# Patient Record
Sex: FEMALE | Race: WHITE | NOT HISPANIC OR LATINO | Employment: OTHER | URBAN - METROPOLITAN AREA
[De-identification: names, ages, dates, MRNs, and addresses within clinical notes are randomized per-mention and may not be internally consistent; named-entity substitution may affect disease eponyms.]

---

## 2017-01-03 ENCOUNTER — TELEPHONE (OUTPATIENT)
Dept: TRANSPLANT | Facility: CLINIC | Age: 47
End: 2017-01-03

## 2017-01-03 NOTE — TELEPHONE ENCOUNTER
MD Marylou Howard RN                     She needs a bilateral so does not need a repeat.            Previous Messages       ----- Message -----      From: Marylou Louie RN      Sent: 12/30/2016   3:21 PM        To: Ralph Whyte MD     Pt wants to know if she needs to have the VQ scan done because she had one done in August.  The test in August did not give a quantitative differential function, and that's what we need, correct?            Notified patient of this.  She had questions regarding other scheduled tests, which were answered to her satisfaction.

## 2017-01-04 ENCOUNTER — HOSPITAL ENCOUNTER (OUTPATIENT)
Dept: SLEEP MEDICINE | Facility: HOSPITAL | Age: 47
Discharge: HOME | End: 2017-01-04
Attending: INTERNAL MEDICINE
Payer: COMMERCIAL

## 2017-01-04 DIAGNOSIS — G47.33 OSA (OBSTRUCTIVE SLEEP APNEA): ICD-10-CM

## 2017-01-04 PROCEDURE — 95810 POLYSOM 6/> YRS 4/> PARAM: CPT

## 2017-01-04 PROCEDURE — 95810 PR POLYSOMNOGRAPHY, 4 OR MORE: ICD-10-PCS | Mod: 26,,, | Performed by: INTERNAL MEDICINE

## 2017-01-04 PROCEDURE — 95810 POLYSOM 6/> YRS 4/> PARAM: CPT | Mod: 26,,, | Performed by: INTERNAL MEDICINE

## 2017-01-06 ENCOUNTER — TELEPHONE (OUTPATIENT)
Dept: TRANSPLANT | Facility: CLINIC | Age: 47
End: 2017-01-06

## 2017-01-06 ENCOUNTER — OFFICE VISIT (OUTPATIENT)
Dept: GASTROENTEROLOGY | Facility: CLINIC | Age: 47
End: 2017-01-06
Payer: COMMERCIAL

## 2017-01-06 VITALS
DIASTOLIC BLOOD PRESSURE: 78 MMHG | HEIGHT: 59 IN | SYSTOLIC BLOOD PRESSURE: 132 MMHG | HEART RATE: 67 BPM | BODY MASS INDEX: 26.8 KG/M2 | WEIGHT: 132.94 LBS

## 2017-01-06 DIAGNOSIS — K58.0 IRRITABLE BOWEL SYNDROME WITH DIARRHEA: Primary | ICD-10-CM

## 2017-01-06 PROCEDURE — 99999 PR PBB SHADOW E&M-EST. PATIENT-LVL III: CPT | Mod: PBBFAC,,, | Performed by: PHYSICIAN ASSISTANT

## 2017-01-06 PROCEDURE — 99214 OFFICE O/P EST MOD 30 MIN: CPT | Mod: S$GLB,,, | Performed by: PHYSICIAN ASSISTANT

## 2017-01-06 PROCEDURE — 1159F MED LIST DOCD IN RCRD: CPT | Mod: S$GLB,,, | Performed by: PHYSICIAN ASSISTANT

## 2017-01-06 RX ORDER — AZELASTINE 1 MG/ML
2 SPRAY, METERED NASAL 2 TIMES DAILY
COMMUNITY
Start: 2016-11-23

## 2017-01-06 RX ORDER — TADALAFIL 20 MG/1
TABLET ORAL
COMMUNITY
Start: 2016-12-29 | End: 2017-01-17 | Stop reason: SDUPTHER

## 2017-01-06 NOTE — PROGRESS NOTES
[facility] Consultation Note    Reason for Consult:  The encounter diagnosis was Irritable bowel syndrome with diarrhea.    PCP:   Lulu Sandhu       Referring MD:  No referring provider defined for this encounter.    HPI:  This is a 46 y.o. female here to follow up on her IBS and GERD  Stopped bentyl caused dry mouth, so restarted Levsin prn  metamucil - made her body ache  Saw no difference on a lactose-free diet x 2 weeks  Having formed to loose BM, 2-3 per day, with urgency in the morning  Associated abdominal cramping pains  probiotic - produced a hard stool  Imodium has caused constipation in the past  Diarrhea is worsensed by her medication Remodulin  Denies use of artificial sweeteners or diet drinks     Has uncontrolled depression and anxiety- her case worked is working on getting counseling covered by her insurance    Reflux is controlled taking nexium    ROS:  Constitutional: No fevers, chills, No weight loss  ENT: No allergies  CV: No chest pain  Pulm: No cough, No shortness of breath  Ophtho: No vision changes  GI: see HPI  Derm: No rash  Heme: No lymphadenopathy, No bruising  MSK: No arthritis  : No dysuria, No hematuria  Endo: No hot or cold intolerance  Neuro: No syncope, No seizure  Psych: No anxiety, No depression    Medical History:  has a past medical history of AR (allergic rhinitis); Cholelithiasis, common bile duct; Chronic low back pain; GERD (gastroesophageal reflux disease); History of migraine headaches; Hypothyroidism; Lumbar disc disease; Menorrhagia; Mild asthma; Obesity; Plantar fasciitis of left foot; Primary pulmonary hypertension; Seizure disorder; TMJ (dislocation of temporomandibular joint); and Tricuspid regurgitation.    Surgical History:  has a past surgical history that includes Portacath placement; Cardiac catheterization; and Upper gastrointestinal endoscopy.    Family History: family history includes Breast cancer in her cousin and maternal  aunt; Cancer in her maternal aunt and maternal grandmother; Diabetes in her maternal grandfather and mother; Glaucoma in her father; Heart attack in her maternal grandfather and paternal grandfather; Heart disease in her father and paternal grandfather; Leukemia in her brother; No Known Problems in her maternal uncle, paternal aunt, paternal grandmother, paternal uncle, and sister. There is no history of Colon cancer, Ovarian cancer, Amblyopia, Blindness, Cataracts, Macular degeneration, Retinal detachment, Strabismus, Stroke, or Thyroid disease..     Social History:  reports that she has never smoked. She has never used smokeless tobacco. She reports that she does not drink alcohol or use illicit drugs.    Review of patient's allergies indicates:   Allergen Reactions    Adhesive Hives     Silk tape    Amoxicillin Rash    Chlorhexidine Other (See Comments)       Current Outpatient Prescriptions on File Prior to Visit   Medication Sig Dispense Refill    0.9 % SODIUM CHLORIDE (SODIUM CHLORIDE 0.9%) 0.9 % Soln Inject 3 mLs into the vein every 8 (eight) hours as needed. (Patient taking differently: Inject 3 mLs into the vein every other day. ) 900 mL 11    ADVAIR DISKUS 100-50 mcg/dose diskus inhaler INHALE ONE PUFF TWICE DAILY 60 each 11    albuterol 90 mcg/actuation inhaler Inhale 2 puffs into the lungs every 4 (four) hours as needed. 2 Aerosol Inhalation Every 4-6 hours 1 Inhaler 3    ambrisentan (LETAIRIS) 10 MG Tab Take 1 tablet (10 mg total) by mouth once daily. 30 tablet 11    butalbital-acetaminophen-caffeine -40 mg (FIORICET, ESGIC) -40 mg per tablet Take 1 tablet by mouth every 4 (four) hours as needed for Pain or Headaches.      cetirizine (ZYRTEC) 10 MG tablet Take 10 mg by mouth. 1 Tablet Oral Every day      clindamycin (CLEOCIN T) 1 % lotion       cyanocobalamin, vitamin B-12, 2,500 mcg Subl Place 2,500 mcg under the tongue once daily.       desonide (DESOWEN) 0.05 % ointment 0.05  %.  Ointment Topical As directed.  Apply to affected area twice a daily over face      diphenhydrAMINE (BENADRYL) 25 mg capsule Take 25 mg by mouth every 6 (six) hours as needed for Itching.      esomeprazole (NEXIUM) 40 MG capsule Take 1 capsule (40 mg total) by mouth before breakfast. 30 capsule 11    ferrous sulfate 325 (65 FE) MG EC tablet Take 325 mg by mouth daily as needed.       fluticasone (FLONASE) 50 mcg/actuation nasal spray 2 sprays by Each Nare route once daily. 1 Bottle 6    folic acid (FOLVITE) 1 MG tablet Take 1 mg by mouth.  Tablet Oral Every day      levothyroxine (SYNTHROID) 137 MCG Tab tablet Take 1 tablet (137 mcg total) by mouth once daily. 30 tablet 5    norethindrone-ethinyl estradiol (MICROGESTIN FE 1/20, 28,) 1 mg-20 mcg (21)/75 mg (7) per tablet Take 1 tablet by mouth once daily. 28 tablet 5    ondansetron (ZOFRAN) 4 MG tablet Take 4 mg by mouth every 6 (six) hours as needed. 1 Tablet Oral Every 6 hours      PRAMOSONE 2.5-1 % Lotn lotion       promethazine (PHENERGAN) 25 MG tablet Take 25 mg by mouth every 4 to 6 hours as needed.       tadalafil (ADCIRCA) 20 MG Tab Take 2 tablets (40 mg total) by mouth once daily. 60 tablet 11    topiramate (TOPAMAX) 100 MG tablet Take 100 mg by mouth 2 (two) times daily.      treprostinil (REMODULIN) 2.5 mg/mL Soln Mix cassette as directed and infuse continuously per physician titration orders on dosing sheet. Current dose 46 ng/kg/min 60 mL 11    VITAMIN D2 50,000 unit capsule TAKE ONE CAPSULE BY MOUTH EVERY 14 DAYS 6 capsule 0    warfarin (COUMADIN) 2.5 MG tablet Take 2.5-3 tablets (6.25-7.5 mg total) by mouth Daily. As directed by coumadin clinic 90 tablet 11    dicyclomine (BENTYL) 10 MG capsule Take 1 capsule (10 mg total) by mouth before meals as needed (TID PRN). 90 capsule 3    FINACEA 15 % Foam Place 1 Pump onto the skin 2 (two) times daily.       No current facility-administered medications on file prior to visit.   "        Objective Findings:    Vital Signs:  Visit Vitals    /78    Pulse 67    Ht 4' 11" (1.499 m)    Wt 60.3 kg (132 lb 15 oz)    BMI 26.85 kg/m2     Body mass index is 26.85 kg/(m^2).    Physical Exam:  General Appearance: Well appearing in no acute distress  Head:   Normocephalic, without obvious abnormality  Eyes:    No scleral icterus  ENT: Neck supple, Lips, mucosa, and tongue normal  Lungs: CTA bilaterally in anterior and posterior fields, no wheezes, no crackles.  Heart:  Regular rate and rhythm, S1, S2 normal, + murmurs heard  Abdomen: Soft, non tender, non distended with positive bowel sounds in all four quadrants.   Extremities: 2+ pulses, no clubbing, cyanosis or edema  Skin: No rash  Neurologic: AAO x 3      Labs:  Lab Results   Component Value Date    WBC 3.92 12/09/2016    HGB 12.3 12/09/2016    HCT 38.4 12/09/2016     12/09/2016    CHOL 127 12/13/2011    TRIG 91 12/13/2011    HDL 34 (L) 12/13/2011    ALT 20 12/09/2016    AST 19 12/09/2016     12/09/2016    K 4.5 12/09/2016     (H) 12/09/2016    CREATININE 0.8 12/09/2016    BUN 7 12/09/2016    CO2 22 (L) 12/09/2016    TSH 2.046 02/08/2016    INR 2.4 12/29/2016    HGBA1C 5.0 12/08/2015       Imaging:    Endoscopy:    EGD 3/2012 - reactive gastritis, H. Pylori neg    Assessment:  1. Irritable bowel syndrome with diarrhea       no relief with levsin / bentyl    Recommendations:  1. Trial of xifaxan. Will likely need a PA. Will contact pt's transplant team to make sure this is ok.     2. Low fod map diet.    3. Info given on mindful meditation    Consider breath tests at next visits  Return in about 3 months (around 4/6/2017).      Order summary:  Orders Placed This Encounter    rifAXIMin (XIFAXAN) 550 mg Tab         Thank you so much for allowing me to participate in the care of Yuni Anguiano PA-C        "

## 2017-01-06 NOTE — TELEPHONE ENCOUNTER
"Patient came by the clinic today with concerns about her weight. She had just been to the GI doctor and her weight was 60.3 (132lbs) which she said was a "big" change from her usual weight of 138lbs. She says she has not been good about weighing herself daily but the last time she did she was around 135lbs.She does not report any changes in diet or GI (diarrhea, vomiting, etc) more than usual. She is concerned about the weight change affecting her remodulin dose.  Notified Dr. Rai and reviewed her last weights in EPIC that do show that she has been within 60-63 kg over the last few months. At this time there is no need to adjust her dosing weight with regard to her remodulin, per Dr. Rai.   Reassured the patient and asked her to start weighing herself once daily at the same time each morning and keep a record so that she would have a baseline for her home scale.  "

## 2017-01-06 NOTE — MR AVS SNAPSHOT
[facility]  [address]  [facility] [address]  Phone: [phone]  Fax: [phone]                  Yuni Perez   2017 11:00 AM   Office Visit    Description:  Female : 1970   Provider:  Miley Anguiano PA-C   Department:  [facility] G[facility]ology           Reason for Visit     Follow-up           Diagnoses this Visit        Comments    Irritable bowel syndrome with diarrhea    -  Primary            To Do List           Future Appointments        Provider Department Dept Phone    2017 8:00 AM LAB, APPOINTMENT [place] [facility] [phone]    2017 8:15 AM SPECIMEN, MAIN [facility]AMPUS [facility] [phone]    2017 8:20 AM SPECIMEN, MAIN CAMPUS [facility] [phone]    2017 8:45 AM NOM XROP3 485 LB LIMIT [facility] [phone]    2017 9:30 AM TRANSPLANT, PRE-LUNG NURSE EDUCATION [facility] [phone]      Goals (5 Years of Data)     None       These Medications        Disp Refills Start End    rifAXIMin (XIFAXAN) 550 mg Tab 42 tablet 0 2017    Take 1 tablet (550 mg total) by mouth 3 (three) times daily. - Oral    [place] [facility] [place] [address] Ph #: [phone]         [facility] On Call     [facility] -  Assistance  Registered nurses in the [facility] provide clinical advisement, health education, appointment booking, and other advisory services.  Call for this free service at [phone].             Medications           Message regarding Medications     Verify the changes and/or additions to your medication regime listed below are the same as discussed with your clinician today.  If any of these changes or additions are incorrect, please notify your healthcare provider.        START taking these NEW medications         Refills    rifAXIMin (XIFAXAN) 550 mg Tab 0    Sig: Take 1 tablet (550 mg total) by mouth 3 (three) times daily.    Class: Normal    Route: Oral           Verify that the below list of medications is an accurate representation of the medications you are currently taking.  If none reported, the list may be blank. If incorrect, please contact your healthcare provider. Carry this list with you in case of emergency.           Current Medications     0.9 % SODIUM CHLORIDE (SODIUM CHLORIDE 0.9%) 0.9 % Soln Inject 3 mLs into the vein every 8 (eight) hours as needed.    ADVAIR DISKUS 100-50 mcg/dose diskus inhaler INHALE ONE PUFF TWICE DAILY    albuterol 90 mcg/actuation inhaler Inhale 2 puffs into the lungs every 4 (four) hours as needed. 2 Aerosol Inhalation Every 4-6 hours    ambrisentan (LETAIRIS) 10 MG Tab Take 1 tablet (10 mg total) by mouth once daily.    azelastine (ASTELIN) 137 mcg (0.1 %) nasal spray     butalbital-acetaminophen-caffeine -40 mg (FIORICET, ESGIC) -40 mg per tablet Take 1 tablet by mouth every 4 (four) hours as needed for Pain or Headaches.    cetirizine (ZYRTEC) 10 MG tablet Take 10 mg by mouth. 1 Tablet Oral Every day    clindamycin (CLEOCIN T) 1 % lotion     cyanocobalamin, vitamin B-12, 2,500 mcg Subl Place 2,500 mcg under the tongue once daily.     desonide (DESOWEN) 0.05 % ointment 0.05 %.  Ointment Topical As directed.  Apply to affected area twice a daily over face    diphenhydrAMINE (BENADRYL) 25 mg capsule Take 25 mg by mouth every 6 (six) hours as needed for Itching.    esomeprazole (NEXIUM) 40 MG capsule Take 1 capsule (40 mg total) by mouth before breakfast.    ferrous sulfate 325 (65 FE) MG EC tablet Take 325 mg by mouth daily as needed.     fluticasone (FLONASE) 50 mcg/actuation nasal spray 2 sprays by Each Nare route once daily.    folic acid (FOLVITE) 1 MG tablet Take 1 mg by mouth.  Tablet Oral Every day    levothyroxine (SYNTHROID) 137 MCG Tab tablet Take 1 tablet (137  "mcg total) by mouth once daily.    norethindrone-ethinyl estradiol (MICROGESTIN FE 1/20, 28,) 1 mg-20 mcg (21)/75 mg (7) per tablet Take 1 tablet by mouth once daily.    ondansetron (ZOFRAN) 4 MG tablet Take 4 mg by mouth every 6 (six) hours as needed. 1 Tablet Oral Every 6 hours    PRAMOSONE 2.5-1 % Lotn lotion     promethazine (PHENERGAN) 25 MG tablet Take 25 mg by mouth every 4 to 6 hours as needed.     tadalafil (ADCIRCA) 20 MG Tab Take 2 tablets (40 mg total) by mouth once daily.    topiramate (TOPAMAX) 100 MG tablet Take 100 mg by mouth 2 (two) times daily.    treprostinil (REMODULIN) 2.5 mg/mL Soln Mix cassette as directed and infuse continuously per physician titration orders on dosing sheet. Current dose 46 ng/kg/min    VITAMIN D2 50,000 unit capsule TAKE ONE CAPSULE BY MOUTH EVERY 14 DAYS    warfarin (COUMADIN) 2.5 MG tablet Take 2.5-3 tablets (6.25-7.5 mg total) by mouth Daily. As directed by coumadin clinic    ADCIRCA 20 mg Tab     dicyclomine (BENTYL) 10 MG capsule Take 1 capsule (10 mg total) by mouth before meals as needed (TID PRN).    FINACEA 15 % Foam Place 1 Pump onto the skin 2 (two) times daily.    rifAXIMin (XIFAXAN) 550 mg Tab Take 1 tablet (550 mg total) by mouth 3 (three) times daily.           Clinical Reference Information           Vital Signs - Last Recorded  Most recent update: 1/6/2017 11:15 AM by Skinny Jenkins MA    BP Pulse Ht Wt BMI    132/78 67 4' 11" (1.499 m) 60.3 kg (132 lb 15 oz) 26.85 kg/m2      Blood Pressure          Most Recent Value    BP  132/78      Allergies as of 1/6/2017     Adhesive    Amoxicillin    Chlorhexidine      Immunizations Administered on Date of Encounter - 1/6/2017     None      Maintenance Dialysis History     Patient has no recorded history of maintenance dialysis.      Transplant Information        Txp Date Organ Coordinator Care Team     Lung Marylou Louie RN Referring Physician:  Stacy A. Mandras, MD [facility] Sign-Up     Activating your " [facility] account is as easy as 1-2-3!     1) Visit [email or web address], select Sign Up Now, enter this activation code and your date of birth, then select Next.  [email or web address]  Expires: 1/23/2017  1:25 PM      2) Create a username and password to use when you visit [facility] in the future and select a security question in case you lose your password and select Next.    3) Enter your e-mail address and click Sign Up!    Additional Information  If you have questions, please e-mail [email or web address] or call [phone] to talk to our [facility] staff. Remember, [facility] is NOT to be used for urgent needs. For medical emergencies, dial 911.         Instructions    Try the probiotic culturelle. Take daily    Try mindful meditation for your IBS    Low Fodmap Diet    Fodmaps (fructo,oligo,mono saccharides and polyols) are contained in certain foods and can cause significant bloating in some people. Reducing these foods can reduce bloating. Start off by cutting out anything with high fructose corn syrup in the ingredients.        Foods suitable on a low-fodmap diet  fruit  banana, blueberry, boysenberry, canteloupe, cranberry, durian, grape,  grapefruit, honeydew melon, kiwifruit, lemon,lime, mandarin, orange,  passionfruit, pawpaw, raspberry, rhubarb, rockmelon, star anise,  strawberry, tangelo  vegetables  alfalfa, artichoke, bamboo shoots, bean shoots, bok betsy,  carrot, celery, choko, betsy sum, endive, bashir, green beans,  lettuce, olives, parsnip, potato, pumpkin, red capsicum (bell pepper),  silver beet, spinach, summer squash (yellow), swede, sweet potato, taro, tomato,  turnip, yam, zucchini   herbs  basil, chili, coriander, bashir, lemongrass,   marjoram, mint, oregano, parsley, rosemary, thyme  milk  lactose-free milk, oat milk*, rice milk, soy milk*  cheeses  hard cheeses, and brie and camembert  yoghurt  lactose-free varieties  ice-cream substitutes  gelati, sorbet  butter substitutes  olive oilgrain  foods  cereals  gluten-free bread or cereal products  bread  100% spelt bread  Rice, oats, polenta, other  arrowroot, millet, psyllium, quinoa, sorgum, tapioca  sweeteners  sugar* (sucrose), glucose, artificial sweeteners not ending in -ol  honey substitutes  springer syrup*, maple syrup*, molasses, treacle  *small quantities  *check for additives  Note: if fruit is dried, eat in small quantities    excess fructose lactose fructans galactans polyols      Eliminate foods containing fodmaps  fruit  apple, apricot, avocado, blackberry, cherry, lychee, nashi, nectarine, peach, pear, plum,  prune, watermelon, josselin, tinned fruit in natural juice,   sweeteners  sorbitol (420)  mannitol (421)  isomalt (953)  maltitol (965)  xylitol (967)  legumes  baked beans, chickpeas, kidney beans, lentils  vegetables  asparagus, beetroot, broccoli, brussels sprouts, cabbage, eggplant, fennel, garlic,  douglas, okra, onion (all), shallots, spring onion, cauliflower, green capsicum (bell pepper), mushroom, sweet corn  cereals  wheat and rye, in large amounts eg. Bread, crackers, cookies, couscous, pasta  fruit  custard apple, persimmon, watermelon  miscellaneous  chicory, dandelion, inulin  sweeteners  fructose, high fructose corn syrup  large total fructose dose  concentrated fruit sources, large servings of fruit, dried fruit, fruit juice  honey  corn syrup, fruisana   milk  milk from cows, goats or sheep, custard, ice cream, yoghurt  cheeses  soft unripened cheeses eg. cottage, cream, mascarpone

## 2017-01-06 NOTE — PATIENT INSTRUCTIONS
Try the probiotic culturelle. Take daily    Try mindful meditation for your IBS    Low Fodmap Diet    Fodmaps (fructo,oligo,mono saccharides and polyols) are contained in certain foods and can cause significant bloating in some people. Reducing these foods can reduce bloating. Start off by cutting out anything with high fructose corn syrup in the ingredients.        Foods suitable on a low-fodmap diet  fruit  banana, blueberry, boysenberry, canteloupe, cranberry, durian, grape,  grapefruit, honeydew melon, kiwifruit, lemon,lime, mandarin, orange,  passionfruit, pawpaw, raspberry, rhubarb, rockmelon, star anise,  strawberry, tangelo  vegetables  alfalfa, artichoke, bamboo shoots, bean shoots, bok betsy,  carrot, celery, choko, betsy sum, endive, bashir, green beans,  lettuce, olives, parsnip, potato, pumpkin, red capsicum (bell pepper),  silver beet, spinach, summer squash (yellow), swede, sweet potato, taro, tomato,  turnip, yam, zucchini   herbs  basil, chili, coriander, bashir, lemongrass,   marjoram, mint, oregano, parsley, rosemary, thyme  milk  lactose-free milk, oat milk*, rice milk, soy milk*  cheeses  hard cheeses, and brie and camembert  yoghurt  lactose-free varieties  ice-cream substitutes  gelati, sorbet  butter substitutes  olive oilgrain foods  cereals  gluten-free bread or cereal products  bread  100% spelt bread  Rice, oats, polenta, other  arrowroot, millet, psyllium, quinoa, sorgum, tapioca  sweeteners  sugar* (sucrose), glucose, artificial sweeteners not ending in -ol  honey substitutes  springer syrup*, maple syrup*, molasses, treacle  *small quantities  *check for additives  Note: if fruit is dried, eat in small quantities    excess fructose lactose fructans galactans polyols      Eliminate foods containing fodmaps  fruit  apple, apricot, avocado, blackberry, cherry, lychee, nashi, nectarine, peach, pear, plum,  prune, watermelon, josselin, tinned fruit in natural juice,   sweeteners  sorbitol  (420)  mannitol (421)  isomalt (953)  maltitol (965)  xylitol (967)  legumes  baked beans, chickpeas, kidney beans, lentils  vegetables  asparagus, beetroot, broccoli, brussels sprouts, cabbage, eggplant, fennel, garlic,  douglas, okra, onion (all), shallots, spring onion, cauliflower, green capsicum (bell pepper), mushroom, sweet corn  cereals  wheat and rye, in large amounts eg. Bread, crackers, cookies, couscous, pasta  fruit  custard apple, persimmon, watermelon  miscellaneous  chicory, dandelion, inulin  sweeteners  fructose, high fructose corn syrup  large total fructose dose  concentrated fruit sources, large servings of fruit, dried fruit, fruit juice  honey  corn syrup, fruisana   milk  milk from cows, goats or sheep, custard, ice cream, yoghurt  cheeses  soft unripened cheeses eg. cottage, cream, mascarpone

## 2017-01-09 ENCOUNTER — TELEPHONE (OUTPATIENT)
Dept: GASTROENTEROLOGY | Facility: CLINIC | Age: 47
End: 2017-01-09

## 2017-01-12 ENCOUNTER — TELEPHONE (OUTPATIENT)
Dept: TRANSPLANT | Facility: CLINIC | Age: 47
End: 2017-01-12

## 2017-01-12 NOTE — TELEPHONE ENCOUNTER
----- Message from Ivonne Rai MD sent at 1/11/2017  3:26 PM CST -----  Can correct the dosing wt without decreasing current rate    ----- Message -----     From: SULY Turner, RN     Sent: 1/11/2017   2:51 PM       To: Ivonne Rai MD    I talked w/her about bringing dose down, and she prefers to keep as is, since she is still having CP. Are you ok with keeping dosing weight 68kg, or do you want new dosing sheet to reflect 60.5kg?   Thank you,  Sarah

## 2017-01-12 NOTE — TELEPHONE ENCOUNTER
Left message for patient regarding same. Message sent to Rajesh, [facility] specialists, requesting new dosing sheet to patient with updated dosing weight of 60.5kg=80ng/kg/min of Remodulin.

## 2017-01-13 NOTE — PROCEDURES
"See imported Sleep Study result in "Chart Review" under the "Media tab."    (This Sleep Study was interpreted by a Board Certified Sleep Specialist who conducted an epoch-by-epoch review of the entire raw data recording.)    (The indication for this sleep study was reviewed and deemed appropriate by [employer] practice Parameters or other reasons by a Board Certified Sleep Specialist.)   "

## 2017-01-16 ENCOUNTER — ANTI-COAG VISIT (OUTPATIENT)
Dept: CARDIOLOGY | Facility: CLINIC | Age: 47
End: 2017-01-16

## 2017-01-16 ENCOUNTER — HOSPITAL ENCOUNTER (OUTPATIENT)
Dept: RADIOLOGY | Facility: HOSPITAL | Age: 47
Discharge: HOME | End: 2017-01-16
Attending: INTERNAL MEDICINE
Payer: COMMERCIAL

## 2017-01-16 ENCOUNTER — CLINICAL SUPPORT (OUTPATIENT)
Dept: TRANSPLANT | Facility: CLINIC | Age: 47
End: 2017-01-16
Payer: COMMERCIAL

## 2017-01-16 DIAGNOSIS — Z76.82 AWAITING ORGAN TRANSPLANT STATUS: ICD-10-CM

## 2017-01-16 DIAGNOSIS — Z79.01 LONG TERM CURRENT USE OF ANTICOAGULANT THERAPY: ICD-10-CM

## 2017-01-16 DIAGNOSIS — I27.9 CHRONIC CARDIOPULMONARY DISEASE: ICD-10-CM

## 2017-01-16 DIAGNOSIS — I27.20 PULMONARY HYPERTENSION: ICD-10-CM

## 2017-01-16 PROCEDURE — 71020 XR CHEST PA AND LATERAL: CPT | Mod: 26,,, | Performed by: RADIOLOGY

## 2017-01-16 PROCEDURE — 71020 XR CHEST PA AND LATERAL: CPT | Mod: TC

## 2017-01-16 NOTE — PROGRESS NOTES
PRE EDUCATION NOTE    Met with Yuni Perez and her mother for pre-transplant education and to consent for lung transplant evaluation.  Pre-transplant educational binder given to patient.  Instructed patient to read the binder.      Thorough pre-transplant education conducted.    Information presented included:  · Evaluation process and testing  · Members of the transplant team  · Selection committee members and role of the committee  · Contraindications to lung transplantation  · Policy for absolute smoking / nicotine cessation for at least 6 months prior to listing  · Relocation pre- and post-transplant  · Listing process for transplant: explained the listing designations, active versus inactive (status 7) and lung allocation score (LAS)  · National graft survival statistics, our current survival statistics since reopening of the program to present  · Wait time on the list  · The need to reach patient within 15 minutes with donor offer  ·  [facility] donors with increased risk of disease transmission  · Donor criteria and testing on donor, procurement of donor lungs and ischemic time, blood transfusions, process for matching donor with recipient  · Transplant surgery, single vs. double, estimated length of surgery, surgical incision, chest tubes and purpose, and ventilator  · Post-transplant immunosuppression for life with the need to be able to afford post transplant medications, immediate post transplant ICU stay - extubation, explained the importance of getting out of bed (once extubated) and the importance of coughing and taking deep breaths despite the pain to prevent pneumonia  · Need for a caregiver to be with her at all times beginning with discharge from ICU and transfer to TSU, through at least the first 3 months post-transplant possibly longer  · Post-transplant medications, their side effects, and self medications  · Discharge, follow-up clinic visits, testing with each visit, and  surveillance bronchoscopies  · Rejection and treatment, how to reach team members at any time  · Health maintenance post-transplant, avoiding large crowds and sick contacts, wearing a mask, good handwashing, pet policy, fishing, dermatology, opthamology, and dental visits post-transplant  · Multiple listing information provided    Yuni Perez and her mother asked pertinent questions which were answered to their satisfaction.     Informed Consent to Undergo Evaluation for Lung Transplantation reviewed and discussed with patient and her mother.  Consent initialed and signed by patient.  Copy of consent given to patient.  Original to be sent to Medical Records for scanning.

## 2017-01-17 ENCOUNTER — HOSPITAL ENCOUNTER (OUTPATIENT)
Dept: RADIOLOGY | Facility: HOSPITAL | Age: 47
Discharge: HOME | End: 2017-01-17
Attending: INTERNAL MEDICINE
Payer: COMMERCIAL

## 2017-01-17 ENCOUNTER — LAB VISIT (OUTPATIENT)
Dept: LAB | Facility: HOSPITAL | Age: 47
End: 2017-01-17
Attending: INTERNAL MEDICINE
Payer: COMMERCIAL

## 2017-01-17 ENCOUNTER — DOCUMENTATION ONLY (OUTPATIENT)
Dept: CARDIOLOGY | Facility: CLINIC | Age: 47
End: 2017-01-17

## 2017-01-17 ENCOUNTER — HOSPITAL ENCOUNTER (OUTPATIENT)
Dept: CARDIOLOGY | Facility: CLINIC | Age: 47
Discharge: HOME | End: 2017-01-17
Payer: COMMERCIAL

## 2017-01-17 ENCOUNTER — INITIAL CONSULT (OUTPATIENT)
Dept: CARDIOLOGY | Facility: CLINIC | Age: 47
End: 2017-01-17
Payer: COMMERCIAL

## 2017-01-17 ENCOUNTER — NUTRITION (OUTPATIENT)
Dept: TRANSPLANT | Facility: CLINIC | Age: 47
End: 2017-01-17
Payer: COMMERCIAL

## 2017-01-17 VITALS — HEIGHT: 58 IN | BODY MASS INDEX: 27.81 KG/M2 | WEIGHT: 132.5 LBS

## 2017-01-17 VITALS
HEART RATE: 72 BPM | SYSTOLIC BLOOD PRESSURE: 115 MMHG | DIASTOLIC BLOOD PRESSURE: 71 MMHG | BODY MASS INDEX: 27.15 KG/M2 | OXYGEN SATURATION: 99 % | HEIGHT: 59 IN | WEIGHT: 134.69 LBS

## 2017-01-17 DIAGNOSIS — I27.20 PULMONARY HYPERTENSION: ICD-10-CM

## 2017-01-17 DIAGNOSIS — R14.0 GENERALIZED BLOATING: ICD-10-CM

## 2017-01-17 DIAGNOSIS — Z76.82 AWAITING ORGAN TRANSPLANT STATUS: ICD-10-CM

## 2017-01-17 DIAGNOSIS — I27.0 PRIMARY PULMONARY HYPERTENSION: ICD-10-CM

## 2017-01-17 DIAGNOSIS — R10.84 GENERALIZED ABDOMINAL CRAMPING: ICD-10-CM

## 2017-01-17 DIAGNOSIS — E66.3 OVERWEIGHT (BMI 25.0-29.9): ICD-10-CM

## 2017-01-17 DIAGNOSIS — N92.1 MENORRHAGIA WITH IRREGULAR CYCLE: ICD-10-CM

## 2017-01-17 DIAGNOSIS — I27.89 OTHER CHRONIC PULMONARY HEART DISEASES: ICD-10-CM

## 2017-01-17 DIAGNOSIS — R19.7 DIARRHEA, UNSPECIFIED TYPE: Primary | ICD-10-CM

## 2017-01-17 DIAGNOSIS — I27.9 CHRONIC CARDIOPULMONARY DISEASE: ICD-10-CM

## 2017-01-17 DIAGNOSIS — Z01.818 PRE-TRANSPLANT EVALUATION FOR LUNG TRANSPLANT: Primary | ICD-10-CM

## 2017-01-17 LAB
ABO + RH BLD: NORMAL
HIV 1+2 AB+HIV1 P24 AG SERPL QL IA: NEGATIVE

## 2017-01-17 PROCEDURE — 93000 ELECTROCARDIOGRAM COMPLETE: CPT | Mod: S$GLB,,, | Performed by: INTERNAL MEDICINE

## 2017-01-17 PROCEDURE — 81373 HLA I TYPING 1 LOCUS LR: CPT | Mod: 91,PO

## 2017-01-17 PROCEDURE — 76700 US EXAM ABDOM COMPLETE: CPT | Mod: TC

## 2017-01-17 PROCEDURE — 36415 COLL VENOUS BLD VENIPUNCTURE: CPT

## 2017-01-17 PROCEDURE — 86901 BLOOD TYPING SEROLOGIC RH(D): CPT

## 2017-01-17 PROCEDURE — 86825 HLA X-MATH NON-CYTOTOXIC: CPT | Mod: PO

## 2017-01-17 PROCEDURE — 81376 HLA II TYPING 1 LOCUS LR: CPT | Mod: PO

## 2017-01-17 PROCEDURE — 93880 EXTRACRANIAL BILAT STUDY: CPT | Mod: TC

## 2017-01-17 PROCEDURE — 86828 HLA CLASS I&II ANTIBODY QUAL: CPT | Mod: 91,PO

## 2017-01-17 PROCEDURE — 97802 MEDICAL NUTRITION INDIV IN: CPT | Mod: S$GLB,,, | Performed by: DIETITIAN, REGISTERED

## 2017-01-17 PROCEDURE — 99999 PR PBB SHADOW E&M-EST. PATIENT-LVL II: CPT | Mod: PBBFAC,,, | Performed by: DIETITIAN, REGISTERED

## 2017-01-17 PROCEDURE — 76700 US EXAM ABDOM COMPLETE: CPT | Mod: 26,,, | Performed by: RADIOLOGY

## 2017-01-17 PROCEDURE — 86832 HLA CLASS I HIGH DEFIN QUAL: CPT | Mod: PO

## 2017-01-17 PROCEDURE — 81373 HLA I TYPING 1 LOCUS LR: CPT | Mod: PO

## 2017-01-17 PROCEDURE — 99999 PR PBB SHADOW E&M-EST. PATIENT-LVL V: CPT | Mod: PBBFAC,,, | Performed by: INTERNAL MEDICINE

## 2017-01-17 PROCEDURE — 86825 HLA X-MATH NON-CYTOTOXIC: CPT | Mod: 91,PO

## 2017-01-17 PROCEDURE — 86833 HLA CLASS II HIGH DEFIN QUAL: CPT | Mod: PO

## 2017-01-17 PROCEDURE — 81376 HLA II TYPING 1 LOCUS LR: CPT | Mod: 91,PO

## 2017-01-17 PROCEDURE — 86900 BLOOD TYPING SEROLOGIC ABO: CPT

## 2017-01-17 PROCEDURE — 86703 HIV-1/HIV-2 1 RESULT ANTBDY: CPT

## 2017-01-17 PROCEDURE — 99244 OFF/OP CNSLTJ NEW/EST MOD 40: CPT | Mod: S$GLB,,, | Performed by: INTERNAL MEDICINE

## 2017-01-17 PROCEDURE — 86829 HLA CLASS I/II ANTIBODY QUAL: CPT | Mod: PO

## 2017-01-17 PROCEDURE — 93880 EXTRACRANIAL BILAT STUDY: CPT | Mod: 26,,, | Performed by: RADIOLOGY

## 2017-01-17 PROCEDURE — 86977 RBC SERUM PRETX INCUBJ/INHIB: CPT | Mod: PO

## 2017-01-17 RX ORDER — DIPHENHYDRAMINE HCL 25 MG
50 CAPSULE ORAL ONCE
Status: CANCELLED | OUTPATIENT
Start: 2017-01-17

## 2017-01-17 RX ORDER — SODIUM CHLORIDE 9 MG/ML
3 INJECTION, SOLUTION INTRAVENOUS CONTINUOUS
Status: CANCELLED | OUTPATIENT
Start: 2017-01-17 | End: 2017-01-17

## 2017-01-17 RX ORDER — NORETHINDRONE ACETATE AND ETHINYL ESTRADIOL 1MG-20(21)
KIT ORAL
Qty: 28 TABLET | Refills: 0 | Status: SHIPPED | OUTPATIENT
Start: 2017-01-17 | End: 2017-03-02 | Stop reason: SDUPTHER

## 2017-01-17 NOTE — PROGRESS NOTES
INTERVENTIONAL CARDIOLOGY CONSULT      Referring Physician:  No att. providers found  Indication: Pre-Lung transplant evaluation     HPI:  46 year old female with a history of idiopathic PH(2008) on remodulin, ambrisentan and tadalafil who is referred by Dr. Whyte for pre-lung transplant evaluation.     Currently doing well, she uses no oxygen and can tolerate walking about half a block without incline before getting short of breath. She notes some headaches with the remodulin. Her shortness of breath has been getting worse since the end of the last year. She has noted some episodes of chest pain that are pleuritic in nature, worsening with breathing since her diagnosis.    She takes coumadin for her PH but denies any clots in the past. She had a exercise treadmill test which was reported normal many years ago. She is a non-smoker.      ROS:  Constitution: Negative for fever, chills, weight loss or gain.   HENT: Negative for sore throat, rhinorrhea, or headache.  Eyes: Negative for blurred or double vision.   Cardiovascular: See above  Pulmonary: Positive for SOB   Gastrointestinal: Negative for abdominal pain, nausea, vomiting, or diarrhea.   : Negative for dysuria.   Neurological: Negative for focal weakness or sensory changes.    Past Medical History   Diagnosis Date    AR (allergic rhinitis)     Cholelithiasis, common bile duct     Chronic low back pain     GERD (gastroesophageal reflux disease)     History of migraine headaches     Hypothyroidism     Lumbar disc disease     Menorrhagia     Mild asthma     Obesity     Plantar fasciitis of left foot     Primary pulmonary hypertension      followed by heart transplant/pulmonary     Seizure disorder      x 1 in 2008    Sleep apnea     TMJ (dislocation of temporomandibular joint)     Tricuspid regurgitation      Past Surgical History   Procedure Laterality Date    Portacath placement      Cardiac catheterization      Upper gastrointestinal  endoscopy       Family History   Problem Relation Age of Onset    Heart disease Father     Glaucoma Father     Diabetes Mother     Cancer Maternal Grandmother      uterine    Cancer Maternal Aunt      breast    Breast cancer Maternal Aunt     Heart disease Paternal Grandfather     Heart attack Paternal Grandfather     Leukemia Brother     Hypertension      Diabetes Maternal Grandfather     Heart attack Maternal Grandfather     Breast cancer Cousin     No Known Problems Sister     No Known Problems Maternal Uncle     No Known Problems Paternal Aunt     No Known Problems Paternal Uncle     No Known Problems Paternal Grandmother     Colon cancer Neg Hx     Ovarian cancer Neg Hx     Amblyopia Neg Hx     Blindness Neg Hx     Cataracts Neg Hx     Macular degeneration Neg Hx     Retinal detachment Neg Hx     Strabismus Neg Hx     Stroke Neg Hx     Thyroid disease Neg Hx      Social History   Substance Use Topics    Smoking status: Never Smoker    Smokeless tobacco: Never Used    Alcohol use No      Comment: 1 per year     Review of patient's allergies indicates:   Allergen Reactions    Adhesive Hives     Silk tape    Amoxicillin Rash    Chlorhexidine Other (See Comments)       Current Outpatient Prescriptions on File Prior to Visit   Medication Sig Dispense Refill    0.9 % SODIUM CHLORIDE (SODIUM CHLORIDE 0.9%) 0.9 % Soln Inject 3 mLs into the vein every 8 (eight) hours as needed. (Patient taking differently: Inject 3 mLs into the vein every other day. ) 900 mL 11    ADVAIR DISKUS 100-50 mcg/dose diskus inhaler INHALE ONE PUFF TWICE DAILY 60 each 11    albuterol 90 mcg/actuation inhaler Inhale 2 puffs into the lungs every 4 (four) hours as needed. 2 Aerosol Inhalation Every 4-6 hours 1 Inhaler 3    ambrisentan (LETAIRIS) 10 MG Tab Take 1 tablet (10 mg total) by mouth once daily. 30 tablet 11    azelastine (ASTELIN) 137 mcg (0.1 %) nasal spray       butalbital-acetaminophen-caffeine  -40 mg (FIORICET, ESGIC) -40 mg per tablet Take 1 tablet by mouth every 4 (four) hours as needed for Pain or Headaches.      cetirizine (ZYRTEC) 10 MG tablet Take 10 mg by mouth. 1 Tablet Oral Every day      clindamycin (CLEOCIN T) 1 % lotion       cyanocobalamin, vitamin B-12, 2,500 mcg Subl Place 2,500 mcg under the tongue once daily.       desonide (DESOWEN) 0.05 % ointment 0.05 %.  Ointment Topical As directed.  Apply to affected area twice a daily over face      diphenhydrAMINE (BENADRYL) 25 mg capsule Take 25 mg by mouth every 6 (six) hours as needed for Itching.      esomeprazole (NEXIUM) 40 MG capsule Take 1 capsule (40 mg total) by mouth before breakfast. 30 capsule 11    ferrous sulfate 325 (65 FE) MG EC tablet Take 325 mg by mouth daily as needed.       fluticasone (FLONASE) 50 mcg/actuation nasal spray 2 sprays by Each Nare route once daily. 1 Bottle 6    folic acid (FOLVITE) 1 MG tablet Take 1 mg by mouth.  Tablet Oral Every day      levothyroxine (SYNTHROID) 137 MCG Tab tablet Take 1 tablet (137 mcg total) by mouth once daily. 30 tablet 5    norethindrone-ethinyl estradiol (MICROGESTIN FE 1/20, 28,) 1 mg-20 mcg (21)/75 mg (7) per tablet Take 1 tablet by mouth once daily. 28 tablet 5    ondansetron (ZOFRAN) 4 MG tablet Take 4 mg by mouth every 6 (six) hours as needed. 1 Tablet Oral Every 6 hours      PRAMOSONE 2.5-1 % Lotn lotion       promethazine (PHENERGAN) 25 MG tablet Take 25 mg by mouth every 4 to 6 hours as needed.       rifAXIMin (XIFAXAN) 550 mg Tab Take 1 tablet (550 mg total) by mouth 3 (three) times daily. 42 tablet 0    tadalafil (ADCIRCA) 20 MG Tab Take 2 tablets (40 mg total) by mouth once daily. 60 tablet 11    topiramate (TOPAMAX) 100 MG tablet Take 100 mg by mouth 2 (two) times daily.      treprostinil (REMODULIN) 2.5 mg/mL Soln Mix cassette as directed and infuse continuously per physician titration orders on dosing sheet. Current dose 46 ng/kg/min 60 mL  "11    VITAMIN D2 50,000 unit capsule TAKE ONE CAPSULE BY MOUTH EVERY 14 DAYS 6 capsule 0    warfarin (COUMADIN) 2.5 MG tablet Take 2.5-3 tablets (6.25-7.5 mg total) by mouth Daily. As directed by coumadin clinic 90 tablet 11    [DISCONTINUED] ADCIRCA 20 mg Tab       [DISCONTINUED] dicyclomine (BENTYL) 10 MG capsule Take 1 capsule (10 mg total) by mouth before meals as needed (TID PRN). 90 capsule 3    [DISCONTINUED] FINACEA 15 % Foam Place 1 Pump onto the skin 2 (two) times daily.       No current facility-administered medications on file prior to visit.        OBJECTIVE:     Vitals:    01/17/17 1246 01/17/17 1255   BP: (!) 127/57 115/71   Pulse: 72    SpO2: 99%    Weight: 61.1 kg (134 lb 11.2 oz)    Height: 4' 11" (1.499 m)    PainSc:   2    PainLoc: Head      Gen: Lying in bed, no acute distress  HEENT: Supple, no JVD  Cvs: Regular rate and rhythm, loud P2  Resp: Normal work of breathing, clear bilaterally  Abd: Soft, non-tender and not distended  Ext: Warm, no edema  Vascular:   LEFT RIGHT   RADIAL 2+ 2+   BRACHIAL 2+ 2+   FEMORAL 2+ 2+   DP 2+ 2+   TP 2+ 2+   Sreedhar's Test Normal Normal     Labs:     Results for KELSY TURNER (MRN [id number]) as of 1/17/2017 13:28   Ref. Range 1/16/2017 08:38   WBC Latest Ref Range: 3.90 - 12.70 K/uL 3.76 (L)   RBC Latest Ref Range: 4.00 - 5.40 M/uL 4.75   Hemoglobin Latest Ref Range: 12.0 - 16.0 g/dL 11.9 (L)   Hematocrit Latest Ref Range: 37.0 - 48.5 % 37.1   MCV Latest Ref Range: 82 - 98 fL 78 (L)   MCH Latest Ref Range: 27.0 - 31.0 pg 25.1 (L)   MCHC Latest Ref Range: 32.0 - 36.0 % 32.1   RDW Latest Ref Range: 11.5 - 14.5 % 16.8 (H)   Platelets Latest Ref Range: 150 - 350 K/uL 258   MPV Latest Ref Range: 9.2 - 12.9 fL SEE COMMENT   Gran% Latest Ref Range: 38.0 - 73.0 % 57.2   Gran # Latest Ref Range: 1.8 - 7.7 K/uL 2.1   Lymph% Latest Ref Range: 18.0 - 48.0 % 27.9   Lymph # Latest Ref Range: 1.0 - 4.8 K/uL 1.1   Mono% Latest Ref Range: 4.0 - 15.0 % 7.2   Mono # " Latest Ref Range: 0.3 - 1.0 K/uL 0.3   Eosinophil% Latest Ref Range: 0.0 - 8.0 % 7.2   Eos # Latest Ref Range: 0.0 - 0.5 K/uL 0.3   Basophil% Latest Ref Range: 0.0 - 1.9 % 0.5   Baso # Latest Ref Range: 0.00 - 0.20 K/uL 0.02   Ovalocytes Unknown Occasional   Aniso Unknown Slight   Poik Unknown Slight   Poly Unknown Occasional   Hypo Unknown Occasional       INR 1/16/17 2.2 Results for KELSY TURNER (MRN [id number]) as of 1/17/2017 13:28   Ref. Range 1/16/2017 08:38   Sodium Latest Ref Range: 136 - 145 mmol/L 139   Potassium Latest Ref Range: 3.5 - 5.1 mmol/L 3.5   Chloride Latest Ref Range: 95 - 110 mmol/L 113 (H)   CO2 Latest Ref Range: 23 - 29 mmol/L 21 (L)   Anion Gap Latest Ref Range: 8 - 16 mmol/L 5 (L)   BUN, Bld Latest Ref Range: 6 - 20 mg/dL 8   Creatinine Latest Ref Range: 0.5 - 1.4 mg/dL 0.7   eGFR if non African American Latest Ref Range: >60 mL/min/1.73 m^2 >60.0   eGFR if African American Latest Ref Range: >60 mL/min/1.73 m^2 >60.0   Glucose Latest Ref Range: 70 - 110 mg/dL 80   Calcium Latest Ref Range: 8.7 - 10.5 mg/dL 8.6 (L)   Magnesium Latest Ref Range: 1.6 - 2.6 mg/dL 2.0   Alkaline Phosphatase Latest Ref Range: 55 - 135 U/L 74   Total Protein Latest Ref Range: 6.0 - 8.4 g/dL 7.5   Albumin Latest Ref Range: 3.5 - 5.2 g/dL 3.5   Total Bilirubin Latest Ref Range: 0.1 - 1.0 mg/dL 0.2   AST Latest Ref Range: 10 - 40 U/L 15   ALT Latest Ref Range: 10 - 44 U/L 16   Triglycerides Latest Ref Range: 30 - 150 mg/dL 101   Cholesterol Latest Ref Range: 120 - 199 mg/dL 128   HDL Latest Ref Range: 40 - 75 mg/dL 31 (L)   LDL Cholesterol Latest Ref Range: 63.0 - 159.0 mg/dL 76.8   Total Cholesterol/HDL Ratio Latest Ref Range: 2.0 - 5.0  4.1   BNP Latest Ref Range: 0 - 99 pg/mL 14   Hemoglobin A1C Latest Ref Range: 4.5 - 6.2 % 5.2   Estimated Avg Glucose Latest Ref Range: 68 - 131 mg/dL 103   TSH Latest Ref Range: 0.400 - 4.000 uIU/mL 1.808   T3, Total Latest Ref Range: 60 - 180 ng/dL 136   T3, Free  Latest Ref Range: 2.3 - 4.2 pg/mL 2.6   T4 Total Latest Ref Range: 4.5 - 11.5 ug/dL 12.5 (H)   Free T4 Latest Ref Range: 0.71 - 1.51 ng/dL 1.35      Micro:   Blood Cultures  Lab Results   Component Value Date    LABBLOO NO GROWTH AFTER 5 DAYS. FINAL REPORT 04/17/2012    LABBLOO NO GROWTH AFTER 5 DAYS. FINAL REPORT 04/17/2012    LABBLOO NO GROWTH AFTER 5 DAYS - FINAL REPORT. 07/19/2010    LABBLOO NO GROWTH AFTER 5 DAYS - FINAL REPORT. 07/19/2010    LABBLOO NO GROWTH AFTER 4 DAYS - FINAL REPORT 05/17/2009     Urine Cultures  No results found for: LABURIN  IMAGING:   EKGs:  NSR    TTE:  12/9/16  CONCLUSIONS     1 - Normal left ventricular systolic function (EF 60-65%).     2 - Right ventricular enlargement with mildly to moderately depressed systolic function.     3 - Normal left ventricular diastolic function.     4 - Pulmonary hypertension. The estimated PA systolic pressure is 41 mmHg.     EF   Date Value Ref Range Status   12/09/2016 60 55 - 65    03/04/2016 60 55 - 65    10/13/2014 60 55 - 65        Prior Ischemic Evaluation:  None    IMPRESSION:   46 year old female with a history of idiopathic PH(2008) on remodulin, ambrisentan and tadalafil who is referred by Dr. Whyte for pre-lung transplant evaluation.      PROCEDURAL RISK STRATIFICATION:   Contrast Nephropathy Post-PCI/Renal Risk Score: 7.5%    Prior Contrast Allergy:  No  Sedation   Prior Sedation: Yes   History of Obstructive Sleep Apnea/SDB:  No  Pertinent Allergies:   Latex: No    ASA: No   Heparin-Induced Thrombocytopenia: No  PLAN:   -plan for LHC via right radial and RHC via right AC vs IJ  -patient to hold coumadin for 3 days prior  -The risks, benefits & alternatives of the procedure were explained to the patient.    -The risks of coronary angiography include but are not limited to:  Bleeding, infection, heart rhythm abnormalities, allergic reactions, kidney injury, stroke and death.    -Should stenting be indicated, the patient has agreed to  dual anti-platelet therapy for 1-consecutive year with a drug-eluting stent and a minimum of 1-month with the use of a bare metal stent.    -The risks of moderate sedation include hypotension, respiratory depression, arrhythmias, bronchospasm, & death.    -Informed consent was obtained & the patient is agreeable to proceed with the procedure.  -This patient was discussed with the attending interventional cardiologist who agrees with the above assessment & plan.      Jeremi Wen DO  Cardiology Fellow  Pager [phone]    I have personally seen, taken the history and examined the patient.  I agree with the housestaff whose note reflects my findings and plan as above.

## 2017-01-17 NOTE — LETTER
January 17, 2017      Ralph Whyte MD  [address]  [facility] [address]           [facility]  [address]  [facility] [address]  Phone: [phone]          Patient: Yuni Perez   MR Number: [id number]   YOB: 1970   Date of Visit: 1/17/2017       Dear Dr. Ralph Whyte:    Thank you for referring Yuni Perez to me for evaluation. Attached you will find relevant portions of my assessment and plan of care.    If you have questions, please do not hesitate to call me. I look forward to following Yuni Perez along with you.    Sincerely,    Tu Mares MD    Enclosure  CC:  No Recipients    If you would like to receive this communication electronically, please contact [email or web address] or [phone] to request more information on [employer] Link access.    For providers and/or their staff who would like to refer a patient to [facility], please contact us through our one-stop-shop provider referral line, [facility], at [phone].    If you feel you have received this communication in error or would no longer like to receive these types of communications, please e-mail [email or web address]

## 2017-01-17 NOTE — PROGRESS NOTES
OUTPATIENT CATHETERIZATION INSTRUCTIONS    You have been scheduled for a procedure in the catheterization lab on Monday, January 30, 2017.     Please report to the Cardiology Waiting Area on the Third floor of the hospital and check in at 7 AM.   You will then be taken to the SSCU (Short Stay Cardiac Unit) and prepared for your procedure. Please be aware that this is not the time of your procedure but the time you are to arrive. The procedures are scheduled on an hourly basis; however, emergency cases take precedence over all other cases.       You may not have anything to eat or drink after midnight the night before your test. You may take your regular morning medications with water. If there are any medications that you should not take you will be instructed to hold them that morning. If you are diabetic and on Metformin (Glucophage) do not take it the day before, the day of, and for 2 days after your procedure.      The procedure will take 1-2 hours to perform. After the procedure, you will return to SSCU on the third floor of the hospital. You will need to lie still (or keep your arm still) for the next 4 to 6 hours to minimize bleeding from the puncture site. Your family may remain in the room with you during this time.       You may be able to be discharged home that same afternoon if there is someone to drive you home and there were no complications. If you have one of the balloon, stent, or device procedures you may spend the night in the hospital. Your doctor will determine, based on your progress, the date and time of your discharge. The results of your procedure will be discussed with you before you are discharged. Any further testing or procedures will be scheduled for you either before you leave or you will be called with these appointments.       If you should have any questions, concerns, or need to change the date of your procedure, please call  DENNY Pitts @ [phone]    Special  Instructions:  Hold Coumadin 3 days before procedure(Friday, Saturday and Sunday)  Drink plenty of water the day before procedure        THE ABOVE INSTRUCTIONS WERE GIVEN TO THE PATIENT VERBALLY AND THEY VERBALIZED UNDERSTANDING.  THEY DO NOT REQUIRE ANY SPECIAL NEEDS AND DO NOT HAVE ANY LEARNING BARRIERS.          Directions for Reporting to Cardiology Waiting Area in the Hospital  If you park in the Parking Garage:  Take elevators to the1st floor of the parking garage.  Continue past the gift shop, coffee shop, and piano.  Take a right and go to the gold elevators. (Elevator B)  Take the elevator to the 3rd floor.  Follow the arrow on the sign on the wall that says Cath Lab Registration/EP Lab Registration.  Follow the long hallway all the way around until you come to a big open area.  This is the registration area.  Check in at Reception Desk.    OR    If family is dropping you off:  Have them drop you off at the front of the Hospital under the green overhang.  Enter through the doors and take a right.  Take the E elevators to the 3rd floor Cardiology Waiting Area.  Check in at the Reception Desk in the waiting room.

## 2017-01-17 NOTE — PROGRESS NOTES
TRANSPLANT NUTRITIONAL ASSESSMENT    Referring Provider: Ralph Whyte MD    Reason for Visit: Pre-lung transplant work-up    Age: 46 y.o.  Sex: female    Patient Active Problem List   Diagnosis    Chronic cardiopulmonary disease    Long term current use of anticoagulant therapy    Generalized abdominal cramping    Generalized bloating    Liver mass    Non-allergic vasomotor rhinitis    Allergic asthma    Primary pulmonary hypertension    Tricuspid regurgitation    Seizure disorder    Hypothyroidism (acquired)    History of migraine headaches    Lumbar disc disease    Mild asthma    Chronic low back pain    Overweight (BMI 25.0-29.9)    Mass    Menorrhagia    Chest tightness    Nonintractable epilepsy without status epilepticus    ABHIJEET (obstructive sleep apnea)     Past Medical History   Diagnosis Date    AR (allergic rhinitis)     Cholelithiasis, common bile duct     Chronic low back pain     GERD (gastroesophageal reflux disease)     History of migraine headaches     Hypothyroidism     Lumbar disc disease     Menorrhagia     Mild asthma     Obesity     Plantar fasciitis of left foot     Primary pulmonary hypertension      followed by heart transplant/pulmonary     Seizure disorder      x 1 in 2008    Sleep apnea     TMJ (dislocation of temporomandibular joint)     Tricuspid regurgitation      Lab Results   Component Value Date    GLU 80 01/16/2017    K 3.5 01/16/2017    PHOS 2.8 09/10/2014    MG 2.0 01/16/2017    CHOL 128 01/16/2017    HDL 31 (L) 01/16/2017    TRIG 101 01/16/2017    ALBUMIN 3.5 01/16/2017    HGBA1C 5.2 01/16/2017    CALCIUM 8.6 (L) 01/16/2017     Other Pertinent Labs: none    Current Outpatient Prescriptions   Medication Sig    0.9 % SODIUM CHLORIDE (SODIUM CHLORIDE 0.9%) 0.9 % Soln Inject 3 mLs into the vein every 8 (eight) hours as needed. (Patient taking differently: Inject 3 mLs into the vein every other day. )    ADVAIR DISKUS 100-50 mcg/dose diskus  inhaler INHALE ONE PUFF TWICE DAILY    albuterol 90 mcg/actuation inhaler Inhale 2 puffs into the lungs every 4 (four) hours as needed. 2 Aerosol Inhalation Every 4-6 hours    ambrisentan (LETAIRIS) 10 MG Tab Take 1 tablet (10 mg total) by mouth once daily.    azelastine (ASTELIN) 137 mcg (0.1 %) nasal spray     butalbital-acetaminophen-caffeine -40 mg (FIORICET, ESGIC) -40 mg per tablet Take 1 tablet by mouth every 4 (four) hours as needed for Pain or Headaches.    cetirizine (ZYRTEC) 10 MG tablet Take 10 mg by mouth. 1 Tablet Oral Every day    clindamycin (CLEOCIN T) 1 % lotion     cyanocobalamin, vitamin B-12, 2,500 mcg Subl Place 2,500 mcg under the tongue once daily.     desonide (DESOWEN) 0.05 % ointment 0.05 %.  Ointment Topical As directed.  Apply to affected area twice a daily over face    diphenhydrAMINE (BENADRYL) 25 mg capsule Take 25 mg by mouth every 6 (six) hours as needed for Itching.    esomeprazole (NEXIUM) 40 MG capsule Take 1 capsule (40 mg total) by mouth before breakfast.    ferrous sulfate 325 (65 FE) MG EC tablet Take 325 mg by mouth daily as needed.     fluticasone (FLONASE) 50 mcg/actuation nasal spray 2 sprays by Each Nare route once daily.    folic acid (FOLVITE) 1 MG tablet Take 1 mg by mouth.  Tablet Oral Every day    levothyroxine (SYNTHROID) 137 MCG Tab tablet Take 1 tablet (137 mcg total) by mouth once daily.    norethindrone-ethinyl estradiol (MICROGESTIN FE 1/20, 28,) 1 mg-20 mcg (21)/75 mg (7) per tablet Take 1 tablet by mouth once daily.    ondansetron (ZOFRAN) 4 MG tablet Take 4 mg by mouth every 6 (six) hours as needed. 1 Tablet Oral Every 6 hours    PRAMOSONE 2.5-1 % Lotn lotion     promethazine (PHENERGAN) 25 MG tablet Take 25 mg by mouth every 4 to 6 hours as needed.     rifAXIMin (XIFAXAN) 550 mg Tab Take 1 tablet (550 mg total) by mouth 3 (three) times daily.    tadalafil (ADCIRCA) 20 MG Tab Take 2 tablets (40 mg total) by mouth once daily.  "   topiramate (TOPAMAX) 100 MG tablet Take 100 mg by mouth 2 (two) times daily.    treprostinil (REMODULIN) 2.5 mg/mL Soln Mix cassette as directed and infuse continuously per physician titration orders on dosing sheet. Current dose 46 ng/kg/min    VITAMIN D2 50,000 unit capsule TAKE ONE CAPSULE BY MOUTH EVERY 14 DAYS    warfarin (COUMADIN) 2.5 MG tablet Take 2.5-3 tablets (6.25-7.5 mg total) by mouth Daily. As directed by coumadin clinic     No current facility-administered medications for this visit.      Allergies: Adhesive; Amoxicillin; and Chlorhexidine    Ht Readings from Last 1 Encounters:   01/17/17 4' 11" (1.499 m)     Wt Readings from Last 1 Encounters:   01/17/17 61.1 kg (134 lb 11.2 oz)      BMI: Body mass index is 27.29 kg/(m^2).    Usual Weight: 138-140 lb  Weight Change/Time: small amount of unintentional weight loss over past couple of months, 6-8lbs  Current Diet: Regular  Appetite/Current Intake: fair   Exercise/Physical Activity: limiting by hip pain, SOB, uses cane, functional for self care but little activity  Nutritional/Herbal Supplements: none  Chewing/Swallowing Problems: none  Symptoms: nausea and diarrhea    Estimated Kcal Need: 7486-5019 kcal (25-30 kcal/kg)  Estimated Protein Need: 60-70 gm (1-1.2 gm/kg)    Nutritional History:   Pt present with her mother today for eval visit. Pt reports some unintentional weight loss recently. She has IBS-like symptoms on a daily basis, these are attributed to her medication regimen. She reports nausea and diarrhea intermittently. She has been experiencing these medical complications for about 8 years. She is not very active due to hip pain, weakness, some SOB. She reports the following diet recall:  B: none usually, sometimes 1% or 2% milk w/ Raisin Bran OR girts & 1 poached egg OR [place] toast OR yogurt  L: grilled cheese sandwich OR ham sandwich OR peanut butter sandwich OR rice w/ butter, salt, pepper OR left overs from dinner, hot tea or " water  D: home cooked; white beans & rice OR seafood (baked fish,boiled,crab stew) OR pork, chicken wings baked w/ various vegetables OR occasional po boy, beef or roast on Sunday  Snack: apple sauce, ice cream, cheese & crackers  Pt's mother uses very little salt in cooking, pt does not add salt to her plate. Pt occasionally eats fresh fruit.     Nutritional Diagnoses  Problem: food- and nutrition-related knowledge deficit  Etiology: r/t no prior edu on low sodium diet and adequate protein intake  Symptoms: aeb diet recall    Educational Need? yes  Barriers: none identified  Discussed with: patient and mother  Interventions: Patient taught nutrition information regarding Pre-lung transplant work-up.    Reviewed Low Sodium packet (2g Na diet, Na content of foods, food label strategies, flavoring tips, & low sodium recipes).   Provided list of protein content in foods, serving sizes, goal per day.  Goals/Recommendations: diet adherence and small frequent meals and snacks  Actions Taken: instruct/provide written information  Patient and/or family comprehend instructions: yes  Outcome: Verbalizes understanding  Monitoring: Follow up in clinic if needed. RD contact info provided.    Counseling Time: 30 minutes

## 2017-01-18 ENCOUNTER — OFFICE VISIT (OUTPATIENT)
Dept: TRANSPLANT | Facility: CLINIC | Age: 47
End: 2017-01-18
Payer: COMMERCIAL

## 2017-01-18 ENCOUNTER — HOSPITAL ENCOUNTER (OUTPATIENT)
Dept: PULMONOLOGY | Facility: CLINIC | Age: 47
Discharge: HOME | End: 2017-01-18
Payer: COMMERCIAL

## 2017-01-18 ENCOUNTER — HOSPITAL ENCOUNTER (OUTPATIENT)
Dept: RADIOLOGY | Facility: HOSPITAL | Age: 47
Discharge: HOME | End: 2017-01-18
Attending: INTERNAL MEDICINE
Payer: COMMERCIAL

## 2017-01-18 ENCOUNTER — SOCIAL WORK (OUTPATIENT)
Dept: TRANSPLANT | Facility: CLINIC | Age: 47
End: 2017-01-18
Payer: COMMERCIAL

## 2017-01-18 ENCOUNTER — OFFICE VISIT (OUTPATIENT)
Dept: INFECTIOUS DISEASES | Facility: CLINIC | Age: 47
End: 2017-01-18
Payer: COMMERCIAL

## 2017-01-18 ENCOUNTER — OFFICE VISIT (OUTPATIENT)
Dept: CARDIOTHORACIC SURGERY | Facility: CLINIC | Age: 47
End: 2017-01-18
Payer: COMMERCIAL

## 2017-01-18 VITALS
HEIGHT: 59 IN | DIASTOLIC BLOOD PRESSURE: 65 MMHG | WEIGHT: 134.31 LBS | BODY MASS INDEX: 27.08 KG/M2 | TEMPERATURE: 98 F | SYSTOLIC BLOOD PRESSURE: 122 MMHG | HEART RATE: 68 BPM | OXYGEN SATURATION: 97 %

## 2017-01-18 VITALS
WEIGHT: 132 LBS | HEIGHT: 59 IN | SYSTOLIC BLOOD PRESSURE: 134 MMHG | HEART RATE: 72 BPM | OXYGEN SATURATION: 98 % | BODY MASS INDEX: 26.61 KG/M2 | RESPIRATION RATE: 20 BRPM | DIASTOLIC BLOOD PRESSURE: 75 MMHG | TEMPERATURE: 97 F

## 2017-01-18 VITALS — WEIGHT: 132.25 LBS | BODY MASS INDEX: 26.66 KG/M2 | HEIGHT: 59 IN

## 2017-01-18 VITALS
WEIGHT: 134.69 LBS | DIASTOLIC BLOOD PRESSURE: 72 MMHG | BODY MASS INDEX: 27.15 KG/M2 | HEIGHT: 59 IN | HEART RATE: 86 BPM | SYSTOLIC BLOOD PRESSURE: 105 MMHG | TEMPERATURE: 99 F

## 2017-01-18 DIAGNOSIS — I27.0 IDIOPATHIC PULMONARY HYPERTENSION: Primary | ICD-10-CM

## 2017-01-18 DIAGNOSIS — Z76.82 AWAITING ORGAN TRANSPLANT STATUS: ICD-10-CM

## 2017-01-18 DIAGNOSIS — J45.909 MILD ASTHMA: ICD-10-CM

## 2017-01-18 DIAGNOSIS — I27.20 PULMONARY HYPERTENSION: ICD-10-CM

## 2017-01-18 DIAGNOSIS — Z76.82 AWAITING ORGAN TRANSPLANT STATUS: Primary | ICD-10-CM

## 2017-01-18 DIAGNOSIS — R09.81 NASAL CONGESTION: ICD-10-CM

## 2017-01-18 DIAGNOSIS — I27.0 PRIMARY PULMONARY HYPERTENSION: Primary | ICD-10-CM

## 2017-01-18 LAB
PRE FEV1 FVC: 68
PRE FEV1: 1.47
PRE FVC: 2.15
PREDICTED FEV1 FVC: 86
PREDICTED FEV1: 2.33
PREDICTED FVC: 2.73

## 2017-01-18 PROCEDURE — 90472 IMMUNIZATION ADMIN EACH ADD: CPT | Mod: S$GLB,,, | Performed by: INTERNAL MEDICINE

## 2017-01-18 PROCEDURE — 99205 OFFICE O/P NEW HI 60 MIN: CPT | Mod: S$GLB,,, | Performed by: THORACIC SURGERY (CARDIOTHORACIC VASCULAR SURGERY)

## 2017-01-18 PROCEDURE — 1159F MED LIST DOCD IN RCRD: CPT | Mod: S$GLB,,, | Performed by: INTERNAL MEDICINE

## 2017-01-18 PROCEDURE — 74220 X-RAY XM ESOPHAGUS 1CNTRST: CPT | Mod: 26,,, | Performed by: RADIOLOGY

## 2017-01-18 PROCEDURE — 74220 X-RAY XM ESOPHAGUS 1CNTRST: CPT | Mod: TC

## 2017-01-18 PROCEDURE — 99999 PR PBB SHADOW E&M-EST. PATIENT-LVL III: CPT | Mod: PBBFAC,,, | Performed by: THORACIC SURGERY (CARDIOTHORACIC VASCULAR SURGERY)

## 2017-01-18 PROCEDURE — 90636 HEP A/HEP B VACC ADULT IM: CPT | Mod: S$GLB,,, | Performed by: INTERNAL MEDICINE

## 2017-01-18 PROCEDURE — 94729 DIFFUSING CAPACITY: CPT | Mod: S$GLB,,, | Performed by: INTERNAL MEDICINE

## 2017-01-18 PROCEDURE — 99999 PR PBB SHADOW E&M-EST. PATIENT-LVL V: CPT | Mod: PBBFAC,,, | Performed by: INTERNAL MEDICINE

## 2017-01-18 PROCEDURE — 90686 IIV4 VACC NO PRSV 0.5 ML IM: CPT | Mod: S$GLB,,, | Performed by: INTERNAL MEDICINE

## 2017-01-18 PROCEDURE — 90732 PPSV23 VACC 2 YRS+ SUBQ/IM: CPT | Mod: S$GLB,,, | Performed by: INTERNAL MEDICINE

## 2017-01-18 PROCEDURE — 90471 IMMUNIZATION ADMIN: CPT | Mod: S$GLB,,, | Performed by: INTERNAL MEDICINE

## 2017-01-18 PROCEDURE — 36600 WITHDRAWAL OF ARTERIAL BLOOD: CPT | Mod: 59,S$GLB,, | Performed by: INTERNAL MEDICINE

## 2017-01-18 PROCEDURE — 94010 BREATHING CAPACITY TEST: CPT | Mod: 59,S$GLB,, | Performed by: INTERNAL MEDICINE

## 2017-01-18 PROCEDURE — 94727 GAS DIL/WSHOT DETER LNG VOL: CPT | Mod: S$GLB,,, | Performed by: INTERNAL MEDICINE

## 2017-01-18 PROCEDURE — 94620 PR PULMONARY STRESS TESTING,SIMPLE: CPT | Mod: S$GLB,,, | Performed by: INTERNAL MEDICINE

## 2017-01-18 PROCEDURE — 90715 TDAP VACCINE 7 YRS/> IM: CPT | Mod: S$GLB,,, | Performed by: INTERNAL MEDICINE

## 2017-01-18 PROCEDURE — 99204 OFFICE O/P NEW MOD 45 MIN: CPT | Mod: 25,S$GLB,, | Performed by: INTERNAL MEDICINE

## 2017-01-18 PROCEDURE — 82803 BLOOD GASES ANY COMBINATION: CPT | Mod: S$GLB,,, | Performed by: INTERNAL MEDICINE

## 2017-01-18 PROCEDURE — 1159F MED LIST DOCD IN RCRD: CPT | Mod: S$GLB,,, | Performed by: THORACIC SURGERY (CARDIOTHORACIC VASCULAR SURGERY)

## 2017-01-18 PROCEDURE — 99213 OFFICE O/P EST LOW 20 MIN: CPT | Mod: 25,S$GLB,, | Performed by: INTERNAL MEDICINE

## 2017-01-18 NOTE — LETTER
January 18, 2017      Ralph Whyte MD  [address]  [facility] [address]           [facility]  [address]  [facility] [address]  Phone: [phone]          Patient: Yuni Perez   MR Number: [id number]   YOB: 1970   Date of Visit: 1/18/2017       Dear Dr. Ralph Whyte:    Thank you for referring Yuni Perez to me for evaluation. Attached you will find relevant portions of my assessment and plan of care.    If you have questions, please do not hesitate to call me. I look forward to following Yuni Perez along with you.    Sincerely,    Clinton Lora MD    Enclosure  CC:  No Recipients    If you would like to receive this communication electronically, please contact [email or web address] or [phone] to request more information on [facility] access.    For providers and/or their staff who would like to refer a patient to [facility], please contact us through our one-stop-shop provider referral line, [facility] [place], at [phone].    If you feel you have received this communication in error or would no longer like to receive these types of communications, please e-mail [email or web address]

## 2017-01-18 NOTE — LETTER
January 18, 2017      Ralph Whyte MD  [address]  [facility] [address]           [facility]  [address]  [facility] [address]  Phone: [phone]  Fax: [phone]          Patient: Yuni Perez   MR Number: [id number]   YOB: 1970   Date of Visit: 1/18/2017       Dear Dr. Ralph Whyte:    Thank you for referring Yuni Perez to me for evaluation. Attached you will find relevant portions of my assessment and plan of care.    If you have questions, please do not hesitate to call me. I look forward to following Yuni Perez along with you.    Sincerely,    Bertin Vigil MD    Enclosure  CC:  No Recipients    If you would like to receive this communication electronically, please contact [email or web address] or [phone] to request more information on [employer] access.    For providers and/or their staff who would like to refer a patient to [facility], please contact us through our one-stop-shop provider referral line, [facility], at [phone].    If you feel you have received this communication in error or would no longer like to receive these types of communications, please e-mail [email or web address]
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
## 2017-01-18 NOTE — MR AVS SNAPSHOT
[facility] - Cardiovascular Surg  [address]  [facility] [address]  Phone: [phone]                  Yuni Perez   2017 10:30 AM   Appointment    Description:  Female : 1970   Provider:  Clinton Lora MD   Department:  [facility] - Cardiovascular Surg                To Do List           Future Appointments        Provider Department Dept Phone    2017 1:30 PM LUNG, TRANS FINANCIAL [facility] - Lung Transplant [phone]    2017 7:30 AM LAB, APPOINTMENT [place] [facility] [phone]    2017 8:00 AM [facility] [address] [facility] [phone]    2017 8:45 AM [facility] [address] [facility] [phone]    2017 10:00 AM [address]ella [address]rez RD [facility] Lung Transplant [phone]      Goals (5 Years of Data)     None      [facility]     [facility] Nurse Care Line -  Assistance  Registered nurses in the [facility] Center provide clinical advisement, health education, appointment booking, and other advisory services.  Call for this free service at [phone].             Medications           Message regarding Medications     Verify the changes and/or additions to your medication regime listed below are the same as discussed with your clinician today.  If any of these changes or additions are incorrect, please notify your healthcare provider.             Verify that the below list of medications is an accurate representation of the medications you are currently taking.  If none reported, the list may be blank. If incorrect, please contact your healthcare provider. Carry this list with you in case of emergency.           Current Medications     0.9 % SODIUM CHLORIDE (SODIUM CHLORIDE 0.9%) 0.9 % Soln Inject 3 mLs into the vein every 8 (eight) hours as needed.    ADCIRCA 20 mg Tab     ADVAIR DISKUS 100-50 mcg/dose diskus inhaler INHALE ONE PUFF TWICE DAILY     albuterol 90 mcg/actuation inhaler Inhale 2 puffs into the lungs every 4 (four) hours as needed. 2 Aerosol Inhalation Every 4-6 hours    ambrisentan (LETAIRIS) 10 MG Tab Take 1 tablet (10 mg total) by mouth once daily.    azelastine (ASTELIN) 137 mcg (0.1 %) nasal spray     butalbital-acetaminophen-caffeine -40 mg (FIORICET, ESGIC) -40 mg per tablet Take 1 tablet by mouth every 4 (four) hours as needed for Pain or Headaches.    cetirizine (ZYRTEC) 10 MG tablet Take 10 mg by mouth. 1 Tablet Oral Every day    clindamycin (CLEOCIN T) 1 % lotion     cyanocobalamin, vitamin B-12, 2,500 mcg Subl Place 2,500 mcg under the tongue once daily.     desonide (DESOWEN) 0.05 % ointment 0.05 %.  Ointment Topical As directed.  Apply to affected area twice a daily over face    dicyclomine (BENTYL) 10 MG capsule Take 1 capsule (10 mg total) by mouth before meals as needed (TID PRN).    diphenhydrAMINE (BENADRYL) 25 mg capsule Take 25 mg by mouth every 6 (six) hours as needed for Itching.    esomeprazole (NEXIUM) 40 MG capsule Take 1 capsule (40 mg total) by mouth before breakfast.    ferrous sulfate 325 (65 FE) MG EC tablet Take 325 mg by mouth daily as needed.     FINACEA 15 % Foam Place 1 Pump onto the skin 2 (two) times daily.    fluticasone (FLONASE) 50 mcg/actuation nasal spray 2 sprays by Each Nare route once daily.    folic acid (FOLVITE) 1 MG tablet Take 1 mg by mouth.  Tablet Oral Every day    levothyroxine (SYNTHROID) 137 MCG Tab tablet Take 1 tablet (137 mcg total) by mouth once daily.    norethindrone-ethinyl estradiol (MICROGESTIN FE 1/20, 28,) 1 mg-20 mcg (21)/75 mg (7) per tablet Take 1 tablet by mouth once daily.    ondansetron (ZOFRAN) 4 MG tablet Take 4 mg by mouth every 6 (six) hours as needed. 1 Tablet Oral Every 6 hours    PRAMOSONE 2.5-1 % Lotn lotion     promethazine (PHENERGAN) 25 MG tablet Take 25 mg by mouth every 4 to 6 hours as needed.     rifAXIMin (XIFAXAN) 550 mg Tab Take 1 tablet (550 mg  total) by mouth 3 (three) times daily.    tadalafil (ADCIRCA) 20 MG Tab Take 2 tablets (40 mg total) by mouth once daily.    topiramate (TOPAMAX) 100 MG tablet Take 100 mg by mouth 2 (two) times daily.    treprostinil (REMODULIN) 2.5 mg/mL Soln Mix cassette as directed and infuse continuously per physician titration orders on dosing sheet. Current dose 46 ng/kg/min    VITAMIN D2 50,000 unit capsule TAKE ONE CAPSULE BY MOUTH EVERY 14 DAYS    warfarin (COUMADIN) 2.5 MG tablet Take 2.5-3 tablets (6.25-7.5 mg total) by mouth Daily. As directed by coumadin clinic           Clinical Reference Information           Allergies as of 1/18/2017     Adhesive    Amoxicillin    Chlorhexidine      Immunizations Administered on Date of Encounter - 1/18/2017     None      Maintenance Dialysis History     Patient has no recorded history of maintenance dialysis.      Transplant Information        Txp Date Organ Coordinator Care Team     Lung Marylou Louie RN Referring Physician:  Ivonne Rai MD         [facility] Sign-Up     Activating your [facility] account is as easy as 1-2-3!     1) Visit [email or web address], select Sign Up Now, enter this activation code and your date of birth, then select Next.  [email or web address]  Expires: 1/23/2017  1:25 PM      2) Create a username and password to use when you visit [facility] in the future and select a security question in case you lose your password and select Next.    3) Enter your e-mail address and click Sign Up!    Additional Information  If you have questions, please e-mail [email or web address] or call [phone] to talk to our [facility] staff. Remember, [facility] is NOT to be used for urgent needs. For medical emergencies, dial 911.

## 2017-01-18 NOTE — PROGRESS NOTES
Pre Transplant Infectious Diseases Consult  Lung Transplant Recipient Evaluation    Requesting Physician: rocky    Reason for Visit:    Chief Complaint   Patient presents with    Lung Transplant Evaluation     History of Present Illness  Yuni Perez is a 46 y.o. year old White female with advanced Lung disease currently being evaluated for Lung transplant.  Patient denies any recent fever, chills, or infective illnesses.    47 y/o f h/o idiopathic PH on remodulin, ambrisentan and tadalafil her for Lung Txp eval    1) Do you have a history of:   YES NO   Diabetes      [] [x]     Diabetic Foot Infection/Bone Infection  []        [x]     Surgical Removal of Spleen   []  [x]                  2) Have you had recurrent infections involving:             YES NO  Sinus infections  []         [x]   Sore Throat   []         [x]                 Prostate Infections  []         [x]              Bladder Infections  []         [x]                     Kidney Infections  []         [x]                               Intestinal Infections  []         [x]      Skin Infections   []         [x]       Reproductive Infections          []  [x]   Periodontal Disease  []         [x]        3)Have you ever had: YES     NO UNKNOWN      Chicken Pox   [x]         []  []   Shingles   []         [x]  []   Orolabial Herpes             []  [x]  []   Genital Herpes  []         [x]  []   Cytomegalovirus  []         [x]  []   Juana-Barr Virus  []         [x]  []   Genital Warts   []         [x]  []   Hepatitis A   []         [x]  []   Hepatitis B   []         [x]  []   Hepatitis C   []         [x]  []   Syphilis   []         [x]  []   Gonorrhea   []         [x]  []   Pelvic Inflammatory   Disease   []         [x]  []   Chlamydia Infection  []         [x]  []   Intestinal parasites   or worms   []         [x]  []   Fungal Infections  []         [x]  []   Blood Infections  []         [x]  []       Comment:       4) Have you ever been exposed    YES NO  To someone with tuberculosis?  []   [x]   If yes, what treatment did you receive:     5) What states have you lived in?   [place]  6) What countries have you visited for more than 2 weeks?      none                   YES NO  7) Did you have any associated infections?  []  [x]       8) Are you planning to travel outside the    []  []   [place] after your transplant?    9) Household                   YES NO  Do you have pets living in your house?    [x]         []   If yes, describe:  Pt has cats     Do you spend time or live on a farm or     []         [x]   have livestock or other farm animals?  If yes, which ones:    Do you have a fish tank? 3           [x]  []       Do you have a litter box?      [x]         []     Do you fish or hunt?      []         [x]     Do you clean or skin fish or animals?    []         [x]     Do you consume raw or undercooked    []         [x]   meat, fish, or shellfish?      10) What occupations have you had?   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    [x]         []   13)Do you hike, camp, or spend     time in wooded areas?   []         [x]        14) The patient's immunization history was reviewed.    Have you ever received:  YES NO UNKNOWN DATES   Routine Childhood vaccines  [x]         []  []      Influenza vaccine   []  [x]  []    Pneumovax    []  [x]  []     Tetanus-diptheria   []         [x]  []    Hepatitis A vaccine series       []  [x]  []    Hepatitis B vaccine series         []  [x]  []    Meningitis vaccine   []         [x]  []    Varicella vaccine   []         [x]  []        Based on the patients immunization history and serologies, immunizations were ordered:         Ordered  Not Ordered  Influenza Vaccine     []    []    Hepatitis A series at 0,  6 months   []    []   Hepatitis B seriesat 0, 1, and 6 months  []    []   Hepatitis B High Dose 0,1, and 6 months  []    []   Prevnar      []    []   Pneumovax      []    []    TDap       []    []    Zoster       []    []   Menactra      []    []            The patient was encouraged to contact us about any problems that may develop after immunization and possible side effects were reviewed.      Previous Transplant: no    Etiology of Lung Disease: IPAH    Allergies: Adhesive; Amoxicillin; and Chlorhexidine  Immunization History   Administered Date(s) Administered    Influenza Split 11/19/2012    Pneumococcal Conjugate - 13 Valent 08/22/2016    Pneumococcal Polysaccharide - 23 Valent 12/17/2008    influenza - Quadrivalent 10/30/2015     Past Medical History   Diagnosis Date    AR (allergic rhinitis)     Cholelithiasis, common bile duct     Chronic low back pain     GERD (gastroesophageal reflux disease)     History of migraine headaches     Hypothyroidism     Lumbar disc disease     Menorrhagia     Mild asthma     Obesity     Plantar fasciitis of left foot     Primary pulmonary hypertension      followed by heart transplant/pulmonary     Seizure disorder      x 1 in 2008    Sleep apnea     TMJ (dislocation of temporomandibular joint)     Tricuspid regurgitation      Past Surgical History   Procedure Laterality Date    Portacath placement      Cardiac catheterization      Upper gastrointestinal endoscopy        Social History     Social History    Marital status: Single     Spouse name: N/A    Number of children: 0    Years of education: N/A     Occupational History    disabled [employer]     Social History Main Topics    Smoking status: Never Smoker    Smokeless tobacco: Never Used    Alcohol use No      Comment: 1 per year    Drug use: No    Sexual activity: No      Comment: pt is a virgin     Other Topics Concern    Not on file     Social  History Narrative       Review of Systems   Constitution: Negative for chills, fever and weakness.   HENT: Negative for headaches and sore throat.    Eyes: Negative for photophobia.   Cardiovascular: Positive for dyspnea on exertion. Negative for chest pain, orthopnea and syncope.   Respiratory: Negative for cough, shortness of breath and sputum production.    Endocrine: Negative for cold intolerance and heat intolerance.   Hematologic/Lymphatic: Negative for adenopathy.   Skin: Negative for poor wound healing.   Musculoskeletal: Negative for arthritis and back pain.   Gastrointestinal: Negative for abdominal pain and diarrhea.   Genitourinary: Negative for dysuria.   Neurological: Negative for dizziness and numbness.   Psychiatric/Behavioral: Negative for altered mental status, depression and memory loss.     Physical Exam   Constitutional: She is oriented to person, place, and time. She appears well-developed and well-nourished.   HENT:   Head: Normocephalic and atraumatic.   Eyes: Pupils are equal, round, and reactive to light.   Neck: Normal range of motion. Neck supple.   Cardiovascular: Normal rate.    Pulmonary/Chest: Effort normal and breath sounds normal.   Abdominal: Soft. Bowel sounds are normal.   Musculoskeletal: She exhibits no edema or tenderness.   Neurological: She is alert and oriented to person, place, and time.   Skin: Skin is warm and dry.   R chest wall cath c/d/i   Psychiatric: She has a normal mood and affect.     Diagnostics:   RPR   Date Value Ref Range Status   01/16/2017 Non-reactive Non-reactive Final     No results found for: CMVANTIBODIE  HIV-1/HIV-2 Ab   Date Value Ref Range Status   08/05/2008 Negative Negative Final     No results found for: HTLVIIIANTIB  Hepatitis B Surface Ag   Date Value Ref Range Status   01/16/2017 Negative  Final     Hep B Core Total Ab   Date Value Ref Range Status   01/16/2017 Negative  Final     Hepatitis C Ab   Date Value Ref Range Status   01/16/2017  Negative  Final     Toxoplasma gondii IGG   Date Value Ref Range Status   01/16/2017 <5.0 0.0 - 6.4 IU/mL Final     No components found for: TOXOIGGINTER  No results found for: OGI5DHO  No results found for: YSE5YPX  Varicella IgG   Date Value Ref Range Status   01/16/2017 2.98 (H) 0.00 - 0.90 ISR Final     Varicella Interpretation   Date Value Ref Range Status   01/16/2017 Positive (A) Negative Final     Comment:     <or=0.90     Negative        No detectable IgG antibody to Varicella zoster  by the MEJIA test. Such individuals are presumed to be   uninfected with Varicella zoster and to be susceptible to   primary infection.  0.91-1.09    Equivocal  >or=1.10     Positive        Indicates presence of detectable IgG antibody to Varicella   zoster by the MEJIA test. Indicative of previous or current   infection.       No results found for: STRONGANTIGG  Juana-Barr Virus IgG (VCA)   Date Value Ref Range Status   01/16/2017 Positive (A) Negative Final     Hep B S Ab   Date Value Ref Range Status   01/16/2017 Negative  Final     No results found for: QUANTIFERON  No results found for: HEPAIGM  No results found for: PPD  No results found for this or any previous visit.    HIV negative  Toxo IGM negative, toxo IGG negative  Hep A IGG negative  strongy IGG pending  HBsAb negative  HCV ab negative  HBcAb total negative  HBsAg negative  CMV IGG negative  RPR negative  VZV IGG positive  EBV IGG positive  Quant gold negative     Transplant Infectious Diseases - Candidacy    Assessment/Plan:   45 y/o f h/o idiopathic PH on remodulin, ambrisentan and tadalafil her for Lung Txp eval  - hep A and B series, Tdap,   - flu shot, pneumovax (got prevnar 8/2016)  - does not want zoster, live vaccine    Transplant Candidacy: Based on available information, there are no identified significant barriers to transplantation from an infectious disease standpoint pending acceptable serologies.  Final determination of transplant candidacy will  be made once evaluation is complete and reviewed by the Transplant Selection Committee.    Bertin Vigil MD         Counseling:I discussed with Yuni the risk for increased susceptibility to infections following transplantation including increased risk for infection right after transplant and if rejection should occur.  The patients has been counseled on the importance of vaccinations including but not limited to a yearly flu vaccine.  Specific guidance has been provided to the patient regarding the patients occupation, hobbies and activities to avoid future infectious complications including but not limited to avoiding undercooked meats and seafood, proper hygiene, and contact with animals.

## 2017-01-18 NOTE — LETTER
January 19, 2017        Ivonne Rai  [address]  [facility] [address]  Phone: [phone]  Fax: [phone]             [facility] - Lung Transplant  [address]  [facility] [address]  Phone: [phone]   Patient: Yuni Perez   MR Number: [id number]   YOB: 1970   Date of Visit: 1/18/2017       Dear Dr. Ivonne Rai    Thank you for referring Yuni Perez to me for evaluation. Attached you will find relevant portions of my assessment and plan of care.    If you have questions, please do not hesitate to call me. I look forward to following Yuni Perez along with you.    Sincerely,    Ralph Whyte MD    Enclosure    If you would like to receive this communication electronically, please contact [email or web address] or [phone] to request [employer] Mobile Link access.    [employer] Mobile Link is a tool which provides read-only access to select patient information with whom you have a relationship. Its easy to use and provides real time access to review your patients record including encounter summaries, notes, results, and demographic information.    If you feel you have received this communication in error or would no longer like to receive these types of communications, please e-mail [email or web address]

## 2017-01-18 NOTE — PROGRESS NOTES
"LUNG TRANSPLANT PRE FOLLOW-UP    Referring Physician: Ivonne Rai    Reason for Visit:  Pre-lung transplant follow-up.         Date of Initial Evaluation: 1/16/2017                                                                                             History of Present Illness: Yuni Perez is a 46 y.o. female who is on 0L of oxygen.  She is on no assisted ventilation.  Her New York Heart Association Class is II  and a Karnofsky score of 80. Normal activity with effort; some symptoms of disease.  She is not diabetic.  She has idiopathic pulmonary hypertension and is here today for routine follow-up.  She was found to have worsening of her PAP on RHC and she is on Remodulin--72 ng with a goal of 75 ng but this has been halted due to side effects.  Approximately 1 year ago, Ambrisentan and Tadalafil were started and she is still on them today.     Since then, she states she has had worsening side effects including nasal congestion/sinus drainage, HA which are reported severe at least once weekly, proximal muscle pain in all 4 extremities, back pain, and right hip pain. This has not changed. She complains of nasal congestion today despite this. She saw Dr. Lora with CTS today and Dr. Mares yesterday-- plans for RHC/LHC later this month. She notes that she is dyspneic with > 1/2 block of ambulation. Denies cough with sputum production, chest pain or dyspnea at rest.    Visit Vitals    /75 (BP Location: Left arm, Patient Position: Sitting, BP Method: Automatic)    Pulse 72    Temp 96.6 °F (35.9 °C) (Oral)    Resp 20    Ht 4' 11" (1.499 m)    Wt 59.9 kg (132 lb)    SpO2 98%  Comment: room air    BMI 26.66 kg/m2     Review of Systems   Constitutional: Negative for chills, diaphoresis, fever, malaise/fatigue and weight loss.   HENT: Positive for congestion. Negative for ear discharge, ear pain, hearing loss, nosebleeds, sore throat and tinnitus.    Eyes: Negative for blurred vision, " double vision, photophobia, pain, discharge and redness.   Respiratory: Positive for shortness of breath. Negative for cough, hemoptysis, sputum production, wheezing and stridor.    Cardiovascular: Negative for chest pain, palpitations, orthopnea, claudication, leg swelling and PND.   Gastrointestinal: Negative for abdominal pain, blood in stool, constipation, diarrhea, heartburn, melena, nausea and vomiting.   Genitourinary: Negative for dysuria, flank pain, frequency, hematuria and urgency.   Musculoskeletal: Positive for back pain, joint pain and myalgias. Negative for falls and neck pain.   Skin: Negative for itching and rash.   Neurological: Positive for dizziness (related to nasal congestion) and headaches. Negative for tingling, tremors, sensory change, speech change, focal weakness, seizures, loss of consciousness and weakness.   Endo/Heme/Allergies: Negative for environmental allergies and polydipsia. Does not bruise/bleed easily.   Psychiatric/Behavioral: Negative for depression, hallucinations and memory loss. The patient is not nervous/anxious and does not have insomnia.      Objective:   Physical Exam   Constitutional: She is oriented to person, place, and time and well-developed, well-nourished, and in no distress. No distress.   HENT:   Head: Normocephalic.   Mouth/Throat: Oropharynx is clear and moist. No oropharyngeal exudate.   Nasal congestion and post nasal gtt present   Eyes: Conjunctivae and EOM are normal. Pupils are equal, round, and reactive to light. No scleral icterus.   Neck: Normal range of motion. Neck supple. No JVD present. No tracheal deviation present. No thyromegaly present.   Cardiovascular: Normal rate, regular rhythm and intact distal pulses.  Exam reveals no gallop and no friction rub.    No murmur heard.  Pulmonary/Chest: Effort normal. No stridor. No respiratory distress. She has no wheezes. She has no rales. She exhibits no tenderness.   Abdominal: Soft. Bowel sounds are  normal. She exhibits no distension and no mass. There is no tenderness. There is no rebound and no guarding.   Musculoskeletal: Normal range of motion. She exhibits no edema.   Lymphadenopathy:     She has no cervical adenopathy.   Neurological: She is alert and oriented to person, place, and time. No cranial nerve deficit. Gait normal. Coordination normal.   Skin: Skin is warm and dry. No rash noted. She is not diaphoretic. No erythema. No pallor.   Psychiatric: Mood and affect normal.     Labs:  Lab Visit on 01/17/2017   Component Date Value    HIV 1/2 Ag/Ab 01/17/2017 Negative     Group & Rh 01/17/2017 A POS    Lab Visit on 01/16/2017   Component Date Value    Alcohol, Urine 01/16/2017 <10     Benzodiazepines 01/16/2017 Negative     Methadone metabolites 01/16/2017 Negative     Cocaine (Metab.) 01/16/2017 Negative     Opiate Scrn, Ur 01/16/2017 Negative     Barbiturate Screen, Ur 01/16/2017 Negative     Amphetamine Screen, Ur 01/16/2017 Negative     THC 01/16/2017 Negative     Phencyclidine 01/16/2017 Negative     Creatinine, Random Ur 01/16/2017 114.0     Toxicology Information 01/16/2017 SEE COMMENT     Preg Test, Ur 01/16/2017 Negative    Lab Visit on 01/16/2017   Component Date Value    Urine Volume 01/16/2017 975     Urine Collection Duration 01/16/2017 24     Urine, Creatinine 01/16/2017 91.0     Creatinine Clearance 01/16/2017 88     Creatinine, Ur (mg/spec) 01/16/2017 887.3     Creatinine 01/16/2017 0.7     Urine Volume 01/16/2017 975     Urine Collection Duration 01/16/2017 24     Protein, Urine 01/16/2017 7     Urine Protein, Timed 01/16/2017 68    Lab Visit on 01/16/2017   Component Date Value    NIL 01/16/2017 0.04     TB Antigen 01/16/2017 0.04     TB Antigen - Nil 01/16/2017 0.00     Mitogen - Nil 01/16/2017 >10.00     TB Gold 01/16/2017 Negative     WBC 01/16/2017 3.76*    RBC 01/16/2017 4.75     Hemoglobin 01/16/2017 11.9*    Hematocrit 01/16/2017 37.1     MCV  01/16/2017 78*    MCH 01/16/2017 25.1*    MCHC 01/16/2017 32.1     RDW 01/16/2017 16.8*    Platelets 01/16/2017 258     MPV 01/16/2017 SEE COMMENT     Gran # 01/16/2017 2.1     Lymph # 01/16/2017 1.1     Mono # 01/16/2017 0.3     Eos # 01/16/2017 0.3     Baso # 01/16/2017 0.02     Gran% 01/16/2017 57.2     Lymph% 01/16/2017 27.9     Mono% 01/16/2017 7.2     Eosinophil% 01/16/2017 7.2     Basophil% 01/16/2017 0.5     Aniso 01/16/2017 Slight     Poik 01/16/2017 Slight     Poly 01/16/2017 Occasional     Hypo 01/16/2017 Occasional     Ovalocytes 01/16/2017 Occasional     Differential Method 01/16/2017 Automated     aPTT 01/16/2017 36.0*    Prothrombin Time 01/16/2017 21.8*    INR 01/16/2017 2.2*    Sodium 01/16/2017 139     Potassium 01/16/2017 3.5     Chloride 01/16/2017 113*    CO2 01/16/2017 21*    Glucose 01/16/2017 80     BUN, Bld 01/16/2017 8     Creatinine 01/16/2017 0.7     Calcium 01/16/2017 8.6*    Total Protein 01/16/2017 7.5     Albumin 01/16/2017 3.5     Total Bilirubin 01/16/2017 0.2     Alkaline Phosphatase 01/16/2017 74     AST 01/16/2017 15     ALT 01/16/2017 16     Anion Gap 01/16/2017 5*    eGFR if African American 01/16/2017 >60.0     eGFR if non  Amer* 01/16/2017 >60.0     Magnesium 01/16/2017 2.0     Cholesterol 01/16/2017 128     Triglycerides 01/16/2017 101     HDL 01/16/2017 31*    LDL Cholesterol 01/16/2017 76.8     HDL/Chol Ratio 01/16/2017 24.2     Total Cholesterol/HDL Ra* 01/16/2017 4.1     Non-HDL Cholesterol 01/16/2017 97     RUBY Screen 01/16/2017 Positive*    Rheumatoid Factor 01/16/2017 <10.0     Varicella IgG 01/16/2017 2.98*    Varicella Interpretation 01/16/2017 Positive*    Juana-Barr Virus IgG (* 01/16/2017 Positive*    IgG - Serum 01/16/2017 1361     IgA 01/16/2017 335     IgM 01/16/2017 91     RPR 01/16/2017 Non-reactive     CMV IgG Interpretation 01/16/2017 Non-Reactive     Hepatitis B Surface Ag 01/16/2017  Negative     Hep B Core Total Ab 01/16/2017 Negative     Hepatitis C Ab 01/16/2017 Negative     Group & Rh 01/16/2017 A POS     BNP 01/16/2017 14     Hemoglobin A1C 01/16/2017 5.2     Estimated Avg Glucose 01/16/2017 103     TSH 01/16/2017 1.808     T3, Total 01/16/2017 136     T3, Free 01/16/2017 2.6     T4, Total 01/16/2017 12.5*    Free T4 01/16/2017 1.35     Toxoplasma gondii IGG 01/16/2017 <5.0     Toxoplasma IGG Interpret* 01/16/2017 Non-reactive     Hep B S Ab 01/16/2017 Negative     Hepatitis A Antibody IgG 01/16/2017 Negative     Toxoplasma IgM 01/16/2017 <3.0     RUBY HEP-2 Titer 01/16/2017 Positive 1:160 Speckled      CXR 1/16- Chest with lateral 2 views.  Heart is not enlarged.  The lungs are clear.  Access venous catheter seen on the right with its tip at the junction of the superior vena cava and right atrium.  No change is seen in the chest since Feb 04, 2016.  No acute process.      Pulmonary Function Tests 11/15/2016 5/17/2016   FVC 2.13 2.22   FEV1 1.43 1.65   TLC (liters) - 3.74   DLCO (ml/mmHg sec) - 17.6   FVC% 78 78   FEV1% 61 69   FEF 25-75 0.78 1.22   FEF 25-75% 29 44   TLC% - 89   RV - 1.49   RV% - 105   DLCO% - 90       Assessment:-  1. Primary pulmonary hypertension    2. Awaiting organ transplant status    3. Mild asthma    4. Nasal congestion      Plan:     1. Followed by Dr. Rai.  Currently on Remodulin, Ambrisentan, and Tadalafil.  Her respiratory symptoms remain stable but she is having worsening side effects from her PH treatment. 6MWT stable today; 1499 ft compared to 1600 ft last walk. No oxygen required, lowest sats 97%. PFT's stable. RHC/LHC scheduled for later this month and currently undergoing the pre-transplant evaluation process.     2. Asthma is controlled on current medications.      3. She currently takes nasal saline and fluticasone for congestion and should continue if she experiences relief (says she somewhat does).       Kalyani Baptiste  MD  Pulmonary/Critical Care Fellow  [phone]      Attending Note:    I have seen and evaluated the patient with the fellow. Their note reflects the content of our discussion and my plan of care. Will discuss with Dr. Rai and Dr. Lora timing for listing. Her candidacy is mostly driven by her medication intolerance.       Ralph Whyte MD  Pulmonary/Critical Care Medicine

## 2017-01-18 NOTE — PROGRESS NOTES
History & Physical    SUBJECTIVE:     History of Present Illness:  Patient is a 46 y.o. female who presents to discuss surgical candidacy for lung transplant.  Patient was diagnosed with IPAH about 5 and a half years. Initial symptom- syncope. Patient has been on triple therapy: Remodulin at 72ng/kg/min infusion, Letaris 10mg qdaily, and Adcirca 40mg qdaily.   Gets SOB when she walks, more than she used to, no CP. Gets LH when she walks as well, and has been more fatigued lately. Currently doing well, she uses no oxygen and can tolerate walking about half a block without incline before getting short of breath. She notes some headaches with the remodulin. Her shortness of breath has been getting worse since the end of the last year. She has noted some episodes of chest pain that are pleuritic in nature, worsening with breathing since her diagnosis.  No fever or chills or issues with her line.   She is blood group A.    Chief Complaint   Patient presents with    Consult       Review of patient's allergies indicates:   Allergen Reactions    Adhesive Hives     Silk tape    Amoxicillin Rash    Chlorhexidine Other (See Comments)       Current Outpatient Prescriptions   Medication Sig Dispense Refill    0.9 % SODIUM CHLORIDE (SODIUM CHLORIDE 0.9%) 0.9 % Soln Inject 3 mLs into the vein every 8 (eight) hours as needed. (Patient taking differently: Inject 3 mLs into the vein every other day. ) 900 mL 11    albuterol 90 mcg/actuation inhaler Inhale 2 puffs into the lungs every 4 (four) hours as needed. 2 Aerosol Inhalation Every 4-6 hours 1 Inhaler 3    ambrisentan (LETAIRIS) 10 MG Tab Take 1 tablet (10 mg total) by mouth once daily. 30 tablet 11    azelastine (ASTELIN) 137 mcg (0.1 %) nasal spray 2 sprays by Nasal route 2 (two) times daily.       butalbital-acetaminophen-caffeine -40 mg (FIORICET, ESGIC) -40 mg per tablet Take 1 tablet by mouth every 4 (four) hours as needed for Pain or Headaches.       cetirizine (ZYRTEC) 10 MG tablet Take 10 mg by mouth. 1 Tablet Oral Every day      clindamycin (CLEOCIN T) 1 % lotion       cyanocobalamin, vitamin B-12, 2,500 mcg Subl Place 2,500 mcg under the tongue once daily.       desonide (DESOWEN) 0.05 % ointment 0.05 %.  Ointment Topical As directed.  Apply to affected area twice a daily over face      diphenhydrAMINE (BENADRYL) 25 mg capsule Take 25 mg by mouth every 6 (six) hours as needed for Itching.      esomeprazole (NEXIUM) 40 MG capsule Take 1 capsule (40 mg total) by mouth before breakfast. 30 capsule 11    ferrous sulfate 325 (65 FE) MG EC tablet Take 325 mg by mouth daily as needed.       fluticasone (FLONASE) 50 mcg/actuation nasal spray 2 sprays by Each Nare route once daily. 1 Bottle 6    folic acid (FOLVITE) 1 MG tablet Take 1 mg by mouth.  Tablet Oral Every day      levothyroxine (SYNTHROID) 137 MCG Tab tablet Take 1 tablet (137 mcg total) by mouth once daily. 30 tablet 5    norethindrone-ethinyl estradiol (JUNEL FE 1/20) 1 mg-20 mcg (21)/75 mg (7) per tablet 1 tablet daily 28 tablet 0    PRAMOSONE 2.5-1 % Lotn lotion       tadalafil (ADCIRCA) 20 MG Tab Take 2 tablets (40 mg total) by mouth once daily. 60 tablet 11    topiramate (TOPAMAX) 100 MG tablet Take 100 mg by mouth 2 (two) times daily.      treprostinil (REMODULIN) 2.5 mg/mL Soln Mix cassette as directed and infuse continuously per physician titration orders on dosing sheet. Current dose 46 ng/kg/min 60 mL 11    VITAMIN D2 50,000 unit capsule TAKE ONE CAPSULE BY MOUTH EVERY 14 DAYS 6 capsule 0    warfarin (COUMADIN) 2.5 MG tablet Take 2.5-3 tablets (6.25-7.5 mg total) by mouth Daily. As directed by coumadin clinic 90 tablet 11    ADVAIR DISKUS 100-50 mcg/dose diskus inhaler INHALE ONE PUFF TWICE DAILY 60 each 11    ondansetron (ZOFRAN) 4 MG tablet Take 4 mg by mouth every 6 (six) hours as needed. 1 Tablet Oral Every 6 hours      promethazine (PHENERGAN) 25 MG tablet Take 25 mg by  mouth every 4 to 6 hours as needed.       rifAXIMin (XIFAXAN) 550 mg Tab Take 1 tablet (550 mg total) by mouth 3 (three) times daily. 42 tablet 0     No current facility-administered medications for this visit.        Past Medical History   Diagnosis Date    AR (allergic rhinitis)     Cholelithiasis, common bile duct     Chronic low back pain     GERD (gastroesophageal reflux disease)     History of migraine headaches     Hypothyroidism     Lumbar disc disease     Menorrhagia     Mild asthma     Obesity     Plantar fasciitis of left foot     Primary pulmonary hypertension      followed by heart transplant/pulmonary     Seizure disorder      x 1 in 2008    Sleep apnea     TMJ (dislocation of temporomandibular joint)     Tricuspid regurgitation      Past Surgical History   Procedure Laterality Date    Portacath placement      Cardiac catheterization      Upper gastrointestinal endoscopy       Family History   Problem Relation Age of Onset    Heart disease Father     Glaucoma Father     Diabetes Mother     Cancer Maternal Grandmother      uterine    Cancer Maternal Aunt      breast    Breast cancer Maternal Aunt     Heart disease Paternal Grandfather     Heart attack Paternal Grandfather     Leukemia Brother     Hypertension      Diabetes Maternal Grandfather     Heart attack Maternal Grandfather     Breast cancer Cousin     No Known Problems Sister     No Known Problems Maternal Uncle     No Known Problems Paternal Aunt     No Known Problems Paternal Uncle     No Known Problems Paternal Grandmother     Colon cancer Neg Hx     Ovarian cancer Neg Hx     Amblyopia Neg Hx     Blindness Neg Hx     Cataracts Neg Hx     Macular degeneration Neg Hx     Retinal detachment Neg Hx     Strabismus Neg Hx     Stroke Neg Hx     Thyroid disease Neg Hx      Social History   Substance Use Topics    Smoking status: Never Smoker    Smokeless tobacco: Never Used    Alcohol use No       "Comment: 1 per year        Review of Systems     Constitution: Negative for fever, chills, weight loss or gain.   HENT: Negative for sore throat, rhinorrhea, or headache.  Eyes: Negative for blurred or double vision.   Cardiovascular: See above  Pulmonary: Positive for SOB: see HPI  Gastrointestinal: Negative for abdominal pain, nausea, vomiting, or diarrhea.   : Negative for dysuria.   Neurological: Negative for focal weakness or sensory changes.  Psych: negative for anxiety or depression  Endo: negative for increased thirst or increased urination.    OBJECTIVE:     Vital Signs (Most Recent)  Temp: 98 °F (36.7 °C) (01/18/17 1109)  Pulse: 68 (01/18/17 1109)  BP: 122/65 (01/18/17 1109)  SpO2: 97 % (01/18/17 1109)  4' 11" (1.499 m)  60.9 kg (134 lb 4.8 oz)     Physical Exam     Gen:  no acute distress, alert and oriented  HEENT: Supple, no JVD  Eyes: EOMI  Cvs: Regular rate and rhythm, loud P2  Resp: Normal work of breathing, clear bilaterally  Abd: Soft, non-tender and not distended  Ext: Warm, no edema      Diagnostic Results:  2d echo:  1 - Normal left ventricular systolic function (EF 60-65%).     2 - Right ventricular enlargement with mildly to moderately depressed systolic function.     3 - Normal left ventricular diastolic function.     4 - Pulmonary hypertension. The estimated PA systolic pressure is 41 mmHg.     Esophagram:  Mild esophageal dysmotility.  Otherwise normal esophagram.      ASSESSMENT/PLAN:     46 year old female with idiopathic pulmonary hypertension presents to discuss surgical candidacy for lung transplantation.  I spoke with the patient and her mother in detail about lung transplant and the short and long term risks of transplantation. She is not tolerating her PH meds. I spok with Dr Mendoza and Dr Whyte. We need to decide with the patient her riskd for PH now and when to list for transplant. She is in good shape now for lung transplant.  "

## 2017-01-21 LAB
CLASS I ANTIBODIES - LUMINEX: NEGATIVE
CLASS II ANTIBODIES - LUMINEX: NORMAL
CLASS II ANTIBODY COMMENTS - LUMINEX: NORMAL
CPRA %: 4
SERUM COLLECTION DT - LUMINEX CLASS I: NORMAL
SERUM COLLECTION DT - LUMINEX CLASS II: NORMAL
SPCL1 TESTING DATE: NORMAL
SPCL2 TESTING DATE: NORMAL

## 2017-01-23 ENCOUNTER — TELEPHONE (OUTPATIENT)
Dept: PULMONOLOGY | Facility: CLINIC | Age: 47
End: 2017-01-23

## 2017-01-23 NOTE — PROGRESS NOTES
Pt ( [phone]) called 1/23/17 to inform us that on 1/30/17 she is dilcia'd to have a (Angiogram). Pt said she was told to hold her coumadin for (3) days. Please advise.

## 2017-01-23 NOTE — PROGRESS NOTES
Patient called to report that she is scheduled for angiogram 1/30/17 and has been told that she need to hold coumadin 3 days. She has been cleared in the past by Dr Rai to hold coumadin 3 days without lovenox for another procedure 4/2016 and had no problems. We will advise her to hold coumadin 3 days without lovenox and will boost her coumadin upon resumption.  Details of plan will be forwarded to Dr Rai.  [facility] details as well. Pt advised of plan and expressed understanding

## 2017-01-23 NOTE — TELEPHONE ENCOUNTER
LVM for pt to call the clinic back to reschedule a appointment with Agustina Escalera on 1/27/2017  New patient

## 2017-01-23 NOTE — PROCEDURES
Yuni Perez is a 46 y.o.  female patient, who presents for a 6 minute walk test ordered by Kali Whyte MD.  The diagnosis is Pre Lung Transplant Evaluation; Pulmonary Hypertension.  The patient's BMI is 26.8 kg/m2.  Predicted distance (lower limit of normal) is 448.42 meters.      Test Results:    The test was completed without stopping.  The total time walked was 360 seconds.  During walking, the patient reported:  Dyspnea, Leg pain, Lightheadedness. The patient used no assistive devices during testing.     01/18/2017---------Distance: 456.9 meters (1499 feet)     O2 Sat % Supplemental Oxygen Heart Rate Blood Pressure Ab Scale   Pre-exercise  (Resting) 99 % Room Air 74 bpm 115/65 mmHg 2   During Exercise 98 % Room Air 119 bpm 122/68 mmHg 7-8   Post-exercise  (Recovery) 99 % Room Air  97 bpm       Recovery Time: 63 seconds    Performing nurse/tech: NATACHA Casas      PREVIOUS STUDY:   12/09/2016---------Distance: 487.68 meters (1600 feet)       O2 Sat % Supplemental Oxygen Heart Rate Blood Pressure Ab Scale   Pre-exercise  (Resting) 99 % Room Air 75 bpm 140/61 mmHg 2   During Exercise 98 % Room Air 123 bpm 146/74 mmHg 9   Post-exercise  (Recovery) 99 % Room Air  110 bpm           CLINICAL INTERPRETATION:  Six minute walk distance is 456.9 meters (1499 feet) with very heavy dyspnea.  During exercise, there was no significant desaturation while breathing room air.  Blood pressure remained stable and Heart rate increased significantly with walking.  The patient reported non-pulmonary symptoms during exercise.  Since the previous study in December 2016, exercise capacity is unchanged.  Based upon age and body mass index, exercise capacity is normal.

## 2017-01-24 ENCOUNTER — TELEPHONE (OUTPATIENT)
Dept: TRANSPLANT | Facility: CLINIC | Age: 47
End: 2017-01-24

## 2017-01-26 DIAGNOSIS — E03.9 HYPOTHYROIDISM (ACQUIRED): ICD-10-CM

## 2017-01-26 LAB
HLA DRB4 1: NORMAL
HLA-A 1 SERO. EQUIV: 2
HLA-A 1: NORMAL
HLA-A 2 SERO. EQUIV: 3
HLA-A 2: NORMAL
HLA-B 1 SERO. EQUIV: 7
HLA-B 1: NORMAL
HLA-B 2 SERO. EQUIV: 62
HLA-B 2: NORMAL
HLA-BW 1 SERO. EQUIV: 6
HLA-BW 2 SERO. EQUIV: NORMAL
HLA-C 1: NORMAL
HLA-C 2: NORMAL
HLA-CW 1 SERO. EQUIV: 9
HLA-CW 2 SERO. EQUIV: 7
HLA-DQ 1 SERO. EQUIV: 7
HLA-DQ 2 SERO. EQUIV: 6
HLA-DQB1 1: NORMAL
HLA-DQB1 2: NORMAL
HLA-DRB1 1 SERO. EQUIV: 11
HLA-DRB1 1: NORMAL
HLA-DRB1 2 SERO. EQUIV: 15
HLA-DRB1 2: NORMAL
HLA-DRB3 1: NORMAL
HLA-DRB3 2: NORMAL
HLA-DRB345 1 SERO. EQUIV: 52
HLA-DRB345 2 SERO. EQUIV: 51
HLA-DRB4 2: NORMAL
HLA-DRB5 1: NORMAL
HLA-DRB5 2: NORMAL
SSDQB TESTING DATE: NORMAL
SSDRB TESTING DATE: NORMAL
SSOA TESTING DATE: NORMAL
SSOB TESTING DATE: NORMAL
SSOC TESTING DATE: NORMAL
SSODR TESTING DATE: NORMAL
TYSSO TESTING DATE: NORMAL

## 2017-01-26 RX ORDER — LEVOTHYROXINE SODIUM 137 UG/1
TABLET ORAL
Qty: 30 TABLET | Refills: 1 | Status: SHIPPED | OUTPATIENT
Start: 2017-01-26 | End: 2017-02-27 | Stop reason: SDUPTHER

## 2017-01-30 ENCOUNTER — HOSPITAL ENCOUNTER (OUTPATIENT)
Facility: HOSPITAL | Age: 47
Discharge: HOME | End: 2017-01-30
Attending: INTERNAL MEDICINE | Admitting: INTERNAL MEDICINE
Payer: COMMERCIAL

## 2017-01-30 ENCOUNTER — SURGERY (OUTPATIENT)
Age: 47
End: 2017-01-30

## 2017-01-30 VITALS
DIASTOLIC BLOOD PRESSURE: 61 MMHG | HEART RATE: 62 BPM | TEMPERATURE: 97 F | WEIGHT: 132 LBS | SYSTOLIC BLOOD PRESSURE: 127 MMHG | BODY MASS INDEX: 26.61 KG/M2 | RESPIRATION RATE: 20 BRPM | HEIGHT: 59 IN | OXYGEN SATURATION: 98 %

## 2017-01-30 DIAGNOSIS — Z01.818 PRE-TRANSPLANT EVALUATION FOR LUNG TRANSPLANT: ICD-10-CM

## 2017-01-30 DIAGNOSIS — Z79.01 LONG TERM CURRENT USE OF ANTICOAGULANT THERAPY: ICD-10-CM

## 2017-01-30 DIAGNOSIS — E03.9 HYPOTHYROIDISM (ACQUIRED): ICD-10-CM

## 2017-01-30 DIAGNOSIS — R10.84 GENERALIZED ABDOMINAL CRAMPING: ICD-10-CM

## 2017-01-30 DIAGNOSIS — E66.3 OVERWEIGHT (BMI 25.0-29.9): ICD-10-CM

## 2017-01-30 DIAGNOSIS — R07.89 CHEST TIGHTNESS: ICD-10-CM

## 2017-01-30 DIAGNOSIS — J45.909 MILD ASTHMA: ICD-10-CM

## 2017-01-30 DIAGNOSIS — G40.909 SEIZURE DISORDER: ICD-10-CM

## 2017-01-30 DIAGNOSIS — I27.9 CHRONIC CARDIOPULMONARY DISEASE: Primary | ICD-10-CM

## 2017-01-30 DIAGNOSIS — J45.20 ALLERGIC ASTHMA, MILD INTERMITTENT, UNCOMPLICATED: ICD-10-CM

## 2017-01-30 DIAGNOSIS — R14.0 GENERALIZED BLOATING: ICD-10-CM

## 2017-01-30 DIAGNOSIS — M51.9 LUMBAR DISC DISEASE: ICD-10-CM

## 2017-01-30 DIAGNOSIS — Z86.69 HISTORY OF MIGRAINE HEADACHES: ICD-10-CM

## 2017-01-30 DIAGNOSIS — G47.33 OSA (OBSTRUCTIVE SLEEP APNEA): ICD-10-CM

## 2017-01-30 DIAGNOSIS — J30.0 NON-ALLERGIC VASOMOTOR RHINITIS: ICD-10-CM

## 2017-01-30 DIAGNOSIS — I27.20 PULMONARY HYPERTENSION: ICD-10-CM

## 2017-01-30 DIAGNOSIS — I27.0 PRIMARY PULMONARY HYPERTENSION: ICD-10-CM

## 2017-01-30 DIAGNOSIS — R16.0 LIVER MASS: ICD-10-CM

## 2017-01-30 DIAGNOSIS — Z76.82 AWAITING ORGAN TRANSPLANT STATUS: ICD-10-CM

## 2017-01-30 LAB
ABO + RH BLD: NORMAL
B-HCG UR QL: NEGATIVE
BLD GP AB SCN CELLS X3 SERPL QL: NORMAL
CTP QC/QA: YES

## 2017-01-30 PROCEDURE — 93005 ELECTROCARDIOGRAM TRACING: CPT

## 2017-01-30 PROCEDURE — 25000003 PHARM REV CODE 250: Performed by: STUDENT IN AN ORGANIZED HEALTH CARE EDUCATION/TRAINING PROGRAM

## 2017-01-30 PROCEDURE — 93460 R&L HRT ART/VENTRICLE ANGIO: CPT | Mod: 26,,, | Performed by: INTERNAL MEDICINE

## 2017-01-30 PROCEDURE — 25000003 PHARM REV CODE 250: Performed by: INTERNAL MEDICINE

## 2017-01-30 PROCEDURE — 93010 ELECTROCARDIOGRAM REPORT: CPT | Mod: ,,, | Performed by: INTERNAL MEDICINE

## 2017-01-30 PROCEDURE — 86901 BLOOD TYPING SEROLOGIC RH(D): CPT

## 2017-01-30 PROCEDURE — 86900 BLOOD TYPING SEROLOGIC ABO: CPT

## 2017-01-30 PROCEDURE — 93010 ELECTROCARDIOGRAM REPORT: CPT | Mod: 76,,, | Performed by: INTERNAL MEDICINE

## 2017-01-30 PROCEDURE — 81025 URINE PREGNANCY TEST: CPT | Performed by: INTERNAL MEDICINE

## 2017-01-30 PROCEDURE — C1769 GUIDE WIRE: HCPCS

## 2017-01-30 RX ORDER — SODIUM CHLORIDE 9 MG/ML
3 INJECTION, SOLUTION INTRAVENOUS CONTINUOUS
Status: ACTIVE | OUTPATIENT
Start: 2017-01-30 | End: 2017-01-30

## 2017-01-30 RX ORDER — DIPHENHYDRAMINE HCL 50 MG
50 CAPSULE ORAL ONCE
Status: COMPLETED | OUTPATIENT
Start: 2017-01-30 | End: 2017-01-30

## 2017-01-30 RX ORDER — SODIUM CHLORIDE 9 MG/ML
3 INJECTION, SOLUTION INTRAVENOUS CONTINUOUS
Status: DISCONTINUED | OUTPATIENT
Start: 2017-01-30 | End: 2017-01-30 | Stop reason: HOSPADM

## 2017-01-30 RX ORDER — ALPRAZOLAM 0.5 MG/1
0.5 TABLET ORAL ONCE
Status: COMPLETED | OUTPATIENT
Start: 2017-01-30 | End: 2017-01-30

## 2017-01-30 RX ORDER — ONDANSETRON 8 MG/1
8 TABLET, ORALLY DISINTEGRATING ORAL EVERY 8 HOURS PRN
Status: DISCONTINUED | OUTPATIENT
Start: 2017-01-30 | End: 2017-01-30 | Stop reason: HOSPADM

## 2017-01-30 RX ADMIN — SODIUM CHLORIDE 3 ML/KG/HR: 0.9 INJECTION, SOLUTION INTRAVENOUS at 07:01

## 2017-01-30 RX ADMIN — DIPHENHYDRAMINE HYDROCHLORIDE 50 MG: 50 CAPSULE ORAL at 07:01

## 2017-01-30 RX ADMIN — ALPRAZOLAM 0.5 MG: 0.5 TABLET ORAL at 08:01

## 2017-01-30 NOTE — PROGRESS NOTES
Patient discharged per MD orders. Instructions given on medications, wound care, activity, signs of infection, when to call MD, and follow up appointments. Pt verbalized understanding. Telemetry and PIV removed. Patient and family left unit with transport.

## 2017-01-30 NOTE — PLAN OF CARE
Problem: Cardiac Catheterization (Diagnostic/Interventional) (Adult)  Goal: Signs and Symptoms of Listed Potential Problems Will be Absent, Minimized or Managed (Cardiac Catheterization)  Signs and symptoms of listed potential problems will be absent, minimized or managed by discharge/transition of care (reference Cardiac Catheterization (Diagnostic/Interventional) (Adult) CPG).  Outcome: Ongoing (interventions implemented as appropriate)  Admit assessment done. Labs sent. IV placed x 2. Plan of care and safety prec initiated. Will continue to monitor.

## 2017-01-30 NOTE — INTERVAL H&P NOTE
The patient has been examined and the H&P has been reviewed:    I concur with the findings and changes have been noted since the H&P was written: Patient stopped coumadin this past Friday. Still on remodulin via right sided chauhan. Feeling nervous but otherwise without any changes since last office visit.    Anesthesia/Surgery risks, benefits and alternative options discussed and understood by patient/family.          Active Hospital Problems    Diagnosis  POA    Pre-transplant evaluation for lung transplant [Z01.818]  Not Applicable    History of migraine headaches [Z86.69]  Not Applicable    Chronic cardiopulmonary disease [I27.9]  Yes      Resolved Hospital Problems    Diagnosis Date Resolved POA   No resolved problems to display.       I have personally seen, taken the history and examined the patient.  I agree with the housestaff whose note reflects my findings and plan as above.

## 2017-01-30 NOTE — DISCHARGE SUMMARY
[facility]  Discharge Summary      Admit Date: 1/30/2017    Discharge Date and Time: 1/30/2017 11:11 AM    Attending Physician: Tu Mares MD     Reason for Admission: LHC/RHC    Procedures Performed: Procedure(s) (LRB):  HEART CATH-LEFT (Left)  HEART CATH-RIGHT (Right)    Hospital Course (synopsis of major diagnoses, care, treatment, and services provided during the course of the hospital stay): 46 year old female with a history of idiopathic PH(2008) on remodulin, ambrisentan and tadalafil who is referred by Dr. Whyte for pre-lung transplant evaluation, on IV remodulin.  No evidence of CAD.  Elevated PA pressures, see cath report for full details.    Consults: none    Significant Diagnostic Studies: Labs: BMP: No results for input(s): GLU, NA, K, CL, CO2, BUN, CREATININE, CALCIUM, MG in the last 48 hours.    Final Diagnoses:    Principal Problem: Pre-transplant evaluation for lung transplant   Secondary Diagnoses:   Active Hospital Problems    Diagnosis  POA    *Pre-transplant evaluation for lung transplant [Z01.818]  Not Applicable    History of migraine headaches [Z86.69]  Not Applicable    Chronic cardiopulmonary disease [I27.9]  Yes      Resolved Hospital Problems    Diagnosis Date Resolved POA   No resolved problems to display.       Discharged Condition: good    Disposition: Home or Self Care    Follow Up/Patient Instructions: No heavy lifting greater than 5 lbs.  Cardiac diet.  Activity as tolerated.    Medications:  Reconciled Home Medications:   Current Discharge Medication List      CONTINUE these medications which have NOT CHANGED    Details   ambrisentan (LETAIRIS) 10 MG Tab Take 1 tablet (10 mg total) by mouth once daily.  Qty: 30 tablet, Refills: 11    Comments: [facility]. Disregard if escripted to retail  Associated Diagnoses: Primary pulmonary hypertension      butalbital-acetaminophen-caffeine -40 mg (FIORICET, ESGIC) -40  mg per tablet Take 1 tablet by mouth every 4 (four) hours as needed for Pain or Headaches.      cetirizine (ZYRTEC) 10 MG tablet Take 10 mg by mouth. 1 Tablet Oral Every day      cyanocobalamin, vitamin B-12, 2,500 mcg Subl Place 2,500 mcg under the tongue once daily.       diphenhydrAMINE (BENADRYL) 25 mg capsule Take 25 mg by mouth every 6 (six) hours as needed for Itching.      esomeprazole (NEXIUM) 40 MG capsule Take 1 capsule (40 mg total) by mouth before breakfast.  Qty: 30 capsule, Refills: 11    Associated Diagnoses: Gastroesophageal reflux disease, esophagitis presence not specified      ferrous sulfate 325 (65 FE) MG EC tablet Take 325 mg by mouth daily as needed.       folic acid (FOLVITE) 1 MG tablet Take 1 mg by mouth.  Tablet Oral Every day      levothyroxine (SYNTHROID) 137 MCG Tab tablet TAKE ONE Tablet BY MOUTH DAILY  Qty: 30 tablet, Refills: 1    Associated Diagnoses: Hypothyroidism (acquired)      norethindrone-ethinyl estradiol (JUNEL FE 1/20) 1 mg-20 mcg (21)/75 mg (7) per tablet 1 tablet daily  Qty: 28 tablet, Refills: 0    Associated Diagnoses: Menorrhagia with irregular cycle      ondansetron (ZOFRAN) 4 MG tablet Take 4 mg by mouth every 6 (six) hours as needed. 1 Tablet Oral Every 6 hours      promethazine (PHENERGAN) 25 MG tablet Take 25 mg by mouth every 4 to 6 hours as needed.       tadalafil (ADCIRCA) 20 MG Tab Take 2 tablets (40 mg total) by mouth once daily.  Qty: 60 tablet, Refills: 11    Comments: To be filled by [facility], please diregard if escripted to retail pharmacy.  Associated Diagnoses: Primary pulmonary hypertension      topiramate (TOPAMAX) 100 MG tablet Take 100 mg by mouth 2 (two) times daily.      treprostinil (REMODULIN) 2.5 mg/mL Soln Mix cassette as directed and infuse continuously per physician titration orders on dosing sheet. Current dose 46 ng/kg/min  Qty: 60 mL, Refills: 11    Comments: To be filled by [facility]; disregard if escripted to  retail.  Associated Diagnoses: Chronic pulmonary heart disease      0.9 % SODIUM CHLORIDE (SODIUM CHLORIDE 0.9%) 0.9 % Soln Inject 3 mLs into the vein every 8 (eight) hours as needed.  Qty: 900 mL, Refills: 11      ADVAIR DISKUS 100-50 mcg/dose diskus inhaler INHALE ONE PUFF TWICE DAILY  Qty: 60 each, Refills: 11      albuterol 90 mcg/actuation inhaler Inhale 2 puffs into the lungs every 4 (four) hours as needed. 2 Aerosol Inhalation Every 4-6 hours  Qty: 1 Inhaler, Refills: 3      azelastine (ASTELIN) 137 mcg (0.1 %) nasal spray 2 sprays by Nasal route 2 (two) times daily.       clindamycin (CLEOCIN T) 1 % lotion       desonide (DESOWEN) 0.05 % ointment 0.05 %.  Ointment Topical As directed.  Apply to affected area twice a daily over face      fluticasone (FLONASE) 50 mcg/actuation nasal spray 2 sprays by Each Nare route once daily.  Qty: 1 Bottle, Refills: 6      PRAMOSONE 2.5-1 % Lotn lotion       VITAMIN D2 50,000 unit capsule TAKE ONE CAPSULE BY MOUTH EVERY 14 DAYS  Qty: 6 capsule, Refills: 0      warfarin (COUMADIN) 2.5 MG tablet Take 2.5-3 tablets (6.25-7.5 mg total) by mouth Daily. As directed by coumadin clinic  Qty: 90 tablet, Refills: 11    Associated Diagnoses: Idiopathic PAH (pulmonary arterial hypertension)           No discharge procedures on file.  Follow-up Information     Follow up with Ralph Whyte MD In 3 weeks.    Specialties:  Intensive Care, Transplant    Contact information:    [address] [facility] [id number] [phone]

## 2017-01-30 NOTE — IP AVS SNAPSHOT
[facility]  [address]  [facility] [address]  Phone: [phone]           Patient Discharge Instructions     Our goal is to set you up for success. This packet includes information on your condition, medications, and your home care. It will help you to care for yourself so you don't get sicker and need to go back to the hospital.     Please ask your nurse if you have any questions.        There are many details to remember when preparing to leave the hospital. Here is what you will need to do:    1. Take your medicine. If you are prescribed medications, review your Medication List in the following pages. You may have new medications to  at the pharmacy and others that you'll need to stop taking. Review the instructions for how and when to take your medications. Talk with your doctor or nurses if you are unsure of what to do.     2. Go to your follow-up appointments. Specific follow-up information is listed in the following pages. Your may be contacted by a transition nurse or clinical provider about future appointments. Be sure we have all of the phone numbers to reach you, if needed. Please contact your provider's office if you are unable to make an appointment.     3. Watch for warning signs. Your doctor or nurse will give you detailed warning signs to watch for and when to call for assistance. These instructions may also include educational information about your condition. If you experience any of warning signs to your health, call your doctor.               [facility]  Unless otherwise directed by your provider, please contact [facility], our nurse care line that is available for 24/7 assistance.     [phone] (toll-free)    Registered nurses in the [facility] provide clinical advisement, health education, appointment booking, and other advisory services.                    ** Verify the list of medication(s) below is accurate and up  to date. Carry this with you in case of emergency. If your medications have changed, please notify your healthcare provider.             Medication List      CHANGE how you take these medications        Additional Info                      sodium chloride 0.9% 0.9 % Soln   Quantity:  900 mL   Refills:  11   Dose:  3 mL   What changed:  when to take this    Instructions:  Inject 3 mLs into the vein every 8 (eight) hours as needed.     Begin Date    AM    Noon    PM    Bedtime         CONTINUE taking these medications        Additional Info                      ADVAIR DISKUS 100-50 mcg/dose diskus inhaler   Quantity:  60 each   Refills:  11   Generic drug:  fluticasone-salmeterol 100-50 mcg/dose    Instructions:  INHALE ONE PUFF TWICE DAILY     Begin Date    AM    Noon    PM    Bedtime       albuterol 90 mcg/actuation inhaler   Quantity:  1 Inhaler   Refills:  3   Dose:  2 puff    Instructions:  Inhale 2 puffs into the lungs every 4 (four) hours as needed. 2 Aerosol Inhalation Every 4-6 hours     Begin Date    AM    Noon    PM    Bedtime       ambrisentan 10 MG Tab   Commonly known as:  LETAIRIS   Quantity:  30 tablet   Refills:  11   Dose:  10 mg   Comments:  To be filled by [facility] specialty pharmacy. Disregard if escripted to retail    Instructions:  Take 1 tablet (10 mg total) by mouth once daily.     Begin Date    AM    Noon    PM    Bedtime       azelastine 137 mcg (0.1 %) nasal spray   Commonly known as:  ASTELIN   Refills:  0   Dose:  2 spray    Instructions:  2 sprays by Nasal route 2 (two) times daily.     Begin Date    AM    Noon    PM    Bedtime       BENADRYL 25 mg capsule   Refills:  0   Dose:  25 mg   Generic drug:  diphenhydrAMINE    Last time this was given:  50 mg on 1/30/2017  7:38 AM   Instructions:  Take 25 mg by mouth every 6 (six) hours as needed for Itching.     Begin Date    AM    Noon    PM    Bedtime       butalbital-acetaminophen-caffeine -40 mg -40 mg per tablet   Commonly  known as:  FIORICET, ESGIC   Refills:  0   Dose:  1 tablet    Instructions:  Take 1 tablet by mouth every 4 (four) hours as needed for Pain or Headaches.     Begin Date    AM    Noon    PM    Bedtime       clindamycin 1 % lotion   Commonly known as:  CLEOCIN T   Refills:  0      Begin Date    AM    Noon    PM    Bedtime       cyanocobalamin (vitamin B-12) 2,500 mcg Subl   Refills:  0   Dose:  2500 mcg    Instructions:  Place 2,500 mcg under the tongue once daily.     Begin Date    AM    Noon    PM    Bedtime       desonide 0.05% 0.05 % Oint   Commonly known as:  DESOWEN   Refills:  0   Dose:  0.05 %    Instructions:  0.05 %.  Ointment Topical As directed.  Apply to affected area twice a daily over face     Begin Date    AM    Noon    PM    Bedtime       esomeprazole 40 MG capsule   Commonly known as:  NEXIUM   Quantity:  30 capsule   Refills:  11   Dose:  40 mg    Instructions:  Take 1 capsule (40 mg total) by mouth before breakfast.     Begin Date    AM    Noon    PM    Bedtime       ferrous sulfate 325 (65 FE) MG EC tablet   Refills:  0   Dose:  325 mg    Instructions:  Take 325 mg by mouth daily as needed.     Begin Date    AM    Noon    PM    Bedtime       fluticasone 50 mcg/actuation nasal spray   Commonly known as:  FLONASE   Quantity:  1 Bottle   Refills:  6   Dose:  2 spray    Instructions:  2 sprays by Each Nare route once daily.     Begin Date    AM    Noon    PM    Bedtime       folic acid 1 MG tablet   Commonly known as:  FOLVITE   Refills:  0   Dose:  1 mg    Instructions:  Take 1 mg by mouth.  Tablet Oral Every day     Begin Date    AM    Noon    PM    Bedtime       levothyroxine 137 MCG Tab tablet   Commonly known as:  SYNTHROID   Quantity:  30 tablet   Refills:  1    Instructions:  TAKE ONE Tablet BY MOUTH DAILY     Begin Date    AM    Noon    PM    Bedtime       norethindrone-ethinyl estradiol 1 mg-20 mcg (21)/75 mg (7) per tablet   Commonly known as:  JUNEL FE 1/20   Quantity:  28 tablet   Refills:   0    Instructions:  1 tablet daily     Begin Date    AM    Noon    PM    Bedtime       PHENERGAN 25 MG tablet   Refills:  0   Dose:  25 mg   Generic drug:  promethazine    Instructions:  Take 25 mg by mouth every 4 to 6 hours as needed.     Begin Date    AM    Noon    PM    Bedtime       PRAMOSONE 2.5-1 % Lotn lotion   Refills:  0   Generic drug:  hydrocortisone-pramoxine      Begin Date    AM    Noon    PM    Bedtime       tadalafil 20 MG Tab   Commonly known as:  ADCIRCA   Quantity:  60 tablet   Refills:  11   Dose:  40 mg   Indications:  Pulmonary Arterial Hypertension   Comments:  To be filled by [facility], please diregard if escripted to retail pharmacy.    Instructions:  Take 2 tablets (40 mg total) by mouth once daily.     Begin Date    AM    Noon    PM    Bedtime       topiramate 100 MG tablet   Commonly known as:  TOPAMAX   Refills:  0   Dose:  100 mg    Instructions:  Take 100 mg by mouth 2 (two) times daily.     Begin Date    AM    Noon    PM    Bedtime       treprostinil 2.5 mg/mL Soln   Commonly known as:  REMODULIN   Quantity:  60 mL   Refills:  11   Comments:  To be filled by [facility]; disregard if escripted to retail.    Instructions:  Mix cassette as directed and infuse continuously per physician titration orders on dosing sheet. Current dose 46 ng/kg/min     Begin Date    AM    Noon    PM    Bedtime       VITAMIN D2 50,000 unit Cap   Quantity:  6 capsule   Refills:  0   Generic drug:  ergocalciferol    Instructions:  TAKE ONE CAPSULE BY MOUTH EVERY 14 DAYS     Begin Date    AM    Noon    PM    Bedtime       warfarin 2.5 MG tablet   Commonly known as:  COUMADIN   Quantity:  90 tablet   Refills:  11   Dose:  6.25-7.5 mg    Instructions:  Take 2.5-3 tablets (6.25-7.5 mg total) by mouth Daily. As directed by coumadin clinic     Begin Date    AM    Noon    PM    Bedtime       ZOFRAN (AS HYDROCHLORIDE) 4 MG tablet   Refills:  0   Dose:  4 mg   Generic drug:  ondansetron     Instructions:  Take 4 mg by mouth every 6 (six) hours as needed. 1 Tablet Oral Every 6 hours     Begin Date    AM    Noon    PM    Bedtime       ZYRTEC 10 MG tablet   Refills:  0   Dose:  10 mg   Generic drug:  cetirizine    Instructions:  Take 10 mg by mouth. 1 Tablet Oral Every day     Begin Date    AM    Noon    PM    Bedtime                  Please bring to all follow up appointments:    1. A copy of your discharge instructions.  2. All medicines you are currently taking in their original bottles.  3. Identification and insurance card.    Please arrive 15 minutes ahead of scheduled appointment time.    Please call 24 hours in advance if you must reschedule your appointment and/or time.        Your Scheduled Appointments     Feb 06, 2017 10:45 AM CST   Anticoagulation with Giovanna Whitmore PharmD   [facility] ([place])    [address] [facility] [address]   [phone]            Feb 09, 2017 10:00 AM CST   Established Patient Visit with Gaetano Berrios MD   [facility] ([place])    [address] [facility] [address] [phone]            Feb 17, 2017 11:00 AM CST   Immunization/Injection with INJECTION, INFECTIOUS DISEASES   [facility] ([facility] )    [address]  [place] [address] [phone]            Apr 07, 2017 10:00 AM CDT   GASTROENTEROLOGY ESTABLISHED PATIENT with Miley Anguiano PA-C   [facility] Gastroenter[facility] ([facility] )    [address]  [place] [address] [phone]            Jul 18, 2017 11:00 AM CDT   Immunization/Injection with INJECTION, INFECTIOUS DISEASES   [facility] ([facility] )    [address]  [place] [address] [phone]              Follow-up Information     Follow up with Ralph Whyte MD In 3 weeks.    Specialties:  Intensive Care, Transplant    Contact information:    [address] [facility] [id number] [phone]       "      Primary Diagnosis     Your primary diagnosis was:  Encounter For Pre-Operative Examination      Admission Information     Date & Time Provider Department CSN    1/30/2017  7:18 AM Tu Mares MD [facility] [id number]      Care Providers     Provider Role Specialty Primary office phone    Tu Mares MD Attending Provider Cardiology [phone]      Your Vitals Were     BP Pulse Temp Resp Height Weight    123/61 68 97 °F (36.1 °C) 20 4' 11" (1.499 m) 59.9 kg (132 lb)    SpO2 BMI             98% 26.66 kg/m2         Recent Lab Values        12/8/2015 1/16/2017                       11:29 AM  8:38 AM          A1C 5.0 5.2          Comment for A1C at  8:38 AM on 1/16/2017:  According to ADA guidelines, hemoglobin A1C <7.0% represents  optimal control in non-pregnant diabetic patients.  Different  metrics may apply to specific populations.   Standards of Medical Care in Diabetes - 2016.  For the purpose of screening for the presence of diabetes:  <5.7%     Consistent with the absence of diabetes  5.7-6.4%  Consistent with increasing risk for diabetes   (prediabetes)  >or=6.5%  Consistent with diabetes  Currently no consensus exists for use of hemoglobin A1C  for diagnosis of diabetes for children.        Allergies as of 1/30/2017        Reactions    Adhesive Hives    Silk tape    Amoxicillin Rash    Chlorhexidine Other (See Comments)      Advance Directives     An advance directive is a document which, in the event you are no longer able to make decisions for yourself, tells your healthcare team what kind of treatment you do or do not want to receive, or who you would like to make those decisions for you.  If you do not currently have an advance directive, [facility] encourages you to create one.  For more information call:  [phone]), [phone] ([phone]),  or log on to [email or web address].        Language Assistance Services     ATTENTION: Language assistance " services are available, free of charge. Please call [phone].      ATENCIÓN: Si habla david, tiene a grider disposición servicios gratuitos de asistencia lingüística. Llame al [phone].     CHÚ Ý: N?u b?n nói Ti?ng Vi?t, có các d?ch v? h? tr? ngôn ng? mi?n phí dành cho b?n. G?i s? [phone].        Coumadin Discharge Instructions                          [facility] complies with applicable Federal civil rights laws and does not discriminate on the basis of race, color, national origin, age, disability, or sex.

## 2017-01-30 NOTE — PLAN OF CARE
Problem: Cardiac Catheterization (Diagnostic/Interventional) (Adult)  Goal: Signs and Symptoms of Listed Potential Problems Will be Absent, Minimized or Managed (Cardiac Catheterization)  Signs and symptoms of listed potential problems will be absent, minimized or managed by discharge/transition of care (reference Cardiac Catheterization (Diagnostic/Interventional) (Adult) CPG).   Outcome: Ongoing (interventions implemented as appropriate)  Received report from DENNY Momin. Patient s/p r/lhc, AAOx3. VSS, no c/o pain or discomfort at this time, resp even and unlabored. Vasc band to r wrist is CDI. No active bleeding. No hematoma noted. Post procedure protocol reviewed with patient and patient's family. Understanding verbalized. Family members at bedside. Nurse call bell within reach. Will continue to monitor per post procedure protocol.

## 2017-01-30 NOTE — PLAN OF CARE
Vasc band removed per protocol over 1 hour period. Patient tolerated well. Gauze/tegaderm dressing placed. Will monitor.

## 2017-01-31 ENCOUNTER — TELEPHONE (OUTPATIENT)
Dept: CARDIOLOGY | Facility: CLINIC | Age: 47
End: 2017-01-31

## 2017-02-02 ENCOUNTER — DOCUMENTATION ONLY (OUTPATIENT)
Dept: TRANSPLANT | Facility: CLINIC | Age: 47
End: 2017-02-02

## 2017-02-02 ENCOUNTER — TELEPHONE (OUTPATIENT)
Dept: TRANSPLANT | Facility: CLINIC | Age: 47
End: 2017-02-02

## 2017-02-02 NOTE — TELEPHONE ENCOUNTER
"Pt called to discuss current status. She reports cath completed earlier this week. Pt inquiring and would like "perspective" regarding how PA pressures are now versus how they were w/previous caths. Provided patient with PA pressures from last couple caths. Pt would like to know from Dr Rai where she currently is with PH. Discussed w/patient that she is on a large dose of remodulin, and that it hasn't improved her pulmonary pressures dramatically, as had been noted w/patient in previous clinic visits. Further, patient continues to have significant SEs from Remodulin that have decreased her quality of life. Pt verbalized understanding. Informed patient that as had been discussed by Dr Rai in previous clinic, we hope she will eventually be listed for lung transplant if she is found to be a suitable candidate. All questions answered to patient satisfaction. Dr Rai informed of above.   "

## 2017-02-02 NOTE — PROGRESS NOTES
Attempted to contact Yuni regarding pap smear.  No answer.  Left a message instructing her to contact me regarding an update on the pap smear.

## 2017-02-03 DIAGNOSIS — Z79.899 POLYPHARMACY: Primary | ICD-10-CM

## 2017-02-03 DIAGNOSIS — R06.82 TACHYPNEA: ICD-10-CM

## 2017-02-03 DIAGNOSIS — I27.9 CHRONIC PULMONARY HEART DISEASE: ICD-10-CM

## 2017-02-06 ENCOUNTER — ANTI-COAG VISIT (OUTPATIENT)
Dept: CARDIOLOGY | Facility: CLINIC | Age: 47
End: 2017-02-06
Payer: COMMERCIAL

## 2017-02-06 DIAGNOSIS — I27.9 CHRONIC CARDIOPULMONARY DISEASE: ICD-10-CM

## 2017-02-06 DIAGNOSIS — Z79.01 LONG TERM CURRENT USE OF ANTICOAGULANT THERAPY: Primary | ICD-10-CM

## 2017-02-06 LAB
B CELL RESULTS - XM AUTO: NEGATIVE
B MCS AVERAGE - XM AUTO: -11.5
FXMAU TESTING DATE: NORMAL
HLA AB QL: POSITIVE
HLA AB SERPL: POSITIVE
HLATY INTERPRETATION: NORMAL
INR PPP: 2.2 (ref 2–3)
SCRFL TESTING DATE: NORMAL
SERUM COLLECTION DT - XM AUTO: NORMAL
SERUM COLLECTION DT: NORMAL
T CELL RESULTS - XM AUTO: NEGATIVE
T MCS AVERAGE - XM AUTO: -6.5

## 2017-02-06 PROCEDURE — 85610 PROTHROMBIN TIME: CPT | Mod: QW,S$GLB,,

## 2017-02-06 PROCEDURE — 99211 OFF/OP EST MAY X REQ PHY/QHP: CPT | Mod: 25,S$GLB,,

## 2017-02-06 NOTE — PROGRESS NOTES
INR good. Pt held for angiogram last week, then took extra dose on day of procedure. Pt confirmed dose and compliance. She denies any med changes. No bruising. Pt reports occasional gum bleeding especially at night. INR increasing well since procedure last week. We will Continue maintenance dose and Recheck INR in 4 weeks

## 2017-02-08 ENCOUNTER — TELEPHONE (OUTPATIENT)
Dept: TRANSPLANT | Facility: CLINIC | Age: 47
End: 2017-02-08

## 2017-02-09 ENCOUNTER — TELEPHONE (OUTPATIENT)
Dept: TRANSPLANT | Facility: CLINIC | Age: 47
End: 2017-02-09

## 2017-02-09 ENCOUNTER — OFFICE VISIT (OUTPATIENT)
Dept: SLEEP MEDICINE | Facility: CLINIC | Age: 47
End: 2017-02-09
Payer: COMMERCIAL

## 2017-02-09 VITALS
HEART RATE: 77 BPM | SYSTOLIC BLOOD PRESSURE: 109 MMHG | WEIGHT: 132 LBS | DIASTOLIC BLOOD PRESSURE: 70 MMHG | BODY MASS INDEX: 26.66 KG/M2 | OXYGEN SATURATION: 97 %

## 2017-02-09 DIAGNOSIS — R09.81 NASAL CONGESTION: ICD-10-CM

## 2017-02-09 DIAGNOSIS — G47.33 OSA (OBSTRUCTIVE SLEEP APNEA): Primary | ICD-10-CM

## 2017-02-09 PROCEDURE — 99999 PR PBB SHADOW E&M-EST. PATIENT-LVL III: CPT | Mod: PBBFAC,,, | Performed by: INTERNAL MEDICINE

## 2017-02-09 PROCEDURE — 99214 OFFICE O/P EST MOD 30 MIN: CPT | Mod: S$GLB,,, | Performed by: INTERNAL MEDICINE

## 2017-02-09 RX ORDER — IPRATROPIUM BROMIDE 21 UG/1
2 SPRAY, METERED NASAL 2 TIMES DAILY
Qty: 30 ML | Refills: 0 | Status: SHIPPED | OUTPATIENT
Start: 2017-02-09 | End: 2018-11-06

## 2017-02-09 NOTE — MR AVS SNAPSHOT
[facility]  [address]  [place] [address]  Phone: [phone]  Fax: [phone]                  Yuni Perez   2017 10:00 AM   Office Visit    Description:  Female : 1970   Provider:  Gaetano Berrios MD   Department:  [facility]           Reason for Visit     Sleep Apnea           Diagnoses this Visit        Comments    ABHIJEET (obstructive sleep apnea)    -  Primary            To Do List           Future Appointments        Provider Department Dept Phone    2017 11:00 AM INJECTION, INFECTIOUS DISEASES [facility] [phone]    3/2/2017 1:15 PM Ashlyn Parnell MD [facility] - OB/GYN [address] [phone]    3/3/2017 1:30 PM LAB, APPOINTMENT [place] [facility] [phone]    3/3/2017 2:40 PM SIX, MINUTE WALK [facility] - Pulmonary Lab [phone]    3/3/2017 3:00 PM Ivonne Rai MD [facility] [phone]      Goals (5 Years of Data)     None       These Medications        Disp Refills Start End    ipratropium (ATROVENT) 0.03 % nasal spray 30 mL 0 2017     2 sprays by Nasal route 2 (two) times daily. - Nasal    Pharmacy: [facility] [address] #: [phone]         [facility]     [facility] Nurse Care Line -  Assistance  Registered nurses in the [facility] provide clinical advisement, health education, appointment booking, and other advisory services.  Call for this free service at [phone].             Medications           Message regarding Medications     Verify the changes and/or additions to your medication regime listed below are the same as discussed with your clinician today.  If any of these changes or additions are incorrect, please notify your healthcare provider.        START taking these NEW medications        Refills    ipratropium (ATROVENT) 0.03 % nasal spray 0    Si sprays by  Nasal route 2 (two) times daily.    Class: Print    Route: Nasal           Verify that the below list of medications is an accurate representation of the medications you are currently taking.  If none reported, the list may be blank. If incorrect, please contact your healthcare provider. Carry this list with you in case of emergency.           Current Medications     0.9 % SODIUM CHLORIDE (SODIUM CHLORIDE 0.9%) 0.9 % Soln Inject 3 mLs into the vein every 8 (eight) hours as needed.    ADVAIR DISKUS 100-50 mcg/dose diskus inhaler INHALE ONE PUFF TWICE DAILY    albuterol 90 mcg/actuation inhaler Inhale 2 puffs into the lungs every 4 (four) hours as needed. 2 Aerosol Inhalation Every 4-6 hours    ambrisentan (LETAIRIS) 10 MG Tab Take 1 tablet (10 mg total) by mouth once daily.    azelastine (ASTELIN) 137 mcg (0.1 %) nasal spray 2 sprays by Nasal route 2 (two) times daily.     butalbital-acetaminophen-caffeine -40 mg (FIORICET, ESGIC) -40 mg per tablet Take 1 tablet by mouth every 4 (four) hours as needed for Pain or Headaches.    cetirizine (ZYRTEC) 10 MG tablet Take 10 mg by mouth. 1 Tablet Oral Every day    clindamycin (CLEOCIN T) 1 % lotion     cyanocobalamin, vitamin B-12, 2,500 mcg Subl Place 2,500 mcg under the tongue once daily.     desonide (DESOWEN) 0.05 % ointment 0.05 %.  Ointment Topical As directed.  Apply to affected area twice a daily over face    diphenhydrAMINE (BENADRYL) 25 mg capsule Take 25 mg by mouth every 6 (six) hours as needed for Itching.    esomeprazole (NEXIUM) 40 MG capsule Take 1 capsule (40 mg total) by mouth before breakfast.    ferrous sulfate 325 (65 FE) MG EC tablet Take 325 mg by mouth daily as needed.     fluticasone (FLONASE) 50 mcg/actuation nasal spray 2 sprays by Each Nare route once daily.    folic acid (FOLVITE) 1 MG tablet Take 1 mg by mouth.  Tablet Oral Every day    levothyroxine (SYNTHROID) 137 MCG Tab tablet TAKE ONE Tablet BY MOUTH DAILY     norethindrone-ethinyl estradiol (JUNEL FE 1/20) 1 mg-20 mcg (21)/75 mg (7) per tablet 1 tablet daily    ondansetron (ZOFRAN) 4 MG tablet Take 4 mg by mouth every 6 (six) hours as needed. 1 Tablet Oral Every 6 hours    PRAMOSONE 2.5-1 % Lotn lotion     promethazine (PHENERGAN) 25 MG tablet Take 25 mg by mouth every 4 to 6 hours as needed.     tadalafil (ADCIRCA) 20 MG Tab Take 2 tablets (40 mg total) by mouth once daily.    topiramate (TOPAMAX) 100 MG tablet Take 100 mg by mouth 2 (two) times daily.    treprostinil (REMODULIN) 2.5 mg/mL Soln Mix cassette as directed and infuse continuously per physician titration orders on dosing sheet. Current dose 46 ng/kg/min    VITAMIN D2 50,000 unit capsule TAKE ONE CAPSULE BY MOUTH EVERY 14 DAYS    warfarin (COUMADIN) 2.5 MG tablet Take 2.5-3 tablets (6.25-7.5 mg total) by mouth Daily. As directed by coumadin clinic    ipratropium (ATROVENT) 0.03 % nasal spray 2 sprays by Nasal route 2 (two) times daily.           Clinical Reference Information           Your Vitals Were     BP Pulse Weight SpO2 BMI    109/70 77 59.9 kg (132 lb) 97% 26.66 kg/m2      Blood Pressure          Most Recent Value    BP  109/70      Allergies as of 2/9/2017     Adhesive    Amoxicillin    Chlorhexidine      Immunizations Administered on Date of Encounter - 2/9/2017     None      Orders Placed During Today's Visit      Normal Orders This Visit    Ambulatory consult to Dentistry       Maintenance Dialysis History     Patient has no recorded history of maintenance dialysis.      Transplant Information        Txp Date Organ Coordinator Care Team     Lung Cheryl Euceda RN Referring Physician:  Ivonne Rai MD         Instructions    Dentists for dental appliance  [place]    - Dr. Lance Schulz [phone] [facility], [address]  [place] [id number]    - [facility] [address] [address]    - Dr. Francisco Euceda [phone]  Francisco Euceda   [address] [place] [address]  [id number]        [facility]   [address]  [place] [address]    - Anastasiya Chatman [id number]      Ask insurance about APAP (auto positive airway pressure) for norah       Language Assistance Services     ATTENTION: Language assistance services are available, free of charge. Please call [phone].      ATENCIÓN: Si habla español, tiene a grider disposición servicios gratuitos de asistencia lingüística. Llame al [phone].     CHÚ Ý: N?u b?n nói Ti?ng Vi?t, có các d?ch v? h? tr? ngôn ng? mi?n phí dành cho b?n. G?i s? [phone].         [facility] complies with applicable Federal civil rights laws and does not discriminate on the basis of race, color, national origin, age, disability, or sex.

## 2017-02-09 NOTE — TELEPHONE ENCOUNTER
"----- Message from Ivonne Rai MD sent at 2/9/2017 12:23 PM CST -----  Please ask her to call the interventionalist who did the procedure, they should take a look at her arm.      ----- Message -----     From: SULY Turner, RN     Sent: 2/9/2017  10:21 AM       To: Ivonne Rai MD    Hi-I routed you a message, but it might not have made it to you. From yesterday afternoon:    Patient called reporting that site on wrist where she had angiogram 1/30/17 " feels tender at the edge of my wrist. There is no bruising and it all looks healed. But my whole arm from my wrist to my shoulder feels 'under pressure'. " Pt states that there is no redness, no increased warmth to arm, and no red lines/streaking. She says it feels worse at night, "kind of like when my pressures are elevated and I get a tightness in the back of my neck." She does not believe the symptoms have gotten any worse since the procedure.  Informed pt that Dr Rai would be notified for feedback. Pt will continue to monitor.     "

## 2017-02-09 NOTE — PROGRESS NOTES
Yuni Perez  was seen as a new patient at the request of  No ref. provider found for the evaluation of norah.    CHIEF COMPLAINT:    Chief Complaint   Patient presents with    Sleep Apnea       HISTORY OF PRESENT ILLNESS: Yuni Perez is a 46 y.o. female is here for sleep evaluation.   Patient was diagnosed with primary pulmonary htn since 2008.  Currently, patient is currently on remodulin, adcirca and letaris.  Patient with nasal congestion (possibly due to adcirca or letaris).  +snoring.  No witnessed sleep apnea.  Feeling tired upon awake most days.  No parasomnia.  No cataplexy.  No RLS symptoms.      [place] Sleepiness Scale score during initial sleep evaluation was 7.    SLEEP ROUTINE:  Activity the hour prior to sleep: reading, stitching or watching tv    Bed partner:  alone  Time to bed:  12 am till 1 am   Lights off:  yes  Sleep onset latency:  Few minutes        Disruptions or awakenings:    Once due to dog    Wakeup time:      7 am; in bed until 9 am   Perceived sleep quality:  tire       Daytime naps:      Occasional nap at 3 pm (2 days per week)  Weekend sleep routine:      same  Caffeine use: 1 coke per day  exercise habit:   none      PAST MEDICAL HISTORY:    Active Ambulatory Problems     Diagnosis Date Noted    Chronic cardiopulmonary disease 06/22/2012    Long term current use of anticoagulant therapy 07/17/2012    Generalized abdominal cramping 07/18/2012    Generalized bloating 07/18/2012    Liver mass 09/14/2012    Non-allergic vasomotor rhinitis 10/09/2012    Allergic asthma 11/06/2012    Primary pulmonary hypertension     Tricuspid regurgitation     Seizure disorder     Hypothyroidism (acquired)     History of migraine headaches     Lumbar disc disease     Mild asthma     Chronic low back pain     Overweight (BMI 25.0-29.9)     Mass 01/27/2014    Menorrhagia 02/27/2014    Chest tightness 09/18/2015    Nonintractable epilepsy without status epilepticus      ABHIJEET (obstructive sleep apnea)     Awaiting organ transplant status     Pulmonary hypertension     Pre-transplant evaluation for lung transplant 01/30/2017     Resolved Ambulatory Problems     Diagnosis Date Noted    Diarrhea 07/18/2012    PAH (pulmonary artery hypertension) 01/08/2016    Eason catheter dysfunction 01/08/2016    Chest pain 01/08/2016     Past Medical History   Diagnosis Date    AR (allergic rhinitis)     Cholelithiasis, common bile duct     GERD (gastroesophageal reflux disease)     Hypothyroidism     Obesity     Plantar fasciitis of left foot     Seizures     Sleep apnea     TMJ (dislocation of temporomandibular joint)                 PAST SURGICAL HISTORY:    Past Surgical History   Procedure Laterality Date    Portacath placement      Cardiac catheterization      Upper gastrointestinal endoscopy           FAMILY HISTORY:                Family History   Problem Relation Age of Onset    Heart disease Father     Glaucoma Father     Diabetes Mother     Cancer Maternal Grandmother      uterine    Cancer Maternal Aunt      breast    Breast cancer Maternal Aunt     Heart disease Paternal Grandfather     Heart attack Paternal Grandfather     Leukemia Brother     Hypertension      Diabetes Maternal Grandfather     Heart attack Maternal Grandfather     Breast cancer Cousin     No Known Problems Sister     No Known Problems Maternal Uncle     No Known Problems Paternal Aunt     No Known Problems Paternal Uncle     No Known Problems Paternal Grandmother     Colon cancer Neg Hx     Ovarian cancer Neg Hx     Amblyopia Neg Hx     Blindness Neg Hx     Cataracts Neg Hx     Macular degeneration Neg Hx     Retinal detachment Neg Hx     Strabismus Neg Hx     Stroke Neg Hx     Thyroid disease Neg Hx        SOCIAL HISTORY:          Tobacco:   History   Smoking Status    Never Smoker   Smokeless Tobacco    Never Used       alcohol use:    History   Alcohol Use No      Comment: 1 per year                 Occupation:  Disable; former [employer]    ALLERGIES:    Review of patient's allergies indicates:   Allergen Reactions    Adhesive Hives     Silk tape    Amoxicillin Rash    Chlorhexidine Other (See Comments)       CURRENT MEDICATIONS:    Current Outpatient Prescriptions   Medication Sig Dispense Refill    0.9 % SODIUM CHLORIDE (SODIUM CHLORIDE 0.9%) 0.9 % Soln Inject 3 mLs into the vein every 8 (eight) hours as needed. (Patient taking differently: Inject 3 mLs into the vein every other day. ) 900 mL 11    ADVAIR DISKUS 100-50 mcg/dose diskus inhaler INHALE ONE PUFF TWICE DAILY 60 each 11    albuterol 90 mcg/actuation inhaler Inhale 2 puffs into the lungs every 4 (four) hours as needed. 2 Aerosol Inhalation Every 4-6 hours 1 Inhaler 3    ambrisentan (LETAIRIS) 10 MG Tab Take 1 tablet (10 mg total) by mouth once daily. 30 tablet 11    azelastine (ASTELIN) 137 mcg (0.1 %) nasal spray 2 sprays by Nasal route 2 (two) times daily.       butalbital-acetaminophen-caffeine -40 mg (FIORICET, ESGIC) -40 mg per tablet Take 1 tablet by mouth every 4 (four) hours as needed for Pain or Headaches.      cetirizine (ZYRTEC) 10 MG tablet Take 10 mg by mouth. 1 Tablet Oral Every day      clindamycin (CLEOCIN T) 1 % lotion       cyanocobalamin, vitamin B-12, 2,500 mcg Subl Place 2,500 mcg under the tongue once daily.       desonide (DESOWEN) 0.05 % ointment 0.05 %.  Ointment Topical As directed.  Apply to affected area twice a daily over face      diphenhydrAMINE (BENADRYL) 25 mg capsule Take 25 mg by mouth every 6 (six) hours as needed for Itching.      esomeprazole (NEXIUM) 40 MG capsule Take 1 capsule (40 mg total) by mouth before breakfast. 30 capsule 11    ferrous sulfate 325 (65 FE) MG EC tablet Take 325 mg by mouth daily as needed.       fluticasone (FLONASE) 50 mcg/actuation nasal spray 2 sprays by Each Nare route once daily. 1 Bottle 6    folic acid (FOLVITE) 1  MG tablet Take 1 mg by mouth.  Tablet Oral Every day      levothyroxine (SYNTHROID) 137 MCG Tab tablet TAKE ONE Tablet BY MOUTH DAILY 30 tablet 1    norethindrone-ethinyl estradiol (JUNEL FE 1/20) 1 mg-20 mcg (21)/75 mg (7) per tablet 1 tablet daily 28 tablet 0    ondansetron (ZOFRAN) 4 MG tablet Take 4 mg by mouth every 6 (six) hours as needed. 1 Tablet Oral Every 6 hours      PRAMOSONE 2.5-1 % Lotn lotion       promethazine (PHENERGAN) 25 MG tablet Take 25 mg by mouth every 4 to 6 hours as needed.       tadalafil (ADCIRCA) 20 MG Tab Take 2 tablets (40 mg total) by mouth once daily. 60 tablet 11    topiramate (TOPAMAX) 100 MG tablet Take 100 mg by mouth 2 (two) times daily.      treprostinil (REMODULIN) 2.5 mg/mL Soln Mix cassette as directed and infuse continuously per physician titration orders on dosing sheet. Current dose 46 ng/kg/min 60 mL 11    VITAMIN D2 50,000 unit capsule TAKE ONE CAPSULE BY MOUTH EVERY 14 DAYS 6 capsule 0    warfarin (COUMADIN) 2.5 MG tablet Take 2.5-3 tablets (6.25-7.5 mg total) by mouth Daily. As directed by coumadin clinic 90 tablet 11    ipratropium (ATROVENT) 0.03 % nasal spray 2 sprays by Nasal route 2 (two) times daily. 30 mL 0     No current facility-administered medications for this visit.                   REVIEW OF SYSTEMS:     Sleep related symptoms as per HPI.  CONST:Denies weight gain    HEENT: + sinus congestion  PULM: Denies dyspnea with adl  CARD:  Denies palpitations   GI:  Denies acid reflux  : Denies polyuria  NEURO: Denies headaches  PSYCH: Denies mood disturbance  HEME: Denies anemia   Otherwise, a balance of systems reviewed is negative.          PHYSICAL EXAM:  Vitals:    02/09/17 1000   BP: 109/70   Pulse: 77   SpO2: 97%   Weight: 59.9 kg (132 lb)   PainSc: 0-No pain     Body mass index is 26.66 kg/(m^2).     GENERAL: Normal development, well groomed  HEENT:  Conjunctivae are non-erythematous; Pupils equal, round, and reactive to light; Nose is  symmetrical; Nasal mucosa is pink and moist; Septum is midline; Inferior turbinates are normal; Nasal airflow is congested; Posterior pharynx is pink; Modified Mallampati: 4; Posterior palate is normal; Tonsils +1; Uvula is normal and pink;Tongue is normal; Dentition is fair; No TMJ tenderness; Jaw opening and protrusion without click and without discomfort.  NECK: Supple. Neck circumference is 13 inches. No thyromegaly. No palpable nodes.     SKIN: On face and neck: No abrasions, no rashes, no lesions.  No subcutaneous nodules are palpable.  RESPIRATORY: Chest is clear to auscultation.  Normal chest expansion and non-labored breathing at rest.  CARDIOVASCULAR: Normal S1, S2.  No murmurs, gallops or rubs. No carotid bruits bilaterally.  EXTREMITIES: No edema. No clubbing. No cyanosis. Station normal. Gait normal.        NEURO/PSYCH: Oriented to time, place and person. Normal attention span and concentration. Affect is full. Mood is normal.                                              DATA   1/4/17 ahi of 22   10/13/15 echo  1 - Normal left ventricular systolic function (EF 60-65%).   2 - Left ventricular diastolic dysfunction.   3 - Biatrial enlargement.   4 - Right ventricular enlargement with hypertrophy, with normal systolic function.   5 - Pulmonary hypertension.   6 - Trivial mitral regurgitation.   7 - Trivial tricuspid regurgitation.     10/5/15 6 min walk 457 [id number] on room air    Lab Results   Component Value Date    TSH 1.808 01/16/2017     ASSESSMENT    ICD-10-CM ICD-9-CM    1. ABHIJEET (obstructive sleep apnea) G47.33 327.23 Ambulatory consult to Dentistry   2. Nasal congestion R09.81 478.19        PLAN:    Sleep Apnea - did not tolerate CPAP during sleep study.  Alternative such as oral appliance d/w patient.  Patient will d/w her dentist.  List of dentists that I know deals with oral appliance also given to patient.      Nasal congestion - may related to vasodilator effect of pulmonary htn meds.   Currently on flonase and asteline.  Will start a trial of atrovent nose spray.  Patient with confer with Dr. Rai      Education: During our discussion today, we talked about the etiology of obstructive sleep apnea as well as the potential ramifications of untreated sleep apnea, which could include daytime sleepiness, hypertension, heart disease and/or stroke.     Precautions: The patient was advised to abstain from driving should they feel sleepy or drowsy.       Thank you for allowing me the opportunity to participate in the care of your patient.    Patient will No Follow-up on file.    Please cc note to  No ref. provider found.

## 2017-02-09 NOTE — PATIENT INSTRUCTIONS
Dentists for dental appliance  [place]    - Dr. Lance Schulz [phone], [address]  [place], [place] [address]    - Jerry Odom   [address]   [place] [address]    - Dr. Francisco Euceda [phone]  Francisco Eucead   [address] [place] [id number]        [facility] [place]    -Manuel Brown  [facility]   [address]  [place] [address]    - Anastasiya Chatman [phone]      Ask insurance about APAP (auto positive airway pressure) for norah

## 2017-02-17 ENCOUNTER — CLINICAL SUPPORT (OUTPATIENT)
Dept: INFECTIOUS DISEASES | Facility: CLINIC | Age: 47
End: 2017-02-17
Payer: COMMERCIAL

## 2017-02-17 PROCEDURE — 99999 PR PBB SHADOW E&M-EST. PATIENT-LVL I: CPT | Mod: PBBFAC,,,

## 2017-02-17 PROCEDURE — 90636 HEP A/HEP B VACC ADULT IM: CPT | Mod: S$GLB,,, | Performed by: INTERNAL MEDICINE

## 2017-02-17 PROCEDURE — 90471 IMMUNIZATION ADMIN: CPT | Mod: S$GLB,,, | Performed by: INTERNAL MEDICINE

## 2017-02-23 ENCOUNTER — TELEPHONE (OUTPATIENT)
Dept: FAMILY MEDICINE | Facility: CLINIC | Age: 47
End: 2017-02-23

## 2017-02-23 DIAGNOSIS — Z00.00 ROUTINE GENERAL MEDICAL EXAMINATION AT A HEALTH CARE FACILITY: ICD-10-CM

## 2017-02-23 DIAGNOSIS — E03.9 HYPOTHYROIDISM (ACQUIRED): Primary | ICD-10-CM

## 2017-02-23 DIAGNOSIS — N92.1 MENORRHAGIA WITH IRREGULAR CYCLE: ICD-10-CM

## 2017-02-23 NOTE — TELEPHONE ENCOUNTER
Patient had extensive blood work done 1/2017 - no need for me to do any blood work at this time.  OCP Rx request needs to go to patient's GYN, Dr. Chandler.

## 2017-02-23 NOTE — TELEPHONE ENCOUNTER
----- Message from Krissy Garcia sent at 2/22/2017  1:45 PM CST -----  Contact: Self/[phone]  Patient would like to speak to staff to schedule a sooner appointment. She states that she's having surgery and she would like to see Dr. Sandhu in March. Thank you.

## 2017-02-27 DIAGNOSIS — E03.9 HYPOTHYROIDISM (ACQUIRED): ICD-10-CM

## 2017-02-27 RX ORDER — LEVOTHYROXINE SODIUM 137 UG/1
TABLET ORAL
Qty: 30 TABLET | Refills: 0 | Status: SHIPPED | OUTPATIENT
Start: 2017-02-27 | End: 2017-04-26 | Stop reason: SDUPTHER

## 2017-03-02 ENCOUNTER — OFFICE VISIT (OUTPATIENT)
Dept: OBSTETRICS AND GYNECOLOGY | Facility: CLINIC | Age: 47
End: 2017-03-02
Attending: OBSTETRICS & GYNECOLOGY
Payer: COMMERCIAL

## 2017-03-02 VITALS
WEIGHT: 131.81 LBS | SYSTOLIC BLOOD PRESSURE: 112 MMHG | BODY MASS INDEX: 26.57 KG/M2 | DIASTOLIC BLOOD PRESSURE: 62 MMHG | HEIGHT: 59 IN

## 2017-03-02 DIAGNOSIS — N92.1 MENORRHAGIA WITH IRREGULAR CYCLE: ICD-10-CM

## 2017-03-02 DIAGNOSIS — Z01.419 VISIT FOR GYNECOLOGIC EXAMINATION: Primary | ICD-10-CM

## 2017-03-02 PROCEDURE — 99999 PR PBB SHADOW E&M-EST. PATIENT-LVL II: CPT | Mod: PBBFAC,,, | Performed by: OBSTETRICS & GYNECOLOGY

## 2017-03-02 PROCEDURE — 88175 CYTOPATH C/V AUTO FLUID REDO: CPT

## 2017-03-02 PROCEDURE — 99396 PREV VISIT EST AGE 40-64: CPT | Mod: S$GLB,,, | Performed by: OBSTETRICS & GYNECOLOGY

## 2017-03-02 RX ORDER — TADALAFIL 20 MG/1
TABLET ORAL
COMMUNITY
Start: 2017-03-01 | End: 2017-03-23 | Stop reason: SDUPTHER

## 2017-03-02 RX ORDER — NORETHINDRONE ACETATE AND ETHINYL ESTRADIOL 1MG-20(21)
1 KIT ORAL DAILY
Qty: 28 TABLET | Refills: 12 | Status: SHIPPED | OUTPATIENT
Start: 2017-03-02 | End: 2018-02-16 | Stop reason: SDUPTHER

## 2017-03-02 NOTE — PROGRESS NOTES
CC: Well woman exam    Yuni Perez is a 46 y.o. female  presents for a well woman exam.  She has no issues, problems, or complaints.    She is preparing for a lung transplant.    Past Medical History:   Diagnosis Date    AR (allergic rhinitis)     Cholelithiasis, common bile duct     Chronic low back pain     GERD (gastroesophageal reflux disease)     History of migraine headaches     Hypothyroidism     Lumbar disc disease     Menorrhagia     Mild asthma     Obesity     Plantar fasciitis of left foot     Primary pulmonary hypertension     followed by heart transplant/pulmonary     Seizure disorder     x 1 in     Seizures     Sleep apnea     TMJ (dislocation of temporomandibular joint)     Tricuspid regurgitation        Past Surgical History:   Procedure Laterality Date    CARDIAC CATHETERIZATION      PORTACATH PLACEMENT      UPPER GASTROINTESTINAL ENDOSCOPY         OB History    Para Term  AB SAB TAB Ectopic Multiple Living   0                      Family History   Problem Relation Age of Onset    Heart disease Father     Glaucoma Father     Diabetes Mother     Cancer Maternal Grandmother      uterine    Cancer Maternal Aunt      breast    Breast cancer Maternal Aunt     Heart disease Paternal Grandfather     Heart attack Paternal Grandfather     Leukemia Brother     Hypertension      Diabetes Maternal Grandfather     Heart attack Maternal Grandfather     Breast cancer Cousin     No Known Problems Sister     No Known Problems Maternal Uncle     No Known Problems Paternal Aunt     No Known Problems Paternal Uncle     No Known Problems Paternal Grandmother     Colon cancer Neg Hx     Ovarian cancer Neg Hx     Amblyopia Neg Hx     Blindness Neg Hx     Cataracts Neg Hx     Macular degeneration Neg Hx     Retinal detachment Neg Hx     Strabismus Neg Hx     Stroke Neg Hx     Thyroid disease Neg Hx        Social History   Substance Use  "Topics    Smoking status: Never Smoker    Smokeless tobacco: Never Used    Alcohol use No      Comment: 1 per year       /62  Ht 4' 11" (1.499 m)  Wt 59.8 kg (131 lb 13.4 oz)  BMI 26.63 kg/m2    ROS:  GENERAL: Denies weight gain or weight loss. Feeling well overall.   SKIN: Denies rash or lesions.   HEAD: Denies head injury or headache.   NODES: Denies enlarged lymph nodes.   CHEST: Denies chest pain or shortness of breath.   CARDIOVASCULAR: Denies palpitations or left sided chest pain.   ABDOMEN: No abdominal pain, constipation, diarrhea, nausea, vomiting or rectal bleeding.   URINARY: No frequency, dysuria, hematuria, or burning on urination.  REPRODUCTIVE: See HPI.   BREASTS: The patient performs breast self-examination and denies pain, lumps, or nipple discharge.   HEMATOLOGIC: No easy bruisability or excessive bleeding.  MUSCULOSKELETAL: Denies joint pain or swelling.   NEUROLOGIC: Denies syncope or weakness.   PSYCHIATRIC: Denies depression, anxiety or mood swings.    Physical Exam:    APPEARANCE: Well nourished, well developed, in no acute distress.  AFFECT: WNL, alert and oriented x 3  SKIN: No acne or hirsutism  NECK: Neck symmetric without masses or thyromegaly  NODES: No inguinal, cervical, axillary, or femoral lymph node enlargement  CHEST: Good respiratory effect  ABDOMEN: Soft.  No tenderness or masses.  No hepatosplenomegaly.  No hernias.  BREASTS: Symmetrical, no skin changes or visible lesions.  No palpable masses, nipple discharge bilaterally.  PELVIC: Normal external genitalia without lesions.  Normal hair distribution.  Adequate perineal body, normal urethral meatus.  Vagina atrophy without lesions or discharge.  Cervix pink, without lesions, discharge or tenderness.  No significant cystocele or rectocele.  Bimanual exam shows uterus to be normal size, regular, mobile and nontender.  Adnexa without masses or tenderness.    EXTREMITIES: No edema.    ASSESSMENT AND PLAN  1. Visit for " gynecologic examination  Liquid-based pap smear, screening   2. Menorrhagia with irregular cycle  norethindrone-ethinyl estradiol (JUNEL FE 1/20) 1 mg-20 mcg (21)/75 mg (7) per tablet       Patient was counseled today on A.C.S. Pap guidelines and recommendations for yearly pelvic exams, pap smears every 3 years, mammograms and monthly self breast exams; to see her PCP for other health maintenance.     Return in about 1 year (around 3/2/2018).

## 2017-03-03 ENCOUNTER — OFFICE VISIT (OUTPATIENT)
Dept: TRANSPLANT | Facility: CLINIC | Age: 47
End: 2017-03-03
Payer: COMMERCIAL

## 2017-03-03 ENCOUNTER — LAB VISIT (OUTPATIENT)
Dept: LAB | Facility: HOSPITAL | Age: 47
End: 2017-03-03
Payer: COMMERCIAL

## 2017-03-03 ENCOUNTER — HOSPITAL ENCOUNTER (OUTPATIENT)
Dept: PULMONOLOGY | Facility: CLINIC | Age: 47
Discharge: HOME | End: 2017-03-03
Payer: COMMERCIAL

## 2017-03-03 VITALS
WEIGHT: 133.19 LBS | DIASTOLIC BLOOD PRESSURE: 64 MMHG | SYSTOLIC BLOOD PRESSURE: 97 MMHG | BODY MASS INDEX: 26.85 KG/M2 | HEART RATE: 72 BPM | BODY MASS INDEX: 26.21 KG/M2 | HEIGHT: 59 IN | OXYGEN SATURATION: 99 % | WEIGHT: 130 LBS | HEIGHT: 59 IN

## 2017-03-03 DIAGNOSIS — Z79.899 POLYPHARMACY: ICD-10-CM

## 2017-03-03 DIAGNOSIS — I27.0 PRIMARY PULMONARY HYPERTENSION: Primary | ICD-10-CM

## 2017-03-03 DIAGNOSIS — Z79.01 LONG TERM CURRENT USE OF ANTICOAGULANT THERAPY: ICD-10-CM

## 2017-03-03 DIAGNOSIS — G47.33 OSA (OBSTRUCTIVE SLEEP APNEA): ICD-10-CM

## 2017-03-03 DIAGNOSIS — Z76.82 AWAITING ORGAN TRANSPLANT STATUS: ICD-10-CM

## 2017-03-03 DIAGNOSIS — R06.82 TACHYPNEA: ICD-10-CM

## 2017-03-03 DIAGNOSIS — I27.9 CHRONIC PULMONARY HEART DISEASE: ICD-10-CM

## 2017-03-03 LAB
ALBUMIN SERPL BCP-MCNC: 3.4 G/DL
ALP SERPL-CCNC: 77 U/L
ALT SERPL W/O P-5'-P-CCNC: 14 U/L
ANION GAP SERPL CALC-SCNC: 5 MMOL/L
AST SERPL-CCNC: 14 U/L
BASOPHILS # BLD AUTO: 0.03 K/UL
BASOPHILS NFR BLD: 0.7 %
BILIRUB SERPL-MCNC: 0.1 MG/DL
BNP SERPL-MCNC: 22 PG/ML
BUN SERPL-MCNC: 8 MG/DL
CALCIUM SERPL-MCNC: 8.5 MG/DL
CHLORIDE SERPL-SCNC: 112 MMOL/L
CO2 SERPL-SCNC: 22 MMOL/L
CREAT SERPL-MCNC: 0.7 MG/DL
DIFFERENTIAL METHOD: ABNORMAL
EOSINOPHIL # BLD AUTO: 0.2 K/UL
EOSINOPHIL NFR BLD: 5.3 %
ERYTHROCYTE [DISTWIDTH] IN BLOOD BY AUTOMATED COUNT: 15.7 %
EST. GFR  (AFRICAN AMERICAN): >60 ML/MIN/1.73 M^2
EST. GFR  (NON AFRICAN AMERICAN): >60 ML/MIN/1.73 M^2
GLUCOSE SERPL-MCNC: 86 MG/DL
HCT VFR BLD AUTO: 35.3 %
HGB BLD-MCNC: 11.5 G/DL
LYMPHOCYTES # BLD AUTO: 1.2 K/UL
LYMPHOCYTES NFR BLD: 27.6 %
MAGNESIUM SERPL-MCNC: 2 MG/DL
MCH RBC QN AUTO: 25.2 PG
MCHC RBC AUTO-ENTMCNC: 32.6 %
MCV RBC AUTO: 77 FL
MONOCYTES # BLD AUTO: 0.4 K/UL
MONOCYTES NFR BLD: 8.7 %
NEUTROPHILS # BLD AUTO: 2.4 K/UL
NEUTROPHILS NFR BLD: 57.5 %
PLATELET # BLD AUTO: 244 K/UL
PMV BLD AUTO: 11 FL
POTASSIUM SERPL-SCNC: 3.9 MMOL/L
PROT SERPL-MCNC: 7.3 G/DL
RBC # BLD AUTO: 4.56 M/UL
SODIUM SERPL-SCNC: 139 MMOL/L
WBC # BLD AUTO: 4.16 K/UL

## 2017-03-03 PROCEDURE — 99214 OFFICE O/P EST MOD 30 MIN: CPT | Mod: S$GLB,,, | Performed by: INTERNAL MEDICINE

## 2017-03-03 PROCEDURE — 99999 PR PBB SHADOW E&M-EST. PATIENT-LVL III: CPT | Mod: PBBFAC,,, | Performed by: INTERNAL MEDICINE

## 2017-03-03 PROCEDURE — 80053 COMPREHEN METABOLIC PANEL: CPT

## 2017-03-03 PROCEDURE — 83735 ASSAY OF MAGNESIUM: CPT

## 2017-03-03 PROCEDURE — 36415 COLL VENOUS BLD VENIPUNCTURE: CPT

## 2017-03-03 PROCEDURE — 1160F RVW MEDS BY RX/DR IN RCRD: CPT | Mod: S$GLB,,, | Performed by: INTERNAL MEDICINE

## 2017-03-03 PROCEDURE — 83880 ASSAY OF NATRIURETIC PEPTIDE: CPT

## 2017-03-03 PROCEDURE — 85025 COMPLETE CBC W/AUTO DIFF WBC: CPT

## 2017-03-03 PROCEDURE — 94620 PR PULMONARY STRESS TESTING,SIMPLE: CPT | Mod: S$GLB,,, | Performed by: INTERNAL MEDICINE

## 2017-03-03 NOTE — PROCEDURES
Yuni Perez is a 46 y.o.  female patient, who presents for a 6 minute walk test ordered by Ivonne Rai MD.  The diagnosis is Pulmonary Hypertension.  The patient's BMI is 26.3 kg/m2.  Predicted distance (lower limit of normal) is 451.54 meters.      Test Results:    The test was completed without stopping.  The total time walked was 360 seconds.  During walking, the patient reported:  Dyspnea, Lightheadedness. The patient used no assistive devices during testing.     03/03/2017---------Distance: 457.2 meters (1500 feet)     O2 Sat % Supplemental Oxygen Heart Rate Blood Pressure Ab Scale   Pre-exercise  (Resting) 98 % Room Air 87 bpm 114/56 mmHg 3   During Exercise 99 % Room Air 112 bpm 121/67 mmHg 7-8   Post-exercise  (Recovery) 100 % Room Air  94 bpm       Recovery Time: 75 seconds    Performing nurse/tech: ABRAHAM Gordon      PREVIOUS STUDY:   01/18/2017---------Distance: 456.9 meters (1499 feet)       O2 Sat % Supplemental Oxygen Heart Rate Blood Pressure Ab Scale   Pre-exercise  (Resting) 99 % Room Air 74 bpm 115/65 mmHg 2   During Exercise 98 % Room Air 119 bpm 122/68 mmHg 7-8   Post-exercise  (Recovery) 99 % Room Air  97 bpm           CLINICAL INTERPRETATION:  Six minute walk distance is 457.2 meters (1500 feet) with very heavy dyspnea.  During exercise, there was no desaturation while breathing room air.  Blood pressure remained stable and Heart rate increased significantly with walking.  The patient reported non-pulmonary symptoms during exercise.  Since the previous study in January 2017, exercise capacity is unchanged.  Based upon age and body mass index, exercise capacity is normal.

## 2017-03-03 NOTE — PROGRESS NOTES
"Subjective:     HPI  46 y.o. White female who presents for PH follow-up. Patient was diagnosed with IPAH about 5 and a half years. Initial symptom- syncope.  Patient has been on triple therapy: Remodulin at  72ng/kg/min infusion, Letaris 10mg qdaily, and Adcirca 40mg qdaily.    Since last visit, pt finally had her PSG- was dx'd with ABHIJEET and told by Dr Berrios it was because of her sinus congestion- was told the mouth mask would not work for her, recommended a mouth guard.  - has been in evaluation for lung tx- had a bad reaction to the Hep A/B vaccines 2/17/17.  Says her breathing harder in the afternoons, says its at a gina level "3" Says a 2 is barely noticeable but she does find her breath getting stilted- gets winded with her regular daily activities, but her O2 sats still run in the upper 90's- when she was sick with the vaccines had palps, had intractable vomiting/ diarrhea as well as low grade fevers.   No fluid retention, she's been losing wt slowly without trying-    No fever or chills or issues with her line. Does have a little itching    6mw today 457m (457 in Nov, 488m unchanged last 3 visits with me( 450 11/15/16,  457m, 518.5m, 533.75, 549m April, 463.6m prev visit)                                              O2 sat  98-> 99 %                                                           HR 87->112                                                      BP  114/56-->121/67                                                       Gina 3 ->7-8    [facility] 4/16  AOPRES: 122/85 (97)  AOSAT: 98  FICKCI: 2.81  FICKCO: 4.38  PAPRES: 101/34 (59)  PASAT: 71  PVR: 10.73  PWPRES: 14/8 (10)  RAPRES: 9/5 (4)  RVPRES: 91/-4, 8    TTE last visit   1 - Normal left ventricular systolic function (EF 60-65%).     2 - Right ventricular enlargement with mildly to moderately depressed systolic function.     3 - Normal left ventricular diastolic function.     4 - Pulmonary hypertension. The estimated PA systolic pressure is 41 mmHg. " "      TTE 3/4/16  1 - Normal left ventricular systolic function (EF 60-65%).   2 - Normal left ventricular diastolic function.   3 - Right ventricle is upper limit of normal in size with low normal to mildly depressed systolic function. TAPSE 2.2 RV 3.7 cm   4 - Trivial to mild tricuspid regurgitation.   5 - Pulmonary hypertension. The estimated PA systolic pressure is 60 mmHg.     TTE Oct  1 - Normal left ventricular systolic function (EF 60-65%).   2 - Left ventricular diastolic dysfunction.   3 - Biatrial enlargement.   4 - Right ventricular enlargement with hypertrophy, with normal systolic function.   5 - Pulmonary hypertension.   6 - Trivial mitral regurgitation.   7 - Trivial tricuspid regurgitation.   PASp 46    CXR 2/3/16  Vascular catheter now identified, its tip in the superior vena cava just superior to its junction with the right atrium. Heart size is normal, as is the appearance of the pulmonary vascularity. Lung zones are clear, and free of significant airspace consolidation or volume loss. No pleural fluid. No hilar or mediastinal mass lesion. No pneumothorax.     VQ Scan 8/17/16: low probability    Review of Systems   Constitution: Positive for malaise/fatigue and weight gain. Negative for chills and fever.   HENT: Positive for congestion and headaches.    Eyes: Negative.    Cardiovascular: Positive for dyspnea on exertion. Negative for leg swelling, near-syncope, orthopnea, palpitations, paroxysmal nocturnal dyspnea and syncope.   Respiratory: Positive for cough. Negative for shortness of breath.    Endocrine: Negative.    Skin: Negative.    Musculoskeletal: Positive for myalgias.        Left-axillary chest wall pain   Gastrointestinal: Negative for bloating, abdominal pain and change in bowel habit.   Neurological: Positive for light-headedness. Negative for dizziness.   Psychiatric/Behavioral: Negative for depression.        Objective:  BP 97/64  Pulse 72  Ht 4' 11" (1.499 m)  Wt 60.4 kg (133 " lb 2.5 oz)  SpO2 99%  BMI 26.89 kg/m2      Physical Exam   Constitutional: She is oriented to person, place, and time. She appears well-developed and well-nourished.   HENT:   Head: Normocephalic and atraumatic.   Neck: Neck supple. No JVD present. No thyromegaly present.   Cardiovascular: Normal rate and regular rhythm.  Exam reveals no gallop and no friction rub.    No murmur heard.  Physiologically split S2 with prominent P2 component     Pulmonary/Chest: Effort normal and breath sounds normal. No respiratory distress. She has no wheezes. She has no rales.   Abdominal: Soft. Bowel sounds are normal. She exhibits no distension. There is no tenderness.   Musculoskeletal: She exhibits no edema.   Neurological: She is alert and oriented to person, place, and time.   Skin: Skin is warm and dry.   Line exit site is clean, no erythema, scab or discharge   Psychiatric: She has a normal mood and affect.               Chemistry        Component Value Date/Time     03/03/2017 1328    K 3.9 03/03/2017 1328     (H) 03/03/2017 1328    CO2 22 (L) 03/03/2017 1328    BUN 8 03/03/2017 1328    CREATININE 0.7 03/03/2017 1328    GLU 86 03/03/2017 1328        Component Value Date/Time    CALCIUM 8.5 (L) 03/03/2017 1328    ALKPHOS 77 03/03/2017 1328    AST 14 03/03/2017 1328    ALT 14 03/03/2017 1328    BILITOT 0.1 03/03/2017 1328            Magnesium   Date Value Ref Range Status   03/03/2017 2.0 1.6 - 2.6 mg/dL Final       Lab Results   Component Value Date    WBC 4.16 03/03/2017    HGB 11.5 (L) 03/03/2017    HCT 35.3 (L) 03/03/2017    MCV 77 (L) 03/03/2017     03/03/2017       Lab Results   Component Value Date    INR 2.2 02/06/2017    INR 2.2 (H) 01/16/2017    INR 2.4 12/29/2016       BNP   Date Value Ref Range Status   03/03/2017 22 0 - 99 pg/mL Final     Comment:     Values of less than 100 pg/ml are consistent with non-CHF populations.   01/16/2017 14 0 - 99 pg/mL Final     Comment:     Values of less than  100 pg/ml are consistent with non-CHF populations.   12/09/2016 20 0 - 99 pg/mL Final     Comment:     Values of less than 100 pg/ml are consistent with non-CHF populations.       No results found for: LDH          Assessment:       1. Primary pulmonary hypertension- FC II->III no volume overload on exam today, BNp remains low, unchanged 6mw today, still over 450m and without hypoxia-- RV enlarged and  Hypertrophied on last echo, today, still with RVE and mild-mod reduced RV systolic fxn despite increasing remodulin dose- PAP by echo does not correlate with her RHC which shows severe PAH, marginal Co and high RA, all of which are concerning   2. Encounter for long-term (current) use of anticoagulants    3. Obesity    4. Menorrhagia    5. Tricuspid regurgitation         Plan:     No changes from a PH standpoint today- on triple tx and unable to increase remodulin further due to SE- in workup for lung transplant    Keep salt intake to under 2000 mg sodium, fluids to under 2 L (64 oz)    Check weights every morning after getting out of bed and urinating. If your weight goes up 3# overnight or 5# in one week she should call us    F/u 2-3 mo with labs and walk

## 2017-03-03 NOTE — MR AVS SNAPSHOT
[facility]  [address]  [facility] [address]  Phone: [phone]                  Yuni Perez   3/3/2017 3:00 PM   Office Visit    Description:  Female : 1970   Provider:  Ivonne Rai MD   Department:  [facility]           Reason for Visit     Pulmonary Hypertension           Diagnoses this Visit        Comments    Primary pulmonary hypertension    -  Primary     ABHIJEET (obstructive sleep apnea)         Long term current use of anticoagulant therapy         Chronic pulmonary heart disease         Awaiting organ transplant status                To Do List           Future Appointments        Provider Department Dept Phone    3/6/2017 10:45 AM Giovanna Whitmore, PharmD [facility] - Coumadin [phone]    2017 10:00 AM Miley Anguiano PA-C [facility] - Gastroenterology [phone]    2017 10:30 AM LAB, [facility] [place] [phone]    2017 1:20 PM Lulu Sandhu MD [facility] Medicine [phone]    2017 11:00 AM INJECTION, INFECTIOUS DISEASES [facility]- ID Injection Room [phone]      Goals (5 Years of Data)     None      Follow-Up and Disposition     Return in about 3 months (around 6/3/2017).      [facility]     [facility] - 24/7 Assistance  Registered nurses in the [facility] provide clinical advisement, health education, appointment booking, and other advisory services.  Call for this free service at [phone].             Medications           Message regarding Medications     Verify the changes and/or additions to your medication regime listed below are the same as discussed with your clinician today.  If any of these changes or additions are incorrect, please notify your healthcare provider.             Verify that the below list of medications is an accurate representation of the medications you are currently taking.  If none reported, the list may  be blank. If incorrect, please contact your healthcare provider. Carry this list with you in case of emergency.           Current Medications     0.9 % SODIUM CHLORIDE (SODIUM CHLORIDE 0.9%) 0.9 % Soln Inject 3 mLs into the vein every 8 (eight) hours as needed.    ADCIRCA 20 mg Tab     ADVAIR DISKUS 100-50 mcg/dose diskus inhaler INHALE ONE PUFF TWICE DAILY    albuterol 90 mcg/actuation inhaler Inhale 2 puffs into the lungs every 4 (four) hours as needed. 2 Aerosol Inhalation Every 4-6 hours    ambrisentan (LETAIRIS) 10 MG Tab Take 1 tablet (10 mg total) by mouth once daily.    azelastine (ASTELIN) 137 mcg (0.1 %) nasal spray 2 sprays by Nasal route 2 (two) times daily.     butalbital-acetaminophen-caffeine -40 mg (FIORICET, ESGIC) -40 mg per tablet Take 1 tablet by mouth every 4 (four) hours as needed for Pain or Headaches.    cetirizine (ZYRTEC) 10 MG tablet Take 10 mg by mouth. 1 Tablet Oral Every day    clindamycin (CLEOCIN T) 1 % lotion     cyanocobalamin, vitamin B-12, 2,500 mcg Subl Place 2,500 mcg under the tongue once daily.     desonide (DESOWEN) 0.05 % ointment 0.05 %.  Ointment Topical As directed.  Apply to affected area twice a daily over face    diphenhydrAMINE (BENADRYL) 25 mg capsule Take 25 mg by mouth every 6 (six) hours as needed for Itching.    esomeprazole (NEXIUM) 40 MG capsule Take 1 capsule (40 mg total) by mouth before breakfast.    ferrous sulfate 325 (65 FE) MG EC tablet Take 325 mg by mouth daily as needed.     fluticasone (FLONASE) 50 mcg/actuation nasal spray 2 sprays by Each Nare route once daily.    folic acid (FOLVITE) 1 MG tablet Take 1 mg by mouth.  Tablet Oral Every day    ipratropium (ATROVENT) 0.03 % nasal spray 2 sprays by Nasal route 2 (two) times daily.    levothyroxine (SYNTHROID) 137 MCG Tab tablet TAKE ONE Tablet BY MOUTH DAILY    norethindrone-ethinyl estradiol (JUNEL FE 1/20) 1 mg-20 mcg (21)/75 mg (7) per tablet Take 1 tablet by mouth once daily.     "ondansetron (ZOFRAN) 4 MG tablet Take 4 mg by mouth every 6 (six) hours as needed. 1 Tablet Oral Every 6 hours    PRAMOSONE 2.5-1 % Lotn lotion     promethazine (PHENERGAN) 25 MG tablet Take 25 mg by mouth every 4 to 6 hours as needed.     tadalafil (ADCIRCA) 20 MG Tab Take 2 tablets (40 mg total) by mouth once daily.    topiramate (TOPAMAX) 100 MG tablet Take 100 mg by mouth 2 (two) times daily.    treprostinil (REMODULIN) 2.5 mg/mL Soln Mix cassette as directed and infuse continuously per physician titration orders on dosing sheet. Current dose 46 ng/kg/min    VITAMIN D2 50,000 unit capsule TAKE ONE CAPSULE BY MOUTH EVERY 14 DAYS    warfarin (COUMADIN) 2.5 MG tablet Take 2.5-3 tablets (6.25-7.5 mg total) by mouth Daily. As directed by coumadin clinic           Clinical Reference Information           Your Vitals Were     BP Pulse Height Weight SpO2 BMI    97/64 72 4' 11" (1.499 m) 60.4 kg (133 lb 2.5 oz) 99% 26.89 kg/m2      Blood Pressure          Most Recent Value    BP  97/64      Allergies as of 3/3/2017     Adhesive    Amoxicillin    Chlorhexidine      Immunizations Administered on Date of Encounter - 3/3/2017     None      Maintenance Dialysis History     Patient has no recorded history of maintenance dialysis.      Transplant Information        Txp Date Organ Coordinator Care Team     Lung Cheryl Euceda, DENNY Referring Physician:  Ivonne Rai MD         Instructions    1. Continue current therapy.  2.  Keep salt intake to under 2000 mg sodium, fluids to under 2 L (64 oz)  3  Check your weights every morning after getting out of bed and urinating. If your weight goes up 3# overnight or 5# in one week call us  4. Call us if you find yourself getting more short of breath, have more swelling or unexpected weight changes, fever, chills, or problems with your line               Language Assistance Services     ATTENTION: Language assistance services are available, free of charge. Please call " [phone].      ATENCIÓN: Si habla español, tiene a grider disposición servicios gratuitos de asistencia lingüística. Llame al [phone].     CHÚ Ý: N?u b?n nói Ti?ng Vi?t, có các d?ch v? h? tr? ngôn ng? mi?n phí dành cho b?n. G?i s? [phone].         [facility] complies with applicable Federal civil rights laws and does not discriminate on the basis of race, color, national origin, age, disability, or sex.

## 2017-03-03 NOTE — PATIENT INSTRUCTIONS
1. Continue current therapy.  2.  Keep salt intake to under 2000 mg sodium, fluids to under 2 L (64 oz)  3  Check your weights every morning after getting out of bed and urinating. If your weight goes up 3# overnight or 5# in one week call us  4. Call us if you find yourself getting more short of breath, have more swelling or unexpected weight changes, fever, chills, or problems with your line

## 2017-03-05 RX ORDER — NORETHINDRONE ACETATE AND ETHINYL ESTRADIOL 1MG-20(21)
1 KIT ORAL DAILY
Qty: 28 TABLET | Refills: 0 | Status: SHIPPED | OUTPATIENT
Start: 2017-03-05 | End: 2017-04-12 | Stop reason: SDUPTHER

## 2017-03-13 ENCOUNTER — ANTI-COAG VISIT (OUTPATIENT)
Dept: CARDIOLOGY | Facility: CLINIC | Age: 47
End: 2017-03-13
Payer: COMMERCIAL

## 2017-03-13 DIAGNOSIS — I27.9 CHRONIC PULMONARY HEART DISEASE: ICD-10-CM

## 2017-03-13 DIAGNOSIS — Z79.01 LONG TERM CURRENT USE OF ANTICOAGULANT THERAPY: Primary | ICD-10-CM

## 2017-03-13 LAB — INR PPP: 2.5 (ref 2–3)

## 2017-03-13 PROCEDURE — 85610 PROTHROMBIN TIME: CPT | Mod: QW,S$GLB,,

## 2017-03-13 NOTE — PROGRESS NOTES
INR very good. Pt denies all changes. No s/sx of bleeding. Continue maintenance dose and Recheck INR in 6 weeks

## 2017-03-14 ENCOUNTER — DOCUMENTATION ONLY (OUTPATIENT)
Dept: PHARMACY | Facility: HOSPITAL | Age: 47
End: 2017-03-14

## 2017-03-14 ENCOUNTER — TELEPHONE (OUTPATIENT)
Dept: TRANSPLANT | Facility: HOSPITAL | Age: 47
End: 2017-03-14

## 2017-03-14 NOTE — PROGRESS NOTES
PHARM.D. PRE-TRANSPLANT NOTE:    Ms. Perez is a 46 YOF being evaluated for lung transplant due to IPAH.  Worsening PAP on RHC, estimated 41 mmHg.   She is NYHA class II, Karnofsky score of 80.  Not a diabetic.  For primary disease, she is on triple therapy - remodulin 72 ng/kg/min with goal of 75 ng,  ambrisentan, and tadalafil.      Patient also takes levothyroxine for hypothyroidism, esomeprazole for GERD, topiramate for a Sz x1 in 2008, and warfarin for tricuspid regurgitation - being followed by [facility].      This patient's medication therapy was evaluated as part of her pre-transplant evaluation.        Current Outpatient Prescriptions   Medication Sig Dispense Refill    0.9 % SODIUM CHLORIDE (SODIUM CHLORIDE 0.9%) 0.9 % Soln Inject 3 mLs into the vein every 8 (eight) hours as needed. (Patient taking differently: Inject 3 mLs into the vein every other day. ) 900 mL 11    ADCIRCA 20 mg Tab       ADVAIR DISKUS 100-50 mcg/dose diskus inhaler INHALE ONE PUFF TWICE DAILY 60 each 11    albuterol 90 mcg/actuation inhaler Inhale 2 puffs into the lungs every 4 (four) hours as needed. 2 Aerosol Inhalation Every 4-6 hours 1 Inhaler 3    ambrisentan (LETAIRIS) 10 MG Tab Take 1 tablet (10 mg total) by mouth once daily. 30 tablet 11    azelastine (ASTELIN) 137 mcg (0.1 %) nasal spray 2 sprays by Nasal route 2 (two) times daily.       butalbital-acetaminophen-caffeine -40 mg (FIORICET, ESGIC) -40 mg per tablet Take 1 tablet by mouth every 4 (four) hours as needed for Pain or Headaches.      cetirizine (ZYRTEC) 10 MG tablet Take 10 mg by mouth. 1 Tablet Oral Every day      clindamycin (CLEOCIN T) 1 % lotion       cyanocobalamin, vitamin B-12, 2,500 mcg Subl Place 2,500 mcg under the tongue once daily.       desonide (DESOWEN) 0.05 % ointment 0.05 %.  Ointment Topical As directed.  Apply to affected area twice a daily over face      diphenhydrAMINE (BENADRYL) 25 mg capsule Take 25 mg by mouth  every 6 (six) hours as needed for Itching.      esomeprazole (NEXIUM) 40 MG capsule Take 1 capsule (40 mg total) by mouth before breakfast. 30 capsule 11    ferrous sulfate 325 (65 FE) MG EC tablet Take 325 mg by mouth daily as needed.       fluticasone (FLONASE) 50 mcg/actuation nasal spray 2 sprays by Each Nare route once daily. 1 Bottle 6    folic acid (FOLVITE) 1 MG tablet Take 1 mg by mouth.  Tablet Oral Every day      ipratropium (ATROVENT) 0.03 % nasal spray 2 sprays by Nasal route 2 (two) times daily. 30 mL 0    levothyroxine (SYNTHROID) 137 MCG Tab tablet TAKE ONE Tablet BY MOUTH DAILY 30 tablet 0    norethindrone-ethinyl estradiol (JUNEL FE 1/20) 1 mg-20 mcg (21)/75 mg (7) per tablet Take 1 tablet by mouth once daily. 28 tablet 0    norethindrone-ethinyl estradiol (JUNEL FE 1/20) 1 mg-20 mcg (21)/75 mg (7) per tablet Take 1 tablet by mouth once daily. 28 tablet 12    ondansetron (ZOFRAN) 4 MG tablet Take 4 mg by mouth every 6 (six) hours as needed. 1 Tablet Oral Every 6 hours      PRAMOSONE 2.5-1 % Lotn lotion       promethazine (PHENERGAN) 25 MG tablet Take 25 mg by mouth every 4 to 6 hours as needed.       tadalafil (ADCIRCA) 20 MG Tab Take 2 tablets (40 mg total) by mouth once daily. 60 tablet 11    topiramate (TOPAMAX) 100 MG tablet Take 100 mg by mouth 2 (two) times daily.      treprostinil (REMODULIN) 2.5 mg/mL Soln Mix cassette as directed and infuse continuously per physician titration orders on dosing sheet. Current dose 46 ng/kg/min 60 mL 11    VITAMIN D2 50,000 unit capsule TAKE ONE CAPSULE BY MOUTH EVERY 14 DAYS 6 capsule 0    warfarin (COUMADIN) 2.5 MG tablet Take 2.5-3 tablets (6.25-7.5 mg total) by mouth Daily. As directed by coumadin clinic 90 tablet 11     No current facility-administered medications for this visit.          Currently, patient's mother is responsible for preparing / administering this patient's medications on a daily basis.  I am available for consultation  and can be contacted, as needed by the other members of the Lung Transplant team.

## 2017-03-14 NOTE — TELEPHONE ENCOUNTER
SW attempted to follow up with pt on caregiver plan. SW contacted pt's mobile phone at [phone], no answer. SW left voicemail for return call. At this time pt only has one confirmed caregiver.

## 2017-03-20 ENCOUNTER — TELEPHONE (OUTPATIENT)
Dept: TRANSPLANT | Facility: HOSPITAL | Age: 47
End: 2017-03-20

## 2017-03-20 NOTE — TELEPHONE ENCOUNTER
SW again attempted to follow up with pt on updated caregiver plan. DEBBIE contacted pt's mobile phone at [phone], no answer. DEBBIE left voicemail for return call.

## 2017-03-21 ENCOUNTER — TELEPHONE (OUTPATIENT)
Dept: TRANSPLANT | Facility: HOSPITAL | Age: 47
End: 2017-03-21

## 2017-03-21 ENCOUNTER — COMMITTEE REVIEW (OUTPATIENT)
Dept: TRANSPLANT | Facility: CLINIC | Age: 47
End: 2017-03-21

## 2017-03-21 ENCOUNTER — DOCUMENTATION ONLY (OUTPATIENT)
Dept: PHARMACY | Facility: HOSPITAL | Age: 47
End: 2017-03-21

## 2017-03-21 NOTE — COMMITTEE REVIEW
Native Organ Dx: Primary Pulmonary Hypertension    Deferred:  Ms. Yuni Perez was presented to Selection Committee meeting for a diagnosis of pulmonary hypertension.  All members present agree that Ms. Perez is not a candidate for lung transplantation at this time due to psychosocial (unreliable caregiving plan) and financial issues .  In order to be reconsidered as a candidate for lung transplantation, she will need reliable and adequate caregivers (at least a primary and at least 2 back up caregivers) and must fundraise $2000 / year for any out of pocket expenses (deductible).  If she meets the above requirements, then she will be listed for a bilateral lung transplant with a retrospective crossmatch at the time of the organ offer.  Unacceptable antigens will be listed in [facility] at the time of listing.        I was present at the meeting and agree with the action stated above.

## 2017-03-22 ENCOUNTER — TELEPHONE (OUTPATIENT)
Dept: TRANSPLANT | Facility: CLINIC | Age: 47
End: 2017-03-22

## 2017-03-22 DIAGNOSIS — I27.20 PULMONARY HYPERTENSION: ICD-10-CM

## 2017-03-22 DIAGNOSIS — Z76.82 AWAITING ORGAN TRANSPLANT STATUS: Primary | ICD-10-CM

## 2017-03-22 NOTE — TELEPHONE ENCOUNTER
Contacted Yuni.  Notified her of the outcome of the Selection Committee meeting.  Informed her that at this time she has been deferred for lung transplant until she has adequate and reliable caregivers (at least 1 primary caregiver and 2 backup caregivers) and $2000 / year to meet her out of pocket for her insurance.  She verbalized complete understanding and stated that she is trying to get confirmation now for all her caregivers .  Instructed her to contact either Johnna or ALONA once she has a caregiving plan.  Informed her that we will see her in clinic 3 months from the last appointment.  Informed her that she will be scheduled next month.  She again verbalized her understanding.

## 2017-03-23 ENCOUNTER — OFFICE VISIT (OUTPATIENT)
Dept: NEUROLOGY | Facility: CLINIC | Age: 47
End: 2017-03-23
Payer: COMMERCIAL

## 2017-03-23 VITALS
DIASTOLIC BLOOD PRESSURE: 62 MMHG | BODY MASS INDEX: 26.94 KG/M2 | SYSTOLIC BLOOD PRESSURE: 112 MMHG | HEART RATE: 72 BPM | HEIGHT: 59 IN | WEIGHT: 133.63 LBS

## 2017-03-23 DIAGNOSIS — G89.29 CHRONIC NONINTRACTABLE HEADACHE, UNSPECIFIED HEADACHE TYPE: Primary | ICD-10-CM

## 2017-03-23 DIAGNOSIS — R51.9 CHRONIC NONINTRACTABLE HEADACHE, UNSPECIFIED HEADACHE TYPE: Primary | ICD-10-CM

## 2017-03-23 DIAGNOSIS — G40.909 SEIZURE DISORDER: ICD-10-CM

## 2017-03-23 PROCEDURE — 99999 PR PBB SHADOW E&M-EST. PATIENT-LVL III: CPT | Mod: PBBFAC,,, | Performed by: NEUROLOGICAL SURGERY

## 2017-03-23 PROCEDURE — 1160F RVW MEDS BY RX/DR IN RCRD: CPT | Mod: S$GLB,,, | Performed by: NEUROLOGICAL SURGERY

## 2017-03-23 PROCEDURE — 99214 OFFICE O/P EST MOD 30 MIN: CPT | Mod: S$GLB,,, | Performed by: NEUROLOGICAL SURGERY

## 2017-03-23 RX ORDER — TOPIRAMATE 100 MG/1
100 TABLET, FILM COATED ORAL 2 TIMES DAILY
Qty: 60 TABLET | Refills: 11 | Status: SHIPPED | OUTPATIENT
Start: 2017-03-23 | End: 2017-10-31 | Stop reason: SDUPTHER

## 2017-03-23 NOTE — PROGRESS NOTES
"Chief Complaint   Patient presents with    Headache        Yuni Perez is a 47 y.o. female with a history of multiple medical diagnoses as listed below that presents for evaluation of headaches. She is accompanied to this visit by her mother. She was diagnosed with pulmonary hypertension about 8 years ago and around the same time she was diagnosed with a seizure. She had workup at that time that included MRI that showed signs of "old injury" and she had EEG that showed "lots of spikes and waves" per her mother. She says that since that time she has been started on Topamax which has been titrated up. She has not been having any seizures but she has bene having some occasional word finding difficulty that she has attributed to the medication. She has tried tylenol because as she has been titrating her medication to treat pulmonary hypertension sh marie been having bilateral intense stabbing pains in her head. She does say that with time her headaches have been better but she has noticed many more headaches since she began the Remodulin. She has not taken any other headache medications in the past.    Interval History  11/17/2016  She has still been having headaches after taking her medications in particular her vasodilators. She has found that her PRN medications are somewhat helpful in minimizing the pain that she has from her headaches. She has not had any mew problems since she was last seen in clinic.    03/23/2017  Since last seen in clinic she has been approved to get on the lung transplant list and she will be making strides to get set up to to receive new lungs. She has been taking her topamax as directed and has felt that overall she has been having fewer headaches but she still has needed Tylenol and on rare occasions Fioricet to get rid of her headaches. She has been having some cognitive difficulty with her current medications but she feels that it is tolerable. She has not had any seizure " like activity.    PAST MEDICAL HISTORY:  Past Medical History:   Diagnosis Date    AR (allergic rhinitis)     Cholelithiasis, common bile duct     Chronic low back pain     GERD (gastroesophageal reflux disease)     History of migraine headaches     Hypothyroidism     Lumbar disc disease     Menorrhagia     Mild asthma     Obesity     Plantar fasciitis of left foot     Primary pulmonary hypertension     followed by heart transplant/pulmonary     Seizure disorder     x 1 in 2008    Seizures     Sleep apnea     TMJ (dislocation of temporomandibular joint)     Tricuspid regurgitation        PAST SURGICAL HISTORY:  Past Surgical History:   Procedure Laterality Date    CARDIAC CATHETERIZATION      PORTACATH PLACEMENT      UPPER GASTROINTESTINAL ENDOSCOPY         SOCIAL HISTORY:  Social History     Social History    Marital status: Single     Spouse name: N/A    Number of children: 0    Years of education: N/A     Occupational History    [id number]     Social History Main Topics    Smoking status: Never Smoker    Smokeless tobacco: Never Used    Alcohol use No      Comment: 1 per year    Drug use: No    Sexual activity: No      Comment: pt is a virgin     Other Topics Concern    Not on file     Social History Narrative       FAMILY HISTORY:  Family History   Problem Relation Age of Onset    Heart disease Father     Glaucoma Father     Diabetes Mother     Cancer Maternal Grandmother      uterine    Cancer Maternal Aunt      breast    Breast cancer Maternal Aunt     Heart disease Paternal Grandfather     Heart attack Paternal Grandfather     Leukemia Brother     Hypertension      Diabetes Maternal Grandfather     Heart attack Maternal Grandfather     Breast cancer Cousin     No Known Problems Sister     No Known Problems Maternal Uncle     No Known Problems Paternal Aunt     No Known Problems Paternal Uncle     No Known Problems Paternal Grandmother      Colon cancer Neg Hx     Ovarian cancer Neg Hx     Amblyopia Neg Hx     Blindness Neg Hx     Cataracts Neg Hx     Macular degeneration Neg Hx     Retinal detachment Neg Hx     Strabismus Neg Hx     Stroke Neg Hx     Thyroid disease Neg Hx        ALLERGIES AND MEDICATIONS: updated and reviewed.  Review of patient's allergies indicates:   Allergen Reactions    Adhesive Hives     Silk tape    Amoxicillin Rash    Chlorhexidine Other (See Comments)     Current Outpatient Prescriptions   Medication Sig Dispense Refill    0.9 % SODIUM CHLORIDE (SODIUM CHLORIDE 0.9%) 0.9 % Soln Inject 3 mLs into the vein every 8 (eight) hours as needed. (Patient taking differently: Inject 3 mLs into the vein every other day. ) 900 mL 11    ADVAIR DISKUS 100-50 mcg/dose diskus inhaler INHALE ONE PUFF TWICE DAILY 60 each 11    albuterol 90 mcg/actuation inhaler Inhale 2 puffs into the lungs every 4 (four) hours as needed. 2 Aerosol Inhalation Every 4-6 hours 1 Inhaler 3    ambrisentan (LETAIRIS) 10 MG Tab Take 1 tablet (10 mg total) by mouth once daily. 30 tablet 11    azelastine (ASTELIN) 137 mcg (0.1 %) nasal spray 2 sprays by Nasal route 2 (two) times daily.       butalbital-acetaminophen-caffeine -40 mg (FIORICET, ESGIC) -40 mg per tablet Take 1 tablet by mouth every 4 (four) hours as needed for Pain or Headaches.      cetirizine (ZYRTEC) 10 MG tablet Take 10 mg by mouth. 1 Tablet Oral Every day      clindamycin (CLEOCIN T) 1 % lotion       cyanocobalamin, vitamin B-12, 2,500 mcg Subl Place 2,500 mcg under the tongue once daily.       desonide (DESOWEN) 0.05 % ointment 0.05 %.  Ointment Topical As directed.  Apply to affected area twice a daily over face      diphenhydrAMINE (BENADRYL) 25 mg capsule Take 25 mg by mouth every 6 (six) hours as needed for Itching.      esomeprazole (NEXIUM) 40 MG capsule Take 1 capsule (40 mg total) by mouth before breakfast. 30 capsule 11    ferrous sulfate 325 (65 FE) MG  EC tablet Take 325 mg by mouth daily as needed.       fluticasone (FLONASE) 50 mcg/actuation nasal spray 2 sprays by Each Nare route once daily. 1 Bottle 6    folic acid (FOLVITE) 1 MG tablet Take 1 mg by mouth.  Tablet Oral Every day      ipratropium (ATROVENT) 0.03 % nasal spray 2 sprays by Nasal route 2 (two) times daily. 30 mL 0    levothyroxine (SYNTHROID) 137 MCG Tab tablet TAKE ONE Tablet BY MOUTH DAILY 30 tablet 0    norethindrone-ethinyl estradiol (JUNEL FE 1/20) 1 mg-20 mcg (21)/75 mg (7) per tablet Take 1 tablet by mouth once daily. 28 tablet 0    norethindrone-ethinyl estradiol (JUNEL FE 1/20) 1 mg-20 mcg (21)/75 mg (7) per tablet Take 1 tablet by mouth once daily. 28 tablet 12    ondansetron (ZOFRAN) 4 MG tablet Take 4 mg by mouth every 6 (six) hours as needed. 1 Tablet Oral Every 6 hours      PRAMOSONE 2.5-1 % Lotn lotion       promethazine (PHENERGAN) 25 MG tablet Take 25 mg by mouth every 4 to 6 hours as needed.       tadalafil (ADCIRCA) 20 MG Tab Take 2 tablets (40 mg total) by mouth once daily. 60 tablet 11    topiramate (TOPAMAX) 100 MG tablet Take 1 tablet (100 mg total) by mouth 2 (two) times daily. 60 tablet 11    treprostinil (REMODULIN) 2.5 mg/mL Soln Mix cassette as directed and infuse continuously per physician titration orders on dosing sheet. Current dose 46 ng/kg/min 60 mL 11    VITAMIN D2 50,000 unit capsule TAKE ONE CAPSULE BY MOUTH EVERY 14 DAYS 6 capsule 0    warfarin (COUMADIN) 2.5 MG tablet Take 2.5-3 tablets (6.25-7.5 mg total) by mouth Daily. As directed by coumadin clinic 90 tablet 11     No current facility-administered medications for this visit.        Review of Systems   Constitutional: Negative for activity change, appetite change, fever and unexpected weight change.   HENT: Negative for trouble swallowing and voice change.    Eyes: Negative for photophobia and visual disturbance.   Respiratory: Negative for apnea and shortness of breath.    Cardiovascular:  Negative for chest pain and leg swelling.   Gastrointestinal: Negative for constipation and nausea.   Genitourinary: Negative for difficulty urinating.   Musculoskeletal: Negative for back pain, gait problem and neck pain.   Skin: Negative for color change and pallor.   Neurological: Positive for dizziness and headaches. Negative for seizures, syncope, weakness and numbness.   Hematological: Negative for adenopathy.   Psychiatric/Behavioral: Negative for agitation, confusion and decreased concentration.       Neurologic Exam     Mental Status   Oriented to person, place, and time.   Registration: recalls 3 of 3 objects.   Attention: normal. Concentration: normal.   Speech: speech is normal   Level of consciousness: alert  Knowledge: good.     Cranial Nerves     CN II   Visual fields full to confrontation.   Right visual field deficit: none  Left visual field deficit: none     CN III, IV, VI   Pupils are equal, round, and reactive to light.  Extraocular motions are normal.   Right pupil: Size: 3 mm. Shape: regular. Accommodation: intact.   Left pupil: Size: 3 mm. Shape: regular. Accommodation: intact.   CN III: no CN III palsy  CN VI: no CN VI palsy  Nystagmus: none   Diplopia: none  Ophthalmoparesis: none  Upgaze: normal  Downgaze: normal  Conjugate gaze: present    CN V   Facial sensation intact.   Right facial sensation deficit: none  Left facial sensation deficit: none    CN VII   Facial expression full, symmetric.   Right facial weakness: none  Left facial weakness: none    CN VIII   CN VIII normal.     CN IX, X   CN IX normal.   CN X normal.   Palate: symmetric    CN XI   CN XI normal.   Right sternocleidomastoid strength: normal  Left sternocleidomastoid strength: normal  Right trapezius strength: normal  Left trapezius strength: normal    CN XII   CN XII normal.   Tongue deviation: none    Motor Exam   Muscle bulk: normal  Overall muscle tone: normal  Right arm tone: normal  Left arm tone: normal  Right leg  tone: normal  Left leg tone: normal    Strength   Strength 5/5 throughout.     Sensory Exam   Right arm light touch: normal  Left arm light touch: normal  Right leg light touch: normal  Left leg light touch: normal  Right arm vibration: normal  Left arm vibration: normal  Right leg vibration: normal  Left leg vibration: normal  Right arm proprioception: normal  Left arm proprioception: normal  Right leg proprioception: normal  Left leg proprioception: normal  Right arm pinprick: normal  Left arm pinprick: normal  Right leg pinprick: normal  Left leg pinprick: normal    Gait, Coordination, and Reflexes     Gait  Gait: normal    Coordination   Romberg: negative  Finger to nose coordination: normal  Heel to shin coordination: normal  Tandem walking coordination: normal    Tremor   Resting tremor: absent    Reflexes   Right brachioradialis: 2+  Left brachioradialis: 2+  Right biceps: 2+  Left biceps: 2+  Right triceps: 2+  Left triceps: 2+  Right patellar: 2+  Left patellar: 2+  Right achilles: 2+  Left achilles: 2+  Right plantar: normal  Left plantar: normal      Physical Exam   Constitutional: She is oriented to person, place, and time. She appears well-developed and well-nourished.   HENT:   Head: Normocephalic and atraumatic.   Eyes: EOM are normal. Pupils are equal, round, and reactive to light.   Neck: Normal range of motion.   Cardiovascular: Normal rate and intact distal pulses.    Pulmonary/Chest: Effort normal. No apnea. No respiratory distress.   Musculoskeletal: Normal range of motion.   Neurological: She is alert and oriented to person, place, and time. She has normal strength. She has a normal Finger-Nose-Finger Test, a normal Heel to Shin Test, a normal Romberg Test and a normal Tandem Gait Test. Gait normal.   Reflex Scores:       Tricep reflexes are 2+ on the right side and 2+ on the left side.       Bicep reflexes are 2+ on the right side and 2+ on the left side.       Brachioradialis reflexes are 2+  "on the right side and 2+ on the left side.       Patellar reflexes are 2+ on the right side and 2+ on the left side.       Achilles reflexes are 2+ on the right side and 2+ on the left side.  Skin: Skin is warm and dry.   Psychiatric: She has a normal mood and affect. Her speech is normal and behavior is normal. Thought content normal.   Vitals reviewed.      Vitals:    03/23/17 1344   BP: 112/62   BP Location: Left arm   Patient Position: Sitting   BP Method: Manual   Pulse: 72   Weight: 60.6 kg (133 lb 9.6 oz)   Height: 4' 11" (1.499 m)       Assessment & Plan:  Chronic nonintractable headache, unspecified headache type  -     topiramate (TOPAMAX) 100 MG tablet; Take 1 tablet (100 mg total) by mouth 2 (two) times daily.  Dispense: 60 tablet; Refill: 11    Seizure disorder      Health Maintenance reviewed.    Follow-up: Return in about 3 months (around 6/23/2017).   More than 50% of this 25 minute encounter was spent in counseling and coordinating care.          "

## 2017-04-06 ENCOUNTER — OFFICE VISIT (OUTPATIENT)
Dept: OPTOMETRY | Facility: CLINIC | Age: 47
End: 2017-04-06
Payer: COMMERCIAL

## 2017-04-06 DIAGNOSIS — H52.13 MYOPIA WITH ASTIGMATISM, BILATERAL: ICD-10-CM

## 2017-04-06 DIAGNOSIS — H52.4 PRESBYOPIA: ICD-10-CM

## 2017-04-06 DIAGNOSIS — Z13.5 SCREENING FOR OTHER EYE CONDITIONS: ICD-10-CM

## 2017-04-06 DIAGNOSIS — H35.411 LATTICE DEGENERATION OF RETINA, RIGHT: Primary | ICD-10-CM

## 2017-04-06 DIAGNOSIS — H52.203 MYOPIA WITH ASTIGMATISM, BILATERAL: ICD-10-CM

## 2017-04-06 PROCEDURE — 99999 PR PBB SHADOW E&M-EST. PATIENT-LVL IV: CPT | Mod: PBBFAC,,, | Performed by: OPTOMETRIST

## 2017-04-06 PROCEDURE — 92014 COMPRE OPH EXAM EST PT 1/>: CPT | Mod: S$GLB,,, | Performed by: OPTOMETRIST

## 2017-04-06 PROCEDURE — 92015 DETERMINE REFRACTIVE STATE: CPT | Mod: S$GLB,,, | Performed by: OPTOMETRIST

## 2017-04-06 NOTE — PATIENT INSTRUCTIONS
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  High myopia (nearsightedness) with astigmatism in each eye.   Satisfactory correctable VA in each eye.  Presbyopia consistent with age.  New spectacle lens Rx issued for full-time wear.  Recheck in 12 - 18 months, or prior if any problems or apparent changes in vision in either eye in the interim.

## 2017-04-06 NOTE — PROGRESS NOTES
HPI     Concerns About Ocular Health    Additional comments: Eye exam and refraction.  Awaiting lung transplant   for pulmonary hypertension.   States her eyes go in and out of focus.     Wears glasses full-time.            Comments   Patient's age: 47 y.o. WF  Approximate date of last eye examination:  09/08/2015  Name of last eye doctor seen: Dr Casas  City/State: [facility]  Wears glasses? Yes     If yes, wears  Full-time or part-time?  Full-time  Present glasses are: Bifocal, SV Distance, SV Reading?  Progressive lenses  Approximate age of present glasses: 3+ years old  Got new glasses following last exam, or subsequently?:  Yes   Any problem with VA with glasses?  Pt co trouble with needle work and   distance vision   Wears CLs?:  No  Headaches?  Yes, pt feels headaches are caused by medication   Eye pain/discomfort?  No                                                                                     Flashes?  No  Floaters?  Occasionally for several years denies increase or change  Diplopia/Double vision?  No  Patient's Ocular History:         Any eye surgeries? No         Any eye injury?  No         Any treatment for eye disease?  No  Family history of eye disease?    Father + Glaucoma + Retinal detachment   Significant patient medical history:         1. Diabetes?  No   2. HBP?  Yes, controlled by medication and diet              3. Other (describe):                                  Idiopathic Pulmonary Hypertension                                Seizures                                 Migraines    ! OTC eyedrops currently using:  Visine as needed for itching    ! Prescription eye meds currently using:  None   ! Any history of allergy/adverse reaction to any eye meds used   previously?  No    ! Any history of allergy/adverse reaction to eyedrops used during prior   eye exam(s)? No    ! Any history of allergy/adverse reaction to Novacaine or similar meds?   No   ! Any history of allergy/adverse reaction to  "Epinephrine or similar meds?   No    ! Patient okay with use of anesthetic eyedrops to check eye pressure?    Yes        ! Patient okay with use of eyedrops to dilate pupils today?  Yes   !  Allergies/Medications/Medical History/Family History reviewed today?    Yes      PD =   57/53  Desired reading distance =  14.25"                                                                        Last edited by Jude Casas, OD on 4/6/2017  3:00 PM. (History)            Assessment /Plan     For exam results, see Encounter Report.    1. Lattice degeneration of retina, right     2. Myopia with astigmatism, bilateral     3. Presbyopia     4. 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  High myopia (nearsightedness) with astigmatism in each eye.   Satisfactory correctable VA in each eye.  Presbyopia consistent with age.  New spectacle lens Rx issued for full-time wear.  Recheck in 12 - 18 months, or prior if any problems or apparent changes in vision in either eye in the interim.             "

## 2017-04-06 NOTE — MR AVS SNAPSHOT
[facility]  [address]  [facility] [address]  Phone: [phone]  Fax: [phone]                  Yuni Perez   2017 2:15 PM   Office Visit    Description:  Female : 1970   Provider:  Jude Casas OD   Department:  [facility]           Reason for Visit     Concerns About Ocular Health                To Do List           Future Appointments        Provider Department Dept Phone    2017 10:20 AM SIX, MINUTE WALK [facility] - Pulmonary Lab [phone]    2017 10:45 AM PULMONARY FUNCTION [facility] - Pulmonary Lab [phone]    2017 11:00 AM MD [facility] - Lung Transplant [phone]    2017 1:00 PM [facility] - Coumadin [phone]    2017 1:20 PM Lulu Sandhu MD [facility] [phone]      Goals (5 Years of Data)     None      [facility]     [facility] On Call Nurse Care Line -  Assistance  Unless otherwise directed by your provider, please contact [facility] On-[facility], our nurse care line that is available for  assistance.     Registered nurses in the [facility] provide: appointment scheduling, clinical advisement, health education, and other advisory services.  Call: [phone] (toll free)               Medications           Message regarding Medications     Verify the changes and/or additions to your medication regime listed below are the same as discussed with your clinician today.  If any of these changes or additions are incorrect, please notify your healthcare provider.             Verify that the below list of medications is an accurate representation of the medications you are currently taking.  If none reported, the list may be blank. If incorrect, please contact your healthcare provider. Carry this list with you in case of emergency.           Current Medications     0.9 % SODIUM CHLORIDE (SODIUM CHLORIDE 0.9%) 0.9 % Soln Inject 3  mLs into the vein every 8 (eight) hours as needed.    ADVAIR DISKUS 100-50 mcg/dose diskus inhaler INHALE ONE PUFF TWICE DAILY    albuterol 90 mcg/actuation inhaler Inhale 2 puffs into the lungs every 4 (four) hours as needed. 2 Aerosol Inhalation Every 4-6 hours    ambrisentan (LETAIRIS) 10 MG Tab Take 1 tablet (10 mg total) by mouth once daily.    azelastine (ASTELIN) 137 mcg (0.1 %) nasal spray 2 sprays by Nasal route 2 (two) times daily.     butalbital-acetaminophen-caffeine -40 mg (FIORICET, ESGIC) -40 mg per tablet Take 1 tablet by mouth every 4 (four) hours as needed for Pain or Headaches.    cetirizine (ZYRTEC) 10 MG tablet Take 10 mg by mouth. 1 Tablet Oral Every day    clindamycin (CLEOCIN T) 1 % lotion     cyanocobalamin, vitamin B-12, 2,500 mcg Subl Place 2,500 mcg under the tongue once daily.     desonide (DESOWEN) 0.05 % ointment 0.05 %.  Ointment Topical As directed.  Apply to affected area twice a daily over face    diphenhydrAMINE (BENADRYL) 25 mg capsule Take 25 mg by mouth every 6 (six) hours as needed for Itching.    esomeprazole (NEXIUM) 40 MG capsule Take 1 capsule (40 mg total) by mouth before breakfast.    ferrous sulfate 325 (65 FE) MG EC tablet Take 325 mg by mouth daily as needed.     fluticasone (FLONASE) 50 mcg/actuation nasal spray 2 sprays by Each Nare route once daily.    folic acid (FOLVITE) 1 MG tablet Take 1 mg by mouth.  Tablet Oral Every day    ipratropium (ATROVENT) 0.03 % nasal spray 2 sprays by Nasal route 2 (two) times daily.    levothyroxine (SYNTHROID) 137 MCG Tab tablet TAKE ONE Tablet BY MOUTH DAILY    norethindrone-ethinyl estradiol (JUNEL FE 1/20) 1 mg-20 mcg (21)/75 mg (7) per tablet Take 1 tablet by mouth once daily.    norethindrone-ethinyl estradiol (JUNEL FE 1/20) 1 mg-20 mcg (21)/75 mg (7) per tablet Take 1 tablet by mouth once daily.    ondansetron (ZOFRAN) 4 MG tablet Take 4 mg by mouth every 6 (six) hours as needed. 1 Tablet Oral Every 6 hours     PRAMOSONE 2.5-1 % Lotn lotion     promethazine (PHENERGAN) 25 MG tablet Take 25 mg by mouth every 4 to 6 hours as needed.     tadalafil (ADCIRCA) 20 MG Tab Take 2 tablets (40 mg total) by mouth once daily.    topiramate (TOPAMAX) 100 MG tablet Take 1 tablet (100 mg total) by mouth 2 (two) times daily.    treprostinil (REMODULIN) 2.5 mg/mL Soln Mix cassette as directed and infuse continuously per physician titration orders on dosing sheet. Current dose 46 ng/kg/min    VITAMIN D2 50,000 unit capsule TAKE ONE CAPSULE BY MOUTH EVERY 14 DAYS    warfarin (COUMADIN) 2.5 MG tablet Take 2.5-3 tablets (6.25-7.5 mg total) by mouth Daily. As directed by coumadin clinic           Clinical Reference Information           Allergies as of 4/6/2017     Adhesive    Amoxicillin    Chlorhexidine      Immunizations Administered on Date of Encounter - 4/6/2017     None      Maintenance Dialysis History     Patient has no recorded history of maintenance dialysis.      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  High myopia (nearsightedness) with astigmatism in each eye.   Satisfactory correctable VA in each eye.  Presbyopia consistent with age.  New spectacle lens Rx issued for full-time wear.  Recheck in 12 - 18 months, or prior if any problems or apparent changes in vision in either eye in the interim.              Language Assistance Services     ATTENTION: Language assistance services are available, free of charge. Please call [phone].      ATENCIÓN: Si corinne david, tiene a grider disposición servicios gratuitos de asistencia lingüística. Llame al [phone].     KASSIDY Ý: N?u b?n nói Ti?ng Vi?t, có các d?ch v? h? tr? ngôn ng? mi?n phí dành cho  b?n. G?i s? [phone].         [facility] complies with applicable Federal civil rights laws and does not discriminate on the basis of race, color, national origin, age, disability, or sex.

## 2017-04-12 ENCOUNTER — HOSPITAL ENCOUNTER (OUTPATIENT)
Dept: PULMONOLOGY | Facility: CLINIC | Age: 47
Discharge: HOME | End: 2017-04-12
Payer: COMMERCIAL

## 2017-04-12 ENCOUNTER — OFFICE VISIT (OUTPATIENT)
Dept: TRANSPLANT | Facility: CLINIC | Age: 47
End: 2017-04-12
Payer: COMMERCIAL

## 2017-04-12 ENCOUNTER — TELEPHONE (OUTPATIENT)
Dept: TRANSPLANT | Facility: HOSPITAL | Age: 47
End: 2017-04-12

## 2017-04-12 VITALS
RESPIRATION RATE: 20 BRPM | OXYGEN SATURATION: 98 % | BODY MASS INDEX: 26.61 KG/M2 | WEIGHT: 132 LBS | HEIGHT: 59 IN | SYSTOLIC BLOOD PRESSURE: 111 MMHG | HEART RATE: 88 BPM | TEMPERATURE: 96 F | DIASTOLIC BLOOD PRESSURE: 81 MMHG

## 2017-04-12 VITALS — HEIGHT: 59 IN | BODY MASS INDEX: 26.94 KG/M2 | WEIGHT: 133.63 LBS

## 2017-04-12 DIAGNOSIS — Z76.82 AWAITING ORGAN TRANSPLANT STATUS: ICD-10-CM

## 2017-04-12 DIAGNOSIS — J45.20 ALLERGIC ASTHMA, MILD INTERMITTENT, UNCOMPLICATED: ICD-10-CM

## 2017-04-12 DIAGNOSIS — R09.81 NASAL CONGESTION: ICD-10-CM

## 2017-04-12 DIAGNOSIS — I27.0 PRIMARY PULMONARY HYPERTENSION: Primary | ICD-10-CM

## 2017-04-12 DIAGNOSIS — I27.20 PULMONARY HYPERTENSION: ICD-10-CM

## 2017-04-12 LAB
PRE FEV1 FVC: 71
PRE FEV1: 1.59
PRE FVC: 2.25
PREDICTED FEV1 FVC: 86
PREDICTED FEV1: 2.31
PREDICTED FVC: 2.72

## 2017-04-12 PROCEDURE — 99213 OFFICE O/P EST LOW 20 MIN: CPT | Mod: 25,S$GLB,TXP, | Performed by: INTERNAL MEDICINE

## 2017-04-12 PROCEDURE — 99999 PR PBB SHADOW E&M-EST. PATIENT-LVL V: CPT | Mod: PBBFAC,TXP,, | Performed by: INTERNAL MEDICINE

## 2017-04-12 PROCEDURE — 94620 PR PULMONARY STRESS TESTING,SIMPLE: CPT | Mod: S$GLB,TXP,, | Performed by: INTERNAL MEDICINE

## 2017-04-12 PROCEDURE — 94010 BREATHING CAPACITY TEST: CPT | Mod: S$GLB,TXP,, | Performed by: INTERNAL MEDICINE

## 2017-04-12 PROCEDURE — 1160F RVW MEDS BY RX/DR IN RCRD: CPT | Mod: S$GLB,TXP,, | Performed by: INTERNAL MEDICINE

## 2017-04-12 RX ORDER — ACETAMINOPHEN 500 MG
1000 TABLET ORAL EVERY 6 HOURS PRN
COMMUNITY

## 2017-04-12 RX ORDER — TADALAFIL 20 MG/1
1 TABLET ORAL DAILY
COMMUNITY
Start: 2017-03-30 | End: 2017-04-12 | Stop reason: SDUPTHER

## 2017-04-12 NOTE — PROGRESS NOTES
"LUNG TRANSPLANT PRE FOLLOW-UP    Referring Physician: Ivonne Rai    Reason for Visit:  Pre-lung transplant follow-up.         Date of Initial Evaluation: 1/16/2017                                                                                             History of Present Illness: Yuni Perez is a 47 y.o. female who is on 0L of oxygen.  She is on no assisted ventilation.  Her New York Heart Association Class is III  and a Karnofsky score of 70. Cares for self; unable to carry on normal activity or active work.  She is not diabetic.  Yuni continues report significant dyspnea.  Her Remodulin infusion is now at 80ng/kg/min, despite this she still has significant limitation and difficulty completing her ADLs.    Review of Systems   Constitutional: Positive for malaise/fatigue.   HENT: Positive for congestion.    Eyes: Negative.    Respiratory: Positive for shortness of breath. Negative for cough and hemoptysis.    Cardiovascular: Negative for chest pain, palpitations and leg swelling.   Gastrointestinal: Negative.    Musculoskeletal: Positive for myalgias.   Skin: Negative.    Neurological: Positive for headaches. Negative for loss of consciousness.   Psychiatric/Behavioral: Negative for depression.     Objective:   /81 (BP Location: Left arm, Patient Position: Sitting, BP Method: Automatic)  Pulse 88  Temp 96.3 °F (35.7 °C) (Oral)   Resp 20  Ht 4' 11" (1.499 m)  Wt 59.9 kg (132 lb)  LMP 08/12/2016  SpO2 98% Comment: room air  BMI 26.66 kg/m2    Physical Exam   Constitutional: She is oriented to person, place, and time and well-developed, well-nourished, and in no distress. No distress.   HENT:   Head: Normocephalic and atraumatic.   Neck: Neck supple. No JVD present. No tracheal deviation present.   Cardiovascular: Normal rate and regular rhythm.    Pulmonary/Chest: Effort normal. No respiratory distress. She has no rales.   Abdominal: Soft. There is no tenderness. "   Musculoskeletal: She exhibits no edema.   Neurological: She is alert and oriented to person, place, and time.   Skin: Skin is warm and dry.   Psychiatric: Affect normal.     Labs:  No visits with results within 7 Day(s) from this visit.  Latest known visit with results is:    Anti-coag visit on 03/13/2017   Component Date Value    INR 03/13/2017 2.5        Pulmonary Function Tests 11/15/2016 5/17/2016   FVC 2.13 2.22   FEV1 1.43 1.65   TLC (liters) - 3.74   DLCO (ml/mmHg sec) - 17.6   FVC% 78 78   FEV1% 61 69   FEF 25-75 0.78 1.22   FEF 25-75% 29 44   TLC% - 89   RV - 1.49   RV% - 105   DLCO% - 90     Other    Assessment:-  1. Primary pulmonary hypertension    2. Awaiting organ transplant status    3. Allergic asthma, mild intermittent, uncomplicated    4. Nasal congestion      Plan:  1. Currently on Remodulin (80ng/kg/min), Ambrisentan, and Tadalafil.  While she is not requiring supplemental oxygen, her symptoms appear to be worsening.  Cont close follow up with Dr. Rai and Geno.  She is in the process of obtaining funding for her deductible, and has a caregiver support system in place.    PFTs reveiwed: FEV1 1.59L (improved)  6MWT:  Walked 1500ft, with no desaturation post-exercise; Ab Dyspena Rating 9    2. Asthma is controlled on current medications.      3. Nasal congestion- medication side effect.  Cont nasal saline and fluticasone    Abraham Roberts MD  Pulmonary and Critical Care Fellow    Attending Note:    I have seen and evaluated the patient with the fellow. Their note reflects the content of our discussion and my plan of care.     She has been approved for listing for lung transplant but still needs to raise funds before we can list her.       Ralph Whyte MD  Pulmonary/Critical Care Medicine

## 2017-04-12 NOTE — TELEPHONE ENCOUNTER
Pt informed by RN coordinator NOLAN Louie that pt has a caregiver plan in place. SW attempted to contact pt, however received voicemail. SW left message requesting callback. SW remains available.

## 2017-04-12 NOTE — LETTER
April 14, 2017        Ivonne Rai  [address]  [facility] [address]  Phone: [phone]  Fax: [phone]             [facility] - Lung Transplant  [address]  [facility] [address]  Phone: [phone]   Patient: Yuni Perez   MR Number: [id number]   YOB: 1970   Date of Visit: 4/12/2017       Dear Dr. Ivonne Rai    Thank you for referring Yuni Perez to me for evaluation. Attached you will find relevant portions of my assessment and plan of care.    If you have questions, please do not hesitate to call me. I look forward to following Yuni Perez along with you.    Sincerely,    Ralph Whyte MD    Enclosure    If you would like to receive this communication electronically, please contact [email or web address] or [phone] to request [employer] Link access.    [employer] Link is a tool which provides read-only access to select patient information with whom you have a relationship. Its easy to use and provides real time access to review your patients record including encounter summaries, notes, results, and demographic information.    If you feel you have received this communication in error or would no longer like to receive these types of communications, please e-mail [email or web address]

## 2017-04-14 NOTE — PROCEDURES
Yuni Perez is a 47 y.o.  female patient, who presents for a 6 minute walk test ordered by Kali Whyte MD.  The diagnosis is Pre Lung Transplant Evaluation; Pulmonary Hypertension.  The patient's BMI is 27 kg/m2.  Predicted distance (lower limit of normal) is 441.34 meters.      Test Results:    The test was completed without stopping.  The total time walked was 360 seconds.  During walking, the patient reported:  Dyspnea, Leg pain. The patient used no assistive devices  during testing.     04/12/2017---------Distance: 457.2 meters (1500 feet)     O2 Sat % Supplemental Oxygen Heart Rate Blood Pressure Ab Scale   Pre-exercise  (Resting) 100 % Room Air 86 bpm 130/75 mmHg 2   During Exercise 98 % Room Air 145 bpm 142/73 mmHg 9   Post-exercise  (Recovery) 98 % Room Air  122 bpm       Recovery Time: 102 seconds    Performing nurse/tech: MARTÍN Gordon      PREVIOUS STUDY:   03/03/2017---------Distance: 457.2 meters (1500 feet)       O2 Sat % Supplemental Oxygen Heart Rate Blood Pressure Ab Scale   Pre-exercise  (Resting) 98 % Room Air 87 bpm 114/56 mmHg 3   During Exercise 99 % Room Air 112 bpm 121/67 mmHg 7-8   Post-exercise  (Recovery) 100 % Room Air  94 bpm           CLINICAL INTERPRETATION:  Six minute walk distance is 457.2 meters (1500 feet) with very, very heavy dyspnea.  During exercise, there was no significant desaturation while breathing room air.  Blood pressure remained stable and Heart rate increased significantly with walking.  This may represent a tachycardic response to exercise.  The patient reported non-pulmonary symptoms during exercise.  Since the previous study in March 2017, exercise capacity is unchanged.  Based upon age and body mass index, exercise capacity is normal.

## 2017-04-26 ENCOUNTER — ANTI-COAG VISIT (OUTPATIENT)
Dept: CARDIOLOGY | Facility: CLINIC | Age: 47
End: 2017-04-26
Payer: COMMERCIAL

## 2017-04-26 ENCOUNTER — OFFICE VISIT (OUTPATIENT)
Dept: FAMILY MEDICINE | Facility: CLINIC | Age: 47
End: 2017-04-26
Payer: COMMERCIAL

## 2017-04-26 VITALS
OXYGEN SATURATION: 99 % | SYSTOLIC BLOOD PRESSURE: 114 MMHG | DIASTOLIC BLOOD PRESSURE: 74 MMHG | HEART RATE: 76 BPM | TEMPERATURE: 98 F | BODY MASS INDEX: 26.82 KG/M2 | WEIGHT: 133.06 LBS | HEIGHT: 59 IN

## 2017-04-26 DIAGNOSIS — Z79.01 LONG TERM CURRENT USE OF ANTICOAGULANT THERAPY: Primary | ICD-10-CM

## 2017-04-26 DIAGNOSIS — Z76.82 AWAITING ORGAN TRANSPLANT STATUS: ICD-10-CM

## 2017-04-26 DIAGNOSIS — I27.0 PRIMARY PULMONARY HYPERTENSION: ICD-10-CM

## 2017-04-26 DIAGNOSIS — I27.9 CHRONIC PULMONARY HEART DISEASE: ICD-10-CM

## 2017-04-26 DIAGNOSIS — Z00.00 ROUTINE MEDICAL EXAM: Primary | ICD-10-CM

## 2017-04-26 DIAGNOSIS — E66.3 OVERWEIGHT (BMI 25.0-29.9): ICD-10-CM

## 2017-04-26 DIAGNOSIS — E03.9 HYPOTHYROIDISM (ACQUIRED): ICD-10-CM

## 2017-04-26 LAB — INR PPP: 2.3 (ref 2–3)

## 2017-04-26 PROCEDURE — 99396 PREV VISIT EST AGE 40-64: CPT | Mod: S$GLB,,, | Performed by: INTERNAL MEDICINE

## 2017-04-26 PROCEDURE — 85610 PROTHROMBIN TIME: CPT | Mod: QW,S$GLB,,

## 2017-04-26 PROCEDURE — 99999 PR PBB SHADOW E&M-EST. PATIENT-LVL III: CPT | Mod: PBBFAC,,, | Performed by: INTERNAL MEDICINE

## 2017-04-26 RX ORDER — LEVOTHYROXINE SODIUM 137 UG/1
137 TABLET ORAL DAILY
Qty: 90 TABLET | Refills: 3 | Status: SHIPPED | OUTPATIENT
Start: 2017-04-26 | End: 2018-04-18 | Stop reason: SDUPTHER

## 2017-04-26 NOTE — MR AVS SNAPSHOT
[facility]  [address]  [place] [address]  Phone: [phone]  Fax: [phone]                  Yuni Perez   2017 1:20 PM   Office Visit    Description:  Female : 1970   Provider:  Lulu Sandhu MD   Department:  [facility]           Reason for Visit     Annual Exam           Diagnoses this Visit        Comments    Routine medical exam    -  Primary     Hypothyroidism (acquired)         Primary pulmonary hypertension         Awaiting organ transplant status         Overweight (BMI 25.0-29.9)                To Do List           Future Appointments        Provider Department Dept Phone    2017 9:00 AM Ivonne Rai MD [facility] [phone]    2017 1:00 PM TIFFANY Stark [facility] - Gastroenterology [phone]    2017 11:00 AM Sanjuanita FreireD [facility] - Coumadin [phone]    2017 11:40 AM Eric Diggs MD [facility]- Neurology [phone]    2017 11:00 AM INJECTION, INFECTIOUS DISEASES [facility]- ID Injection Room [phone]      Goals (5 Years of Data)     None      Follow-Up and Disposition     Return in about 1 year (around 2018), or if symptoms worsen or fail to improve, for annual exam.       These Medications        Disp Refills Start End    levothyroxine (SYNTHROID) 137 MCG Tab tablet 90 tablet 3 2017     Take 1 tablet (137 mcg total) by mouth once daily. - Oral    [place] [facility] [place] [address] Ph #: [phone]         [facility]     [facility] Nurse Care Line - / Assistance  Unless otherwise directed by your provider, please contact [facility], our nurse care line that is available for / assistance.     Registered nurses in the [facility] provide: appointment scheduling, clinical advisement, health education, and other advisory services.  Call: [phone] (toll  free)               Medications           Message regarding Medications     Verify the changes and/or additions to your medication regime listed below are the same as discussed with your clinician today.  If any of these changes or additions are incorrect, please notify your healthcare provider.        CHANGE how you are taking these medications     Start Taking Instead of    levothyroxine (SYNTHROID) 137 MCG Tab tablet levothyroxine (SYNTHROID) 137 MCG Tab tablet    Dosage:  Take 1 tablet (137 mcg total) by mouth once daily. Dosage:  TAKE ONE Tablet BY MOUTH DAILY    Reason for Change:  Reorder            Verify that the below list of medications is an accurate representation of the medications you are currently taking.  If none reported, the list may be blank. If incorrect, please contact your healthcare provider. Carry this list with you in case of emergency.           Current Medications     0.9 % SODIUM CHLORIDE (SODIUM CHLORIDE 0.9%) 0.9 % Soln Inject 3 mLs into the vein every 8 (eight) hours as needed.    acetaminophen (TYLENOL EXTRA STRENGTH) 500 MG tablet Take 1,000 mg by mouth every 6 (six) hours as needed for Pain.    ADVAIR DISKUS 100-50 mcg/dose diskus inhaler INHALE ONE PUFF TWICE DAILY    albuterol 90 mcg/actuation inhaler Inhale 2 puffs into the lungs every 4 (four) hours as needed. 2 Aerosol Inhalation Every 4-6 hours    ambrisentan (LETAIRIS) 10 MG Tab Take 1 tablet (10 mg total) by mouth once daily.    azelastine (ASTELIN) 137 mcg (0.1 %) nasal spray 2 sprays by Nasal route 2 (two) times daily.     butalbital-acetaminophen-caffeine -40 mg (FIORICET, ESGIC) -40 mg per tablet Take 1 tablet by mouth every 4 (four) hours as needed for Pain or Headaches.    cetirizine (ZYRTEC) 10 MG tablet Take 10 mg by mouth. 1 Tablet Oral Every day    clindamycin (CLEOCIN T) 1 % lotion daily as needed.     cyanocobalamin, vitamin B-12, 2,500 mcg Subl Place 2,500 mcg under the tongue once daily.      "desonide (DESOWEN) 0.05 % ointment 0.05 % daily as needed.  Ointment Topical As directed.  Apply to affected area twice a daily over face    diphenhydrAMINE (BENADRYL) 25 mg capsule Take 25 mg by mouth every 6 (six) hours as needed for Itching.    esomeprazole (NEXIUM) 40 MG capsule Take 1 capsule (40 mg total) by mouth before breakfast.    ferrous sulfate 325 (65 FE) MG EC tablet Take 325 mg by mouth daily as needed.     fluticasone (FLONASE) 50 mcg/actuation nasal spray 2 sprays by Each Nare route once daily.    folic acid (FOLVITE) 1 MG tablet Take 1 mg by mouth.  Tablet Oral Every day    ipratropium (ATROVENT) 0.03 % nasal spray 2 sprays by Nasal route 2 (two) times daily.    levothyroxine (SYNTHROID) 137 MCG Tab tablet Take 1 tablet (137 mcg total) by mouth once daily.    norethindrone-ethinyl estradiol (JUNEL FE 1/20) 1 mg-20 mcg (21)/75 mg (7) per tablet Take 1 tablet by mouth once daily.    ondansetron (ZOFRAN) 4 MG tablet Take 4 mg by mouth every 6 (six) hours as needed. 1 Tablet Oral Every 6 hours    PRAMOSONE 2.5-1 % Lotn lotion daily as needed.     promethazine (PHENERGAN) 25 MG tablet Take 25 mg by mouth every 4 to 6 hours as needed.     tadalafil (ADCIRCA) 20 MG Tab Take 2 tablets (40 mg total) by mouth once daily.    topiramate (TOPAMAX) 100 MG tablet Take 1 tablet (100 mg total) by mouth 2 (two) times daily.    treprostinil (REMODULIN) 2.5 mg/mL Soln Mix cassette as directed and infuse continuously per physician titration orders on dosing sheet. Current dose 46 ng/kg/min    VITAMIN D2 50,000 unit capsule TAKE ONE CAPSULE BY MOUTH EVERY 14 DAYS    warfarin (COUMADIN) 2.5 MG tablet Take 2.5-3 tablets (6.25-7.5 mg total) by mouth Daily. As directed by coumadin clinic           Clinical Reference Information           Your Vitals Were     BP Pulse Temp Height Weight Last Period    114/74 76 98.3 °F (36.8 °C) 4' 11" (1.499 m) 60.3 kg (133 lb 0.8 oz) 08/12/2016    SpO2 BMI             99% 26.87 kg/m2       "   Blood Pressure          Most Recent Value    BP  114/74      Allergies as of 4/26/2017     Adhesive    Amoxicillin    Chlorhexidine      Immunizations Administered on Date of Encounter - 4/26/2017     None      Maintenance Dialysis History     Patient has no recorded history of maintenance dialysis.      Transplant Information        Txp Date Organ Coordinator Care Team     Lung Cheryl Euceda, RN Referring Physician:  Ivonne Rai MD         Language Assistance Services     ATTENTION: Language assistance services are available, free of charge. Please call [phone].      ATENCIÓN: Si corinne hernandez, tiene a grider disposición servicios gratuitos de asistencia lingüística. Llame al [phone].     CHÚ Ý: N?u b?n nói Ti?ng Vi?t, có các d?ch v? h? tr? ngôn ng? mi?n phí dành cho b?n. G?i s? [phone].         [facility] complies with applicable Federal civil rights laws and does not discriminate on the basis of race, color, national origin, age, disability, or sex.

## 2017-04-26 NOTE — PROGRESS NOTES
HISTORY OF PRESENT ILLNESS:  Yuni Perez is a 47 y.o. female who presents to the clinic today for a routine medical physical exam. Her last physical exam was more than a year ago. She sees GYN for PAP and MMG - she is up to date.    Contraception: no method      PAST MEDICAL HISTORY:  Past Medical History:   Diagnosis Date    AR (allergic rhinitis)     Cholelithiasis, common bile duct     Chronic low back pain     GERD (gastroesophageal reflux disease)     History of migraine headaches     Hypothyroidism     Lumbar disc disease     Menorrhagia     Mild asthma     Obesity     Plantar fasciitis of left foot     Primary pulmonary hypertension     followed by heart transplant/pulmonary     Seizure disorder     x 1 in 2008    Seizures     Sleep apnea     TMJ (dislocation of temporomandibular joint)     Tricuspid regurgitation        PAST SURGICAL HISTORY:  Past Surgical History:   Procedure Laterality Date    CARDIAC CATHETERIZATION      PORTACATH PLACEMENT      UPPER GASTROINTESTINAL ENDOSCOPY         SOCIAL HISTORY:  Social History     Social History    Marital status: Single     Spouse name: N/A    Number of children: 0    Years of education: N/A     Occupational History    [employer]     Social History Main Topics    Smoking status: Never Smoker    Smokeless tobacco: Never Used    Alcohol use No      Comment: 1 per year    Drug use: No    Sexual activity: No      Comment: pt is a virgin     Other Topics Concern    Not on file     Social History Narrative       FAMILY HISTORY:  Family History   Problem Relation Age of Onset    Heart disease Father     Glaucoma Father     Diabetes Mother     Cancer Maternal Grandmother      uterine    Cancer Maternal Aunt      breast    Breast cancer Maternal Aunt     Heart disease Paternal Grandfather     Heart attack Paternal Grandfather     Leukemia Brother     Hypertension      Diabetes Maternal Grandfather      Heart attack Maternal Grandfather     Breast cancer Cousin     No Known Problems Sister     No Known Problems Maternal Uncle     No Known Problems Paternal Aunt     No Known Problems Paternal Uncle     No Known Problems Paternal Grandmother     Colon cancer Neg Hx     Ovarian cancer Neg Hx     Amblyopia Neg Hx     Blindness Neg Hx     Cataracts Neg Hx     Macular degeneration Neg Hx     Retinal detachment Neg Hx     Strabismus Neg Hx     Stroke Neg Hx     Thyroid disease Neg Hx        ALLERGIES AND MEDICATIONS: updated and reviewed.  Review of patient's allergies indicates:   Allergen Reactions    Adhesive Hives     Silk tape    Amoxicillin Rash    Chlorhexidine Other (See Comments)     Medication List with Changes/Refills   Current Medications    0.9 % SODIUM CHLORIDE (SODIUM CHLORIDE 0.9%) 0.9 % SOLN    Inject 3 mLs into the vein every 8 (eight) hours as needed.    ACETAMINOPHEN (TYLENOL EXTRA STRENGTH) 500 MG TABLET    Take 1,000 mg by mouth every 6 (six) hours as needed for Pain.    ADVAIR DISKUS 100-50 MCG/DOSE DISKUS INHALER    INHALE ONE PUFF TWICE DAILY    ALBUTEROL 90 MCG/ACTUATION INHALER    Inhale 2 puffs into the lungs every 4 (four) hours as needed. 2 Aerosol Inhalation Every 4-6 hours    AMBRISENTAN (LETAIRIS) 10 MG TAB    Take 1 tablet (10 mg total) by mouth once daily.    AZELASTINE (ASTELIN) 137 MCG (0.1 %) NASAL SPRAY    2 sprays by Nasal route 2 (two) times daily.     BUTALBITAL-ACETAMINOPHEN-CAFFEINE -40 MG (FIORICET, ESGIC) -40 MG PER TABLET    Take 1 tablet by mouth every 4 (four) hours as needed for Pain or Headaches.    CETIRIZINE (ZYRTEC) 10 MG TABLET    Take 10 mg by mouth. 1 Tablet Oral Every day    CLINDAMYCIN (CLEOCIN T) 1 % LOTION    daily as needed.     CYANOCOBALAMIN, VITAMIN B-12, 2,500 MCG SUBL    Place 2,500 mcg under the tongue once daily.     DESONIDE (DESOWEN) 0.05 % OINTMENT    0.05 % daily as needed.  Ointment Topical As directed.  Apply to  affected area twice a daily over face    DIPHENHYDRAMINE (BENADRYL) 25 MG CAPSULE    Take 25 mg by mouth every 6 (six) hours as needed for Itching.    ESOMEPRAZOLE (NEXIUM) 40 MG CAPSULE    Take 1 capsule (40 mg total) by mouth before breakfast.    FERROUS SULFATE 325 (65 FE) MG EC TABLET    Take 325 mg by mouth daily as needed.     FLUTICASONE (FLONASE) 50 MCG/ACTUATION NASAL SPRAY    2 sprays by Each Nare route once daily.    FOLIC ACID (FOLVITE) 1 MG TABLET    Take 1 mg by mouth.  Tablet Oral Every day    IPRATROPIUM (ATROVENT) 0.03 % NASAL SPRAY    2 sprays by Nasal route 2 (two) times daily.    NORETHINDRONE-ETHINYL ESTRADIOL (JUNEL FE 1/20) 1 MG-20 MCG (21)/75 MG (7) PER TABLET    Take 1 tablet by mouth once daily.    ONDANSETRON (ZOFRAN) 4 MG TABLET    Take 4 mg by mouth every 6 (six) hours as needed. 1 Tablet Oral Every 6 hours    PRAMOSONE 2.5-1 % LOTN LOTION    daily as needed.     PROMETHAZINE (PHENERGAN) 25 MG TABLET    Take 25 mg by mouth every 4 to 6 hours as needed.     TADALAFIL (ADCIRCA) 20 MG TAB    Take 2 tablets (40 mg total) by mouth once daily.    TOPIRAMATE (TOPAMAX) 100 MG TABLET    Take 1 tablet (100 mg total) by mouth 2 (two) times daily.    TREPROSTINIL (REMODULIN) 2.5 MG/ML SOLN    Mix cassette as directed and infuse continuously per physician titration orders on dosing sheet. Current dose 46 ng/kg/min    VITAMIN D2 50,000 UNIT CAPSULE    TAKE ONE CAPSULE BY MOUTH EVERY 14 DAYS    WARFARIN (COUMADIN) 2.5 MG TABLET    Take 2.5-3 tablets (6.25-7.5 mg total) by mouth Daily. As directed by coumadin clinic   Changed and/or Refilled Medications    Modified Medication Previous Medication    LEVOTHYROXINE (SYNTHROID) 137 MCG TAB TABLET levothyroxine (SYNTHROID) 137 MCG Tab tablet       Take 1 tablet (137 mcg total) by mouth once daily.    TAKE ONE Tablet BY MOUTH DAILY             SCREENING HISTORY:  Health Maintenance       Date Due Completion Date    Mammogram 12/14/2017 12/14/2016    Override  "on 9/2/2013: Done    Override on 3/1/2011: Done    Pap Smear 3/2/2018 3/2/2017    Override on 9/17/2014: Done (Dr. Khan)    Override on 2/1/2012: Done    Override on 6/10/2003: Done    Lipid Panel 1/16/2022 1/16/2017    TETANUS VACCINE 1/18/2027 1/18/2017    Pneumococcal PPSV23 (High Risk) (3) 3/18/2036 1/18/2017            REVIEW OF SYSTEMS:   The patient reports good dietary habits.  The patient does not exercise regularly.  General ROS: negative for - chills, fever or malaise; positive for - weight loss due to loose stools from medication  Psychological ROS: negative for - memory difficulties, obsessive thoughts or suicidal ideation  Ophthalmic ROS: negative for - blurry vision or eye pain  ENT ROS: positive for - nasal congestion due to medication  negative for - epistaxis, oral lesions or sore throat  Allergy and Immunology ROS: negative for - itchy/watery eyes  Hematological and Lymphatic ROS: negative for - swollen lymph nodes  Endocrine ROS: negative for - polydipsia/polyuria or temperature intolerance  Respiratory ROS: negative for - hemoptysis or sputum changes  Cardiovascular ROS: negative for - chest pain or palpitations  Gastrointestinal ROS: negative for - appetite loss or blood in stools  Genito-Urinary ROS: no dysuria, trouble voiding, or hematuria  Breast ROS: negative for breast lumps  Musculoskeletal ROS: negative for - joint swelling or muscle pain  Neurological ROS: no TIA or stroke symptoms  Dermatological ROS: negative for hair changes and mole changes    Physical Examination:   Vitals:    04/26/17 1332   BP: 114/74   Pulse: 76   Temp: 98.3 °F (36.8 °C)     Weight: 60.3 kg (133 lb 0.8 oz)   Height: 4' 11" (149.9 cm)   Body mass index is 26.87 kg/(m^2).    General appearance - alert, well appearing, and in no distress and overweight  Mental status - alert, oriented to person, place, and time, normal mood, behavior, speech, dress, motor activity, and thought processes  Eyes - pupils equal " and reactive, extraocular eye movements intact, sclera anicteric  Mouth - mucous membranes moist, pharynx normal without lesions  Neck - supple, no significant adenopathy, carotids upstroke normal bilaterally, no bruits, thyroid exam: thyroid is normal in size without nodules or tenderness  Lymphatics - no palpable lymphadenopathy  Chest - clear to auscultation, no wheezes, rales or rhonchi, symmetric air entry  Heart - normal rate and regular rhythm, no gallops noted; soft systolic murmur  Back exam - full range of motion, no tenderness, palpable spasm or pain on motion  Neurological - alert, oriented, normal speech, no focal findings or movement disorder noted, cranial nerves II through XII intact  Musculoskeletal - no joint tenderness, deformity or swelling, no muscular tenderness noted  Extremities - no pedal edema noted  Skin - normal coloration and turgor, no rashes, no suspicious skin lesions noted      Lab Results   Component Value Date    TSH 1.808 01/16/2017           ASSESSMENT AND PLAN:  1. Routine medical exam  Counseled on age appropriate medical preventative services including age appropriate cancer screenings, age appropriate eye and dental exams, over all nutritional health, need for a consistent exercise regimen, and an over all push towards maintaining a vigorous and active lifestyle.  Counseled on age appropriate vaccines and discussed upcoming health care needs based on age/gender. Discussed good sleep hygiene and stress management.     2. Hypothyroidism (acquired)  Patient is clinically euthyroid. Continue current regimen.   - levothyroxine (SYNTHROID) 137 MCG Tab tablet; Take 1 tablet (137 mcg total) by mouth once daily.  Dispense: 90 tablet; Refill: 3    3. Primary pulmonary hypertension/4. Awaiting organ transplant status  She is at maximum dosing on her medication for treatment.  She is currently on the lung transplant list.  She is up-to-date on all for health maintenance.  We did briefly  discuss immunizations.    5. Overweight (BMI 25.0-29.9)  The patient is asked to make an attempt to improve diet and exercise patterns to aid in medical management of this problem.           Return in about 1 year (around 4/26/2018), or if symptoms worsen or fail to improve, for annual exam. or sooner as needed.

## 2017-05-11 ENCOUNTER — LAB VISIT (OUTPATIENT)
Dept: LAB | Facility: HOSPITAL | Age: 47
End: 2017-05-11
Attending: INTERNAL MEDICINE
Payer: COMMERCIAL

## 2017-05-11 ENCOUNTER — HOSPITAL ENCOUNTER (OUTPATIENT)
Dept: PULMONOLOGY | Facility: CLINIC | Age: 47
Discharge: HOME | End: 2017-05-11
Payer: COMMERCIAL

## 2017-05-11 VITALS — HEIGHT: 59 IN | BODY MASS INDEX: 27.12 KG/M2 | WEIGHT: 134.5 LBS

## 2017-05-11 DIAGNOSIS — Z79.899 POLYPHARMACY: ICD-10-CM

## 2017-05-11 DIAGNOSIS — R06.82 TACHYPNEA: ICD-10-CM

## 2017-05-11 DIAGNOSIS — Z79.899 POLYPHARMACY: Primary | ICD-10-CM

## 2017-05-11 DIAGNOSIS — I27.9 CHRONIC PULMONARY HEART DISEASE: ICD-10-CM

## 2017-05-11 LAB
ALBUMIN SERPL BCP-MCNC: 3.3 G/DL
ALBUMIN SERPL BCP-MCNC: 3.3 G/DL
ALP SERPL-CCNC: 63 U/L
ALP SERPL-CCNC: 63 U/L
ALT SERPL W/O P-5'-P-CCNC: 10 U/L
ALT SERPL W/O P-5'-P-CCNC: 10 U/L
ANION GAP SERPL CALC-SCNC: 6 MMOL/L
ANION GAP SERPL CALC-SCNC: 6 MMOL/L
AST SERPL-CCNC: 12 U/L
AST SERPL-CCNC: 12 U/L
BASOPHILS # BLD AUTO: 0.03 K/UL
BASOPHILS # BLD AUTO: 0.03 K/UL
BASOPHILS NFR BLD: 0.7 %
BASOPHILS NFR BLD: 0.7 %
BILIRUB SERPL-MCNC: 0.1 MG/DL
BILIRUB SERPL-MCNC: 0.1 MG/DL
BNP SERPL-MCNC: 18 PG/ML
BNP SERPL-MCNC: 18 PG/ML
BUN SERPL-MCNC: 9 MG/DL
BUN SERPL-MCNC: 9 MG/DL
CALCIUM SERPL-MCNC: 8.6 MG/DL
CALCIUM SERPL-MCNC: 8.6 MG/DL
CHLORIDE SERPL-SCNC: 113 MMOL/L
CHLORIDE SERPL-SCNC: 113 MMOL/L
CO2 SERPL-SCNC: 21 MMOL/L
CO2 SERPL-SCNC: 21 MMOL/L
CREAT SERPL-MCNC: 0.8 MG/DL
CREAT SERPL-MCNC: 0.8 MG/DL
DIFFERENTIAL METHOD: ABNORMAL
DIFFERENTIAL METHOD: ABNORMAL
EOSINOPHIL # BLD AUTO: 0.3 K/UL
EOSINOPHIL # BLD AUTO: 0.3 K/UL
EOSINOPHIL NFR BLD: 6.3 %
EOSINOPHIL NFR BLD: 6.3 %
ERYTHROCYTE [DISTWIDTH] IN BLOOD BY AUTOMATED COUNT: 17.6 %
ERYTHROCYTE [DISTWIDTH] IN BLOOD BY AUTOMATED COUNT: 17.6 %
EST. GFR  (AFRICAN AMERICAN): >60 ML/MIN/1.73 M^2
EST. GFR  (AFRICAN AMERICAN): >60 ML/MIN/1.73 M^2
EST. GFR  (NON AFRICAN AMERICAN): >60 ML/MIN/1.73 M^2
EST. GFR  (NON AFRICAN AMERICAN): >60 ML/MIN/1.73 M^2
GLUCOSE SERPL-MCNC: 88 MG/DL
GLUCOSE SERPL-MCNC: 88 MG/DL
HCT VFR BLD AUTO: 37.5 %
HCT VFR BLD AUTO: 37.5 %
HGB BLD-MCNC: 11.5 G/DL
HGB BLD-MCNC: 11.5 G/DL
LYMPHOCYTES # BLD AUTO: 1.2 K/UL
LYMPHOCYTES # BLD AUTO: 1.2 K/UL
LYMPHOCYTES NFR BLD: 28.4 %
LYMPHOCYTES NFR BLD: 28.4 %
MAGNESIUM SERPL-MCNC: 2.2 MG/DL
MAGNESIUM SERPL-MCNC: 2.2 MG/DL
MCH RBC QN AUTO: 24.5 PG
MCH RBC QN AUTO: 24.5 PG
MCHC RBC AUTO-ENTMCNC: 30.7 %
MCHC RBC AUTO-ENTMCNC: 30.7 %
MCV RBC AUTO: 80 FL
MCV RBC AUTO: 80 FL
MONOCYTES # BLD AUTO: 0.5 K/UL
MONOCYTES # BLD AUTO: 0.5 K/UL
MONOCYTES NFR BLD: 12 %
MONOCYTES NFR BLD: 12 %
NEUTROPHILS # BLD AUTO: 2.2 K/UL
NEUTROPHILS # BLD AUTO: 2.2 K/UL
NEUTROPHILS NFR BLD: 52.6 %
NEUTROPHILS NFR BLD: 52.6 %
PLATELET # BLD AUTO: 237 K/UL
PLATELET # BLD AUTO: 237 K/UL
PMV BLD AUTO: 11.5 FL
PMV BLD AUTO: 11.5 FL
POTASSIUM SERPL-SCNC: 4 MMOL/L
POTASSIUM SERPL-SCNC: 4 MMOL/L
PROT SERPL-MCNC: 7.2 G/DL
PROT SERPL-MCNC: 7.2 G/DL
RBC # BLD AUTO: 4.7 M/UL
RBC # BLD AUTO: 4.7 M/UL
SODIUM SERPL-SCNC: 140 MMOL/L
SODIUM SERPL-SCNC: 140 MMOL/L
WBC # BLD AUTO: 4.26 K/UL
WBC # BLD AUTO: 4.26 K/UL

## 2017-05-11 PROCEDURE — 80053 COMPREHEN METABOLIC PANEL: CPT | Mod: TXP

## 2017-05-11 PROCEDURE — 36415 COLL VENOUS BLD VENIPUNCTURE: CPT | Mod: PO,TXP

## 2017-05-11 PROCEDURE — 83880 ASSAY OF NATRIURETIC PEPTIDE: CPT | Mod: TXP

## 2017-05-11 PROCEDURE — 83735 ASSAY OF MAGNESIUM: CPT | Mod: TXP

## 2017-05-11 PROCEDURE — 94620 PR PULMONARY STRESS TESTING,SIMPLE: CPT | Mod: S$GLB,TXP,, | Performed by: INTERNAL MEDICINE

## 2017-05-11 PROCEDURE — 85025 COMPLETE CBC W/AUTO DIFF WBC: CPT | Mod: TXP

## 2017-05-11 NOTE — PROCEDURES
Yuni Perez is a 47 y.o.  female patient, who presents for a 6 minute walk test ordered by Ivonne Rai MD.  The diagnosis is Pulmonary Hypertension.  The patient's BMI is 27.2 kg/m2.  Predicted distance (lower limit of normal) is 440.09 meters.      Test Results:    The test was completed without stopping.  The total time walked was 360 seconds.  During walking, the patient reported:  Dyspnea; Leg pain; Lightheadedness.  The patient used no assistive devices during testing.     05/11/2017---------Distance: 457.2 meters (1500 feet)     O2 Sat % Supplemental Oxygen Heart Rate Blood Pressure Ab Scale   Pre-exercise  (Resting) 99 % Room Air 79 bpm 128/65 mmHg 2   During Exercise 98 % Room Air 110 bpm 136/72 mmHg 9   Post-exercise  (Recovery) 99 % Room Air  93 bpm       Recovery Time: 46 seconds    Performing nurse/tech: NATACHA Casas      PREVIOUS STUDY:   04/12/2017---------Distance: 457.2 meters (1500 feet)       O2 Sat % Supplemental Oxygen Heart Rate Blood Pressure Ab Scale   Pre-exercise  (Resting) 100 % Room Air 86 bpm 130/75 mmHg 2   During Exercise 98 % Room Air 145 bpm 142/73 mmHg 9   Post-exercise  (Recovery) 98 % Room Air  122 bpm           CLINICAL INTERPRETATION:  Six minute walk distance is 457.2 meters (1500 feet) with very, very heavy dyspnea.  During exercise, there was no significant desaturation while breathing room air.  Blood pressure remained stable and Heart rate increased significantly with walking.  The patient reported non-pulmonary symptoms during exercise.  Since the previous study in April 2017, exercise capacity is unchanged.  Based upon age and body mass index, exercise capacity is normal.

## 2017-05-12 ENCOUNTER — OFFICE VISIT (OUTPATIENT)
Dept: TRANSPLANT | Facility: CLINIC | Age: 47
End: 2017-05-12
Payer: COMMERCIAL

## 2017-05-12 VITALS
WEIGHT: 135.38 LBS | OXYGEN SATURATION: 98 % | HEART RATE: 68 BPM | DIASTOLIC BLOOD PRESSURE: 73 MMHG | HEIGHT: 59 IN | SYSTOLIC BLOOD PRESSURE: 118 MMHG | BODY MASS INDEX: 27.29 KG/M2

## 2017-05-12 DIAGNOSIS — Z79.01 LONG TERM CURRENT USE OF ANTICOAGULANT THERAPY: ICD-10-CM

## 2017-05-12 DIAGNOSIS — I27.9 CHRONIC PULMONARY HEART DISEASE: ICD-10-CM

## 2017-05-12 DIAGNOSIS — J96.11 CHRONIC RESPIRATORY FAILURE WITH HYPOXIA: ICD-10-CM

## 2017-05-12 DIAGNOSIS — G47.33 OSA (OBSTRUCTIVE SLEEP APNEA): ICD-10-CM

## 2017-05-12 DIAGNOSIS — E66.3 OVERWEIGHT (BMI 25.0-29.9): ICD-10-CM

## 2017-05-12 DIAGNOSIS — Z01.818 PRE-TRANSPLANT EVALUATION FOR LUNG TRANSPLANT: ICD-10-CM

## 2017-05-12 DIAGNOSIS — I27.0 PRIMARY PULMONARY HYPERTENSION: Primary | ICD-10-CM

## 2017-05-12 PROCEDURE — 99999 PR PBB SHADOW E&M-EST. PATIENT-LVL III: CPT | Mod: PBBFAC,TXP,, | Performed by: INTERNAL MEDICINE

## 2017-05-12 PROCEDURE — 1160F RVW MEDS BY RX/DR IN RCRD: CPT | Mod: S$GLB,TXP,, | Performed by: INTERNAL MEDICINE

## 2017-05-12 PROCEDURE — 99214 OFFICE O/P EST MOD 30 MIN: CPT | Mod: S$GLB,TXP,, | Performed by: INTERNAL MEDICINE

## 2017-05-12 RX ORDER — ALBUTEROL SULFATE 90 UG/1
2 AEROSOL, METERED RESPIRATORY (INHALATION) EVERY 4 HOURS PRN
Qty: 1 INHALER | Refills: 3 | Status: SHIPPED | OUTPATIENT
Start: 2017-05-12 | End: 2019-03-18 | Stop reason: SDUPTHER

## 2017-05-12 NOTE — MR AVS SNAPSHOT
[facility]  [address]  [facility] [address]  Phone: [phone]                  Yuni Perez   2017 9:00 AM   Office Visit    Description:  Female : 1970   Provider:  Ivonne Rai MD   Department:  [facility]           Reason for Visit     Pulmonary Hypertension           Diagnoses this Visit        Comments    Primary pulmonary hypertension    -  Primary     ABHIJEET (obstructive sleep apnea)         Long term current use of anticoagulant therapy         Chronic pulmonary heart disease         Chronic respiratory failure with hypoxia         Pre-transplant evaluation for lung transplant         Overweight (BMI 25.0-29.9)                To Do List           Future Appointments        Provider Department Dept Phone    2017 1:00 PM TIFFANY Stark [facility] - Gastroenterology [phone]    2017 11:00 AM Giovanna Whitmore, PharmD [facility] - Coumadin [phone]    2017 9:00 AM Eric Diggs MD [facility] Neurology [phone]    2017 11:00 AM INJECTION, INFECTIOUS DISEASES Delon [facility]- ID Injection Room [phone]      Goals (5 Years of Data)     None      Follow-Up and Disposition     Return in about 3 months (around 2017).       These Medications        Disp Refills Start End    albuterol 90 mcg/actuation inhaler 1 Inhaler 3 2017     Inhale 2 puffs into the lungs every 4 (four) hours as needed. 2 Aerosol Inhalation Every 4-6 hours - Inhalation    [place] [facility] [place] Ph #: [phone]         [facility]     [facility] -  Assistance  Unless otherwise directed by your provider, please contact [facility], our nurse care line that is available for  assistance.     Registered nurses in the [facility] provide: appointment scheduling, clinical advisement, health education, and other advisory  services.  Call: [phone] (toll free)               Medications           Message regarding Medications     Verify the changes and/or additions to your medication regime listed below are the same as discussed with your clinician today.  If any of these changes or additions are incorrect, please notify your healthcare provider.             Verify that the below list of medications is an accurate representation of the medications you are currently taking.  If none reported, the list may be blank. If incorrect, please contact your healthcare provider. Carry this list with you in case of emergency.           Current Medications     0.9 % SODIUM CHLORIDE (SODIUM CHLORIDE 0.9%) 0.9 % Soln Inject 3 mLs into the vein every 8 (eight) hours as needed.    acetaminophen (TYLENOL EXTRA STRENGTH) 500 MG tablet Take 1,000 mg by mouth every 6 (six) hours as needed for Pain.    ADVAIR DISKUS 100-50 mcg/dose diskus inhaler INHALE ONE PUFF TWICE DAILY    albuterol 90 mcg/actuation inhaler Inhale 2 puffs into the lungs every 4 (four) hours as needed. 2 Aerosol Inhalation Every 4-6 hours    ambrisentan (LETAIRIS) 10 MG Tab Take 1 tablet (10 mg total) by mouth once daily.    azelastine (ASTELIN) 137 mcg (0.1 %) nasal spray 2 sprays by Nasal route 2 (two) times daily.     butalbital-acetaminophen-caffeine -40 mg (FIORICET, ESGIC) -40 mg per tablet Take 1 tablet by mouth every 4 (four) hours as needed for Pain or Headaches.    cetirizine (ZYRTEC) 10 MG tablet Take 10 mg by mouth. 1 Tablet Oral Every day    clindamycin (CLEOCIN T) 1 % lotion daily as needed.     cyanocobalamin, vitamin B-12, 2,500 mcg Subl Place 2,500 mcg under the tongue once daily.     desonide (DESOWEN) 0.05 % ointment 0.05 % daily as needed.  Ointment Topical As directed.  Apply to affected area twice a daily over face    diphenhydrAMINE (BENADRYL) 25 mg capsule Take 25 mg by mouth every 6 (six) hours as needed for Itching.    esomeprazole (NEXIUM) 40  "MG capsule Take 1 capsule (40 mg total) by mouth before breakfast.    ferrous sulfate 325 (65 FE) MG EC tablet Take 325 mg by mouth daily as needed.     fluticasone (FLONASE) 50 mcg/actuation nasal spray 2 sprays by Each Nare route once daily.    folic acid (FOLVITE) 1 MG tablet Take 1 mg by mouth.  Tablet Oral Every day    ipratropium (ATROVENT) 0.03 % nasal spray 2 sprays by Nasal route 2 (two) times daily.    levothyroxine (SYNTHROID) 137 MCG Tab tablet Take 1 tablet (137 mcg total) by mouth once daily.    norethindrone-ethinyl estradiol (JUNEL FE 1/20) 1 mg-20 mcg (21)/75 mg (7) per tablet Take 1 tablet by mouth once daily.    ondansetron (ZOFRAN) 4 MG tablet Take 4 mg by mouth every 6 (six) hours as needed. 1 Tablet Oral Every 6 hours    PRAMOSONE 2.5-1 % Lotn lotion daily as needed.     promethazine (PHENERGAN) 25 MG tablet Take 25 mg by mouth every 4 to 6 hours as needed.     tadalafil (ADCIRCA) 20 MG Tab Take 2 tablets (40 mg total) by mouth once daily.    topiramate (TOPAMAX) 100 MG tablet Take 1 tablet (100 mg total) by mouth 2 (two) times daily.    treprostinil (REMODULIN) 2.5 mg/mL Soln Mix cassette as directed and infuse continuously per physician titration orders on dosing sheet. Current dose 46 ng/kg/min    VITAMIN D2 50,000 unit capsule TAKE ONE CAPSULE BY MOUTH EVERY 14 DAYS    warfarin (COUMADIN) 2.5 MG tablet Take 2.5-3 tablets (6.25-7.5 mg total) by mouth Daily. As directed by coumadin clinic           Clinical Reference Information           Your Vitals Were     BP Pulse Height Weight Last Period SpO2    118/73 68 4' 11" (1.499 m) 61.4 kg (135 lb 5.8 oz) 08/12/2016 98%    BMI                27.34 kg/m2          Blood Pressure          Most Recent Value    BP  118/73      Allergies as of 5/12/2017     Adhesive    Amoxicillin    Chlorhexidine      Immunizations Administered on Date of Encounter - 5/12/2017     None      Orders Placed During Today's Visit     Future Labs/Procedures Expected by " Expires    2D echo with color flow doppler  As directed 5/12/2018      Maintenance Dialysis History     Patient has no recorded history of maintenance dialysis.      Transplant Information        Txp Date Organ Coordinator Care Team     Lung Cheryl Euceda RN Referring Physician:  Ivonne Rai MD         Instructions    1. Continue current therapy.  2.  Keep salt intake to under 2000 mg sodium, fluids to under 2 L (64 oz)  3  Check your weights every morning after getting out of bed and urinating. If your weight goes up 3# overnight or 5# in one week  call us   4. Call us if you find yourself getting more short of breath, have more swelling or unexpected weight changes, fever, chills, or problems with your line    Echo next time             Language Assistance Services     ATTENTION: Language assistance services are available, free of charge. Please call [phone].      ATENCIÓN: Si corinne hernandez, tiene a grider disposición servicios gratuitos de asistencia lingüística. Llame al [phone].     CHÚ Ý: N?u b?n nói Ti?ng Vi?t, có các d?ch v? h? tr? ngôn ng? mi?n phí dành cho b?n. G?i s? [phone].         [facility] complies with applicable Federal civil rights laws and does not discriminate on the basis of race, color, national origin, age, disability, or sex.

## 2017-05-12 NOTE — LETTER
May 12, 2017        Ivonne Rai  [address]  [facility] [address]  Phone: [phone]  Fax: [phone]             [facility] [address]  [facility] [address]  Phone: [phone]   Patient: Yuni Perez   MR Number: [id number]   YOB: 1970   Date of Visit: 5/12/2017       Dear Dr. Ivonne Rai    Thank you for referring Yuni Perez to me for evaluation. Attached you will find relevant portions of my assessment and plan of care.    If you have questions, please do not hesitate to call me. I look forward to following Yuni Perez along with you.    Sincerely,    Ivonne Rai MD    Enclosure    If you would like to receive this communication electronically, please contact [email or web address] or [phone] to request [employer] [facility] [employer] access.    [employer] [facility] is a tool which provides read-only access to select patient information with whom you have a relationship. Its easy to use and provides real time access to review your patients record including encounter summaries, notes, results, and demographic information.    If you feel you have received this communication in error or would no longer like to receive these types of communications, please e-mail [email or web address]

## 2017-05-12 NOTE — PATIENT INSTRUCTIONS
1. Continue current therapy.  2.  Keep salt intake to under 2000 mg sodium, fluids to under 2 L (64 oz)  3  Check your weights every morning after getting out of bed and urinating. If your weight goes up 3# overnight or 5# in one week  call us   4. Call us if you find yourself getting more short of breath, have more swelling or unexpected weight changes, fever, chills, or problems with your line    Echo next time

## 2017-05-12 NOTE — PROGRESS NOTES
Subjective:     HPI  47 y.o. White female who presents for PH follow-up. Patient was diagnosed with IPAH about 5 and a half years, ABHIJEET recently, in eval for LUT. Initial symptom- syncope.  Patient has been on triple therapy: Remodulin at  72ng/kg/min infusion, Letaris 10mg qdaily, and Adcirca 40mg qdaily.    Since last visit, pt has been working on raising $ to get listed for lung transplant-she says she's been feeling tired, has had pressure across her back when she has felt SOB- has been coughing (mostly dry) but no fever- gets sweats at [facility] but thinks it's perimenopausal.  No swelling, keeps an eye on her salt intake.     No issues with her line. Does have some itching    6mw today 457m unchanged from 4/14/17 (457 in Nov, 488m unchanged last 3 visits with me( 450 11/15/16,  457m, 518.5m, 533.75, 549m April, 463.6m prev visit)                                              O2 sat  99-> 98 %                                                           HR 79->110                                                      BP  128/65-->136/72                                                       Ab 2 ->9    C 4/16  AOPRES: 122/85 (97)  AOSAT: 98  FICKCI: 2.81  FICKCO: 4.38  PAPRES: 101/34 (59)  PASAT: 71  PVR: 10.73  PWPRES: 14/8 (10)  RAPRES: 9/5 (4)  RVPRES: 91/-4, 8    TTE last visit   1 - Normal left ventricular systolic function (EF 60-65%).     2 - Right ventricular enlargement with mildly to moderately depressed systolic function.     3 - Normal left ventricular diastolic function.     4 - Pulmonary hypertension. The estimated PA systolic pressure is 41 mmHg.       TTE 3/4/16  1 - Normal left ventricular systolic function (EF 60-65%).   2 - Normal left ventricular diastolic function.   3 - Right ventricle is upper limit of normal in size with low normal to mildly depressed systolic function. TAPSE 2.2 RV 3.7 cm   4 - Trivial to mild tricuspid regurgitation.   5 - Pulmonary hypertension. The estimated PA systolic  "pressure is 60 mmHg.     TTE Oct  1 - Normal left ventricular systolic function (EF 60-65%).   2 - Left ventricular diastolic dysfunction.   3 - Biatrial enlargement.   4 - Right ventricular enlargement with hypertrophy, with normal systolic function.   5 - Pulmonary hypertension.   6 - Trivial mitral regurgitation.   7 - Trivial tricuspid regurgitation.   PASp 46    CXR 2/3/16  Vascular catheter now identified, its tip in the superior vena cava just superior to its junction with the right atrium. Heart size is normal, as is the appearance of the pulmonary vascularity. Lung zones are clear, and free of significant airspace consolidation or volume loss. No pleural fluid. No hilar or mediastinal mass lesion. No pneumothorax.     VQ Scan 8/17/16: low probability    Review of Systems   Constitution: Positive for malaise/fatigue and weight gain. Negative for chills and fever.   HENT: Positive for congestion and headaches.    Eyes: Negative.    Cardiovascular: Positive for dyspnea on exertion. Negative for leg swelling, near-syncope, orthopnea, palpitations, paroxysmal nocturnal dyspnea and syncope.   Respiratory: Positive for cough. Negative for shortness of breath.    Endocrine: Negative.    Skin: Negative.    Musculoskeletal: Positive for myalgias.        Left-axillary chest wall pain   Gastrointestinal: Negative for bloating, abdominal pain and change in bowel habit.   Neurological: Positive for light-headedness. Negative for dizziness.   Psychiatric/Behavioral: Negative for depression.        Objective:  Ht 4' 11" (1.499 m)  Wt 61.4 kg (135 lb 5.8 oz)  LMP 08/12/2016  BMI 27.34 kg/m2      Physical Exam   Constitutional: She is oriented to person, place, and time. She appears well-developed and well-nourished.   HENT:   Head: Normocephalic and atraumatic.   Neck: Neck supple. No JVD present. No thyromegaly present.   Cardiovascular: Normal rate and regular rhythm.  Exam reveals no gallop and no friction rub.    No " murmur heard.  Physiologically split S2 with prominent P2 component     Pulmonary/Chest: Effort normal and breath sounds normal. No respiratory distress. She has no wheezes. She has no rales.   Abdominal: Soft. Bowel sounds are normal. She exhibits no distension. There is no tenderness.   Musculoskeletal: She exhibits no edema.   Neurological: She is alert and oriented to person, place, and time.   Skin: Skin is warm and dry.   Line exit site is clean, no erythema, scab or discharge   Psychiatric: She has a normal mood and affect.                   Chemistry        Component Value Date/Time     05/11/2017 1110     05/11/2017 1110    K 4.0 05/11/2017 1110    K 4.0 05/11/2017 1110     (H) 05/11/2017 1110     (H) 05/11/2017 1110    CO2 21 (L) 05/11/2017 1110    CO2 21 (L) 05/11/2017 1110    BUN 9 05/11/2017 1110    BUN 9 05/11/2017 1110    CREATININE 0.8 05/11/2017 1110    CREATININE 0.8 05/11/2017 1110    GLU 88 05/11/2017 1110    GLU 88 05/11/2017 1110        Component Value Date/Time    CALCIUM 8.6 (L) 05/11/2017 1110    CALCIUM 8.6 (L) 05/11/2017 1110    ALKPHOS 63 05/11/2017 1110    ALKPHOS 63 05/11/2017 1110    AST 12 05/11/2017 1110    AST 12 05/11/2017 1110    ALT 10 05/11/2017 1110    ALT 10 05/11/2017 1110    BILITOT 0.1 05/11/2017 1110    BILITOT 0.1 05/11/2017 1110            Magnesium   Date Value Ref Range Status   05/11/2017 2.2 1.6 - 2.6 mg/dL Final   05/11/2017 2.2 1.6 - 2.6 mg/dL Final       Lab Results   Component Value Date    WBC 4.26 05/11/2017    WBC 4.26 05/11/2017    HGB 11.5 (L) 05/11/2017    HGB 11.5 (L) 05/11/2017    HCT 37.5 05/11/2017    HCT 37.5 05/11/2017    MCV 80 (L) 05/11/2017    MCV 80 (L) 05/11/2017     05/11/2017     05/11/2017       Lab Results   Component Value Date    INR 2.3 04/26/2017    INR 2.5 03/13/2017    INR 2.2 02/06/2017       BNP   Date Value Ref Range Status   05/11/2017 18 0 - 99 pg/mL Final     Comment:     Values of less than  100 pg/ml are consistent with non-CHF populations.   05/11/2017 18 0 - 99 pg/mL Final     Comment:     Values of less than 100 pg/ml are consistent with non-CHF populations.   03/03/2017 22 0 - 99 pg/mL Final     Comment:     Values of less than 100 pg/ml are consistent with non-CHF populations.       No results found for: LDH          Assessment:       1. Primary pulmonary hypertension- FC II->III no volume overload on exam today, BNp remains low, unchanged 6mw today, still over 450m and without hypoxia-- RV enlarged and  Hypertrophied with mild-mod red systolic fxn on last echo, despite increasing remodulin dose- PAP by echo does not correlate with her RHC which shows severe PAH, marginal Co and high RA, all of which are concerning   2. Encounter for long-term (current) use of anticoagulants    3. Obesity    4. Menorrhagia    5. Tricuspid regurgitation         Plan:     No changes from a PH standpoint today- on triple tx and unable to increase remodulin further due to SE- in workup for lung transplant    Keep salt intake to under 2000 mg sodium, fluids to under 2 L (64 oz)    Check weights every morning after getting out of bed and urinating. If your weight goes up 3# overnight or 5# in one week she should call us    agree with pt applying for disability    F/u 3 mo with labs and walk and repeat echo

## 2017-05-17 ENCOUNTER — TELEPHONE (OUTPATIENT)
Dept: TRANSPLANT | Facility: CLINIC | Age: 47
End: 2017-05-17

## 2017-05-17 NOTE — TELEPHONE ENCOUNTER
----- Message from Ginny Durham sent at 5/17/2017  2:38 PM CDT -----  Contact: patient   Requesting a return call please call      Contacted Yuni.  She stated that she needed to speak with Johnna, , regarding the caregivers.  Informed her that I would have Johnna contact her.  She verbalized her understanding.

## 2017-06-06 RX ORDER — CEPHALEXIN 250 MG/1
CAPSULE ORAL
Qty: 1 EACH | Refills: 11 | Status: SHIPPED | OUTPATIENT
Start: 2017-06-06 | End: 2018-08-06 | Stop reason: SDUPTHER

## 2017-06-07 ENCOUNTER — ANTI-COAG VISIT (OUTPATIENT)
Dept: CARDIOLOGY | Facility: CLINIC | Age: 47
End: 2017-06-07
Payer: COMMERCIAL

## 2017-06-07 DIAGNOSIS — I27.9 CHRONIC PULMONARY HEART DISEASE: ICD-10-CM

## 2017-06-07 DIAGNOSIS — Z79.01 LONG TERM CURRENT USE OF ANTICOAGULANT THERAPY: Primary | ICD-10-CM

## 2017-06-07 LAB — INR PPP: 2.8 (ref 2–3)

## 2017-06-07 PROCEDURE — 99211 OFF/OP EST MAY X REQ PHY/QHP: CPT | Mod: 25,S$GLB,,

## 2017-06-07 PROCEDURE — 85610 PROTHROMBIN TIME: CPT | Mod: QW,S$GLB,,

## 2017-06-07 NOTE — PROGRESS NOTES
INR good. It is on the upper end of range today. She is overdue to eat a high vit K food. She has only had avocado this week. She will eat greens today. No other changes. No signs or symptoms of bleeding. Pt to resume normal diet. We will continue maintenance dose. Repeat INR next month

## 2017-06-12 DIAGNOSIS — I27.20 PULMONARY HYPERTENSION: ICD-10-CM

## 2017-06-12 DIAGNOSIS — Z76.82 AWAITING ORGAN TRANSPLANT STATUS: Primary | ICD-10-CM

## 2017-06-13 ENCOUNTER — OFFICE VISIT (OUTPATIENT)
Dept: NEUROLOGY | Facility: CLINIC | Age: 47
End: 2017-06-13
Payer: COMMERCIAL

## 2017-06-13 VITALS
WEIGHT: 135.81 LBS | HEIGHT: 59 IN | DIASTOLIC BLOOD PRESSURE: 60 MMHG | BODY MASS INDEX: 27.38 KG/M2 | HEART RATE: 68 BPM | SYSTOLIC BLOOD PRESSURE: 100 MMHG

## 2017-06-13 DIAGNOSIS — G44.229 CHRONIC TENSION-TYPE HEADACHE, NOT INTRACTABLE: ICD-10-CM

## 2017-06-13 DIAGNOSIS — G43.009 MIGRAINE WITHOUT AURA AND WITHOUT STATUS MIGRAINOSUS, NOT INTRACTABLE: ICD-10-CM

## 2017-06-13 DIAGNOSIS — G40.909 SEIZURE DISORDER: Primary | ICD-10-CM

## 2017-06-13 PROCEDURE — 99214 OFFICE O/P EST MOD 30 MIN: CPT | Mod: NTX,S$GLB,, | Performed by: NEUROLOGICAL SURGERY

## 2017-06-13 PROCEDURE — 99999 PR PBB SHADOW E&M-EST. PATIENT-LVL III: CPT | Mod: PBBFAC,TXP,, | Performed by: NEUROLOGICAL SURGERY

## 2017-06-15 NOTE — PROGRESS NOTES
"Chief Complaint   Patient presents with    Headache        Yuni Perez is a 47 y.o. female with a history of multiple medical diagnoses as listed below that presents for evaluation of headaches. She is accompanied to this visit by her mother. She was diagnosed with pulmonary hypertension about 8 years ago and around the same time she was diagnosed with a seizure. She had workup at that time that included MRI that showed signs of "old injury" and she had EEG that showed "lots of spikes and waves" per her mother. She says that since that time she has been started on Topamax which has been titrated up. She has not been having any seizures but she has bene having some occasional word finding difficulty that she has attributed to the medication. She has tried tylenol because as she has been titrating her medication to treat pulmonary hypertension sh marie been having bilateral intense stabbing pains in her head. She does say that with time her headaches have been better but she has noticed many more headaches since she began the Remodulin. She has not taken any other headache medications in the past.    Interval History  11/17/2016  She has still been having headaches after taking her medications in particular her vasodilators. She has found that her PRN medications are somewhat helpful in minimizing the pain that she has from her headaches. She has not had any mew problems since she was last seen in clinic.    03/23/2017  Since last seen in clinic she has been approved to get on the lung transplant list and she will be making strides to get set up to to receive new lungs. She has been taking her topamax as directed and has felt that overall she has been having fewer headaches but she still has needed Tylenol and on rare occasions Fioricet to get rid of her headaches. She has been having some cognitive difficulty with her current medications but she feels that it is tolerable. She has not had any seizure " like activity.    06/13/2017  Since last seen in clinic she says that she has been able to be placed on the donor list. She says that overall she feels that she has been stable with regards to her breathing. She continues to have chronic sinus issues that have been essentially refractory to any medications that she has tried including antihistamines and nasal rinses. She feels that she is able to clear her sinuses and have it come right back. She has headaches almost daily but despite the frequency of the headaches she feels that most are not migraines. She has not had any seizures since last seen. She is tolerating all of her medications well without any problems.    PAST MEDICAL HISTORY:  Past Medical History:   Diagnosis Date    AR (allergic rhinitis)     Cholelithiasis, common bile duct     Chronic low back pain     GERD (gastroesophageal reflux disease)     History of migraine headaches     Hypothyroidism     Lumbar disc disease     Menorrhagia     Mild asthma     Obesity     Plantar fasciitis of left foot     Primary pulmonary hypertension     followed by heart transplant/pulmonary     Seizure disorder     x 1 in 2008    Seizures     Sleep apnea     TMJ (dislocation of temporomandibular joint)     Tricuspid regurgitation        PAST SURGICAL HISTORY:  Past Surgical History:   Procedure Laterality Date    CARDIAC CATHETERIZATION      PORTACATH PLACEMENT      UPPER GASTROINTESTINAL ENDOSCOPY         SOCIAL HISTORY:  Social History     Social History    Marital status: Single     Spouse name: N/A    Number of children: 0    Years of education: N/A     Occupational History    disabled [employer]     Social History Main Topics    Smoking status: Never Smoker    Smokeless tobacco: Never Used    Alcohol use No      Comment: 1 per year    Drug use: No    Sexual activity: No      Comment: pt is a virgin     Other Topics Concern    Not on file     Social History Narrative    No  narrative on file       FAMILY HISTORY:  Family History   Problem Relation Age of Onset    Heart disease Father     Glaucoma Father     Diabetes Mother     Cancer Maternal Grandmother      uterine    Cancer Maternal Aunt      breast    Breast cancer Maternal Aunt     Heart disease Paternal Grandfather     Heart attack Paternal Grandfather     Leukemia Brother     Hypertension      Diabetes Maternal Grandfather     Heart attack Maternal Grandfather     Breast cancer Cousin     No Known Problems Sister     No Known Problems Maternal Uncle     No Known Problems Paternal Aunt     No Known Problems Paternal Uncle     No Known Problems Paternal Grandmother     Colon cancer Neg Hx     Ovarian cancer Neg Hx     Amblyopia Neg Hx     Blindness Neg Hx     Cataracts Neg Hx     Macular degeneration Neg Hx     Retinal detachment Neg Hx     Strabismus Neg Hx     Stroke Neg Hx     Thyroid disease Neg Hx        ALLERGIES AND MEDICATIONS: updated and reviewed.  Review of patient's allergies indicates:   Allergen Reactions    Adhesive Hives     Silk tape    Amoxicillin Rash    Chlorhexidine Other (See Comments)     Current Outpatient Prescriptions   Medication Sig Dispense Refill    0.9 % SODIUM CHLORIDE (SODIUM CHLORIDE 0.9%) 0.9 % Soln Inject 3 mLs into the vein every 8 (eight) hours as needed. (Patient taking differently: Inject 3 mLs into the vein every other day. ) 900 mL 11    acetaminophen (TYLENOL EXTRA STRENGTH) 500 MG tablet Take 1,000 mg by mouth every 6 (six) hours as needed for Pain.      ADVAIR DISKUS 100-50 mcg/dose diskus inhaler inhale 1 PUFF TWICE DAILY 1 each 11    albuterol 90 mcg/actuation inhaler Inhale 2 puffs into the lungs every 4 (four) hours as needed. 2 Aerosol Inhalation Every 4-6 hours 1 Inhaler 3    ambrisentan (LETAIRIS) 10 MG Tab Take 1 tablet (10 mg total) by mouth once daily. 30 tablet 11    azelastine (ASTELIN) 137 mcg (0.1 %) nasal spray 2 sprays by Nasal route  2 (two) times daily.       butalbital-acetaminophen-caffeine -40 mg (FIORICET, ESGIC) -40 mg per tablet Take 1 tablet by mouth every 4 (four) hours as needed for Pain or Headaches.      cetirizine (ZYRTEC) 10 MG tablet Take 10 mg by mouth. 1 Tablet Oral Every day      clindamycin (CLEOCIN T) 1 % lotion daily as needed.       cyanocobalamin, vitamin B-12, 2,500 mcg Subl Place 2,500 mcg under the tongue once daily.       desonide (DESOWEN) 0.05 % ointment 0.05 % daily as needed.  Ointment Topical As directed.  Apply to affected area twice a daily over face      diphenhydrAMINE (BENADRYL) 25 mg capsule Take 25 mg by mouth every 6 (six) hours as needed for Itching.      esomeprazole (NEXIUM) 40 MG capsule Take 1 capsule (40 mg total) by mouth before breakfast. 30 capsule 11    ferrous sulfate 325 (65 FE) MG EC tablet Take 325 mg by mouth daily as needed.       fluticasone (FLONASE) 50 mcg/actuation nasal spray 2 sprays by Each Nare route once daily. 1 Bottle 6    folic acid (FOLVITE) 1 MG tablet Take 1 mg by mouth.  Tablet Oral Every day      ipratropium (ATROVENT) 0.03 % nasal spray 2 sprays by Nasal route 2 (two) times daily. (Patient taking differently: 2 sprays by Nasal route 2 (two) times daily as needed. ) 30 mL 0    levothyroxine (SYNTHROID) 137 MCG Tab tablet Take 1 tablet (137 mcg total) by mouth once daily. 90 tablet 3    norethindrone-ethinyl estradiol (JUNEL FE 1/20) 1 mg-20 mcg (21)/75 mg (7) per tablet Take 1 tablet by mouth once daily. 28 tablet 12    ondansetron (ZOFRAN) 4 MG tablet Take 4 mg by mouth every 6 (six) hours as needed. 1 Tablet Oral Every 6 hours      PRAMOSONE 2.5-1 % Lotn lotion daily as needed.       promethazine (PHENERGAN) 25 MG tablet Take 25 mg by mouth every 4 to 6 hours as needed.       tadalafil (ADCIRCA) 20 MG Tab Take 2 tablets (40 mg total) by mouth once daily. 60 tablet 11    topiramate (TOPAMAX) 100 MG tablet Take 1 tablet (100 mg total) by mouth  2 (two) times daily. 60 tablet 11    treprostinil (REMODULIN) 2.5 mg/mL Soln Mix cassette as directed and infuse continuously per physician titration orders on dosing sheet. Current dose 46 ng/kg/min 60 mL 11    VITAMIN D2 50,000 unit capsule TAKE ONE CAPSULE BY MOUTH EVERY 14 DAYS 6 capsule 0    warfarin (COUMADIN) 2.5 MG tablet Take 2.5-3 tablets (6.25-7.5 mg total) by mouth Daily. As directed by coumadin clinic 90 tablet 11     No current facility-administered medications for this visit.        Review of Systems   Constitutional: Negative for activity change, appetite change, fever and unexpected weight change.   HENT: Negative for trouble swallowing and voice change.    Eyes: Negative for photophobia and visual disturbance.   Respiratory: Negative for apnea and shortness of breath.    Cardiovascular: Negative for chest pain and leg swelling.   Gastrointestinal: Negative for constipation and nausea.   Genitourinary: Negative for difficulty urinating.   Musculoskeletal: Negative for back pain, gait problem and neck pain.   Skin: Negative for color change and pallor.   Neurological: Positive for dizziness and headaches. Negative for seizures, syncope, weakness and numbness.   Hematological: Negative for adenopathy.   Psychiatric/Behavioral: Negative for agitation, confusion and decreased concentration.       Neurologic Exam     Mental Status   Oriented to person, place, and time.   Registration: recalls 3 of 3 objects.   Attention: normal. Concentration: normal.   Speech: speech is normal   Level of consciousness: alert  Knowledge: good.     Cranial Nerves     CN II   Visual fields full to confrontation.   Right visual field deficit: none  Left visual field deficit: none     CN III, IV, VI   Pupils are equal, round, and reactive to light.  Extraocular motions are normal.   Right pupil: Size: 3 mm. Shape: regular. Accommodation: intact.   Left pupil: Size: 3 mm. Shape: regular. Accommodation: intact.   CN III: no  CN III palsy  CN VI: no CN VI palsy  Nystagmus: none   Diplopia: none  Ophthalmoparesis: none  Upgaze: normal  Downgaze: normal  Conjugate gaze: present    CN V   Facial sensation intact.   Right facial sensation deficit: none  Left facial sensation deficit: none    CN VII   Facial expression full, symmetric.   Right facial weakness: none  Left facial weakness: none    CN VIII   CN VIII normal.     CN IX, X   CN IX normal.   CN X normal.   Palate: symmetric    CN XI   CN XI normal.   Right sternocleidomastoid strength: normal  Left sternocleidomastoid strength: normal  Right trapezius strength: normal  Left trapezius strength: normal    CN XII   CN XII normal.   Tongue deviation: none    Motor Exam   Muscle bulk: normal  Overall muscle tone: normal  Right arm tone: normal  Left arm tone: normal  Right leg tone: normal  Left leg tone: normal    Strength   Strength 5/5 throughout.     Sensory Exam   Right arm light touch: normal  Left arm light touch: normal  Right leg light touch: normal  Left leg light touch: normal  Right arm vibration: normal  Left arm vibration: normal  Right leg vibration: normal  Left leg vibration: normal  Right arm proprioception: normal  Left arm proprioception: normal  Right leg proprioception: normal  Left leg proprioception: normal  Right arm pinprick: normal  Left arm pinprick: normal  Right leg pinprick: normal  Left leg pinprick: normal    Gait, Coordination, and Reflexes     Gait  Gait: normal    Coordination   Romberg: negative  Finger to nose coordination: normal  Heel to shin coordination: normal  Tandem walking coordination: normal    Tremor   Resting tremor: absent    Reflexes   Right brachioradialis: 2+  Left brachioradialis: 2+  Right biceps: 2+  Left biceps: 2+  Right triceps: 2+  Left triceps: 2+  Right patellar: 2+  Left patellar: 2+  Right achilles: 2+  Left achilles: 2+  Right plantar: normal  Left plantar: normal      Physical Exam   Constitutional: She is oriented to  "person, place, and time. She appears well-developed and well-nourished.   HENT:   Head: Normocephalic and atraumatic.   Eyes: EOM are normal. Pupils are equal, round, and reactive to light.   Neck: Normal range of motion.   Cardiovascular: Normal rate and intact distal pulses.    Pulmonary/Chest: Effort normal. No apnea. No respiratory distress.   Musculoskeletal: Normal range of motion.   Neurological: She is alert and oriented to person, place, and time. She has normal strength. She has a normal Finger-Nose-Finger Test, a normal Heel to Shin Test, a normal Romberg Test and a normal Tandem Gait Test. Gait normal.   Reflex Scores:       Tricep reflexes are 2+ on the right side and 2+ on the left side.       Bicep reflexes are 2+ on the right side and 2+ on the left side.       Brachioradialis reflexes are 2+ on the right side and 2+ on the left side.       Patellar reflexes are 2+ on the right side and 2+ on the left side.       Achilles reflexes are 2+ on the right side and 2+ on the left side.  Skin: Skin is warm and dry.   Psychiatric: She has a normal mood and affect. Her speech is normal and behavior is normal. Thought content normal.   Vitals reviewed.      Vitals:    06/13/17 0943   BP: 100/60   BP Location: Left arm   Patient Position: Sitting   BP Method: Manual   Pulse: 68   Weight: 61.6 kg (135 lb 12.9 oz)   Height: 4' 11" (1.499 m)       Assessment & Plan:  Problem List Items Addressed This Visit     Seizure disorder - Primary    Current Assessment & Plan     Continue current medications.         Migraine without aura and without status migrainosus, not intractable    Overview     Despite frequency of headaches few have come in the form of migraine headaches.         Current Assessment & Plan     Continue Topamax         Chronic nonintractable headache    Overview     Daily headaches likely rebound from her medications, tension type, and sinus headaches.         Current Assessment & Plan     Continue " current abortive strategies           Other Visit Diagnoses    None.         Follow-up: Return in about 3 months (around 9/13/2017).   More than 50% of this 25 minute encounter was spent in counseling and coordinating care.

## 2017-06-19 ENCOUNTER — OFFICE VISIT (OUTPATIENT)
Dept: GASTROENTEROLOGY | Facility: CLINIC | Age: 47
End: 2017-06-19
Payer: COMMERCIAL

## 2017-06-19 VITALS
WEIGHT: 133.81 LBS | HEIGHT: 59 IN | SYSTOLIC BLOOD PRESSURE: 118 MMHG | BODY MASS INDEX: 26.98 KG/M2 | HEART RATE: 78 BPM | DIASTOLIC BLOOD PRESSURE: 76 MMHG

## 2017-06-19 DIAGNOSIS — K58.0 IRRITABLE BOWEL SYNDROME WITH DIARRHEA: Primary | ICD-10-CM

## 2017-06-19 PROCEDURE — 99213 OFFICE O/P EST LOW 20 MIN: CPT | Mod: NTX,S$GLB,, | Performed by: PHYSICIAN ASSISTANT

## 2017-06-19 PROCEDURE — 99999 PR PBB SHADOW E&M-EST. PATIENT-LVL V: CPT | Mod: PBBFAC,TXP,, | Performed by: PHYSICIAN ASSISTANT

## 2017-06-19 RX ORDER — TADALAFIL 20 MG/1
40 TABLET ORAL DAILY
COMMUNITY
Start: 2017-05-26 | End: 2017-07-12 | Stop reason: SDUPTHER

## 2017-06-19 RX ORDER — ERGOCALCIFEROL 1.25 MG/1
CAPSULE ORAL
Qty: 6 CAPSULE | Refills: 0 | OUTPATIENT
Start: 2017-06-19

## 2017-06-19 NOTE — PROGRESS NOTES
[facility] Consultation Note    Reason for Consult:  The encounter diagnosis was Irritable bowel syndrome with diarrhea.    PCP:   Lulu Sandhu       Referring MD:  Aaareferral Self  No address on file    HPI:  This is a 47 y.o. female here to follow up on her IBS    Did not take the xifaxan because she had questions about it  Since her visit 5 months ago her stool have alternated between normal stools, constipation, and pasty stool. She says it seems like her stool normalizes when she eats more cheese. The urgency to have BM has improved.    Stopped bentyl caused dry mouth, so restarted Levsin prn  metamucil - made her body ache  Saw no difference on a lactose-free diet x 2 weeks  Imodium has caused constipation in the past  Diarrhea is worsensed by her medication Remodulin  Denies use of artificial sweeteners or diet drinks     Has uncontrolled depression and anxiety- her case worked is working on getting counseling covered by her insurance    Reflux is controlled taking nexium    ROS:  Constitutional: No fevers, chills, No weight loss  ENT: No allergies  CV: No chest pain  Pulm: No cough, No shortness of breath  Ophtho: No vision changes  GI: see HPI  Derm: No rash  Heme: No lymphadenopathy, No bruising  MSK: No arthritis  : No dysuria, No hematuria  Endo: No hot or cold intolerance  Neuro: No syncope, No seizure  Psych: No anxiety, No depression    Medical History:  has a past medical history of AR (allergic rhinitis); Cholelithiasis, common bile duct; Chronic low back pain; Eye pressure (2017); GERD (gastroesophageal reflux disease); History of migraine headaches; Hypothyroidism; Lumbar disc disease; Menorrhagia; Mild asthma; Obesity; Plantar fasciitis of left foot; Primary pulmonary hypertension; Seizure disorder; Seizures; Sleep apnea; TMJ (dislocation of temporomandibular joint); and Tricuspid regurgitation.    Surgical History:  has a past surgical history that includes  Portacath placement; Cardiac catheterization; and Upper gastrointestinal endoscopy.    Family History: family history includes Breast cancer in her cousin and maternal aunt; Cancer in her maternal aunt and maternal grandmother; Diabetes in her maternal grandfather, mother, and paternal grandfather; Glaucoma in her father; Heart attack in her maternal grandfather and paternal grandfather; Heart disease in her father and paternal grandfather; Leukemia in her brother; No Known Problems in her maternal uncle, paternal aunt, paternal grandmother, paternal uncle, and sister..     Social History:  reports that she has never smoked. She has never used smokeless tobacco. She reports that she does not drink alcohol or use drugs.    Review of patient's allergies indicates:   Allergen Reactions    Adhesive Hives     Silk tape    Amoxicillin Rash    Chlorhexidine Other (See Comments)       Current Outpatient Prescriptions on File Prior to Visit   Medication Sig Dispense Refill    0.9 % SODIUM CHLORIDE (SODIUM CHLORIDE 0.9%) 0.9 % Soln Inject 3 mLs into the vein every 8 (eight) hours as needed. (Patient taking differently: Inject 3 mLs into the vein every other day. ) 900 mL 11    acetaminophen (TYLENOL EXTRA STRENGTH) 500 MG tablet Take 1,000 mg by mouth every 6 (six) hours as needed for Pain.      ADVAIR DISKUS 100-50 mcg/dose diskus inhaler inhale 1 PUFF TWICE DAILY 1 each 11    albuterol 90 mcg/actuation inhaler Inhale 2 puffs into the lungs every 4 (four) hours as needed. 2 Aerosol Inhalation Every 4-6 hours 1 Inhaler 3    ambrisentan (LETAIRIS) 10 MG Tab Take 1 tablet (10 mg total) by mouth once daily. 30 tablet 11    azelastine (ASTELIN) 137 mcg (0.1 %) nasal spray 2 sprays by Nasal route 2 (two) times daily.       butalbital-acetaminophen-caffeine -40 mg (FIORICET, ESGIC) -40 mg per tablet Take 1 tablet by mouth every 4 (four) hours as needed for Pain or Headaches.      cetirizine (ZYRTEC) 10 MG  tablet Take 10 mg by mouth. 1 Tablet Oral Every day      clindamycin (CLEOCIN T) 1 % lotion daily as needed.       cyanocobalamin, vitamin B-12, 2,500 mcg Subl Place 2,500 mcg under the tongue once daily.       desonide (DESOWEN) 0.05 % ointment 0.05 % daily as needed.  Ointment Topical As directed.  Apply to affected area twice a daily over face      diphenhydrAMINE (BENADRYL) 25 mg capsule Take 25 mg by mouth every 6 (six) hours as needed for Itching.      esomeprazole (NEXIUM) 40 MG capsule Take 1 capsule (40 mg total) by mouth before breakfast. 30 capsule 11    ferrous sulfate 325 (65 FE) MG EC tablet Take 325 mg by mouth daily as needed.       fluticasone (FLONASE) 50 mcg/actuation nasal spray 2 sprays by Each Nare route once daily. 1 Bottle 6    folic acid (FOLVITE) 1 MG tablet Take 1 mg by mouth.  Tablet Oral Every day      ipratropium (ATROVENT) 0.03 % nasal spray 2 sprays by Nasal route 2 (two) times daily. (Patient taking differently: 2 sprays by Nasal route 2 (two) times daily as needed. ) 30 mL 0    levothyroxine (SYNTHROID) 137 MCG Tab tablet Take 1 tablet (137 mcg total) by mouth once daily. 90 tablet 3    norethindrone-ethinyl estradiol (JUNEL FE 1/20) 1 mg-20 mcg (21)/75 mg (7) per tablet Take 1 tablet by mouth once daily. 28 tablet 12    ondansetron (ZOFRAN) 4 MG tablet Take 4 mg by mouth every 6 (six) hours as needed. 1 Tablet Oral Every 6 hours      PRAMOSONE 2.5-1 % Lotn lotion daily as needed.       promethazine (PHENERGAN) 25 MG tablet Take 25 mg by mouth every 4 to 6 hours as needed.       tadalafil (ADCIRCA) 20 MG Tab Take 2 tablets (40 mg total) by mouth once daily. 60 tablet 11    topiramate (TOPAMAX) 100 MG tablet Take 1 tablet (100 mg total) by mouth 2 (two) times daily. 60 tablet 11    treprostinil (REMODULIN) 2.5 mg/mL Soln Mix cassette as directed and infuse continuously per physician titration orders on dosing sheet. Current dose 46 ng/kg/min 60 mL 11    VITAMIN D2  "50,000 unit capsule TAKE ONE CAPSULE BY MOUTH EVERY 14 DAYS 6 capsule 0    warfarin (COUMADIN) 2.5 MG tablet Take 2.5-3 tablets (6.25-7.5 mg total) by mouth Daily. As directed by coumadin clinic 90 tablet 11     No current facility-administered medications on file prior to visit.          Objective Findings:    Vital Signs:  /76   Pulse 78   Ht 4' 11" (1.499 m)   Wt 60.7 kg (133 lb 13.1 oz)   BMI 27.03 kg/m²   Body mass index is 27.03 kg/m².    Physical Exam:  General Appearance: Well appearing in no acute distress  Head:   Normocephalic, without obvious abnormality  Eyes:    No scleral icterus  ENT: Neck supple, Lips, mucosa, and tongue normal  Lungs: CTA bilaterally in anterior and posterior fields, no wheezes, no crackles.  Heart:  Regular rate and rhythm, S1, S2 normal, + murmurs heard  Abdomen: Soft, non tender, non distended with positive bowel sounds in all four quadrants.   Extremities:no edema  Skin: No rash  Neurologic: AAO x 3      Labs:  Lab Results   Component Value Date    WBC 4.26 05/11/2017    WBC 4.26 05/11/2017    HGB 11.5 (L) 05/11/2017    HGB 11.5 (L) 05/11/2017    HCT 37.5 05/11/2017    HCT 37.5 05/11/2017     05/11/2017     05/11/2017    CHOL 128 01/16/2017    TRIG 101 01/16/2017    HDL 31 (L) 01/16/2017    ALT 10 05/11/2017    ALT 10 05/11/2017    AST 12 05/11/2017    AST 12 05/11/2017     05/11/2017     05/11/2017    K 4.0 05/11/2017    K 4.0 05/11/2017     (H) 05/11/2017     (H) 05/11/2017    CREATININE 0.8 05/11/2017    CREATININE 0.8 05/11/2017    BUN 9 05/11/2017    BUN 9 05/11/2017    CO2 21 (L) 05/11/2017    CO2 21 (L) 05/11/2017    TSH 1.808 01/16/2017    INR 2.8 06/07/2017    HGBA1C 5.2 01/16/2017       Imaging:    Endoscopy:    EGD 3/2012 - reactive gastritis, H. Pylori neg    Assessment:  1. Irritable bowel syndrome with diarrhea      Stools alternate between normal, pasty, and constipation. She has taken the xifaxan that was " prescribed at the last visit    Recommendations:  1. Trial of xifaxan    Consider questran if ok with transplant team    No Follow-up on file.      Order summary:         Thank you so much for allowing me to participate in the care of Yuni Anguiano PA-C

## 2017-06-21 RX ORDER — ERGOCALCIFEROL 1.25 MG/1
CAPSULE ORAL
Qty: 6 CAPSULE | Refills: 0 | Status: SHIPPED | OUTPATIENT
Start: 2017-06-21 | End: 2018-05-11 | Stop reason: SDUPTHER

## 2017-06-21 NOTE — TELEPHONE ENCOUNTER
----- Message from Violet Madden sent at 6/21/2017 12:44 PM CDT -----  Contact: PAOLA  [phone]  Pt needs refill on Vit D . Pls call Paola [phone]. Thanks.....Sarina

## 2017-07-11 ENCOUNTER — TELEPHONE (OUTPATIENT)
Dept: TRANSPLANT | Facility: CLINIC | Age: 47
End: 2017-07-11

## 2017-07-11 NOTE — TELEPHONE ENCOUNTER
----- Message from Scarlett Nelson sent at 7/11/2017  2:35 PM CDT -----  Contact: pt  Wants to speak with Johnna about setting up an appt tomorrow with her please call her asap @ # [phone].    Contacted Yuni.  Informed her that I left a message for Johnna regarding seeing her in clinic tomorrow, but I have not received a return call from Johnna yet.  Yuni verbalized her understanding.

## 2017-07-12 ENCOUNTER — SOCIAL WORK (OUTPATIENT)
Dept: TRANSPLANT | Facility: HOSPITAL | Age: 47
End: 2017-07-12

## 2017-07-12 ENCOUNTER — HOSPITAL ENCOUNTER (OUTPATIENT)
Dept: PULMONOLOGY | Facility: CLINIC | Age: 47
Discharge: HOME | End: 2017-07-12
Payer: COMMERCIAL

## 2017-07-12 ENCOUNTER — OFFICE VISIT (OUTPATIENT)
Dept: TRANSPLANT | Facility: CLINIC | Age: 47
End: 2017-07-12
Payer: COMMERCIAL

## 2017-07-12 VITALS
WEIGHT: 134 LBS | BODY MASS INDEX: 27.01 KG/M2 | RESPIRATION RATE: 20 BRPM | OXYGEN SATURATION: 97 % | SYSTOLIC BLOOD PRESSURE: 118 MMHG | WEIGHT: 134 LBS | TEMPERATURE: 96 F | HEIGHT: 60 IN | BODY MASS INDEX: 26.31 KG/M2 | DIASTOLIC BLOOD PRESSURE: 63 MMHG | HEART RATE: 83 BPM | HEIGHT: 59 IN

## 2017-07-12 DIAGNOSIS — Z76.82 LUNG TRANSPLANT CANDIDATE: ICD-10-CM

## 2017-07-12 DIAGNOSIS — I27.0 PRIMARY PULMONARY HYPERTENSION: Primary | ICD-10-CM

## 2017-07-12 DIAGNOSIS — I27.20 PULMONARY HYPERTENSION: ICD-10-CM

## 2017-07-12 DIAGNOSIS — Z76.82 AWAITING ORGAN TRANSPLANT STATUS: ICD-10-CM

## 2017-07-12 LAB
PRE FEV1 FVC: 69
PRE FEV1: 1.54
PRE FVC: 2.24
PREDICTED FEV1 FVC: 85
PREDICTED FEV1: 2.38
PREDICTED FVC: 2.83

## 2017-07-12 PROCEDURE — 99999 PR PBB SHADOW E&M-EST. PATIENT-LVL III: CPT | Mod: PBBFAC,TXP,, | Performed by: INTERNAL MEDICINE

## 2017-07-12 PROCEDURE — 94620 PR PULMONARY STRESS TESTING,SIMPLE: CPT | Mod: S$GLB,TXP,, | Performed by: INTERNAL MEDICINE

## 2017-07-12 PROCEDURE — 94010 BREATHING CAPACITY TEST: CPT | Mod: S$GLB,TXP,, | Performed by: INTERNAL MEDICINE

## 2017-07-12 PROCEDURE — 99213 OFFICE O/P EST LOW 20 MIN: CPT | Mod: 25,S$GLB,TXP, | Performed by: INTERNAL MEDICINE

## 2017-07-12 NOTE — LETTER
July 12, 2017        Ivonne Rai  [address]  [facility] [address]  Phone: [phone]  Fax: [phone]             [facility]  [address]  [facility] [address]  Phone: [phone]   Patient: Yuni Perez   MR Number: [id number]   YOB: 1970   Date of Visit: 7/12/2017       Dear Dr. Ivonne Rai    Thank you for referring Yuni Perez to me for evaluation. Attached you will find relevant portions of my assessment and plan of care.    If you have questions, please do not hesitate to call me. I look forward to following Yuni Perez along with you.    Sincerely,    Ralph Whyte MD    Enclosure    If you would like to receive this communication electronically, please contact [email or web address] or [phone] to request [facility] access.    [facility] is a tool which provides read-only access to select patient information with whom you have a relationship. Its easy to use and provides real time access to review your patients record including encounter summaries, notes, results, and demographic information.    If you feel you have received this communication in error or would no longer like to receive these types of communications, please e-mail [email or web address]
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
## 2017-07-12 NOTE — PROGRESS NOTES
LUNG TRANSPLANT PRE FOLLOW-UP    Referring Physician: Ivonne Rai    Reason for Visit:  Pre-lung transplant follow-up.         Date of Initial Evaluation: 1/16/2017                                                                                             History of Present Illness: Yuni Perez is a 47 y.o. female who is on 0L of oxygen.  She is on no assisted ventilation.  Her New York Heart Association Class is III  and a Karnofsky score of 70. Cares for self; unable to carry on normal activity or active work.  She is not diabetic.      Yuni continues report dyspnea on exertion and continue to have symptoms of nasal congestion and headaches which are secondary to her PH therapy. Has not had episodes of dizziness or passing out. No hospitalizations.     Review of Systems   Constitutional: Positive for malaise/fatigue. Negative for chills, diaphoresis, fever and weight loss.   HENT: Positive for congestion. Negative for ear discharge, ear pain, hearing loss, nosebleeds and sore throat.    Eyes: Negative.  Negative for blurred vision, double vision and photophobia.   Respiratory: Positive for shortness of breath (on exertion). Negative for cough, hemoptysis, sputum production and wheezing.    Cardiovascular: Negative for chest pain, palpitations, orthopnea, claudication, leg swelling and PND.   Gastrointestinal: Negative.  Negative for abdominal pain, blood in stool, constipation, diarrhea, heartburn, melena, nausea and vomiting.   Genitourinary: Negative for dysuria, flank pain, frequency, hematuria and urgency.   Musculoskeletal: Positive for myalgias. Negative for back pain, falls, joint pain and neck pain.   Skin: Negative.  Negative for itching and rash.   Neurological: Positive for headaches. Negative for dizziness, tremors, sensory change, loss of consciousness and weakness.   Endo/Heme/Allergies: Does not bruise/bleed easily.   Psychiatric/Behavioral: Negative for depression,  hallucinations and memory loss. The patient is not nervous/anxious and does not have insomnia.      Objective:   /63 (BP Location: Left arm, Patient Position: Sitting, BP Method: Automatic)   Pulse 83   Temp 96.3 °F (35.7 °C) (Oral)   Resp 20   Ht 5' (1.524 m)   Wt 60.8 kg (134 lb)   SpO2 97% Comment: room air  BMI 26.17 kg/m²     Physical Exam   Constitutional: She is oriented to person, place, and time and well-developed, well-nourished, and in no distress. No distress.   HENT:   Head: Normocephalic and atraumatic.   Nose: Nose normal.   Mouth/Throat: Oropharynx is clear and moist. No oropharyngeal exudate.   Nasal voice   Eyes: Conjunctivae and EOM are normal. Pupils are equal, round, and reactive to light. No scleral icterus.   Neck: Normal range of motion. Neck supple. No JVD present. No tracheal deviation present. No thyromegaly present.   Cardiovascular: Normal rate, regular rhythm and intact distal pulses.  Exam reveals no gallop and no friction rub.    No murmur heard.  Pulmonary/Chest: Effort normal. No stridor. No respiratory distress. She has no wheezes. She has no rales. She exhibits no tenderness.   Abdominal: Soft. Bowel sounds are normal. She exhibits no distension and no mass. There is no tenderness. There is no rebound and no guarding.   Musculoskeletal: Normal range of motion. She exhibits no edema.   Lymphadenopathy:     She has no cervical adenopathy.   Neurological: She is alert and oriented to person, place, and time. No cranial nerve deficit. Gait normal. Coordination normal.   Skin: Skin is warm and dry. No rash noted. She is not diaphoretic. No erythema. No pallor.   Psychiatric: Mood and affect normal.     Labs:  No visits with results within 7 Day(s) from this visit.   Latest known visit with results is:   Anti-coag visit on 06/07/2017   Component Date Value    INR 06/07/2017 2.8        Pulmonary Function Tests 7/12/2017 11/15/2016 5/17/2016   FVC 2.24 2.13 2.22   FEV1 1.54  1.43 1.65   TLC (liters) - - 3.74   DLCO (ml/mmHg sec) - - 17.6   FVC% 80 78 78   FEV1% 65 61 69   FEF 25-75 0.87 0.78 1.22   FEF 25-75% 32 29 44   TLC% - - 89   RV - - 1.49   RV% - - 105   DLCO% - - 90     6MWT: Able to walk 1550 feet ( 472 m) with lowest SpO2 99% at 0L. Very heavy dyspnea reported. Patient stopped 0 times during the test. Patient was able to complete the test.       Assessment:-  1. Primary pulmonary hypertension    2. Lung transplant candidate      Plan:  1. She will continue her current therapy for PH as prescribed by Dr. Rai. Our main concern is that if her symptoms worsen she will not tolerate further increases in her therapy because of side effects. Her walk distance remains stable.     2. Currently being evaluated for lung transplant. There are still financial and psychosocial issues that need to be resolved before we can consider listing.     3. RTC in 3 months.

## 2017-07-13 DIAGNOSIS — Z76.82 AWAITING ORGAN TRANSPLANT STATUS: Primary | ICD-10-CM

## 2017-07-13 DIAGNOSIS — I27.0 PRIMARY PULMONARY HYPERTENSION: ICD-10-CM

## 2017-07-13 NOTE — PROCEDURES
Yuni Perez is a 47 y.o.  female patient, who presents for a 6 minute walk test ordered by Kali Whyte MD.  The diagnosis is Pre Lung Transplant Evaluation; Pulmonary Hypertension.  The patient's BMI is 27.1 kg/m2.  Predicted distance (lower limit of normal) is 440.72 meters.      Test Results:    The test was completed without stopping.  The total time walked was 360 seconds.  During walking, the patient reported:  Chest pain, Dyspnea, Leg pain. The patient used no assistive devices during testing.     07/12/2017---------Distance: 472.44 meters (1550 feet)     O2 Sat % Supplemental Oxygen Heart Rate Blood Pressure Ab Scale   Pre-exercise  (Resting) 98 % Room Air 65 bpm 123/69 mmHg 2   During Exercise 99 % Room Air 120 bpm 133/85 mmHg 7-8   Post-exercise  (Recovery) 99 % Room Air  80 bpm       Recovery Time: 120 seconds    Performing nurse/tech:  ABRAHAM Koo      PREVIOUS STUDY:   05/11/2017---------Distance: 457.2 meters (1500 feet)       O2 Sat % Supplemental Oxygen Heart Rate Blood Pressure Ab Scale   Pre-exercise  (Resting) 99 % Room Air 79 bpm 128/65 mmHg 2   During Exercise 98 % Room Air 110 bpm 136/72 mmHg 9   Post-exercise  (Recovery) 99 % Room Air  93 bpm           CLINICAL INTERPRETATION:  Six minute walk distance is 472.44 meters (1550 feet) with very heavy dyspnea.  During exercise, there was no significant desaturation while breathing supplemental oxygen.  Blood pressure remained stable and Heart rate increased significantly with walking.  The patient reported non-pulmonary symptoms during exercise.  Since the previous study in May 2017, exercise capacity is unchanged.  Based upon age and body mass index, exercise capacity is normal.

## 2017-07-14 ENCOUNTER — TELEPHONE (OUTPATIENT)
Dept: TRANSPLANT | Facility: HOSPITAL | Age: 47
End: 2017-07-14

## 2017-07-14 NOTE — TELEPHONE ENCOUNTER
----- Message from Ginny Durham sent at 7/13/2017  4:40 PM CDT -----  Contact: patient   Returning your call please call  pt states that she'll be back on Monday       SW contacted pt back. Pt states is currently going out of town and would like SW to contact pt back when she returns to town. SW to contact pt back at a later time in order to scheduled a follow up with pt next week.

## 2017-07-19 ENCOUNTER — ANTI-COAG VISIT (OUTPATIENT)
Dept: CARDIOLOGY | Facility: CLINIC | Age: 47
End: 2017-07-19

## 2017-07-19 ENCOUNTER — CLINICAL SUPPORT (OUTPATIENT)
Dept: INFECTIOUS DISEASES | Facility: CLINIC | Age: 47
End: 2017-07-19
Payer: COMMERCIAL

## 2017-07-19 ENCOUNTER — LAB VISIT (OUTPATIENT)
Dept: LAB | Facility: HOSPITAL | Age: 47
End: 2017-07-19
Payer: COMMERCIAL

## 2017-07-19 DIAGNOSIS — I27.9 CHRONIC PULMONARY HEART DISEASE: ICD-10-CM

## 2017-07-19 DIAGNOSIS — Z79.01 LONG TERM CURRENT USE OF ANTICOAGULANT THERAPY: ICD-10-CM

## 2017-07-19 LAB
INR PPP: 1.9
PROTHROMBIN TIME: 19.2 SEC

## 2017-07-19 PROCEDURE — 90471 IMMUNIZATION ADMIN: CPT | Mod: S$GLB,TXP,, | Performed by: INTERNAL MEDICINE

## 2017-07-19 PROCEDURE — 85610 PROTHROMBIN TIME: CPT | Mod: TXP

## 2017-07-19 PROCEDURE — 36415 COLL VENOUS BLD VENIPUNCTURE: CPT | Mod: TXP

## 2017-07-19 PROCEDURE — 90636 HEP A/HEP B VACC ADULT IM: CPT | Mod: S$GLB,TXP,, | Performed by: INTERNAL MEDICINE

## 2017-07-25 ENCOUNTER — TELEPHONE (OUTPATIENT)
Dept: TRANSPLANT | Facility: HOSPITAL | Age: 47
End: 2017-07-25

## 2017-07-25 NOTE — TELEPHONE ENCOUNTER
SW attempted to follow up with pt on Wednesday 7/19/17 after Lung txp support group re: pt's reported family issues and feelings of anxiety, however pt stated had an MD appointment and would be asleep after that for the remainder of the day.     Pt states will contact SW back for discussion. SW remains available.

## 2017-08-01 ENCOUNTER — TELEPHONE (OUTPATIENT)
Dept: TRANSPLANT | Facility: CLINIC | Age: 47
End: 2017-08-01

## 2017-08-01 NOTE — TELEPHONE ENCOUNTER
"----- Message from Ginny Durham sent at 8/1/2017  9:16 AM CDT -----  Contact: patient   Pt need to speak with you regarding a call from [employer] please call pt at     Contacted Yuni.  She stated that she received a call from Abbey Barbosa, her  with [facility], who informed her that she is still deferred.  She stated that Abbey informed her that the reason for the deferral is she is having trouble with fundraising.  She stated, "I was under the impression that I had a bill to pay and needed caregivers.  I was unaware that fundraising was a factor.  It is frustrating to get a call from [facility] stating one thing when I fulfilled my end.  I gave a list of my caregivers to the .  It is up to her to determine if they meet the criteria."  Yuni stated that Abbey spoke with someone at [facility] recently regarding her case.  Informed Yuni that neither I nor Dedra have spoke with Abbey Barbosa recently regarding her case.  Informed her that when she was presented to the Selection Committee, she was deferred due to psychosocial and financial reasons.  Informed her that she had an unreliable caregiving plan and needed to have reliable and adequate caregivers including 1 primary and at least 2 back up caregivers.  Also informed her that it was determined that she needed to fundraise $2000 / year to cover the out of pocket expenses (her deductible).  Informed her that I contacted her on 3/22/17 after the meeting and notified her of the outcome of the meeting.  Informed her that she was told about the caregivers as well as the fundraising.  Yuni stated, "Yes, I was aware and I have been trying to meet a fundraising goal of $500 / month.  I have received some money, but I don't know where to put it.  I don't know if I should put it in the bank or not."  Informed Yuni that I am not the expert on fundraising and she has to discuss the fundraising with either Johnna (social " "worker) or Dedra ().  Informed her that Dedra was in clinic with me and stated that she has to raise at least $500 to be placed on the list.  Also informed her that Dedra gave her information (brochures) regarding where to place the monies received for fundraising.  Informed her that Dedra stated that if it is placed in the bank, then it will be taxed because it is treated like income.  Yuni stated that she has over $500 that she has raised.  She received one check from a friend for $500.  Informed Yuni that I would send a message to Johnna to see if she has contacted her caregivers and to see if she has an adequate caregiving plan.  Informed her that I would contact her once I know about her caregiving plan.  She verbalized her understanding.  She stated, "I just want to make sure that everyone is on the same page."  "

## 2017-08-08 ENCOUNTER — CONFERENCE (OUTPATIENT)
Dept: TRANSPLANT | Facility: CLINIC | Age: 47
End: 2017-08-08

## 2017-08-08 NOTE — TELEPHONE ENCOUNTER
Discussed Yuni Perez in Selection Committee today since she has raised $500 and provided a list of caregivers to Johnna.  Also discussed the below concerns:    DORIS Ballard RN             So I have attempted to speak with Yuni 3 times since she had informed us of all of the issues she was experiencing with her family.  When I speak with her she tells me to call her back another time.  She has a lot of caregivers lined up, however Im now really concerned about her mental health and she seems to be avoiding discussing it with me... Im concerned that she may need a psych referral, however I know this is very late in the work up process. I can not get any straight answers from her in order to assess her mental status. It is also concerning that at support group I gave her step by step information on how to fundraise yet she is stating she does not know how to open the account    Previous Messages      ----- Message -----   From: Cheryl Euceda RN   Sent: 8/1/2017   6:48 PM   To: Johnna Anguiano LMSW     Please let me know if Yuni has an adequate caregiving plan or not.  She has fundraised $500 which is Dedra's request for listing.  She needs to raise a total of $2000 / year.       Please read my note from today for my conversation with Yuni.     Thanks,   sl         All members agree that Yuni will continue to be deferred due to psychosocial issues.  Yuni must speak with Johnna, , and comply with her recommendations prior to being reconsidered as a potential lung transplant candidate.

## 2017-08-10 ENCOUNTER — TELEPHONE (OUTPATIENT)
Dept: TRANSPLANT | Facility: CLINIC | Age: 47
End: 2017-08-10

## 2017-08-10 NOTE — TELEPHONE ENCOUNTER
Attempted to contact Yuni Perez to notify her of the discussion in Selection Committee meeting this week.  No answer.  Left a message requesting a return call.

## 2017-08-11 ENCOUNTER — TELEPHONE (OUTPATIENT)
Dept: TRANSPLANT | Facility: CLINIC | Age: 47
End: 2017-08-11

## 2017-08-11 NOTE — TELEPHONE ENCOUNTER
----- Message from Joann Berman sent at 8/11/2017  1:38 PM CDT -----  Contact: patient   Returning call to Cheryl. Please call     Contacted Yuni.  Notified her of the committee's recommendations for her to speak with Johnna regarding her mental health concerns prior to making a final determination regarding her transplant candidacy.  She verbalized her understanding and stated that she has an appointment all day on Monday.  She will contact Johnna if she is home early enough from her appointment.  If not, she stated that she would be available all day on Tuesday.

## 2017-08-21 ENCOUNTER — TELEPHONE (OUTPATIENT)
Dept: TRANSPLANT | Facility: HOSPITAL | Age: 47
End: 2017-08-21
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
## 2017-08-30 ENCOUNTER — ANTI-COAG VISIT (OUTPATIENT)
Dept: CARDIOLOGY | Facility: CLINIC | Age: 47
End: 2017-08-30
Payer: COMMERCIAL

## 2017-08-30 DIAGNOSIS — I27.9 CHRONIC PULMONARY HEART DISEASE: ICD-10-CM

## 2017-08-30 DIAGNOSIS — Z79.01 LONG TERM CURRENT USE OF ANTICOAGULANT THERAPY: Primary | ICD-10-CM

## 2017-08-30 LAB — INR PPP: 2.5 (ref 2–3)

## 2017-08-30 PROCEDURE — 85610 PROTHROMBIN TIME: CPT | Mod: QW,S$GLB,,

## 2017-09-07 ENCOUNTER — TELEPHONE (OUTPATIENT)
Dept: TRANSPLANT | Facility: CLINIC | Age: 47
End: 2017-09-07

## 2017-09-07 DIAGNOSIS — B99.9 INFECTION: Primary | ICD-10-CM

## 2017-09-07 RX ORDER — DOXYCYCLINE HYCLATE 100 MG
100 TABLET ORAL 2 TIMES DAILY
Qty: 14 TABLET | Refills: 0 | Status: SHIPPED | OUTPATIENT
Start: 2017-09-07 | End: 2017-09-13
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
## 2017-09-07 NOTE — TELEPHONE ENCOUNTER
Called patient and informed of same. Pt verbalized understanding of all. Pt also wishes to discuss possible decrease in Remodulin, to try to improve overall congestion, which has been chronic since Remodulin initiation. Will submit to Dr Rai, and encouraged pt to discuss this at next appointment.

## 2017-09-08 ENCOUNTER — TELEPHONE (OUTPATIENT)
Dept: TRANSPLANT | Facility: CLINIC | Age: 47
End: 2017-09-08

## 2017-09-08 DIAGNOSIS — Z79.899 POLYPHARMACY: Primary | ICD-10-CM

## 2017-09-08 DIAGNOSIS — Z79.01 LONG TERM (CURRENT) USE OF ANTICOAGULANTS: ICD-10-CM

## 2017-09-08 NOTE — TELEPHONE ENCOUNTER
Received call from patient this AM, who reports concern regarding when she should go to ER for Eason catheter issue (see telephone note from yesterday). Asked patient to do dressing change today and send pictures (See below). Per Dr Rai, pt should come for evaluation of Eason catheter w/nephrology access, and likely line replacement. Pt understands to bring full set up for Remodulin so new cassette/line could be used with new line. Pt will also hold coumadin three days prior to line placement, once appt scheduled. Message left for Belle in line placement to contact me regarding scheduling patient. Pt verbalizes understanding of all, including to continue taking antibiotics as prescribed.

## 2017-09-11 DIAGNOSIS — K21.9 GASTROESOPHAGEAL REFLUX DISEASE, ESOPHAGITIS PRESENCE NOT SPECIFIED: ICD-10-CM

## 2017-09-11 RX ORDER — ESOMEPRAZOLE MAGNESIUM 40 MG/1
CAPSULE, DELAYED RELEASE ORAL
Qty: 90 CAPSULE | Refills: 3 | Status: SHIPPED | OUTPATIENT
Start: 2017-09-11 | End: 2019-01-08 | Stop reason: SDUPTHER

## 2017-09-12 ENCOUNTER — HOSPITAL ENCOUNTER (OUTPATIENT)
Facility: HOSPITAL | Age: 47
Discharge: HOME | End: 2017-09-12
Attending: INTERNAL MEDICINE | Admitting: INTERNAL MEDICINE
Payer: COMMERCIAL

## 2017-09-12 ENCOUNTER — LAB VISIT (OUTPATIENT)
Dept: LAB | Facility: HOSPITAL | Age: 47
End: 2017-09-12
Attending: INTERNAL MEDICINE
Payer: COMMERCIAL

## 2017-09-12 VITALS
OXYGEN SATURATION: 97 % | WEIGHT: 134 LBS | SYSTOLIC BLOOD PRESSURE: 152 MMHG | RESPIRATION RATE: 16 BRPM | HEIGHT: 59 IN | HEART RATE: 67 BPM | TEMPERATURE: 97 F | BODY MASS INDEX: 27.01 KG/M2 | DIASTOLIC BLOOD PRESSURE: 75 MMHG

## 2017-09-12 DIAGNOSIS — Z79.01 LONG TERM (CURRENT) USE OF ANTICOAGULANTS: ICD-10-CM

## 2017-09-12 DIAGNOSIS — J96.11 CHRONIC RESPIRATORY FAILURE WITH HYPOXIA: Primary | ICD-10-CM

## 2017-09-12 DIAGNOSIS — Z79.899 POLYPHARMACY: ICD-10-CM

## 2017-09-12 DIAGNOSIS — T82.598D MALFUNCTION OF PERIPHERAL INSERTED CENTRAL CATHETER, SUBSEQUENT ENCOUNTER: ICD-10-CM

## 2017-09-12 DIAGNOSIS — I27.0 PRIMARY PULMONARY HYPERTENSION: ICD-10-CM

## 2017-09-12 DIAGNOSIS — I27.29 OTHER SECONDARY PULMONARY HYPERTENSION: ICD-10-CM

## 2017-09-12 DIAGNOSIS — T82.598A MALFUNCTION OF PERIPHERAL INSERTED CENTRAL CATHETER, INITIAL ENCOUNTER: ICD-10-CM

## 2017-09-12 LAB
ALBUMIN SERPL BCP-MCNC: 3.5 G/DL
ALP SERPL-CCNC: 82 U/L
ALT SERPL W/O P-5'-P-CCNC: 12 U/L
ANION GAP SERPL CALC-SCNC: 8 MMOL/L
AST SERPL-CCNC: 16 U/L
BASOPHILS # BLD AUTO: 0.02 K/UL
BASOPHILS NFR BLD: 0.4 %
BILIRUB SERPL-MCNC: 0.4 MG/DL
BUN SERPL-MCNC: 10 MG/DL
CALCIUM SERPL-MCNC: 8.5 MG/DL
CHLORIDE SERPL-SCNC: 111 MMOL/L
CO2 SERPL-SCNC: 21 MMOL/L
CREAT SERPL-MCNC: 0.8 MG/DL
DIFFERENTIAL METHOD: ABNORMAL
EOSINOPHIL # BLD AUTO: 0.2 K/UL
EOSINOPHIL NFR BLD: 4.1 %
ERYTHROCYTE [DISTWIDTH] IN BLOOD BY AUTOMATED COUNT: 16.3 %
EST. GFR  (AFRICAN AMERICAN): >60 ML/MIN/1.73 M^2
EST. GFR  (NON AFRICAN AMERICAN): >60 ML/MIN/1.73 M^2
GLUCOSE SERPL-MCNC: 88 MG/DL
HCT VFR BLD AUTO: 40.9 %
HGB BLD-MCNC: 13.4 G/DL
INR PPP: 1
LYMPHOCYTES # BLD AUTO: 1.2 K/UL
LYMPHOCYTES NFR BLD: 24.5 %
MAGNESIUM SERPL-MCNC: 2.3 MG/DL
MCH RBC QN AUTO: 26.6 PG
MCHC RBC AUTO-ENTMCNC: 32.8 G/DL
MCV RBC AUTO: 81 FL
MONOCYTES # BLD AUTO: 0.4 K/UL
MONOCYTES NFR BLD: 7.4 %
NEUTROPHILS # BLD AUTO: 3.1 K/UL
NEUTROPHILS NFR BLD: 63.4 %
PLATELET # BLD AUTO: 233 K/UL
PMV BLD AUTO: 11.3 FL
POTASSIUM SERPL-SCNC: 3.8 MMOL/L
PROT SERPL-MCNC: 7.5 G/DL
PROTHROMBIN TIME: 10.9 SEC
RBC # BLD AUTO: 5.04 M/UL
SODIUM SERPL-SCNC: 140 MMOL/L
WBC # BLD AUTO: 4.86 K/UL

## 2017-09-12 PROCEDURE — 85025 COMPLETE CBC W/AUTO DIFF WBC: CPT | Mod: TXP

## 2017-09-12 PROCEDURE — 85610 PROTHROMBIN TIME: CPT | Mod: TXP

## 2017-09-12 PROCEDURE — 80053 COMPREHEN METABOLIC PANEL: CPT | Mod: TXP

## 2017-09-12 PROCEDURE — 36415 COLL VENOUS BLD VENIPUNCTURE: CPT | Mod: TXP

## 2017-09-12 PROCEDURE — 63600175 PHARM REV CODE 636 W HCPCS: Mod: TXP

## 2017-09-12 PROCEDURE — 25000003 PHARM REV CODE 250: Mod: TXP

## 2017-09-12 PROCEDURE — 77001 FLUOROGUIDE FOR VEIN DEVICE: CPT | Mod: 26,NTX,, | Performed by: INTERNAL MEDICINE

## 2017-09-12 PROCEDURE — 36558 INSERT TUNNELED CV CATH: CPT | Mod: NTX,,, | Performed by: INTERNAL MEDICINE

## 2017-09-12 PROCEDURE — 36561 INSERT TUNNELED CV CATH: CPT

## 2017-09-12 PROCEDURE — 76937 US GUIDE VASCULAR ACCESS: CPT | Mod: TXP

## 2017-09-12 PROCEDURE — 83735 ASSAY OF MAGNESIUM: CPT | Mod: TXP

## 2017-09-12 PROCEDURE — 76937 US GUIDE VASCULAR ACCESS: CPT | Mod: 26,NTX,, | Performed by: INTERNAL MEDICINE

## 2017-09-12 PROCEDURE — 87077 CULTURE AEROBIC IDENTIFY: CPT | Mod: TXP

## 2017-09-12 PROCEDURE — 36589 REMOVAL TUNNELED CV CATH: CPT

## 2017-09-12 PROCEDURE — 77001 FLUOROGUIDE FOR VEIN DEVICE: CPT | Mod: TXP

## 2017-09-12 PROCEDURE — 87186 SC STD MICRODIL/AGAR DIL: CPT | Mod: TXP

## 2017-09-12 PROCEDURE — 87070 CULTURE OTHR SPECIMN AEROBIC: CPT | Mod: TXP

## 2017-09-12 PROCEDURE — 87040 BLOOD CULTURE FOR BACTERIA: CPT | Mod: TXP

## 2017-09-12 RX ORDER — SULFAMETHOXAZOLE AND TRIMETHOPRIM 800; 160 MG/1; MG/1
1 TABLET ORAL 2 TIMES DAILY
Qty: 14 TABLET | Refills: 0 | Status: SHIPPED | OUTPATIENT
Start: 2017-09-12 | End: 2017-09-19

## 2017-09-12 NOTE — DISCHARGE SUMMARY
[facility]  Discharge Note  Short Stay    Admit Date: 9/12/2017    Discharge Date and Time: 9/12/17    Attending Physician: Asa Pena MD     Discharge Provider: Asa Pena    Diagnoses:  Active Hospital Problems    Diagnosis  POA    Malfunction of peripheral inserted central catheter [T82.598A]  Yes      Resolved Hospital Problems    Diagnosis Date Resolved POA   No resolved problems to display.       Discharged Condition: stable    Hospital Course: Patient was admitted for an outpatient procedure and tolerated the procedure well with no complications.    Final Diagnoses: Same as principal problem.    Disposition: Home or Self Care    Follow up/Patient Instructions:  Instruction given. F/u with HTS.     Medications:  Reconciled Home Medications:   Current Discharge Medication List      CONTINUE these medications which have NOT CHANGED    Details   ADVAIR DISKUS 100-50 mcg/dose diskus inhaler inhale 1 PUFF TWICE DAILY  Qty: 1 each, Refills: 11      ambrisentan (LETAIRIS) 10 MG Tab Take 1 tablet (10 mg total) by mouth once daily.  Qty: 30 tablet, Refills: 11    Comments: To be filled by [facility]. Disregard if escripted to retail  Associated Diagnoses: Primary pulmonary hypertension      azelastine (ASTELIN) 137 mcg (0.1 %) nasal spray 2 sprays by Nasal route 2 (two) times daily.       butalbital-acetaminophen-caffeine -40 mg (FIORICET, ESGIC) -40 mg per tablet Take 1 tablet by mouth every 4 (four) hours as needed for Pain or Headaches.      cetirizine (ZYRTEC) 10 MG tablet Take 10 mg by mouth once daily. 1 Tablet Oral Every day      clindamycin (CLEOCIN T) 1 % lotion daily as needed.       cyanocobalamin, vitamin B-12, 2,500 mcg Subl Place 2,500 mcg under the tongue once daily.       diphenhydrAMINE (BENADRYL) 25 mg capsule Take 25 mg by mouth every 6 (six) hours as needed for Itching.      ergocalciferol (VITAMIN D2) 50,000 unit Cap TAKE ONE CAPSULE BY MOUTH  EVERY 14 DAYS  Qty: 6 capsule, Refills: 0      esomeprazole (NEXIUM) 40 MG capsule TAKE ONE CAPSULE BY MOUTH DAILY before breakfast  Qty: 90 capsule, Refills: 3    Associated Diagnoses: Gastroesophageal reflux disease, esophagitis presence not specified      ferrous sulfate 325 (65 FE) MG EC tablet Take 325 mg by mouth daily as needed.       fluticasone (FLONASE) 50 mcg/actuation nasal spray 2 sprays by Each Nare route once daily.  Qty: 1 Bottle, Refills: 6      folic acid (FOLVITE) 1 MG tablet Take 1 mg by mouth.  Tablet Oral Every day      ipratropium (ATROVENT) 0.03 % nasal spray 2 sprays by Nasal route 2 (two) times daily.  Qty: 30 mL, Refills: 0      levothyroxine (SYNTHROID) 137 MCG Tab tablet Take 1 tablet (137 mcg total) by mouth once daily.  Qty: 90 tablet, Refills: 3    Associated Diagnoses: Hypothyroidism (acquired)      norethindrone-ethinyl estradiol (JUNEL FE 1/20) 1 mg-20 mcg (21)/75 mg (7) per tablet Take 1 tablet by mouth once daily.  Qty: 28 tablet, Refills: 12    Associated Diagnoses: Menorrhagia with irregular cycle      PRAMOSONE 2.5-1 % Lotn lotion daily as needed.       tadalafil (ADCIRCA) 20 MG Tab Take 2 tablets (40 mg total) by mouth once daily.  Qty: 60 tablet, Refills: 11    Comments: To be filled by [facility], please diregard if escripted to retail pharmacy.  Associated Diagnoses: Primary pulmonary hypertension      topiramate (TOPAMAX) 100 MG tablet Take 1 tablet (100 mg total) by mouth 2 (two) times daily.  Qty: 60 tablet, Refills: 11    Associated Diagnoses: Chronic nonintractable headache, unspecified headache type      treprostinil (REMODULIN) 2.5 mg/mL Soln Mix cassette as directed and infuse continuously per physician titration orders on dosing sheet. Current dose 46 ng/kg/min  Qty: 60 mL, Refills: 11    Comments: To be filled by [facility]; disregard if escripted to retail.  Associated Diagnoses: Chronic pulmonary heart disease      0.9 % SODIUM CHLORIDE  (SODIUM CHLORIDE 0.9%) 0.9 % Soln Inject 3 mLs into the vein every 8 (eight) hours as needed.  Qty: 900 mL, Refills: 11      acetaminophen (TYLENOL EXTRA STRENGTH) 500 MG tablet Take 1,000 mg by mouth every 6 (six) hours as needed for Pain.      albuterol 90 mcg/actuation inhaler Inhale 2 puffs into the lungs every 4 (four) hours as needed. 2 Aerosol Inhalation Every 4-6 hours  Qty: 1 Inhaler, Refills: 3      desonide (DESOWEN) 0.05 % ointment 0.05 % daily as needed.  Ointment Topical As directed.  Apply to affected area twice a daily over face      doxycycline (VIBRA-TABS) 100 MG tablet Take 1 tablet (100 mg total) by mouth 2 (two) times daily.  Qty: 14 tablet, Refills: 0    Associated Diagnoses: Infection      ondansetron (ZOFRAN) 4 MG tablet Take 4 mg by mouth every 6 (six) hours as needed. 1 Tablet Oral Every 6 hours      promethazine (PHENERGAN) 25 MG tablet Take 25 mg by mouth every 4 to 6 hours as needed.       warfarin (COUMADIN) 2.5 MG tablet Take 2.5-3 tablets (6.25-7.5 mg total) by mouth Daily. As directed by coumadin clinic  Qty: 90 tablet, Refills: 11    Associated Diagnoses: Idiopathic PAH (pulmonary arterial hypertension)           No discharge procedures on file.      Discharge Procedure Orders: Resume previous diet and activity.

## 2017-09-12 NOTE — PROGRESS NOTES
Pt returned to room AAOx4. VSS.  Family called to bedside.  Post procedure protocol reviewed. Bilateral chest wall dressings c/d/i.  Will continue to monitor.

## 2017-09-12 NOTE — NURSING
Patient ambulated on unit with mother at side.  C/o pain of 2 on scale of 0-10 around right upper chest wall catheter.  Dr. Pena at bedside.  Pt notified Dr. Pena that the antibiotic she was taking for possible catheter line infection she had an allergic reaction to over the weekend.  Verbalized understanding of procedure.  Will continue to monitor.

## 2017-09-12 NOTE — NURSING
Received via stretcher from procedure.  Awake and alert.  C/o pain of 2 on scale of 0-10.  Refusing pain med.  Cath to right chest wall clean dry and intact.  Cath to left chest wall in place clean dry and intact.  Pt family member called to bedside.  Will continue to monitor.

## 2017-09-12 NOTE — NURSING
IV d/c'd with cath tip intact.  Dressing to right upper chest wall c/d/i.  Dressing to left chest catheter with scant amount of bloody drainage.  Awake and alert.  Tolerated fluids and eating crackers.  trf off unit for discharge home by wheelchair.  Ambulates without difficulty.

## 2017-09-12 NOTE — PROGRESS NOTES
[facility] Procedure Note: A left IJ Bard, single lumen, Power Eason 8 Fr. x 26 cm tunneled catheter was inserted by Dr. Pena. Lot # [id number]. The catheter was inserted and verified under flouroscopy. OK to use the catheter per Dr. Pena.

## 2017-09-12 NOTE — PROCEDURES
DATE OF PROCEDURE: 9/12/17    PROCEDURE TYPE: Removal of right Internal Jugular Vein tunneled chauhan catheter.     INDICATION: tunnel infection     PROCEDURE NOTE: After informed consent was obtained, the area of Ms. Perez catheter and right chest/neck were sterilely prepped and draped in usual fashion. Anesthesia was obtained with 2% lidocaine with epinephrine, and the subcutaneous cuff was blunt dissected free. The catheter was then removed in total, tip of catheter sent for cultures and a compression dressing was applied to the exit site. Ms. Perez tolerated the procedure well. There were no immediate complications. Estimated blood loss was less than 1 mL. No sedation was given.

## 2017-09-12 NOTE — PROCEDURES
Surgery Date: 9/12/17    (s) and Role:   Asa Pena MD    Indication: catheter infection     Pre-op Diagnosis:    Pulmonary Hypertension    Post-op Diagnosis;  Pulmonary Hypertension    Procedure: Insertion Left IJ Tunneled, Cuffed, Single Lumen Eason     Anesthesia: IV Sedation + Local     Findings/Key Components:   Catheter placed in RA-SVC junction. Good flush and draw through each port.  Estimated Blood Loss: 5mL   Specimens     None         PROCEDURE: After obtaining consent, the patient was taken to the [facility] suite. Sedation was administered. The left neck and chest were prepped and draped in usual sterile fashion. We began using ultrasound guidance to examine the left internal jugular vein. It appeared to be widely patent and was free of thrombus that appeared suitable for access for eason catheter and a permanent recording was placed on the patient's chart. We accessed the internal jugular vein using a Seldinger technique with an micropunture needle with out difficulty, micropuncture wire was passed into the superior vena cava through the heart into the inferior vena cava. The wire position was confirmed with intraoperative fluoroscopy, then used a dilators to dilate the tract of the catheter. Counterincision was made on the chest wall just below the clavicle. Local anesthesia was used to anesthetize the track and a tunneler was used to pass the catheter underneath the chest wall until the felt cuff was just underneath the counter incision. The large peel-away sheath was then inserted over the guidewire under fluoroscopic guidance. The dilator and wire were removed. The catheter was placed through the peel-away sheath and positioned appropriately at the right atrial SVC junction. Fluoroscopy was used to confirm that the catheter tip was in appropriate position and there was no kinking at the apex of the catheter. A permanent recording of the catheter position was also taken with  fluoroscopy. Port had excellent draw and flush. The catheter was then secured in place with 3-0 Prolene. The counterincision in the neck was closed with a 3-0 Vicryl. Patient tolerated procedure well and was discharged in stable condition.     Anesthesia:  Conscious sedation was achieved with 75 mcg of Fentanyl and 1.5 mg of Midazolam (Versed). Local anesthesia was achieved with 20 ml of Lidocaine 2%. Moderate conscious sedation was performed and cardiorespiratory functions were monitored the entire procedure by me and Karen Herrera RN. Sedation began at 1212pm and concluded at 1252pm, totaling 40 minutes.

## 2017-09-12 NOTE — H&P
"[facility]  History & Physical    Subjective:      Chief Complaint/Reason for Admission: Single lumen hick man catheter for remodulin therapy.    Yuni Perez is a 47 y.o. female here for placement of Single lumen hick man catheter for remodulin therapy. She c/o exit site "greenish drainage" and "brush burn" over dressing site. Patient was started on Doxycycline but stopped taking it after 3 days 2/2 reaction (swollen throat). Drainage improved significantly.     Past Medical History:   Diagnosis Date    AR (allergic rhinitis)     Cholelithiasis, common bile duct     Chronic low back pain     Eye pressure 2017    GERD (gastroesophageal reflux disease)     History of migraine headaches     Hypothyroidism     Lumbar disc disease     Menorrhagia     Mild asthma     Obesity     Plantar fasciitis of left foot     Primary pulmonary hypertension     followed by heart transplant/pulmonary     Seizure disorder     x 1 in 2008    Seizures     Sleep apnea     TMJ (dislocation of temporomandibular joint)     Tricuspid regurgitation      Past Surgical History:   Procedure Laterality Date    CARDIAC CATHETERIZATION      PORTACATH PLACEMENT      UPPER GASTROINTESTINAL ENDOSCOPY       Family History   Problem Relation Age of Onset    Heart disease Father     Glaucoma Father     Diabetes Mother     Cancer Maternal Grandmother      uterine    Cancer Maternal Aunt      breast    Breast cancer Maternal Aunt     Heart disease Paternal Grandfather     Heart attack Paternal Grandfather     Diabetes Paternal Grandfather     Leukemia Brother     Hypertension      Diabetes Maternal Grandfather     Heart attack Maternal Grandfather     Breast cancer Cousin     No Known Problems Sister     No Known Problems Maternal Uncle     No Known Problems Paternal Aunt     No Known Problems Paternal Uncle     No Known Problems Paternal Grandmother     Colon cancer Neg Hx     " Ovarian cancer Neg Hx     Amblyopia Neg Hx     Blindness Neg Hx     Cataracts Neg Hx     Macular degeneration Neg Hx     Retinal detachment Neg Hx     Strabismus Neg Hx     Stroke Neg Hx     Thyroid disease Neg Hx      Social History   Substance Use Topics    Smoking status: Never Smoker    Smokeless tobacco: Never Used    Alcohol use No      Comment: 1 per year       PTA Medications   Medication Sig    0.9 % SODIUM CHLORIDE (SODIUM CHLORIDE 0.9%) 0.9 % Soln Inject 3 mLs into the vein every 8 (eight) hours as needed. (Patient taking differently: Inject 3 mLs into the vein every other day. )    acetaminophen (TYLENOL EXTRA STRENGTH) 500 MG tablet Take 1,000 mg by mouth every 6 (six) hours as needed for Pain.    ADVAIR DISKUS 100-50 mcg/dose diskus inhaler inhale 1 PUFF TWICE DAILY    albuterol 90 mcg/actuation inhaler Inhale 2 puffs into the lungs every 4 (four) hours as needed. 2 Aerosol Inhalation Every 4-6 hours    ambrisentan (LETAIRIS) 10 MG Tab Take 1 tablet (10 mg total) by mouth once daily.    azelastine (ASTELIN) 137 mcg (0.1 %) nasal spray 2 sprays by Nasal route 2 (two) times daily.     butalbital-acetaminophen-caffeine -40 mg (FIORICET, ESGIC) -40 mg per tablet Take 1 tablet by mouth every 4 (four) hours as needed for Pain or Headaches.    cetirizine (ZYRTEC) 10 MG tablet Take 10 mg by mouth once daily. 1 Tablet Oral Every day    clindamycin (CLEOCIN T) 1 % lotion daily as needed.     cyanocobalamin, vitamin B-12, 2,500 mcg Subl Place 2,500 mcg under the tongue once daily.     desonide (DESOWEN) 0.05 % ointment 0.05 % daily as needed.  Ointment Topical As directed.  Apply to affected area twice a daily over face    diphenhydrAMINE (BENADRYL) 25 mg capsule Take 25 mg by mouth every 6 (six) hours as needed for Itching.    doxycycline (VIBRA-TABS) 100 MG tablet Take 1 tablet (100 mg total) by mouth 2 (two) times daily.    ergocalciferol (VITAMIN D2) 50,000 unit Cap TAKE  ONE CAPSULE BY MOUTH EVERY 14 DAYS    esomeprazole (NEXIUM) 40 MG capsule TAKE ONE CAPSULE BY MOUTH DAILY before breakfast    ferrous sulfate 325 (65 FE) MG EC tablet Take 325 mg by mouth daily as needed.     fluticasone (FLONASE) 50 mcg/actuation nasal spray 2 sprays by Each Nare route once daily.    folic acid (FOLVITE) 1 MG tablet Take 1 mg by mouth.  Tablet Oral Every day    ipratropium (ATROVENT) 0.03 % nasal spray 2 sprays by Nasal route 2 (two) times daily. (Patient taking differently: 2 sprays by Nasal route 2 (two) times daily as needed. )    levothyroxine (SYNTHROID) 137 MCG Tab tablet Take 1 tablet (137 mcg total) by mouth once daily.    norethindrone-ethinyl estradiol (JUNEL FE 1/20) 1 mg-20 mcg (21)/75 mg (7) per tablet Take 1 tablet by mouth once daily.    ondansetron (ZOFRAN) 4 MG tablet Take 4 mg by mouth every 6 (six) hours as needed. 1 Tablet Oral Every 6 hours    PRAMOSONE 2.5-1 % Lotn lotion daily as needed.     promethazine (PHENERGAN) 25 MG tablet Take 25 mg by mouth every 4 to 6 hours as needed.     tadalafil (ADCIRCA) 20 MG Tab Take 2 tablets (40 mg total) by mouth once daily.    topiramate (TOPAMAX) 100 MG tablet Take 1 tablet (100 mg total) by mouth 2 (two) times daily.    treprostinil (REMODULIN) 2.5 mg/mL Soln Mix cassette as directed and infuse continuously per physician titration orders on dosing sheet. Current dose 46 ng/kg/min    warfarin (COUMADIN) 2.5 MG tablet Take 2.5-3 tablets (6.25-7.5 mg total) by mouth Daily. As directed by coumadin clinic     Allergies   Allergen Reactions    Adhesive Hives     Silk tape    Amoxicillin Rash    Chlorhexidine Other (See Comments)        ROS Constitutional: No fever or chills, no weight changes.  Eyes: No visual changes or photophobia  HEENT: No nasal congestion or sore throat  Respiratory: No cough or shortness of breath  Cardiovascular: No chest pain or palpitations  Gastrointestinal: Good appetite, no nausea or vomiting, no  change in bowel habits  Genitourinary: No hematuria or dysuria  Skin: No rash or pruritis  Hematologic/lymphatic: No easy bruising, bleeding or lymphadenopathy  Musculoskeletal: No arthralgias or myalgias  Neurological: No seizures or tremors  Endocrine: No heat/cold intolerance.  No polyuria/polydipsia.  Psychiatric:  No depression or anxiety.    Objective:      Vital Signs (Most Recent)       Vital Signs Range (Last 24H):       Physical Exam General appearance: Well developed, well nourished  Head: Normocephalic, atraumatic  Eyes:  Conjunctivae nl. Sclera anicteric. PERRL.  HEENT: Lips, mucosa, and tongue normal; teeth and gums normal and oropharynx clear.  Neck: Supple, trachea midline, thyroid not enlarged,   Lungs: Clear to auscultation bilaterally and normal respiratory effort  Heart: Regular rate and rhythm, S1, S2 normal, no murmur, click, rub or gallop  Abdomen: Soft, non-tender non-distended; bowel sounds normal; no masses,  no organomegaly  Extremities: No cyanosis or clubbing. No edema  Pulses: 2+ and symmetric  Skin: Skin color, texture, turgor normal. No rashes or lesions  Lymph nodes: Cervical, supraclavicular, and axillary nodes normal.  Neurologic: Normal strength and tone. No focal numbness or weakness  Psychiatric:  Alert and oriented times 3.  Affect appropriate.      Assessment:      The Patient Has:                                                     * Indicates Principal Hospital Problem           Codes POA    PAH (pulmonary artery hypertension) ICD-10-CM: I27.2  ICD-9-CM: 416.8 Yes    Relevant Results         10/13/14      EF  60     Est. PA Systolic Pressure  46                     Plan:    1.will remove right chauhan catheter, will do blood cultures, send tip of catheter for cultures and place new left IJ Single lumen hick man catheter for remodulin therapy  2. Bactrim BID for 7 days.

## 2017-09-13 ENCOUNTER — TELEPHONE (OUTPATIENT)
Dept: TRANSPLANT | Facility: CLINIC | Age: 47
End: 2017-09-13

## 2017-09-13 ENCOUNTER — ANTI-COAG VISIT (OUTPATIENT)
Dept: CARDIOLOGY | Facility: CLINIC | Age: 47
End: 2017-09-13

## 2017-09-13 DIAGNOSIS — Z79.01 LONG TERM CURRENT USE OF ANTICOAGULANT THERAPY: ICD-10-CM

## 2017-09-13 DIAGNOSIS — I27.9 CHRONIC PULMONARY HEART DISEASE: ICD-10-CM

## 2017-09-13 NOTE — PROGRESS NOTES
Patient questioned and confirmed correct dose.  Reports starting  9/12 generic bactrim 800/160 mg  One tablet Twice a day ,  total of seven days .. Stopped doxycyline 9/10 due to bad reaction.  Procedure for line insertion Tuesday 9/12 .  Patient taken 7.5 mg 9/12 and held 9/11,9/10,and 9/9 due to procedure.    Reports eating cabbage , okra , and coleslaw over the weekend no other changes reported.

## 2017-09-15 ENCOUNTER — TELEPHONE (OUTPATIENT)
Dept: TRANSPLANT | Facility: CLINIC | Age: 47
End: 2017-09-15

## 2017-09-15 DIAGNOSIS — T80.219S: Primary | ICD-10-CM

## 2017-09-15 LAB — BACTERIA CATH TIP CULT: NORMAL

## 2017-09-15 NOTE — TELEPHONE ENCOUNTER
"Patient called to report she has noticed increased jaw pain, back/shoulder aching, as well as hip pain. She notes she has these symptoms because of Remodulin, but they seem more intensified over the last couple of weeks. Pt reports no fever (taking antbx for chauhan infection), but is having "sweats both at night and during the day." Per Dr aRi, will repeat BC (current set pending final result) and continue to monitor. Patient understands to come to ER if temp 100.5 or greater, or she notices she is not feeling any better, or worsens whatsoever.   "

## 2017-09-17 LAB — BACTERIA BLD CULT: NORMAL

## 2017-09-18 ENCOUNTER — HOSPITAL ENCOUNTER (OUTPATIENT)
Dept: CARDIOLOGY | Facility: CLINIC | Age: 47
Discharge: HOME | End: 2017-09-18
Attending: INTERNAL MEDICINE
Payer: COMMERCIAL

## 2017-09-18 ENCOUNTER — ANTI-COAG VISIT (OUTPATIENT)
Dept: CARDIOLOGY | Facility: CLINIC | Age: 47
End: 2017-09-18

## 2017-09-18 ENCOUNTER — LAB VISIT (OUTPATIENT)
Dept: LAB | Facility: HOSPITAL | Age: 47
End: 2017-09-18
Attending: INTERNAL MEDICINE
Payer: COMMERCIAL

## 2017-09-18 DIAGNOSIS — I27.9 CHRONIC PULMONARY HEART DISEASE: ICD-10-CM

## 2017-09-18 DIAGNOSIS — T80.219S: ICD-10-CM

## 2017-09-18 DIAGNOSIS — Z79.01 LONG TERM CURRENT USE OF ANTICOAGULANT THERAPY: ICD-10-CM

## 2017-09-18 DIAGNOSIS — Z79.899 POLYPHARMACY: ICD-10-CM

## 2017-09-18 DIAGNOSIS — I27.0 PRIMARY PULMONARY HYPERTENSION: ICD-10-CM

## 2017-09-18 DIAGNOSIS — R06.82 TACHYPNEA: ICD-10-CM

## 2017-09-18 LAB
ALBUMIN SERPL BCP-MCNC: 3.2 G/DL
ALP SERPL-CCNC: 72 U/L
ALT SERPL W/O P-5'-P-CCNC: 13 U/L
ANION GAP SERPL CALC-SCNC: 6 MMOL/L
ANISOCYTOSIS BLD QL SMEAR: SLIGHT
AST SERPL-CCNC: 15 U/L
BASOPHILS # BLD AUTO: 0.03 K/UL
BASOPHILS NFR BLD: 0.9 %
BILIRUB SERPL-MCNC: 0.1 MG/DL
BNP SERPL-MCNC: 21 PG/ML
BUN SERPL-MCNC: 6 MG/DL
CALCIUM SERPL-MCNC: 8.3 MG/DL
CHLORIDE SERPL-SCNC: 110 MMOL/L
CO2 SERPL-SCNC: 21 MMOL/L
CREAT SERPL-MCNC: 0.8 MG/DL
DIASTOLIC DYSFUNCTION: NO
DIFFERENTIAL METHOD: ABNORMAL
EOSINOPHIL # BLD AUTO: 0.1 K/UL
EOSINOPHIL NFR BLD: 3.9 %
ERYTHROCYTE [DISTWIDTH] IN BLOOD BY AUTOMATED COUNT: 16.4 %
EST. GFR  (AFRICAN AMERICAN): >60 ML/MIN/1.73 M^2
EST. GFR  (NON AFRICAN AMERICAN): >60 ML/MIN/1.73 M^2
ESTIMATED PA SYSTOLIC PRESSURE: 35.72
GIANT PLATELETS BLD QL SMEAR: PRESENT
GLUCOSE SERPL-MCNC: 103 MG/DL
HCT VFR BLD AUTO: 36.4 %
HGB BLD-MCNC: 12.3 G/DL
HYPOCHROMIA BLD QL SMEAR: ABNORMAL
INR PPP: 2.8
LYMPHOCYTES # BLD AUTO: 1 K/UL
LYMPHOCYTES NFR BLD: 28.5 %
MAGNESIUM SERPL-MCNC: 2.1 MG/DL
MCH RBC QN AUTO: 26.6 PG
MCHC RBC AUTO-ENTMCNC: 33.8 G/DL
MCV RBC AUTO: 79 FL
MITRAL VALVE MOBILITY: NORMAL
MONOCYTES # BLD AUTO: 0.3 K/UL
MONOCYTES NFR BLD: 7.8 %
NEUTROPHILS # BLD AUTO: 2 K/UL
NEUTROPHILS NFR BLD: 58.9 %
OVALOCYTES BLD QL SMEAR: ABNORMAL
PLATELET # BLD AUTO: 194 K/UL
PMV BLD AUTO: ABNORMAL FL
POIKILOCYTOSIS BLD QL SMEAR: SLIGHT
POLYCHROMASIA BLD QL SMEAR: ABNORMAL
POTASSIUM SERPL-SCNC: 3.6 MMOL/L
PROT SERPL-MCNC: 7.3 G/DL
PROTHROMBIN TIME: 28.1 SEC
RBC # BLD AUTO: 4.63 M/UL
RETIRED EF AND QEF - SEE NOTES: 60 (ref 55–65)
SODIUM SERPL-SCNC: 137 MMOL/L
TRICUSPID VALVE REGURGITATION: NORMAL
WBC # BLD AUTO: 3.33 K/UL

## 2017-09-18 PROCEDURE — 85025 COMPLETE CBC W/AUTO DIFF WBC: CPT | Mod: TXP

## 2017-09-18 PROCEDURE — 85610 PROTHROMBIN TIME: CPT | Mod: TXP

## 2017-09-18 PROCEDURE — 93306 TTE W/DOPPLER COMPLETE: CPT | Mod: S$GLB,TXP,, | Performed by: INTERNAL MEDICINE

## 2017-09-18 PROCEDURE — 36415 COLL VENOUS BLD VENIPUNCTURE: CPT | Mod: TXP

## 2017-09-18 PROCEDURE — 80053 COMPREHEN METABOLIC PANEL: CPT | Mod: TXP

## 2017-09-18 PROCEDURE — 87040 BLOOD CULTURE FOR BACTERIA: CPT | Mod: TXP

## 2017-09-18 PROCEDURE — 83880 ASSAY OF NATRIURETIC PEPTIDE: CPT | Mod: TXP

## 2017-09-18 PROCEDURE — 83735 ASSAY OF MAGNESIUM: CPT | Mod: TXP

## 2017-09-19 ENCOUNTER — TELEPHONE (OUTPATIENT)
Dept: TRANSPLANT | Facility: CLINIC | Age: 47
End: 2017-09-19

## 2017-09-22 ENCOUNTER — OFFICE VISIT (OUTPATIENT)
Dept: TRANSPLANT | Facility: CLINIC | Age: 47
End: 2017-09-22
Payer: COMMERCIAL

## 2017-09-22 ENCOUNTER — TELEPHONE (OUTPATIENT)
Dept: TRANSPLANT | Facility: CLINIC | Age: 47
End: 2017-09-22

## 2017-09-22 VITALS
DIASTOLIC BLOOD PRESSURE: 70 MMHG | BODY MASS INDEX: 27.42 KG/M2 | WEIGHT: 136 LBS | SYSTOLIC BLOOD PRESSURE: 99 MMHG | HEART RATE: 73 BPM | HEIGHT: 59 IN

## 2017-09-22 DIAGNOSIS — I36.1 NON-RHEUMATIC TRICUSPID VALVE INSUFFICIENCY: ICD-10-CM

## 2017-09-22 DIAGNOSIS — Z76.82 AWAITING ORGAN TRANSPLANT STATUS: ICD-10-CM

## 2017-09-22 DIAGNOSIS — I27.0 PRIMARY PULMONARY HYPERTENSION: Primary | ICD-10-CM

## 2017-09-22 DIAGNOSIS — I27.9 CHRONIC PULMONARY HEART DISEASE: ICD-10-CM

## 2017-09-22 DIAGNOSIS — J96.11 CHRONIC RESPIRATORY FAILURE WITH HYPOXIA: ICD-10-CM

## 2017-09-22 DIAGNOSIS — Z79.01 LONG TERM CURRENT USE OF ANTICOAGULANT THERAPY: ICD-10-CM

## 2017-09-22 DIAGNOSIS — E66.3 OVERWEIGHT (BMI 25.0-29.9): ICD-10-CM

## 2017-09-22 DIAGNOSIS — G47.33 OSA (OBSTRUCTIVE SLEEP APNEA): ICD-10-CM

## 2017-09-22 PROCEDURE — 99999 PR PBB SHADOW E&M-EST. PATIENT-LVL III: CPT | Mod: PBBFAC,TXP,, | Performed by: INTERNAL MEDICINE

## 2017-09-22 PROCEDURE — 3008F BODY MASS INDEX DOCD: CPT | Mod: S$GLB,TXP,, | Performed by: INTERNAL MEDICINE

## 2017-09-22 PROCEDURE — 99214 OFFICE O/P EST MOD 30 MIN: CPT | Mod: S$GLB,TXP,, | Performed by: INTERNAL MEDICINE

## 2017-09-22 RX ORDER — CITALOPRAM 10 MG/1
10 TABLET ORAL DAILY
Qty: 30 TABLET | Refills: 11 | Status: SHIPPED | OUTPATIENT
Start: 2017-09-22 | End: 2018-09-22

## 2017-09-22 NOTE — PATIENT INSTRUCTIONS
1. Continue current therapy.  2.  Keep salt intake to under 2000 mg sodium, fluids to under 2 L (64 oz)  3  Check your weights every morning after getting out of bed and urinating. If your weight goes up 3# overnight or 5# in one week take call us   4. Call us if you find yourself getting more short of breath, have more swelling or unexpected weight changes, fever, chills, or problems with your line

## 2017-09-22 NOTE — PROGRESS NOTES
Subjective:     HPI  47 y.o. White female who presents for PH follow-up. Patient was diagnosed with IPAH about 5 and a half years, ABHIJEET recently, in eval for LUT. Initial symptom- syncope.  Patient has been on triple therapy: Remodulin at  72ng/kg/min infusion, Letaris 10mg qdaily, and Adcirca 40mg qdaily.    Since last visit, pt had to have her line changed as it started draining brown bloody discharge and was red and no longer incorporated (line was 9 year old- see Sarah's note and photo from 9/8/17) The line was exchanged by JULIETA 9/12/17 and pt was started on bactrim as the cx grew out MSSA.  Bld cx 9/18/17  NGTD.     Today she c/o pain in her r shoulder that he has had since her original line was placed- tried PT/rehab which didn't help, says Dr darby put her on an antidepressant with some pain medicine in it but was low dose and didn't do anything for her pain and was stopped.   Has been feeling ok otherwise, has felt itchy with little bumps on her hands- using hydrocortisone cream- has also been coughing up some phlegm and took some mucinex which helped  No swelling, no LH, keeps an eye on her salt intake.   Sometimes gets pain in her LUQ and flank, +HA, +wt gain (thinks its chocolate)    TTE 9/18/17  1 - Normal left ventricular systolic function (EF 60-65%).     2 - No wall motion abnormalities.     3 - Normal left ventricular diastolic function.     4 - Right atrial enlargement.     5 - Right ventricular enlargement with moderately depressed systolic function.     6 - The estimated PA systolic pressure is 36 mmHg.     7 - Mild tricuspid regurgitation.     6mw 7/13/17 457m unchanged from 4/14/17 (457 in Nov, 488m unchanged last 3 visits with me( 450 11/15/16,  457m, 518.5m, 533.75, 549m April, 463.6m prev visit)                                              O2 sat  99-> 98 %                                                           HR 79->110                                                      BP  128/65-->136/72                                                        Ba 2 ->9    [facility] 4/16  AOPRES: 122/85 (97)  AOSAT: 98  FICKCI: 2.81  FICKCO: 4.38  PAPRES: 101/34 (59)  PASAT: 71  PVR: 10.73  PWPRES: 14/8 (10)  RAPRES: 9/5 (4)  RVPRES: 91/-4, 8    TTE last visit   1 - Normal left ventricular systolic function (EF 60-65%).     2 - Right ventricular enlargement with mildly to moderately depressed systolic function.     3 - Normal left ventricular diastolic function.     4 - Pulmonary hypertension. The estimated PA systolic pressure is 41 mmHg.       TTE 3/4/16  1 - Normal left ventricular systolic function (EF 60-65%).   2 - Normal left ventricular diastolic function.   3 - Right ventricle is upper limit of normal in size with low normal to mildly depressed systolic function. TAPSE 2.2 RV 3.7 cm   4 - Trivial to mild tricuspid regurgitation.   5 - Pulmonary hypertension. The estimated PA systolic pressure is 60 mmHg.     TTE Oct  1 - Normal left ventricular systolic function (EF 60-65%).   2 - Left ventricular diastolic dysfunction.   3 - Biatrial enlargement.   4 - Right ventricular enlargement with hypertrophy, with normal systolic function.   5 - Pulmonary hypertension.   6 - Trivial mitral regurgitation.   7 - Trivial tricuspid regurgitation.   PASp 46    CXR 2/3/16  Vascular catheter now identified, its tip in the superior vena cava just superior to its junction with the right atrium. Heart size is normal, as is the appearance of the pulmonary vascularity. Lung zones are clear, and free of significant airspace consolidation or volume loss. No pleural fluid. No hilar or mediastinal mass lesion. No pneumothorax.     VQ Scan 8/17/16: low probability    Review of Systems   Constitution: Positive for malaise/fatigue and weight gain. Negative for chills and fever.   HENT: Positive for congestion.    Eyes: Negative.    Cardiovascular: Positive for dyspnea on exertion. Negative for leg swelling, near-syncope, orthopnea,  "palpitations, paroxysmal nocturnal dyspnea and syncope.   Respiratory: Positive for cough. Negative for shortness of breath.    Endocrine: Negative.    Skin: Negative.    Musculoskeletal: Positive for myalgias.        Left-axillary chest wall pain   Gastrointestinal: Negative for bloating, abdominal pain and change in bowel habit.   Neurological: Positive for headaches and light-headedness. Negative for dizziness.   Psychiatric/Behavioral: Negative for depression.        Objective:  BP 99/70 (BP Location: Left arm, Patient Position: Sitting, BP Method: Large (Automatic))   Pulse 73   Ht 4' 11" (1.499 m)   Wt 61.7 kg (136 lb 0.4 oz)   BMI 27.47 kg/m²       Physical Exam   Constitutional: She is oriented to person, place, and time. She appears well-developed and well-nourished.   HENT:   Head: Normocephalic and atraumatic.   Neck: Neck supple. No JVD present. No thyromegaly present.   Cardiovascular: Normal rate and regular rhythm.  Exam reveals no gallop and no friction rub.    No murmur heard.  Physiologically split S2 with prominent P2 component     Pulmonary/Chest: Effort normal and breath sounds normal. No respiratory distress. She has no wheezes. She has no rales.   Abdominal: Soft. Bowel sounds are normal. She exhibits no distension. There is no tenderness.   Musculoskeletal: She exhibits no edema.   Neurological: She is alert and oriented to person, place, and time.   Skin: Skin is warm and dry.   Line exit site is clean, no erythema, scab or discharge   Psychiatric: She has a normal mood and affect.                         Chemistry        Component Value Date/Time     09/18/2017 1230    K 3.6 09/18/2017 1230     09/18/2017 1230    CO2 21 (L) 09/18/2017 1230    BUN 6 09/18/2017 1230    CREATININE 0.8 09/18/2017 1230     09/18/2017 1230        Component Value Date/Time    CALCIUM 8.3 (L) 09/18/2017 1230    ALKPHOS 72 09/18/2017 1230    AST 15 09/18/2017 1230    ALT 13 09/18/2017 1230    " BILITOT 0.1 09/18/2017 1230            Magnesium   Date Value Ref Range Status   09/18/2017 2.1 1.6 - 2.6 mg/dL Final       Lab Results   Component Value Date    WBC 3.33 (L) 09/18/2017    HGB 12.3 09/18/2017    HCT 36.4 (L) 09/18/2017    MCV 79 (L) 09/18/2017     09/18/2017       Lab Results   Component Value Date    INR 2.8 (H) 09/18/2017    INR 1.0 09/12/2017    INR 2.5 08/30/2017       BNP   Date Value Ref Range Status   09/18/2017 21 0 - 99 pg/mL Final     Comment:     Values of less than 100 pg/ml are consistent with non-CHF populations.   05/11/2017 18 0 - 99 pg/mL Final     Comment:     Values of less than 100 pg/ml are consistent with non-CHF populations.   05/11/2017 18 0 - 99 pg/mL Final     Comment:     Values of less than 100 pg/ml are consistent with non-CHF populations.       No results found for: LDH          Assessment:       1. Primary pulmonary hypertension- FC II-III no volume overload on exam today, BNP remains low, nop 6mw today,  RV enlarged and  Hypertrophied with mild-mod red systolic fxn on echo this week, despite increasing remodulin dose- PAP by echo does not correlate with her RHC which shows severe PAH, marginal Co and high RA- overall though she remains clinically stable on IV remodulin   2. Encounter for long-term (current) use of anticoagulants    3. Obesity    4. Menorrhagia    5. Tricuspid regurgitation         Plan:     No changes from a PH standpoint today- on triple tx and unable to increase remodulin further due to SE- deferred for lung transplant at this time (financial and psychosocial issues)    Keep salt intake to under 2000 mg sodium, fluids to under 2 L (64 oz)    Check weights every morning after getting out of bed and urinating. If weight goes up 3# overnight or 5# in one week she should call us    Will give her celexa for depression- needs therapy but cant afford it. Really would like to see her find something to do with her time    F/u 3 mo with labs and walk  and repeat RHC in the spring

## 2017-09-23 LAB — BACTERIA BLD CULT: NORMAL

## 2017-09-25 ENCOUNTER — TELEPHONE (OUTPATIENT)
Dept: TRANSPLANT | Facility: CLINIC | Age: 47
End: 2017-09-25

## 2017-09-25 NOTE — TELEPHONE ENCOUNTER
Correspondence from DENNY Solis with [facility]:    A follow up visit for skin irritation was completed along with a central line dressing change with the pt and her mother.Both are nice pleasant people.   Eason was placed September 12th, switched from right chest to left chest, catheter infection oral antibiotics no hospitalizations for infections.Mepilex dressing caused a skin irritation. Pt had a SPO2 of 97% on room air while resting. All vital signs stable, lung sounds clear, pt c/o diarrhea and body aches which she says have been going on for a while.  Pt has been on IVR 9 years, and her current dose for 1 year now, occasional side effects include aches and diarrhea her main side effects.  Dressing changed using sterile technique, alcohol swabs x 3, and cholorhexide swabs used , a biopatch and IV 3000 dressing applied, pt instructed if dressing is non irritating that this will be the dressing that will be used from now . This is week 2 dressing, the current site and the previous dressing site both look very good no signs of infection to either site.  Pt instructed to try this dressing if no skin irritation to notify [facility] to send this dressing only. If this dressing causes irritation she next will try a tagaderm dressing to see if it relieves irritation.  If there is anything else I can assist you with please let me know, have a great day today.

## 2017-10-02 ENCOUNTER — TELEPHONE (OUTPATIENT)
Dept: TRANSPLANT | Facility: CLINIC | Age: 47
End: 2017-10-02

## 2017-10-09 ENCOUNTER — ANTI-COAG VISIT (OUTPATIENT)
Dept: CARDIOLOGY | Facility: CLINIC | Age: 47
End: 2017-10-09
Payer: COMMERCIAL

## 2017-10-09 DIAGNOSIS — Z79.01 LONG TERM CURRENT USE OF ANTICOAGULANT THERAPY: Primary | ICD-10-CM

## 2017-10-09 DIAGNOSIS — I27.9 CHRONIC PULMONARY HEART DISEASE: ICD-10-CM

## 2017-10-09 LAB — INR PPP: 2.2 (ref 2–3)

## 2017-10-09 PROCEDURE — 85610 PROTHROMBIN TIME: CPT | Mod: QW,S$GLB,,

## 2017-10-09 PROCEDURE — 99211 OFF/OP EST MAY X REQ PHY/QHP: CPT | Mod: 25,S$GLB,,

## 2017-10-09 NOTE — PROGRESS NOTES
INR good. Patient reports new med, celexa. No interaction expected. No other changes. No signs or symptoms of bleeding. We will continue on maintenance dose. Recheck INR in 4 weeks

## 2017-10-17 ENCOUNTER — DOCUMENTATION ONLY (OUTPATIENT)
Dept: TRANSPLANT | Facility: CLINIC | Age: 47
End: 2017-10-17

## 2017-10-18 ENCOUNTER — OFFICE VISIT (OUTPATIENT)
Dept: GASTROENTEROLOGY | Facility: CLINIC | Age: 47
End: 2017-10-18
Payer: COMMERCIAL

## 2017-10-18 VITALS
HEIGHT: 59 IN | BODY MASS INDEX: 26.98 KG/M2 | WEIGHT: 133.81 LBS | SYSTOLIC BLOOD PRESSURE: 115 MMHG | HEART RATE: 76 BPM | DIASTOLIC BLOOD PRESSURE: 66 MMHG

## 2017-10-18 DIAGNOSIS — K58.0 IRRITABLE BOWEL SYNDROME WITH DIARRHEA: Primary | ICD-10-CM

## 2017-10-18 PROCEDURE — 99999 PR PBB SHADOW E&M-EST. PATIENT-LVL III: CPT | Mod: PBBFAC,TXP,, | Performed by: PHYSICIAN ASSISTANT

## 2017-10-18 PROCEDURE — 99213 OFFICE O/P EST LOW 20 MIN: CPT | Mod: S$GLB,TXP,, | Performed by: PHYSICIAN ASSISTANT

## 2017-10-18 RX ORDER — LORATADINE 10 MG/1
10 TABLET ORAL NIGHTLY
COMMUNITY

## 2017-10-18 NOTE — PROGRESS NOTES
[facility] Consultation Note    Reason for Consult:  The encounter diagnosis was Irritable bowel syndrome with diarrhea.    PCP:   Lulu Sandhu       Referring MD:  No referring provider defined for this encounter.    HPI:  This is a 47 y.o. female here to follow up on her IBS  Last seen 4 months ago and treated with Xifaxan x 2 weeks.   Today she does report significant relief after taking the xifaxan.    She says she was doing well until she got a central line staph infection and required antibiotics last month  Stool is formed and pastey  +Excessive gas  Yesterday had an episode of rectal urgency, relief with levsin  Has been seen for her anxiety and depression    Also has intermittent LUQ and epigastric cramping  Lasts a few hours  No relations to meals  Still taking Nexium and denies breakthrough GERD    Bentyl caused dry mouth, so restarted Levsin prn  metamucil - made her body ache  Saw no difference on a lactose-free diet x 2 weeks  Imodium has caused constipation in the past  Diarrhea is worsensed by her medication Remodulin  Denies use of artificial sweeteners or diet drinks     ROS:  Constitutional: No fevers, chills, No weight loss  ENT: No allergies  CV: No chest pain  Pulm: No cough, No shortness of breath  Ophtho: No vision changes  GI: see HPI  Derm: No rash  Heme: No lymphadenopathy, No bruising  MSK: No arthritis  : No dysuria, No hematuria  Endo: No hot or cold intolerance  Neuro: No syncope, No seizure  Psych: No anxiety, No depression    Medical History:  has a past medical history of AR (allergic rhinitis); Cholelithiasis, common bile duct; Chronic low back pain; Eye pressure (2017); GERD (gastroesophageal reflux disease); History of migraine headaches; Hypothyroidism; Lumbar disc disease; Menorrhagia; Mild asthma; Obesity; Plantar fasciitis of left foot; Primary pulmonary hypertension; Seizure disorder; Seizures; Sleep apnea; TMJ (dislocation of temporomandibular  joint); and Tricuspid regurgitation.    Surgical History:  has a past surgical history that includes Portacath placement; Cardiac catheterization; Upper gastrointestinal endoscopy; and Central venous catheter insertion.    Family History: family history includes Breast cancer in her cousin and maternal aunt; Cancer in her maternal aunt and maternal grandmother; Diabetes in her maternal grandfather, mother, and paternal grandfather; Glaucoma in her father; Heart attack in her maternal grandfather and paternal grandfather; Heart disease in her father and paternal grandfather; Leukemia in her brother; No Known Problems in her maternal uncle, paternal aunt, paternal grandmother, paternal uncle, and sister..     Social History:  reports that she has never smoked. She has never used smokeless tobacco. She reports that she does not drink alcohol or use drugs.    Review of patient's allergies indicates:   Allergen Reactions    Adhesive Hives     Silk tape    Amoxicillin Rash    Chlorhexidine Other (See Comments)       Current Outpatient Prescriptions on File Prior to Visit   Medication Sig Dispense Refill    0.9 % SODIUM CHLORIDE (SODIUM CHLORIDE 0.9%) 0.9 % Soln Inject 3 mLs into the vein every 8 (eight) hours as needed. (Patient taking differently: Inject 3 mLs into the vein every other day. ) 900 mL 11    acetaminophen (TYLENOL EXTRA STRENGTH) 500 MG tablet Take 1,000 mg by mouth every 6 (six) hours as needed for Pain.      ADVAIR DISKUS 100-50 mcg/dose diskus inhaler inhale 1 PUFF TWICE DAILY 1 each 11    albuterol 90 mcg/actuation inhaler Inhale 2 puffs into the lungs every 4 (four) hours as needed. 2 Aerosol Inhalation Every 4-6 hours 1 Inhaler 3    ambrisentan (LETAIRIS) 10 MG Tab Take 1 tablet (10 mg total) by mouth once daily. 30 tablet 11    azelastine (ASTELIN) 137 mcg (0.1 %) nasal spray 2 sprays by Nasal route 2 (two) times daily.       butalbital-acetaminophen-caffeine -40 mg (FIORICET, ESGIC)  -40 mg per tablet Take 1 tablet by mouth every 4 (four) hours as needed for Pain or Headaches.      citalopram (CELEXA) 10 MG tablet Take 1 tablet (10 mg total) by mouth once daily. 30 tablet 11    clindamycin (CLEOCIN T) 1 % lotion daily as needed.       cyanocobalamin, vitamin B-12, 2,500 mcg Subl Place 2,500 mcg under the tongue once daily.       desonide (DESOWEN) 0.05 % ointment 0.05 % daily as needed.  Ointment Topical As directed.  Apply to affected area twice a daily over face      diphenhydrAMINE (BENADRYL) 25 mg capsule Take 25 mg by mouth every 6 (six) hours as needed for Itching.      ergocalciferol (VITAMIN D2) 50,000 unit Cap TAKE ONE CAPSULE BY MOUTH EVERY 14 DAYS 6 capsule 0    esomeprazole (NEXIUM) 40 MG capsule TAKE ONE CAPSULE BY MOUTH DAILY before breakfast 90 capsule 3    ferrous sulfate 325 (65 FE) MG EC tablet Take 325 mg by mouth daily as needed.       fluticasone (FLONASE) 50 mcg/actuation nasal spray 2 sprays by Each Nare route once daily. 1 Bottle 6    folic acid (FOLVITE) 1 MG tablet Take 1 mg by mouth.  Tablet Oral Every day      ipratropium (ATROVENT) 0.03 % nasal spray 2 sprays by Nasal route 2 (two) times daily. (Patient taking differently: 2 sprays by Nasal route 2 (two) times daily as needed. ) 30 mL 0    levothyroxine (SYNTHROID) 137 MCG Tab tablet Take 1 tablet (137 mcg total) by mouth once daily. 90 tablet 3    norethindrone-ethinyl estradiol (JUNEL FE 1/20) 1 mg-20 mcg (21)/75 mg (7) per tablet Take 1 tablet by mouth once daily. 28 tablet 12    ondansetron (ZOFRAN) 4 MG tablet Take 4 mg by mouth every 6 (six) hours as needed. 1 Tablet Oral Every 6 hours      PRAMOSONE 2.5-1 % Lotn lotion daily as needed.       promethazine (PHENERGAN) 25 MG tablet Take 25 mg by mouth every 4 to 6 hours as needed.       tadalafil (ADCIRCA) 20 MG Tab Take 2 tablets (40 mg total) by mouth once daily. 60 tablet 11    topiramate (TOPAMAX) 100 MG tablet Take 1 tablet (100 mg  "total) by mouth 2 (two) times daily. 60 tablet 11    treprostinil (REMODULIN) 2.5 mg/mL Soln Mix cassette as directed and infuse continuously per physician titration orders on dosing sheet. Current dose 46 ng/kg/min 60 mL 11    warfarin (COUMADIN) 2.5 MG tablet Take 2.5-3 tablets (6.25-7.5 mg total) by mouth Daily. As directed by coumadin clinic 90 tablet 11    cetirizine (ZYRTEC) 10 MG tablet Take 10 mg by mouth once daily. 1 Tablet Oral Every day       No current facility-administered medications on file prior to visit.          Objective Findings:    Vital Signs:  /66   Pulse 76   Ht 4' 11" (1.499 m)   Wt 60.7 kg (133 lb 13.1 oz)   BMI 27.03 kg/m²   Body mass index is 27.03 kg/m².    Physical Exam:  General Appearance: Well appearing in no acute distress  Head:   Normocephalic, without obvious abnormality  Eyes:    No scleral icterus  ENT: Neck supple, Lips, mucosa, and tongue normal  Lungs: CTA bilaterally in anterior and posterior fields, no wheezes, no crackles.  Heart:  Regular rate and rhythm, S1, S2 normal, + murmurs heard  Abdomen: Soft, non tender, non distended with positive bowel sounds in all four quadrants.   Extremities:no edema  Skin: No rash  Neurologic: AAO x 3      Labs:  Lab Results   Component Value Date    WBC 3.33 (L) 09/18/2017    HGB 12.3 09/18/2017    HCT 36.4 (L) 09/18/2017     09/18/2017    CHOL 128 01/16/2017    TRIG 101 01/16/2017    HDL 31 (L) 01/16/2017    ALT 13 09/18/2017    AST 15 09/18/2017     09/18/2017    K 3.6 09/18/2017     09/18/2017    CREATININE 0.8 09/18/2017    BUN 6 09/18/2017    CO2 21 (L) 09/18/2017    TSH 1.808 01/16/2017    INR 2.2 10/09/2017    HGBA1C 5.2 01/16/2017       Imaging:    Endoscopy:    EGD 3/2012 - reactive gastritis, H. Pylori neg    Assessment:  1. Irritable bowel syndrome with diarrhea      48yo F with IBS presents for followup. She reports significant improvement in bowel regularity and pain after use of Xifaxan " until a recent central line infection requiring antibiotics last month. Symptoms are likely related to dysbiosis.    Recommendations:  1. Take probiotic twice daily    2. Take levsin as needed    3.sadgas and low fod map diets    If no relief in 2 months will retreat with xifaxan      Return in about 2 months (around 12/18/2017).      Order summary:         Thank you so much for allowing me to participate in the care of Yuni BreVamsi Anguiano PA-C

## 2017-10-25 ENCOUNTER — TELEPHONE (OUTPATIENT)
Dept: TRANSPLANT | Facility: CLINIC | Age: 47
End: 2017-10-25

## 2017-10-25 NOTE — TELEPHONE ENCOUNTER
"Pt reports "I had something I think was edema but I am not 100% sure." She reports pain and swelling in her "2nd" toe on left foot that started around 10/18. The next day, she noticed swelling into "3rd toe" accompanied by redness and joint swelling/tenderness. Pt reports this took about 3 days to spontaneously resolve, and this happened with her continuing normal daily activity. Pt also mentioned to GI doc when she went for appt recently. Pt wonders if this might be gout, and if she should have uric acid level drawn. Pt was deferred to PCP if she has recurrence of swelling/pain, but also notified her that Dr Rai would be made aware. Pt will also contact us if she has recurrence of swelling.  "

## 2017-10-26 ENCOUNTER — TELEPHONE (OUTPATIENT)
Dept: TRANSPLANT | Facility: CLINIC | Age: 47
End: 2017-10-26

## 2017-10-30 ENCOUNTER — TELEPHONE (OUTPATIENT)
Dept: TRANSPLANT | Facility: CLINIC | Age: 47
End: 2017-10-30

## 2017-10-31 ENCOUNTER — OFFICE VISIT (OUTPATIENT)
Dept: NEUROLOGY | Facility: CLINIC | Age: 47
End: 2017-10-31
Payer: COMMERCIAL

## 2017-10-31 VITALS
WEIGHT: 133.81 LBS | SYSTOLIC BLOOD PRESSURE: 137 MMHG | HEART RATE: 72 BPM | BODY MASS INDEX: 26.98 KG/M2 | DIASTOLIC BLOOD PRESSURE: 62 MMHG | HEIGHT: 59 IN

## 2017-10-31 DIAGNOSIS — R51.9 CHRONIC NONINTRACTABLE HEADACHE, UNSPECIFIED HEADACHE TYPE: ICD-10-CM

## 2017-10-31 DIAGNOSIS — G89.29 CHRONIC NONINTRACTABLE HEADACHE, UNSPECIFIED HEADACHE TYPE: ICD-10-CM

## 2017-10-31 DIAGNOSIS — G43.009 MIGRAINE WITHOUT AURA AND WITHOUT STATUS MIGRAINOSUS, NOT INTRACTABLE: Primary | ICD-10-CM

## 2017-10-31 PROCEDURE — 99214 OFFICE O/P EST MOD 30 MIN: CPT | Mod: NTX,S$GLB,, | Performed by: NEUROLOGICAL SURGERY

## 2017-10-31 PROCEDURE — 99999 PR PBB SHADOW E&M-EST. PATIENT-LVL III: CPT | Mod: PBBFAC,TXP,, | Performed by: NEUROLOGICAL SURGERY

## 2017-10-31 RX ORDER — BUTALBITAL, ACETAMINOPHEN AND CAFFEINE 50; 325; 40 MG/1; MG/1; MG/1
1 TABLET ORAL EVERY 4 HOURS PRN
Qty: 40 TABLET | Refills: 3 | Status: SHIPPED | OUTPATIENT
Start: 2017-10-31 | End: 2018-10-31

## 2017-10-31 RX ORDER — TOPIRAMATE 100 MG/1
100 TABLET, FILM COATED ORAL 2 TIMES DAILY
Qty: 60 TABLET | Refills: 11 | Status: SHIPPED | OUTPATIENT
Start: 2017-10-31 | End: 2018-11-06 | Stop reason: SDUPTHER

## 2017-10-31 NOTE — PROGRESS NOTES
"Chief Complaint   Patient presents with    Headache        Yuni Perez is a 47 y.o. female with a history of multiple medical diagnoses as listed below that presents for evaluation of headaches. She is accompanied to this visit by her mother. She was diagnosed with pulmonary hypertension about 8 years ago and around the same time she was diagnosed with a seizure. She had workup at that time that included MRI that showed signs of "old injury" and she had EEG that showed "lots of spikes and waves" per her mother. She says that since that time she has been started on Topamax which has been titrated up. She has not been having any seizures but she has bene having some occasional word finding difficulty that she has attributed to the medication. She has tried tylenol because as she has been titrating her medication to treat pulmonary hypertension sh marie been having bilateral intense stabbing pains in her head. She does say that with time her headaches have been better but she has noticed many more headaches since she began the Remodulin. She has not taken any other headache medications in the past.    Interval History  11/17/2016  She has still been having headaches after taking her medications in particular her vasodilators. She has found that her PRN medications are somewhat helpful in minimizing the pain that she has from her headaches. She has not had any mew problems since she was last seen in clinic.    03/23/2017  Since last seen in clinic she has been approved to get on the lung transplant list and she will be making strides to get set up to to receive new lungs. She has been taking her topamax as directed and has felt that overall she has been having fewer headaches but she still has needed Tylenol and on rare occasions Fioricet to get rid of her headaches. She has been having some cognitive difficulty with her current medications but she feels that it is tolerable. She has not had any seizure " like activity.    06/13/2017  Since last seen in clinic she says that she has been able to be placed on the donor list. She says that overall she feels that she has been stable with regards to her breathing. She continues to have chronic sinus issues that have been essentially refractory to any medications that she has tried including antihistamines and nasal rinses. She feels that she is able to clear her sinuses and have it come right back. She has headaches almost daily but despite the frequency of the headaches she feels that most are not migraines. She has not had any seizures since last seen. She is tolerating all of her medications well without any problems.    10/31/2017  She has continued to make preparations for lung transplant.  Said that she has been frustrated with the process and she does not have much guidance which is causing increased stress.  Headaches overall have been about the same, but are responsive to Fioricet.  She says the medication helps give her adequate relief of the pain.    PAST MEDICAL HISTORY:  Past Medical History:   Diagnosis Date    AR (allergic rhinitis)     Cholelithiasis, common bile duct     Chronic low back pain     Eye pressure 2017    GERD (gastroesophageal reflux disease)     History of migraine headaches     Hypothyroidism     Lumbar disc disease     Menorrhagia     Mild asthma     Obesity     Plantar fasciitis of left foot     Primary pulmonary hypertension     followed by heart transplant/pulmonary     Seizure disorder     x 1 in 2008    Seizures     Sleep apnea     TMJ (dislocation of temporomandibular joint)     Tricuspid regurgitation        PAST SURGICAL HISTORY:  Past Surgical History:   Procedure Laterality Date    CARDIAC CATHETERIZATION      CENTRAL VENOUS CATHETER INSERTION      PORTACATH PLACEMENT      UPPER GASTROINTESTINAL ENDOSCOPY         SOCIAL HISTORY:  Social History     Social History    Marital status: Single     Spouse name:  N/A    Number of children: 0    Years of education: N/A     Occupational History    disabled [employer]     Social History Main Topics    Smoking status: Never Smoker    Smokeless tobacco: Never Used    Alcohol use No      Comment: 1 per year    Drug use: No    Sexual activity: No      Comment: pt is a virgin     Other Topics Concern    Not on file     Social History Narrative    No narrative on file       FAMILY HISTORY:  Family History   Problem Relation Age of Onset    Heart disease Father     Glaucoma Father     Diabetes Mother     Cancer Maternal Grandmother      uterine    Cancer Maternal Aunt      breast    Breast cancer Maternal Aunt     Heart disease Paternal Grandfather     Heart attack Paternal Grandfather     Diabetes Paternal Grandfather     Leukemia Brother     Hypertension      Diabetes Maternal Grandfather     Heart attack Maternal Grandfather     Breast cancer Cousin     No Known Problems Sister     No Known Problems Maternal Uncle     No Known Problems Paternal Aunt     No Known Problems Paternal Uncle     No Known Problems Paternal Grandmother     Colon cancer Neg Hx     Ovarian cancer Neg Hx     Amblyopia Neg Hx     Blindness Neg Hx     Cataracts Neg Hx     Macular degeneration Neg Hx     Retinal detachment Neg Hx     Strabismus Neg Hx     Stroke Neg Hx     Thyroid disease Neg Hx        ALLERGIES AND MEDICATIONS: updated and reviewed.  Review of patient's allergies indicates:   Allergen Reactions    Adhesive Hives     Silk tape    Amoxicillin Rash    Chlorhexidine Other (See Comments)     Current Outpatient Prescriptions   Medication Sig Dispense Refill    0.9 % SODIUM CHLORIDE (SODIUM CHLORIDE 0.9%) 0.9 % Soln Inject 3 mLs into the vein every 8 (eight) hours as needed. (Patient taking differently: Inject 3 mLs into the vein every other day. ) 900 mL 11    acetaminophen (TYLENOL EXTRA STRENGTH) 500 MG tablet Take 1,000 mg by mouth every 6  (six) hours as needed for Pain.      ADVAIR DISKUS 100-50 mcg/dose diskus inhaler inhale 1 PUFF TWICE DAILY 1 each 11    albuterol 90 mcg/actuation inhaler Inhale 2 puffs into the lungs every 4 (four) hours as needed. 2 Aerosol Inhalation Every 4-6 hours 1 Inhaler 3    ambrisentan (LETAIRIS) 10 MG Tab Take 1 tablet (10 mg total) by mouth once daily. 30 tablet 11    azelastine (ASTELIN) 137 mcg (0.1 %) nasal spray 2 sprays by Nasal route 2 (two) times daily.       butalbital-acetaminophen-caffeine -40 mg (FIORICET, ESGIC) -40 mg per tablet Take 1 tablet by mouth every 4 (four) hours as needed for Pain or Headaches. 40 tablet 3    cetirizine (ZYRTEC) 10 MG tablet Take 10 mg by mouth once daily. 1 Tablet Oral Every day      citalopram (CELEXA) 10 MG tablet Take 1 tablet (10 mg total) by mouth once daily. 30 tablet 11    clindamycin (CLEOCIN T) 1 % lotion daily as needed.       cyanocobalamin, vitamin B-12, 2,500 mcg Subl Place 2,500 mcg under the tongue once daily.       desonide (DESOWEN) 0.05 % ointment 0.05 % daily as needed.  Ointment Topical As directed.  Apply to affected area twice a daily over face      diphenhydrAMINE (BENADRYL) 25 mg capsule Take 25 mg by mouth every 6 (six) hours as needed for Itching.      ergocalciferol (VITAMIN D2) 50,000 unit Cap TAKE ONE CAPSULE BY MOUTH EVERY 14 DAYS 6 capsule 0    esomeprazole (NEXIUM) 40 MG capsule TAKE ONE CAPSULE BY MOUTH DAILY before breakfast 90 capsule 3    ferrous sulfate 325 (65 FE) MG EC tablet Take 325 mg by mouth daily as needed.       fluticasone (FLONASE) 50 mcg/actuation nasal spray 2 sprays by Each Nare route once daily. 1 Bottle 6    folic acid (FOLVITE) 1 MG tablet Take 1 mg by mouth.  Tablet Oral Every day      ipratropium (ATROVENT) 0.03 % nasal spray 2 sprays by Nasal route 2 (two) times daily. (Patient taking differently: 2 sprays by Nasal route 2 (two) times daily as needed. ) 30 mL 0    levothyroxine (SYNTHROID) 137  MCG Tab tablet Take 1 tablet (137 mcg total) by mouth once daily. 90 tablet 3    loratadine (CLARITIN) 10 mg tablet Take 10 mg by mouth once daily.      norethindrone-ethinyl estradiol (JUNEL FE 1/20) 1 mg-20 mcg (21)/75 mg (7) per tablet Take 1 tablet by mouth once daily. 28 tablet 12    ondansetron (ZOFRAN) 4 MG tablet Take 4 mg by mouth every 6 (six) hours as needed. 1 Tablet Oral Every 6 hours      PRAMOSONE 2.5-1 % Lotn lotion daily as needed.       promethazine (PHENERGAN) 25 MG tablet Take 25 mg by mouth every 4 to 6 hours as needed.       tadalafil (ADCIRCA) 20 MG Tab Take 2 tablets (40 mg total) by mouth once daily. 60 tablet 11    topiramate (TOPAMAX) 100 MG tablet Take 1 tablet (100 mg total) by mouth 2 (two) times daily. 60 tablet 11    treprostinil (REMODULIN) 2.5 mg/mL Soln Mix cassette as directed and infuse continuously per physician titration orders on dosing sheet. Current dose 46 ng/kg/min 60 mL 11    warfarin (COUMADIN) 2.5 MG tablet Take 2.5-3 tablets (6.25-7.5 mg total) by mouth Daily. As directed by coumadin clinic 90 tablet 11     No current facility-administered medications for this visit.        Review of Systems   Constitutional: Negative for activity change, appetite change, fever and unexpected weight change.   HENT: Negative for trouble swallowing and voice change.    Eyes: Negative for photophobia and visual disturbance.   Respiratory: Negative for apnea and shortness of breath.    Cardiovascular: Negative for chest pain and leg swelling.   Gastrointestinal: Negative for constipation and nausea.   Genitourinary: Negative for difficulty urinating.   Musculoskeletal: Negative for back pain, gait problem and neck pain.   Skin: Negative for color change and pallor.   Neurological: Positive for dizziness and headaches. Negative for seizures, syncope, weakness and numbness.   Hematological: Negative for adenopathy.   Psychiatric/Behavioral: Negative for agitation, confusion and  decreased concentration.       Neurologic Exam     Mental Status   Oriented to person, place, and time.   Registration: recalls 3 of 3 objects.   Attention: normal. Concentration: normal.   Speech: speech is normal   Level of consciousness: alert  Knowledge: good.     Cranial Nerves     CN II   Visual fields full to confrontation.   Right visual field deficit: none  Left visual field deficit: none     CN III, IV, VI   Pupils are equal, round, and reactive to light.  Extraocular motions are normal.   Right pupil: Size: 3 mm. Shape: regular. Accommodation: intact.   Left pupil: Size: 3 mm. Shape: regular. Accommodation: intact.   CN III: no CN III palsy  CN VI: no CN VI palsy  Nystagmus: none   Diplopia: none  Ophthalmoparesis: none  Upgaze: normal  Downgaze: normal  Conjugate gaze: present    CN V   Facial sensation intact.   Right facial sensation deficit: none  Left facial sensation deficit: none    CN VII   Facial expression full, symmetric.   Right facial weakness: none  Left facial weakness: none    CN VIII   CN VIII normal.     CN IX, X   CN IX normal.   CN X normal.   Palate: symmetric    CN XI   CN XI normal.   Right sternocleidomastoid strength: normal  Left sternocleidomastoid strength: normal  Right trapezius strength: normal  Left trapezius strength: normal    CN XII   CN XII normal.   Tongue deviation: none    Motor Exam   Muscle bulk: normal  Overall muscle tone: normal  Right arm tone: normal  Left arm tone: normal  Right leg tone: normal  Left leg tone: normal    Strength   Strength 5/5 throughout.     Sensory Exam   Right arm light touch: normal  Left arm light touch: normal  Right leg light touch: normal  Left leg light touch: normal  Right arm vibration: normal  Left arm vibration: normal  Right leg vibration: normal  Left leg vibration: normal  Right arm proprioception: normal  Left arm proprioception: normal  Right leg proprioception: normal  Left leg proprioception: normal  Right arm pinprick:  normal  Left arm pinprick: normal  Right leg pinprick: normal  Left leg pinprick: normal    Gait, Coordination, and Reflexes     Gait  Gait: normal    Coordination   Romberg: negative  Finger to nose coordination: normal  Heel to shin coordination: normal  Tandem walking coordination: normal    Tremor   Resting tremor: absent    Reflexes   Right brachioradialis: 2+  Left brachioradialis: 2+  Right biceps: 2+  Left biceps: 2+  Right triceps: 2+  Left triceps: 2+  Right patellar: 2+  Left patellar: 2+  Right achilles: 2+  Left achilles: 2+  Right plantar: normal  Left plantar: normal      Physical Exam   Constitutional: She is oriented to person, place, and time. She appears well-developed and well-nourished.   HENT:   Head: Normocephalic and atraumatic.   Eyes: EOM are normal. Pupils are equal, round, and reactive to light.   Neck: Normal range of motion.   Cardiovascular: Normal rate and intact distal pulses.    Pulmonary/Chest: Effort normal. No apnea. No respiratory distress.   Musculoskeletal: Normal range of motion.   Neurological: She is alert and oriented to person, place, and time. She has normal strength. She has a normal Finger-Nose-Finger Test, a normal Heel to Shin Test, a normal Romberg Test and a normal Tandem Gait Test. Gait normal.   Reflex Scores:       Tricep reflexes are 2+ on the right side and 2+ on the left side.       Bicep reflexes are 2+ on the right side and 2+ on the left side.       Brachioradialis reflexes are 2+ on the right side and 2+ on the left side.       Patellar reflexes are 2+ on the right side and 2+ on the left side.       Achilles reflexes are 2+ on the right side and 2+ on the left side.  Skin: Skin is warm and dry.   Psychiatric: She has a normal mood and affect. Her speech is normal and behavior is normal. Thought content normal.   Vitals reviewed.      Vitals:    10/31/17 1002   BP: 137/62   BP Location: Right arm   Patient Position: Sitting   BP Method: Large (Automatic)  "  Pulse: 72   Weight: 60.7 kg (133 lb 13.1 oz)   Height: 4' 11" (1.499 m)       Assessment & Plan:  Problem List Items Addressed This Visit     Migraine without aura and without status migrainosus, not intractable - Primary    Overview     Despite frequency of headaches few have come in the form of migraine headaches.         Relevant Medications    butalbital-acetaminophen-caffeine -40 mg (FIORICET, ESGIC) -40 mg per tablet    Chronic nonintractable headache    Overview     Daily headaches likely rebound from her medications, tension type, and sinus headaches.         Relevant Medications    topiramate (TOPAMAX) 100 MG tablet    butalbital-acetaminophen-caffeine -40 mg (FIORICET, ESGIC) -40 mg per tablet          Follow-up: Return in about 3 months (around 1/31/2018).   More than 50% of this 25 minute encounter was spent in counseling and coordinating care.          "

## 2017-11-01 ENCOUNTER — HOSPITAL ENCOUNTER (OUTPATIENT)
Dept: PULMONOLOGY | Facility: CLINIC | Age: 47
Discharge: HOME | End: 2017-11-01

## 2017-11-07 ENCOUNTER — HOSPITAL ENCOUNTER (OUTPATIENT)
Dept: PULMONOLOGY | Facility: CLINIC | Age: 47
Discharge: HOME | End: 2017-11-07
Payer: COMMERCIAL

## 2017-11-07 ENCOUNTER — OFFICE VISIT (OUTPATIENT)
Dept: TRANSPLANT | Facility: CLINIC | Age: 47
End: 2017-11-07
Payer: COMMERCIAL

## 2017-11-07 VITALS — WEIGHT: 134.5 LBS | HEIGHT: 60 IN | BODY MASS INDEX: 26.41 KG/M2

## 2017-11-07 VITALS
DIASTOLIC BLOOD PRESSURE: 73 MMHG | OXYGEN SATURATION: 99 % | TEMPERATURE: 97 F | HEIGHT: 60 IN | SYSTOLIC BLOOD PRESSURE: 113 MMHG | RESPIRATION RATE: 20 BRPM | HEART RATE: 60 BPM | BODY MASS INDEX: 26.7 KG/M2 | WEIGHT: 136 LBS

## 2017-11-07 DIAGNOSIS — R09.81 NASAL CONGESTION: ICD-10-CM

## 2017-11-07 DIAGNOSIS — Z76.82 AWAITING ORGAN TRANSPLANT STATUS: ICD-10-CM

## 2017-11-07 DIAGNOSIS — J96.11 CHRONIC RESPIRATORY FAILURE WITH HYPOXIA: ICD-10-CM

## 2017-11-07 DIAGNOSIS — I27.0 PRIMARY PULMONARY HYPERTENSION: ICD-10-CM

## 2017-11-07 DIAGNOSIS — Z01.818 PRE-TRANSPLANT EVALUATION FOR LUNG TRANSPLANT: ICD-10-CM

## 2017-11-07 DIAGNOSIS — I27.0 PRIMARY PULMONARY HYPERTENSION: Primary | ICD-10-CM

## 2017-11-07 LAB
PRE FEV1 FVC: 70
PRE FEV1: 1.56
PRE FVC: 2.24
PREDICTED FEV1 FVC: 85
PREDICTED FEV1: 2.38
PREDICTED FVC: 2.83

## 2017-11-07 PROCEDURE — 99999 PR PBB SHADOW E&M-EST. PATIENT-LVL III: CPT | Mod: PBBFAC,TXP,, | Performed by: INTERNAL MEDICINE

## 2017-11-07 PROCEDURE — 99213 OFFICE O/P EST LOW 20 MIN: CPT | Mod: 25,S$GLB,TXP, | Performed by: INTERNAL MEDICINE

## 2017-11-07 PROCEDURE — 94620 PR PULMONARY STRESS TESTING,SIMPLE: CPT | Mod: S$GLB,TXP,, | Performed by: INTERNAL MEDICINE

## 2017-11-07 PROCEDURE — 94010 BREATHING CAPACITY TEST: CPT | Mod: 59,S$GLB,TXP, | Performed by: INTERNAL MEDICINE

## 2017-11-07 NOTE — PROCEDURES
Yuni Perez is a 47 y.o.  female patient, who presents for a 6 minute walk test ordered by MD Pato.  The diagnosis is Pre Lung Transplant Evaluation.  The patient's BMI is 26.3 kg/m2.  Predicted distance (lower limit of normal) is 445.71 meters.      Test Results:    The test was completed without stopping.    The total time walked was 360 seconds.  During walking, the patient reported:  Dyspnea, Leg pain. The patient used no assistive devices  during testing.     11/07/2017---------Distance: 469.39 meters (1540 feet)     O2 Sat % Supplemental Oxygen Heart Rate Blood Pressure Ab Scale   Pre-exercise  (Resting) 99 % Room Air 79 bpm (!) 136/93 mmHg 2   During Exercise 97 % Room Air 113 bpm 146/73 mmHg 7-8   Post-exercise  (Recovery) 98 % Room Air  91 bpm   mmHg       Recovery Time: 68 seconds    Performing nurse/tech: NATACHA Khan      PREVIOUS STUDY:   The patient had a previous study.  07/12/2017---------Distance: 472.44 meters (1550 feet)       O2 Sat % Supplemental Oxygen Heart Rate Blood Pressure Ab Scale   Pre-exercise  (Resting) 98 % Room Air 65 bpm 123/69 mmHg 2   During Exercise 99 % Room Air 120 bpm 133/85 mmHg 7-8   Post-exercise  (Recovery) 99 % Room Air  80 bpm          CLINICAL INTERPRETATION:  Six minute walk distance is 469.39 meters (1540 feet) with very heavy dyspnea.  During exercise, there was no significant desaturation while breathing room air.  Blood pressure remained stable and Heart rate increased significantly with walking.  This may represent a tachycardic response to exercise.  The patient reported non-pulmonary symptoms during exercise.  Since the previous study in July 2017, exercise capacity is unchanged.  Based upon age and body mass index, exercise capacity is normal.

## 2017-11-07 NOTE — LETTER
November 7, 2017        Ivonne Rai  [address]  [facility] [address]  Phone: [phone]  Fax: [phone]             [facility] - [facility] Trans[facility]  [address]  [facility] [address]  Phone: [phone]   Patient: Yuni Perez   MR Number: [id number]   YOB: 1970   Date of Visit: 11/7/2017       Dear Dr. Ivonne Rai    Thank you for referring Yuni Perez to me for evaluation. Attached you will find relevant portions of my assessment and plan of care.    If you have questions, please do not hesitate to call me. I look forward to following Yuni Perez along with you.    Sincerely,    Ralph Whyte MD    Enclosure    If you would like to receive this communication electronically, please contact [email or web address] or [phone] to request [employer] access.    [employer] is a tool which provides read-only access to select patient information with whom you have a relationship. Its easy to use and provides real time access to review your patients record including encounter summaries, notes, results, and demographic information.    If you feel you have received this communication in error or would no longer like to receive these types of communications, please e-mail [email or web address]

## 2017-11-07 NOTE — PROGRESS NOTES
LUNG TRANSPLANT PRE FOLLOW-UP    Referring Physician: Ivonne Rai    Reason for Visit:  Pre-lung transplant follow-up.         Date of Initial Evaluation: 1/16/2017                                                                                             History of Present Illness: Yuni Perez is a 47 y.o. female who is on 0L of oxygen. She is on no assisted ventilation.  Her New York Heart Association Class is II and a Karnofsky score of 90% - Able to carry on normal activity: minor symptoms of disease. She is not diabetic.     Doing great today.  No new complaints.   Still experiencing significant sinus congestion and diarrhea with PAH meds.    /73 (BP Location: Right arm, Patient Position: Sitting, BP Method: Large (Automatic))   Pulse 60   Temp 96.9 °F (36.1 °C) (Oral)   Resp 20   Ht 5' (1.524 m)   Wt 61.7 kg (136 lb)   SpO2 99% Comment: room air  BMI 26.56 kg/m²      Review of Systems   Constitutional: Negative for chills, fever and weight loss.   HENT: Positive for congestion. Negative for ear discharge, ear pain, nosebleeds and sinus pain.    Eyes: Negative for blurred vision, double vision and photophobia.   Respiratory: Negative for cough, hemoptysis, sputum production, shortness of breath and wheezing.    Cardiovascular: Negative for chest pain, orthopnea, claudication, leg swelling and PND.   Gastrointestinal: Positive for diarrhea. Negative for abdominal pain and constipation.   Genitourinary: Negative.    Musculoskeletal: Negative.    Skin: Negative for itching and rash.   Neurological: Negative.  Negative for weakness.   Endo/Heme/Allergies: Negative.    Psychiatric/Behavioral: Negative.      Objective:   Physical Exam   Constitutional: She is oriented to person, place, and time and well-developed, well-nourished, and in no distress. No distress.   HENT:   Head: Normocephalic and atraumatic.   Right Ear: External ear normal.   Left Ear: External ear normal.    Mouth/Throat: No oropharyngeal exudate.   Eyes: Conjunctivae and EOM are normal. No scleral icterus.   Neck: Normal range of motion. Neck supple. No JVD present.   Cardiovascular: Normal rate, regular rhythm, normal heart sounds and intact distal pulses.  Exam reveals no gallop and no friction rub.    No murmur heard.  Pulmonary/Chest: Effort normal and breath sounds normal. No respiratory distress. She has no wheezes. She exhibits no tenderness.   Abdominal: Soft. Bowel sounds are normal. She exhibits no distension and no mass. There is no tenderness. There is no guarding.   Musculoskeletal: Normal range of motion. She exhibits no edema, tenderness or deformity.   Lymphadenopathy:     She has no cervical adenopathy.   Neurological: She is alert and oriented to person, place, and time. Gait normal. GCS score is 15.   Skin: Skin is warm and dry. No rash noted. She is not diaphoretic. No erythema. No pallor.   Psychiatric: Mood, memory, affect and judgment normal.     Labs:   INR 10/09/2017 2.2      Pulmonary Function Tests 11/7/2017 7/12/2017 11/15/2016 5/17/2016   FVC 2.24 2.24 2.13 2.22   FEV1 1.56 1.54 1.43 1.65   TLC (liters) - - - 3.74   DLCO (ml/mmHg sec) - - - 17.6   FVC% 80 80 78 78   FEV1% 66 65 61 69   FEF 25-75 0.96 0.87 0.78 1.22   FEF 25-75% 35 32 29 44   TLC% - - - 89   RV - - - 1.49   RV% - - - 105   DLCO% - - - 90     Other:  BNP 21  Echo:  CONCLUSONS     1 - Normal left ventricular systolic function (EF 60-65%).     2 - No wall motion abnormalities.     3 - Normal left ventricular diastolic function.     4 - Right atrial enlargement.     5 - Right ventricular enlargement with moderately depressed systolic function.     6 - The estimated PA systolic pressure is 36 mmHg.     7 - Mild tricuspid regurgitation.   Assessment:-  1. Primary pulmonary hypertension    2. Chronic respiratory failure with hypoxia    3. Pre-transplant evaluation for lung transplant    4. Nasal congestion      Plan:   1. Will  defer to Dr. Rai for management of PAH.  Seems to be doing relatively well. Walk distance remains stable. Side effects more or less unchanged.  2. No role for transplant at this point.  3. RTC in 3 months.     Da Fletcher MD  [facility] Pulmonary / Critical Care Medicine Fellow  11/07/2017  2:54 PM    Attending Note:    I have seen and evaluated the patient with the fellow. Their note reflects the content of our discussion and my plan of care.    Remains stable with current therapy. We will not pursue transplant at this moment. Will continue to monitor every 6 months.       Ralph Whyte MD  Pulmonary/Critical Care Medicine

## 2017-11-08 ENCOUNTER — ANTI-COAG VISIT (OUTPATIENT)
Dept: CARDIOLOGY | Facility: CLINIC | Age: 47
End: 2017-11-08
Payer: COMMERCIAL

## 2017-11-08 DIAGNOSIS — I27.0 IDIOPATHIC PAH (PULMONARY ARTERIAL HYPERTENSION): ICD-10-CM

## 2017-11-08 DIAGNOSIS — Z79.01 LONG TERM CURRENT USE OF ANTICOAGULANT THERAPY: Primary | ICD-10-CM

## 2017-11-08 DIAGNOSIS — I27.9 CHRONIC PULMONARY HEART DISEASE: ICD-10-CM

## 2017-11-08 LAB — INR PPP: 1.6 (ref 2–3)

## 2017-11-08 PROCEDURE — 99211 OFF/OP EST MAY X REQ PHY/QHP: CPT | Mod: S$GLB,,,

## 2017-11-08 PROCEDURE — 85610 PROTHROMBIN TIME: CPT | Mod: QW,S$GLB,,

## 2017-11-08 NOTE — PROGRESS NOTES
INR low today. Patient had too much greens this past week. No other changes. She will resume normal diet. We will boost dose today then resume maintenance dose. Recheck INR in 2 weeks

## 2017-11-09 NOTE — NURSING
"Per Dr. Whyte's note:  "Remains stable with current therapy. We will not pursue transplant at this moment. Will continue to monitor every 6 months."  Status remains as deferred, but reason changed to currently too well.  "

## 2017-11-10 ENCOUNTER — TELEPHONE (OUTPATIENT)
Dept: TRANSPLANT | Facility: CLINIC | Age: 47
End: 2017-11-10

## 2017-11-10 DIAGNOSIS — I27.0 IDIOPATHIC PAH (PULMONARY ARTERIAL HYPERTENSION): ICD-10-CM

## 2017-11-13 RX ORDER — WARFARIN 2.5 MG/1
TABLET ORAL
Qty: 90 TABLET | Refills: 5 | Status: SHIPPED | OUTPATIENT
Start: 2017-11-13 | End: 2017-11-22 | Stop reason: SDUPTHER

## 2017-11-14 RX ORDER — WARFARIN 2.5 MG/1
TABLET ORAL
Qty: 90 TABLET | Refills: 3 | Status: SHIPPED | OUTPATIENT
Start: 2017-11-14 | End: 2018-11-20

## 2017-11-22 ENCOUNTER — ANTI-COAG VISIT (OUTPATIENT)
Dept: CARDIOLOGY | Facility: CLINIC | Age: 47
End: 2017-11-22
Payer: COMMERCIAL

## 2017-11-22 DIAGNOSIS — I27.9 CHRONIC PULMONARY HEART DISEASE: ICD-10-CM

## 2017-11-22 DIAGNOSIS — Z79.01 LONG TERM CURRENT USE OF ANTICOAGULANT THERAPY: Primary | ICD-10-CM

## 2017-11-22 LAB — INR PPP: 2.4 (ref 2–3)

## 2017-11-22 PROCEDURE — 99211 OFF/OP EST MAY X REQ PHY/QHP: CPT | Mod: 25,S$GLB,,

## 2017-11-22 PROCEDURE — 85610 PROTHROMBIN TIME: CPT | Mod: QW,S$GLB,,

## 2017-11-22 NOTE — PROGRESS NOTES
Patient here for close monitoring due to recent low INR. INR was low due to diet last visit. She resumed normal diet plan. No other changes. No signs or symptoms of bleeding. INR good and on maintenance dose. Continue maintenance dose and Recheck INR in 4 weeks

## 2017-12-05 DIAGNOSIS — I27.9 CHRONIC PULMONARY HEART DISEASE: ICD-10-CM

## 2017-12-05 RX ORDER — TREPROSTINIL 2.5 MG/ML
INJECTION, SOLUTION INTRAVENOUS; SUBCUTANEOUS
Qty: 60 ML | Refills: 11 | Status: SHIPPED | OUTPATIENT
Start: 2017-12-05

## 2017-12-18 ENCOUNTER — OFFICE VISIT (OUTPATIENT)
Dept: GASTROENTEROLOGY | Facility: CLINIC | Age: 47
End: 2017-12-18
Payer: COMMERCIAL

## 2017-12-18 VITALS
BODY MASS INDEX: 27.6 KG/M2 | HEIGHT: 59 IN | WEIGHT: 136.88 LBS | DIASTOLIC BLOOD PRESSURE: 74 MMHG | HEART RATE: 78 BPM | SYSTOLIC BLOOD PRESSURE: 109 MMHG

## 2017-12-18 DIAGNOSIS — K58.0 IRRITABLE BOWEL SYNDROME WITH DIARRHEA: Primary | ICD-10-CM

## 2017-12-18 PROCEDURE — 99213 OFFICE O/P EST LOW 20 MIN: CPT | Mod: NTX,S$GLB,, | Performed by: NURSE PRACTITIONER

## 2017-12-18 PROCEDURE — 99999 PR PBB SHADOW E&M-EST. PATIENT-LVL III: CPT | Mod: PBBFAC,TXP,, | Performed by: NURSE PRACTITIONER

## 2017-12-18 NOTE — PROGRESS NOTES
[facility] Note    Reason for Visit:  The encounter diagnosis was Irritable bowel syndrome with diarrhea.    PCP:   Lulu Sandhu       Referring MD:  No referring provider defined for this encounter.    HPI:  This is a 47 y.o. female here for f/u evaluation of IBS-D. Ms. Perez was last seen in clinic with GUMARO Eagle. She is new to me.     Hx of IBS-D x years. Has taking Xifaxan. Hx of taking fiber powder and did not like side effects. Has not tried fiber gummies or pill. Remodulin will worsen diarrhea at times.      Currently, has a soft normal formed stool daily. Will occasionally have gas. Has been following the low FODMAP diet, occasionally will eat High FODMAP products. Will have 2 watery diarrhea episodes per week. Taking probiotic daily. Denies abdominal pain, blood in stool, and melena. Take Levsin as needed.     Denies hx of reflux and dysphagia/odynophagia. NSAID usage- none    ROS:  Constitutional: No fevers, no chills, No unintentional weight loss, no fatigue   ENT: No allergies  CV: No chest pain, no palpitations, no perif. edema  Pulm: No cough, No shortness of breath, no wheezes, no sputum  Ophtho: No vision changes  GI: see HPI; also no nausea, no vomiting, no change in appetite  Derm: No rash  Heme: No lymphadenopathy, No bruising  MSK: No arthritis, no muscle pain, no muscle weakness  : No dysuria, No hematuria  Endo: No hot or cold intolerance  Neuro: No syncope, No seizure     Medical History:  has a past medical history of AR (allergic rhinitis); Cholelithiasis, common bile duct; Chronic low back pain; Eye pressure (2017); GERD (gastroesophageal reflux disease); History of migraine headaches; Hypothyroidism; Lumbar disc disease; Menorrhagia; Mild asthma; Obesity; Plantar fasciitis of left foot; Primary pulmonary hypertension; Seizure disorder; Seizures; Sleep apnea; TMJ (dislocation of temporomandibular joint); and Tricuspid regurgitation.    Surgical  "History:  has a past surgical history that includes Portacath placement; Cardiac catheterization; Upper gastrointestinal endoscopy; and Central venous catheter insertion.    Family History: family history includes Breast cancer in her cousin and maternal aunt; Cancer in her maternal aunt and maternal grandmother; Diabetes in her maternal grandfather, mother, and paternal grandfather; Glaucoma in her father; Heart attack in her maternal grandfather and paternal grandfather; Heart disease in her father and paternal grandfather; Leukemia in her brother; No Known Problems in her maternal uncle, paternal aunt, paternal grandmother, paternal uncle, and sister..     Social History:  reports that she has never smoked. She has never used smokeless tobacco. She reports that she does not drink alcohol or use drugs.    Review of patient's allergies indicates:   Allergen Reactions    Vibra-tabs [doxycycline hyclate] Anaphylaxis     "throat felt like it was closing"    Adhesive Hives     Silk tape    Amoxicillin Rash    Chlorhexidine Other (See Comments)    Doxycycline        Current Outpatient Prescriptions   Medication Sig    0.9 % SODIUM CHLORIDE (SODIUM CHLORIDE 0.9%) 0.9 % Soln Inject 3 mLs into the vein every 8 (eight) hours as needed. (Patient taking differently: Inject 3 mLs into the vein every other day. )    acetaminophen (TYLENOL EXTRA STRENGTH) 500 MG tablet Take 1,000 mg by mouth every 6 (six) hours as needed for Pain.    ADVAIR DISKUS 100-50 mcg/dose diskus inhaler inhale 1 PUFF TWICE DAILY    albuterol 90 mcg/actuation inhaler Inhale 2 puffs into the lungs every 4 (four) hours as needed. 2 Aerosol Inhalation Every 4-6 hours    ambrisentan (LETAIRIS) 10 MG Tab Take 1 tablet (10 mg total) by mouth once daily.    azelastine (ASTELIN) 137 mcg (0.1 %) nasal spray 2 sprays by Nasal route 2 (two) times daily.     butalbital-acetaminophen-caffeine -40 mg (FIORICET, ESGIC) -40 mg per tablet Take 1 " tablet by mouth every 4 (four) hours as needed for Pain or Headaches.    citalopram (CELEXA) 10 MG tablet Take 1 tablet (10 mg total) by mouth once daily.    clindamycin (CLEOCIN T) 1 % lotion daily as needed.     cyanocobalamin, vitamin B-12, 2,500 mcg Subl Place 2,500 mcg under the tongue once daily.     desonide (DESOWEN) 0.05 % ointment 0.05 % daily as needed.  Ointment Topical As directed.  Apply to affected area twice a daily over face    diphenhydrAMINE (BENADRYL) 25 mg capsule Take 25 mg by mouth every 6 (six) hours as needed for Itching.    ergocalciferol (VITAMIN D2) 50,000 unit Cap TAKE ONE CAPSULE BY MOUTH EVERY 14 DAYS    esomeprazole (NEXIUM) 40 MG capsule TAKE ONE CAPSULE BY MOUTH DAILY before breakfast    ferrous sulfate 325 (65 FE) MG EC tablet Take 325 mg by mouth daily as needed.     fluticasone (FLONASE) 50 mcg/actuation nasal spray 2 sprays by Each Nare route once daily.    folic acid (FOLVITE) 1 MG tablet Take 1 mg by mouth.  Tablet Oral Every day    ipratropium (ATROVENT) 0.03 % nasal spray 2 sprays by Nasal route 2 (two) times daily. (Patient taking differently: 2 sprays by Nasal route 2 (two) times daily as needed. )    levothyroxine (SYNTHROID) 137 MCG Tab tablet Take 1 tablet (137 mcg total) by mouth once daily.    loratadine (CLARITIN) 10 mg tablet Take 10 mg by mouth once daily.    norethindrone-ethinyl estradiol (JUNEL FE 1/20) 1 mg-20 mcg (21)/75 mg (7) per tablet Take 1 tablet by mouth once daily.    ondansetron (ZOFRAN) 4 MG tablet Take 4 mg by mouth every 6 (six) hours as needed. 1 Tablet Oral Every 6 hours    PRAMOSONE 2.5-1 % Lotn lotion daily as needed.     promethazine (PHENERGAN) 25 MG tablet Take 25 mg by mouth every 4 to 6 hours as needed.     tadalafil (ADCIRCA) 20 MG Tab Take 2 tablets (40 mg total) by mouth once daily.    topiramate (TOPAMAX) 100 MG tablet Take 1 tablet (100 mg total) by mouth 2 (two) times daily.    treprostinil (REMODULIN) 2.5 mg/mL  "Soln Mix cassette as directed and infuse continuously per physician titration orders on dosing sheet. Current dose 80ng/kg/min    warfarin (COUMADIN) 2.5 MG tablet TAKE 2.5-3 TABLETS BY MOUTH DAILY AS DIRECTED by [facility]     No current facility-administered medications for this visit.      Objective Findings:    Vital Signs:  /74   Pulse 78   Ht 4' 11" (1.499 m)   Wt 62.1 kg (136 lb 14.5 oz)   BMI 27.65 kg/m²   Body mass index is 27.65 kg/m².    Physical Exam:  General Appearance: Well appearing in no acute distress  Head: Normocephalic, without obvious abnormality  Eyes: No scleral icterus, EOMI  ENT: Neck supple, Lips, mucosa, and tongue normal; teeth and gums normal  Lungs: CTA bilaterally in anterior and posterior fields, no wheezes, no crackles.  Heart: Regular rate and rhythm, no murmurs heard  Abdomen: Soft, non tender, non distended with positive bowel sounds in all four quadrants.  Extremities: No clubbing, cyanosis or edema  Skin: No rash to exposed areas  Neurologic: AAOx4    Labs:  Lab Results   Component Value Date    WBC 3.33 (L) 09/18/2017    HGB 12.3 09/18/2017    HCT 36.4 (L) 09/18/2017     09/18/2017    CHOL 128 01/16/2017    TRIG 101 01/16/2017    HDL 31 (L) 01/16/2017    ALT 13 09/18/2017    AST 15 09/18/2017     09/18/2017    K 3.6 09/18/2017     09/18/2017    CREATININE 0.8 09/18/2017    BUN 6 09/18/2017    CO2 21 (L) 09/18/2017    TSH 1.808 01/16/2017    INR 3.2 12/20/2017    HGBA1C 5.2 01/16/2017     Endoscopy:    EGD- 3/2012 - reactive gastritis, H. Pylori negative.     Assessment:  1. Irritable bowel syndrome with diarrhea      Recommendations:  1. Handout given and reviewed again FODMAP diet. Continue taking Levsin as needed. Trial a fiber gummie or pill.     Discussed scheduling colonoscopy at a future date for colon cancer screening and pt deferred at this time to later.      Follow up in 1 year or sooner if needed.      Thank you so much for " allowing me to participate in the care of Yuni BreVamsi Betancourt, APRN, FNP-C

## 2017-12-20 ENCOUNTER — ANTI-COAG VISIT (OUTPATIENT)
Dept: CARDIOLOGY | Facility: CLINIC | Age: 47
End: 2017-12-20
Payer: COMMERCIAL

## 2017-12-20 DIAGNOSIS — I27.9 CHRONIC PULMONARY HEART DISEASE: ICD-10-CM

## 2017-12-20 DIAGNOSIS — Z79.01 LONG TERM CURRENT USE OF ANTICOAGULANT THERAPY: Primary | ICD-10-CM

## 2017-12-20 LAB — INR PPP: 3.2 (ref 2–3)

## 2017-12-20 PROCEDURE — 99211 OFF/OP EST MAY X REQ PHY/QHP: CPT | Mod: 25,S$GLB,,

## 2017-12-20 PROCEDURE — 85610 PROTHROMBIN TIME: CPT | Mod: QW,S$GLB,,

## 2017-12-20 NOTE — PROGRESS NOTES
INR a tad high due to not getting enough vit K in her duet this week. She had only a handful of almonds for her moderate Vit K serving this week. Educated patient on what constitutes moderate vit K. No other changes. No signs or symptoms of bleeding. We will adjust dose for today. Patient will resume normal diet and maintenance dose. Repeat INR with labs 1/12.

## 2017-12-27 ENCOUNTER — TELEPHONE (OUTPATIENT)
Dept: TRANSPLANT | Facility: CLINIC | Age: 47
End: 2017-12-27

## 2017-12-27 RX ORDER — FLUTICASONE PROPIONATE 50 MCG
SPRAY, SUSPENSION (ML) NASAL
Qty: 16 G | Refills: 3 | Status: SHIPPED | OUTPATIENT
Start: 2017-12-27 | End: 2020-12-15 | Stop reason: SDUPTHER

## 2018-01-04 ENCOUNTER — TELEPHONE (OUTPATIENT)
Dept: TRANSPLANT | Facility: CLINIC | Age: 48
End: 2018-01-04

## 2018-01-12 ENCOUNTER — LAB VISIT (OUTPATIENT)
Dept: LAB | Facility: HOSPITAL | Age: 48
End: 2018-01-12
Attending: INTERNAL MEDICINE
Payer: COMMERCIAL

## 2018-01-12 ENCOUNTER — ANTI-COAG VISIT (OUTPATIENT)
Dept: CARDIOLOGY | Facility: CLINIC | Age: 48
End: 2018-01-12

## 2018-01-12 ENCOUNTER — OFFICE VISIT (OUTPATIENT)
Dept: TRANSPLANT | Facility: CLINIC | Age: 48
End: 2018-01-12
Payer: COMMERCIAL

## 2018-01-12 ENCOUNTER — HOSPITAL ENCOUNTER (OUTPATIENT)
Dept: PULMONOLOGY | Facility: CLINIC | Age: 48
Discharge: HOME | End: 2018-01-12
Payer: COMMERCIAL

## 2018-01-12 ENCOUNTER — CLINICAL SUPPORT (OUTPATIENT)
Dept: INFECTIOUS DISEASES | Facility: CLINIC | Age: 48
End: 2018-01-12
Payer: COMMERCIAL

## 2018-01-12 VITALS
HEIGHT: 59 IN | OXYGEN SATURATION: 97 % | WEIGHT: 138 LBS | SYSTOLIC BLOOD PRESSURE: 89 MMHG | BODY MASS INDEX: 27.82 KG/M2 | DIASTOLIC BLOOD PRESSURE: 65 MMHG | HEART RATE: 88 BPM

## 2018-01-12 VITALS — WEIGHT: 136.88 LBS | BODY MASS INDEX: 27.6 KG/M2 | HEIGHT: 59 IN

## 2018-01-12 DIAGNOSIS — J96.11 CHRONIC RESPIRATORY FAILURE WITH HYPOXIA: ICD-10-CM

## 2018-01-12 DIAGNOSIS — G47.33 OSA (OBSTRUCTIVE SLEEP APNEA): ICD-10-CM

## 2018-01-12 DIAGNOSIS — Z79.01 LONG TERM CURRENT USE OF ANTICOAGULANT THERAPY: ICD-10-CM

## 2018-01-12 DIAGNOSIS — E03.9 HYPOTHYROIDISM (ACQUIRED): ICD-10-CM

## 2018-01-12 DIAGNOSIS — E66.3 OVERWEIGHT (BMI 25.0-29.9): ICD-10-CM

## 2018-01-12 DIAGNOSIS — R06.82 TACHYPNEA: ICD-10-CM

## 2018-01-12 DIAGNOSIS — I27.9 CHRONIC PULMONARY HEART DISEASE: ICD-10-CM

## 2018-01-12 DIAGNOSIS — I27.0 PRIMARY PULMONARY HYPERTENSION: Primary | ICD-10-CM

## 2018-01-12 DIAGNOSIS — Z76.82 AWAITING ORGAN TRANSPLANT STATUS: ICD-10-CM

## 2018-01-12 DIAGNOSIS — Z79.899 POLYPHARMACY: ICD-10-CM

## 2018-01-12 PROBLEM — T82.598A MALFUNCTION OF PERIPHERAL INSERTED CENTRAL CATHETER: Status: RESOLVED | Noted: 2017-09-12 | Resolved: 2018-01-12

## 2018-01-12 LAB
ALBUMIN SERPL BCP-MCNC: 3.2 G/DL
ALP SERPL-CCNC: 70 U/L
ALT SERPL W/O P-5'-P-CCNC: 10 U/L
ANION GAP SERPL CALC-SCNC: 5 MMOL/L
AST SERPL-CCNC: 13 U/L
BASOPHILS # BLD AUTO: 0.04 K/UL
BASOPHILS NFR BLD: 0.9 %
BILIRUB SERPL-MCNC: 0.2 MG/DL
BNP SERPL-MCNC: 17 PG/ML
BUN SERPL-MCNC: 8 MG/DL
CALCIUM SERPL-MCNC: 8.9 MG/DL
CHLORIDE SERPL-SCNC: 111 MMOL/L
CO2 SERPL-SCNC: 23 MMOL/L
CREAT SERPL-MCNC: 0.7 MG/DL
DIFFERENTIAL METHOD: ABNORMAL
EOSINOPHIL # BLD AUTO: 0.3 K/UL
EOSINOPHIL NFR BLD: 5.5 %
ERYTHROCYTE [DISTWIDTH] IN BLOOD BY AUTOMATED COUNT: 14.9 %
EST. GFR  (AFRICAN AMERICAN): >60 ML/MIN/1.73 M^2
EST. GFR  (NON AFRICAN AMERICAN): >60 ML/MIN/1.73 M^2
GLUCOSE SERPL-MCNC: 79 MG/DL
HCT VFR BLD AUTO: 38 %
HGB BLD-MCNC: 12.5 G/DL
IMM GRANULOCYTES # BLD AUTO: 0.01 K/UL
IMM GRANULOCYTES NFR BLD AUTO: 0.2 %
INR PPP: 2
LYMPHOCYTES # BLD AUTO: 1 K/UL
LYMPHOCYTES NFR BLD: 22.9 %
MAGNESIUM SERPL-MCNC: 2 MG/DL
MCH RBC QN AUTO: 27.1 PG
MCHC RBC AUTO-ENTMCNC: 32.9 G/DL
MCV RBC AUTO: 82 FL
MONOCYTES # BLD AUTO: 0.4 K/UL
MONOCYTES NFR BLD: 7.7 %
NEUTROPHILS # BLD AUTO: 2.9 K/UL
NEUTROPHILS NFR BLD: 62.8 %
NRBC BLD-RTO: 0 /100 WBC
PLATELET # BLD AUTO: 213 K/UL
PMV BLD AUTO: 12.2 FL
POTASSIUM SERPL-SCNC: 4 MMOL/L
PROT SERPL-MCNC: 7.5 G/DL
PROTHROMBIN TIME: 20.4 SEC
RBC # BLD AUTO: 4.62 M/UL
SODIUM SERPL-SCNC: 139 MMOL/L
WBC # BLD AUTO: 4.54 K/UL

## 2018-01-12 PROCEDURE — 94618 PULMONARY STRESS TESTING: CPT | Mod: S$GLB,TXP,, | Performed by: INTERNAL MEDICINE

## 2018-01-12 PROCEDURE — 99214 OFFICE O/P EST MOD 30 MIN: CPT | Mod: S$GLB,TXP,, | Performed by: INTERNAL MEDICINE

## 2018-01-12 PROCEDURE — 80053 COMPREHEN METABOLIC PANEL: CPT | Mod: TXP

## 2018-01-12 PROCEDURE — 90471 IMMUNIZATION ADMIN: CPT | Mod: S$GLB,TXP,, | Performed by: INTERNAL MEDICINE

## 2018-01-12 PROCEDURE — 85025 COMPLETE CBC W/AUTO DIFF WBC: CPT | Mod: TXP

## 2018-01-12 PROCEDURE — 99999 PR PBB SHADOW E&M-EST. PATIENT-LVL III: CPT | Mod: PBBFAC,TXP,, | Performed by: INTERNAL MEDICINE

## 2018-01-12 PROCEDURE — 83735 ASSAY OF MAGNESIUM: CPT | Mod: NTX

## 2018-01-12 PROCEDURE — 83880 ASSAY OF NATRIURETIC PEPTIDE: CPT | Mod: NTX

## 2018-01-12 PROCEDURE — 85610 PROTHROMBIN TIME: CPT | Mod: NTX

## 2018-01-12 PROCEDURE — 36415 COLL VENOUS BLD VENIPUNCTURE: CPT | Mod: NTX

## 2018-01-12 PROCEDURE — 90662 IIV NO PRSV INCREASED AG IM: CPT | Mod: S$GLB,TXP,, | Performed by: INTERNAL MEDICINE

## 2018-01-12 NOTE — PROGRESS NOTES
Pt received the high dose influenza vaccination. Pt tolerated the injection well. Pt left the unit in NAD.

## 2018-01-12 NOTE — PATIENT INSTRUCTIONS
restart celexa in the evening as we discussed    I'll ask wing to apply for prefilled casettes for  You for March    Flu shot- high dose    1. Continue current therapy.  2.  Keep salt intake to under 2000 mg sodium, fluids to under 2 L (64 oz)  3  Check your weights every morning after getting out of bed and urinating. If your weight goes up 3# overnight or 5# in one week call us  4. Call us if you find yourself getting more short of breath, have more swelling or unexpected weight changes, fever, chills, or problems with your line

## 2018-01-12 NOTE — PROGRESS NOTES
Subjective:     HPI  47 y.o. White female who presents for PH follow-up. Patient was diagnosed with IPAH about 5 and a half years, ABHIJEET recently, currently deferred for LUT (was working on fund raising). Initial symptom- syncope.  Patient has been on triple therapy: Remodulin at  72ng/kg/min infusion, Letaris 10mg qdaily, and Adcirca 40mg qdaily.    Since last visit, pt has been doing ok- says her SOB comes and goes- not as active as she would like to be.  Last visit we started her on celexa and it was making her sleepy so she stopped taking it, plans to start taking it again around 9pm, mixes her drug around 1am as she is up at that time. No swelling, no LH.  Day before yest woke up with pain across her back and that came across her chest- stayed all day, and then this am was gone.  Occasionally has discomfort in her upper shoulder blades, not associated with exertion      TTE 9/18/17  1 - Normal left ventricular systolic function (EF 60-65%).     2 - No wall motion abnormalities.     3 - Normal left ventricular diastolic function.     4 - Right atrial enlargement.     5 - Right ventricular enlargement with moderately depressed systolic function.     6 - The estimated PA systolic pressure is 36 mmHg.     7 - Mild tricuspid regurgitation.     6mw 7/13/17 457m ( 469m in Nov, 457 in Nov, 488m unchanged last 3 visits with me( 450 11/15/16,  457m, 518.5m, 533.75, 549m April, 463.6m prev visit)                                              O2 sat  98-> 99 %                                                           HR 82->124                                                      BP  106/60-->125/59                                                       Ab 2 ->9    [facility] 4/16  AOPRES: 122/85 (97)  AOSAT: 98  FICKCI: 2.81  FICKCO: 4.38  PAPRES: 101/34 (59)  PASAT: 71  PVR: 10.73  PWPRES: 14/8 (10)  RAPRES: 9/5 (4)  RVPRES: 91/-4, 8    TTE last visit   1 - Normal left ventricular systolic function (EF 60-65%).     2 - Right  ventricular enlargement with mildly to moderately depressed systolic function.     3 - Normal left ventricular diastolic function.     4 - Pulmonary hypertension. The estimated PA systolic pressure is 41 mmHg.       TTE 3/4/16  1 - Normal left ventricular systolic function (EF 60-65%).   2 - Normal left ventricular diastolic function.   3 - Right ventricle is upper limit of normal in size with low normal to mildly depressed systolic function. TAPSE 2.2 RV 3.7 cm   4 - Trivial to mild tricuspid regurgitation.   5 - Pulmonary hypertension. The estimated PA systolic pressure is 60 mmHg.     TTE Oct  1 - Normal left ventricular systolic function (EF 60-65%).   2 - Left ventricular diastolic dysfunction.   3 - Biatrial enlargement.   4 - Right ventricular enlargement with hypertrophy, with normal systolic function.   5 - Pulmonary hypertension.   6 - Trivial mitral regurgitation.   7 - Trivial tricuspid regurgitation.   PASp 46    CXR 2/3/16  Vascular catheter now identified, its tip in the superior vena cava just superior to its junction with the right atrium. Heart size is normal, as is the appearance of the pulmonary vascularity. Lung zones are clear, and free of significant airspace consolidation or volume loss. No pleural fluid. No hilar or mediastinal mass lesion. No pneumothorax.     VQ Scan 8/17/16: low probability    Review of Systems   Constitution: Positive for malaise/fatigue and weight gain. Negative for chills and fever.   HENT: Positive for congestion.    Eyes: Negative.    Cardiovascular: Positive for dyspnea on exertion. Negative for leg swelling, near-syncope, orthopnea, palpitations, paroxysmal nocturnal dyspnea and syncope.   Respiratory: Positive for cough. Negative for shortness of breath.    Endocrine: Negative.    Skin: Negative.    Musculoskeletal: Positive for myalgias.        Left-axillary chest wall pain   Gastrointestinal: Negative for bloating, abdominal pain and change in bowel habit.  "  Neurological: Positive for headaches and light-headedness. Negative for dizziness.   Psychiatric/Behavioral: Negative for depression.        Objective:  BP (!) 89/65   Pulse 88   Ht 4' 11" (1.499 m)   Wt 62.6 kg (138 lb 0.1 oz)   SpO2 97%   BMI 27.87 kg/m²       Physical Exam   Constitutional: She is oriented to person, place, and time. She appears well-developed and well-nourished.   HENT:   Head: Normocephalic and atraumatic.   Neck: Neck supple. No JVD present. No thyromegaly present.   Cardiovascular: Normal rate and regular rhythm.  Exam reveals no gallop and no friction rub.    No murmur heard.  Physiologically split S2 with prominent P2 component     Pulmonary/Chest: Effort normal and breath sounds normal. No respiratory distress. She has no wheezes. She has no rales.   Abdominal: Soft. Bowel sounds are normal. She exhibits no distension. There is no tenderness.   Musculoskeletal: She exhibits no edema.   Neurological: She is alert and oriented to person, place, and time.   Skin: Skin is warm and dry.   Line exit site is clean, no erythema, scab or discharge   Psychiatric: She has a normal mood and affect.                       Chemistry        Component Value Date/Time     01/12/2018 1203    K 4.0 01/12/2018 1203     (H) 01/12/2018 1203    CO2 23 01/12/2018 1203    BUN 8 01/12/2018 1203    CREATININE 0.7 01/12/2018 1203    GLU 79 01/12/2018 1203        Component Value Date/Time    CALCIUM 8.9 01/12/2018 1203    ALKPHOS 70 01/12/2018 1203    AST 13 01/12/2018 1203    ALT 10 01/12/2018 1203    BILITOT 0.2 01/12/2018 1203            Magnesium   Date Value Ref Range Status   01/12/2018 2.0 1.6 - 2.6 mg/dL Final       Lab Results   Component Value Date    WBC 4.54 01/12/2018    HGB 12.5 01/12/2018    HCT 38.0 01/12/2018    MCV 82 01/12/2018     01/12/2018       Lab Results   Component Value Date    INR 2.0 (H) 01/12/2018    INR 3.2 12/20/2017    INR 2.4 11/22/2017       BNP   Date Value " Ref Range Status   01/12/2018 17 0 - 99 pg/mL Final     Comment:     Values of less than 100 pg/ml are consistent with non-CHF populations.   09/18/2017 21 0 - 99 pg/mL Final     Comment:     Values of less than 100 pg/ml are consistent with non-CHF populations.   05/11/2017 18 0 - 99 pg/mL Final     Comment:     Values of less than 100 pg/ml are consistent with non-CHF populations.   05/11/2017 18 0 - 99 pg/mL Final     Comment:     Values of less than 100 pg/ml are consistent with non-CHF populations.       No results found for: LDH          Assessment:       1. Primary pulmonary hypertension- FC II-III no volume overload on exam today, BNP remains low, adequate 6mw distance today,  RV enlarged and  Hypertrophied with mild-mod red systolic fxn on echo this week, despite increasing remodulin dose- PAP by echo does not correlate with her RHC which shows severe PAH, marginal Co and high RA- overall though she remains clinically stable on IV remodulin   2. Encounter for long-term (current) use of anticoagulants    3. Obesity    4. Menorrhagia    5. Tricuspid regurgitation         Plan:     No changes from a PH standpoint today- on triple tx and unable to increase remodulin further due to SE- deferred for lung transplant at this time (financial and psychosocial issues) and clinically she remains stable    restart celexa in the evening as we discussed    Will apply for prefilled casettes     Flu shot- high dose    Keep salt intake to under 2000 mg sodium, fluids to under 2 L (64 oz)    Check weights every morning after getting out of bed and urinating. If weight goes up 3# overnight or 5# in one week she should call us    F/u 3 mo with labs and walk and repeat echo- will determine timing of RHC based on echo

## 2018-01-13 NOTE — PROCEDURES
Yuni Perez is a 47 y.o.  female patient, who presents for a 6 minute walk test ordered by Ivonne aRi MD.  The diagnosis is Pulmonary Hypertension.  The patient's BMI is 27.7 kg/m2.  Predicted distance (lower limit of normal) is 436.97 meters.      Test Results:    The test was completed without stopping.  The total time walked was 360 seconds.  During walking, the patient reported:  Dyspnea.  The patient used no assistive devices during testing.     01/12/2018---------Distance: 457.2 meters (1500 feet)     O2 Sat % Supplemental Oxygen Heart Rate Blood Pressure Ab Scale   Pre-exercise  (Resting) 98 % Room Air 82 bpm 106/60 mmHg 2   During Exercise 99 % Room Air 124 bpm 125/59 mmHg 9   Post-exercise  (Recovery) 99 % Room Air  106 bpm       Recovery Time:  80 seconds    Performing nurse/tech:  SUNNI Casas RRT      PREVIOUS STUDY:   11/07/2017---------Distance: 469.39 meters (1540 feet)       O2 Sat % Supplemental Oxygen Heart Rate Blood Pressure Ab Scale   Pre-exercise  (Resting) 99 % Room Air 79 bpm (!) 136/93 mmHg 2   During Exercise 97 % Room Air 113 bpm 146/73 mmHg 7-8   Post-exercise  (Recovery) 98 % Room Air  91 bpm   mmHg        CLINICAL INTERPRETATION:  Six minute walk distance is 457.2 meters (1500 feet) with very, very heavy dyspnea.  During exercise, there was no desaturation while breathing room air.  Blood pressure remained stable and Heart rate increased significantly with walking.  The patient did not report non-pulmonary symptoms during exercise.  Since the previous study in November 2017, exercise capacity is unchanged.  Based upon age and body mass index, exercise capacity is normal.

## 2018-01-15 ENCOUNTER — TELEPHONE (OUTPATIENT)
Dept: TRANSPLANT | Facility: CLINIC | Age: 48
End: 2018-01-15

## 2018-02-08 ENCOUNTER — ANTI-COAG VISIT (OUTPATIENT)
Dept: CARDIOLOGY | Facility: CLINIC | Age: 48
End: 2018-02-08
Payer: COMMERCIAL

## 2018-02-08 DIAGNOSIS — Z79.01 LONG TERM CURRENT USE OF ANTICOAGULANT THERAPY: Primary | ICD-10-CM

## 2018-02-08 DIAGNOSIS — I27.9 CHRONIC PULMONARY HEART DISEASE: ICD-10-CM

## 2018-02-08 LAB — INR PPP: 2.1 (ref 2–3)

## 2018-02-08 PROCEDURE — 85610 PROTHROMBIN TIME: CPT | Mod: QW,S$GLB,,

## 2018-02-08 NOTE — PROGRESS NOTES
INR good and stable. She denies changes. No signs or symptoms of bleeding. Continue maintenance dose and Recheck INR in 4 weeks

## 2018-02-16 DIAGNOSIS — N92.1 MENORRHAGIA WITH IRREGULAR CYCLE: ICD-10-CM

## 2018-02-18 RX ORDER — NORETHINDRONE ACETATE AND ETHINYL ESTRADIOL AND FERROUS FUMARATE 1MG-20(21)
KIT ORAL
Qty: 28 TABLET | Refills: 0 | Status: SHIPPED | OUTPATIENT
Start: 2018-02-18 | End: 2018-04-02 | Stop reason: SDUPTHER

## 2018-02-19 ENCOUNTER — TELEPHONE (OUTPATIENT)
Dept: NEUROLOGY | Facility: CLINIC | Age: 48
End: 2018-02-19

## 2018-02-19 NOTE — TELEPHONE ENCOUNTER
----- Message from Celso Mandel sent at 2/19/2018 11:17 AM CST -----  Contact: Self  Pt request a call back in reference to her medication    Ms [phone]      She's been taking 1mg folic acid everyday and at her last visit you told her to take OTC folic acid but the OTC one is 4 micrograms so how should she take the larger dose

## 2018-02-20 ENCOUNTER — TELEPHONE (OUTPATIENT)
Dept: FAMILY MEDICINE | Facility: CLINIC | Age: 48
End: 2018-02-20

## 2018-02-20 DIAGNOSIS — E03.9 HYPOTHYROIDISM (ACQUIRED): Primary | ICD-10-CM

## 2018-02-20 RX ORDER — TADALAFIL 20 MG/1
TABLET ORAL
COMMUNITY
Start: 2018-01-22 | End: 2018-02-20 | Stop reason: SDUPTHER

## 2018-02-20 NOTE — TELEPHONE ENCOUNTER
----- Message from Arcelia Peguero sent at 2/20/2018  4:39 PM CST -----  Contact: self  Patient would like to know does she need blood work for her thyroids? Patient can be reached at [phone].      Thanks,

## 2018-03-08 ENCOUNTER — ANTI-COAG VISIT (OUTPATIENT)
Dept: CARDIOLOGY | Facility: CLINIC | Age: 48
End: 2018-03-08
Payer: COMMERCIAL

## 2018-03-08 DIAGNOSIS — I27.9 CHRONIC PULMONARY HEART DISEASE: ICD-10-CM

## 2018-03-08 DIAGNOSIS — Z79.01 LONG TERM CURRENT USE OF ANTICOAGULANT THERAPY: Primary | ICD-10-CM

## 2018-03-08 LAB — INR PPP: 2.7 (ref 2–3)

## 2018-03-08 PROCEDURE — 85610 PROTHROMBIN TIME: CPT | Mod: QW,S$GLB,,

## 2018-03-08 NOTE — PROGRESS NOTES
Patient INR in therapeutic range today. Reports no bleeding, bruising or other changes. Will maintain current weekly dose until follow up in 6 weeks. Advised patient to call with any concerns or changes.

## 2018-03-08 NOTE — PROGRESS NOTES
Patient seen by Leandra GARCÍA. I have reviewed her initial findings and agree with her assessment.  Care plan made together.

## 2018-03-22 ENCOUNTER — OFFICE VISIT (OUTPATIENT)
Dept: NEUROLOGY | Facility: CLINIC | Age: 48
End: 2018-03-22
Payer: COMMERCIAL

## 2018-03-22 ENCOUNTER — TELEPHONE (OUTPATIENT)
Dept: TRANSPLANT | Facility: CLINIC | Age: 48
End: 2018-03-22

## 2018-03-22 VITALS
BODY MASS INDEX: 27.82 KG/M2 | DIASTOLIC BLOOD PRESSURE: 81 MMHG | HEIGHT: 59 IN | SYSTOLIC BLOOD PRESSURE: 114 MMHG | WEIGHT: 138 LBS | HEART RATE: 74 BPM

## 2018-03-22 DIAGNOSIS — G43.009 MIGRAINE WITHOUT AURA AND WITHOUT STATUS MIGRAINOSUS, NOT INTRACTABLE: Primary | ICD-10-CM

## 2018-03-22 DIAGNOSIS — Z76.82 AWAITING ORGAN TRANSPLANT STATUS: ICD-10-CM

## 2018-03-22 DIAGNOSIS — I27.0 PRIMARY PULMONARY HYPERTENSION: Primary | ICD-10-CM

## 2018-03-22 PROCEDURE — 99214 OFFICE O/P EST MOD 30 MIN: CPT | Mod: NTX,S$GLB,, | Performed by: NEUROLOGICAL SURGERY

## 2018-03-22 PROCEDURE — 99999 PR PBB SHADOW E&M-EST. PATIENT-LVL III: CPT | Mod: PBBFAC,TXP,, | Performed by: NEUROLOGICAL SURGERY

## 2018-03-22 RX ORDER — SUMATRIPTAN SUCCINATE 100 MG/1
100 TABLET ORAL
Qty: 12 TABLET | Refills: 5 | Status: SHIPPED | OUTPATIENT
Start: 2018-03-22 | End: 2021-03-19

## 2018-03-22 NOTE — PROGRESS NOTES
"Chief Complaint   Patient presents with    Headache        Yuni Perez is a 48 y.o. female with a history of multiple medical diagnoses as listed below that presents for evaluation of headaches. She is accompanied to this visit by her mother. She was diagnosed with pulmonary hypertension about 8 years ago and around the same time she was diagnosed with a seizure. She had workup at that time that included MRI that showed signs of "old injury" and she had EEG that showed "lots of spikes and waves" per her mother. She says that since that time she has been started on Topamax which has been titrated up. She has not been having any seizures but she has bene having some occasional word finding difficulty that she has attributed to the medication. She has tried tylenol because as she has been titrating her medication to treat pulmonary hypertension sh marie been having bilateral intense stabbing pains in her head. She does say that with time her headaches have been better but she has noticed many more headaches since she began the Remodulin. She has not taken any other headache medications in the past.    Interval History  11/17/2016  She has still been having headaches after taking her medications in particular her vasodilators. She has found that her PRN medications are somewhat helpful in minimizing the pain that she has from her headaches. She has not had any mew problems since she was last seen in clinic.    03/23/2017  Since last seen in clinic she has been approved to get on the lung transplant list and she will be making strides to get set up to to receive new lungs. She has been taking her topamax as directed and has felt that overall she has been having fewer headaches but she still has needed Tylenol and on rare occasions Fioricet to get rid of her headaches. She has been having some cognitive difficulty with her current medications but she feels that it is tolerable. She has not had any seizure " "like activity.    06/13/2017  Since last seen in clinic she says that she has been able to be placed on the donor list. She says that overall she feels that she has been stable with regards to her breathing. She continues to have chronic sinus issues that have been essentially refractory to any medications that she has tried including antihistamines and nasal rinses. She feels that she is able to clear her sinuses and have it come right back. She has headaches almost daily but despite the frequency of the headaches she feels that most are not migraines. She has not had any seizures since last seen. She is tolerating all of her medications well without any problems.    10/31/2017  She has continued to make preparations for lung transplant.  Said that she has been frustrated with the process and she does not have much guidance which is causing increased stress.  Headaches overall have been about the same, but are responsive to Fioricet.  She says the medication helps give her adequate relief of the pain.    03/22/2018  She feels that her headaches have been getting worse in the last week.  Overall prior to this week she feels that the headaches were about the same as when she was last seen in clinic and she was using Fioricet as needed which provided some relief from her headache pain.  In the last week however Fioricet is not provided much of any relief and headaches have been going on almost every day.  She says that she feels like she has "sinus headaches" which have lingered until she has started having pounding unilateral headaches that she feels her migraines.  These headaches seem to be much more frequent and much more intense that she is ever had in the past.    PAST MEDICAL HISTORY:  Past Medical History:   Diagnosis Date    AR (allergic rhinitis)     Cholelithiasis, common bile duct     Chronic low back pain     Eye pressure 2017    GERD (gastroesophageal reflux disease)     History of migraine " headaches     Hypothyroidism     Lumbar disc disease     Menorrhagia     Mild asthma     Obesity     Plantar fasciitis of left foot     Primary pulmonary hypertension     followed by heart transplant/pulmonary     Seizure disorder     x 1 in 2008    Seizures     Sleep apnea     TMJ (dislocation of temporomandibular joint)     Tricuspid regurgitation        PAST SURGICAL HISTORY:  Past Surgical History:   Procedure Laterality Date    CARDIAC CATHETERIZATION      CENTRAL VENOUS CATHETER INSERTION      PORTACATH PLACEMENT      UPPER GASTROINTESTINAL ENDOSCOPY         SOCIAL HISTORY:  Social History     Social History    Marital status: Single     Spouse name: N/A    Number of children: 0    Years of education: N/A     Occupational History    [employer]     Social History Main Topics    Smoking status: Never Smoker    Smokeless tobacco: Never Used    Alcohol use No      Comment: 1 per year    Drug use: No    Sexual activity: No      Comment: pt is a virgin     Other Topics Concern    Not on file     Social History Narrative    No narrative on file       FAMILY HISTORY:  Family History   Problem Relation Age of Onset    Heart disease Father     Glaucoma Father     Diabetes Mother     Cancer Maternal Grandmother      uterine    Cancer Maternal Aunt      breast    Breast cancer Maternal Aunt     Heart disease Paternal Grandfather     Heart attack Paternal Grandfather     Diabetes Paternal Grandfather     Leukemia Brother     Hypertension      Diabetes Maternal Grandfather     Heart attack Maternal Grandfather     Breast cancer Cousin     No Known Problems Sister     No Known Problems Maternal Uncle     No Known Problems Paternal Aunt     No Known Problems Paternal Uncle     No Known Problems Paternal Grandmother     Colon cancer Neg Hx     Ovarian cancer Neg Hx     Amblyopia Neg Hx     Blindness Neg Hx     Cataracts Neg Hx     Macular degeneration Neg  Hx     Retinal detachment Neg Hx     Strabismus Neg Hx     Stroke Neg Hx     Thyroid disease Neg Hx        ALLERGIES AND MEDICATIONS: updated and reviewed.  Review of patient's allergies indicates:   Allergen Reactions    Adhesive Hives     Silk tape    Amoxicillin Rash    Chlorhexidine Other (See Comments)     Current Outpatient Prescriptions   Medication Sig Dispense Refill    0.9 % SODIUM CHLORIDE (SODIUM CHLORIDE 0.9%) 0.9 % Soln Inject 3 mLs into the vein every 8 (eight) hours as needed. (Patient taking differently: Inject 3 mLs into the vein every other day. ) 900 mL 11    acetaminophen (TYLENOL EXTRA STRENGTH) 500 MG tablet Take 1,000 mg by mouth every 6 (six) hours as needed for Pain.      ADVAIR DISKUS 100-50 mcg/dose diskus inhaler inhale 1 PUFF TWICE DAILY 1 each 11    albuterol 90 mcg/actuation inhaler Inhale 2 puffs into the lungs every 4 (four) hours as needed. 2 Aerosol Inhalation Every 4-6 hours 1 Inhaler 3    ambrisentan (LETAIRIS) 10 MG Tab Take 1 tablet (10 mg total) by mouth once daily. 30 tablet 11    azelastine (ASTELIN) 137 mcg (0.1 %) nasal spray 2 sprays by Nasal route 2 (two) times daily.       butalbital-acetaminophen-caffeine -40 mg (FIORICET, ESGIC) -40 mg per tablet Take 1 tablet by mouth every 4 (four) hours as needed for Pain or Headaches. 40 tablet 3    citalopram (CELEXA) 10 MG tablet Take 1 tablet (10 mg total) by mouth once daily. 30 tablet 11    clindamycin (CLEOCIN T) 1 % lotion daily as needed.       cyanocobalamin, vitamin B-12, 2,500 mcg Subl Place 2,500 mcg under the tongue once daily.       desonide (DESOWEN) 0.05 % ointment 0.05 % daily as needed.  Ointment Topical As directed.  Apply to affected area twice a daily over face      diphenhydrAMINE (BENADRYL) 25 mg capsule Take 25 mg by mouth every 6 (six) hours as needed for Itching.      ergocalciferol (VITAMIN D2) 50,000 unit Cap TAKE ONE CAPSULE BY MOUTH EVERY 14 DAYS 6 capsule 0     esomeprazole (NEXIUM) 40 MG capsule TAKE ONE CAPSULE BY MOUTH DAILY before breakfast 90 capsule 3    ferrous sulfate 325 (65 FE) MG EC tablet Take 325 mg by mouth daily as needed.       fluticasone (FLONASE) 50 mcg/actuation nasal spray INHALE 2 SPRAYS IN EACH NOSTRIL DAILY 16 g 3    folic acid (FOLVITE) 1 MG tablet Take 1 mg by mouth.  Tablet Oral Every day      ipratropium (ATROVENT) 0.03 % nasal spray 2 sprays by Nasal route 2 (two) times daily. (Patient taking differently: 2 sprays by Nasal route 2 (two) times daily as needed. ) 30 mL 0    levothyroxine (SYNTHROID) 137 MCG Tab tablet Take 1 tablet (137 mcg total) by mouth once daily. 90 tablet 3    loratadine (CLARITIN) 10 mg tablet Take 10 mg by mouth once daily.      MICROGESTIN FE 1/20, 28, 1 mg-20 mcg (21)/75 mg (7) per tablet TAKE ONE Tablet BY MOUTH EVERY DAY 28 tablet 0    ondansetron (ZOFRAN) 4 MG tablet Take 4 mg by mouth every 6 (six) hours as needed. 1 Tablet Oral Every 6 hours      PRAMOSONE 2.5-1 % Lotn lotion daily as needed.       promethazine (PHENERGAN) 25 MG tablet Take 25 mg by mouth every 4 to 6 hours as needed.       sumatriptan (IMITREX) 100 MG tablet Take 1 tablet (100 mg total) by mouth as needed for Migraine. Take at the onset of headache, may take 1 more in 1 hour if needed. 12 tablet 5    tadalafil (ADCIRCA) 20 MG Tab Take 2 tablets (40 mg total) by mouth once daily. 60 tablet 11    topiramate (TOPAMAX) 100 MG tablet Take 1 tablet (100 mg total) by mouth 2 (two) times daily. 60 tablet 11    treprostinil (REMODULIN) 2.5 mg/mL Soln Mix cassette as directed and infuse continuously per physician titration orders on dosing sheet. Current dose 80ng/kg/min 60 mL 11    warfarin (COUMADIN) 2.5 MG tablet TAKE 2.5-3 TABLETS BY MOUTH DAILY AS DIRECTED by [facility] 90 tablet 3     No current facility-administered medications for this visit.        Review of Systems   Constitutional: Negative for activity change, appetite  change, fever and unexpected weight change.   HENT: Negative for trouble swallowing and voice change.    Eyes: Negative for photophobia and visual disturbance.   Respiratory: Negative for apnea and shortness of breath.    Cardiovascular: Negative for chest pain and leg swelling.   Gastrointestinal: Negative for constipation and nausea.   Genitourinary: Negative for difficulty urinating.   Musculoskeletal: Negative for back pain, gait problem and neck pain.   Skin: Negative for color change and pallor.   Neurological: Positive for dizziness and headaches. Negative for seizures, syncope, weakness and numbness.   Hematological: Negative for adenopathy.   Psychiatric/Behavioral: Negative for agitation, confusion and decreased concentration.       Neurologic Exam     Mental Status   Oriented to person, place, and time.   Registration: recalls 3 of 3 objects.   Attention: normal. Concentration: normal.   Speech: speech is normal   Level of consciousness: alert  Knowledge: good.     Cranial Nerves     CN II   Visual fields full to confrontation.   Right visual field deficit: none  Left visual field deficit: none     CN III, IV, VI   Pupils are equal, round, and reactive to light.  Extraocular motions are normal.   Right pupil: Size: 3 mm. Shape: regular. Accommodation: intact.   Left pupil: Size: 3 mm. Shape: regular. Accommodation: intact.   CN III: no CN III palsy  CN VI: no CN VI palsy  Nystagmus: none   Diplopia: none  Ophthalmoparesis: none  Upgaze: normal  Downgaze: normal  Conjugate gaze: present    CN V   Facial sensation intact.   Right facial sensation deficit: none  Left facial sensation deficit: none    CN VII   Facial expression full, symmetric.   Right facial weakness: none  Left facial weakness: none    CN VIII   CN VIII normal.     CN IX, X   CN IX normal.   CN X normal.   Palate: symmetric    CN XI   CN XI normal.   Right sternocleidomastoid strength: normal  Left sternocleidomastoid strength:  normal  Right trapezius strength: normal  Left trapezius strength: normal    CN XII   CN XII normal.   Tongue deviation: none    Motor Exam   Muscle bulk: normal  Overall muscle tone: normal  Right arm tone: normal  Left arm tone: normal  Right leg tone: normal  Left leg tone: normal    Strength   Strength 5/5 throughout.     Sensory Exam   Right arm light touch: normal  Left arm light touch: normal  Right leg light touch: normal  Left leg light touch: normal  Right arm vibration: normal  Left arm vibration: normal  Right leg vibration: normal  Left leg vibration: normal  Right arm proprioception: normal  Left arm proprioception: normal  Right leg proprioception: normal  Left leg proprioception: normal  Right arm pinprick: normal  Left arm pinprick: normal  Right leg pinprick: normal  Left leg pinprick: normal    Gait, Coordination, and Reflexes     Gait  Gait: normal    Coordination   Romberg: negative  Finger to nose coordination: normal  Heel to shin coordination: normal  Tandem walking coordination: normal    Tremor   Resting tremor: absent    Reflexes   Right brachioradialis: 2+  Left brachioradialis: 2+  Right biceps: 2+  Left biceps: 2+  Right triceps: 2+  Left triceps: 2+  Right patellar: 2+  Left patellar: 2+  Right achilles: 2+  Left achilles: 2+  Right plantar: normal  Left plantar: normal      Physical Exam   Constitutional: She is oriented to person, place, and time. She appears well-developed and well-nourished.   HENT:   Head: Normocephalic and atraumatic.   Eyes: EOM are normal. Pupils are equal, round, and reactive to light.   Neck: Normal range of motion.   Cardiovascular: Normal rate and intact distal pulses.    Pulmonary/Chest: Effort normal. No apnea. No respiratory distress.   Musculoskeletal: Normal range of motion.   Neurological: She is alert and oriented to person, place, and time. She has normal strength. She has a normal Finger-Nose-Finger Test, a normal Heel to Joyce Test, a normal  "Romberg Test and a normal Tandem Gait Test. Gait normal.   Reflex Scores:       Tricep reflexes are 2+ on the right side and 2+ on the left side.       Bicep reflexes are 2+ on the right side and 2+ on the left side.       Brachioradialis reflexes are 2+ on the right side and 2+ on the left side.       Patellar reflexes are 2+ on the right side and 2+ on the left side.       Achilles reflexes are 2+ on the right side and 2+ on the left side.  Skin: Skin is warm and dry.   Psychiatric: She has a normal mood and affect. Her speech is normal and behavior is normal. Thought content normal.   Vitals reviewed.      Vitals:    03/22/18 1119   BP: 114/81   BP Location: Left arm   Patient Position: Sitting   BP Method: Large (Automatic)   Pulse: 74   Weight: 62.6 kg (138 lb 0.1 oz)   Height: 4' 11" (1.499 m)       Assessment & Plan:  Problem List Items Addressed This Visit     Migraine without aura and without status migrainosus, not intractable - Primary    Overview     Despite frequency of headaches few have come in the form of migraine headaches.         Relevant Medications    sumatriptan (IMITREX) 100 MG tablet    Other Relevant Orders    MRI Brain W WO Contrast    Creatinine, serum          Follow-up: Follow-up in about 3 months (around 6/22/2018).   More than 50% of this 25 minute encounter was spent in counseling and coordinating care.          "

## 2018-03-22 NOTE — TELEPHONE ENCOUNTER
----- Message from Ivonne Rai MD sent at 3/22/2018  3:53 PM CDT -----  Ok for both  ----- Message -----  From: SULY Turner, RN  Sent: 3/22/2018   3:21 PM  To: Ivonne Rai MD    Hi-Are we ok with her using imitrex once in a very great while? Neurologist prescribed it today for migraines. Also, she wanted to make sure ok to disconnect herself again for routine MRI of brain.  Sarah

## 2018-03-27 DIAGNOSIS — I27.0 PRIMARY PULMONARY HYPERTENSION: ICD-10-CM

## 2018-03-28 RX ORDER — AMBRISENTAN 10 MG/1
10 TABLET, FILM COATED ORAL DAILY
Qty: 30 TABLET | Refills: 11
Start: 2018-03-28 | End: 2021-04-07 | Stop reason: SDUPTHER

## 2018-04-02 DIAGNOSIS — N92.1 MENORRHAGIA WITH IRREGULAR CYCLE: ICD-10-CM

## 2018-04-03 RX ORDER — NORETHINDRONE ACETATE AND ETHINYL ESTRADIOL AND FERROUS FUMARATE 1MG-20(21)
KIT ORAL
Qty: 28 TABLET | Refills: 0 | Status: SHIPPED | OUTPATIENT
Start: 2018-04-03 | End: 2018-05-01 | Stop reason: SDUPTHER

## 2018-04-13 DIAGNOSIS — I27.0 IDIOPATHIC PAH (PULMONARY ARTERIAL HYPERTENSION): ICD-10-CM

## 2018-04-13 RX ORDER — WARFARIN 2.5 MG/1
TABLET ORAL
Qty: 90 TABLET | Refills: 11 | Status: SHIPPED | OUTPATIENT
Start: 2018-04-13 | End: 2018-04-27 | Stop reason: SDUPTHER

## 2018-04-18 DIAGNOSIS — E03.9 HYPOTHYROIDISM (ACQUIRED): ICD-10-CM

## 2018-04-18 RX ORDER — TADALAFIL 20 MG/1
TABLET ORAL
COMMUNITY
Start: 2018-03-26 | End: 2018-04-27 | Stop reason: SDUPTHER

## 2018-04-18 RX ORDER — LEVOTHYROXINE SODIUM 137 UG/1
TABLET ORAL
Qty: 90 TABLET | Refills: 0 | Status: SHIPPED | OUTPATIENT
Start: 2018-04-18 | End: 2018-05-11 | Stop reason: SDUPTHER

## 2018-04-19 ENCOUNTER — TELEPHONE (OUTPATIENT)
Dept: TRANSPLANT | Facility: CLINIC | Age: 48
End: 2018-04-19

## 2018-04-19 NOTE — TELEPHONE ENCOUNTER
----- Message from Tiffanie Larry sent at 4/19/2018 11:51 AM CDT -----  Contact: pt  Calling to speak with Chris regarding a duplicate test appt    Pt contact [phone]

## 2018-04-19 NOTE — TELEPHONE ENCOUNTER
Patient has appts and pfts scheduled to see Dr. Whyte on 5/10/2018. Pt has appts scheduled to see Dr. Rai on 4/25/2018 and also has a 6min walk scheduled. I advised pt that b/c she has a 6 min walk scheduled on 4/25/2018 I will cancel the walk scheduled on 5/10/2018. Pt expressed understanding. No further concerns. Ended Call.

## 2018-04-25 ENCOUNTER — HOSPITAL ENCOUNTER (OUTPATIENT)
Dept: RADIOLOGY | Facility: HOSPITAL | Age: 48
Discharge: HOME | End: 2018-04-25
Attending: NEUROLOGICAL SURGERY
Payer: COMMERCIAL

## 2018-04-25 ENCOUNTER — HOSPITAL ENCOUNTER (OUTPATIENT)
Dept: PULMONOLOGY | Facility: CLINIC | Age: 48
Discharge: HOME | End: 2018-04-25
Payer: COMMERCIAL

## 2018-04-25 ENCOUNTER — OFFICE VISIT (OUTPATIENT)
Dept: TRANSPLANT | Facility: CLINIC | Age: 48
End: 2018-04-25
Payer: COMMERCIAL

## 2018-04-25 ENCOUNTER — ANTI-COAG VISIT (OUTPATIENT)
Dept: CARDIOLOGY | Facility: CLINIC | Age: 48
End: 2018-04-25

## 2018-04-25 ENCOUNTER — HOSPITAL ENCOUNTER (OUTPATIENT)
Dept: CARDIOLOGY | Facility: CLINIC | Age: 48
Discharge: HOME | End: 2018-04-25
Attending: INTERNAL MEDICINE
Payer: COMMERCIAL

## 2018-04-25 ENCOUNTER — TELEPHONE (OUTPATIENT)
Dept: TRANSPLANT | Facility: CLINIC | Age: 48
End: 2018-04-25

## 2018-04-25 VITALS — BODY MASS INDEX: 27.56 KG/M2 | HEIGHT: 59 IN | WEIGHT: 136.69 LBS

## 2018-04-25 VITALS
BODY MASS INDEX: 27.87 KG/M2 | HEART RATE: 85 BPM | HEIGHT: 59 IN | SYSTOLIC BLOOD PRESSURE: 128 MMHG | DIASTOLIC BLOOD PRESSURE: 92 MMHG | OXYGEN SATURATION: 97 % | WEIGHT: 138.25 LBS

## 2018-04-25 DIAGNOSIS — I27.0 PRIMARY PULMONARY HYPERTENSION: ICD-10-CM

## 2018-04-25 DIAGNOSIS — Z79.01 LONG TERM CURRENT USE OF ANTICOAGULANT THERAPY: ICD-10-CM

## 2018-04-25 DIAGNOSIS — G43.009 MIGRAINE WITHOUT AURA AND WITHOUT STATUS MIGRAINOSUS, NOT INTRACTABLE: ICD-10-CM

## 2018-04-25 DIAGNOSIS — Z76.82 AWAITING ORGAN TRANSPLANT STATUS: ICD-10-CM

## 2018-04-25 DIAGNOSIS — E03.9 HYPOTHYROIDISM (ACQUIRED): ICD-10-CM

## 2018-04-25 DIAGNOSIS — J96.11 CHRONIC RESPIRATORY FAILURE WITH HYPOXIA: ICD-10-CM

## 2018-04-25 DIAGNOSIS — I27.9 CHRONIC PULMONARY HEART DISEASE: ICD-10-CM

## 2018-04-25 DIAGNOSIS — G47.33 OSA (OBSTRUCTIVE SLEEP APNEA): ICD-10-CM

## 2018-04-25 DIAGNOSIS — I36.1 NON-RHEUMATIC TRICUSPID VALVE INSUFFICIENCY: ICD-10-CM

## 2018-04-25 DIAGNOSIS — E66.3 OVERWEIGHT (BMI 25.0-29.9): ICD-10-CM

## 2018-04-25 DIAGNOSIS — I27.0 PRIMARY PULMONARY HYPERTENSION: Primary | ICD-10-CM

## 2018-04-25 DIAGNOSIS — Z79.01 ANTICOAGULATION MONITORING, INR RANGE 2-3: ICD-10-CM

## 2018-04-25 DIAGNOSIS — R16.0 LIVER MASS: ICD-10-CM

## 2018-04-25 LAB
ESTIMATED PA SYSTOLIC PRESSURE: 39.19
RETIRED EF AND QEF - SEE NOTES: 60 (ref 55–65)
TRICUSPID VALVE REGURGITATION: NORMAL

## 2018-04-25 PROCEDURE — 70553 MRI BRAIN STEM W/O & W/DYE: CPT | Mod: 26,NTX,, | Performed by: RADIOLOGY

## 2018-04-25 PROCEDURE — 70553 MRI BRAIN STEM W/O & W/DYE: CPT | Mod: TC,NTX

## 2018-04-25 PROCEDURE — 99999 PR PBB SHADOW E&M-EST. PATIENT-LVL IV: CPT | Mod: PBBFAC,TXP,, | Performed by: INTERNAL MEDICINE

## 2018-04-25 PROCEDURE — A9585 GADOBUTROL INJECTION: HCPCS | Mod: NTX | Performed by: NEUROLOGICAL SURGERY

## 2018-04-25 PROCEDURE — 94618 PULMONARY STRESS TESTING: CPT | Mod: S$GLB,TXP,, | Performed by: INTERNAL MEDICINE

## 2018-04-25 PROCEDURE — 25500020 PHARM REV CODE 255: Mod: NTX | Performed by: NEUROLOGICAL SURGERY

## 2018-04-25 PROCEDURE — 99214 OFFICE O/P EST MOD 30 MIN: CPT | Mod: S$GLB,TXP,, | Performed by: INTERNAL MEDICINE

## 2018-04-25 PROCEDURE — 93306 TTE W/DOPPLER COMPLETE: CPT | Mod: S$GLB,TXP,, | Performed by: INTERNAL MEDICINE

## 2018-04-25 RX ORDER — LORAZEPAM 1 MG/1
1 TABLET ORAL ONCE
Qty: 1 TABLET | Refills: 0 | Status: SHIPPED | OUTPATIENT
Start: 2018-04-25 | End: 2018-11-06

## 2018-04-25 RX ORDER — GADOBUTROL 604.72 MG/ML
6 INJECTION INTRAVENOUS
Status: COMPLETED | OUTPATIENT
Start: 2018-04-25 | End: 2018-04-25

## 2018-04-25 RX ADMIN — GADOBUTROL 6 ML: 604.72 INJECTION INTRAVENOUS at 05:04

## 2018-04-25 NOTE — TELEPHONE ENCOUNTER
Called patient, who stated she does not need to speak with her coordinator Cheryl Euceda.  She denied having any needs with which the lung transplant team can help her at this time.

## 2018-04-25 NOTE — PLAN OF CARE
Discussed case with Dr. Rai,   Patient is scheduled for an MRI of the Brain today (test is ~30mins).     Patient will disconnect her IV Remodulin infusion for the duration of the test and reconnect the infusion once the testing is complete.     Remodulin 1/2 life is 4-6hrs    Patient does NOT need to be monitored by nursing.     If any decompensation occurs patient should present to the ED.     Dr. Rai agrees with all of the above and this information was discussed with MRI supervisor

## 2018-04-25 NOTE — TELEPHONE ENCOUNTER
----- Message from Yessica Lomas sent at 4/25/2018  1:51 PM CDT -----  Contact: Cheryl [phone] MRI supervisor  She needs to speak with Lung Transplant   Cheryl Euceda    Thank you,   Yessica    ----- Message -----  From: Nadine Tierney  Sent: 4/25/2018   1:06 PM  To: [facility] Post-Heart Transplant Clinical    Roya please call the MRI supervisor and she did ask for you.    Thanks

## 2018-04-25 NOTE — TELEPHONE ENCOUNTER
----- Message from Sara Murdock sent at 4/25/2018  2:10 PM CDT -----  Contact: Sen/MRI dept nurse  Please call Sen at [phone]. Patient is scheduled for an MRI today at 330 pm and nurse needs to discuss patient remodulin pump    Thank you

## 2018-04-25 NOTE — PROGRESS NOTES
Subjective:     HPI  48 y.o. White female who presents for PH follow-up. Patient was diagnosed with IPAH about 5 and a half years, ABHIJEET recently, currently deferred for LUT (was working on fund raising). Initial symptom- syncope.  Patient has been on triple therapy: Remodulin at  72ng/kg/min infusion, Letairis 10mg qdaily, and Adcirca 40mg qdaily.    Since last visit, pt has been doing ok- says she had some edema 1-2 wk ago in her left foot between a couple of her toes- no injury she knows of, she elevated her foot and it went down over a couple of days- says it hurt and was puffy-says she had tried making fried chicken wings a few days prior- very congested today in terms of her sinuses but says her breathing on her walk was ok- her legs did get tired and cramp though- did purse lipped breathing which is common for her when she pushes herself.  No swelling today, no LH.  No orthopnea, PND. Watching her salt. Has CP every once in a while    Only line issue is itching    TTE today  Right Ventricle: The right ventricle is normal in size measuring 3.1 cm at the base in the apical right ventricle-focused view. Global right ventricular systolic function appears mildly to moderately depressed. Tricuspid annular plane systolic excursion   (TAPSE) is 2.3 cm. The estimated PA systolic pressure is greater than 39 mmHg.       TTE 9/18/17  1 - Normal left ventricular systolic function (EF 60-65%).     2 - No wall motion abnormalities.     3 - Normal left ventricular diastolic function.     4 - Right atrial enlargement.     5 - Right ventricular enlargement with moderately depressed systolic function.     6 - The estimated PA systolic pressure is 36 mmHg.     7 - Mild tricuspid regurgitation.   Right Ventricle: The right ventricle is enlarged measuring 4.1 cm at the base in the apical right ventricle-focused view. Global right ventricular systolic function appears moderately depressed. There is abnormal septal motion consistent  with RV   pressure/volume overload. Tricuspid annular plane systolic excursion (TAPSE) is 1.7 cm. Tissue Doppler-derived tricuspid annular peak systolic velocity (S prime) is 11.0 cm/s. The estimated PA systolic pressure is 36 mmHg.     6mw 463m (457  1/18, 469m in Nov, 457 in Nov, 488m unchanged last 3 visits with me( 450 11/15/16,  457m, 518.5m, 533.75, 549m April, 463.6m prev visit)                                              O2 sat  98-> 98 % RA                                                          HR 79->139                                                      BP  121/-->133/75                                                       Ab 2 ->7-8    R/LHC 1/17  PW:  (30)  RV: 102  RVEDP: 21     PA: 102/58  RA:  (28)  PA_SAT: 65  AO: 93/56  LV: 93  LVEDP: 14   FICKCO: 4.08  Normal cors    RHC 4/16  AOPRES: 122/85 (97)  AOSAT: 98  FICKCI: 2.81  FICKCO: 4.38  PAPRES: 101/34 (59)  PASAT: 71  PVR: 10.73  PWPRES: 14/8 (10)  RAPRES: 9/5 (4)  RVPRES: 91/-4, 8    TTE last visit   1 - Normal left ventricular systolic function (EF 60-65%).     2 - Right ventricular enlargement with mildly to moderately depressed systolic function.     3 - Normal left ventricular diastolic function.     4 - Pulmonary hypertension. The estimated PA systolic pressure is 41 mmHg.       TTE 3/4/16  1 - Normal left ventricular systolic function (EF 60-65%).   2 - Normal left ventricular diastolic function.   3 - Right ventricle is upper limit of normal in size with low normal to mildly depressed systolic function. TAPSE 2.2 RV 3.7 cm   4 - Trivial to mild tricuspid regurgitation.   5 - Pulmonary hypertension. The estimated PA systolic pressure is 60 mmHg.     TTE Oct  1 - Normal left ventricular systolic function (EF 60-65%).   2 - Left ventricular diastolic dysfunction.   3 - Biatrial enlargement.   4 - Right ventricular enlargement with hypertrophy, with normal systolic function.   5 - Pulmonary hypertension.   6 - Trivial mitral  "regurgitation.   7 - Trivial tricuspid regurgitation.   PASp 46    CXR 2/3/16  Vascular catheter now identified, its tip in the superior vena cava just superior to its junction with the right atrium. Heart size is normal, as is the appearance of the pulmonary vascularity. Lung zones are clear, and free of significant airspace consolidation or volume loss. No pleural fluid. No hilar or mediastinal mass lesion. No pneumothorax.     VQ Scan 8/17/16: low probability    Review of Systems   Constitution: Positive for malaise/fatigue and weight gain. Negative for chills and fever.   HENT: Positive for congestion.    Eyes: Negative.    Cardiovascular: Positive for dyspnea on exertion. Negative for leg swelling, near-syncope, orthopnea, palpitations, paroxysmal nocturnal dyspnea and syncope.   Respiratory: Positive for cough. Negative for shortness of breath.    Endocrine: Negative.    Skin: Negative.    Musculoskeletal: Positive for myalgias.        Left-axillary chest wall pain   Gastrointestinal: Negative for bloating, abdominal pain and change in bowel habit.   Neurological: Positive for headaches and light-headedness. Negative for dizziness.   Psychiatric/Behavioral: Negative for depression.        Objective:  BP (!) 128/92   Pulse 85   Ht 4' 11" (1.499 m)   Wt 62.7 kg (138 lb 3.7 oz)   SpO2 97%   BMI 27.92 kg/m²       Physical Exam   Constitutional: She is oriented to person, place, and time. She appears well-developed and well-nourished.   HENT:   Head: Normocephalic and atraumatic.   Neck: Neck supple. No JVD present. No thyromegaly present.   Cardiovascular: Normal rate and regular rhythm.  Exam reveals no gallop and no friction rub.    No murmur heard.  Physiologically split S2 with prominent P2 component     Pulmonary/Chest: Effort normal and breath sounds normal. No respiratory distress. She has no wheezes. She has no rales.   Abdominal: Soft. Bowel sounds are normal. She exhibits no distension. There is no " tenderness.   Musculoskeletal: She exhibits no edema.   Neurological: She is alert and oriented to person, place, and time.   Skin: Skin is warm and dry.   Line exit site is clean, no erythema, scab or discharge   Psychiatric: She has a normal mood and affect.                       Chemistry        Component Value Date/Time     04/25/2018 1340    K 3.9 04/25/2018 1340     (H) 04/25/2018 1340    CO2 20 (L) 04/25/2018 1340    BUN 8 04/25/2018 1340    CREATININE 0.7 04/25/2018 1340    GLU 82 04/25/2018 1340        Component Value Date/Time    CALCIUM 8.7 04/25/2018 1340    ALKPHOS 74 04/25/2018 1340    AST 16 04/25/2018 1340    ALT 12 04/25/2018 1340    BILITOT 0.2 04/25/2018 1340            Magnesium   Date Value Ref Range Status   04/25/2018 1.9 1.6 - 2.6 mg/dL Final       Lab Results   Component Value Date    WBC 4.85 04/25/2018    HGB 13.3 04/25/2018    HCT 40.3 04/25/2018    MCV 87 04/25/2018     04/25/2018       Lab Results   Component Value Date    INR 2.1 (H) 04/25/2018    INR 2.7 03/08/2018    INR 2.1 02/08/2018       BNP   Date Value Ref Range Status   04/25/2018 20 0 - 99 pg/mL Final     Comment:     Values of less than 100 pg/ml are consistent with non-CHF populations.   01/12/2018 17 0 - 99 pg/mL Final     Comment:     Values of less than 100 pg/ml are consistent with non-CHF populations.   09/18/2017 21 0 - 99 pg/mL Final     Comment:     Values of less than 100 pg/ml are consistent with non-CHF populations.       No results found for: LDH          Assessment:       1. Primary pulmonary hypertension- FC II-III no volume overload on exam today, BNP remains low, adequate 6mw distance today,  RV enlarged and  Hypertrophied with mild-mod red systolic fxn on echo this week, despite increasing remodulin dose- PAP by echo does not correlate with her RHC which shows severe PAH, marginal Co and high RA- overall though she remains clinically stable on IV remodulin   2. Encounter for long-term  (current) use of anticoagulants    3. Obesity    4. Menorrhagia    5. Tricuspid regurgitation         Plan:     No changes from a PH standpoint today- on triple tx and unable to increase remodulin further due to SE- deferred for lung transplant at this time (financial and psychosocial issues) and clinically she remains stable    Keep salt intake to under 2000 mg sodium, fluids to under 2 L (64 oz)    Check weights every morning after getting out of bed and urinating. If weight goes up 3# overnight or 5# in one week she should call us    F/u August with RHC with me- to hold coumadin  3 nights prior- will decide based on results whether or not to try to transition adcirca to adempas (have held off due to her congestion)

## 2018-04-25 NOTE — TELEPHONE ENCOUNTER
Attempted to return call--no answer. Spoke w/pt and told her she is ok to disconnect self from CADD pump and reconnect after MRI completed, if she is comfortable with this. If not, [facility]SU RN may be contacted (NELSON Centeno RN previously notified of pt coming).

## 2018-04-25 NOTE — PATIENT INSTRUCTIONS
1. Continue current therapy.  2.  Keep salt intake to under 2000 mg sodium, fluids to under 2 L (64 oz)  3  Check your weights every morning after getting out of bed and urinating. If your weight goes up 3# overnight or 5# in one week call Sarah  4. Call us if you find yourself getting more short of breath, have more swelling or unexpected weight changes, fever, chills, or problems with your line    In August, We will do a right heart catheterization to measure the blood pressure in your lungs-  Take your usual medicines and eat a light breakfast that am.  Labs on 2nd floor- then go to [facility] cath lab waiting area and check in    Hold coumadin 3 nights prior, take the ativan one hour before

## 2018-04-27 RX ORDER — TADALAFIL 20 MG/1
40 TABLET ORAL DAILY
COMMUNITY
End: 2019-10-09 | Stop reason: ALTCHOICE

## 2018-04-28 NOTE — PROCEDURES
Yuni Perez is a 48 y.o.  female patient, who presents for a 6 minute walk test ordered by Ivonne Rai MD.  The diagnosis is Pulmonary Hypertension.  The patient's BMI is 27.7 kg/m2.  Predicted distance (lower limit of normal) is 431.14 meters.      Test Results:    The test was completed without stopping.  The total time walked was 360 seconds.  During walking, the patient reported:  Dyspnea, Leg pain.  The patient used no assistive devices during testing.     04/25/2018---------Distance: 463.3 meters (1520 feet)     O2 Sat % Supplemental Oxygen Heart Rate Blood Pressure Ab Scale   Pre-exercise  (Resting) 98 % Room Air 79 bpm 121/70 mmHg 2   During Exercise 98 % Room Air 139 bpm 133/75 mmHg 7-8   Post-exercise  (Recovery) 99 % Room Air  117 bpm       Recovery Time:  47 seconds    Performing nurse/tech:  NATACHA Carlisle      PREVIOUS STUDY:   01/12/2018---------Distance: 457.2 meters (1500 feet)       O2 Sat % Supplemental Oxygen Heart Rate Blood Pressure Ab Scale   Pre-exercise  (Resting) 98 % Room Air 82 bpm 106/60 mmHg 2   During Exercise 99 % Room Air 124 bpm 125/59 mmHg 9   Post-exercise  (Recovery) 99 % Room Air  106 bpm           CLINICAL INTERPRETATION:  Six minute walk distance is 463.3 meters (1520 feet) with very heavy dyspnea.  During exercise, there was no desaturation while breathing room air.  Blood pressure remained stable and Heart rate increased significantly with walking.  The patient reported non-pulmonary symptoms during exercise.  Since the previous study in January 2018, exercise capacity is unchanged.  Based upon age and body mass index, exercise capacity is normal.

## 2018-05-01 DIAGNOSIS — N92.1 MENORRHAGIA WITH IRREGULAR CYCLE: ICD-10-CM

## 2018-05-03 ENCOUNTER — TELEPHONE (OUTPATIENT)
Dept: TRANSPLANT | Facility: CLINIC | Age: 48
End: 2018-05-03

## 2018-05-03 ENCOUNTER — OFFICE VISIT (OUTPATIENT)
Dept: NEUROLOGY | Facility: CLINIC | Age: 48
End: 2018-05-03
Payer: COMMERCIAL

## 2018-05-03 VITALS
HEIGHT: 59 IN | BODY MASS INDEX: 27.82 KG/M2 | DIASTOLIC BLOOD PRESSURE: 80 MMHG | SYSTOLIC BLOOD PRESSURE: 111 MMHG | WEIGHT: 138 LBS | HEART RATE: 75 BPM

## 2018-05-03 DIAGNOSIS — G44.229 CHRONIC TENSION-TYPE HEADACHE, NOT INTRACTABLE: Primary | ICD-10-CM

## 2018-05-03 PROCEDURE — 3008F BODY MASS INDEX DOCD: CPT | Mod: CPTII,NTX,S$GLB, | Performed by: NEUROLOGICAL SURGERY

## 2018-05-03 PROCEDURE — 99214 OFFICE O/P EST MOD 30 MIN: CPT | Mod: NTX,S$GLB,, | Performed by: NEUROLOGICAL SURGERY

## 2018-05-03 PROCEDURE — 99999 PR PBB SHADOW E&M-EST. PATIENT-LVL II: CPT | Mod: PBBFAC,TXP,, | Performed by: NEUROLOGICAL SURGERY

## 2018-05-03 NOTE — TELEPHONE ENCOUNTER
"----- Message from Ivonne Rai MD sent at 5/3/2018  2:52 PM CDT -----  Not qualified to comment- she should talk to the doc who ordered it.  ----- Message -----  From: SULY Turner, RN  Sent: 5/3/2018  12:57 PM  To: Ivonne Rai MD    Requests you review her MRI-"saw something in sinuses and she wants you to look."  Sarah"

## 2018-05-07 RX ORDER — NORETHINDRONE ACETATE AND ETHINYL ESTRADIOL AND FERROUS FUMARATE 1MG-20(21)
KIT ORAL
Qty: 28 TABLET | Refills: 0 | Status: SHIPPED | OUTPATIENT
Start: 2018-05-07 | End: 2018-05-31 | Stop reason: SDUPTHER

## 2018-05-09 NOTE — PROGRESS NOTES
"Chief Complaint   Patient presents with    Follow-up     MRI results        Yuni Perez is a 48 y.o. female with a history of multiple medical diagnoses as listed below that presents for evaluation of headaches. She is accompanied to this visit by her mother. She was diagnosed with pulmonary hypertension about 8 years ago and around the same time she was diagnosed with a seizure. She had workup at that time that included MRI that showed signs of "old injury" and she had EEG that showed "lots of spikes and waves" per her mother. She says that since that time she has been started on Topamax which has been titrated up. She has not been having any seizures but she has bene having some occasional word finding difficulty that she has attributed to the medication. She has tried tylenol because as she has been titrating her medication to treat pulmonary hypertension sh marie been having bilateral intense stabbing pains in her head. She does say that with time her headaches have been better but she has noticed many more headaches since she began the Remodulin. She has not taken any other headache medications in the past.    Interval History  11/17/2016  She has still been having headaches after taking her medications in particular her vasodilators. She has found that her PRN medications are somewhat helpful in minimizing the pain that she has from her headaches. She has not had any mew problems since she was last seen in clinic.    03/23/2017  Since last seen in clinic she has been approved to get on the lung transplant list and she will be making strides to get set up to to receive new lungs. She has been taking her topamax as directed and has felt that overall she has been having fewer headaches but she still has needed Tylenol and on rare occasions Fioricet to get rid of her headaches. She has been having some cognitive difficulty with her current medications but she feels that it is tolerable. She has not " "had any seizure like activity.    06/13/2017  Since last seen in clinic she says that she has been able to be placed on the donor list. She says that overall she feels that she has been stable with regards to her breathing. She continues to have chronic sinus issues that have been essentially refractory to any medications that she has tried including antihistamines and nasal rinses. She feels that she is able to clear her sinuses and have it come right back. She has headaches almost daily but despite the frequency of the headaches she feels that most are not migraines. She has not had any seizures since last seen. She is tolerating all of her medications well without any problems.    10/31/2017  She has continued to make preparations for lung transplant.  Said that she has been frustrated with the process and she does not have much guidance which is causing increased stress.  Headaches overall have been about the same, but are responsive to Fioricet.  She says the medication helps give her adequate relief of the pain.    03/22/2018  She feels that her headaches have been getting worse in the last week.  Overall prior to this week she feels that the headaches were about the same as when she was last seen in clinic and she was using Fioricet as needed which provided some relief from her headache pain.  In the last week however Fioricet is not provided much of any relief and headaches have been going on almost every day.  She says that she feels like she has "sinus headaches" which have lingered until she has started having pounding unilateral headaches that she feels her migraines.  These headaches seem to be much more frequent and much more intense that she is ever had in the past.    05/03/2018  headaches have been better as she has hardly had any since she was last seen in clinic. Lung transplant is till her recourse for treatm of pulmonary hypertension, but she has not been able to get placed on the transplant " list just yet.    PAST MEDICAL HISTORY:  Past Medical History:   Diagnosis Date    AR (allergic rhinitis)     Cholelithiasis, common bile duct     Chronic low back pain     Eye pressure 2017    GERD (gastroesophageal reflux disease)     History of migraine headaches     Hypothyroidism     Lumbar disc disease     Menorrhagia     Mild asthma     Obesity     Plantar fasciitis of left foot     Primary pulmonary hypertension     followed by heart transplant/pulmonary     Seizure disorder     x 1 in 2008    Seizures     Sleep apnea     TMJ (dislocation of temporomandibular joint)     Tricuspid regurgitation        PAST SURGICAL HISTORY:  Past Surgical History:   Procedure Laterality Date    CARDIAC CATHETERIZATION      CENTRAL VENOUS CATHETER INSERTION      PORTACATH PLACEMENT      UPPER GASTROINTESTINAL ENDOSCOPY         SOCIAL HISTORY:  Social History     Social History    Marital status: Single     Spouse name: N/A    Number of children: 0    Years of education: N/A     Occupational History    Data Maid     Social History Main Topics    Smoking status: Never Smoker    Smokeless tobacco: Never Used    Alcohol use No      Comment: 1 per year    Drug use: No    Sexual activity: No      Comment: pt is a virgin     Other Topics Concern    Not on file     Social History Narrative    No narrative on file       FAMILY HISTORY:  Family History   Problem Relation Age of Onset    Heart disease Father     Glaucoma Father     Diabetes Mother     Cancer Maternal Grandmother         uterine    Cancer Maternal Aunt         breast    Breast cancer Maternal Aunt     Heart disease Paternal Grandfather     Heart attack Paternal Grandfather     Diabetes Paternal Grandfather     Leukemia Brother     Hypertension Unknown     Diabetes Maternal Grandfather     Heart attack Maternal Grandfather     Breast cancer Cousin     No Known Problems Sister     No Known Problems  Maternal Uncle     No Known Problems Paternal Aunt     No Known Problems Paternal Uncle     No Known Problems Paternal Grandmother     Colon cancer Neg Hx     Ovarian cancer Neg Hx     Amblyopia Neg Hx     Blindness Neg Hx     Cataracts Neg Hx     Macular degeneration Neg Hx     Retinal detachment Neg Hx     Strabismus Neg Hx     Stroke Neg Hx     Thyroid disease Neg Hx        ALLERGIES AND MEDICATIONS: updated and reviewed.  Review of patient's allergies indicates:   Allergen Reactions    Adhesive Hives     Silk tape    Amoxicillin Rash    Chlorhexidine Other (See Comments)     Current Outpatient Prescriptions   Medication Sig Dispense Refill    acetaminophen (TYLENOL EXTRA STRENGTH) 500 MG tablet Take 1,000 mg by mouth every 6 (six) hours as needed for Pain.      ADVAIR DISKUS 100-50 mcg/dose diskus inhaler inhale 1 PUFF TWICE DAILY 1 each 11    albuterol 90 mcg/actuation inhaler Inhale 2 puffs into the lungs every 4 (four) hours as needed. 2 Aerosol Inhalation Every 4-6 hours 1 Inhaler 3    ambrisentan (LETAIRIS) 10 MG Tab Take 1 tablet (10 mg total) by mouth once daily. 30 tablet 11    azelastine (ASTELIN) 137 mcg (0.1 %) nasal spray 2 sprays by Nasal route 2 (two) times daily.       butalbital-acetaminophen-caffeine -40 mg (FIORICET, ESGIC) -40 mg per tablet Take 1 tablet by mouth every 4 (four) hours as needed for Pain or Headaches. 40 tablet 3    citalopram (CELEXA) 10 MG tablet Take 1 tablet (10 mg total) by mouth once daily. 30 tablet 11    clindamycin (CLEOCIN T) 1 % lotion daily as needed.       cyanocobalamin, vitamin B-12, 2,500 mcg Subl Place 2,500 mcg under the tongue once daily.       desonide (DESOWEN) 0.05 % ointment 0.05 % daily as needed.  Ointment Topical As directed.  Apply to affected area twice a daily over face      diphenhydrAMINE (BENADRYL) 25 mg capsule Take 25 mg by mouth every 6 (six) hours as needed for Itching.      ergocalciferol (VITAMIN D2)  50,000 unit Cap TAKE ONE CAPSULE BY MOUTH EVERY 14 DAYS 6 capsule 0    esomeprazole (NEXIUM) 40 MG capsule TAKE ONE CAPSULE BY MOUTH DAILY before breakfast 90 capsule 3    ferrous sulfate 325 (65 FE) MG EC tablet Take 325 mg by mouth daily as needed.       fluticasone (FLONASE) 50 mcg/actuation nasal spray INHALE 2 SPRAYS IN EACH NOSTRIL DAILY 16 g 3    folic acid (FOLVITE) 1 MG tablet Take 1 mg by mouth.  Tablet Oral Every day      ipratropium (ATROVENT) 0.03 % nasal spray 2 sprays by Nasal route 2 (two) times daily. (Patient taking differently: 2 sprays by Nasal route 2 (two) times daily as needed. ) 30 mL 0    levothyroxine (SYNTHROID) 137 MCG Tab tablet TAKE ONE Tablet BY MOUTH DAILY 90 tablet 0    loratadine (CLARITIN) 10 mg tablet Take 10 mg by mouth once daily.      ondansetron (ZOFRAN) 4 MG tablet Take 4 mg by mouth every 6 (six) hours as needed. 1 Tablet Oral Every 6 hours      PRAMOSONE 2.5-1 % Lotn lotion daily as needed.       promethazine (PHENERGAN) 25 MG tablet Take 25 mg by mouth every 4 to 6 hours as needed.       sumatriptan (IMITREX) 100 MG tablet Take 1 tablet (100 mg total) by mouth as needed for Migraine. Take at the onset of headache, may take 1 more in 1 hour if needed. 12 tablet 5    tadalafil, PULMONARY HYPERTENSION, (ADCIRCA) 20 mg Tab Take 40 mg by mouth once daily.      topiramate (TOPAMAX) 100 MG tablet Take 1 tablet (100 mg total) by mouth 2 (two) times daily. 60 tablet 11    treprostinil (REMODULIN) 2.5 mg/mL Soln Mix cassette as directed and infuse continuously per physician titration orders on dosing sheet. Current dose 80ng/kg/min 60 mL 11    warfarin (COUMADIN) 2.5 MG tablet TAKE 2.5-3 TABLETS BY MOUTH DAILY AS DIRECTED by [facility] 90 tablet 3    LORazepam (ATIVAN) 1 MG tablet Take 1 tablet (1 mg total) by mouth once. Take 1 hour prior to RHC 1 tablet 0    MICROGESTIN FE 1/20, 28, 1 mg-20 mcg (21)/75 mg (7) per tablet TAKE ONE TABLET BY MOUTH EVERY DAY 28  tablet 0     No current facility-administered medications for this visit.        Review of Systems   Constitutional: Negative for activity change, appetite change, fever and unexpected weight change.   HENT: Negative for trouble swallowing and voice change.    Eyes: Negative for photophobia and visual disturbance.   Respiratory: Negative for apnea and shortness of breath.    Cardiovascular: Negative for chest pain and leg swelling.   Gastrointestinal: Negative for constipation and nausea.   Genitourinary: Negative for difficulty urinating.   Musculoskeletal: Negative for back pain, gait problem and neck pain.   Skin: Negative for color change and pallor.   Neurological: Positive for dizziness and headaches. Negative for seizures, syncope, weakness and numbness.   Hematological: Negative for adenopathy.   Psychiatric/Behavioral: Negative for agitation, confusion and decreased concentration.       Neurologic Exam     Mental Status   Oriented to person, place, and time.   Registration: recalls 3 of 3 objects.   Attention: normal. Concentration: normal.   Speech: speech is normal   Level of consciousness: alert  Knowledge: good.     Cranial Nerves     CN II   Visual fields full to confrontation.   Right visual field deficit: none  Left visual field deficit: none     CN III, IV, VI   Pupils are equal, round, and reactive to light.  Extraocular motions are normal.   Right pupil: Size: 3 mm. Shape: regular. Accommodation: intact.   Left pupil: Size: 3 mm. Shape: regular. Accommodation: intact.   CN III: no CN III palsy  CN VI: no CN VI palsy  Nystagmus: none   Diplopia: none  Ophthalmoparesis: none  Upgaze: normal  Downgaze: normal  Conjugate gaze: present    CN V   Facial sensation intact.   Right facial sensation deficit: none  Left facial sensation deficit: none    CN VII   Facial expression full, symmetric.   Right facial weakness: none  Left facial weakness: none    CN VIII   CN VIII normal.     CN IX, X   CN IX  normal.   CN X normal.   Palate: symmetric    CN XI   CN XI normal.   Right sternocleidomastoid strength: normal  Left sternocleidomastoid strength: normal  Right trapezius strength: normal  Left trapezius strength: normal    CN XII   CN XII normal.   Tongue deviation: none    Motor Exam   Muscle bulk: normal  Overall muscle tone: normal  Right arm tone: normal  Left arm tone: normal  Right leg tone: normal  Left leg tone: normal    Strength   Strength 5/5 throughout.     Sensory Exam   Right arm light touch: normal  Left arm light touch: normal  Right leg light touch: normal  Left leg light touch: normal  Right arm vibration: normal  Left arm vibration: normal  Right leg vibration: normal  Left leg vibration: normal  Right arm proprioception: normal  Left arm proprioception: normal  Right leg proprioception: normal  Left leg proprioception: normal  Right arm pinprick: normal  Left arm pinprick: normal  Right leg pinprick: normal  Left leg pinprick: normal    Gait, Coordination, and Reflexes     Gait  Gait: normal    Coordination   Romberg: negative  Finger to nose coordination: normal  Heel to shin coordination: normal  Tandem walking coordination: normal    Tremor   Resting tremor: absent    Reflexes   Right brachioradialis: 2+  Left brachioradialis: 2+  Right biceps: 2+  Left biceps: 2+  Right triceps: 2+  Left triceps: 2+  Right patellar: 2+  Left patellar: 2+  Right achilles: 2+  Left achilles: 2+  Right plantar: normal  Left plantar: normal      Physical Exam   Constitutional: She is oriented to person, place, and time. She appears well-developed and well-nourished.   HENT:   Head: Normocephalic and atraumatic.   Eyes: EOM are normal. Pupils are equal, round, and reactive to light.   Neck: Normal range of motion.   Cardiovascular: Normal rate and intact distal pulses.    Pulmonary/Chest: Effort normal. No apnea. No respiratory distress.   Musculoskeletal: Normal range of motion.   Neurological: She is alert  "and oriented to person, place, and time. She has normal strength. She has a normal Finger-Nose-Finger Test, a normal Heel to Shin Test, a normal Romberg Test and a normal Tandem Gait Test. Gait normal.   Reflex Scores:       Tricep reflexes are 2+ on the right side and 2+ on the left side.       Bicep reflexes are 2+ on the right side and 2+ on the left side.       Brachioradialis reflexes are 2+ on the right side and 2+ on the left side.       Patellar reflexes are 2+ on the right side and 2+ on the left side.       Achilles reflexes are 2+ on the right side and 2+ on the left side.  Skin: Skin is warm and dry.   Psychiatric: She has a normal mood and affect. Her speech is normal and behavior is normal. Thought content normal.   Vitals reviewed.      Vitals:    05/03/18 1102   BP: 111/80   Pulse: 75   Weight: 62.6 kg (138 lb)   Height: 4' 11" (1.499 m)       Assessment & Plan:  Problem List Items Addressed This Visit     Chronic nonintractable headache - Primary    Overview     Daily headaches likely rebound from her medications, tension type, and sinus headaches.               Follow-up: Follow-up in about 6 months (around 11/3/2018).   More than 50% of this 25 minute encounter was spent in counseling and coordinating care.          "

## 2018-05-10 ENCOUNTER — HOSPITAL ENCOUNTER (OUTPATIENT)
Dept: PULMONOLOGY | Facility: CLINIC | Age: 48
Discharge: HOME | End: 2018-05-10
Payer: COMMERCIAL

## 2018-05-10 ENCOUNTER — OFFICE VISIT (OUTPATIENT)
Dept: TRANSPLANT | Facility: CLINIC | Age: 48
End: 2018-05-10
Payer: COMMERCIAL

## 2018-05-10 VITALS
SYSTOLIC BLOOD PRESSURE: 121 MMHG | HEIGHT: 60 IN | BODY MASS INDEX: 27.09 KG/M2 | OXYGEN SATURATION: 98 % | RESPIRATION RATE: 20 BRPM | DIASTOLIC BLOOD PRESSURE: 60 MMHG | WEIGHT: 138 LBS | TEMPERATURE: 96 F | HEART RATE: 72 BPM

## 2018-05-10 DIAGNOSIS — Z76.82 LUNG TRANSPLANT CANDIDATE: Primary | ICD-10-CM

## 2018-05-10 DIAGNOSIS — I27.0 PRIMARY PULMONARY HYPERTENSION: ICD-10-CM

## 2018-05-10 DIAGNOSIS — Z76.82 AWAITING ORGAN TRANSPLANT STATUS: ICD-10-CM

## 2018-05-10 DIAGNOSIS — J45.20 ALLERGIC ASTHMA, MILD INTERMITTENT, UNCOMPLICATED: ICD-10-CM

## 2018-05-10 LAB
PRE FEV1 FVC: 70
PRE FEV1: 1.48
PRE FVC: 2.11
PREDICTED FEV1 FVC: 85
PREDICTED FEV1: 2.36
PREDICTED FVC: 2.81

## 2018-05-10 PROCEDURE — 94010 BREATHING CAPACITY TEST: CPT | Mod: S$GLB,TXP,, | Performed by: INTERNAL MEDICINE

## 2018-05-10 PROCEDURE — 99213 OFFICE O/P EST LOW 20 MIN: CPT | Mod: 25,S$GLB,TXP, | Performed by: INTERNAL MEDICINE

## 2018-05-10 PROCEDURE — 99999 PR PBB SHADOW E&M-EST. PATIENT-LVL III: CPT | Mod: PBBFAC,TXP,, | Performed by: INTERNAL MEDICINE

## 2018-05-10 PROCEDURE — 94729 DIFFUSING CAPACITY: CPT | Mod: S$GLB,TXP,, | Performed by: INTERNAL MEDICINE

## 2018-05-10 PROCEDURE — 3008F BODY MASS INDEX DOCD: CPT | Mod: CPTII,S$GLB,TXP, | Performed by: INTERNAL MEDICINE

## 2018-05-10 NOTE — PROGRESS NOTES
LUNG TRANSPLANT PRE FOLLOW-UP    Referring Physician: Ivonne Rai    Reason for Visit:  Pre-lung transplant follow-up.         Date of Initial Evaluation: 1/16/2017                                                                                             History of Present Illness: Yuni Perez is a 48 y.o. female who is on 0L of oxygen. She is on no assisted ventilation.  Her New York Heart Association Class is III and a Karnofsky score of 70% - Cares for self: Unable to carry on normal activity or active work. She is not diabetic.   She presents today for routine follow-up for a diagnosis of pulmonary HTN.  She is currently on Letairis, Adcirca, and Remodulin at 80ng.  States that she has been doing well from a respiratory standpoint.  Gets occasional dyspnea with exertion but denies any palpitations, syncope, or dizziness/lightheadedness.  Complains of sinus congestion and currently takes Flonase and Azelstine.  She had an MRI which was obtained for migraine HAs which showed sinusitis.  She has an appointment with her PCP tomorrow for evaluation.  She currently takes Advair and Albuterol for asthma.  Denies any recent illnesses or hospitalizations.  Is scheduled for repeat RHC in 8/2018.    Review of Systems   Constitutional: Negative for chills, fever and weight loss.   HENT: Positive for congestion. Negative for ear discharge, ear pain, nosebleeds and sinus pain.    Eyes: Negative for blurred vision, double vision and photophobia.   Respiratory: Negative for cough, hemoptysis, sputum production, shortness of breath and wheezing.    Cardiovascular: Negative for chest pain, orthopnea, claudication, leg swelling and PND.   Gastrointestinal: Negative for abdominal pain, constipation and diarrhea.   Genitourinary: Negative for flank pain and frequency.   Musculoskeletal: Negative for joint pain, myalgias and neck pain.   Skin: Negative for itching and rash.   Neurological: Negative for dizziness,  focal weakness, weakness and headaches.   Endo/Heme/Allergies: Positive for environmental allergies.   Psychiatric/Behavioral: Negative for depression. The patient is not nervous/anxious.      Objective:   /60   Pulse 72   Temp 96.1 °F (35.6 °C) (Oral)   Resp 20   Ht 5' (1.524 m)   Wt 62.6 kg (138 lb)   SpO2 98% Comment: room air  BMI 26.95 kg/m²     Physical Exam   Constitutional: She is oriented to person, place, and time and well-developed, well-nourished, and in no distress. No distress.   HENT:   Head: Normocephalic and atraumatic.   Right Ear: External ear normal.   Left Ear: External ear normal.   Mouth/Throat: No oropharyngeal exudate.   Eyes: Conjunctivae and EOM are normal. No scleral icterus.   Neck: Normal range of motion. Neck supple. No JVD present.   Cardiovascular: Normal rate, regular rhythm and intact distal pulses.  Exam reveals no gallop and no friction rub.    Murmur heard.  Pulmonary/Chest: Effort normal and breath sounds normal. No respiratory distress. She has no wheezes. She exhibits no tenderness.   Abdominal: Soft. Bowel sounds are normal. She exhibits no distension and no mass. There is no tenderness. There is no guarding.   Musculoskeletal: Normal range of motion. She exhibits no edema, tenderness or deformity.   Lymphadenopathy:     She has no cervical adenopathy.   Neurological: She is alert and oriented to person, place, and time. Gait normal. GCS score is 15.   Skin: Skin is warm and dry. No rash noted. She is not diaphoretic. No erythema. No pallor.   Psychiatric: Mood and affect normal.     Labs:  No visits with results within 7 Day(s) from this visit.   Latest known visit with results is:   Hospital Outpatient Visit on 04/25/2018   Component Date Value    EF 04/25/2018 60     Est. PA Systolic Pressure 04/25/2018 39.19     Tricuspid Valve Regurgit* 04/25/2018 TRIVIAL        Pulmonary Function Tests 5/10/2018 11/7/2017 7/12/2017 11/15/2016 5/17/2016   FVC 2.11 2.24  2.24 2.13 2.22   FEV1 1.48 1.56 1.54 1.43 1.65   TLC (liters) - - - - 3.74   DLCO (ml/mmHg sec) 14.3 - - - 17.6   FVC% 76 80 80 78 78   FEV1% 63 66 65 61 69   FEF 25-75 0.92 0.96 0.87 0.78 1.22   FEF 25-75% 34 35 32 29 44   TLC% - - - - 89   RV - - - - 1.49   RV% - - - - 105   DLCO% 75 - - - 90     Other:   6MW 4/25/2018 1/12/2018 11/7/2017 7/12/2017 5/11/2017 4/12/2017 3/3/2017   6MWT Status - completed without stopping completed without stopping completed without stopping completed without stopping completed without stopping completed without stopping   Patient Reported Dyspnea;Leg pain Dyspnea Dyspnea;Leg pain Chest pain;Dyspnea;Leg pain - Dyspnea;Leg pain Dyspnea;Lightheadedness   Was O2 used? No No No No No No No   6MW Distance walked (feet) 1520 1500 1540 1550 1500 1500 1500   Distance walked (meters) 463.3 457.2 469.39 472.44 457.2 457.2 457.2   Did patient stop? No No No No No No No   Oxygen Saturation 98 98 99 98 99 100 98   Supplemental Oxygen Room Air Room Air Room Air Room Air Room Air Room Air Room Air   Heart Rate 79 82 79 65 79 86 87   Blood Pressure 121/70 106/60 136/93 123/69 128/65 130/75 114/56   Ab Dyspnea Rating  light light light light light light moderate   Oxygen Saturation 98 99 97 99 98 98 99   Supplemental Oxygen Room Air Room Air Room Air Room Air Room Air Room Air Room Air   Heart Rate 139 124 113 120 110 145 112   Blood Pressure 133/75 125/59 146/73 133/85 136/72 142/73 121/67   Ab Dyspnea Rating  very heavy very,very heavy very heavy very heavy very,very heavy very,very heavy very heavy   Recovery Time (seconds) 47 80 68 120 46 102 75   Oxygen Saturation 99 99 98 99 99 98 100   Supplemental Oxygen Room Air Room Air Room Air Room Air Room Air Room Air Room Air   Heart Rate 117 106 91 80 93 122 94       Assessment:-  1. Lung transplant candidate    2. Primary pulmonary hypertension    3. Allergic asthma, mild intermittent, uncomplicated      Plan:   1. Followed by Dr. Rai for  PAH.  Currently on Adcirca, Letairis, and Remodulin at 80ng.  Her most recent RHC with mPAP at 59.  Recent TTE shows normal ePAP and mildly depressed RV function.  Continue outpatient follow-up with Dr. Rai.    2. Continue with inhaler therapy for asthma.  Continue nasal regimen for allergic rhinitis which includes Flonase and Azelastine.  Following up with her PCP tomorrow for evaluation of her sinusitis.      3. With regards to lung transplant for her PAH, she has been worked up in the past.  Has been deferred for financial and psychosocial issues (unreliable caregiver support).  She has remained stable on her current therapy.  Will continue to defer at this time.    4. Follow-up in 6 months or earlier if needed.    Gaby Bahena DO  PCCM Fellow  Attending Note:    I have seen and evaluated the patient with the fellow. Their note reflects the content of our discussion and my plan of care.      Ralph Whyte MD  Pulmonary/Critical Care Medicine

## 2018-05-10 NOTE — LETTER
May 10, 2018        Ivonne Rai  [address]  [facility] [address]  Phone: [phone]  Fax: [phone]             [facility] - Lung Transplant  [address]  [facility] [address]  Phone: [phone]   Patient: Yuni Perez   MR Number: [id number]   YOB: 1970   Date of Visit: 5/10/2018       Dear Dr. Ivonne Rai    Thank you for referring Yuni Perez to me for evaluation. Attached you will find relevant portions of my assessment and plan of care.    If you have questions, please do not hesitate to call me. I look forward to following Yuni Perez along with you.    Sincerely,    Ralph Whyte MD    Enclosure    If you would like to receive this communication electronically, please contact [email or web address] or [phone] to request [employer] access.    [employer] is a tool which provides read-only access to select patient information with whom you have a relationship. Its easy to use and provides real time access to review your patients record including encounter summaries, notes, results, and demographic information.    If you feel you have received this communication in error or would no longer like to receive these types of communications, please e-mail [email or web address]

## 2018-05-11 ENCOUNTER — OFFICE VISIT (OUTPATIENT)
Dept: FAMILY MEDICINE | Facility: CLINIC | Age: 48
End: 2018-05-11
Payer: COMMERCIAL

## 2018-05-11 VITALS
TEMPERATURE: 99 F | OXYGEN SATURATION: 96 % | SYSTOLIC BLOOD PRESSURE: 142 MMHG | HEART RATE: 71 BPM | DIASTOLIC BLOOD PRESSURE: 86 MMHG | WEIGHT: 138.44 LBS | HEIGHT: 59 IN | BODY MASS INDEX: 27.91 KG/M2

## 2018-05-11 DIAGNOSIS — E03.9 HYPOTHYROIDISM (ACQUIRED): ICD-10-CM

## 2018-05-11 DIAGNOSIS — G47.33 OSA (OBSTRUCTIVE SLEEP APNEA): ICD-10-CM

## 2018-05-11 DIAGNOSIS — E55.9 VITAMIN D DEFICIENCY: ICD-10-CM

## 2018-05-11 DIAGNOSIS — Z00.00 ROUTINE MEDICAL EXAM: Primary | ICD-10-CM

## 2018-05-11 DIAGNOSIS — G40.909 SEIZURE DISORDER: ICD-10-CM

## 2018-05-11 DIAGNOSIS — Z12.31 ENCOUNTER FOR SCREENING MAMMOGRAM FOR BREAST CANCER: ICD-10-CM

## 2018-05-11 DIAGNOSIS — E66.3 OVERWEIGHT (BMI 25.0-29.9): ICD-10-CM

## 2018-05-11 DIAGNOSIS — J32.0 LEFT MAXILLARY SINUSITIS: ICD-10-CM

## 2018-05-11 DIAGNOSIS — Z76.82 AWAITING ORGAN TRANSPLANT STATUS: ICD-10-CM

## 2018-05-11 DIAGNOSIS — I27.0 PRIMARY PULMONARY HYPERTENSION: ICD-10-CM

## 2018-05-11 PROBLEM — Z01.818 PRE-TRANSPLANT EVALUATION FOR LUNG TRANSPLANT: Status: RESOLVED | Noted: 2017-01-30 | Resolved: 2018-05-11

## 2018-05-11 PROCEDURE — 99396 PREV VISIT EST AGE 40-64: CPT | Mod: S$GLB,,, | Performed by: INTERNAL MEDICINE

## 2018-05-11 PROCEDURE — 99999 PR PBB SHADOW E&M-EST. PATIENT-LVL III: CPT | Mod: PBBFAC,,, | Performed by: INTERNAL MEDICINE

## 2018-05-11 RX ORDER — FOLIC ACID 1 MG/1
1 TABLET ORAL DAILY
Qty: 90 TABLET | Refills: 3 | Status: SHIPPED | OUTPATIENT
Start: 2018-05-11 | End: 2019-02-21 | Stop reason: SDUPTHER

## 2018-05-11 RX ORDER — LEVOFLOXACIN 750 MG/1
750 TABLET ORAL DAILY
Qty: 5 TABLET | Refills: 0 | Status: SHIPPED | OUTPATIENT
Start: 2018-05-11 | End: 2018-05-16

## 2018-05-11 RX ORDER — ERGOCALCIFEROL 1.25 MG/1
CAPSULE ORAL
Qty: 6 CAPSULE | Refills: 3 | Status: SHIPPED | OUTPATIENT
Start: 2018-05-11 | End: 2019-01-22

## 2018-05-11 RX ORDER — LEVOTHYROXINE SODIUM 137 UG/1
137 TABLET ORAL DAILY
Qty: 90 TABLET | Refills: 1 | Status: SHIPPED | OUTPATIENT
Start: 2018-05-11 | End: 2019-01-08 | Stop reason: SDUPTHER

## 2018-05-11 NOTE — PROGRESS NOTES
HISTORY OF PRESENT ILLNESS:  Yuni Perez is a 48 y.o. female who presents to the clinic today for a routine medical physical exam. Her last physical exam was approximately 1 years(s) ago.    Contraception: abstinence      PAST MEDICAL HISTORY:  Past Medical History:   Diagnosis Date    AR (allergic rhinitis)     Cholelithiasis, common bile duct     Chronic low back pain     Eye pressure 2017    GERD (gastroesophageal reflux disease)     History of migraine headaches     Hypothyroidism     Lumbar disc disease     Menorrhagia     Mild asthma     Obesity     Plantar fasciitis of left foot     Primary pulmonary hypertension     followed by heart transplant/pulmonary     Seizure disorder     x 1 in 2008    Seizures     Sleep apnea     TMJ (dislocation of temporomandibular joint)     Tricuspid regurgitation        PAST SURGICAL HISTORY:  Past Surgical History:   Procedure Laterality Date    CARDIAC CATHETERIZATION      CENTRAL VENOUS CATHETER INSERTION      PORTACATH PLACEMENT      UPPER GASTROINTESTINAL ENDOSCOPY         SOCIAL HISTORY:  Social History     Social History    Marital status: Single     Spouse name: N/A    Number of children: 0    Years of education: N/A     Occupational History    [employer]     Social History Main Topics    Smoking status: Never Smoker    Smokeless tobacco: Never Used    Alcohol use No      Comment: 1 per year    Drug use: No    Sexual activity: No      Comment: pt is a virgin     Other Topics Concern    Not on file     Social History Narrative    No narrative on file       FAMILY HISTORY:  Family History   Problem Relation Age of Onset    Heart disease Father     Glaucoma Father     Diabetes Mother     Cancer Maternal Grandmother         uterine    Cancer Maternal Aunt         breast    Breast cancer Maternal Aunt     Heart disease Paternal Grandfather     Heart attack Paternal Grandfather     Diabetes Paternal  "Grandfather     Leukemia Brother     Hypertension Unknown     Diabetes Maternal Grandfather     Heart attack Maternal Grandfather     Breast cancer Cousin     No Known Problems Sister     No Known Problems Maternal Uncle     No Known Problems Paternal Aunt     No Known Problems Paternal Uncle     No Known Problems Paternal Grandmother     Colon cancer Neg Hx     Ovarian cancer Neg Hx     Amblyopia Neg Hx     Blindness Neg Hx     Cataracts Neg Hx     Macular degeneration Neg Hx     Retinal detachment Neg Hx     Strabismus Neg Hx     Stroke Neg Hx     Thyroid disease Neg Hx        ALLERGIES AND MEDICATIONS: updated and reviewed.  Review of patient's allergies indicates:   Allergen Reactions    Vibra-tabs [doxycycline hyclate] Anaphylaxis     "throat felt like it was closing"    Adhesive Hives     Silk tape    Amoxicillin Rash    Chlorhexidine Other (See Comments)    Doxycycline      Medication List with Changes/Refills   New Medications    LEVOFLOXACIN (LEVAQUIN) 750 MG TABLET    Take 1 tablet (750 mg total) by mouth once daily.   Current Medications    ACETAMINOPHEN (TYLENOL EXTRA STRENGTH) 500 MG TABLET    Take 1,000 mg by mouth every 6 (six) hours as needed for Pain.    ADVAIR DISKUS 100-50 MCG/DOSE DISKUS INHALER    inhale 1 PUFF TWICE DAILY    ALBUTEROL 90 MCG/ACTUATION INHALER    Inhale 2 puffs into the lungs every 4 (four) hours as needed. 2 Aerosol Inhalation Every 4-6 hours    AMBRISENTAN (LETAIRIS) 10 MG TAB    Take 1 tablet (10 mg total) by mouth once daily.    AZELASTINE (ASTELIN) 137 MCG (0.1 %) NASAL SPRAY    2 sprays by Nasal route 2 (two) times daily.     BUTALBITAL-ACETAMINOPHEN-CAFFEINE -40 MG (FIORICET, ESGIC) -40 MG PER TABLET    Take 1 tablet by mouth every 4 (four) hours as needed for Pain or Headaches.    CITALOPRAM (CELEXA) 10 MG TABLET    Take 1 tablet (10 mg total) by mouth once daily.    CLINDAMYCIN (CLEOCIN T) 1 % LOTION    daily as needed.     " CYANOCOBALAMIN, VITAMIN B-12, 2,500 MCG SUBL    Place 2,500 mcg under the tongue once daily.     DESONIDE (DESOWEN) 0.05 % OINTMENT    0.05 % daily as needed.  Ointment Topical As directed.  Apply to affected area twice a daily over face    DIPHENHYDRAMINE (BENADRYL) 25 MG CAPSULE    Take 25 mg by mouth every 6 (six) hours as needed for Itching.    ESOMEPRAZOLE (NEXIUM) 40 MG CAPSULE    TAKE ONE CAPSULE BY MOUTH DAILY before breakfast    FERROUS SULFATE 325 (65 FE) MG EC TABLET    Take 325 mg by mouth daily as needed.     FLUTICASONE (FLONASE) 50 MCG/ACTUATION NASAL SPRAY    INHALE 2 SPRAYS IN EACH NOSTRIL DAILY    IPRATROPIUM (ATROVENT) 0.03 % NASAL SPRAY    2 sprays by Nasal route 2 (two) times daily.    LORATADINE (CLARITIN) 10 MG TABLET    Take 10 mg by mouth once daily.    LORAZEPAM (ATIVAN) 1 MG TABLET    Take 1 tablet (1 mg total) by mouth once. Take 1 hour prior to RHC    MICROGESTIN FE 1/20, 28, 1 MG-20 MCG (21)/75 MG (7) PER TABLET    TAKE ONE TABLET BY MOUTH EVERY DAY    ONDANSETRON (ZOFRAN) 4 MG TABLET    Take 4 mg by mouth every 6 (six) hours as needed. 1 Tablet Oral Every 6 hours    PRAMOSONE 2.5-1 % LOTN LOTION    daily as needed.     PROMETHAZINE (PHENERGAN) 25 MG TABLET    Take 25 mg by mouth every 4 to 6 hours as needed.     SUMATRIPTAN (IMITREX) 100 MG TABLET    Take 1 tablet (100 mg total) by mouth as needed for Migraine. Take at the onset of headache, may take 1 more in 1 hour if needed.    TADALAFIL, PULMONARY HYPERTENSION, (ADCIRCA) 20 MG TAB    Take 40 mg by mouth once daily.    TOPIRAMATE (TOPAMAX) 100 MG TABLET    Take 1 tablet (100 mg total) by mouth 2 (two) times daily.    TREPROSTINIL (REMODULIN) 2.5 MG/ML SOLN    Mix cassette as directed and infuse continuously per physician titration orders on dosing sheet. Current dose 80ng/kg/min    WARFARIN (COUMADIN) 2.5 MG TABLET    TAKE 2.5-3 TABLETS BY MOUTH DAILY AS DIRECTED by [facility]   Changed and/or Refilled Medications    Modified  Medication Previous Medication    ERGOCALCIFEROL (VITAMIN D2) 50,000 UNIT CAP ergocalciferol (VITAMIN D2) 50,000 unit Cap       TAKE ONE CAPSULE BY MOUTH EVERY 14 DAYS    TAKE ONE CAPSULE BY MOUTH EVERY 14 DAYS    FOLIC ACID (FOLVITE) 1 MG TABLET folic acid (FOLVITE) 1 MG tablet       Take 1 tablet (1 mg total) by mouth once daily.  Tablet Oral Every day    Take 1 mg by mouth.  Tablet Oral Every day    LEVOTHYROXINE (SYNTHROID) 137 MCG TAB TABLET levothyroxine (SYNTHROID) 137 MCG Tab tablet       Take 1 tablet (137 mcg total) by mouth once daily.    TAKE ONE Tablet BY MOUTH DAILY         CARE TEAM:  Patient Care Team:  Lulu Sandhu MD as PCP - General (Internal Medicine)  SULY Turner, RN as Registered Nurse  Ralph hWyte MD as Referring Physician (Intensive Care)           SCREENING HISTORY:  Health Maintenance       Date Due Completion Date    Mammogram 12/14/2017 12/14/2016    Override on 9/2/2013: Done    Override on 3/1/2011: Done    Influenza Vaccine 08/01/2018 1/12/2018    Pap Smear with HPV Cotest 03/02/2020 3/2/2017 (Done)    Override on 3/2/2017: Done    Lipid Panel 01/16/2022 1/16/2017    TETANUS VACCINE 01/18/2027 1/18/2017    Pneumococcal PPSV23 (High Risk) (3) 03/18/2036 1/18/2017            REVIEW OF SYSTEMS:   The patient reports good dietary habits.  The patient does not exercise regularly.  Review of Systems   Constitutional: Negative for chills, fatigue, fever and unexpected weight change.   HENT: Positive for congestion (- recent MRI showed sinusitis). Negative for ear discharge and ear pain.    Eyes: Negative for photophobia, pain and visual disturbance.   Respiratory: Positive for shortness of breath (- chronic). Negative for cough and wheezing.    Cardiovascular: Negative for chest pain, palpitations and leg swelling.   Gastrointestinal: Negative for abdominal pain, constipation, diarrhea, nausea and vomiting.   Genitourinary: Negative for dysuria, frequency, urgency and  "vaginal discharge.   Musculoskeletal: Positive for arthralgias (- intermittent right hip pain). Negative for back pain, joint swelling and neck stiffness.   Skin: Negative for rash.   Neurological: Negative for weakness and headaches.   Psychiatric/Behavioral: Negative for dysphoric mood and sleep disturbance. The patient is not nervous/anxious.      negative for - pelvic pain  Breast ROS: negative for breast lumps        Physical Examination:   Vitals:    05/11/18 1109   BP: (!) 142/86   Pulse: 71   Temp: 99.2 °F (37.3 °C)     Weight: 62.8 kg (138 lb 7.2 oz)   Height: 4' 11" (149.9 cm)   Body mass index is 27.96 kg/m².      Patient did not require to have a chaperone present during the exam today.  General appearance - alert, well appearing, and in no distress and overweight  Mental status - alert, oriented to person, place, and time, normal mood, behavior, speech, dress, motor activity, and thought processes  Eyes - pupils equal and reactive, extraocular eye movements intact, sclera anicteric  Nose - mucosal congestion  Mouth - mucous membranes moist, pharynx normal without lesions  Neck - supple, no significant adenopathy, carotids upstroke normal bilaterally, no bruits, thyroid exam: thyroid is normal in size without nodules or tenderness  Lymphatics - no palpable lymphadenopathy  Chest - clear to auscultation, no wheezes, rales or rhonchi, symmetric air entry  Heart - normal rate and regular rhythm, no gallops noted  Abdomen - soft, nontender, nondistended, no masses or organomegaly  Breasts - not examined  Back exam - full range of motion, no tenderness, palpable spasm or pain on motion  Neurological - alert, oriented, normal speech, no focal findings or movement disorder noted, cranial nerves II through XII intact  Musculoskeletal - no joint tenderness, deformity or swelling, no muscular tenderness noted  Extremities - peripheral pulses normal, no pedal edema, no clubbing or cyanosis  Skin - normal coloration " and turgor, no rashes, no suspicious skin lesions noted      ASSESSMENT AND PLAN:  1. Routine medical exam  Counseled on age appropriate medical preventative services including age appropriate cancer screenings, age appropriate eye and dental exams, over all nutritional health, need for a consistent exercise regimen, and an over all push towards maintaining a vigorous and active lifestyle.  Counseled on age appropriate vaccines and discussed upcoming health care needs based on age/gender. Discussed good sleep hygiene and stress management.     2. Hypothyroidism (acquired)  Patient is clinically euthyroid. Continue current regimen.   - levothyroxine (SYNTHROID) 137 MCG Tab tablet; Take 1 tablet (137 mcg total) by mouth once daily.  Dispense: 90 tablet; Refill: 1    3. Primary pulmonary hypertension/4. Awaiting organ transplant status  The current medical regimen is effective;  continue present plan and medications. Followed by transplant medicine.  - folic acid (FOLVITE) 1 MG tablet; Take 1 tablet (1 mg total) by mouth once daily.  Tablet Oral Every day  Dispense: 90 tablet; Refill: 3    5. Seizure disorder  The current medical regimen is effective;  continue present plan and medications.     6. ABHIJEET (obstructive sleep apnea)  Patient reports that they are compliant with their CPAP machine.  We discussed the potential ramifications of untreated sleep apnea, which could include daytime sleepiness, hypertension, heart disease including CHF, sudden death while sleeping and/or stroke. The patient was advised to abstain from driving should they feel sleepy or drowsy.  We discussed potential treatment options, which could include weight loss, body positioning, continuous positive airway pressure (CPAP), or referral for surgical consideration.      7. Overweight (BMI 25.0-29.9)  The patient is asked to make an attempt to improve diet and exercise patterns to aid in medical management of this problem.     8. Left maxillary  sinusitis  The patient reports chronic congestion for at least a year.  Recent MRI showed sinusitis.  I will treat her with a course of antibiotics.  She will let me know in 10-14 days if she is feeling any better.  If she is not, we will refer her to ENT.  If she is only feeling a little better we may try a 2nd course and add steroids. The patient voiced understanding and agreement with treatment plan.  - levoFLOXacin (LEVAQUIN) 750 MG tablet; Take 1 tablet (750 mg total) by mouth once daily.  Dispense: 5 tablet; Refill: 0    9. Vitamin D deficiency  The current medical regimen is effective;  continue present plan and medications.   - ergocalciferol (VITAMIN D2) 50,000 unit Cap; TAKE ONE CAPSULE BY MOUTH EVERY 14 DAYS  Dispense: 6 capsule; Refill: 3    10. Encounter for screening mammogram for breast cancer    - Mammo Digital Screening Bilat with CAD; Future           Follow-up in about 6 months (around 11/11/2018), or if symptoms worsen or fail to improve, for follow up chronic medical conditions.. or sooner as needed.

## 2018-05-25 ENCOUNTER — HOSPITAL ENCOUNTER (OUTPATIENT)
Dept: RADIOLOGY | Facility: HOSPITAL | Age: 48
Discharge: HOME | End: 2018-05-25
Attending: INTERNAL MEDICINE
Payer: COMMERCIAL

## 2018-05-25 DIAGNOSIS — Z12.31 ENCOUNTER FOR SCREENING MAMMOGRAM FOR BREAST CANCER: ICD-10-CM

## 2018-05-25 PROCEDURE — 77063 BREAST TOMOSYNTHESIS BI: CPT | Mod: 26,NTX,, | Performed by: RADIOLOGY

## 2018-05-25 PROCEDURE — 77067 SCR MAMMO BI INCL CAD: CPT | Mod: TC,TXP

## 2018-05-25 PROCEDURE — 77067 SCR MAMMO BI INCL CAD: CPT | Mod: 26,NTX,, | Performed by: RADIOLOGY

## 2018-05-31 DIAGNOSIS — N92.1 MENORRHAGIA WITH IRREGULAR CYCLE: ICD-10-CM

## 2018-06-01 ENCOUNTER — TELEPHONE (OUTPATIENT)
Dept: FAMILY MEDICINE | Facility: CLINIC | Age: 48
End: 2018-06-01

## 2018-06-01 DIAGNOSIS — J32.9 CHRONIC SINUSITIS, UNSPECIFIED LOCATION: Primary | ICD-10-CM

## 2018-06-01 RX ORDER — LEVOFLOXACIN 750 MG/1
750 TABLET ORAL DAILY
Qty: 5 TABLET | Refills: 0 | Status: SHIPPED | OUTPATIENT
Start: 2018-06-01 | End: 2018-06-06

## 2018-06-01 RX ORDER — METHYLPREDNISOLONE 4 MG/1
TABLET ORAL
Qty: 1 PACKAGE | Refills: 0 | Status: SHIPPED | OUTPATIENT
Start: 2018-06-01 | End: 2018-11-06

## 2018-06-01 NOTE — TELEPHONE ENCOUNTER
Spoke w/patient, states the antibiotic worked well but she feel like she can use another round. States she still has some nasal stuffiness to where she cannot inhale through her nostrils especially the left side. States since starting the antibiotic, she has been waking up in the mornings with blood on her tongue and bottom lip. States it's not coming from a tooth. Want to know should she come in to be evaluated. Please advise.

## 2018-06-01 NOTE — TELEPHONE ENCOUNTER
----- Message from Ansley Snell sent at 6/1/2018  1:30 PM CDT -----  Contact: Self  Pt has questions regarding ABX Rx. Pt can be reached @ [phone].

## 2018-06-03 RX ORDER — NORETHINDRONE ACETATE AND ETHINYL ESTRADIOL AND FERROUS FUMARATE 1MG-20(21)
KIT ORAL
Qty: 28 TABLET | Refills: 0 | Status: SHIPPED | OUTPATIENT
Start: 2018-06-03 | End: 2018-06-20 | Stop reason: SDUPTHER

## 2018-06-04 NOTE — TELEPHONE ENCOUNTER
Spoke w/informed of information.  Patient states she I still concerned about having dark red blood on her tongue. States he isn't sure exactly where it's coming from. States it does not occur everyday but when it does the amount of blood varies. Pt states it seems to occur more when taking antibiotics. Please Advise.

## 2018-06-04 NOTE — TELEPHONE ENCOUNTER
I am not aware of any bleeding caused by antibiotics. Does she have any mouth pain? Is she biting her tongue? Could this be blood from her sinuses draining into her mouth?  Does she want to try a second course of antibiotics with steroids?

## 2018-06-05 NOTE — TELEPHONE ENCOUNTER
Spoke w/patient, states she is not biting her tongue, no mouth pain, and don't really know if it is coming from the sinus. States she started the second round of antibiotics and will notify office if she continue to have problems.

## 2018-06-06 ENCOUNTER — ANTI-COAG VISIT (OUTPATIENT)
Dept: CARDIOLOGY | Facility: CLINIC | Age: 48
End: 2018-06-06
Payer: COMMERCIAL

## 2018-06-06 DIAGNOSIS — I27.9 CHRONIC PULMONARY HEART DISEASE: ICD-10-CM

## 2018-06-06 DIAGNOSIS — Z79.01 LONG TERM CURRENT USE OF ANTICOAGULANT THERAPY: ICD-10-CM

## 2018-06-06 DIAGNOSIS — Z79.01 LONG TERM (CURRENT) USE OF ANTICOAGULANTS: Primary | ICD-10-CM

## 2018-06-06 LAB — INR PPP: 3.2 (ref 2–3)

## 2018-06-06 PROCEDURE — 85610 PROTHROMBIN TIME: CPT | Mod: QW,S$GLB,,

## 2018-06-06 NOTE — TELEPHONE ENCOUNTER
Spoke with pt, she states she will start the steroids on Sunday.   recommended that the antibiotics and steroids be taken together.  Patient verbalized understandings.

## 2018-06-06 NOTE — TELEPHONE ENCOUNTER
Did she start the steroids as well?  She should contact me in 2 weeks to let me know how she is doing.  Left a voice message

## 2018-06-06 NOTE — TELEPHONE ENCOUNTER
Noted. Did she start the steroids as well?  She should contact me in 2 weeks to let me know how she is doing.

## 2018-06-06 NOTE — PROGRESS NOTES
INR high today. Patient reports a change in medication, levaquin 5/11-5/16 and will start medrol holli on 6/10 for sinus infection. No bleeding or bruising. Will hold coumadin today and decrease weekly dose until follow up in 1 week for close monitoring. Advised patient to call with any changes or concerns.

## 2018-06-13 ENCOUNTER — LAB VISIT (OUTPATIENT)
Dept: LAB | Facility: HOSPITAL | Age: 48
End: 2018-06-13
Attending: INTERNAL MEDICINE
Payer: COMMERCIAL

## 2018-06-13 DIAGNOSIS — I27.9 CHRONIC PULMONARY HEART DISEASE: ICD-10-CM

## 2018-06-13 DIAGNOSIS — Z79.01 LONG TERM CURRENT USE OF ANTICOAGULANT THERAPY: ICD-10-CM

## 2018-06-13 LAB
INR PPP: 1.7
PROTHROMBIN TIME: 17.2 SEC

## 2018-06-13 PROCEDURE — 36415 COLL VENOUS BLD VENIPUNCTURE: CPT | Mod: PO,TXP

## 2018-06-13 PROCEDURE — 85610 PROTHROMBIN TIME: CPT | Mod: TXP

## 2018-06-14 ENCOUNTER — ANTI-COAG VISIT (OUTPATIENT)
Dept: CARDIOLOGY | Facility: CLINIC | Age: 48
End: 2018-06-14

## 2018-06-14 DIAGNOSIS — Z79.01 LONG TERM CURRENT USE OF ANTICOAGULANT THERAPY: ICD-10-CM

## 2018-06-14 DIAGNOSIS — I27.9 CHRONIC PULMONARY HEART DISEASE: ICD-10-CM

## 2018-06-14 NOTE — PROGRESS NOTES
Patient will finish levofloxacin today and steroid pack tomorrow 6/14. Last INR adjusted from [facility] and elevated INR.  We will boost gently as her INR is subtherapeutic and return to her previous dose prior to Abx/methylprednisolone.

## 2018-06-20 DIAGNOSIS — N92.1 MENORRHAGIA WITH IRREGULAR CYCLE: ICD-10-CM

## 2018-06-20 RX ORDER — NORETHINDRONE ACETATE AND ETHINYL ESTRADIOL AND FERROUS FUMARATE 1MG-20(21)
KIT ORAL
Qty: 28 TABLET | Refills: 0 | Status: SHIPPED | OUTPATIENT
Start: 2018-06-20 | End: 2018-07-31 | Stop reason: SDUPTHER

## 2018-06-21 ENCOUNTER — LAB VISIT (OUTPATIENT)
Dept: LAB | Facility: HOSPITAL | Age: 48
End: 2018-06-21
Attending: INTERNAL MEDICINE
Payer: COMMERCIAL

## 2018-06-21 ENCOUNTER — ANTI-COAG VISIT (OUTPATIENT)
Dept: CARDIOLOGY | Facility: CLINIC | Age: 48
End: 2018-06-21

## 2018-06-21 DIAGNOSIS — I27.9 CHRONIC PULMONARY HEART DISEASE: ICD-10-CM

## 2018-06-21 DIAGNOSIS — Z79.01 LONG TERM CURRENT USE OF ANTICOAGULANT THERAPY: ICD-10-CM

## 2018-06-21 LAB
INR PPP: 1.7
PROTHROMBIN TIME: 17.1 SEC

## 2018-06-21 PROCEDURE — 85610 PROTHROMBIN TIME: CPT | Mod: TXP

## 2018-06-21 PROCEDURE — 36415 COLL VENOUS BLD VENIPUNCTURE: CPT | Mod: PO,NTX

## 2018-06-25 ENCOUNTER — TELEPHONE (OUTPATIENT)
Dept: TRANSPLANT | Facility: CLINIC | Age: 48
End: 2018-06-25

## 2018-06-28 ENCOUNTER — ANTI-COAG VISIT (OUTPATIENT)
Dept: CARDIOLOGY | Facility: CLINIC | Age: 48
End: 2018-06-28
Payer: COMMERCIAL

## 2018-06-28 DIAGNOSIS — Z79.01 LONG TERM CURRENT USE OF ANTICOAGULANT THERAPY: Primary | ICD-10-CM

## 2018-06-28 DIAGNOSIS — I27.9 CHRONIC PULMONARY HEART DISEASE: ICD-10-CM

## 2018-06-28 LAB — INR PPP: 2.7 (ref 2–3)

## 2018-06-28 PROCEDURE — 85610 PROTHROMBIN TIME: CPT | Mod: QW,S$GLB,,

## 2018-06-28 NOTE — PROGRESS NOTES
Quick follow up from low INR on 6/21. Patient INR in therapeutic range today. Reports no bleeding, bruising or other changes. Will maintain current weekly dose until follow up in 3 weeks. Advised patient to call with any concerns or changes. Care Plan made with Giovanna Whitmore Pharm D.

## 2018-07-02 ENCOUNTER — TELEPHONE (OUTPATIENT)
Dept: FAMILY MEDICINE | Facility: CLINIC | Age: 48
End: 2018-07-02

## 2018-07-02 DIAGNOSIS — R09.81 CONGESTION OF NASAL SINUS: Primary | ICD-10-CM

## 2018-07-02 NOTE — TELEPHONE ENCOUNTER
Spoke with the patient, she states that on Saturday, she woke up and found a blood clot in her mouth.  The blood clot was the size of a quarter.  She states that her nasal congestion is better, but it still goes back and forward.   I told the patient, that I think  will send her to see an ENT or an oral surgeon.  Patient verbalized understandings.

## 2018-07-02 NOTE — TELEPHONE ENCOUNTER
----- Message from Theodora Willis sent at 7/2/2018  1:35 PM CDT -----  Contact: Self   Patient says she was told to follow up after she finished her medications. Patient also states she had bleeding again with a clot. Please call at [phone]

## 2018-07-06 ENCOUNTER — TELEPHONE (OUTPATIENT)
Dept: TRANSPLANT | Facility: CLINIC | Age: 48
End: 2018-07-06

## 2018-07-06 NOTE — TELEPHONE ENCOUNTER
"Returned patient's call. Relayed information from [facility] concerning new prefilled cassette and change in rate.   Per [facility] -" At [facility] she was on a concentration of 175,000ng/ml and at a rate of 40ml/24hr (80ng/kg/min)  The premix team is using a concentration of 150,000ng/ml which at a rate of 46ml/24hr also gives a dose of 80ng/kg/min.  Faster infusion only because the drug in her cassette isnt as potent."    Asked [facility] to contact patient today as follow-up and answer any other questions.  "

## 2018-07-19 ENCOUNTER — ANTI-COAG VISIT (OUTPATIENT)
Dept: CARDIOLOGY | Facility: CLINIC | Age: 48
End: 2018-07-19
Payer: COMMERCIAL

## 2018-07-19 DIAGNOSIS — I27.9 CHRONIC PULMONARY HEART DISEASE: ICD-10-CM

## 2018-07-19 DIAGNOSIS — Z79.01 LONG TERM CURRENT USE OF ANTICOAGULANT THERAPY: Primary | ICD-10-CM

## 2018-07-19 LAB — INR PPP: 2.2 (ref 2–3)

## 2018-07-19 PROCEDURE — 85610 PROTHROMBIN TIME: CPT | Mod: QW,S$GLB,, | Performed by: INTERNAL MEDICINE

## 2018-07-19 NOTE — PROGRESS NOTES
INR in therapeutic range. Patient reports upcoming right heart cath procedure on 8/20. No bleeding or bruising. Will resume with weekly dose until follow up in 3.5 weeks. Advised patient to call with any changes or concerns. Care Plan made with Sanjuanita Teixeira D.

## 2018-07-24 ENCOUNTER — TELEPHONE (OUTPATIENT)
Dept: TRANSPLANT | Facility: CLINIC | Age: 48
End: 2018-07-24

## 2018-07-24 NOTE — TELEPHONE ENCOUNTER
Pt called w/some concerns about her pre-filled cassettes. She said she has spoken with multiple people at [facility] (Julia, Tammy, etc), who have recommended letting the cassette go to around 5 mL residual volume, before changing it out. Pt is not comfortable with this. She said she can tell a difference when she lets herself get that low, and when she hooks up a new cassette, she feels SEs are intensified. Her other concerns are why the concentration changed with the prefilled cassettes. Pt reports that she loves the convenience of prefilled cassettes, but she is concerned about some of the other issues. Message sent to Gregory, at [facility], regarding same. Informed pt she would be contacted once feedback received.

## 2018-07-28 DIAGNOSIS — N92.1 MENORRHAGIA WITH IRREGULAR CYCLE: ICD-10-CM

## 2018-07-30 RX ORDER — NORETHINDRONE ACETATE AND ETHINYL ESTRADIOL AND FERROUS FUMARATE 1MG-20(21)
KIT ORAL
Qty: 28 TABLET | OUTPATIENT
Start: 2018-07-30

## 2018-07-31 DIAGNOSIS — N92.1 MENORRHAGIA WITH IRREGULAR CYCLE: ICD-10-CM

## 2018-08-06 ENCOUNTER — TELEPHONE (OUTPATIENT)
Dept: TRANSPLANT | Facility: CLINIC | Age: 48
End: 2018-08-06

## 2018-08-06 ENCOUNTER — DOCUMENTATION ONLY (OUTPATIENT)
Dept: TRANSPLANT | Facility: CLINIC | Age: 48
End: 2018-08-06

## 2018-08-06 RX ORDER — CEPHALEXIN 250 MG/1
CAPSULE ORAL
Qty: 1 EACH | Refills: 11 | Status: SHIPPED | OUTPATIENT
Start: 2018-08-06 | End: 2019-11-22 | Stop reason: SDUPTHER

## 2018-08-07 ENCOUNTER — TELEPHONE (OUTPATIENT)
Dept: OBSTETRICS AND GYNECOLOGY | Facility: CLINIC | Age: 48
End: 2018-08-07

## 2018-08-07 NOTE — TELEPHONE ENCOUNTER
Hello, this is Eve calling from the OBGYN clinic at [facility]. Returning your call to the clinic. (No answer, left VM message for the pt to call the clinic back.)

## 2018-08-07 NOTE — TELEPHONE ENCOUNTER
Patient contacted the clinic regarding filling her Rx for Microgestin. Patient informed that she is out of refills and that a message will be sent to Dr. Parnell for approval of refills until her next annual visit. Patient informed that Dr. Parnell is out of the office today in [place] and will be returning to clinic on tomorrow. Pt informed to allow Dr. Parnell some time to review and respond to her messages. Pt verbalized understanding.      Can you refill the pt's BC?

## 2018-08-07 NOTE — TELEPHONE ENCOUNTER
----- Message from Elan Flores sent at 8/7/2018 10:33 AM CDT -----  Please call pt regarding her rx [phone]

## 2018-08-08 ENCOUNTER — TELEPHONE (OUTPATIENT)
Dept: OBSTETRICS AND GYNECOLOGY | Facility: CLINIC | Age: 48
End: 2018-08-08

## 2018-08-08 ENCOUNTER — TELEPHONE (OUTPATIENT)
Dept: FAMILY MEDICINE | Facility: CLINIC | Age: 48
End: 2018-08-08

## 2018-08-08 NOTE — TELEPHONE ENCOUNTER
----- Message from Maki Daily sent at 8/8/2018 10:18 AM CDT -----  Contact: self  Pt called, call transferred to Eve.

## 2018-08-08 NOTE — TELEPHONE ENCOUNTER
Hello, this is Eve calling from the OBGYN clinic at [facility]. Contacted the pt to inform her that she needs to come in for an annual in order to receive a refill of her BC. Dr. Parnell's next available appointment is on 9/13 at 8:30AM. (No answer, left VM message for the pt regarding this information and to call the clinic back to confirm if this day and time works for her.)

## 2018-08-08 NOTE — TELEPHONE ENCOUNTER
Patient contacted the clinic regarding scheduling her annual exam. Pt agreed to an appointment on 9/20 at 9:30AM. Pt verbalized understanding. Letter sent out.

## 2018-08-08 NOTE — TELEPHONE ENCOUNTER
----- Message from Meme Fuentes sent at 8/8/2018 10:26 AM CDT -----  Contact: Self  Pt is calling to speak with staff regarding vitamin d efficiency . Please call pt at [phone].

## 2018-08-08 NOTE — TELEPHONE ENCOUNTER
She was due for an annual in March and I don't see anything scheduled.  I can refill it until her next appointment when something is scheduled.

## 2018-08-08 NOTE — TELEPHONE ENCOUNTER
Occupational Therapy Daily Treatment Note    Date:  7/17/2018    Patient Name: Lisa Renee  YOB: 1941   Medical Diagnosis: Parkinson's Disease  Treatment Diagnosis:Performance deficits / Impairments: Decreased functional mobility , Decreased ADL status, Decreased strength, Decreased cognition, Decreased endurance, Decreased balance  Referring Physician: Referring Practitioner: Todd Sanches MD (6/12/18), [facility] Escalante, Neurology     Onset Date:  Onset Date: 03/01/18    Time In: 0900     Time Out: 1000  Timed Code Treatment Minutes:60    Total Treatment Time:  60    Visits Allowed/Insurance/Certification Information: Medicare    Restrictions/Precautions: Yes, lower extremity knee brace, Restrictions: gets laser treatment for foot to increase circulation for arthritis in foot  Position Activity Restriction  Other position/activity restrictions: has a history of increased tremors when she is fatigued and passing out (spouse reports it is not blood pressure related)     G-Code (if applicable):      Date / Visit # G-Code Applied:  7/13/17  G-Code  OT G-codes  Functional Assessment Tool Used: Vaibhav Abrams  Score: 23  Functional Limitation: Self care  Self Care Goal Status (): At least 1 percent but less than 20 percent impaired, limited or restricted  Self Care Discharge Status (): At least 20 percent but less than 40 percent impaired, limited or restricted  ____________________________________________________________________    Plan of Care signed: Y/N: Saint Fujita June 2018    Medicare Cap (if applicable):  390.33 = total amount used, updated 7/17/2018    Progress Note: [x]  Yes  []  No  Next due by: 8/10/18       Visit #: Jose Lucio plus 5/16    Pain Level:  5-6/10 RLE knee (at all times, started Wednesday)    SUBJECTIVE:  Patient states she is feeling better but has still been having knee pain and is going to see the doctor today.   Patient states she would like to work more on her walking and Spoke with patient to inform to continue vitamin d as ordered and call for questions/concerns   information regarding the patient's current condition, treatment and planned interventions as well as recent or anticipated changes in the plan of care.      Electronically signed by:  Holly Chow

## 2018-08-09 DIAGNOSIS — R51.9 HEAD ACHE: Primary | ICD-10-CM

## 2018-08-09 RX ORDER — NORETHINDRONE ACETATE AND ETHINYL ESTRADIOL AND FERROUS FUMARATE 1MG-20(21)
KIT ORAL
Qty: 28 TABLET | Refills: 0 | Status: SHIPPED | OUTPATIENT
Start: 2018-08-09 | End: 2018-11-02 | Stop reason: SDUPTHER

## 2018-08-13 ENCOUNTER — ANTI-COAG VISIT (OUTPATIENT)
Dept: CARDIOLOGY | Facility: CLINIC | Age: 48
End: 2018-08-13
Payer: COMMERCIAL

## 2018-08-13 DIAGNOSIS — I27.9 CHRONIC PULMONARY HEART DISEASE: ICD-10-CM

## 2018-08-13 DIAGNOSIS — Z79.01 LONG TERM CURRENT USE OF ANTICOAGULANT THERAPY: Primary | ICD-10-CM

## 2018-08-13 LAB — INR PPP: 1.7 (ref 2–3)

## 2018-08-13 PROCEDURE — 85610 PROTHROMBIN TIME: CPT | Mod: QW,S$GLB,, | Performed by: INTERNAL MEDICINE

## 2018-08-13 NOTE — PROGRESS NOTES
INR low today. Patient reports a change in medication, stopped claritin and started remodulin. Reports upcoming right heart cath procedure on 8/20 and will need to hold coumadin 3 days prior. No bleeding or bruising. Will boost today and resume with weekly dose until 8/17 patient will hold coumadin for 3 days. On the day of procedure patient will resume coumadin unless procedural MD says otherwise. Follow up 1 week post procedure. Advised patient to call with any changes or concerns. Care Plan made with Sanjuanita Teixeira.

## 2018-08-14 ENCOUNTER — HOSPITAL ENCOUNTER (OUTPATIENT)
Dept: RADIOLOGY | Facility: HOSPITAL | Age: 48
Discharge: HOME | End: 2018-08-14
Attending: OTOLARYNGOLOGY
Payer: COMMERCIAL

## 2018-08-14 DIAGNOSIS — R51.9 HEAD ACHE: ICD-10-CM

## 2018-08-14 PROCEDURE — 70486 CT MAXILLOFACIAL W/O DYE: CPT | Mod: TC,NTX

## 2018-08-14 PROCEDURE — 70486 CT MAXILLOFACIAL W/O DYE: CPT | Mod: 26,NTX,, | Performed by: RADIOLOGY

## 2018-08-20 ENCOUNTER — LAB VISIT (OUTPATIENT)
Dept: LAB | Facility: HOSPITAL | Age: 48
End: 2018-08-20
Attending: INTERNAL MEDICINE
Payer: COMMERCIAL

## 2018-08-20 ENCOUNTER — HOSPITAL ENCOUNTER (OUTPATIENT)
Facility: HOSPITAL | Age: 48
Discharge: HOME | End: 2018-08-20
Attending: INTERNAL MEDICINE | Admitting: INTERNAL MEDICINE
Payer: COMMERCIAL

## 2018-08-20 DIAGNOSIS — Z79.01 LONG TERM CURRENT USE OF ANTICOAGULANT THERAPY: ICD-10-CM

## 2018-08-20 DIAGNOSIS — I27.0 PRIMARY PULMONARY HYPERTENSION (PPH): Primary | ICD-10-CM

## 2018-08-20 DIAGNOSIS — I27.9 CHRONIC PULMONARY HEART DISEASE: Primary | ICD-10-CM

## 2018-08-20 DIAGNOSIS — Z76.82 AWAITING ORGAN TRANSPLANT STATUS: ICD-10-CM

## 2018-08-20 DIAGNOSIS — I27.9 CHRONIC PULMONARY HEART DISEASE: ICD-10-CM

## 2018-08-20 DIAGNOSIS — I27.0 PRIMARY PULMONARY HYPERTENSION: ICD-10-CM

## 2018-08-20 DIAGNOSIS — Z79.01 LONG TERM CURRENT USE OF ANTICOAGULANT: ICD-10-CM

## 2018-08-20 DIAGNOSIS — Z79.01 ANTICOAGULATION MONITORING, INR RANGE 2-3: ICD-10-CM

## 2018-08-20 LAB
ALBUMIN SERPL BCP-MCNC: 3.7 G/DL
ALP SERPL-CCNC: 77 U/L
ALT SERPL W/O P-5'-P-CCNC: 14 U/L
ANION GAP SERPL CALC-SCNC: 6 MMOL/L
AST SERPL-CCNC: 17 U/L
BASOPHILS # BLD AUTO: 0.04 K/UL
BASOPHILS NFR BLD: 0.9 %
BILIRUB SERPL-MCNC: 0.3 MG/DL
BUN SERPL-MCNC: 7 MG/DL
CALCIUM SERPL-MCNC: 8.8 MG/DL
CHLORIDE SERPL-SCNC: 111 MMOL/L
CO2 SERPL-SCNC: 22 MMOL/L
CREAT SERPL-MCNC: 0.7 MG/DL
DIFFERENTIAL METHOD: NORMAL
EOSINOPHIL # BLD AUTO: 0.2 K/UL
EOSINOPHIL NFR BLD: 4.3 %
ERYTHROCYTE [DISTWIDTH] IN BLOOD BY AUTOMATED COUNT: 14.1 %
EST. GFR  (AFRICAN AMERICAN): >60 ML/MIN/1.73 M^2
EST. GFR  (NON AFRICAN AMERICAN): >60 ML/MIN/1.73 M^2
GLUCOSE SERPL-MCNC: 88 MG/DL
HCT VFR BLD AUTO: 42.8 %
HGB BLD-MCNC: 14.1 G/DL
IMM GRANULOCYTES # BLD AUTO: 0.02 K/UL
IMM GRANULOCYTES NFR BLD AUTO: 0.5 %
INR PPP: 1
INR PPP: 1
LYMPHOCYTES # BLD AUTO: 1.2 K/UL
LYMPHOCYTES NFR BLD: 27.4 %
MCH RBC QN AUTO: 28.8 PG
MCHC RBC AUTO-ENTMCNC: 32.9 G/DL
MCV RBC AUTO: 88 FL
MONOCYTES # BLD AUTO: 0.4 K/UL
MONOCYTES NFR BLD: 9.5 %
NEUTROPHILS # BLD AUTO: 2.4 K/UL
NEUTROPHILS NFR BLD: 57.4 %
NRBC BLD-RTO: 0 /100 WBC
PLATELET # BLD AUTO: 210 K/UL
PMV BLD AUTO: 11.7 FL
POTASSIUM SERPL-SCNC: 3.9 MMOL/L
PROT SERPL-MCNC: 7.6 G/DL
PROTHROMBIN TIME: 10.2 SEC
PROTHROMBIN TIME: 10.2 SEC
RBC # BLD AUTO: 4.89 M/UL
SODIUM SERPL-SCNC: 139 MMOL/L
WBC # BLD AUTO: 4.23 K/UL

## 2018-08-20 PROCEDURE — 25000003 PHARM REV CODE 250: Mod: TXP

## 2018-08-20 PROCEDURE — 85610 PROTHROMBIN TIME: CPT | Mod: NTX

## 2018-08-20 PROCEDURE — 85025 COMPLETE CBC W/AUTO DIFF WBC: CPT | Mod: NTX

## 2018-08-20 PROCEDURE — C1894 INTRO/SHEATH, NON-LASER: HCPCS | Mod: TXP

## 2018-08-20 PROCEDURE — 80053 COMPREHEN METABOLIC PANEL: CPT | Mod: NTX

## 2018-08-20 PROCEDURE — 93451 RIGHT HEART CATH: CPT | Mod: 26,TXP,, | Performed by: INTERNAL MEDICINE

## 2018-08-20 PROCEDURE — 36415 COLL VENOUS BLD VENIPUNCTURE: CPT | Mod: NTX

## 2018-08-20 NOTE — DISCHARGE INSTRUCTIONS

## 2018-08-20 NOTE — H&P
48 y.o. White female who presents for PH follow-up. Patient was diagnosed with IPAH about 5 and a half years, ABHIJEET recently, currently deferred for LUT (was working on fund raising). Initial symptom- syncope.  Patient has been on triple therapy: Remodulin at  72ng/kg/min infusion, Letairis 10mg qdaily, and Adcirca 40mg qdaily.     Here for routine RHC to reassess PAP     TTE last visit    Right Ventricle: The right ventricle is normal in size measuring 3.1 cm at the base in the apical right ventricle-focused view. Global right ventricular systolic function appears mildly to moderately depressed. Tricuspid annular plane systolic excursion   (TAPSE) is 2.3 cm. The estimated PA systolic pressure is greater than 39 mmHg.         TTE 9/18/17  1 - Normal left ventricular systolic function (EF 60-65%).     2 - No wall motion abnormalities.     3 - Normal left ventricular diastolic function.     4 - Right atrial enlargement.     5 - Right ventricular enlargement with moderately depressed systolic function.     6 - The estimated PA systolic pressure is 36 mmHg.     7 - Mild tricuspid regurgitation.   Right Ventricle: The right ventricle is enlarged measuring 4.1 cm at the base in the apical right ventricle-focused view. Global right ventricular systolic function appears moderately depressed. There is abnormal septal motion consistent with RV   pressure/volume overload. Tricuspid annular plane systolic excursion (TAPSE) is 1.7 cm. Tissue Doppler-derived tricuspid annular peak systolic velocity (S prime) is 11.0 cm/s. The estimated PA systolic pressure is 36 mmHg.      6mw 463m (457  1/18, 469m in Nov, 457 in Nov, 488m unchanged last 3 visits with me( 450 11/15/16,  457m, 518.5m, 533.75, 549m April, 463.6m prev visit)                                              O2 sat  98-> 98 % RA                                                          HR 79->139                                                      BP  121/-->133/75                                                        Ab 2 ->7-8     R/LHC 1/17  PW:  (30)  RV: 102  RVEDP: 21     PA: 102/58  RA:  (28)  PA_SAT: 65  AO: 93/56  LV: 93  LVEDP: 14   FICKCO: 4.08  Normal cors     RHC 4/16  AOPRES: 122/85 (97)  AOSAT: 98  FICKCI: 2.81  FICKCO: 4.38  PAPRES: 101/34 (59)  PASAT: 71  PVR: 10.73  PWPRES: 14/8 (10)  RAPRES: 9/5 (4)  RVPRES: 91/-4, 8     TTE last visit   1 - Normal left ventricular systolic function (EF 60-65%).     2 - Right ventricular enlargement with mildly to moderately depressed systolic function.     3 - Normal left ventricular diastolic function.     4 - Pulmonary hypertension. The estimated PA systolic pressure is 41 mmHg.         TTE 3/4/16  1 - Normal left ventricular systolic function (EF 60-65%).   2 - Normal left ventricular diastolic function.   3 - Right ventricle is upper limit of normal in size with low normal to mildly depressed systolic function. TAPSE 2.2 RV 3.7 cm   4 - Trivial to mild tricuspid regurgitation.   5 - Pulmonary hypertension. The estimated PA systolic pressure is 60 mmHg.      TTE Oct  1 - Normal left ventricular systolic function (EF 60-65%).   2 - Left ventricular diastolic dysfunction.   3 - Biatrial enlargement.   4 - Right ventricular enlargement with hypertrophy, with normal systolic function.   5 - Pulmonary hypertension.   6 - Trivial mitral regurgitation.   7 - Trivial tricuspid regurgitation.   PASp 46     CXR 2/3/16  Vascular catheter now identified, its tip in the superior vena cava just superior to its junction with the right atrium. Heart size is normal, as is the appearance of the pulmonary vascularity. Lung zones are clear, and free of significant airspace consolidation or volume loss. No pleural fluid. No hilar or mediastinal mass lesion. No pneumothorax.      VQ Scan 8/17/16: low probability     Review of Systems   Constitution: Positive for malaise/fatigue and weight gain. Negative for chills and fever.   HENT: Positive for  congestion.    Eyes: Negative.    Cardiovascular: Positive for dyspnea on exertion. Negative for leg swelling, near-syncope, orthopnea, palpitations, paroxysmal nocturnal dyspnea and syncope.   Respiratory: Positive for cough. Negative for shortness of breath.    Endocrine: Negative.    Skin: Negative.    Musculoskeletal: Positive for myalgias.        Left-axillary chest wall pain   Gastrointestinal: Negative for bloating, abdominal pain and change in bowel habit.   Neurological: Positive for headaches and light-headedness. Negative for dizziness.   Psychiatric/Behavioral: Negative for depression.      Objective:       Physical Exam   Constitutional: She is oriented to person, place, and time. She appears well-developed and well-nourished.   HENT:   Head: Normocephalic and atraumatic.   Neck: Neck supple. No JVD present. No thyromegaly present.   Cardiovascular: Normal rate and regular rhythm.  Exam reveals no gallop and no friction rub.    No murmur heard.  Physiologically split S2 with prominent P2 component     Pulmonary/Chest: Effort normal and breath sounds normal. No respiratory distress. She has no wheezes. She has no rales.   Abdominal: Soft. Bowel sounds are normal. She exhibits no distension. There is no tenderness.   Musculoskeletal: She exhibits no edema.   Neurological: She is alert and oriented to person, place, and time.   Skin: Skin is warm and dry.   Line exit site is clean, no erythema, scab or discharge   Psychiatric: She has a normal mood and affect.                     Lab Results   Component Value Date    BNP 20 04/25/2018     08/20/2018    K 3.9 08/20/2018    MG 1.9 04/25/2018     (H) 08/20/2018    CO2 22 (L) 08/20/2018    BUN 7 08/20/2018    CREATININE 0.7 08/20/2018    GLU 88 08/20/2018    HGBA1C 5.2 01/16/2017    AST 17 08/20/2018    ALT 14 08/20/2018    ALBUMIN 3.7 08/20/2018    PROT 7.6 08/20/2018    BILITOT 0.3 08/20/2018    CHOL 128 01/16/2017    HDL 31 (L) 01/16/2017     LDLCALC 76.8 01/16/2017    TRIG 101 01/16/2017       Magnesium   Date Value Ref Range Status   04/25/2018 1.9 1.6 - 2.6 mg/dL Final       Lab Results   Component Value Date    WBC 4.23 08/20/2018    HGB 14.1 08/20/2018    HCT 42.8 08/20/2018    MCV 88 08/20/2018     08/20/2018       Lab Results   Component Value Date    INR 1.0 08/20/2018    INR 1.0 08/20/2018    INR 1.7 08/13/2018       BNP   Date Value Ref Range Status   04/25/2018 20 0 - 99 pg/mL Final     Comment:     Values of less than 100 pg/ml are consistent with non-CHF populations.   01/12/2018 17 0 - 99 pg/mL Final     Comment:     Values of less than 100 pg/ml are consistent with non-CHF populations.   09/18/2017 21 0 - 99 pg/mL Final     Comment:     Values of less than 100 pg/ml are consistent with non-CHF populations.       No results found for: LDH                Assessment:       1. Primary pulmonary hypertension-on IV remodulin   2. Encounter for long-term (current) use of anticoagulants    3. Obesity    4. Menorrhagia    5. Tricuspid regurgitation       Plan:      RHC L IJ, 7 Fr sheath with local lidocaine, micropuncture kit and US guidance.    I have explained the risks, benefits and alternatives of the procedure in detail. The patient voices understanding and all questions have been answered,. The patient agrees to proceed as planned.

## 2018-08-20 NOTE — DISCHARGE SUMMARY
Date of admit to cath lab: 8/20/2018      Date of discharge from cath lab: 8/20/2018      Principal diagnosis: PH    Discharge attending physician: Ivonne Rai MD    Hospital Course/Outcome of the treatment, procedures or surgery: Pt admitted for RHC.   See CVIS/cath lab procedure report in EPIC  for full report of today's procedure.    Disposition of the case (d/c disposition): home    Discharge Medication List: see med card    Plan for follow up care, diet, activity level: F/U as scheduled. Resume low Na diet.  Activity as tolerated    Condition on discharge from Cath lab: Stable.

## 2018-08-27 ENCOUNTER — ANTI-COAG VISIT (OUTPATIENT)
Dept: CARDIOLOGY | Facility: CLINIC | Age: 48
End: 2018-08-27
Payer: COMMERCIAL

## 2018-08-27 DIAGNOSIS — Z79.01 LONG TERM CURRENT USE OF ANTICOAGULANT THERAPY: Primary | ICD-10-CM

## 2018-08-27 DIAGNOSIS — I27.9 CHRONIC PULMONARY HEART DISEASE: ICD-10-CM

## 2018-08-27 LAB — INR PPP: 2 (ref 2–3)

## 2018-08-27 PROCEDURE — 99211 OFF/OP EST MAY X REQ PHY/QHP: CPT | Mod: 25,S$GLB,, | Performed by: INTERNAL MEDICINE

## 2018-08-27 PROCEDURE — 85610 PROTHROMBIN TIME: CPT | Mod: QW,S$GLB,, | Performed by: INTERNAL MEDICINE

## 2018-08-27 NOTE — PROGRESS NOTES
Quick follow up from right heart cath procedure on 8/20. Patient INR in therapeutic range today. Reports no bleeding, bruising or other changes. Will maintain current weekly dose until follow up in 2 weeks. Advised patient to call with any concerns or changes.

## 2018-08-28 DIAGNOSIS — K21.9 GASTROESOPHAGEAL REFLUX DISEASE, ESOPHAGITIS PRESENCE NOT SPECIFIED: ICD-10-CM

## 2018-08-28 RX ORDER — ESOMEPRAZOLE MAGNESIUM 40 MG/1
CAPSULE, DELAYED RELEASE ORAL
Qty: 90 CAPSULE | Refills: 3 | Status: CANCELLED | OUTPATIENT
Start: 2018-08-28

## 2018-09-13 ENCOUNTER — ANTI-COAG VISIT (OUTPATIENT)
Dept: CARDIOLOGY | Facility: CLINIC | Age: 48
End: 2018-09-13
Payer: COMMERCIAL

## 2018-09-13 DIAGNOSIS — Z79.01 LONG TERM CURRENT USE OF ANTICOAGULANT THERAPY: Primary | ICD-10-CM

## 2018-09-13 DIAGNOSIS — I27.9 CHRONIC PULMONARY HEART DISEASE: ICD-10-CM

## 2018-09-13 LAB — INR PPP: 2.6 (ref 2–3)

## 2018-09-13 PROCEDURE — 99211 OFF/OP EST MAY X REQ PHY/QHP: CPT | Mod: 25,S$GLB,,

## 2018-09-13 PROCEDURE — 85610 PROTHROMBIN TIME: CPT | Mod: QW,S$GLB,,

## 2018-09-14 NOTE — PROGRESS NOTES
Pt seen by Leandra GARCÍA. I have reviewed her documentation and agree with her assessment and plan.  The pt is resuming a previously stable weekly dose of warfarin.

## 2018-09-24 DIAGNOSIS — N92.1 MENORRHAGIA WITH IRREGULAR CYCLE: ICD-10-CM

## 2018-09-24 RX ORDER — NORETHINDRONE ACETATE AND ETHINYL ESTRADIOL AND FERROUS FUMARATE 1MG-20(21)
KIT ORAL
Qty: 28 TABLET | Refills: 0 | OUTPATIENT
Start: 2018-09-24

## 2018-10-02 DIAGNOSIS — N92.1 MENORRHAGIA WITH IRREGULAR CYCLE: ICD-10-CM

## 2018-10-02 RX ORDER — NORETHINDRONE ACETATE AND ETHINYL ESTRADIOL AND FERROUS FUMARATE 1MG-20(21)
KIT ORAL
Qty: 28 TABLET | Refills: 0 | OUTPATIENT
Start: 2018-10-02

## 2018-10-05 ENCOUNTER — TELEPHONE (OUTPATIENT)
Dept: TRANSPLANT | Facility: CLINIC | Age: 48
End: 2018-10-05

## 2018-10-08 ENCOUNTER — TELEPHONE (OUTPATIENT)
Dept: TRANSPLANT | Facility: CLINIC | Age: 48
End: 2018-10-08

## 2018-10-11 ENCOUNTER — ANTI-COAG VISIT (OUTPATIENT)
Dept: CARDIOLOGY | Facility: CLINIC | Age: 48
End: 2018-10-11
Payer: COMMERCIAL

## 2018-10-11 DIAGNOSIS — Z79.01 LONG TERM CURRENT USE OF ANTICOAGULANT THERAPY: ICD-10-CM

## 2018-10-11 DIAGNOSIS — Z79.01 LONG TERM (CURRENT) USE OF ANTICOAGULANTS: Primary | ICD-10-CM

## 2018-10-11 DIAGNOSIS — I27.9 CHRONIC PULMONARY HEART DISEASE: ICD-10-CM

## 2018-10-11 LAB — INR PPP: 2.3 (ref 2–3)

## 2018-10-11 PROCEDURE — 99211 OFF/OP EST MAY X REQ PHY/QHP: CPT | Mod: 25,S$GLB,, | Performed by: INTERNAL MEDICINE

## 2018-10-11 PROCEDURE — 85610 PROTHROMBIN TIME: CPT | Mod: QW,,, | Performed by: INTERNAL MEDICINE

## 2018-10-11 NOTE — PROGRESS NOTES
Patient INR in therapeutic range today. Reports no bleeding, bruising or other changes. Will maintain current weekly dose until follow up in 4.5 weeks. Advised patient to call with any concerns or changes.

## 2018-10-15 ENCOUNTER — TELEPHONE (OUTPATIENT)
Dept: TRANSPLANT | Facility: CLINIC | Age: 48
End: 2018-10-15

## 2018-10-18 DIAGNOSIS — Z79.899 POLYPHARMACY: Primary | ICD-10-CM

## 2018-10-18 DIAGNOSIS — R06.82 TACHYPNEA: ICD-10-CM

## 2018-10-20 DIAGNOSIS — N92.1 MENORRHAGIA WITH IRREGULAR CYCLE: ICD-10-CM

## 2018-10-22 RX ORDER — NORETHINDRONE ACETATE AND ETHINYL ESTRADIOL AND FERROUS FUMARATE 1MG-20(21)
KIT ORAL
Qty: 28 TABLET | Refills: 0 | OUTPATIENT
Start: 2018-10-22

## 2018-10-23 DIAGNOSIS — N92.1 MENORRHAGIA WITH IRREGULAR CYCLE: ICD-10-CM

## 2018-10-25 RX ORDER — NORETHINDRONE ACETATE AND ETHINYL ESTRADIOL AND FERROUS FUMARATE 1MG-20(21)
KIT ORAL
Qty: 28 TABLET | Refills: 0 | OUTPATIENT
Start: 2018-10-25

## 2018-11-01 DIAGNOSIS — N92.1 MENORRHAGIA WITH IRREGULAR CYCLE: ICD-10-CM

## 2018-11-01 RX ORDER — NORETHINDRONE ACETATE AND ETHINYL ESTRADIOL AND FERROUS FUMARATE 1MG-20(21)
KIT ORAL
Qty: 28 TABLET | Refills: 0 | OUTPATIENT
Start: 2018-11-01

## 2018-11-02 ENCOUNTER — TELEPHONE (OUTPATIENT)
Dept: TRANSPLANT | Facility: CLINIC | Age: 48
End: 2018-11-02

## 2018-11-02 DIAGNOSIS — N92.1 MENORRHAGIA WITH IRREGULAR CYCLE: ICD-10-CM

## 2018-11-02 RX ORDER — NORETHINDRONE ACETATE AND ETHINYL ESTRADIOL AND FERROUS FUMARATE 1MG-20(21)
KIT ORAL
Qty: 28 TABLET | Refills: 0 | Status: SHIPPED | OUTPATIENT
Start: 2018-11-02 | End: 2018-11-03 | Stop reason: SDUPTHER

## 2018-11-02 NOTE — TELEPHONE ENCOUNTER
Returned the pt's call to the clinic. Pt requesting that her BC medication be refilled until her appointment on 11/08. Pt stated that she had to reschedule her last appointment because she was sick. Informed the pt that Dr. Parnell is out of the office and that the request will be sen to KAI Macedo to inquire if she can fill the Rx until the pt comes in for her appointment with Dr. Olivera. Pt stated that she needs her BC especially due to the other medications she is on and the SE of her other medications. Pt informed that the message will be forwarded and that she will receive a call back once KAI Macedo responds. Pt verbalized understanding.    Katelyn, can you give this patient one refill of her BC until her appointment on 11/08? Please advise.

## 2018-11-02 NOTE — TELEPHONE ENCOUNTER
----- Message from Surekha Hopkins sent at 11/2/2018  4:45 PM CDT -----  Contact: pt   Name of Who is Calling: KELSY TURNER [[id number]]       What is the request in detail: patient is returning a call in regards to prescription..Please contact to further discuss and advise.       Can the clinic reply by [facility]: no       [facility] [phone]

## 2018-11-02 NOTE — TELEPHONE ENCOUNTER
Patient requesting a refill of her BC until her appointment with Dr. Parnell on 11/08. Can you fill for the patient?

## 2018-11-02 NOTE — TELEPHONE ENCOUNTER
Returned call to the clinic. Pt inquiring if her Rx will be able to be filled informed the pt that [facility] will give her a 1 month refill until she comes in to see Dr. Parnell on 11/08. Informed the pt that her Rx should be ready by tomorrow for sure. Pt verbalized understanding.

## 2018-11-02 NOTE — TELEPHONE ENCOUNTER
----- Message from Fátima Lyles sent at 11/2/2018 11:55 AM CDT -----  Contact: Pt   Name of Who is Calling:      What is the request in detail: Pt states that her MICROGESTIN FE 1/20, 28, 1 mg-20 mcg (21)/75 mg (7) per tablet was denied and she is unsure why. Please advise.    Can the clinic reply by [facility]: No     What Number to Call Back if not in [facility]: [phone]

## 2018-11-03 DIAGNOSIS — N92.1 MENORRHAGIA WITH IRREGULAR CYCLE: ICD-10-CM

## 2018-11-03 RX ORDER — NORETHINDRONE ACETATE AND ETHINYL ESTRADIOL 1MG-20(21)
1 KIT ORAL DAILY
Qty: 28 TABLET | Refills: 0 | Status: SHIPPED | OUTPATIENT
Start: 2018-11-03 | End: 2018-12-26 | Stop reason: SDUPTHER

## 2018-11-05 ENCOUNTER — TELEPHONE (OUTPATIENT)
Dept: OBSTETRICS AND GYNECOLOGY | Facility: CLINIC | Age: 48
End: 2018-11-05

## 2018-11-05 NOTE — TELEPHONE ENCOUNTER
Contacted the pt to r/s appointment on 11/08 due to Dr. Parnell's template being released incorrectly. Pt agreed to r/s her appointment to 11/12 at 10:30AM. Pt verbalized understanding.

## 2018-11-06 ENCOUNTER — OFFICE VISIT (OUTPATIENT)
Dept: NEUROLOGY | Facility: CLINIC | Age: 48
End: 2018-11-06
Payer: COMMERCIAL

## 2018-11-06 ENCOUNTER — OFFICE VISIT (OUTPATIENT)
Dept: FAMILY MEDICINE | Facility: CLINIC | Age: 48
End: 2018-11-06
Payer: COMMERCIAL

## 2018-11-06 VITALS
OXYGEN SATURATION: 96 % | BODY MASS INDEX: 27.88 KG/M2 | SYSTOLIC BLOOD PRESSURE: 102 MMHG | WEIGHT: 138.31 LBS | DIASTOLIC BLOOD PRESSURE: 66 MMHG | TEMPERATURE: 98 F | HEIGHT: 59 IN | HEART RATE: 76 BPM

## 2018-11-06 VITALS
HEIGHT: 59 IN | HEART RATE: 71 BPM | WEIGHT: 138 LBS | DIASTOLIC BLOOD PRESSURE: 61 MMHG | BODY MASS INDEX: 27.82 KG/M2 | SYSTOLIC BLOOD PRESSURE: 135 MMHG

## 2018-11-06 DIAGNOSIS — G89.29 CHRONIC NONINTRACTABLE HEADACHE, UNSPECIFIED HEADACHE TYPE: ICD-10-CM

## 2018-11-06 DIAGNOSIS — E03.9 HYPOTHYROIDISM (ACQUIRED): ICD-10-CM

## 2018-11-06 DIAGNOSIS — R51.9 CHRONIC NONINTRACTABLE HEADACHE, UNSPECIFIED HEADACHE TYPE: ICD-10-CM

## 2018-11-06 DIAGNOSIS — G44.229 CHRONIC TENSION-TYPE HEADACHE, NOT INTRACTABLE: ICD-10-CM

## 2018-11-06 DIAGNOSIS — Z76.82 AWAITING ORGAN TRANSPLANT STATUS: ICD-10-CM

## 2018-11-06 DIAGNOSIS — E66.3 OVERWEIGHT (BMI 25.0-29.9): ICD-10-CM

## 2018-11-06 DIAGNOSIS — G40.909 SEIZURE DISORDER: ICD-10-CM

## 2018-11-06 DIAGNOSIS — G47.33 OSA (OBSTRUCTIVE SLEEP APNEA): ICD-10-CM

## 2018-11-06 DIAGNOSIS — I27.0 PRIMARY PULMONARY HYPERTENSION: Primary | ICD-10-CM

## 2018-11-06 PROCEDURE — 3008F BODY MASS INDEX DOCD: CPT | Mod: CPTII,NTX,S$GLB, | Performed by: NEUROLOGICAL SURGERY

## 2018-11-06 PROCEDURE — 99999 PR PBB SHADOW E&M-EST. PATIENT-LVL III: CPT | Mod: PBBFAC,,, | Performed by: NURSE PRACTITIONER

## 2018-11-06 PROCEDURE — 99999 PR PBB SHADOW E&M-EST. PATIENT-LVL II: CPT | Mod: PBBFAC,TXP,, | Performed by: NEUROLOGICAL SURGERY

## 2018-11-06 PROCEDURE — 99214 OFFICE O/P EST MOD 30 MIN: CPT | Mod: S$GLB,,, | Performed by: NURSE PRACTITIONER

## 2018-11-06 PROCEDURE — 3008F BODY MASS INDEX DOCD: CPT | Mod: CPTII,S$GLB,, | Performed by: NURSE PRACTITIONER

## 2018-11-06 PROCEDURE — 99214 OFFICE O/P EST MOD 30 MIN: CPT | Mod: NTX,S$GLB,, | Performed by: NEUROLOGICAL SURGERY

## 2018-11-06 RX ORDER — LEVOTHYROXINE SODIUM 137 UG/1
137 TABLET ORAL DAILY
Qty: 90 TABLET | Refills: 1 | Status: CANCELLED | OUTPATIENT
Start: 2018-11-06

## 2018-11-06 RX ORDER — BUTALBITAL, ACETAMINOPHEN AND CAFFEINE 50; 325; 40 MG/1; MG/1; MG/1
1 TABLET ORAL EVERY 8 HOURS PRN
Qty: 40 TABLET | Refills: 2 | Status: SHIPPED | OUTPATIENT
Start: 2018-11-06 | End: 2018-12-06

## 2018-11-06 RX ORDER — TOPIRAMATE 100 MG/1
100 TABLET, FILM COATED ORAL 2 TIMES DAILY
Qty: 60 TABLET | Refills: 11 | Status: SHIPPED | OUTPATIENT
Start: 2018-11-06 | End: 2019-11-15 | Stop reason: SDUPTHER

## 2018-11-06 NOTE — PROGRESS NOTES
Subjective:       Patient ID: Yuni Perez is a 48 y.o. female.    Chief Complaint: Hypertension (F/U) and Hypothyroidism (F/U)    48-year-old female presents to the clinic today for follow-up on hypertension and hypothyroidism.  Her blood pressure today is 102/66.  She has fair dietary habits.  She says she dances a fair amount.  She is compliant with all of her medications.  She is currently being deferred on the lung transplant list because she is doing so well.  She says they are thinking about taking her off of the Coumadin.  She denies any cardiac chest pain, heart palpitations, shortness breath, or swelling to lower extremities.  She says she has chronic daily headaches which are followed by Dr. Diggs.  She denies any dizziness or blurred vision.  She is due for TSH and free T4.  She would like to have that done on November 20th when she has the rest of her lab work drawn.  I will add that to her current lab work.  She will have her flu shot done on the 20th when she sees the transplant clinic.  She denies any complaints today.      Past Medical History:   Diagnosis Date    AR (allergic rhinitis)     Cholelithiasis, common bile duct     Chronic low back pain     Eye pressure 2017    GERD (gastroesophageal reflux disease)     History of migraine headaches     Hypothyroidism     Lumbar disc disease     Menorrhagia     Mild asthma     Obesity     Plantar fasciitis of left foot     Primary pulmonary hypertension     followed by heart transplant/pulmonary     Seizure disorder     x 1 in 2008    Seizures     Sleep apnea     TMJ (dislocation of temporomandibular joint)     Tricuspid regurgitation      Past Surgical History:   Procedure Laterality Date    CARDIAC CATHETERIZATION      CENTRAL VENOUS CATHETER INSERTION      HEART CATH-LEFT Left 1/30/2017    [facility]    HEART CATH-RIGHT Right 8/20/2018    [facility] at  [facility]    HEART CATH-RIGHT Right 1/30/2017    [facility]    HEART CATH-RIGHT Right 9/8/2014    [facility] at [facility] CATH LAB    PORTACATH PLACEMENT      RIGHT HEART CATHETERIZATION Right 8/20/2018    Procedure: HEART CATH-RIGHT;  Surgeon: Ivonne Rai MD;  Location: [facility] CATH LAB;  Service: Cardiology;  Laterality: Right;    UPPER GASTROINTESTINAL ENDOSCOPY        reports that  has never smoked. she has never used smokeless tobacco. She reports that she does not drink alcohol or use drugs.  Review of Systems   Constitutional: Negative for activity change.   Respiratory: Negative for cough, chest tightness, shortness of breath and wheezing.    Cardiovascular: Negative for chest pain, palpitations and leg swelling.   Gastrointestinal: Negative for abdominal pain, constipation, diarrhea, nausea and vomiting.   Musculoskeletal: Negative for gait problem.   Skin: Negative for color change.   Neurological: Positive for headaches. Negative for dizziness, syncope and light-headedness.       Objective:      Physical Exam   Constitutional: She is oriented to person, place, and time. She appears well-developed and well-nourished. No distress.   Eyes: Conjunctivae and EOM are normal. Pupils are equal, round, and reactive to light. Right eye exhibits no discharge. Left eye exhibits no discharge. No scleral icterus.   Neck: Normal range of motion. Neck supple. No JVD present.   Cardiovascular: Normal rate, regular rhythm and normal heart sounds.   No murmur heard.  Pulmonary/Chest: Effort normal and breath sounds normal. No respiratory distress. She has no wheezes. She has no rales.   Abdominal: Soft. Bowel sounds are normal. There is no tenderness.   Musculoskeletal: Normal range of motion. She exhibits no edema.   Neurological: She is alert and oriented to person, place, and time.   Skin: Skin is warm and dry. She is not diaphoretic.   Psychiatric: She  has a normal mood and affect.       Assessment:       1. Primary pulmonary hypertension    2. Hypothyroidism (acquired)    3. Awaiting organ transplant status    4. Seizure disorder    5. ABHIJEET (obstructive sleep apnea)    6. Overweight (BMI 25.0-29.9)    7. Chronic tension-type headache, not intractable        Plan:         Primary pulmonary hypertension    Hypothyroidism (acquired)  -     TSH; Future; Expected date: 11/06/2018  -     T4, free; Future; Expected date: 11/06/2018    Awaiting organ transplant status    Seizure disorder    ABHIJEET (obstructive sleep apnea)    Overweight (BMI 25.0-29.9)    Chronic tension-type headache, not intractable    Other orders  -     Cancel: levothyroxine (SYNTHROID) 137 MCG Tab tablet; Take 1 tablet (137 mcg total) by mouth once daily.  Dispense: 90 tablet; Refill: 1        No Follow-up on file.

## 2018-11-06 NOTE — PROGRESS NOTES
"Chief Complaint   Patient presents with    Migraine        Yuni Perez is a 48 y.o. female with a history of multiple medical diagnoses as listed below that presents for evaluation of headaches. She is accompanied to this visit by her mother. She was diagnosed with pulmonary hypertension about 8 years ago and around the same time she was diagnosed with a seizure. She had workup at that time that included MRI that showed signs of "old injury" and she had EEG that showed "lots of spikes and waves" per her mother. She says that since that time she has been started on Topamax which has been titrated up. She has not been having any seizures but she has bene having some occasional word finding difficulty that she has attributed to the medication. She has tried tylenol because as she has been titrating her medication to treat pulmonary hypertension sh marie been having bilateral intense stabbing pains in her head. She does say that with time her headaches have been better but she has noticed many more headaches since she began the Remodulin. She has not taken any other headache medications in the past.    Interval History  11/17/2016  She has still been having headaches after taking her medications in particular her vasodilators. She has found that her PRN medications are somewhat helpful in minimizing the pain that she has from her headaches. She has not had any mew problems since she was last seen in clinic.    03/23/2017  Since last seen in clinic she has been approved to get on the lung transplant list and she will be making strides to get set up to to receive new lungs. She has been taking her topamax as directed and has felt that overall she has been having fewer headaches but she still has needed Tylenol and on rare occasions Fioricet to get rid of her headaches. She has been having some cognitive difficulty with her current medications but she feels that it is tolerable. She has not had any seizure " "like activity.    06/13/2017  Since last seen in clinic she says that she has been able to be placed on the donor list. She says that overall she feels that she has been stable with regards to her breathing. She continues to have chronic sinus issues that have been essentially refractory to any medications that she has tried including antihistamines and nasal rinses. She feels that she is able to clear her sinuses and have it come right back. She has headaches almost daily but despite the frequency of the headaches she feels that most are not migraines. She has not had any seizures since last seen. She is tolerating all of her medications well without any problems.    10/31/2017  She has continued to make preparations for lung transplant.  Said that she has been frustrated with the process and she does not have much guidance which is causing increased stress.  Headaches overall have been about the same, but are responsive to Fioricet.  She says the medication helps give her adequate relief of the pain.    03/22/2018  She feels that her headaches have been getting worse in the last week.  Overall prior to this week she feels that the headaches were about the same as when she was last seen in clinic and she was using Fioricet as needed which provided some relief from her headache pain.  In the last week however Fioricet is not provided much of any relief and headaches have been going on almost every day.  She says that she feels like she has "sinus headaches" which have lingered until she has started having pounding unilateral headaches that she feels her migraines.  These headaches seem to be much more frequent and much more intense that she is ever had in the past.    05/03/2018  headaches have been better as she has hardly had any since she was last seen in clinic. Lung transplant is till her recourse for treatm of pulmonary hypertension, but she has not been able to get placed on the transplant list just " yet.    11/06/2018  Headaches have still been frequent, but have been relieved effectively by using Fioricet. She has been taking her medications as directed without any complaints of side effects. She feels that the intensity has been about the same overall. Imitrex has been prescribed, but she has not used the medication yet to determine if it helps her headaches.     PAST MEDICAL HISTORY:  Past Medical History:   Diagnosis Date    AR (allergic rhinitis)     Cholelithiasis, common bile duct     Chronic low back pain     Eye pressure 2017    GERD (gastroesophageal reflux disease)     History of migraine headaches     Hypothyroidism     Lumbar disc disease     Menorrhagia     Mild asthma     Obesity     Plantar fasciitis of left foot     Primary pulmonary hypertension     followed by heart transplant/pulmonary     Seizure disorder     x 1 in 2008    Seizures     Sleep apnea     TMJ (dislocation of temporomandibular joint)     Tricuspid regurgitation        PAST SURGICAL HISTORY:  Past Surgical History:   Procedure Laterality Date    CARDIAC CATHETERIZATION      CENTRAL VENOUS CATHETER INSERTION      HEART CATH-LEFT Left 1/30/2017    [facility]    HEART CATH-RIGHT Right 8/20/2018    [facility] C[facility]    HEART CATH-RIGHT Right 1/30/2017    [facility] C[facility] LAB    HEART CATH-RIGHT Right 9/8/2014    [facility] CATH LAB    PORTACATH PLACEMENT      RIGHT HEART CATHETERIZATION Right 8/20/2018    Procedure: HEART CATH-RIGHT;  Surgeon: Ivonne Rai MD;  Location: [facility] CATH LAB;  Service: Cardiology;  Laterality: Right;    UPPER GASTROINTESTINAL ENDOSCOPY         SOCIAL HISTORY:  Social History     Socioeconomic History    Marital status: Single     Spouse name: Not on file    Number of children: 0    Years of education: Not on file    Highest education level:  Not on file   Social Needs    Financial resource strain: Not on file    Food insecurity - worry: Not on file    Food insecurity - inability: Not on file    Transportation needs - medical: Not on file    Transportation needs - non-medical: Not on file   Occupational History    Occupation: disabled     Employer: [employer]   Tobacco Use    Smoking status: Never Smoker    Smokeless tobacco: Never Used   Substance and Sexual Activity    Alcohol use: No     Alcohol/week: 0.5 oz     Types: 1 Standard drinks or equivalent per week     Comment: 1 per year    Drug use: No    Sexual activity: No     Birth control/protection: OCP, Pill     Comment: pt is a virgin   Other Topics Concern    Not on file   Social History Narrative    Not on file       FAMILY HISTORY:  Family History   Problem Relation Age of Onset    Heart disease Father     Glaucoma Father     Diabetes Mother     Cancer Maternal Grandmother         uterine    Cancer Maternal Aunt         breast    Breast cancer Maternal Aunt     Heart disease Paternal Grandfather     Heart attack Paternal Grandfather     Diabetes Paternal Grandfather     Leukemia Brother     Hypertension Unknown     Diabetes Maternal Grandfather     Heart attack Maternal Grandfather     Breast cancer Cousin     No Known Problems Sister     No Known Problems Maternal Uncle     No Known Problems Paternal Aunt     No Known Problems Paternal Uncle     No Known Problems Paternal Grandmother     Colon cancer Neg Hx     Ovarian cancer Neg Hx     Amblyopia Neg Hx     Blindness Neg Hx     Cataracts Neg Hx     Macular degeneration Neg Hx     Retinal detachment Neg Hx     Strabismus Neg Hx     Stroke Neg Hx     Thyroid disease Neg Hx        ALLERGIES AND MEDICATIONS: updated and reviewed.  Review of patient's allergies indicates:   Allergen Reactions    Adhesive Hives     Silk tape    Amoxicillin Rash    Chlorhexidine Other (See Comments)     Current  Outpatient Medications   Medication Sig Dispense Refill    acetaminophen (TYLENOL EXTRA STRENGTH) 500 MG tablet Take 1,000 mg by mouth every 6 (six) hours as needed for Pain.      ADVAIR DISKUS 100-50 mcg/dose diskus inhaler INHALE 1 PUFF TWICE DAILY 1 each 11    albuterol 90 mcg/actuation inhaler Inhale 2 puffs into the lungs every 4 (four) hours as needed. 2 Aerosol Inhalation Every 4-6 hours 1 Inhaler 3    ambrisentan (LETAIRIS) 10 MG Tab Take 1 tablet (10 mg total) by mouth once daily. 30 tablet 11    azelastine (ASTELIN) 137 mcg (0.1 %) nasal spray 2 sprays by Nasal route 2 (two) times daily.       clindamycin (CLEOCIN T) 1 % lotion daily as needed.       cyanocobalamin, vitamin B-12, 2,500 mcg Subl Place 2,500 mcg under the tongue once daily.       desonide (DESOWEN) 0.05 % ointment 0.05 % daily as needed.  Ointment Topical As directed.  Apply to affected area twice a daily over face      diphenhydrAMINE (BENADRYL) 25 mg capsule Take 25 mg by mouth every 6 (six) hours as needed for Itching.      ergocalciferol (VITAMIN D2) 50,000 unit Cap TAKE ONE CAPSULE BY MOUTH EVERY 14 DAYS 6 capsule 3    esomeprazole (NEXIUM) 40 MG capsule TAKE ONE CAPSULE BY MOUTH DAILY before breakfast 90 capsule 3    ferrous sulfate 325 (65 FE) MG EC tablet Take 325 mg by mouth daily as needed.       fluticasone (FLONASE) 50 mcg/actuation nasal spray INHALE 2 SPRAYS IN EACH NOSTRIL DAILY 16 g 3    folic acid (FOLVITE) 1 MG tablet Take 1 tablet (1 mg total) by mouth once daily.  Tablet Oral Every day 90 tablet 3    levothyroxine (SYNTHROID) 137 MCG Tab tablet Take 1 tablet (137 mcg total) by mouth once daily. 90 tablet 1    loratadine (CLARITIN) 10 mg tablet Take 10 mg by mouth once daily.      norethindrone-ethinyl estradiol (MICROGESTIN FE 1/20, 28,) 1 mg-20 mcg (21)/75 mg (7) per tablet Take 1 tablet by mouth once daily. 28 tablet 0    ondansetron (ZOFRAN) 4 MG tablet Take 4 mg by mouth every 6 (six) hours as  needed. 1 Tablet Oral Every 6 hours      PRAMOSONE 2.5-1 % Lotn lotion daily as needed.       promethazine (PHENERGAN) 25 MG tablet Take 25 mg by mouth every 4 to 6 hours as needed.       sumatriptan (IMITREX) 100 MG tablet Take 1 tablet (100 mg total) by mouth as needed for Migraine. Take at the onset of headache, may take 1 more in 1 hour if needed. 12 tablet 5    tadalafil, PULMONARY HYPERTENSION, (ADCIRCA) 20 mg Tab Take 40 mg by mouth once daily.      treprostinil (REMODULIN) 2.5 mg/mL Soln Mix cassette as directed and infuse continuously per physician titration orders on dosing sheet. Current dose 80ng/kg/min 60 mL 11    warfarin (COUMADIN) 2.5 MG tablet TAKE 2.5-3 TABLETS BY MOUTH DAILY AS DIRECTED by [facility] 90 tablet 3    butalbital-acetaminophen-caffeine -40 mg (FIORICET, ESGIC) -40 mg per tablet Take 1 tablet by mouth every 8 (eight) hours as needed for Headaches. 40 tablet 2    citalopram (CELEXA) 10 MG tablet Take 1 tablet (10 mg total) by mouth once daily. 30 tablet 11    topiramate (TOPAMAX) 100 MG tablet Take 1 tablet (100 mg total) by mouth 2 (two) times daily. 60 tablet 11     No current facility-administered medications for this visit.        Review of Systems   Constitutional: Negative for activity change, appetite change, fever and unexpected weight change.   HENT: Negative for trouble swallowing and voice change.    Eyes: Negative for photophobia and visual disturbance.   Respiratory: Negative for apnea and shortness of breath.    Cardiovascular: Negative for chest pain and leg swelling.   Gastrointestinal: Negative for constipation and nausea.   Genitourinary: Negative for difficulty urinating.   Musculoskeletal: Negative for back pain, gait problem and neck pain.   Skin: Negative for color change and pallor.   Neurological: Positive for dizziness and headaches. Negative for seizures, syncope, weakness and numbness.   Hematological: Negative for adenopathy.    Psychiatric/Behavioral: Negative for agitation, confusion and decreased concentration.       Neurologic Exam     Mental Status   Oriented to person, place, and time.   Registration: recalls 3 of 3 objects.   Attention: normal. Concentration: normal.   Speech: speech is normal   Level of consciousness: alert  Knowledge: good.     Cranial Nerves     CN II   Visual fields full to confrontation.   Right visual field deficit: none  Left visual field deficit: none     CN III, IV, VI   Pupils are equal, round, and reactive to light.  Extraocular motions are normal.   Right pupil: Size: 3 mm. Shape: regular. Accommodation: intact.   Left pupil: Size: 3 mm. Shape: regular. Accommodation: intact.   CN III: no CN III palsy  CN VI: no CN VI palsy  Nystagmus: none   Diplopia: none  Ophthalmoparesis: none  Upgaze: normal  Downgaze: normal  Conjugate gaze: present    CN V   Facial sensation intact.   Right facial sensation deficit: none  Left facial sensation deficit: none    CN VII   Facial expression full, symmetric.   Right facial weakness: none  Left facial weakness: none    CN VIII   CN VIII normal.     CN IX, X   CN IX normal.   CN X normal.   Palate: symmetric    CN XI   CN XI normal.   Right sternocleidomastoid strength: normal  Left sternocleidomastoid strength: normal  Right trapezius strength: normal  Left trapezius strength: normal    CN XII   CN XII normal.   Tongue deviation: none    Motor Exam   Muscle bulk: normal  Overall muscle tone: normal  Right arm tone: normal  Left arm tone: normal  Right leg tone: normal  Left leg tone: normal    Strength   Strength 5/5 throughout.     Sensory Exam   Right arm light touch: normal  Left arm light touch: normal  Right leg light touch: normal  Left leg light touch: normal  Right arm vibration: normal  Left arm vibration: normal  Right leg vibration: normal  Left leg vibration: normal  Right arm proprioception: normal  Left arm proprioception: normal  Right leg  proprioception: normal  Left leg proprioception: normal  Right arm pinprick: normal  Left arm pinprick: normal  Right leg pinprick: normal  Left leg pinprick: normal    Gait, Coordination, and Reflexes     Gait  Gait: normal    Coordination   Romberg: negative  Finger to nose coordination: normal  Heel to shin coordination: normal  Tandem walking coordination: normal    Tremor   Resting tremor: absent    Reflexes   Right brachioradialis: 2+  Left brachioradialis: 2+  Right biceps: 2+  Left biceps: 2+  Right triceps: 2+  Left triceps: 2+  Right patellar: 2+  Left patellar: 2+  Right achilles: 2+  Left achilles: 2+  Right plantar: normal  Left plantar: normal      Physical Exam   Constitutional: She is oriented to person, place, and time. She appears well-developed and well-nourished.   HENT:   Head: Normocephalic and atraumatic.   Eyes: EOM are normal. Pupils are equal, round, and reactive to light.   Neck: Normal range of motion.   Cardiovascular: Normal rate and intact distal pulses.   Pulmonary/Chest: Effort normal. No apnea. No respiratory distress.   Musculoskeletal: Normal range of motion.   Neurological: She is alert and oriented to person, place, and time. She has normal strength. She has a normal Finger-Nose-Finger Test, a normal Heel to Shin Test, a normal Romberg Test and a normal Tandem Gait Test. Gait normal.   Reflex Scores:       Tricep reflexes are 2+ on the right side and 2+ on the left side.       Bicep reflexes are 2+ on the right side and 2+ on the left side.       Brachioradialis reflexes are 2+ on the right side and 2+ on the left side.       Patellar reflexes are 2+ on the right side and 2+ on the left side.       Achilles reflexes are 2+ on the right side and 2+ on the left side.  Skin: Skin is warm and dry.   Psychiatric: She has a normal mood and affect. Her speech is normal and behavior is normal. Thought content normal.   Vitals reviewed.      Vitals:    11/06/18 1011   BP: 135/61   BP  "Location: Right arm   Patient Position: Sitting   BP Method: Medium (Automatic)   Pulse: 71   Weight: 62.6 kg (138 lb 0.1 oz)   Height: 4' 11" (1.499 m)       Assessment & Plan:  Problem List Items Addressed This Visit     Chronic nonintractable headache    Overview     Daily headaches likely rebound from her medications, tension type, and sinus headaches.         Relevant Medications    topiramate (TOPAMAX) 100 MG tablet    butalbital-acetaminophen-caffeine -40 mg (FIORICET, ESGIC) -40 mg per tablet        Discussed use of [facility] directed therapies in order to treat her headaches, but she has been reluctant to accept any therapies that involve the use of needles.    Follow-up: Follow-up in about 6 months (around 5/6/2019).   More than 50% of this 25 minute encounter was spent in counseling and coordinating care of headaches.          "

## 2018-11-06 NOTE — PATIENT INSTRUCTIONS
Check TSH and Free T4 to blood work ordered on 11/20/2018   Continue all other medications   Will get flu shot on 11/20/2018

## 2018-11-12 ENCOUNTER — TELEPHONE (OUTPATIENT)
Dept: TRANSPLANT | Facility: CLINIC | Age: 48
End: 2018-11-12

## 2018-11-12 ENCOUNTER — OFFICE VISIT (OUTPATIENT)
Dept: OBSTETRICS AND GYNECOLOGY | Facility: CLINIC | Age: 48
End: 2018-11-12
Attending: OBSTETRICS & GYNECOLOGY
Payer: COMMERCIAL

## 2018-11-12 VITALS
DIASTOLIC BLOOD PRESSURE: 64 MMHG | HEIGHT: 59 IN | WEIGHT: 140.19 LBS | BODY MASS INDEX: 28.26 KG/M2 | SYSTOLIC BLOOD PRESSURE: 100 MMHG

## 2018-11-12 DIAGNOSIS — Z01.419 VISIT FOR GYNECOLOGIC EXAMINATION: Primary | ICD-10-CM

## 2018-11-12 DIAGNOSIS — N92.1 MENORRHAGIA WITH IRREGULAR CYCLE: ICD-10-CM

## 2018-11-12 PROCEDURE — 99999 PR PBB SHADOW E&M-EST. PATIENT-LVL IV: CPT | Mod: PBBFAC,,, | Performed by: OBSTETRICS & GYNECOLOGY

## 2018-11-12 PROCEDURE — 99396 PREV VISIT EST AGE 40-64: CPT | Mod: S$GLB,,, | Performed by: OBSTETRICS & GYNECOLOGY

## 2018-11-12 RX ORDER — NORETHINDRONE ACETATE AND ETHINYL ESTRADIOL 1MG-20(21)
1 KIT ORAL DAILY
Qty: 28 TABLET | Refills: 11 | Status: SHIPPED | OUTPATIENT
Start: 2018-11-12 | End: 2019-01-15 | Stop reason: SDUPTHER

## 2018-11-12 NOTE — PROGRESS NOTES
CC: Well woman exam    Yuni Perez is a 48 y.o. female  presents for a well woman exam.  She has no issues, problems, or complaints.    She reports cycles are well controlled, no menorrhagia with use of OCP.     Past Medical History:   Diagnosis Date    AR (allergic rhinitis)     Cholelithiasis, common bile duct     Chronic low back pain     Eye pressure     GERD (gastroesophageal reflux disease)     History of migraine headaches     Hypothyroidism     Lumbar disc disease     Menorrhagia     Mild asthma     Obesity     Plantar fasciitis of left foot     Primary pulmonary hypertension     followed by heart transplant/pulmonary     Seizure disorder     x 1 in     Seizures     Sleep apnea     TMJ (dislocation of temporomandibular joint)     Tricuspid regurgitation        Past Surgical History:   Procedure Laterality Date    CARDIAC CATHETERIZATION      CENTRAL VENOUS CATHETER INSERTION      HEART CATH-LEFT Left 2017    [facility]    HEART CATH-RIGHT Right 2018    [facility]von[facility]    HEART CATH-RIGHT Right 2017    [facility] CATH LAB    HEART CATH-RIGHT Right 2014    [facility] CATH LAB    PORTACATH PLACEMENT      RIGHT HEART CATHETERIZATION Right 2018    Procedure: HEART CATH-RIGHT;  Surgeon: Ivonne Rai MD;  Location: [facility] CATH LAB;  Service: Cardiology;  Laterality: Right;    UPPER GASTROINTESTINAL ENDOSCOPY         OB History    Para Term  AB Living   0   0         SAB TAB Ectopic Multiple Live Births                         Family History   Problem Relation Age of Onset    Heart disease Father     Glaucoma Father     Diabetes Mother     Cancer Maternal Grandmother         uterine    Cancer Maternal Aunt         breast    Breast cancer Maternal Aunt     Heart disease Paternal  "Grandfather     Heart attack Paternal Grandfather     Diabetes Paternal Grandfather     Leukemia Brother     Hypertension Unknown     Diabetes Maternal Grandfather     Heart attack Maternal Grandfather     Breast cancer Cousin     No Known Problems Sister     No Known Problems Maternal Uncle     No Known Problems Paternal Aunt     No Known Problems Paternal Uncle     No Known Problems Paternal Grandmother     Colon cancer Neg Hx     Ovarian cancer Neg Hx     Amblyopia Neg Hx     Blindness Neg Hx     Cataracts Neg Hx     Macular degeneration Neg Hx     Retinal detachment Neg Hx     Strabismus Neg Hx     Stroke Neg Hx     Thyroid disease Neg Hx        Social History     Tobacco Use    Smoking status: Never Smoker    Smokeless tobacco: Never Used   Substance Use Topics    Alcohol use: No     Alcohol/week: 0.5 oz     Types: 1 Standard drinks or equivalent per week     Comment: 1 per year    Drug use: No       /64 (BP Location: Left arm, Patient Position: Sitting, BP Method: Small (Manual))   Ht 4' 11" (1.499 m)   Wt 63.6 kg (140 lb 3.4 oz)   LMP 10/30/2018   BMI 28.32 kg/m²     ROS:  GENERAL: Denies weight gain or weight loss. Feeling well overall.   SKIN: Denies rash or lesions.   HEAD: Denies head injury or headache.   NODES: Denies enlarged lymph nodes.   CHEST: Denies chest pain or shortness of breath.   CARDIOVASCULAR: Denies palpitations or left sided chest pain.   ABDOMEN: No abdominal pain, constipation, diarrhea, nausea, vomiting or rectal bleeding.   URINARY: No frequency, dysuria, hematuria, or burning on urination.  REPRODUCTIVE: See HPI.   BREASTS: The patient performs breast self-examination and denies pain, lumps, or nipple discharge.   HEMATOLOGIC: No easy bruisability or excessive bleeding.  MUSCULOSKELETAL: Denies joint pain or swelling.   NEUROLOGIC: Denies syncope or weakness.   PSYCHIATRIC: Denies depression, anxiety or mood swings.    Physical " Exam:    APPEARANCE: Well nourished, well developed, in no acute distress.  AFFECT: WNL, alert and oriented x 3  SKIN: No acne or hirsutism  NECK: Neck symmetric without masses or thyromegaly  NODES: No inguinal, cervical, axillary, or femoral lymph node enlargement  CHEST: Good respiratory effect  ABDOMEN: Soft.  No tenderness or masses.  No hepatosplenomegaly.  No hernias.  BREASTS: Symmetrical, no skin changes or visible lesions.  No palpable masses, nipple discharge bilaterally.  PELVIC: Normal external genitalia without lesions.  Normal hair distribution.  Adequate perineal body, normal urethral meatus.  Patient declines speculum exam.  Vagina moist and well rugated without lesions or discharge.  Cervix palapated without tenderness.  No significant cystocele or rectocele.  Bimanual exam shows uterus to be normal size, regular, mobile and nontender.  Adnexa without masses or tenderness.    EXTREMITIES: No edema.    ASSESSMENT AND PLAN  1. Visit for gynecologic examination     2. Menorrhagia with irregular cycle  norethindrone-ethinyl estradiol (MICROGESTIN FE 1/20, 28,) 1 mg-20 mcg (21)/75 mg (7) per tablet       Patient was counseled today on A.C.S. Pap guidelines and recommendations for yearly pelvic exams, pap smears every 3 years (last pap 2017), mammograms and monthly self breast exams; to see her PCP for other health maintenance.     Follow-up in about 1 year (around 11/12/2019).

## 2018-11-20 ENCOUNTER — HOSPITAL ENCOUNTER (OUTPATIENT)
Dept: PULMONOLOGY | Facility: CLINIC | Age: 48
Discharge: HOME | End: 2018-11-20
Payer: COMMERCIAL

## 2018-11-20 ENCOUNTER — CLINICAL SUPPORT (OUTPATIENT)
Dept: INFECTIOUS DISEASES | Facility: CLINIC | Age: 48
End: 2018-11-20
Payer: COMMERCIAL

## 2018-11-20 ENCOUNTER — OFFICE VISIT (OUTPATIENT)
Dept: TRANSPLANT | Facility: CLINIC | Age: 48
End: 2018-11-20
Payer: COMMERCIAL

## 2018-11-20 ENCOUNTER — ANTI-COAG VISIT (OUTPATIENT)
Dept: CARDIOLOGY | Facility: CLINIC | Age: 48
End: 2018-11-20

## 2018-11-20 ENCOUNTER — LAB VISIT (OUTPATIENT)
Dept: LAB | Facility: HOSPITAL | Age: 48
End: 2018-11-20
Attending: INTERNAL MEDICINE
Payer: COMMERCIAL

## 2018-11-20 VITALS
HEIGHT: 60 IN | SYSTOLIC BLOOD PRESSURE: 105 MMHG | RESPIRATION RATE: 20 BRPM | HEART RATE: 90 BPM | WEIGHT: 140 LBS | BODY MASS INDEX: 27.48 KG/M2 | TEMPERATURE: 96 F | DIASTOLIC BLOOD PRESSURE: 73 MMHG | OXYGEN SATURATION: 96 %

## 2018-11-20 VITALS — BODY MASS INDEX: 27.52 KG/M2 | WEIGHT: 140.19 LBS | HEIGHT: 60 IN

## 2018-11-20 VITALS
WEIGHT: 141.13 LBS | HEART RATE: 101 BPM | HEIGHT: 59 IN | SYSTOLIC BLOOD PRESSURE: 111 MMHG | BODY MASS INDEX: 28.45 KG/M2 | OXYGEN SATURATION: 95 % | DIASTOLIC BLOOD PRESSURE: 68 MMHG

## 2018-11-20 DIAGNOSIS — Z79.899 POLYPHARMACY: ICD-10-CM

## 2018-11-20 DIAGNOSIS — J45.909 MILD ASTHMA WITHOUT COMPLICATION, UNSPECIFIED WHETHER PERSISTENT: ICD-10-CM

## 2018-11-20 DIAGNOSIS — I27.0 PRIMARY PULMONARY HYPERTENSION (PPH): Primary | ICD-10-CM

## 2018-11-20 DIAGNOSIS — Z79.01 LONG TERM CURRENT USE OF ANTICOAGULANT THERAPY: ICD-10-CM

## 2018-11-20 DIAGNOSIS — Z76.82 AWAITING ORGAN TRANSPLANT STATUS: ICD-10-CM

## 2018-11-20 DIAGNOSIS — I27.0 PRIMARY PULMONARY HYPERTENSION: ICD-10-CM

## 2018-11-20 DIAGNOSIS — Z76.82 LUNG TRANSPLANT CANDIDATE: ICD-10-CM

## 2018-11-20 DIAGNOSIS — I27.9 CHRONIC PULMONARY HEART DISEASE: ICD-10-CM

## 2018-11-20 DIAGNOSIS — Z79.01 LONG TERM (CURRENT) USE OF ANTICOAGULANTS: ICD-10-CM

## 2018-11-20 DIAGNOSIS — J96.11 CHRONIC RESPIRATORY FAILURE WITH HYPOXIA: Primary | ICD-10-CM

## 2018-11-20 DIAGNOSIS — R06.82 TACHYPNEA: ICD-10-CM

## 2018-11-20 DIAGNOSIS — E66.3 OVERWEIGHT (BMI 25.0-29.9): ICD-10-CM

## 2018-11-20 DIAGNOSIS — I27.0 PRIMARY PULMONARY HYPERTENSION (PPH): ICD-10-CM

## 2018-11-20 DIAGNOSIS — G43.009 MIGRAINE WITHOUT AURA AND WITHOUT STATUS MIGRAINOSUS, NOT INTRACTABLE: ICD-10-CM

## 2018-11-20 DIAGNOSIS — E03.9 HYPOTHYROIDISM (ACQUIRED): ICD-10-CM

## 2018-11-20 DIAGNOSIS — G47.33 OSA (OBSTRUCTIVE SLEEP APNEA): ICD-10-CM

## 2018-11-20 LAB
ALBUMIN SERPL BCP-MCNC: 3.4 G/DL
ALP SERPL-CCNC: 69 U/L
ALT SERPL W/O P-5'-P-CCNC: 12 U/L
ANION GAP SERPL CALC-SCNC: 5 MMOL/L
AST SERPL-CCNC: 13 U/L
BASOPHILS # BLD AUTO: 0.03 K/UL
BASOPHILS NFR BLD: 0.7 %
BILIRUB SERPL-MCNC: 0.2 MG/DL
BNP SERPL-MCNC: 50 PG/ML
BUN SERPL-MCNC: 10 MG/DL
CALCIUM SERPL-MCNC: 8.8 MG/DL
CHLORIDE SERPL-SCNC: 114 MMOL/L
CO2 SERPL-SCNC: 20 MMOL/L
CREAT SERPL-MCNC: 0.8 MG/DL
CREAT SERPL-MCNC: 0.8 MG/DL
DIFFERENTIAL METHOD: ABNORMAL
EOSINOPHIL # BLD AUTO: 0.2 K/UL
EOSINOPHIL NFR BLD: 5.4 %
ERYTHROCYTE [DISTWIDTH] IN BLOOD BY AUTOMATED COUNT: 14 %
EST. GFR  (AFRICAN AMERICAN): >60 ML/MIN/1.73 M^2
EST. GFR  (AFRICAN AMERICAN): >60 ML/MIN/1.73 M^2
EST. GFR  (NON AFRICAN AMERICAN): >60 ML/MIN/1.73 M^2
EST. GFR  (NON AFRICAN AMERICAN): >60 ML/MIN/1.73 M^2
GLUCOSE SERPL-MCNC: 84 MG/DL
HCT VFR BLD AUTO: 41.5 %
HGB BLD-MCNC: 13.6 G/DL
IMM GRANULOCYTES # BLD AUTO: 0.02 K/UL
IMM GRANULOCYTES NFR BLD AUTO: 0.4 %
INR PPP: 2.1
LYMPHOCYTES # BLD AUTO: 0.9 K/UL
LYMPHOCYTES NFR BLD: 20.9 %
MAGNESIUM SERPL-MCNC: 2.2 MG/DL
MCH RBC QN AUTO: 28.2 PG
MCHC RBC AUTO-ENTMCNC: 32.8 G/DL
MCV RBC AUTO: 86 FL
MONOCYTES # BLD AUTO: 0.4 K/UL
MONOCYTES NFR BLD: 9.4 %
NEUTROPHILS # BLD AUTO: 2.8 K/UL
NEUTROPHILS NFR BLD: 63.2 %
NRBC BLD-RTO: 0 /100 WBC
PLATELET # BLD AUTO: 241 K/UL
PMV BLD AUTO: 12.2 FL
POTASSIUM SERPL-SCNC: 4.3 MMOL/L
PRE FEV1 FVC: 70
PRE FEV1: 1.62
PRE FVC: 2.32
PREDICTED FEV1 FVC: 85
PREDICTED FEV1: 2.36
PREDICTED FVC: 2.81
PROT SERPL-MCNC: 7.4 G/DL
PROTHROMBIN TIME: 20.7 SEC
RBC # BLD AUTO: 4.82 M/UL
SODIUM SERPL-SCNC: 139 MMOL/L
T4 FREE SERPL-MCNC: 1.24 NG/DL
TSH SERPL DL<=0.005 MIU/L-ACNC: 1.04 UIU/ML
WBC # BLD AUTO: 4.46 K/UL

## 2018-11-20 PROCEDURE — 36415 COLL VENOUS BLD VENIPUNCTURE: CPT | Mod: TXP

## 2018-11-20 PROCEDURE — 84439 ASSAY OF FREE THYROXINE: CPT | Mod: NTX

## 2018-11-20 PROCEDURE — 3008F BODY MASS INDEX DOCD: CPT | Mod: CPTII,NTX,S$GLB, | Performed by: INTERNAL MEDICINE

## 2018-11-20 PROCEDURE — 83880 ASSAY OF NATRIURETIC PEPTIDE: CPT | Mod: NTX

## 2018-11-20 PROCEDURE — 99999 PR PBB SHADOW E&M-EST. PATIENT-LVL III: CPT | Mod: PBBFAC,TXP,, | Performed by: INTERNAL MEDICINE

## 2018-11-20 PROCEDURE — 94618 PULMONARY STRESS TESTING: CPT | Mod: NTX,S$GLB,, | Performed by: INTERNAL MEDICINE

## 2018-11-20 PROCEDURE — 83735 ASSAY OF MAGNESIUM: CPT | Mod: TXP

## 2018-11-20 PROCEDURE — 84443 ASSAY THYROID STIM HORMONE: CPT | Mod: NTX

## 2018-11-20 PROCEDURE — 80053 COMPREHEN METABOLIC PANEL: CPT | Mod: NTX

## 2018-11-20 PROCEDURE — 90471 IMMUNIZATION ADMIN: CPT | Mod: S$GLB,TXP,, | Performed by: INTERNAL MEDICINE

## 2018-11-20 PROCEDURE — 85610 PROTHROMBIN TIME: CPT | Mod: NTX

## 2018-11-20 PROCEDURE — 99214 OFFICE O/P EST MOD 30 MIN: CPT | Mod: NTX,S$GLB,, | Performed by: INTERNAL MEDICINE

## 2018-11-20 PROCEDURE — 99999 PR PBB SHADOW E&M-EST. PATIENT-LVL III: CPT | Mod: PBBFAC,TXP,,

## 2018-11-20 PROCEDURE — 85025 COMPLETE CBC W/AUTO DIFF WBC: CPT | Mod: TXP

## 2018-11-20 PROCEDURE — 99213 OFFICE O/P EST LOW 20 MIN: CPT | Mod: 25,NTX,S$GLB, | Performed by: INTERNAL MEDICINE

## 2018-11-20 PROCEDURE — 90662 IIV NO PRSV INCREASED AG IM: CPT | Mod: S$GLB,TXP,, | Performed by: INTERNAL MEDICINE

## 2018-11-20 PROCEDURE — 94010 BREATHING CAPACITY TEST: CPT | Mod: NTX,S$GLB,, | Performed by: INTERNAL MEDICINE

## 2018-11-20 RX ORDER — CITALOPRAM 10 MG/1
10 TABLET ORAL NIGHTLY
COMMUNITY
End: 2022-08-15

## 2018-11-20 NOTE — LETTER
November 20, 2018        Ivonne Rai  [address]  [facility] [address]  Phone: [phone]  Fax: [phone]             [facility]  [address]  [facility] [address]  Phone: [phone]   Patient: Yuni Perez   MR Number: [id number]   YOB: 1970   Date of Visit: 11/20/2018       Dear Dr. Ivonne Rai    Thank you for referring Yuni Perez to me for evaluation. Attached you will find relevant portions of my assessment and plan of care.    If you have questions, please do not hesitate to call me. I look forward to following Yuni Perez along with you.    Sincerely,    Ralph Whyte MD    Enclosure    If you would like to receive this communication electronically, please contact [email or web address] or [phone] to request [employer] Link access.    [employer] Link is a tool which provides read-only access to select patient information with whom you have a relationship. Its easy to use and provides real time access to review your patients record including encounter summaries, notes, results, and demographic information.    If you feel you have received this communication in error or would no longer like to receive these types of communications, please e-mail [email or web address]

## 2018-11-20 NOTE — PROGRESS NOTES
LUNG TRANSPLANT PRE FOLLOW-UP    Referring Physician: Ivonne Rai    Reason for Visit:  Pre-lung transplant follow-up.         Date of Initial Evaluation: 1/16/2017                                                                                             History of Present Illness: Yuni Perez is a 48 y.o. female who is on 0L of oxygen. She is on no assisted ventilation.  Her New York Heart Association Class is III and a Karnofsky score of 60% - Requires occasional assistance but is able to care for needs. She is not diabetic. Ms. Perez is a 47 y/o woman being seen in follow up for chronic PAH and asthma. She remains on tadalafil, ambrisentan, and a remodulin infusion. She remains on coumadin, which may be stopped soon. She has had side effects of nasal congestion and migrains since she began PAH-specific therapy. Her asthma is well-controlled on Advair, and she only needs her albuterol about once per year. She has no dyspnea at rest, but develops ZAVALA. She can walk about 1 block before stopping, and she can clean her house, but notices that lifting and vacuuming are difficult. She has not had recent episodes of syncope, but in the remote past has had syncopal episodes attributable to PH. She completed pulmonary rehab about 5 years ago.     /73   Pulse 90   Temp 96.2 °F (35.7 °C) (Oral)   Resp 20   Ht 5' (1.524 m)   Wt 63.5 kg (140 lb)   LMP 10/30/2018   SpO2 96% Comment: room air  BMI 27.34 kg/m²   Review of Systems   Constitutional: Negative for chills, diaphoresis and fever.   HENT: Negative for ear pain and sore throat.         Chronic congestion from remodulin   Eyes: Negative for blurred vision and double vision.   Respiratory: Negative for cough, hemoptysis, sputum production and shortness of breath.    Cardiovascular: Negative for chest pain and leg swelling.   Gastrointestinal: Positive for abdominal pain. Negative for blood in stool, constipation, heartburn, nausea and  vomiting.        Chronic diarrhea from remodulin   Genitourinary: Negative for dysuria, frequency and hematuria.   Musculoskeletal: Negative for back pain and joint pain.   Skin: Negative for rash.   Neurological: Positive for headaches. Negative for tremors and seizures.     Objective:   Physical Exam   Constitutional: She is well-developed, well-nourished, and in no distress. No distress.   HENT:   Head: Normocephalic and atraumatic.   Mouth/Throat: No oropharyngeal exudate.   Eyes: No scleral icterus.   Neck: Neck supple.   Cardiovascular: Normal rate and regular rhythm.   Prominent P2   Pulmonary/Chest: Breath sounds normal. No respiratory distress. She has no wheezes.   Abdominal: Soft. She exhibits no distension. There is no tenderness.   Musculoskeletal: She exhibits no edema or deformity.   Lymphadenopathy:     She has no cervical adenopathy.   Neurological: She is alert. Coordination normal.   Skin: Skin is warm and dry. No rash noted. She is not diaphoretic.   Psychiatric: Affect and judgment normal.   Vitals reviewed.    Labs:  Lab Visit on 11/20/2018   Component Date Value    Creatinine 11/20/2018 0.8     eGFR if African American 11/20/2018 >60.0     eGFR if non  Amer* 11/20/2018 >60.0     Prothrombin Time 11/20/2018 20.7*    INR 11/20/2018 2.1*    WBC 11/20/2018 4.46     RBC 11/20/2018 4.82     Hemoglobin 11/20/2018 13.6     Hematocrit 11/20/2018 41.5     MCV 11/20/2018 86     MCH 11/20/2018 28.2     MCHC 11/20/2018 32.8     RDW 11/20/2018 14.0     Platelets 11/20/2018 241     MPV 11/20/2018 12.2     Immature Granulocytes 11/20/2018 0.4     Gran # (ANC) 11/20/2018 2.8     Immature Grans (Abs) 11/20/2018 0.02     Lymph # 11/20/2018 0.9*    Mono # 11/20/2018 0.4     Eos # 11/20/2018 0.2     Baso # 11/20/2018 0.03     nRBC 11/20/2018 0     Gran% 11/20/2018 63.2     Lymph% 11/20/2018 20.9     Mono% 11/20/2018 9.4     Eosinophil% 11/20/2018 5.4     Basophil% 11/20/2018 0.7      Differential Method 11/20/2018 Automated     BNP 11/20/2018 50     Magnesium 11/20/2018 2.2     Sodium 11/20/2018 139     Potassium 11/20/2018 4.3     Chloride 11/20/2018 114*    CO2 11/20/2018 20*    Glucose 11/20/2018 84     BUN, Bld 11/20/2018 10     Creatinine 11/20/2018 0.8     Calcium 11/20/2018 8.8     Total Protein 11/20/2018 7.4     Albumin 11/20/2018 3.4*    Total Bilirubin 11/20/2018 0.2     Alkaline Phosphatase 11/20/2018 69     AST 11/20/2018 13     ALT 11/20/2018 12     Anion Gap 11/20/2018 5*    eGFR if African American 11/20/2018 >60.0     eGFR if non  Amer* 11/20/2018 >60.0     TSH 11/20/2018 1.043     Free T4 11/20/2018 1.24        Pulmonary Function Tests 11/20/2018 5/10/2018 11/7/2017 7/12/2017 11/15/2016 5/17/2016   FVC 2.32 2.11 2.24 2.24 2.13 2.22   FEV1 1.62 1.48 1.56 1.54 1.43 1.65   TLC (liters) - - - - - 3.74   DLCO (ml/mmHg sec) - 14.3 - - - 17.6   FVC% 84 76 80 80 78 78   FEV1% 69 63 66 65 61 69   FEF 25-75 0.99 0.92 0.96 0.87 0.78 1.22   FEF 25-75% 37 34 35 32 29 44   TLC% - - - - - 89   RV - - - - - 1.49   RV% - - - - - 105   DLCO% - 75 - - - 90     6MW 11/20/2018 4/25/2018 1/12/2018 11/7/2017 7/12/2017 5/11/2017 4/12/2017   6MWT Status completed without stopping - completed without stopping completed without stopping completed without stopping completed without stopping completed without stopping   Patient Reported Dyspnea Dyspnea;Leg pain Dyspnea Dyspnea;Leg pain Chest pain;Dyspnea;Leg pain - Dyspnea;Leg pain   Was O2 used? No No No No No No No   6MW Distance walked (feet) 1562 1520 1500 1540 1550 1500 1500   Distance walked (meters) 476.1 463.3 457.2 469.39 472.44 457.2 457.2   Did patient stop? No No No No No No No   Oxygen Saturation 97 98 98 99 98 99 100   Supplemental Oxygen Room Air Room Air Room Air Room Air Room Air Room Air Room Air   Heart Rate 76 79 82 79 65 79 86   Blood Pressure 129/71 121/70 106/60 136/93 123/69 128/65 130/75   Ab  Dyspnea Rating  light light light light light light light   Oxygen Saturation 98 98 99 97 99 98 98   Supplemental Oxygen Room Air Room Air Room Air Room Air Room Air Room Air Room Air   Heart Rate 151 139 124 113 120 110 145   Blood Pressure 142/71 133/75 125/59 146/73 133/85 136/72 142/73   Ab Dyspnea Rating  very,very heavy very heavy very,very heavy very heavy very heavy very,very heavy very,very heavy   Recovery Time (seconds) 180 47 80 68 120 46 102   Oxygen Saturation 99 99 99 98 99 99 98   Supplemental Oxygen Room Air Room Air Room Air Room Air Room Air Room Air Room Air   Heart Rate 93 117 106 91 80 93 122       Other:   RHC 8/2018:  PW: 8/6 (5)  PA: 101/35 (57)  RV: 92/0  RVEDP: 6     AO_SAT: 99  PA_SAT: 72  BP: 126/85  HR: 81    CONDITION 1 (8/20/2018 11:03:20):  RA: 6/5 (4)  FICKCI: 2.5700  FICKCO: 4.0800  PVR: 1038.0000    Assessment:  1. Primary pulmonary hypertension (PPH)    2. Lung transplant candidate    3. Mild asthma without complication, unspecified whether persistent      Plan:   Pulmonary hypertension, WHO group I. Mean PAP 57 in August 2018. Currently she maintains good functional status with stable lung function.  -Agree with current PAH therapy.  -Currently she is stable to continue her current therapy and does not need to undergo lung transplant evaluation at this time.  -UTD on the seasonal flu vaccine.  -Continue symptomatic management of vasodilator-induced side effects.    Mild persistent asthma  -agree with LABA-ICS    Ms. Perez was seen and discussed with Dr. Whyte.    Lulu Art 11/20/2018 2:57 PM  [facility]    Attending Note:    I have seen and evaluated the patient with the fellow. Their note reflects the content of our discussion and my plan of care.      Ralph Whyte MD  Pulmonary/Critical Care Medicine

## 2018-11-20 NOTE — PROGRESS NOTES
"Received message and confirmed in chart note "D/c coumadin given recent metaanalysis showing no clear benefit in PAH- will cc coumadin clinic" We will d/c from clinic  "

## 2018-11-20 NOTE — PATIENT INSTRUCTIONS
Flu shot    1. Continue current therapy.  2.  Keep salt intake to under 2000 mg sodium, fluids to under 2 L (64 oz)  3  Check your weights every morning after getting out of bed and urinating. If your weight goes up 3# overnight or 5# in one week take call us  4. Call us if you find yourself getting more short of breath, have more swelling or unexpected weight changes, fever, chills, or problems with your line

## 2018-11-20 NOTE — PROGRESS NOTES
Subjective:     HPI  48 y.o. White female who presents for PH follow-up. Patient was diagnosed with IPAH about 5 and a half years, ABHIJEET recently, currently deferred for LUT (was working on fund raising). Initial symptom- syncope.  Patient has been on triple therapy: Remodulin at  72ng/kg/min infusion, Letairis 10mg qdaily, and Adcirca 40mg qdaily.    Since last visit, pt has been doing ok- says today she has had some asthma/SOB- says her chest feels a little tight. Has albuterol at home. Has not noticed any fluid retention though she says she gained a couple # (ate some pie and was on a pred a couple months ago)  occasionally LH with activity (going from laying to standing will have to sit back down and wait, happened twice in last two months).  No orthopnea, PND. Watching her salt. Has CP every once in a while, and says her sinuses have been really bad    Only line issue is itching    RHC 8/20/18     PW: 8/6 (5)  PA: 101/35 (57)  RV: 92/0  RVEDP: 6   AO_SAT: 99  PA_SAT: 72  RA: 6/5 (4)  FICKCI: 2.5700  FICKCO: 4.0800  PVR: 1038.0000    TTE 4/18  Right Ventricle: The right ventricle is normal in size measuring 3.1 cm at the base in the apical right ventricle-focused view. Global right ventricular systolic function appears mildly to moderately depressed. Tricuspid annular plane systolic excursion   (TAPSE) is 2.3 cm. The estimated PA systolic pressure is greater than 39 mmHg.       TTE 9/18/17  1 - Normal left ventricular systolic function (EF 60-65%).     2 - No wall motion abnormalities.     3 - Normal left ventricular diastolic function.     4 - Right atrial enlargement.     5 - Right ventricular enlargement with moderately depressed systolic function.     6 - The estimated PA systolic pressure is 36 mmHg.     7 - Mild tricuspid regurgitation.   Right Ventricle: The right ventricle is enlarged measuring 4.1 cm at the base in the apical right ventricle-focused view. Global right ventricular systolic function appears  moderately depressed. There is abnormal septal motion consistent with RV   pressure/volume overload. Tricuspid annular plane systolic excursion (TAPSE) is 1.7 cm. Tissue Doppler-derived tricuspid annular peak systolic velocity (S prime) is 11.0 cm/s. The estimated PA systolic pressure is 36 mmHg.     6mw 476m 463m (457  1/18, 469m in Nov, 457 in Nov, 488m unchanged last 3 visits with me( 450 11/15/16,  457m, 518.5m, 533.75, 549m April, 463.6m prev visit)                                              O2 sat  97-> 98 % RA                                                          HR 76->151                                                      BP  129/71-->142/71                                                       Ab 2->8    R/LHC 1/17  PW:  (30)  RV: 102  RVEDP: 21     PA: 102/58  RA:  (28)  PA_SAT: 65  AO: 93/56  LV: 93  LVEDP: 14   FICKCO: 4.08  Normal cors    RHC 4/16  AOPRES: 122/85 (97)  AOSAT: 98  FICKCI: 2.81  FICKCO: 4.38  PAPRES: 101/34 (59)  PASAT: 71  PVR: 10.73  PWPRES: 14/8 (10)  RAPRES: 9/5 (4)  RVPRES: 91/-4, 8    TTE last visit   1 - Normal left ventricular systolic function (EF 60-65%).     2 - Right ventricular enlargement with mildly to moderately depressed systolic function.     3 - Normal left ventricular diastolic function.     4 - Pulmonary hypertension. The estimated PA systolic pressure is 41 mmHg.       TTE 3/4/16  1 - Normal left ventricular systolic function (EF 60-65%).   2 - Normal left ventricular diastolic function.   3 - Right ventricle is upper limit of normal in size with low normal to mildly depressed systolic function. TAPSE 2.2 RV 3.7 cm   4 - Trivial to mild tricuspid regurgitation.   5 - Pulmonary hypertension. The estimated PA systolic pressure is 60 mmHg.     TTE Oct  1 - Normal left ventricular systolic function (EF 60-65%).   2 - Left ventricular diastolic dysfunction.   3 - Biatrial enlargement.   4 - Right ventricular enlargement with hypertrophy, with normal systolic  "function.   5 - Pulmonary hypertension.   6 - Trivial mitral regurgitation.   7 - Trivial tricuspid regurgitation.   PASp 46    CXR 2/3/16  Vascular catheter now identified, its tip in the superior vena cava just superior to its junction with the right atrium. Heart size is normal, as is the appearance of the pulmonary vascularity. Lung zones are clear, and free of significant airspace consolidation or volume loss. No pleural fluid. No hilar or mediastinal mass lesion. No pneumothorax.     VQ Scan 8/17/16: low probability    Review of Systems   Constitution: Positive for malaise/fatigue and weight gain. Negative for chills and fever.   HENT: Positive for congestion.    Eyes: Negative.    Cardiovascular: Positive for dyspnea on exertion. Negative for leg swelling, near-syncope, orthopnea, palpitations, paroxysmal nocturnal dyspnea and syncope.   Respiratory: Positive for cough. Negative for shortness of breath.    Endocrine: Negative.    Skin: Negative.    Musculoskeletal: Positive for myalgias.        Left-axillary chest wall pain   Gastrointestinal: Negative for bloating, abdominal pain and change in bowel habit.   Neurological: Positive for headaches and light-headedness. Negative for dizziness.   Psychiatric/Behavioral: Negative for depression.        Objective:  /68   Pulse 101   Ht 4' 11" (1.499 m)   Wt 64 kg (141 lb 1.5 oz)   LMP 10/30/2018   SpO2 95%   BMI 28.50 kg/m²       Physical Exam   Constitutional: She is oriented to person, place, and time. She appears well-developed and well-nourished.   HENT:   Head: Normocephalic and atraumatic.   Neck: Neck supple. No JVD present. No thyromegaly present.   Cardiovascular: Normal rate and regular rhythm. Exam reveals no gallop and no friction rub.   No murmur heard.  Physiologically split S2 with prominent P2 component     Pulmonary/Chest: Effort normal and breath sounds normal. No respiratory distress. She has no wheezes. She has no rales.   Abdominal: " Soft. Bowel sounds are normal. She exhibits no distension. There is no tenderness.   Musculoskeletal: She exhibits no edema.   Neurological: She is alert and oriented to person, place, and time.   Skin: Skin is warm and dry.   Line exit site is clean, no erythema, scab or discharge   Psychiatric: She has a normal mood and affect.                       Chemistry        Component Value Date/Time     11/20/2018 1050    K 4.3 11/20/2018 1050     (H) 11/20/2018 1050    CO2 20 (L) 11/20/2018 1050    BUN 10 11/20/2018 1050    CREATININE 0.8 11/20/2018 1050    CREATININE 0.8 11/20/2018 1050    GLU 84 11/20/2018 1050        Component Value Date/Time    CALCIUM 8.8 11/20/2018 1050    ALKPHOS 69 11/20/2018 1050    AST 13 11/20/2018 1050    ALT 12 11/20/2018 1050    BILITOT 0.2 11/20/2018 1050            Magnesium   Date Value Ref Range Status   11/20/2018 2.2 1.6 - 2.6 mg/dL Final       Lab Results   Component Value Date    WBC 4.46 11/20/2018    HGB 13.6 11/20/2018    HCT 41.5 11/20/2018    MCV 86 11/20/2018     11/20/2018       Lab Results   Component Value Date    INR 2.1 (H) 11/20/2018    INR 2.3 10/11/2018    INR 2.6 09/13/2018       BNP   Date Value Ref Range Status   11/20/2018 50 0 - 99 pg/mL Final     Comment:     Values of less than 100 pg/ml are consistent with non-CHF populations.   04/25/2018 20 0 - 99 pg/mL Final     Comment:     Values of less than 100 pg/ml are consistent with non-CHF populations.   01/12/2018 17 0 - 99 pg/mL Final     Comment:     Values of less than 100 pg/ml are consistent with non-CHF populations.       No results found for: LDH          Assessment:       1. Primary pulmonary hypertension- FC II-III no volume overload on exam today, BNP remains low, adequate 6mw distance today,  RV enlarged and  Hypertrophied with mild-mod red systolic fxn on echo this week, despite increasing remodulin dose- PAP by echo does not correlate with her RHC which shows severe PAH, marginal Co  and high RA- overall though she remains clinically stable on IV remodulin   2. Encounter for long-term (current) use of anticoagulants    3. Obesity    4. Menorrhagia    5. Tricuspid regurgitation         Plan:     No changes from a PH standpoint today- on triple tx and unable to increase remodulin further due to SE- deferred for lung transplant at this time (financial and psychosocial issues) and clinically she remains stable    Keep salt intake to under 2000 mg sodium, fluids to under 2 L (64 oz)    Check weights every morning after getting out of bed and urinating. If weight goes up 3# overnight or 5# in one week she should call us    D/c coumadin given recent metaanalysis showing no clear benefit in PAH- will cc coumadin clinic    High dose flu shot ordered    F/u 3 mo with labs walk and echo

## 2018-11-20 NOTE — PROCEDURES
Yuni Perez is a 48 y.o.  female patient, who presents for a 6 minute walk test ordered by MD Pato.  The diagnosis is Pre Lung Transplant Evaluation.  The patient's BMI is 27.4 kg/m2.  Predicted distance (lower limit of normal) is 433.01 meters.      Test Results:    The test was completed without stopping.   The total time walked was 360 seconds.  During walking, the patient reported:  Dyspnea. The patient used no assistive devices  during testing.     11/20/2018---------Distance: 476.1 meters (1562 feet)     O2 Sat % Supplemental Oxygen Heart Rate Blood Pressure Ab Scale   Pre-exercise  (Resting) 97 % Room Air 76 bpm 129/71 mmHg 2   During Exercise 98 % Room Air 151 bpm 142/71 mmHg 9   Post-exercise  (Recovery) 99 % Room Air  93 bpm   mmHg       Recovery Time: 180 seconds    Performing nurse/tech: [employer] RRT      PREVIOUS STUDY:   The patient had a previous study.  04/25/2018---------Distance: 463.3 meters (1520 feet)       O2 Sat % Supplemental Oxygen Heart Rate Blood Pressure Ab Scale   Pre-exercise  (Resting) 98 % Room Air 79 bpm 121/70 mmHg 2   During Exercise 98 % Room Air 139 bpm 133/75 mmHg 7-8   Post-exercise  (Recovery) 99 % Room Air  117 bpm             CLINICAL INTERPRETATION:  Six minute walk distance is 476.1 meters (1562 feet) with very, very heavy dyspnea.  During exercise, there was no significant desaturation while breathing room air.  Blood pressure remained stable and Heart rate increased significantly with walking.  This may represent a tachycardic response to exercise.  The patient did not report non-pulmonary symptoms during exercise.  Since the previous study in April 2018, exercise capacity is unchanged.  Based upon age and body mass index, exercise capacity is normal.

## 2018-11-21 ENCOUNTER — TELEPHONE (OUTPATIENT)
Dept: FAMILY MEDICINE | Facility: CLINIC | Age: 48
End: 2018-11-21

## 2018-11-21 NOTE — TELEPHONE ENCOUNTER
I left a message the her thyroid tests that I ordered were normal. She needed to continue her current Synthroid dose. If she had any questions to please feel free to call me.

## 2018-12-26 DIAGNOSIS — N92.1 MENORRHAGIA WITH IRREGULAR CYCLE: ICD-10-CM

## 2018-12-26 RX ORDER — NORETHINDRONE ACETATE AND ETHINYL ESTRADIOL AND FERROUS FUMARATE 1MG-20(21)
KIT ORAL
Qty: 28 TABLET | Refills: 0 | Status: SHIPPED | OUTPATIENT
Start: 2018-12-26 | End: 2019-01-08 | Stop reason: SDUPTHER

## 2019-01-05 DIAGNOSIS — K21.9 GASTROESOPHAGEAL REFLUX DISEASE, ESOPHAGITIS PRESENCE NOT SPECIFIED: ICD-10-CM

## 2019-01-05 RX ORDER — ESOMEPRAZOLE MAGNESIUM 40 MG/1
CAPSULE, DELAYED RELEASE ORAL
Qty: 90 CAPSULE | Refills: 3 | Status: CANCELLED | OUTPATIENT
Start: 2019-01-05

## 2019-01-08 DIAGNOSIS — N92.1 MENORRHAGIA WITH IRREGULAR CYCLE: ICD-10-CM

## 2019-01-08 DIAGNOSIS — E03.9 HYPOTHYROIDISM (ACQUIRED): ICD-10-CM

## 2019-01-08 DIAGNOSIS — K21.9 GASTROESOPHAGEAL REFLUX DISEASE, ESOPHAGITIS PRESENCE NOT SPECIFIED: ICD-10-CM

## 2019-01-08 RX ORDER — NORETHINDRONE ACETATE AND ETHINYL ESTRADIOL AND FERROUS FUMARATE 1MG-20(21)
KIT ORAL
Qty: 28 TABLET | Refills: 0 | Status: SHIPPED | OUTPATIENT
Start: 2019-01-08 | End: 2019-02-19 | Stop reason: SDUPTHER

## 2019-01-08 RX ORDER — ESOMEPRAZOLE MAGNESIUM 40 MG/1
40 CAPSULE, DELAYED RELEASE ORAL DAILY
Qty: 90 CAPSULE | Refills: 3 | Status: SHIPPED | OUTPATIENT
Start: 2019-01-08 | End: 2019-03-06

## 2019-01-08 RX ORDER — LEVOTHYROXINE SODIUM 137 UG/1
TABLET ORAL
Qty: 90 TABLET | Refills: 1 | Status: SHIPPED | OUTPATIENT
Start: 2019-01-08 | End: 2019-07-03 | Stop reason: SDUPTHER

## 2019-01-15 ENCOUNTER — OFFICE VISIT (OUTPATIENT)
Dept: GASTROENTEROLOGY | Facility: CLINIC | Age: 49
End: 2019-01-15
Payer: COMMERCIAL

## 2019-01-15 ENCOUNTER — LAB VISIT (OUTPATIENT)
Dept: LAB | Facility: HOSPITAL | Age: 49
End: 2019-01-15
Payer: COMMERCIAL

## 2019-01-15 VITALS
WEIGHT: 138.88 LBS | HEIGHT: 59 IN | SYSTOLIC BLOOD PRESSURE: 104 MMHG | BODY MASS INDEX: 28 KG/M2 | DIASTOLIC BLOOD PRESSURE: 74 MMHG | HEART RATE: 75 BPM

## 2019-01-15 DIAGNOSIS — K21.9 GASTROESOPHAGEAL REFLUX DISEASE WITHOUT ESOPHAGITIS: Primary | ICD-10-CM

## 2019-01-15 DIAGNOSIS — Z79.899 ENCOUNTER FOR MONITORING LONG-TERM PROTON PUMP INHIBITOR THERAPY: ICD-10-CM

## 2019-01-15 DIAGNOSIS — Z51.81 ENCOUNTER FOR MONITORING LONG-TERM PROTON PUMP INHIBITOR THERAPY: ICD-10-CM

## 2019-01-15 LAB
25(OH)D3+25(OH)D2 SERPL-MCNC: 19 NG/ML
VIT B12 SERPL-MCNC: 1708 PG/ML

## 2019-01-15 PROCEDURE — 99213 OFFICE O/P EST LOW 20 MIN: CPT | Mod: NTX,S$GLB,, | Performed by: PHYSICIAN ASSISTANT

## 2019-01-15 PROCEDURE — 99213 PR OFFICE/OUTPT VISIT, EST, LEVL III, 20-29 MIN: ICD-10-PCS | Mod: NTX,S$GLB,, | Performed by: PHYSICIAN ASSISTANT

## 2019-01-15 PROCEDURE — 3008F BODY MASS INDEX DOCD: CPT | Mod: CPTII,NTX,S$GLB, | Performed by: PHYSICIAN ASSISTANT

## 2019-01-15 PROCEDURE — 99999 PR PBB SHADOW E&M-EST. PATIENT-LVL V: ICD-10-PCS | Mod: PBBFAC,TXP,, | Performed by: PHYSICIAN ASSISTANT

## 2019-01-15 PROCEDURE — 36415 COLL VENOUS BLD VENIPUNCTURE: CPT | Mod: TXP

## 2019-01-15 PROCEDURE — 99999 PR PBB SHADOW E&M-EST. PATIENT-LVL V: CPT | Mod: PBBFAC,TXP,, | Performed by: PHYSICIAN ASSISTANT

## 2019-01-15 PROCEDURE — 82306 VITAMIN D 25 HYDROXY: CPT | Mod: TXP

## 2019-01-15 PROCEDURE — 82607 VITAMIN B-12: CPT | Mod: TXP

## 2019-01-15 PROCEDURE — 3008F PR BODY MASS INDEX (BMI) DOCUMENTED: ICD-10-PCS | Mod: CPTII,NTX,S$GLB, | Performed by: PHYSICIAN ASSISTANT

## 2019-01-15 NOTE — PROGRESS NOTES
[facility] Consultation Note    Reason for Consult:  The primary encounter diagnosis was Gastroesophageal reflux disease without esophagitis. A diagnosis of Encounter for monitoring long-term proton pump inhibitor therapy was also pertinent to this visit.    PCP:   Lulu Sandhu       Referring MD:  Lulu Sandhu Md  [address] [facility]  [place] [address]    HPI:  This is a 48 y.o. female here to follow up on her IBS     Here today of follow up  Today she is doing well.  Avoiding foods that cause gas  Rare cramping  Some urgency to pass gas - occasional  Has only needed to take levsin  Once in the past year  2BM daily  Taking Nexium with relief of GERD    Has been treated with Xifaxan in the past which improved bloatin and gas    Bentyl caused dry mouth, so restarted Levsin prn  metamucil - made her body ache  Saw no difference on a lactose-free diet x 2 weeks  Imodium has caused constipation in the past  Diarrhea is worsensed by her medication Remodulin  Denies use of artificial sweeteners or diet drinks     ROS:  Constitutional: No fevers, chills, No weight loss  ENT: No allergies  CV: No chest pain  Pulm: No cough, No shortness of breath  Ophtho: No vision changes  GI: see HPI  Derm: No rash  Heme: No lymphadenopathy, No bruising  MSK: No arthritis  : No dysuria, No hematuria  Endo: No hot or cold intolerance  Neuro: No syncope, No seizure  Psych: No anxiety, No depression    Medical History:  has a past medical history of AR (allergic rhinitis), Cholelithiasis, common bile duct, Chronic low back pain, Eye pressure (2017), GERD (gastroesophageal reflux disease), History of migraine headaches, Hypothyroidism, Lumbar disc disease, Menorrhagia, Mild asthma, Obesity, Plantar fasciitis of left foot, Primary pulmonary hypertension, Seizure disorder, Seizures, Sleep apnea, TMJ (dislocation of temporomandibular joint), and Tricuspid regurgitation.    Surgical History:  has a past surgical  history that includes Portacath placement; Cardiac catheterization; Upper gastrointestinal endoscopy; Central venous catheter insertion; and right heart catheterization (Right, 8/20/2018).    Family History: family history includes Breast cancer in her cousin and maternal aunt; Cancer in her maternal aunt and maternal grandmother; Diabetes in her maternal grandfather, mother, and paternal grandfather; Glaucoma in her father; Heart attack in her maternal grandfather and paternal grandfather; Heart disease in her father and paternal grandfather; Hypertension in her unknown relative; Leukemia in her brother; No Known Problems in her maternal uncle, paternal aunt, paternal grandmother, paternal uncle, and sister..     Social History:  reports that  has never smoked. she has never used smokeless tobacco. She reports that she does not drink alcohol or use drugs.    Review of patient's allergies indicates:   Allergen Reactions    Adhesive Hives     Silk tape    Amoxicillin Rash    Chlorhexidine Other (See Comments)       Current Outpatient Medications on File Prior to Visit   Medication Sig Dispense Refill    acetaminophen (TYLENOL EXTRA STRENGTH) 500 MG tablet Take 1,000 mg by mouth every 6 (six) hours as needed for Pain.      ADVAIR DISKUS 100-50 mcg/dose diskus inhaler INHALE 1 PUFF TWICE DAILY 1 each 11    albuterol 90 mcg/actuation inhaler Inhale 2 puffs into the lungs every 4 (four) hours as needed. 2 Aerosol Inhalation Every 4-6 hours 1 Inhaler 3    ambrisentan (LETAIRIS) 10 MG Tab Take 1 tablet (10 mg total) by mouth once daily. 30 tablet 11    azelastine (ASTELIN) 137 mcg (0.1 %) nasal spray 2 sprays by Nasal route 2 (two) times daily.       citalopram (CELEXA) 10 MG tablet Take 10 mg by mouth once daily.      clindamycin (CLEOCIN T) 1 % lotion daily as needed.       cyanocobalamin, vitamin B-12, 2,500 mcg Subl Place 2,500 mcg under the tongue once daily.       desonide (DESOWEN) 0.05 % ointment 0.05 %  daily as needed.  Ointment Topical As directed.  Apply to affected area twice a daily over face      diphenhydrAMINE (BENADRYL) 25 mg capsule Take 25 mg by mouth every 6 (six) hours as needed for Itching.      ergocalciferol (VITAMIN D2) 50,000 unit Cap TAKE ONE CAPSULE BY MOUTH EVERY 14 DAYS 6 capsule 3    esomeprazole (NEXIUM) 40 MG capsule Take 1 capsule (40 mg total) by mouth once daily. 90 capsule 3    ferrous sulfate 325 (65 FE) MG EC tablet Take 325 mg by mouth daily as needed.       fluticasone (FLONASE) 50 mcg/actuation nasal spray INHALE 2 SPRAYS IN EACH NOSTRIL DAILY 16 g 3    folic acid (FOLVITE) 1 MG tablet Take 1 tablet (1 mg total) by mouth once daily.  Tablet Oral Every day 90 tablet 3    levothyroxine (SYNTHROID) 137 MCG Tab tablet TAKE ONE TABLET BY MOUTH once DAILY 90 tablet 1    loratadine (CLARITIN) 10 mg tablet Take 10 mg by mouth once daily.      MICROGESTIN FE 1/20, 28, 1 mg-20 mcg (21)/75 mg (7) per tablet TAKE ONE TABLET BY MOUTH once DAILY 28 tablet 0    ondansetron (ZOFRAN) 4 MG tablet Take 4 mg by mouth every 6 (six) hours as needed. 1 Tablet Oral Every 6 hours      PRAMOSONE 2.5-1 % Lotn lotion daily as needed.       promethazine (PHENERGAN) 25 MG tablet Take 25 mg by mouth every 4 to 6 hours as needed.       sumatriptan (IMITREX) 100 MG tablet Take 1 tablet (100 mg total) by mouth as needed for Migraine. Take at the onset of headache, may take 1 more in 1 hour if needed. 12 tablet 5    tadalafil, PULMONARY HYPERTENSION, (ADCIRCA) 20 mg Tab Take 40 mg by mouth once daily.      topiramate (TOPAMAX) 100 MG tablet Take 1 tablet (100 mg total) by mouth 2 (two) times daily. 60 tablet 11    treprostinil (REMODULIN) 2.5 mg/mL Soln Mix cassette as directed and infuse continuously per physician titration orders on dosing sheet. Current dose 80ng/kg/min 60 mL 11     No current facility-administered medications on file prior to visit.          Objective Findings:    Vital  "Signs:  /74   Pulse 75   Ht 4' 11" (1.499 m)   Wt 63 kg (138 lb 14.4 oz)   BMI 28.05 kg/m²   Body mass index is 28.05 kg/m².    Physical Exam:  General Appearance: Well appearing in no acute distress  Head:   Normocephalic, without obvious abnormality  Eyes:    No scleral icterus  ENT: Neck supple, Lips, mucosa, and tongue normal  Lungs: CTA bilaterally in anterior and posterior fields, no wheezes, no crackles.  Heart:  Regular rate and rhythm, S1, S2 normal, + murmurs heard  Abdomen: Soft, non tender, non distended with positive bowel sounds in all four quadrants.   Extremities:no edema  Skin: No rash  Neurologic: AAO x 3      Labs:  Lab Results   Component Value Date    WBC 4.46 11/20/2018    HGB 13.6 11/20/2018    HCT 41.5 11/20/2018     11/20/2018    CHOL 128 01/16/2017    TRIG 101 01/16/2017    HDL 31 (L) 01/16/2017    ALT 12 11/20/2018    AST 13 11/20/2018     11/20/2018    K 4.3 11/20/2018     (H) 11/20/2018    CREATININE 0.8 11/20/2018    CREATININE 0.8 11/20/2018    BUN 10 11/20/2018    CO2 20 (L) 11/20/2018    TSH 1.043 11/20/2018    INR 2.1 (H) 11/20/2018    HGBA1C 5.2 01/16/2017       Imaging:    Endoscopy:    EGD 3/2012 - reactive gastritis, H. Pylori neg    Assessment:  1. Gastroesophageal reflux disease without esophagitis    2. Encounter for monitoring long-term proton pump inhibitor therapy      48yo F with IBS present for follow. Sx currently stable    GERD contolled    Recommendations:  1. Will try weening off Nexium and onto zantac 150 BID  2.Will order labs to rule out vitamin deficiencies due to long term use of PPI.  3. Bone density scan      Follow-up if symptoms worsen or fail to improve.      Order summary:  Orders Placed This Encounter    DXA Bone Density Spine And Hip    Vitamin D    Vitamin B12    ranitidine (ZANTAC) 150 MG tablet         Thank you so much for allowing me to participate in the care of Yuni Anguiano, " TIFFANY

## 2019-01-15 NOTE — LETTER
January 16, 2019      Lulu Sandhu MD  [address]  [place] [address]           [facility]  [address]  [place] [address]  Phone: [phone]  Fax: [phone]          Patient: Yuni Perez   MR Number: [id number]   YOB: 1970   Date of Visit: 1/15/2019       Dear Dr. Lulu Sandhu:    Thank you for referring Yuni Perez to me for evaluation. Attached you will find relevant portions of my assessment and plan of care.    If you have questions, please do not hesitate to call me. I look forward to following Yuni Perez along with you.    Sincerely,    TIFFANY Stark  CC:  No Recipients    If you would like to receive this communication electronically, please contact [email or web address] or [phone] to request more information on [employer] access.    For providers and/or their staff who would like to refer a patient to [facility], please contact us through our one-stop-shop provider referral line, [facility], at [phone].    If you feel you have received this communication in error or would no longer like to receive these types of communications, please e-mail [email or web address]

## 2019-01-15 NOTE — PATIENT INSTRUCTIONS
Ok to try to ween off the nexium. Start taking every other day for 2 weeks, then every 3 days for 2 weeks, then stop. Days that you are not taking the nexium, take zantac 150mg twice daily.

## 2019-01-19 ENCOUNTER — TELEPHONE (OUTPATIENT)
Dept: GASTROENTEROLOGY | Facility: CLINIC | Age: 49
End: 2019-01-19

## 2019-01-19 DIAGNOSIS — E55.9 VITAMIN D INSUFFICIENCY: Primary | ICD-10-CM

## 2019-01-22 RX ORDER — ERGOCALCIFEROL 1.25 MG/1
50000 CAPSULE ORAL
Qty: 6 CAPSULE | Refills: 0 | Status: SHIPPED | OUTPATIENT
Start: 2019-01-22 | End: 2020-07-31

## 2019-01-29 DIAGNOSIS — I27.9 CHRONIC PULMONARY HEART DISEASE: Primary | ICD-10-CM

## 2019-02-07 ENCOUNTER — OFFICE VISIT (OUTPATIENT)
Dept: NEUROLOGY | Facility: CLINIC | Age: 49
End: 2019-02-07
Payer: COMMERCIAL

## 2019-02-07 VITALS
HEIGHT: 59 IN | SYSTOLIC BLOOD PRESSURE: 110 MMHG | BODY MASS INDEX: 27.78 KG/M2 | DIASTOLIC BLOOD PRESSURE: 62 MMHG | WEIGHT: 137.81 LBS

## 2019-02-07 DIAGNOSIS — G43.009 MIGRAINE WITHOUT AURA AND WITHOUT STATUS MIGRAINOSUS, NOT INTRACTABLE: Primary | ICD-10-CM

## 2019-02-07 DIAGNOSIS — G44.229 CHRONIC TENSION-TYPE HEADACHE, NOT INTRACTABLE: ICD-10-CM

## 2019-02-07 PROCEDURE — 3008F PR BODY MASS INDEX (BMI) DOCUMENTED: ICD-10-PCS | Mod: CPTII,NTX,S$GLB, | Performed by: NEUROLOGICAL SURGERY

## 2019-02-07 PROCEDURE — 99214 OFFICE O/P EST MOD 30 MIN: CPT | Mod: NTX,S$GLB,, | Performed by: NEUROLOGICAL SURGERY

## 2019-02-07 PROCEDURE — 99214 PR OFFICE/OUTPT VISIT, EST, LEVL IV, 30-39 MIN: ICD-10-PCS | Mod: NTX,S$GLB,, | Performed by: NEUROLOGICAL SURGERY

## 2019-02-07 PROCEDURE — 99999 PR PBB SHADOW E&M-EST. PATIENT-LVL IV: CPT | Mod: PBBFAC,TXP,, | Performed by: NEUROLOGICAL SURGERY

## 2019-02-07 PROCEDURE — 3008F BODY MASS INDEX DOCD: CPT | Mod: CPTII,NTX,S$GLB, | Performed by: NEUROLOGICAL SURGERY

## 2019-02-07 PROCEDURE — 99999 PR PBB SHADOW E&M-EST. PATIENT-LVL IV: ICD-10-PCS | Mod: PBBFAC,TXP,, | Performed by: NEUROLOGICAL SURGERY

## 2019-02-07 NOTE — PROGRESS NOTES
"Chief Complaint   Patient presents with    Migraine        Yuni Perez is a 48 y.o. female with a history of multiple medical diagnoses as listed below that presents for evaluation of headaches. She is accompanied to this visit by her mother. She was diagnosed with pulmonary hypertension about 8 years ago and around the same time she was diagnosed with a seizure. She had workup at that time that included MRI that showed signs of "old injury" and she had EEG that showed "lots of spikes and waves" per her mother. She says that since that time she has been started on Topamax which has been titrated up. She has not been having any seizures but she has bene having some occasional word finding difficulty that she has attributed to the medication. She has tried tylenol because as she has been titrating her medication to treat pulmonary hypertension sh marie been having bilateral intense stabbing pains in her head. She does say that with time her headaches have been better but she has noticed many more headaches since she began the Remodulin. She has not taken any other headache medications in the past.    Interval History  11/17/2016  She has still been having headaches after taking her medications in particular her vasodilators. She has found that her PRN medications are somewhat helpful in minimizing the pain that she has from her headaches. She has not had any mew problems since she was last seen in clinic.    03/23/2017  Since last seen in clinic she has been approved to get on the lung transplant list and she will be making strides to get set up to to receive new lungs. She has been taking her topamax as directed and has felt that overall she has been having fewer headaches but she still has needed Tylenol and on rare occasions Fioricet to get rid of her headaches. She has been having some cognitive difficulty with her current medications but she feels that it is tolerable. She has not had any seizure " "like activity.    06/13/2017  Since last seen in clinic she says that she has been able to be placed on the donor list. She says that overall she feels that she has been stable with regards to her breathing. She continues to have chronic sinus issues that have been essentially refractory to any medications that she has tried including antihistamines and nasal rinses. She feels that she is able to clear her sinuses and have it come right back. She has headaches almost daily but despite the frequency of the headaches she feels that most are not migraines. She has not had any seizures since last seen. She is tolerating all of her medications well without any problems.    10/31/2017  She has continued to make preparations for lung transplant.  Said that she has been frustrated with the process and she does not have much guidance which is causing increased stress.  Headaches overall have been about the same, but are responsive to Fioricet.  She says the medication helps give her adequate relief of the pain.    03/22/2018  She feels that her headaches have been getting worse in the last week.  Overall prior to this week she feels that the headaches were about the same as when she was last seen in clinic and she was using Fioricet as needed which provided some relief from her headache pain.  In the last week however Fioricet is not provided much of any relief and headaches have been going on almost every day.  She says that she feels like she has "sinus headaches" which have lingered until she has started having pounding unilateral headaches that she feels her migraines.  These headaches seem to be much more frequent and much more intense that she is ever had in the past.    05/03/2018  headaches have been better as she has hardly had any since she was last seen in clinic. Lung transplant is till her recourse for treatm of pulmonary hypertension, but she has not been able to get placed on the transplant list just " yet.    11/06/2018  Headaches have still been frequent, but have been relieved effectively by using Fioricet. She has been taking her medications as directed without any complaints of side effects. She feels that the intensity has been about the same overall. Imitrex has been prescribed, but she has not used the medication yet to determine if it helps her headaches.    02/07/2019  She has been having headaches with about the same frequency as she had in the past, but they have continued to have some response to her current abortive regimen. She has not been comfortable with the CGRP receptor blockers as a treatment modality as she has been weary of using any injectable medication to treat her symptoms. She has been seeing her Pulmonary team as recommended.    PAST MEDICAL HISTORY:  Past Medical History:   Diagnosis Date    AR (allergic rhinitis)     Cholelithiasis, common bile duct     Chronic low back pain     Eye pressure 2017    GERD (gastroesophageal reflux disease)     History of migraine headaches     Hypothyroidism     Lumbar disc disease     Menorrhagia     Mild asthma     Obesity     Plantar fasciitis of left foot     Primary pulmonary hypertension     followed by heart transplant/pulmonary     Seizure disorder     x 1 in 2008    Seizures     Sleep apnea     TMJ (dislocation of temporomandibular joint)     Tricuspid regurgitation        PAST SURGICAL HISTORY:  Past Surgical History:   Procedure Laterality Date    CARDIAC CATHETERIZATION      CENTRAL VENOUS CATHETER INSERTION      HEART CATH-LEFT Left 1/30/2017    [facility]    HEART CATH-RIGHT Right 8/20/2018    [facility] CATH LAB    HEART CATH-RIGHT Right 1/30/2017    [facility] CATH LAB    HEART CATH-RIGHT Right 9/8/2014    [facility] CATH LAB    PORTACATH PLACEMENT      UPPER GASTROINTESTINAL  ENDOSCOPY         SOCIAL HISTORY:  Social History     Socioeconomic History    Marital status: Single     Spouse name: Not on file    Number of children: 0    Years of education: Not on file    Highest education level: Not on file   Social Needs    Financial resource strain: Not on file    Food insecurity - worry: Not on file    Food insecurity - inability: Not on file    Transportation needs - medical: Not on file    Transportation needs - non-medical: Not on file   Occupational History    Occupation: disabled     Employer: [employer]   Tobacco Use    Smoking status: Never Smoker    Smokeless tobacco: Never Used   Substance and Sexual Activity    Alcohol use: No     Alcohol/week: 0.5 oz     Types: 1 Standard drinks or equivalent per week     Comment: 1 per year    Drug use: No    Sexual activity: No     Birth control/protection: OCP, Pill     Comment: pt is a virgin   Other Topics Concern    Not on file   Social History Narrative    Not on file       FAMILY HISTORY:  Family History   Problem Relation Age of Onset    Heart disease Father     Glaucoma Father     Diabetes Mother     Cancer Maternal Grandmother         uterine    Cancer Maternal Aunt         breast    Breast cancer Maternal Aunt     Heart disease Paternal Grandfather     Heart attack Paternal Grandfather     Diabetes Paternal Grandfather     Leukemia Brother     Hypertension Unknown     Diabetes Maternal Grandfather     Heart attack Maternal Grandfather     Breast cancer Cousin     No Known Problems Sister     No Known Problems Maternal Uncle     No Known Problems Paternal Aunt     No Known Problems Paternal Uncle     No Known Problems Paternal Grandmother     Colon cancer Neg Hx     Ovarian cancer Neg Hx     Amblyopia Neg Hx     Blindness Neg Hx     Cataracts Neg Hx     Macular degeneration Neg Hx     Retinal detachment Neg Hx     Strabismus Neg Hx     Stroke Neg Hx     Thyroid disease Neg Hx      Esophageal cancer Neg Hx        ALLERGIES AND MEDICATIONS: updated and reviewed.  Review of patient's allergies indicates:   Allergen Reactions    Adhesive Hives     Silk tape    Amoxicillin Rash    Chlorhexidine Other (See Comments)     Current Outpatient Medications   Medication Sig Dispense Refill    acetaminophen (TYLENOL EXTRA STRENGTH) 500 MG tablet Take 1,000 mg by mouth every 6 (six) hours as needed for Pain.      ADVAIR DISKUS 100-50 mcg/dose diskus inhaler INHALE 1 PUFF TWICE DAILY 1 each 11    albuterol 90 mcg/actuation inhaler Inhale 2 puffs into the lungs every 4 (four) hours as needed. 2 Aerosol Inhalation Every 4-6 hours 1 Inhaler 3    ambrisentan (LETAIRIS) 10 MG Tab Take 1 tablet (10 mg total) by mouth once daily. 30 tablet 11    azelastine (ASTELIN) 137 mcg (0.1 %) nasal spray 2 sprays by Nasal route 2 (two) times daily.       citalopram (CELEXA) 10 MG tablet Take 10 mg by mouth once daily.      clindamycin (CLEOCIN T) 1 % lotion daily as needed.       cyanocobalamin, vitamin B-12, 2,500 mcg Subl Place 2,500 mcg under the tongue once daily.       desonide (DESOWEN) 0.05 % ointment 0.05 % daily as needed.  Ointment Topical As directed.  Apply to affected area twice a daily over face      diphenhydrAMINE (BENADRYL) 25 mg capsule Take 25 mg by mouth every 6 (six) hours as needed for Itching.      ergocalciferol (VITAMIN D2) 50,000 unit Cap Take 1 capsule (50,000 Units total) by mouth every 7 days. 6 capsule 0    esomeprazole (NEXIUM) 40 MG capsule Take 1 capsule (40 mg total) by mouth once daily. 90 capsule 3    ferrous sulfate 325 (65 FE) MG EC tablet Take 325 mg by mouth daily as needed.       fluticasone (FLONASE) 50 mcg/actuation nasal spray INHALE 2 SPRAYS IN EACH NOSTRIL DAILY 16 g 3    folic acid (FOLVITE) 1 MG tablet Take 1 tablet (1 mg total) by mouth once daily.  Tablet Oral Every day 90 tablet 3    levothyroxine (SYNTHROID) 137 MCG Tab tablet TAKE ONE TABLET BY MOUTH  once DAILY 90 tablet 1    loratadine (CLARITIN) 10 mg tablet Take 10 mg by mouth once daily.      MICROGESTIN FE 1/20, 28, 1 mg-20 mcg (21)/75 mg (7) per tablet TAKE ONE TABLET BY MOUTH once DAILY 28 tablet 0    ondansetron (ZOFRAN) 4 MG tablet Take 4 mg by mouth every 6 (six) hours as needed. 1 Tablet Oral Every 6 hours      PRAMOSONE 2.5-1 % Lotn lotion daily as needed.       promethazine (PHENERGAN) 25 MG tablet Take 25 mg by mouth every 4 to 6 hours as needed.       ranitidine (ZANTAC) 150 MG tablet Take 1 tablet (150 mg total) by mouth 2 (two) times daily. 60 tablet 11    sumatriptan (IMITREX) 100 MG tablet Take 1 tablet (100 mg total) by mouth as needed for Migraine. Take at the onset of headache, may take 1 more in 1 hour if needed. 12 tablet 5    tadalafil, PULMONARY HYPERTENSION, (ADCIRCA) 20 mg Tab Take 40 mg by mouth once daily.      topiramate (TOPAMAX) 100 MG tablet Take 1 tablet (100 mg total) by mouth 2 (two) times daily. 60 tablet 11    treprostinil (REMODULIN) 2.5 mg/mL Soln Mix cassette as directed and infuse continuously per physician titration orders on dosing sheet. Current dose 80ng/kg/min 60 mL 11     No current facility-administered medications for this visit.        Review of Systems   Constitutional: Negative for activity change, appetite change, fever and unexpected weight change.   HENT: Negative for trouble swallowing and voice change.    Eyes: Negative for photophobia and visual disturbance.   Respiratory: Negative for apnea and shortness of breath.    Cardiovascular: Negative for chest pain and leg swelling.   Gastrointestinal: Negative for constipation and nausea.   Genitourinary: Negative for difficulty urinating.   Musculoskeletal: Negative for back pain, gait problem and neck pain.   Skin: Negative for color change and pallor.   Neurological: Positive for dizziness and headaches. Negative for seizures, syncope, weakness and numbness.   Hematological: Negative for  adenopathy.   Psychiatric/Behavioral: Negative for agitation, confusion and decreased concentration.       Neurologic Exam     Mental Status   Oriented to person, place, and time.   Registration: recalls 3 of 3 objects.   Attention: normal. Concentration: normal.   Speech: speech is normal   Level of consciousness: alert  Knowledge: good.     Cranial Nerves     CN II   Visual fields full to confrontation.   Right visual field deficit: none  Left visual field deficit: none     CN III, IV, VI   Pupils are equal, round, and reactive to light.  Extraocular motions are normal.   Right pupil: Size: 3 mm. Shape: regular. Accommodation: intact.   Left pupil: Size: 3 mm. Shape: regular. Accommodation: intact.   CN III: no CN III palsy  CN VI: no CN VI palsy  Nystagmus: none   Diplopia: none  Ophthalmoparesis: none  Upgaze: normal  Downgaze: normal  Conjugate gaze: present    CN V   Facial sensation intact.   Right facial sensation deficit: none  Left facial sensation deficit: none    CN VII   Facial expression full, symmetric.   Right facial weakness: none  Left facial weakness: none    CN VIII   CN VIII normal.     CN IX, X   CN IX normal.   CN X normal.   Palate: symmetric    CN XI   CN XI normal.   Right sternocleidomastoid strength: normal  Left sternocleidomastoid strength: normal  Right trapezius strength: normal  Left trapezius strength: normal    CN XII   CN XII normal.   Tongue deviation: none    Motor Exam   Muscle bulk: normal  Overall muscle tone: normal  Right arm tone: normal  Left arm tone: normal  Right leg tone: normal  Left leg tone: normal    Strength   Strength 5/5 throughout.     Sensory Exam   Right arm light touch: normal  Left arm light touch: normal  Right leg light touch: normal  Left leg light touch: normal  Right arm vibration: normal  Left arm vibration: normal  Right leg vibration: normal  Left leg vibration: normal  Right arm proprioception: normal  Left arm proprioception: normal  Right leg  proprioception: normal  Left leg proprioception: normal  Right arm pinprick: normal  Left arm pinprick: normal  Right leg pinprick: normal  Left leg pinprick: normal    Gait, Coordination, and Reflexes     Gait  Gait: normal    Coordination   Romberg: negative  Finger to nose coordination: normal  Heel to shin coordination: normal  Tandem walking coordination: normal    Tremor   Resting tremor: absent    Reflexes   Right brachioradialis: 2+  Left brachioradialis: 2+  Right biceps: 2+  Left biceps: 2+  Right triceps: 2+  Left triceps: 2+  Right patellar: 2+  Left patellar: 2+  Right achilles: 2+  Left achilles: 2+  Right plantar: normal  Left plantar: normal      Physical Exam   Constitutional: She is oriented to person, place, and time. She appears well-developed and well-nourished.   HENT:   Head: Normocephalic and atraumatic.   Eyes: EOM are normal. Pupils are equal, round, and reactive to light.   Neck: Normal range of motion.   Cardiovascular: Normal rate and intact distal pulses.   Pulmonary/Chest: Effort normal. No apnea. No respiratory distress.   Musculoskeletal: Normal range of motion.   Neurological: She is alert and oriented to person, place, and time. She has normal strength. She has a normal Finger-Nose-Finger Test, a normal Heel to Shin Test, a normal Romberg Test and a normal Tandem Gait Test. Gait normal.   Reflex Scores:       Tricep reflexes are 2+ on the right side and 2+ on the left side.       Bicep reflexes are 2+ on the right side and 2+ on the left side.       Brachioradialis reflexes are 2+ on the right side and 2+ on the left side.       Patellar reflexes are 2+ on the right side and 2+ on the left side.       Achilles reflexes are 2+ on the right side and 2+ on the left side.  Skin: Skin is warm and dry.   Psychiatric: She has a normal mood and affect. Her speech is normal and behavior is normal. Thought content normal.   Vitals reviewed.      Vitals:    02/07/19 1128   BP: 110/62   BP  "Location: Left arm   Patient Position: Sitting   BP Method: Medium (Manual)   Weight: 62.5 kg (137 lb 12.6 oz)   Height: 4' 11" (1.499 m)       Assessment & Plan:  Problem List Items Addressed This Visit     Migraine without aura and without status migrainosus, not intractable - Primary    Overview     Despite frequency of headaches few have come in the form of migraine headaches.         Chronic nonintractable headache    Overview     Daily headaches likely rebound from her medications, tension type, and sinus headaches.             Discussed use of CRGP directed therapies in order to treat her headaches, but she has been reluctant to accept any therapies that involve the use of needles.    Follow-up: Follow-up in about 3 months (around 5/7/2019).   More than 50% of this 25 minute encounter was spent in counseling and coordinating care of headaches.          "

## 2019-02-15 DIAGNOSIS — N92.1 MENORRHAGIA WITH IRREGULAR CYCLE: ICD-10-CM

## 2019-02-15 RX ORDER — NORETHINDRONE ACETATE AND ETHINYL ESTRADIOL AND FERROUS FUMARATE 1MG-20(21)
KIT ORAL
Qty: 28 TABLET | Refills: 0 | Status: CANCELLED | OUTPATIENT
Start: 2019-02-15

## 2019-02-19 ENCOUNTER — TELEPHONE (OUTPATIENT)
Dept: OBSTETRICS AND GYNECOLOGY | Facility: CLINIC | Age: 49
End: 2019-02-19

## 2019-02-19 DIAGNOSIS — N92.1 MENORRHAGIA WITH IRREGULAR CYCLE: ICD-10-CM

## 2019-02-19 RX ORDER — NORETHINDRONE ACETATE AND ETHINYL ESTRADIOL 1MG-20(21)
1 KIT ORAL DAILY
Qty: 28 TABLET | Refills: 1 | Status: SHIPPED | OUTPATIENT
Start: 2019-02-19 | End: 2019-04-15 | Stop reason: SDUPTHER

## 2019-02-19 NOTE — TELEPHONE ENCOUNTER
Patient requesting a refill of her birth control. The patient's last annual was on 11/12/2018. Can you refill for the patient?

## 2019-02-19 NOTE — TELEPHONE ENCOUNTER
----- Message from Jojo Fletcher sent at 2/19/2019  2:22 PM CST -----  Contact: KELSY TURNER [[id number]]  Name of Who is Calling:KELSY TURNER [[id number]]        What is the request in detail: pt has additional questions regarding MICROGESTIN FE 1/20, 28, 1 mg-20 mcg (21)/75 mg (7) per tablet. Please advise at your earliest convenience.  Thanks-          Can the clinic reply by [facility]: N    What Number to Call Back [facility]: [phone]

## 2019-02-21 DIAGNOSIS — I27.0 PRIMARY PULMONARY HYPERTENSION: ICD-10-CM

## 2019-02-21 RX ORDER — FOLIC ACID 1 MG/1
TABLET ORAL
Qty: 90 TABLET | Refills: 0 | Status: SHIPPED | OUTPATIENT
Start: 2019-02-21 | End: 2019-08-21 | Stop reason: SDUPTHER

## 2019-02-22 ENCOUNTER — HOSPITAL ENCOUNTER (OUTPATIENT)
Dept: CARDIOLOGY | Facility: CLINIC | Age: 49
Discharge: HOME | End: 2019-02-22
Attending: INTERNAL MEDICINE
Payer: COMMERCIAL

## 2019-02-22 ENCOUNTER — HOSPITAL ENCOUNTER (OUTPATIENT)
Dept: RADIOLOGY | Facility: CLINIC | Age: 49
Discharge: HOME | End: 2019-02-22
Attending: PHYSICIAN ASSISTANT
Payer: COMMERCIAL

## 2019-02-22 VITALS
BODY MASS INDEX: 27.62 KG/M2 | HEIGHT: 59 IN | HEART RATE: 87 BPM | DIASTOLIC BLOOD PRESSURE: 72 MMHG | WEIGHT: 137 LBS | SYSTOLIC BLOOD PRESSURE: 136 MMHG

## 2019-02-22 DIAGNOSIS — J96.11 CHRONIC RESPIRATORY FAILURE WITH HYPOXIA: ICD-10-CM

## 2019-02-22 DIAGNOSIS — Z51.81 ENCOUNTER FOR MONITORING LONG-TERM PROTON PUMP INHIBITOR THERAPY: ICD-10-CM

## 2019-02-22 DIAGNOSIS — Z79.899 ENCOUNTER FOR MONITORING LONG-TERM PROTON PUMP INHIBITOR THERAPY: ICD-10-CM

## 2019-02-22 DIAGNOSIS — I27.0 PRIMARY PULMONARY HYPERTENSION: ICD-10-CM

## 2019-02-22 LAB
ASCENDING AORTA: 2.41 CM
AV INDEX (PROSTH): 1.01
AV MEAN GRADIENT: 5.34 MMHG
AV PEAK GRADIENT: 9.24 MMHG
AV VALVE AREA: 2.65 CM2
AV VELOCITY RATIO: 0.96
BSA FOR ECHO PROCEDURE: 1.61 M2
CV ECHO LV RWT: 0.28 CM
DOP CALC AO PEAK VEL: 1.52 M/S
DOP CALC AO VTI: 29.31 CM
DOP CALC LVOT AREA: 2.63 CM2
DOP CALC LVOT DIAMETER: 1.83 CM
DOP CALC LVOT PEAK VEL: 1.47 M/S
DOP CALC LVOT STROKE VOLUME: 77.82 CM3
DOP CALCLVOT PEAK VEL VTI: 29.6 CM
E WAVE DECELERATION TIME: 241.36 MSEC
E/A RATIO: 1.07
E/E' RATIO: 7.91
ECHO LV POSTERIOR WALL: 0.6 CM (ref 0.6–1.1)
FRACTIONAL SHORTENING: 50 % (ref 28–44)
INTERVENTRICULAR SEPTUM: 0.62 CM (ref 0.6–1.1)
LA MAJOR: 4.46 CM
LA MINOR: 4.08 CM
LA WIDTH: 3.25 CM
LEFT ATRIUM SIZE: 2.97 CM
LEFT ATRIUM VOLUME INDEX: 22.3 ML/M2
LEFT ATRIUM VOLUME: 34.96 CM3
LEFT INTERNAL DIMENSION IN SYSTOLE: 2.16 CM (ref 2.1–4)
LEFT VENTRICLE DIASTOLIC VOLUME INDEX: 52.77 ML/M2
LEFT VENTRICLE DIASTOLIC VOLUME: 82.85 ML
LEFT VENTRICLE MASS INDEX: 47.4 G/M2
LEFT VENTRICLE SYSTOLIC VOLUME INDEX: 9.9 ML/M2
LEFT VENTRICLE SYSTOLIC VOLUME: 15.56 ML
LEFT VENTRICULAR INTERNAL DIMENSION IN DIASTOLE: 4.3 CM (ref 3.5–6)
LEFT VENTRICULAR MASS: 74.39 G
LV LATERAL E/E' RATIO: 6.5
LV SEPTAL E/E' RATIO: 10.11
MV PEAK A VEL: 0.85 M/S
MV PEAK E VEL: 0.91 M/S
PISA TR MAX VEL: 3.57 M/S
PULM VEIN S/D RATIO: 0.8
PV PEAK D VEL: 0.5 M/S
PV PEAK S VEL: 0.4 M/S
RA MAJOR: 4.14 CM
RA PRESSURE: 3 MMHG
RA WIDTH: 3.32 CM
RIGHT VENTRICULAR END-DIASTOLIC DIMENSION: 3.98 CM
RV TISSUE DOPPLER FREE WALL SYSTOLIC VELOCITY 1 (APICAL 4 CHAMBER VIEW): 10.6 M/S
SINUS: 2.61 CM
STJ: 2.23 CM
TDI LATERAL: 0.14
TDI SEPTAL: 0.09
TDI: 0.12
TR MAX PG: 50.98 MMHG
TRICUSPID ANNULAR PLANE SYSTOLIC EXCURSION: 1.93 CM
TV REST PULMONARY ARTERY PRESSURE: 54 MMHG

## 2019-02-22 PROCEDURE — 77080 DEXA BONE DENSITY SPINE HIP: ICD-10-PCS | Mod: 26,NTX,, | Performed by: INTERNAL MEDICINE

## 2019-02-22 PROCEDURE — 93306 TRANSTHORACIC ECHO (TTE) COMPLETE (CUPID ONLY): ICD-10-PCS | Mod: NTX,S$GLB,, | Performed by: INTERNAL MEDICINE

## 2019-02-22 PROCEDURE — 77080 DXA BONE DENSITY AXIAL: CPT | Mod: TC,TXP

## 2019-02-22 PROCEDURE — 77080 DXA BONE DENSITY AXIAL: CPT | Mod: 26,NTX,, | Performed by: INTERNAL MEDICINE

## 2019-02-22 PROCEDURE — 93306 TTE W/DOPPLER COMPLETE: CPT | Mod: NTX,S$GLB,, | Performed by: INTERNAL MEDICINE

## 2019-02-25 ENCOUNTER — OFFICE VISIT (OUTPATIENT)
Dept: TRANSPLANT | Facility: CLINIC | Age: 49
End: 2019-02-25
Payer: COMMERCIAL

## 2019-02-25 ENCOUNTER — HOSPITAL ENCOUNTER (OUTPATIENT)
Dept: PULMONOLOGY | Facility: CLINIC | Age: 49
Discharge: HOME | End: 2019-02-25
Payer: COMMERCIAL

## 2019-02-25 VITALS
HEIGHT: 59 IN | HEIGHT: 59 IN | HEART RATE: 91 BPM | OXYGEN SATURATION: 95 % | WEIGHT: 137 LBS | BODY MASS INDEX: 28 KG/M2 | WEIGHT: 138.88 LBS | DIASTOLIC BLOOD PRESSURE: 71 MMHG | BODY MASS INDEX: 27.62 KG/M2 | SYSTOLIC BLOOD PRESSURE: 123 MMHG

## 2019-02-25 DIAGNOSIS — I27.9 CHRONIC PULMONARY HEART DISEASE: ICD-10-CM

## 2019-02-25 DIAGNOSIS — I27.0 PRIMARY PULMONARY HYPERTENSION: Primary | ICD-10-CM

## 2019-02-25 DIAGNOSIS — G47.33 OSA (OBSTRUCTIVE SLEEP APNEA): ICD-10-CM

## 2019-02-25 DIAGNOSIS — J96.11 CHRONIC RESPIRATORY FAILURE WITH HYPOXIA: ICD-10-CM

## 2019-02-25 PROCEDURE — 99999 PR PBB SHADOW E&M-EST. PATIENT-LVL III: CPT | Mod: PBBFAC,TXP,, | Performed by: INTERNAL MEDICINE

## 2019-02-25 PROCEDURE — 99214 PR OFFICE/OUTPT VISIT, EST, LEVL IV, 30-39 MIN: ICD-10-PCS | Mod: NTX,S$GLB,, | Performed by: INTERNAL MEDICINE

## 2019-02-25 PROCEDURE — 94618 PULMONARY STRESS TESTING: CPT | Mod: NTX,S$GLB,, | Performed by: INTERNAL MEDICINE

## 2019-02-25 PROCEDURE — 99214 OFFICE O/P EST MOD 30 MIN: CPT | Mod: NTX,S$GLB,, | Performed by: INTERNAL MEDICINE

## 2019-02-25 PROCEDURE — 3008F PR BODY MASS INDEX (BMI) DOCUMENTED: ICD-10-PCS | Mod: CPTII,NTX,S$GLB, | Performed by: INTERNAL MEDICINE

## 2019-02-25 PROCEDURE — 94618 PULMONARY STRESS TESTING: ICD-10-PCS | Mod: NTX,S$GLB,, | Performed by: INTERNAL MEDICINE

## 2019-02-25 PROCEDURE — 99999 PR PBB SHADOW E&M-EST. PATIENT-LVL III: ICD-10-PCS | Mod: PBBFAC,TXP,, | Performed by: INTERNAL MEDICINE

## 2019-02-25 PROCEDURE — 3008F BODY MASS INDEX DOCD: CPT | Mod: CPTII,NTX,S$GLB, | Performed by: INTERNAL MEDICINE

## 2019-02-25 NOTE — PATIENT INSTRUCTIONS
PH on Facebook: A couple of our patients created a group on Facebook for people living in [place] with PH, it's called [place] [facility].    Check it out!      PH Mendoza March is March 31, 9am, at [facility], consider walking!!!        Talk to the Dr Whyte about your inhalers when you see him and see if you would benefit from seeing a general pulmonologist as well as the two of us.

## 2019-02-25 NOTE — PROCEDURES
Yuni Perez is a 48 y.o.  female patient, who presents for a 6 minute walk test ordered by MD Cecelia.  The diagnosis is Pulmonary Hypertension, Qualify for Oxygen.  The patient's BMI is 27.7 kg/m2.  Predicted distance (lower limit of normal) is 431.14 meters.      Test Results:    The test was completed without stopping.   The total time walked was 360 seconds.  During walking, the patient reported:  Dyspnea, Leg pain, Lightheadedness. The patient used no assistive devices  during testing.     02/25/2019---------Distance: 487.68 meters (1600 feet)     O2 Sat % Supplemental Oxygen Heart Rate Blood Pressure Ab Scale   Pre-exercise  (Resting) 97 % Room Air 82 bpm 135/78 mmHg 2   During Exercise 96 % Room Air 141 bpm 151/87 mmHg 9   Post-exercise  (Recovery) 98 % Room Air  95 bpm   mmHg       Recovery Time: 210 seconds    Performing nurse/tech: Haim MANZANO      PREVIOUS STUDY:   The patient had a previous study.  11/20/2018---------Distance: 476.1 meters (1562 feet)       O2 Sat % Supplemental Oxygen Heart Rate Blood Pressure Ab Scale   Pre-exercise  (Resting) 97 % Room Air 76 bpm 129/71 mmHg 2   During Exercise 98 % Room Air 151 bpm 142/71 mmHg 9   Post-exercise  (Recovery) 99 % Room Air  93 bpm   mmHg          CLINICAL INTERPRETATION:  Six minute walk distance is 487.68 meters (1600 feet) with very, very heavy dyspnea.  During exercise, there was no significant desaturation while breathing room air.  Both blood pressure and heart rate increased significantly with walking.  This may represent a hypertensive and a tachycardic response to exercise.  The patient reported non-pulmonary symptoms during exercise.  Since the previous study in November 2018, exercise capacity is unchanged.  Based upon age and body mass index, exercise capacity is normal.

## 2019-02-25 NOTE — PROGRESS NOTES
I have personally taken the history and examined this patient and agree with Dr Ruiz's note as stated above.    Echo appears stable today as is 6mw.  pulm VD therapy maxed out due to SE.   No change today. Will cont to f/u with LUT and  With us in 3 mo.

## 2019-02-25 NOTE — PROGRESS NOTES
Subjective:   Ms. Perez is a 48 y.o. year old White female with PH who is been seen today for routine follow up.      HPI     48 y.o. White female who presents for PH follow-up. Patient was diagnosed with IPAH about 5 and a half years, ABHIJEET recently, currently deferred for LUT (was working on fund raising). Initial symptom- syncope.  Patient has been on triple therapy: Remodulin at  80 ng/kg/min infusion, Letairis 10mg qdaily, and Adcirca 40mg qdaily. Since last evaluation of the patient on 11/2018 patient referred that has remained relatively stable with exertional symptoms falling on WHO FC II most of the time. Patient referred been compliant with medical therapy. Patient has denied dizziness, syncope or fever/chills. Patient has denied jaw pain. Patient only refer some episodes of diarrhea (not new) that has been managing with diet changes. TTE from 2/22/2019  Images were reviewed. Patient with preserved LV systolic function and borderline normal RV systolic function, mildly dilated RV cavity, unreliable TR jet for estimation of PAP but at least suggest to be elevated. Patient with stable renal and liver function test. Patient with stable cell counts and BNP. Patient is euvolemic. Patient with improvement in the 6MWT distance.    TTE (02/22/2019):    · Normal left ventricular systolic function. The estimated ejection fraction is 65%  · Normal LV diastolic function.  · Septal wall has abnormal motion.  · Mildly reduced right ventricular systolic function.  · Mild tricuspid regurgitation.  · The estimated PA systolic pressure is 54 mm Hg  · Pulmonary hypertension present.  · Normal central venous pressure (3 mm Hg).      RHC 8/20/18     PW: 8/6 (5)  PA: 101/35 (57)  RV: 92/0  RVEDP: 6   AO_SAT: 99  PA_SAT: 72  RA: 6/5 (4)  FICKCI: 2.5700  FICKCO: 4.0800  PVR: 1038.0000    TTE 4/18  Right Ventricle: The right ventricle is normal in size measuring 3.1 cm at the base in the apical right ventricle-focused view.  Global right ventricular systolic function appears mildly to moderately depressed. Tricuspid annular plane systolic excursion   (TAPSE) is 2.3 cm. The estimated PA systolic pressure is greater than 39 mmHg.       TTE 9/18/17  1 - Normal left ventricular systolic function (EF 60-65%).     2 - No wall motion abnormalities.     3 - Normal left ventricular diastolic function.     4 - Right atrial enlargement.     5 - Right ventricular enlargement with moderately depressed systolic function.     6 - The estimated PA systolic pressure is 36 mmHg.     7 - Mild tricuspid regurgitation.   Right Ventricle: The right ventricle is enlarged measuring 4.1 cm at the base in the apical right ventricle-focused view. Global right ventricular systolic function appears moderately depressed. There is abnormal septal motion consistent with RV   pressure/volume overload. Tricuspid annular plane systolic excursion (TAPSE) is 1.7 cm. Tissue Doppler-derived tricuspid annular peak systolic velocity (S prime) is 11.0 cm/s. The estimated PA systolic pressure is 36 mmHg.         Today:   6mw 488 m 463m (457  1/18, 469m in Nov, 457 in Nov, 488m unchanged last 3 visits with me( 450 11/15/16,  457m, 518.5m, 533.75, 549m April, 463.6m prev visit)                                              O2 sat  97-> 96 % RA                                                          HR 82->141                                                     BP  135/78-->151/87                                                      Ab 2->9    6mw 476m 463m (457  1/18, 469m in Nov, 457 in Nov, 488m unchanged last 3 visits with me( 450 11/15/16,  457m, 518.5m, 533.75, 549m April, 463.6m prev visit)                                              O2 sat  97-> 98 % RA                                                          HR 76->151                                                      BP  129/71-->142/71                                                       Ab 2->8    R/LHC  1/17  PW:  (30)  RV: 102  RVEDP: 21     PA: 102/58  RA:  (28)  PA_SAT: 65  AO: 93/56  LV: 93  LVEDP: 14   FICKCO: 4.08  Normal cors    RHC 4/16  AOPRES: 122/85 (97)  AOSAT: 98  FICKCI: 2.81  FICKCO: 4.38  PAPRES: 101/34 (59)  PASAT: 71  PVR: 10.73  PWPRES: 14/8 (10)  RAPRES: 9/5 (4)  RVPRES: 91/-4, 8    TTE last visit   1 - Normal left ventricular systolic function (EF 60-65%).     2 - Right ventricular enlargement with mildly to moderately depressed systolic function.     3 - Normal left ventricular diastolic function.     4 - Pulmonary hypertension. The estimated PA systolic pressure is 41 mmHg.       TTE 3/4/16  1 - Normal left ventricular systolic function (EF 60-65%).   2 - Normal left ventricular diastolic function.   3 - Right ventricle is upper limit of normal in size with low normal to mildly depressed systolic function. TAPSE 2.2 RV 3.7 cm   4 - Trivial to mild tricuspid regurgitation.   5 - Pulmonary hypertension. The estimated PA systolic pressure is 60 mmHg.     TTE Oct  1 - Normal left ventricular systolic function (EF 60-65%).   2 - Left ventricular diastolic dysfunction.   3 - Biatrial enlargement.   4 - Right ventricular enlargement with hypertrophy, with normal systolic function.   5 - Pulmonary hypertension.   6 - Trivial mitral regurgitation.   7 - Trivial tricuspid regurgitation.   PASp 46    CXR 2/3/16  Vascular catheter now identified, its tip in the superior vena cava just superior to its junction with the right atrium. Heart size is normal, as is the appearance of the pulmonary vascularity. Lung zones are clear, and free of significant airspace consolidation or volume loss. No pleural fluid. No hilar or mediastinal mass lesion. No pneumothorax.     VQ Scan 8/17/16: low probability      Review of Systems   Constitution: Negative for fever, weakness, malaise/fatigue, night sweats, weight gain and weight loss.   Cardiovascular: Positive for dyspnea on exertion (WHO FC II). Negative for chest  "pain, claudication, cyanosis, irregular heartbeat, leg swelling, near-syncope and orthopnea.   Respiratory: Negative for hemoptysis.    Musculoskeletal: Negative for joint pain.   Gastrointestinal: Negative for constipation, hematemesis, hematochezia, melena and nausea.       Objective:   Blood pressure 123/71, pulse 91, height 4' 11" (1.499 m), weight 63 kg (138 lb 14.2 oz), SpO2 95 %.body mass index is 28.05 kg/m².    Physical Exam   Constitutional: She is oriented to person, place, and time. She appears well-developed.   HENT:   Head: Normocephalic.   Eyes: Pupils are equal, round, and reactive to light.   Neck: Normal range of motion. No JVD present.   Cardiovascular: Normal rate.   Murmur heard.  Pulmonary/Chest: Effort normal and breath sounds normal. No respiratory distress. She has no wheezes. She has no rales.   Abdominal: Soft. Bowel sounds are normal. She exhibits no distension. There is no tenderness. There is no rebound and no guarding.   Musculoskeletal: Normal range of motion. She exhibits no edema.   Neurological: She is alert and oriented to person, place, and time. She has normal reflexes. No cranial nerve deficit. Coordination normal.   Skin: Skin is warm and dry.       Lab Results   Component Value Date    WBC 4.52 02/22/2019    HGB 14.3 02/22/2019    HCT 42.9 02/22/2019    MCV 87 02/22/2019     02/22/2019    CO2 18 (L) 02/22/2019    CREATININE 0.7 02/22/2019    CALCIUM 8.9 02/22/2019    ALBUMIN 3.5 02/22/2019    AST 13 02/22/2019    BNP 26 02/22/2019    ALT 13 02/22/2019       Lab Results   Component Value Date    INR 2.1 (H) 11/20/2018    INR 2.3 10/11/2018    INR 2.6 09/13/2018       BNP   Date Value Ref Range Status   02/22/2019 26 0 - 99 pg/mL Final     Comment:     Values of less than 100 pg/ml are consistent with non-CHF populations.   11/20/2018 50 0 - 99 pg/mL Final     Comment:     Values of less than 100 pg/ml are consistent with non-CHF populations.   04/25/2018 20 0 - 99 pg/mL " Final     Comment:     Values of less than 100 pg/ml are consistent with non-CHF populations.         Assessment:     1. Primary pulmonary hypertension    2. Chronic respiratory failure with hypoxia    3. ABHIJEET (obstructive sleep apnea)        Plan:   -Will continue with current medical therapy, no changes to be made today.  -Patient with WHO FC II and euvolemic.  -Keep salt intake to under 2000 mg sodium, fluids to under 2 L (64 oz)  -Check weights every morning after getting out of bed and urinating. If weight goes up 3# overnight or 5# in one week she should call us  -F/u 3 mo with labs.  -Case discussed with attending physician Dr. Rai.    Negro Hampton MD

## 2019-03-04 ENCOUNTER — TELEPHONE (OUTPATIENT)
Dept: GASTROENTEROLOGY | Facility: CLINIC | Age: 49
End: 2019-03-04

## 2019-03-04 NOTE — TELEPHONE ENCOUNTER
Patient would like to know if you want her to stay on the nexium or the zantac   She states when she was taking the zantac her stomach was hurting  Please advise

## 2019-03-04 NOTE — TELEPHONE ENCOUNTER
----- Message from Luann Sesay sent at 3/4/2019 11:49 AM CST -----  Contact: pt [phone]  Patient Returning Call from [facility]    Who Left Message for Patient:Skinny  Communication Preference:callback   Additional Information:

## 2019-03-04 NOTE — TELEPHONE ENCOUNTER
Ma called pt no answer message left on pt vm    Per Miley note   Bone density scan revealed osteopenia, which is pre-osteoporosis. Take Calcium 8268-8220 mg daily in addition to the vit D she is taking

## 2019-03-04 NOTE — TELEPHONE ENCOUNTER
----- Message from Miley Anguiano PA-C sent at 3/3/2019  8:59 AM CST -----  Bone density scan revealed osteopenia, which is pre-osteoporosis. Take Calcium 2231-3838 mg daily in addition to the vit D she is taking

## 2019-03-06 ENCOUNTER — TELEPHONE (OUTPATIENT)
Dept: GASTROENTEROLOGY | Facility: CLINIC | Age: 49
End: 2019-03-06

## 2019-03-06 RX ORDER — HYDROGEN PEROXIDE 3 %
20 SOLUTION, NON-ORAL MISCELLANEOUS
Qty: 90 CAPSULE | Refills: 3 | Status: SHIPPED | OUTPATIENT
Start: 2019-03-06 | End: 2019-05-23 | Stop reason: DRUGHIGH

## 2019-03-06 NOTE — TELEPHONE ENCOUNTER
Pt aware and understanding that Silvio want her to stop taking zantac since it hurts her stomach and to start taking Nexium 20mg (lower dosage).

## 2019-03-13 ENCOUNTER — OFFICE VISIT (OUTPATIENT)
Dept: OPTOMETRY | Facility: CLINIC | Age: 49
End: 2019-03-13
Payer: COMMERCIAL

## 2019-03-13 DIAGNOSIS — H35.411 LATTICE DEGENERATION OF RETINA, RIGHT: Primary | ICD-10-CM

## 2019-03-13 DIAGNOSIS — Z13.5 SCREENING FOR EYE CONDITION: ICD-10-CM

## 2019-03-13 DIAGNOSIS — H52.203 MYOPIA WITH ASTIGMATISM, BILATERAL: ICD-10-CM

## 2019-03-13 DIAGNOSIS — H52.13 MYOPIA WITH ASTIGMATISM, BILATERAL: ICD-10-CM

## 2019-03-13 DIAGNOSIS — H35.431 COBBLESTONE RETINAL DEGENERATION, RIGHT: ICD-10-CM

## 2019-03-13 DIAGNOSIS — H52.4 PRESBYOPIA: ICD-10-CM

## 2019-03-13 PROCEDURE — 92014 COMPRE OPH EXAM EST PT 1/>: CPT | Mod: S$GLB,,, | Performed by: OPTOMETRIST

## 2019-03-13 PROCEDURE — 92014 PR EYE EXAM, EST PATIENT,COMPREHESV: ICD-10-PCS | Mod: S$GLB,,, | Performed by: OPTOMETRIST

## 2019-03-13 PROCEDURE — 92015 PR REFRACTION: ICD-10-PCS | Mod: S$GLB,,, | Performed by: OPTOMETRIST

## 2019-03-13 PROCEDURE — 99999 PR PBB SHADOW E&M-EST. PATIENT-LVL III: ICD-10-PCS | Mod: PBBFAC,,, | Performed by: OPTOMETRIST

## 2019-03-13 PROCEDURE — 99999 PR PBB SHADOW E&M-EST. PATIENT-LVL III: CPT | Mod: PBBFAC,,, | Performed by: OPTOMETRIST

## 2019-03-13 PROCEDURE — 92015 DETERMINE REFRACTIVE STATE: CPT | Mod: S$GLB,,, | Performed by: OPTOMETRIST

## 2019-03-13 NOTE — PATIENT INSTRUCTIONS
Peripheral lattice retinal degeneration in each eye, without evidence of secondary retinal tear/hole.break.  Focal peripheral cobblestone retinal degeneration in the right eye.    Otherwise, ocular health appears good in each eye.  High myopia (nearsightedness) with astigmatism in each eye.   Satisfactory correctable VA in each eye.  Presbyopia consistent with age.  New spectacle lens Rx issued for full-time wear.  Recheck in 12 - 18 months, or prior if any problems or apparent changes in vision in either eye in the interim.

## 2019-03-13 NOTE — PROGRESS NOTES
HPI     Concerns About Ocular Health      Additional comments: pt co itchy watery OU  Near VA somewhat blurry at night.  Notes past diagnosis of ocular hypertension.  History of migraine - taking Topomax              Comments     Patient's age: 48 y.o. WF  Approximate date of last eye examination:  04/06/2017  Name of last eye doctor seen: Dr Casas  City/State: [facility]  Wears glasses? Yes     If yes, wears  Full-time or part-time?  Full-time  Present glasses are: Bifocal, SV Distance, SV Reading?  Progressive lenses  Approximate age of present glasses: 3+ years old  Got new glasses following last exam, or subsequently?:  No    Any problem with VA with glasses?  Pt co trouble seeing with glasses. Pt   was not able to get new Rx following last refraction   Wears CLs?:  No  Headaches?  Yes, pt feels headaches are caused by medication   Eye pain/discomfort?  No                                                                                     Flashes?  No  Floaters?  No   Diplopia/Double vision?  Questionable - more blurry than double, and   mostly while reading and only late at night  Patient's Ocular History:         Any eye surgeries? No         Any eye injury?  No         Any treatment for eye disease?  No  Family history of eye disease?    Father + Glaucoma + Retinal detachment   Significant patient medical history:         1. Diabetes?  No   2. HBP?  Yes, controlled by medication and diet              3. Other (describe):                                  Idiopathic Pulmonary Hypertension                                Seizures                                 Migraines    ! OTC eyedrops currently using:  Visine as needed for itching    ! Prescription eye meds currently using:  None   ! Any history of allergy/adverse reaction to any eye meds used   previously?  No    ! Any history of allergy/adverse reaction to eyedrops used during prior   eye exam(s)? No    ! Any history of allergy/adverse reaction to Novacaine  "or similar meds?   Yes   ! Any history of allergy/adverse reaction to Epinephrine or similar meds?   No    ! Patient okay with use of anesthetic eyedrops to check eye pressure?    Yes        ! Patient okay with use of eyedrops to dilate pupils today?  Yes   !  Allergies/Medications/Medical History/Family History reviewed today?    Yes      PD =   57/54  Desired reading distance =  14.25"                                                                            Last edited by Jude Casas, OD on 3/13/2019  1:47 PM. (History)            Assessment /Plan     For exam results, see Encounter Report.    1. Lattice degeneration of retina, right     2. Cobblestone retinal degeneration, right     3. Myopia with astigmatism, bilateral     4. Presbyopia     5. Screening for eye condition                Peripheral lattice retinal degeneration in each eye, without evidence of secondary retinal tear/hole.break.  Focal peripheral cobblestone retinal degeneration in the right eye.    Otherwise, ocular health appears good in each eye.  High myopia (nearsightedness) with astigmatism in each eye.   Satisfactory correctable VA in each eye.  Presbyopia consistent with age.  New spectacle lens Rx issued for full-time wear.  Recheck in 12 - 18 months, or prior if any problems or apparent changes in vision in either eye in the interim.                  "

## 2019-03-19 RX ORDER — ALBUTEROL SULFATE 90 UG/1
AEROSOL, METERED RESPIRATORY (INHALATION)
Qty: 18 G | Refills: 11 | Status: SHIPPED | OUTPATIENT
Start: 2019-03-19 | End: 2020-05-12

## 2019-04-03 ENCOUNTER — TELEPHONE (OUTPATIENT)
Dept: TRANSPLANT | Facility: CLINIC | Age: 49
End: 2019-04-03

## 2019-04-03 NOTE — TELEPHONE ENCOUNTER
Returned call to patient, who reports she needs letairis refill. Notified [facility] regarding same.     Pt also reports URI since approximately 3/18, and now she has a persistent dry cough, accompanied by periodic wheezing. Pt reports wheezing is not always relieved by albuterol inhaler, and sometimes she does feel increased SOB/tightness in chest. Message sent to Dr Rai regarding same. Will provide pt w/plan when available. In meantime, she understands to go to ER/urgent care if increasingly SOB.

## 2019-04-03 NOTE — TELEPHONE ENCOUNTER
----- Message from Ivonne Rai MD sent at 4/3/2019  3:32 PM CDT -----  pcp  ----- Message -----  From: SULY Razo, RN  Sent: 4/3/2019  11:40 AM  To: Ivonne Rai MD    She's had URI that started around 3/18. Now continues to have lingering cough. She also notices periodic wheeze that is not always alleviated w/her albuterol, and some SOB. Chest x-ray?  Sarah

## 2019-04-15 DIAGNOSIS — N92.1 MENORRHAGIA WITH IRREGULAR CYCLE: ICD-10-CM

## 2019-04-15 RX ORDER — NORETHINDRONE ACETATE AND ETHINYL ESTRADIOL AND FERROUS FUMARATE 1MG-20(21)
KIT ORAL
Qty: 28 TABLET | Refills: 1 | Status: SHIPPED | OUTPATIENT
Start: 2019-04-15 | End: 2019-04-15

## 2019-04-15 RX ORDER — NORETHINDRONE ACETATE AND ETHINYL ESTRADIOL 1MG-20(21)
1 KIT ORAL DAILY
Qty: 28 TABLET | Refills: 6 | Status: SHIPPED | OUTPATIENT
Start: 2019-04-15 | End: 2019-06-04 | Stop reason: SDUPTHER

## 2019-04-16 ENCOUNTER — TELEPHONE (OUTPATIENT)
Dept: TRANSPLANT | Facility: CLINIC | Age: 49
End: 2019-04-16

## 2019-04-16 NOTE — TELEPHONE ENCOUNTER
Pt reports N/V over weekend, but better now. No sick contacts of which she is aware. Did endorse eating chicken fried steak, and pt reports she has gallbladder issues. Pt calling because concerned her HR is fluctuating from . Discussed that this is probably a normal fluctuation, but that Dr Rai would also be notified of her concerns. Pt verbalized understanding.

## 2019-04-18 DIAGNOSIS — I27.0 PRIMARY PULMONARY HYPERTENSION: ICD-10-CM

## 2019-04-18 DIAGNOSIS — Z76.82 AWAITING ORGAN TRANSPLANT STATUS: Primary | ICD-10-CM

## 2019-04-29 ENCOUNTER — DOCUMENTATION ONLY (OUTPATIENT)
Dept: TRANSPLANT | Facility: CLINIC | Age: 49
End: 2019-04-29

## 2019-05-09 ENCOUNTER — OFFICE VISIT (OUTPATIENT)
Dept: NEUROLOGY | Facility: CLINIC | Age: 49
End: 2019-05-09
Payer: COMMERCIAL

## 2019-05-09 VITALS
HEIGHT: 59 IN | WEIGHT: 135.56 LBS | BODY MASS INDEX: 27.33 KG/M2 | SYSTOLIC BLOOD PRESSURE: 104 MMHG | DIASTOLIC BLOOD PRESSURE: 66 MMHG | HEART RATE: 68 BPM

## 2019-05-09 DIAGNOSIS — G43.009 MIGRAINE WITHOUT AURA AND WITHOUT STATUS MIGRAINOSUS, NOT INTRACTABLE: Primary | ICD-10-CM

## 2019-05-09 DIAGNOSIS — G44.229 CHRONIC TENSION-TYPE HEADACHE, NOT INTRACTABLE: ICD-10-CM

## 2019-05-09 PROCEDURE — 99214 OFFICE O/P EST MOD 30 MIN: CPT | Mod: NTX,S$GLB,, | Performed by: NEUROLOGICAL SURGERY

## 2019-05-09 PROCEDURE — 3008F PR BODY MASS INDEX (BMI) DOCUMENTED: ICD-10-PCS | Mod: CPTII,NTX,S$GLB, | Performed by: NEUROLOGICAL SURGERY

## 2019-05-09 PROCEDURE — 99999 PR PBB SHADOW E&M-EST. PATIENT-LVL IV: ICD-10-PCS | Mod: PBBFAC,TXP,, | Performed by: NEUROLOGICAL SURGERY

## 2019-05-09 PROCEDURE — 99999 PR PBB SHADOW E&M-EST. PATIENT-LVL IV: CPT | Mod: PBBFAC,TXP,, | Performed by: NEUROLOGICAL SURGERY

## 2019-05-09 PROCEDURE — 99214 PR OFFICE/OUTPT VISIT, EST, LEVL IV, 30-39 MIN: ICD-10-PCS | Mod: NTX,S$GLB,, | Performed by: NEUROLOGICAL SURGERY

## 2019-05-09 PROCEDURE — 3008F BODY MASS INDEX DOCD: CPT | Mod: CPTII,NTX,S$GLB, | Performed by: NEUROLOGICAL SURGERY

## 2019-05-23 ENCOUNTER — HOSPITAL ENCOUNTER (OUTPATIENT)
Dept: PULMONOLOGY | Facility: CLINIC | Age: 49
Discharge: HOME | End: 2019-05-23
Payer: COMMERCIAL

## 2019-05-23 ENCOUNTER — OFFICE VISIT (OUTPATIENT)
Dept: TRANSPLANT | Facility: CLINIC | Age: 49
End: 2019-05-23
Payer: COMMERCIAL

## 2019-05-23 VITALS — WEIGHT: 136.88 LBS | HEIGHT: 59 IN | BODY MASS INDEX: 27.6 KG/M2

## 2019-05-23 VITALS
HEIGHT: 59 IN | SYSTOLIC BLOOD PRESSURE: 90 MMHG | HEART RATE: 73 BPM | WEIGHT: 137 LBS | DIASTOLIC BLOOD PRESSURE: 63 MMHG | OXYGEN SATURATION: 97 % | BODY MASS INDEX: 27.62 KG/M2 | RESPIRATION RATE: 20 BRPM

## 2019-05-23 DIAGNOSIS — J45.909 ASTHMA, UNSPECIFIED ASTHMA SEVERITY, UNSPECIFIED WHETHER COMPLICATED, UNSPECIFIED WHETHER PERSISTENT: ICD-10-CM

## 2019-05-23 DIAGNOSIS — I27.0 PRIMARY PULMONARY HYPERTENSION: ICD-10-CM

## 2019-05-23 DIAGNOSIS — Z76.82 AWAITING ORGAN TRANSPLANT STATUS: ICD-10-CM

## 2019-05-23 DIAGNOSIS — Z76.82 LUNG TRANSPLANT CANDIDATE: ICD-10-CM

## 2019-05-23 DIAGNOSIS — I27.0 PRIMARY PULMONARY HYPERTENSION: Primary | ICD-10-CM

## 2019-05-23 LAB
PRE FEV1 FVC: 70
PRE FEV1: 1.4
PRE FVC: 1.99
PREDICTED FEV1 FVC: 85
PREDICTED FEV1: 2.27
PREDICTED FVC: 2.68

## 2019-05-23 PROCEDURE — 99999 PR PBB SHADOW E&M-EST. PATIENT-LVL V: ICD-10-PCS | Mod: PBBFAC,TXP,, | Performed by: INTERNAL MEDICINE

## 2019-05-23 PROCEDURE — 3008F PR BODY MASS INDEX (BMI) DOCUMENTED: ICD-10-PCS | Mod: CPTII,S$GLB,TXP, | Performed by: INTERNAL MEDICINE

## 2019-05-23 PROCEDURE — 94729 PR C02/MEMBANE DIFFUSE CAPACITY: ICD-10-PCS | Mod: S$GLB,TXP,, | Performed by: INTERNAL MEDICINE

## 2019-05-23 PROCEDURE — 3008F BODY MASS INDEX DOCD: CPT | Mod: CPTII,S$GLB,TXP, | Performed by: INTERNAL MEDICINE

## 2019-05-23 PROCEDURE — 94729 DIFFUSING CAPACITY: CPT | Mod: S$GLB,TXP,, | Performed by: INTERNAL MEDICINE

## 2019-05-23 PROCEDURE — 99999 PR PBB SHADOW E&M-EST. PATIENT-LVL V: CPT | Mod: PBBFAC,TXP,, | Performed by: INTERNAL MEDICINE

## 2019-05-23 PROCEDURE — 94010 BREATHING CAPACITY TEST: ICD-10-PCS | Mod: 59,S$GLB,TXP, | Performed by: INTERNAL MEDICINE

## 2019-05-23 PROCEDURE — 99213 OFFICE O/P EST LOW 20 MIN: CPT | Mod: 25,S$GLB,TXP, | Performed by: INTERNAL MEDICINE

## 2019-05-23 PROCEDURE — 94618 PULMONARY STRESS TESTING: CPT | Mod: S$GLB,TXP,, | Performed by: INTERNAL MEDICINE

## 2019-05-23 PROCEDURE — 94618 PULMONARY STRESS TESTING: ICD-10-PCS | Mod: S$GLB,TXP,, | Performed by: INTERNAL MEDICINE

## 2019-05-23 PROCEDURE — 94010 BREATHING CAPACITY TEST: CPT | Mod: 59,S$GLB,TXP, | Performed by: INTERNAL MEDICINE

## 2019-05-23 PROCEDURE — 99213 PR OFFICE/OUTPT VISIT, EST, LEVL III, 20-29 MIN: ICD-10-PCS | Mod: 25,S$GLB,TXP, | Performed by: INTERNAL MEDICINE

## 2019-05-23 RX ORDER — ESOMEPRAZOLE MAGNESIUM 40 MG/1
40 CAPSULE, DELAYED RELEASE ORAL DAILY
COMMUNITY
Start: 2019-05-18 | End: 2020-02-12

## 2019-05-23 NOTE — LETTER
May 23, 2019        Ivonne Rai  [address]  [facility] [address]  Phone: [phone]  Fax: [phone]             [facility]  [address]  [facility] [address]  Phone: [phone]   Patient: Yuni Perez   MR Number: [id number]   YOB: 1970   Date of Visit: 5/23/2019       Dear Dr. Ivonne Rai    Thank you for referring Yuni Perez to me for evaluation. Attached you will find relevant portions of my assessment and plan of care.    If you have questions, please do not hesitate to call me. I look forward to following Yuni Perez along with you.    Sincerely,    Ralph Whyte MD    Enclosure    If you would like to receive this communication electronically, please contact [email or web address] or [phone] to request [email or web address] Link access.    [id number] is a tool which provides read-only access to select patient information with whom you have a relationship. Its easy to use and provides real time access to review your patients record including encounter summaries, notes, results, and demographic information.    If you feel you have received this communication in error or would no longer like to receive these types of communications, please e-mail [email or web address]

## 2019-05-23 NOTE — PROCEDURES
Yuni Perez is a 49 y.o.  female patient, who presents for a 6 minute walk test ordered by Kali Whyte MD.  The diagnosis is Pre Lung Transplant Evaluation; Pulmonary Hypertension.  The patient's BMI is 27.7 kg/m2.  Predicted distance (lower limit of normal) is 425.31 meters.      Test Results:    The test was completed without stopping.  The total time walked was 360 seconds.  During walking, the patient reported:  Dyspnea, Leg pain, Lightheadedness.  The patient used no assistive devices during testing.     05/23/2019---------Distance: 441.96 meters (1450 feet)     O2 Sat % Supplemental Oxygen Heart Rate Blood Pressure Ab Scale   Pre-exercise  (Resting) 98 % Room Air 65 bpm 112/72 mmHg 3   During Exercise 100 % Room Air 142 bpm 150/76 mmHg 7-8   Post-exercise  (Recovery) 98 % Room Air  88 bpm 115/73 mmHg      Recovery Time:  200 seconds    Performing nurse/tech:  SUNNI Casas RRT      PREVIOUS STUDY:   02/25/2019---------Distance: 487.68 meters (1600 feet)       O2 Sat % Supplemental Oxygen Heart Rate Blood Pressure Ab Scale   Pre-exercise  (Resting) 97 % Room Air 82 bpm 135/78 mmHg 2   During Exercise 96 % Room Air 141 bpm 151/87 mmHg 9   Post-exercise  (Recovery) 98 % Room Air  95 bpm   mmHg        CLINICAL INTERPRETATION:  Six minute walk distance is 441.96 meters (1450 feet) with very heavy dyspnea.  During exercise, there was no desaturation while breathing room air.  Both blood pressure and heart rate increased significantly with walking.  This may represent a tachycardic response to exercise.  The patient reported non-pulmonary symptoms during exercise.  Since the previous study in February 2019, exercise capacity is unchanged.  Based upon age and body mass index, exercise capacity is normal.

## 2019-05-23 NOTE — PROGRESS NOTES
LUNG TRANSPLANT PRE FOLLOW-UP    Referring Physician: Ivonne Rai    Reason for Visit:  Pre-lung transplant follow-up.         Date of Initial Evaluation: 1/16/2017                                                                                             History of Present Illness: Yuni Perez is a 49 y.o. female who is on 0L of oxygen. She is on no assisted ventilation.  Her New York Heart Association Class is III and a Karnofsky score of 60% - Requires occasional assistance but is able to care for needs. She is not diabetic.  She has a hx of IPAH and asthma.  She takes tadalafil, ambrisentan, and remodulin infusion for her PAH.  She is no longer on any OACs.  She takes Advair, Albuterol for her asthma.  She is using her albuterol 4-6x/day with some relief.  Her asthma is currently flaring up with increased wheezing and cough off and on since January.  This is a yearly phenomenon for her.  She has significant nasal congestion for which she takes Zyrtec, Flonase, and Azelastin without meaningful relief.  Nasal congestion has been a constant schaefer since starting PAH specific therapy.  No dyspnea at rest has ZAVALA with fast walking or vacuuming but not with walking at a normal pace but a normal pace she can only walk ~ 1 block without stopping.  No episodes of syncope or pre-syncope.  She completed Pulmonary Rehab ~3x, most recently ~6 years ago.  No chest pain.  She has many environmental allergies (oak trees, ragweed) but no meaningful triggers that she can consistently avoid.  No hx of GERD but is on Nexium.      RHC in 8/2018 demonstrated:    PA: 101/35 (57)  RV: 92/0  RVEDP: 6     She cannot detect any meaningful clinical deterioration in the last year.    Review of Systems   Constitutional: Negative for chills and fever.   HENT: Positive for congestion and sinus pain. Negative for sore throat and tinnitus.    Eyes: Negative for blurred vision.   Respiratory: Positive for cough, shortness of  "breath and wheezing. Negative for hemoptysis and sputum production.    Cardiovascular: Negative for chest pain, orthopnea and leg swelling.   Gastrointestinal: Negative for diarrhea, heartburn and nausea.   Genitourinary: Negative for dysuria and frequency.   Musculoskeletal: Negative for myalgias and neck pain.   Skin: Negative for rash.   Neurological: Positive for dizziness and headaches. Negative for sensory change and focal weakness.   Endo/Heme/Allergies: Negative.    Psychiatric/Behavioral: Negative for depression and hallucinations. The patient is not nervous/anxious.      Objective:   BP 90/63 (BP Location: Right arm, Patient Position: Sitting, BP Method: Medium (Automatic))   Pulse 73   Resp 20   Ht 4' 11" (1.499 m)   Wt 62.1 kg (137 lb)   LMP 05/10/2019   SpO2 97% Comment: room air  BMI 27.67 kg/m²      Physical Exam   Constitutional: She is oriented to person, place, and time and well-developed, well-nourished, and in no distress. No distress.   HENT:   Head: Normocephalic and atraumatic.   Eyes: Pupils are equal, round, and reactive to light. Right eye exhibits no discharge.   Neck: Normal range of motion.   Cardiovascular: Normal rate and regular rhythm.   Pulmonary/Chest: Effort normal. No stridor. No respiratory distress. She has no wheezes.   Abdominal: Soft. She exhibits no distension. There is no tenderness. There is no rebound.   Musculoskeletal: She exhibits no edema.   Neurological: She is alert and oriented to person, place, and time. GCS score is 15.   Skin: Skin is warm. She is not diaphoretic. No erythema.   Psychiatric: Affect normal.     Labs:  No visits with results within 7 Day(s) from this visit.   Latest known visit with results is:   Hospital Outpatient Visit on 02/22/2019   Component Date Value    Ascending aorta 02/22/2019 2.41     STJ 02/22/2019 2.23     AV mean gradient 02/22/2019 5.34     Ao peak tracy 02/22/2019 1.52     Ao VTI 02/22/2019 29.31     IVS 02/22/2019 " 0.62     LA size 02/22/2019 2.97     Left Atrium Major Axis 02/22/2019 4.46     Left Atrium Minor Axis 02/22/2019 4.08     LVIDD 02/22/2019 4.30     LVIDS 02/22/2019 2.16     LVOT diameter 02/22/2019 1.83     LVOT peak VTI 02/22/2019 29.60     PW 02/22/2019 0.60     MV Peak A Gil 02/22/2019 0.85     E wave decelartion time 02/22/2019 241.36     MV Peak E Gil 02/22/2019 0.91     PV Peak D Gil 02/22/2019 0.50     PV Peak S Gil 02/22/2019 0.40     RA Major Axis 02/22/2019 4.14     RA Width 02/22/2019 3.32     RVDD 02/22/2019 3.98     Sinus 02/22/2019 2.61     TAPSE 02/22/2019 1.93     TR Max Gil 02/22/2019 3.57     TDI LATERAL 02/22/2019 0.14     TDI SEPTAL 02/22/2019 0.09     LA WIDTH 02/22/2019 3.25     LV Diastolic Volume 02/22/2019 82.85     LV Systolic Volume 02/22/2019 15.56     RV S' 02/22/2019 10.60     LVOT peak gil 02/22/2019 [id number]     LV LATERAL E/E' RATIO 02/22/2019 6.50     LV SEPTAL E/E' RATIO 02/22/2019 10.11     FS 02/22/2019 50     LA volume 02/22/2019 34.96     LV mass 02/22/2019 74.39     Left Ventricle Relative * 02/22/2019 0.28     AV valve area 02/22/2019 2.65     AV Velocity Ratio 02/22/2019 0.96     AV index (prosthetic) 02/22/2019 1.01     E/A ratio 02/22/2019 1.07     Mean e' 02/22/2019 0.12     Pulm vein S/D ratio 02/22/2019 0.80     LVOT area 02/22/2019 2.63     LVOT stroke volume 02/22/2019 77.82     AV peak gradient 02/22/2019 9.24     E/E' ratio 02/22/2019 7.91     Triscuspid Valve Regurgi* 02/22/2019 50.98     BSA 02/22/2019 1.61     LV Systolic Volume Index 02/22/2019 9.9     LV Diastolic Volume Index 02/22/2019 52.77     LA Volume Index 02/22/2019 22.3     LV Mass Index 02/22/2019 47.4     Right Atrial Pressure (f* 02/22/2019 3     TV rest pulmonary artery* 02/22/2019 54        Pulmonary Function Tests 5/23/2019 11/20/2018 5/10/2018 11/7/2017 7/12/2017 11/15/2016 5/17/2016   FVC 1.99 2.32 2.11 2.24 2.24 2.13 2.22   FEV1 1.4  1.62 1.48 1.56 1.54 1.43 1.65   TLC (liters) - - - - - - 3.74   DLCO (ml/mmHg sec) 13.8 - 14.3 - - - 17.6   FVC% 75 84 76 80 80 78 78   FEV1% 62 69 63 66 65 61 69   FEF 25-75 - 0.99 0.92 0.96 0.87 0.78 1.22   FEF 25-75% - 37 34 35 32 29 44   TLC% - - - - - - 89   RV - - - - - - 1.49   RV% - - - - - - 105   DLCO% 73 - 75 - - - 90     6MW 5/23/2019 2/25/2019 11/20/2018 4/25/2018 1/12/2018 11/7/2017 7/12/2017   6MWT Status completed without stopping completed without stopping completed without stopping - completed without stopping completed without stopping completed without stopping   Patient Reported Dyspnea;Leg pain;Lightheadedness Dyspnea;Leg pain;Lightheadedness Dyspnea Dyspnea;Leg pain Dyspnea Dyspnea;Leg pain Chest pain;Dyspnea;Leg pain   Was O2 used? No No No No No No No   6MW Distance walked (feet) 1450 1600 1562 1520 1500 1540 1550   Distance walked (meters) 441.96 487.68 476.1 463.3 457.2 469.39 472.44   Did patient stop? No No No No No No No   Oxygen Saturation 98 97 97 98 98 99 98   Supplemental Oxygen Room Air Room Air Room Air Room Air Room Air Room Air Room Air   Heart Rate 65 82 76 79 82 79 65   Blood Pressure 112/72 135/78 129/71 121/70 106/60 136/93 123/69   Ab Dyspnea Rating  moderate light light light light light light   Oxygen Saturation 100 96 98 98 99 97 99   Supplemental Oxygen Room Air Room Air Room Air Room Air Room Air Room Air Room Air   Heart Rate 142 141 151 139 124 113 120   Blood Pressure 150/76 151/87 142/71 133/75 125/59 146/73 133/85   Ab Dyspnea Rating  very heavy very,very heavy very,very heavy very heavy very,very heavy very heavy very heavy   Recovery Time (seconds) 200 210 180 47 80 68 120   Oxygen Saturation 98 98 99 99 99 98 99   Supplemental Oxygen Room Air Room Air Room Air Room Air Room Air Room Air Room Air   Heart Rate 88 95 93 117 106 91 80       Assessment:-  1. Primary pulmonary hypertension    2. Asthma, unspecified asthma severity, unspecified whether  complicated, unspecified whether persistent    3. Lung transplant candidate      Plan:     1. IPAH on Remodulin infusion, Tadalafil, Amrbisentan.  mPAP 57 in 8/2018. 6MWT of 1450 ft (1600 ft in November) but currently reports sx of mild asthma exacerbation.  PFTs with mild downtrend again in the context of a mild asthma exacerbation.  Currently does not need lung tx evaluation as she is stable on medical therapy.  Has significant sinus related side effects from PAH therapy.  Management per Cardiology.    2. Mild exacerbation likely largely tied to her seasonal allergies.  No wheeze.  No indication for steroids.  Will refer to Dr. Berrios on the [facility].  Continue LABA/ICS.    Discussed w/ Dr. Whyte.    Gibran Buckley MD  [facility] Pulmonary/Critical Care Fellow [facility]    Attending Note:    I have seen and evaluated the patient with the fellow. Their note reflects the content of our discussion and my plan of care.      Ralph Whyte MD  Pulmonary/Critical Care Medicine

## 2019-06-04 ENCOUNTER — TELEPHONE (OUTPATIENT)
Dept: TRANSPLANT | Facility: CLINIC | Age: 49
End: 2019-06-04

## 2019-06-04 ENCOUNTER — TELEPHONE (OUTPATIENT)
Dept: OBSTETRICS AND GYNECOLOGY | Facility: CLINIC | Age: 49
End: 2019-06-04

## 2019-06-04 DIAGNOSIS — N92.1 MENORRHAGIA WITH IRREGULAR CYCLE: ICD-10-CM

## 2019-06-04 DIAGNOSIS — Z12.31 SCREENING MAMMOGRAM, ENCOUNTER FOR: Primary | ICD-10-CM

## 2019-06-04 RX ORDER — NORETHINDRONE ACETATE AND ETHINYL ESTRADIOL AND FERROUS FUMARATE 1MG-20(21)
KIT ORAL
Qty: 28 TABLET | Refills: 6 | Status: SHIPPED | OUTPATIENT
Start: 2019-06-04 | End: 2019-12-18 | Stop reason: SDUPTHER

## 2019-06-04 NOTE — TELEPHONE ENCOUNTER
----- Message from Leandra Gu sent at 6/4/2019 12:54 PM CDT -----  Contact: self    Patient will like a refill on her medication ( microgestin). Patient can be contacted at [phone].            Preferred Pharmacy:  [facility] - [place] [address]   [phone] (Phone)  [phone] (Fax)

## 2019-06-04 NOTE — TELEPHONE ENCOUNTER
Returned call to the patient. Patient informed that NP Cl sent in a refill of her BC with 6 additional refills on 4/15/19 to [facility]. Patient agreed to a mmg appointment on 6/18 at 11AM at [facility] [address]. Patient also agreed to a WWE on 11/18 at 11AM with Dr. Parnell. Patient verbalized understanding. Letter sent out.

## 2019-06-04 NOTE — TELEPHONE ENCOUNTER
"Returned call to patient, who reports that last Tuesday, she had a bowl of cereal, and shortly thereafter had "lots of abdominal pain. I felt bloated and achy. The pain went away on its own, but I was still sore. I also had one episode of vomiting after I ate a really healthy meal, with only grilled foods. The pain I have in my abdomen is on both the right and left side, and it is constant, around a 4 almost up to a 7 at times." Pt referenced another time when she had "terrible abdominal pain" and states "I think I probably should have gone to the ER then. This has not been as bad as that was, but it is really uncomfortable." Advised pt f/u with PCP, and she said she has appt in early July. Advised pt to be seen sooner, and that if pain continued or worsened, she should go to ER. Notified Dr Rai regarding all.   "

## 2019-06-05 ENCOUNTER — OFFICE VISIT (OUTPATIENT)
Dept: FAMILY MEDICINE | Facility: CLINIC | Age: 49
End: 2019-06-05
Payer: COMMERCIAL

## 2019-06-05 ENCOUNTER — HOSPITAL ENCOUNTER (INPATIENT)
Facility: HOSPITAL | Age: 49
LOS: 5 days | Discharge: HOME | DRG: 445 | End: 2019-06-10
Attending: EMERGENCY MEDICINE | Admitting: INTERNAL MEDICINE
Payer: COMMERCIAL

## 2019-06-05 VITALS
HEART RATE: 104 BPM | TEMPERATURE: 98 F | BODY MASS INDEX: 26.27 KG/M2 | DIASTOLIC BLOOD PRESSURE: 60 MMHG | WEIGHT: 130.31 LBS | HEIGHT: 59 IN | OXYGEN SATURATION: 97 % | SYSTOLIC BLOOD PRESSURE: 100 MMHG

## 2019-06-05 DIAGNOSIS — K80.20 CALCULUS OF GALLBLADDER WITHOUT CHOLECYSTITIS WITHOUT OBSTRUCTION: ICD-10-CM

## 2019-06-05 DIAGNOSIS — R53.1 WEAKNESS: ICD-10-CM

## 2019-06-05 DIAGNOSIS — R10.11 RUQ PAIN: ICD-10-CM

## 2019-06-05 DIAGNOSIS — R63.0 DECREASED APPETITE: ICD-10-CM

## 2019-06-05 DIAGNOSIS — J96.11 CHRONIC RESPIRATORY FAILURE WITH HYPOXIA: ICD-10-CM

## 2019-06-05 DIAGNOSIS — R11.10 VOMITING, INTRACTABILITY OF VOMITING NOT SPECIFIED, PRESENCE OF NAUSEA NOT SPECIFIED, UNSPECIFIED VOMITING TYPE: ICD-10-CM

## 2019-06-05 DIAGNOSIS — K80.00 CALCULUS OF GALLBLADDER WITH ACUTE CHOLECYSTITIS WITHOUT OBSTRUCTION: Primary | ICD-10-CM

## 2019-06-05 DIAGNOSIS — I27.9 CHRONIC PULMONARY HEART DISEASE: ICD-10-CM

## 2019-06-05 DIAGNOSIS — I27.0 PRIMARY PULMONARY HYPERTENSION: ICD-10-CM

## 2019-06-05 DIAGNOSIS — R10.11 RUQ ABDOMINAL PAIN: Primary | ICD-10-CM

## 2019-06-05 DIAGNOSIS — R10.13 EPIGASTRIC PAIN: ICD-10-CM

## 2019-06-05 LAB
ALBUMIN SERPL BCP-MCNC: 3.6 G/DL (ref 3.5–5.2)
ALP SERPL-CCNC: 72 U/L (ref 55–135)
ALT SERPL W/O P-5'-P-CCNC: 26 U/L (ref 10–44)
ANION GAP SERPL CALC-SCNC: 12 MMOL/L (ref 8–16)
AST SERPL-CCNC: 17 U/L (ref 10–40)
B-HCG UR QL: NEGATIVE
BACTERIA #/AREA URNS AUTO: ABNORMAL /HPF
BASOPHILS # BLD AUTO: 0.01 K/UL (ref 0–0.2)
BASOPHILS NFR BLD: 0.1 % (ref 0–1.9)
BILIRUB SERPL-MCNC: 0.4 MG/DL (ref 0.1–1)
BILIRUB UR QL STRIP: NEGATIVE
BUN SERPL-MCNC: 6 MG/DL (ref 6–20)
CALCIUM SERPL-MCNC: 9.4 MG/DL (ref 8.7–10.5)
CHLORIDE SERPL-SCNC: 110 MMOL/L (ref 95–110)
CLARITY UR REFRACT.AUTO: CLEAR
CO2 SERPL-SCNC: 15 MMOL/L (ref 23–29)
COLOR UR AUTO: YELLOW
CREAT SERPL-MCNC: 0.7 MG/DL (ref 0.5–1.4)
DIFFERENTIAL METHOD: ABNORMAL
EOSINOPHIL # BLD AUTO: 0 K/UL (ref 0–0.5)
EOSINOPHIL NFR BLD: 0.3 % (ref 0–8)
ERYTHROCYTE [DISTWIDTH] IN BLOOD BY AUTOMATED COUNT: 13.4 % (ref 11.5–14.5)
EST. GFR  (AFRICAN AMERICAN): >60 ML/MIN/1.73 M^2
EST. GFR  (NON AFRICAN AMERICAN): >60 ML/MIN/1.73 M^2
GLUCOSE SERPL-MCNC: 95 MG/DL (ref 70–110)
GLUCOSE UR QL STRIP: NEGATIVE
HCT VFR BLD AUTO: 43 % (ref 37–48.5)
HGB BLD-MCNC: 14.8 G/DL (ref 12–16)
HGB UR QL STRIP: ABNORMAL
HYALINE CASTS UR QL AUTO: 1 /LPF
IMM GRANULOCYTES # BLD AUTO: 0.02 K/UL (ref 0–0.04)
IMM GRANULOCYTES NFR BLD AUTO: 0.3 % (ref 0–0.5)
KETONES UR QL STRIP: ABNORMAL
LACTATE SERPL-SCNC: 0.7 MMOL/L (ref 0.5–2.2)
LEUKOCYTE ESTERASE UR QL STRIP: NEGATIVE
LIPASE SERPL-CCNC: 25 U/L (ref 4–60)
LYMPHOCYTES # BLD AUTO: 1.2 K/UL (ref 1–4.8)
LYMPHOCYTES NFR BLD: 16.7 % (ref 18–48)
MCH RBC QN AUTO: 29.7 PG (ref 27–31)
MCHC RBC AUTO-ENTMCNC: 34.4 G/DL (ref 32–36)
MCV RBC AUTO: 86 FL (ref 82–98)
MICROSCOPIC COMMENT: ABNORMAL
MONOCYTES # BLD AUTO: 0.7 K/UL (ref 0.3–1)
MONOCYTES NFR BLD: 10.1 % (ref 4–15)
NEUTROPHILS # BLD AUTO: 5.2 K/UL (ref 1.8–7.7)
NEUTROPHILS NFR BLD: 72.5 % (ref 38–73)
NITRITE UR QL STRIP: NEGATIVE
NRBC BLD-RTO: 0 /100 WBC
PH UR STRIP: 6 [PH] (ref 5–8)
PLATELET # BLD AUTO: 228 K/UL (ref 150–350)
PMV BLD AUTO: 12.5 FL (ref 9.2–12.9)
POTASSIUM SERPL-SCNC: 3.5 MMOL/L (ref 3.5–5.1)
PROT SERPL-MCNC: 8.1 G/DL (ref 6–8.4)
PROT UR QL STRIP: ABNORMAL
RBC # BLD AUTO: 4.98 M/UL (ref 4–5.4)
RBC #/AREA URNS AUTO: 11 /HPF (ref 0–4)
SODIUM SERPL-SCNC: 137 MMOL/L (ref 136–145)
SP GR UR STRIP: 1.02 (ref 1–1.03)
SQUAMOUS #/AREA URNS AUTO: 2 /HPF
T4 FREE SERPL-MCNC: 1.49 NG/DL (ref 0.71–1.51)
TROPONIN I SERPL DL<=0.01 NG/ML-MCNC: <0.006 NG/ML (ref 0–0.03)
TSH SERPL DL<=0.005 MIU/L-ACNC: 0.39 UIU/ML (ref 0.4–4)
URN SPEC COLLECT METH UR: ABNORMAL
WBC # BLD AUTO: 7.23 K/UL (ref 3.9–12.7)
WBC #/AREA URNS AUTO: 0 /HPF (ref 0–5)

## 2019-06-05 PROCEDURE — 81025 URINE PREGNANCY TEST: CPT | Mod: NTX

## 2019-06-05 PROCEDURE — 99999 PR PBB SHADOW E&M-EST. PATIENT-LVL V: CPT | Mod: PBBFAC,,, | Performed by: NURSE PRACTITIONER

## 2019-06-05 PROCEDURE — 3008F PR BODY MASS INDEX (BMI) DOCUMENTED: ICD-10-PCS | Mod: CPTII,S$GLB,, | Performed by: NURSE PRACTITIONER

## 2019-06-05 PROCEDURE — 85025 COMPLETE CBC W/AUTO DIFF WBC: CPT | Mod: NTX

## 2019-06-05 PROCEDURE — 96361 HYDRATE IV INFUSION ADD-ON: CPT | Mod: NTX

## 2019-06-05 PROCEDURE — 25000003 PHARM REV CODE 250: Mod: NTX | Performed by: EMERGENCY MEDICINE

## 2019-06-05 PROCEDURE — 81001 URINALYSIS AUTO W/SCOPE: CPT | Mod: NTX

## 2019-06-05 PROCEDURE — 99285 EMERGENCY DEPT VISIT HI MDM: CPT | Mod: ,,, | Performed by: EMERGENCY MEDICINE

## 2019-06-05 PROCEDURE — 84443 ASSAY THYROID STIM HORMONE: CPT | Mod: NTX

## 2019-06-05 PROCEDURE — 3008F BODY MASS INDEX DOCD: CPT | Mod: CPTII,S$GLB,, | Performed by: NURSE PRACTITIONER

## 2019-06-05 PROCEDURE — 84484 ASSAY OF TROPONIN QUANT: CPT | Mod: NTX

## 2019-06-05 PROCEDURE — 84439 ASSAY OF FREE THYROXINE: CPT | Mod: NTX

## 2019-06-05 PROCEDURE — 25000003 PHARM REV CODE 250: Mod: NTX | Performed by: INTERNAL MEDICINE

## 2019-06-05 PROCEDURE — 99285 EMERGENCY DEPT VISIT HI MDM: CPT | Mod: 25,NTX

## 2019-06-05 PROCEDURE — 99214 OFFICE O/P EST MOD 30 MIN: CPT | Mod: S$GLB,,, | Performed by: NURSE PRACTITIONER

## 2019-06-05 PROCEDURE — 12000002 HC ACUTE/MED SURGE SEMI-PRIVATE ROOM: Mod: NTX

## 2019-06-05 PROCEDURE — 96374 THER/PROPH/DIAG INJ IV PUSH: CPT | Mod: NTX

## 2019-06-05 PROCEDURE — 99214 PR OFFICE/OUTPT VISIT, EST, LEVL IV, 30-39 MIN: ICD-10-PCS | Mod: S$GLB,,, | Performed by: NURSE PRACTITIONER

## 2019-06-05 PROCEDURE — 83690 ASSAY OF LIPASE: CPT | Mod: NTX

## 2019-06-05 PROCEDURE — 96375 TX/PRO/DX INJ NEW DRUG ADDON: CPT | Mod: NTX

## 2019-06-05 PROCEDURE — 80053 COMPREHEN METABOLIC PANEL: CPT | Mod: NTX

## 2019-06-05 PROCEDURE — 99285 PR EMERGENCY DEPT VISIT,LEVEL V: ICD-10-PCS | Mod: ,,, | Performed by: EMERGENCY MEDICINE

## 2019-06-05 PROCEDURE — 83605 ASSAY OF LACTIC ACID: CPT | Mod: NTX

## 2019-06-05 PROCEDURE — 63600175 PHARM REV CODE 636 W HCPCS: Mod: NTX | Performed by: EMERGENCY MEDICINE

## 2019-06-05 PROCEDURE — 99999 PR PBB SHADOW E&M-EST. PATIENT-LVL V: ICD-10-PCS | Mod: PBBFAC,,, | Performed by: NURSE PRACTITIONER

## 2019-06-05 RX ORDER — PANTOPRAZOLE SODIUM 40 MG/1
40 TABLET, DELAYED RELEASE ORAL DAILY
Status: DISCONTINUED | OUTPATIENT
Start: 2019-06-06 | End: 2019-06-10 | Stop reason: HOSPADM

## 2019-06-05 RX ORDER — FLUTICASONE PROPIONATE 50 MCG
2 SPRAY, SUSPENSION (ML) NASAL DAILY
Status: DISCONTINUED | OUTPATIENT
Start: 2019-06-06 | End: 2019-06-10 | Stop reason: HOSPADM

## 2019-06-05 RX ORDER — METRONIDAZOLE 500 MG/1
500 TABLET ORAL EVERY 8 HOURS
Status: DISCONTINUED | OUTPATIENT
Start: 2019-06-06 | End: 2019-06-10 | Stop reason: HOSPADM

## 2019-06-05 RX ORDER — CYANOCOBALAMIN (VITAMIN B-12) 250 MCG
250 TABLET ORAL DAILY
Status: DISCONTINUED | OUTPATIENT
Start: 2019-06-06 | End: 2019-06-10 | Stop reason: HOSPADM

## 2019-06-05 RX ORDER — TADALAFIL 20 MG/1
40 TABLET ORAL DAILY
Status: DISCONTINUED | OUTPATIENT
Start: 2019-06-05 | End: 2019-06-06

## 2019-06-05 RX ORDER — CEFTRIAXONE 1 G/1
1 INJECTION, POWDER, FOR SOLUTION INTRAMUSCULAR; INTRAVENOUS
Status: COMPLETED | OUTPATIENT
Start: 2019-06-05 | End: 2019-06-05

## 2019-06-05 RX ORDER — DIPHENHYDRAMINE HCL 50 MG
50 CAPSULE ORAL EVERY 6 HOURS PRN
Status: DISCONTINUED | OUTPATIENT
Start: 2019-06-05 | End: 2019-06-10 | Stop reason: HOSPADM

## 2019-06-05 RX ORDER — ONDANSETRON 4 MG/1
4 TABLET, ORALLY DISINTEGRATING ORAL
Status: COMPLETED | OUTPATIENT
Start: 2019-06-05 | End: 2019-06-05

## 2019-06-05 RX ORDER — ONDANSETRON 2 MG/ML
4 INJECTION INTRAMUSCULAR; INTRAVENOUS EVERY 8 HOURS PRN
Status: DISCONTINUED | OUTPATIENT
Start: 2019-06-05 | End: 2019-06-10 | Stop reason: HOSPADM

## 2019-06-05 RX ORDER — FOLIC ACID 1 MG/1
1000 TABLET ORAL DAILY
Status: DISCONTINUED | OUTPATIENT
Start: 2019-06-06 | End: 2019-06-10 | Stop reason: HOSPADM

## 2019-06-05 RX ORDER — CETIRIZINE HYDROCHLORIDE 10 MG/1
10 TABLET ORAL DAILY
Status: DISCONTINUED | OUTPATIENT
Start: 2019-06-06 | End: 2019-06-10 | Stop reason: HOSPADM

## 2019-06-05 RX ORDER — OXYCODONE AND ACETAMINOPHEN 5; 325 MG/1; MG/1
1 TABLET ORAL EVERY 4 HOURS PRN
Status: DISCONTINUED | OUTPATIENT
Start: 2019-06-05 | End: 2019-06-10 | Stop reason: HOSPADM

## 2019-06-05 RX ORDER — ONDANSETRON 4 MG/1
4 TABLET, FILM COATED ORAL EVERY 6 HOURS PRN
Status: DISCONTINUED | OUTPATIENT
Start: 2019-06-05 | End: 2019-06-10 | Stop reason: HOSPADM

## 2019-06-05 RX ORDER — METRONIDAZOLE 500 MG/1
500 TABLET ORAL
Status: COMPLETED | OUTPATIENT
Start: 2019-06-05 | End: 2019-06-05

## 2019-06-05 RX ORDER — ALBUTEROL SULFATE 90 UG/1
2 AEROSOL, METERED RESPIRATORY (INHALATION) EVERY 4 HOURS PRN
Status: DISCONTINUED | OUTPATIENT
Start: 2019-06-05 | End: 2019-06-10 | Stop reason: HOSPADM

## 2019-06-05 RX ORDER — TOPIRAMATE 100 MG/1
100 TABLET, FILM COATED ORAL 2 TIMES DAILY
Status: DISCONTINUED | OUTPATIENT
Start: 2019-06-05 | End: 2019-06-10 | Stop reason: HOSPADM

## 2019-06-05 RX ORDER — AMBRISENTAN 10 MG/1
10 TABLET, FILM COATED ORAL DAILY
Status: DISCONTINUED | OUTPATIENT
Start: 2019-06-06 | End: 2019-06-10 | Stop reason: HOSPADM

## 2019-06-05 RX ORDER — ACETAMINOPHEN 500 MG
1000 TABLET ORAL EVERY 6 HOURS PRN
Status: DISCONTINUED | OUTPATIENT
Start: 2019-06-05 | End: 2019-06-05

## 2019-06-05 RX ORDER — CEFTRIAXONE 1 G/1
1 INJECTION, POWDER, FOR SOLUTION INTRAMUSCULAR; INTRAVENOUS
Status: DISCONTINUED | OUTPATIENT
Start: 2019-06-06 | End: 2019-06-10 | Stop reason: HOSPADM

## 2019-06-05 RX ORDER — ONDANSETRON 8 MG/1
8 TABLET, ORALLY DISINTEGRATING ORAL EVERY 8 HOURS PRN
Status: DISCONTINUED | OUTPATIENT
Start: 2019-06-05 | End: 2019-06-10 | Stop reason: HOSPADM

## 2019-06-05 RX ORDER — LOPERAMIDE HYDROCHLORIDE 2 MG/1
2 CAPSULE ORAL EVERY 4 HOURS PRN
Status: DISCONTINUED | OUTPATIENT
Start: 2019-06-05 | End: 2019-06-08

## 2019-06-05 RX ORDER — FLUTICASONE FUROATE AND VILANTEROL 100; 25 UG/1; UG/1
1 POWDER RESPIRATORY (INHALATION) DAILY
Status: DISCONTINUED | OUTPATIENT
Start: 2019-06-06 | End: 2019-06-10 | Stop reason: HOSPADM

## 2019-06-05 RX ORDER — FENTANYL CITRATE 50 UG/ML
25 INJECTION, SOLUTION INTRAMUSCULAR; INTRAVENOUS
Status: COMPLETED | OUTPATIENT
Start: 2019-06-05 | End: 2019-06-05

## 2019-06-05 RX ORDER — ACETAMINOPHEN 325 MG/1
650 TABLET ORAL EVERY 6 HOURS PRN
Status: DISCONTINUED | OUTPATIENT
Start: 2019-06-05 | End: 2019-06-10 | Stop reason: HOSPADM

## 2019-06-05 RX ADMIN — FENTANYL CITRATE 25 MCG: 50 INJECTION INTRAMUSCULAR; INTRAVENOUS at 07:06

## 2019-06-05 RX ADMIN — TOPIRAMATE 100 MG: 100 TABLET, FILM COATED ORAL at 10:06

## 2019-06-05 RX ADMIN — SODIUM CHLORIDE 500 ML: 0.9 INJECTION, SOLUTION INTRAVENOUS at 02:06

## 2019-06-05 RX ADMIN — CEFTRIAXONE SODIUM 1 G: 1 INJECTION, POWDER, FOR SOLUTION INTRAMUSCULAR; INTRAVENOUS at 06:06

## 2019-06-05 RX ADMIN — SODIUM CHLORIDE 1000 ML: 0.9 INJECTION, SOLUTION INTRAVENOUS at 06:06

## 2019-06-05 RX ADMIN — TADALAFIL 40 MG: 20 TABLET ORAL at 10:06

## 2019-06-05 RX ADMIN — ONDANSETRON 4 MG: 4 TABLET, ORALLY DISINTEGRATING ORAL at 05:06

## 2019-06-05 RX ADMIN — METRONIDAZOLE 500 MG: 500 TABLET ORAL at 06:06

## 2019-06-05 NOTE — PROVIDER PROGRESS NOTES - EMERGENCY DEPT.
Encounter Date: 6/5/2019    ED Physician Progress Notes         EKG - STEMI Decision  Initial Reading: No STEMI present.  Response: No Action Needed.

## 2019-06-05 NOTE — ED PROVIDER NOTES
"SCRIBE #1 NOTE: I, Cristian Light, am scribing for, and in the presence of,  Dr. Gr. I have scribed the following portions of the note - Other sections scribed: ROSE SAENZ.         CC: Abdominal Pain (RUQ pain with nausea)      History provided by: Patient     HPI: Yuni Perez is a 49 y.o. year old female who presents to the ED complaining of right upper quadrant pain  Pt states she has had intermittent abdominal pain since April. States she initally had abdominal pain after eating a country fried steak on April 13th. She described the pain as 7/10 in severity and was similar to her menstral cramps. States she doesn't normally eat fatty foods. Then, the abdominal pain returned the following night after eating finger sandwiches. Also endorsed abdominal bloating, vomiting, and diarrhea at that time.     Her symptoms subsided until 8 days ago, when her abdominal pain returned after eating shreaded wheat ceral. Since then, her abdominal pain has been constant. She has been unable to eat and has lost 8 lbs in the past week. Abdominal pain exacerbated with food intake. States she is able to consume liquids but occasionally vomits after eating fatty foods. Endorses diaphoresis, which she describes as "like a furnace." Also has constant diarrhea, but thinks it may be related to her IBS. Denies fever. She usually has 2 BM per day. She has been passing gas normally. Reports 1 day ago, she had back pain that radiated to her R shoulder, but has since subsided. Patient believes her symptoms are related to her gal bladder or pancreas. Dr. Langford previously tried to remove her gal bladder but was unable to "because her blood pressure flipped." Allergic to amoxicillin and doxycycline. She had a heart catheter placed several years ago. Does not take medication to control her heart rate. No recent changes in medications.           Past Medical History:   Diagnosis Date    AR (allergic rhinitis)     Cholelithiasis, " common bile duct     Chronic low back pain     Eye pressure 2017    GERD (gastroesophageal reflux disease)     History of migraine headaches     Hypothyroidism     Lumbar disc disease     Menorrhagia     Mild asthma     Obesity     Plantar fasciitis of left foot     Primary pulmonary hypertension     followed by heart transplant/pulmonary     Seizure disorder     x 1 in 2008    Seizures     Sleep apnea     TMJ (dislocation of temporomandibular joint)     Tricuspid regurgitation      Past Surgical History:   Procedure Laterality Date    CARDIAC CATHETERIZATION      CENTRAL VENOUS CATHETER INSERTION      HEART CATH-LEFT Left 1/30/2017    [facility]    HEART CATH-RIGHT Right 8/20/2018    [facility] at [facility] CATH LAB    HEART CATH-RIGHT Right 1/30/2017    [facility] CATH [facility]    HEART CATH-RIGHT Right 9/8/2014    [facility] CATH LAB    PORTACATH PLACEMENT      UPPER GASTROINTESTINAL ENDOSCOPY       Family History   Problem Relation Age of Onset    Heart disease Father     Glaucoma Father     Diabetes Mother     Cancer Maternal Grandmother         uterine    Cancer Maternal Aunt         breast    Breast cancer Maternal Aunt     Heart disease Paternal Grandfather     Heart attack Paternal Grandfather     Diabetes Paternal Grandfather     Leukemia Brother     Hypertension Unknown     Diabetes Maternal Grandfather     Heart attack Maternal Grandfather     Breast cancer Cousin     No Known Problems Sister     No Known Problems Maternal Uncle     No Known Problems Paternal Aunt     No Known Problems Paternal Uncle     No Known Problems Paternal Grandmother     Colon cancer Neg Hx     Ovarian cancer Neg Hx     Amblyopia Neg Hx     Blindness Neg Hx     Cataracts Neg Hx     Macular degeneration Neg Hx     Retinal detachment Neg Hx     Strabismus Neg Hx      Stroke Neg Hx     Thyroid disease Neg Hx     Esophageal cancer Neg Hx      No current facility-administered medications on file prior to encounter.      Current Outpatient Medications on File Prior to Encounter   Medication Sig Dispense Refill    acetaminophen (TYLENOL EXTRA STRENGTH) 500 MG tablet Take 1,000 mg by mouth every 6 (six) hours as needed for Pain.      ADVAIR DISKUS 100-50 mcg/dose diskus inhaler INHALE 1 PUFF TWICE DAILY 1 each 11    ambrisentan (LETAIRIS) 10 MG Tab Take 1 tablet (10 mg total) by mouth once daily. 30 tablet 11    azelastine (ASTELIN) 137 mcg (0.1 %) nasal spray 2 sprays by Nasal route 2 (two) times daily.       citalopram (CELEXA) 10 MG tablet Take 10 mg by mouth once daily.      cyanocobalamin, vitamin B-12, 2,500 mcg Subl Place 2,500 mcg under the tongue once daily.       ergocalciferol (VITAMIN D2) 50,000 unit Cap Take 1 capsule (50,000 Units total) by mouth every 7 days. 6 capsule 0    esomeprazole (NEXIUM) 40 MG capsule Take 40 mg by mouth once daily.      ferrous sulfate 325 (65 FE) MG EC tablet Take 325 mg by mouth daily as needed.       fluticasone (FLONASE) 50 mcg/actuation nasal spray INHALE 2 SPRAYS IN EACH NOSTRIL DAILY 16 g 3    folic acid (FOLVITE) 1 MG tablet TAKE ONE TABLET BY MOUTH once DAILY 90 tablet 0    levothyroxine (SYNTHROID) 137 MCG Tab tablet TAKE ONE TABLET BY MOUTH once DAILY 90 tablet 1    loratadine (CLARITIN) 10 mg tablet Take 10 mg by mouth once daily.      MICROGESTIN FE 1/20, 28, 1 mg-20 mcg (21)/75 mg (7) per tablet TAKE ONE TABLET BY MOUTH ONCE DAILY 28 tablet 6    tadalafil, PULMONARY HYPERTENSION, (ADCIRCA) 20 mg Tab Take 40 mg by mouth once daily.      topiramate (TOPAMAX) 100 MG tablet Take 1 tablet (100 mg total) by mouth 2 (two) times daily. 60 tablet 11    treprostinil (REMODULIN) 2.5 mg/mL Soln Mix cassette as directed and infuse continuously per physician titration orders on dosing sheet. Current dose 80ng/kg/min 60 mL 11     VENTOLIN HFA 90 mcg/actuation inhaler inhale 2 puffs BY MOUTH EVERY 4-6 HOURS 18 g 11    diphenhydrAMINE (BENADRYL) 25 mg capsule Take 50 mg by mouth every 6 (six) hours as needed for Itching.       ondansetron (ZOFRAN) 4 MG tablet Take 4 mg by mouth every 6 (six) hours as needed. 1 Tablet Oral Every 6 hours      sumatriptan (IMITREX) 100 MG tablet Take 1 tablet (100 mg total) by mouth as needed for Migraine. Take at the onset of headache, may take 1 more in 1 hour if needed. 12 tablet 5    [DISCONTINUED] clindamycin (CLEOCIN T) 1 % lotion daily as needed.       [DISCONTINUED] desonide (DESOWEN) 0.05 % ointment 0.05 % daily as needed.  Ointment Topical As directed.  Apply to affected area twice a daily over face      [DISCONTINUED] PRAMOSONE 2.5-1 % Lotn lotion daily as needed.       [DISCONTINUED] promethazine (PHENERGAN) 25 MG tablet Take 25 mg by mouth every 4 to 6 hours as needed.        Vibra-tabs [doxycycline hyclate]; Adhesive; Amoxicillin; Chlorhexidine; and Doxycycline  Social History     Socioeconomic History    Marital status: Single     Spouse name: Not on file    Number of children: 0    Years of education: Not on file    Highest education level: Not on file   Occupational History    Occupation: disabled     Employer: [employer]   Social Needs    Financial resource strain: Not on file    Food insecurity:     Worry: Not on file     Inability: Not on file    Transportation needs:     Medical: Not on file     Non-medical: Not on file   Tobacco Use    Smoking status: Never Smoker    Smokeless tobacco: Never Used   Substance and Sexual Activity    Alcohol use: No     Alcohol/week: 0.5 oz     Types: 1 Standard drinks or equivalent per week     Comment: 1 per year    Drug use: No    Sexual activity: Never     Birth control/protection: OCP, Pill     Comment: pt is a virgin   Lifestyle    Physical activity:     Days per week: Not on file     Minutes per session: Not on file     Stress: Not on file   Relationships    Social connections:     Talks on phone: Not on file     Gets together: Not on file     Attends [employer] service: Not on file     Active member of club or organization: Not on file     Attends meetings of clubs or organizations: Not on file     Relationship status: Not on file   Other Topics Concern    Not on file   Social History Narrative    Not on file       ROS:     Constitutional : neg for fever. Positive for diaphoresis  HEENT neg  Resp neg  Cardiac  neg  GI right upper quadrant pain, nausea, vomiting, diarrhea, and abdominal distention.    neg  Neuro neg  Heme/Immune: neg  Endo neg  Skin neg    PHYSICAL EXAM:  Vitals:    06/05/19 1758   BP: 101/70   Pulse: 92   Resp:    Temp:          PHYSICAL EXAM:   general: comfortable, in no acute distress  VS: triage VS reviewed  HEENT: NC/AT, PERRL, EOMI  Neck: trachea midline  CV: RRR, no m/r/g, no LE pitting edema, left upper chest catheter for the Remodulin infusion  Resp: CTAB  ABD:  ND, + normal BS, tenderness to palpation over the right upper quadrant no peritoneal signs  Renal: No CVAT  Neuro: AAO x 3, 5/5 muscle strength in upper and lower extremities, sensation grossly intact, face symmetric, speech normal  Ext: no deformity, +2 symmetrical peripheral pulses          DATA & INTERVENTIONS:    LABS reviewed:  Labs Reviewed   COMPREHENSIVE METABOLIC PANEL - Abnormal; Notable for the following components:       Result Value    CO2 15 (*)     All other components within normal limits   CBC W/ AUTO DIFFERENTIAL - Abnormal; Notable for the following components:    Lymph% 16.7 (*)     All other components within normal limits   URINALYSIS, REFLEX TO URINE CULTURE - Abnormal; Notable for the following components:    Protein, UA 1+ (*)     Ketones, UA 3+ (*)     Occult Blood UA 1+ (*)     All other components within normal limits    Narrative:     Preferred Collection Type->Urine, Clean Catch   TSH - Abnormal; Notable for the  following components:    TSH 0.388 (*)     All other components within normal limits   URINALYSIS MICROSCOPIC - Abnormal; Notable for the following components:    RBC, UA 11 (*)     All other components within normal limits    Narrative:     Preferred Collection Type->Urine, Clean Catch   LIPASE   TROPONIN I   T4, FREE   LACTIC ACID, PLASMA       RADIOLOGY reviewed:  Imaging Results          US Abdomen Limited (Gallbladder) (Final result)  Result time 06/05/19 15:54:21    Final result by Gucci Ortiz MD (06/05/19 15:54:21)                 Impression:      1.  Cholelithiasis with findings suggestive of acute cholecystitis, including pericholecystic fluid and apositive sonographic Castellon's sign.  However, no gallbladder wall thickening or hyperemia.    2.  Mass within the right hepatic lobe, measuring up to 4.9 cm, seen on multiple prior exams and previously characterized as an adenoma on MRI.    Electronically signed by resident: Yen Pandya  Date:    06/05/2019  Time:    15:33    Electronically signed by: Gucci Ortiz MD  Date:    06/05/2019  Time:    15:54             Narrative:    EXAMINATION:  US ABDOMEN LIMITED    CLINICAL HISTORY:  Right upper quadrant pain    TECHNIQUE:  Limited ultrasound of the right upper quadrant of the abdomen (including pancreas, liver, gallbladder, common bile duct) was performed.    COMPARISON:  Abdominal ultrasound 01/17/2017, MRI abdomen 12/01/2015    FINDINGS:  Hyperechoic mass within the right hepatic lobe, measuring 4.9 x 4.2 x 3.0 cm, seen on prior MRI abdomen 12/01/2015 and demonstrated characteristics compatible with an adenoma.    The gallbladder contains 2 non-mobile stones measuring up to 1.6 cm, 1 of which is at the gallbladder neck.  There is pericholecystic fluid and a positive sonographic Castellon's sign.  No significant gallbladder wall thickening or hyperemia.    The common bile duct is not dilated and measures 0.3 cm.    The visualized portions of the  pancreas are unremarkable.    There is no free fluid within the visualized abdomen.                               X-Ray Chest PA And Lateral (Final result)  Result time 06/05/19 14:37:48    Final result by Maycol Puente MD (06/05/19 14:37:48)                 Impression:      No detrimental change or radiographic acute intrathoracic process seen.  Specifically, no focal consolidation.      Electronically signed by: Maycol Puente MD  Date:    06/05/2019  Time:    14:37             Narrative:    EXAMINATION:  XR CHEST PA AND LATERAL    CLINICAL HISTORY:  Epigastric pain    TECHNIQUE:  PA and lateral views of the chest were performed.    COMPARISON:  Chest radiograph 01/16/2017 and CT thorax 05/16/2016    FINDINGS:  Previous right IJ CVC has been removed, with interval placement of left IJ CVC and its tip projecting over the distal SVC.  Cardiomediastinal silhouette is midline and within normal limits.  Pulmonary vasculature and hilar regions are within normal limits.  The lungs are symmetrically well expanded without focal consolidation, pleural effusion or pneumothorax.  Osseous structures appear stable without acute process seen.                                MEDICATIONS/FLUIDS:  Medications   cefTRIAXone injection 1 g (has no administration in time range)   metroNIDAZOLE tablet 500 mg (has no administration in time range)   sodium chloride 0.9% bolus 1,000 mL (has no administration in time range)   sodium chloride 0.9% bolus 500 mL (0 mLs Intravenous Stopped 6/5/19 1711)   ondansetron disintegrating tablet 4 mg (4 mg Oral Given 6/5/19 1711)         MDM:  Yuni Perez is a 49 y.o. year old female who presents to the ED complaining of right upper quadrant pain x1 week worse with meals.  History of diarrhea however has been improving now with 2 bowel movements a day.  Decreased appetite, nausea, no fever    DDX includes but not limited to:  Cholecystitis versus cholangitis versus pancreatitis versus  small bowel  obstruction versus colitis    Labs ordered and interpreted:   UA positive for occult blood, negative for nitrites and leukocyte  CMP with normal kidney function and electrolytes, normal LFTs anion gap 12, bicarbonate 15  CBC with normal white count, hemoglobin and platelet  Lipase within normal limits    TSH 0.388  Troponin negative  Free T4 1.49    US GB: 1.  Cholelithiasis with findings suggestive of acute cholecystitis, including pericholecystic fluid and apositive sonographic Castellon's sign.  However, no gallbladder wall thickening or hyperemia.2.  Mass within the right hepatic lobe, measuring up to 4.9 cm, seen on multiple prior exams and previously characterized as an adenoma on MRI.  Ceftriaxone, flagyl      EKG (independantly reviewed):  Heart rate of 107, sinus tachycardia, diffuse ST depression in lateral and inferior leads.  Last EKG available in the system is January 2017 with normal ST segment in the inferior and lateral leads    Chart reviewed:   2016 abdominal ultrasound:The liver is enlarged measuring 17.4cm.  Hepatic parenchyma is homogeneous.  There is again evidence of a 4-5 cm mass within the right inferior hepatic lobe.  This finding demonstrated benign characteristics on prior MRI, however is nonspecific on today's ultrasound.  No intra- or extrahepatic biliary ductal dilatation. The common bile duct measures 0.2 cm.  The gallbladder appears normal. There are 2 stones within the gallbladder, one in the fundus and one in the neck.  The largest measures 1.5 cm.  Sonographic Castellon's sign is negative. The visualized portions of the pancreas, aorta and IVC appear normal. The kidneys are normal in size and measure 9.6 cm on the right and 10.5 cm on the left. The spleen is normal in size and measures 11.5 x 4.7 cm. No ascites.     Case discussed with the consultant: gen surg    patient admitted to \[facility]\""    IMPRESSION:  1.) RUQ abdominal pain, concern for cholecystitis  2.) Pulm HTN  3)  liver mass    Dispo:   Admission    Critical Care Time: N/A       Ladonna Gr MD  06/05/19 2043

## 2019-06-05 NOTE — ED TRIAGE NOTES
"Pt arrived with father POV from home. Pt reports having upper right quad abd pain and cramping with nausea,diarrhea and " getting hot a lot" for the past few days. Last normal BM beginning of April. Hx of pulm HTN and IBS.   "

## 2019-06-05 NOTE — PROGRESS NOTES
Subjective:       Patient ID: Yuni Perez is a 49 y.o. female.    Chief Complaint: Abdominal Pain (Upper  3 days); Sweats; and Anorexia    49-year-old female presents to the clinic today with complaint of right upper quadrant abdominal pain, nausea, vomiting, diarrhea, decreased appetite for the past week.  She has recently lost about 8 lb since 05/23/2019.  She states she also has a lot of gas.  She has been taking some of her medication for irritable bowel syndrome which she states is not helping.  She states she has eaten very little over the past week.  She states she rates her plane presently a 4/10.  She is on the transplant list for a long due to pulmonary hypertension.  She is on continuous Remodulin which causes chronic diarrhea.  She states she has been feeling weak for the last couple of days.  She states she last night she ate chicken, half of a banana drink some water and some Gatorade.  She has only had a small amount of cereal today.  She denies any fever, chills, chest pain, heart palpitations, shortness of breath, or swelling to lower extremities.  She denies any headaches, dizziness, or blurred vision.    Past Medical History:   Diagnosis Date    AR (allergic rhinitis)     Cholelithiasis, common bile duct     Chronic low back pain     Eye pressure 2017    GERD (gastroesophageal reflux disease)     History of migraine headaches     Hypothyroidism     Lumbar disc disease     Menorrhagia     Mild asthma     Obesity     Plantar fasciitis of left foot     Primary pulmonary hypertension     followed by heart transplant/pulmonary     Seizure disorder     x 1 in 2008    Seizures     Sleep apnea     TMJ (dislocation of temporomandibular joint)     Tricuspid regurgitation      Past Surgical History:   Procedure Laterality Date    CARDIAC CATHETERIZATION      CENTRAL VENOUS CATHETER INSERTION      HEART CATH-LEFT Left 1/30/2017    [facility] CATH  LAB    HEART CATH-RIGHT Right 8/20/2018    [facility] CATH [facility]    HEART CATH-RIGHT Right 1/30/2017    [facility] CATH LAB    HEART CATH-RIGHT Right 9/8/2014    [facility] at [facility] CATH LAB    PORTACATH PLACEMENT      UPPER GASTROINTESTINAL ENDOSCOPY        reports that she has never smoked. She has never used smokeless tobacco. She reports that she does not drink alcohol or use drugs.  Review of Systems   Constitutional: Positive for appetite change, fatigue and unexpected weight change. Negative for chills and fever.   Respiratory: Negative for cough, shortness of breath and wheezing.    Cardiovascular: Negative for chest pain, palpitations and leg swelling.   Gastrointestinal: Positive for abdominal pain, diarrhea, nausea and vomiting.   Musculoskeletal: Negative for gait problem.   Neurological: Positive for weakness. Negative for dizziness and headaches.       Objective:      Physical Exam   Constitutional: She is oriented to person, place, and time. She appears well-developed and well-nourished. No distress.   Eyes: Pupils are equal, round, and reactive to light. Conjunctivae and EOM are normal. Right eye exhibits no discharge. Left eye exhibits no discharge. No scleral icterus.   Cardiovascular:   tachycardia    Pulmonary/Chest: Effort normal and breath sounds normal. She has no wheezes.   Abdominal: Soft. Bowel sounds are normal. There is tenderness. There is no rebound and no guarding.   Moderate amount RUQ tenderness no rebound or guarding noted bowel sounds present    Musculoskeletal: Normal range of motion. She exhibits no edema.   Neurological: She is alert and oriented to person, place, and time.   Skin: Skin is warm and dry. She is not diaphoretic.   Continuous Remodulin noted    Psychiatric: She has a normal mood and affect.       Assessment:       1. RUQ abdominal pain    2. Weakness    3. Vomiting, intractability  of vomiting not specified, presence of nausea not specified, unspecified vomiting type    4. Decreased appetite    5. Primary pulmonary hypertension    6. Chronic pulmonary heart disease    7. Chronic respiratory failure with hypoxia        Plan:         RUQ abdominal pain  - To ER [facility] now for evaluation     Weakness  - To ER [facility] now for evaluation     Vomiting, intractability of vomiting not specified, presence of nausea not specified, unspecified vomiting type  - To ER [facility] now for evaluation     Decreased appetite  - To [facility] now for evaluation     Primary pulmonary hypertension  - The current medical regimen is effective;  continue present plan and medications.    Chronic pulmonary heart disease  - The current medical regimen is effective;  continue present plan and medications.    Chronic respiratory failure with hypoxia  - on oxygen as needed       Report called to triage nurse at [facility] ER

## 2019-06-06 LAB
ALBUMIN SERPL BCP-MCNC: 2.9 G/DL (ref 3.5–5.2)
ALP SERPL-CCNC: 58 U/L (ref 55–135)
ALT SERPL W/O P-5'-P-CCNC: 20 U/L (ref 10–44)
ANION GAP SERPL CALC-SCNC: 9 MMOL/L (ref 8–16)
AST SERPL-CCNC: 14 U/L (ref 10–40)
BASOPHILS # BLD AUTO: 0.03 K/UL (ref 0–0.2)
BASOPHILS NFR BLD: 0.6 % (ref 0–1.9)
BILIRUB SERPL-MCNC: 0.3 MG/DL (ref 0.1–1)
BUN SERPL-MCNC: 5 MG/DL (ref 6–20)
CALCIUM SERPL-MCNC: 8.4 MG/DL (ref 8.7–10.5)
CHLORIDE SERPL-SCNC: 111 MMOL/L (ref 95–110)
CO2 SERPL-SCNC: 17 MMOL/L (ref 23–29)
CREAT SERPL-MCNC: 0.6 MG/DL (ref 0.5–1.4)
DIFFERENTIAL METHOD: ABNORMAL
EOSINOPHIL # BLD AUTO: 0.2 K/UL (ref 0–0.5)
EOSINOPHIL NFR BLD: 3.4 % (ref 0–8)
ERYTHROCYTE [DISTWIDTH] IN BLOOD BY AUTOMATED COUNT: 13.6 % (ref 11.5–14.5)
EST. GFR  (AFRICAN AMERICAN): >60 ML/MIN/1.73 M^2
EST. GFR  (NON AFRICAN AMERICAN): >60 ML/MIN/1.73 M^2
GLUCOSE SERPL-MCNC: 68 MG/DL (ref 70–110)
HCT VFR BLD AUTO: 36.8 % (ref 37–48.5)
HGB BLD-MCNC: 12.5 G/DL (ref 12–16)
IMM GRANULOCYTES # BLD AUTO: 0.01 K/UL (ref 0–0.04)
IMM GRANULOCYTES NFR BLD AUTO: 0.2 % (ref 0–0.5)
LYMPHOCYTES # BLD AUTO: 1.3 K/UL (ref 1–4.8)
LYMPHOCYTES NFR BLD: 28.7 % (ref 18–48)
MAGNESIUM SERPL-MCNC: 1.9 MG/DL (ref 1.6–2.6)
MCH RBC QN AUTO: 29.4 PG (ref 27–31)
MCHC RBC AUTO-ENTMCNC: 34 G/DL (ref 32–36)
MCV RBC AUTO: 87 FL (ref 82–98)
MONOCYTES # BLD AUTO: 0.6 K/UL (ref 0.3–1)
MONOCYTES NFR BLD: 12.9 % (ref 4–15)
NEUTROPHILS # BLD AUTO: 2.5 K/UL (ref 1.8–7.7)
NEUTROPHILS NFR BLD: 54.2 % (ref 38–73)
NRBC BLD-RTO: 0 /100 WBC
PLATELET # BLD AUTO: 197 K/UL (ref 150–350)
PMV BLD AUTO: 12.5 FL (ref 9.2–12.9)
POTASSIUM SERPL-SCNC: 3.3 MMOL/L (ref 3.5–5.1)
PROT SERPL-MCNC: 6.4 G/DL (ref 6–8.4)
RBC # BLD AUTO: 4.25 M/UL (ref 4–5.4)
SODIUM SERPL-SCNC: 137 MMOL/L (ref 136–145)
WBC # BLD AUTO: 4.64 K/UL (ref 3.9–12.7)

## 2019-06-06 PROCEDURE — 83735 ASSAY OF MAGNESIUM: CPT | Mod: NTX

## 2019-06-06 PROCEDURE — 20600001 HC STEP DOWN PRIVATE ROOM: Mod: NTX

## 2019-06-06 PROCEDURE — 25000003 PHARM REV CODE 250: Mod: NTX | Performed by: INTERNAL MEDICINE

## 2019-06-06 PROCEDURE — 63600175 PHARM REV CODE 636 W HCPCS: Mod: NTX | Performed by: INTERNAL MEDICINE

## 2019-06-06 PROCEDURE — 25000003 PHARM REV CODE 250: Mod: NTX | Performed by: STUDENT IN AN ORGANIZED HEALTH CARE EDUCATION/TRAINING PROGRAM

## 2019-06-06 PROCEDURE — 99232 SBSQ HOSP IP/OBS MODERATE 35: CPT | Mod: NTX,,, | Performed by: INTERNAL MEDICINE

## 2019-06-06 PROCEDURE — 63600175 PHARM REV CODE 636 W HCPCS: Mod: NTX | Performed by: STUDENT IN AN ORGANIZED HEALTH CARE EDUCATION/TRAINING PROGRAM

## 2019-06-06 PROCEDURE — 85025 COMPLETE CBC W/AUTO DIFF WBC: CPT | Mod: NTX

## 2019-06-06 PROCEDURE — 80053 COMPREHEN METABOLIC PANEL: CPT | Mod: NTX

## 2019-06-06 PROCEDURE — 25000003 PHARM REV CODE 250: Mod: NTX | Performed by: NURSE PRACTITIONER

## 2019-06-06 PROCEDURE — 63600175 PHARM REV CODE 636 W HCPCS: Mod: JG,NTX | Performed by: INTERNAL MEDICINE

## 2019-06-06 PROCEDURE — 25000242 PHARM REV CODE 250 ALT 637 W/ HCPCS: Mod: NTX | Performed by: INTERNAL MEDICINE

## 2019-06-06 PROCEDURE — 36415 COLL VENOUS BLD VENIPUNCTURE: CPT | Mod: NTX

## 2019-06-06 PROCEDURE — 99232 PR SUBSEQUENT HOSPITAL CARE,LEVL II: ICD-10-PCS | Mod: NTX,,, | Performed by: INTERNAL MEDICINE

## 2019-06-06 RX ORDER — TADALAFIL 20 MG/1
40 TABLET ORAL EVERY EVENING
Status: DISCONTINUED | OUTPATIENT
Start: 2019-06-06 | End: 2019-06-10 | Stop reason: HOSPADM

## 2019-06-06 RX ORDER — TADALAFIL 20 MG/1
40 TABLET ORAL NIGHTLY
Status: DISCONTINUED | OUTPATIENT
Start: 2019-06-06 | End: 2019-06-06

## 2019-06-06 RX ORDER — POTASSIUM CHLORIDE 20 MEQ/1
40 TABLET, EXTENDED RELEASE ORAL ONCE
Status: COMPLETED | OUTPATIENT
Start: 2019-06-06 | End: 2019-06-06

## 2019-06-06 RX ORDER — MORPHINE SULFATE 2 MG/ML
2 INJECTION, SOLUTION INTRAMUSCULAR; INTRAVENOUS ONCE
Status: COMPLETED | OUTPATIENT
Start: 2019-06-06 | End: 2019-06-06

## 2019-06-06 RX ADMIN — TADALAFIL 40 MG: 20 TABLET ORAL at 08:06

## 2019-06-06 RX ADMIN — LEVOTHYROXINE SODIUM 137 MCG: 25 TABLET ORAL at 05:06

## 2019-06-06 RX ADMIN — TREPROSTINIL 80 NG/KG/MIN: 100 INJECTION, SOLUTION INTRAVENOUS; SUBCUTANEOUS at 02:06

## 2019-06-06 RX ADMIN — TREPROSTINIL 80 NG/KG/MIN: 20 INJECTION, SOLUTION INTRAVENOUS; SUBCUTANEOUS at 02:06

## 2019-06-06 RX ADMIN — POTASSIUM CHLORIDE 40 MEQ: 1500 TABLET, EXTENDED RELEASE ORAL at 12:06

## 2019-06-06 RX ADMIN — CEFTRIAXONE SODIUM 1 G: 1 INJECTION, POWDER, FOR SOLUTION INTRAMUSCULAR; INTRAVENOUS at 06:06

## 2019-06-06 RX ADMIN — PANTOPRAZOLE SODIUM 40 MG: 40 TABLET, DELAYED RELEASE ORAL at 12:06

## 2019-06-06 RX ADMIN — OXYCODONE HYDROCHLORIDE AND ACETAMINOPHEN 1 TABLET: 5; 325 TABLET ORAL at 07:06

## 2019-06-06 RX ADMIN — FLUTICASONE PROPIONATE 100 MCG: 50 SPRAY, METERED NASAL at 12:06

## 2019-06-06 RX ADMIN — MORPHINE SULFATE 2 MG: 2 INJECTION, SOLUTION INTRAMUSCULAR; INTRAVENOUS at 11:06

## 2019-06-06 RX ADMIN — FOLIC ACID 1000 MCG: 1 TABLET ORAL at 12:06

## 2019-06-06 RX ADMIN — METRONIDAZOLE 500 MG: 500 TABLET ORAL at 05:06

## 2019-06-06 RX ADMIN — TOPIRAMATE 100 MG: 100 TABLET, FILM COATED ORAL at 12:06

## 2019-06-06 RX ADMIN — METRONIDAZOLE 500 MG: 500 TABLET ORAL at 09:06

## 2019-06-06 RX ADMIN — CYANOCOBALAMIN TAB 250 MCG 250 MCG: 250 TAB at 12:06

## 2019-06-06 RX ADMIN — CETIRIZINE HYDROCHLORIDE 10 MG: 10 TABLET, FILM COATED ORAL at 12:06

## 2019-06-06 RX ADMIN — OXYCODONE HYDROCHLORIDE AND ACETAMINOPHEN 1 TABLET: 5; 325 TABLET ORAL at 01:06

## 2019-06-06 RX ADMIN — FLUTICASONE FUROATE AND VILANTEROL TRIFENATATE 1 PUFF: 100; 25 POWDER RESPIRATORY (INHALATION) at 12:06

## 2019-06-06 RX ADMIN — Medication: at 02:06

## 2019-06-06 RX ADMIN — AMBRISENTAN 10 MG: 10 TABLET, FILM COATED ORAL at 01:06

## 2019-06-06 RX ADMIN — METRONIDAZOLE 500 MG: 500 TABLET ORAL at 01:06

## 2019-06-06 RX ADMIN — TOPIRAMATE 100 MG: 100 TABLET, FILM COATED ORAL at 09:06

## 2019-06-06 NOTE — SUBJECTIVE & OBJECTIVE
"Interval History: Continues with some RUQ pain. Otherwise no complaints.    Continuous Infusions:   treprostinil (REMODULIN) infusion      veletri/remodulin cassette      veletri/remodulin cassette       Scheduled Meds:   ambrisentan  10 mg Oral Daily    cefTRIAXone (ROCEPHIN) IVPB  1 g Intravenous Q24H    cetirizine  10 mg Oral Daily    cyanocobalamin  250 mcg Oral Daily    fluticasone furoate-vilanterol  1 puff Inhalation Daily    fluticasone propionate  2 spray Each Nare Daily    folic acid  1,000 mcg Oral Daily    levothyroxine  137 mcg Oral Daily    metroNIDAZOLE  500 mg Oral Q8H    pantoprazole  40 mg Oral Daily    potassium chloride  40 mEq Oral Once    tadalafil  40 mg Oral QHS    topiramate  100 mg Oral BID     PRN Meds:acetaminophen, albuterol, diphenhydrAMINE, loperamide, ondansetron, ondansetron, ondansetron, oxyCODONE-acetaminophen    Review of patient's allergies indicates:   Allergen Reactions    Vibra-tabs [doxycycline hyclate] Anaphylaxis     "throat felt like it was closing"    Adhesive Hives     Silk tape    Amoxicillin Rash    Chlorhexidine Other (See Comments)    Doxycycline      Objective:     Vital Signs (Most Recent):  Temp: 97.5 °F (36.4 °C) (06/06/19 0722)  Pulse: 87 (06/06/19 0742)  Resp: 17 (06/06/19 0722)  BP: 110/78 (06/06/19 0722)  SpO2: (!) 94 % (06/06/19 0722) Vital Signs (24h Range):  Temp:  [97.5 °F (36.4 °C)-98.6 °F (37 °C)] 97.5 °F (36.4 °C)  Pulse:  [] 87  Resp:  [17-20] 17  SpO2:  [90 %-97 %] 94 %  BP: ()/(54-78) 110/78     Patient Vitals for the past 72 hrs (Last 3 readings):   Weight   06/05/19 1308 59 kg (130 lb)     Body mass index is 26.26 kg/m².      Intake/Output Summary (Last 24 hours) at 6/6/2019 0921  Last data filed at 6/6/2019 0600  Gross per 24 hour   Intake 1500 ml   Output 500 ml   Net 1000 ml        Telemetry: No events  Physical Exam   Constitutional: She is oriented to person, place, and time. She appears well-developed and " well-nourished.   HENT:   Head: Normocephalic.   Eyes: Pupils are equal, round, and reactive to light.   Neck: Normal range of motion. Neck supple.   Cardiovascular: Normal rate and regular rhythm.   Pulmonary/Chest: Effort normal and breath sounds normal.   Abdominal: Soft. Bowel sounds are normal.   Musculoskeletal: Normal range of motion.   Neurological: She is alert and oriented to person, place, and time.   Skin: Skin is warm and dry.   Psychiatric: She has a normal mood and affect. Her behavior is normal.   Nursing note and vitals reviewed.    Significant Labs:  CBC:  Recent Labs   Lab 06/05/19  1411 06/06/19  0631   WBC 7.23 4.64   RBC 4.98 4.25   HGB 14.8 12.5   HCT 43.0 36.8*    197   MCV 86 87   MCH 29.7 29.4   MCHC 34.4 34.0     BNP:  No results for input(s): BNP in the last 168 hours.    Invalid input(s): BNPTRIAGELBLO  CMP:  Recent Labs   Lab 06/05/19  1411 06/06/19  0631   GLU 95 68*   CALCIUM 9.4 8.4*   ALBUMIN 3.6 2.9*   PROT 8.1 6.4    137   K 3.5 3.3*   CO2 15* 17*    111*   BUN 6 5*   CREATININE 0.7 0.6   ALKPHOS 72 58   ALT 26 20   AST 17 14   BILITOT 0.4 0.3      Coagulation:   No results for input(s): PT, INR, APTT in the last 168 hours.  LDH:  No results for input(s): LDH in the last 72 hours.  Microbiology:  Microbiology Results (last 7 days)     ** No results found for the last 168 hours. **        I have reviewed all pertinent labs within the past 24 hours.    Estimated Creatinine Clearance: 106.8 mL/min (based on SCr of 0.6 mg/dL).    Diagnostic Results:  I have reviewed all pertinent imaging results/findings within the past 24 hours.

## 2019-06-06 NOTE — CONSULTS
Radiology Consult    Yuni Perez is a 49 y.o. female with a history of pulmonary hypertension with findings consistent with acute cholecystits. Patient with reported attempted cholecystectomy 2 years ago with decompensation during beginning of procedure and aborted. IR consulted for cholecystomy tube placement.    Past Medical History:   Diagnosis Date    AR (allergic rhinitis)     Cholelithiasis, common bile duct     Chronic low back pain     Eye pressure 2017    GERD (gastroesophageal reflux disease)     History of migraine headaches     Hypothyroidism     Lumbar disc disease     Menorrhagia     Mild asthma     Obesity     Plantar fasciitis of left foot     Primary pulmonary hypertension     followed by heart transplant/pulmonary     Seizure disorder     x 1 in 2008    Seizures     Sleep apnea     TMJ (dislocation of temporomandibular joint)     Tricuspid regurgitation      Past Surgical History:   Procedure Laterality Date    CARDIAC CATHETERIZATION      CENTRAL VENOUS CATHETER INSERTION      HEART CATH-LEFT Left 1/30/2017    [facility]    HEART CATH-RIGHT Right 8/20/2018    [facility]    HEART CATH-RIGHT Right 1/30/2017    [facility]    HEART CATH-RIGHT Right 9/8/2014    [facility]    PORTACATH PLACEMENT      UPPER GASTROINTESTINAL ENDOSCOPY         Discussed with primary team.    Procedure: Cholecystostomy tube placement.    Scheduled Meds:    ambrisentan  10 mg Oral Daily    cefTRIAXone (ROCEPHIN) IVPB  1 g Intravenous Q24H    cetirizine  10 mg Oral Daily    cyanocobalamin  250 mcg Oral Daily    fluticasone furoate-vilanterol  1 puff Inhalation Daily    fluticasone propionate  2 spray Each Nare Daily    folic acid  1,000 mcg Oral Daily    levothyroxine  137 mcg Oral Daily    metroNIDAZOLE  500 mg Oral Q8H     "pantoprazole  40 mg Oral Daily    tadalafil  40 mg Oral Daily    topiramate  100 mg Oral BID     Continuous Infusions:    treprostinil (REMODULIN) infusion 80 ng/kg/min (06/06/19 1456)    veletri/remodulin cassette      veletri/remodulin tubing       PRN Meds:acetaminophen, albuterol, diphenhydrAMINE, loperamide, ondansetron, ondansetron, ondansetron, oxyCODONE-acetaminophen    Allergies:   Review of patient's allergies indicates:   Allergen Reactions    Vibra-tabs [doxycycline hyclate] Anaphylaxis     "throat felt like it was closing"    Adhesive Hives     Silk tape    Amoxicillin Rash    Chlorhexidine Other (See Comments)    Doxycycline        Labs:  No results for input(s): INR in the last 168 hours.    Invalid input(s):  PT,  PTT    Recent Labs   Lab 06/06/19  0631   WBC 4.64   HGB 12.5   HCT 36.8*   MCV 87         Recent Labs   Lab 06/06/19  0631   GLU 68*      K 3.3*   *   CO2 17*   BUN 5*   CREATININE 0.6   CALCIUM 8.4*   MG 1.9   ALT 20   AST 14   ALBUMIN 2.9*   BILITOT 0.3         Vitals (Most Recent):  Temp: 97.6 °F (36.4 °C) (06/06/19 1626)  Pulse: 88 (06/06/19 1626)  Resp: 16 (06/06/19 1626)  BP: 116/80 (06/06/19 1626)  SpO2: (!) 94 % (06/06/19 1626)    Plan:   1. NPO after midnight.  2. Hold anticoagulants.  3. Cholecystostomy tube scheduled for tomorrow 6/7/19.    Shaji Rubio MD  PGY-II Radiology Resident  [address] [facility]  Pager: [phone]    "

## 2019-06-06 NOTE — PROGRESS NOTES
"[facility]  Heart Transplant  Progress Note    Patient Name: Yuni Perez  MRN: [id number]  Admission Date: 6/5/2019  Hospital Length of Stay: 1 days  Attending Physician: Ivonne Rai MD  Primary Care Provider: Lulu Sandhu MD  Principal Problem:Cholelithiasis    Subjective:     Interval History: Continues with some RUQ pain. Otherwise no complaints.    Continuous Infusions:   treprostinil (REMODULIN) infusion      veletri/remodulin cassette      veletri/remodulin cassette       Scheduled Meds:   ambrisentan  10 mg Oral Daily    cefTRIAXone (ROCEPHIN) IVPB  1 g Intravenous Q24H    cetirizine  10 mg Oral Daily    cyanocobalamin  250 mcg Oral Daily    fluticasone furoate-vilanterol  1 puff Inhalation Daily    fluticasone propionate  2 spray Each Nare Daily    folic acid  1,000 mcg Oral Daily    levothyroxine  137 mcg Oral Daily    metroNIDAZOLE  500 mg Oral Q8H    pantoprazole  40 mg Oral Daily    potassium chloride  40 mEq Oral Once    tadalafil  40 mg Oral QHS    topiramate  100 mg Oral BID     PRN Meds:acetaminophen, albuterol, diphenhydrAMINE, loperamide, ondansetron, ondansetron, ondansetron, oxyCODONE-acetaminophen    Review of patient's allergies indicates:   Allergen Reactions    Vibra-tabs [doxycycline hyclate] Anaphylaxis     "throat felt like it was closing"    Adhesive Hives     Silk tape    Amoxicillin Rash    Chlorhexidine Other (See Comments)    Doxycycline      Objective:     Vital Signs (Most Recent):  Temp: 97.5 °F (36.4 °C) (06/06/19 0722)  Pulse: 87 (06/06/19 0742)  Resp: 17 (06/06/19 0722)  BP: 110/78 (06/06/19 0722)  SpO2: (!) 94 % (06/06/19 0722) Vital Signs (24h Range):  Temp:  [97.5 °F (36.4 °C)-98.6 °F (37 °C)] 97.5 °F (36.4 °C)  Pulse:  [] 87  Resp:  [17-20] 17  SpO2:  [90 %-97 %] 94 %  BP: ()/(54-78) 110/78     Patient Vitals for the past 72 hrs (Last 3 readings):   Weight   06/05/19 1308 59 kg (130 lb)     Body mass " index is 26.26 kg/m².      Intake/Output Summary (Last 24 hours) at 6/6/2019 0921  Last data filed at 6/6/2019 0600  Gross per 24 hour   Intake 1500 ml   Output 500 ml   Net 1000 ml        Telemetry: No events  Physical Exam   Constitutional: She is oriented to person, place, and time. She appears well-developed and well-nourished.   HENT:   Head: Normocephalic.   Eyes: Pupils are equal, round, and reactive to light.   Neck: Normal range of motion. Neck supple.   Cardiovascular: Normal rate and regular rhythm.   Pulmonary/Chest: Effort normal and breath sounds normal.   Abdominal: Soft. Bowel sounds are normal.   Musculoskeletal: Normal range of motion.   Neurological: She is alert and oriented to person, place, and time.   Skin: Skin is warm and dry.   Psychiatric: She has a normal mood and affect. Her behavior is normal.   Nursing note and vitals reviewed.    Significant Labs:  CBC:  Recent Labs   Lab 06/05/19  1411 06/06/19  0631   WBC 7.23 4.64   RBC 4.98 4.25   HGB 14.8 12.5   HCT 43.0 36.8*    197   MCV 86 87   MCH 29.7 29.4   MCHC 34.4 34.0     BNP:  No results for input(s): BNP in the last 168 hours.    Invalid input(s): BNPTRIAGELBLO  CMP:  Recent Labs   Lab 06/05/19  1411 06/06/19  0631   GLU 95 68*   CALCIUM 9.4 8.4*   ALBUMIN 3.6 2.9*   PROT 8.1 6.4    137   K 3.5 3.3*   CO2 15* 17*    111*   BUN 6 5*   CREATININE 0.7 0.6   ALKPHOS 72 58   ALT 26 20   AST 17 14   BILITOT 0.4 0.3      Coagulation:   No results for input(s): PT, INR, APTT in the last 168 hours.  LDH:  No results for input(s): LDH in the last 72 hours.  Microbiology:  Microbiology Results (last 7 days)     ** No results found for the last 168 hours. **        I have reviewed all pertinent labs within the past 24 hours.    Estimated Creatinine Clearance: 106.8 mL/min (based on SCr of 0.6 mg/dL).    Diagnostic Results:  I have reviewed all pertinent imaging results/findings within the past 24 hours.    Assessment and Plan:      49 year old female with history of IPAH (deferred LUT) on triple therapy (remodulin 80ng/kg/min, Letairis 10mg qday and Adcircoa 40mg qday), seizure disorder, ABHIJEET(not using CPAP), Asthma who presents for abdminal pain that started 1 week ago. It has been waxing and waning, RUQ with associated nausea, vomiting. Patient reports vomiting bile colored liquid but no blood. She also reports diarrhea more than her usual with remodulin She denies any fever, does reports sweats. Denies any shortness of breath.    * Cholelithiasis  -NPO for HIDA scan   -Surgery consult appreciated  -Continue rocephin and flagyl  -Prn pain and nausea control    Primary pulmonary hypertension  -Continue remodulin(current cassette to  tomorrow pm)  -Continue tadalafil  -Continue ambrisentan    Migraine without aura and without status migrainosus, not intractable  Continue topamax    Hypothyroidism (acquired)  Continue synthyroid at home dose      Naveen Hurtado, NP [id number]  Heart Transplant  [facility]

## 2019-06-06 NOTE — PLAN OF CARE
Home IV Remodulin dosing variables confirmed by [facility] dosing sheet    HOME CONCNETRATION  Dose = 80ng/kg/min  DW 60.5kg  CADD rate 46ml/24hrs  Vial = 1mg/ml  Final Concentration = 150,000ng/ml --pre-mixed cassette from specialty pharmacy  Patient switched yesterday; therefore, she has >24hrs left in her reservoir     Patient to be switched to HOSPITAL MIXED CASSETTE ~ 1pm   Concentratio 90,000ng/ml  Hospital CADD rate = 77mls/24hrs    Line needs to be aspirated and primed prior to switching to Hospital cassette.    Upon discharge:  Patient will need to be switched to her HOME concentration of 150,000ng/ml prior to leaving the hospital/

## 2019-06-06 NOTE — CONSULTS
[facility]  Cardiology  Consult Note    Patient Name: Yuni Perez  MRN: [id number]  Admission Date: 6/5/2019  Hospital Length of Stay: 0 days  Code Status: Prior   Attending Provider: Ladonna Gr MD   Consulting Provider: Pema Gates MD  Primary Care Physician: Lulu Sandhu MD  Principal Problem:<principal problem not specified>    Patient information was obtained from patient and ER records.       Subjective:     Chief Complaint:  Abdominal pain     HPI:   Ms. Perez is a 49 y.o. female, Cardiology consulted for admission with cholecystitis in this patient on IV Remodulin therapy. 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 Who presented to ED with 1 week c/o ongoing N/V/D associated with crampy RUQ abd discomfort. Associated with ingestion of any food, particularly fatty / greasy foods. She denies any fevers, chills, sick contact, recent travels.  Patient states similar discomfort many years ago with acute cholecystitis attempted for lap kita however did not tolerate anesthesia induction and procedure was aborted. Furthermore, had discomfort in 04/2019 which resolved spontaneously. Patient has been on Remodulin IV therapy for 11 years, tolerating well. No issues with this. Compliant with medications.     General surgery consulted in ED, recommended HIDA scan, received Rocephin and flagyl in ED.     ED: CBC unremarkable, CMP (Cr 0.7), LFTS wnl, Lactic 0.7, UA: +1 protein, +3 Ketones, +1 blood,     U/s Abdomen:   1. Cholelithiasis with findings suggestive of acute cholecystitis, including pericholecystic fluid and apositive sonographic Castellon's sign.  However, no gallbladder wall thickening or hyperemia.  2.  Mass within the right hepatic lobe, measuring up to 4.9 cm, seen on multiple prior exams and previously characterized as an adenoma on MRI.    CXR:  "Negative    TTE 02/2019  · Normal left ventricular systolic function. The estimated ejection fraction is 65%  · Normal LV diastolic function.  · Septal wall has abnormal motion.  · Mildly reduced right ventricular systolic function.  · Mild tricuspid regurgitation.  · The estimated PA systolic pressure is 54 mm Hg  · Pulmonary hypertension present.  Normal central venous pressure (3 mm Hg).    RHC 08/2018 Hemodynamic Results  PW: 8/6 (5)  PA: 101/35 (57)  RV: 92/0  RVEDP: 6   AO_SAT: 99  PA_SAT: 72  BP: 126/85  HR: 81  CONDITION 1 (8/20/2018 11:03:20):  RA: 6/5 (4)  FICKCI: 2.5700  FICKCO: 4.0800  PVR: 1038.0000    Past Medical History:   Diagnosis Date    AR (allergic rhinitis)     Cholelithiasis, common bile duct     Chronic low back pain     Eye pressure 2017    GERD (gastroesophageal reflux disease)     History of migraine headaches     Hypothyroidism     Lumbar disc disease     Menorrhagia     Mild asthma     Obesity     Plantar fasciitis of left foot     Primary pulmonary hypertension     followed by heart transplant/pulmonary     Seizure disorder     x 1 in 2008    Seizures     Sleep apnea     TMJ (dislocation of temporomandibular joint)     Tricuspid regurgitation        Past Surgical History:   Procedure Laterality Date    CARDIAC CATHETERIZATION      CENTRAL VENOUS CATHETER INSERTION      HEART CATH-LEFT Left 1/30/2017    [facility]    HEART CATH-RIGHT Right 8/20/2018    [facility]vonne [facility] at [facility] CATH LAB    HEART CATH-RIGHT Right 1/30/2017    [facility] CATH [facility]    HEART CATH-RIGHT Right 9/8/2014    [facility] at [facility] CATH LAB    PORTACATH PLACEMENT      UPPER GASTROINTESTINAL ENDOSCOPY         Review of patient's allergies indicates:   Allergen Reactions    Vibra-tabs [doxycycline hyclate] Anaphylaxis     "throat felt like it was closing"    Adhesive Hives     Silk tape "    Amoxicillin Rash    Chlorhexidine Other (See Comments)    Doxycycline        No current facility-administered medications on file prior to encounter.      Current Outpatient Medications on File Prior to Encounter   Medication Sig    acetaminophen (TYLENOL EXTRA STRENGTH) 500 MG tablet Take 1,000 mg by mouth every 6 (six) hours as needed for Pain.    ADVAIR DISKUS 100-50 mcg/dose diskus inhaler INHALE 1 PUFF TWICE DAILY    ambrisentan (LETAIRIS) 10 MG Tab Take 1 tablet (10 mg total) by mouth once daily.    azelastine (ASTELIN) 137 mcg (0.1 %) nasal spray 2 sprays by Nasal route 2 (two) times daily.     citalopram (CELEXA) 10 MG tablet Take 10 mg by mouth once daily.    cyanocobalamin, vitamin B-12, 2,500 mcg Subl Place 2,500 mcg under the tongue once daily.     ergocalciferol (VITAMIN D2) 50,000 unit Cap Take 1 capsule (50,000 Units total) by mouth every 7 days.    esomeprazole (NEXIUM) 40 MG capsule Take 40 mg by mouth once daily.    ferrous sulfate 325 (65 FE) MG EC tablet Take 325 mg by mouth daily as needed.     fluticasone (FLONASE) 50 mcg/actuation nasal spray INHALE 2 SPRAYS IN EACH NOSTRIL DAILY    folic acid (FOLVITE) 1 MG tablet TAKE ONE TABLET BY MOUTH once DAILY    levothyroxine (SYNTHROID) 137 MCG Tab tablet TAKE ONE TABLET BY MOUTH once DAILY    loratadine (CLARITIN) 10 mg tablet Take 10 mg by mouth once daily.    MICROGESTIN FE 1/20, 28, 1 mg-20 mcg (21)/75 mg (7) per tablet TAKE ONE TABLET BY MOUTH ONCE DAILY    tadalafil, PULMONARY HYPERTENSION, (ADCIRCA) 20 mg Tab Take 40 mg by mouth once daily.    topiramate (TOPAMAX) 100 MG tablet Take 1 tablet (100 mg total) by mouth 2 (two) times daily.    treprostinil (REMODULIN) 2.5 mg/mL Soln Mix cassette as directed and infuse continuously per physician titration orders on dosing sheet. Current dose 80ng/kg/min    VENTOLIN HFA 90 mcg/actuation inhaler inhale 2 puffs BY MOUTH EVERY 4-6 HOURS    diphenhydrAMINE (BENADRYL) 25 mg capsule  Take 50 mg by mouth every 6 (six) hours as needed for Itching.     ondansetron (ZOFRAN) 4 MG tablet Take 4 mg by mouth every 6 (six) hours as needed. 1 Tablet Oral Every 6 hours    sumatriptan (IMITREX) 100 MG tablet Take 1 tablet (100 mg total) by mouth as needed for Migraine. Take at the onset of headache, may take 1 more in 1 hour if needed.    [DISCONTINUED] clindamycin (CLEOCIN T) 1 % lotion daily as needed.     [DISCONTINUED] desonide (DESOWEN) 0.05 % ointment 0.05 % daily as needed.  Ointment Topical As directed.  Apply to affected area twice a daily over face    [DISCONTINUED] PRAMOSONE 2.5-1 % Lotn lotion daily as needed.     [DISCONTINUED] promethazine (PHENERGAN) 25 MG tablet Take 25 mg by mouth every 4 to 6 hours as needed.      Family History     Problem Relation (Age of Onset)    Breast cancer Maternal Aunt, Cousin    Cancer Maternal Grandmother, Maternal Aunt    Diabetes Mother, Paternal Grandfather, Maternal Grandfather    Glaucoma Father    Heart attack Paternal Grandfather, Maternal Grandfather    Heart disease Father, Paternal Grandfather    Hypertension     Leukemia Brother    No Known Problems Sister, Maternal Uncle, Paternal Aunt, Paternal Uncle, Paternal Grandmother        Tobacco Use    Smoking status: Never Smoker    Smokeless tobacco: Never Used   Substance and Sexual Activity    Alcohol use: No     Alcohol/week: 0.5 oz     Types: 1 Standard drinks or equivalent per week     Comment: 1 per year    Drug use: No    Sexual activity: Never     Birth control/protection: OCP, Pill     Comment: pt is a virgin     Review of Systems   Constitution: Negative for chills, decreased appetite, diaphoresis, fever, weight gain and weight loss.   Eyes: Negative for blurred vision.   Cardiovascular: Negative for chest pain, dyspnea on exertion, irregular heartbeat, leg swelling, near-syncope, orthopnea and palpitations.   Respiratory: Negative for cough, shortness of breath, snoring and wheezing.     Gastrointestinal: Positive for abdominal pain, diarrhea, nausea and vomiting.   Genitourinary: Negative for bladder incontinence and urgency.     Objective:     Vital Signs (Most Recent):  Temp: 98.3 °F (36.8 °C) (06/05/19 1308)  Pulse: 88 (06/05/19 1826)  Resp: 20 (06/05/19 1308)  BP: (!) 142/78 (06/05/19 1826)  SpO2: 95 % (06/05/19 1826) Vital Signs (24h Range):  Temp:  [98.1 °F (36.7 °C)-98.3 °F (36.8 °C)] 98.3 °F (36.8 °C)  Pulse:  [] 88  Resp:  [20] 20  SpO2:  [95 %-97 %] 95 %  BP: (100-142)/(60-78) 142/78     Weight: 59 kg (130 lb)  Body mass index is 26.26 kg/m².    SpO2: 95 %         Intake/Output Summary (Last 24 hours) at 6/5/2019 2039  Last data filed at 6/5/2019 1711  Gross per 24 hour   Intake 500 ml   Output --   Net 500 ml       Lines/Drains/Airways     Central Venous Catheter Line                 Tunneled Central Line Insertion/Assessment - Single Lumen  01/30/17 0743 right subclavian 856 days         Percutaneous Central Line Insertion/Assessment - single lumen  09/12/17 1249 left internal jugular 631 days          Peripheral Intravenous Line                 Peripheral IV - Single Lumen 06/05/19 1411 18 G Left Forearm less than 1 day                Physical Exam   Constitutional: She is oriented to person, place, and time. She appears well-developed and well-nourished. No distress.   HENT:   Head: Normocephalic and atraumatic.   Eyes: Pupils are equal, round, and reactive to light. Conjunctivae are normal.   Neck: Normal range of motion. Neck supple. No thyromegaly present.   Cardiovascular: Normal rate, regular rhythm and intact distal pulses. Exam reveals no gallop and no friction rub.   Murmur heard.  Pulses:       Carotid pulses are 2+ on the right side, and 2+ on the left side.       Radial pulses are 2+ on the right side, and 2+ on the left side.   Pulmonary/Chest: Effort normal and breath sounds normal. No respiratory distress. She has no wheezes.   Abdominal: Soft. Bowel sounds are  normal. She exhibits no distension. There is no tenderness.   Neurological: She is alert and oriented to person, place, and time.   Skin: She is not diaphoretic.       Significant Labs:   CMP   Recent Labs   Lab 06/05/19  1411      K 3.5      CO2 15*   GLU 95   BUN 6   CREATININE 0.7   CALCIUM 9.4   PROT 8.1   ALBUMIN 3.6   BILITOT 0.4   ALKPHOS 72   AST 17   ALT 26   ANIONGAP 12   ESTGFRAFRICA >60.0   EGFRNONAA >60.0   , CBC   Recent Labs   Lab 06/05/19  1411   WBC 7.23   HGB 14.8   HCT 43.0      , INR No results for input(s): INR, PROTIME in the last 48 hours. and Lipid Panel No results for input(s): CHOL, HDL, LDLCALC, TRIG, CHOLHDL in the last 48 hours.    Significant Imaging: Echocardiogram:   Transthoracic echo (TTE) complete (Cupid Only):   Results for orders placed or performed during the hospital encounter of 02/22/19   Transthoracic echo (TTE) complete (Cupid Only)   Result Value Ref Range    Ascending aorta 2.41 cm    STJ 2.23 cm    AV mean gradient 5.34 mmHg    Ao peak gil 1.52 m/s    Ao VTI 29.31 cm    IVS 0.62 0.6 - 1.1 cm    LA size 2.97 cm    Left Atrium Major Axis 4.46 cm    Left Atrium Minor Axis 4.08 cm    LVIDD 4.30 3.5 - 6.0 cm    LVIDS 2.16 2.1 - 4.0 cm    LVOT diameter 1.83 cm    LVOT peak VTI 29.60 cm    PW 0.60 0.6 - 1.1 cm    MV Peak A Gil 0.85 m/s    E wave decelartion time 241.36 msec    MV Peak E Gil 0.91 m/s    PV Peak D Gil 0.50 m/s    PV Peak S Gil 0.40 m/s    RA Major Axis 4.14 cm    RA Width 3.32 cm    RVDD 3.98 cm    Sinus 2.61 cm    TAPSE 1.93 cm    TR Max Gil 3.57 m/s    TDI LATERAL 0.14     TDI SEPTAL 0.09     LA WIDTH 3.25 cm    LV Diastolic Volume 82.85 mL    LV Systolic Volume 15.56 mL    RV S' 10.60 m/s    LVOT peak gil [id number] m/s    LV LATERAL E/E' RATIO 6.50     LV SEPTAL E/E' RATIO 10.11     FS 50 %    LA volume 34.96 cm3    LV mass 74.39 g    Left Ventricle Relative Wall Thickness 0.28 cm    AV valve area 2.65 cm2    AV Velocity Ratio 0.96      AV index (prosthetic) 1.01     E/A ratio 1.07     Mean e' 0.12     Pulm vein S/D ratio 0.80     LVOT area 2.63 cm2    LVOT stroke volume 77.82 cm3    AV peak gradient 9.24 mmHg    E/E' ratio 7.91     Triscuspid Valve Regurgitation Peak Gradient 50.98 mmHg    BSA 1.61 m2    LV Systolic Volume Index 9.9 mL/m2    LV Diastolic Volume Index 52.77 mL/m2    LA Volume Index 22.3 mL/m2    LV Mass Index 47.4 g/m2    Right Atrial Pressure (from IVC) 3 mmHg    TV rest pulmonary artery pressure 54 mmHg     Assessment and Plan:     Cholelithiasis  Ms. Perez is a 49 y.o. female, Cardiology consulted for admission with cholecystitis in this patient on IV Remodulin therapy. 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 Presents with 1 week history of N/V/D with RUQ ABD pain. Patient is hemodynamically stable with unremarkable objective findings (Lactic wnl). Hx of complicated anesthesia induction 2014. General surgery deferring surgical intervention at the time.    - CBC unremarkable, CMP (Cr 0.7), LFTS wnl, Lactic 0.7, UA: +1 protein, +3 Ketones, +1 blood,    - U/s Abdomen: Cholelithiasis with findings suggestive of acute cholecystitis, including pericholecystic fluid and apositive sonographic Castellon's sign.     - TTE Ef 65%, Pasp 54mmHG, CVP 3mmHg     - Admit to [facility], TSU only, Discussed with Dr. Rai  - Appreciate General surgery assistance    - Conservative management for now, awaiting HIDA scan   - Continue Rocephin / Flagyl for now  - Continue Remodulin, Pulm-HTN orderset, DO NOT FLUSH OR INTERRUPT REMODULIN. PLEASE CALL [facility] FELLOW ASAP FOR ANY ISSUES WITH PUMP / CASSETTE   - Anti-Emetics with Zofran   - Pain control   - Advance diet as tolerated, keep on low salt 2000mg Na and 2L fluid restriction diet            VTE Risk Mitigation (From admission, onward)    None          Thank you for your consult. I will  follow-up with patient. Please contact us if you have any additional questions.    Pema Gates MD  Cardiology   [facility]

## 2019-06-06 NOTE — ASSESSMENT & PLAN NOTE
-NPO for HIDA scan   -Surgery consult appreciated  -Continue rocephin and flagyl  -Prn pain and nausea control

## 2019-06-06 NOTE — ASSESSMENT & PLAN NOTE
49 y.o. female with obesity, seizure disorder, ABHIJEET, Asthma, and severe pulmonary HTN on Remodulin    - Symptoms based on history and physical are consistent with biliary colic. Currently does not have fever, leukocytosis. Does have known gallstones and pericholecystic fluid on ultrasound. No gallbladder wall thickening.  - Patient is a very high risk surgical candidate given her severe pulmonary HTN and the fact that she already displayed significant hemodynamic instability the last time she underwent general anesthesia. Given all of this, would recommend HIDA scan for further evaluation. She may require percutaneous cholecystostomy tube   - Recommend admission to heart failure. Continue IV abx for now   - Surgery will follow

## 2019-06-06 NOTE — H&P
[facility]  Heart Transplant  H&P    Patient Name: Yuni Perez  MRN: [id number]  Admission Date: 6/5/2019  Attending Physician: Ladonna Gr MD  Primary Care Provider: Lulu Sandhu MD  Principal Problem:Cholelithiasis    Subjective:   CC-abdominal pain  History of Present Illness:  49 year old female with history of IPAH (deferred LUT) on triple therapy (remodulin 80ng/kg/min, Letairis 10mg qday and Adcircoa 40mg qday), seizure disorder, ABHIJEET(not using CPAP), Asthma who presents for abdminal pain that started 1 week ago. It has been waxing and waning, RUQ with associated nausea, vomiting. Patient reports vomiting bile colored liquid but no blood. She also reports diarrhea more than her usual with remodulin She denies any fever, does reports sweats. Denies any shortness of breath.    Past Medical History:   Diagnosis Date    AR (allergic rhinitis)     Cholelithiasis, common bile duct     Chronic low back pain     Eye pressure 2017    GERD (gastroesophageal reflux disease)     History of migraine headaches     Hypothyroidism     Lumbar disc disease     Menorrhagia     Mild asthma     Obesity     Plantar fasciitis of left foot     Primary pulmonary hypertension     followed by heart transplant/pulmonary     Seizure disorder     x 1 in 2008    Seizures     Sleep apnea     TMJ (dislocation of temporomandibular joint)     Tricuspid regurgitation        Past Surgical History:   Procedure Laterality Date    CARDIAC CATHETERIZATION      CENTRAL VENOUS CATHETER INSERTION      HEART CATH-LEFT Left 1/30/2017    [facility] CATH LAB    HEART CATH-RIGHT Right 8/20/2018    [facility] CATH LAB    HEART CATH-RIGHT Right 1/30/2017    [facility] CATH LAB    HEART CATH-RIGHT Right 9/8/2014    [facility] CATH LAB    PORTACATH PLACEMENT      UPPER  "GASTROINTESTINAL ENDOSCOPY         Review of patient's allergies indicates:   Allergen Reactions    Vibra-tabs [doxycycline hyclate] Anaphylaxis     "throat felt like it was closing"    Adhesive Hives     Silk tape    Amoxicillin Rash    Chlorhexidine Other (See Comments)    Doxycycline        Current Facility-Administered Medications   Medication    acetaminophen tablet 1,000 mg    acetaminophen tablet 650 mg    albuterol inhaler 2 puff    [START ON 6/6/2019] ambrisentan tablet 10 mg    [START ON 6/6/2019] cefTRIAXone injection 1 g    [START ON 6/6/2019] cetirizine tablet 10 mg    [START ON 6/6/2019] cyanocobalamin tablet 250 mcg    diphenhydrAMINE capsule 50 mg    [START ON 6/6/2019] fluticasone furoate-vilanterol 100-25 mcg/dose diskus inhaler 1 puff    [START ON 6/6/2019] fluticasone propionate 50 mcg/actuation nasal spray 100 mcg    [START ON 6/6/2019] folic acid tablet 1,000 mcg    [START ON 6/6/2019] levothyroxine tablet 137 mcg    loperamide capsule 2 mg    [START ON 6/6/2019] metroNIDAZOLE tablet 500 mg    ondansetron disintegrating tablet 8 mg    ondansetron injection 4 mg    ondansetron tablet 4 mg    oxyCODONE-acetaminophen 5-325 mg per tablet 1 tablet    [START ON 6/6/2019] pantoprazole EC tablet 40 mg    tadalafil tablet 40 mg    topiramate tablet 100 mg    [START ON 6/6/2019] treprostinil (REMODULIN) 1,000,000 ng in sodium chloride 0.9% 100 mL infusion    VELETRI/REMODULIN CASSETTE    VELETRI/REMODULIN CASSETTE     Current Outpatient Medications   Medication Sig    acetaminophen (TYLENOL EXTRA STRENGTH) 500 MG tablet Take 1,000 mg by mouth every 6 (six) hours as needed for Pain.    ADVAIR DISKUS 100-50 mcg/dose diskus inhaler INHALE 1 PUFF TWICE DAILY    ambrisentan (LETAIRIS) 10 MG Tab Take 1 tablet (10 mg total) by mouth once daily.    azelastine (ASTELIN) 137 mcg (0.1 %) nasal spray 2 sprays by Nasal route 2 (two) times daily.     cyanocobalamin, vitamin B-12, " 2,500 mcg Subl Place 2,500 mcg under the tongue once daily.     ergocalciferol (VITAMIN D2) 50,000 unit Cap Take 1 capsule (50,000 Units total) by mouth every 7 days.    esomeprazole (NEXIUM) 40 MG capsule Take 40 mg by mouth once daily.    ferrous sulfate 325 (65 FE) MG EC tablet Take 325 mg by mouth daily as needed.     fluticasone (FLONASE) 50 mcg/actuation nasal spray INHALE 2 SPRAYS IN EACH NOSTRIL DAILY    folic acid (FOLVITE) 1 MG tablet TAKE ONE TABLET BY MOUTH once DAILY    levothyroxine (SYNTHROID) 137 MCG Tab tablet TAKE ONE TABLET BY MOUTH once DAILY    loratadine (CLARITIN) 10 mg tablet Take 10 mg by mouth once daily.    MICROGESTIN FE 1/20, 28, 1 mg-20 mcg (21)/75 mg (7) per tablet TAKE ONE TABLET BY MOUTH ONCE DAILY    ondansetron (ZOFRAN) 4 MG tablet Take 4 mg by mouth every 6 (six) hours as needed. 1 Tablet Oral Every 6 hours    tadalafil, PULMONARY HYPERTENSION, (ADCIRCA) 20 mg Tab Take 40 mg by mouth once daily.    topiramate (TOPAMAX) 100 MG tablet Take 1 tablet (100 mg total) by mouth 2 (two) times daily.    treprostinil (REMODULIN) 2.5 mg/mL Soln Mix cassette as directed and infuse continuously per physician titration orders on dosing sheet. Current dose 80ng/kg/min    VENTOLIN HFA 90 mcg/actuation inhaler inhale 2 puffs BY MOUTH EVERY 4-6 HOURS    citalopram (CELEXA) 10 MG tablet Take 10 mg by mouth once daily.    diphenhydrAMINE (BENADRYL) 25 mg capsule Take 50 mg by mouth every 6 (six) hours as needed for Itching.     sumatriptan (IMITREX) 100 MG tablet Take 1 tablet (100 mg total) by mouth as needed for Migraine. Take at the onset of headache, may take 1 more in 1 hour if needed.     Family History     Problem Relation (Age of Onset)    Breast cancer Maternal Aunt, Cousin    Cancer Maternal Grandmother, Maternal Aunt    Diabetes Mother, Paternal Grandfather, Maternal Grandfather    Glaucoma Father    Heart attack Paternal Grandfather, Maternal Grandfather    Heart disease  Father, Paternal Grandfather    Hypertension     Leukemia Brother    No Known Problems Sister, Maternal Uncle, Paternal Aunt, Paternal Uncle, Paternal Grandmother        Tobacco Use    Smoking status: Never Smoker    Smokeless tobacco: Never Used   Substance and Sexual Activity    Alcohol use: No     Alcohol/week: 0.5 oz     Types: 1 Standard drinks or equivalent per week     Comment: 1 per year    Drug use: No    Sexual activity: Never     Birth control/protection: OCP, Pill     Comment: pt is a virgin     Review of Systems   All other systems reviewed and are negative.    Objective:     Vital Signs (Most Recent):  Temp: 98.6 °F (37 °C) (06/05/19 2058)  Pulse: 92 (06/05/19 2058)  Resp: 18 (06/05/19 2058)  BP: 114/65 (06/05/19 2058)  SpO2: 96 % (06/05/19 2058) Vital Signs (24h Range):  Temp:  [98.1 °F (36.7 °C)-98.6 °F (37 °C)] 98.6 °F (37 °C)  Pulse:  [] 92  Resp:  [18-20] 18  SpO2:  [94 %-97 %] 96 %  BP: ()/(60-78) 114/65     Patient Vitals for the past 72 hrs (Last 3 readings):   Weight   06/05/19 1308 59 kg (130 lb)     Body mass index is 26.26 kg/m².      Intake/Output Summary (Last 24 hours) at 6/5/2019 2151  Last data filed at 6/5/2019 2100  Gross per 24 hour   Intake 1500 ml   Output --   Net 1500 ml       Physical Exam  General: alert, awake and oriented x 3  Eyes:PERRL.   Neck:no JVD   Lungs:  clear to auscultation bilaterally   Cardiovascular: Heart: regular rate and rhythm, S1, S2 normal, no murmur, click, rub or gallop.   Pulses-2+ radial, femoral, DP, PT b/l  Extremities: no cyanosis or edema.   Abdomen/Rectal: Abdomen: soft, tender RUQ non-distented; bowel sounds normal  Neurologic: Normal strength and tone. No focal numbness or weakness  Significant Labs:  CBC:  Recent Labs   Lab 06/05/19  1411   WBC 7.23   RBC 4.98   HGB 14.8   HCT 43.0      MCV 86   MCH 29.7   MCHC 34.4     BNP:  No results for input(s): BNP in the last 168 hours.    Invalid input(s):  BNPTRIAGELBLO  CMP:  Recent Labs   Lab 19  1411   GLU 95   CALCIUM 9.4   ALBUMIN 3.6   PROT 8.1      K 3.5   CO2 15*      BUN 6   CREATININE 0.7   ALKPHOS 72   ALT 26   AST 17   BILITOT 0.4      Coagulation:   No results for input(s): PT, INR, APTT in the last 168 hours.  LDH:  No results for input(s): LDH in the last 72 hours.  Microbiology:  Microbiology Results (last 7 days)     ** No results found for the last 168 hours. **          I have reviewed all pertinent labs within the past 24 hours.    Diagnostic Results:  I have reviewed all pertinent imaging results/findings within the past 24 hours.    Assessment/Plan:     * Cholelithiasis  HIDA scan   Surgery consult appreciated  Continue rocephin and flagyl  Prn pain and nausea control    Migraine without aura and without status migrainosus, not intractable  Continue topamax    Hypothyroidism (acquired)  Continue synthyroid at home dose    Primary pulmonary hypertension  Continue remodulin(current cassette to  tomorrow pm)  Continue tadalafil  Continue ambrisentan        Toribio Paula MD  Heart Transplant  [facility]

## 2019-06-06 NOTE — HPI
Ms. Perez is a 49 y.o. female, Cardiology consulted for admission with cholecystitis in this patient on IV Remodulin therapy. 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 Who presented to ED with 1 week c/o ongoing N/V/D associated with crampy RUQ abd discomfort. Associated with ingestion of any food, particularly fatty / greasy foods. She denies any fevers, chills, sick contact, recent travels.  Patient states similar discomfort many years ago with acute cholecystitis attempted for lap kita however did not tolerate anesthesia induction and procedure was aborted. Furthermore, had discomfort in 04/2019 which resolved spontaneously. Patient has been on Remodulin IV therapy for 11 years, tolerating well. No issues with this. Compliant with medications.     General surgery consulted in ED, recommended HIDA scan, received Rocephin and flagyl in ED.     ED: CBC unremarkable, CMP (Cr 0.7), LFTS wnl, Lactic 0.7, UA: +1 protein, +3 Ketones, +1 blood,     U/s Abdomen:   1. Cholelithiasis with findings suggestive of acute cholecystitis, including pericholecystic fluid and apositive sonographic Castellon's sign.  However, no gallbladder wall thickening or hyperemia.  2.  Mass within the right hepatic lobe, measuring up to 4.9 cm, seen on multiple prior exams and previously characterized as an adenoma on MRI.    CXR: Negative    TTE 02/2019  · Normal left ventricular systolic function. The estimated ejection fraction is 65%  · Normal LV diastolic function.  · Septal wall has abnormal motion.  · Mildly reduced right ventricular systolic function.  · Mild tricuspid regurgitation.  · The estimated PA systolic pressure is 54 mm Hg  · Pulmonary hypertension present.  Normal central venous pressure (3 mm Hg).    [facility] 08/2018 Hemodynamic Results  PW: 8/6 (5)  PA: 101/35 (57)  RV: 92/0  RVEDP: 6   AO_SAT: 99  PA_SAT: 72  BP: 126/85  HR:  81  CONDITION 1 (8/20/2018 11:03:20):  RA: 6/5 (4)  FICKCI: 2.5700  FICKCO: 4.0800  PVR: 1038.0000

## 2019-06-06 NOTE — HPI
49 year old female with history of IPAH (deferred LUT) on triple therapy (remodulin 80ng/kg/min, Letairis 10mg qday and Adcircoa 40mg qday), seizure disorder, ABHIJEET(not using CPAP), Asthma who presents for abdminal pain that started 1 week ago. It has been waxing and waning, RUQ with associated nausea, vomiting. Patient reports vomiting bile colored liquid but no blood. She also reports diarrhea more than her usual with remodulin She denies any fever, does reports sweats. Denies any shortness of breath.

## 2019-06-06 NOTE — SUBJECTIVE & OBJECTIVE
"Interval History: No acute events overnight. Continued pain similar to admission.     Medications:  Continuous Infusions:   treprostinil (REMODULIN) infusion      veletri/remodulin cassette      veletri/remodulin cassette       Scheduled Meds:   ambrisentan  10 mg Oral Daily    cefTRIAXone (ROCEPHIN) IVPB  1 g Intravenous Q24H    cetirizine  10 mg Oral Daily    cyanocobalamin  250 mcg Oral Daily    fluticasone furoate-vilanterol  1 puff Inhalation Daily    fluticasone propionate  2 spray Each Nare Daily    folic acid  1,000 mcg Oral Daily    levothyroxine  137 mcg Oral Daily    metroNIDAZOLE  500 mg Oral Q8H    pantoprazole  40 mg Oral Daily    potassium chloride  40 mEq Oral Once    tadalafil  40 mg Oral Daily    topiramate  100 mg Oral BID     PRN Meds:acetaminophen, albuterol, diphenhydrAMINE, loperamide, ondansetron, ondansetron, ondansetron, oxyCODONE-acetaminophen     Review of patient's allergies indicates:   Allergen Reactions    Vibra-tabs [doxycycline hyclate] Anaphylaxis     "throat felt like it was closing"    Adhesive Hives     Silk tape    Amoxicillin Rash    Chlorhexidine Other (See Comments)    Doxycycline      Objective:     Vital Signs (Most Recent):  Temp: 97.5 °F (36.4 °C) (06/06/19 0722)  Pulse: 87 (06/06/19 0742)  Resp: 17 (06/06/19 0722)  BP: 110/78 (06/06/19 0722)  SpO2: (!) 94 % (06/06/19 0722) Vital Signs (24h Range):  Temp:  [97.5 °F (36.4 °C)-98.6 °F (37 °C)] 97.5 °F (36.4 °C)  Pulse:  [] 87  Resp:  [17-20] 17  SpO2:  [90 %-97 %] 94 %  BP: ()/(54-78) 110/78     Weight: 59 kg (130 lb)  Body mass index is 26.26 kg/m².    Intake/Output - Last 3 Shifts       06/04 0700 - 06/05 0659 06/05 0700 - 06/06 0659 06/06 0700 - 06/07 0659    P.O.  0     IV Piggyback  1500     Total Intake(mL/kg)  1500 (25.4)     Urine (mL/kg/hr)  500     Stool  0     Total Output  500     Net  +1000            Stool Occurrence  0 x           Physical Exam   Constitutional: She is " oriented to person, place, and time. She appears well-developed and well-nourished.   obese   Eyes: Pupils are equal, round, and reactive to light. EOM are normal.   Neck: Neck supple.   Cardiovascular: Normal rate and regular rhythm.   Pulmonary/Chest: Effort normal.   Eason catheter left chest   Abdominal: Soft. She exhibits no distension. There is tenderness (RUQ). There is no rebound and no guarding.   Neurological: She is alert and oriented to person, place, and time.   Skin: Skin is warm and dry.   Psychiatric: She has a normal mood and affect. Her behavior is normal.   Nursing note and vitals reviewed.      Significant Labs:  CBC:   Recent Labs   Lab 06/06/19  0631   WBC 4.64   RBC 4.25   HGB 12.5   HCT 36.8*      MCV 87   MCH 29.4   MCHC 34.0     CMP:   Recent Labs   Lab 06/06/19  0631   GLU 68*   CALCIUM 8.4*   ALBUMIN 2.9*   PROT 6.4      K 3.3*   CO2 17*   *   BUN 5*   CREATININE 0.6   ALKPHOS 58   ALT 20   AST 14   BILITOT 0.3     LFTs:   Recent Labs   Lab 06/06/19  0631   ALT 20   AST 14   ALKPHOS 58   BILITOT 0.3   PROT 6.4   ALBUMIN 2.9*       Significant Diagnostics:  I have reviewed all pertinent imaging results/findings within the past 24 hours.

## 2019-06-06 NOTE — ASSESSMENT & PLAN NOTE
HIDA scan   Surgery consult appreciated  Continue rocephin and flagyl  Prn pain and nausea control

## 2019-06-06 NOTE — PROGRESS NOTES
Admit Note     Met with patient, mother and father to assess needs. Patient is a 49 y.o. single female, admitted for IPAH, seizure disorder, ABHIJEET, abdominal pain and cholelithiasis.     Patient admitted from emergency on 6/5/2019 .  At this time, patient presents as alert and oriented x 4, good eye contact, calm and communicative.  At this time, patients caregiver presents as alert and oriented x 4, good eye contact, calm and communicative.    Household/Family Systems     Patient resides with patient's parents, at     [address] [address].        Support system includes parents.    Patient does not have dependents that are need of being cared for.   Pt does not have any children.    Pt reports limited extended support system, pt's parents are only reported support people.      Patients primary caregiver is self.    Pt's cell:  [phone]  Pt's home: [phone]    Emergency contact:  Deuce Perez (father) [phone]  Britta Perez (mother) [phone]    During admission, patient's caregiver plans to visit.   Confirmed patient and patients caregivers do have access to reliable transportation.    Cognitive Status/Learning     Patient reports reading ability as college and states patient does not have difficulty with writing, seeing, hearing, comprehension and learning.  Pt reports reading difficulties since she was a child. Pt also reports difficulty with memory , especially short term memory.  Pt reports this is due to the medication she takes.  Patient reports patient learns best by visual and hands on .     Needed: No.   Highest education level: Attended College/Technical School    Vocation/Disability   .  Working for Income: No  If no, reason not working: illness.  Patient reports that due to her illness she has not worked. Pt does not receive disability or any income.  Pt's insurance is paid for by her father.    Pt was encouraged to apply for [place] medicaid and  disability.     Adherence     Patient reports a high level of adherence to patients health care regimen.  Adherence counseling and education provided. Patient verbalizes understanding.    Substance Use    Patient reports the following substance usage.    Tobacco: none, patient denies any use.  Alcohol: only once a year.   Illicit Drugs/Non-prescribed Medications: none, patient denies any use.  Patient states clear understanding of the potential impact of substance use.  Substance abstinence/cessation counseling, education and resources provided and reviewed.     Services Utilizing/ADLS    Infusion Service: Prior to admission, patient utilizing? no  Home Health: Prior to admission, patient utilizing? no  DME: Prior to admission, yes, cane  Pulmonary/Cardiac Rehab: Prior to admission, no, however pt has completed two pulmonary rehab units in the past.  Dialysis:  Prior to admission, no  Transplant Specialty Pharmacy:  Prior to admission, no.    Prior to admission, patient reports patient was independent with ADLS.  Pt reports some difficulty and requires assitance with housework, cooking and laundry. Pt  was driving. Pt's parents also drive and can assist with transportation as needed.     Patient reports patient is not able to care for self at this time due to compromised medical condition (as documented in medical record) and physical weakness..  Patient indicates a willingness to care for self once medically cleared to do so.    Insurance/Medications    Insured by   Payor/Plan Subscr  Sex Relation Sub. Ins. ID Effective Group Num   1. BLUE CROSS BL* VERA TURNER* 1970 Female  [id number] 10[birth year]/10 [id number]                                   [address] [address]      Primary Insurance (for UNOS reporting): Private Insurance  Secondary Insurance (for UNOS reporting): None    Patient reports patient is able to obtain and afford medications at this time and at time of discharge.    Living Will/Healthcare Power of      Patient states patient does not have a LW and/or HCPA.  Worker provided paperwork. \   provided education regarding LW and HCPA and the completion of forms.    Coping/Mental Health    Patient is coping adequately with the aid of  family members.   Patient indicates mental health difficulties.   Pt reports difficulty with anxiety and depression.  Pt reports she does not get out of the house much and usually communicates with friends via text messages.  Pt reports she started mental health medication last Fall, but got out of the habit of taking it and stopped.  Pt reports want to see a counselor or psychiatrist, however her insurance does not cover same.  Pt would like to talk to team about her mental health needs.   Pt reports her parents are supportive, especially her father. Pt is looking forward to seeing friends for a once a year get together in July and hopes her illness will not prevent same.    Worker provided support.       Discharge Planning    At time of discharge, patient plans to return to patient's home under the care of self and parents as needed.  Patients parents will transport patient.  Per rounds today, expected discharge date has not been medically determined at this time. Patient and patients caregiver  verbalize understanding and are involved in treatment planning and discharge process.    Additional Concerns    Patient is being followed for needs, education, resources, information, emotional support, supportive counseling, and for supportive and skilled discharge plan of care.  providing ongoing psychosocial support, education, resources and d/c planning as needed.  SW remains available. Patient's caregiver verbalizes understanding and agreement with information reviewed,  availability and how to access available resources as needed. Patient verbalizes understanding and agreement with information reviewed, social work availability, and how to  access available resources as needed.

## 2019-06-06 NOTE — SUBJECTIVE & OBJECTIVE
No current facility-administered medications on file prior to encounter.      Current Outpatient Medications on File Prior to Encounter   Medication Sig    acetaminophen (TYLENOL EXTRA STRENGTH) 500 MG tablet Take 1,000 mg by mouth every 6 (six) hours as needed for Pain.    ADVAIR DISKUS 100-50 mcg/dose diskus inhaler INHALE 1 PUFF TWICE DAILY    ambrisentan (LETAIRIS) 10 MG Tab Take 1 tablet (10 mg total) by mouth once daily.    azelastine (ASTELIN) 137 mcg (0.1 %) nasal spray 2 sprays by Nasal route 2 (two) times daily.     citalopram (CELEXA) 10 MG tablet Take 10 mg by mouth once daily.    cyanocobalamin, vitamin B-12, 2,500 mcg Subl Place 2,500 mcg under the tongue once daily.     ergocalciferol (VITAMIN D2) 50,000 unit Cap Take 1 capsule (50,000 Units total) by mouth every 7 days.    esomeprazole (NEXIUM) 40 MG capsule Take 40 mg by mouth once daily.    ferrous sulfate 325 (65 FE) MG EC tablet Take 325 mg by mouth daily as needed.     fluticasone (FLONASE) 50 mcg/actuation nasal spray INHALE 2 SPRAYS IN EACH NOSTRIL DAILY    folic acid (FOLVITE) 1 MG tablet TAKE ONE TABLET BY MOUTH once DAILY    levothyroxine (SYNTHROID) 137 MCG Tab tablet TAKE ONE TABLET BY MOUTH once DAILY    loratadine (CLARITIN) 10 mg tablet Take 10 mg by mouth once daily.    MICROGESTIN FE 1/20, 28, 1 mg-20 mcg (21)/75 mg (7) per tablet TAKE ONE TABLET BY MOUTH ONCE DAILY    tadalafil, PULMONARY HYPERTENSION, (ADCIRCA) 20 mg Tab Take 40 mg by mouth once daily.    topiramate (TOPAMAX) 100 MG tablet Take 1 tablet (100 mg total) by mouth 2 (two) times daily.    treprostinil (REMODULIN) 2.5 mg/mL Soln Mix cassette as directed and infuse continuously per physician titration orders on dosing sheet. Current dose 80ng/kg/min    VENTOLIN HFA 90 mcg/actuation inhaler inhale 2 puffs BY MOUTH EVERY 4-6 HOURS    diphenhydrAMINE (BENADRYL) 25 mg capsule Take 50 mg by mouth every 6 (six) hours as needed for Itching.     ondansetron  "(ZOFRAN) 4 MG tablet Take 4 mg by mouth every 6 (six) hours as needed. 1 Tablet Oral Every 6 hours    sumatriptan (IMITREX) 100 MG tablet Take 1 tablet (100 mg total) by mouth as needed for Migraine. Take at the onset of headache, may take 1 more in 1 hour if needed.    [DISCONTINUED] clindamycin (CLEOCIN T) 1 % lotion daily as needed.     [DISCONTINUED] desonide (DESOWEN) 0.05 % ointment 0.05 % daily as needed.  Ointment Topical As directed.  Apply to affected area twice a daily over face    [DISCONTINUED] PRAMOSONE 2.5-1 % Lotn lotion daily as needed.     [DISCONTINUED] promethazine (PHENERGAN) 25 MG tablet Take 25 mg by mouth every 4 to 6 hours as needed.        Review of patient's allergies indicates:   Allergen Reactions    Vibra-tabs [doxycycline hyclate] Anaphylaxis     "throat felt like it was closing"    Adhesive Hives     Silk tape    Amoxicillin Rash    Chlorhexidine Other (See Comments)    Doxycycline        Past Medical History:   Diagnosis Date    AR (allergic rhinitis)     Cholelithiasis, common bile duct     Chronic low back pain     Eye pressure 2017    GERD (gastroesophageal reflux disease)     History of migraine headaches     Hypothyroidism     Lumbar disc disease     Menorrhagia     Mild asthma     Obesity     Plantar fasciitis of left foot     Primary pulmonary hypertension     followed by heart transplant/pulmonary     Seizure disorder     x 1 in 2008    Seizures     Sleep apnea     TMJ (dislocation of temporomandibular joint)     Tricuspid regurgitation      Past Surgical History:   Procedure Laterality Date    CARDIAC CATHETERIZATION      CENTRAL VENOUS CATHETER INSERTION      HEART CATH-LEFT Left 1/30/2017    [facility]    HEART CATH-RIGHT Right 8/20/2018    [facility] CATH LAB    HEART CATH-RIGHT Right 1/30/2017    [facility] CATH [facility]    HEART CATH-RIGHT Right " 9/8/2014    Performed by [facility] at [facility] CATH LAB    PORTACATH PLACEMENT      UPPER GASTROINTESTINAL ENDOSCOPY       Family History     Problem Relation (Age of Onset)    Breast cancer Maternal Aunt, Cousin    Cancer Maternal Grandmother, Maternal Aunt    Diabetes Mother, Paternal Grandfather, Maternal Grandfather    Glaucoma Father    Heart attack Paternal Grandfather, Maternal Grandfather    Heart disease Father, Paternal Grandfather    Hypertension     Leukemia Brother    No Known Problems Sister, Maternal Uncle, Paternal Aunt, Paternal Uncle, Paternal Grandmother        Tobacco Use    Smoking status: Never Smoker    Smokeless tobacco: Never Used   Substance and Sexual Activity    Alcohol use: No     Alcohol/week: 0.5 oz     Types: 1 Standard drinks or equivalent per week     Comment: 1 per year    Drug use: No    Sexual activity: Never     Birth control/protection: OCP, Pill     Comment: pt is a virgin     Review of Systems   Constitutional: Positive for appetite change and fatigue. Negative for fever and unexpected weight change.   HENT: Negative.    Respiratory: Negative.    Cardiovascular: Negative.    Gastrointestinal: Positive for abdominal pain, nausea and vomiting.   Endocrine: Negative.    Genitourinary: Negative.    Allergic/Immunologic: Negative.    Neurological: Negative.    Hematological: Negative.    Psychiatric/Behavioral: Negative.      Objective:     Vital Signs (Most Recent):  Temp: 98.3 °F (36.8 °C) (06/05/19 1308)  Pulse: 88 (06/05/19 1826)  Resp: 20 (06/05/19 1308)  BP: (!) 142/78 (06/05/19 1826)  SpO2: 95 % (06/05/19 1826) Vital Signs (24h Range):  Temp:  [98.1 °F (36.7 °C)-98.3 °F (36.8 °C)] 98.3 °F (36.8 °C)  Pulse:  [] 88  Resp:  [20] 20  SpO2:  [95 %-97 %] 95 %  BP: (100-142)/(60-78) 142/78     Weight: 59 kg (130 lb)  Body mass index is 26.26 kg/m².    Physical Exam   Constitutional: She is oriented to person, place, and time. She appears well-developed and  well-nourished.   obese   Eyes: Pupils are equal, round, and reactive to light. EOM are normal.   Neck: Neck supple.   Cardiovascular: Normal rate and regular rhythm.   Pulmonary/Chest: Effort normal.   Eason catheter left chest   Abdominal: Soft. She exhibits no distension. There is tenderness (RUQ). There is no rebound and no guarding.   Neurological: She is alert and oriented to person, place, and time.   Skin: Skin is warm and dry.   Psychiatric: She has a normal mood and affect. Her behavior is normal.   Nursing note and vitals reviewed.      Significant Labs:  CBC:   Recent Labs   Lab 06/05/19  1411   WBC 7.23   RBC 4.98   HGB 14.8   HCT 43.0      MCV 86   MCH 29.7   MCHC 34.4     BMP:   Recent Labs   Lab 06/05/19  1411   GLU 95      K 3.5      CO2 15*   BUN 6   CREATININE 0.7   CALCIUM 9.4     CMP:   Recent Labs   Lab 06/05/19  1411   GLU 95   CALCIUM 9.4   ALBUMIN 3.6   PROT 8.1      K 3.5   CO2 15*      BUN 6   CREATININE 0.7   ALKPHOS 72   ALT 26   AST 17   BILITOT 0.4     LFTs:   Recent Labs   Lab 06/05/19  1411   ALT 26   AST 17   ALKPHOS 72   BILITOT 0.4   PROT 8.1   ALBUMIN 3.6       Significant Diagnostics:  I have reviewed all pertinent imaging results/findings within the past 24 hours.   Impression       1.  Cholelithiasis with findings suggestive of acute cholecystitis, including pericholecystic fluid and apositive sonographic Castellon's sign.  However, no gallbladder wall thickening or hyperemia.    2.  Mass within the right hepatic lobe, measuring up to 4.9 cm, seen on multiple prior exams and previously characterized as an adenoma on MRI.

## 2019-06-06 NOTE — ASSESSMENT & PLAN NOTE
Ms. Perez is a 49 y.o. female, Cardiology consulted for admission with cholecystitis in this patient on IV Remodulin therapy. 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 Presents with 1 week history of N/V/D with RUQ ABD pain. Patient is hemodynamically stable with unremarkable objective findings (Lactic wnl). Hx of complicated anesthesia induction 2014. General surgery deferring surgical intervention at the time.    - CBC unremarkable, CMP (Cr 0.7), LFTS wnl, Lactic 0.7, UA: +1 protein, +3 Ketones, +1 blood,    - U/s Abdomen: Cholelithiasis with findings suggestive of acute cholecystitis, including pericholecystic fluid and apositive sonographic Castellon's sign.     - TTE Ef 65%, Pasp 54mmHG, CVP 3mmHg     - Admit to HTS Primary, TSU only, Discussed with Dr. Rai  - Appreciate General surgery assistance    - Conservative management for now, awaiting HIDA scan   - Continue Rocephin / Flagyl for now  - Continue Remodulin, Pulm-HTN orderset, DO NOT FLUSH OR INTERRUPT REMODULIN. PLEASE CALL [facility] FELLOW ASAP FOR ANY ISSUES WITH PUMP / CASSETTE   - Anti-Emetics with Zofran   - Pain control   - Advance diet as tolerated, keep on low salt 2000mg Na and 2L fluid restriction diet

## 2019-06-06 NOTE — PROGRESS NOTES
Patient arrived back to floor from HIDA scan.  Patient AAOx3, VSS, and in NAD.  Patient reports pain a 4/10.  AM meds administered and NOLAN Hurtado NP notified of patient's return and instructed to keep patient NPO w meds for the time.  Will continue to monitor patient.

## 2019-06-06 NOTE — ASSESSMENT & PLAN NOTE
49 y.o. female with obesity, seizure disorder, ABHIJEET, Asthma, and severe pulmonary HTN on Remodulin    - Symptoms based on history and physical are consistent with biliary colic. Currently does not have fever, leukocytosis. Does have known gallstones and pericholecystic fluid on ultrasound. No gallbladder wall thickening.  - Patient is a very high risk surgical candidate given her severe pulmonary HTN and the fact that she already displayed significant hemodynamic instability the last time she underwent general anesthesia. Given all of this, would recommend HIDA scan for further evaluation. She may require percutaneous cholecystostomy tube   -continue abx  -follow up HIDA today  -surgery will follow

## 2019-06-06 NOTE — SUBJECTIVE & OBJECTIVE
"Past Medical History:   Diagnosis Date    AR (allergic rhinitis)     Cholelithiasis, common bile duct     Chronic low back pain     Eye pressure 2017    GERD (gastroesophageal reflux disease)     History of migraine headaches     Hypothyroidism     Lumbar disc disease     Menorrhagia     Mild asthma     Obesity     Plantar fasciitis of left foot     Primary pulmonary hypertension     followed by heart transplant/pulmonary     Seizure disorder     x 1 in 2008    Seizures     Sleep apnea     TMJ (dislocation of temporomandibular joint)     Tricuspid regurgitation        Past Surgical History:   Procedure Laterality Date    CARDIAC CATHETERIZATION      CENTRAL VENOUS CATHETER INSERTION      HEART CATH-LEFT Left 1/30/2017    [facility] C[facility]    HEART CATH-RIGHT Right 8/20/2018    [facility]vonne [facility] at [facility] CATH [facility]    HEART CATH-RIGHT Right 1/30/2017    [facility] CATH [facility]    HEART CATH-RIGHT Right 9/8/2014    [facility] CATH LAB    PORTACATH PLACEMENT      UPPER GASTROINTESTINAL ENDOSCOPY         Review of patient's allergies indicates:   Allergen Reactions    Vibra-tabs [doxycycline hyclate] Anaphylaxis     "throat felt like it was closing"    Adhesive Hives     Silk tape    Amoxicillin Rash    Chlorhexidine Other (See Comments)    Doxycycline        Current Facility-Administered Medications   Medication    acetaminophen tablet 1,000 mg    acetaminophen tablet 650 mg    albuterol inhaler 2 puff    [START ON 6/6/2019] ambrisentan tablet 10 mg    [START ON 6/6/2019] cefTRIAXone injection 1 g    [START ON 6/6/2019] cetirizine tablet 10 mg    [START ON 6/6/2019] cyanocobalamin tablet 250 mcg    diphenhydrAMINE capsule 50 mg    [START ON 6/6/2019] fluticasone furoate-vilanterol 100-25 mcg/dose diskus inhaler 1 puff    [START ON 6/6/2019] fluticasone propionate 50 mcg/actuation " nasal spray 100 mcg    [START ON 6/6/2019] folic acid tablet 1,000 mcg    [START ON 6/6/2019] levothyroxine tablet 137 mcg    loperamide capsule 2 mg    [START ON 6/6/2019] metroNIDAZOLE tablet 500 mg    ondansetron disintegrating tablet 8 mg    ondansetron injection 4 mg    ondansetron tablet 4 mg    oxyCODONE-acetaminophen 5-325 mg per tablet 1 tablet    [START ON 6/6/2019] pantoprazole EC tablet 40 mg    tadalafil tablet 40 mg    topiramate tablet 100 mg    [START ON 6/6/2019] treprostinil (REMODULIN) 1,000,000 ng in sodium chloride 0.9% 100 mL infusion    VELETRI/REMODULIN CASSETTE    VELETRI/REMODULIN CASSETTE     Current Outpatient Medications   Medication Sig    acetaminophen (TYLENOL EXTRA STRENGTH) 500 MG tablet Take 1,000 mg by mouth every 6 (six) hours as needed for Pain.    ADVAIR DISKUS 100-50 mcg/dose diskus inhaler INHALE 1 PUFF TWICE DAILY    ambrisentan (LETAIRIS) 10 MG Tab Take 1 tablet (10 mg total) by mouth once daily.    azelastine (ASTELIN) 137 mcg (0.1 %) nasal spray 2 sprays by Nasal route 2 (two) times daily.     cyanocobalamin, vitamin B-12, 2,500 mcg Subl Place 2,500 mcg under the tongue once daily.     ergocalciferol (VITAMIN D2) 50,000 unit Cap Take 1 capsule (50,000 Units total) by mouth every 7 days.    esomeprazole (NEXIUM) 40 MG capsule Take 40 mg by mouth once daily.    ferrous sulfate 325 (65 FE) MG EC tablet Take 325 mg by mouth daily as needed.     fluticasone (FLONASE) 50 mcg/actuation nasal spray INHALE 2 SPRAYS IN EACH NOSTRIL DAILY    folic acid (FOLVITE) 1 MG tablet TAKE ONE TABLET BY MOUTH once DAILY    levothyroxine (SYNTHROID) 137 MCG Tab tablet TAKE ONE TABLET BY MOUTH once DAILY    loratadine (CLARITIN) 10 mg tablet Take 10 mg by mouth once daily.    MICROGESTIN FE 1/20, 28, 1 mg-20 mcg (21)/75 mg (7) per tablet TAKE ONE TABLET BY MOUTH ONCE DAILY    ondansetron (ZOFRAN) 4 MG tablet Take 4 mg by mouth every 6 (six) hours as needed. 1 Tablet  Oral Every 6 hours    tadalafil, PULMONARY HYPERTENSION, (ADCIRCA) 20 mg Tab Take 40 mg by mouth once daily.    topiramate (TOPAMAX) 100 MG tablet Take 1 tablet (100 mg total) by mouth 2 (two) times daily.    treprostinil (REMODULIN) 2.5 mg/mL Soln Mix cassette as directed and infuse continuously per physician titration orders on dosing sheet. Current dose 80ng/kg/min    VENTOLIN HFA 90 mcg/actuation inhaler inhale 2 puffs BY MOUTH EVERY 4-6 HOURS    citalopram (CELEXA) 10 MG tablet Take 10 mg by mouth once daily.    diphenhydrAMINE (BENADRYL) 25 mg capsule Take 50 mg by mouth every 6 (six) hours as needed for Itching.     sumatriptan (IMITREX) 100 MG tablet Take 1 tablet (100 mg total) by mouth as needed for Migraine. Take at the onset of headache, may take 1 more in 1 hour if needed.     Family History     Problem Relation (Age of Onset)    Breast cancer Maternal Aunt, Cousin    Cancer Maternal Grandmother, Maternal Aunt    Diabetes Mother, Paternal Grandfather, Maternal Grandfather    Glaucoma Father    Heart attack Paternal Grandfather, Maternal Grandfather    Heart disease Father, Paternal Grandfather    Hypertension     Leukemia Brother    No Known Problems Sister, Maternal Uncle, Paternal Aunt, Paternal Uncle, Paternal Grandmother        Tobacco Use    Smoking status: Never Smoker    Smokeless tobacco: Never Used   Substance and Sexual Activity    Alcohol use: No     Alcohol/week: 0.5 oz     Types: 1 Standard drinks or equivalent per week     Comment: 1 per year    Drug use: No    Sexual activity: Never     Birth control/protection: OCP, Pill     Comment: pt is a virgin     Review of Systems   All other systems reviewed and are negative.    Objective:     Vital Signs (Most Recent):  Temp: 98.6 °F (37 °C) (06/05/19 2058)  Pulse: 92 (06/05/19 2058)  Resp: 18 (06/05/19 2058)  BP: 114/65 (06/05/19 2058)  SpO2: 96 % (06/05/19 2058) Vital Signs (24h Range):  Temp:  [98.1 °F (36.7 °C)-98.6 °F (37 °C)]  98.6 °F (37 °C)  Pulse:  [] 92  Resp:  [18-20] 18  SpO2:  [94 %-97 %] 96 %  BP: ()/(60-78) 114/65     Patient Vitals for the past 72 hrs (Last 3 readings):   Weight   06/05/19 1308 59 kg (130 lb)     Body mass index is 26.26 kg/m².      Intake/Output Summary (Last 24 hours) at 6/5/2019 2151  Last data filed at 6/5/2019 2100  Gross per 24 hour   Intake 1500 ml   Output --   Net 1500 ml       Physical Exam  General: alert, awake and oriented x 3  Eyes:PERRL.   Neck:no JVD   Lungs:  clear to auscultation bilaterally   Cardiovascular: Heart: regular rate and rhythm, S1, S2 normal, no murmur, click, rub or gallop.   Pulses-2+ radial, femoral, DP, PT b/l  Extremities: no cyanosis or edema.   Abdomen/Rectal: Abdomen: soft, tender RUQ non-distented; bowel sounds normal  Neurologic: Normal strength and tone. No focal numbness or weakness  Significant Labs:  CBC:  Recent Labs   Lab 06/05/19  1411   WBC 7.23   RBC 4.98   HGB 14.8   HCT 43.0      MCV 86   MCH 29.7   MCHC 34.4     BNP:  No results for input(s): BNP in the last 168 hours.    Invalid input(s): BNPTRIAGELBLO  CMP:  Recent Labs   Lab 06/05/19  1411   GLU 95   CALCIUM 9.4   ALBUMIN 3.6   PROT 8.1      K 3.5   CO2 15*      BUN 6   CREATININE 0.7   ALKPHOS 72   ALT 26   AST 17   BILITOT 0.4      Coagulation:   No results for input(s): PT, INR, APTT in the last 168 hours.  LDH:  No results for input(s): LDH in the last 72 hours.  Microbiology:  Microbiology Results (last 7 days)     ** No results found for the last 168 hours. **          I have reviewed all pertinent labs within the past 24 hours.    Diagnostic Results:  I have reviewed all pertinent imaging results/findings within the past 24 hours.

## 2019-06-06 NOTE — PLAN OF CARE
Problem: Adult Inpatient Plan of Care  Goal: Plan of Care Review  Patient aao x4. Call bell within reach. She is up independent. hida scan done this am. Plan to place kita tube tomorrow. Pt npo after MN. Patient verbalizes understanding. Will continue to monitor.

## 2019-06-06 NOTE — HPI
Yuni Perez is a 49 y.o. female with seizure disorder, ABHIJEET, Asthma, and severe pulmonary HTN on Remodulin presents to the ED with complaint of crampy RUQ abdominal pain since last Tuesday. Patient reports that she has had this pain since April. Pain is worse with food and is associated with nausea and vomiting. She denies any fevers, chills, constipation, diarrhea. Us abdomen obtained in the ED revealed cholelithiasis and pericholecystic fluid. No gallbladder wall thickening. Labs are WNL. Normal WBC, LFTs, Tbili. General Surgery consulted for acute cholecystitis.     Of note, she was taken to the OR back in 2014 for planned lap cholecystectomy but the procedure was aborted secondary to severe pulmonary HTN and hemodynamic instability after induction of anesthesia. Per operative report, she had significantly elevated PA pressures along with systemic hypotension requiring multiple pressors. The procedure was aborted and she was admitted to the ICU after.

## 2019-06-06 NOTE — ED NOTES
Tele box [id number] applied to pt. Marifer in war room states able to see pt on monitor, rhythm NS with HR 97.

## 2019-06-06 NOTE — CONSULTS
[facility]  General Surgery  Consult Note    Patient Name: Yuni Perez  MRN: [id number]  Code Status: Prior  Admission Date: 6/5/2019  Hospital Length of Stay: 0 days  Attending Physician: Ladonna Gr MD  Primary Care Provider: Lulu Sandhu MD    Patient information was obtained from patient, past medical records and ER records.     Inpatient consult to General surgery  Consult performed by: Jan Lewis MD  Consult ordered by: Ladonna Gr MD        Subjective:     Principal Problem: <principal problem not specified>    History of Present Illness: Yuni Perez is a 49 y.o. female with seizure disorder, ABHIJEET, Asthma, and severe pulmonary HTN on Remodulin presents to the ED with complaint of crampy RUQ abdominal pain since last Tuesday. Patient reports that she has had this pain since April. Pain is worse with food and is associated with nausea and vomiting. She denies any fevers, chills, constipation, diarrhea. Us abdomen obtained in the ED revealed cholelithiasis and pericholecystic fluid. No gallbladder wall thickening. Labs are WNL. Normal WBC, LFTs, Tbili. General Surgery consulted for acute cholecystitis.     Of note, she was taken to the OR back in 2014 for planned lap cholecystectomy but the procedure was aborted secondary to severe pulmonary HTN and hemodynamic instability after induction of anesthesia. Per operative report, she had significantly elevated PA pressures along with systemic hypotension requiring multiple pressors. The procedure was aborted and she was admitted to the ICU after.     No current facility-administered medications on file prior to encounter.      Current Outpatient Medications on File Prior to Encounter   Medication Sig    acetaminophen (TYLENOL EXTRA STRENGTH) 500 MG tablet Take 1,000 mg by mouth every 6 (six) hours as needed for Pain.    ADVAIR DISKUS 100-50 mcg/dose diskus inhaler INHALE 1 PUFF TWICE DAILY     ambrisentan (LETAIRIS) 10 MG Tab Take 1 tablet (10 mg total) by mouth once daily.    azelastine (ASTELIN) 137 mcg (0.1 %) nasal spray 2 sprays by Nasal route 2 (two) times daily.     citalopram (CELEXA) 10 MG tablet Take 10 mg by mouth once daily.    cyanocobalamin, vitamin B-12, 2,500 mcg Subl Place 2,500 mcg under the tongue once daily.     ergocalciferol (VITAMIN D2) 50,000 unit Cap Take 1 capsule (50,000 Units total) by mouth every 7 days.    esomeprazole (NEXIUM) 40 MG capsule Take 40 mg by mouth once daily.    ferrous sulfate 325 (65 FE) MG EC tablet Take 325 mg by mouth daily as needed.     fluticasone (FLONASE) 50 mcg/actuation nasal spray INHALE 2 SPRAYS IN EACH NOSTRIL DAILY    folic acid (FOLVITE) 1 MG tablet TAKE ONE TABLET BY MOUTH once DAILY    levothyroxine (SYNTHROID) 137 MCG Tab tablet TAKE ONE TABLET BY MOUTH once DAILY    loratadine (CLARITIN) 10 mg tablet Take 10 mg by mouth once daily.    MICROGESTIN FE 1/20, 28, 1 mg-20 mcg (21)/75 mg (7) per tablet TAKE ONE TABLET BY MOUTH ONCE DAILY    tadalafil, PULMONARY HYPERTENSION, (ADCIRCA) 20 mg Tab Take 40 mg by mouth once daily.    topiramate (TOPAMAX) 100 MG tablet Take 1 tablet (100 mg total) by mouth 2 (two) times daily.    treprostinil (REMODULIN) 2.5 mg/mL Soln Mix cassette as directed and infuse continuously per physician titration orders on dosing sheet. Current dose 80ng/kg/min    VENTOLIN HFA 90 mcg/actuation inhaler inhale 2 puffs BY MOUTH EVERY 4-6 HOURS    diphenhydrAMINE (BENADRYL) 25 mg capsule Take 50 mg by mouth every 6 (six) hours as needed for Itching.     ondansetron (ZOFRAN) 4 MG tablet Take 4 mg by mouth every 6 (six) hours as needed. 1 Tablet Oral Every 6 hours    sumatriptan (IMITREX) 100 MG tablet Take 1 tablet (100 mg total) by mouth as needed for Migraine. Take at the onset of headache, may take 1 more in 1 hour if needed.    [DISCONTINUED] clindamycin (CLEOCIN T) 1 % lotion daily as needed.      "[DISCONTINUED] desonide (DESOWEN) 0.05 % ointment 0.05 % daily as needed.  Ointment Topical As directed.  Apply to affected area twice a daily over face    [DISCONTINUED] PRAMOSONE 2.5-1 % Lotn lotion daily as needed.     [DISCONTINUED] promethazine (PHENERGAN) 25 MG tablet Take 25 mg by mouth every 4 to 6 hours as needed.        Review of patient's allergies indicates:   Allergen Reactions    Vibra-tabs [doxycycline hyclate] Anaphylaxis     "throat felt like it was closing"    Adhesive Hives     Silk tape    Amoxicillin Rash    Chlorhexidine Other (See Comments)    Doxycycline        Past Medical History:   Diagnosis Date    AR (allergic rhinitis)     Cholelithiasis, common bile duct     Chronic low back pain     Eye pressure 2017    GERD (gastroesophageal reflux disease)     History of migraine headaches     Hypothyroidism     Lumbar disc disease     Menorrhagia     Mild asthma     Obesity     Plantar fasciitis of left foot     Primary pulmonary hypertension     followed by heart transplant/pulmonary     Seizure disorder     x 1 in 2008    Seizures     Sleep apnea     TMJ (dislocation of temporomandibular joint)     Tricuspid regurgitation      Past Surgical History:   Procedure Laterality Date    CARDIAC CATHETERIZATION      CENTRAL VENOUS CATHETER INSERTION      HEART CATH-LEFT Left 1/30/2017    Performed by [facility] CATH LAB    HEART CATH-RIGHT Right 8/20/2018    Performed by [facility]vonne Rai MD at [facility] CATH LAB    HEART CATH-RIGHT Right 1/30/2017    Performed [facility] at [facility] CATH LAB    HEART CATH-RIGHT Right 9/8/2014    [facility] at [facility] CATH LAB    PORTACATH PLACEMENT      UPPER GASTROINTESTINAL ENDOSCOPY       Family History     Problem Relation (Age of Onset)    Breast cancer Maternal Aunt, Cousin    Cancer Maternal Grandmother, Maternal Aunt    Diabetes Mother, Paternal Grandfather, Maternal Grandfather    Glaucoma " Father    Heart attack Paternal Grandfather, Maternal Grandfather    Heart disease Father, Paternal Grandfather    Hypertension     Leukemia Brother    No Known Problems Sister, Maternal Uncle, Paternal Aunt, Paternal Uncle, Paternal Grandmother        Tobacco Use    Smoking status: Never Smoker    Smokeless tobacco: Never Used   Substance and Sexual Activity    Alcohol use: No     Alcohol/week: 0.5 oz     Types: 1 Standard drinks or equivalent per week     Comment: 1 per year    Drug use: No    Sexual activity: Never     Birth control/protection: OCP, Pill     Comment: pt is a virgin     Review of Systems   Constitutional: Positive for appetite change and fatigue. Negative for fever and unexpected weight change.   HENT: Negative.    Respiratory: Negative.    Cardiovascular: Negative.    Gastrointestinal: Positive for abdominal pain, nausea and vomiting.   Endocrine: Negative.    Genitourinary: Negative.    Allergic/Immunologic: Negative.    Neurological: Negative.    Hematological: Negative.    Psychiatric/Behavioral: Negative.      Objective:     Vital Signs (Most Recent):  Temp: 98.3 °F (36.8 °C) (06/05/19 1308)  Pulse: 88 (06/05/19 1826)  Resp: 20 (06/05/19 1308)  BP: (!) 142/78 (06/05/19 1826)  SpO2: 95 % (06/05/19 1826) Vital Signs (24h Range):  Temp:  [98.1 °F (36.7 °C)-98.3 °F (36.8 °C)] 98.3 °F (36.8 °C)  Pulse:  [] 88  Resp:  [20] 20  SpO2:  [95 %-97 %] 95 %  BP: (100-142)/(60-78) 142/78     Weight: 59 kg (130 lb)  Body mass index is 26.26 kg/m².    Physical Exam   Constitutional: She is oriented to person, place, and time. She appears well-developed and well-nourished.   obese   Eyes: Pupils are equal, round, and reactive to light. EOM are normal.   Neck: Neck supple.   Cardiovascular: Normal rate and regular rhythm.   Pulmonary/Chest: Effort normal.   Eason catheter left chest   Abdominal: Soft. She exhibits no distension. There is tenderness (RUQ). There is no rebound and no guarding.    Neurological: She is alert and oriented to person, place, and time.   Skin: Skin is warm and dry.   Psychiatric: She has a normal mood and affect. Her behavior is normal.   Nursing note and vitals reviewed.      Significant Labs:  CBC:   Recent Labs   Lab 06/05/19  1411   WBC 7.23   RBC 4.98   HGB 14.8   HCT 43.0      MCV 86   MCH 29.7   MCHC 34.4     BMP:   Recent Labs   Lab 06/05/19  1411   GLU 95      K 3.5      CO2 15*   BUN 6   CREATININE 0.7   CALCIUM 9.4     CMP:   Recent Labs   Lab 06/05/19  1411   GLU 95   CALCIUM 9.4   ALBUMIN 3.6   PROT 8.1      K 3.5   CO2 15*      BUN 6   CREATININE 0.7   ALKPHOS 72   ALT 26   AST 17   BILITOT 0.4     LFTs:   Recent Labs   Lab 06/05/19  1411   ALT 26   AST 17   ALKPHOS 72   BILITOT 0.4   PROT 8.1   ALBUMIN 3.6       Significant Diagnostics:  I have reviewed all pertinent imaging results/findings within the past 24 hours.   Impression       1.  Cholelithiasis with findings suggestive of acute cholecystitis, including pericholecystic fluid and apositive sonographic Castellon's sign.  However, no gallbladder wall thickening or hyperemia.    2.  Mass within the right hepatic lobe, measuring up to 4.9 cm, seen on multiple prior exams and previously characterized as an adenoma on MRI.         Assessment/Plan:     Cholelithiasis   49 y.o. female with obesity, seizure disorder, ABHIJEET, Asthma, and severe pulmonary HTN on Remodulin    - Symptoms based on history and physical are consistent with biliary colic. Currently does not have fever, leukocytosis. Does have known gallstones and pericholecystic fluid on ultrasound. No gallbladder wall thickening.  - Patient is a very high risk surgical candidate given her severe pulmonary HTN and the fact that she already displayed significant hemodynamic instability the last time she underwent general anesthesia. Given all of this, would recommend HIDA scan for further evaluation. She may require percutaneous  cholecystostomy tube   - Recommend admission to heart failure. Continue IV abx for now   - Surgery will follow      VTE Risk Mitigation (From admission, onward)    None          Thank you for your consult. I will follow-up with patient. Please contact us if you have any additional questions.    Jan Lewis MD  General Surgery  [facility]

## 2019-06-06 NOTE — SUBJECTIVE & OBJECTIVE
"Past Medical History:   Diagnosis Date    AR (allergic rhinitis)     Cholelithiasis, common bile duct     Chronic low back pain     Eye pressure 2017    GERD (gastroesophageal reflux disease)     History of migraine headaches     Hypothyroidism     Lumbar disc disease     Menorrhagia     Mild asthma     Obesity     Plantar fasciitis of left foot     Primary pulmonary hypertension     followed by heart transplant/pulmonary     Seizure disorder     x 1 in 2008    Seizures     Sleep apnea     TMJ (dislocation of temporomandibular joint)     Tricuspid regurgitation        Past Surgical History:   Procedure Laterality Date    CARDIAC CATHETERIZATION      CENTRAL VENOUS CATHETER INSERTION      HEART CATH-LEFT Left 1/30/2017    Performed by [facility] at [facility] CATH LAB    HEART CATH-RIGHT Right 8/20/2018    Performed by Ivonne Rai MD at [facility] CATH LAB    HEART CATH-RIGHT Right 1/30/2017    Performed by [facility] at [facility] CATH LAB    HEART CATH-RIGHT Right 9/8/2014    Performed by [facility]ai [facility] at [facility] CATH LAB    PORTACATH PLACEMENT      UPPER GASTROINTESTINAL ENDOSCOPY         Review of patient's allergies indicates:   Allergen Reactions    Vibra-tabs [doxycycline hyclate] Anaphylaxis     "throat felt like it was closing"    Adhesive Hives     Silk tape    Amoxicillin Rash    Chlorhexidine Other (See Comments)    Doxycycline        No current facility-administered medications on file prior to encounter.      Current Outpatient Medications on File Prior to Encounter   Medication Sig    acetaminophen (TYLENOL EXTRA STRENGTH) 500 MG tablet Take 1,000 mg by mouth every 6 (six) hours as needed for Pain.    ADVAIR DISKUS 100-50 mcg/dose diskus inhaler INHALE 1 PUFF TWICE DAILY    ambrisentan (LETAIRIS) 10 MG Tab Take 1 tablet (10 mg total) by mouth once daily.    azelastine (ASTELIN) 137 mcg (0.1 %) nasal spray 2 sprays by Nasal route 2 (two) times daily.     " citalopram (CELEXA) 10 MG tablet Take 10 mg by mouth once daily.    cyanocobalamin, vitamin B-12, 2,500 mcg Subl Place 2,500 mcg under the tongue once daily.     ergocalciferol (VITAMIN D2) 50,000 unit Cap Take 1 capsule (50,000 Units total) by mouth every 7 days.    esomeprazole (NEXIUM) 40 MG capsule Take 40 mg by mouth once daily.    ferrous sulfate 325 (65 FE) MG EC tablet Take 325 mg by mouth daily as needed.     fluticasone (FLONASE) 50 mcg/actuation nasal spray INHALE 2 SPRAYS IN EACH NOSTRIL DAILY    folic acid (FOLVITE) 1 MG tablet TAKE ONE TABLET BY MOUTH once DAILY    levothyroxine (SYNTHROID) 137 MCG Tab tablet TAKE ONE TABLET BY MOUTH once DAILY    loratadine (CLARITIN) 10 mg tablet Take 10 mg by mouth once daily.    MICROGESTIN FE 1/20, 28, 1 mg-20 mcg (21)/75 mg (7) per tablet TAKE ONE TABLET BY MOUTH ONCE DAILY    tadalafil, PULMONARY HYPERTENSION, (ADCIRCA) 20 mg Tab Take 40 mg by mouth once daily.    topiramate (TOPAMAX) 100 MG tablet Take 1 tablet (100 mg total) by mouth 2 (two) times daily.    treprostinil (REMODULIN) 2.5 mg/mL Soln Mix cassette as directed and infuse continuously per physician titration orders on dosing sheet. Current dose 80ng/kg/min    VENTOLIN HFA 90 mcg/actuation inhaler inhale 2 puffs BY MOUTH EVERY 4-6 HOURS    diphenhydrAMINE (BENADRYL) 25 mg capsule Take 50 mg by mouth every 6 (six) hours as needed for Itching.     ondansetron (ZOFRAN) 4 MG tablet Take 4 mg by mouth every 6 (six) hours as needed. 1 Tablet Oral Every 6 hours    sumatriptan (IMITREX) 100 MG tablet Take 1 tablet (100 mg total) by mouth as needed for Migraine. Take at the onset of headache, may take 1 more in 1 hour if needed.    [DISCONTINUED] clindamycin (CLEOCIN T) 1 % lotion daily as needed.     [DISCONTINUED] desonide (DESOWEN) 0.05 % ointment 0.05 % daily as needed.  Ointment Topical As directed.  Apply to affected area twice a daily over face    [DISCONTINUED] PRAMOSONE 2.5-1 % Lotn  lotion daily as needed.     [DISCONTINUED] promethazine (PHENERGAN) 25 MG tablet Take 25 mg by mouth every 4 to 6 hours as needed.      Family History     Problem Relation (Age of Onset)    Breast cancer Maternal Aunt, Cousin    Cancer Maternal Grandmother, Maternal Aunt    Diabetes Mother, Paternal Grandfather, Maternal Grandfather    Glaucoma Father    Heart attack Paternal Grandfather, Maternal Grandfather    Heart disease Father, Paternal Grandfather    Hypertension     Leukemia Brother    No Known Problems Sister, Maternal Uncle, Paternal Aunt, Paternal Uncle, Paternal Grandmother        Tobacco Use    Smoking status: Never Smoker    Smokeless tobacco: Never Used   Substance and Sexual Activity    Alcohol use: No     Alcohol/week: 0.5 oz     Types: 1 Standard drinks or equivalent per week     Comment: 1 per year    Drug use: No    Sexual activity: Never     Birth control/protection: OCP, Pill     Comment: pt is a virgin     Review of Systems   Constitution: Negative for chills, decreased appetite, diaphoresis, fever, weight gain and weight loss.   Eyes: Negative for blurred vision.   Cardiovascular: Negative for chest pain, dyspnea on exertion, irregular heartbeat, leg swelling, near-syncope, orthopnea and palpitations.   Respiratory: Negative for cough, shortness of breath, snoring and wheezing.    Gastrointestinal: Positive for abdominal pain, diarrhea, nausea and vomiting.   Genitourinary: Negative for bladder incontinence and urgency.     Objective:     Vital Signs (Most Recent):  Temp: 98.3 °F (36.8 °C) (06/05/19 1308)  Pulse: 88 (06/05/19 1826)  Resp: 20 (06/05/19 1308)  BP: (!) 142/78 (06/05/19 1826)  SpO2: 95 % (06/05/19 1826) Vital Signs (24h Range):  Temp:  [98.1 °F (36.7 °C)-98.3 °F (36.8 °C)] 98.3 °F (36.8 °C)  Pulse:  [] 88  Resp:  [20] 20  SpO2:  [95 %-97 %] 95 %  BP: (100-142)/(60-78) 142/78     Weight: 59 kg (130 lb)  Body mass index is 26.26 kg/m².    SpO2: 95 %          Intake/Output Summary (Last 24 hours) at 6/5/2019 2039  Last data filed at 6/5/2019 1711  Gross per 24 hour   Intake 500 ml   Output --   Net 500 ml       Lines/Drains/Airways     Central Venous Catheter Line                 Tunneled Central Line Insertion/Assessment - Single Lumen  01/30/17 0743 right subclavian 856 days         Percutaneous Central Line Insertion/Assessment - single lumen  09/12/17 1249 left internal jugular 631 days          Peripheral Intravenous Line                 Peripheral IV - Single Lumen 06/05/19 1411 18 G Left Forearm less than 1 day                Physical Exam   Constitutional: She is oriented to person, place, and time. She appears well-developed and well-nourished. No distress.   HENT:   Head: Normocephalic and atraumatic.   Eyes: Pupils are equal, round, and reactive to light. Conjunctivae are normal.   Neck: Normal range of motion. Neck supple. No thyromegaly present.   Cardiovascular: Normal rate, regular rhythm and intact distal pulses. Exam reveals no gallop and no friction rub.   Murmur heard.  Pulses:       Carotid pulses are 2+ on the right side, and 2+ on the left side.       Radial pulses are 2+ on the right side, and 2+ on the left side.   Pulmonary/Chest: Effort normal and breath sounds normal. No respiratory distress. She has no wheezes.   Abdominal: Soft. Bowel sounds are normal. She exhibits no distension. There is no tenderness.   Neurological: She is alert and oriented to person, place, and time.   Skin: She is not diaphoretic.       Significant Labs:   CMP   Recent Labs   Lab 06/05/19  1411      K 3.5      CO2 15*   GLU 95   BUN 6   CREATININE 0.7   CALCIUM 9.4   PROT 8.1   ALBUMIN 3.6   BILITOT 0.4   ALKPHOS 72   AST 17   ALT 26   ANIONGAP 12   ESTGFRAFRICA >60.0   EGFRNONAA >60.0   , CBC   Recent Labs   Lab 06/05/19  1411   WBC 7.23   HGB 14.8   HCT 43.0      , INR No results for input(s): INR, PROTIME in the last 48 hours. and Lipid  Panel No results for input(s): CHOL, HDL, LDLCALC, TRIG, CHOLHDL in the last 48 hours.    Significant Imaging: Echocardiogram:   Transthoracic echo (TTE) complete (Cupid Only):   Results for orders placed or performed during the hospital encounter of 02/22/19   Transthoracic echo (TTE) complete (Cupid Only)   Result Value Ref Range    Ascending aorta 2.41 cm    STJ 2.23 cm    AV mean gradient 5.34 mmHg    Ao peak gil 1.52 m/s    Ao VTI 29.31 cm    IVS 0.62 0.6 - 1.1 cm    LA size 2.97 cm    Left Atrium Major Axis 4.46 cm    Left Atrium Minor Axis 4.08 cm    LVIDD 4.30 3.5 - 6.0 cm    LVIDS 2.16 2.1 - 4.0 cm    LVOT diameter 1.83 cm    LVOT peak VTI 29.60 cm    PW 0.60 0.6 - 1.1 cm    MV Peak A Gil 0.85 m/s    E wave decelartion time 241.36 msec    MV Peak E Gil 0.91 m/s    PV Peak D Gil 0.50 m/s    PV Peak S Gil 0.40 m/s    RA Major Axis 4.14 cm    RA Width 3.32 cm    RVDD 3.98 cm    Sinus 2.61 cm    TAPSE 1.93 cm    TR Max Gil 3.57 m/s    TDI LATERAL 0.14     TDI SEPTAL 0.09     LA WIDTH 3.25 cm    LV Diastolic Volume 82.85 mL    LV Systolic Volume 15.56 mL    RV S' 10.60 m/s    LVOT peak gil [id number] m/s    LV LATERAL E/E' RATIO 6.50     LV SEPTAL E/E' RATIO 10.11     FS 50 %    LA volume 34.96 cm3    LV mass 74.39 g    Left Ventricle Relative Wall Thickness 0.28 cm    AV valve area 2.65 cm2    AV Velocity Ratio 0.96     AV index (prosthetic) 1.01     E/A ratio 1.07     Mean e' 0.12     Pulm vein S/D ratio 0.80     LVOT area 2.63 cm2    LVOT stroke volume 77.82 cm3    AV peak gradient 9.24 mmHg    E/E' ratio 7.91     Triscuspid Valve Regurgitation Peak Gradient 50.98 mmHg    BSA 1.61 m2    LV Systolic Volume Index 9.9 mL/m2    LV Diastolic Volume Index 52.77 mL/m2    LA Volume Index 22.3 mL/m2    LV Mass Index 47.4 g/m2    Right Atrial Pressure (from IVC) 3 mmHg    TV rest pulmonary artery pressure 54 mmHg

## 2019-06-06 NOTE — PROGRESS NOTES
[facility]  General Surgery  Progress Note    Subjective:     History of Present Illness:  Yuni Perez is a 49 y.o. female with seizure disorder, ABHIJEET, Asthma, and severe pulmonary HTN on Remodulin presents to the ED with complaint of crampy RUQ abdominal pain since last Tuesday. Patient reports that she has had this pain since April. Pain is worse with food and is associated with nausea and vomiting. She denies any fevers, chills, constipation, diarrhea. Us abdomen obtained in the ED revealed cholelithiasis and pericholecystic fluid. No gallbladder wall thickening. Labs are WNL. Normal WBC, LFTs, Tbili. General Surgery consulted for acute cholecystitis.     Of note, she was taken to the OR back in 2014 for planned lap cholecystectomy but the procedure was aborted secondary to severe pulmonary HTN and hemodynamic instability after induction of anesthesia. Per operative report, she had significantly elevated PA pressures along with systemic hypotension requiring multiple pressors. The procedure was aborted and she was admitted to the ICU after.     Post-Op Info:  * No surgery found *         Interval History: No acute events overnight. Continued pain similar to admission.     Medications:  Continuous Infusions:   treprostinil (REMODULIN) infusion      veletri/remodulin cassette      veletri/remodulin cassette       Scheduled Meds:   ambrisentan  10 mg Oral Daily    cefTRIAXone (ROCEPHIN) IVPB  1 g Intravenous Q24H    cetirizine  10 mg Oral Daily    cyanocobalamin  250 mcg Oral Daily    fluticasone furoate-vilanterol  1 puff Inhalation Daily    fluticasone propionate  2 spray Each Nare Daily    folic acid  1,000 mcg Oral Daily    levothyroxine  137 mcg Oral Daily    metroNIDAZOLE  500 mg Oral Q8H    pantoprazole  40 mg Oral Daily    potassium chloride  40 mEq Oral Once    tadalafil  40 mg Oral Daily    topiramate  100 mg Oral BID     PRN Meds:acetaminophen, albuterol,  "diphenhydrAMINE, loperamide, ondansetron, ondansetron, ondansetron, oxyCODONE-acetaminophen     Review of patient's allergies indicates:   Allergen Reactions    Vibra-tabs [doxycycline hyclate] Anaphylaxis     "throat felt like it was closing"    Adhesive Hives     Silk tape    Amoxicillin Rash    Chlorhexidine Other (See Comments)    Doxycycline      Objective:     Vital Signs (Most Recent):  Temp: 97.5 °F (36.4 °C) (06/06/19 0722)  Pulse: 87 (06/06/19 0742)  Resp: 17 (06/06/19 0722)  BP: 110/78 (06/06/19 0722)  SpO2: (!) 94 % (06/06/19 0722) Vital Signs (24h Range):  Temp:  [97.5 °F (36.4 °C)-98.6 °F (37 °C)] 97.5 °F (36.4 °C)  Pulse:  [] 87  Resp:  [17-20] 17  SpO2:  [90 %-97 %] 94 %  BP: ()/(54-78) 110/78     Weight: 59 kg (130 lb)  Body mass index is 26.26 kg/m².    Intake/Output - Last 3 Shifts       06/04 0700 - 06/05 0659 06/05 0700 - 06/06 0659 06/06 0700 - 06/07 0659    P.O.  0     IV Piggyback  1500     Total Intake(mL/kg)  1500 (25.4)     Urine (mL/kg/hr)  500     Stool  0     Total Output  500     Net  +1000            Stool Occurrence  0 x           Physical Exam   Constitutional: She is oriented to person, place, and time. She appears well-developed and well-nourished.   obese   Eyes: Pupils are equal, round, and reactive to light. EOM are normal.   Neck: Neck supple.   Cardiovascular: Normal rate and regular rhythm.   Pulmonary/Chest: Effort normal.   Eason catheter left chest   Abdominal: Soft. She exhibits no distension. There is tenderness (RUQ). There is no rebound and no guarding.   Neurological: She is alert and oriented to person, place, and time.   Skin: Skin is warm and dry.   Psychiatric: She has a normal mood and affect. Her behavior is normal.   Nursing note and vitals reviewed.      Significant Labs:  CBC:   Recent Labs   Lab 06/06/19 0631   WBC 4.64   RBC 4.25   HGB 12.5   HCT 36.8*      MCV 87   MCH 29.4   MCHC 34.0     CMP:   Recent Labs   Lab 06/06/19 0631 "   GLU 68*   CALCIUM 8.4*   ALBUMIN 2.9*   PROT 6.4      K 3.3*   CO2 17*   *   BUN 5*   CREATININE 0.6   ALKPHOS 58   ALT 20   AST 14   BILITOT 0.3     LFTs:   Recent Labs   Lab 06/06/19  0631   ALT 20   AST 14   ALKPHOS 58   BILITOT 0.3   PROT 6.4   ALBUMIN 2.9*       Significant Diagnostics:  I have reviewed all pertinent imaging results/findings within the past 24 hours.    Assessment/Plan:     * Cholelithiasis   49 y.o. female with obesity, seizure disorder, ABHIJEET, Asthma, and severe pulmonary HTN on Remodulin    - Symptoms based on history and physical are consistent with biliary colic. Currently does not have fever, leukocytosis. Does have known gallstones and pericholecystic fluid on ultrasound. No gallbladder wall thickening.  - Patient is a very high risk surgical candidate given her severe pulmonary HTN and the fact that she already displayed significant hemodynamic instability the last time she underwent general anesthesia. Given all of this, would recommend HIDA scan for further evaluation. She may require percutaneous cholecystostomy tube   -continue abx  -follow up HIDA today  -surgery will follow        Endy Valverde MD  General Surgery  [facility]

## 2019-06-06 NOTE — NURSING
Pt arrived via stretcher from ED to [place] RM [id number].  AAOX4.  VSS.  No complaints of pain.  Pt has remodulin infusing to left chest wall chauhan.  Called [facility] for dosing measurements.  Remodulin currently at 80ng/kg/min.  At a rate of 35ml/24hrs.  Cassette due to be changed Thursday evening.  Pt independent.  On tele, NSR.  Pt denies any nausea.  NPO for HIDA scan in the morning.  Pt remains on RA.  Bed is in lowest position, call bell is in reach, and pt instructed to call nurse when needing assistance.  Will continue to monitor.

## 2019-06-07 LAB
APPEARANCE FLD: NORMAL
BASOPHILS # BLD AUTO: 0.03 K/UL (ref 0–0.2)
BASOPHILS NFR BLD: 0.7 % (ref 0–1.9)
BODY FLD TYPE: NORMAL
COLOR FLD: NORMAL
DIFFERENTIAL METHOD: ABNORMAL
EOSINOPHIL # BLD AUTO: 0.3 K/UL (ref 0–0.5)
EOSINOPHIL NFR BLD: 5.8 % (ref 0–8)
ERYTHROCYTE [DISTWIDTH] IN BLOOD BY AUTOMATED COUNT: 13.5 % (ref 11.5–14.5)
GRAM STN SPEC: NORMAL
GRAM STN SPEC: NORMAL
HCT VFR BLD AUTO: 37.8 % (ref 37–48.5)
HGB BLD-MCNC: 12.8 G/DL (ref 12–16)
IMM GRANULOCYTES # BLD AUTO: 0.02 K/UL (ref 0–0.04)
IMM GRANULOCYTES NFR BLD AUTO: 0.5 % (ref 0–0.5)
INR PPP: 1 (ref 0.8–1.2)
LYMPHOCYTES # BLD AUTO: 0.9 K/UL (ref 1–4.8)
LYMPHOCYTES NFR BLD: 20.9 % (ref 18–48)
LYMPHOCYTES NFR FLD MANUAL: 1 %
MCH RBC QN AUTO: 29.5 PG (ref 27–31)
MCHC RBC AUTO-ENTMCNC: 33.9 G/DL (ref 32–36)
MCV RBC AUTO: 87 FL (ref 82–98)
MONOCYTES # BLD AUTO: 0.5 K/UL (ref 0.3–1)
MONOCYTES NFR BLD: 12.6 % (ref 4–15)
MONOS+MACROS NFR FLD MANUAL: 3 %
NEUTROPHILS # BLD AUTO: 2.6 K/UL (ref 1.8–7.7)
NEUTROPHILS NFR BLD: 59.5 % (ref 38–73)
NEUTROPHILS NFR FLD MANUAL: 96 %
NRBC BLD-RTO: 0 /100 WBC
PLATELET # BLD AUTO: 188 K/UL (ref 150–350)
PMV BLD AUTO: 12.8 FL (ref 9.2–12.9)
PROTHROMBIN TIME: 10.4 SEC (ref 9–12.5)
RBC # BLD AUTO: 4.34 M/UL (ref 4–5.4)
WBC # BLD AUTO: 4.3 K/UL (ref 3.9–12.7)
WBC # FLD: NORMAL /CU MM

## 2019-06-07 PROCEDURE — 25000242 PHARM REV CODE 250 ALT 637 W/ HCPCS: Mod: NTX | Performed by: INTERNAL MEDICINE

## 2019-06-07 PROCEDURE — 99233 PR SUBSEQUENT HOSPITAL CARE,LEVL III: ICD-10-PCS | Mod: NTX,,, | Performed by: INTERNAL MEDICINE

## 2019-06-07 PROCEDURE — 25000003 PHARM REV CODE 250: Mod: NTX | Performed by: INTERNAL MEDICINE

## 2019-06-07 PROCEDURE — 89051 BODY FLUID CELL COUNT: CPT | Mod: NTX

## 2019-06-07 PROCEDURE — 36415 COLL VENOUS BLD VENIPUNCTURE: CPT | Mod: NTX

## 2019-06-07 PROCEDURE — 87102 FUNGUS ISOLATION CULTURE: CPT | Mod: NTX

## 2019-06-07 PROCEDURE — 85025 COMPLETE CBC W/AUTO DIFF WBC: CPT | Mod: NTX

## 2019-06-07 PROCEDURE — 87070 CULTURE OTHR SPECIMN AEROBIC: CPT | Mod: NTX

## 2019-06-07 PROCEDURE — 25000003 PHARM REV CODE 250: Mod: NTX | Performed by: STUDENT IN AN ORGANIZED HEALTH CARE EDUCATION/TRAINING PROGRAM

## 2019-06-07 PROCEDURE — 25000003 PHARM REV CODE 250: Mod: NTX | Performed by: PHYSICIAN ASSISTANT

## 2019-06-07 PROCEDURE — 87015 SPECIMEN INFECT AGNT CONCNTJ: CPT | Mod: NTX

## 2019-06-07 PROCEDURE — 63600175 PHARM REV CODE 636 W HCPCS: Mod: NTX | Performed by: INTERNAL MEDICINE

## 2019-06-07 PROCEDURE — 87206 SMEAR FLUORESCENT/ACID STAI: CPT | Mod: NTX

## 2019-06-07 PROCEDURE — 63600175 PHARM REV CODE 636 W HCPCS: Mod: NTX | Performed by: STUDENT IN AN ORGANIZED HEALTH CARE EDUCATION/TRAINING PROGRAM

## 2019-06-07 PROCEDURE — 87116 MYCOBACTERIA CULTURE: CPT | Mod: NTX

## 2019-06-07 PROCEDURE — 87075 CULTR BACTERIA EXCEPT BLOOD: CPT | Mod: NTX

## 2019-06-07 PROCEDURE — 85610 PROTHROMBIN TIME: CPT | Mod: NTX

## 2019-06-07 PROCEDURE — 63600175 PHARM REV CODE 636 W HCPCS: Mod: NTX | Performed by: RADIOLOGY

## 2019-06-07 PROCEDURE — 87205 SMEAR GRAM STAIN: CPT | Mod: NTX

## 2019-06-07 PROCEDURE — 20600001 HC STEP DOWN PRIVATE ROOM: Mod: NTX

## 2019-06-07 PROCEDURE — 63600175 PHARM REV CODE 636 W HCPCS: Mod: JG,NTX | Performed by: INTERNAL MEDICINE

## 2019-06-07 PROCEDURE — 25000003 PHARM REV CODE 250: Mod: NTX | Performed by: RADIOLOGY

## 2019-06-07 PROCEDURE — 99233 SBSQ HOSP IP/OBS HIGH 50: CPT | Mod: NTX,,, | Performed by: INTERNAL MEDICINE

## 2019-06-07 RX ORDER — FLUMAZENIL 0.1 MG/ML
INJECTION INTRAVENOUS CODE/TRAUMA/SEDATION MEDICATION
Status: COMPLETED | OUTPATIENT
Start: 2019-06-07 | End: 2019-06-07

## 2019-06-07 RX ORDER — MIDAZOLAM HYDROCHLORIDE 1 MG/ML
INJECTION INTRAMUSCULAR; INTRAVENOUS CODE/TRAUMA/SEDATION MEDICATION
Status: COMPLETED | OUTPATIENT
Start: 2019-06-07 | End: 2019-06-07

## 2019-06-07 RX ORDER — NALOXONE HYDROCHLORIDE 1 MG/ML
INJECTION INTRAMUSCULAR; INTRAVENOUS; SUBCUTANEOUS CODE/TRAUMA/SEDATION MEDICATION
Status: COMPLETED | OUTPATIENT
Start: 2019-06-07 | End: 2019-06-07

## 2019-06-07 RX ORDER — ALPRAZOLAM 0.25 MG/1
0.25 TABLET ORAL ONCE
Status: COMPLETED | OUTPATIENT
Start: 2019-06-07 | End: 2019-06-07

## 2019-06-07 RX ORDER — FENTANYL CITRATE 50 UG/ML
INJECTION, SOLUTION INTRAMUSCULAR; INTRAVENOUS CODE/TRAUMA/SEDATION MEDICATION
Status: COMPLETED | OUTPATIENT
Start: 2019-06-07 | End: 2019-06-07

## 2019-06-07 RX ADMIN — AMBRISENTAN 10 MG: 10 TABLET, FILM COATED ORAL at 09:06

## 2019-06-07 RX ADMIN — ONDANSETRON 4 MG: 2 INJECTION INTRAMUSCULAR; INTRAVENOUS at 07:06

## 2019-06-07 RX ADMIN — TOPIRAMATE 100 MG: 100 TABLET, FILM COATED ORAL at 09:06

## 2019-06-07 RX ADMIN — METRONIDAZOLE 500 MG: 500 TABLET ORAL at 05:06

## 2019-06-07 RX ADMIN — FENTANYL CITRATE 25 MCG: 50 INJECTION, SOLUTION INTRAMUSCULAR; INTRAVENOUS at 12:06

## 2019-06-07 RX ADMIN — MIDAZOLAM HYDROCHLORIDE 0.5 MG: 1 INJECTION, SOLUTION INTRAMUSCULAR; INTRAVENOUS at 12:06

## 2019-06-07 RX ADMIN — OXYCODONE HYDROCHLORIDE AND ACETAMINOPHEN 1 TABLET: 5; 325 TABLET ORAL at 09:06

## 2019-06-07 RX ADMIN — METRONIDAZOLE 500 MG: 500 TABLET ORAL at 02:06

## 2019-06-07 RX ADMIN — CETIRIZINE HYDROCHLORIDE 10 MG: 10 TABLET, FILM COATED ORAL at 09:06

## 2019-06-07 RX ADMIN — FLUTICASONE FUROATE AND VILANTEROL TRIFENATATE 1 PUFF: 100; 25 POWDER RESPIRATORY (INHALATION) at 09:06

## 2019-06-07 RX ADMIN — CYANOCOBALAMIN TAB 250 MCG 250 MCG: 250 TAB at 09:06

## 2019-06-07 RX ADMIN — FLUTICASONE PROPIONATE 100 MCG: 50 SPRAY, METERED NASAL at 09:06

## 2019-06-07 RX ADMIN — FOLIC ACID 1000 MCG: 1 TABLET ORAL at 09:06

## 2019-06-07 RX ADMIN — MIDAZOLAM HYDROCHLORIDE 1 MG: 1 INJECTION, SOLUTION INTRAMUSCULAR; INTRAVENOUS at 12:06

## 2019-06-07 RX ADMIN — TADALAFIL 40 MG: 20 TABLET ORAL at 07:06

## 2019-06-07 RX ADMIN — CEFTRIAXONE SODIUM 1 G: 1 INJECTION, POWDER, FOR SOLUTION INTRAMUSCULAR; INTRAVENOUS at 04:06

## 2019-06-07 RX ADMIN — TREPROSTINIL 80 NG/KG/MIN: 100 INJECTION, SOLUTION INTRAVENOUS; SUBCUTANEOUS at 04:06

## 2019-06-07 RX ADMIN — FENTANYL CITRATE 50 MCG: 50 INJECTION, SOLUTION INTRAMUSCULAR; INTRAVENOUS at 12:06

## 2019-06-07 RX ADMIN — FLUMAZENIL 0.2 MG: 0.1 INJECTION INTRAVENOUS at 01:06

## 2019-06-07 RX ADMIN — PANTOPRAZOLE SODIUM 40 MG: 40 TABLET, DELAYED RELEASE ORAL at 08:06

## 2019-06-07 RX ADMIN — METRONIDAZOLE 500 MG: 500 TABLET ORAL at 09:06

## 2019-06-07 RX ADMIN — ALPRAZOLAM 0.25 MG: 0.25 TABLET ORAL at 09:06

## 2019-06-07 RX ADMIN — NALOXONE HYDROCHLORIDE 1 MG: 1 INJECTION PARENTERAL at 12:06

## 2019-06-07 RX ADMIN — LEVOTHYROXINE SODIUM 137 MCG: 25 TABLET ORAL at 05:06

## 2019-06-07 RX ADMIN — OXYCODONE HYDROCHLORIDE AND ACETAMINOPHEN 1 TABLET: 5; 325 TABLET ORAL at 04:06

## 2019-06-07 NOTE — PROGRESS NOTES
Cholescystomy tube placement procedure complete.  Code called, Narcan and Flumazenil given. Patient switched from non-rebreather to Nasal canula with sats 99%. Dressing clean, dry and intact. Patient ready for transfer to [facility].

## 2019-06-07 NOTE — PROGRESS NOTES
Patient in [facility], AAOx4. Patient states she remembers the procedure and it hurt, patient stated she took the Xanax before the procedure because she had a bad experience with a previous heart cath procedure in which she felt everything and had a reaction like this one to the fentanyl.  Fentanyl not listed as an allergy, but has been added to allergies. Patient agrees with Fentanyl being added as an allergy.   Patient tearful stating she feels like she keeps reliving her procedures because she felt the whole thing from the time she was stuck.  Patient states she thought she would be out.

## 2019-06-07 NOTE — PROGRESS NOTES
D/C:    SW to pt's room for possible weekend D/C. Pt out of room for procedure. Pt's father present, aaox4, calm, and pleasant. Pt's father reports he and pt are agreeable to D/C as soon as MD clears pt for D/C. Pt's father reports no preference of  [employer]. SW referred pt to [facility] ([phone] ph, [phone] fax). Marylou with [facility] to arrange services for pt. Pt's father reports no other needs and no other needs identified. SW providing psychosocial and counseling support, education, assistance, resources, and D/C planning as indicated. SW remains available.

## 2019-06-07 NOTE — PLAN OF CARE
[facility]   [facility] Transplant[facility]   [address] [place] [address]   [phone] after hours [phone] fax   HOME HEALTH ORDERS     Admit to Home Health   Diagnosis:  Patient Active Problem List   Diagnosis    Chronic pulmonary heart disease    Long term current use of anticoagulant therapy    Generalized abdominal cramping    Generalized bloating    Liver mass    Non-allergic vasomotor rhinitis    Allergic asthma    Primary pulmonary hypertension    Tricuspid regurgitation    Seizure disorder    Hypothyroidism (acquired)    Migraine without aura and without status migrainosus, not intractable    Lumbar disc disease    Mild asthma    Chronic low back pain    Overweight (BMI 25.0-29.9)    Menorrhagia    ABHIJEET (obstructive sleep apnea)    Awaiting organ transplant status    Chronic nonintractable headache    Chronic respiratory failure with hypoxia    Cholelithiasis    RUQ pain       Send follow up questions to [phone] or [phone].     Patient is homebound due to:Pulm HTN     Diet: cardiac diet    Acitivities: as tolerated     Nursing:   SN to complete comprehensive assessment including routine vital signs. Instruct on disease process and s/s of complications to report to MD. Review/verify medication list sent home with the patient at time of discharge and instruct patient/caregiver as needed. Frequency may be adjusted depending on start of care date.     Notify MD if SBP > 160 or < 90; DBP > 90 or < 50; HR > 120 or < 50; Temp > 101; Weight gain >3lbs in 1 day or 5lbs in 1 week.      CONSULTS:     Aide to provide assistance with personal care, ADLs, and vital signs.  Access cholecystostomy tube placement       Send follow up questions to [phone] or [phone].       =======================  ADDENDUM:  Cholecystostomy tube care:  1. Drainage: [place] drainage    2. Flushing instructions: Flush drain with 5-10 cc of  sterile saline every 12 hours    3. Follow-up: With interventional radiology in 4 to 8 weeks if the patient is not a surgical candidate.    4. Site care: Change dressing every 2 days or as needed if dressing becomes soiled. Wash hands thoroughly prior to changing dressing. Okay to shower after removing dressing; dressing should be replaced after shower. Clean skin insertion site gently with soap and water with each dressing change. Dry skin gently and thoroughly. Monitor insertion site for redness/drainage/tenderness or other signs of infection. Replace gauze dressing and use adhesive or tegaderm to secure.

## 2019-06-07 NOTE — SEDATION DOCUMENTATION
1252-Patient SpO2 dropped 65%, patient only responding to painful stimuli, patient began turning blue, code blue called, 1mg Narcan given at 1255, 0.2mg Romazicon given at 1303.

## 2019-06-07 NOTE — H&P
Inpatient Radiology Pre-procedure Note    History of Present Illness:  Yuni Perez is a 49 y.o. female with a history of pulmonary hypertension with findings consistent with acute cholecystits. Patient with reported attempted cholecystectomy 2 years ago with decompensation during beginning of procedure and aborted. IR consulted for cholecystomy tube placement.    Admission H&P reviewed.  Past Medical History:   Diagnosis Date    AR (allergic rhinitis)     Cholelithiasis, common bile duct     Chronic low back pain     Eye pressure 2017    GERD (gastroesophageal reflux disease)     History of migraine headaches     Hypothyroidism     Lumbar disc disease     Menorrhagia     Mild asthma     Obesity     Plantar fasciitis of left foot     Primary pulmonary hypertension     followed by heart transplant/pulmonary     Seizure disorder     x 1 in 2008    Seizures     Sleep apnea     TMJ (dislocation of temporomandibular joint)     Tricuspid regurgitation      Past Surgical History:   Procedure Laterality Date    CARDIAC CATHETERIZATION      CENTRAL VENOUS CATHETER INSERTION      HEART CATH-LEFT Left 1/30/2017    Performed by [facility] at [facility] CATH [facility]    HEART CATH-RIGHT Right 8/20/2018    Performed by [facility]vonne Rai [facility] at [facility] CATH LAB    HEART CATH-RIGHT Right 1/30/2017    [facility] at [facility] CATH LAB    HEART CATH-RIGHT Right 9/8/2014    Per[facility] by [facility] at [facility] CATH LAB    PORTACATH PLACEMENT      UPPER GASTROINTESTINAL ENDOSCOPY         Review of Systems:   As documented in primary team H&P    Home Meds:   Prior to Admission medications    Medication Sig Start Date End Date Taking? Authorizing Provider   acetaminophen (TYLENOL EXTRA STRENGTH) 500 MG tablet Take 1,000 mg by mouth every 6 (six) hours as needed for Pain.   Yes Historical Provider, MD JENN ROBLESUS 100-50 mcg/dose diskus inhaler INHALE 1 PUFF TWICE DAILY 8/6/18  Yes  Ivonne Rai MD   ambrisentan (LETAIRIS) 10 MG Tab Take 1 tablet (10 mg total) by mouth once daily. 3/28/18  Yes Ivonne Rai MD   azelastine (ASTELIN) 137 mcg (0.1 %) nasal spray 2 sprays by Nasal route 2 (two) times daily.  11/23/16  Yes Historical Provider, MD   cyanocobalamin, vitamin B-12, 2,500 mcg Subl Place 2,500 mcg under the tongue once daily.    Yes Historical Provider, MD   ergocalciferol (VITAMIN D2) 50,000 unit Cap Take 1 capsule (50,000 Units total) by mouth every 7 days. 1/22/19  Yes Miley Anguiano PA-C   esomeprazole (NEXIUM) 40 MG capsule Take 40 mg by mouth once daily. 5/18/19  Yes Historical Provider, MD   ferrous sulfate 325 (65 FE) MG EC tablet Take 325 mg by mouth daily as needed.    Yes Historical Provider, MD   fluticasone (FLONASE) 50 mcg/actuation nasal spray INHALE 2 SPRAYS IN EACH NOSTRIL DAILY 12/27/17  Yes Tu Robin MD   folic acid (FOLVITE) 1 MG tablet TAKE ONE TABLET BY MOUTH once DAILY 2/21/19  Yes Lulu Sandhu MD   levothyroxine (SYNTHROID) 137 MCG Tab tablet TAKE ONE TABLET BY MOUTH once DAILY 1/8/19  Yes Dane Moyer, FNP-C   loratadine (CLARITIN) 10 mg tablet Take 10 mg by mouth once daily.   Yes Historical Provider, MD   MICROGESTIN FE 1/20, 28, 1 mg-20 mcg (21)/75 mg (7) per tablet TAKE ONE TABLET BY MOUTH ONCE DAILY 6/4/19  Yes Katelyn Macedo, NP   ondansetron (ZOFRAN) 4 MG tablet Take 4 mg by mouth every 6 (six) hours as needed. 1 Tablet Oral Every 6 hours   Yes Historical Provider, MD   tadalafil, PULMONARY HYPERTENSION, (ADCIRCA) 20 mg Tab Take 40 mg by mouth once daily.   Yes Historical Provider, MD   topiramate (TOPAMAX) 100 MG tablet Take 1 tablet (100 mg total) by mouth 2 (two) times daily. 11/6/18 11/6/19 Yes Eric Diggs MD   treprostinil (REMODULIN) 2.5 mg/mL Soln Mix cassette as directed and infuse continuously per physician titration orders on dosing sheet. Current dose 80ng/kg/min 12/5/17  Yes Ivonne Rai MD  "  VENTOLIN HFA 90 mcg/actuation inhaler inhale 2 puffs BY MOUTH EVERY 4-6 HOURS 3/19/19  Yes Ivonne Rai MD   citalopram (CELEXA) 10 MG tablet Take 10 mg by mouth once daily.    Historical Provider, MD   diphenhydrAMINE (BENADRYL) 25 mg capsule Take 50 mg by mouth every 6 (six) hours as needed for Itching.     Historical Provider, MD   sumatriptan (IMITREX) 100 MG tablet Take 1 tablet (100 mg total) by mouth as needed for Migraine. Take at the onset of headache, may take 1 more in 1 hour if needed. 3/22/18   Eric Diggs MD     Scheduled Meds:    ambrisentan  10 mg Oral Daily    cefTRIAXone (ROCEPHIN) IVPB  1 g Intravenous Q24H    cetirizine  10 mg Oral Daily    cyanocobalamin  250 mcg Oral Daily    fluticasone furoate-vilanterol  1 puff Inhalation Daily    fluticasone propionate  2 spray Each Nare Daily    folic acid  1,000 mcg Oral Daily    levothyroxine  137 mcg Oral Daily    metroNIDAZOLE  500 mg Oral Q8H    pantoprazole  40 mg Oral Daily    tadalafil  40 mg Oral Daily    topiramate  100 mg Oral BID     Continuous Infusions:    treprostinil (REMODULIN) infusion 80 ng/kg/min (06/06/19 1456)    veletri/remodulin cassette      veletri/remodulin tubing       PRN Meds:acetaminophen, albuterol, diphenhydrAMINE, loperamide, ondansetron, ondansetron, ondansetron, oxyCODONE-acetaminophen     Anticoagulants/Antiplatelets: no anticoagulation    Allergies:   Review of patient's allergies indicates:   Allergen Reactions    Vibra-tabs [doxycycline hyclate] Anaphylaxis     "throat felt like it was closing"    Adhesive Hives     Silk tape    Amoxicillin Rash    Chlorhexidine Other (See Comments)    Doxycycline      Sedation Hx: have not been any systemic reactions    Labs:  Recent Labs   Lab 06/07/19  0913   INR 1.0       Recent Labs   Lab 06/07/19  0633   WBC 4.30   HGB 12.8   HCT 37.8   MCV 87         Recent Labs   Lab 06/06/19  0631   GLU 68*      K 3.3*   *   CO2 17*   BUN " 5*   CREATININE 0.6   CALCIUM 8.4*   MG 1.9   ALT 20   AST 14   ALBUMIN 2.9*   BILITOT 0.3         Vitals:  Temp: 96.6 °F (35.9 °C) (06/07/19 0740)  Pulse: 94 (06/07/19 0921)  Resp: 16 (06/07/19 0921)  BP: 109/72 (06/07/19 0740)  SpO2: (!) 94 % (06/07/19 0740)     Physical Exam:  ASA: 2  Mallampati: 2    General: no acute distress  Mental Status: alert and oriented to person, place and time  HEENT: normocephalic, atraumatic  Chest: unlabored breathing  Heart: regular heart rate  Abdomen: nondistended  Extremity: moves all extremities    Plan: Cholecystostomy tube placement    Sedation Plan: moderate        Ricardo Wood MD  Radiology, PGY - III  [phone]

## 2019-06-07 NOTE — PROGRESS NOTES
"[facility]  Heart Transplant  Progress Note    Patient Name: Yuni Perez  MRN: [id number]  Admission Date: 6/5/2019  Hospital Length of Stay: 2 days  Attending Physician: Ivonne Rai MD  Primary Care Provider: Lulu Sandhu MD  Principal Problem:Cholelithiasis    Subjective:     Interval History: Patient reports abdominal pain has mildly improved.  She is on Rocephin and Flagyl.  HIDA scan consistent with acute cholecystitis. Plan for cholecystostomy tube with IR today.  She is anxious about it and asking for low dose Xanax pre procedure.     Continuous Infusions:   treprostinil (REMODULIN) infusion 80 ng/kg/min (06/06/19 1456)    veletri/remodulin cassette      veletri/remodulin tubing       Scheduled Meds:   ambrisentan  10 mg Oral Daily    cefTRIAXone (ROCEPHIN) IVPB  1 g Intravenous Q24H    cetirizine  10 mg Oral Daily    cyanocobalamin  250 mcg Oral Daily    fluticasone furoate-vilanterol  1 puff Inhalation Daily    fluticasone propionate  2 spray Each Nare Daily    folic acid  1,000 mcg Oral Daily    levothyroxine  137 mcg Oral Daily    metroNIDAZOLE  500 mg Oral Q8H    pantoprazole  40 mg Oral Daily    tadalafil  40 mg Oral Daily    topiramate  100 mg Oral BID     PRN Meds:acetaminophen, albuterol, diphenhydrAMINE, loperamide, ondansetron, ondansetron, ondansetron, oxyCODONE-acetaminophen    Review of patient's allergies indicates:   Allergen Reactions    Vibra-tabs [doxycycline hyclate] Anaphylaxis     "throat felt like it was closing"    Adhesive Hives     Silk tape    Amoxicillin Rash    Chlorhexidine Other (See Comments)    Doxycycline      Objective:     Vital Signs (Most Recent):  Temp: 96.6 °F (35.9 °C) (06/07/19 0740)  Pulse: 94 (06/07/19 0921)  Resp: 16 (06/07/19 0921)  BP: 109/72 (06/07/19 0740)  SpO2: (!) 94 % (06/07/19 0740) Vital Signs (24h Range):  Temp:  [96.6 °F (35.9 °C)-97.8 °F (36.6 °C)] 96.6 °F (35.9 °C)  Pulse:  [82-94] 94  Resp: "  [16-18] 16  SpO2:  [89 %-94 %] 94 %  BP: (109-116)/(70-80) 109/72     Patient Vitals for the past 72 hrs (Last 3 readings):   Weight   06/05/19 1308 59 kg (130 lb)     Body mass index is 26.26 kg/m².      Intake/Output Summary (Last 24 hours) at 6/7/2019 1119  Last data filed at 6/7/2019 0908  Gross per 24 hour   Intake 600 ml   Output 1550 ml   Net -950 ml        Telemetry: No events  Physical Exam   Constitutional: She is oriented to person, place, and time. She appears well-developed and well-nourished.   HENT:   Head: Normocephalic.   Eyes: Pupils are equal, round, and reactive to light.   Neck: Normal range of motion. Neck supple.   Cardiovascular: Normal rate and regular rhythm.   Pulmonary/Chest: Effort normal and breath sounds normal.   Abdominal: Soft. Bowel sounds are normal.   Musculoskeletal: Normal range of motion.   Neurological: She is alert and oriented to person, place, and time.   Skin: Skin is warm and dry.   Psychiatric: She has a normal mood and affect. Her behavior is normal.   Nursing note and vitals reviewed.    Significant Labs:  CBC:  Recent Labs   Lab 06/05/19  1411 06/06/19  0631 06/07/19  0633   WBC 7.23 4.64 4.30   RBC 4.98 4.25 4.34   HGB 14.8 12.5 12.8   HCT 43.0 36.8* 37.8    197 188   MCV 86 87 87   MCH 29.7 29.4 29.5   MCHC 34.4 34.0 33.9     BNP:  No results for input(s): BNP in the last 168 hours.    Invalid input(s): BNPTRIAGELBLO  CMP:  Recent Labs   Lab 06/05/19  1411 06/06/19  0631   GLU 95 68*   CALCIUM 9.4 8.4*   ALBUMIN 3.6 2.9*   PROT 8.1 6.4    137   K 3.5 3.3*   CO2 15* 17*    111*   BUN 6 5*   CREATININE 0.7 0.6   ALKPHOS 72 58   ALT 26 20   AST 17 14   BILITOT 0.4 0.3      Coagulation:   Recent Labs   Lab 06/07/19  0913   INR 1.0     LDH:  No results for input(s): LDH in the last 72 hours.  Microbiology:  Microbiology Results (last 7 days)     ** No results found for the last 168 hours. **        I have reviewed all pertinent labs within the past  24 hours.    Estimated Creatinine Clearance: 106.8 mL/min (based on SCr of 0.6 mg/dL).    Diagnostic Results:  I have reviewed all pertinent imaging results/findings within the past 24 hours.    Assessment and Plan:     49 year old female with history of IPAH (deferred LUT) on triple therapy (remodulin 80ng/kg/min, Letairis 10mg qday and Adcircoa 40mg qday), seizure disorder, ABHIJEET(not using CPAP), Asthma who presents for abdminal pain that started 1 week ago. It has been waxing and waning, RUQ with associated nausea, vomiting. Patient reports vomiting bile colored liquid but no blood. She also reports diarrhea more than her usual with remodulin She denies any fever, does reports sweats. Denies any shortness of breath.    * Cholelithiasis  -Surgery consult appreciated  -Continue rocephin and flagyl  -HIDA scan consistent with acute cholecystitis.  Plan for cholecystostomy tube today with IR  -Prn pain and nausea control    Primary pulmonary hypertension  -Continue remodulin, tadalafil, and ambrisentan    Migraine without aura and without status migrainosus, not intractable  Continue topamax    Hypothyroidism (acquired)  Continue synthyroid at home dose      GUMARO Frazier  Heart Transplant  [facility]

## 2019-06-07 NOTE — PROGRESS NOTES
"Chief Complaint   Patient presents with    Headache        Yuni Perez is a 49 y.o. female with a history of multiple medical diagnoses as listed below that presents for evaluation of headaches. She is accompanied to this visit by her mother. She was diagnosed with pulmonary hypertension about 8 years ago and around the same time she was diagnosed with a seizure. She had workup at that time that included MRI that showed signs of "old injury" and she had EEG that showed "lots of spikes and waves" per her mother. She says that since that time she has been started on Topamax which has been titrated up. She has not been having any seizures but she has bene having some occasional word finding difficulty that she has attributed to the medication. She has tried tylenol because as she has been titrating her medication to treat pulmonary hypertension sh marie been having bilateral intense stabbing pains in her head. She does say that with time her headaches have been better but she has noticed many more headaches since she began the Remodulin. She has not taken any other headache medications in the past.    Interval History  11/17/2016  She has still been having headaches after taking her medications in particular her vasodilators. She has found that her PRN medications are somewhat helpful in minimizing the pain that she has from her headaches. She has not had any mew problems since she was last seen in clinic.    03/23/2017  Since last seen in clinic she has been approved to get on the lung transplant list and she will be making strides to get set up to to receive new lungs. She has been taking her topamax as directed and has felt that overall she has been having fewer headaches but she still has needed Tylenol and on rare occasions Fioricet to get rid of her headaches. She has been having some cognitive difficulty with her current medications but she feels that it is tolerable. She has not had any seizure " "like activity.    06/13/2017  Since last seen in clinic she says that she has been able to be placed on the donor list. She says that overall she feels that she has been stable with regards to her breathing. She continues to have chronic sinus issues that have been essentially refractory to any medications that she has tried including antihistamines and nasal rinses. She feels that she is able to clear her sinuses and have it come right back. She has headaches almost daily but despite the frequency of the headaches she feels that most are not migraines. She has not had any seizures since last seen. She is tolerating all of her medications well without any problems.    10/31/2017  She has continued to make preparations for lung transplant.  Said that she has been frustrated with the process and she does not have much guidance which is causing increased stress.  Headaches overall have been about the same, but are responsive to Fioricet.  She says the medication helps give her adequate relief of the pain.    03/22/2018  She feels that her headaches have been getting worse in the last week.  Overall prior to this week she feels that the headaches were about the same as when she was last seen in clinic and she was using Fioricet as needed which provided some relief from her headache pain.  In the last week however Fioricet is not provided much of any relief and headaches have been going on almost every day.  She says that she feels like she has "sinus headaches" which have lingered until she has started having pounding unilateral headaches that she feels her migraines.  These headaches seem to be much more frequent and much more intense that she is ever had in the past.    05/03/2018  headaches have been better as she has hardly had any since she was last seen in clinic. Lung transplant is till her recourse for treatm of pulmonary hypertension, but she has not been able to get placed on the transplant list just " yet.    11/06/2018  Headaches have still been frequent, but have been relieved effectively by using Fioricet. She has been taking her medications as directed without any complaints of side effects. She feels that the intensity has been about the same overall. Imitrex has been prescribed, but she has not used the medication yet to determine if it helps her headaches.    02/07/2019  She has been having headaches with about the same frequency as she had in the past, but they have continued to have some response to her current abortive regimen. She has not been comfortable with the CGRP receptor blockers as a treatment modality as she has been weary of using any injectable medication to treat her symptoms. She has been seeing her Pulmonary team as recommended.    05/09/2019  She presents to clinic for routine follow-up of her headaches.  The headaches be somewhat improved with her current medication regimen, but the ability river frequently.  She needs to use abortive medications several times per week to by relief in order to her level of functioning and her ability to care for her activities of daily where they are.    PAST MEDICAL HISTORY:  Past Medical History:   Diagnosis Date    AR (allergic rhinitis)     Cholelithiasis, common bile duct     Chronic low back pain     Eye pressure 2017    GERD (gastroesophageal reflux disease)     History of migraine headaches     Hypothyroidism     Lumbar disc disease     Menorrhagia     Mild asthma     Obesity     Plantar fasciitis of left foot     Primary pulmonary hypertension     followed by heart transplant/pulmonary     Seizure disorder     x 1 in 2008    Seizures     Sleep apnea     TMJ (dislocation of temporomandibular joint)     Tricuspid regurgitation        PAST SURGICAL HISTORY:  Past Surgical History:   Procedure Laterality Date    CARDIAC CATHETERIZATION      CENTRAL VENOUS CATHETER INSERTION      HEART CATH-LEFT Left 1/30/2017    [facility]formed [facility]    HEART CATH-RIGHT Right 8/20/2018    [facility] at [facility] CATH LAB    HEART CATH-RIGHT Right 1/30/2017    [facility] CATH LAB    HEART CATH-RIGHT Right 9/8/2014    [facility] at [facility] CATH LAB    PORTACATH PLACEMENT      UPPER GASTROINTESTINAL ENDOSCOPY         SOCIAL HISTORY:  Social History     Socioeconomic History    Marital status: Single     Spouse name: Not on file    Number of children: 0    Years of education: Not on file    Highest education level: Not on file   Occupational History    Occupation: disabled     Employer: [employer]   Social Needs    Financial resource strain: Not on file    Food insecurity:     Worry: Not on file     Inability: Not on file    Transportation needs:     Medical: Not on file     Non-medical: Not on file   Tobacco Use    Smoking status: Never Smoker    Smokeless tobacco: Never Used   Substance and Sexual Activity    Alcohol use: No     Alcohol/week: 0.5 oz     Types: 1 Standard drinks or equivalent per week     Comment: 1 per year    Drug use: No    Sexual activity: Never     Birth control/protection: OCP, Pill     Comment: pt is a virgin   Lifestyle    Physical activity:     Days per week: Not on file     Minutes per session: Not on file    Stress: Not on file   Relationships    Social connections:     Talks on phone: Not on file     Gets together: Not on file     Attends Holiness service: Not on file     Active member of club or organization: Not on file     Attends meetings of clubs or organizations: Not on file     Relationship status: Not on file   Other Topics Concern    Not on file   Social History Narrative    Not on file       FAMILY HISTORY:  Family History   Problem Relation Age of Onset    Heart disease Father     Glaucoma Father     Diabetes Mother     Cancer Maternal Grandmother         uterine    Cancer Maternal Aunt          breast    Breast cancer Maternal Aunt     Heart disease Paternal Grandfather     Heart attack Paternal Grandfather     Diabetes Paternal Grandfather     Leukemia Brother     Hypertension Unknown     Diabetes Maternal Grandfather     Heart attack Maternal Grandfather     Breast cancer Cousin     No Known Problems Sister     No Known Problems Maternal Uncle     No Known Problems Paternal Aunt     No Known Problems Paternal Uncle     No Known Problems Paternal Grandmother     Colon cancer Neg Hx     Ovarian cancer Neg Hx     Amblyopia Neg Hx     Blindness Neg Hx     Cataracts Neg Hx     Macular degeneration Neg Hx     Retinal detachment Neg Hx     Strabismus Neg Hx     Stroke Neg Hx     Thyroid disease Neg Hx     Esophageal cancer Neg Hx        ALLERGIES AND MEDICATIONS: updated and reviewed.  Review of patient's allergies indicates:   Allergen Reactions    Adhesive Hives     Silk tape    Amoxicillin Rash    Chlorhexidine Other (See Comments)     No current facility-administered medications for this visit.      No current outpatient medications on file.     Facility-Administered Medications Ordered in Other Visits   Medication Dose Route Frequency Provider Last Rate Last Dose    acetaminophen tablet 650 mg  650 mg Oral Q6H PRN Pema Rodriguez MD        albuterol inhaler 2 puff  2 puff Inhalation Q4H PRN Toribio Paula MD        ambrisentan tablet 10 mg  10 mg Oral Daily Toribio Paula MD   10 mg at 06/07/19 0940    cefTRIAXone injection 1 g  1 g Intravenous Q24H Toribio Paula MD   1 g at 06/06/19 1806    cetirizine tablet 10 mg  10 mg Oral Daily Toribio Paula MD   10 mg at 06/07/19 0924    cyanocobalamin tablet 250 mcg  250 mcg Oral Daily Toribio Paula MD   250 mcg at 06/07/19 0924    diphenhydrAMINE capsule 50 mg  50 mg Oral Q6H PRN Toribio Paula MD        fluticasone furoate-vilanterol 100-25 mcg/dose diskus inhaler 1 puff  1 puff Inhalation Daily Toribio Paula  MD   1 puff at 06/07/19 0921    fluticasone propionate 50 mcg/actuation nasal spray 100 mcg  2 spray Each Nare Daily Toribio Paula MD   100 mcg at 06/07/19 0921    folic acid tablet 1,000 mcg  1,000 mcg Oral Daily Toribio Paula MD   1,000 mcg at 06/07/19 0924    levothyroxine tablet 137 mcg  137 mcg Oral Daily Toribio Paula MD   137 mcg at 06/07/19 0539    loperamide capsule 2 mg  2 mg Oral Q4H PRN Pema Rodriguez MD        metroNIDAZOLE tablet 500 mg  500 mg Oral Q8H Toribio Paula MD   500 mg at 06/07/19 0538    ondansetron disintegrating tablet 8 mg  8 mg Oral Q8H PRN Pema Rodriguez MD        ondansetron injection 4 mg  4 mg Intravenous Q8H PRN Pema Rodriguez MD   4 mg at 06/07/19 0731    ondansetron tablet 4 mg  4 mg Oral Q6H PRN Toribio Paula MD        oxyCODONE-acetaminophen 5-325 mg per tablet 1 tablet  1 tablet Oral Q4H PRN Pema Rodriguez MD   1 tablet at 06/06/19 1943    pantoprazole EC tablet 40 mg  40 mg Oral Daily Toribio Paula MD   40 mg at 06/07/19 0800    tadalafil tablet 40 mg  40 mg Oral Daily Ivonne Rai MD   40 mg at 06/06/19 2059    topiramate tablet 100 mg  100 mg Oral BID Toribio Paula MD   100 mg at 06/07/19 0924    treprostinil (REMODULIN) 9,000,000 ng in sodium chloride 0.9% 100 mL infusion  80 ng/kg/min (Order-Specific) Intravenous Continuous Ivonne Rai MD 3.23 mL/hr at 06/06/19 1456 80 ng/kg/min at 06/06/19 1456    VELETRI/REMODULIN CASSETTE   Intravenous Continuous Toribio Paula MD        VELETRI/REMODULIN TUBING   Intravenous Continuous Ivonne Rai MD           Review of Systems   Constitutional: Negative for activity change, appetite change, fever and unexpected weight change.   HENT: Negative for trouble swallowing and voice change.    Eyes: Negative for photophobia and visual disturbance.   Respiratory: Negative for apnea and shortness of breath.    Cardiovascular: Negative for chest pain and leg swelling.    Gastrointestinal: Negative for constipation and nausea.   Genitourinary: Negative for difficulty urinating.   Musculoskeletal: Negative for back pain, gait problem and neck pain.   Skin: Negative for color change and pallor.   Neurological: Positive for dizziness and headaches. Negative for seizures, syncope, weakness and numbness.   Hematological: Negative for adenopathy.   Psychiatric/Behavioral: Negative for agitation, confusion and decreased concentration.       Neurologic Exam     Mental Status   Oriented to person, place, and time.   Registration: recalls 3 of 3 objects.   Attention: normal. Concentration: normal.   Speech: speech is normal   Level of consciousness: alert  Knowledge: good.     Cranial Nerves     CN II   Visual fields full to confrontation.   Right visual field deficit: none  Left visual field deficit: none     CN III, IV, VI   Pupils are equal, round, and reactive to light.  Extraocular motions are normal.   Right pupil: Size: 3 mm. Shape: regular. Accommodation: intact.   Left pupil: Size: 3 mm. Shape: regular. Accommodation: intact.   CN III: no CN III palsy  CN VI: no CN VI palsy  Nystagmus: none   Diplopia: none  Ophthalmoparesis: none  Upgaze: normal  Downgaze: normal  Conjugate gaze: present    CN V   Facial sensation intact.   Right facial sensation deficit: none  Left facial sensation deficit: none    CN VII   Facial expression full, symmetric.   Right facial weakness: none  Left facial weakness: none    CN VIII   CN VIII normal.     CN IX, X   CN IX normal.   CN X normal.   Palate: symmetric    CN XI   CN XI normal.   Right sternocleidomastoid strength: normal  Left sternocleidomastoid strength: normal  Right trapezius strength: normal  Left trapezius strength: normal    CN XII   CN XII normal.   Tongue deviation: none    Motor Exam   Muscle bulk: normal  Overall muscle tone: normal  Right arm tone: normal  Left arm tone: normal  Right leg tone: normal  Left leg tone:  normal    Strength   Strength 5/5 throughout.     Sensory Exam   Right arm light touch: normal  Left arm light touch: normal  Right leg light touch: normal  Left leg light touch: normal  Right arm vibration: normal  Left arm vibration: normal  Right leg vibration: normal  Left leg vibration: normal  Right arm proprioception: normal  Left arm proprioception: normal  Right leg proprioception: normal  Left leg proprioception: normal  Right arm pinprick: normal  Left arm pinprick: normal  Right leg pinprick: normal  Left leg pinprick: normal    Gait, Coordination, and Reflexes     Gait  Gait: normal    Coordination   Romberg: negative  Finger to nose coordination: normal  Heel to shin coordination: normal  Tandem walking coordination: normal    Tremor   Resting tremor: absent    Reflexes   Right brachioradialis: 2+  Left brachioradialis: 2+  Right biceps: 2+  Left biceps: 2+  Right triceps: 2+  Left triceps: 2+  Right patellar: 2+  Left patellar: 2+  Right achilles: 2+  Left achilles: 2+  Right plantar: normal  Left plantar: normal      Physical Exam   Constitutional: She is oriented to person, place, and time. She appears well-developed and well-nourished.   HENT:   Head: Normocephalic and atraumatic.   Eyes: Pupils are equal, round, and reactive to light. EOM are normal.   Neck: Normal range of motion.   Cardiovascular: Normal rate and intact distal pulses.   Pulmonary/Chest: Effort normal. No apnea. No respiratory distress.   Musculoskeletal: Normal range of motion.   Neurological: She is alert and oriented to person, place, and time. She has normal strength. She has a normal Finger-Nose-Finger Test, a normal Heel to Shin Test, a normal Romberg Test and a normal Tandem Gait Test. Gait normal.   Reflex Scores:       Tricep reflexes are 2+ on the right side and 2+ on the left side.       Bicep reflexes are 2+ on the right side and 2+ on the left side.       Brachioradialis reflexes are 2+ on the right side and 2+ on  "the left side.       Patellar reflexes are 2+ on the right side and 2+ on the left side.       Achilles reflexes are 2+ on the right side and 2+ on the left side.  Skin: Skin is warm and dry.   Psychiatric: She has a normal mood and affect. Her speech is normal and behavior is normal. Thought content normal.   Vitals reviewed.      Vitals:    05/09/19 1117   BP: 104/66   BP Location: Right arm   Patient Position: Sitting   BP Method: Large (Automatic)   Pulse: 68   Weight: 61.5 kg (135 lb 9.3 oz)   Height: 4' 11" (1.499 m)       Assessment & Plan:  Problem List Items Addressed This Visit     None        Discussed use of CRGP directed therapies in order to treat her headaches, but she has been reluctant to accept any therapies that involve the use of needles.    Follow-up: Follow up in about 3 months (around 8/9/2019).   More than 50% of this 25 minute encounter was spent in counseling and coordinating care of headaches.          "

## 2019-06-07 NOTE — ASSESSMENT & PLAN NOTE
-Surgery consult appreciated  -Continue rocephin and flagyl  -HIDA scan consistent with acute cholecystitis.  Plan for cholecystostomy tube today with IR  -Prn pain and nausea control

## 2019-06-07 NOTE — PLAN OF CARE
Problem: Adult Inpatient Plan of Care  Goal: Plan of Care Review  Outcome: Ongoing (interventions implemented as appropriate)  AAOX4.  VSS.  Pt complaining of gas/indigestion at beginning of shift.  Pt requested pain med, prn percocet administered with pain relief.  NPO after midnight for cholecystomy tube placement in IR.  Remodulin infusing at 80ng/kg/min.  Bed is in lowest position, call bell is in reach, and pt instructed to call nurse when needing assistance.  Will continue to monitor.

## 2019-06-07 NOTE — PLAN OF CARE
Problem: Adult Inpatient Plan of Care  Goal: Plan of Care Review  Outcome: Ongoing (interventions implemented as appropriate)  Pt aao x3. Vss. No acute distress. Pt steady on her feet. Remodulin infusing as per order. Pt washed up per nurse. Pt currently off unit having a drain placed in IR. See assessment for full chart details. Will continue to monitor, assess and adjust care as needed.

## 2019-06-07 NOTE — SIGNIFICANT EVENT
"RAPID RESPONSE NURSE NOTE     Admit Date: 2019  LOS: 2  Code Status: Prior   Date of Consult: 2019  : 1970  Age: 49 y.o.  Weight:   Wt Readings from Last 1 Encounters:   19 59 kg (130 lb)     Sex: female  Race: White   Bed: 04275/04261 A:   MRN: [id number]  Time Rapid Response Team page Received: 1258  Time Rapid Response Team at Bedside: 1300  Time Rapid Response Team left Bedside: 1320  Was the patient discharged from an ICU this admission?   no  Was the patient discharged from a PACU within last 24 hours?  no  Did the patient receive conscious sedation/general anesthesia within last 24 hours?  yes  Was the patient in the ED within the past 24 hours?  no  Was the patient started on NIPPV within the past 24 hours?  no  Did this progress into an ARC or CPA:  no  Attending Physician: Ivonne Rai MD  Primary Service: [facility] HEART TRANSPLANT TEAM 1  Consult Requested By: Ivonne Rai MD     SITUATION     Reason for Call:   Called to evaluate the patient for Respiratory    BACKGROUND     Why is the patient in the hospital?: Cholelithiasis    Patient has a past medical history of AR (allergic rhinitis), Cholelithiasis, common bile duct, Chronic low back pain, Eye pressure, GERD (gastroesophageal reflux disease), History of migraine headaches, Hypothyroidism, Lumbar disc disease, Menorrhagia, Mild asthma, Obesity, Plantar fasciitis of left foot, Primary pulmonary hypertension, Seizure disorder, Seizures, Sleep apnea, TMJ (dislocation of temporomandibular joint), and Tricuspid regurgitation.    ASSESSMENT/INTERVENTIONS     /64   Pulse 96   Temp 96.6 °F (35.9 °C) (Oral)   Resp 18   Ht 4' 11" (1.499 m)   Wt 59 kg (130 lb)   LMP 05/10/2019   SpO2 97%   Breastfeeding? No   BMI 26.26 kg/m²     What did you find: Upon arrival patient found lying on exam table minimally responsive.  /84  RR 16 O2 100% on NRB.  According to MD during procedure patient received 3mg versed and " 150mg fentanyl when she became more lethargic and O2 sats started to drop.  When unable to awaken patient received dose of narcan.  Dirk from critical care at bedside to assess.  While there patient became completely awake and AAOX4.  Weaned down to 2L NC O2 sat 100%.  Will transfer to ROCU for recovery and monitoring.    RECOMMENDATIONS     We recommend: Monitoring in ROCU and then readmission to the floor    FOLLOW-UP/CONTINGENCY PLAN     Patient needs a second visit at : N/A    Call the Rapid Response Nurse, Santosh Villeda RN at x [phone] for additional questions or concerns.    PHYSICIAN ESCALATION     Orders received and case discussed with Dr. Cavazos.    Disposition: Remain in room ROCU.

## 2019-06-07 NOTE — PROCEDURES
Radiology Post-Procedure Note    Pre Op Diagnosis: Cholecystitis    Post Op Diagnosis: Same    Procedure: Cholecystostomy tube placement    Procedure performed by: Nilesh Moya MD    Written Informed Consent Obtained: Yes  Specimen Removed: YES 30 cc green thick bile  Estimated Blood Loss: Minimal    Findings:   Under US and fluoro guidance an 8 Fr APD was placed in the gallbladder via transhepatic approach. Cholecystogram shows two large stones. Cystid duct is patent. No choledocholithiasis. No contrast was seen coursing to small bowel which could reflect ampullary stricture.    Patient tolerated procedure well.    Nilesh Moya M.D.  Diagnostic and Interventional Radiologist  Department of Radiology  Pager: [phone]

## 2019-06-07 NOTE — SUBJECTIVE & OBJECTIVE
"Interval History: Patient reports abdominal pain has mildly improved.  She is on Rocephin and Flagyl.  HIDA scan consistent with acute cholecystitis. Plan for cholecystostomy tube with IR today.  She is anxious about it and asking for low dose Xanax pre procedure.     Continuous Infusions:   treprostinil (REMODULIN) infusion 80 ng/kg/min (06/06/19 1456)    veletri/remodulin cassette      veletri/remodulin tubing       Scheduled Meds:   ambrisentan  10 mg Oral Daily    cefTRIAXone (ROCEPHIN) IVPB  1 g Intravenous Q24H    cetirizine  10 mg Oral Daily    cyanocobalamin  250 mcg Oral Daily    fluticasone furoate-vilanterol  1 puff Inhalation Daily    fluticasone propionate  2 spray Each Nare Daily    folic acid  1,000 mcg Oral Daily    levothyroxine  137 mcg Oral Daily    metroNIDAZOLE  500 mg Oral Q8H    pantoprazole  40 mg Oral Daily    tadalafil  40 mg Oral Daily    topiramate  100 mg Oral BID     PRN Meds:acetaminophen, albuterol, diphenhydrAMINE, loperamide, ondansetron, ondansetron, ondansetron, oxyCODONE-acetaminophen    Review of patient's allergies indicates:   Allergen Reactions    Vibra-tabs [doxycycline hyclate] Anaphylaxis     "throat felt like it was closing"    Adhesive Hives     Silk tape    Amoxicillin Rash    Chlorhexidine Other (See Comments)    Doxycycline      Objective:     Vital Signs (Most Recent):  Temp: 96.6 °F (35.9 °C) (06/07/19 0740)  Pulse: 94 (06/07/19 0921)  Resp: 16 (06/07/19 0921)  BP: 109/72 (06/07/19 0740)  SpO2: (!) 94 % (06/07/19 0740) Vital Signs (24h Range):  Temp:  [96.6 °F (35.9 °C)-97.8 °F (36.6 °C)] 96.6 °F (35.9 °C)  Pulse:  [82-94] 94  Resp:  [16-18] 16  SpO2:  [89 %-94 %] 94 %  BP: (109-116)/(70-80) 109/72     Patient Vitals for the past 72 hrs (Last 3 readings):   Weight   06/05/19 1308 59 kg (130 lb)     Body mass index is 26.26 kg/m².      Intake/Output Summary (Last 24 hours) at 6/7/2019 1119  Last data filed at 6/7/2019 0908  Gross per 24 hour "   Intake 600 ml   Output 1550 ml   Net -950 ml        Telemetry: No events  Physical Exam   Constitutional: She is oriented to person, place, and time. She appears well-developed and well-nourished.   HENT:   Head: Normocephalic.   Eyes: Pupils are equal, round, and reactive to light.   Neck: Normal range of motion. Neck supple.   Cardiovascular: Normal rate and regular rhythm.   Pulmonary/Chest: Effort normal and breath sounds normal.   Abdominal: Soft. Bowel sounds are normal.   Musculoskeletal: Normal range of motion.   Neurological: She is alert and oriented to person, place, and time.   Skin: Skin is warm and dry.   Psychiatric: She has a normal mood and affect. Her behavior is normal.   Nursing note and vitals reviewed.    Significant Labs:  CBC:  Recent Labs   Lab 06/05/19  1411 06/06/19  0631 06/07/19  0633   WBC 7.23 4.64 4.30   RBC 4.98 4.25 4.34   HGB 14.8 12.5 12.8   HCT 43.0 36.8* 37.8    197 188   MCV 86 87 87   MCH 29.7 29.4 29.5   MCHC 34.4 34.0 33.9     BNP:  No results for input(s): BNP in the last 168 hours.    Invalid input(s): BNPTRIAGELBLO  CMP:  Recent Labs   Lab 06/05/19  1411 06/06/19  0631   GLU 95 68*   CALCIUM 9.4 8.4*   ALBUMIN 3.6 2.9*   PROT 8.1 6.4    137   K 3.5 3.3*   CO2 15* 17*    111*   BUN 6 5*   CREATININE 0.7 0.6   ALKPHOS 72 58   ALT 26 20   AST 17 14   BILITOT 0.4 0.3      Coagulation:   Recent Labs   Lab 06/07/19  0913   INR 1.0     LDH:  No results for input(s): LDH in the last 72 hours.  Microbiology:  Microbiology Results (last 7 days)     ** No results found for the last 168 hours. **        I have reviewed all pertinent labs within the past 24 hours.    Estimated Creatinine Clearance: 106.8 mL/min (based on SCr of 0.6 mg/dL).    Diagnostic Results:  I have reviewed all pertinent imaging results/findings within the past 24 hours.

## 2019-06-07 NOTE — SIGNIFICANT EVENT
Rapid Response Respiratory Therapy Note        Code Status: Prior   : 1970  Age: 49 y.o.  Weight:   Wt Readings from Last 1 Encounters:   19 59 kg (130 lb)     Sex: female  Race: White   Bed: 20061/99561 A:   MRN: [id number]  Time page Received: 1259  Time Rapid Response RT at Bedside: 1301  Time Rapid Response RT left Bedside: 1315    SITUATION     Evaluated patient for: Rapid response called while patient was in     BACKGROUND     Patient has a past medical history of AR (allergic rhinitis), Cholelithiasis, common bile duct, Chronic low back pain, Eye pressure, GERD (gastroesophageal reflux disease), History of migraine headaches, Hypothyroidism, Lumbar disc disease, Menorrhagia, Mild asthma, Obesity, Plantar fasciitis of left foot, Primary pulmonary hypertension, Seizure disorder, Seizures, Sleep apnea, TMJ (dislocation of temporomandibular joint), and Tricuspid regurgitation.  Pulmonary Hx: Asthma Pulmonary HTN ABHIJEET  Clinically Significant Surgical Hx: None    ASSESSMENT     Pulse: Pulse: 96 Respiratory rate: Resp: 18 Temperature: Temp: 96.6 °F (35.9 °C) BP: BP: 115/64 SpO2:SpO2: 97 %   Level of Consciousness: Level of Consciousness (AVPU): alert  Respiratory Effort:    Expansion/Accessory Muscle Usage: Expansion/Accessory Muscles/Retractions: no use of accessory muscles, no retractions, expansion symmetric  All Lung Field Breath Sounds: All Lung Fields Breath Sounds: Posterior:, clear, equal bilaterally  LLL Breath Sounds: diminished  RLL Breath Sounds: diminished  Cough Type:    Mobility at time of assessment:    O2 Device/Concentration: O2 Device (Oxygen Therapy): room air        Most recent blood gas: No results for input(s): PH, PCO2, PO2, HCO3, POCSATURATED, BE in the last 72 hours.    Current Respiratory Care Orders: Inhaler daily and PRN    ETCO2 monitored: ETCO2 (mmHg): 38 mmHg  Ambu at bedside:  Yes    INTERVENTIONS/RECOMMENDATIONS   ?  Upon entering IR room # 189, patient was  "non responsive, but had a SAT of 99%.  It was said that the patient became apneic due to being given too much medication.  Ambu bag was at bedside, although patient was being "bagged" with a nonrebreather.  MD was squeezing the bag of the nonrebreather to ventilate patient.  The MD said a nasal airway was needed. The code cart was cracked, but no one had grabbed the nasal airway.  With my assessment, patient did not need the airway because spontaneous respirations were resumed and patient was alert and responsive.        Disposition: Remain in room  back to room 39010.    FOLLOW-UP     Please call back the Rapid Response RT, Beth Ren, RRT at x [phone] for any questions or concerns.             "

## 2019-06-08 LAB
BASOPHILS # BLD AUTO: 0.04 K/UL (ref 0–0.2)
BASOPHILS NFR BLD: 0.8 % (ref 0–1.9)
DIFFERENTIAL METHOD: NORMAL
EOSINOPHIL # BLD AUTO: 0.3 K/UL (ref 0–0.5)
EOSINOPHIL NFR BLD: 6 % (ref 0–8)
ERYTHROCYTE [DISTWIDTH] IN BLOOD BY AUTOMATED COUNT: 13.6 % (ref 11.5–14.5)
HCT VFR BLD AUTO: 38.9 % (ref 37–48.5)
HGB BLD-MCNC: 13.4 G/DL (ref 12–16)
IMM GRANULOCYTES # BLD AUTO: 0.02 K/UL (ref 0–0.04)
IMM GRANULOCYTES NFR BLD AUTO: 0.4 % (ref 0–0.5)
LYMPHOCYTES # BLD AUTO: 1.5 K/UL (ref 1–4.8)
LYMPHOCYTES NFR BLD: 30.5 % (ref 18–48)
MCH RBC QN AUTO: 30 PG (ref 27–31)
MCHC RBC AUTO-ENTMCNC: 34.4 G/DL (ref 32–36)
MCV RBC AUTO: 87 FL (ref 82–98)
MONOCYTES # BLD AUTO: 0.5 K/UL (ref 0.3–1)
MONOCYTES NFR BLD: 10.1 % (ref 4–15)
NEUTROPHILS # BLD AUTO: 2.5 K/UL (ref 1.8–7.7)
NEUTROPHILS NFR BLD: 52.2 % (ref 38–73)
NRBC BLD-RTO: 0 /100 WBC
PLATELET # BLD AUTO: 193 K/UL (ref 150–350)
PMV BLD AUTO: 12.2 FL (ref 9.2–12.9)
RBC # BLD AUTO: 4.46 M/UL (ref 4–5.4)
WBC # BLD AUTO: 4.86 K/UL (ref 3.9–12.7)

## 2019-06-08 PROCEDURE — 85025 COMPLETE CBC W/AUTO DIFF WBC: CPT | Mod: NTX

## 2019-06-08 PROCEDURE — 25000003 PHARM REV CODE 250: Mod: NTX | Performed by: INTERNAL MEDICINE

## 2019-06-08 PROCEDURE — 99232 PR SUBSEQUENT HOSPITAL CARE,LEVL II: ICD-10-PCS | Mod: NTX,,, | Performed by: INTERNAL MEDICINE

## 2019-06-08 PROCEDURE — 36415 COLL VENOUS BLD VENIPUNCTURE: CPT | Mod: NTX

## 2019-06-08 PROCEDURE — 99232 SBSQ HOSP IP/OBS MODERATE 35: CPT | Mod: NTX,,, | Performed by: INTERNAL MEDICINE

## 2019-06-08 PROCEDURE — 63600175 PHARM REV CODE 636 W HCPCS: Mod: JG,NTX | Performed by: INTERNAL MEDICINE

## 2019-06-08 PROCEDURE — 63600175 PHARM REV CODE 636 W HCPCS: Mod: NTX | Performed by: INTERNAL MEDICINE

## 2019-06-08 PROCEDURE — 20600001 HC STEP DOWN PRIVATE ROOM: Mod: NTX

## 2019-06-08 PROCEDURE — 25000003 PHARM REV CODE 250: Mod: NTX | Performed by: STUDENT IN AN ORGANIZED HEALTH CARE EDUCATION/TRAINING PROGRAM

## 2019-06-08 RX ORDER — LOPERAMIDE HYDROCHLORIDE 2 MG/1
4 CAPSULE ORAL EVERY 6 HOURS PRN
Status: DISCONTINUED | OUTPATIENT
Start: 2019-06-08 | End: 2019-06-10 | Stop reason: HOSPADM

## 2019-06-08 RX ADMIN — FLUTICASONE PROPIONATE 100 MCG: 50 SPRAY, METERED NASAL at 08:06

## 2019-06-08 RX ADMIN — CYANOCOBALAMIN TAB 250 MCG 250 MCG: 250 TAB at 08:06

## 2019-06-08 RX ADMIN — CETIRIZINE HYDROCHLORIDE 10 MG: 10 TABLET, FILM COATED ORAL at 08:06

## 2019-06-08 RX ADMIN — METRONIDAZOLE 500 MG: 500 TABLET ORAL at 09:06

## 2019-06-08 RX ADMIN — FOLIC ACID 1000 MCG: 1 TABLET ORAL at 08:06

## 2019-06-08 RX ADMIN — OXYCODONE HYDROCHLORIDE AND ACETAMINOPHEN 1 TABLET: 5; 325 TABLET ORAL at 11:06

## 2019-06-08 RX ADMIN — TREPROSTINIL 80 NG/KG/MIN: 100 INJECTION, SOLUTION INTRAVENOUS; SUBCUTANEOUS at 04:06

## 2019-06-08 RX ADMIN — TOPIRAMATE 100 MG: 100 TABLET, FILM COATED ORAL at 08:06

## 2019-06-08 RX ADMIN — TADALAFIL 40 MG: 20 TABLET ORAL at 05:06

## 2019-06-08 RX ADMIN — TOPIRAMATE 100 MG: 100 TABLET, FILM COATED ORAL at 09:06

## 2019-06-08 RX ADMIN — Medication: at 04:06

## 2019-06-08 RX ADMIN — METRONIDAZOLE 500 MG: 500 TABLET ORAL at 08:06

## 2019-06-08 RX ADMIN — AMBRISENTAN 10 MG: 10 TABLET, FILM COATED ORAL at 09:06

## 2019-06-08 RX ADMIN — LEVOTHYROXINE SODIUM 137 MCG: 25 TABLET ORAL at 06:06

## 2019-06-08 RX ADMIN — LOPERAMIDE HYDROCHLORIDE 4 MG: 2 CAPSULE ORAL at 11:06

## 2019-06-08 RX ADMIN — PANTOPRAZOLE SODIUM 40 MG: 40 TABLET, DELAYED RELEASE ORAL at 08:06

## 2019-06-08 RX ADMIN — CEFTRIAXONE SODIUM 1 G: 1 INJECTION, POWDER, FOR SOLUTION INTRAMUSCULAR; INTRAVENOUS at 09:06

## 2019-06-08 RX ADMIN — FLUTICASONE FUROATE AND VILANTEROL TRIFENATATE 1 PUFF: 100; 25 POWDER RESPIRATORY (INHALATION) at 08:06

## 2019-06-08 RX ADMIN — METRONIDAZOLE 500 MG: 500 TABLET ORAL at 02:06

## 2019-06-08 NOTE — PROGRESS NOTES
"[facility]  Heart Transplant  Progress Note    Patient Name: Yuni Perez  MRN: [id number]  Admission Date: 6/5/2019  Hospital Length of Stay: 3 days  Attending Physician: Ivonne Rai MD  Primary Care Provider: Lulu Sandhu MD  Principal Problem:Cholelithiasis    Subjective:     Interval History: S/p cholecystostomy tube placement by IR yesterday. Reports site being sore. Also reports sob when ambulating. Continues to have watery BM.    Continuous Infusions:   treprostinil (REMODULIN) infusion 80 ng/kg/min (06/07/19 1611)    veletri/remodulin cassette      veletri/remodulin tubing       Scheduled Meds:   ambrisentan  10 mg Oral Daily    cefTRIAXone (ROCEPHIN) IVPB  1 g Intravenous Q24H    cetirizine  10 mg Oral Daily    cyanocobalamin  250 mcg Oral Daily    fluticasone furoate-vilanterol  1 puff Inhalation Daily    fluticasone propionate  2 spray Each Nare Daily    folic acid  1,000 mcg Oral Daily    levothyroxine  137 mcg Oral Daily    metroNIDAZOLE  500 mg Oral Q8H    pantoprazole  40 mg Oral Daily    tadalafil  40 mg Oral Daily    topiramate  100 mg Oral BID     PRN Meds:acetaminophen, albuterol, diphenhydrAMINE, loperamide, ondansetron, ondansetron, ondansetron, oxyCODONE-acetaminophen    Review of patient's allergies indicates:   Allergen Reactions    Fentanyl Anaphylaxis     Respiratory distress    Vibra-tabs [doxycycline hyclate] Anaphylaxis     "throat felt like it was closing"    Adhesive Hives     Silk tape    Amoxicillin Rash    Chlorhexidine Other (See Comments)    Doxycycline      Objective:     Vital Signs (Most Recent):  Temp: 98.6 °F (37 °C) (06/07/19 2321)  Pulse: 77 (06/07/19 2321)  Resp: 20 (06/07/19 2321)  BP: (!) 102/58 (06/07/19 2321)  SpO2: 95 % (06/07/19 2321) Vital Signs (24h Range):  Temp:  [96.6 °F (35.9 °C)-98.6 °F (37 °C)] 98.6 °F (37 °C)  Pulse:  [] 77  Resp:  [16-25] 20  SpO2:  [89 %-100 %] 95 %  BP: ()/(55-78) " 102/58     Patient Vitals for the past 72 hrs (Last 3 readings):   Weight   06/05/19 1308 59 kg (130 lb)     Body mass index is 26.26 kg/m².      Intake/Output Summary (Last 24 hours) at 6/8/2019 0051  Last data filed at 6/7/2019 2000  Gross per 24 hour   Intake 0 ml   Output 1170 ml   Net -1170 ml       Hemodynamic Parameters:         Physical Exam   Constitutional: She is oriented to person, place, and time. She appears well-developed and well-nourished.   HENT:   Head: Normocephalic.   Eyes: Pupils are equal, round, and reactive to light.   Neck: Normal range of motion. Neck supple.   Cardiovascular: Normal rate and regular rhythm.   Pulmonary/Chest: Effort normal and breath sounds normal.   Abdominal: Soft. Bowel sounds are normal.   Musculoskeletal: Normal range of motion.   Neurological: She is alert and oriented to person, place, and time.   Skin: Skin is warm and dry.   Psychiatric: She has a normal mood and affect. Her behavior is normal.   Nursing note and vitals reviewed.      Significant Labs:  CBC:  Recent Labs   Lab 06/05/19  1411 06/06/19  0631 06/07/19  0633   WBC 7.23 4.64 4.30   RBC 4.98 4.25 4.34   HGB 14.8 12.5 12.8   HCT 43.0 36.8* 37.8    197 188   MCV 86 87 87   MCH 29.7 29.4 29.5   MCHC 34.4 34.0 33.9     BNP:  No results for input(s): BNP in the last 168 hours.    Invalid input(s): BNPTRIAGELBLO  CMP:  Recent Labs   Lab 06/05/19  1411 06/06/19  0631   GLU 95 68*   CALCIUM 9.4 8.4*   ALBUMIN 3.6 2.9*   PROT 8.1 6.4    137   K 3.5 3.3*   CO2 15* 17*    111*   BUN 6 5*   CREATININE 0.7 0.6   ALKPHOS 72 58   ALT 26 20   AST 17 14   BILITOT 0.4 0.3      Coagulation:   Recent Labs   Lab 06/07/19  0913   INR 1.0     LDH:  No results for input(s): LDH in the last 72 hours.  Microbiology:  Microbiology Results (last 7 days)     Procedure Component Value Units Date/Time    Gram stain [[id number]] Collected:  06/07/19 1538    Order Status:  Completed Specimen:  Gall Bladder from  Gallbladder Updated:  06/07/19 1733     Gram Stain Result Many WBC's      No organisms seen    Fungus culture [[id number]] Collected:  06/07/19 1541    Order Status:  Sent Specimen:  Gall Bladder from Gallbladder Updated:  06/07/19 1541    AFB Culture & Smear [[id number]] Collected:  06/07/19 1540    Order Status:  Sent Specimen:  Body Fluid from Gallbladder Updated:  06/07/19 1540    Culture, Anaerobe [[id number]] Collected:  06/07/19 1540    Order Status:  Sent Specimen:  Gall Bladder from Gallbladder Updated:  06/07/19 1540    Aerobic culture [[id number]] Collected:  06/07/19 1539    Order Status:  Sent Specimen:  Gall Bladder from Gallbladder Updated:  06/07/19 1540          I have reviewed all pertinent labs within the past 24 hours.    Estimated Creatinine Clearance: 106.8 mL/min (based on SCr of 0.6 mg/dL).    Diagnostic Results:  I have reviewed all pertinent imaging results/findings within the past 24 hours.    Assessment and Plan:     49 year old female with history of IPAH (deferred LUT) on triple therapy (remodulin 80ng/kg/min, Letairis 10mg qday and Adcircoa 40mg qday), seizure disorder, ABHIJEET(not using CPAP), Asthma who presents for abdminal pain that started 1 week ago. It has been waxing and waning, RUQ with associated nausea, vomiting. Patient reports vomiting bile colored liquid but no blood. She also reports diarrhea more than her usual with remodulin She denies any fever, does reports sweats. Denies any shortness of breath.    * Cholelithiasis  -s/p cholecystostomy tube by IR 6/7/2018  -Continue rocephin and flagyl  -Prn pain and nausea control    Migraine without aura and without status migrainosus, not intractable  Continue topamax    Hypothyroidism (acquired)  Continue synthyroid at home dose    Primary pulmonary hypertension  -Continue remodulin, tadalafil, and ambrisentan  -Use prn albuterol for SOB  - Lomotil 4 mg prn for diarrhea      Toribio Paula MD  Heart Transplant  [facility]  [place]

## 2019-06-08 NOTE — PLAN OF CARE
Problem: Adult Inpatient Plan of Care  Goal: Plan of Care Review  Outcome: Ongoing (interventions implemented as appropriate)    Pt AAOx4. Up to bathroom independent.  Did c/o SOB after returning from the bathroom. Pt found to be satting 95% on room air but placed on 2L NC until could regain her breath.  Pt had kita tube place in IR yesterday. RUQ tube to dependent bag drainage. Dressing and insertion site CDI. Tube putting out serosang drainage. 70 mL emptied thus far.  Pt given 5 mg Percocet once for c/o 7/10 pain to RUQ.  Pt on continuous tele monitor running NSR.  BP stable.  Remodulin infusing to R CW chauhan. Remodulin is infusing at 80ng/kg/min x dw 60.5kg ( 77 mL/25hr).  Plan for DC over the weekend.  HH orders in place.   NAEON thus far.  Pt remained free from falls or injury thus far. Bed is in low/ locked position, side rails are up x 2, call light is in reach.   Will continue to monitor.

## 2019-06-08 NOTE — SUBJECTIVE & OBJECTIVE
"Interval History: No acute events overnight.    Continuous Infusions:   treprostinil (REMODULIN) infusion 80 ng/kg/min (06/07/19 1611)    veletri/remodulin cassette      veletri/remodulin tubing       Scheduled Meds:   ambrisentan  10 mg Oral Daily    cefTRIAXone (ROCEPHIN) IVPB  1 g Intravenous Q24H    cetirizine  10 mg Oral Daily    cyanocobalamin  250 mcg Oral Daily    fluticasone furoate-vilanterol  1 puff Inhalation Daily    fluticasone propionate  2 spray Each Nare Daily    folic acid  1,000 mcg Oral Daily    levothyroxine  137 mcg Oral Daily    metroNIDAZOLE  500 mg Oral Q8H    pantoprazole  40 mg Oral Daily    tadalafil  40 mg Oral Daily    topiramate  100 mg Oral BID     PRN Meds:acetaminophen, albuterol, diphenhydrAMINE, loperamide, ondansetron, ondansetron, ondansetron, oxyCODONE-acetaminophen    Review of patient's allergies indicates:   Allergen Reactions    Fentanyl Anaphylaxis     Respiratory distress    Vibra-tabs [doxycycline hyclate] Anaphylaxis     "throat felt like it was closing"    Adhesive Hives     Silk tape    Amoxicillin Rash    Chlorhexidine Other (See Comments)    Doxycycline      Objective:     Vital Signs (Most Recent):  Temp: 98.6 °F (37 °C) (06/07/19 2321)  Pulse: 77 (06/07/19 2321)  Resp: 20 (06/07/19 2321)  BP: (!) 102/58 (06/07/19 2321)  SpO2: 95 % (06/07/19 2321) Vital Signs (24h Range):  Temp:  [96.6 °F (35.9 °C)-98.6 °F (37 °C)] 98.6 °F (37 °C)  Pulse:  [] 77  Resp:  [16-25] 20  SpO2:  [89 %-100 %] 95 %  BP: ()/(55-78) 102/58     Patient Vitals for the past 72 hrs (Last 3 readings):   Weight   06/05/19 1308 59 kg (130 lb)     Body mass index is 26.26 kg/m².      Intake/Output Summary (Last 24 hours) at 6/8/2019 0051  Last data filed at 6/7/2019 2000  Gross per 24 hour   Intake 0 ml   Output 1170 ml   Net -1170 ml       Hemodynamic Parameters:         Physical Exam   Constitutional: She is oriented to person, place, and time. She appears " well-developed and well-nourished.   HENT:   Head: Normocephalic.   Eyes: Pupils are equal, round, and reactive to light.   Neck: Normal range of motion. Neck supple.   Cardiovascular: Normal rate and regular rhythm.   Pulmonary/Chest: Effort normal and breath sounds normal.   Abdominal: Soft. Bowel sounds are normal.   Musculoskeletal: Normal range of motion.   Neurological: She is alert and oriented to person, place, and time.   Skin: Skin is warm and dry.   Psychiatric: She has a normal mood and affect. Her behavior is normal.   Nursing note and vitals reviewed.      Significant Labs:  CBC:  Recent Labs   Lab 06/05/19  1411 06/06/19  0631 06/07/19  0633   WBC 7.23 4.64 4.30   RBC 4.98 4.25 4.34   HGB 14.8 12.5 12.8   HCT 43.0 36.8* 37.8    197 188   MCV 86 87 87   MCH 29.7 29.4 29.5   MCHC 34.4 34.0 33.9     BNP:  No results for input(s): BNP in the last 168 hours.    Invalid input(s): BNPTRIAGELBLO  CMP:  Recent Labs   Lab 06/05/19  1411 06/06/19  0631   GLU 95 68*   CALCIUM 9.4 8.4*   ALBUMIN 3.6 2.9*   PROT 8.1 6.4    137   K 3.5 3.3*   CO2 15* 17*    111*   BUN 6 5*   CREATININE 0.7 0.6   ALKPHOS 72 58   ALT 26 20   AST 17 14   BILITOT 0.4 0.3      Coagulation:   Recent Labs   Lab 06/07/19  0913   INR 1.0     LDH:  No results for input(s): LDH in the last 72 hours.  Microbiology:  Microbiology Results (last 7 days)     Procedure Component Value Units Date/Time    Gram stain [[id number]] Collected:  06/07/19 1538    Order Status:  Completed Specimen:  Gall Bladder from Gallbladder Updated:  06/07/19 1733     Gram Stain Result Many WBC's      No organisms seen    Fungus culture [[id number]] Collected:  06/07/19 1541    Order Status:  Sent Specimen:  Gall Bladder from Gallbladder Updated:  06/07/19 1541    AFB Culture & Smear [[id number]] Collected:  06/07/19 1540    Order Status:  Sent Specimen:  Body Fluid from Gallbladder Updated:  06/07/19 1540    Culture, Anaerobe [[id number]] Collected:   06/07/19 1540    Order Status:  Sent Specimen:  Gall Bladder from Gallbladder Updated:  06/07/19 1540    Aerobic culture [[id number]] Collected:  06/07/19 1539    Order Status:  Sent Specimen:  Gall Bladder from Gallbladder Updated:  06/07/19 1540          I have reviewed all pertinent labs within the past 24 hours.    Estimated Creatinine Clearance: 106.8 mL/min (based on SCr of 0.6 mg/dL).    Diagnostic Results:  I have reviewed all pertinent imaging results/findings within the past 24 hours.

## 2019-06-08 NOTE — NURSING
Notified Dr. Paula that pt has bedside nasal cannula at 2 LPM that she applies PRN.  Orders written for PRN oxygen.  RN will continue to monitor.

## 2019-06-08 NOTE — PLAN OF CARE
Problem: Adult Inpatient Plan of Care  Goal: Plan of Care Review  Outcome: Ongoing (interventions implemented as appropriate)  Pt AAOx4, VSS, in NAD throughout shift.  Pt up and ambulatory in room this shift, ambulates to restroom.  Pt having diarrhea this shift, team aware, imodium ordered PRN, RN giving doses as ordered, see NITZA Rogers drain pulling small amount of serosanguinous fluid.  Pt has PRN oxygen available but has not applied the nasal cannula so far this shift.  Pt skilled at changing own remodulin cassettes and caring for infusion.  RN maintaining fall risk precautions this shift (bedside commode ordered for emergent BMs).  Pt denies pain or other need at this time; RN will continue to monitor, assess, and alter plan of care as needed until report given to oncoming night shift nurse.

## 2019-06-09 LAB
BASOPHILS # BLD AUTO: 0.02 K/UL (ref 0–0.2)
BASOPHILS NFR BLD: 0.4 % (ref 0–1.9)
DIFFERENTIAL METHOD: ABNORMAL
EOSINOPHIL # BLD AUTO: 0.3 K/UL (ref 0–0.5)
EOSINOPHIL NFR BLD: 7.2 % (ref 0–8)
ERYTHROCYTE [DISTWIDTH] IN BLOOD BY AUTOMATED COUNT: 13.6 % (ref 11.5–14.5)
HCT VFR BLD AUTO: 36.5 % (ref 37–48.5)
HGB BLD-MCNC: 12.3 G/DL (ref 12–16)
IMM GRANULOCYTES # BLD AUTO: 0.02 K/UL (ref 0–0.04)
IMM GRANULOCYTES NFR BLD AUTO: 0.4 % (ref 0–0.5)
LYMPHOCYTES # BLD AUTO: 1.6 K/UL (ref 1–4.8)
LYMPHOCYTES NFR BLD: 34.8 % (ref 18–48)
MCH RBC QN AUTO: 29.7 PG (ref 27–31)
MCHC RBC AUTO-ENTMCNC: 33.7 G/DL (ref 32–36)
MCV RBC AUTO: 88 FL (ref 82–98)
MONOCYTES # BLD AUTO: 0.5 K/UL (ref 0.3–1)
MONOCYTES NFR BLD: 10.7 % (ref 4–15)
NEUTROPHILS # BLD AUTO: 2.1 K/UL (ref 1.8–7.7)
NEUTROPHILS NFR BLD: 46.5 % (ref 38–73)
NRBC BLD-RTO: 0 /100 WBC
PLATELET # BLD AUTO: 197 K/UL (ref 150–350)
PMV BLD AUTO: 12.6 FL (ref 9.2–12.9)
RBC # BLD AUTO: 4.14 M/UL (ref 4–5.4)
WBC # BLD AUTO: 4.6 K/UL (ref 3.9–12.7)

## 2019-06-09 PROCEDURE — 25000003 PHARM REV CODE 250: Mod: NTX | Performed by: STUDENT IN AN ORGANIZED HEALTH CARE EDUCATION/TRAINING PROGRAM

## 2019-06-09 PROCEDURE — 99232 SBSQ HOSP IP/OBS MODERATE 35: CPT | Mod: NTX,,, | Performed by: INTERNAL MEDICINE

## 2019-06-09 PROCEDURE — 85025 COMPLETE CBC W/AUTO DIFF WBC: CPT | Mod: NTX

## 2019-06-09 PROCEDURE — 99232 PR SUBSEQUENT HOSPITAL CARE,LEVL II: ICD-10-PCS | Mod: NTX,,, | Performed by: INTERNAL MEDICINE

## 2019-06-09 PROCEDURE — 25000003 PHARM REV CODE 250: Mod: NTX | Performed by: INTERNAL MEDICINE

## 2019-06-09 PROCEDURE — 63600175 PHARM REV CODE 636 W HCPCS: Mod: NTX | Performed by: INTERNAL MEDICINE

## 2019-06-09 PROCEDURE — 99900035 HC TECH TIME PER 15 MIN (STAT): Mod: NTX

## 2019-06-09 PROCEDURE — 36415 COLL VENOUS BLD VENIPUNCTURE: CPT | Mod: NTX

## 2019-06-09 PROCEDURE — 20600001 HC STEP DOWN PRIVATE ROOM: Mod: NTX

## 2019-06-09 PROCEDURE — 63600175 PHARM REV CODE 636 W HCPCS: Mod: JG,NTX | Performed by: INTERNAL MEDICINE

## 2019-06-09 PROCEDURE — 94761 N-INVAS EAR/PLS OXIMETRY MLT: CPT | Mod: NTX

## 2019-06-09 RX ADMIN — TOPIRAMATE 100 MG: 100 TABLET, FILM COATED ORAL at 11:06

## 2019-06-09 RX ADMIN — LEVOTHYROXINE SODIUM 137 MCG: 25 TABLET ORAL at 08:06

## 2019-06-09 RX ADMIN — CETIRIZINE HYDROCHLORIDE 10 MG: 10 TABLET, FILM COATED ORAL at 11:06

## 2019-06-09 RX ADMIN — TREPROSTINIL 80 NG/KG/MIN: 100 INJECTION, SOLUTION INTRAVENOUS; SUBCUTANEOUS at 03:06

## 2019-06-09 RX ADMIN — OXYCODONE HYDROCHLORIDE AND ACETAMINOPHEN 1 TABLET: 5; 325 TABLET ORAL at 04:06

## 2019-06-09 RX ADMIN — METRONIDAZOLE 500 MG: 500 TABLET ORAL at 08:06

## 2019-06-09 RX ADMIN — CYANOCOBALAMIN TAB 250 MCG 250 MCG: 250 TAB at 11:06

## 2019-06-09 RX ADMIN — TADALAFIL 40 MG: 20 TABLET ORAL at 05:06

## 2019-06-09 RX ADMIN — PANTOPRAZOLE SODIUM 40 MG: 40 TABLET, DELAYED RELEASE ORAL at 11:06

## 2019-06-09 RX ADMIN — FLUTICASONE PROPIONATE 100 MCG: 50 SPRAY, METERED NASAL at 08:06

## 2019-06-09 RX ADMIN — FOLIC ACID 1000 MCG: 1 TABLET ORAL at 11:06

## 2019-06-09 RX ADMIN — METRONIDAZOLE 500 MG: 500 TABLET ORAL at 11:06

## 2019-06-09 RX ADMIN — METRONIDAZOLE 500 MG: 500 TABLET ORAL at 02:06

## 2019-06-09 RX ADMIN — CEFTRIAXONE SODIUM 1 G: 1 INJECTION, POWDER, FOR SOLUTION INTRAMUSCULAR; INTRAVENOUS at 08:06

## 2019-06-09 RX ADMIN — ACETAMINOPHEN 650 MG: 325 TABLET ORAL at 09:06

## 2019-06-09 RX ADMIN — OXYCODONE HYDROCHLORIDE AND ACETAMINOPHEN 1 TABLET: 5; 325 TABLET ORAL at 02:06

## 2019-06-09 RX ADMIN — AMBRISENTAN 10 MG: 10 TABLET, FILM COATED ORAL at 11:06

## 2019-06-09 RX ADMIN — FLUTICASONE FUROATE AND VILANTEROL TRIFENATATE 1 PUFF: 100; 25 POWDER RESPIRATORY (INHALATION) at 08:06

## 2019-06-09 RX ADMIN — ACETAMINOPHEN 650 MG: 325 TABLET ORAL at 11:06

## 2019-06-09 RX ADMIN — TOPIRAMATE 100 MG: 100 TABLET, FILM COATED ORAL at 08:06

## 2019-06-09 NOTE — PROGRESS NOTES
"[facility]  Heart Transplant  Progress Note    Patient Name: Yuni Perez  MRN: [id number]  Admission Date: 6/5/2019  Hospital Length of Stay: 4 days  Attending Physician: Ivonne Rai MD  Primary Care Provider: Lulu Sandhu MD  Principal Problem:Cholelithiasis    Subjective:     Interval History: pain slightly improved, Sob better. Denies any diarrhea.    Continuous Infusions:   treprostinil (REMODULIN) infusion 80 ng/kg/min (06/08/19 1605)    veletri/remodulin cassette      veletri/remodulin tubing       Scheduled Meds:   ambrisentan  10 mg Oral Daily    cefTRIAXone (ROCEPHIN) IVPB  1 g Intravenous Q24H    cetirizine  10 mg Oral Daily    cyanocobalamin  250 mcg Oral Daily    fluticasone furoate-vilanterol  1 puff Inhalation Daily    fluticasone propionate  2 spray Each Nare Daily    folic acid  1,000 mcg Oral Daily    levothyroxine  137 mcg Oral Daily    metroNIDAZOLE  500 mg Oral Q8H    pantoprazole  40 mg Oral Daily    tadalafil  40 mg Oral Daily    topiramate  100 mg Oral BID     PRN Meds:acetaminophen, albuterol, diphenhydrAMINE, loperamide, ondansetron, ondansetron, ondansetron, oxyCODONE-acetaminophen    Review of patient's allergies indicates:   Allergen Reactions    Fentanyl Anaphylaxis     Respiratory distress    Vibra-tabs [doxycycline hyclate] Anaphylaxis     "throat felt like it was closing"    Adhesive Hives     Silk tape    Amoxicillin Rash    Chlorhexidine Other (See Comments)    Doxycycline      Objective:     Vital Signs (Most Recent):  Temp: 97.6 °F (36.4 °C) (06/09/19 1205)  Pulse: 70 (06/09/19 1205)  Resp: 18 (06/09/19 1205)  BP: 109/71 (06/09/19 1205)  SpO2: 97 % (06/09/19 1205) Vital Signs (24h Range):  Temp:  [97.6 °F (36.4 °C)-99.1 °F (37.3 °C)] 97.6 °F (36.4 °C)  Pulse:  [64-90] 70  Resp:  [16-20] 18  SpO2:  [93 %-97 %] 97 %  BP: ()/(57-79) 109/71     Patient Vitals for the past 72 hrs (Last 3 readings):   Weight   06/09/19 " 0642 61.5 kg (135 lb 9.3 oz)   06/08/19 0600 61 kg (134 lb 7.7 oz)     Body mass index is 27.38 kg/m².      Intake/Output Summary (Last 24 hours) at 6/9/2019 1217  Last data filed at 6/9/2019 1100  Gross per 24 hour   Intake 1140 ml   Output 1205 ml   Net -65 ml       Hemodynamic Parameters:       Physical Exam   Constitutional: She is oriented to person, place, and time. She appears well-developed and well-nourished.   HENT:   Head: Normocephalic.   Eyes: Pupils are equal, round, and reactive to light.   Neck: Normal range of motion. Neck supple.   Cardiovascular: Normal rate and regular rhythm.   Pulmonary/Chest: Effort normal and breath sounds normal.   Abdominal: Soft. Bowel sounds are normal.   Cholecystostomy tube in place   Musculoskeletal: Normal range of motion.   Neurological: She is alert and oriented to person, place, and time.   Skin: Skin is warm and dry.   Psychiatric: She has a normal mood and affect. Her behavior is normal.   Nursing note and vitals reviewed.      Significant Labs:  CBC:  Recent Labs   Lab 06/07/19  0633 06/08/19  0705 06/09/19  0646   WBC 4.30 4.86 4.60   RBC 4.34 4.46 4.14   HGB 12.8 13.4 12.3   HCT 37.8 38.9 36.5*    193 197   MCV 87 87 88   MCH 29.5 30.0 29.7   MCHC 33.9 34.4 33.7     BNP:  No results for input(s): BNP in the last 168 hours.    Invalid input(s): BNPTRIAGELBLO  CMP:  Recent Labs   Lab 06/05/19  1411 06/06/19  0631   GLU 95 68*   CALCIUM 9.4 8.4*   ALBUMIN 3.6 2.9*   PROT 8.1 6.4    137   K 3.5 3.3*   CO2 15* 17*    111*   BUN 6 5*   CREATININE 0.7 0.6   ALKPHOS 72 58   ALT 26 20   AST 17 14   BILITOT 0.4 0.3      Coagulation:   Recent Labs   Lab 06/07/19  0913   INR 1.0     LDH:  No results for input(s): LDH in the last 72 hours.  Microbiology:  Microbiology Results (last 7 days)     Procedure Component Value Units Date/Time    AFB Culture & Smear [[id number]] Collected:  06/07/19 1540    Order Status:  Completed Specimen:  Body Fluid from  Gallbladder Updated:  06/08/19 2127     AFB Culture & Smear Culture in progress    Aerobic culture [[id number]] Collected:  06/07/19 1539    Order Status:  Completed Specimen:  Gall Bladder from Gallbladder Updated:  06/08/19 0804     Aerobic Bacterial Culture No growth    Gram stain [[id number]] Collected:  06/07/19 1538    Order Status:  Completed Specimen:  Gall Bladder from Gallbladder Updated:  06/07/19 1733     Gram Stain Result Many WBC's      No organisms seen    Fungus culture [[id number]] Collected:  06/07/19 1541    Order Status:  Sent Specimen:  Gall Bladder from Gallbladder Updated:  06/07/19 1541    Culture, Anaerobe [[id number]] Collected:  06/07/19 1540    Order Status:  Sent Specimen:  Gall Bladder from Gallbladder Updated:  06/07/19 1540          I have reviewed all pertinent labs within the past 24 hours.    Estimated Creatinine Clearance: 111.3 mL/min (based on SCr of 0.6 mg/dL).    Diagnostic Results:  I have reviewed all pertinent imaging results/findings within the past 24 hours.    Assessment and Plan:     49 year old female with history of IPAH (deferred LUT) on triple therapy (remodulin 80ng/kg/min, Letairis 10mg qday and Adcircoa 40mg qday), seizure disorder, ABHIJEET(not using CPAP), Asthma who presents for abdminal pain that started 1 week ago. It has been waxing and waning, RUQ with associated nausea, vomiting. Patient reports vomiting bile colored liquid but no blood. She also reports diarrhea more than her usual with remodulin She denies any fever, does reports sweats. Denies any shortness of breath.    * Cholelithiasis  -S/p cholecystostomy tube placement by IR 6/7  -Continue rocephin and flagyl  -Prn pain and nausea control    Migraine without aura and without status migrainosus, not intractable  Continue topamax    Hypothyroidism (acquired)  Continue synthyroid at home dose    Primary pulmonary hypertension  -Continue remodulin, tadalafil, and ambrisentan        Toribio Paula MD  Heart  Transplant  [facility]

## 2019-06-09 NOTE — PLAN OF CARE
Problem: Adult Inpatient Plan of Care  Goal: Plan of Care Review  Outcome: Ongoing (interventions implemented as appropriate)  Pt AAOx4, VSS, in NAD throughout shift.  Pt up to restroom independently.  Pt denies any diarrhea this shift.  Pt has not needed PRN oxygen this shift, SpO2 > or = 93% throughout shift.  Pt changed remodulin cassette this afternoon independently (two RN's verified rate on pump and observed the cassette change).  Pt aware of possible discharge tomorrow.  Pt in good spirits this shift.  Pt managing pain this shift by alternating PRN Tylenol and percocet, see MAR; pt reporting relief.  RN maintaining fall risk precautions throughout shift.  Pt denies pain or other need at this time; RN will continue to monitor, assess, and alter plan of care as needed until report given to oncoming night shift nurse.

## 2019-06-09 NOTE — NURSING
"Pt stated she was "not fully awake" when night shift RN delivered synthroid to bedside, and so missed due time.  Pt's am medication schedule per pt request is as follows:     Breakfast 08:30  Synthroid 10:30  Flagyl and other am meds: 11:30    RN will administer meds as requested and continue to monitor.  "

## 2019-06-09 NOTE — SUBJECTIVE & OBJECTIVE
"Interval History: pain slightly improved, Sob better. Denies any diarrhea.    Continuous Infusions:   treprostinil (REMODULIN) infusion 80 ng/kg/min (06/08/19 1605)    veletri/remodulin cassette      veletri/remodulin tubing       Scheduled Meds:   ambrisentan  10 mg Oral Daily    cefTRIAXone (ROCEPHIN) IVPB  1 g Intravenous Q24H    cetirizine  10 mg Oral Daily    cyanocobalamin  250 mcg Oral Daily    fluticasone furoate-vilanterol  1 puff Inhalation Daily    fluticasone propionate  2 spray Each Nare Daily    folic acid  1,000 mcg Oral Daily    levothyroxine  137 mcg Oral Daily    metroNIDAZOLE  500 mg Oral Q8H    pantoprazole  40 mg Oral Daily    tadalafil  40 mg Oral Daily    topiramate  100 mg Oral BID     PRN Meds:acetaminophen, albuterol, diphenhydrAMINE, loperamide, ondansetron, ondansetron, ondansetron, oxyCODONE-acetaminophen    Review of patient's allergies indicates:   Allergen Reactions    Fentanyl Anaphylaxis     Respiratory distress    Vibra-tabs [doxycycline hyclate] Anaphylaxis     "throat felt like it was closing"    Adhesive Hives     Silk tape    Amoxicillin Rash    Chlorhexidine Other (See Comments)    Doxycycline      Objective:     Vital Signs (Most Recent):  Temp: 97.6 °F (36.4 °C) (06/09/19 1205)  Pulse: 70 (06/09/19 1205)  Resp: 18 (06/09/19 1205)  BP: 109/71 (06/09/19 1205)  SpO2: 97 % (06/09/19 1205) Vital Signs (24h Range):  Temp:  [97.6 °F (36.4 °C)-99.1 °F (37.3 °C)] 97.6 °F (36.4 °C)  Pulse:  [64-90] 70  Resp:  [16-20] 18  SpO2:  [93 %-97 %] 97 %  BP: ()/(57-79) 109/71     Patient Vitals for the past 72 hrs (Last 3 readings):   Weight   06/09/19 0642 61.5 kg (135 lb 9.3 oz)   06/08/19 0600 61 kg (134 lb 7.7 oz)     Body mass index is 27.38 kg/m².      Intake/Output Summary (Last 24 hours) at 6/9/2019 1217  Last data filed at 6/9/2019 1100  Gross per 24 hour   Intake 1140 ml   Output 1205 ml   Net -65 ml       Hemodynamic Parameters:       Physical Exam "   Constitutional: She is oriented to person, place, and time. She appears well-developed and well-nourished.   HENT:   Head: Normocephalic.   Eyes: Pupils are equal, round, and reactive to light.   Neck: Normal range of motion. Neck supple.   Cardiovascular: Normal rate and regular rhythm.   Pulmonary/Chest: Effort normal and breath sounds normal.   Abdominal: Soft. Bowel sounds are normal.   Cholecystostomy tube in place   Musculoskeletal: Normal range of motion.   Neurological: She is alert and oriented to person, place, and time.   Skin: Skin is warm and dry.   Psychiatric: She has a normal mood and affect. Her behavior is normal.   Nursing note and vitals reviewed.      Significant Labs:  CBC:  Recent Labs   Lab 06/07/19  0633 06/08/19  0705 06/09/19  0646   WBC 4.30 4.86 4.60   RBC 4.34 4.46 4.14   HGB 12.8 13.4 12.3   HCT 37.8 38.9 36.5*    193 197   MCV 87 87 88   MCH 29.5 30.0 29.7   MCHC 33.9 34.4 33.7     BNP:  No results for input(s): BNP in the last 168 hours.    Invalid input(s): BNPTRIAGELBLO  CMP:  Recent Labs   Lab 06/05/19  1411 06/06/19  0631   GLU 95 68*   CALCIUM 9.4 8.4*   ALBUMIN 3.6 2.9*   PROT 8.1 6.4    137   K 3.5 3.3*   CO2 15* 17*    111*   BUN 6 5*   CREATININE 0.7 0.6   ALKPHOS 72 58   ALT 26 20   AST 17 14   BILITOT 0.4 0.3      Coagulation:   Recent Labs   Lab 06/07/19  0913   INR 1.0     LDH:  No results for input(s): LDH in the last 72 hours.  Microbiology:  Microbiology Results (last 7 days)     Procedure Component Value Units Date/Time    AFB Culture & Smear [[id number]] Collected:  06/07/19 1540    Order Status:  Completed Specimen:  Body Fluid from Gallbladder Updated:  06/08/19 2127     AFB Culture & Smear Culture in progress    Aerobic culture [[id number]] Collected:  06/07/19 1539    Order Status:  Completed Specimen:  Gall Bladder from Gallbladder Updated:  06/08/19 0804     Aerobic Bacterial Culture No growth    Gram stain [[id number]] Collected:  06/07/19  1538    Order Status:  Completed Specimen:  Gall Bladder from Gallbladder Updated:  06/07/19 1733     Gram Stain Result Many WBC's      No organisms seen    Fungus culture [[id number]] Collected:  06/07/19 1541    Order Status:  Sent Specimen:  Gall Bladder from Gallbladder Updated:  06/07/19 1541    Culture, Anaerobe [[id number]] Collected:  06/07/19 1540    Order Status:  Sent Specimen:  Gall Bladder from Gallbladder Updated:  06/07/19 1540          I have reviewed all pertinent labs within the past 24 hours.    Estimated Creatinine Clearance: 111.3 mL/min (based on SCr of 0.6 mg/dL).    Diagnostic Results:  I have reviewed all pertinent imaging results/findings within the past 24 hours.

## 2019-06-09 NOTE — ASSESSMENT & PLAN NOTE
-S/p cholecystostomy tube placement by IR 6/7  -Continue rocephin and flagyl  -Prn pain and nausea control

## 2019-06-09 NOTE — PLAN OF CARE
Problem: Adult Inpatient Plan of Care  Goal: Plan of Care Review  Outcome: Ongoing (interventions implemented as appropriate)    Pt AAOx4. Up to bathroom independent.  Satting 93-97% on room air overnight. PRN NC 2L on standby.  Pt had kita tube place in IR Friday. RUQ tube to dependent bag drainage. Dressing and insertion site CDI. Tube putting out serosang drainage.   Pt given 5 mg Percocet once for c/o 7/10 pain to RUQ.  Pt on continuous tele monitor running NSR.  BP stable.  Remodulin infusing to R CW chauhan. Remodulin is infusing at 80ng/kg/min x dw 60.5kg ( 77 mL/25hr).  Plan for possible DC today.  HH orders in place.   NAEON thus far.  Pt remained free from falls or injury thus far. Bed is in low/ locked position, side rails are up x 2, call light is in reach.   Will continue to monitor.

## 2019-06-10 VITALS
RESPIRATION RATE: 16 BRPM | OXYGEN SATURATION: 96 % | TEMPERATURE: 98 F | HEIGHT: 59 IN | DIASTOLIC BLOOD PRESSURE: 74 MMHG | BODY MASS INDEX: 26.98 KG/M2 | SYSTOLIC BLOOD PRESSURE: 118 MMHG | WEIGHT: 133.81 LBS | HEART RATE: 80 BPM

## 2019-06-10 LAB
ANION GAP SERPL CALC-SCNC: 7 MMOL/L (ref 8–16)
BACTERIA SPEC AEROBE CULT: NO GROWTH
BASOPHILS # BLD AUTO: 0.03 K/UL (ref 0–0.2)
BASOPHILS NFR BLD: 0.6 % (ref 0–1.9)
BUN SERPL-MCNC: 5 MG/DL (ref 6–20)
CALCIUM SERPL-MCNC: 8.5 MG/DL (ref 8.7–10.5)
CHLORIDE SERPL-SCNC: 113 MMOL/L (ref 95–110)
CO2 SERPL-SCNC: 18 MMOL/L (ref 23–29)
CREAT SERPL-MCNC: 0.6 MG/DL (ref 0.5–1.4)
DIFFERENTIAL METHOD: ABNORMAL
EOSINOPHIL # BLD AUTO: 0.5 K/UL (ref 0–0.5)
EOSINOPHIL NFR BLD: 11.3 % (ref 0–8)
ERYTHROCYTE [DISTWIDTH] IN BLOOD BY AUTOMATED COUNT: 13.7 % (ref 11.5–14.5)
EST. GFR  (AFRICAN AMERICAN): >60 ML/MIN/1.73 M^2
EST. GFR  (NON AFRICAN AMERICAN): >60 ML/MIN/1.73 M^2
GLUCOSE SERPL-MCNC: 89 MG/DL (ref 70–110)
HCT VFR BLD AUTO: 36.8 % (ref 37–48.5)
HGB BLD-MCNC: 12.4 G/DL (ref 12–16)
IMM GRANULOCYTES # BLD AUTO: 0.02 K/UL (ref 0–0.04)
IMM GRANULOCYTES NFR BLD AUTO: 0.4 % (ref 0–0.5)
LYMPHOCYTES # BLD AUTO: 1.3 K/UL (ref 1–4.8)
LYMPHOCYTES NFR BLD: 28.7 % (ref 18–48)
MCH RBC QN AUTO: 29.5 PG (ref 27–31)
MCHC RBC AUTO-ENTMCNC: 33.7 G/DL (ref 32–36)
MCV RBC AUTO: 87 FL (ref 82–98)
MONOCYTES # BLD AUTO: 0.4 K/UL (ref 0.3–1)
MONOCYTES NFR BLD: 8.4 % (ref 4–15)
NEUTROPHILS # BLD AUTO: 2.4 K/UL (ref 1.8–7.7)
NEUTROPHILS NFR BLD: 50.6 % (ref 38–73)
NRBC BLD-RTO: 0 /100 WBC
PLATELET # BLD AUTO: 212 K/UL (ref 150–350)
PMV BLD AUTO: 12 FL (ref 9.2–12.9)
POTASSIUM SERPL-SCNC: 3.5 MMOL/L (ref 3.5–5.1)
RBC # BLD AUTO: 4.21 M/UL (ref 4–5.4)
SODIUM SERPL-SCNC: 138 MMOL/L (ref 136–145)
WBC # BLD AUTO: 4.67 K/UL (ref 3.9–12.7)

## 2019-06-10 PROCEDURE — 25000003 PHARM REV CODE 250: Mod: NTX | Performed by: INTERNAL MEDICINE

## 2019-06-10 PROCEDURE — 80048 BASIC METABOLIC PNL TOTAL CA: CPT | Mod: NTX

## 2019-06-10 PROCEDURE — 99239 PR HOSPITAL DISCHARGE DAY,>30 MIN: ICD-10-PCS | Mod: NTX,,, | Performed by: INTERNAL MEDICINE

## 2019-06-10 PROCEDURE — 99239 HOSP IP/OBS DSCHRG MGMT >30: CPT | Mod: NTX,,, | Performed by: INTERNAL MEDICINE

## 2019-06-10 PROCEDURE — 85025 COMPLETE CBC W/AUTO DIFF WBC: CPT | Mod: NTX

## 2019-06-10 PROCEDURE — 36415 COLL VENOUS BLD VENIPUNCTURE: CPT | Mod: NTX

## 2019-06-10 RX ORDER — OXYCODONE AND ACETAMINOPHEN 5; 325 MG/1; MG/1
1 TABLET ORAL EVERY 4 HOURS PRN
Qty: 15 TABLET | Refills: 0 | Status: SHIPPED | OUTPATIENT
Start: 2019-06-10 | End: 2019-06-10

## 2019-06-10 RX ORDER — ONDANSETRON 4 MG/1
4 TABLET, FILM COATED ORAL EVERY 6 HOURS PRN
Qty: 30 TABLET | Refills: 2 | Status: SHIPPED | OUTPATIENT
Start: 2019-06-10

## 2019-06-10 RX ORDER — OXYCODONE AND ACETAMINOPHEN 5; 325 MG/1; MG/1
1 TABLET ORAL EVERY 4 HOURS PRN
Qty: 15 TABLET | Refills: 0 | Status: SHIPPED | OUTPATIENT
Start: 2019-06-10 | End: 2020-02-12

## 2019-06-10 RX ORDER — METRONIDAZOLE 500 MG/1
500 TABLET ORAL EVERY 8 HOURS
Qty: 30 TABLET | Refills: 0 | Status: SHIPPED | OUTPATIENT
Start: 2019-06-10 | End: 2019-06-20

## 2019-06-10 RX ORDER — CIPROFLOXACIN 500 MG/1
750 TABLET ORAL EVERY 12 HOURS
Qty: 20 TABLET | Refills: 0 | Status: SHIPPED | OUTPATIENT
Start: 2019-06-10 | End: 2019-07-03

## 2019-06-10 RX ADMIN — CETIRIZINE HYDROCHLORIDE 10 MG: 10 TABLET, FILM COATED ORAL at 09:06

## 2019-06-10 RX ADMIN — FOLIC ACID 1000 MCG: 1 TABLET ORAL at 09:06

## 2019-06-10 RX ADMIN — FLUTICASONE FUROATE AND VILANTEROL TRIFENATATE 1 PUFF: 100; 25 POWDER RESPIRATORY (INHALATION) at 09:06

## 2019-06-10 RX ADMIN — METRONIDAZOLE 500 MG: 500 TABLET ORAL at 09:06

## 2019-06-10 RX ADMIN — TOPIRAMATE 100 MG: 100 TABLET, FILM COATED ORAL at 09:06

## 2019-06-10 RX ADMIN — AMBRISENTAN 10 MG: 10 TABLET, FILM COATED ORAL at 09:06

## 2019-06-10 RX ADMIN — PANTOPRAZOLE SODIUM 40 MG: 40 TABLET, DELAYED RELEASE ORAL at 09:06

## 2019-06-10 RX ADMIN — FLUTICASONE PROPIONATE 100 MCG: 50 SPRAY, METERED NASAL at 09:06

## 2019-06-10 RX ADMIN — CYANOCOBALAMIN TAB 250 MCG 250 MCG: 250 TAB at 09:06

## 2019-06-10 RX ADMIN — METRONIDAZOLE 500 MG: 500 TABLET ORAL at 03:06

## 2019-06-10 RX ADMIN — LEVOTHYROXINE SODIUM 137 MCG: 25 TABLET ORAL at 06:06

## 2019-06-10 NOTE — PROGRESS NOTES
Update:    SW to pt's room for D/C. Pt reports in agreement with plan to D/C home today with [facility] ([phone] ph, [phone] fax). Pt reports mother will provide transport. Pt reports nervous about returning home with a drain. SW providing emotional support. Pt reports no other questions or concerns at this time, and none identified. SW providing psychosocial and counseling support, education, assistance, resources, and D/C planning as indicated. SW remains available.

## 2019-06-10 NOTE — PHYSICIAN QUERY
PT Name: Yuni Perez  MR #: [id number]     Physician Query Form - Documentation Clarification      CDS/: Britta Hayes RN, CCDS             Contact information: [email or web address]    This form is a permanent document in the medical record.     Query Date: Gela 10, 2019    By submitting this query, we are merely seeking further clarification of documentation. Please utilize your independent clinical judgment when addressing the question(s) below.    The Medical record reflects the following:    Supporting Clinical Findings Location in Medical Record      Sue tube to be placed today for acute cholecystitis. High risk given severe PAH and prior decompensation with induction of anesthesia for cholecystectomy.      Patient SpO2 dropped 65%, patient only responding to painful stimuli, patient began turning blue, code blue called, 1mg Narcan given at 1255, 0.2mg Romazicon given at 1303.     Cholescystomy tube placement procedure complete.  Code called, Narcan and Flumazenil given. Patient switched from non-rebreather to Nasal canula with sats 99    patient was non responsive, It was said that the patient became apneic due to being given too much medication.    6/7 prog notes            6/7 nurse note            6/7 nurse note          6/7 resp note     previous heart cath procedure in which she felt everything and had a reaction like this one to the fentanyl.  Fentanyl not listed as an allergy, but has been added to allergies. Patient agrees with Fentanyl being added as an allergy.       6/7 nurse note                                                                            Doctor, Please specify diagnosis or diagnoses associated with above clinical findings.    Provider Use Only        ( x   )  Apnea, Hypoxia  2/2  Fentanyl    (    )  Other, specify____________________                                                                                                                                [   ] Clinically Undetermined

## 2019-06-10 NOTE — PROGRESS NOTES
Discharge paperwork given to and reviewed with pt and pts mother.  All questions answered and understanding verbalized.    Peripheral IV removed without issues.  Cath tip intact.  VSS prior to leaving unit.  Remodulin pump switched over from hospital to home by patient without any issues.  Transport requested.  Belongings packed. Pt currently awaiting wheelchair and transfer to car garage.  Will monitor.

## 2019-06-10 NOTE — PLAN OF CARE
"Discharge Planning:    Patient is to be discharged home today per Dr. King.   Requested "backup " dosing sheet from [facility] for patient to mix her discharge Home Cassette.   Verified the patient had delivered the 2.5mg/ml Remodulin vial. She confirmed she feels comfortable mixing and has all needed supplies.     Informed patient she could go ahead and mix but not to connect the new cassette without nursing involvement.     Patient will discharge with HH for Perc drain care. Patient is aware not to have HH manage her Central Line.   "

## 2019-06-10 NOTE — HOSPITAL COURSE
Patient had a US abdomen done that showed Cholelithiasis with findings suggestive of acute cholecystitis, including pericholecystic fluid and a positive sonographic Castellon's sign.  However, no gallbladder wall thickening or hyperemia.Surgery consulted and recommended IV antibiotics (rocephin and flagyl). HIDA scan performed as patient had no WBC, No fever. HIDA scan was consistent with acute cholecystitis. Patient was felt to be too high risk for surgery as a previous attempt at lap cholecystectomy resulted in severe hypotension and elevated PASP with induction of anesthesia(9/2014). IR consulted for cholecystostomy tube placement which was done on 6/7. She was monitored for 2 days and was discharged home with PO antibiotics and follow up with IR in 4 weeks for cholecystostomy tube check.She was continued on remodulin, tadalafil and ambrisentan and no changes were made to her PH regimen.

## 2019-06-11 PROCEDURE — G0180 PR HOME HEALTH MD CERTIFICATION: ICD-10-PCS | Mod: NTX,,, | Performed by: INTERNAL MEDICINE

## 2019-06-11 PROCEDURE — G0180 MD CERTIFICATION HHA PATIENT: HCPCS | Mod: NTX,,, | Performed by: INTERNAL MEDICINE

## 2019-06-11 NOTE — DISCHARGE SUMMARY
[facility]  Heart Transplant  Discharge Summary      Patient Name: Yuni Perez  MRN: [id number]  Admission Date: 6/5/2019  Hospital Length of Stay: 5 days  Discharge Date and Time: 06/10/2019 8:26 PM  Attending Physician: No att. providers found   Discharging Provider: Toribio Paula MD  Primary Care Provider: Lulu Sandhu MD     HPI: 49 year old female with history of IPAH (deferred LUT) on triple therapy (remodulin 80ng/kg/min, Letairis 10mg qday and Adcircoa 40mg qday), seizure disorder, ABHIJEET(not using CPAP), Asthma who presents for abdminal pain that started 1 week ago. It has been waxing and waning, RUQ with associated nausea, vomiting. Patient reports vomiting bile colored liquid but no blood. She also reports diarrhea more than her usual with remodulin She denies any fever, does reports sweats. Denies any shortness of breath.    * No surgery found *     Hospital Course:   Patient had a US abdomen done that showed Cholelithiasis with findings suggestive of acute cholecystitis, including pericholecystic fluid and a positive sonographic Castellon's sign.  However, no gallbladder wall thickening or hyperemia.Surgery consulted and recommended IV antibiotics (rocephin and flagyl). HIDA scan performed as patient had no WBC, No fever. HIDA scan was consistent with acute cholecystitis. Patient was felt to be too high risk for surgery as a previous attempt at lap cholecystectomy resulted in severe hypotension and elevated PASP with induction of anesthesia(9/2014). IR consulted for cholecystostomy tube placement which was done on 6/7. She was monitored for 2 days and was discharged home with PO antibiotics and follow up with IR in 4 weeks for cholecystostomy tube check.She was continued on remodulin, tadalafil and ambrisentan and no changes were made to her PH regimen.    Consults (From admission, onward)        Status Ordering Provider     Inpatient consult to General surgery  Once      Provider:  (Not yet assigned)    Completed ADRIÁN BLACKWELL     Inpatient consult to Interventional Radiology  Once     Provider:  (Not yet assigned)    Completed BEST MUNIZ          Significant Diagnostic Studies: Labs:   BMP:   Recent Labs   Lab 06/10/19  0800   GLU 89      K 3.5   *   CO2 18*   BUN 5*   CREATININE 0.6   CALCIUM 8.5*    and CBC   Recent Labs   Lab 06/09/19  0646 06/10/19  0622   WBC 4.60 4.67   HGB 12.3 12.4   HCT 36.5* 36.8*    212       Pending Diagnostic Studies:     None        Final Active Diagnoses:    Diagnosis Date Noted POA    PRINCIPAL PROBLEM:  Cholelithiasis [K80.20] 06/05/2019 Yes    RUQ pain [R10.11] 06/05/2019 Yes    Primary pulmonary hypertension [I27.0]  Yes    Hypothyroidism (acquired) [E03.9]  Yes    Migraine without aura and without status migrainosus, not intractable [G43.009]  Yes      Problems Resolved During this Admission:      Discharged Condition: good    Disposition: Home or Self Care    Follow Up:  Follow-up Information     INTERVENTIONAL, RADIOLOGY In 4 weeks.    Why:  Cholecystostomy tube check               Patient Instructions:      Notify your health care provider if you experience any of the following:  temperature >100.4     Notify your health care provider if you experience any of the following:  persistent nausea and vomiting or diarrhea     Notify your health care provider if you experience any of the following:  severe uncontrolled pain     Notify your health care provider if you experience any of the following:  redness, tenderness, or signs of infection (pain, swelling, redness, odor or green/yellow discharge around incision site)     Notify your health care provider if you experience any of the following:  difficulty breathing or increased cough     Notify your health care provider if you experience any of the following:  severe persistent headache     Notify your health care provider if you experience any of the  following:  worsening rash     Notify your health care provider if you experience any of the following:  persistent dizziness, light-headedness, or visual disturbances     Notify your health care provider if you experience any of the following:  increased confusion or weakness     Notify your health care provider if you experience any of the following:     Activity as tolerated     Medications:  Reconciled Home Medications:      Medication List      START taking these medications    ciprofloxacin HCl 500 MG tablet  Commonly known as:  CIPRO  Take 1.5 tablets (750 mg total) by mouth every 12 (twelve) hours.     metroNIDAZOLE 500 MG tablet  Commonly known as:  FLAGYL  Take 1 tablet (500 mg total) by mouth every 8 (eight) hours. for 10 days     oxyCODONE-acetaminophen 5-325 mg per tablet  Commonly known as:  PERCOCET  Take 1 tablet by mouth every 4 (four) hours as needed (Headache / pain greater than 5 / 10 pain scale).        CONTINUE taking these medications    ADCIRCA 20 mg Tab  Generic drug:  tadalafil  Take 40 mg by mouth once daily.     ADVAIR DISKUS 100-50 mcg/dose diskus inhaler  Generic drug:  fluticasone-salmeterol 100-50 mcg/dose  INHALE 1 PUFF TWICE DAILY     ambrisentan 10 MG Tab  Commonly known as:  LETAIRIS  Take 1 tablet (10 mg total) by mouth once daily.     azelastine 137 mcg (0.1 %) nasal spray  Commonly known as:  ASTELIN  2 sprays by Nasal route 2 (two) times daily.     BENADRYL 25 mg capsule  Generic drug:  diphenhydrAMINE  Take 50 mg by mouth every 6 (six) hours as needed for Itching.     citalopram 10 MG tablet  Commonly known as:  CELEXA  Take 10 mg by mouth once daily.     CLARITIN 10 mg tablet  Generic drug:  loratadine  Take 10 mg by mouth once daily.     cyanocobalamin (vitamin B-12) 2,500 mcg Subl  Commonly known as:  VITAMIN B-12  Place 2,500 mcg under the tongue once daily.     ergocalciferol 50,000 unit Cap  Commonly known as:  VITAMIN D2  Take 1 capsule (50,000 Units total) by mouth  every 7 days.     esomeprazole 40 MG capsule  Commonly known as:  NEXIUM  Take 40 mg by mouth once daily.     ferrous sulfate 325 (65 FE) MG EC tablet  Take 325 mg by mouth daily as needed.     fluticasone propionate 50 mcg/actuation nasal spray  Commonly known as:  FLONASE  INHALE 2 SPRAYS IN EACH NOSTRIL DAILY     folic acid 1 MG tablet  Commonly known as:  FOLVITE  TAKE ONE TABLET BY MOUTH once DAILY     levothyroxine 137 MCG Tab tablet  Commonly known as:  SYNTHROID  TAKE ONE TABLET BY MOUTH once DAILY     MICROGESTIN FE 1/20 (28) 1 mg-20 mcg (21)/75 mg (7) per tablet  Generic drug:  norethindrone-ethinyl estradiol  TAKE ONE TABLET BY MOUTH ONCE DAILY     ondansetron 4 MG tablet  Commonly known as:  ZOFRAN  Take 1 tablet (4 mg total) by mouth every 6 (six) hours as needed. 1 Tablet Oral Every 6 hours     sumatriptan 100 MG tablet  Commonly known as:  IMITREX  Take 1 tablet (100 mg total) by mouth as needed for Migraine. Take at the onset of headache, may take 1 more in 1 hour if needed.     topiramate 100 MG tablet  Commonly known as:  TOPAMAX  Take 1 tablet (100 mg total) by mouth 2 (two) times daily.     treprostinil 2.5 mg/mL Soln  Commonly known as:  REMODULIN  Mix cassette as directed and infuse continuously per physician titration orders on dosing sheet. Current dose 80ng/kg/min     TYLENOL EXTRA STRENGTH 500 MG tablet  Generic drug:  acetaminophen  Take 1,000 mg by mouth every 6 (six) hours as needed for Pain.     VENTOLIN HFA 90 mcg/actuation inhaler  Generic drug:  albuterol  inhale 2 puffs BY MOUTH EVERY 4-6 HOURS            Toribio Paula MD  Heart Transplant  [facility]

## 2019-06-12 ENCOUNTER — TELEPHONE (OUTPATIENT)
Dept: TRANSPLANT | Facility: CLINIC | Age: 49
End: 2019-06-12

## 2019-06-12 ENCOUNTER — PATIENT OUTREACH (OUTPATIENT)
Dept: ADMINISTRATIVE | Facility: CLINIC | Age: 49
End: 2019-06-12

## 2019-06-12 NOTE — TELEPHONE ENCOUNTER
----- Message from Nadine Ramost sent at 6/12/2019 11:28 AM CDT -----  Contact: Chris [phone] with [facility]   nurse says pt has a Cholecystostomy bag. They were not informed on how to clean it or how often to change the dressing.    Thanks

## 2019-06-12 NOTE — TELEPHONE ENCOUNTER
Returned call to patient, who reports she is doing ok since being home, and managing her pain w/extra strength tylenol. Pt inquiring regarding Friday appt and whether or not she should keep it or reschedule. Will send message to Dr Rai regarding same.

## 2019-06-12 NOTE — TELEPHONE ENCOUNTER
Attempted to reach Dr Paula (discharging fellow) regarding orders for caring for pt's choly bag. Dr Paula is off service, so spoke w/Dr Chowdhury, who agreed to put HH orders in for care of pt's choly bag/dressing change. Left message for HH nurse regarding same.

## 2019-06-13 ENCOUNTER — TELEPHONE (OUTPATIENT)
Dept: TRANSPLANT | Facility: CLINIC | Age: 49
End: 2019-06-13

## 2019-06-13 NOTE — TELEPHONE ENCOUNTER
DEBBIE was notified by  nurse ruby Cage that HH orders at time of pt's d/c last week did not include kita drain care.  Per JOCELYN Cage, orders are now updated in Epic.  DEBBIE spoke with Marylin with [facility] ([id number]) and informed her orders have been updated.  Marylin verified that she sees updated orders in Epic and will forward orders to [facility] team seeing pt in the field.  DEBBIE updated nurse ruby Cage via Epic message.  DEBBIE remains available.      UPDATE, 6/14 -- DEBBIE was notified by JOCELYN Cage that  orders were updated again with dressing change orders.  DEBBIE spoke with Marylin with [facility] again this morning, who states she will pull new orders from [facility] to provide to [facility] nurse seeing pt in the field.  Orders state for dressing to be changed every 2 days or as needed.  Per Marylin, [facility] cannot make visits to pt every 2 days, but [facility] nurse can teach pt how to perform dressing change on days nurse is not present & nurse will change/check dressing on her weekly visits.  DEBBIE updated JOCELYN Cage via Epic.

## 2019-06-13 NOTE — TELEPHONE ENCOUNTER
SULY Razo, RN  Campos King Jr., MD; Toribio Paula MD; Ricardo Chowdhury MD           Hi-   Please see below. Could somebody please assist with pt's discharge orders. Pt was discharged and needed follow up with IR for choly drain check, so IR cholangiogram order is required. She also please needs orders for home health for care of her choly drain (first visit was today, and they did not know what to do w/drain).   Thank you in advance!   Ricardo, I know you didn't discharge her, but I do appreciate your willingness to assist!   Sarah   Previous Messages         ----- Message -----   From: Shanice Renteria   Sent: 6/13/2019   2:02 PM   To: SULY Razo, RN   Subject: RE: appts                                         Can you enter IR Cholangiogram order?     ----- Message -----   From: SULY Razo, RN   Sent: 6/13/2019   1:48 PM   To: [facility] Heart Transplant Schedulers   Subject: appts                                             Hi-   Please contact pt to reschedule appts for tomorrow to August or Sept w/Dr Rai.   Also, just checking in again on the IR appt s/p hospital discharge. Thank you so much for your help!   Sarah

## 2019-06-13 NOTE — TELEPHONE ENCOUNTER
Pt left VM, reporting that HH is in her home and there are still no orders regarding caring for choly drain/bag and dressing change. Lety Langford RN went to multi-disciplinary rounds, and Dr Chowdhury stated he would enter these orders today.

## 2019-06-14 DIAGNOSIS — K80.10 CALCULUS OF GALLBLADDER WITH CHRONIC CHOLECYSTITIS WITHOUT OBSTRUCTION: Primary | ICD-10-CM

## 2019-06-14 LAB — BACTERIA SPEC ANAEROBE CULT: NORMAL

## 2019-06-24 ENCOUNTER — TELEPHONE (OUTPATIENT)
Dept: TRANSPLANT | Facility: CLINIC | Age: 49
End: 2019-06-24

## 2019-06-24 ENCOUNTER — TELEPHONE (OUTPATIENT)
Dept: SURGERY | Facility: CLINIC | Age: 49
End: 2019-06-24

## 2019-06-24 DIAGNOSIS — K80.10 CALCULUS OF GALLBLADDER WITH CHRONIC CHOLECYSTITIS WITHOUT OBSTRUCTION: Primary | ICD-10-CM

## 2019-06-24 NOTE — TELEPHONE ENCOUNTER
Left message on patient's voicemail for her to call back regarding her kita tube.  Phone number given for her to call back.

## 2019-06-24 NOTE — TELEPHONE ENCOUNTER
----- Message from Louise Burr sent at 6/24/2019  2:47 PM CDT -----  Contact: Home Health  .Needs Advice    Reason for call: Pt needs hospital FU 6/10.        Communication Preference: [phone], & Call Inder- [phone], has questions    Additional Information: Thanks

## 2019-06-24 NOTE — TELEPHONE ENCOUNTER
"Spoke w/pt, who reports that she thinks "the home health nurses are confused" and she also admits she is confused, regarding care of choly drain. Pt reports that while there are orders to flush the drain, this has not yet been done "because there are no supplies". Pt stated that she did not want the  nurse to change the dressing today because she now feels the drain is "more like my chauhan" and might need to be done w/sterile technique. Pt inquiring regarding this RN's opinion of same, who reports little to no experience dealing w/choly drains and dressings. Pt reports she is "frustrated" but feels it could be "so much worse". She expressed gratitude for efforts to "get everybody on the same page" and is amenable to seeing general surgery regarding plan of care, rationale for having drain, and instructions regarding drain care. Message sent to  regarding all.   "

## 2019-06-24 NOTE — TELEPHONE ENCOUNTER
"Received call from Home Health nurse, who states she is at patient's home, and patient and mother are refusing dressing change of choly tube, insisting that it be done sterile, and not clean, as per orders (soap/water). Pt's nurse reports that pt and mother are "upset and don't know anything about the choly tube--Why she has it, how it is supposed to be managed and cared for, and that patient has no follow up." Noted that patient has f/u with IR first week of July, and that this would be follow-up regarding tube. Pt's HH nurse states that patient actually had choly drain dressing changed last week, and it was NOT done in a sterile fashion at that time, but today, the patient and her mother are insisting on sterile dressing change. Pt's nurse states the patient and her mother are frustrated because she went home without dressing change orders/supplies or follow up instructions/care for choly drain. Will continue to monitor/follow along, and also reach out to HTS to see if patient should have GI follow up, to possibly provide her w/more education/rationale as well as plan, going forward, for having choly drain, if these questions can't be addressed by IR.   "

## 2019-06-25 ENCOUNTER — TELEPHONE (OUTPATIENT)
Dept: SURGERY | Facility: CLINIC | Age: 49
End: 2019-06-25

## 2019-06-25 ENCOUNTER — EXTERNAL HOME HEALTH (OUTPATIENT)
Dept: HOME HEALTH SERVICES | Facility: HOSPITAL | Age: 49
End: 2019-06-25
Payer: COMMERCIAL

## 2019-06-25 NOTE — TELEPHONE ENCOUNTER
Spoke with patient about her Cholecystostomy Tube.  Instructed her on drain care, emptying of the drain daily, and scheduled her an appt for f/u on 7/11/19.  All questions answered and she verbalized understanding.

## 2019-06-25 NOTE — TELEPHONE ENCOUNTER
----- Message from Giovanna Garcia sent at 6/25/2019 11:40 AM CDT -----  Contact: Patient   Patient Returning Call from [facility]    Who Left Message for Patient: Seda  Communication Preference: [phone]   Additional Information:  Please contact the patient

## 2019-06-28 ENCOUNTER — TELEPHONE (OUTPATIENT)
Dept: TRANSPLANT | Facility: CLINIC | Age: 49
End: 2019-06-28

## 2019-06-28 RX ORDER — HYOSCYAMINE SULFATE 0.12 MG/1
0.12 TABLET SUBLINGUAL EVERY 6 HOURS PRN
COMMUNITY
End: 2022-05-13 | Stop reason: SDUPTHER

## 2019-06-28 NOTE — TELEPHONE ENCOUNTER
"Returned call to patient, who reports that HH nurse came today to do choly drain care. Pt reports that immediately after nurse flushed choly drain with 10 CC saline, patient had "immediate accident" with her bowels. She reports BRAT diet has helped, but she continue to lose weight (down to 128 lb). She is taking immodium as well, and wanted to confirm max dose/day, which we reviewed. All questions/concerns answered/addressed to pt satisfaction. She will contact me Monday to let me know if she has better control w/diarrhea, and if not, she is amenable to trying lomotil, if ok.  "

## 2019-07-03 ENCOUNTER — LAB VISIT (OUTPATIENT)
Dept: LAB | Facility: HOSPITAL | Age: 49
End: 2019-07-03
Attending: INTERNAL MEDICINE
Payer: COMMERCIAL

## 2019-07-03 ENCOUNTER — OFFICE VISIT (OUTPATIENT)
Dept: FAMILY MEDICINE | Facility: CLINIC | Age: 49
End: 2019-07-03
Payer: COMMERCIAL

## 2019-07-03 VITALS
BODY MASS INDEX: 26.57 KG/M2 | DIASTOLIC BLOOD PRESSURE: 68 MMHG | HEIGHT: 59 IN | WEIGHT: 131.81 LBS | HEART RATE: 80 BPM | SYSTOLIC BLOOD PRESSURE: 104 MMHG | OXYGEN SATURATION: 97 % | TEMPERATURE: 98 F

## 2019-07-03 DIAGNOSIS — R19.7 DIARRHEA, UNSPECIFIED TYPE: ICD-10-CM

## 2019-07-03 DIAGNOSIS — K58.9 IRRITABLE BOWEL SYNDROME, UNSPECIFIED TYPE: ICD-10-CM

## 2019-07-03 DIAGNOSIS — R06.82 TACHYPNEA: ICD-10-CM

## 2019-07-03 DIAGNOSIS — K81.9 CHOLECYSTITIS: ICD-10-CM

## 2019-07-03 DIAGNOSIS — R10.11 RUQ ABDOMINAL PAIN: ICD-10-CM

## 2019-07-03 DIAGNOSIS — I27.0 PRIMARY PULMONARY HYPERTENSION: ICD-10-CM

## 2019-07-03 DIAGNOSIS — Z79.899 POLYPHARMACY: ICD-10-CM

## 2019-07-03 DIAGNOSIS — G40.909 SEIZURE DISORDER: ICD-10-CM

## 2019-07-03 DIAGNOSIS — E03.9 HYPOTHYROIDISM (ACQUIRED): ICD-10-CM

## 2019-07-03 DIAGNOSIS — Z00.00 ROUTINE MEDICAL EXAM: Primary | ICD-10-CM

## 2019-07-03 LAB
ALBUMIN SERPL BCP-MCNC: 3.3 G/DL (ref 3.5–5.2)
ALP SERPL-CCNC: 63 U/L (ref 55–135)
ALT SERPL W/O P-5'-P-CCNC: 14 U/L (ref 10–44)
ANION GAP SERPL CALC-SCNC: 9 MMOL/L (ref 8–16)
AST SERPL-CCNC: 15 U/L (ref 10–40)
BASOPHILS # BLD AUTO: 0.03 K/UL (ref 0–0.2)
BASOPHILS NFR BLD: 0.7 % (ref 0–1.9)
BILIRUB SERPL-MCNC: 0.2 MG/DL (ref 0.1–1)
BNP SERPL-MCNC: 20 PG/ML (ref 0–99)
BUN SERPL-MCNC: 9 MG/DL (ref 6–20)
CALCIUM SERPL-MCNC: 9.2 MG/DL (ref 8.7–10.5)
CHLORIDE SERPL-SCNC: 111 MMOL/L (ref 95–110)
CO2 SERPL-SCNC: 20 MMOL/L (ref 23–29)
CREAT SERPL-MCNC: 0.7 MG/DL (ref 0.5–1.4)
DIFFERENTIAL METHOD: NORMAL
EOSINOPHIL # BLD AUTO: 0.2 K/UL (ref 0–0.5)
EOSINOPHIL NFR BLD: 4.4 % (ref 0–8)
ERYTHROCYTE [DISTWIDTH] IN BLOOD BY AUTOMATED COUNT: 14.4 % (ref 11.5–14.5)
EST. GFR  (AFRICAN AMERICAN): >60 ML/MIN/1.73 M^2
EST. GFR  (NON AFRICAN AMERICAN): >60 ML/MIN/1.73 M^2
GLUCOSE SERPL-MCNC: 83 MG/DL (ref 70–110)
HCT VFR BLD AUTO: 41.7 % (ref 37–48.5)
HGB BLD-MCNC: 13.9 G/DL (ref 12–16)
IMM GRANULOCYTES # BLD AUTO: 0.02 K/UL (ref 0–0.04)
IMM GRANULOCYTES NFR BLD AUTO: 0.4 % (ref 0–0.5)
LYMPHOCYTES # BLD AUTO: 1.4 K/UL (ref 1–4.8)
LYMPHOCYTES NFR BLD: 31.2 % (ref 18–48)
MAGNESIUM SERPL-MCNC: 2.1 MG/DL (ref 1.6–2.6)
MCH RBC QN AUTO: 29.6 PG (ref 27–31)
MCHC RBC AUTO-ENTMCNC: 33.3 G/DL (ref 32–36)
MCV RBC AUTO: 89 FL (ref 82–98)
MONOCYTES # BLD AUTO: 0.6 K/UL (ref 0.3–1)
MONOCYTES NFR BLD: 12.7 % (ref 4–15)
NEUTROPHILS # BLD AUTO: 2.3 K/UL (ref 1.8–7.7)
NEUTROPHILS NFR BLD: 50.6 % (ref 38–73)
NRBC BLD-RTO: 0 /100 WBC
PLATELET # BLD AUTO: 204 K/UL (ref 150–350)
PMV BLD AUTO: 12.8 FL (ref 9.2–12.9)
POTASSIUM SERPL-SCNC: 3.6 MMOL/L (ref 3.5–5.1)
PROT SERPL-MCNC: 7.2 G/DL (ref 6–8.4)
RBC # BLD AUTO: 4.69 M/UL (ref 4–5.4)
SODIUM SERPL-SCNC: 140 MMOL/L (ref 136–145)
WBC # BLD AUTO: 4.55 K/UL (ref 3.9–12.7)

## 2019-07-03 PROCEDURE — 99396 PREV VISIT EST AGE 40-64: CPT | Mod: S$GLB,,, | Performed by: INTERNAL MEDICINE

## 2019-07-03 PROCEDURE — 83735 ASSAY OF MAGNESIUM: CPT | Mod: NTX

## 2019-07-03 PROCEDURE — 99999 PR PBB SHADOW E&M-EST. PATIENT-LVL III: CPT | Mod: PBBFAC,,, | Performed by: INTERNAL MEDICINE

## 2019-07-03 PROCEDURE — 83880 ASSAY OF NATRIURETIC PEPTIDE: CPT | Mod: NTX

## 2019-07-03 PROCEDURE — 36415 COLL VENOUS BLD VENIPUNCTURE: CPT | Mod: PO,NTX

## 2019-07-03 PROCEDURE — 99396 PR PREVENTIVE VISIT,EST,40-64: ICD-10-PCS | Mod: S$GLB,,, | Performed by: INTERNAL MEDICINE

## 2019-07-03 PROCEDURE — 80053 COMPREHEN METABOLIC PANEL: CPT | Mod: NTX

## 2019-07-03 PROCEDURE — 85025 COMPLETE CBC W/AUTO DIFF WBC: CPT | Mod: NTX

## 2019-07-03 PROCEDURE — 99999 PR PBB SHADOW E&M-EST. PATIENT-LVL III: ICD-10-PCS | Mod: PBBFAC,,, | Performed by: INTERNAL MEDICINE

## 2019-07-03 RX ORDER — LEVOTHYROXINE SODIUM 137 UG/1
137 TABLET ORAL DAILY
Qty: 90 TABLET | Refills: 3 | Status: SHIPPED | OUTPATIENT
Start: 2019-07-03 | End: 2020-06-15 | Stop reason: SDUPTHER

## 2019-07-03 RX ORDER — BUTALBITAL, ACETAMINOPHEN AND CAFFEINE 50; 325; 40 MG/1; MG/1; MG/1
1 TABLET ORAL EVERY 4 HOURS PRN
COMMUNITY
End: 2020-12-07 | Stop reason: SDUPTHER

## 2019-07-03 NOTE — PROGRESS NOTES
HISTORY OF PRESENT ILLNESS:  Yuni ePrez is a 49 y.o. female who presents to the clinic today for a routine medical physical exam. Her last physical exam was approximately 1 years(s) ago.    Contraception: oral contraceptives (estrogen/progesterone)      PAST MEDICAL HISTORY:  Past Medical History:   Diagnosis Date    AR (allergic rhinitis)     Cholelithiasis, common bile duct     Chronic low back pain     Eye pressure 2017    GERD (gastroesophageal reflux disease)     History of migraine headaches     Hypothyroidism     Lumbar disc disease     Menorrhagia     Mild asthma     Obesity     Plantar fasciitis of left foot     Primary pulmonary hypertension     followed by heart transplant/pulmonary     Seizure disorder     x 1 in 2008    Seizures     Sleep apnea     TMJ (dislocation of temporomandibular joint)     Tricuspid regurgitation        PAST SURGICAL HISTORY:  Past Surgical History:   Procedure Laterality Date    CARDIAC CATHETERIZATION      CENTRAL VENOUS CATHETER INSERTION      HEART CATH-LEFT Left 1/30/2017    [facility] C[facility]    HEART CATH-RIGHT Right 8/20/2018    [facility] at [facility] CATH LAB    HEART CATH-RIGHT Right 1/30/2017    [facility] CATH LAB    HEART CATH-RIGHT Right 9/8/2014    [facility] CATH LAB    PORTACATH PLACEMENT      UPPER GASTROINTESTINAL ENDOSCOPY         SOCIAL HISTORY:  Social History     Socioeconomic History    Marital status: Single     Spouse name: Not on file    Number of children: 0    Years of education: Not on file    Highest education level: Not on file   Occupational History    Occupation: disabled     Employer: [employer]   Social Needs    Financial resource strain: Not on file    Food insecurity:     Worry: Not on file     Inability: Not on file    Transportation needs:     Medical: Not on file      Non-medical: Not on file   Tobacco Use    Smoking status: Never Smoker    Smokeless tobacco: Never Used   Substance and Sexual Activity    Alcohol use: No     Alcohol/week: 0.5 oz     Types: 1 Standard drinks or equivalent per week     Comment: 1 per year    Drug use: No    Sexual activity: Never     Birth control/protection: OCP, Pill     Comment: pt is a virgin   Lifestyle    Physical activity:     Days per week: Not on file     Minutes per session: Not on file    Stress: Not on file   Relationships    Social connections:     Talks on phone: Not on file     Gets together: Not on file     Attends Sikh service: Not on file     Active member of club or organization: Not on file     Attends meetings of clubs or organizations: Not on file     Relationship status: Not on file   Other Topics Concern    Not on file   Social History Narrative    Not on file       FAMILY HISTORY:  Family History   Problem Relation Age of Onset    Heart disease Father     Glaucoma Father     Diabetes Mother     Cancer Maternal Grandmother         uterine    Cancer Maternal Aunt         breast    Breast cancer Maternal Aunt     Heart disease Paternal Grandfather     Heart attack Paternal Grandfather     Diabetes Paternal Grandfather     Leukemia Brother     Hypertension Unknown     Diabetes Maternal Grandfather     Heart attack Maternal Grandfather     Breast cancer Cousin     No Known Problems Sister     No Known Problems Maternal Uncle     No Known Problems Paternal Aunt     No Known Problems Paternal Uncle     No Known Problems Paternal Grandmother     Colon cancer Neg Hx     Ovarian cancer Neg Hx     Amblyopia Neg Hx     Blindness Neg Hx     Cataracts Neg Hx     Macular degeneration Neg Hx     Retinal detachment Neg Hx     Strabismus Neg Hx     Stroke Neg Hx     Thyroid disease Neg Hx     Esophageal cancer Neg Hx        ALLERGIES AND MEDICATIONS: updated and reviewed.  Review of patient's  "allergies indicates:   Allergen Reactions    Fentanyl Anaphylaxis     Respiratory distress    Vibra-tabs [doxycycline hyclate] Anaphylaxis     "throat felt like it was closing"    Adhesive Hives     Silk tape    Amoxicillin Rash    Chlorhexidine Other (See Comments)    Doxycycline      Medication List with Changes/Refills   Current Medications    ACETAMINOPHEN (TYLENOL EXTRA STRENGTH) 500 MG TABLET    Take 1,000 mg by mouth every 6 (six) hours as needed for Pain.    ADVAIR DISKUS 100-50 MCG/DOSE DISKUS INHALER    INHALE 1 PUFF TWICE DAILY    AMBRISENTAN (LETAIRIS) 10 MG TAB    Take 1 tablet (10 mg total) by mouth once daily.    AZELASTINE (ASTELIN) 137 MCG (0.1 %) NASAL SPRAY    2 sprays by Nasal route 2 (two) times daily.     BUTALBITAL-ACETAMINOPHEN-CAFFEINE -40 MG (FIORICET, ESGIC) -40 MG PER TABLET    Take 1 tablet by mouth every 4 (four) hours as needed for Pain.    CITALOPRAM (CELEXA) 10 MG TABLET    Take 10 mg by mouth once daily.    CYANOCOBALAMIN, VITAMIN B-12, 2,500 MCG SUBL    Place 2,500 mcg under the tongue once daily.     DIPHENHYDRAMINE (BENADRYL) 25 MG CAPSULE    Take 50 mg by mouth every 6 (six) hours as needed for Itching.     ERGOCALCIFEROL (VITAMIN D2) 50,000 UNIT CAP    Take 1 capsule (50,000 Units total) by mouth every 7 days.    ESOMEPRAZOLE (NEXIUM) 40 MG CAPSULE    Take 40 mg by mouth once daily.    FERROUS SULFATE 325 (65 FE) MG EC TABLET    Take 325 mg by mouth daily as needed.     FLUTICASONE (FLONASE) 50 MCG/ACTUATION NASAL SPRAY    INHALE 2 SPRAYS IN EACH NOSTRIL DAILY    FOLIC ACID (FOLVITE) 1 MG TABLET    TAKE ONE TABLET BY MOUTH once DAILY    HYOSCYAMINE (LEVSIN/SL) 0.125 MG SUBL    Place 0.125 mg under the tongue every 6 (six) hours as needed.     LORATADINE (CLARITIN) 10 MG TABLET    Take 10 mg by mouth once daily.    MICROGESTIN FE 1/20, 28, 1 MG-20 MCG (21)/75 MG (7) PER TABLET    TAKE ONE TABLET BY MOUTH ONCE DAILY    ONDANSETRON (ZOFRAN) 4 MG TABLET    Take 1 " tablet (4 mg total) by mouth every 6 (six) hours as needed. 1 Tablet Oral Every 6 hours    OXYCODONE-ACETAMINOPHEN (PERCOCET) 5-325 MG PER TABLET    Take 1 tablet by mouth every 4 (four) hours as needed (Headache / pain greater than 5 / 10 pain scale).    SUMATRIPTAN (IMITREX) 100 MG TABLET    Take 1 tablet (100 mg total) by mouth as needed for Migraine. Take at the onset of headache, may take 1 more in 1 hour if needed.    TADALAFIL, PULMONARY HYPERTENSION, (ADCIRCA) 20 MG TAB    Take 40 mg by mouth once daily.    TOPIRAMATE (TOPAMAX) 100 MG TABLET    Take 1 tablet (100 mg total) by mouth 2 (two) times daily.    TREPROSTINIL (REMODULIN) 2.5 MG/ML SOLN    Mix cassette as directed and infuse continuously per physician titration orders on dosing sheet. Current dose 80ng/kg/min    VENTOLIN HFA 90 MCG/ACTUATION INHALER    inhale 2 puffs BY MOUTH EVERY 4-6 HOURS   Changed and/or Refilled Medications    Modified Medication Previous Medication    LEVOTHYROXINE (SYNTHROID) 137 MCG TAB TABLET levothyroxine (SYNTHROID) 137 MCG Tab tablet       Take 1 tablet (137 mcg total) by mouth once daily.    TAKE ONE TABLET BY MOUTH once DAILY   Discontinued Medications    CIPROFLOXACIN HCL (CIPRO) 500 MG TABLET    Take 1.5 tablets (750 mg total) by mouth every 12 (twelve) hours.         CARE TEAM:  Patient Care Team:  Lulu Sandhu MD as PCP - General (Internal Medicine)  SULY Razo, RN as Registered Nurse  Ralph Whyte MD as Referring Physician (Intensive Care)           SCREENING HISTORY:  Health Maintenance       Date Due Completion Date    Mammogram 05/25/2019 5/25/2018    Override on 9/2/2013: Done    Override on 3/1/2011: Done    Influenza Vaccine 08/01/2019 11/20/2018    Lipid Panel 01/16/2022 1/16/2017    TETANUS VACCINE 01/18/2027 1/18/2017            REVIEW OF SYSTEMS:   The patient reports good dietary habits.  The patient does not exercise regularly, but stays active.  Review of Systems   Constitutional:  "Negative for chills, fatigue, fever and unexpected weight change.   HENT: Positive for congestion (- chronic; due to medication).    Eyes: Negative for pain and visual disturbance.   Respiratory: Positive for shortness of breath (- chronic/baseline). Negative for cough and wheezing.    Cardiovascular: Negative for chest pain, palpitations and leg swelling.   Gastrointestinal: Positive for abdominal pain (- mild RUQ abd pain - 2/10) and diarrhea (- not sure if this is related to her IBS or the fact that she was recently on antibiotics.). Negative for constipation, nausea and vomiting.        She was recently hospitalized for cholecystitis.  She cannot undergo surgery as she is high risk due to her pulmonary hypertension and previous coding on the operating table.  She has a drain in place.  She has follow-up with GI in the near future.   Genitourinary: Negative for dysuria.   Musculoskeletal: Negative for arthralgias and back pain.   Skin: Negative for rash.   Neurological: Negative for weakness and headaches.   Psychiatric/Behavioral: Negative for dysphoric mood and sleep disturbance. The patient is not nervous/anxious.      negative for - irregular/heavy menses or pelvic pain  Breast ROS: negative for breast lumps        Physical Examination:   Vitals:    07/03/19 1257   BP: 104/68   Pulse: 80   Temp: 97.9 °F (36.6 °C)     Weight: 59.8 kg (131 lb 13.4 oz)   Height: 4' 11" (149.9 cm)   Body mass index is 26.63 kg/m².      Patient did not require to have a chaperone present during the exam today.  General appearance - alert, well appearing, and in no distress and overweight  Mental status - alert, oriented to person, place, and time, normal mood, behavior, speech, dress, motor activity, and thought processes  Eyes - pupils equal and reactive, extraocular eye movements intact  Mouth - mucous membranes moist, pharynx normal without lesions  Neck - supple, no significant adenopathy, carotids upstroke normal bilaterally, " no bruits  Lymphatics - no palpable lymphadenopathy  Chest - clear to auscultation, no wheezes, rales or rhonchi, symmetric air entry, no tachypnea, retractions or cyanosis  Heart - normal rate and regular rhythm, no gallops noted; she had a central line in place on her right upper chest wall  Abdomen - soft, nondistended, no masses or organomegaly  tenderness noted - minimal - RUQ with drain in place  no rebound tenderness noted  Breasts - not examined  Back exam - full range of motion, no tenderness, palpable spasm or pain on motion  Neurological - alert, oriented, normal speech, no focal findings or movement disorder noted, cranial nerves II through XII intact  Musculoskeletal - no joint tenderness, deformity or swelling, no muscular tenderness noted  Extremities - peripheral pulses normal, no pedal edema, no clubbing or cyanosis  Skin - normal coloration and turgor, no rashes, no suspicious skin lesions noted      ASSESSMENT AND PLAN:  1. Routine medical exam  Counseled on age appropriate medical preventative services including age appropriate cancer screenings, age appropriate eye and dental exams, over all nutritional health, need for a consistent exercise regimen, and an over all push towards maintaining a vigorous and active lifestyle.  Counseled on age appropriate vaccines and discussed upcoming health care needs based on age/gender. Discussed good sleep hygiene and stress management.     2. RUQ abdominal pain/3. Cholecystitis  Symptoms approved.  She still has a drain in place.  She has follow-up with GI in the near future.  She will return to the emergency room for any acute changes in her medical condition.    4. Hypothyroidism (acquired)  Patient is clinically euthyroid. Continue current regimen.   - levothyroxine (SYNTHROID) 137 MCG Tab tablet; Take 1 tablet (137 mcg total) by mouth once daily.  Dispense: 90 tablet; Refill: 3    5. Primary pulmonary hypertension  Stable. Followed by  pulmonary/transplant.    6. Seizure disorder  The current medical regimen is effective;  continue present plan and medications.     7. Diarrhea, unspecified type  I am concerned that her diarrhea may be related to C diff colitis as she recently had antibiotics.  I will check stool studies and address results accordingly.  She did start probiotics yesterday.  - Clostridium difficile EIA; Future  - Clostridium difficile EIA; Future  - Clostridium difficile EIA; Future    8. Irritable bowel syndrome, unspecified type  Not sure if her diarrhea symptoms are related to irritable bowel syndrome.  We discussed routine strategies of high-fiber diet and meditation.           Follow up in about 1 year (around 7/3/2020) for annual exam. or sooner as needed.

## 2019-07-05 ENCOUNTER — TELEPHONE (OUTPATIENT)
Dept: INTERVENTIONAL RADIOLOGY/VASCULAR | Facility: HOSPITAL | Age: 49
End: 2019-07-05

## 2019-07-08 ENCOUNTER — HOSPITAL ENCOUNTER (OUTPATIENT)
Dept: INTERVENTIONAL RADIOLOGY/VASCULAR | Facility: HOSPITAL | Age: 49
Discharge: HOME | End: 2019-07-08
Attending: INTERNAL MEDICINE
Payer: COMMERCIAL

## 2019-07-08 VITALS
OXYGEN SATURATION: 97 % | HEART RATE: 74 BPM | DIASTOLIC BLOOD PRESSURE: 62 MMHG | SYSTOLIC BLOOD PRESSURE: 109 MMHG | RESPIRATION RATE: 18 BRPM

## 2019-07-08 DIAGNOSIS — K80.10 CALCULUS OF GALLBLADDER WITH CHRONIC CHOLECYSTITIS WITHOUT OBSTRUCTION: ICD-10-CM

## 2019-07-08 PROCEDURE — 47531 INJECTION FOR CHOLANGIOGRAM: CPT | Mod: NTX,,, | Performed by: RADIOLOGY

## 2019-07-08 PROCEDURE — 47531 IR CHOLANGIOGRAM THROUGH EXISTING CATH (XPD): ICD-10-PCS | Mod: NTX,,, | Performed by: RADIOLOGY

## 2019-07-08 PROCEDURE — 25500020 PHARM REV CODE 255: Mod: NTX | Performed by: INTERNAL MEDICINE

## 2019-07-08 PROCEDURE — 47531 INJECTION FOR CHOLANGIOGRAM: CPT | Mod: TXP

## 2019-07-08 RX ADMIN — IOHEXOL 8 ML: 300 INJECTION, SOLUTION INTRAVENOUS at 10:07

## 2019-07-08 NOTE — PLAN OF CARE
Plan of care reviewed with patient, patient verbalizes understanding. Pt arrived to 189 for Sue tube check. Pt oriented to unit and staff. Comfort measures utilized. Pt safely transferred  to procedural table. Fall risk reviewed with patient, fall risk interventions maintained. Safety strap applied, positioner pillows utilized to minimize pressure points. Blankets applied. Pt resting comfortably. Denies pain/discomfort. Will continue to monitor. See flow sheets for monitoring.

## 2019-07-08 NOTE — PROGRESS NOTES
Sue tube check complete, tube to drainage bag. No acute events. Pt resting comfortably. Pt to lobby.

## 2019-07-09 LAB — FUNGUS SPEC CULT: NORMAL

## 2019-07-10 ENCOUNTER — LAB VISIT (OUTPATIENT)
Dept: LAB | Facility: HOSPITAL | Age: 49
End: 2019-07-10
Attending: INTERNAL MEDICINE
Payer: COMMERCIAL

## 2019-07-10 DIAGNOSIS — R19.7 DIARRHEA, UNSPECIFIED TYPE: ICD-10-CM

## 2019-07-10 PROCEDURE — 87449 NOS EACH ORGANISM AG IA: CPT | Mod: TXP

## 2019-07-11 ENCOUNTER — TELEPHONE (OUTPATIENT)
Dept: FAMILY MEDICINE | Facility: CLINIC | Age: 49
End: 2019-07-11

## 2019-07-11 ENCOUNTER — OFFICE VISIT (OUTPATIENT)
Dept: SURGERY | Facility: CLINIC | Age: 49
End: 2019-07-11
Payer: COMMERCIAL

## 2019-07-11 VITALS
SYSTOLIC BLOOD PRESSURE: 105 MMHG | HEIGHT: 59 IN | DIASTOLIC BLOOD PRESSURE: 69 MMHG | HEART RATE: 83 BPM | BODY MASS INDEX: 26.21 KG/M2 | WEIGHT: 130 LBS | TEMPERATURE: 98 F

## 2019-07-11 DIAGNOSIS — A04.72 C. DIFFICILE DIARRHEA: Primary | ICD-10-CM

## 2019-07-11 DIAGNOSIS — K81.9 CHOLECYSTITIS: Primary | ICD-10-CM

## 2019-07-11 LAB
C DIFF GDH STL QL: POSITIVE
C DIFF TOX A+B STL QL IA: POSITIVE

## 2019-07-11 PROCEDURE — 3008F PR BODY MASS INDEX (BMI) DOCUMENTED: ICD-10-PCS | Mod: CPTII,NTX,S$GLB, | Performed by: SURGERY

## 2019-07-11 PROCEDURE — 99213 OFFICE O/P EST LOW 20 MIN: CPT | Mod: NTX,S$GLB,, | Performed by: SURGERY

## 2019-07-11 PROCEDURE — 99999 PR PBB SHADOW E&M-EST. PATIENT-LVL IV: CPT | Mod: PBBFAC,TXP,, | Performed by: SURGERY

## 2019-07-11 PROCEDURE — 99999 PR PBB SHADOW E&M-EST. PATIENT-LVL IV: ICD-10-PCS | Mod: PBBFAC,TXP,, | Performed by: SURGERY

## 2019-07-11 PROCEDURE — 3008F BODY MASS INDEX DOCD: CPT | Mod: CPTII,NTX,S$GLB, | Performed by: SURGERY

## 2019-07-11 PROCEDURE — 99213 PR OFFICE/OUTPT VISIT, EST, LEVL III, 20-29 MIN: ICD-10-PCS | Mod: NTX,S$GLB,, | Performed by: SURGERY

## 2019-07-11 RX ORDER — METRONIDAZOLE 500 MG/1
500 TABLET ORAL 3 TIMES DAILY
Qty: 42 TABLET | Refills: 0 | Status: SHIPPED | OUTPATIENT
Start: 2019-07-11 | End: 2019-07-25

## 2019-07-11 NOTE — TELEPHONE ENCOUNTER
Left message for patient on voicemail regarding positive stool findings of c.diff. I sent the Rx to the pharmacy. Advised to complete all medication and alert coumadin clinic, if applicable. Should inform us if diarrhea recurs after completing the medication.

## 2019-07-11 NOTE — PROGRESS NOTES
"I have reviewed the Resident's progress note. I agree with the findings. Any changes were made directly in the note below.    Marly White MD  Endocrine Surgery & General Surgery      History & Physical    SUBJECTIVE:     History of Present Illness:  Patient is a 49 y.o. female with seizure disorder, ABHIJEET, Asthma, and severe pulmonary HTN on Remodulin who was recently discharged following admission for acute calculus cholecystitis. Pt underwent a cholecystectomy tube due to her severe pulmonary HTN making her a high risk surgical candidate. Since discharge she has been doing well with her only complaint being diarrhea. Tolerating kita tube without any problems. Denies RUQ pain, nausea, vomiting, fever and chills.       Of note, she was taken to the OR back in 2014 for planned lap cholecystectomy but the procedure was aborted secondary to severe pulmonary HTN and hemodynamic instability after induction of anesthesia. Per operative report, she had significantly elevated PA pressures along with systemic hypotension requiring multiple pressors. The procedure was aborted and she was admitted to the ICU after.     Chief Complaint   Patient presents with    Follow-up       Review of patient's allergies indicates:   Allergen Reactions    Fentanyl Anaphylaxis     Respiratory distress    Vibra-tabs [doxycycline hyclate] Anaphylaxis     "throat felt like it was closing"    Adhesive Hives     Silk tape    Amoxicillin Rash    Chlorhexidine Other (See Comments)    Doxycycline        Current Outpatient Medications   Medication Sig Dispense Refill    acetaminophen (TYLENOL EXTRA STRENGTH) 500 MG tablet Take 1,000 mg by mouth every 6 (six) hours as needed for Pain.      ADVAIR DISKUS 100-50 mcg/dose diskus inhaler INHALE 1 PUFF TWICE DAILY 1 each 11    ambrisentan (LETAIRIS) 10 MG Tab Take 1 tablet (10 mg total) by mouth once daily. 30 tablet 11    azelastine (ASTELIN) 137 mcg (0.1 %) nasal spray 2 sprays by Nasal " route 2 (two) times daily.       butalbital-acetaminophen-caffeine -40 mg (FIORICET, ESGIC) -40 mg per tablet Take 1 tablet by mouth every 4 (four) hours as needed for Pain.      citalopram (CELEXA) 10 MG tablet Take 10 mg by mouth once daily.      cyanocobalamin, vitamin B-12, 2,500 mcg Subl Place 2,500 mcg under the tongue once daily.       diphenhydrAMINE (BENADRYL) 25 mg capsule Take 50 mg by mouth every 6 (six) hours as needed for Itching.       ergocalciferol (VITAMIN D2) 50,000 unit Cap Take 1 capsule (50,000 Units total) by mouth every 7 days. 6 capsule 0    esomeprazole (NEXIUM) 40 MG capsule Take 40 mg by mouth once daily.      ferrous sulfate 325 (65 FE) MG EC tablet Take 325 mg by mouth daily as needed.       fluticasone (FLONASE) 50 mcg/actuation nasal spray INHALE 2 SPRAYS IN EACH NOSTRIL DAILY 16 g 3    folic acid (FOLVITE) 1 MG tablet TAKE ONE TABLET BY MOUTH once DAILY 90 tablet 0    hyoscyamine (LEVSIN/SL) 0.125 mg Subl Place 0.125 mg under the tongue every 6 (six) hours as needed.       levothyroxine (SYNTHROID) 137 MCG Tab tablet Take 1 tablet (137 mcg total) by mouth once daily. 90 tablet 3    loratadine (CLARITIN) 10 mg tablet Take 10 mg by mouth once daily.      MICROGESTIN FE 1/20, 28, 1 mg-20 mcg (21)/75 mg (7) per tablet TAKE ONE TABLET BY MOUTH ONCE DAILY 28 tablet 6    ondansetron (ZOFRAN) 4 MG tablet Take 1 tablet (4 mg total) by mouth every 6 (six) hours as needed. 1 Tablet Oral Every 6 hours 30 tablet 2    oxyCODONE-acetaminophen (PERCOCET) 5-325 mg per tablet Take 1 tablet by mouth every 4 (four) hours as needed (Headache / pain greater than 5 / 10 pain scale). 15 tablet 0    sumatriptan (IMITREX) 100 MG tablet Take 1 tablet (100 mg total) by mouth as needed for Migraine. Take at the onset of headache, may take 1 more in 1 hour if needed. 12 tablet 5    tadalafil, PULMONARY HYPERTENSION, (ADCIRCA) 20 mg Tab Take 40 mg by mouth once daily.      topiramate  (TOPAMAX) 100 MG tablet Take 1 tablet (100 mg total) by mouth 2 (two) times daily. 60 tablet 11    treprostinil (REMODULIN) 2.5 mg/mL Soln Mix cassette as directed and infuse continuously per physician titration orders on dosing sheet. Current dose 80ng/kg/min 60 mL 11    VENTOLIN HFA 90 mcg/actuation inhaler inhale 2 puffs BY MOUTH EVERY 4-6 HOURS 18 g 11     No current facility-administered medications for this visit.        Past Medical History:   Diagnosis Date    AR (allergic rhinitis)     Cholelithiasis, common bile duct     Chronic low back pain     Eye pressure 2017    GERD (gastroesophageal reflux disease)     History of migraine headaches     Hypothyroidism     Lumbar disc disease     Menorrhagia     Mild asthma     Obesity     Plantar fasciitis of left foot     Primary pulmonary hypertension     followed by heart transplant/pulmonary     Seizure disorder     x 1 in 2008    Seizures     Sleep apnea     TMJ (dislocation of temporomandibular joint)     Tricuspid regurgitation      Past Surgical History:   Procedure Laterality Date    CARDIAC CATHETERIZATION      CENTRAL VENOUS CATHETER INSERTION      HEART CATH-LEFT Left 1/30/2017    [facility]    HEART CATH-RIGHT Right 8/20/2018    [facility] CATH LAB    HEART CATH-RIGHT Right 1/30/2017    [facility] CATH LAB    HEART CATH-RIGHT Right 9/8/2014    [facility] CATH LAB    PORTACATH PLACEMENT      UPPER GASTROINTESTINAL ENDOSCOPY       Family History   Problem Relation Age of Onset    Heart disease Father     Glaucoma Father     Diabetes Mother     Cancer Maternal Grandmother         uterine    Cancer Maternal Aunt         breast    Breast cancer Maternal Aunt     Heart disease Paternal Grandfather     Heart attack Paternal Grandfather     Diabetes Paternal Grandfather     Leukemia Brother      "Hypertension Unknown     Diabetes Maternal Grandfather     Heart attack Maternal Grandfather     Breast cancer Cousin     No Known Problems Sister     No Known Problems Maternal Uncle     No Known Problems Paternal Aunt     No Known Problems Paternal Uncle     No Known Problems Paternal Grandmother     Colon cancer Neg Hx     Ovarian cancer Neg Hx     Amblyopia Neg Hx     Blindness Neg Hx     Cataracts Neg Hx     Macular degeneration Neg Hx     Retinal detachment Neg Hx     Strabismus Neg Hx     Stroke Neg Hx     Thyroid disease Neg Hx     Esophageal cancer Neg Hx      Social History     Tobacco Use    Smoking status: Never Smoker    Smokeless tobacco: Never Used   Substance Use Topics    Alcohol use: No     Alcohol/week: 0.5 oz     Types: 1 Standard drinks or equivalent per week     Comment: 1 per year    Drug use: No        Review of Systems:  Review of Systems   Constitutional: Negative for chills and fever.   HENT: Negative for congestion and voice change.    Eyes: Negative for pain and itching.   Respiratory: Positive for shortness of breath. Negative for cough.    Cardiovascular: Negative for chest pain and palpitations.   Gastrointestinal: Positive for diarrhea. Negative for abdominal distention, abdominal pain, constipation, nausea and vomiting.   Genitourinary: Negative for flank pain and hematuria.   Musculoskeletal: Negative for back pain and neck pain.   Neurological: Negative for dizziness and numbness.       OBJECTIVE:     Vital Signs (Most Recent)  Temp: 98.3 °F (36.8 °C) (07/11/19 1050)  Pulse: 83 (07/11/19 1050)  BP: 105/69 (07/11/19 1050)  4' 11" (1.499 m)  59 kg (130 lb)     Physical Exam:  Physical Exam   Constitutional: She is oriented to person, place, and time. She appears well-developed and well-nourished.   HENT:   Head: Normocephalic and atraumatic.   Eyes: Conjunctivae and EOM are normal.   Neck: Normal range of motion. Neck supple. No tracheal deviation present. "   Cardiovascular: Normal rate and regular rhythm.   Pulmonary/Chest: No respiratory distress.   On home oxygen   Abdominal: Soft. She exhibits no distension. There is no tenderness.   Sue tube with bile tinged drainage; no erythema or purulence at insertion site   Musculoskeletal: Normal range of motion. She exhibits no edema.   Neurological: She is alert and oriented to person, place, and time.   Skin: Skin is warm and dry. She is not diaphoretic.   Psychiatric: She has a normal mood and affect.   Vitals reviewed.      Laboratory  Reviewed    Diagnostic Results:  Reviewed    ASSESSMENT/PLAN:     48 y/o F with hx ofseizure disorder, ABHIJEET, Asthma, and severe pulmonary HTN on Remodulin who was recently discharged following admission for acute calculus cholecystitis. Returns with cholecystectomy tube in place.     PLAN:Plan     Reviewed cholangiogram via cholecystectomy tube and no evidence of common or cystic duct obstruction.   Recommended removing tube in clinic today but pt wishes to keep tube in place until after an upcoming vacation.   Will schedule pt to follow up after her vacation for tube removal.   As previously discussed while the patient was admitted in the hospital, will not planned interval elective cholecystectomy at this time.  Will continue to manage conservatively pending lung transplant.    Darrian Acosta M.D.  PGY 4

## 2019-07-12 ENCOUNTER — TELEPHONE (OUTPATIENT)
Dept: FAMILY MEDICINE | Facility: CLINIC | Age: 49
End: 2019-07-12

## 2019-07-12 NOTE — TELEPHONE ENCOUNTER
Spoke with states right foot is swollen, red and tender touch (8/10). Pt denies calf tenderness. Swelling and pain started 7/10/2019. Pt tried tylenol with no relief. States after a google search she started taking Aleve and elevating her foot.  Pt states swelling and pain has decreased. Advised to per Dr. Do to continue taking the aleve, use cold compresses and elevate right foot. Pt instructed if symptoms persist or worsen to visit urgent care or schedule OV  for further evaluation. Pt verbalized understanding.

## 2019-07-12 NOTE — TELEPHONE ENCOUNTER
----- Message from Carolynn Anguiano sent at 7/12/2019  9:27 AM CDT -----  Contact: Mildred [phone] or [phone]  Type: Patient Call Back    Who called:Yuni    What is the request in detail: The patient is returning a call back to Dr. Sandhu    Can the clinic reply by [facility]?no    Would the patient rather a call back or a response via [facility]? Call back    Best call back number:[phone] or [phone]

## 2019-07-16 ENCOUNTER — TELEPHONE (OUTPATIENT)
Dept: TRANSPLANT | Facility: CLINIC | Age: 49
End: 2019-07-16

## 2019-07-16 ENCOUNTER — OFFICE VISIT (OUTPATIENT)
Dept: FAMILY MEDICINE | Facility: CLINIC | Age: 49
End: 2019-07-16
Payer: COMMERCIAL

## 2019-07-16 ENCOUNTER — LAB VISIT (OUTPATIENT)
Dept: LAB | Facility: HOSPITAL | Age: 49
End: 2019-07-16
Payer: COMMERCIAL

## 2019-07-16 VITALS
BODY MASS INDEX: 26.75 KG/M2 | TEMPERATURE: 98 F | SYSTOLIC BLOOD PRESSURE: 100 MMHG | OXYGEN SATURATION: 93 % | WEIGHT: 132.69 LBS | HEIGHT: 59 IN | DIASTOLIC BLOOD PRESSURE: 60 MMHG | HEART RATE: 76 BPM

## 2019-07-16 DIAGNOSIS — I27.9 CHRONIC PULMONARY HEART DISEASE: ICD-10-CM

## 2019-07-16 DIAGNOSIS — M25.50 ARTHRALGIA, UNSPECIFIED JOINT: ICD-10-CM

## 2019-07-16 DIAGNOSIS — M25.50 ARTHRALGIA, UNSPECIFIED JOINT: Primary | ICD-10-CM

## 2019-07-16 DIAGNOSIS — I27.0 PRIMARY PULMONARY HYPERTENSION: ICD-10-CM

## 2019-07-16 DIAGNOSIS — J96.11 CHRONIC RESPIRATORY FAILURE WITH HYPOXIA: ICD-10-CM

## 2019-07-16 LAB
CCP AB SER IA-ACNC: <0.5 U/ML
CRP SERPL-MCNC: 54 MG/L (ref 0–8.2)
ERYTHROCYTE [SEDIMENTATION RATE] IN BLOOD BY WESTERGREN METHOD: 12 MM/HR (ref 0–36)
RHEUMATOID FACT SERPL-ACNC: <10 IU/ML (ref 0–15)
URATE SERPL-MCNC: 4.1 MG/DL (ref 2.4–5.7)

## 2019-07-16 PROCEDURE — 99214 PR OFFICE/OUTPT VISIT, EST, LEVL IV, 30-39 MIN: ICD-10-PCS | Mod: S$GLB,,, | Performed by: NURSE PRACTITIONER

## 2019-07-16 PROCEDURE — 86038 ANTINUCLEAR ANTIBODIES: CPT | Mod: TXP

## 2019-07-16 PROCEDURE — 99214 OFFICE O/P EST MOD 30 MIN: CPT | Mod: S$GLB,,, | Performed by: NURSE PRACTITIONER

## 2019-07-16 PROCEDURE — 85652 RBC SED RATE AUTOMATED: CPT | Mod: NTX

## 2019-07-16 PROCEDURE — 86200 CCP ANTIBODY: CPT | Mod: NTX

## 2019-07-16 PROCEDURE — 3008F PR BODY MASS INDEX (BMI) DOCUMENTED: ICD-10-PCS | Mod: CPTII,S$GLB,, | Performed by: NURSE PRACTITIONER

## 2019-07-16 PROCEDURE — 99999 PR PBB SHADOW E&M-EST. PATIENT-LVL V: CPT | Mod: PBBFAC,,, | Performed by: NURSE PRACTITIONER

## 2019-07-16 PROCEDURE — 86140 C-REACTIVE PROTEIN: CPT | Mod: NTX

## 2019-07-16 PROCEDURE — 99999 PR PBB SHADOW E&M-EST. PATIENT-LVL V: ICD-10-PCS | Mod: PBBFAC,,, | Performed by: NURSE PRACTITIONER

## 2019-07-16 PROCEDURE — 86431 RHEUMATOID FACTOR QUANT: CPT | Mod: NTX

## 2019-07-16 PROCEDURE — 36415 COLL VENOUS BLD VENIPUNCTURE: CPT | Mod: PO,NTX

## 2019-07-16 PROCEDURE — 3008F BODY MASS INDEX DOCD: CPT | Mod: CPTII,S$GLB,, | Performed by: NURSE PRACTITIONER

## 2019-07-16 PROCEDURE — 84550 ASSAY OF BLOOD/URIC ACID: CPT | Mod: TXP

## 2019-07-16 RX ORDER — IBUPROFEN 800 MG/1
800 TABLET ORAL 3 TIMES DAILY
Qty: 30 TABLET | Refills: 0 | Status: SHIPPED | OUTPATIENT
Start: 2019-07-16 | End: 2019-10-11 | Stop reason: SINTOL

## 2019-07-16 NOTE — PROGRESS NOTES
Subjective:       Patient ID: Yuni Perez is a 49 y.o. female.    Chief Complaint: Joint point (Migrating    5 days)    49-year-old female presents to the clinic today with complaint of joint pain. She states it initially started with stiffness in her fingers then moved to her right ankle, left knee, left shoulder, left elbow, and right shoulder joint discomfort.  She denies any swelling or redness to the joints.  She has been taking Tylenol pure set without any relief.  However, Advil every 8 hr is helping her.  She still has a tube in her gallbladder that is currently clamped.  She is scheduled to have the tube removed next week on July 25, 2019.  She is has pulmonary hypertension and is on continuous Remodulin.  She denies any cardiac chest pain, heart palpitations, shortness breath, or swelling to lower extremities.  She denies any dizziness or blurred vision.  She has been having problems with headaches.  Presently the pain is mostly in both shoulders and left elbow.     Past Medical History:   Diagnosis Date    AR (allergic rhinitis)     Cholelithiasis, common bile duct     Chronic low back pain     Eye pressure 2017    GERD (gastroesophageal reflux disease)     History of migraine headaches     Hypothyroidism     Lumbar disc disease     Menorrhagia     Mild asthma     Obesity     Plantar fasciitis of left foot     Primary pulmonary hypertension     followed by heart transplant/pulmonary     Seizure disorder     x 1 in 2008    Seizures     Sleep apnea     TMJ (dislocation of temporomandibular joint)     Tricuspid regurgitation      Past Surgical History:   Procedure Laterality Date    CARDIAC CATHETERIZATION      CENTRAL VENOUS CATHETER INSERTION      HEART CATH-LEFT Left 1/30/2017    [facility] CATH LAB    HEART CATH-RIGHT Right 8/20/2018    [facility] at [facility] CATH LAB    HEART CATH-RIGHT Right 1/30/2017    [facility]ATH [facility]    HEART CATH-RIGHT Right 9/8/2014    [facility] at [facility] CATH LAB    PORTACATH PLACEMENT      UPPER GASTROINTESTINAL ENDOSCOPY        reports that she has never smoked. She has never used smokeless tobacco. She reports that she does not drink alcohol or use drugs.  Review of Systems   Constitutional: Negative for chills and fever.   Respiratory: Negative for cough, shortness of breath and wheezing.    Gastrointestinal: Negative for abdominal pain, diarrhea, nausea and vomiting.   Musculoskeletal: Positive for arthralgias. Negative for gait problem and joint swelling.   Neurological: Positive for headaches. Negative for dizziness and light-headedness.       Objective:      Physical Exam   Constitutional: She appears well-developed and well-nourished. No distress.   Eyes: Pupils are equal, round, and reactive to light. Conjunctivae and EOM are normal. Right eye exhibits no discharge. Left eye exhibits no discharge. No scleral icterus.   Cardiovascular: Normal rate, regular rhythm and normal heart sounds. Exam reveals no gallop and no friction rub.   No murmur heard.  Pulmonary/Chest: Effort normal and breath sounds normal. No respiratory distress. She has no wheezes. She has no rales.   Abdominal: Soft. Bowel sounds are normal. There is no tenderness.   Tube RUQ of abdomen in gall bladder clamped at this time   Musculoskeletal: She exhibits tenderness.   Tenderness both knees right medial side and left lateral side also tenderness both elbows and right shoulder unable to left left arm up completely straight painful ROM right shoulder no redness or swelling of joints    Skin: Skin is warm and dry. She is not diaphoretic.   Psychiatric: She has a normal mood and affect.       Assessment:       1. Arthralgia, unspecified joint    2. Primary pulmonary hypertension    3. Chronic respiratory failure with hypoxia    4. Chronic pulmonary heart disease        Plan:          Arthralgia, unspecified joint  -     CYCLIC CITRUL PEPTIDE ANTIBODY, IGG; Future; Expected date: 07/16/2019  -     Sedimentation rate; Future; Expected date: 07/16/2019  -     RUBY Screen w/Reflex; Future; Expected date: 07/16/2019  -     C-reactive protein; Future; Expected date: 07/16/2019  -     Rheumatoid factor; Future; Expected date: 07/16/2019  -     Uric acid; Future; Expected date: 07/16/2019  -     ibuprofen (ADVIL,MOTRIN) 800 MG tablet; Take 1 tablet (800 mg total) by mouth 3 (three) times daily.  Dispense: 30 tablet; Refill: 0    Primary pulmonary hypertension  - on continuous Remodulin    Chronic respiratory failure with hypoxia  - on Remodulin     Chronic pulmonary heart disease  - followed by transplant team

## 2019-07-17 ENCOUNTER — TELEPHONE (OUTPATIENT)
Dept: FAMILY MEDICINE | Facility: CLINIC | Age: 49
End: 2019-07-17

## 2019-07-17 DIAGNOSIS — M25.50 ARTHRALGIA, UNSPECIFIED JOINT: Primary | ICD-10-CM

## 2019-07-17 LAB — ANA SER QL IF: NORMAL

## 2019-07-17 NOTE — TELEPHONE ENCOUNTER
----- Message from Desirae Castillo sent at 7/17/2019  2:22 PM CDT -----  Contact: Patient   Patient would like a call back at [phone], Regards to a question that she has about her medication.    Thanks  Td

## 2019-07-17 NOTE — TELEPHONE ENCOUNTER
I spoke with the patient and explained that her CRP was elevated which is a inflammation marker. The rest of the labs are normal. I told her that we needed to repeat it in about 10 days. Patient would like to have it drawn on 7/25/2019 at 9:30 am.

## 2019-07-17 NOTE — TELEPHONE ENCOUNTER
Patient return call and wanted to know because she is going out of town and she is taking Flagyl for C-diff and she is in a private room; do she still have to sanitize her bathroom. She said that her stools are beginning to get firm but still soft. Advise to continue to disinfect areas until she finish antibiotics.

## 2019-07-17 NOTE — TELEPHONE ENCOUNTER
----- Message from Jojo Fletcher sent at 7/17/2019  4:41 PM CDT -----  Contact: KELSY TURNER [[id number]]  Type:  Patient Returning Call    Who Called: MANJINDER Monge      Who Left Message for Patient: MANJINDER Monge        Does the patient know what this is regarding?results    Best Call Back Number:[phone]    Additional Information:

## 2019-07-22 ENCOUNTER — TELEPHONE (OUTPATIENT)
Dept: TRANSPLANT | Facility: CLINIC | Age: 49
End: 2019-07-22

## 2019-07-22 NOTE — TELEPHONE ENCOUNTER
"Pt reports that she thinks she had "a reaction to the ibuprofen I was taking." Pt reports "swelling" which has now resolved. She further ports that she has had "periodic pump alarms" with the Remodulin CADD pump for the last couple months. She notes that she is still receiving all her medication without difficulty. Pt reports "high pressure" alarm when arm is elevated, but the alarm stops when she brings arm back down by her side. She has also had the alarm at night a couple times. Pt reports she spoke w/[facility], who advised she might also have alarm d/t condensation on cold Remodulin cassette, which is triggering alarm on pump sensor. Pt further reports she has struggled w/"new line and caps" ever since the  of these changed. Advised pt that it would be helpful to have line checked for patency, and pt is amenable to same. Left  for nephrology access. Dr Rai updated regarding all.   "

## 2019-07-22 NOTE — TELEPHONE ENCOUNTER
"----- Message from Ivonne Rai MD sent at 7/17/2019  8:52 AM CDT -----  Should not be on advil.  If needed can refer to rheum      ----- Message -----  From: Bárbara Langford RN  Sent: 7/16/2019  12:52 PM  To: Ivonne Rai MD, SULY Razo, RN    See below. Her PCP prescribed 800mg Ibuprofen, q8 and ordered an RF, RUBY, C-reactive, uric acid and cyclic citrul    " complaint of joint pain. She states it initially started with stiffness in her fingers then moved to her right ankle, left knee, left shoulder, left elbow, and right shoulder joint discomfort.  She denies any swelling or redness to the joints.  She has been taking Tylenol pure set without any relief.  However, Advil every 8 hr is helping her. "    "

## 2019-07-23 ENCOUNTER — TELEPHONE (OUTPATIENT)
Dept: TRANSPLANT | Facility: CLINIC | Age: 49
End: 2019-07-23

## 2019-07-23 NOTE — TELEPHONE ENCOUNTER
"----- Message from Ivonne Rai MD sent at 7/22/2019  4:12 PM CDT -----  Lets viridiana a [facility] next Month- will be a year and can help us decide- if I do it can avoid the clinic visit ;)  ----- Message -----  From: SULY Razo, RN  Sent: 7/22/2019   2:05 PM  To: Ivonne Rai MD    "A tight clap feeling around back of neck. Pain and swelling remind her of when I needed to titrate."   ----- Message -----  From: Ivonne Rai MD  Sent: 7/22/2019   1:56 PM  To: SULY Razo, RN, #    Pain???  ----- Message -----  From: SULY Razo, RN  Sent: 7/20/2019   8:16 PM  To: Ivonne Rai MD, Bárbara Langford, RN    She texted me. SOB and having lots of pain still. Wants to see you sooner, but I told her unlikely. She settled for labs Monday--She thinks she needs to increase remodulin more.   ----- Message -----  From: Bárbara Langford RN  Sent: 7/19/2019  10:33 AM  To: Ivonne Rai MD, SULY Razo, RN    FYI -     RF, RUBY all neg. Only abnormal was elevated CRP which they are repeating in 10 days. Patient is going on a retreat this weekend (possible knitting - is that thing?). Has been following up with her internal med doc and all seems ok. Have not heard if her joint pain has subsided as of yet but I am sure she will call!    Lety          "

## 2019-07-23 NOTE — TELEPHONE ENCOUNTER
Left VM for pt. Will hear from [facility] tomorrow regarding when pt can go for line check (see previous phone note).

## 2019-07-23 NOTE — TELEPHONE ENCOUNTER
"Returned call to pt, who endorses feelings of anxiety r/t needing  Upcoming RHC, as well as getting "bili bag" removed. Pt reports "unpleasant experience" with getting tube placed, and also negative experience with provider other than Dr Rai when getting RHC. Pt reports taking xanax prior to other RHC and requests same for pre procedures, if possible. Message sent to Dr Rai regarding same.   "

## 2019-07-24 ENCOUNTER — TELEPHONE (OUTPATIENT)
Dept: TRANSPLANT | Facility: CLINIC | Age: 49
End: 2019-07-24

## 2019-07-24 ENCOUNTER — LAB VISIT (OUTPATIENT)
Dept: LAB | Facility: HOSPITAL | Age: 49
End: 2019-07-24
Attending: INTERNAL MEDICINE
Payer: COMMERCIAL

## 2019-07-24 DIAGNOSIS — Z79.899 POLYPHARMACY: ICD-10-CM

## 2019-07-24 DIAGNOSIS — Z79.899 POLYPHARMACY: Primary | ICD-10-CM

## 2019-07-24 DIAGNOSIS — R06.82 TACHYPNEA: ICD-10-CM

## 2019-07-24 LAB
ALBUMIN SERPL BCP-MCNC: 3.4 G/DL (ref 3.5–5.2)
ALP SERPL-CCNC: 62 U/L (ref 55–135)
ALT SERPL W/O P-5'-P-CCNC: 11 U/L (ref 10–44)
ANION GAP SERPL CALC-SCNC: 8 MMOL/L (ref 8–16)
AST SERPL-CCNC: 18 U/L (ref 10–40)
BASOPHILS # BLD AUTO: 0.04 K/UL (ref 0–0.2)
BASOPHILS NFR BLD: 0.9 % (ref 0–1.9)
BILIRUB SERPL-MCNC: 0.3 MG/DL (ref 0.1–1)
BNP SERPL-MCNC: <10 PG/ML (ref 0–99)
BUN SERPL-MCNC: 7 MG/DL (ref 6–20)
CALCIUM SERPL-MCNC: 9.6 MG/DL (ref 8.7–10.5)
CHLORIDE SERPL-SCNC: 108 MMOL/L (ref 95–110)
CO2 SERPL-SCNC: 20 MMOL/L (ref 23–29)
CREAT SERPL-MCNC: 0.7 MG/DL (ref 0.5–1.4)
CRP SERPL-MCNC: 16.2 MG/L (ref 0–8.2)
DIFFERENTIAL METHOD: ABNORMAL
EOSINOPHIL # BLD AUTO: 0.2 K/UL (ref 0–0.5)
EOSINOPHIL NFR BLD: 5.4 % (ref 0–8)
ERYTHROCYTE [DISTWIDTH] IN BLOOD BY AUTOMATED COUNT: 15 % (ref 11.5–14.5)
EST. GFR  (AFRICAN AMERICAN): >60 ML/MIN/1.73 M^2
EST. GFR  (NON AFRICAN AMERICAN): >60 ML/MIN/1.73 M^2
GLUCOSE SERPL-MCNC: 67 MG/DL (ref 70–110)
HCT VFR BLD AUTO: 44.1 % (ref 37–48.5)
HGB BLD-MCNC: 13.7 G/DL (ref 12–16)
IMM GRANULOCYTES # BLD AUTO: 0.02 K/UL (ref 0–0.04)
IMM GRANULOCYTES NFR BLD AUTO: 0.4 % (ref 0–0.5)
LYMPHOCYTES # BLD AUTO: 1.2 K/UL (ref 1–4.8)
LYMPHOCYTES NFR BLD: 26.6 % (ref 18–48)
MAGNESIUM SERPL-MCNC: 2.3 MG/DL (ref 1.6–2.6)
MCH RBC QN AUTO: 29.5 PG (ref 27–31)
MCHC RBC AUTO-ENTMCNC: 31.1 G/DL (ref 32–36)
MCV RBC AUTO: 95 FL (ref 82–98)
MONOCYTES # BLD AUTO: 0.3 K/UL (ref 0.3–1)
MONOCYTES NFR BLD: 7.4 % (ref 4–15)
NEUTROPHILS # BLD AUTO: 2.7 K/UL (ref 1.8–7.7)
NEUTROPHILS NFR BLD: 59.3 % (ref 38–73)
NRBC BLD-RTO: 0 /100 WBC
PLATELET # BLD AUTO: 297 K/UL (ref 150–350)
PMV BLD AUTO: 12.1 FL (ref 9.2–12.9)
POTASSIUM SERPL-SCNC: 4.3 MMOL/L (ref 3.5–5.1)
PROT SERPL-MCNC: 7.1 G/DL (ref 6–8.4)
RBC # BLD AUTO: 4.64 M/UL (ref 4–5.4)
SODIUM SERPL-SCNC: 136 MMOL/L (ref 136–145)
WBC # BLD AUTO: 4.48 K/UL (ref 3.9–12.7)

## 2019-07-24 PROCEDURE — 83735 ASSAY OF MAGNESIUM: CPT | Mod: NTX

## 2019-07-24 PROCEDURE — 83880 ASSAY OF NATRIURETIC PEPTIDE: CPT | Mod: TXP

## 2019-07-24 PROCEDURE — 80053 COMPREHEN METABOLIC PANEL: CPT | Mod: NTX

## 2019-07-24 PROCEDURE — 36415 COLL VENOUS BLD VENIPUNCTURE: CPT | Mod: PO,TXP

## 2019-07-24 PROCEDURE — 85025 COMPLETE CBC W/AUTO DIFF WBC: CPT | Mod: TXP

## 2019-07-24 PROCEDURE — 86140 C-REACTIVE PROTEIN: CPT | Mod: TXP

## 2019-07-24 RX ORDER — ALPRAZOLAM 0.5 MG/1
TABLET ORAL
Qty: 10 TABLET | Refills: 0 | Status: SHIPPED | OUTPATIENT
Start: 2019-07-24 | End: 2020-01-20

## 2019-07-24 NOTE — TELEPHONE ENCOUNTER
Spoke w/Nephrology service/Dr Suazo--Unable to troubleshoot possible issue w/pt's tien in cath lab. Pt instructed to continue to monitor for more CADD pump alarms, and come to ER if these continue. Pt verbalized understanding.

## 2019-07-25 ENCOUNTER — TELEPHONE (OUTPATIENT)
Dept: TRANSPLANT | Facility: CLINIC | Age: 49
End: 2019-07-25

## 2019-07-25 ENCOUNTER — OFFICE VISIT (OUTPATIENT)
Dept: SURGERY | Facility: CLINIC | Age: 49
End: 2019-07-25
Payer: COMMERCIAL

## 2019-07-25 VITALS
HEART RATE: 81 BPM | TEMPERATURE: 98 F | HEIGHT: 59 IN | BODY MASS INDEX: 26.42 KG/M2 | SYSTOLIC BLOOD PRESSURE: 110 MMHG | DIASTOLIC BLOOD PRESSURE: 63 MMHG | WEIGHT: 131.06 LBS | RESPIRATION RATE: 20 BRPM

## 2019-07-25 DIAGNOSIS — I27.9 CHRONIC PULMONARY HEART DISEASE: Primary | ICD-10-CM

## 2019-07-25 DIAGNOSIS — K81.9 CHOLECYSTITIS: Primary | ICD-10-CM

## 2019-07-25 DIAGNOSIS — R06.82 TACHYPNEA: ICD-10-CM

## 2019-07-25 DIAGNOSIS — Z79.899 POLYPHARMACY: Primary | ICD-10-CM

## 2019-07-25 DIAGNOSIS — Z79.01 LONG TERM (CURRENT) USE OF ANTICOAGULANTS: ICD-10-CM

## 2019-07-25 PROCEDURE — 99213 PR OFFICE/OUTPT VISIT, EST, LEVL III, 20-29 MIN: ICD-10-PCS | Mod: NTX,S$GLB,, | Performed by: SURGERY

## 2019-07-25 PROCEDURE — 3008F PR BODY MASS INDEX (BMI) DOCUMENTED: ICD-10-PCS | Mod: CPTII,NTX,S$GLB, | Performed by: SURGERY

## 2019-07-25 PROCEDURE — 3008F BODY MASS INDEX DOCD: CPT | Mod: CPTII,NTX,S$GLB, | Performed by: SURGERY

## 2019-07-25 PROCEDURE — 99999 PR PBB SHADOW E&M-EST. PATIENT-LVL V: ICD-10-PCS | Mod: PBBFAC,TXP,, | Performed by: SURGERY

## 2019-07-25 PROCEDURE — 99213 OFFICE O/P EST LOW 20 MIN: CPT | Mod: NTX,S$GLB,, | Performed by: SURGERY

## 2019-07-25 PROCEDURE — 99999 PR PBB SHADOW E&M-EST. PATIENT-LVL V: CPT | Mod: PBBFAC,TXP,, | Performed by: SURGERY

## 2019-07-25 NOTE — Clinical Note
Good afternoon,I wanted to let you know that we removed the cholecystostomy tube today.  Everything went well. Hoping she does not have another episode.  If so, we will place another tube for her.  Please let me know if you have any questions.Thanks.

## 2019-07-25 NOTE — TELEPHONE ENCOUNTER
----- Message from Ivonne Rai MD sent at 7/25/2019  3:12 PM CDT -----  nope  ----- Message -----  From: SULY Razo, RN  Sent: 7/25/2019   9:29 AM  To: Ivonne Rai MD    All look good--Any changes?

## 2019-07-25 NOTE — PROGRESS NOTES
"I have reviewed the Resident's progress note. I agree with the findings. Any changes were made directly in the note below.    Marly White MD  Endocrine Surgery & General Surgery        History & Physical    SUBJECTIVE:     Interval Hx: Pt returns today for cholecystostomy tube removal. Denies recent change in health or change in symptoms. Nervous about drain removal today. Denies fever/chills.        History of Present Illness:  Patient is a 49 y.o. female with seizure disorder, ABHIJEET, Asthma, and severe pulmonary HTN on Remodulin who was recently discharged following admission for acute calculus cholecystitis. Pt underwent a cholecystectomy tube due to her severe pulmonary HTN making her a high risk surgical candidate. Since discharge she has been doing well with her only complaint being diarrhea. Tolerating kita tube without any problems. Denies RUQ pain, nausea, vomiting, fever and chills.       Of note, she was taken to the OR back in 2014 for planned lap cholecystectomy but the procedure was aborted secondary to severe pulmonary HTN and hemodynamic instability after induction of anesthesia. Per operative report, she had significantly elevated PA pressures along with systemic hypotension requiring multiple pressors. The procedure was aborted and she was admitted to the ICU after.     Chief Complaint   Patient presents with    Post-op Evaluation       Review of patient's allergies indicates:   Allergen Reactions    Fentanyl Anaphylaxis     Respiratory distress    Vibra-tabs [doxycycline hyclate] Anaphylaxis     "throat felt like it was closing"    Adhesive Hives     Silk tape    Amoxicillin Rash    Chlorhexidine Other (See Comments)    Doxycycline        Current Outpatient Medications   Medication Sig Dispense Refill    acetaminophen (TYLENOL EXTRA STRENGTH) 500 MG tablet Take 1,000 mg by mouth every 6 (six) hours as needed for Pain.      ADVAIR DISKUS 100-50 mcg/dose diskus inhaler INHALE 1 PUFF " TWICE DAILY 1 each 11    ALPRAZolam (XANAX) 0.5 MG tablet Take one tab 60-90 minutes prior to procedure. 10 tablet 0    ambrisentan (LETAIRIS) 10 MG Tab Take 1 tablet (10 mg total) by mouth once daily. 30 tablet 11    azelastine (ASTELIN) 137 mcg (0.1 %) nasal spray 2 sprays by Nasal route 2 (two) times daily.       butalbital-acetaminophen-caffeine -40 mg (FIORICET, ESGIC) -40 mg per tablet Take 1 tablet by mouth every 4 (four) hours as needed for Pain.      citalopram (CELEXA) 10 MG tablet Take 10 mg by mouth once daily.      cyanocobalamin, vitamin B-12, 2,500 mcg Subl Place 2,500 mcg under the tongue once daily.       diphenhydrAMINE (BENADRYL) 25 mg capsule Take 50 mg by mouth every 6 (six) hours as needed for Itching.       ergocalciferol (VITAMIN D2) 50,000 unit Cap Take 1 capsule (50,000 Units total) by mouth every 7 days. 6 capsule 0    esomeprazole (NEXIUM) 40 MG capsule Take 40 mg by mouth once daily.      ferrous sulfate 325 (65 FE) MG EC tablet Take 325 mg by mouth daily as needed.       fluticasone (FLONASE) 50 mcg/actuation nasal spray INHALE 2 SPRAYS IN EACH NOSTRIL DAILY 16 g 3    folic acid (FOLVITE) 1 MG tablet TAKE ONE TABLET BY MOUTH once DAILY 90 tablet 0    hyoscyamine (LEVSIN/SL) 0.125 mg Subl Place 0.125 mg under the tongue every 6 (six) hours as needed.       levothyroxine (SYNTHROID) 137 MCG Tab tablet Take 1 tablet (137 mcg total) by mouth once daily. 90 tablet 3    loratadine (CLARITIN) 10 mg tablet Take 10 mg by mouth once daily.      metroNIDAZOLE (FLAGYL) 500 MG tablet Take 1 tablet (500 mg total) by mouth 3 (three) times daily. for 14 days 42 tablet 0    MICROGESTIN FE 1/20, 28, 1 mg-20 mcg (21)/75 mg (7) per tablet TAKE ONE TABLET BY MOUTH ONCE DAILY 28 tablet 6    ondansetron (ZOFRAN) 4 MG tablet Take 1 tablet (4 mg total) by mouth every 6 (six) hours as needed. 1 Tablet Oral Every 6 hours 30 tablet 2    oxyCODONE-acetaminophen (PERCOCET) 5-325 mg per  tablet Take 1 tablet by mouth every 4 (four) hours as needed (Headache / pain greater than 5 / 10 pain scale). 15 tablet 0    sumatriptan (IMITREX) 100 MG tablet Take 1 tablet (100 mg total) by mouth as needed for Migraine. Take at the onset of headache, may take 1 more in 1 hour if needed. 12 tablet 5    tadalafil, PULMONARY HYPERTENSION, (ADCIRCA) 20 mg Tab Take 40 mg by mouth once daily.      topiramate (TOPAMAX) 100 MG tablet Take 1 tablet (100 mg total) by mouth 2 (two) times daily. 60 tablet 11    treprostinil (REMODULIN) 2.5 mg/mL Soln Mix cassette as directed and infuse continuously per physician titration orders on dosing sheet. Current dose 80ng/kg/min 60 mL 11    VENTOLIN HFA 90 mcg/actuation inhaler inhale 2 puffs BY MOUTH EVERY 4-6 HOURS 18 g 11    ibuprofen (ADVIL,MOTRIN) 800 MG tablet Take 1 tablet (800 mg total) by mouth 3 (three) times daily. 30 tablet 0     No current facility-administered medications for this visit.        Past Medical History:   Diagnosis Date    AR (allergic rhinitis)     Cholelithiasis, common bile duct     Chronic low back pain     Eye pressure 2017    GERD (gastroesophageal reflux disease)     History of migraine headaches     Hypothyroidism     Lumbar disc disease     Menorrhagia     Mild asthma     Obesity     Plantar fasciitis of left foot     Primary pulmonary hypertension     followed by heart transplant/pulmonary     Seizure disorder     x 1 in 2008    Seizures     Sleep apnea     TMJ (dislocation of temporomandibular joint)     Tricuspid regurgitation      Past Surgical History:   Procedure Laterality Date    CARDIAC CATHETERIZATION      CENTRAL VENOUS CATHETER INSERTION      HEART CATH-LEFT Left 1/30/2017    [facility]    HEART CATH-RIGHT Right 8/20/2018    [facility] CATH LAB    HEART CATH-RIGHT Right 1/30/2017    [facility] CATH LAB     HEART CATH-RIGHT Right 9/8/2014    [facility] CATH LAB    PORTACATH PLACEMENT      UPPER GASTROINTESTINAL ENDOSCOPY       Family History   Problem Relation Age of Onset    Heart disease Father     Glaucoma Father     Diabetes Mother     Cancer Maternal Grandmother         uterine    Cancer Maternal Aunt         breast    Breast cancer Maternal Aunt     Heart disease Paternal Grandfather     Heart attack Paternal Grandfather     Diabetes Paternal Grandfather     Leukemia Brother     Hypertension Unknown     Diabetes Maternal Grandfather     Heart attack Maternal Grandfather     Breast cancer Cousin     No Known Problems Sister     No Known Problems Maternal Uncle     No Known Problems Paternal Aunt     No Known Problems Paternal Uncle     No Known Problems Paternal Grandmother     Colon cancer Neg Hx     Ovarian cancer Neg Hx     Amblyopia Neg Hx     Blindness Neg Hx     Cataracts Neg Hx     Macular degeneration Neg Hx     Retinal detachment Neg Hx     Strabismus Neg Hx     Stroke Neg Hx     Thyroid disease Neg Hx     Esophageal cancer Neg Hx      Social History     Tobacco Use    Smoking status: Never Smoker    Smokeless tobacco: Never Used   Substance Use Topics    Alcohol use: No     Alcohol/week: 0.5 oz     Types: 1 Standard drinks or equivalent per week     Comment: 1 per year    Drug use: No        Review of Systems:  Review of Systems   Constitutional: Negative for chills and fever.   HENT: Negative for congestion and voice change.    Eyes: Negative for pain and itching.   Respiratory: Positive for shortness of breath. Negative for cough.    Cardiovascular: Negative for chest pain and palpitations.   Gastrointestinal: Positive for diarrhea. Negative for abdominal distention, abdominal pain, constipation, nausea and vomiting.   Genitourinary: Negative for flank pain and hematuria.   Musculoskeletal: Negative for back pain and neck pain.  "  Neurological: Negative for dizziness and numbness.       OBJECTIVE:     Vital Signs (Most Recent)  Temp: 97.8 °F (36.6 °C) (07/25/19 1023)  Pulse: 81 (07/25/19 1023)  Resp: 20 (07/25/19 1023)  BP: 110/63 (07/25/19 1023)  4' 11" (1.499 m)  59.5 kg (131 lb 1 oz)     Physical Exam:  Physical Exam   Constitutional: She is oriented to person, place, and time. She appears well-developed and well-nourished.   HENT:   Head: Normocephalic and atraumatic.   Eyes: Conjunctivae and EOM are normal.   Neck: Normal range of motion. Neck supple. No tracheal deviation present.   Cardiovascular: Normal rate and regular rhythm.   Pulmonary/Chest: No respiratory distress.   On home oxygen   Abdominal: Soft. She exhibits no distension. There is no tenderness.   Sue tube with bile tinged drainage; no erythema or purulence at insertion site   Musculoskeletal: Normal range of motion. She exhibits no edema.   Neurological: She is alert and oriented to person, place, and time.   Skin: Skin is warm and dry. She is not diaphoretic.   Psychiatric: She has a normal mood and affect.   Vitals reviewed.      Laboratory  Reviewed    Diagnostic Results:  Reviewed    ASSESSMENT/PLAN:     50 y/o F with hx ofseizure disorder, ABHIJEET, Asthma, and severe pulmonary HTN on Remodulin who was recently discharged following admission for acute calculus cholecystitis. Returns with cholecystectomy tube in place.     PLAN:Plan     Reviewed cholangiogram via cholecystectomy tube and no evidence of common or cystic duct obstruction.   Removed drain in clinic today.  As previously discussed while the patient was admitted in the hospital, will not planned interval elective cholecystectomy at this time.  Will continue to manage conservatively pending lung transplant.    Darrian Acosta M.D.  PGY 4       "

## 2019-07-31 ENCOUNTER — TELEPHONE (OUTPATIENT)
Dept: TRANSPLANT | Facility: CLINIC | Age: 49
End: 2019-07-31

## 2019-07-31 DIAGNOSIS — A04.72 C. DIFFICILE DIARRHEA: ICD-10-CM

## 2019-07-31 RX ORDER — METRONIDAZOLE 500 MG/1
TABLET ORAL
Qty: 42 TABLET | Refills: 0 | OUTPATIENT
Start: 2019-07-31

## 2019-08-02 DIAGNOSIS — A04.72 C. DIFFICILE DIARRHEA: ICD-10-CM

## 2019-08-02 DIAGNOSIS — R19.7 DIARRHEA OF PRESUMED INFECTIOUS ORIGIN: Primary | ICD-10-CM

## 2019-08-02 RX ORDER — METRONIDAZOLE 500 MG/1
500 TABLET ORAL 3 TIMES DAILY
Qty: 42 TABLET | Refills: 0 | Status: CANCELLED | OUTPATIENT
Start: 2019-08-02 | End: 2019-08-16

## 2019-08-02 NOTE — TELEPHONE ENCOUNTER
I assume we are referring to retesting for c. Diff collitis. We will need that result before we can re-prescribe the metronidazole.

## 2019-08-02 NOTE — TELEPHONE ENCOUNTER
We should only need to refill the metronidazole if she is still having diarrhea and we retest her and she is still positive for C diff.  Please clarify what results she is looking for.  I am not familiar with C factor

## 2019-08-02 NOTE — TELEPHONE ENCOUNTER
Patient says she is calling in for the refill on the Flagyl saying Dr mendoza told her the dx (c-diff) was going to be hard to cure and that she may need another refill and if it does not work she would prescribe vancomcyin. Patient also wants to know about her C-Factor.

## 2019-08-02 NOTE — TELEPHONE ENCOUNTER
Patient says she is still having diarrhea. Says Dane Moyer asked for C-Factor test and wants the results.

## 2019-08-02 NOTE — TELEPHONE ENCOUNTER
Pt has been advised retesting for C. Diff is needed before medication can be prescribed. Orders are needed.

## 2019-08-05 ENCOUNTER — TELEPHONE (OUTPATIENT)
Dept: TRANSPLANT | Facility: CLINIC | Age: 49
End: 2019-08-05

## 2019-08-05 NOTE — TELEPHONE ENCOUNTER
"Pt called to report that she needs to cx her RHC secondary to "gallbladder attack overnight". Pt reports vomiting twice, and that this is "not as bad as it was when I came to the hospital the last time". Inquired if pt could pinpoint attack being related to something she ate, and she states "probably".  Pt was encouraged not to wait too long before coming to ER. She states "I know, that's what my mom said as well." Message sent to surgery regarding pt's current status, and Dr Rai also updated.   "

## 2019-08-09 LAB
ACID FAST MOD KINY STN SPEC: NORMAL
MYCOBACTERIUM SPEC QL CULT: NORMAL

## 2019-08-13 ENCOUNTER — PATIENT OUTREACH (OUTPATIENT)
Dept: ADMINISTRATIVE | Facility: OTHER | Age: 49
End: 2019-08-13

## 2019-08-13 DIAGNOSIS — Z76.82 AWAITING ORGAN TRANSPLANT STATUS: Primary | ICD-10-CM

## 2019-08-13 DIAGNOSIS — I27.0 PRIMARY PULMONARY HYPERTENSION: ICD-10-CM

## 2019-08-15 ENCOUNTER — OFFICE VISIT (OUTPATIENT)
Dept: NEUROLOGY | Facility: CLINIC | Age: 49
End: 2019-08-15
Payer: COMMERCIAL

## 2019-08-15 VITALS
DIASTOLIC BLOOD PRESSURE: 82 MMHG | SYSTOLIC BLOOD PRESSURE: 150 MMHG | HEART RATE: 74 BPM | WEIGHT: 129.88 LBS | HEIGHT: 59 IN | BODY MASS INDEX: 26.18 KG/M2

## 2019-08-15 DIAGNOSIS — G44.229 CHRONIC TENSION-TYPE HEADACHE, NOT INTRACTABLE: ICD-10-CM

## 2019-08-15 DIAGNOSIS — G43.009 MIGRAINE WITHOUT AURA AND WITHOUT STATUS MIGRAINOSUS, NOT INTRACTABLE: Primary | ICD-10-CM

## 2019-08-15 PROCEDURE — 3008F BODY MASS INDEX DOCD: CPT | Mod: CPTII,NTX,S$GLB, | Performed by: NEUROLOGICAL SURGERY

## 2019-08-15 PROCEDURE — 99214 PR OFFICE/OUTPT VISIT, EST, LEVL IV, 30-39 MIN: ICD-10-PCS | Mod: NTX,S$GLB,, | Performed by: NEUROLOGICAL SURGERY

## 2019-08-15 PROCEDURE — 99214 OFFICE O/P EST MOD 30 MIN: CPT | Mod: NTX,S$GLB,, | Performed by: NEUROLOGICAL SURGERY

## 2019-08-15 PROCEDURE — 3008F PR BODY MASS INDEX (BMI) DOCUMENTED: ICD-10-PCS | Mod: CPTII,NTX,S$GLB, | Performed by: NEUROLOGICAL SURGERY

## 2019-08-15 PROCEDURE — 99999 PR PBB SHADOW E&M-EST. PATIENT-LVL IV: CPT | Mod: PBBFAC,TXP,, | Performed by: NEUROLOGICAL SURGERY

## 2019-08-15 PROCEDURE — 99999 PR PBB SHADOW E&M-EST. PATIENT-LVL IV: ICD-10-PCS | Mod: PBBFAC,TXP,, | Performed by: NEUROLOGICAL SURGERY

## 2019-08-15 NOTE — PROGRESS NOTES
"Chief Complaint   Patient presents with    Headache        Yuni Perez is a 49 y.o. female with a history of multiple medical diagnoses as listed below that presents for evaluation of headaches. She is accompanied to this visit by her mother. She was diagnosed with pulmonary hypertension about 8 years ago and around the same time she was diagnosed with a seizure. She had workup at that time that included MRI that showed signs of "old injury" and she had EEG that showed "lots of spikes and waves" per her mother. She says that since that time she has been started on Topamax which has been titrated up. She has not been having any seizures but she has bene having some occasional word finding difficulty that she has attributed to the medication. She has tried tylenol because as she has been titrating her medication to treat pulmonary hypertension sh marie been having bilateral intense stabbing pains in her head. She does say that with time her headaches have been better but she has noticed many more headaches since she began the Remodulin. She has not taken any other headache medications in the past.    Interval History  11/17/2016  She has still been having headaches after taking her medications in particular her vasodilators. She has found that her PRN medications are somewhat helpful in minimizing the pain that she has from her headaches. She has not had any mew problems since she was last seen in clinic.    03/23/2017  Since last seen in clinic she has been approved to get on the lung transplant list and she will be making strides to get set up to to receive new lungs. She has been taking her topamax as directed and has felt that overall she has been having fewer headaches but she still has needed Tylenol and on rare occasions Fioricet to get rid of her headaches. She has been having some cognitive difficulty with her current medications but she feels that it is tolerable. She has not had any seizure " "like activity.    06/13/2017  Since last seen in clinic she says that she has been able to be placed on the donor list. She says that overall she feels that she has been stable with regards to her breathing. She continues to have chronic sinus issues that have been essentially refractory to any medications that she has tried including antihistamines and nasal rinses. She feels that she is able to clear her sinuses and have it come right back. She has headaches almost daily but despite the frequency of the headaches she feels that most are not migraines. She has not had any seizures since last seen. She is tolerating all of her medications well without any problems.    10/31/2017  She has continued to make preparations for lung transplant.  Said that she has been frustrated with the process and she does not have much guidance which is causing increased stress.  Headaches overall have been about the same, but are responsive to Fioricet.  She says the medication helps give her adequate relief of the pain.    03/22/2018  She feels that her headaches have been getting worse in the last week.  Overall prior to this week she feels that the headaches were about the same as when she was last seen in clinic and she was using Fioricet as needed which provided some relief from her headache pain.  In the last week however Fioricet is not provided much of any relief and headaches have been going on almost every day.  She says that she feels like she has "sinus headaches" which have lingered until she has started having pounding unilateral headaches that she feels her migraines.  These headaches seem to be much more frequent and much more intense that she is ever had in the past.    05/03/2018  headaches have been better as she has hardly had any since she was last seen in clinic. Lung transplant is till her recourse for treatm of pulmonary hypertension, but she has not been able to get placed on the transplant list just " yet.    11/06/2018  Headaches have still been frequent, but have been relieved effectively by using Fioricet. She has been taking her medications as directed without any complaints of side effects. She feels that the intensity has been about the same overall. Imitrex has been prescribed, but she has not used the medication yet to determine if it helps her headaches.    02/07/2019  She has been having headaches with about the same frequency as she had in the past, but they have continued to have some response to her current abortive regimen. She has not been comfortable with the CGRP receptor blockers as a treatment modality as she has been weary of using any injectable medication to treat her symptoms. She has been seeing her Pulmonary team as recommended.    05/09/2019  She presents to clinic for routine follow-up of her headaches.  The headaches be somewhat improved with her current medication regimen, but the ability river frequently.  She needs to use abortive medications several times per week to by relief in order to her level of functioning and her ability to care for her activities of daily where they are.    08/15/2019  Her headaches have been overall stable since she was last seen in clinic. She has been trying to use various OTC medications and prescription medications with intermittent effectivetness in alleviating her headaches. She has not been amendable to considering a CGRP modulating medication to control her headaches as she has been unwilling to commit in an injectable delivery for any medication.    PAST MEDICAL HISTORY:  Past Medical History:   Diagnosis Date    AR (allergic rhinitis)     Cholelithiasis, common bile duct     Chronic low back pain     Eye pressure 2017    GERD (gastroesophageal reflux disease)     History of migraine headaches     Hypothyroidism     Lumbar disc disease     Menorrhagia     Mild asthma     Obesity     Plantar fasciitis of left foot     Primary  pulmonary hypertension     followed by heart transplant/pulmonary     Seizure disorder     x 1 in 2008    Seizures     Sleep apnea     TMJ (dislocation of temporomandibular joint)     Tricuspid regurgitation        PAST SURGICAL HISTORY:  Past Surgical History:   Procedure Laterality Date    CARDIAC CATHETERIZATION      CENTRAL VENOUS CATHETER INSERTION      HEART CATH-LEFT Left 1/30/2017    [facility]    HEART CATH-RIGHT Right 8/20/2018    [facility] C[facility]    HEART CATH-RIGHT Right 1/30/2017    [facility] C[facility]    HEART CATH-RIGHT Right 9/8/2014    [facility] CATH LAB    PORTACATH PLACEMENT      UPPER GASTROINTESTINAL ENDOSCOPY         SOCIAL HISTORY:  Social History     Socioeconomic History    Marital status: Single     Spouse name: Not on file    Number of children: 0    Years of education: Not on file    Highest education level: Not on file   Occupational History    Occupation: disabled     Employer: [employer]   Social Needs    Financial resource strain: Not on file    Food insecurity:     Worry: Not on file     Inability: Not on file    Transportation needs:     Medical: Not on file     Non-medical: Not on file   Tobacco Use    Smoking status: Never Smoker    Smokeless tobacco: Never Used   Substance and Sexual Activity    Alcohol use: No     Alcohol/week: 0.5 oz     Types: 1 Standard drinks or equivalent per week     Comment: 1 per year    Drug use: No    Sexual activity: Never     Birth control/protection: OCP, Pill     Comment: pt is a virgin   Lifestyle    Physical activity:     Days per week: Not on file     Minutes per session: Not on file    Stress: Not on file   Relationships    Social connections:     Talks on phone: Not on file     Gets together: Not on file     Attends [employer] service: Not on file     Active member of club or  organization: Not on file     Attends meetings of clubs or organizations: Not on file     Relationship status: Not on file   Other Topics Concern    Not on file   Social History Narrative    Not on file       FAMILY HISTORY:  Family History   Problem Relation Age of Onset    Heart disease Father     Glaucoma Father     Diabetes Mother     Cancer Maternal Grandmother         uterine    Cancer Maternal Aunt         breast    Breast cancer Maternal Aunt     Heart disease Paternal Grandfather     Heart attack Paternal Grandfather     Diabetes Paternal Grandfather     Leukemia Brother     Hypertension Unknown     Diabetes Maternal Grandfather     Heart attack Maternal Grandfather     Breast cancer Cousin     No Known Problems Sister     No Known Problems Maternal Uncle     No Known Problems Paternal Aunt     No Known Problems Paternal Uncle     No Known Problems Paternal Grandmother     Colon cancer Neg Hx     Ovarian cancer Neg Hx     Amblyopia Neg Hx     Blindness Neg Hx     Cataracts Neg Hx     Macular degeneration Neg Hx     Retinal detachment Neg Hx     Strabismus Neg Hx     Stroke Neg Hx     Thyroid disease Neg Hx     Esophageal cancer Neg Hx        ALLERGIES AND MEDICATIONS: updated and reviewed.  Review of patient's allergies indicates:   Allergen Reactions    Adhesive Hives     Silk tape    Amoxicillin Rash    Chlorhexidine Other (See Comments)     Current Outpatient Medications   Medication Sig Dispense Refill    acetaminophen (TYLENOL EXTRA STRENGTH) 500 MG tablet Take 1,000 mg by mouth every 6 (six) hours as needed for Pain.      ADVAIR DISKUS 100-50 mcg/dose diskus inhaler INHALE 1 PUFF TWICE DAILY 1 each 11    ALPRAZolam (XANAX) 0.5 MG tablet Take one tab 60-90 minutes prior to procedure. 10 tablet 0    ambrisentan (LETAIRIS) 10 MG Tab Take 1 tablet (10 mg total) by mouth once daily. 30 tablet 11    azelastine (ASTELIN) 137 mcg (0.1 %) nasal spray 2 sprays by Nasal  route 2 (two) times daily.       butalbital-acetaminophen-caffeine -40 mg (FIORICET, ESGIC) -40 mg per tablet Take 1 tablet by mouth every 4 (four) hours as needed for Pain.      citalopram (CELEXA) 10 MG tablet Take 10 mg by mouth once daily.      cyanocobalamin, vitamin B-12, 2,500 mcg Subl Place 2,500 mcg under the tongue once daily.       diphenhydrAMINE (BENADRYL) 25 mg capsule Take 50 mg by mouth every 6 (six) hours as needed for Itching.       ergocalciferol (VITAMIN D2) 50,000 unit Cap Take 1 capsule (50,000 Units total) by mouth every 7 days. 6 capsule 0    esomeprazole (NEXIUM) 40 MG capsule Take 40 mg by mouth once daily.      ferrous sulfate 325 (65 FE) MG EC tablet Take 325 mg by mouth daily as needed.       fluticasone (FLONASE) 50 mcg/actuation nasal spray INHALE 2 SPRAYS IN EACH NOSTRIL DAILY 16 g 3    folic acid (FOLVITE) 1 MG tablet TAKE ONE TABLET BY MOUTH once DAILY 90 tablet 0    hyoscyamine (LEVSIN/SL) 0.125 mg Subl Place 0.125 mg under the tongue every 6 (six) hours as needed.       ibuprofen (ADVIL,MOTRIN) 800 MG tablet Take 1 tablet (800 mg total) by mouth 3 (three) times daily. 30 tablet 0    levothyroxine (SYNTHROID) 137 MCG Tab tablet Take 1 tablet (137 mcg total) by mouth once daily. 90 tablet 3    loratadine (CLARITIN) 10 mg tablet Take 10 mg by mouth once daily.      MICROGESTIN FE 1/20, 28, 1 mg-20 mcg (21)/75 mg (7) per tablet TAKE ONE TABLET BY MOUTH ONCE DAILY 28 tablet 6    ondansetron (ZOFRAN) 4 MG tablet Take 1 tablet (4 mg total) by mouth every 6 (six) hours as needed. 1 Tablet Oral Every 6 hours 30 tablet 2    oxyCODONE-acetaminophen (PERCOCET) 5-325 mg per tablet Take 1 tablet by mouth every 4 (four) hours as needed (Headache / pain greater than 5 / 10 pain scale). 15 tablet 0    sumatriptan (IMITREX) 100 MG tablet Take 1 tablet (100 mg total) by mouth as needed for Migraine. Take at the onset of headache, may take 1 more in 1 hour if needed. 12  tablet 5    tadalafil, PULMONARY HYPERTENSION, (ADCIRCA) 20 mg Tab Take 40 mg by mouth once daily.      topiramate (TOPAMAX) 100 MG tablet Take 1 tablet (100 mg total) by mouth 2 (two) times daily. 60 tablet 11    treprostinil (REMODULIN) 2.5 mg/mL Soln Mix cassette as directed and infuse continuously per physician titration orders on dosing sheet. Current dose 80ng/kg/min 60 mL 11    VENTOLIN HFA 90 mcg/actuation inhaler inhale 2 puffs BY MOUTH EVERY 4-6 HOURS 18 g 11     No current facility-administered medications for this visit.        Review of Systems   Constitutional: Negative for activity change, appetite change, fever and unexpected weight change.   HENT: Negative for trouble swallowing and voice change.    Eyes: Negative for photophobia and visual disturbance.   Respiratory: Negative for apnea and shortness of breath.    Cardiovascular: Negative for chest pain and leg swelling.   Gastrointestinal: Negative for constipation and nausea.   Genitourinary: Negative for difficulty urinating.   Musculoskeletal: Negative for back pain, gait problem and neck pain.   Skin: Negative for color change and pallor.   Neurological: Positive for dizziness and headaches. Negative for seizures, syncope, weakness and numbness.   Hematological: Negative for adenopathy.   Psychiatric/Behavioral: Negative for agitation, confusion and decreased concentration.       Neurologic Exam     Mental Status   Oriented to person, place, and time.   Registration: recalls 3 of 3 objects.   Attention: normal. Concentration: normal.   Speech: speech is normal   Level of consciousness: alert  Knowledge: good.     Cranial Nerves     CN II   Visual fields full to confrontation.   Right visual field deficit: none  Left visual field deficit: none     CN III, IV, VI   Pupils are equal, round, and reactive to light.  Extraocular motions are normal.   Right pupil: Size: 3 mm. Shape: regular. Accommodation: intact.   Left pupil: Size: 3 mm. Shape:  regular. Accommodation: intact.   CN III: no CN III palsy  CN VI: no CN VI palsy  Nystagmus: none   Diplopia: none  Ophthalmoparesis: none  Upgaze: normal  Downgaze: normal  Conjugate gaze: present    CN V   Facial sensation intact.   Right facial sensation deficit: none  Left facial sensation deficit: none    CN VII   Facial expression full, symmetric.   Right facial weakness: none  Left facial weakness: none    CN VIII   CN VIII normal.     CN IX, X   CN IX normal.   CN X normal.   Palate: symmetric    CN XI   CN XI normal.   Right sternocleidomastoid strength: normal  Left sternocleidomastoid strength: normal  Right trapezius strength: normal  Left trapezius strength: normal    CN XII   CN XII normal.   Tongue deviation: none    Motor Exam   Muscle bulk: normal  Overall muscle tone: normal  Right arm tone: normal  Left arm tone: normal  Right leg tone: normal  Left leg tone: normal    Strength   Strength 5/5 throughout.     Sensory Exam   Right arm light touch: normal  Left arm light touch: normal  Right leg light touch: normal  Left leg light touch: normal  Right arm vibration: normal  Left arm vibration: normal  Right leg vibration: normal  Left leg vibration: normal  Right arm proprioception: normal  Left arm proprioception: normal  Right leg proprioception: normal  Left leg proprioception: normal  Right arm pinprick: normal  Left arm pinprick: normal  Right leg pinprick: normal  Left leg pinprick: normal    Gait, Coordination, and Reflexes     Gait  Gait: normal    Coordination   Romberg: negative  Finger to nose coordination: normal  Heel to shin coordination: normal  Tandem walking coordination: normal    Tremor   Resting tremor: absent    Reflexes   Right brachioradialis: 2+  Left brachioradialis: 2+  Right biceps: 2+  Left biceps: 2+  Right triceps: 2+  Left triceps: 2+  Right patellar: 2+  Left patellar: 2+  Right achilles: 2+  Left achilles: 2+  Right plantar: normal  Left plantar: normal      Physical  "Exam   Constitutional: She is oriented to person, place, and time. She appears well-developed and well-nourished.   HENT:   Head: Normocephalic and atraumatic.   Eyes: Pupils are equal, round, and reactive to light. EOM are normal.   Neck: Normal range of motion.   Cardiovascular: Normal rate and intact distal pulses.   Pulmonary/Chest: Effort normal. No apnea. No respiratory distress.   Musculoskeletal: Normal range of motion.   Neurological: She is alert and oriented to person, place, and time. She has normal strength. She has a normal Finger-Nose-Finger Test, a normal Heel to Shin Test, a normal Romberg Test and a normal Tandem Gait Test. Gait normal.   Reflex Scores:       Tricep reflexes are 2+ on the right side and 2+ on the left side.       Bicep reflexes are 2+ on the right side and 2+ on the left side.       Brachioradialis reflexes are 2+ on the right side and 2+ on the left side.       Patellar reflexes are 2+ on the right side and 2+ on the left side.       Achilles reflexes are 2+ on the right side and 2+ on the left side.  Skin: Skin is warm and dry.   Psychiatric: She has a normal mood and affect. Her speech is normal and behavior is normal. Thought content normal.   Vitals reviewed.      Vitals:    08/15/19 1141   BP: (!) 150/82   BP Location: Right arm   Patient Position: Sitting   BP Method: Large (Automatic)   Pulse: 74   Weight: 58.9 kg (129 lb 13.6 oz)   Height: 4' 11" (1.499 m)       Assessment & Plan:  Problem List Items Addressed This Visit     Migraine without aura and without status migrainosus, not intractable - Primary    Overview     Despite frequency of headaches few have come in the form of migraine headaches.         Chronic nonintractable headache    Overview     Daily headaches likely rebound from her medications, tension type, and sinus headaches.             Discussed use of CRGP directed therapies in order to treat her headaches, but she has been reluctant to accept any therapies " that involve the use of needles.    Follow-up: Follow up in about 6 months (around 2/15/2020).   More than 50% of this 25 minute encounter was spent in counseling and coordinating care of headaches.

## 2019-08-21 DIAGNOSIS — I27.0 PRIMARY PULMONARY HYPERTENSION: ICD-10-CM

## 2019-08-21 RX ORDER — FOLIC ACID 1 MG/1
TABLET ORAL
Qty: 90 TABLET | Refills: 2 | Status: SHIPPED | OUTPATIENT
Start: 2019-08-21 | End: 2020-05-18

## 2019-09-04 ENCOUNTER — LAB VISIT (OUTPATIENT)
Dept: LAB | Facility: HOSPITAL | Age: 49
End: 2019-09-04
Attending: INTERNAL MEDICINE
Payer: COMMERCIAL

## 2019-09-04 ENCOUNTER — HOSPITAL ENCOUNTER (OUTPATIENT)
Facility: HOSPITAL | Age: 49
Discharge: HOME | End: 2019-09-04
Attending: INTERNAL MEDICINE | Admitting: INTERNAL MEDICINE
Payer: COMMERCIAL

## 2019-09-04 DIAGNOSIS — R06.82 TACHYPNEA: ICD-10-CM

## 2019-09-04 DIAGNOSIS — Z79.899 POLYPHARMACY: ICD-10-CM

## 2019-09-04 DIAGNOSIS — Z79.01 LONG TERM (CURRENT) USE OF ANTICOAGULANTS: ICD-10-CM

## 2019-09-04 DIAGNOSIS — I27.20 PULMONARY HYPERTENSION: ICD-10-CM

## 2019-09-04 DIAGNOSIS — Z34.00 SUPERVISION OF NORMAL FIRST PREGNANCY, ANTEPARTUM: Primary | ICD-10-CM

## 2019-09-04 LAB
ALBUMIN SERPL BCP-MCNC: 3.7 G/DL (ref 3.5–5.2)
ALP SERPL-CCNC: 80 U/L (ref 55–135)
ALT SERPL W/O P-5'-P-CCNC: 12 U/L (ref 10–44)
ANION GAP SERPL CALC-SCNC: 7 MMOL/L (ref 8–16)
AST SERPL-CCNC: 14 U/L (ref 10–40)
BASOPHILS # BLD AUTO: 0.02 K/UL (ref 0–0.2)
BASOPHILS NFR BLD: 0.4 % (ref 0–1.9)
BILIRUB SERPL-MCNC: 0.2 MG/DL (ref 0.1–1)
BNP SERPL-MCNC: 12 PG/ML (ref 0–99)
BUN SERPL-MCNC: 9 MG/DL (ref 6–20)
CALCIUM SERPL-MCNC: 9 MG/DL (ref 8.7–10.5)
CHLORIDE SERPL-SCNC: 111 MMOL/L (ref 95–110)
CO2 SERPL-SCNC: 22 MMOL/L (ref 23–29)
CREAT SERPL-MCNC: 0.7 MG/DL (ref 0.5–1.4)
DIFFERENTIAL METHOD: NORMAL
EOSINOPHIL # BLD AUTO: 0.3 K/UL (ref 0–0.5)
EOSINOPHIL NFR BLD: 6.5 % (ref 0–8)
ERYTHROCYTE [DISTWIDTH] IN BLOOD BY AUTOMATED COUNT: 13.8 % (ref 11.5–14.5)
EST. GFR  (AFRICAN AMERICAN): >60 ML/MIN/1.73 M^2
EST. GFR  (NON AFRICAN AMERICAN): >60 ML/MIN/1.73 M^2
GLUCOSE SERPL-MCNC: 85 MG/DL (ref 70–110)
HCG INTACT+B SERPL-ACNC: 3 MIU/ML
HCT VFR BLD AUTO: 43.9 % (ref 37–48.5)
HGB BLD-MCNC: 14.1 G/DL (ref 12–16)
IMM GRANULOCYTES # BLD AUTO: 0.01 K/UL (ref 0–0.04)
IMM GRANULOCYTES NFR BLD AUTO: 0.2 % (ref 0–0.5)
INR PPP: 0.9 (ref 0.8–1.2)
LYMPHOCYTES # BLD AUTO: 1.4 K/UL (ref 1–4.8)
LYMPHOCYTES NFR BLD: 31.4 % (ref 18–48)
MAGNESIUM SERPL-MCNC: 2.1 MG/DL (ref 1.6–2.6)
MCH RBC QN AUTO: 29.2 PG (ref 27–31)
MCHC RBC AUTO-ENTMCNC: 32.1 G/DL (ref 32–36)
MCV RBC AUTO: 91 FL (ref 82–98)
MONOCYTES # BLD AUTO: 0.4 K/UL (ref 0.3–1)
MONOCYTES NFR BLD: 8.9 % (ref 4–15)
NEUTROPHILS # BLD AUTO: 2.4 K/UL (ref 1.8–7.7)
NEUTROPHILS NFR BLD: 52.6 % (ref 38–73)
NRBC BLD-RTO: 0 /100 WBC
PLATELET # BLD AUTO: 234 K/UL (ref 150–350)
PMV BLD AUTO: 11.8 FL (ref 9.2–12.9)
POTASSIUM SERPL-SCNC: 4 MMOL/L (ref 3.5–5.1)
PROT SERPL-MCNC: 7.6 G/DL (ref 6–8.4)
PROTHROMBIN TIME: 9.9 SEC (ref 9–12.5)
RBC # BLD AUTO: 4.83 M/UL (ref 4–5.4)
SODIUM SERPL-SCNC: 140 MMOL/L (ref 136–145)
WBC # BLD AUTO: 4.59 K/UL (ref 3.9–12.7)

## 2019-09-04 PROCEDURE — 84702 CHORIONIC GONADOTROPIN TEST: CPT | Mod: TXP

## 2019-09-04 PROCEDURE — 85025 COMPLETE CBC W/AUTO DIFF WBC: CPT | Mod: TXP

## 2019-09-04 PROCEDURE — 80053 COMPREHEN METABOLIC PANEL: CPT | Mod: NTX

## 2019-09-04 PROCEDURE — C1751 CATH, INF, PER/CENT/MIDLINE: HCPCS | Mod: TXP | Performed by: INTERNAL MEDICINE

## 2019-09-04 PROCEDURE — 93451 RIGHT HEART CATH: CPT | Mod: 26,NTX,, | Performed by: INTERNAL MEDICINE

## 2019-09-04 PROCEDURE — 36415 COLL VENOUS BLD VENIPUNCTURE: CPT | Mod: NTX

## 2019-09-04 PROCEDURE — C1894 INTRO/SHEATH, NON-LASER: HCPCS | Mod: TXP | Performed by: INTERNAL MEDICINE

## 2019-09-04 PROCEDURE — 25000003 PHARM REV CODE 250: Mod: TXP | Performed by: INTERNAL MEDICINE

## 2019-09-04 PROCEDURE — 85610 PROTHROMBIN TIME: CPT | Mod: TXP

## 2019-09-04 PROCEDURE — 83735 ASSAY OF MAGNESIUM: CPT | Mod: TXP

## 2019-09-04 PROCEDURE — 83880 ASSAY OF NATRIURETIC PEPTIDE: CPT | Mod: TXP

## 2019-09-04 PROCEDURE — 93451 PR RIGHT HEART CATH O2 SATURATION & CARDIAC OUTPUT: ICD-10-PCS | Mod: 26,NTX,, | Performed by: INTERNAL MEDICINE

## 2019-09-04 PROCEDURE — 93451 RIGHT HEART CATH: CPT | Mod: TXP | Performed by: INTERNAL MEDICINE

## 2019-09-04 RX ORDER — SODIUM CHLORIDE 0.9 G/100ML
IRRIGANT IRRIGATION
Status: DISCONTINUED | OUTPATIENT
Start: 2019-09-04 | End: 2019-09-04 | Stop reason: HOSPADM

## 2019-09-04 RX ORDER — LIDOCAINE HYDROCHLORIDE 20 MG/ML
INJECTION, SOLUTION INFILTRATION; PERINEURAL
Status: DISCONTINUED | OUTPATIENT
Start: 2019-09-04 | End: 2019-09-04 | Stop reason: HOSPADM

## 2019-09-04 NOTE — DISCHARGE INSTRUCTIONS

## 2019-09-04 NOTE — Clinical Note
The PA catheter is repositioned to the main pulmonary artery. Hemodynamics performed. O2 saturation measured at 70%.

## 2019-09-04 NOTE — H&P
HPI      49 y.o. White female who presents for PH follow-up. Patient was diagnosed with IPAH about 5 and a half years, ABHIJEET recently, currently deferred for LUT (was working on fund raising). Initial symptom- syncope.  Patient has been on triple therapy: Remodulin at  80 ng/kg/min infusion, Letairis 10mg qdaily, and Adcirca 40mg qdaily. Since last evaluation of the patient on 11/2018 patient referred that has remained relatively stable with exertional symptoms falling on WHO FC II most of the time. Here for routine RHC to reassess PAP     TTE (02/22/2019):     · Normal left ventricular systolic function. The estimated ejection fraction is 65%  · Normal LV diastolic function.  · Septal wall has abnormal motion.  · Mildly reduced right ventricular systolic function.  · Mild tricuspid regurgitation.  · The estimated PA systolic pressure is 54 mm Hg  · Pulmonary hypertension present.  · Normal central venous pressure (3 mm Hg).        RHC 8/20/18     PW: 8/6 (5)  PA: 101/35 (57)  RV: 92/0  RVEDP: 6   AO_SAT: 99  PA_SAT: 72  RA: 6/5 (4)  FICKCI: 2.5700  FICKCO: 4.0800  PVR: 1038.0000     TTE 4/18  Right Ventricle: The right ventricle is normal in size measuring 3.1 cm at the base in the apical right ventricle-focused view. Global right ventricular systolic function appears mildly to moderately depressed. Tricuspid annular plane systolic excursion   (TAPSE) is 2.3 cm. The estimated PA systolic pressure is greater than 39 mmHg.         TTE 9/18/17  1 - Normal left ventricular systolic function (EF 60-65%).     2 - No wall motion abnormalities.     3 - Normal left ventricular diastolic function.     4 - Right atrial enlargement.     5 - Right ventricular enlargement with moderately depressed systolic function.     6 - The estimated PA systolic pressure is 36 mmHg.     7 - Mild tricuspid regurgitation.   Right Ventricle: The right ventricle is enlarged measuring 4.1 cm at the base in the apical right ventricle-focused view.  Global right ventricular systolic function appears moderately depressed. There is abnormal septal motion consistent with RV   pressure/volume overload. Tricuspid annular plane systolic excursion (TAPSE) is 1.7 cm. Tissue Doppler-derived tricuspid annular peak systolic velocity (S prime) is 11.0 cm/s. The estimated PA systolic pressure is 36 mmHg.            Today:   6mw 488 m 463m (457  1/18, 469m in Nov, 457 in Nov, 488m unchanged last 3 visits with me( 450 11/15/16,  457m, 518.5m, 533.75, 549m April, 463.6m prev visit)                                              O2 sat  97-> 96 % RA                                                          HR 82->141                                                     BP  135/78-->151/87                                                      Ab 2->9     6mw 476m 463m (457  1/18, 469m in Nov, 457 in Nov, 488m unchanged last 3 visits with me( 450 11/15/16,  457m, 518.5m, 533.75, 549m April, 463.6m prev visit)                                              O2 sat  97-> 98 % RA                                                          HR 76->151                                                      BP  129/71-->142/71                                                       Ab 2->8     R/LHC 1/17  PW:  (30)  RV: 102  RVEDP: 21     PA: 102/58  RA:  (28)  PA_SAT: 65  AO: 93/56  LV: 93  LVEDP: 14   FICKCO: 4.08  Normal cors     C 4/16  AOPRES: 122/85 (97)  AOSAT: 98  FICKCI: 2.81  FICKCO: 4.38  PAPRES: 101/34 (59)  PASAT: 71  PVR: 10.73  PWPRES: 14/8 (10)  RAPRES: 9/5 (4)  RVPRES: 91/-4, 8     TTE last visit   1 - Normal left ventricular systolic function (EF 60-65%).     2 - Right ventricular enlargement with mildly to moderately depressed systolic function.     3 - Normal left ventricular diastolic function.     4 - Pulmonary hypertension. The estimated PA systolic pressure is 41 mmHg.         TTE 3/4/16  1 - Normal left ventricular systolic function (EF 60-65%).   2 - Normal left  ventricular diastolic function.   3 - Right ventricle is upper limit of normal in size with low normal to mildly depressed systolic function. TAPSE 2.2 RV 3.7 cm   4 - Trivial to mild tricuspid regurgitation.   5 - Pulmonary hypertension. The estimated PA systolic pressure is 60 mmHg.      TTE Oct  1 - Normal left ventricular systolic function (EF 60-65%).   2 - Left ventricular diastolic dysfunction.   3 - Biatrial enlargement.   4 - Right ventricular enlargement with hypertrophy, with normal systolic function.   5 - Pulmonary hypertension.   6 - Trivial mitral regurgitation.   7 - Trivial tricuspid regurgitation.   PASp 46     CXR 2/3/16  Vascular catheter now identified, its tip in the superior vena cava just superior to its junction with the right atrium. Heart size is normal, as is the appearance of the pulmonary vascularity. Lung zones are clear, and free of significant airspace consolidation or volume loss. No pleural fluid. No hilar or mediastinal mass lesion. No pneumothorax.      VQ Scan 8/17/16: low probability        Review of Systems   Constitution: Negative for fever, weakness, malaise/fatigue, night sweats, weight gain and weight loss.   Cardiovascular: Positive for dyspnea on exertion (WHO FC II). Negative for chest pain, claudication, cyanosis, irregular heartbeat, leg swelling, near-syncope and orthopnea.   Respiratory: Negative for hemoptysis.    Musculoskeletal: Negative for joint pain.   Gastrointestinal: Negative for constipation, hematemesis, hematochezia, melena and nausea.         Objective:        Physical Exam   Constitutional: She is oriented to person, place, and time. She appears well-developed.   HENT:   Head: Normocephalic.   Eyes: Pupils are equal, round, and reactive to light.   Neck: Normal range of motion. No JVD present.   Cardiovascular: Normal rate.   Murmur heard.  Pulmonary/Chest: Effort normal and breath sounds normal. No respiratory distress. She has no wheezes. She has no  rales.   Abdominal: Soft. Bowel sounds are normal. She exhibits no distension. There is no tenderness. There is no rebound and no guarding.   Musculoskeletal: Normal range of motion. She exhibits no edema.   Neurological: She is alert and oriented to person, place, and time. She has normal reflexes. No cranial nerve deficit. Coordination normal.   Skin: Skin is warm and dry.         Lab Results   Component Value Date    BNP 12 09/04/2019     09/04/2019    K 4.0 09/04/2019    MG 2.1 09/04/2019     (H) 09/04/2019    CO2 22 (L) 09/04/2019    BUN 9 09/04/2019    CREATININE 0.7 09/04/2019    GLU 85 09/04/2019    HGBA1C 5.2 01/16/2017    AST 14 09/04/2019    ALT 12 09/04/2019    ALBUMIN 3.7 09/04/2019    PROT 7.6 09/04/2019    BILITOT 0.2 09/04/2019    CHOL 128 01/16/2017    HDL 31 (L) 01/16/2017    LDLCALC 76.8 01/16/2017    TRIG 101 01/16/2017       Magnesium   Date Value Ref Range Status   09/04/2019 2.1 1.6 - 2.6 mg/dL Final       Lab Results   Component Value Date    WBC 4.59 09/04/2019    HGB 14.1 09/04/2019    HCT 43.9 09/04/2019    MCV 91 09/04/2019     09/04/2019       Lab Results   Component Value Date    INR 0.9 09/04/2019    INR 1.0 06/07/2019    INR 2.1 (H) 11/20/2018       BNP   Date Value Ref Range Status   09/04/2019 12 0 - 99 pg/mL Final     Comment:     Values of less than 100 pg/ml are consistent with non-CHF populations.   07/24/2019 <10 0 - 99 pg/mL Final     Comment:     Values of less than 100 pg/ml are consistent with non-CHF populations.   07/03/2019 20 0 - 99 pg/mL Final     Comment:     Values of less than 100 pg/ml are consistent with non-CHF populations.       No results found for: LDH           Assessment:      1. Primary pulmonary hypertension    2. Chronic respiratory failure with hypoxia    3. ABHIJEET (obstructive sleep apnea)          Plan:      RHC R IJ, 7 Fr sheath with local lidocaine, micropuncture kit and US guidance.    I have explained the risks, benefits and  alternatives of the procedure in detail. The patient voices understanding and all questions have been answered,. The patient agrees to proceed as planned.

## 2019-09-04 NOTE — DISCHARGE SUMMARY
Date of admit to cath lab: 9/4/2019      Date of discharge from cath lab: 9/4/2019      Principal diagnosis: PH    Discharge attending physician: Ivonne Rai MD    Hospital Course/Outcome of the treatment, procedures or surgery: Pt admitted for RHC.   See CVIS/cath lab procedure report in EPIC  for full report of today's procedure.    Disposition of the case (d/c disposition): home    Discharge Medication List: see med card    Plan for follow up care, diet, activity level: F/U as scheduled. Resume low Na diet.  Activity as tolerated    Condition on discharge from Cath lab: Stable.

## 2019-09-05 ENCOUNTER — TELEPHONE (OUTPATIENT)
Dept: TRANSPLANT | Facility: CLINIC | Age: 49
End: 2019-09-05

## 2019-09-11 ENCOUNTER — TELEPHONE (OUTPATIENT)
Dept: TRANSPLANT | Facility: CLINIC | Age: 49
End: 2019-09-11

## 2019-09-11 NOTE — TELEPHONE ENCOUNTER
"Met w/pt in clinic and discussed Adempas. Pt signed enrollment form and was also provided w/medication guide regarding Adempas, as well as  booklet. Pt has "unnatural" reason for not have period >1 year (BC for heavy periods), so is still considered of childbearing potential, per [place].     Patient already very familiar w/role of SP. Discussed all of the following:     This medication is one approach to help bring down your pulmonary pressures (pulmonary hypertension, or high blood pressure in your lungs). The  has a great website about the medication, but here are a few things to know about it:     1. The Adempas will be supplied to you by a specialty pharmacy. I will send the referral for your medication to the  of the medication ([employer]) and they will contact you and eventually send the medication referral to the pharmacy. They will tell you about the copay for the med, if there is one at all. If it is too high for you, please let them know this. They can triage you to patient assistance programs, but they can't do so unless you say something.     2. After the medication is shipped to you, please do NOT start it right away. Please wait to hear from a specialty pharmacy nurse (from either [facility] or [facility]), who will set up an appointment with you to come to your home to do teaching and assess you for readiness to start the medication (blood pressure, etc). Every few weeks, the nurse will make a visit with you to see how you are doing, and to determine if we can increase the dose. The tablet strengths are 0.5mg, 1.0 mg, 1.5 mg, 2.0 mg and 2.5 mg. You will likely start at 1mg. Once you've reached the goal dose, you won't likely need additional visits from the nurse, except for maintenance visits.     3. Possible side effects might include low blood pressure, headache, indigestion, dizziness, nosebleeds. Some people don't experience any side effects at all. Please call us " with anything that is concerning for you. If you need to take an antacid, please take it one hour before or one hour after the Adempas, as they can lessen the effect of Adempas.     4. If you are ever hospitalized at a facility other than [facility] while taking Adempas, you will need to bring the medication with you, in case it is not on their formulary. Missing more than 3 days of the medication means having to restart at the lowest dose and titrate up again.     5. The specialty pharmacy will need to make contact with you monthly to arrange shipment of the medication.     All questions answered/addressed to patient satisfaction. Pt has my direct contact info for any questions/concerns. [place] [facility] [place] [facility].

## 2019-09-20 ENCOUNTER — TELEPHONE (OUTPATIENT)
Dept: TRANSPLANT | Facility: CLINIC | Age: 49
End: 2019-09-20

## 2019-09-20 NOTE — TELEPHONE ENCOUNTER
Separate PA for each tablet strength of Adempas sent to [facility], per their request, after having received one PA for all strengths. [place] [facility] [place]

## 2019-09-23 ENCOUNTER — TELEPHONE (OUTPATIENT)
Dept: TRANSPLANT | Facility: CLINIC | Age: 49
End: 2019-09-23

## 2019-09-23 NOTE — TELEPHONE ENCOUNTER
Left pt message regarding below. Per Dr Ria, pt may decide if she wants to D/C letairis or not. Had originally considered trying to come off, to see if her chronic nasal/sinus congestion improved.  -----------------------  BRITTANY ID [id number]  Patient Initials:  AKBAR  : [birth year]    This is the patient that you d/c and then stated she will stay on the Letairis until you are able to add Adempas.  I just wanted to follow up with you to see if she will need another shipment of the Letairis or not.    Thanks,    Prabha Julio

## 2019-09-23 NOTE — TELEPHONE ENCOUNTER
Pt returned call and stated that all is going well w/Adempas, so far. She has noticed some mild indigestion, but otherwise, no issues. Pt will continue to monitor. She has also decided to continue w/Letairis.

## 2019-09-24 ENCOUNTER — TELEPHONE (OUTPATIENT)
Dept: TRANSPLANT | Facility: CLINIC | Age: 49
End: 2019-09-24

## 2019-09-30 ENCOUNTER — TELEPHONE (OUTPATIENT)
Dept: TRANSPLANT | Facility: CLINIC | Age: 49
End: 2019-09-30

## 2019-10-09 ENCOUNTER — TELEPHONE (OUTPATIENT)
Dept: TRANSPLANT | Facility: CLINIC | Age: 49
End: 2019-10-09

## 2019-10-09 NOTE — TELEPHONE ENCOUNTER
"Pt reports that she "almost immediately" noticed slight SOB after stopping Adcirca and starting Adempas. She feels the SOB also worsened w/increase to 1.5. She said "this is all interesting, because I haven't felt like this for a long time". Pt also using albuterol inhaler and did note she's had a cold, but that those symptoms have since resolved. She is wondering if this is because she has not gotten up on a high enough dose, or if this might not be the right medication for her. Explained to patient that sometimes it is difficult to determine which it might be, but if she feels any worse in the next couple days, to please call before she takes next dose of Adempas that is due. Dr Rai also messaged regarding all. Will contact pt once feedback received.  "

## 2019-10-10 ENCOUNTER — TELEPHONE (OUTPATIENT)
Dept: TRANSPLANT | Facility: CLINIC | Age: 49
End: 2019-10-10

## 2019-10-10 NOTE — TELEPHONE ENCOUNTER
"----- Message from Ivonne Rai MD sent at 10/10/2019  3:03 PM CDT -----  ok  ----- Message -----  From: SULY Razo, RN  Sent: 10/9/2019   4:54 PM CDT  To: Ivonne Rai MD    Ok-She has visit to move to 2mg Monday.  ----- Message -----  From: Ivonne Rai MD  Sent: 10/9/2019   4:51 PM CDT  To: SULY Razo, RN    Lets see how she feels on 2.5- if still no better can switch her back if she wants.   ----- Message -----  From: SULY Razo, RN  Sent: 10/9/2019   4:38 PM CDT  To: Ivonne Rai MD    Pt reports that she "almost immediately" noticed slight SOB after stopping Adcirca and starting Adempas. She feels the SOB also worsened w/increase to 1.5. She said "this is all interesting, because I haven't felt like this for a long time". Pt also using albuterol inhaler and did note she's had a cold, but that those symptoms have since resolved. Thoughts?        "

## 2019-10-11 ENCOUNTER — TELEPHONE (OUTPATIENT)
Dept: TRANSPLANT | Facility: CLINIC | Age: 49
End: 2019-10-11

## 2019-10-11 ENCOUNTER — OFFICE VISIT (OUTPATIENT)
Dept: FAMILY MEDICINE | Facility: CLINIC | Age: 49
End: 2019-10-11
Payer: COMMERCIAL

## 2019-10-11 VITALS
TEMPERATURE: 98 F | HEART RATE: 84 BPM | OXYGEN SATURATION: 94 % | HEIGHT: 59 IN | SYSTOLIC BLOOD PRESSURE: 98 MMHG | BODY MASS INDEX: 26.91 KG/M2 | WEIGHT: 133.5 LBS | DIASTOLIC BLOOD PRESSURE: 60 MMHG

## 2019-10-11 DIAGNOSIS — Z23 FLU VACCINE NEED: ICD-10-CM

## 2019-10-11 DIAGNOSIS — J96.11 CHRONIC RESPIRATORY FAILURE WITH HYPOXIA: ICD-10-CM

## 2019-10-11 DIAGNOSIS — Z12.31 ENCOUNTER FOR SCREENING MAMMOGRAM FOR BREAST CANCER: ICD-10-CM

## 2019-10-11 DIAGNOSIS — E66.3 OVERWEIGHT (BMI 25.0-29.9): ICD-10-CM

## 2019-10-11 DIAGNOSIS — I27.9 CHRONIC PULMONARY HEART DISEASE: Primary | ICD-10-CM

## 2019-10-11 DIAGNOSIS — J30.0 NON-ALLERGIC VASOMOTOR RHINITIS: ICD-10-CM

## 2019-10-11 DIAGNOSIS — K21.9 GASTROESOPHAGEAL REFLUX DISEASE, ESOPHAGITIS PRESENCE NOT SPECIFIED: ICD-10-CM

## 2019-10-11 DIAGNOSIS — I27.0 PRIMARY PULMONARY HYPERTENSION: ICD-10-CM

## 2019-10-11 DIAGNOSIS — E03.9 HYPOTHYROIDISM (ACQUIRED): ICD-10-CM

## 2019-10-11 PROCEDURE — 99214 OFFICE O/P EST MOD 30 MIN: CPT | Mod: 25,S$GLB,, | Performed by: INTERNAL MEDICINE

## 2019-10-11 PROCEDURE — 90662 FLU VACCINE - HIGH DOSE (65+) PRESERVATIVE FREE IM: ICD-10-PCS | Mod: S$GLB,,, | Performed by: INTERNAL MEDICINE

## 2019-10-11 PROCEDURE — 99999 PR PBB SHADOW E&M-EST. PATIENT-LVL III: ICD-10-PCS | Mod: PBBFAC,,, | Performed by: INTERNAL MEDICINE

## 2019-10-11 PROCEDURE — 99214 PR OFFICE/OUTPT VISIT, EST, LEVL IV, 30-39 MIN: ICD-10-PCS | Mod: 25,S$GLB,, | Performed by: INTERNAL MEDICINE

## 2019-10-11 PROCEDURE — 90471 IMMUNIZATION ADMIN: CPT | Mod: S$GLB,,, | Performed by: INTERNAL MEDICINE

## 2019-10-11 PROCEDURE — 3008F BODY MASS INDEX DOCD: CPT | Mod: CPTII,S$GLB,, | Performed by: INTERNAL MEDICINE

## 2019-10-11 PROCEDURE — 90662 IIV NO PRSV INCREASED AG IM: CPT | Mod: S$GLB,,, | Performed by: INTERNAL MEDICINE

## 2019-10-11 PROCEDURE — 3008F PR BODY MASS INDEX (BMI) DOCUMENTED: ICD-10-PCS | Mod: CPTII,S$GLB,, | Performed by: INTERNAL MEDICINE

## 2019-10-11 PROCEDURE — 90471 FLU VACCINE - HIGH DOSE (65+) PRESERVATIVE FREE IM: ICD-10-PCS | Mod: S$GLB,,, | Performed by: INTERNAL MEDICINE

## 2019-10-11 PROCEDURE — 99999 PR PBB SHADOW E&M-EST. PATIENT-LVL III: CPT | Mod: PBBFAC,,, | Performed by: INTERNAL MEDICINE

## 2019-10-11 RX ORDER — PANTOPRAZOLE SODIUM 40 MG/1
40 TABLET, DELAYED RELEASE ORAL DAILY
Qty: 90 TABLET | Refills: 3 | Status: SHIPPED | OUTPATIENT
Start: 2019-10-11 | End: 2020-09-11

## 2019-10-11 NOTE — PROGRESS NOTES
HISTORY OF PRESENT ILLNESS:  Yuni Perez is a 49 y.o. female who presents to the clinic today for follow-up of her hypothyroidism and primary pulmonary hypertension.    She has chronic fatigue which is at baseline.  No cardiac chest pain. She does reports some increase in shortness of breath right now.  They are transitioning her to a new oral medication to help with her primary pulmonary hypertension.  She does still have some headaches, but they have improved a little.  Not sure what caused them.  She was prescribed some ibuprofen, but this caused swelling. Swelling has resolved since she stopped the ibuprofen.  She has fully recovered from the gallbladder attack.  She no longer has a drain in place.  She is not a candidate for cholecystectomy.  She also does not feel like her reflux medication, Nexium, is working as well as it used to.  She has minimal chocolate, caffeine, and red and white peppermint intake.  She has been eating some chocolate chip ice cream in the evening after dinner.      PAST MEDICAL HISTORY:  Past Medical History:   Diagnosis Date    AR (allergic rhinitis)     Cholelithiasis, common bile duct     Chronic low back pain     Eye pressure 2017    GERD (gastroesophageal reflux disease)     History of migraine headaches     Hypothyroidism     Lumbar disc disease     Menorrhagia     Mild asthma     Obesity     Plantar fasciitis of left foot     Primary pulmonary hypertension     followed by heart transplant/pulmonary     Seizure disorder     x 1 in 2008    Seizures     Sleep apnea     TMJ (dislocation of temporomandibular joint)     Tricuspid regurgitation        PAST SURGICAL HISTORY:  Past Surgical History:   Procedure Laterality Date    CARDIAC CATHETERIZATION      CENTRAL VENOUS CATHETER INSERTION      gallbladder drain  06/2019    PORTACATH PLACEMENT      RIGHT HEART CATHETERIZATION Right 8/20/2018    Procedure: HEART CATH-RIGHT;  Surgeon: Ivonne FLORES  MD Cecelia;  Location: [facility] CATH LAB;  Service: Cardiology;  Laterality: Right;    RIGHT HEART CATHETERIZATION Right 9/4/2019    Procedure: INSERTION, CATHETER, RIGHT HEART;  Surgeon: Ivonne Rai MD;  Location: [facility] CATH LAB;  Service: Cardiology;  Laterality: Right;    UPPER GASTROINTESTINAL ENDOSCOPY         SOCIAL HISTORY:  Social History     Socioeconomic History    Marital status: Single     Spouse name: Not on file    Number of children: 0    Years of education: Not on file    Highest education level: Not on file   Occupational History    Occupation: disabled     Employer: [email or web address]   Social Needs    Financial resource strain: Not on file    Food insecurity:     Worry: Not on file     Inability: Not on file    Transportation needs:     Medical: Not on file     Non-medical: Not on file   Tobacco Use    Smoking status: Never Smoker    Smokeless tobacco: Never Used   Substance and Sexual Activity    Alcohol use: No     Alcohol/week: 0.8 standard drinks     Types: 1 Standard drinks or equivalent per week     Comment: 1 per year    Drug use: No    Sexual activity: Never     Birth control/protection: OCP, Pill     Comment: pt is a virgin   Lifestyle    Physical activity:     Days per week: Not on file     Minutes per session: Not on file    Stress: Not on file   Relationships    Social connections:     Talks on phone: Not on file     Gets together: Not on file     Attends Oriental orthodox service: Not on file     Active member of club or organization: Not on file     Attends meetings of clubs or organizations: Not on file     Relationship status: Not on file   Other Topics Concern    Not on file   Social History Narrative    Not on file       FAMILY HISTORY:  Family History   Problem Relation Age of Onset    Heart disease Father     Glaucoma Father     Diabetes Mother     Cancer Maternal Grandmother         uterine    Cancer Maternal Aunt         breast    Breast cancer Maternal  "Aunt     Heart disease Paternal Grandfather     Heart attack Paternal Grandfather     Diabetes Paternal Grandfather     Leukemia Brother     Hypertension Unknown     Diabetes Maternal Grandfather     Heart attack Maternal Grandfather     Breast cancer Cousin     No Known Problems Sister     No Known Problems Maternal Uncle     No Known Problems Paternal Aunt     No Known Problems Paternal Uncle     No Known Problems Paternal Grandmother     Colon cancer Neg Hx     Ovarian cancer Neg Hx     Amblyopia Neg Hx     Blindness Neg Hx     Cataracts Neg Hx     Macular degeneration Neg Hx     Retinal detachment Neg Hx     Strabismus Neg Hx     Stroke Neg Hx     Thyroid disease Neg Hx     Esophageal cancer Neg Hx        ALLERGIES AND MEDICATIONS: updated and reviewed.  Review of patient's allergies indicates:   Allergen Reactions    Fentanyl Anaphylaxis     Respiratory distress    Vibra-tabs [doxycycline hyclate] Anaphylaxis     "throat felt like it was closing"    Adhesive Hives     Silk tape    Amoxicillin Rash    Nsaids (non-steroidal anti-inflammatory drug) Swelling    Chlorhexidine Other (See Comments)     Medication List with Changes/Refills   New Medications    PANTOPRAZOLE (PROTONIX) 40 MG TABLET    Take 1 tablet (40 mg total) by mouth once daily.   Current Medications    ACETAMINOPHEN (TYLENOL EXTRA STRENGTH) 500 MG TABLET    Take 1,000 mg by mouth every 6 (six) hours as needed for Pain.    ADVAIR DISKUS 100-50 MCG/DOSE DISKUS INHALER    INHALE 1 PUFF TWICE DAILY    ALPRAZOLAM (XANAX) 0.5 MG TABLET    Take one tab 60-90 minutes prior to procedure.    AMBRISENTAN (LETAIRIS) 10 MG TAB    Take 1 tablet (10 mg total) by mouth once daily.    AZELASTINE (ASTELIN) 137 MCG (0.1 %) NASAL SPRAY    2 sprays by Nasal route 2 (two) times daily.     BUTALBITAL-ACETAMINOPHEN-CAFFEINE -40 MG (FIORICET, ESGIC) -40 MG PER TABLET    Take 1 tablet by mouth every 4 (four) hours as needed for Pain. "    CITALOPRAM (CELEXA) 10 MG TABLET    Take 10 mg by mouth once daily.    CYANOCOBALAMIN, VITAMIN B-12, 2,500 MCG SUBL    Place 2,500 mcg under the tongue once daily.     DIPHENHYDRAMINE (BENADRYL) 25 MG CAPSULE    Take 50 mg by mouth every 6 (six) hours as needed for Itching.     ERGOCALCIFEROL (VITAMIN D2) 50,000 UNIT CAP    Take 1 capsule (50,000 Units total) by mouth every 7 days.    ESOMEPRAZOLE (NEXIUM) 40 MG CAPSULE    Take 40 mg by mouth once daily.    FERROUS SULFATE 325 (65 FE) MG EC TABLET    Take 325 mg by mouth daily as needed.     FLUTICASONE (FLONASE) 50 MCG/ACTUATION NASAL SPRAY    INHALE 2 SPRAYS IN EACH NOSTRIL DAILY    FOLIC ACID (FOLVITE) 1 MG TABLET    TAKE ONE TABLET BY MOUTH once DAILY    HYOSCYAMINE (LEVSIN/SL) 0.125 MG SUBL    Place 0.125 mg under the tongue every 6 (six) hours as needed.     LEVOTHYROXINE (SYNTHROID) 137 MCG TAB TABLET    Take 1 tablet (137 mcg total) by mouth once daily.    LORATADINE (CLARITIN) 10 MG TABLET    Take 10 mg by mouth once daily.    MICROGESTIN FE 1/20, 28, 1 MG-20 MCG (21)/75 MG (7) PER TABLET    TAKE ONE TABLET BY MOUTH ONCE DAILY    ONDANSETRON (ZOFRAN) 4 MG TABLET    Take 1 tablet (4 mg total) by mouth every 6 (six) hours as needed. 1 Tablet Oral Every 6 hours    OXYCODONE-ACETAMINOPHEN (PERCOCET) 5-325 MG PER TABLET    Take 1 tablet by mouth every 4 (four) hours as needed (Headache / pain greater than 5 / 10 pain scale).    RIOCIGUAT (ADEMPAS) 1.5 MG TAB TABLET    Take 1.5 mg by mouth 3 (three) times daily.    SUMATRIPTAN (IMITREX) 100 MG TABLET    Take 1 tablet (100 mg total) by mouth as needed for Migraine. Take at the onset of headache, may take 1 more in 1 hour if needed.    TOPIRAMATE (TOPAMAX) 100 MG TABLET    Take 1 tablet (100 mg total) by mouth 2 (two) times daily.    TREPROSTINIL (REMODULIN) 2.5 MG/ML SOLN    Mix cassette as directed and infuse continuously per physician titration orders on dosing sheet. Current dose 80ng/kg/min    VENTOLIN HFA  90 MCG/ACTUATION INHALER    inhale 2 puffs BY MOUTH EVERY 4-6 HOURS   Discontinued Medications    IBUPROFEN (ADVIL,MOTRIN) 800 MG TABLET    Take 1 tablet (800 mg total) by mouth 3 (three) times daily.         CARE TEAM:  Patient Care Team:  Lulu Sandhu MD as PCP - General (Internal Medicine)  TD RazoN, RN as Registered Nurse  Ralph Whyte MD as Referring Physician (Intensive Care)           SCREENING HISTORY:  Health Maintenance       Date Due Completion Date    Mammogram 05/25/2019 5/25/2018    Override on 9/2/2013: Done    Override on 3/1/2011: Done    Lipid Panel 01/16/2022 1/16/2017    TETANUS VACCINE 01/18/2027 1/18/2017            REVIEW OF SYSTEMS:   The patient reports: good dietary habits.  The patient reports : that they do not exercise, but stay active.  Review of Systems   Constitutional: Positive for fatigue. Negative for chills, fever and unexpected weight change.   HENT: Negative for congestion and postnasal drip.    Eyes: Negative for pain and visual disturbance.   Respiratory: Positive for cough and shortness of breath (- she reports some worsening of her shortness of breath as she is currently trying to switch to a new medication for her pulmonary hypertension). Negative for wheezing.    Cardiovascular: Negative for chest pain, palpitations and leg swelling (She did develop swelling on the ibuprofen, but this has resolved since she stopped the medication).   Gastrointestinal: Negative for abdominal pain, constipation, diarrhea, nausea and vomiting.   Genitourinary: Negative for dysuria.   Musculoskeletal: Positive for arthralgias. Negative for back pain.   Skin: Negative for rash.   Neurological: Positive for headaches (- improved). Negative for weakness.   Psychiatric/Behavioral: Negative for dysphoric mood and sleep disturbance. The patient is not nervous/anxious.       ROS (Optional)-: no pelvic pain  Breast ROS (Optional)-: negative for breast  "lumps/discharge        Physical Examination:   Vitals:    10/11/19 1123   BP: 98/60   Pulse: 84   Temp: 97.8 °F (36.6 °C)     Weight: 60.6 kg (133 lb 7.8 oz)   Height: 4' 11" (149.9 cm)   Body mass index is 26.96 kg/m².      Patient did not require to have a chaperone present during the exam today.    General appearance - alert, well appearing, and in no distress, overweight  Mental status - alert, oriented to person, place, and time, normal mood, behavior, speech, dress, motor activity, and thought processes  Eyes - pupils equal and reactive, extraocular eye movements intact, sclera anicteric  Mouth - mucous membranes moist, pharynx normal without lesions  Neck - supple, no significant adenopathy, carotids upstroke normal bilaterally, no bruits, thyroid exam: thyroid is normal in size without nodules or tenderness  Lymphatics - no palpable cervical lymphadenopathy  Chest - clear to auscultation, no wheezes, rales or rhonchi, symmetric air entry, mild shortness of breath with exertion (baseline)  Heart - normal rate and regular rhythm, no gallops noted  Abdomen - soft, nontender, nondistended, no masses or organomegaly  Breasts - not examined  Back exam - full range of motion, no tenderness, palpable spasm or pain on motion  Neurological - alert, normal speech, no focal findings or movement disorder noted, cranial nerves II through XII intact  Musculoskeletal - patient noted to have Mild osteoarthritic changes to both knee joints. No joint effusions noted., no muscular tenderness noted  Extremities - peripheral pulses normal, no pedal edema, no clubbing or cyanosis  Skin - normal coloration and turgor, no rashes, no suspicious skin lesions noted      Labs:  Pre-visit Labs - not applicable      ASSESSMENT AND PLAN:  1. Chronic pulmonary heart disease/2. Primary pulmonary hypertension/3. Chronic respiratory failure with hypoxia  Mild worsening of her shortness of breath while she is transitioning to a new medication.  " Hopefully this will improve.  Followed by transplant.    4. Non-allergic vasomotor rhinitis  Stable.  Medication as needed.  Observe.    5. Hypothyroidism (acquired)  Patient is clinically euthyroid. Continue current regimen.     6. Gastroesophageal reflux disease, esophagitis presence not specified  Symptoms controlled: no - I will change her to Protonix to see if this will work better for her than the Nexium. Reflux precautions discussed (eliminate tobacco if a smoker; minimize caffeine, chocolate and red/white peppermint intake; avoid heavy and spicy meals; don't lay down within 2-3 hours after eating; minimize the intake of NSAIDs). Medication as needed. Patient asked to take medication breaks, if possible - discussed chronic use can limit calcium absorption (which can lead to osteopenia/osteoporosis), increases the risk for intestinal infections, and can cause kidney damage. There are also some newer studies that show possible increased risk of mortality.   - pantoprazole (PROTONIX) 40 MG tablet; Take 1 tablet (40 mg total) by mouth once daily.  Dispense: 90 tablet; Refill: 3    7. Overweight (BMI 25.0-29.9)  The patient is asked to make an attempt to improve diet and exercise patterns to aid in medical management of this problem.     8. Encounter for screening mammogram for breast cancer  She will schedule her appointment at her earliest convenience.    9. Flu vaccine need    - Influenza - High Dose (65+) (PF) (IM)           Follow up in about 6 months (around 4/11/2020), or if symptoms worsen or fail to improve, for follow up chronic medical conditions.. or sooner as needed.

## 2019-10-11 NOTE — TELEPHONE ENCOUNTER
"Pt reports that she went to PCP and O2 sat was 94%. Pt baseline typically 96-97%. Pt reports no edema, but continues to feel like a "bowling ball" is on her chest since starting Adempas. Message sent to Dr Rai regarding same.  "

## 2019-10-14 ENCOUNTER — TELEPHONE (OUTPATIENT)
Dept: TRANSPLANT | Facility: CLINIC | Age: 49
End: 2019-10-14

## 2019-10-14 NOTE — TELEPHONE ENCOUNTER
"----- Message from Ivonne Rai MD sent at 10/11/2019  4:20 PM CDT -----  Regarding: RE: ALKA  Sounds like she wants to switch back- its fine with me  ----- Message -----  From: SULY Razo, RN  Sent: 10/11/2019   3:07 PM CDT  To: Ivonne Rai MD  Subject: ALKA                                              Pt messaged to let me know O2 sat at PCP was 94% today-Not too far from baseline (usually 97%), but she continues to feel like a "bowling ball is on her chest" since starting adempas.     "

## 2019-10-14 NOTE — TELEPHONE ENCOUNTER
"Spoke w/pt this morning. Offered for her to switch back to Adcirca. Pt currently undecided, but notes "This weekend, I went to the store with my mom, and I felt really short of breath after I got to the car. I had a to take a little time to catch my breath". Pt notes that she doesn't feel this way all the time, and wonders if this might be a period of adjustment for her. Pt will think more about whether or not she wants to return to Adcirca, and let me know.   "

## 2019-10-23 ENCOUNTER — TELEPHONE (OUTPATIENT)
Dept: TRANSPLANT | Facility: CLINIC | Age: 49
End: 2019-10-23

## 2019-10-23 NOTE — TELEPHONE ENCOUNTER
"Called pt to see if any better on Adempas. She reports currently at 2mg TID. Pt states "things are ok, and the bowling ball on my chest is less, but I still have episodes of SOB. I also feel overly tired, and have difficulty getting up sometimes". Pt reports she has occasionally been an hour late w/medications, d/t sleeping through alarms. Denies edema/fullness in abdomen. Pt reports "dry cough" that is all the time, but worsens in afternoon/evening. Message sent to Dr Rai regarding all. Notified pt that we would contact her w/any changes.  "

## 2019-10-25 ENCOUNTER — TELEPHONE (OUTPATIENT)
Dept: OBSTETRICS AND GYNECOLOGY | Facility: CLINIC | Age: 49
End: 2019-10-25

## 2019-10-25 NOTE — TELEPHONE ENCOUNTER
Contacted the patient to r/s 11/18 appointment due to Dr. Parnell's new leadership role with the Pending sale to [facility]. Patient agreed to a appointment on 12/19 at 11AM. Patient verbalized understanding.

## 2019-10-28 ENCOUNTER — LAB VISIT (OUTPATIENT)
Dept: LAB | Facility: HOSPITAL | Age: 49
End: 2019-10-28
Attending: INTERNAL MEDICINE
Payer: COMMERCIAL

## 2019-10-28 DIAGNOSIS — R06.82 TACHYPNEA: ICD-10-CM

## 2019-10-28 DIAGNOSIS — Z79.899 POLYPHARMACY: ICD-10-CM

## 2019-10-28 PROCEDURE — 83880 ASSAY OF NATRIURETIC PEPTIDE: CPT | Mod: TXP

## 2019-10-28 PROCEDURE — 85025 COMPLETE CBC W/AUTO DIFF WBC: CPT | Mod: NTX

## 2019-10-28 PROCEDURE — 80053 COMPREHEN METABOLIC PANEL: CPT | Mod: TXP

## 2019-10-28 PROCEDURE — 83735 ASSAY OF MAGNESIUM: CPT | Mod: NTX

## 2019-10-28 PROCEDURE — 36415 COLL VENOUS BLD VENIPUNCTURE: CPT | Mod: PO,TXP

## 2019-10-29 LAB
ALBUMIN SERPL BCP-MCNC: 3.9 G/DL (ref 3.5–5.2)
ALP SERPL-CCNC: 76 U/L (ref 55–135)
ALT SERPL W/O P-5'-P-CCNC: 17 U/L (ref 10–44)
ANION GAP SERPL CALC-SCNC: 3 MMOL/L (ref 8–16)
AST SERPL-CCNC: 19 U/L (ref 10–40)
BASOPHILS # BLD AUTO: 0.06 K/UL (ref 0–0.2)
BASOPHILS NFR BLD: 1.4 % (ref 0–1.9)
BILIRUB SERPL-MCNC: 0.3 MG/DL (ref 0.1–1)
BNP SERPL-MCNC: 13 PG/ML (ref 0–99)
BUN SERPL-MCNC: 11 MG/DL (ref 6–20)
CALCIUM SERPL-MCNC: 8.8 MG/DL (ref 8.7–10.5)
CHLORIDE SERPL-SCNC: 110 MMOL/L (ref 95–110)
CO2 SERPL-SCNC: 27 MMOL/L (ref 23–29)
CREAT SERPL-MCNC: 0.8 MG/DL (ref 0.5–1.4)
DIFFERENTIAL METHOD: ABNORMAL
EOSINOPHIL # BLD AUTO: 0.3 K/UL (ref 0–0.5)
EOSINOPHIL NFR BLD: 6.4 % (ref 0–8)
ERYTHROCYTE [DISTWIDTH] IN BLOOD BY AUTOMATED COUNT: 13.8 % (ref 11.5–14.5)
EST. GFR  (AFRICAN AMERICAN): >60 ML/MIN/1.73 M^2
EST. GFR  (NON AFRICAN AMERICAN): >60 ML/MIN/1.73 M^2
GLUCOSE SERPL-MCNC: 83 MG/DL (ref 70–110)
HCT VFR BLD AUTO: 46.5 % (ref 37–48.5)
HGB BLD-MCNC: 14.6 G/DL (ref 12–16)
IMM GRANULOCYTES # BLD AUTO: 0.01 K/UL (ref 0–0.04)
IMM GRANULOCYTES NFR BLD AUTO: 0.2 % (ref 0–0.5)
LYMPHOCYTES # BLD AUTO: 1.2 K/UL (ref 1–4.8)
LYMPHOCYTES NFR BLD: 29.5 % (ref 18–48)
MAGNESIUM SERPL-MCNC: 2.1 MG/DL (ref 1.6–2.6)
MCH RBC QN AUTO: 28.9 PG (ref 27–31)
MCHC RBC AUTO-ENTMCNC: 31.4 G/DL (ref 32–36)
MCV RBC AUTO: 92 FL (ref 82–98)
MONOCYTES # BLD AUTO: 0.4 K/UL (ref 0.3–1)
MONOCYTES NFR BLD: 9.7 % (ref 4–15)
NEUTROPHILS # BLD AUTO: 2.2 K/UL (ref 1.8–7.7)
NEUTROPHILS NFR BLD: 52.8 % (ref 38–73)
NRBC BLD-RTO: 0 /100 WBC
PLATELET # BLD AUTO: 230 K/UL (ref 150–350)
PMV BLD AUTO: 12.3 FL (ref 9.2–12.9)
POTASSIUM SERPL-SCNC: 3.7 MMOL/L (ref 3.5–5.1)
PROT SERPL-MCNC: 7.5 G/DL (ref 6–8.4)
RBC # BLD AUTO: 5.05 M/UL (ref 4–5.4)
SODIUM SERPL-SCNC: 140 MMOL/L (ref 136–145)
WBC # BLD AUTO: 4.21 K/UL (ref 3.9–12.7)

## 2019-11-06 ENCOUNTER — TELEPHONE (OUTPATIENT)
Dept: TRANSPLANT | Facility: CLINIC | Age: 49
End: 2019-11-06

## 2019-11-11 ENCOUNTER — PATIENT OUTREACH (OUTPATIENT)
Dept: ADMINISTRATIVE | Facility: OTHER | Age: 49
End: 2019-11-11

## 2019-11-15 DIAGNOSIS — R51.9 CHRONIC NONINTRACTABLE HEADACHE, UNSPECIFIED HEADACHE TYPE: ICD-10-CM

## 2019-11-15 DIAGNOSIS — G89.29 CHRONIC NONINTRACTABLE HEADACHE, UNSPECIFIED HEADACHE TYPE: ICD-10-CM

## 2019-11-15 RX ORDER — TOPIRAMATE 100 MG/1
100 TABLET, FILM COATED ORAL 2 TIMES DAILY
Qty: 60 TABLET | Refills: 11 | Status: SHIPPED | OUTPATIENT
Start: 2019-11-15 | End: 2020-11-02 | Stop reason: SDUPTHER

## 2019-11-22 RX ORDER — CEPHALEXIN 250 MG/1
CAPSULE ORAL
Qty: 1 EACH | Refills: 11 | Status: SHIPPED | OUTPATIENT
Start: 2019-11-22 | End: 2020-04-02 | Stop reason: ALTCHOICE

## 2019-12-03 ENCOUNTER — HOSPITAL ENCOUNTER (OUTPATIENT)
Dept: PULMONOLOGY | Facility: CLINIC | Age: 49
Discharge: HOME | End: 2019-12-03
Payer: COMMERCIAL

## 2019-12-03 ENCOUNTER — OFFICE VISIT (OUTPATIENT)
Dept: TRANSPLANT | Facility: CLINIC | Age: 49
End: 2019-12-03
Payer: COMMERCIAL

## 2019-12-03 VITALS
WEIGHT: 131.63 LBS | HEIGHT: 59 IN | BODY MASS INDEX: 26.54 KG/M2 | BODY MASS INDEX: 26.41 KG/M2 | TEMPERATURE: 98 F | DIASTOLIC BLOOD PRESSURE: 77 MMHG | SYSTOLIC BLOOD PRESSURE: 107 MMHG | OXYGEN SATURATION: 95 % | RESPIRATION RATE: 20 BRPM | HEART RATE: 96 BPM | WEIGHT: 131 LBS | HEIGHT: 59 IN

## 2019-12-03 DIAGNOSIS — I27.0 PRIMARY PULMONARY HYPERTENSION: ICD-10-CM

## 2019-12-03 DIAGNOSIS — Z76.82 LUNG TRANSPLANT CANDIDATE: ICD-10-CM

## 2019-12-03 DIAGNOSIS — Z76.82 AWAITING ORGAN TRANSPLANT STATUS: ICD-10-CM

## 2019-12-03 DIAGNOSIS — I27.0 PRIMARY PULMONARY HYPERTENSION: Primary | ICD-10-CM

## 2019-12-03 LAB
DLCO ADJ PRE: 12.37 ML/(MIN*MMHG) (ref 15.7–27.17)
DLCO SINGLE BREATH LLN: 15.7
DLCO SINGLE BREATH PRE REF: 59.8 %
DLCO SINGLE BREATH REF: 21.43
DLCOC SBVA LLN: 3.34
DLCOC SBVA PRE REF: 70.6 %
DLCOC SBVA REF: 5.21
DLCOC SINGLE BREATH LLN: 15.7
DLCOC SINGLE BREATH PRE REF: 57.7 %
DLCOC SINGLE BREATH REF: 21.43
DLCOCSBVAULN: 7.09
DLCOCSINGLEBREATHULN: 27.17
DLCOSINGLEBREATHULN: 27.17
DLCOVA LLN: 3.34
DLCOVA PRE REF: 73.1 %
DLCOVA PRE: 3.81 ML/(MIN*MMHG*L) (ref 3.34–7.09)
DLCOVA REF: 5.21
DLCOVAULN: 7.09
DLVAADJ PRE: 3.68 ML/(MIN*MMHG*L) (ref 3.34–7.09)
FEF 25 75 LLN: 1.42
FEF 25 75 PRE REF: 28.3 %
FEF 25 75 REF: 2.49
FEV05 LLN: 0.92
FEV05 REF: 1.77
FEV1 FVC LLN: 70
FEV1 FVC PRE REF: 81.6 %
FEV1 FVC REF: 81
FEV1 LLN: 1.85
FEV1 PRE REF: 51.9 %
FEV1 REF: 2.36
FVC LLN: 2.28
FVC PRE REF: 63.4 %
FVC REF: 2.91
IVC PRE: 1.78 L (ref 2.28–3.53)
IVC SINGLE BREATH LLN: 2.28
IVC SINGLE BREATH PRE REF: 61.2 %
IVC SINGLE BREATH REF: 2.91
IVCSINGLEBREATHULN: 3.53
MVV LLN: 72
MVV REF: 85
PEF LLN: 4.61
PEF PRE REF: 66.7 %
PEF REF: 6.07
PHYSICIAN COMMENT: ABNORMAL
PRE DLCO: 12.81 ML/(MIN*MMHG) (ref 15.7–27.17)
PRE FEF 25 75: 0.7 L/S (ref 1.42–3.57)
PRE FET 100: 6.17 SEC
PRE FEV05 REF: 53.2 %
PRE FEV1 FVC: 66.45 % (ref 70.24–92.6)
PRE FEV1: 1.22 L (ref 1.85–2.87)
PRE FEV5: 0.94 L (ref 0.92–2.63)
PRE FVC: 1.84 L (ref 2.28–3.53)
PRE PEF: 4.04 L/S (ref 4.61–7.52)
VA PRE: 3.37 L (ref 3.96–3.96)
VA SINGLE BREATH LLN: 3.96
VA SINGLE BREATH PRE REF: 85 %
VA SINGLE BREATH REF: 3.96
VASINGLEBREATHULN: 3.96

## 2019-12-03 PROCEDURE — 94010 BREATHING CAPACITY TEST: CPT | Mod: NTX,S$GLB,, | Performed by: INTERNAL MEDICINE

## 2019-12-03 PROCEDURE — 94729 PR C02/MEMBANE DIFFUSE CAPACITY: ICD-10-PCS | Mod: NTX,S$GLB,, | Performed by: INTERNAL MEDICINE

## 2019-12-03 PROCEDURE — 94010 BREATHING CAPACITY TEST: ICD-10-PCS | Mod: NTX,S$GLB,, | Performed by: INTERNAL MEDICINE

## 2019-12-03 PROCEDURE — 99999 PR PBB SHADOW E&M-EST. PATIENT-LVL V: CPT | Mod: PBBFAC,TXP,, | Performed by: INTERNAL MEDICINE

## 2019-12-03 PROCEDURE — 99213 PR OFFICE/OUTPT VISIT, EST, LEVL III, 20-29 MIN: ICD-10-PCS | Mod: 25,NTX,S$GLB, | Performed by: INTERNAL MEDICINE

## 2019-12-03 PROCEDURE — 3008F PR BODY MASS INDEX (BMI) DOCUMENTED: ICD-10-PCS | Mod: CPTII,NTX,S$GLB, | Performed by: INTERNAL MEDICINE

## 2019-12-03 PROCEDURE — 99213 OFFICE O/P EST LOW 20 MIN: CPT | Mod: 25,NTX,S$GLB, | Performed by: INTERNAL MEDICINE

## 2019-12-03 PROCEDURE — 94618 PULMONARY STRESS TESTING: ICD-10-PCS | Mod: NTX,S$GLB,, | Performed by: INTERNAL MEDICINE

## 2019-12-03 PROCEDURE — 94729 DIFFUSING CAPACITY: CPT | Mod: NTX,S$GLB,, | Performed by: INTERNAL MEDICINE

## 2019-12-03 PROCEDURE — 94618 PULMONARY STRESS TESTING: CPT | Mod: NTX,S$GLB,, | Performed by: INTERNAL MEDICINE

## 2019-12-03 PROCEDURE — 99999 PR PBB SHADOW E&M-EST. PATIENT-LVL V: ICD-10-PCS | Mod: PBBFAC,TXP,, | Performed by: INTERNAL MEDICINE

## 2019-12-03 PROCEDURE — 3008F BODY MASS INDEX DOCD: CPT | Mod: CPTII,NTX,S$GLB, | Performed by: INTERNAL MEDICINE

## 2019-12-03 NOTE — PROCEDURES
Yuni Perez is a 49 y.o.  female patient, who presents for a 6 minute walk test ordered by MD Pato.  The diagnosis is Pre Lung Transplant Evaluation.  The patient's BMI is 26.6 kg/m2.  Predicted distance (lower limit of normal) is 432.18 meters.      Test Results:    The test was completed without stopping.  The total time walked was 360 seconds.  During walking, the patient reported:  Dyspnea. The patient used no assistive devices  during testing.     12/03/2019---------Distance: 457.2 meters (1500 feet)     O2 Sat % Supplemental Oxygen Heart Rate Blood Pressure Ab Scale   Pre-exercise  (Resting) 98 % Room Air 91 bpm 103/80 mmHg 2   During Exercise 96 % Room Air 140 bpm 138/80 mmHg 9   Post-exercise  (Recovery) 99 % Room Air  110 bpm 126/72 mmHg       Recovery Time: 120 seconds    Performing nurse/tech: Estop[employer] RRT      PREVIOUS STUDY:   The patient had a previous study.  05/23/2019---------Distance: 441.96 meters (1450 feet)       O2 Sat % Supplemental Oxygen Heart Rate Blood Pressure Ab Scale   Pre-exercise  (Resting) 98 % Room Air 65 bpm 112/72 mmHg 3   During Exercise 100 % Room Air 142 bpm 150/76 mmHg 7-8   Post-exercise  (Recovery) 98 % Room Air  88 bpm 115/73 mmHg           CLINICAL INTERPRETATION:  Six minute walk distance is 457.2 meters (1500 feet) with very, very heavy dyspnea.  During exercise, there was no significant desaturation while breathing room air.  Both blood pressure and heart rate increased significantly with walking.  This may represent a hypertensive and a tachycardic response to exercise.  The patient did not report non-pulmonary symptoms during exercise.  Since the previous study in May 2019, exercise capacity is unchanged.  Based upon age and body mass index, exercise capacity is normal.

## 2019-12-03 NOTE — LETTER
December 3, 2019        Ivonne Rai  [address]  [facility] [address]  Phone: [phone]  Fax: [phone]             [facility] - [facility] Trans[facility]  [address]  [facility] [address]  Phone: [phone]   Patient: Yuni Perez   MR Number: [id number]   YOB: 1970   Date of Visit: 12/3/2019       Dear Dr. Ivonne Rai    Thank you for referring Yuni Perez to me for evaluation. Attached you will find relevant portions of my assessment and plan of care.    If you have questions, please do not hesitate to call me. I look forward to following Yuni Perez along with you.    Sincerely,    Ralph Whyte MD    Enclosure    If you would like to receive this communication electronically, please contact [email or web address] or [phone] to request [facility] Link access.    [facility] Link is a tool which provides read-only access to select patient information with whom you have a relationship. Its easy to use and provides real time access to review your patients record including encounter summaries, notes, results, and demographic information.    If you feel you have received this communication in error or would no longer like to receive these types of communications, please e-mail [email or web address]

## 2019-12-12 ENCOUNTER — PATIENT OUTREACH (OUTPATIENT)
Dept: ADMINISTRATIVE | Facility: OTHER | Age: 49
End: 2019-12-12

## 2019-12-13 ENCOUNTER — OFFICE VISIT (OUTPATIENT)
Dept: NEUROLOGY | Facility: CLINIC | Age: 49
End: 2019-12-13
Payer: COMMERCIAL

## 2019-12-13 VITALS
HEART RATE: 86 BPM | DIASTOLIC BLOOD PRESSURE: 65 MMHG | BODY MASS INDEX: 26.75 KG/M2 | SYSTOLIC BLOOD PRESSURE: 111 MMHG | WEIGHT: 132.69 LBS | HEIGHT: 59 IN

## 2019-12-13 DIAGNOSIS — G44.229 CHRONIC TENSION-TYPE HEADACHE, NOT INTRACTABLE: Primary | ICD-10-CM

## 2019-12-13 DIAGNOSIS — G43.009 MIGRAINE WITHOUT AURA AND WITHOUT STATUS MIGRAINOSUS, NOT INTRACTABLE: ICD-10-CM

## 2019-12-13 PROCEDURE — 3008F BODY MASS INDEX DOCD: CPT | Mod: CPTII,NTX,S$GLB, | Performed by: NEUROLOGICAL SURGERY

## 2019-12-13 PROCEDURE — 99214 OFFICE O/P EST MOD 30 MIN: CPT | Mod: NTX,S$GLB,, | Performed by: NEUROLOGICAL SURGERY

## 2019-12-13 PROCEDURE — 3008F PR BODY MASS INDEX (BMI) DOCUMENTED: ICD-10-PCS | Mod: CPTII,NTX,S$GLB, | Performed by: NEUROLOGICAL SURGERY

## 2019-12-13 PROCEDURE — 99999 PR PBB SHADOW E&M-EST. PATIENT-LVL IV: ICD-10-PCS | Mod: PBBFAC,TXP,, | Performed by: NEUROLOGICAL SURGERY

## 2019-12-13 PROCEDURE — 99214 PR OFFICE/OUTPT VISIT, EST, LEVL IV, 30-39 MIN: ICD-10-PCS | Mod: NTX,S$GLB,, | Performed by: NEUROLOGICAL SURGERY

## 2019-12-13 PROCEDURE — 99999 PR PBB SHADOW E&M-EST. PATIENT-LVL IV: CPT | Mod: PBBFAC,TXP,, | Performed by: NEUROLOGICAL SURGERY

## 2019-12-14 NOTE — PROGRESS NOTES
"Chief Complaint   Patient presents with    Migraine        Yuni Perez is a 49 y.o. female with a history of multiple medical diagnoses as listed below that presents for evaluation of headaches. She is accompanied to this visit by her mother. She was diagnosed with pulmonary hypertension about 8 years ago and around the same time she was diagnosed with a seizure. She had workup at that time that included MRI that showed signs of "old injury" and she had EEG that showed "lots of spikes and waves" per her mother. She says that since that time she has been started on Topamax which has been titrated up. She has not been having any seizures but she has bene having some occasional word finding difficulty that she has attributed to the medication. She has tried tylenol because as she has been titrating her medication to treat pulmonary hypertension sh marie been having bilateral intense stabbing pains in her head. She does say that with time her headaches have been better but she has noticed many more headaches since she began the Remodulin. She has not taken any other headache medications in the past.    Interval History  11/17/2016  She has still been having headaches after taking her medications in particular her vasodilators. She has found that her PRN medications are somewhat helpful in minimizing the pain that she has from her headaches. She has not had any mew problems since she was last seen in clinic.    03/23/2017  Since last seen in clinic she has been approved to get on the lung transplant list and she will be making strides to get set up to to receive new lungs. She has been taking her topamax as directed and has felt that overall she has been having fewer headaches but she still has needed Tylenol and on rare occasions Fioricet to get rid of her headaches. She has been having some cognitive difficulty with her current medications but she feels that it is tolerable. She has not had any seizure " "like activity.    06/13/2017  Since last seen in clinic she says that she has been able to be placed on the donor list. She says that overall she feels that she has been stable with regards to her breathing. She continues to have chronic sinus issues that have been essentially refractory to any medications that she has tried including antihistamines and nasal rinses. She feels that she is able to clear her sinuses and have it come right back. She has headaches almost daily but despite the frequency of the headaches she feels that most are not migraines. She has not had any seizures since last seen. She is tolerating all of her medications well without any problems.    10/31/2017  She has continued to make preparations for lung transplant.  Said that she has been frustrated with the process and she does not have much guidance which is causing increased stress.  Headaches overall have been about the same, but are responsive to Fioricet.  She says the medication helps give her adequate relief of the pain.    03/22/2018  She feels that her headaches have been getting worse in the last week.  Overall prior to this week she feels that the headaches were about the same as when she was last seen in clinic and she was using Fioricet as needed which provided some relief from her headache pain.  In the last week however Fioricet is not provided much of any relief and headaches have been going on almost every day.  She says that she feels like she has "sinus headaches" which have lingered until she has started having pounding unilateral headaches that she feels her migraines.  These headaches seem to be much more frequent and much more intense that she is ever had in the past.    05/03/2018  headaches have been better as she has hardly had any since she was last seen in clinic. Lung transplant is till her recourse for treatm of pulmonary hypertension, but she has not been able to get placed on the transplant list just " yet.    11/06/2018  Headaches have still been frequent, but have been relieved effectively by using Fioricet. She has been taking her medications as directed without any complaints of side effects. She feels that the intensity has been about the same overall. Imitrex has been prescribed, but she has not used the medication yet to determine if it helps her headaches.    02/07/2019  She has been having headaches with about the same frequency as she had in the past, but they have continued to have some response to her current abortive regimen. She has not been comfortable with the CGRP receptor blockers as a treatment modality as she has been weary of using any injectable medication to treat her symptoms. She has been seeing her Pulmonary team as recommended.    05/09/2019  She presents to clinic for routine follow-up of her headaches.  The headaches be somewhat improved with her current medication regimen, but the ability river frequently.  She needs to use abortive medications several times per week to by relief in order to her level of functioning and her ability to care for her activities of daily where they are.    08/15/2019  Her headaches have been overall stable since she was last seen in clinic. She has been trying to use various OTC medications and prescription medications with intermittent effectivetness in alleviating her headaches. She has not been amendable to considering a CGRP modulating medication to control her headaches as she has been unwilling to commit in an injectable delivery for any medication.    12/13/2019  Since she has been placed on a new medication to address pulmonary hypertension she has had fewer headaches. headaches have been responding well to fioricet and she has not needed to use the sumatriptan as often as she had in the past. She has not ad any change in her candidacy for lung transplant.     PAST MEDICAL HISTORY:  Past Medical History:   Diagnosis Date    AR (allergic rhinitis)      Cholelithiasis, common bile duct     Chronic low back pain     Eye pressure 2017    GERD (gastroesophageal reflux disease)     History of migraine headaches     Hypothyroidism     Lumbar disc disease     Menorrhagia     Mild asthma     Obesity     Plantar fasciitis of left foot     Primary pulmonary hypertension     followed by heart transplant/pulmonary     Seizure disorder     x 1 in 2008    Seizures     Sleep apnea     TMJ (dislocation of temporomandibular joint)     Tricuspid regurgitation        PAST SURGICAL HISTORY:  Past Surgical History:   Procedure Laterality Date    CARDIAC CATHETERIZATION      CENTRAL VENOUS CATHETER INSERTION      gallbladder drain  06/2019    PORTACATH PLACEMENT      RIGHT HEART CATHETERIZATION Right 8/20/2018    Procedure: HEART CATH-RIGHT;  Surgeon: Ivonne Rai MD;  Location: [facility] CATH LAB;  Service: Cardiology;  Laterality: Right;    RIGHT HEART CATHETERIZATION Right 9/4/2019    Procedure: INSERTION, CATHETER, RIGHT HEART;  Surgeon: Ivonne Rai MD;  Location: [facility] CATH LAB;  Service: Cardiology;  Laterality: Right;    UPPER GASTROINTESTINAL ENDOSCOPY         SOCIAL HISTORY:  Social History     Socioeconomic History    Marital status: Single     Spouse name: Not on file    Number of children: 0    Years of education: Not on file    Highest education level: Not on file   Occupational History    Occupation: disabled     Employer: [employer]   Social Needs    Financial resource strain: Not on file    Food insecurity:     Worry: Not on file     Inability: Not on file    Transportation needs:     Medical: Not on file     Non-medical: Not on file   Tobacco Use    Smoking status: Never Smoker    Smokeless tobacco: Never Used   Substance and Sexual Activity    Alcohol use: No     Alcohol/week: 0.8 standard drinks     Types: 1 Standard drinks or equivalent per week     Comment: 1 per year    Drug use: No    Sexual activity: Never      Birth control/protection: OCP, Pill     Comment: pt is a virgin   Lifestyle    Physical activity:     Days per week: Not on file     Minutes per session: Not on file    Stress: Not on file   Relationships    Social connections:     Talks on phone: Not on file     Gets together: Not on file     Attends Sabianist service: Not on file     Active member of club or organization: Not on file     Attends meetings of clubs or organizations: Not on file     Relationship status: Not on file   Other Topics Concern    Not on file   Social History Narrative    Not on file       FAMILY HISTORY:  Family History   Problem Relation Age of Onset    Heart disease Father     Glaucoma Father     Diabetes Mother     Cancer Maternal Grandmother         uterine    Cancer Maternal Aunt         breast    Breast cancer Maternal Aunt     Heart disease Paternal Grandfather     Heart attack Paternal Grandfather     Diabetes Paternal Grandfather     Leukemia Brother     Hypertension Unknown     Diabetes Maternal Grandfather     Heart attack Maternal Grandfather     Breast cancer Cousin     No Known Problems Sister     No Known Problems Maternal Uncle     No Known Problems Paternal Aunt     No Known Problems Paternal Uncle     No Known Problems Paternal Grandmother     Colon cancer Neg Hx     Ovarian cancer Neg Hx     Amblyopia Neg Hx     Blindness Neg Hx     Cataracts Neg Hx     Macular degeneration Neg Hx     Retinal detachment Neg Hx     Strabismus Neg Hx     Stroke Neg Hx     Thyroid disease Neg Hx     Esophageal cancer Neg Hx        ALLERGIES AND MEDICATIONS: updated and reviewed.  Review of patient's allergies indicates:   Allergen Reactions    Adhesive Hives     Silk tape    Amoxicillin Rash    Chlorhexidine Other (See Comments)     Current Outpatient Medications   Medication Sig Dispense Refill    acetaminophen (TYLENOL EXTRA STRENGTH) 500 MG tablet Take 1,000 mg by mouth every 6 (six) hours as  needed for Pain.      ADVAIR DISKUS 100-50 mcg/dose diskus inhaler INHALE 1 PUFF TWICE DAILY 1 each 11    ALPRAZolam (XANAX) 0.5 MG tablet Take one tab 60-90 minutes prior to procedure. (Patient not taking: Reported on 12/3/2019) 10 tablet 0    ambrisentan (LETAIRIS) 10 MG Tab Take 1 tablet (10 mg total) by mouth once daily. 30 tablet 11    azelastine (ASTELIN) 137 mcg (0.1 %) nasal spray 2 sprays by Nasal route 2 (two) times daily.       butalbital-acetaminophen-caffeine -40 mg (FIORICET, ESGIC) -40 mg per tablet Take 1 tablet by mouth every 4 (four) hours as needed for Pain.      citalopram (CELEXA) 10 MG tablet Take 10 mg by mouth once daily.      cyanocobalamin, vitamin B-12, 2,500 mcg Subl Place 2,500 mcg under the tongue once daily.       diphenhydrAMINE (BENADRYL) 25 mg capsule Take 50 mg by mouth every 6 (six) hours as needed for Itching.       ergocalciferol (VITAMIN D2) 50,000 unit Cap Take 1 capsule (50,000 Units total) by mouth every 7 days. 6 capsule 0    esomeprazole (NEXIUM) 40 MG capsule Take 40 mg by mouth once daily.      ferrous sulfate 325 (65 FE) MG EC tablet Take 325 mg by mouth daily as needed.       fluticasone (FLONASE) 50 mcg/actuation nasal spray INHALE 2 SPRAYS IN EACH NOSTRIL DAILY 16 g 3    folic acid (FOLVITE) 1 MG tablet TAKE ONE TABLET BY MOUTH once DAILY 90 tablet 2    hyoscyamine (LEVSIN/SL) 0.125 mg Subl Place 0.125 mg under the tongue every 6 (six) hours as needed.       levothyroxine (SYNTHROID) 137 MCG Tab tablet Take 1 tablet (137 mcg total) by mouth once daily. 90 tablet 3    loratadine (CLARITIN) 10 mg tablet Take 10 mg by mouth once daily.      MICROGESTIN FE 1/20, 28, 1 mg-20 mcg (21)/75 mg (7) per tablet TAKE ONE TABLET BY MOUTH ONCE DAILY 28 tablet 6    ondansetron (ZOFRAN) 4 MG tablet Take 1 tablet (4 mg total) by mouth every 6 (six) hours as needed. 1 Tablet Oral Every 6 hours (Patient not taking: Reported on 12/3/2019) 30 tablet 2     oxyCODONE-acetaminophen (PERCOCET) 5-325 mg per tablet Take 1 tablet by mouth every 4 (four) hours as needed (Headache / pain greater than 5 / 10 pain scale). (Patient not taking: Reported on 12/3/2019) 15 tablet 0    pantoprazole (PROTONIX) 40 MG tablet Take 1 tablet (40 mg total) by mouth once daily. 90 tablet 3    riociguat (ADEMPAS) 2 mg Tab tablet Take 2 mg by mouth 3 (three) times daily.      sumatriptan (IMITREX) 100 MG tablet Take 1 tablet (100 mg total) by mouth as needed for Migraine. Take at the onset of headache, may take 1 more in 1 hour if needed. 12 tablet 5    topiramate (TOPAMAX) 100 MG tablet Take 1 tablet (100 mg total) by mouth 2 (two) times daily. 60 tablet 11    treprostinil (REMODULIN) 2.5 mg/mL Soln Mix cassette as directed and infuse continuously per physician titration orders on dosing sheet. Current dose 80ng/kg/min 60 mL 11    VENTOLIN HFA 90 mcg/actuation inhaler inhale 2 puffs BY MOUTH EVERY 4-6 HOURS 18 g 11     No current facility-administered medications for this visit.        Review of Systems   Constitutional: Negative for activity change, appetite change, fever and unexpected weight change.   HENT: Negative for trouble swallowing and voice change.    Eyes: Negative for photophobia and visual disturbance.   Respiratory: Negative for apnea and shortness of breath.    Cardiovascular: Negative for chest pain and leg swelling.   Gastrointestinal: Negative for constipation and nausea.   Genitourinary: Negative for difficulty urinating.   Musculoskeletal: Negative for back pain, gait problem and neck pain.   Skin: Negative for color change and pallor.   Neurological: Positive for dizziness and headaches. Negative for seizures, syncope, weakness and numbness.   Hematological: Negative for adenopathy.   Psychiatric/Behavioral: Negative for agitation, confusion and decreased concentration.       Neurologic Exam     Mental Status   Oriented to person, place, and time.   Registration:  recalls 3 of 3 objects.   Attention: normal. Concentration: normal.   Speech: speech is normal   Level of consciousness: alert  Knowledge: good.     Cranial Nerves     CN II   Visual fields full to confrontation.   Right visual field deficit: none  Left visual field deficit: none     CN III, IV, VI   Pupils are equal, round, and reactive to light.  Extraocular motions are normal.   Right pupil: Size: 3 mm. Shape: regular. Accommodation: intact.   Left pupil: Size: 3 mm. Shape: regular. Accommodation: intact.   CN III: no CN III palsy  CN VI: no CN VI palsy  Nystagmus: none   Diplopia: none  Ophthalmoparesis: none  Upgaze: normal  Downgaze: normal  Conjugate gaze: present    CN V   Facial sensation intact.   Right facial sensation deficit: none  Left facial sensation deficit: none    CN VII   Facial expression full, symmetric.   Right facial weakness: none  Left facial weakness: none    CN VIII   CN VIII normal.     CN IX, X   CN IX normal.   CN X normal.   Palate: symmetric    CN XI   CN XI normal.   Right sternocleidomastoid strength: normal  Left sternocleidomastoid strength: normal  Right trapezius strength: normal  Left trapezius strength: normal    CN XII   CN XII normal.   Tongue deviation: none    Motor Exam   Muscle bulk: normal  Overall muscle tone: normal  Right arm tone: normal  Left arm tone: normal  Right leg tone: normal  Left leg tone: normal    Strength   Strength 5/5 throughout.     Sensory Exam   Right arm light touch: normal  Left arm light touch: normal  Right leg light touch: normal  Left leg light touch: normal  Right arm vibration: normal  Left arm vibration: normal  Right leg vibration: normal  Left leg vibration: normal  Right arm proprioception: normal  Left arm proprioception: normal  Right leg proprioception: normal  Left leg proprioception: normal  Right arm pinprick: normal  Left arm pinprick: normal  Right leg pinprick: normal  Left leg pinprick: normal    Gait, Coordination, and  "Reflexes     Gait  Gait: normal    Coordination   Romberg: negative  Finger to nose coordination: normal  Heel to shin coordination: normal  Tandem walking coordination: normal    Tremor   Resting tremor: absent    Reflexes   Right brachioradialis: 2+  Left brachioradialis: 2+  Right biceps: 2+  Left biceps: 2+  Right triceps: 2+  Left triceps: 2+  Right patellar: 2+  Left patellar: 2+  Right achilles: 2+  Left achilles: 2+  Right plantar: normal  Left plantar: normal      Physical Exam   Constitutional: She is oriented to person, place, and time. She appears well-developed and well-nourished.   HENT:   Head: Normocephalic and atraumatic.   Eyes: Pupils are equal, round, and reactive to light. EOM are normal.   Neck: Normal range of motion.   Cardiovascular: Normal rate and intact distal pulses.   Pulmonary/Chest: Effort normal. No apnea. No respiratory distress.   Musculoskeletal: Normal range of motion.   Neurological: She is alert and oriented to person, place, and time. She has normal strength. She has a normal Finger-Nose-Finger Test, a normal Heel to Shin Test, a normal Romberg Test and a normal Tandem Gait Test. Gait normal.   Reflex Scores:       Tricep reflexes are 2+ on the right side and 2+ on the left side.       Bicep reflexes are 2+ on the right side and 2+ on the left side.       Brachioradialis reflexes are 2+ on the right side and 2+ on the left side.       Patellar reflexes are 2+ on the right side and 2+ on the left side.       Achilles reflexes are 2+ on the right side and 2+ on the left side.  Skin: Skin is warm and dry.   Psychiatric: She has a normal mood and affect. Her speech is normal and behavior is normal. Thought content normal.   Vitals reviewed.      Vitals:    12/13/19 1457   BP: 111/65   BP Location: Left arm   Patient Position: Sitting   BP Method: Large (Automatic)   Pulse: 86   Weight: 60.2 kg (132 lb 11.5 oz)   Height: 4' 11" (1.499 m)       Assessment & Plan:  Problem List Items " Addressed This Visit     Migraine without aura and without status migrainosus, not intractable    Overview     Despite frequency of headaches few have come in the form of migraine headaches.         Chronic nonintractable headache - Primary    Overview     Daily headaches likely rebound from her medications, tension type, and sinus headaches.             Discussed use of CRGP directed therapies in order to treat her headaches, but she has been reluctant to accept any therapies that involve the use of needles.    Follow-up: Follow up in about 3 months (around 3/13/2020).   More than 50% of this 25 minute encounter was spent in counseling and coordinating care of headaches.

## 2019-12-18 ENCOUNTER — PATIENT OUTREACH (OUTPATIENT)
Dept: ADMINISTRATIVE | Facility: OTHER | Age: 49
End: 2019-12-18

## 2019-12-18 ENCOUNTER — TELEPHONE (OUTPATIENT)
Dept: OBSTETRICS AND GYNECOLOGY | Facility: CLINIC | Age: 49
End: 2019-12-18

## 2019-12-18 DIAGNOSIS — N92.1 MENORRHAGIA WITH IRREGULAR CYCLE: ICD-10-CM

## 2019-12-18 RX ORDER — NORETHINDRONE ACETATE AND ETHINYL ESTRADIOL 1MG-20(21)
1 KIT ORAL DAILY
Qty: 28 TABLET | Refills: 6 | Status: SHIPPED | OUTPATIENT
Start: 2019-12-18 | End: 2020-06-04

## 2019-12-18 NOTE — TELEPHONE ENCOUNTER
----- Message from Eli Castillo MA sent at 12/18/2019 10:20 AM CST -----  I got this patient rescheduled for her annual in March. She was scheduled for tomorrow. She needs a refill on her hormone medications.     Thanks

## 2020-01-02 ENCOUNTER — TELEPHONE (OUTPATIENT)
Dept: TRANSPLANT | Facility: CLINIC | Age: 50
End: 2020-01-02

## 2020-01-02 NOTE — TELEPHONE ENCOUNTER
Pt is still trying to secure copay assistance for PH meds (specifically, Remodulin). At this time, no funds are open. Pt was directed to call [facility], and this RN sent email to Gracy at [facility], to hopefully delay patient being billed the $2500 copay again (she is still paying on last year's.)

## 2020-01-17 ENCOUNTER — LAB VISIT (OUTPATIENT)
Dept: LAB | Facility: HOSPITAL | Age: 50
End: 2020-01-17
Attending: INTERNAL MEDICINE
Payer: COMMERCIAL

## 2020-01-17 DIAGNOSIS — Z34.00 SUPERVISION OF NORMAL FIRST PREGNANCY, ANTEPARTUM: ICD-10-CM

## 2020-01-17 DIAGNOSIS — R06.82 TACHYPNEA: ICD-10-CM

## 2020-01-17 DIAGNOSIS — Z79.899 POLYPHARMACY: ICD-10-CM

## 2020-01-17 LAB
ALBUMIN SERPL BCP-MCNC: 3.6 G/DL (ref 3.5–5.2)
ALP SERPL-CCNC: 77 U/L (ref 55–135)
ALT SERPL W/O P-5'-P-CCNC: 13 U/L (ref 10–44)
ANION GAP SERPL CALC-SCNC: 7 MMOL/L (ref 8–16)
AST SERPL-CCNC: 13 U/L (ref 10–40)
BASOPHILS # BLD AUTO: 0.03 K/UL (ref 0–0.2)
BASOPHILS NFR BLD: 0.6 % (ref 0–1.9)
BILIRUB SERPL-MCNC: 0.3 MG/DL (ref 0.1–1)
BNP SERPL-MCNC: 27 PG/ML (ref 0–99)
BUN SERPL-MCNC: 7 MG/DL (ref 6–20)
CALCIUM SERPL-MCNC: 8.6 MG/DL (ref 8.7–10.5)
CHLORIDE SERPL-SCNC: 112 MMOL/L (ref 95–110)
CO2 SERPL-SCNC: 20 MMOL/L (ref 23–29)
CREAT SERPL-MCNC: 0.7 MG/DL (ref 0.5–1.4)
DIFFERENTIAL METHOD: ABNORMAL
EOSINOPHIL # BLD AUTO: 0.3 K/UL (ref 0–0.5)
EOSINOPHIL NFR BLD: 6.4 % (ref 0–8)
ERYTHROCYTE [DISTWIDTH] IN BLOOD BY AUTOMATED COUNT: 13.9 % (ref 11.5–14.5)
EST. GFR  (AFRICAN AMERICAN): >60 ML/MIN/1.73 M^2
EST. GFR  (NON AFRICAN AMERICAN): >60 ML/MIN/1.73 M^2
GLUCOSE SERPL-MCNC: 81 MG/DL (ref 70–110)
HCG INTACT+B SERPL-ACNC: 1.7 MIU/ML
HCT VFR BLD AUTO: 44.9 % (ref 37–48.5)
HGB BLD-MCNC: 14.9 G/DL (ref 12–16)
IMM GRANULOCYTES # BLD AUTO: 0.03 K/UL (ref 0–0.04)
IMM GRANULOCYTES NFR BLD AUTO: 0.6 % (ref 0–0.5)
LYMPHOCYTES # BLD AUTO: 1.3 K/UL (ref 1–4.8)
LYMPHOCYTES NFR BLD: 26.3 % (ref 18–48)
MAGNESIUM SERPL-MCNC: 2.2 MG/DL (ref 1.6–2.6)
MCH RBC QN AUTO: 29.8 PG (ref 27–31)
MCHC RBC AUTO-ENTMCNC: 33.2 G/DL (ref 32–36)
MCV RBC AUTO: 90 FL (ref 82–98)
MONOCYTES # BLD AUTO: 0.5 K/UL (ref 0.3–1)
MONOCYTES NFR BLD: 9.9 % (ref 4–15)
NEUTROPHILS # BLD AUTO: 2.7 K/UL (ref 1.8–7.7)
NEUTROPHILS NFR BLD: 56.2 % (ref 38–73)
NRBC BLD-RTO: 0 /100 WBC
PLATELET # BLD AUTO: 222 K/UL (ref 150–350)
PMV BLD AUTO: 12.4 FL (ref 9.2–12.9)
POTASSIUM SERPL-SCNC: 3.8 MMOL/L (ref 3.5–5.1)
PROT SERPL-MCNC: 7.2 G/DL (ref 6–8.4)
RBC # BLD AUTO: 5 M/UL (ref 4–5.4)
SODIUM SERPL-SCNC: 139 MMOL/L (ref 136–145)
WBC # BLD AUTO: 4.86 K/UL (ref 3.9–12.7)

## 2020-01-17 PROCEDURE — 84702 CHORIONIC GONADOTROPIN TEST: CPT | Mod: NTX

## 2020-01-17 PROCEDURE — 80053 COMPREHEN METABOLIC PANEL: CPT | Mod: TXP

## 2020-01-17 PROCEDURE — 83735 ASSAY OF MAGNESIUM: CPT | Mod: NTX

## 2020-01-17 PROCEDURE — 85025 COMPLETE CBC W/AUTO DIFF WBC: CPT | Mod: NTX

## 2020-01-17 PROCEDURE — 36415 COLL VENOUS BLD VENIPUNCTURE: CPT | Mod: PO,TXP

## 2020-01-17 PROCEDURE — 83880 ASSAY OF NATRIURETIC PEPTIDE: CPT | Mod: NTX

## 2020-01-20 ENCOUNTER — OFFICE VISIT (OUTPATIENT)
Dept: TRANSPLANT | Facility: CLINIC | Age: 50
End: 2020-01-20
Payer: COMMERCIAL

## 2020-01-20 VITALS
DIASTOLIC BLOOD PRESSURE: 63 MMHG | WEIGHT: 132.5 LBS | HEART RATE: 92 BPM | SYSTOLIC BLOOD PRESSURE: 99 MMHG | HEIGHT: 59 IN | BODY MASS INDEX: 26.71 KG/M2

## 2020-01-20 DIAGNOSIS — E66.3 OVERWEIGHT (BMI 25.0-29.9): ICD-10-CM

## 2020-01-20 DIAGNOSIS — J96.11 CHRONIC RESPIRATORY FAILURE WITH HYPOXIA: ICD-10-CM

## 2020-01-20 DIAGNOSIS — I36.1 NONRHEUMATIC TRICUSPID VALVE REGURGITATION: ICD-10-CM

## 2020-01-20 DIAGNOSIS — Z79.01 LONG TERM CURRENT USE OF ANTICOAGULANT THERAPY: ICD-10-CM

## 2020-01-20 DIAGNOSIS — I27.9 CHRONIC PULMONARY HEART DISEASE: Primary | ICD-10-CM

## 2020-01-20 DIAGNOSIS — G47.33 OSA (OBSTRUCTIVE SLEEP APNEA): ICD-10-CM

## 2020-01-20 DIAGNOSIS — Z76.82 AWAITING ORGAN TRANSPLANT STATUS: ICD-10-CM

## 2020-01-20 DIAGNOSIS — I27.9 CHRONIC PULMONARY HEART DISEASE: ICD-10-CM

## 2020-01-20 DIAGNOSIS — I27.0 PRIMARY PULMONARY HYPERTENSION: Primary | ICD-10-CM

## 2020-01-20 PROCEDURE — 3008F PR BODY MASS INDEX (BMI) DOCUMENTED: ICD-10-PCS | Mod: CPTII,S$GLB,TXP, | Performed by: INTERNAL MEDICINE

## 2020-01-20 PROCEDURE — 3008F BODY MASS INDEX DOCD: CPT | Mod: CPTII,S$GLB,TXP, | Performed by: INTERNAL MEDICINE

## 2020-01-20 PROCEDURE — 99214 OFFICE O/P EST MOD 30 MIN: CPT | Mod: S$GLB,TXP,, | Performed by: INTERNAL MEDICINE

## 2020-01-20 PROCEDURE — 99999 PR PBB SHADOW E&M-EST. PATIENT-LVL III: CPT | Mod: PBBFAC,TXP,, | Performed by: INTERNAL MEDICINE

## 2020-01-20 PROCEDURE — 99999 PR PBB SHADOW E&M-EST. PATIENT-LVL III: ICD-10-PCS | Mod: PBBFAC,TXP,, | Performed by: INTERNAL MEDICINE

## 2020-01-20 PROCEDURE — 99214 PR OFFICE/OUTPT VISIT, EST, LEVL IV, 30-39 MIN: ICD-10-PCS | Mod: S$GLB,TXP,, | Performed by: INTERNAL MEDICINE

## 2020-01-20 NOTE — PROGRESS NOTES
Subjective:   Ms. Perez is a 49 y.o. year old White female with PH who is been seen today for routine follow up.      HPI     49 y.o. White female who presents for PH follow-up. Patient was diagnosed with IPAH about 5 and a half years, ABHIJEET recently, currently deferred for LUT (was working on fund raising). Initial symptom- syncope.  Patient has been on triple therapy: Remodulin at  80 ng/kg/min infusion, Letairis 10mg qdaily, and Adcirca 40mg qdaily. Since last visit pt underwent RHC in Dec (see below)- Plan was to switch adcirca to adempas and to stop letairis as seems her nasal congestion is worse with letairis. (she is now on remodulin, adcirca and letairis)    Since yest pt started coughing up yellow phlegm and developed a sore throat- says her throat feels better but still w cough- thinks she's getting bronchitis- before her cold, says she still had some SOB, wheezing- intermittent periods of SOB with bending over-thinks with adcirca she didn't have that- she is on full dose adempas now (2.5mg tid)  Has only missed one dose. She never stopped letairis.  Says her sinus issues have actually improved, has times she can breathe out of her nose and the pressure in her face her been reduced at times. Still has occasional migraines- takes fioricet which helps.       No 6mw scheduled today  476m 463m (457  1/18, 469m in Nov, 457 in Nov, 488m unchanged last 3 visits with me( 450 11/15/16,  457m, 518.5m, 533.75, 549m April, 463.6m prev visit)                                              O2 sat  97-> 98 % RA                                                          HR 76->151                                                      BP  129/71-->142/71                                                       Ab 2->8    RHC 9/4/19  · RA: 6/ 7/ 5 RV: 105/ 3/ 10 PA: 103/ 45/ 64 PWP: 15/ 1512/ 13 . Cardiac output was 4.11 by Ebony. Cardiac index is 2.67 L/min/m2. O2 Sat: PA 70%. AO sat 96% PVR 12.4       TTE  (02/22/2019):    · Normal left ventricular systolic function. The estimated ejection fraction is 65%  · Normal LV diastolic function.  · Septal wall has abnormal motion.  · Mildly reduced right ventricular systolic function.  · Mild tricuspid regurgitation.  · The estimated PA systolic pressure is 54 mm Hg  · Pulmonary hypertension present.  · Normal central venous pressure (3 mm Hg).      RHC 8/20/18     PW: 8/6 (5)  PA: 101/35 (57)  RV: 92/0  RVEDP: 6   AO_SAT: 99  PA_SAT: 72  RA: 6/5 (4)  FICKCI: 2.5700  FICKCO: 4.0800  PVR: 1038.0000    TTE 4/18  Right Ventricle: The right ventricle is normal in size measuring 3.1 cm at the base in the apical right ventricle-focused view. Global right ventricular systolic function appears mildly to moderately depressed. Tricuspid annular plane systolic excursion   (TAPSE) is 2.3 cm. The estimated PA systolic pressure is greater than 39 mmHg.       TTE 9/18/17  1 - Normal left ventricular systolic function (EF 60-65%).     2 - No wall motion abnormalities.     3 - Normal left ventricular diastolic function.     4 - Right atrial enlargement.     5 - Right ventricular enlargement with moderately depressed systolic function.     6 - The estimated PA systolic pressure is 36 mmHg.     7 - Mild tricuspid regurgitation.   Right Ventricle: The right ventricle is enlarged measuring 4.1 cm at the base in the apical right ventricle-focused view. Global right ventricular systolic function appears moderately depressed. There is abnormal septal motion consistent with RV   pressure/volume overload. Tricuspid annular plane systolic excursion (TAPSE) is 1.7 cm. Tissue Doppler-derived tricuspid annular peak systolic velocity (S prime) is 11.0 cm/s. The estimated PA systolic pressure is 36 mmHg.         Today:   6mw 488 m 463m (457  1/18, 469m in Nov, 457 in Nov, 488m unchanged last 3 visits with me( 450 11/15/16,  457m, 518.5m, 533.75, 549m April, 463.6m prev visit)                                               O2 sat  97-> 96 % RA                                                          HR 82->141                                                     BP  135/78-->151/87                                                      Ab 2->9    R/LHC 1/17  PW:  (30)  RV: 102  RVEDP: 21     PA: 102/58  RA:  (28)  PA_SAT: 65  AO: 93/56  LV: 93  LVEDP: 14   FICKCO: 4.08  Normal cors    RHC 4/16  AOPRES: 122/85 (97)  AOSAT: 98  FICKCI: 2.81  FICKCO: 4.38  PAPRES: 101/34 (59)  PASAT: 71  PVR: 10.73  PWPRES: 14/8 (10)  RAPRES: 9/5 (4)  RVPRES: 91/-4, 8    TTE last visit   1 - Normal left ventricular systolic function (EF 60-65%).     2 - Right ventricular enlargement with mildly to moderately depressed systolic function.     3 - Normal left ventricular diastolic function.     4 - Pulmonary hypertension. The estimated PA systolic pressure is 41 mmHg.       TTE 3/4/16  1 - Normal left ventricular systolic function (EF 60-65%).   2 - Normal left ventricular diastolic function.   3 - Right ventricle is upper limit of normal in size with low normal to mildly depressed systolic function. TAPSE 2.2 RV 3.7 cm   4 - Trivial to mild tricuspid regurgitation.   5 - Pulmonary hypertension. The estimated PA systolic pressure is 60 mmHg.     TTE Oct  1 - Normal left ventricular systolic function (EF 60-65%).   2 - Left ventricular diastolic dysfunction.   3 - Biatrial enlargement.   4 - Right ventricular enlargement with hypertrophy, with normal systolic function.   5 - Pulmonary hypertension.   6 - Trivial mitral regurgitation.   7 - Trivial tricuspid regurgitation.   PASp 46    CXR 2/3/16  Vascular catheter now identified, its tip in the superior vena cava just superior to its junction with the right atrium. Heart size is normal, as is the appearance of the pulmonary vascularity. Lung zones are clear, and free of significant airspace consolidation or volume loss. No pleural fluid. No hilar or mediastinal mass lesion. No pneumothorax.  "    VQ Scan 8/17/16: low probability      Review of Systems   Constitution: Negative for chills, fever, malaise/fatigue, night sweats, weight gain and weight loss.   HENT: Positive for congestion.    Eyes: Negative.    Cardiovascular: Positive for dyspnea on exertion (WHO FC II). Negative for chest pain, claudication, cyanosis, irregular heartbeat, leg swelling, near-syncope, orthopnea, palpitations, paroxysmal nocturnal dyspnea and syncope.   Respiratory: Positive for cough and wheezing. Negative for hemoptysis and shortness of breath.    Endocrine: Negative.    Skin: Negative.    Musculoskeletal: Negative.  Negative for joint pain.   Gastrointestinal: Negative for bloating, abdominal pain, change in bowel habit, constipation, hematemesis, hematochezia, melena and nausea.   Neurological: Negative for dizziness, light-headedness and weakness.   Psychiatric/Behavioral: Negative for depression.       Objective:   Blood pressure 99/63, pulse 92, height 4' 11" (1.499 m), weight 60.1 kg (132 lb 7.9 oz).body mass index is 26.76 kg/m².    Physical Exam   Constitutional: She is oriented to person, place, and time. She appears well-developed.   HENT:   Head: Normocephalic.   Eyes: Pupils are equal, round, and reactive to light.   Neck: Normal range of motion. No JVD present.   Cardiovascular: Normal rate.   Murmur heard.  Pulmonary/Chest: Effort normal and breath sounds normal. No respiratory distress. She has no wheezes. She has no rales.   Abdominal: Soft. Bowel sounds are normal. She exhibits no distension. There is no tenderness. There is no rebound and no guarding.   Musculoskeletal: Normal range of motion. She exhibits no edema.   Neurological: She is alert and oriented to person, place, and time. She has normal reflexes. No cranial nerve deficit. Coordination normal.   Skin: Skin is warm and dry.       Lab Results   Component Value Date    WBC 4.86 01/17/2020    HGB 14.9 01/17/2020    HCT 44.9 01/17/2020    MCV 90 " 01/17/2020     01/17/2020    CO2 20 (L) 01/17/2020    CREATININE 0.7 01/17/2020    CALCIUM 8.6 (L) 01/17/2020    ALBUMIN 3.6 01/17/2020    AST 13 01/17/2020    BNP 27 01/17/2020    ALT 13 01/17/2020       Lab Results   Component Value Date    INR 0.9 09/04/2019    INR 1.0 06/07/2019    INR 2.1 (H) 11/20/2018       BNP   Date Value Ref Range Status   01/17/2020 27 0 - 99 pg/mL Final     Comment:     Values of less than 100 pg/ml are consistent with non-CHF populations.   10/28/2019 13 0 - 99 pg/mL Final     Comment:     Values of less than 100 pg/ml are consistent with non-CHF populations.   09/04/2019 12 0 - 99 pg/mL Final     Comment:     Values of less than 100 pg/ml are consistent with non-CHF populations.         Assessment:     1. Primary pulmonary hypertension    2. Overweight (BMI 25.0-29.9)    3. ABHIJEET (obstructive sleep apnea)    4. Long term current use of anticoagulant therapy    5. Chronic respiratory failure with hypoxia    6. Awaiting organ transplant status    7. Chronic pulmonary heart disease    8. Nonrheumatic tricuspid valve regurgitation      WHO group 1,  FC II    Plan:   -Will continue with current medical therapy for PH- sent Rx for guaifensin w codeine  -pt to f/u with Dr Hollins to discuss her inhalers    - WHO FC II and euvolemic. BNP remains low. No 6mw scheduled today for some reason    -Keep salt intake to under 2000 mg sodium, fluids to under 2 L (64 oz)  -Check weights every morning after getting out of bed and urinating. If weight goes up 3# overnight or 5# in one week she should call us    -F/u 3 mo with labs walk and echo    Ivonne Rai MD

## 2020-01-20 NOTE — PATIENT INSTRUCTIONS
We will make a follow up with Dr Hollins to discuss your inhalers    PH on Facebook: A couple of our patients created a group on Facebook for people living in [place] with PH, it's called [place] [facility].    Check it out!      PH support group    [facility]   6:30pm  Lety Langford, PH Nurse Coordinator [facility]  Topic: Medication Adherence and Goals, Traveling with PH    RSVMADIE Davidson [phone] or email [email or web address]

## 2020-01-27 ENCOUNTER — TELEPHONE (OUTPATIENT)
Dept: TRANSPLANT | Facility: CLINIC | Age: 50
End: 2020-01-27

## 2020-01-27 NOTE — TELEPHONE ENCOUNTER
"Returned call to pt, who reports she continues to have increased SOB w/cough and congestion. Pt also expresses concern regarding higher pulse rate, on average "I am normally 70-72, and I now average low 90s." Pt also reports oxygen saturations average 90%, where they normally are 98-99%. She notes this happened when she started Adempas, and has just continued. Pt inquired regarding possible steroid taper, as she says "the rescue inhaler is not working, even when I use 3 puffs instead of 2." Pt was encouraged to see PCP urgently, or come to ER if she continues to feel poorly. Message sent to Dr Rai regarding all.   "

## 2020-01-28 ENCOUNTER — OFFICE VISIT (OUTPATIENT)
Dept: FAMILY MEDICINE | Facility: CLINIC | Age: 50
End: 2020-01-28
Payer: COMMERCIAL

## 2020-01-28 ENCOUNTER — TELEPHONE (OUTPATIENT)
Dept: FAMILY MEDICINE | Facility: CLINIC | Age: 50
End: 2020-01-28

## 2020-01-28 ENCOUNTER — TELEPHONE (OUTPATIENT)
Dept: TRANSPLANT | Facility: CLINIC | Age: 50
End: 2020-01-28

## 2020-01-28 ENCOUNTER — HOSPITAL ENCOUNTER (OUTPATIENT)
Dept: RADIOLOGY | Facility: HOSPITAL | Age: 50
Discharge: HOME | End: 2020-01-28
Attending: NURSE PRACTITIONER
Payer: COMMERCIAL

## 2020-01-28 VITALS
HEIGHT: 59 IN | OXYGEN SATURATION: 93 % | WEIGHT: 131.75 LBS | SYSTOLIC BLOOD PRESSURE: 94 MMHG | HEART RATE: 86 BPM | DIASTOLIC BLOOD PRESSURE: 58 MMHG | TEMPERATURE: 98 F | BODY MASS INDEX: 26.56 KG/M2

## 2020-01-28 DIAGNOSIS — J96.11 CHRONIC RESPIRATORY FAILURE WITH HYPOXIA: ICD-10-CM

## 2020-01-28 DIAGNOSIS — R05.9 COUGHING: ICD-10-CM

## 2020-01-28 DIAGNOSIS — I27.0 PRIMARY PULMONARY HYPERTENSION: ICD-10-CM

## 2020-01-28 DIAGNOSIS — R06.2 WHEEZING: ICD-10-CM

## 2020-01-28 DIAGNOSIS — J18.9 PNEUMONIA DUE TO INFECTIOUS ORGANISM, UNSPECIFIED LATERALITY, UNSPECIFIED PART OF LUNG: ICD-10-CM

## 2020-01-28 DIAGNOSIS — R06.02 SOB (SHORTNESS OF BREATH): ICD-10-CM

## 2020-01-28 DIAGNOSIS — R05.9 COUGHING: Primary | ICD-10-CM

## 2020-01-28 DIAGNOSIS — I27.9 CHRONIC PULMONARY HEART DISEASE: ICD-10-CM

## 2020-01-28 DIAGNOSIS — J18.9 PNEUMONIA DUE TO INFECTIOUS ORGANISM, UNSPECIFIED LATERALITY, UNSPECIFIED PART OF LUNG: Primary | ICD-10-CM

## 2020-01-28 PROCEDURE — 71046 X-RAY EXAM CHEST 2 VIEWS: CPT | Mod: 26,NTX,, | Performed by: RADIOLOGY

## 2020-01-28 PROCEDURE — 71046 XR CHEST PA AND LATERAL: ICD-10-PCS | Mod: 26,NTX,, | Performed by: RADIOLOGY

## 2020-01-28 PROCEDURE — 99214 PR OFFICE/OUTPT VISIT, EST, LEVL IV, 30-39 MIN: ICD-10-PCS | Mod: 25,S$GLB,, | Performed by: NURSE PRACTITIONER

## 2020-01-28 PROCEDURE — 94640 PR INHAL RX, AIRWAY OBST/DX SPUTUM INDUCT: ICD-10-PCS | Mod: S$GLB,,, | Performed by: NURSE PRACTITIONER

## 2020-01-28 PROCEDURE — 3008F BODY MASS INDEX DOCD: CPT | Mod: CPTII,S$GLB,, | Performed by: NURSE PRACTITIONER

## 2020-01-28 PROCEDURE — 94640 AIRWAY INHALATION TREATMENT: CPT | Mod: S$GLB,,, | Performed by: NURSE PRACTITIONER

## 2020-01-28 PROCEDURE — 99999 PR PBB SHADOW E&M-EST. PATIENT-LVL V: ICD-10-PCS | Mod: PBBFAC,,, | Performed by: NURSE PRACTITIONER

## 2020-01-28 PROCEDURE — 99999 PR PBB SHADOW E&M-EST. PATIENT-LVL V: CPT | Mod: PBBFAC,,, | Performed by: NURSE PRACTITIONER

## 2020-01-28 PROCEDURE — 71046 X-RAY EXAM CHEST 2 VIEWS: CPT | Mod: TC,FY,PO,NTX

## 2020-01-28 PROCEDURE — 99214 OFFICE O/P EST MOD 30 MIN: CPT | Mod: 25,S$GLB,, | Performed by: NURSE PRACTITIONER

## 2020-01-28 PROCEDURE — 3008F PR BODY MASS INDEX (BMI) DOCUMENTED: ICD-10-PCS | Mod: CPTII,S$GLB,, | Performed by: NURSE PRACTITIONER

## 2020-01-28 RX ORDER — LEVALBUTEROL INHALATION SOLUTION 1.25 MG/3ML
1.25 SOLUTION RESPIRATORY (INHALATION)
Status: COMPLETED | OUTPATIENT
Start: 2020-01-28 | End: 2020-01-28

## 2020-01-28 RX ORDER — LEVOFLOXACIN 500 MG/1
500 TABLET, FILM COATED ORAL DAILY
Qty: 7 TABLET | Refills: 0 | Status: SHIPPED | OUTPATIENT
Start: 2020-01-28 | End: 2020-02-04

## 2020-01-28 RX ADMIN — LEVALBUTEROL INHALATION SOLUTION 1.25 MG: 1.25 SOLUTION RESPIRATORY (INHALATION) at 09:01

## 2020-01-28 NOTE — TELEPHONE ENCOUNTER
I spoke with the patient and explained that I wanted to repeat her chest x-ray on March at 10 am. Patient verbalized understanding of above.

## 2020-01-28 NOTE — TELEPHONE ENCOUNTER
"Returned call to pt, who reports diagnosis of PNA w/PCP today. Pt starting oral antibiotic, and understands reasons to come to hospital (worsening SOB, not making improvement on antibiotic, fever). Offered to possibly draw set of labs/move echo to earlier date, to allay pt's concern that PH might be worsening ("I thought you had to have a fever to have pneumonia"). Pt says she will consider and possibly call me back about same.  "

## 2020-01-28 NOTE — PROGRESS NOTES
Subjective:       Patient ID: Yuni Perez is a 49 y.o. female.    Chief Complaint: URI    49-year-old female presents to the clinic today with complaint of sinus congestion, coughing, wheezing, shortness of breath, and chest pain with coughing for the past week.  She denies any fever, chills, sore throat, ear pain, abdominal pain, nausea, vomiting, or diarrhea.  She denies any headaches, dizziness, or blurred vision.  She denies any cardiac chest pain, heart palpitations, or swelling to lower extremities.  She has been taking Mucinex every 12 hr, Tussin with codeine, Benadryl, Claritin, and using her Ventolin inhaler every 4 hours.  She has pulmonary hypertension and is on Remodulin.     Past Medical History:   Diagnosis Date    AR (allergic rhinitis)     Cholelithiasis, common bile duct     Chronic low back pain     Eye pressure 2017    GERD (gastroesophageal reflux disease)     History of migraine headaches     Hypothyroidism     Lumbar disc disease     Menorrhagia     Mild asthma     Obesity     Plantar fasciitis of left foot     Primary pulmonary hypertension     followed by heart transplant/pulmonary     Seizure disorder     x 1 in 2008    Seizures     Sleep apnea     TMJ (dislocation of temporomandibular joint)     Tricuspid regurgitation      Past Surgical History:   Procedure Laterality Date    CARDIAC CATHETERIZATION      CENTRAL VENOUS CATHETER INSERTION      gallbladder drain  06/2019    PORTACATH PLACEMENT      RIGHT HEART CATHETERIZATION Right 8/20/2018    Procedure: HEART CATH-RIGHT;  Surgeon: Ivonne Rai MD;  Location: [facility] CATH LAB;  Service: Cardiology;  Laterality: Right;    RIGHT HEART CATHETERIZATION Right 9/4/2019    Procedure: INSERTION, CATHETER, RIGHT HEART;  Surgeon: Ivonne Rai MD;  Location: [facility] CATH LAB;  Service: Cardiology;  Laterality: Right;    UPPER GASTROINTESTINAL ENDOSCOPY        reports that she has never smoked. She has never  used smokeless tobacco. She reports that she does not drink alcohol or use drugs.  Review of Systems   Constitutional: Negative for chills and fever.   HENT: Positive for congestion. Negative for ear discharge, ear pain, postnasal drip, rhinorrhea, sinus pressure, sneezing and sore throat.    Eyes: Negative for pain, discharge and itching.   Respiratory: Positive for cough, shortness of breath and wheezing.    Cardiovascular: Negative for chest pain, palpitations and leg swelling.   Gastrointestinal: Negative for abdominal pain, diarrhea, nausea and vomiting.   Musculoskeletal: Negative for back pain, neck pain and neck stiffness.   Skin: Negative for rash.   Neurological: Negative for dizziness, light-headedness and headaches.   Hematological: Negative for adenopathy.       Objective:      Physical Exam   Constitutional: She is oriented to person, place, and time. She appears well-developed and well-nourished. No distress.   HENT:   Head: Normocephalic and atraumatic.   Right Ear: External ear normal.   Left Ear: External ear normal.   Mouth/Throat: Oropharynx is clear and moist. No oropharyngeal exudate.   Pale mucus membranes clear nasal drainage    Eyes: Pupils are equal, round, and reactive to light. Conjunctivae and EOM are normal. Right eye exhibits no discharge. Left eye exhibits no discharge. No scleral icterus.   Neck: Normal range of motion. Neck supple.   Cardiovascular: Normal rate and regular rhythm. Exam reveals no gallop and no friction rub.   No murmur heard.  Pulmonary/Chest: Effort normal. No respiratory distress. She has wheezes. She has no rales.   A lot of coughing with slight wheezing pulse ox 93% on Room air    Abdominal: Soft. Bowel sounds are normal. There is no tenderness.   Musculoskeletal: Normal range of motion. She exhibits no edema.   Lymphadenopathy:     She has no cervical adenopathy.   Neurological: She is alert and oriented to person, place, and time.   Skin: Skin is warm and dry.  No rash noted. She is not diaphoretic.   Psychiatric: She has a normal mood and affect.       Assessment:       1. Coughing    2. Pneumonia due to infectious organism, unspecified laterality, unspecified part of lung    3. SOB (shortness of breath)    4. Primary pulmonary hypertension    5. Chronic respiratory failure with hypoxia    6. Chronic pulmonary heart disease    7. Wheezing        Plan:         Coughing  -     X-Ray Chest PA And Lateral; Future; Expected date: 01/28/2020  - continue Tussin with Codeine at night    and Mucinex during the day     Pneumonia due to infectious organism, unspecified laterality, unspecified part of lung  - Levaquin 500 mg po daily x 7 days    SOB (shortness of breath)  -     X-Ray Chest PA And Lateral; Future; Expected date: 01/28/2020    Primary pulmonary hypertension  - continue Remodulin  - followed by transplant team    Chronic respiratory failure with hypoxia  - use inhaler as needed     Chronic pulmonary heart disease  - The current medical regimen is effective;  continue present plan and medications.    Wheezing  -     levalbuterol nebulizer solution 1.25 mg  -     X-Ray Chest PA And Lateral; Future; Expected date: 01/28/2020  - after treatment lungs clear much less coughing       Follow up if symptoms worsen or fail to improve.

## 2020-01-28 NOTE — TELEPHONE ENCOUNTER
----- Message from Claribel Delong sent at 1/28/2020  3:57 PM CST -----  Contact: Patient ph [phone]  Type: Patient Call Back    Who called: Patient    What is the request in detail: Pt was seen earlier. Would like to speak to nurse in regards to antibiotics and prednisone that were supposed to be sent in to pharmacy. Just calling to make sure she has everything. Please call..    Would the patient rather a call back or a response via [facility]?  Call back    Best call back number: [phone]

## 2020-01-29 ENCOUNTER — TELEPHONE (OUTPATIENT)
Dept: FAMILY MEDICINE | Facility: CLINIC | Age: 50
End: 2020-01-29

## 2020-02-05 NOTE — NURSING
Status changed to monitor per Dr. Whyte's instructions since patient is stable on current medical therapy and is too well for lung transplant.

## 2020-02-12 ENCOUNTER — OFFICE VISIT (OUTPATIENT)
Dept: TRANSPLANT | Facility: CLINIC | Age: 50
End: 2020-02-12
Payer: COMMERCIAL

## 2020-02-12 VITALS
BODY MASS INDEX: 26.66 KG/M2 | DIASTOLIC BLOOD PRESSURE: 64 MMHG | SYSTOLIC BLOOD PRESSURE: 104 MMHG | OXYGEN SATURATION: 94 % | HEIGHT: 59 IN | WEIGHT: 132.25 LBS | HEART RATE: 92 BPM

## 2020-02-12 DIAGNOSIS — J45.41 MODERATE PERSISTENT ASTHMA WITH ACUTE EXACERBATION: Primary | ICD-10-CM

## 2020-02-12 PROCEDURE — 99203 PR OFFICE/OUTPT VISIT, NEW, LEVL III, 30-44 MIN: ICD-10-PCS | Mod: 25,NTX,S$GLB, | Performed by: INTERNAL MEDICINE

## 2020-02-12 PROCEDURE — 3008F BODY MASS INDEX DOCD: CPT | Mod: CPTII,NTX,S$GLB, | Performed by: INTERNAL MEDICINE

## 2020-02-12 PROCEDURE — 99999 PR PBB SHADOW E&M-EST. PATIENT-LVL V: ICD-10-PCS | Mod: PBBFAC,TXP,, | Performed by: INTERNAL MEDICINE

## 2020-02-12 PROCEDURE — 99999 PR PBB SHADOW E&M-EST. PATIENT-LVL V: CPT | Mod: PBBFAC,TXP,, | Performed by: INTERNAL MEDICINE

## 2020-02-12 PROCEDURE — 99203 OFFICE O/P NEW LOW 30 MIN: CPT | Mod: 25,NTX,S$GLB, | Performed by: INTERNAL MEDICINE

## 2020-02-12 PROCEDURE — 3008F PR BODY MASS INDEX (BMI) DOCUMENTED: ICD-10-PCS | Mod: CPTII,NTX,S$GLB, | Performed by: INTERNAL MEDICINE

## 2020-02-12 RX ORDER — PREDNISONE 10 MG/1
TABLET ORAL
Qty: 21 TABLET | Refills: 0 | Status: SHIPPED | OUTPATIENT
Start: 2020-02-12 | End: 2020-02-26

## 2020-02-12 RX ORDER — IPRATROPIUM BROMIDE AND ALBUTEROL SULFATE 2.5; .5 MG/3ML; MG/3ML
3 SOLUTION RESPIRATORY (INHALATION) EVERY 6 HOURS PRN
Qty: 120 VIAL | Refills: 5 | Status: SHIPPED | OUTPATIENT
Start: 2020-02-12 | End: 2021-10-22 | Stop reason: SDUPTHER

## 2020-02-12 NOTE — LETTER
February 13, 2020        Ivonne Rai  [address] [facility] [address]  Phone: [phone]  Fax: [phone]             [facility] [address]  [facility] [address]  Phone: [phone]   Patient: Yuni Perez   MR Number: [id number]   YOB: 1970   Date of Visit: 2/12/2020       Dear Dr. Ivonne Rai    Thank you for referring Yuni Perez to me for evaluation. Attached you will find relevant portions of my assessment and plan of care.    If you have questions, please do not hesitate to call me. I look forward to following Yuni Perez along with you.    Sincerely,    Carol Hollins MD    Enclosure    If you would like to receive this communication electronically, please contact [email or web address] or [phone] to request [employer] access.    [employer] is a tool which provides read-only access to select patient information with whom you have a relationship. Its easy to use and provides real time access to review your patients record including encounter summaries, notes, results, and demographic information.    If you feel you have received this communication in error or would no longer like to receive these types of communications, please e-mail [email or web address]

## 2020-02-12 NOTE — PROGRESS NOTES
Subjective:       Patient ID: Yuni Perez is a 49 y.o. female.    Chief Complaint: Pulmonary Hypertension    HPI   Yuni Perez 49 y.o. female    has a past medical history of AR (allergic rhinitis), Cholelithiasis, common bile duct, Chronic low back pain, Eye pressure (2017), GERD (gastroesophageal reflux disease), History of migraine headaches, Hypothyroidism, Lumbar disc disease, Menorrhagia, Mild asthma, Obesity, Plantar fasciitis of left foot, Primary pulmonary hypertension, Seizure disorder, Seizures, Sleep apnea, TMJ (dislocation of temporomandibular joint), and Tricuspid regurgitation.    has a past surgical history that includes Portacath placement; Cardiac catheterization; Upper gastrointestinal endoscopy; Central venous catheter insertion; Right heart catheterization (Right, 8/20/2018); Right heart catheterization (Right, 9/4/2019); and gallbladder drain (06/2019).   reports that she has never smoked. She has never used smokeless tobacco. She reports that she does not drink alcohol or use drugs.   Referred by: Vida Russell  Who had concerns including Pulmonary Hypertension.  The patient's last visit with me was on 10/21/2014.    Went into hospital last May for gall bladder and they switched to breo and then switched to adempas (prior adcirca)- now can't do the activities she could do prior to the medications changes.  2 episodes of viral infections in august and October  Ok in December, more consistent with advair- in January- told pneumonia by PCP, and treated with levaquin and improved  Having more issues with asthma- using ventolin > proair, no nebulizer treatments; normally doesn't use inhaler except during goldenrod season- now using 3x per day  + sinus issues- not on anything except benadryl and claritin  Not on steroids   + wheezing    Using 3 pillows    Still on advair now  No fever chills, ns, wt changes, nausea, vomiting, diarrhea, constipation, chest pain,  tightness, pressure. Remainder of review of systems is negative.  Otherwise asymptomatic from pulmonary standpoint.      Review of Systems   All other systems reviewed and are negative.      Objective:      Physical Exam   Constitutional: She is oriented to person, place, and time. She appears well-developed and well-nourished. She appears not cachectic. No distress.   HENT:   Head: Normocephalic.   Right Ear: External ear normal.   Left Ear: External ear normal.   Neck: Normal range of motion. No thyromegaly present.   Cardiovascular: Normal rate, regular rhythm, normal heart sounds and intact distal pulses. Exam reveals no gallop and no friction rub.   No murmur heard.  Pulmonary/Chest: Normal expansion, symmetric chest wall expansion, effort normal and breath sounds normal. No stridor. No respiratory distress. She has no decreased breath sounds. She has no wheezes. She has no rhonchi. She has no rales. Chest wall is not dull to percussion. She exhibits no tenderness. Negative for egophony. Negative for tactile fremitus.   Abdominal: She exhibits no distension.   Musculoskeletal: She exhibits no edema, tenderness or deformity.   Lymphadenopathy:     She has no cervical adenopathy.   Neurological: She is alert and oriented to person, place, and time. No cranial nerve deficit. Gait normal.   Skin: Skin is warm and dry. No rash noted. She is not diaphoretic. No cyanosis or erythema. No pallor. Nails show no clubbing.   Psychiatric: She has a normal mood and affect. Her behavior is normal. Judgment and thought content normal.     Personal Diagnostic Review    No flowsheet data found.      Assessment:       1. Moderate persistent asthma with acute exacerbation        Outpatient Encounter Medications as of 2/12/2020   Medication Sig Dispense Refill    acetaminophen (TYLENOL EXTRA STRENGTH) 500 MG tablet Take 1,000 mg by mouth every 6 (six) hours as needed for Pain.      ADVAIR DISKUS 100-50 mcg/dose diskus inhaler  INHALE 1 PUFF TWICE DAILY 1 each 11    ambrisentan (LETAIRIS) 10 MG Tab Take 1 tablet (10 mg total) by mouth once daily. 30 tablet 11    azelastine (ASTELIN) 137 mcg (0.1 %) nasal spray 2 sprays by Nasal route 2 (two) times daily.       butalbital-acetaminophen-caffeine -40 mg (FIORICET, ESGIC) -40 mg per tablet Take 1 tablet by mouth every 4 (four) hours as needed for Pain.      citalopram (CELEXA) 10 MG tablet Take 10 mg by mouth once daily.      cyanocobalamin, vitamin B-12, 2,500 mcg Subl Place 2,500 mcg under the tongue once daily.       diphenhydrAMINE (BENADRYL) 25 mg capsule Take 50 mg by mouth every 6 (six) hours as needed for Itching.       ergocalciferol (VITAMIN D2) 50,000 unit Cap Take 1 capsule (50,000 Units total) by mouth every 7 days. 6 capsule 0    ferrous sulfate 325 (65 FE) MG EC tablet Take 325 mg by mouth daily as needed.       fluticasone (FLONASE) 50 mcg/actuation nasal spray INHALE 2 SPRAYS IN EACH NOSTRIL DAILY 16 g 3    folic acid (FOLVITE) 1 MG tablet TAKE ONE TABLET BY MOUTH once DAILY 90 tablet 2    hyoscyamine (LEVSIN/SL) 0.125 mg Subl Place 0.125 mg under the tongue every 6 (six) hours as needed.       levothyroxine (SYNTHROID) 137 MCG Tab tablet Take 1 tablet (137 mcg total) by mouth once daily. 90 tablet 3    loratadine (CLARITIN) 10 mg tablet Take 10 mg by mouth once daily.      norethindrone-ethinyl estradiol (MICROGESTIN FE 1/20, 28,) 1 mg-20 mcg (21)/75 mg (7) per tablet Take 1 tablet by mouth once daily. 28 tablet 6    ondansetron (ZOFRAN) 4 MG tablet Take 1 tablet (4 mg total) by mouth every 6 (six) hours as needed. 1 Tablet Oral Every 6 hours 30 tablet 2    pantoprazole (PROTONIX) 40 MG tablet Take 1 tablet (40 mg total) by mouth once daily. 90 tablet 3    riociguat (ADEMPAS) 2 mg Tab tablet Take 2 mg by mouth 3 (three) times daily.      sumatriptan (IMITREX) 100 MG tablet Take 1 tablet (100 mg total) by mouth as needed for Migraine. Take at the  "onset of headache, may take 1 more in 1 hour if needed. 12 tablet 5    topiramate (TOPAMAX) 100 MG tablet Take 1 tablet (100 mg total) by mouth 2 (two) times daily. 60 tablet 11    treprostinil (REMODULIN) 2.5 mg/mL Soln Mix cassette as directed and infuse continuously per physician titration orders on dosing sheet. Current dose 80ng/kg/min 60 mL 11    VENTOLIN HFA 90 mcg/actuation inhaler inhale 2 puffs BY MOUTH EVERY 4-6 HOURS 18 g 11    albuterol-ipratropium (DUO-NEB) 2.5 mg-0.5 mg/3 mL nebulizer solution Take 3 mLs by nebulization every 6 (six) hours as needed for Wheezing. Rescue 120 vial 5    predniSONE (DELTASONE) 10 MG tablet Take 2 tablets (20 mg total) by mouth once daily for 7 days, THEN 1 tablet (10 mg total) once daily for 7 days. 21 tablet 0    [DISCONTINUED] esomeprazole (NEXIUM) 40 MG capsule Take 40 mg by mouth once daily.      [DISCONTINUED] oxyCODONE-acetaminophen (PERCOCET) 5-325 mg per tablet Take 1 tablet by mouth every 4 (four) hours as needed (Headache / pain greater than 5 / 10 pain scale). 15 tablet 0     No facility-administered encounter medications on file as of 2/12/2020.      Orders Placed This Encounter   Procedures    NEBULIZER KIT (SUPPLIES) FOR HOME USE     Order Specific Question:   Height:     Answer:   4' 11" (1.499 m)     Order Specific Question:   Weight:     Answer:   60 kg (132 lb 4.4 oz)     Order Specific Question:   Length of need (1-99 months):     Answer:   99     Order Specific Question:   Mask or Mouthpiece?     Answer:   Mouthpiece       Plan:                Assessment:  Yuni was seen today for pulmonary hypertension.    Diagnoses and all orders for this visit:    Moderate persistent asthma with acute exacerbation  -     NEBULIZER KIT (SUPPLIES) FOR HOME USE    Other orders  -     predniSONE (DELTASONE) 10 MG tablet; Take 2 tablets (20 mg total) by mouth once daily for 7 days, THEN 1 tablet (10 mg total) once daily for 7 days.  -     " albuterol-ipratropium (DUO-NEB) 2.5 mg-0.5 mg/3 mL nebulizer solution; Take 3 mLs by nebulization every 6 (six) hours as needed for Wheezing. Rescue        Plan:  Problem List Items Addressed This Visit     None      Visit Diagnoses     Moderate persistent asthma with acute exacerbation    -  Primary    Relevant Orders    NEBULIZER KIT (SUPPLIES) FOR HOME USE          Start prednisone, nebulizer 2-4 times per day  Call offfice in 2 weeks and let us know how she is doing   Continue other inhalers, however may need to increase advair to 250 or 500    Follow up in about 4 months (around 6/12/2020) for pfts.    There are no Patient Instructions on file for this visit.    Immunization History   Administered Date(s) Administered    Hepatitis A / Hepatitis B 01/18/2017, 02/17/2017, 07/19/2017    Influenza 12/05/2006, 10/22/2008, 10/05/2010, 10/18/2011, 11/19/2012    Influenza - High Dose - PF (65 years and older) 01/12/2018, 11/20/2018, 10/11/2019    Influenza - Quadrivalent 10/16/2014, 10/30/2015    Influenza - Quadrivalent - PF (6 months and older) 12/05/2006, 10/22/2008, 10/05/2010, 10/18/2011, 11/19/2012, 01/18/2017    Influenza Split 11/19/2012    PPD Test 08/28/2006    Pneumococcal Conjugate - 13 Valent 08/22/2016    Pneumococcal Polysaccharide - 23 Valent 12/17/2008, 01/18/2017    Tdap 12/17/2008, 01/18/2017

## 2020-02-18 ENCOUNTER — TELEPHONE (OUTPATIENT)
Dept: TRANSPLANT | Facility: CLINIC | Age: 50
End: 2020-02-18

## 2020-02-18 NOTE — TELEPHONE ENCOUNTER
----- Message from Ivonne Rai MD sent at 2/17/2020  4:19 PM CST -----  yes  ----- Message -----  From: SULY Razo, RN  Sent: 2/17/2020   1:35 PM CST  To: Ivonne Rai MD    Are you ok with home preg test for her?  Sarah

## 2020-03-05 ENCOUNTER — HOSPITAL ENCOUNTER (OUTPATIENT)
Dept: RADIOLOGY | Facility: HOSPITAL | Age: 50
Discharge: HOME | End: 2020-03-05
Attending: NURSE PRACTITIONER
Payer: COMMERCIAL

## 2020-03-05 ENCOUNTER — TELEPHONE (OUTPATIENT)
Dept: FAMILY MEDICINE | Facility: CLINIC | Age: 50
End: 2020-03-05

## 2020-03-05 DIAGNOSIS — J18.9 PNEUMONIA DUE TO INFECTIOUS ORGANISM, UNSPECIFIED LATERALITY, UNSPECIFIED PART OF LUNG: ICD-10-CM

## 2020-03-05 PROCEDURE — 71046 XR CHEST PA AND LATERAL: ICD-10-PCS | Mod: 26,NTX,, | Performed by: RADIOLOGY

## 2020-03-05 PROCEDURE — 71046 X-RAY EXAM CHEST 2 VIEWS: CPT | Mod: TC,FY,PO,NTX

## 2020-03-05 PROCEDURE — 71046 X-RAY EXAM CHEST 2 VIEWS: CPT | Mod: 26,NTX,, | Performed by: RADIOLOGY

## 2020-03-05 NOTE — TELEPHONE ENCOUNTER
I spoke with the patient and explained that her pneumonia was resolved but chest abnormal so I was going to ask DR. Rai to review it. She said she has a echo scheduled on 4/17/2020. Patient verbalized understanding of above.

## 2020-03-12 ENCOUNTER — TELEPHONE (OUTPATIENT)
Dept: TRANSPLANT | Facility: CLINIC | Age: 50
End: 2020-03-12

## 2020-03-12 NOTE — TELEPHONE ENCOUNTER
Spoke w/pt, who inquired regarding Letairis re-enrollment form. Pt continues to get brand letairis, so asked her to please return whatever Augustin requested. Pt also inquiring regarding wearing a mask to protect form COVID-19, going out in public, etc. Reviewed vigilant handwashing, avoiding large crowds/sick contacts, and discussed that wearing a mask might serve as a barrier against droplet transmission, and certainly would not hurt, but was not a definite preventive measure. Pt verbalized understanding/agreement.

## 2020-03-27 ENCOUNTER — TELEPHONE (OUTPATIENT)
Dept: PULMONOLOGY | Facility: CLINIC | Age: 50
End: 2020-03-27

## 2020-03-27 NOTE — TELEPHONE ENCOUNTER
I left the patient a voicemail to let her know and make sure she is using her Advair twice a day and flonase as well. She can use an over the counter antihistamine and saline rinse per Dr Richard. Patient to call back if she has any questions.

## 2020-03-27 NOTE — TELEPHONE ENCOUNTER
----- Message from Natalia Richard MD sent at 3/26/2020  3:26 PM CDT -----  Please make sure that she is using her Advair twice a day and flonase as well as an over the counter antihistamine and saline rinse.  Natalia    ----- Message -----  From: Ligia Diggs MA  Sent: 3/26/2020   2:55 PM CDT  To: Natalia Richard MD        ----- Message -----  From: SULY Razo, RN  Sent: 3/25/2020   4:52 PM CDT  To: Carol Hollins MD, Gurvinder MADDEN Staff    Hi-    Not sure if this could be forwarded on to somebody covering for you, since I am sure you are beyond taxed in the hospital. However, pt said you wanted her to report when she had uptick in allergies and/or asthma exacerbations. She is noticing this now symptom-wise but is no more short of breath than baseline.     Thank you,  Sarah

## 2020-04-02 ENCOUNTER — TELEPHONE (OUTPATIENT)
Dept: PULMONOLOGY | Facility: CLINIC | Age: 50
End: 2020-04-02

## 2020-04-02 RX ORDER — FLUTICASONE FUROATE AND VILANTEROL 200; 25 UG/1; UG/1
1 POWDER RESPIRATORY (INHALATION) DAILY
Qty: 1 EACH | Refills: 5 | Status: SHIPPED | OUTPATIENT
Start: 2020-04-02 | End: 2020-09-28

## 2020-04-02 NOTE — TELEPHONE ENCOUNTER
----- Message from Ligia Diggs MA sent at 4/2/2020  3:08 PM CDT -----  Contact: [phone]  Dr Richard,  I spoke to patient. Madi's changed to [facility] and now requiring patient to get a new rx even though she still had refills left on rx. Advair is not covered on her formulary now. Patient stated they would not tell her what is covered on her formulary.  Can you send in a new rx to [facility] on [facility] for patient?   Thank you, Ligia  ----- Message -----  From: Charleen Bruno  Sent: 4/2/2020   2:34 PM CDT  To: Gurvinder MADDEN Staff    Pt states [employer] have taken advair off their formulary states what should she do please call back to discuss

## 2020-04-02 NOTE — TELEPHONE ENCOUNTER
I spoke to patient to let her know Dr Richard changed rx from advair to breo,once daily to pharmacy. Patient verbalized understanding.

## 2020-04-30 ENCOUNTER — TELEPHONE (OUTPATIENT)
Dept: TRANSPLANT | Facility: CLINIC | Age: 50
End: 2020-04-30

## 2020-04-30 NOTE — TELEPHONE ENCOUNTER
Spoke w/Dr Rai. Advised trying hydrocortisone away from insertion site, and monitoring closely. Pt will contact our office for increased drainage, redness, fever, or any other s/s of infection at chauhan site.

## 2020-04-30 NOTE — TELEPHONE ENCOUNTER
"Pt called to report that  Eason site "always itching" d/t adhesive. She isn't sure if she rubbed too hard and irritated site, or not, but noticed redness last week w/no other symptoms/issues.  Today, when she changed dressing, she noticed site had a little yellowish drainage, but none on old dressing. No odor. Pt cleaned site and had a little bleeding. It feels "tight" but not sore/painful. Plans to clean it again tomorrow. Denies fever. Pt unable to take pic and send d/t camera not working. Message sent to Dr Rai regarding all.  "

## 2020-05-06 ENCOUNTER — TELEPHONE (OUTPATIENT)
Dept: TRANSPLANT | Facility: CLINIC | Age: 50
End: 2020-05-06

## 2020-05-06 NOTE — TELEPHONE ENCOUNTER
"Per pt yesterday, chauhan site continues to have "dime to edi" sized yellowish drainage to biopatch when dressing changed. Pt and mom are changing dressing daily. Pt reports site only hurts "when we push on it to see if it will drain anything". Asked pt to please refrain from doing this, and explained rationale (opening to infection, etc). Pt reports afebrile, and site only a little red, but does bleed after it's cleaned.     Spoke w/Dr Rai, who recommends blood cultures and continued measures to decrease itching, which is ongoing complaint for pt. Suggestions from this RN and Dr Rai to eliminate skin prep, but pt was reluctant in previous conversation, stating dressing will not stay in place. Dr Rai also recommends not changing dressing daily.     At approximately 4:15 pm on 5/5, left VM for pt, recommending blood cultures, and changing dressing every couple days, rather than daily. Recommended continued use of hydrocortisone, and also to refrain from pushing on/touching chauhan, rubbing on areas of skin that itch. Again recommended elimination of skin prep, as this can be a primary offender for skin irritation w/chauhan catheter dressings.   "

## 2020-05-07 DIAGNOSIS — T80.212A LOCAL INFECTION DUE TO HICKMAN CATHETER, INITIAL ENCOUNTER: Primary | ICD-10-CM

## 2020-05-11 ENCOUNTER — HOSPITAL ENCOUNTER (OUTPATIENT)
Dept: CARDIOLOGY | Facility: CLINIC | Age: 50
Discharge: HOME | End: 2020-05-11
Attending: INTERNAL MEDICINE
Payer: COMMERCIAL

## 2020-05-11 ENCOUNTER — LAB VISIT (OUTPATIENT)
Dept: LAB | Facility: HOSPITAL | Age: 50
End: 2020-05-11
Attending: INTERNAL MEDICINE
Payer: COMMERCIAL

## 2020-05-11 ENCOUNTER — HOSPITAL ENCOUNTER (OUTPATIENT)
Dept: PULMONOLOGY | Facility: CLINIC | Age: 50
Discharge: HOME | End: 2020-05-11
Payer: COMMERCIAL

## 2020-05-11 VITALS — BODY MASS INDEX: 26.21 KG/M2 | HEIGHT: 59 IN | WEIGHT: 130 LBS

## 2020-05-11 VITALS
BODY MASS INDEX: 26.61 KG/M2 | SYSTOLIC BLOOD PRESSURE: 100 MMHG | WEIGHT: 132 LBS | HEART RATE: 90 BPM | HEIGHT: 59 IN | DIASTOLIC BLOOD PRESSURE: 60 MMHG

## 2020-05-11 DIAGNOSIS — Z79.899 POLYPHARMACY: ICD-10-CM

## 2020-05-11 DIAGNOSIS — I27.9 CHRONIC PULMONARY HEART DISEASE: ICD-10-CM

## 2020-05-11 DIAGNOSIS — R06.82 TACHYPNEA: ICD-10-CM

## 2020-05-11 DIAGNOSIS — T80.212A LOCAL INFECTION DUE TO HICKMAN CATHETER, INITIAL ENCOUNTER: ICD-10-CM

## 2020-05-11 LAB
ALBUMIN SERPL BCP-MCNC: 3.9 G/DL (ref 3.5–5.2)
ALP SERPL-CCNC: 76 U/L (ref 55–135)
ALT SERPL W/O P-5'-P-CCNC: 20 U/L (ref 10–44)
ANION GAP SERPL CALC-SCNC: 6 MMOL/L (ref 8–16)
ASCENDING AORTA: 2.79 CM
AST SERPL-CCNC: 16 U/L (ref 10–40)
AV INDEX (PROSTH): 1.06
AV MEAN GRADIENT: 4 MMHG
AV PEAK GRADIENT: 9 MMHG
AV VALVE AREA: 2.58 CM2
AV VELOCITY RATIO: 0.9
BASOPHILS # BLD AUTO: 0.03 K/UL (ref 0–0.2)
BASOPHILS NFR BLD: 0.7 % (ref 0–1.9)
BILIRUB SERPL-MCNC: 0.5 MG/DL (ref 0.1–1)
BNP SERPL-MCNC: 69 PG/ML (ref 0–99)
BSA FOR ECHO PROCEDURE: 1.58 M2
BUN SERPL-MCNC: 9 MG/DL (ref 6–20)
CALCIUM SERPL-MCNC: 9.1 MG/DL (ref 8.7–10.5)
CHLORIDE SERPL-SCNC: 110 MMOL/L (ref 95–110)
CO2 SERPL-SCNC: 23 MMOL/L (ref 23–29)
CREAT SERPL-MCNC: 0.7 MG/DL (ref 0.5–1.4)
CV ECHO LV RWT: 0.34 CM
DIFFERENTIAL METHOD: NORMAL
DOP CALC AO PEAK VEL: 1.5 M/S
DOP CALC AO VTI: 21.4 CM
DOP CALC LVOT AREA: 2.4 CM2
DOP CALC LVOT DIAMETER: 1.76 CM
DOP CALC LVOT PEAK VEL: 1.35 M/S
DOP CALC LVOT STROKE VOLUME: 55.2 CM3
DOP CALCLVOT PEAK VEL VTI: 22.7 CM
E WAVE DECELERATION TIME: 164.3 MSEC
E/A RATIO: 1.02
E/E' RATIO: 9.08 M/S
ECHO LV POSTERIOR WALL: 0.63 CM (ref 0.6–1.1)
EOSINOPHIL # BLD AUTO: 0.1 K/UL (ref 0–0.5)
EOSINOPHIL NFR BLD: 3.4 % (ref 0–8)
ERYTHROCYTE [DISTWIDTH] IN BLOOD BY AUTOMATED COUNT: 13.8 % (ref 11.5–14.5)
EST. GFR  (AFRICAN AMERICAN): >60 ML/MIN/1.73 M^2
EST. GFR  (NON AFRICAN AMERICAN): >60 ML/MIN/1.73 M^2
FRACTIONAL SHORTENING: 27 % (ref 28–44)
GLUCOSE SERPL-MCNC: 84 MG/DL (ref 70–110)
HCT VFR BLD AUTO: 45.2 % (ref 37–48.5)
HGB BLD-MCNC: 14.7 G/DL (ref 12–16)
IMM GRANULOCYTES # BLD AUTO: 0.01 K/UL (ref 0–0.04)
IMM GRANULOCYTES NFR BLD AUTO: 0.2 % (ref 0–0.5)
INTERVENTRICULAR SEPTUM: 0.88 CM (ref 0.6–1.1)
IVRT: 74.22 MSEC
LA MAJOR: 4.03 CM
LA MINOR: 3.98 CM
LA WIDTH: 3.12 CM
LEFT ATRIUM SIZE: 2.73 CM
LEFT ATRIUM VOLUME INDEX: 18.8 ML/M2
LEFT ATRIUM VOLUME: 28.99 CM3
LEFT INTERNAL DIMENSION IN SYSTOLE: 2.68 CM (ref 2.1–4)
LEFT VENTRICLE DIASTOLIC VOLUME INDEX: 37.37 ML/M2
LEFT VENTRICLE DIASTOLIC VOLUME: 57.76 ML
LEFT VENTRICLE MASS INDEX: 49 G/M2
LEFT VENTRICLE SYSTOLIC VOLUME INDEX: 17.2 ML/M2
LEFT VENTRICLE SYSTOLIC VOLUME: 26.59 ML
LEFT VENTRICULAR INTERNAL DIMENSION IN DIASTOLE: 3.69 CM (ref 3.5–6)
LEFT VENTRICULAR MASS: 75.78 G
LV LATERAL E/E' RATIO: 6.06 M/S
LV SEPTAL E/E' RATIO: 18.17 M/S
LYMPHOCYTES # BLD AUTO: 1.2 K/UL (ref 1–4.8)
LYMPHOCYTES NFR BLD: 27.9 % (ref 18–48)
MAGNESIUM SERPL-MCNC: 2.3 MG/DL (ref 1.6–2.6)
MCH RBC QN AUTO: 29.3 PG (ref 27–31)
MCHC RBC AUTO-ENTMCNC: 32.5 G/DL (ref 32–36)
MCV RBC AUTO: 90 FL (ref 82–98)
MONOCYTES # BLD AUTO: 0.4 K/UL (ref 0.3–1)
MONOCYTES NFR BLD: 9 % (ref 4–15)
MV PEAK A VEL: 1.07 M/S
MV PEAK E VEL: 1.09 M/S
NEUTROPHILS # BLD AUTO: 2.4 K/UL (ref 1.8–7.7)
NEUTROPHILS NFR BLD: 58.8 % (ref 38–73)
NRBC BLD-RTO: 0 /100 WBC
PISA TR MAX VEL: 5 M/S
PLATELET # BLD AUTO: 191 K/UL (ref 150–350)
PMV BLD AUTO: 11.9 FL (ref 9.2–12.9)
POTASSIUM SERPL-SCNC: 3.8 MMOL/L (ref 3.5–5.1)
PROT SERPL-MCNC: 7.4 G/DL (ref 6–8.4)
PULM VEIN S/D RATIO: 1.7
PV PEAK D VEL: 0.3 M/S
PV PEAK S VEL: 0.51 M/S
RA MAJOR: 3.77 CM
RA PRESSURE: 3 MMHG
RA WIDTH: 2.69 CM
RBC # BLD AUTO: 5.01 M/UL (ref 4–5.4)
RIGHT VENTRICULAR END-DIASTOLIC DIMENSION: 4.32 CM
RV TISSUE DOPPLER FREE WALL SYSTOLIC VELOCITY 1 (APICAL 4 CHAMBER VIEW): 9.76 CM/S
SINUS: 2.87 CM
SODIUM SERPL-SCNC: 139 MMOL/L (ref 136–145)
STJ: 2.25 CM
TDI LATERAL: 0.18 M/S
TDI SEPTAL: 0.06 M/S
TDI: 0.12 M/S
TR MAX PG: 100 MMHG
TRICUSPID ANNULAR PLANE SYSTOLIC EXCURSION: 1.89 CM
TV REST PULMONARY ARTERY PRESSURE: 103 MMHG
WBC # BLD AUTO: 4.12 K/UL (ref 3.9–12.7)

## 2020-05-11 PROCEDURE — 93306 TTE W/DOPPLER COMPLETE: CPT | Mod: 26,TXP,, | Performed by: INTERNAL MEDICINE

## 2020-05-11 PROCEDURE — 36415 COLL VENOUS BLD VENIPUNCTURE: CPT | Mod: TXP

## 2020-05-11 PROCEDURE — 93306 ECHO (CUPID ONLY): ICD-10-PCS | Mod: 26,TXP,, | Performed by: INTERNAL MEDICINE

## 2020-05-11 PROCEDURE — 83735 ASSAY OF MAGNESIUM: CPT | Mod: NTX

## 2020-05-11 PROCEDURE — 94618 PULMONARY STRESS TESTING: ICD-10-PCS | Mod: S$GLB,TXP,, | Performed by: INTERNAL MEDICINE

## 2020-05-11 PROCEDURE — 85025 COMPLETE CBC W/AUTO DIFF WBC: CPT | Mod: TXP

## 2020-05-11 PROCEDURE — 83880 ASSAY OF NATRIURETIC PEPTIDE: CPT | Mod: TXP

## 2020-05-11 PROCEDURE — 87040 BLOOD CULTURE FOR BACTERIA: CPT | Mod: TXP

## 2020-05-11 PROCEDURE — 80053 COMPREHEN METABOLIC PANEL: CPT | Mod: TXP

## 2020-05-11 PROCEDURE — 93306 TTE W/DOPPLER COMPLETE: CPT | Mod: TXP

## 2020-05-11 PROCEDURE — 94618 PULMONARY STRESS TESTING: CPT | Mod: S$GLB,TXP,, | Performed by: INTERNAL MEDICINE

## 2020-05-11 NOTE — PROCEDURES
Yuni Perez is a 50 y.o.  female patient, who presents for a 6 minute walk test ordered by MD Cecelia.  The diagnosis is Pulmonary Hypertension.  The patient's BMI is 26.2 kg/m2.  Predicted distance (lower limit of normal) is 428.84 meters.      Test Results:    The test was complete without stops.   The total time walked was 360 seconds.  During walking, the patient reported:  Dyspnea, Lightheadedness. The patient used no assistive devices  during testing.     05/11/2020---------Distance: 426.72 meters (1400 feet)     O2 Sat % Supplemental Oxygen Heart Rate Blood Pressure Ab Scale   Pre-exercise  (Resting) 96 % Room Air 95 bpm 92/63 mmHg 3   During Exercise 95 % Room Air 143 bpm (!) 129/92 mmHg 9   Post-exercise  (Recovery) 97 % Room Air  106 bpm   mmHg       Recovery Time: 115 seconds    Performing nurse/tech: SUNNI Laughlin RRT      PREVIOUS STUDY:   The patient had a previous study.  12/03/2019---------Distance: 457.2 meters (1500 feet)       O2 Sat % Supplemental Oxygen Heart Rate Blood Pressure Ab Scale   Pre-exercise  (Resting) 98 % Room Air 91 bpm 103/80 mmHg 2   During Exercise 96 % Room Air 140 bpm 138/80 mmHg 9   Post-exercise  (Recovery) 99 % Room Air  110 bpm 126/72 mmHg           CLINICAL INTERPRETATION:  Six minute walk distance is 426.72 meters (1400 feet) with very, very heavy dyspnea.  During exercise, there was no significant desaturation while breathing room air.  Both blood pressure and heart rate increased significantly with walking.  Tachycardia was present prior to exercise.  This may represent a hypertensive and a tachycardic response to exercise.  The patient reported non-pulmonary symptoms during exercise.  Since the previous study in December 2019, exercise capacity is unchanged.  Based upon age and body mass index, exercise capacity is less than predicted.

## 2020-05-12 ENCOUNTER — OFFICE VISIT (OUTPATIENT)
Dept: TRANSPLANT | Facility: CLINIC | Age: 50
End: 2020-05-12
Payer: COMMERCIAL

## 2020-05-12 VITALS
HEIGHT: 59 IN | DIASTOLIC BLOOD PRESSURE: 70 MMHG | HEART RATE: 83 BPM | WEIGHT: 134.25 LBS | SYSTOLIC BLOOD PRESSURE: 93 MMHG | BODY MASS INDEX: 27.06 KG/M2 | OXYGEN SATURATION: 94 %

## 2020-05-12 DIAGNOSIS — G47.33 OSA (OBSTRUCTIVE SLEEP APNEA): ICD-10-CM

## 2020-05-12 DIAGNOSIS — J96.11 CHRONIC RESPIRATORY FAILURE WITH HYPOXIA: ICD-10-CM

## 2020-05-12 DIAGNOSIS — I27.0 PRIMARY PULMONARY HYPERTENSION: Primary | ICD-10-CM

## 2020-05-12 DIAGNOSIS — Z76.82 AWAITING ORGAN TRANSPLANT STATUS: ICD-10-CM

## 2020-05-12 DIAGNOSIS — Z79.01 LONG TERM CURRENT USE OF ANTICOAGULANT THERAPY: ICD-10-CM

## 2020-05-12 DIAGNOSIS — E66.3 OVERWEIGHT (BMI 25.0-29.9): ICD-10-CM

## 2020-05-12 DIAGNOSIS — J45.909 MILD ASTHMA WITHOUT COMPLICATION, UNSPECIFIED WHETHER PERSISTENT: ICD-10-CM

## 2020-05-12 PROCEDURE — 99999 PR PBB SHADOW E&M-EST. PATIENT-LVL IV: CPT | Mod: PBBFAC,TXP,, | Performed by: INTERNAL MEDICINE

## 2020-05-12 PROCEDURE — 3008F BODY MASS INDEX DOCD: CPT | Mod: CPTII,S$GLB,TXP, | Performed by: INTERNAL MEDICINE

## 2020-05-12 PROCEDURE — 99999 PR PBB SHADOW E&M-EST. PATIENT-LVL IV: ICD-10-PCS | Mod: PBBFAC,TXP,, | Performed by: INTERNAL MEDICINE

## 2020-05-12 PROCEDURE — 99214 OFFICE O/P EST MOD 30 MIN: CPT | Mod: S$GLB,TXP,, | Performed by: INTERNAL MEDICINE

## 2020-05-12 PROCEDURE — 99214 PR OFFICE/OUTPT VISIT, EST, LEVL IV, 30-39 MIN: ICD-10-PCS | Mod: S$GLB,TXP,, | Performed by: INTERNAL MEDICINE

## 2020-05-12 PROCEDURE — 3008F PR BODY MASS INDEX (BMI) DOCUMENTED: ICD-10-PCS | Mod: CPTII,S$GLB,TXP, | Performed by: INTERNAL MEDICINE

## 2020-05-12 RX ORDER — RIOCIGUAT 2.5 MG/1
2.5 TABLET, FILM COATED ORAL EVERY 8 HOURS
COMMUNITY
Start: 2020-04-23 | End: 2020-08-05 | Stop reason: SINTOL

## 2020-05-12 NOTE — PATIENT INSTRUCTIONS
Lets increase remodulin to 100 ng/kg/min over the next couple of months- repeat echo again next time, if no better, right heart cath    Keep salt intake to under 2000 mg sodium, fluids to under 2 L (64 oz)    Call us if you find yourself getting more short of breath, have more swelling or unexpected weight changes, fever, chills or problems with your line

## 2020-05-12 NOTE — PROGRESS NOTES
Subjective:   Ms. Perez is a 50 y.o. year old White female with PH who is been seen today for routine follow up.      HPI     50 y.o. White female who presents for PH follow-up. Patient was diagnosed with IPAH about 5 and a half years, ABHIJEET recently, currently deferred for LUT (was working on fund raising). Initial symptom- syncope.  Patient has been on triple therapy: Remodulin at  80 ng/kg/min infusion, Letairis 10mg qdaily, and Adcirca 40mg qdaily. Since last visit pt underwent RHC in Dec (see below)- Plan was to switch adcirca to adempas and to stop letairis as seems her nasal congestion is worse with letairis. (she is now on remodulin, adcirca and letairis)    Since last visit pt reports having aching in her left arm, and her whole body feels tight- has felt it today, yest, off and on through Dec, ever since she had atypical pneumonia. No fever. Cough every once in a while, usually dry.   While sitting in the chair reports having the aching in her arm, though not as strong- feels like a throbbing feeling and does not occur when she exerts herself- will feel it when in front of the tv playing [employer]. No swelling. Her site looks good, just has a little drainage under the biopatch but not red or painful, just itches. Has been staying home with covid. Felt very winded during her walk today, more than usual, couldn't get to her usual aerobic pace she says. Was purse lipped  Breathing on the second lap- has noticed this sometimes at home. Since being on adempas, noticed kneeling down she is a lot more winded- cleaning up an egg on her knees was winded getting up      6mw  today  427m (457m 476m 463m (457  1/18, 469m in Nov, 457 in Nov, 488m unchanged last 3 visits with me( 450 11/15/16,  457m, 518.5m, 533.75, 549m April, 463.6m prev visit)                                              O2 sat  96-> 95 % RA                                                          HR 95->143                                                       BP  92/63-->129/92                                                       Ab 3->9    RHC 9/4/19  · RA: 6/ 7/ 5 RV: 105/ 3/ 10 PA: 103/ 45/ 64 PWP: 15/ 1512/ 13 . Cardiac output was 4.11 by Ebony. Cardiac index is 2.67 L/min/m2. O2 Sat: PA 70%. AO sat 96% PVR 12.4     TTE 5/11/20  · Normal left ventricular systolic function. The estimated ejection fraction is 60%.  · Indeterminate left ventricular diastolic function.  · Septal wall has abnormal motion. Systolic flattening of the interventricular septum consistent with right ventricle pressure overload.  · Moderately reduced right ventricular systolic function.  RV 4.3 TAPSE 1.9  · Moderate right ventricular enlargement.  · Mild to moderate tricuspid regurgitation.  · Seevere pulmonary hypertension present.  · The estimated PA systolic pressure is 103 mmHg.  · Normal central venous pressure (3 mmHg).    TTE (02/22/2019):    · Normal left ventricular systolic function. The estimated ejection fraction is 65%  · Normal LV diastolic function.  · Septal wall has abnormal motion.  · Mildly reduced right ventricular systolic function.  RV 3.9 TAPSE 1.9  · Mild tricuspid regurgitation.  · The estimated PA systolic pressure is 54 mm Hg  · Pulmonary hypertension present.  · Normal central venous pressure (3 mm Hg).      RHC 8/20/18     PW: 8/6 (5)  PA: 101/35 (57)  RV: 92/0  RVEDP: 6   AO_SAT: 99  PA_SAT: 72  RA: 6/5 (4)  FICKCI: 2.5700  FICKCO: 4.0800  PVR: 1038.0000    TTE 4/18  Right Ventricle: The right ventricle is normal in size measuring 3.1 cm at the base in the apical right ventricle-focused view. Global right ventricular systolic function appears mildly to moderately depressed. Tricuspid annular plane systolic excursion   (TAPSE) is 2.3 cm. The estimated PA systolic pressure is greater than 39 mmHg.       TTE 9/18/17  1 - Normal left ventricular systolic function (EF 60-65%).     2 - No wall motion abnormalities.     3 - Normal left ventricular  diastolic function.     4 - Right atrial enlargement.     5 - Right ventricular enlargement with moderately depressed systolic function.     6 - The estimated PA systolic pressure is 36 mmHg.     7 - Mild tricuspid regurgitation.   Right Ventricle: The right ventricle is enlarged measuring 4.1 cm at the base in the apical right ventricle-focused view. Global right ventricular systolic function appears moderately depressed. There is abnormal septal motion consistent with RV   pressure/volume overload. Tricuspid annular plane systolic excursion (TAPSE) is 1.7 cm. Tissue Doppler-derived tricuspid annular peak systolic velocity (S prime) is 11.0 cm/s. The estimated PA systolic pressure is 36 mmHg.           6mw 488 m 463m (457  1/18, 469m in Nov, 457 in Nov, 488m unchanged last 3 visits with me( 450 11/15/16,  457m, 518.5m, 533.75, 549m April, 463.6m prev visit)                                              O2 sat  97-> 96 % RA                                                          HR 82->141                                                     BP  135/78-->151/87                                                      Ab 2->9    R/LHC 1/17  PW:  (30)  RV: 102  RVEDP: 21     PA: 102/58  RA:  (28)  PA_SAT: 65  AO: 93/56  LV: 93  LVEDP: 14   FICKCO: 4.08  Normal cors    RHC 4/16  AOPRES: 122/85 (97)  AOSAT: 98  FICKCI: 2.81  FICKCO: 4.38  PAPRES: 101/34 (59)  PASAT: 71  PVR: 10.73  PWPRES: 14/8 (10)  RAPRES: 9/5 (4)  RVPRES: 91/-4, 8    TTE last visit   1 - Normal left ventricular systolic function (EF 60-65%).     2 - Right ventricular enlargement with mildly to moderately depressed systolic function.     3 - Normal left ventricular diastolic function.     4 - Pulmonary hypertension. The estimated PA systolic pressure is 41 mmHg.       TTE 3/4/16  1 - Normal left ventricular systolic function (EF 60-65%).   2 - Normal left ventricular diastolic function.   3 - Right ventricle is upper limit of normal in size with low  "normal to mildly depressed systolic function. TAPSE 2.2 RV 3.7 cm   4 - Trivial to mild tricuspid regurgitation.   5 - Pulmonary hypertension. The estimated PA systolic pressure is 60 mmHg.     TTE Oct  1 - Normal left ventricular systolic function (EF 60-65%).   2 - Left ventricular diastolic dysfunction.   3 - Biatrial enlargement.   4 - Right ventricular enlargement with hypertrophy, with normal systolic function.   5 - Pulmonary hypertension.   6 - Trivial mitral regurgitation.   7 - Trivial tricuspid regurgitation.   PASp 46    CXR 2/3/16  Vascular catheter now identified, its tip in the superior vena cava just superior to its junction with the right atrium. Heart size is normal, as is the appearance of the pulmonary vascularity. Lung zones are clear, and free of significant airspace consolidation or volume loss. No pleural fluid. No hilar or mediastinal mass lesion. No pneumothorax.     VQ Scan 8/17/16: low probability      Review of Systems   Constitution: Negative for chills, fever, malaise/fatigue, night sweats, weight gain and weight loss.   HENT: Positive for congestion.    Eyes: Negative.    Cardiovascular: Positive for dyspnea on exertion (WHO FC II). Negative for chest pain, claudication, cyanosis, irregular heartbeat, leg swelling, near-syncope, orthopnea, palpitations, paroxysmal nocturnal dyspnea and syncope.   Respiratory: Positive for cough and wheezing. Negative for hemoptysis and shortness of breath.    Endocrine: Negative.    Skin: Negative.    Musculoskeletal: Negative.  Negative for joint pain.   Gastrointestinal: Negative for bloating, abdominal pain, change in bowel habit, constipation, hematemesis, hematochezia, melena and nausea.   Neurological: Negative for dizziness, light-headedness and weakness.   Psychiatric/Behavioral: Negative for depression.       Objective:   Blood pressure 93/70, pulse 83, height 4' 11" (1.499 m), weight 60.9 kg (134 lb 4.2 oz), last menstrual period 05/03/2020, " SpO2 (!) 94 %.body mass index is 27.12 kg/m².    Physical Exam   Constitutional: She is oriented to person, place, and time. She appears well-developed.   HENT:   Head: Normocephalic.   Eyes: Pupils are equal, round, and reactive to light.   Neck: Normal range of motion. No JVD present.   Cardiovascular: Normal rate.   Murmur heard.  Pulmonary/Chest: Effort normal and breath sounds normal. No respiratory distress. She has no wheezes. She has no rales.   Abdominal: Soft. Bowel sounds are normal. She exhibits no distension. There is no tenderness. There is no rebound and no guarding.   Musculoskeletal: Normal range of motion. She exhibits no edema.   Neurological: She is alert and oriented to person, place, and time. She has normal reflexes. No cranial nerve deficit. Coordination normal.   Skin: Skin is warm and dry.       Lab Results   Component Value Date    WBC 4.12 05/11/2020    HGB 14.7 05/11/2020    HCT 45.2 05/11/2020    MCV 90 05/11/2020     05/11/2020    CO2 23 05/11/2020    CREATININE 0.7 05/11/2020    CALCIUM 9.1 05/11/2020    ALBUMIN 3.9 05/11/2020    AST 16 05/11/2020    BNP 69 05/11/2020    ALT 20 05/11/2020       Lab Results   Component Value Date    INR 0.9 09/04/2019    INR 1.0 06/07/2019    INR 2.1 (H) 11/20/2018       BNP   Date Value Ref Range Status   05/11/2020 69 0 - 99 pg/mL Final     Comment:     Values of less than 100 pg/ml are consistent with non-CHF populations.   01/17/2020 27 0 - 99 pg/mL Final     Comment:     Values of less than 100 pg/ml are consistent with non-CHF populations.   10/28/2019 13 0 - 99 pg/mL Final     Comment:     Values of less than 100 pg/ml are consistent with non-CHF populations.         Assessment:     1. Primary pulmonary hypertension    2. ABHIJEET (obstructive sleep apnea)    3. Overweight (BMI 25.0-29.9)    4. Mild asthma without complication, unspecified whether persistent    5. Long term current use of anticoagulant therapy    6. Chronic respiratory failure  with hypoxia    7. Awaiting organ transplant status      WHO group 1,  FC II-III    Plan:   Concerned today as pt's 6mw has dropped on both of the last two walks-  476m ->457m ->427m and symptomatically seems to be declining- echo with moderate RVE/mild-mod RV dsyfxn (similar to prior) BNP while still normal is also uptrending    Will go ahead and increase remodulin slowly from 80-> 100ng.  pt seems to think her sx related to transition of adempas but I favor disease progression. Will repeat echo again on F/U and if no improvement in echo or walk will repeat RHC.    - Will cc LUT to make them aware I'm worried pt is progressing    F/u blood cx- has a slight spot of discharge at her line exit site but no fever, erythema, normal WBC... Can see if the biopatch is irritating her skin by leaving it off and seeing if it heals    -Keep salt intake to under 2000 mg sodium, fluids to under 2 L (64 oz)    -Check weights every morning after getting out of bed and urinating. If weight goes up 3# overnight or 5# in one week she should call us    -F/u 3 mo with labs walk and echo as above    Ivonne Rai MD

## 2020-05-12 NOTE — Clinical Note
Worried andrea is starting to progress... Will push her remodulin dose and repeat echo in 3 mo- if no better, will repeat RHCSAMSAM

## 2020-05-15 LAB — BACTERIA BLD CULT: NORMAL

## 2020-05-17 DIAGNOSIS — I27.0 PRIMARY PULMONARY HYPERTENSION: ICD-10-CM

## 2020-05-18 ENCOUNTER — TELEPHONE (OUTPATIENT)
Dept: TRANSPLANT | Facility: CLINIC | Age: 50
End: 2020-05-18

## 2020-05-18 RX ORDER — FOLIC ACID 1 MG/1
TABLET ORAL
Qty: 90 TABLET | Refills: 2 | Status: SHIPPED | OUTPATIENT
Start: 2020-05-18 | End: 2020-06-03 | Stop reason: SDUPTHER

## 2020-05-18 NOTE — TELEPHONE ENCOUNTER
"Pt continues to exhibit concern regarding chauhan site. She stopped using biopatch one week ago, but still reports reddish yellow discharge, and red area at insertion site. No pics. Pt advises she changes dressing every 2-3 days as drainage "bleeds through" dressing, and "by day three, it's the size of a quarter with discharge reddish yellow." Pt reports "microblisters that are wet, at 7 oclock to site 1/2 inch down."  Dr Rai updated. Since pt's BCs are negative, she advises changing chauhan, but pt may also wait a little longer to see if stopping biopatch will help. Advised patient regarding both options. Also suggested contacting pharmacy to try another dressing, if desired. Pt also inquired if she could place antibiotic liquid or cream on insertion site, and she was advised not to do this. Will continue to monitor closely.   "

## 2020-05-26 ENCOUNTER — DOCUMENTATION ONLY (OUTPATIENT)
Dept: TRANSPLANT | Facility: CLINIC | Age: 50
End: 2020-05-26

## 2020-05-27 ENCOUNTER — DOCUMENTATION ONLY (OUTPATIENT)
Dept: TRANSPLANT | Facility: CLINIC | Age: 50
End: 2020-05-27

## 2020-05-27 ENCOUNTER — TELEPHONE (OUTPATIENT)
Dept: TRANSPLANT | Facility: CLINIC | Age: 50
End: 2020-05-27

## 2020-05-27 DIAGNOSIS — Z76.82 AWAITING ORGAN TRANSPLANT STATUS: Primary | ICD-10-CM

## 2020-05-27 DIAGNOSIS — I27.20 PULMONARY HYPERTENSION: Primary | ICD-10-CM

## 2020-05-27 DIAGNOSIS — I27.0 PRIMARY PULMONARY HYPERTENSION: ICD-10-CM

## 2020-05-27 NOTE — TELEPHONE ENCOUNTER
Contacted [facility] to schedule her 6 month follow-up appointment.  Notified her she would need to have a negative COVID-19 test within 48 hours of the spirometry (breathing test).  She stated that Dr. Rai is in the process of increasing her Remodulin.  She states that a repeat echo will be ordered with her next visit.  She would prefer to wait until August or September for a follow-up appointment to get a better picture of her clinical status.  Informed her that I would discuss with Dr. Whyte and would call her back with his instructions.  She verbalized her understanding.    Notified Dr. Whyte of above.  Instructions received from Dr. Whyte to schedule a follow-up appointment after the repeat echo in 3 months.    Attempted to contact [facility] with Dr. Whyte's instructions to schedule a follow-up appointment after the repeat echo in 3 months.  No answer.  Left a message informing her that we will schedule a follow-up appointment after the repeat echo in 3 months.  Instructed her to call back with any questions or should she need to be seen sooner.

## 2020-06-01 ENCOUNTER — TELEPHONE (OUTPATIENT)
Dept: TRANSPLANT | Facility: CLINIC | Age: 50
End: 2020-06-01

## 2020-06-01 NOTE — TELEPHONE ENCOUNTER
"Pt messaged:  "Just an update. Stoma site still has pus w/blisters on top of blisters (not sure what this means). The whole inflammation of site is approximately a quarter in size. Just using alcohol to clean now. Nothing has changed but more blisters. Very uncomfortable."    Again stressed to not change the dressing so frequently (Go back to weekly) and again asked for a pic. She said she does dressing change every three days because dressing is "saturated". Message sent to Dr Rai regarding all. Pt has had issue w/tien since approximately April 30.   "

## 2020-06-02 ENCOUNTER — TELEPHONE (OUTPATIENT)
Dept: TRANSPLANT | Facility: CLINIC | Age: 50
End: 2020-06-02

## 2020-06-02 NOTE — TELEPHONE ENCOUNTER
"----- Message from Ivonne Rai MD sent at 6/2/2020  8:54 AM CDT -----  We should change the line.  ----- Message -----  From: SULY Razo, RN  Sent: 6/1/2020   3:48 PM CDT  To: Ivonne Rai MD    She texted "Just an update. Stoma site still has pus w/blisters on top of blisters (not sure what this means). The whole inflammation of site is approximately a quarter in size. Just using alcohol to clean now. Nothing has changed but more blisters. Very uncomfortable."    I again stressed to not change the dressing so frequently (Go back to weekly) and I again asked for a pic. She said she does it every three days because dressing is "saturated". She also endorsed rubbing on the dressing, the last time I spoke w/her (about a week ago).     This has been going on since April 30, just FYI.         "

## 2020-06-02 NOTE — TELEPHONE ENCOUNTER
Spoke w/pt, who agrees to have line replaced. She notes she will be out of town July 17-19. Message sent to Seda in [facility], regarding scheduling pt for Eason removal and placement.

## 2020-06-03 ENCOUNTER — OFFICE VISIT (OUTPATIENT)
Dept: FAMILY MEDICINE | Facility: CLINIC | Age: 50
End: 2020-06-03
Payer: COMMERCIAL

## 2020-06-03 VITALS
HEIGHT: 59 IN | BODY MASS INDEX: 27.12 KG/M2 | DIASTOLIC BLOOD PRESSURE: 60 MMHG | OXYGEN SATURATION: 96 % | HEART RATE: 95 BPM | WEIGHT: 134.5 LBS | SYSTOLIC BLOOD PRESSURE: 90 MMHG | TEMPERATURE: 98 F

## 2020-06-03 DIAGNOSIS — I27.0 PRIMARY PULMONARY HYPERTENSION: ICD-10-CM

## 2020-06-03 DIAGNOSIS — J96.11 CHRONIC RESPIRATORY FAILURE WITH HYPOXIA: ICD-10-CM

## 2020-06-03 DIAGNOSIS — I27.9 CHRONIC PULMONARY HEART DISEASE: ICD-10-CM

## 2020-06-03 DIAGNOSIS — E66.3 OVERWEIGHT (BMI 25.0-29.9): ICD-10-CM

## 2020-06-03 DIAGNOSIS — M85.80 BORDERLINE OSTEOPENIA: ICD-10-CM

## 2020-06-03 DIAGNOSIS — E03.9 HYPOTHYROIDISM (ACQUIRED): ICD-10-CM

## 2020-06-03 DIAGNOSIS — K21.9 GASTROESOPHAGEAL REFLUX DISEASE, ESOPHAGITIS PRESENCE NOT SPECIFIED: ICD-10-CM

## 2020-06-03 DIAGNOSIS — G44.229 CHRONIC TENSION-TYPE HEADACHE, NOT INTRACTABLE: ICD-10-CM

## 2020-06-03 DIAGNOSIS — Z12.31 ENCOUNTER FOR SCREENING MAMMOGRAM FOR BREAST CANCER: ICD-10-CM

## 2020-06-03 DIAGNOSIS — Z00.00 ROUTINE MEDICAL EXAM: Primary | ICD-10-CM

## 2020-06-03 PROCEDURE — 99396 PR PREVENTIVE VISIT,EST,40-64: ICD-10-PCS | Mod: S$GLB,,, | Performed by: INTERNAL MEDICINE

## 2020-06-03 PROCEDURE — 99396 PREV VISIT EST AGE 40-64: CPT | Mod: S$GLB,,, | Performed by: INTERNAL MEDICINE

## 2020-06-03 PROCEDURE — 99999 PR PBB SHADOW E&M-EST. PATIENT-LVL III: ICD-10-PCS | Mod: PBBFAC,,, | Performed by: INTERNAL MEDICINE

## 2020-06-03 PROCEDURE — 99999 PR PBB SHADOW E&M-EST. PATIENT-LVL III: CPT | Mod: PBBFAC,,, | Performed by: INTERNAL MEDICINE

## 2020-06-03 RX ORDER — FOLIC ACID 1 MG/1
1000 TABLET ORAL DAILY
Qty: 90 TABLET | Refills: 3 | Status: SHIPPED | OUTPATIENT
Start: 2020-06-03 | End: 2021-05-26

## 2020-06-03 NOTE — Clinical Note
Dr. Rai - our mutual patient asked me to copy you on her note from today. I took a picture of the site of her indwelling line for your review. Thanks! Lulu

## 2020-06-03 NOTE — PROGRESS NOTES
HISTORY OF PRESENT ILLNESS:  Yuni Perez is a 50 y.o. female who presents to the clinic today for a routine physical exam. Her last physical exam was approximately 1 years(s) ago.    Contraception: OCP; followed by GYN      PAST MEDICAL HISTORY:  Past Medical History:   Diagnosis Date    AR (allergic rhinitis)     Cholelithiasis, common bile duct     Chronic low back pain     Eye pressure 2017    GERD (gastroesophageal reflux disease)     History of migraine headaches     Hypothyroidism     Lumbar disc disease     Menorrhagia     Mild asthma     Obesity     Plantar fasciitis of left foot     Primary pulmonary hypertension     followed by heart transplant/pulmonary     Seizure disorder     x 1 in 2008    Seizures     Sleep apnea     TMJ (dislocation of temporomandibular joint)     Tricuspid regurgitation        PAST SURGICAL HISTORY:  Past Surgical History:   Procedure Laterality Date    CARDIAC CATHETERIZATION      CENTRAL VENOUS CATHETER INSERTION      gallbladder drain  06/2019    PORTACATH PLACEMENT      RIGHT HEART CATHETERIZATION Right 8/20/2018    Procedure: HEART CATH-RIGHT;  Surgeon: Ivonne Rai MD;  Location: [facility] CATH LAB;  Service: Cardiology;  Laterality: Right;    RIGHT HEART CATHETERIZATION Right 9/4/2019    Procedure: INSERTION, CATHETER, RIGHT HEART;  Surgeon: Ivonne Rai MD;  Location: [facility] CATH LAB;  Service: Cardiology;  Laterality: Right;    UPPER GASTROINTESTINAL ENDOSCOPY         SOCIAL HISTORY:  Social History     Socioeconomic History    Marital status: Single     Spouse name: Not on file    Number of children: 0    Years of education: Not on file    Highest education level: Not on file   Occupational History    Occupation: disabled     Employer: [employer]   Social Needs    Financial resource strain: Not on file    Food insecurity:     Worry: Not on file     Inability: Not on file    Transportation needs:     Medical: Not  on file     Non-medical: Not on file   Tobacco Use    Smoking status: Never Smoker    Smokeless tobacco: Never Used   Substance and Sexual Activity    Alcohol use: No     Alcohol/week: 0.8 standard drinks     Types: 1 Standard drinks or equivalent per week     Comment: 1 per year    Drug use: No    Sexual activity: Never     Birth control/protection: OCP, Pill     Comment: pt is a virgin   Lifestyle    Physical activity:     Days per week: Not on file     Minutes per session: Not on file    Stress: Not on file   Relationships    Social connections:     Talks on phone: Not on file     Gets together: Not on file     Attends Confucianism service: Not on file     Active member of club or organization: Not on file     Attends meetings of clubs or organizations: Not on file     Relationship status: Not on file   Other Topics Concern    Not on file   Social History Narrative    Not on file       FAMILY HISTORY:  Family History   Problem Relation Age of Onset    Heart disease Father     Glaucoma Father     Diabetes Mother     Cancer Maternal Grandmother         uterine    Cancer Maternal Aunt         breast    Breast cancer Maternal Aunt     Heart disease Paternal Grandfather     Heart attack Paternal Grandfather     Diabetes Paternal Grandfather     Leukemia Brother     Hypertension Unknown     Diabetes Maternal Grandfather     Heart attack Maternal Grandfather     Breast cancer Cousin     No Known Problems Sister     No Known Problems Maternal Uncle     No Known Problems Paternal Aunt     No Known Problems Paternal Uncle     No Known Problems Paternal Grandmother     Colon cancer Neg Hx     Ovarian cancer Neg Hx     Amblyopia Neg Hx     Blindness Neg Hx     Cataracts Neg Hx     Macular degeneration Neg Hx     Retinal detachment Neg Hx     Strabismus Neg Hx     Stroke Neg Hx     Thyroid disease Neg Hx     Esophageal cancer Neg Hx        ALLERGIES AND MEDICATIONS: updated and  "reviewed.  Review of patient's allergies indicates:   Allergen Reactions    Fentanyl Anaphylaxis     Respiratory distress    Vibra-tabs [doxycycline hyclate] Anaphylaxis     "throat felt like it was closing"    Adhesive Hives     Silk tape    Amoxicillin Rash    Nsaids (non-steroidal anti-inflammatory drug) Swelling    Chlorhexidine Other (See Comments)     Medication List with Changes/Refills   Current Medications    ACETAMINOPHEN (TYLENOL EXTRA STRENGTH) 500 MG TABLET    Take 1,000 mg by mouth every 6 (six) hours as needed for Pain.    ADEMPAS 2.5 MG TABLET        ALBUTEROL-IPRATROPIUM (DUO-NEB) 2.5 MG-0.5 MG/3 ML NEBULIZER SOLUTION    Take 3 mLs by nebulization every 6 (six) hours as needed for Wheezing. Rescue    AMBRISENTAN (LETAIRIS) 10 MG TAB    Take 1 tablet (10 mg total) by mouth once daily.    AZELASTINE (ASTELIN) 137 MCG (0.1 %) NASAL SPRAY    2 sprays by Nasal route 2 (two) times daily.     BUTALBITAL-ACETAMINOPHEN-CAFFEINE -40 MG (FIORICET, ESGIC) -40 MG PER TABLET    Take 1 tablet by mouth every 4 (four) hours as needed for Pain.    CITALOPRAM (CELEXA) 10 MG TABLET    Take 10 mg by mouth once daily.    CYANOCOBALAMIN, VITAMIN B-12, 2,500 MCG SUBL    Place 2,500 mcg under the tongue once daily.     DIPHENHYDRAMINE (BENADRYL) 25 MG CAPSULE    Take 50 mg by mouth every 6 (six) hours as needed for Itching.     ERGOCALCIFEROL (VITAMIN D2) 50,000 UNIT CAP    Take 1 capsule (50,000 Units total) by mouth every 7 days.    FERROUS SULFATE 325 (65 FE) MG EC TABLET    Take 325 mg by mouth daily as needed.     FLUTICASONE (FLONASE) 50 MCG/ACTUATION NASAL SPRAY    INHALE 2 SPRAYS IN EACH NOSTRIL DAILY    FLUTICASONE FUROATE-VILANTEROL (BREO ELLIPTA) 200-25 MCG/DOSE DSDV DISKUS INHALER    Inhale 1 puff into the lungs once daily. Controller    HYOSCYAMINE (LEVSIN/SL) 0.125 MG SUBL    Place 0.125 mg under the tongue every 6 (six) hours as needed.     LEVOTHYROXINE (SYNTHROID) 137 MCG TAB TABLET    " Take 1 tablet (137 mcg total) by mouth once daily.    LORATADINE (CLARITIN) 10 MG TABLET    Take 10 mg by mouth once daily.    NORETHINDRONE-ETHINYL ESTRADIOL (MICROGESTIN FE 1/20, 28,) 1 MG-20 MCG (21)/75 MG (7) PER TABLET    Take 1 tablet by mouth once daily.    ONDANSETRON (ZOFRAN) 4 MG TABLET    Take 1 tablet (4 mg total) by mouth every 6 (six) hours as needed. 1 Tablet Oral Every 6 hours    PANTOPRAZOLE (PROTONIX) 40 MG TABLET    Take 1 tablet (40 mg total) by mouth once daily.    SUMATRIPTAN (IMITREX) 100 MG TABLET    Take 1 tablet (100 mg total) by mouth as needed for Migraine. Take at the onset of headache, may take 1 more in 1 hour if needed.    TOPIRAMATE (TOPAMAX) 100 MG TABLET    Take 1 tablet (100 mg total) by mouth 2 (two) times daily.    TREPROSTINIL (REMODULIN) 2.5 MG/ML SOLN    Mix cassette as directed and infuse continuously per physician titration orders on dosing sheet. Current dose 80ng/kg/min   Changed and/or Refilled Medications    Modified Medication Previous Medication    FOLIC ACID (FOLVITE) 1 MG TABLET folic acid (FOLVITE) 1 MG tablet       Take 1 tablet (1,000 mcg total) by mouth once daily.    TAKE ONE TABLET BY MOUTH EVERY DAY         CARE TEAM:  Patient Care Team:  Lulu Sandhu MD as PCP - General (Internal Medicine)  SULY Razo, RN as Registered Nurse  Ralph Whyte MD as Referring Physician (Intensive Care)           SCREENING HISTORY:  Health Maintenance       Date Due Completion Date    Mammogram 05/25/2019 5/25/2018    Override on 9/2/2013: Done    Override on 3/1/2011: Done    Lipid Panel 01/16/2022 1/16/2017    TETANUS VACCINE 01/18/2027 1/18/2017            REVIEW OF SYSTEMS:   The patient reports: good dietary habits.  The patient reports : that they do not exercise, but stay active.  Review of Systems   Constitutional: Negative for chills, fatigue, fever and unexpected weight change.   HENT: Negative for congestion and postnasal drip.    Eyes: Negative for  "pain and visual disturbance.   Respiratory: Positive for shortness of breath (- stable; baseline). Negative for cough and wheezing.    Cardiovascular: Negative for chest pain, palpitations and leg swelling.   Gastrointestinal: Negative for abdominal pain, constipation, diarrhea, nausea and vomiting.        She has moderate to severe chronic reflux symptoms due to medication.  She is taking Protonix once a day and then often Pepcid or Maalox in the evening.   Genitourinary: Negative for dysuria.   Musculoskeletal: Negative for arthralgias and back pain.   Skin: Positive for rash (- she is having irritation/blisters/?infection at site of chest line).   Neurological: Negative for weakness and headaches.   Psychiatric/Behavioral: Negative for dysphoric mood and sleep disturbance. The patient is not nervous/anxious.       ROS (Optional)-: no pelvic pain  Breast ROS (Optional)-: negative for breast lumps/discharge  positive for - mild left breast discomfort due to indwelling line        Physical Examination:   Vitals:    06/03/20 1121   BP: 90/60   Pulse: 95   Temp: 97.7 °F (36.5 °C)     Weight: 61 kg (134 lb 7.7 oz)   Height: 4' 11" (149.9 cm)   Body mass index is 27.16 kg/m².      Patient did not require to have a chaperone present during the exam today.    General appearance - alert, well appearing, and in no distress, overweight  Psychiatric - alert, oriented to person, place, and time, normal mood, behavior, speech, dress, motor activity, and thought processes  Eyes - pupils equal and reactive, extraocular eye movements intact, sclera anicteric  Mouth - not examined; patient wearing mask due to Covid 19 pandemic  Neck - supple, no significant adenopathy, carotids upstroke normal bilaterally, no bruits, thyroid exam: thyroid is normal in size without nodules or tenderness  Lymphatics - no palpable cervical lymphadenopathy  Chest - clear to auscultation, no wheezes, rales or rhonchi, symmetric air entry  Heart - " normal rate and regular rhythm, no gallops noted  Abdomen - soft, nontender, nondistended, no masses or organomegaly  Breasts - not examined  Back exam - not examined  Neurological - alert, normal speech, no focal findings or movement disorder noted, cranial nerves II through XII intact  Musculoskeletal - patient noted to have Mild osteoarthritic changes to both knee joints. No joint effusions noted., no muscular tenderness noted  Extremities - peripheral pulses normal, no pedal edema, no clubbing or cyanosis  Skin - normal coloration, no suspicious skin lesions; indwelling line site:            Labs:  Ordered/Scheduled      ASSESSMENT AND PLAN:  1. Routine medical exam  Counseled on age appropriate medical preventative services including age appropriate cancer screenings, age appropriate eye and dental exams, over all nutritional health, need for a consistent exercise regimen, and an over all push towards maintaining a vigorous and active lifestyle.  Counseled on age appropriate vaccines and discussed upcoming health care needs based on age/gender. Discussed good sleep hygiene and stress management.    2. Primary pulmonary hypertension/3. Chronic pulmonary heart disease/4. Chronic respiratory failure with hypoxia  The current medical regimen is effective;  continue present plan and medications.   Followed by: Pulmonology/Transplant.   - folic acid (FOLVITE) 1 MG tablet; Take 1 tablet (1,000 mcg total) by mouth once daily.  Dispense: 90 tablet; Refill: 3    5. Chronic tension-type headache, not intractable  The current medical regimen is effective;  continue present plan and medications.     6. Hypothyroidism (acquired)  Patient is clinically euthyroid. Continue current regimen.  - TSH; Future    7. Overweight (BMI 25.0-29.9)  The patient is asked to continue to make an attempt to improve diet and exercise patterns to aid in medical management of this problem.     8. Encounter for screening mammogram for breast  cancer  We will coordinate to have her mammogram done at the [facility]  - Mammo Digital Screening Bilat w/ [place]; Future    9. Gastroesophageal reflux disease, esophagitis presence not specified  Symptoms controlled: yes, but needs to take medication on a daily/twice a day basis to control symptoms.   Reflux precautions discussed (eliminate tobacco if a smoker; minimize caffeine, chocolate and red/white peppermint intake; avoid heavy and spicy meals; don't lay down within 2-3 hours after eating; minimize the intake of NSAIDs). Medication as needed.   Patient asked to take medication breaks, if possible - discussed chronic use can limit calcium absorption (which can lead to osteopenia/osteoporosis), increases the risk for intestinal infections, and can cause kidney damage. There are also some newer studies that show possible increased risk of mortality.    10. Borderline osteopenia  We discussed adequate calcium and vitamin D supplementation. We discussed fall precautions. She is up to date on her BMD. No need for prescription medication at this time           Follow up in about 1 year (around 6/3/2021), or if symptoms worsen or fail to improve, for annual exam. or sooner as needed.

## 2020-06-03 NOTE — TELEPHONE ENCOUNTER
Pt reports increased diarrhea, HA, jaw pain, and calf pain with Remodulin increases. She plans to stay at 89 ng until chauhan catheter is changed. Pt taking fiorcet for HA.  Rx submitted for lomotil, for diarrhea.

## 2020-06-04 RX ORDER — DIPHENOXYLATE HYDROCHLORIDE AND ATROPINE SULFATE 2.5; .025 MG/1; MG/1
TABLET ORAL
Qty: 120 TABLET | Refills: 0 | Status: SHIPPED | OUTPATIENT
Start: 2020-06-04 | End: 2020-07-31

## 2020-06-05 ENCOUNTER — TELEPHONE (OUTPATIENT)
Dept: SURGERY | Facility: CLINIC | Age: 50
End: 2020-06-05

## 2020-06-05 NOTE — TELEPHONE ENCOUNTER
"----- Message from SULY Razo, RN sent at 6/2/2020 12:09 PM CDT -----  Regarding: Eason   Hi-    When possible, could you reach out to patient to get her set up for new single lumen Eason, and removal of old, for Remodulin infusion? If you need an order in or whatever, please let me know!    Thank you!  Sarah   ----- Message -----  From: Ivonne Rai MD  Sent: 6/2/2020   8:54 AM CDT  To: SULY Razo, RN    We should change the line.  ----- Message -----  From: SULY Razo, RN  Sent: 6/1/2020   3:48 PM CDT  To: Ivonne Rai MD    She texted "Just an update. Stoma site still has pus w/blisters on top of blisters (not sure what this means). The whole inflammation of site is approximately a quarter in size. Just using alcohol to clean now. Nothing has changed but more blisters. Very uncomfortable."    I again stressed to not change the dressing so frequently (Go back to weekly) and I again asked for a pic. She said she does it every three days because dressing is "saturated". She also endorsed rubbing on the dressing, the last time I spoke w/her (about a week ago).     This has been going on since April 30, just FYI.           "

## 2020-06-05 NOTE — TELEPHONE ENCOUNTER
Left message on patient's voicemail to call to schedule line replacement.  Direct phone number given for her to call back.

## 2020-06-08 DIAGNOSIS — I27.20 PULMONARY HTN: Primary | ICD-10-CM

## 2020-06-12 ENCOUNTER — TELEPHONE (OUTPATIENT)
Dept: SURGERY | Facility: CLINIC | Age: 50
End: 2020-06-12

## 2020-06-12 NOTE — TELEPHONE ENCOUNTER
Pre-Op instructions given to patient.  She will  the Pre-Op instructions packet on 6/15/2020 for her Single Lumen Eason exchange on 6/17/2020.

## 2020-06-12 NOTE — PATIENT INSTRUCTIONS
Central Line (Central Venous Access Device)     The catheter may have more than 1 lumen (channel). This means that different fluids or medications can be given at the same time.   You need a central line as part of your treatment. Its also called a central venous access device (CVAD) or central venous catheter (CVC). A small, soft tube called a catheter is put in a vein that leads to your heart. When you no longer need the central line, it will be taken out. Your skin will then heal. This sheet describes types of central lines. It also explains how the central line is placed in your body.  What a central line does  A central line is often used instead of a standard IV (intravenous) line when you need treatment for longer than a week or so. The line can deliver medicine or nutrition right into your bloodstream. It can also be used to measure blood flow (hemodynamic monitoring), to draw blood, or for other reasons. Ask your healthcare provider why you need the central line and which type youll get.  Types of central lines  The central line will be placed into 1 of the veins as described below. Which vein is used depends on your needs and overall health. The catheter is threaded through the vein until the tip sits in the large vein near the heart (vena cava). Types of central lines include:  · Peripherally inserted central catheter (PICC). This line is placed in a large vein in the upper arm, or near the bend of the elbow.  · Subclavian line. This line is placed into the vein that runs behind the collarbone.  · Internal jugular line. This line is placed into a large vein in the neck.  · Femoral line. This line is placed in a large vein in the groin.  Placing the central line  The central line is placed in your body during a brief procedure. This may be done in your hospital room or an operating room. Your healthcare team can tell you what to expect. During central line placement:  · Youre fully covered with a  large sterile sheet. Only the spot where the line will be placed is exposed. The skin is cleaned with antiseptic solution. These steps lower the risk for infection.  · Medicine (local anesthetic) is injected near the vein. This numbs the skin so you dont feel pain during the procedure.  · After the pain medicine takes effect, the catheter is gently passed into the vein. Its moved forward until the tip of the catheter is in the vena cava, close to the heart.  · The other end of the catheter extends a few inches out from your skin. It may be loosely attached to the skin with stitches to hold it in place.  · The healthcare provider flushes the catheter with saline solution to clear it. The solution may include heparin, which prevents blood clots.  · An X-ray or other imaging test is done. This allows the provider to confirm the catheters position and check for problems.  Risks and complications  As with any procedure, having a central line placed has certain risks. These include:  · Infection  · Bleeding problems  · An irregular heartbeat  · Injury to the vein or to lymph ducts near the vein  · Inflammation of the vein (phlebitis)  · Air bubble in the blood (air embolism). An air embolism can travel through the blood vessels and block the flow of blood to the heart, lungs, brain, or other organs.  · Blood clot (thrombus) that can block the flow of blood. A blood clot can also travel through the blood vessels and block the flow of blood to the heart, lungs (pulmonary embolism), brain, or other organs.  · Collapsed lung (pneumothorax) or buildup of blood between the lungs and the chest wall (hemothorax)  · Nerve injury  · Accidental insertion into an artery instead of a vein  · Catheter not positioned correctly  If you have any problems with your central line, talk to your healthcare provider.  Date Last Reviewed: 7/1/2016  © 5012-2750 [employer]. [address] [place] [address], [place] [address]. All rights  reserved. This information is not intended as a substitute for professional medical care. Always follow your healthcare professional's instructions.        Discharge Instructions: Changing the Dressing on Your Central Line  You are going home with a central line. Its also called a central venous access device (CVAD) or central venous catheter (CVC). A small, soft tube called a catheter has been put in a vein in that leads to your heart. This will be used  for a short time (temporary). It provides medicine during your treatment. Where the catheter enters your body, its covered with a bandage (dressing). The dressing is often made of clear (transparent) plastic. This helps keep the area germ-free (sterile). To prevent infection, you need to keep the dressing clean and dry. Only change the dressing if you or a caregiver have been told to do so. This sheet explains the process. Also follow any specific instructions from your healthcare provider.  Prevent infection with good hand hygiene  A central line can let germs into your body. This can lead to serious and sometimes fatal infections. To prevent infection, its very important that you, your caregivers, and others around you use good hand hygiene. This means washing your hands well with soap and water, and cleaning them with alcohol-based hand gel as directed. Never touch the central line or dressing without first using one of these methods.  To wash your hands with soap and water:  1. Wet your hands with warm water. (Avoid hot water. It can cause skin irritation when you wash your hands often.)  2. Apply enough soap to cover the entire surface of your hands, including your fingers.  3. Rub your hands together briskly for at least 15 seconds. Make sure to rub the front and back of each hand up to the wrist, your fingers and fingernails, between the fingers, and each thumb.  4. Rinse your hands with warm water.  5. Dry your hands completely with a new, unused paper  towel. Dont use a cloth towel or other reusable towel. These can harbor germs.  6. Use the paper towel to turn off the faucet, then throw it away. If youre in a bathroom, also use a paper towel to open the door instead of touching the handle.  When you dont have access to soap and water: Use alcohol-based hand gel to clean your hands. The gel should have at least 60% alcohol. Follow the instructions on the package. Your healthcare team can answer any questions you have about when to use hand gel, or when its better to wash with soap and water.   When to change the dressing  · If you have a transparent bandage (dressing), change it every 7 days (or more often if instructed).  · If you have a gauze dressing, change it every 2 days. This includes gauze under a transparent dressing.  · If the dressing becomes loose, dirty, or wet, change it right away. Report this to your healthcare provider as soon as possible.  Avoiding infection while changing the dressing  The central line provides a direct path into your bloodstream. So the chance of infection is high as you change the dressing. Dont touch the catheter where it enters the skin. And be very careful to keep your work area and supplies clean. Following the steps on this sheet will help. Keep in mind that some supplies come in sterile (germ free) packaging. Make sure to keep these sterile during the dressing change.  Supplies for changing the dressing  A general list of supplies is below. Your healthcare team will provide you with a list of specific items and brands to use. Or you may get a kit that has everything you need. Your supplies may include:  · Gauze dressing  · Transparent dressing  · Antimicrobial sponge disc  · Antiseptic supplies to clean the skin and around the catheter exit site (such as chlorhexidine plus alcohol)  · Sterile gloves  · Non-sterile gloves and a mask  · Medical tape, adhesive strips, or a securement device  Before you start, review  the steps below and make sure you understand them. If youre not sure what to do or how to use your supplies, ask a member of your healthcare team before you try to change the dressing.  Step 1. Wash your hands  Wash your hands well with soap and water. Use the method described above.  Step 2. Prepare your work area  · Choose an area with a hard, flat surface where you can easily spread out the supplies, such as a desk or table. Dont use the bathroom. There are too many germs.  · Put pets and children out of the work area. Keep them out until the dressing change is done.  · Clean washable surfaces with soap and water. Dry with a clean, unused paper towel. Then throw the paper towel away.  · Spread clean, unused paper towels over your work surface. Use as many as you need to cover it.  · If you need to cough or sneeze, move away from your work surface first.  Step 3. Lay out your supplies  · Clean your hands with soap and water or hand gel. Do this before you lay out your supplies on the work surface.  · After cleaning your hands, only touch your supplies. If you do touch anything else, such as furniture or your clothes, clean your hands again. This is very important for preventing infection.  · Place your supplies on the cleaned and dried work surface. Lay them out in the order you will be using them.  · If youre using a kit with a sterile tray, take off the plastic covering. Remove the covering only. Don't open the tray. Keep the plastic covering to dispose of the old dressing.  Step 4. Remove the old dressing  · Put on clean, nonsterile gloves and a mask. Anyone who is helping you change the dressing should do this, too. (Dont use your sterile gloves for this step. Sterile gloves are used in Step 5.)  · Take off the old dressing by gently pulling the edges. Carefully peel off the dressing in the direction of the catheter site. While doing this, hold the end of the catheter (the lumen) so it doesnt pull out of  your body.  · If a securement device is in place, carefully remove it using the method your nurse showed you.  · Check the catheter site for signs of infection such as redness, swelling, drainage, or a bad odor. If you notice any, call your healthcare team when youre finished changing the dressing.  · Wrap the old dressing in plastic (if available) and put it aside.  Step 5. Prepare sterile supplies  · Remove your nonsterile gloves. Clean your hands with hand gel. You should still be wearing the mask.  · Open any sterile supplies, such as the transparent dressing and sterile gloves. Follow the directions on the package and your healthcare teams instructions.  · Put on sterile gloves. Follow the directions on the package or as you were shown in the hospital.  Step 6. Clean the catheter site  · If the skin under the dressing has dried blood or a lot of drainage, clean it as directed by your healthcare team.  · Clean the catheter exit site using the antiseptic supplies your healthcare team recommends. Its very important to follow the package directions and your providers instructions exactly.  Step 7. Apply the new dressing  · If youre using a gauze dressing, apply it over the catheter exit site the way your nurse showed you.  · If youre using an antimicrobial sponge disc, place it around the catheter with the correct side touching your skin. Line up the slit on the sponge disc with the catheter.  · Apply the transparent dressing over the catheter exit site and over the gauze or sponge disc (if used). Put the top end down first. Then smooth out the rest across the area where the catheter exits your skin. Make sure the dressing covers the entire catheter.  · Take off and discard the sterile gloves and mask.  · Tape the end of the catheter (lumen) to your skin.  · Throw away the old dressing and used supplies.  · Wash your hands.  Changing the injection caps  The catheters injection caps need to be changed every 3  to 7 days. This is often done at the same time as the dressing change. Your healthcare provider will give you instructions.  Risk of blood clot  If a blood clot forms, it can block blood flow through the vein where the catheter is placed. Signs of a blood clot include pain or swelling in your neck, face, check, or arm. If you have any of these symptoms, call your healthcare provider right away. You may need an ultrasound exam to find the blood clot and be treated with a blood thinner.  When to seek medical care  Call your healthcare provider right away if you have any of the following:  · Pain or burning in your shoulder, chest, back, arm, or leg  · Fever of 100.4°F (38.0°C) or higher  · Chills  · Signs of infection at the catheter site (pain, redness, drainage, burning, or stinging)  · Coughing, wheezing, or shortness of breath  · A racing or irregular heartbeat  · Muscle stiffness or trouble moving  · Gurgling noises coming from the catheter  · The catheter falls out, breaks, cracks, leaks, or has other damage   Date Last Reviewed: 7/1/2016  © 8378-2770 [employer]. [address] [place] [address], [place] [address]. All rights reserved. This information is not intended as a substitute for professional medical care. Always follow your healthcare professional's instructions.

## 2020-06-15 ENCOUNTER — LAB VISIT (OUTPATIENT)
Dept: SURGERY | Facility: CLINIC | Age: 50
End: 2020-06-15
Payer: COMMERCIAL

## 2020-06-15 DIAGNOSIS — E03.9 HYPOTHYROIDISM (ACQUIRED): ICD-10-CM

## 2020-06-15 DIAGNOSIS — I27.20 PULMONARY HTN: ICD-10-CM

## 2020-06-15 LAB — SARS-COV-2 RNA RESP QL NAA+PROBE: NOT DETECTED

## 2020-06-15 PROCEDURE — U0003 INFECTIOUS AGENT DETECTION BY NUCLEIC ACID (DNA OR RNA); SEVERE ACUTE RESPIRATORY SYNDROME CORONAVIRUS 2 (SARS-COV-2) (CORONAVIRUS DISEASE [COVID-19]), AMPLIFIED PROBE TECHNIQUE, MAKING USE OF HIGH THROUGHPUT TECHNOLOGIES AS DESCRIBED BY CMS-2020-01-R: HCPCS | Mod: TXP

## 2020-06-15 RX ORDER — LEVOTHYROXINE SODIUM 137 UG/1
137 TABLET ORAL DAILY
Qty: 90 TABLET | Refills: 3 | Status: SHIPPED | OUTPATIENT
Start: 2020-06-15 | End: 2020-09-22

## 2020-06-15 NOTE — TELEPHONE ENCOUNTER
----- Message from Marlin Echols sent at 6/15/2020  2:33 PM CDT -----  Can the clinic reply in [facility]: no    Please refill the medication(s) listed below. The patient can be reached at this phone number once it is called into the pharmacy [phone]    Medication #1: levothyroxine (SYNTHROID) 137 MCG Tab tablet    Preferred Pharmacy: [facility] - [place] [id number]

## 2020-06-16 ENCOUNTER — ANESTHESIA EVENT (OUTPATIENT)
Dept: SURGERY | Facility: HOSPITAL | Age: 50
End: 2020-06-16
Payer: COMMERCIAL

## 2020-06-16 ENCOUNTER — TELEPHONE (OUTPATIENT)
Dept: SURGERY | Facility: CLINIC | Age: 50
End: 2020-06-16

## 2020-06-16 NOTE — PRE-PROCEDURE INSTRUCTIONS
PREOP INSTRUCTIONS:No solid food ,milk or milk products for 8 hours prior to procedure.Clear liquids are allowed up to 2 hours before procedure.Clear liquids are:water,apple juice,gatorade & powerade.Shower instructions as well as directions to the [facility] Center were given.Patient encouraged to wear loose fitting,comfortable clothing.Medication instructions for pm prior to and am of procedure reviewed.Instructed patient to avoid taking vitamins,supplements,aspirin and ibuprofen the morning of surgery. Patient stated an understanding.Patient stated she will take the Synthroid as well as the Protonix before surgery and take the rest after the procedure.She typically takes them at various times throughout the day and night.    Covid screening completed 6/15/2020 with Negative results    Patient denies any side effects or issues with anesthesia or sedation.Patient did report that she was slow to awaken after one previous anesthetic     Patient does not know arrival time.Explained that this information comes from the surgeon's office and if they haven't heard from them by 2 or 3 pm to call the office.Patient stated an understanding.

## 2020-06-16 NOTE — ANESTHESIA PREPROCEDURE EVALUATION
06/16/2020  Yuni Perez is a 50 y.o., female.    Anesthesia Evaluation    I have reviewed the Patient Summary Reports.    I have reviewed the Nursing Notes. I have reviewed the NPO Status.   I have reviewed the Medications.     Review of Systems  Anesthesia Hx:  No problems with previous Anesthesia  History of prior surgery of interest to airway management or planning: Denies Family Hx of Anesthesia complications.  Personal Hx of Anesthesia complications Slow To Awaken/Delayed Emergence   Hematology/Oncology:  Hematology Normal   Oncology Normal     EENT/Dental:EENT/Dental Normal   Cardiovascular:   Exercise tolerance: poor Denies CAD.     Denies Angina. ZAVALA ECG has been reviewed. Severe pHTN despite triple therapy PAH  TTE 5/2020  · Normal left ventricular systolic function. The estimated ejection fraction is 60%.  · Indeterminate left ventricular diastolic function.  · Septal wall has abnormal motion. Systolic flattening of the interventricular septum consistent with right ventricle pressure overload.  · Moderately reduced right ventricular systolic function.  · Moderate right ventricular enlargement.  · Mild to moderate tricuspid regurgitation.  · Seevere pulmonary hypertension present.  · The estimated PA systolic pressure is 103 mmHg.  · Normal central venous pressure (3 mmHg).   Pulmonary:   Asthma mild Sleep Apnea    Renal/:  Renal/ Normal     Hepatic/GI:   GERD, well controlled    Musculoskeletal:  Musculoskeletal Normal    Neurological:   Headaches Seizures, well controlled    Endocrine:   Hypothyroidism    Dermatological:  Skin Normal    Psych:  Psychiatric Normal           Physical Exam  General:  Well nourished    Airway/Jaw/Neck:  Airway Findings: Mouth Opening: Normal Tongue: Normal  General Airway Assessment: Adult  Mallampati: II  TM Distance: Normal, at least 6 cm         Eyes/Ears/Nose:  EYES/EARS/NOSE FINDINGS: Normal   Dental:  DENTAL FINDINGS: Normal   Chest/Lungs:  Chest/Lungs Clear    Heart/Vascular:  Heart Findings: Normal Heart murmur: negative Vascular Findings: Normal    Abdomen:  Abdomen Findings: Normal    Musculoskeletal:  Musculoskeletal Findings: Normal   Skin:  Skin Findings: Normal    Mental Status:  Mental Status Findings: Normal        Anesthesia Plan  Type of Anesthesia, risks & benefits discussed:  Anesthesia Type:  MAC  Patient's Preference: Minimal anesthetic  Intra-op Monitoring Plan: standard ASA monitors  Intra-op Monitoring Plan Comments:   Post Op Pain Control Plan: per primary service following discharge from PACU  Post Op Pain Control Plan Comments:   Induction:   IV  Beta Blocker:  Patient is not currently on a Beta-Blocker (No further documentation required).       Informed Consent: Patient understands risks and agrees with Anesthesia plan.  Questions answered. Anesthesia consent signed with patient.  ASA Score: 4     Day of Surgery Review of History & Physical:    H&P update referred to the surgeon.         Ready For Surgery From Anesthesia Perspective.

## 2020-06-17 ENCOUNTER — ANESTHESIA (OUTPATIENT)
Dept: SURGERY | Facility: HOSPITAL | Age: 50
End: 2020-06-17
Payer: COMMERCIAL

## 2020-06-17 ENCOUNTER — HOSPITAL ENCOUNTER (OUTPATIENT)
Facility: HOSPITAL | Age: 50
Discharge: HOME | End: 2020-06-17
Attending: SURGERY | Admitting: SURGERY
Payer: COMMERCIAL

## 2020-06-17 VITALS
OXYGEN SATURATION: 95 % | SYSTOLIC BLOOD PRESSURE: 93 MMHG | HEART RATE: 80 BPM | DIASTOLIC BLOOD PRESSURE: 53 MMHG | HEIGHT: 59 IN | WEIGHT: 134 LBS | RESPIRATION RATE: 22 BRPM | TEMPERATURE: 98 F | BODY MASS INDEX: 27.01 KG/M2

## 2020-06-17 DIAGNOSIS — I27.20 PULMONARY HYPERTENSION: Primary | ICD-10-CM

## 2020-06-17 LAB
GRAM STN SPEC: NORMAL
GRAM STN SPEC: NORMAL

## 2020-06-17 PROCEDURE — 36589 PR REMOVAL TUNNELED CV CATH W/O SUBQ PORT OR PUMP: ICD-10-PCS | Mod: 51,NTX,, | Performed by: SURGERY

## 2020-06-17 PROCEDURE — 88300 SURGICAL PATH GROSS: CPT | Mod: NTX | Performed by: PATHOLOGY

## 2020-06-17 PROCEDURE — 87205 SMEAR GRAM STAIN: CPT | Mod: TXP

## 2020-06-17 PROCEDURE — 87186 SC STD MICRODIL/AGAR DIL: CPT | Mod: TXP

## 2020-06-17 PROCEDURE — 71000033 HC RECOVERY, INTIAL HOUR: Mod: TXP | Performed by: SURGERY

## 2020-06-17 PROCEDURE — D9220A PRA ANESTHESIA: ICD-10-PCS | Mod: ANES,NTX,, | Performed by: ANESTHESIOLOGY

## 2020-06-17 PROCEDURE — 36000706: Mod: NTX | Performed by: SURGERY

## 2020-06-17 PROCEDURE — 77001 CHG FLUOROGUIDE CNTRL VEN ACCESS,PLACE,REPLACE,REMOVE: ICD-10-PCS | Mod: 26,NTX,, | Performed by: SURGERY

## 2020-06-17 PROCEDURE — 25000003 PHARM REV CODE 250: Mod: TXP | Performed by: STUDENT IN AN ORGANIZED HEALTH CARE EDUCATION/TRAINING PROGRAM

## 2020-06-17 PROCEDURE — 00532 ANES ACCESS CTR VENOUS CRCJ: CPT | Mod: NTX | Performed by: SURGERY

## 2020-06-17 PROCEDURE — 36558 PR INSERT TUNNELED CV CATH W/O PORT OR PUMP: ICD-10-PCS | Mod: RT,NTX,, | Performed by: SURGERY

## 2020-06-17 PROCEDURE — 87070 CULTURE OTHR SPECIMN AEROBIC: CPT | Mod: NTX

## 2020-06-17 PROCEDURE — 25000003 PHARM REV CODE 250: Mod: TXP | Performed by: NURSE ANESTHETIST, CERTIFIED REGISTERED

## 2020-06-17 PROCEDURE — S0020 INJECTION, BUPIVICAINE HYDRO: HCPCS | Mod: NTX | Performed by: SURGERY

## 2020-06-17 PROCEDURE — 77001 FLUOROGUIDE FOR VEIN DEVICE: CPT | Mod: 26,NTX,, | Performed by: SURGERY

## 2020-06-17 PROCEDURE — 36000707: Mod: TXP | Performed by: SURGERY

## 2020-06-17 PROCEDURE — 71000016 HC POSTOP RECOV ADDL HR: Mod: NTX | Performed by: SURGERY

## 2020-06-17 PROCEDURE — 36589 REMOVAL TUNNELED CV CATH: CPT | Mod: 51,NTX,, | Performed by: SURGERY

## 2020-06-17 PROCEDURE — 25000003 PHARM REV CODE 250: Mod: NTX | Performed by: SURGERY

## 2020-06-17 PROCEDURE — 88300 SURGICAL PATH GROSS: CPT | Mod: 26,NTX,, | Performed by: PATHOLOGY

## 2020-06-17 PROCEDURE — D9220A PRA ANESTHESIA: ICD-10-PCS | Mod: CRNA,NTX,, | Performed by: NURSE ANESTHETIST, CERTIFIED REGISTERED

## 2020-06-17 PROCEDURE — 94761 N-INVAS EAR/PLS OXIMETRY MLT: CPT | Mod: NTX

## 2020-06-17 PROCEDURE — D9220A PRA ANESTHESIA: Mod: ANES,NTX,, | Performed by: ANESTHESIOLOGY

## 2020-06-17 PROCEDURE — 37000009 HC ANESTHESIA EA ADD 15 MINS: Mod: NTX | Performed by: SURGERY

## 2020-06-17 PROCEDURE — 63600175 PHARM REV CODE 636 W HCPCS: Mod: TXP | Performed by: NURSE ANESTHETIST, CERTIFIED REGISTERED

## 2020-06-17 PROCEDURE — 63600175 PHARM REV CODE 636 W HCPCS: Mod: NTX | Performed by: SURGERY

## 2020-06-17 PROCEDURE — 25000003 PHARM REV CODE 250: Mod: NTX | Performed by: STUDENT IN AN ORGANIZED HEALTH CARE EDUCATION/TRAINING PROGRAM

## 2020-06-17 PROCEDURE — 71000015 HC POSTOP RECOV 1ST HR: Mod: TXP | Performed by: SURGERY

## 2020-06-17 PROCEDURE — 87075 CULTR BACTERIA EXCEPT BLOOD: CPT | Mod: NTX

## 2020-06-17 PROCEDURE — A4216 STERILE WATER/SALINE, 10 ML: HCPCS | Mod: NTX | Performed by: NURSE ANESTHETIST, CERTIFIED REGISTERED

## 2020-06-17 PROCEDURE — 37000008 HC ANESTHESIA 1ST 15 MINUTES: Mod: NTX | Performed by: SURGERY

## 2020-06-17 PROCEDURE — 88300 PR  SURG PATH,GROSS,LEVEL I: ICD-10-PCS | Mod: 26,NTX,, | Performed by: PATHOLOGY

## 2020-06-17 PROCEDURE — 36558 INSERT TUNNELED CV CATH: CPT | Mod: RT,NTX,, | Performed by: SURGERY

## 2020-06-17 PROCEDURE — D9220A PRA ANESTHESIA: Mod: CRNA,NTX,, | Performed by: NURSE ANESTHETIST, CERTIFIED REGISTERED

## 2020-06-17 PROCEDURE — 87077 CULTURE AEROBIC IDENTIFY: CPT | Mod: NTX

## 2020-06-17 PROCEDURE — C1751 CATH, INF, PER/CENT/MIDLINE: HCPCS | Mod: TXP | Performed by: SURGERY

## 2020-06-17 PROCEDURE — 87102 FUNGUS ISOLATION CULTURE: CPT | Mod: NTX

## 2020-06-17 DEVICE — CATH POWERHICKMAN 8FR 5CM: Type: IMPLANTABLE DEVICE | Site: CHEST | Status: FUNCTIONAL

## 2020-06-17 RX ORDER — CLINDAMYCIN PHOSPHATE 900 MG/50ML
900 INJECTION, SOLUTION INTRAVENOUS ONCE
Status: COMPLETED | OUTPATIENT
Start: 2020-06-17 | End: 2020-06-17

## 2020-06-17 RX ORDER — OXYCODONE AND ACETAMINOPHEN 5; 325 MG/1; MG/1
1 TABLET ORAL EVERY 4 HOURS PRN
Status: DISCONTINUED | OUTPATIENT
Start: 2020-06-17 | End: 2020-06-17 | Stop reason: HOSPADM

## 2020-06-17 RX ORDER — BUPIVACAINE HYDROCHLORIDE 5 MG/ML
INJECTION, SOLUTION EPIDURAL; INTRACAUDAL
Status: DISCONTINUED | OUTPATIENT
Start: 2020-06-17 | End: 2020-06-17 | Stop reason: HOSPADM

## 2020-06-17 RX ORDER — SODIUM CHLORIDE 9 MG/ML
INJECTION, SOLUTION INTRAVENOUS CONTINUOUS PRN
Status: DISCONTINUED | OUTPATIENT
Start: 2020-06-17 | End: 2020-06-17

## 2020-06-17 RX ORDER — SODIUM CHLORIDE 9 MG/ML
INJECTION, SOLUTION INTRAVENOUS CONTINUOUS
Status: DISCONTINUED | OUTPATIENT
Start: 2020-06-17 | End: 2020-06-17 | Stop reason: HOSPADM

## 2020-06-17 RX ORDER — SODIUM CHLORIDE 0.9 % (FLUSH) 0.9 %
3 SYRINGE (ML) INJECTION
Status: DISCONTINUED | OUTPATIENT
Start: 2020-06-17 | End: 2020-06-17 | Stop reason: HOSPADM

## 2020-06-17 RX ORDER — KETAMINE HCL IN 0.9 % NACL 50 MG/5 ML
SYRINGE (ML) INTRAVENOUS
Status: DISCONTINUED | OUTPATIENT
Start: 2020-06-17 | End: 2020-06-17

## 2020-06-17 RX ORDER — LIDOCAINE HYDROCHLORIDE 10 MG/ML
INJECTION, SOLUTION EPIDURAL; INFILTRATION; INTRACAUDAL; PERINEURAL
Status: DISCONTINUED | OUTPATIENT
Start: 2020-06-17 | End: 2020-06-17 | Stop reason: HOSPADM

## 2020-06-17 RX ORDER — MIDAZOLAM HYDROCHLORIDE 1 MG/ML
INJECTION, SOLUTION INTRAMUSCULAR; INTRAVENOUS
Status: DISCONTINUED | OUTPATIENT
Start: 2020-06-17 | End: 2020-06-17

## 2020-06-17 RX ORDER — FENTANYL CITRATE 50 UG/ML
INJECTION, SOLUTION INTRAMUSCULAR; INTRAVENOUS
Status: DISCONTINUED | OUTPATIENT
Start: 2020-06-17 | End: 2020-06-17

## 2020-06-17 RX ORDER — TRAMADOL HYDROCHLORIDE 50 MG/1
50 TABLET ORAL EVERY 6 HOURS
Qty: 12 TABLET | Refills: 0 | Status: ON HOLD | OUTPATIENT
Start: 2020-06-17 | End: 2020-09-29

## 2020-06-17 RX ORDER — HEPARIN 100 UNIT/ML
SYRINGE INTRAVENOUS
Status: DISCONTINUED | OUTPATIENT
Start: 2020-06-17 | End: 2020-06-17 | Stop reason: HOSPADM

## 2020-06-17 RX ORDER — DEXMEDETOMIDINE HYDROCHLORIDE 100 UG/ML
INJECTION, SOLUTION INTRAVENOUS
Status: DISCONTINUED | OUTPATIENT
Start: 2020-06-17 | End: 2020-06-17

## 2020-06-17 RX ORDER — CEFAZOLIN SODIUM 1 G/3ML
2 INJECTION, POWDER, FOR SOLUTION INTRAMUSCULAR; INTRAVENOUS
Status: DISCONTINUED | OUTPATIENT
Start: 2020-06-17 | End: 2020-06-17

## 2020-06-17 RX ADMIN — CLINDAMYCIN PHOSPHATE 900 MG: 18 INJECTION, SOLUTION INTRAVENOUS at 09:06

## 2020-06-17 RX ADMIN — SODIUM CHLORIDE: 0.9 INJECTION, SOLUTION INTRAVENOUS at 09:06

## 2020-06-17 RX ADMIN — DEXMEDETOMIDINE HYDROCHLORIDE 4 MCG: 100 INJECTION, SOLUTION, CONCENTRATE INTRAVENOUS at 10:06

## 2020-06-17 RX ADMIN — DEXMEDETOMIDINE HYDROCHLORIDE 8 MCG: 100 INJECTION, SOLUTION, CONCENTRATE INTRAVENOUS at 09:06

## 2020-06-17 RX ADMIN — MIDAZOLAM HYDROCHLORIDE 0.5 MG: 1 INJECTION, SOLUTION INTRAMUSCULAR; INTRAVENOUS at 10:06

## 2020-06-17 RX ADMIN — Medication 5 MG: at 10:06

## 2020-06-17 RX ADMIN — MIDAZOLAM HYDROCHLORIDE 2 MG: 1 INJECTION, SOLUTION INTRAMUSCULAR; INTRAVENOUS at 09:06

## 2020-06-17 RX ADMIN — Medication 10 MG: at 10:06

## 2020-06-17 RX ADMIN — MIDAZOLAM HYDROCHLORIDE 0.5 MG: 1 INJECTION, SOLUTION INTRAMUSCULAR; INTRAVENOUS at 09:06

## 2020-06-17 RX ADMIN — Medication 5 MG: at 09:06

## 2020-06-17 RX ADMIN — FENTANYL CITRATE 25 MCG: 50 INJECTION, SOLUTION INTRAMUSCULAR; INTRAVENOUS at 10:06

## 2020-06-17 RX ADMIN — DEXMEDETOMIDINE HYDROCHLORIDE 0.4 MCG/KG/HR: 100 INJECTION, SOLUTION, CONCENTRATE INTRAVENOUS at 09:06

## 2020-06-17 NOTE — PROGRESS NOTES
Dr. Obando is at the bedside. I did update him she refused to take a pregnancy test. He assessing her and obtaining  consent.

## 2020-06-17 NOTE — ANESTHESIA POSTPROCEDURE EVALUATION
Anesthesia Post Evaluation    Patient: Yuni Perez    Procedure(s) Performed: Procedure(s) (LRB):  INSERTION, CATHETER, CENTRAL VENOUS, HAYNES Replace Single Lumen for Remodulin Infusion (Right)  REMOVAL, CATHETER, CENTRAL VENOUS, TUNNELED (N/A)    Final Anesthesia Type: MAC    Patient location during evaluation: PACU  Patient participation: Yes- Able to Participate  Level of consciousness: awake and alert and oriented  Post-procedure vital signs: reviewed and stable  Pain management: adequate  Airway patency: patent    PONV status at discharge: No PONV  Anesthetic complications: no      Cardiovascular status: blood pressure returned to baseline  Respiratory status: unassisted, room air and spontaneous ventilation  Hydration status: euvolemic  Follow-up not needed.          Vitals Value Taken Time   BP 90/56 06/17/20 1116   Temp 36.3 °C (97.3 °F) 06/17/20 1103   Pulse 64 06/17/20 1117   Resp 17 06/17/20 1117   SpO2 99 % 06/17/20 1117   Vitals shown include unvalidated device data.      No case tracking events are documented in the log.      Pain/Ronald Score: No data recorded

## 2020-06-17 NOTE — BRIEF OP NOTE
[facility]  Brief Operative Note    Surgery Date: 6/17/2020     Surgeon(s) and Role:     * Bertin Dewitt MD - Primary     * Darrian Bustos MD - Resident - Assisting        Pre-op Diagnosis:  Pulmonary HTN [I27.20]    Post-op Diagnosis:  Post-Op Diagnosis Codes:     * Pulmonary HTN [I27.20]    Procedure(s) (LRB):  INSERTION, CATHETER, CENTRAL VENOUS, HAYNES Replace Single Lumen for Remodulin Infusion (Right)  REMOVAL, CATHETER, CENTRAL VENOUS, TUNNELED (N/A)    Anesthesia: Local MAC    Description of the findings of the procedure(s): R IJ haynes placement without apparent complication. L tunneled CVC removal without issue. Cx sent     Estimated Blood Loss: 5cc         Specimens:   Cx from old L CVC     Discharge Note    OUTCOME: Patient tolerated treatment/procedure well without complication and is now ready for discharge.    DISPOSITION: Home or Self Care    FINAL DIAGNOSIS:  Pulmonary hypertension    FOLLOWUP: In clinic    DISCHARGE INSTRUCTIONS:    Discharge Procedure Orders   Call MD for:  temperature >100.4     Call MD for:  persistent nausea and vomiting or diarrhea     Call MD for:  severe uncontrolled pain     Call MD for:  difficulty breathing or increased cough     Leave dressing on - Keep it clean, dry, and intact until clinic visit   Order Comments: Change dressings as you were doing previously after 2 days     Activity as tolerated

## 2020-06-17 NOTE — PLAN OF CARE
Patient assigned to this writer by charge nurse. The patient was  escorted to [facility] room 14  by PCT. The patient is currently  changing into a hospital gown. This writer is completing a chart review and reviewing MD orders.

## 2020-06-17 NOTE — PROGRESS NOTES
Pt given written and verbal dc inst, VSS,resp unlabored, Remodulin infusing,Eason intact and confirmed with XRay, Bedside delivery of Rx received, yovani po fluids,pt given written and verbal dc inst, pt states intent to comply with follow up /prn pain med, stable at present,dc'd to home via PMV with mother. Eye glasses,cell phone and bag with pt.

## 2020-06-17 NOTE — TRANSFER OF CARE
"Anesthesia Transfer of Care Note    Patient: Yuni Perez    Procedure(s) Performed: Procedure(s) (LRB):  INSERTION, CATHETER, CENTRAL VENOUS, HAYNES Replace Single Lumen for Remodulin Infusion (Right)  REMOVAL, CATHETER, CENTRAL VENOUS, TUNNELED (N/A)    Patient location: PACU    Anesthesia Type: MAC    Transport from OR: Transported from OR on 6-10 L/min O2 by face mask with adequate spontaneous ventilation    Post pain: adequate analgesia    Post assessment: no apparent anesthetic complications    Post vital signs: stable    Level of consciousness: awake and alert    Nausea/Vomiting: no nausea/vomiting    Complications: none    Transfer of care protocol was followed      Last vitals:   Visit Vitals  BP (!) 92/57 (BP Location: Left arm, Patient Position: Lying)   Pulse 69   Temp 36.3 °C (97.3 °F) (Temporal)   Resp 18   Ht 4' 11" (1.499 m)   Wt 60.8 kg (134 lb)   LMP 05/27/2020   SpO2 99%   Breastfeeding No   BMI 27.06 kg/m²     "

## 2020-06-17 NOTE — OP NOTE
[facility]  Surgery Department  Operative Note    SUMMARY     Date of Procedure: 6/17/2020     Procedure: Procedure(s) (LRB):  INSERTION, CATHETER, CENTRAL VENOUS, HAYNES Replace Single Lumen for Remodulin Infusion (Right)  REMOVAL, CATHETER, CENTRAL VENOUS, TUNNELED (N/A)     Surgeon(s) and Role:     * Bertin Dewitt MD - Primary     * Darrian Bustos MD - Resident - Assisting        Pre-Operative Diagnosis: Pulmonary HTN [I27.20]    Post-Operative Diagnosis: Post-Op Diagnosis Codes:     * Pulmonary HTN [I27.20]    Anesthesia: Local MAC    Indication:  This is a 50-year-old female with history of chronic pulmonary hypertension.  She has been on a home Remodulin pump for several years.  During this time she is needed a tunneled central venous catheter we decided be replaced twice.  Most recently her left tunneled central venous catheter had some symptoms concerning for chronic line infection.  We offered new placement of a right IJ Haynes catheter and she agreed to proceed    Description of the Findings of the Procedure:   After informed consent was obtained/assured and H&P was updated, the patient was brought back to the OR and moved to the operating room table.    MAC was induced and appropriate pre-operative antibiotics were administered.  The patient was prepped and draped in the usual sterile fashion using betadine due to patient allergy to chlorhexidine .  Prior to the procedure start a time out was called and the correct patient and laterality identified as applicable.     The right IJ was examined using ultrasound and found to be patent with good compressibility, we therefore elected to proceed with ultrasound-guided right internal jugular placement.  Field block was created using 10 cc of local anesthetic. Under ultrasound guidance, the introducer needle was inserted into the internal jugular under ultrasound guidance and aspiration and advancement yielded dark red blood consistent  with venous cannulation.  The wire was inserted through the needle to appropriate depth and C-arm was brought in draped to confirm wire in good position in the right venous circulation. The c-arm was taken back from the OR table and skin nick was made using #11 blade scalpel. The rigid tunneler was then used to pass the catheter through a deep subcutaneous tract to the puncture exit site 2 cm below the clavicle, a 1 cm incision was made horizontally with the #15 blade.  The introducer sheath was fully inserted under fluoroscopic guidance and the wire withdrawn,  the catheter was passed down the break-away introducer sheath. Good flow was confirmed by accessing the catheter and aspirating to see blood return, then forcefully flushing sterile saline through the port and catheter with appropriately low resistance.  1cc of heparinized saline was then injected into the catheter.  C arm was then brought back in to confirm the catheter was in appropriate position with no kinks in the catheter.  The eason catheter was secured with 2 3-0 Nylon sutures and a sterile dressing applied.  A single interrupted 4-0 monocryl suture was used to close the puncture site and covered with dermabond.    We then turned our attention to the left chest.  A field block was created using a cc of local.  The exit site of the old Eason catheter was bluntly dissected and the catheter removed.  Pressure was held over the insertion site in the neck, and sterile dressings were applied to the exit site.  Cultures were taken from the old Eason    The procedure was tolerated well and the patient was sent to recovery in good condition.  Post-central venous cannulation chest x-ray is pending completion at this time.        Complications: No    Estimated Blood Loss (EBL): 5cc  Implants:   Implant Name Type Inv. Item Serial No.  Lot No. LRB No. Used Action   CATH POWERHICKMAN 8FR 5CM - [id number] Dialysis Catheter CATH POWERHICKMAN 8FR  5CM  [employer] [id number] Right 1 Implanted       Specimens:   Culture taken from old CVC that was removed            Condition: Good    Disposition: PACU - hemodynamically stable.    Attestation: Dr. Dewitt was present and scrubbed for the key portion of the procedure

## 2020-06-17 NOTE — H&P
Consult  Surgery      SUBJECTIVE:     Reason for admission: New tunneled CVC     History of Present Illness: Yuni Perez is a 50 y.o. female with Pulm HTN on chronic home remodulin pump who presents for a new tunneled CVC. He last line has been having issues with skin erythema. No evidence of bacteremia. Her team have asked General Surgery to place a new line.     Prior R IJ tunneled CVC  Current L IJ tunneled CVC      No current facility-administered medications on file prior to encounter.      Current Outpatient Medications on File Prior to Encounter   Medication Sig    acetaminophen (TYLENOL EXTRA STRENGTH) 500 MG tablet Take 1,000 mg by mouth every 6 (six) hours as needed for Pain.    ADEMPAS 2.5 mg tablet Take 2.5 mg by mouth every 8 (eight) hours. Patient takes first dose about 10am daily    albuterol-ipratropium (DUO-NEB) 2.5 mg-0.5 mg/3 mL nebulizer solution Take 3 mLs by nebulization every 6 (six) hours as needed for Wheezing. Rescue    ambrisentan (LETAIRIS) 10 MG Tab Take 1 tablet (10 mg total) by mouth once daily. (Patient taking differently: Take 10 mg by mouth every morning. Patient takes about 10am daily)    azelastine (ASTELIN) 137 mcg (0.1 %) nasal spray 2 sprays by Nasal route 2 (two) times daily. Patient uses prn in the evening    butalbital-acetaminophen-caffeine -40 mg (FIORICET, ESGIC) -40 mg per tablet Take 1 tablet by mouth every 4 (four) hours as needed for Pain.    citalopram (CELEXA) 10 MG tablet Take 10 mg by mouth every evening.     cyanocobalamin, vitamin B-12, 2,500 mcg Subl Place 2,500 mcg under the tongue every morning.     diphenhydrAMINE (BENADRYL) 25 mg capsule Take 50 mg by mouth every 6 (six) hours as needed for Itching. Patient takes prn evenings    ergocalciferol (VITAMIN D2) 50,000 unit Cap Take 1 capsule (50,000 Units total) by mouth every 7 days.    ferrous sulfate 325 (65 FE) MG EC tablet Take 325 mg by mouth daily as needed.      "fluticasone (FLONASE) 50 mcg/actuation nasal spray INHALE 2 SPRAYS IN EACH NOSTRIL DAILY (Patient taking differently: every evening. )    fluticasone furoate-vilanteroL (BREO ELLIPTA) 200-25 mcg/dose DsDv diskus inhaler Inhale 1 puff into the lungs once daily. Controller (Patient taking differently: Inhale 1 puff into the lungs every evening. Controller)    folic acid (FOLVITE) 1 MG tablet Take 1 tablet (1,000 mcg total) by mouth once daily. (Patient taking differently: Take 1,000 mcg by mouth every morning. )    hyoscyamine (LEVSIN/SL) 0.125 mg Subl Place 0.125 mg under the tongue every 6 (six) hours as needed.     loratadine (CLARITIN) 10 mg tablet Take 10 mg by mouth every evening.     MICROGESTIN FE 1/20, 28, 1 mg-20 mcg (21)/75 mg (7) per tablet TAKE ONE TABLET BY MOUTH ONCE DAILY (Patient taking differently: Take 1 tablet by mouth every morning. )    ondansetron (ZOFRAN) 4 MG tablet Take 1 tablet (4 mg total) by mouth every 6 (six) hours as needed. 1 Tablet Oral Every 6 hours    pantoprazole (PROTONIX) 40 MG tablet Take 1 tablet (40 mg total) by mouth once daily. (Patient taking differently: Take 40 mg by mouth every morning. )    sumatriptan (IMITREX) 100 MG tablet Take 1 tablet (100 mg total) by mouth as needed for Migraine. Take at the onset of headache, may take 1 more in 1 hour if needed.    topiramate (TOPAMAX) 100 MG tablet Take 1 tablet (100 mg total) by mouth 2 (two) times daily.    treprostinil (REMODULIN) 2.5 mg/mL Soln Mix cassette as directed and infuse continuously per physician titration orders on dosing sheet. Current dose 80ng/kg/min    diphenoxylate-atropine 2.5-0.025 mg (LOMOTIL) 2.5-0.025 mg per tablet Take 1-2 tablets by mouth, 3-4 times daily, as needed. Do not exceed 20mg/day.       Review of patient's allergies indicates:   Allergen Reactions    Fentanyl Anaphylaxis     Respiratory distress    Vibra-tabs [doxycycline hyclate] Anaphylaxis     "throat felt like it was " "closing"    Adhesive Hives     Silk tape    Amoxicillin Rash    Nsaids (non-steroidal anti-inflammatory drug) Swelling    Chlorhexidine Other (See Comments)     Blue Chlorhexidine causes hives       Past Medical History:   Diagnosis Date    AR (allergic rhinitis)     Cholelithiasis, common bile duct     Chronic low back pain     Eye pressure 2017    GERD (gastroesophageal reflux disease)     History of migraine headaches     Hypothyroidism     Lumbar disc disease     Menorrhagia     Mild asthma     Obesity     Plantar fasciitis of left foot     Primary pulmonary hypertension     followed by heart transplant/pulmonary     Seizure disorder     x 1 in 2008    Seizures     Sleep apnea     TMJ (dislocation of temporomandibular joint)     Tricuspid regurgitation      Past Surgical History:   Procedure Laterality Date    CARDIAC CATHETERIZATION      CENTRAL VENOUS CATHETER INSERTION      gallbladder drain  06/2019    PORTACATH PLACEMENT      RIGHT HEART CATHETERIZATION Right 8/20/2018    Procedure: HEART CATH-RIGHT;  Surgeon: Ivonne Rai MD;  Location: [facility] CATH LAB;  Service: Cardiology;  Laterality: Right;    RIGHT HEART CATHETERIZATION Right 9/4/2019    Procedure: INSERTION, CATHETER, RIGHT HEART;  Surgeon: Ivonne Rai MD;  Location: [facility] CATH LAB;  Service: Cardiology;  Laterality: Right;    UPPER GASTROINTESTINAL ENDOSCOPY       Family History   Problem Relation Age of Onset    Heart disease Father     Glaucoma Father     Diabetes Mother     Cancer Maternal Grandmother         uterine    Cancer Maternal Aunt         breast    Breast cancer Maternal Aunt     Heart disease Paternal Grandfather     Heart attack Paternal Grandfather     Diabetes Paternal Grandfather     Leukemia Brother     Hypertension Unknown     Diabetes Maternal Grandfather     Heart attack Maternal Grandfather     Breast cancer Cousin     No Known Problems Sister     No Known Problems Maternal " Uncle     No Known Problems Paternal Aunt     No Known Problems Paternal Uncle     No Known Problems Paternal Grandmother     Colon cancer Neg Hx     Ovarian cancer Neg Hx     Amblyopia Neg Hx     Blindness Neg Hx     Cataracts Neg Hx     Macular degeneration Neg Hx     Retinal detachment Neg Hx     Strabismus Neg Hx     Stroke Neg Hx     Thyroid disease Neg Hx     Esophageal cancer Neg Hx      Social History     Tobacco Use    Smoking status: Never Smoker    Smokeless tobacco: Never Used   Substance Use Topics    Alcohol use: No     Alcohol/week: 0.8 standard drinks     Types: 1 Standard drinks or equivalent per week     Comment: 1 per year    Drug use: No        Review of Systems  Otherwise negative except as listed above    OBJECTIVE:     Vital Signs (Most Recent)  Temp: 97.8 °F (36.6 °C) (06/17/20 0839)  Pulse: 92 (06/17/20 0839)  Resp: 19 (06/17/20 0839)  BP: (!) 97/58 (06/17/20 0839)  SpO2: 96 % (06/17/20 0839)    Physical Exam  A&O, NAD  RRR  L chest with tunneled CVC dressing c/d/i  No Respiratory Distress, on O2,       Laboratory  none  Diagnostic Results:  none    ASSESSMENT/PLAN:     A/P:  Yuni Perez is a 50 y.o. female with need for new CVC for remodulin pump    - To OR for new line, pref R IJ  - Risks, benefits, and alternatives were discussed with the patient, and full informed consent was obtained prior to the procedure.     Darrian Bustos MD   Pager: [phone]  General Surgery PGY-III  [facility]

## 2020-06-18 LAB
FINAL PATHOLOGIC DIAGNOSIS: NORMAL
GROSS: NORMAL

## 2020-06-19 LAB — BACTERIA SPEC AEROBE CULT: ABNORMAL

## 2020-06-22 LAB — BACTERIA SPEC ANAEROBE CULT: NORMAL

## 2020-06-23 ENCOUNTER — TELEPHONE (OUTPATIENT)
Dept: TRANSPLANT | Facility: CLINIC | Age: 50
End: 2020-06-23

## 2020-06-23 NOTE — TELEPHONE ENCOUNTER
"Spoke w/pt to get update on new Chauhan and old site. Pt reports that old site is healing w/ "crusty scab" and new site has "lots of itchiness". Pt reports hand still very sore where IV was.     Pt will stop using chlorhexadine, and use only alcohol again, to see if this helps minimize itching. She understands to follow up with PCP regarding hand pain where PIV was, if persistent pain/swellling. Advised pt that she should be able to shower 48 hours after chauhan placement, but to follow up with surgery. She reports she has Dr card from pre-op packet which she was given on test day.     All questions answered/addressed to satisfaction. Dr Poe and Dr Rai updated.   "

## 2020-06-26 ENCOUNTER — TELEPHONE (OUTPATIENT)
Dept: TRANSPLANT | Facility: CLINIC | Age: 50
End: 2020-06-26

## 2020-06-26 DIAGNOSIS — N92.1 MENORRHAGIA WITH IRREGULAR CYCLE: ICD-10-CM

## 2020-06-26 NOTE — TELEPHONE ENCOUNTER
Per Dr Rai, since pt had only few colonies of staph, presumably from old chauhan cath tip, and she continue to be afebrile and old chauhan is removed, there is no need to treat at this time. Pt should continue to monitor for s/s of infection. Pt is also allergic to doxy and amoxi, and there are concerns that giving another antibiotic w/o sign of infection may increase chances of recurrent C diff. Pt notified of all.

## 2020-06-27 RX ORDER — NORETHINDRONE ACETATE AND ETHINYL ESTRADIOL 1MG-20(21)
1 KIT ORAL DAILY
Qty: 28 TABLET | Refills: 6 | Status: SHIPPED | OUTPATIENT
Start: 2020-06-27 | End: 2021-01-13 | Stop reason: SDUPTHER

## 2020-07-06 ENCOUNTER — TELEPHONE (OUTPATIENT)
Dept: TRANSPLANT | Facility: CLINIC | Age: 50
End: 2020-07-06

## 2020-07-13 LAB — FUNGUS SPEC CULT: NORMAL

## 2020-07-16 ENCOUNTER — TELEPHONE (OUTPATIENT)
Dept: TRANSPLANT | Facility: CLINIC | Age: 50
End: 2020-07-16

## 2020-07-16 DIAGNOSIS — Z01.812 PRE-PROCEDURE LAB EXAM: Primary | ICD-10-CM

## 2020-07-16 DIAGNOSIS — I27.0 PRIMARY PULMONARY HYPERTENSION: ICD-10-CM

## 2020-07-16 DIAGNOSIS — Z76.82 AWAITING ORGAN TRANSPLANT STATUS: ICD-10-CM

## 2020-07-16 NOTE — TELEPHONE ENCOUNTER
Contacted patient and informed her that a negative COVID-19 test is required within 72 hours of the PFTs.  Inquired as to where she wanted us to schedule the test.  She requested to schedule the COVID test in the Surgery Clinic on the 2nd floor.  Informed her that the test will be scheduled on Tuesday, 7/28/20 around 11am in the surgery clinic.  Informed her that she will be contacted if an appointment is not available.  She verbalized her understanding of all discussed.

## 2020-07-22 ENCOUNTER — TELEPHONE (OUTPATIENT)
Dept: TRANSPLANT | Facility: CLINIC | Age: 50
End: 2020-07-22

## 2020-07-27 ENCOUNTER — TELEPHONE (OUTPATIENT)
Dept: TRANSPLANT | Facility: CLINIC | Age: 50
End: 2020-07-27

## 2020-07-28 ENCOUNTER — LAB VISIT (OUTPATIENT)
Dept: SURGERY | Facility: CLINIC | Age: 50
End: 2020-07-28
Payer: COMMERCIAL

## 2020-07-28 DIAGNOSIS — Z01.812 PRE-PROCEDURE LAB EXAM: ICD-10-CM

## 2020-07-28 DIAGNOSIS — I27.0 PRIMARY PULMONARY HYPERTENSION: ICD-10-CM

## 2020-07-28 DIAGNOSIS — Z76.82 AWAITING ORGAN TRANSPLANT STATUS: ICD-10-CM

## 2020-07-28 PROCEDURE — U0003 INFECTIOUS AGENT DETECTION BY NUCLEIC ACID (DNA OR RNA); SEVERE ACUTE RESPIRATORY SYNDROME CORONAVIRUS 2 (SARS-COV-2) (CORONAVIRUS DISEASE [COVID-19]), AMPLIFIED PROBE TECHNIQUE, MAKING USE OF HIGH THROUGHPUT TECHNOLOGIES AS DESCRIBED BY CMS-2020-01-R: HCPCS | Mod: TXP

## 2020-07-29 LAB — SARS-COV-2 RNA RESP QL NAA+PROBE: NOT DETECTED

## 2020-07-30 NOTE — PROGRESS NOTES
Subjective:   Ms. Perez is a 50 y.o. year old White female with PH who is been seen today for routine follow up.      HPI     50 y.o. White female who presents for PH follow-up. Patient was diagnosed with IPAH about 5 and a half years, ABHIJEET recently, currently deferred for LUT (was working on fund raising). Initial symptom- syncope.  Patient has been on triple therapy: Remodulin at  80 ng/kg/min infusion, Letairis 10mg qdaily, and Adcirca 40mg qdaily. Since last visit pt underwent RHC in Dec (see below)- Plan was to switch adcirca to adempas and to stop letairis as seems her nasal congestion is worse with letairis. (she is now on remodulin, adcirca and letairis)    Since last visit pt has been doing a good job staying home with quarantine,  Reports her sats have been fine but subjectively feels more SOB with exertion- every once in a while while laying down has palps- lasts about 5 secs and goes away on its own. No swelling- wt has been stable around 125# on her scale. Feels that when she walks, she comes to the place where she is SOB faster (used to be able to do a full turn and half a lap before pursed lip breathing and now she's a quarter of the way when she comes to that point)- attributes the change to when she switched from adcirca to adempas,- bending over to pick something up or wipe the floor she is huffing and puffing where she didn't have that before.    Had splurged this week and had chicken nuggets    Now on 89 ng/kg/min      6mw  today  427m unchanged from last in May(457m 476m 463m (457  1/18, 469m in Nov, 457 in Nov, 488m unchanged last 3 visits with me( 450 11/15/16,  457m, 518.5m, 533.75, 549m April, 463.6m prev visit)                                              O2 sat  96-> 94 % RA                                                          ->144                                                      BP  121/69-->127/67                                                       Ab 3->9    TTE  today  RV 4.7 TAPSE 1.6  · Normal left ventricular systolic function. The estimated ejection fraction is 65%.  · Concentric left ventricular remodeling.  · Septal wall has abnormal motion.  · Indeterminate left ventricular diastolic function.  · Moderate right ventricular enlargement.  · Mildly reduced right ventricular systolic function.  · Mild to moderate tricuspid regurgitation.  · Normal central venous pressure (3 mmHg).  · The estimated PA systolic pressure is 113 mmHg.  · Pulmonary hypertension present.      RHC 9/4/19  · RA: 6/ 7/ 5 RV: 105/ 3/ 10 PA: 103/ 45/ 64 PWP: 15/ 1512/ 13 . Cardiac output was 4.11 by Ebony. Cardiac index is 2.67 L/min/m2. O2 Sat: PA 70%. AO sat 96% PVR 12.4     TTE 5/11/20  · Normal left ventricular systolic function. The estimated ejection fraction is 60%.  · Indeterminate left ventricular diastolic function.  · Septal wall has abnormal motion. Systolic flattening of the interventricular septum consistent with right ventricle pressure overload.  · Moderately reduced right ventricular systolic function.  RV 4.3 TAPSE 1.9  · Moderate right ventricular enlargement.  · Mild to moderate tricuspid regurgitation.  · Seevere pulmonary hypertension present.  · The estimated PA systolic pressure is 103 mmHg.  · Normal central venous pressure (3 mmHg).    TTE (02/22/2019):    · Normal left ventricular systolic function. The estimated ejection fraction is 65%  · Normal LV diastolic function.  · Septal wall has abnormal motion.  · Mildly reduced right ventricular systolic function.  RV 3.9 TAPSE 1.9  · Mild tricuspid regurgitation.  · The estimated PA systolic pressure is 54 mm Hg  · Pulmonary hypertension present.  · Normal central venous pressure (3 mm Hg).      RHC 8/20/18     PW: 8/6 (5)  PA: 101/35 (57)  RV: 92/0  RVEDP: 6   AO_SAT: 99  PA_SAT: 72  RA: 6/5 (4)  FICKCI: 2.5700  FICKCO: 4.0800  PVR: 1038.0000    TTE 4/18  Right Ventricle: The right ventricle is normal in size measuring 3.1 cm  at the base in the apical right ventricle-focused view. Global right ventricular systolic function appears mildly to moderately depressed. Tricuspid annular plane systolic excursion   (TAPSE) is 2.3 cm. The estimated PA systolic pressure is greater than 39 mmHg.       TTE 9/18/17  1 - Normal left ventricular systolic function (EF 60-65%).     2 - No wall motion abnormalities.     3 - Normal left ventricular diastolic function.     4 - Right atrial enlargement.     5 - Right ventricular enlargement with moderately depressed systolic function.     6 - The estimated PA systolic pressure is 36 mmHg.     7 - Mild tricuspid regurgitation.   Right Ventricle: The right ventricle is enlarged measuring 4.1 cm at the base in the apical right ventricle-focused view. Global right ventricular systolic function appears moderately depressed. There is abnormal septal motion consistent with RV   pressure/volume overload. Tricuspid annular plane systolic excursion (TAPSE) is 1.7 cm. Tissue Doppler-derived tricuspid annular peak systolic velocity (S prime) is 11.0 cm/s. The estimated PA systolic pressure is 36 mmHg.           6mw 488 m 463m (457  1/18, 469m in Nov, 457 in Nov, 488m unchanged last 3 visits with me( 450 11/15/16,  457m, 518.5m, 533.75, 549m April, 463.6m prev visit)                                              O2 sat  97-> 96 % RA                                                          HR 82->141                                                     BP  135/78-->151/87                                                      Ab 2->9    R/[facility] 1/17  PW:  (30)  RV: 102  RVEDP: 21     PA: 102/58  RA:  (28)  PA_SAT: 65  AO: 93/56  LV: 93  LVEDP: 14   FICKCO: 4.08  Normal cors    [facility] 4/16  AOPRES: 122/85 (97)  AOSAT: 98  FICKCI: 2.81  FICKCO: 4.38  PAPRES: 101/34 (59)  PASAT: 71  PVR: 10.73  PWPRES: 14/8 (10)  RAPRES: 9/5 (4)  RVPRES: 91/-4, 8    TTE last visit   1 - Normal left ventricular systolic function (EF 60-65%).     2 -  Right ventricular enlargement with mildly to moderately depressed systolic function.     3 - Normal left ventricular diastolic function.     4 - Pulmonary hypertension. The estimated PA systolic pressure is 41 mmHg.       TTE 3/4/16  1 - Normal left ventricular systolic function (EF 60-65%).   2 - Normal left ventricular diastolic function.   3 - Right ventricle is upper limit of normal in size with low normal to mildly depressed systolic function. TAPSE 2.2 RV 3.7 cm   4 - Trivial to mild tricuspid regurgitation.   5 - Pulmonary hypertension. The estimated PA systolic pressure is 60 mmHg.     TTE Oct  1 - Normal left ventricular systolic function (EF 60-65%).   2 - Left ventricular diastolic dysfunction.   3 - Biatrial enlargement.   4 - Right ventricular enlargement with hypertrophy, with normal systolic function.   5 - Pulmonary hypertension.   6 - Trivial mitral regurgitation.   7 - Trivial tricuspid regurgitation.   PASp 46    CXR 2/3/16  Vascular catheter now identified, its tip in the superior vena cava just superior to its junction with the right atrium. Heart size is normal, as is the appearance of the pulmonary vascularity. Lung zones are clear, and free of significant airspace consolidation or volume loss. No pleural fluid. No hilar or mediastinal mass lesion. No pneumothorax.     VQ Scan 8/17/16: low probability      Review of Systems   Constitution: Negative for chills, fever, malaise/fatigue, night sweats, weight gain and weight loss.   HENT: Positive for congestion.    Eyes: Negative.    Cardiovascular: Positive for dyspnea on exertion (WHO FC II). Negative for chest pain, claudication, cyanosis, irregular heartbeat, leg swelling, near-syncope, orthopnea, palpitations, paroxysmal nocturnal dyspnea and syncope.   Respiratory: Positive for cough and wheezing. Negative for hemoptysis and shortness of breath.    Endocrine: Negative.    Skin: Negative.    Musculoskeletal: Negative.  Negative for joint  "pain.   Gastrointestinal: Negative for bloating, abdominal pain, change in bowel habit, constipation, hematemesis, hematochezia, melena and nausea.   Neurological: Negative for dizziness, light-headedness and weakness.   Psychiatric/Behavioral: Negative for depression.       Objective:   Blood pressure 103/73, pulse 92, height 4' 11" (1.499 m), weight 58.5 kg (129 lb), last menstrual period 07/17/2020, SpO2 95 %.body mass index is 26.05 kg/m².    Physical Exam   Constitutional: She is oriented to person, place, and time. She appears well-developed.   HENT:   Head: Normocephalic.   Eyes: Pupils are equal, round, and reactive to light.   Neck: Normal range of motion. No JVD present.   Cardiovascular: Normal rate.   Murmur heard.  Pulmonary/Chest: Effort normal and breath sounds normal. No respiratory distress. She has no wheezes. She has no rales.   Abdominal: Soft. Bowel sounds are normal. She exhibits no distension. There is no abdominal tenderness. There is no rebound and no guarding.   Musculoskeletal: Normal range of motion.         General: No edema.   Neurological: She is alert and oriented to person, place, and time. She has normal reflexes. No cranial nerve deficit. Coordination normal.   Skin: Skin is warm and dry.       Lab Results   Component Value Date    WBC 5.65 07/31/2020    HGB 15.6 07/31/2020    HCT 48.3 07/31/2020    MCV 90 07/31/2020     07/31/2020    CO2 22 (L) 07/31/2020    CREATININE 0.7 07/31/2020    CALCIUM 8.9 07/31/2020    ALBUMIN 4.1 07/31/2020    AST 19 07/31/2020     (H) 07/31/2020    ALT 17 07/31/2020       Lab Results   Component Value Date    INR 0.9 09/04/2019    INR 1.0 06/07/2019    INR 2.1 (H) 11/20/2018       BNP   Date Value Ref Range Status   07/31/2020 121 (H) 0 - 99 pg/mL Final     Comment:     Values of less than 100 pg/ml are consistent with non-CHF populations.   05/11/2020 69 0 - 99 pg/mL Final     Comment:     Values of less than 100 pg/ml are consistent " with non-CHF populations.   01/17/2020 27 0 - 99 pg/mL Final     Comment:     Values of less than 100 pg/ml are consistent with non-CHF populations.         Assessment:     1. Primary pulmonary hypertension    2. Chronic respiratory failure with hypoxia    3. ABHIJEET (obstructive sleep apnea)    4. Nonrheumatic tricuspid valve regurgitation    5. Long term current use of anticoagulant therapy    6. Chronic pulmonary heart disease    7. Overweight (BMI 25.0-29.9)    8. Awaiting organ transplant status      WHO group 1,  FC II-III    Plan:   Concerned today as pt's 6mw has dropped this year-  476m ->457m ->427m and symptomatically seems to be declining- echo with moderate RVE/mild-mod RV dsyfxn (slightly worse today as are PAP, ) BNP also increased for the first time today    Cont remodulin at 89 ng/kg/min.  pt seems to think her worsening SOB is related to transition of adempas but I favor disease progression. She wants to try to switch back to adcirca- pt to go home, do an inventory of what she has and let us know- if she has enough for 2 weeks- will have her go ahead and stop adempas, wait 24 hr and start adcirca 40mg daily, and if she feels less SOB, we will place the PA for adcirca- if she finds transitioning back makes no improvement in her breathing, I would prefer that she stay on adempas given its mechanism of action (will need a 48 hr washout going back to adempas from adcrica).      Also could try again to increase remodulin but she has been limited by SE with this In the past.     -saw LUT today- pt to resume eval including right and left heart cath    Pt to come by the cath lab on weds and I'll remove the suture in her line as her skin is becoming irritated from the suture      -Keep salt intake to under 2000 mg sodium, fluids to under 2 L (64 oz)    -Check weights every morning after getting out of bed and urinating. If weight goes up 3# overnight or 5# in one week she should call us    -F/u 3 mo with labs  walk and cath as above    Ivonne Rai MD

## 2020-07-31 ENCOUNTER — OFFICE VISIT (OUTPATIENT)
Dept: TRANSPLANT | Facility: CLINIC | Age: 50
End: 2020-07-31
Payer: COMMERCIAL

## 2020-07-31 ENCOUNTER — HOSPITAL ENCOUNTER (OUTPATIENT)
Dept: PULMONOLOGY | Facility: CLINIC | Age: 50
Discharge: HOME | End: 2020-07-31
Payer: COMMERCIAL

## 2020-07-31 ENCOUNTER — TELEPHONE (OUTPATIENT)
Dept: TRANSPLANT | Facility: CLINIC | Age: 50
End: 2020-07-31

## 2020-07-31 ENCOUNTER — LAB VISIT (OUTPATIENT)
Dept: LAB | Facility: HOSPITAL | Age: 50
End: 2020-07-31
Attending: INTERNAL MEDICINE
Payer: COMMERCIAL

## 2020-07-31 ENCOUNTER — HOSPITAL ENCOUNTER (OUTPATIENT)
Dept: CARDIOLOGY | Facility: HOSPITAL | Age: 50
Discharge: HOME | End: 2020-07-31
Attending: INTERNAL MEDICINE
Payer: COMMERCIAL

## 2020-07-31 VITALS
BODY MASS INDEX: 27.01 KG/M2 | HEIGHT: 59 IN | WEIGHT: 134 LBS | HEART RATE: 94 BPM | DIASTOLIC BLOOD PRESSURE: 68 MMHG | SYSTOLIC BLOOD PRESSURE: 110 MMHG

## 2020-07-31 VITALS
HEART RATE: 92 BPM | DIASTOLIC BLOOD PRESSURE: 73 MMHG | SYSTOLIC BLOOD PRESSURE: 103 MMHG | WEIGHT: 129 LBS | HEIGHT: 59 IN | BODY MASS INDEX: 26 KG/M2 | OXYGEN SATURATION: 95 %

## 2020-07-31 VITALS
TEMPERATURE: 98 F | WEIGHT: 132.06 LBS | DIASTOLIC BLOOD PRESSURE: 80 MMHG | HEART RATE: 107 BPM | HEIGHT: 59 IN | HEIGHT: 59 IN | WEIGHT: 132 LBS | SYSTOLIC BLOOD PRESSURE: 111 MMHG | BODY MASS INDEX: 26.61 KG/M2 | OXYGEN SATURATION: 96 % | BODY MASS INDEX: 26.62 KG/M2 | RESPIRATION RATE: 22 BRPM

## 2020-07-31 DIAGNOSIS — I27.20 PULMONARY HYPERTENSION: ICD-10-CM

## 2020-07-31 DIAGNOSIS — I36.1 NONRHEUMATIC TRICUSPID VALVE REGURGITATION: ICD-10-CM

## 2020-07-31 DIAGNOSIS — J96.11 CHRONIC RESPIRATORY FAILURE WITH HYPOXIA: ICD-10-CM

## 2020-07-31 DIAGNOSIS — I27.0 PRIMARY PULMONARY HYPERTENSION: Primary | ICD-10-CM

## 2020-07-31 DIAGNOSIS — I27.20 PULMONARY HYPERTENSION: Primary | ICD-10-CM

## 2020-07-31 DIAGNOSIS — Z79.01 LONG TERM CURRENT USE OF ANTICOAGULANT THERAPY: ICD-10-CM

## 2020-07-31 DIAGNOSIS — Z79.899 POLYPHARMACY: ICD-10-CM

## 2020-07-31 DIAGNOSIS — Z76.82 LUNG TRANSPLANT CANDIDATE: ICD-10-CM

## 2020-07-31 DIAGNOSIS — E66.3 OVERWEIGHT (BMI 25.0-29.9): ICD-10-CM

## 2020-07-31 DIAGNOSIS — I27.21 PULMONARY ARTERIAL HYPERTENSION: ICD-10-CM

## 2020-07-31 DIAGNOSIS — I27.0 PRIMARY PULMONARY HYPERTENSION: ICD-10-CM

## 2020-07-31 DIAGNOSIS — Z76.82 LUNG TRANSPLANT CANDIDATE: Primary | ICD-10-CM

## 2020-07-31 DIAGNOSIS — R06.82 TACHYPNEA: ICD-10-CM

## 2020-07-31 DIAGNOSIS — G47.33 OSA (OBSTRUCTIVE SLEEP APNEA): ICD-10-CM

## 2020-07-31 DIAGNOSIS — Z76.82 AWAITING ORGAN TRANSPLANT STATUS: ICD-10-CM

## 2020-07-31 DIAGNOSIS — I27.9 CHRONIC PULMONARY HEART DISEASE: ICD-10-CM

## 2020-07-31 DIAGNOSIS — E03.9 HYPOTHYROIDISM (ACQUIRED): ICD-10-CM

## 2020-07-31 LAB
ALBUMIN SERPL BCP-MCNC: 4.1 G/DL (ref 3.5–5.2)
ALP SERPL-CCNC: 77 U/L (ref 55–135)
ALT SERPL W/O P-5'-P-CCNC: 17 U/L (ref 10–44)
ANION GAP SERPL CALC-SCNC: 6 MMOL/L (ref 8–16)
ASCENDING AORTA: 2.24 CM
AST SERPL-CCNC: 19 U/L (ref 10–40)
AV INDEX (PROSTH): 0.89
AV MEAN GRADIENT: 4 MMHG
AV PEAK GRADIENT: 6 MMHG
AV VALVE AREA: 2.32 CM2
AV VELOCITY RATIO: 0.87
BASOPHILS # BLD AUTO: 0.04 K/UL (ref 0–0.2)
BASOPHILS NFR BLD: 0.7 % (ref 0–1.9)
BILIRUB SERPL-MCNC: 0.4 MG/DL (ref 0.1–1)
BNP SERPL-MCNC: 121 PG/ML (ref 0–99)
BSA FOR ECHO PROCEDURE: 1.59 M2
BUN SERPL-MCNC: 7 MG/DL (ref 6–20)
CALCIUM SERPL-MCNC: 8.9 MG/DL (ref 8.7–10.5)
CHLORIDE SERPL-SCNC: 112 MMOL/L (ref 95–110)
CO2 SERPL-SCNC: 22 MMOL/L (ref 23–29)
CREAT SERPL-MCNC: 0.7 MG/DL (ref 0.5–1.4)
CV ECHO LV RWT: 0.49 CM
DIFFERENTIAL METHOD: NORMAL
DOP CALC AO PEAK VEL: 1.2 M/S
DOP CALC AO VTI: 21.86 CM
DOP CALC LVOT AREA: 2.6 CM2
DOP CALC LVOT DIAMETER: 1.82 CM
DOP CALC LVOT PEAK VEL: 1.04 M/S
DOP CALC LVOT STROKE VOLUME: 50.63 CM3
DOP CALCLVOT PEAK VEL VTI: 19.47 CM
E WAVE DECELERATION TIME: 139.94 MSEC
E/A RATIO: 0.95
E/E' RATIO: 7.78 M/S
ECHO LV POSTERIOR WALL: 0.78 CM (ref 0.6–1.1)
EOSINOPHIL # BLD AUTO: 0.2 K/UL (ref 0–0.5)
EOSINOPHIL NFR BLD: 4.2 % (ref 0–8)
ERYTHROCYTE [DISTWIDTH] IN BLOOD BY AUTOMATED COUNT: 14.2 % (ref 11.5–14.5)
EST. GFR  (AFRICAN AMERICAN): >60 ML/MIN/1.73 M^2
EST. GFR  (NON AFRICAN AMERICAN): >60 ML/MIN/1.73 M^2
FEF 25 75 LLN: 1.39
FEF 25 75 PRE REF: 26.6 %
FEF 25 75 REF: 2.46
FEV05 LLN: 0.9
FEV05 REF: 1.76
FEV1 FVC LLN: 70
FEV1 FVC PRE REF: 82 %
FEV1 FVC REF: 81
FEV1 LLN: 1.83
FEV1 PRE REF: 55.1 %
FEV1 REF: 2.35
FRACTIONAL SHORTENING: 55 % (ref 28–44)
FVC LLN: 2.27
FVC PRE REF: 67 %
FVC REF: 2.89
GLUCOSE SERPL-MCNC: 90 MG/DL (ref 70–110)
HCT VFR BLD AUTO: 48.3 % (ref 37–48.5)
HGB BLD-MCNC: 15.6 G/DL (ref 12–16)
IMM GRANULOCYTES # BLD AUTO: 0.03 K/UL (ref 0–0.04)
IMM GRANULOCYTES NFR BLD AUTO: 0.5 % (ref 0–0.5)
INTERVENTRICULAR SEPTUM: 0.86 CM (ref 0.6–1.1)
LA MAJOR: 3.96 CM
LA MINOR: 3.93 CM
LA WIDTH: 3.32 CM
LEFT ATRIUM SIZE: 2.94 CM
LEFT ATRIUM VOLUME INDEX: 21 ML/M2
LEFT ATRIUM VOLUME: 32.73 CM3
LEFT INTERNAL DIMENSION IN SYSTOLE: 1.42 CM (ref 2.1–4)
LEFT VENTRICLE DIASTOLIC VOLUME INDEX: 25.96 ML/M2
LEFT VENTRICLE DIASTOLIC VOLUME: 40.38 ML
LEFT VENTRICLE MASS INDEX: 43 G/M2
LEFT VENTRICLE SYSTOLIC VOLUME INDEX: 3.3 ML/M2
LEFT VENTRICLE SYSTOLIC VOLUME: 5.21 ML
LEFT VENTRICULAR INTERNAL DIMENSION IN DIASTOLE: 3.18 CM (ref 3.5–6)
LEFT VENTRICULAR MASS: 67.01 G
LV LATERAL E/E' RATIO: 5.83 M/S
LV SEPTAL E/E' RATIO: 11.67 M/S
LYMPHOCYTES # BLD AUTO: 1.1 K/UL (ref 1–4.8)
LYMPHOCYTES NFR BLD: 19.3 % (ref 18–48)
MAGNESIUM SERPL-MCNC: 2.2 MG/DL (ref 1.6–2.6)
MCH RBC QN AUTO: 29.1 PG (ref 27–31)
MCHC RBC AUTO-ENTMCNC: 32.3 G/DL (ref 32–36)
MCV RBC AUTO: 90 FL (ref 82–98)
MONOCYTES # BLD AUTO: 0.4 K/UL (ref 0.3–1)
MONOCYTES NFR BLD: 7.1 % (ref 4–15)
MV PEAK A VEL: 0.74 M/S
MV PEAK E VEL: 0.7 M/S
MV STENOSIS PRESSURE HALF TIME: 40.58 MS
MV VALVE AREA P 1/2 METHOD: 5.42 CM2
NEUTROPHILS # BLD AUTO: 3.9 K/UL (ref 1.8–7.7)
NEUTROPHILS NFR BLD: 68.2 % (ref 38–73)
NRBC BLD-RTO: 0 /100 WBC
PEF LLN: 4.58
PEF PRE REF: 82.6 %
PEF REF: 6.04
PHYSICIAN COMMENT: ABNORMAL
PISA TR MAX VEL: 5.24 M/S
PLATELET # BLD AUTO: 206 K/UL (ref 150–350)
PMV BLD AUTO: 12.3 FL (ref 9.2–12.9)
POTASSIUM SERPL-SCNC: 4.1 MMOL/L (ref 3.5–5.1)
PRE FEF 25 75: 0.66 L/S (ref 1.39–3.54)
PRE FET 100: 6.7 SEC
PRE FEV05 REF: 59.3 %
PRE FEV1 FVC: 66.67 % (ref 70.12–92.54)
PRE FEV1: 1.29 L (ref 1.83–2.86)
PRE FEV5: 1.04 L (ref 0.9–2.61)
PRE FVC: 1.94 L (ref 2.27–3.52)
PRE PEF: 4.99 L/S (ref 4.58–7.49)
PROT SERPL-MCNC: 7.8 G/DL (ref 6–8.4)
RA MAJOR: 4.1 CM
RA PRESSURE: 3 MMHG
RA WIDTH: 3.53 CM
RBC # BLD AUTO: 5.37 M/UL (ref 4–5.4)
RIGHT VENTRICULAR END-DIASTOLIC DIMENSION: 4.71 CM
RV TISSUE DOPPLER FREE WALL SYSTOLIC VELOCITY 1 (APICAL 4 CHAMBER VIEW): 8.84 CM/S
SINUS: 2.53 CM
SODIUM SERPL-SCNC: 140 MMOL/L (ref 136–145)
STJ: 2 CM
T4 FREE SERPL-MCNC: 1.62 NG/DL (ref 0.71–1.51)
TDI LATERAL: 0.12 M/S
TDI SEPTAL: 0.06 M/S
TDI: 0.09 M/S
TR MAX PG: 110 MMHG
TRICUSPID ANNULAR PLANE SYSTOLIC EXCURSION: 1.63 CM
TSH SERPL DL<=0.005 MIU/L-ACNC: <0.01 UIU/ML (ref 0.4–4)
TV REST PULMONARY ARTERY PRESSURE: 113 MMHG
WBC # BLD AUTO: 5.65 K/UL (ref 3.9–12.7)

## 2020-07-31 PROCEDURE — 83735 ASSAY OF MAGNESIUM: CPT | Mod: TXP

## 2020-07-31 PROCEDURE — 3008F BODY MASS INDEX DOCD: CPT | Mod: CPTII,NTX,S$GLB, | Performed by: INTERNAL MEDICINE

## 2020-07-31 PROCEDURE — 99214 PR OFFICE/OUTPT VISIT, EST, LEVL IV, 30-39 MIN: ICD-10-PCS | Mod: 25,NTX,S$GLB, | Performed by: INTERNAL MEDICINE

## 2020-07-31 PROCEDURE — 94010 BREATHING CAPACITY TEST: ICD-10-PCS | Mod: 59,NTX,S$GLB, | Performed by: INTERNAL MEDICINE

## 2020-07-31 PROCEDURE — 99214 OFFICE O/P EST MOD 30 MIN: CPT | Mod: NTX,S$GLB,, | Performed by: INTERNAL MEDICINE

## 2020-07-31 PROCEDURE — 99214 OFFICE O/P EST MOD 30 MIN: CPT | Mod: 25,NTX,S$GLB, | Performed by: INTERNAL MEDICINE

## 2020-07-31 PROCEDURE — 80053 COMPREHEN METABOLIC PANEL: CPT | Mod: TXP

## 2020-07-31 PROCEDURE — 94010 BREATHING CAPACITY TEST: CPT | Mod: 59,NTX,S$GLB, | Performed by: INTERNAL MEDICINE

## 2020-07-31 PROCEDURE — 99999 PR PBB SHADOW E&M-EST. PATIENT-LVL V: CPT | Mod: PBBFAC,TXP,, | Performed by: INTERNAL MEDICINE

## 2020-07-31 PROCEDURE — 3008F PR BODY MASS INDEX (BMI) DOCUMENTED: ICD-10-PCS | Mod: CPTII,NTX,S$GLB, | Performed by: INTERNAL MEDICINE

## 2020-07-31 PROCEDURE — 99999 PR PBB SHADOW E&M-EST. PATIENT-LVL V: ICD-10-PCS | Mod: PBBFAC,TXP,, | Performed by: INTERNAL MEDICINE

## 2020-07-31 PROCEDURE — 93306 TTE W/DOPPLER COMPLETE: CPT | Mod: 26,NTX,, | Performed by: INTERNAL MEDICINE

## 2020-07-31 PROCEDURE — 99214 PR OFFICE/OUTPT VISIT, EST, LEVL IV, 30-39 MIN: ICD-10-PCS | Mod: NTX,S$GLB,, | Performed by: INTERNAL MEDICINE

## 2020-07-31 PROCEDURE — 85025 COMPLETE CBC W/AUTO DIFF WBC: CPT | Mod: TXP

## 2020-07-31 PROCEDURE — 84439 ASSAY OF FREE THYROXINE: CPT | Mod: TXP

## 2020-07-31 PROCEDURE — 93306 ECHO (CUPID ONLY): ICD-10-PCS | Mod: 26,NTX,, | Performed by: INTERNAL MEDICINE

## 2020-07-31 PROCEDURE — 94618 PULMONARY STRESS TESTING: ICD-10-PCS | Mod: NTX,S$GLB,, | Performed by: INTERNAL MEDICINE

## 2020-07-31 PROCEDURE — 94618 PULMONARY STRESS TESTING: CPT | Mod: NTX,S$GLB,, | Performed by: INTERNAL MEDICINE

## 2020-07-31 PROCEDURE — 83880 ASSAY OF NATRIURETIC PEPTIDE: CPT | Mod: TXP

## 2020-07-31 PROCEDURE — 84443 ASSAY THYROID STIM HORMONE: CPT | Mod: TXP

## 2020-07-31 PROCEDURE — 93306 TTE W/DOPPLER COMPLETE: CPT | Mod: TXP

## 2020-07-31 PROCEDURE — 36415 COLL VENOUS BLD VENIPUNCTURE: CPT | Mod: TXP

## 2020-07-31 NOTE — PATIENT INSTRUCTIONS
Cont remodulin at 89 ng/kg/min.       go home, do an inventory of what of your # adcirca tablets and let us know- if you have enough for 2 weeks- go ahead and stop adempas, start adcirca 40mg daily, and if you feel less SOB, we will apply for you to stay on adcirca- if you find transitioning back makes no improvement in your breathing, I would prefer that you stay on adempas given its mechanism of action.      we could try again to increase remodulin if all else fails     come by the cath lab on weds and I'll remove the suture in your line- bring a dressing kit    Call us if you find yourself getting more short of breath, have more swelling or unexpected weight changes    Keep salt intake to under 2000 mg sodium, fluids to under 2 L (64 oz)

## 2020-07-31 NOTE — LETTER
August 3, 2020        Ivonne Rai  [address]  [facility] [address]  Phone: [phone]  Fax: [phone]             [facility] Trans[facility]  [address]  [facility] [address]  Phone: [phone]   Patient: Yuni Perez   MR Number: [id number]   YOB: 1970   Date of Visit: 7/31/2020       Dear Dr. Ivonne Rai    Thank you for referring Yuni Perez to me for evaluation. Attached you will find relevant portions of my assessment and plan of care.    If you have questions, please do not hesitate to call me. I look forward to following Yuni Perez along with you.    Sincerely,    Ian Lackey MD    Enclosure    If you would like to receive this communication electronically, please contact [email or web address] or [phone] to request [employer] access.    [employer] is a tool which provides read-only access to select patient information with whom you have a relationship. Its easy to use and provides real time access to review your patients record including encounter summaries, notes, results, and demographic information.    If you feel you have received this communication in error or would no longer like to receive these types of communications, please e-mail [email or web address]

## 2020-07-31 NOTE — TELEPHONE ENCOUNTER
7/31/20 - Instructions received from Dr. Lackey to proceed with lung transplant evaluation.  Informed patient in clinic that we will submit for financial clearance for the evaluation.  Informed her that I would contact her once the clearance is received to schedule the testing.  She verbalized her understanding.    8/3/20 - Spoke with Dr. Lackey.  Clinic note signed.  Clinicals routed to Nataliya Ro for financial clearance for outpatient evaluation.      8/10/20 - Contacted Yuni and informed her that financial clearance for the lung transplant evaluation testing has been received from the insurance company.  Informed her that we can now schedule the evaluation testing.  Inquired as to her availability, offered available dates.  Reminded her that the evaluation testing consists of three (3) days of testing (Monday, Tuesday, and Wednesday).  Informed her that a right and left heart catheterization will be scheduled either on the Thursday of the same week, or another day depending on the physician's availability.  She requested to schedule the testing the week of September 21st.  She requested testing on consecutive days if possible. Instructed her to bring her primary caregiver.  She verbalized understanding of all information discussed.  She stated that she has a dental appointment scheduled for 8/25/20.      Informed her that she will receive a 24 hour urine container (large orange jug) in the mail.  Informed her that she will need to collect urine for 24 hours and keep it on ice at all times.  Informed her that the test is done to determine kidney function.  Informed her that after transplant, some of the medications can affect kidney function.  Informed her that she will receive instructions in the mail.  Instructed her to start the collection on the Sunday morning prior to appointment.  Informed her that if she wakes up at 07:00 and urinates, then that will be the start of the 24 hour urine collection.   Informed her that she will discard that first urine, but it will be the start time of the test.  Instructed her to collect every urine thereafter until 07:00 on Monday morning (the following day).  Instructed her to collect the last urine at 07:00 and end the collection.  Informed her that she can drop off the 24 hour urine container on Monday morning when checking in for labs.      Also informed her that she will receive 2 small specimen containers in the mail as well. Informed her that one cup is for a sputum collection.  Informed her to collect sputum or mucus, not spit.  Informed her that the other cup is to collect urine.  Informed her that she can collect that urine while she is in the building on Monday.  Instructed her to drop off both specimens to the lab once able to collect it.      Yuni Joannaedie spelled her name and stated her date of birth for verification. Informed her that all the specimen containers will be labeled with this information.  Instructed her to verify her name and date of birth on all of the containers once received. She verbalized understanding of all information discussed.  All questions answered.

## 2020-07-31 NOTE — PROGRESS NOTES
LUNG TRANSPLANT PRE FOLLOW-UP    Referring Physician: Ivonne Rai    Reason for Visit:  Pre-lung transplant follow-up.         Date of Initial Evaluation: 1/16/2017                                                                                             History of Present Illness: Yuni Perez is a 50 y.o. female who is on 0L of oxygen. She is on no assisted ventilation.  Her New [place] Heart Association Class is III and a Karnofsky score of 60% - Requires occasional assistance but is able to care for needs. She is not diabetic.   She presents today for follow up eval for Lung Transplant. Last seen in May 2019. She follows with Dr. Rai in the PH clinic. She is currently on Letairis, Adempas, and Remodulin at 80ng. Patient reports that her SOB is progressive with minimal activities since November, 2019 compared to previous.  At that time, she did get empirically treated for PNA based on her CXR.  She was changed from Adcirca to Adempas prior to symptom onset in November.  She remains on letaris and uptitrated Remodulin.    Since November 20219, her respiratory symptoms haven't progressed but she has not improved to her baseline of prior to November.  Denies syncope/presyncope.  Denies any hospitalizations since last visit.  She is tolerating her medications well.      Review of Systems   Constitutional: Positive for malaise/fatigue. Negative for chills, fever and weight loss.   HENT: Negative for congestion, nosebleeds and sinus pain.    Eyes: Negative for blurred vision, double vision and photophobia.   Respiratory: Positive for shortness of breath. Negative for cough, hemoptysis, sputum production and wheezing.    Cardiovascular: Negative for chest pain, orthopnea, claudication, leg swelling and PND.   Gastrointestinal: Negative for abdominal pain, constipation and diarrhea.   Genitourinary: Negative for flank pain and frequency.   Musculoskeletal: Negative for joint pain, myalgias and neck  "pain.   Skin: Negative for itching and rash.   Neurological: Negative for dizziness, focal weakness, weakness and headaches.   Endo/Heme/Allergies: Positive for environmental allergies.   Psychiatric/Behavioral: Negative for depression. The patient is nervous/anxious.      Objective:   /80   Pulse 107   Temp 97.9 °F (36.6 °C)   Resp (!) 22   Ht 4' 11" (1.499 m)   Wt 59.9 kg (132 lb)   LMP 07/17/2020 (Exact Date)   SpO2 96%   BMI 26.66 kg/m²     Physical Exam   Constitutional: She is oriented to person, place, and time and well-developed, well-nourished, and in no distress. No distress.   HENT:   Head: Normocephalic and atraumatic.   Right Ear: External ear normal.   Left Ear: External ear normal.   Mouth/Throat: No oropharyngeal exudate.   Eyes: Conjunctivae and EOM are normal. No scleral icterus.   Neck: Normal range of motion. Neck supple. No JVD present.   Cardiovascular: Normal rate, regular rhythm and intact distal pulses. Exam reveals no gallop and no friction rub.   Murmur heard.  Pulmonary/Chest: Effort normal and breath sounds normal. No respiratory distress. She has no wheezes. She exhibits no tenderness.   Abdominal: Soft. Bowel sounds are normal. She exhibits no distension and no mass. There is no abdominal tenderness. There is no guarding.   Musculoskeletal: Normal range of motion.         General: No tenderness, deformity or edema.   Lymphadenopathy:     She has no cervical adenopathy.   Neurological: She is alert and oriented to person, place, and time. Gait normal. GCS score is 15.   Skin: Skin is warm and dry. No rash noted. She is not diaphoretic. No erythema. No pallor.   Psychiatric: Mood and affect normal.     Labs:  Lab Visit on 07/28/2020   Component Date Value    SARS-CoV2 (COVID-19) Ernst* 07/28/2020 Not Detected        Pulmonary Function Tests 7/31/2020 12/3/2019 5/23/2019 11/20/2018 5/10/2018 11/7/2017 7/12/2017   FVC 1.94 1.84 1.99 2.32 2.11 2.24 2.24   FEV1 1.29 1.22 1.4 " 1.62 1.48 1.56 1.54   TLC (liters) - - - - - - -   DLCO (ml/mmHg sec) - 12.8 13.8 - 14.3 - -   FVC% 67 63 75 84 76 80 80   FEV1% 55 52 62 69 63 66 65   FEF 25-75 - - - 0.99 0.92 0.96 0.87   FEF 25-75% - - - 37 34 35 32   TLC% - - - - - - -   RV - - - - - - -   RV% - - - - - - -   DLCO% - 60 73 - 75 - -     Other:   6MW 7/31/2020 5/11/2020 12/3/2019 5/23/2019 2/25/2019 11/20/2018 4/25/2018   6MWT Status completed without stopping - completed without stopping completed without stopping completed without stopping completed without stopping -   Patient Reported Dyspnea;Leg pain Dyspnea;Lightheadedness Dyspnea Dyspnea;Leg pain;Lightheadedness Dyspnea;Leg pain;Lightheadedness Dyspnea Dyspnea;Leg pain   Was O2 used? No No No No No No No   6MW Distance walked (feet) 1400 1400 1500 1450 1600 1562 1520   Distance walked (meters) 426.72 426.72 457.2 441.96 487.68 476.1 463.3   Did patient stop? No No No No No No No   Oxygen Saturation 96 96 98 98 97 97 98   Supplemental Oxygen Room Air Room Air Room Air Room Air Room Air Room Air Room Air   Heart Rate 101 95 91 65 82 76 79   Blood Pressure 121/69 92/63 103/80 112/72 135/78 129/71 121/70   Ab Dyspnea Rating  moderate moderate light moderate light light light   Oxygen Saturation 94 95 96 100 96 98 98   Supplemental Oxygen Room Air Room Air Room Air Room Air Room Air Room Air Room Air   Heart Rate 144 143 140 142 141 151 139   Blood Pressure 127/67 129/92 138/80 150/76 151/87 142/71 133/75   Ab Dyspnea Rating  very,very heavy very,very heavy very,very heavy very heavy very,very heavy very,very heavy very heavy   Recovery Time (seconds) 107 115 120 200 210 180 47   Oxygen Saturation 98 97 99 98 98 99 99   Supplemental Oxygen Room Air Room Air Room Air Room Air Room Air Room Air Room Air   Heart Rate 108 106 110 88 95 93 117       TTE 05/11/20  Conclusion    · Normal left ventricular systolic function. The estimated ejection fraction is 60%.  · Indeterminate left ventricular  diastolic function.  · Septal wall has abnormal motion. Systolic flattening of the interventricular septum consistent with right ventricle pressure overload.  · Moderately reduced right ventricular systolic function.  · Moderate right ventricular enlargement.  · Mild to moderate tricuspid regurgitation.  · Severe pulmonary hypertension present.  · The estimated PA systolic pressure is 103 mmHg.  · Normal central venous pressure (3 mmHg).       TTE (02/22/2019):     · Normal left ventricular systolic function. The estimated ejection fraction is 65%  · Normal LV diastolic function.  · Septal wall has abnormal motion.  · Mildly reduced right ventricular systolic function.  · Mild tricuspid regurgitation.  · The estimated PA systolic pressure is 54 mm Hg  · Pulmonary hypertension present.  · Normal central venous pressure (3 mm Hg).        RHC 8/20/18     PW: 8/6 (5)  PA: 101/35 (57)  RV: 92/0  RVEDP: 6   AO_SAT: 99  PA_SAT: 72  RA: 6/5 (4)  FICKCI: 2.5700  FICKCO: 4.0800  PVR: 1038.0000     RHC 9/4/19  · Severe PAH on IV remodulin, adcirca and letairis.  · Normal right and left-sided filling pressures.  · Borderline low-normal cardiac output by Ebony method which is normal when indexed for BSA.  · No signficant change from [facility] 8/18.  · RA: 6/ 7/ 5   · RV: 105/ 3/ 10   · PA: 103/ 45/ 64  · PWP: 15/ 1512/ 13 .   · Cardiac output was 4.11 by Ebony.   · Cardiac index is 2.67 L/min/m2.   · O2 Sat: PA 70%. AO sat 96%  · PVR 12.4        Assessment:-  1. Lung transplant candidate    2. Primary pulmonary hypertension        Plan:   - Patient meets disease specific criteria given her progressive symptoms with a drop in her 6MWD from her established baseline while on optimal therapy for PH.  Will start pre-lung transplant evaluation.      - Current tx for PH includes: Riociguat, Ambrisentan, and Treprostinil.  Will defer on-going management of PH to Dr. Rai.    RTC in 8 weeks or earlier if completed work-up with full PFTs        Ian Lackey MD  Pulmonary/Critical Care Medicine/Transplant Pulmonology  [facility] [place]  8/3/2020

## 2020-07-31 NOTE — TELEPHONE ENCOUNTER
----- Message from Ivonne Rai MD sent at 7/31/2020  4:10 PM CDT -----  No I agree will need to start over with uptitrating  ----- Message -----  From: TD RazoN, RN  Sent: 7/31/2020   3:31 PM CDT  To: Ivonne Rai MD    If she does try the Adcirca, and wants to return to [facility], are you ok with her restarting at the 2.5? Typically, we'd start at 1mg and ramp up every 2 weeks.    Thank you,  Sarah

## 2020-08-03 ENCOUNTER — PATIENT OUTREACH (OUTPATIENT)
Dept: ADMINISTRATIVE | Facility: OTHER | Age: 50
End: 2020-08-03

## 2020-08-03 NOTE — PROGRESS NOTES
Requested updates within Care Everywhere.  Patient's chart was reviewed for overdue MELANI topics.  Immunizations reconciled.

## 2020-08-05 ENCOUNTER — HOSPITAL ENCOUNTER (OUTPATIENT)
Dept: RADIOLOGY | Facility: HOSPITAL | Age: 50
Discharge: HOME | End: 2020-08-05
Attending: INTERNAL MEDICINE
Payer: COMMERCIAL

## 2020-08-05 DIAGNOSIS — Z12.31 ENCOUNTER FOR SCREENING MAMMOGRAM FOR BREAST CANCER: ICD-10-CM

## 2020-08-05 PROCEDURE — 77067 MAMMO DIGITAL SCREENING BILAT WITH TOMOSYNTHESIS_CAD: ICD-10-PCS | Mod: 26,NTX,, | Performed by: RADIOLOGY

## 2020-08-05 PROCEDURE — 77067 SCR MAMMO BI INCL CAD: CPT | Mod: TC,NTX

## 2020-08-05 PROCEDURE — 77067 SCR MAMMO BI INCL CAD: CPT | Mod: 26,NTX,, | Performed by: RADIOLOGY

## 2020-08-05 PROCEDURE — 77063 BREAST TOMOSYNTHESIS BI: CPT | Mod: 26,NTX,, | Performed by: RADIOLOGY

## 2020-08-05 PROCEDURE — 77063 MAMMO DIGITAL SCREENING BILAT WITH TOMOSYNTHESIS_CAD: ICD-10-PCS | Mod: 26,NTX,, | Performed by: RADIOLOGY

## 2020-08-05 RX ORDER — TADALAFIL 20 MG/1
40 TABLET ORAL DAILY
COMMUNITY
Start: 2020-08-02 | End: 2020-08-05 | Stop reason: SDUPTHER

## 2020-08-05 NOTE — PROCEDURES
Yuni Perez is a 50 y.o.  female patient, who presents for a 6 minute walk test ordered by MD Cecelia.  The diagnosis is Pulmonary Hypertension.  The patient's BMI is 26.7 kg/m2.  Predicted distance (lower limit of normal) is 425.72 meters.      Test Results:    The test was completed without stopping.  The total time walked was 360 seconds.  During walking, the patient reported:  Dyspnea, Leg pain.  The patient used no assistive devices during testing.     07/31/2020---------Distance: 426.72 meters (1400 feet)     O2 Sat % Supplemental Oxygen Heart Rate Blood Pressure Ab Scale   Pre-exercise  (Resting) 96 % Room Air 101 bpm 121/69 mmHg 3   During Exercise 94 % Room Air 144 bpm 127/67 mmHg 9   Post-exercise  (Recovery) 98 % Room Air  108 bpm       Recovery Time: 107 seconds    Performing nurse/tech: Cyril MANZANO      PREVIOUS STUDY:   05/11/2020---------Distance: 426.72 meters (1400 feet)       O2 Sat % Supplemental Oxygen Heart Rate Blood Pressure Ab Scale   Pre-exercise  (Resting) 96 % Room Air 95 bpm 92/63 mmHg 3   During Exercise   95 % Room Air 143 bpm (!) 129/92 mmHg 9   Post-exercise  (Recovery) 97 % Room Air  106 bpm   mmHg         CLINICAL INTERPRETATION:  Six minute walk distance is 426.72 meters (1400 feet) with very, very heavy dyspnea.  During exercise, there was desaturation while breathing room air.  Blood pressure remained stable and Heart rate increased significantly with walking.  Tachycardia was present prior to exercise.  The patient reported non-pulmonary symptoms during exercise.  Since the previous study in May 2020, exercise capacity is unchanged.  Based upon age and body mass index, exercise capacity is normal.

## 2020-08-06 RX ORDER — TADALAFIL 20 MG/1
40 TABLET ORAL DAILY
Qty: 14 TABLET | Refills: 11 | COMMUNITY
Start: 2020-08-06 | End: 2020-08-11 | Stop reason: SDUPTHER

## 2020-08-11 ENCOUNTER — TELEPHONE (OUTPATIENT)
Dept: PALLIATIVE MEDICINE | Facility: CLINIC | Age: 50
End: 2020-08-11

## 2020-08-11 ENCOUNTER — TELEPHONE (OUTPATIENT)
Dept: TRANSPLANT | Facility: CLINIC | Age: 50
End: 2020-08-11

## 2020-08-11 RX ORDER — TADALAFIL 20 MG/1
40 TABLET ORAL DAILY
Qty: 60 TABLET | Refills: 11 | Status: SHIPPED | OUTPATIENT
Start: 2020-08-11

## 2020-08-11 NOTE — TELEPHONE ENCOUNTER
Mrs. Perez has tried brand Adcirca for one week after switching from Adempas. She states that she feels much better. She does not have the aches and pains, SOB that she has with Adempas. Also reports able to walk further and do all housework without becoming SOB. Patient has 4 days left of medication.  Contacted [facility] to d/c Adempas and start brand adcirca. Sent eRx. Rex with [facility] is working on the now.

## 2020-08-11 NOTE — TELEPHONE ENCOUNTER
Attempted to reach pt in regards to scheduling an appt in palliative medicine left message for pt to call back

## 2020-08-12 ENCOUNTER — TELEPHONE (OUTPATIENT)
Dept: TRANSPLANT | Facility: CLINIC | Age: 50
End: 2020-08-12

## 2020-08-12 NOTE — TELEPHONE ENCOUNTER
Spoke with patient about Adcirca. [facility] is working the prescription now and running it through her insurance. They may want her to try generic but clarified with patient that she has taken generic before and had worse side effects (arm pain, aching). Advised patient to call DIANN (kathie) and let them know that she is switching medications from Adempas to Adcirca.  Pharmacy should reach out to patient. They know she will be out of medication on Saturday.

## 2020-08-13 ENCOUNTER — TELEPHONE (OUTPATIENT)
Dept: TRANSPLANT | Facility: CLINIC | Age: 50
End: 2020-08-13

## 2020-08-14 ENCOUNTER — TELEPHONE (OUTPATIENT)
Dept: TRANSPLANT | Facility: CLINIC | Age: 50
End: 2020-08-14

## 2020-08-18 ENCOUNTER — TELEPHONE (OUTPATIENT)
Dept: PALLIATIVE MEDICINE | Facility: CLINIC | Age: 50
End: 2020-08-18

## 2020-08-19 ENCOUNTER — CLINICAL SUPPORT (OUTPATIENT)
Dept: PALLIATIVE MEDICINE | Facility: CLINIC | Age: 50
End: 2020-08-19
Payer: COMMERCIAL

## 2020-08-19 DIAGNOSIS — F41.9 ANXIETY: ICD-10-CM

## 2020-08-19 DIAGNOSIS — Z76.82 LUNG TRANSPLANT CANDIDATE: ICD-10-CM

## 2020-08-19 DIAGNOSIS — R06.09 DYSPNEA ON EXERTION: Primary | ICD-10-CM

## 2020-08-19 DIAGNOSIS — Z71.89 COUNSELING REGARDING ADVANCE DIRECTIVES AND GOALS OF CARE: ICD-10-CM

## 2020-08-19 DIAGNOSIS — Z51.5 ENCOUNTER FOR PALLIATIVE CARE: ICD-10-CM

## 2020-08-19 DIAGNOSIS — I27.0 PRIMARY PULMONARY HYPERTENSION: ICD-10-CM

## 2020-08-19 PROCEDURE — 99205 OFFICE O/P NEW HI 60 MIN: CPT | Mod: 95,TXP,, | Performed by: CLINICAL NURSE SPECIALIST

## 2020-08-19 PROCEDURE — 99205 PR OFFICE/OUTPT VISIT, NEW, LEVL V, 60-74 MIN: ICD-10-PCS | Mod: 95,TXP,, | Performed by: CLINICAL NURSE SPECIALIST

## 2020-08-19 NOTE — PROGRESS NOTES
[facility]  Palliative Medicine Clinic  Consult Note    Patient Name: Yuni Perez  MRN: [id number]  Referring Provider: Dr. Ian Lackey   Primary Care Physician: Lulu Sandhu MD    Assessment/Plan:   The patient location is: [place], [place]  The chief complaint leading to consultation is: Lung transplantation evaluation/PHTN    Visit type: audio only    Time with patient: 60 minutes of total time spent on the encounter, which includes non-face to face time preparing to see the patient (eg, review of tests), Obtaining and/or reviewing separately obtained history, Documenting clinical information in the electronic or other health record, Independently interpreting results (not separately reported) and communicating results to the patient/family/caregiver, or Care coordination (not separately reported).     Each patient to whom he or she provides medical services by telemedicine is:  (1) informed of the relationship between the physician and patient and the respective role of any other health care provider with respect to management of the patient; and (2) notified that he or she may decline to receive medical services by telemedicine and may withdraw from such care at any time.    Notes:   Diagnoses and all orders for this visit:    Dyspnea on exertion    Primary pulmonary hypertension  -     Ambulatory referral/consult to Palliative Care    Lung transplant candidate  -     Ambulatory referral/consult to Palliative Care    Anxiety    Counseling regarding advance directives and goals of care    Encounter for palliative care    Impression:  50 yrold woman with primary PHTN who was referred for pre-lung tranplant evaluation.  Long discussion was conducted with patient who verbalizd a good understanding of the risk vs benefits, possible complications, and need for 24 hr caregiver support required for  lung transplant with good prognosotic awareness.  She has completed advance  directives. She is highly motivated to proceed with lung transplant and has realistic goals and expectations pertaining to the optimal positive outcomes.      Plan/Recommedations:  1) Patient is requesting follow up clinic vivist following completion of lung transplant testing/results in 1 st week of October 2020.  Palliative Medicine office will contact patient to schedule    Thank you for your consult. I will follow along with you. Please call [phone] with questions.     Subjective:     HPI: Patient is a 50 y.o. year old female presenting primary PHTN and meets disease specific criteria given her progressive symptoms from her established baseline while on optimal therapy for PHTN.  She is now in the process of pre-lung transplant evaluation.    She is currently on  Riociguat, Ambrisentan, and Treprostinil.  Will defer on-going management of PH to Dr. Rai. Patient reports that her SOB is progressive, worsening with minimal activities since November, 2019   Advance Care Planning   Review of Symptoms    Symptom Assessment (ESAS 0-10 Scale)  Pain:  0  Dyspnea:  4  Anxiety:  0  Nausea:  0  Depression:  2  Anorexia:  0  Fatigue:  4  Insomnia:  0  Restlessness:  0  Agitation:  0     CAM / Delirium:  Negative  Constipation:  Negative  Diarrhea:  Negative    Bowel Management Plan (BMP):  No            Advanced Directives:  Living Will:  Yes.  Copy on chart:  Yes    [place]: No    Do Not Resuscitate Status: No    Medical Power of : Yes     Agent's Name:  Britta Perez (mother).  Agent's Contact Number:  [phone]    Decision Making:  Patient answered questions    Living Arrangements:  Lives with family    Psychosocial/Cultural:  50 yr old woman disabled x 12 yrs due to primary PHTN.  She resides with her mother and father who are elderly and are her primary support system and caregivers.  Patient reports she is able to do her ADLs with assistnce with housekeping duties.       Time-Based Charting:   Yes  Symptom Assessment: 10 minutes  Advance Care Plannin minutes  Goals of Care: 30 minutes    Total Time Spent: 60 minutes      [facility]  I engaged the patient in a conversation about advance care planning and we specifically addressed what the goals of care would be moving forward, in light of the patient's change in clinical status, specifically declining PHTN  We did specifically address the patient's likely prognosis, which is poor.  .  We explored the patient's values and preferences for future care.  The patient endorses that what is most important right now is to focus on remaining as independent as possible and symptom/pain control and pursuing lung transplantation  Accordingly, we have decided that the best plan to meet the patient's goals includes continuing with treatment  Living will and medical power of  documents are completed and were reviewed with patient who confirms the accuracy and are placed in the EMR.    I spent a total of 20 minutes engaging the patient in this advance care planning discussion.     Past Medical History:   Diagnosis Date    AR (allergic rhinitis)     Cholelithiasis, common bile duct     Chronic low back pain     Eye pressure     GERD (gastroesophageal reflux disease)     History of migraine headaches     Hypothyroidism     Lumbar disc disease     Menorrhagia     Mild asthma     Obesity     Plantar fasciitis of left foot     Primary pulmonary hypertension     followed by heart transplant/pulmonary     Seizure disorder     x 1 in     Seizures     Sleep apnea     TMJ (dislocation of temporomandibular joint)     Tricuspid regurgitation      Past Surgical History:   Procedure Laterality Date    CARDIAC CATHETERIZATION      CENTRAL VENOUS CATHETER INSERTION      gallbladder drain  2019    INSERTION OF HAYNES CATHETER Right 2020    Procedure: INSERTION, CATHETER, CENTRAL VENOUS, HAYNES Replace Single Lumen for Remodulin Infusion;   "Surgeon: eBrtin Dewitt MD;  [facility];  Service: General;  Laterality: Right;    PORTACATH PLACEMENT      REMOVAL OF TUNNELED CENTRAL VENOUS CATHETER (CVC) N/A 6/17/2020    Procedure: REMOVAL, CATHETER, CENTRAL VENOUS, TUNNELED;  Surgeon: Bertin Dewitt MD;  [facility];  Service: General;  Laterality: N/A;    RIGHT HEART CATHETERIZATION Right 8/20/2018    Procedure: HEART CATH-RIGHT;  Surgeon: Ivonne Rai MD;  Location: [facility] CATH LAB;  Service: Cardiology;  Laterality: Right;    RIGHT HEART CATHETERIZATION Right 9/4/2019    Procedure: INSERTION, CATHETER, RIGHT HEART;  Surgeon: Ivonne Rai MD;  Location: [facility] CATH LAB;  Service: Cardiology;  Laterality: Right;    UPPER GASTROINTESTINAL ENDOSCOPY       Family History   Problem Relation Age of Onset    Heart disease Father     Glaucoma Father     Diabetes Mother     Cancer Maternal Grandmother         uterine    Cancer Maternal Aunt         breast    Breast cancer Maternal Aunt     Heart disease Paternal Grandfather     Heart attack Paternal Grandfather     Diabetes Paternal Grandfather     Leukemia Brother     Hypertension Unknown     Diabetes Maternal Grandfather     Heart attack Maternal Grandfather     Breast cancer Cousin     No Known Problems Sister     No Known Problems Maternal Uncle     No Known Problems Paternal Aunt     No Known Problems Paternal Uncle     No Known Problems Paternal Grandmother     Colon cancer Neg Hx     Ovarian cancer Neg Hx     Amblyopia Neg Hx     Blindness Neg Hx     Cataracts Neg Hx     Macular degeneration Neg Hx     Retinal detachment Neg Hx     Strabismus Neg Hx     Stroke Neg Hx     Thyroid disease Neg Hx     Esophageal cancer Neg Hx      Review of patient's allergies indicates:   Allergen Reactions    Fentanyl Anaphylaxis     Respiratory distress    Vibra-tabs [doxycycline hyclate] Anaphylaxis     "throat felt like it was closing"    " Adhesive Hives     Silk tape    Amoxicillin Rash    Nsaids (non-steroidal anti-inflammatory drug) Swelling    Chlorhexidine Other (See Comments)     Blue Chlorhexidine causes hives       Medications:    Current Outpatient Medications:     acetaminophen (TYLENOL EXTRA STRENGTH) 500 MG tablet, Take 1,000 mg by mouth every 6 (six) hours as needed for Pain., Disp: , Rfl:     ADCIRCA 20 mg Tab, Take 2 tablets (40 mg total) by mouth once daily., Disp: 60 tablet, Rfl: 11    albuterol-ipratropium (DUO-NEB) 2.5 mg-0.5 mg/3 mL nebulizer solution, Take 3 mLs by nebulization every 6 (six) hours as needed for Wheezing. Rescue, Disp: 120 vial, Rfl: 5    ambrisentan (LETAIRIS) 10 MG Tab, Take 1 tablet (10 mg total) by mouth once daily. (Patient taking differently: Take 10 mg by mouth every morning. Patient takes about 10am daily), Disp: 30 tablet, Rfl: 11    azelastine (ASTELIN) 137 mcg (0.1 %) nasal spray, 2 sprays by Nasal route 2 (two) times daily. Patient uses prn in the evening, Disp: , Rfl:     butalbital-acetaminophen-caffeine -40 mg (FIORICET, ESGIC) -40 mg per tablet, Take 1 tablet by mouth every 4 (four) hours as needed for Pain., Disp: , Rfl:     citalopram (CELEXA) 10 MG tablet, Take 10 mg by mouth every evening. , Disp: , Rfl:     cyanocobalamin, vitamin B-12, 2,500 mcg Subl, Place 2,500 mcg under the tongue every morning. , Disp: , Rfl:     diphenhydrAMINE (BENADRYL) 25 mg capsule, Take 50 mg by mouth every 6 (six) hours as needed for Itching. Patient takes prn evenings, Disp: , Rfl:     ferrous sulfate 325 (65 FE) MG EC tablet, Take 325 mg by mouth daily as needed. , Disp: , Rfl:     fluticasone (FLONASE) 50 mcg/actuation nasal spray, INHALE 2 SPRAYS IN EACH NOSTRIL DAILY (Patient taking differently: every evening. ), Disp: 16 g, Rfl: 3    fluticasone furoate-vilanteroL (BREO ELLIPTA) 200-25 mcg/dose DsDv diskus inhaler, Inhale 1 puff into the lungs once daily. Controller (Patient taking  differently: Inhale 1 puff into the lungs every evening. Controller), Disp: 1 each, Rfl: 5    folic acid (FOLVITE) 1 MG tablet, Take 1 tablet (1,000 mcg total) by mouth once daily. (Patient taking differently: Take 1,000 mcg by mouth every morning. ), Disp: 90 tablet, Rfl: 3    hyoscyamine (LEVSIN/SL) 0.125 mg Subl, Place 0.125 mg under the tongue every 6 (six) hours as needed. , Disp: , Rfl:     levothyroxine (SYNTHROID) 137 MCG Tab tablet, Take 1 tablet (137 mcg total) by mouth once daily. (Patient taking differently: Take 137 mcg by mouth before breakfast. ), Disp: 90 tablet, Rfl: 3    loratadine (CLARITIN) 10 mg tablet, Take 10 mg by mouth every evening. , Disp: , Rfl:     norethindrone-ethinyl estradiol (MICROGESTIN FE 1/20, 28,) 1 mg-20 mcg (21)/75 mg (7) per tablet, Take 1 tablet by mouth once daily., Disp: 28 tablet, Rfl: 6    ondansetron (ZOFRAN) 4 MG tablet, Take 1 tablet (4 mg total) by mouth every 6 (six) hours as needed. 1 Tablet Oral Every 6 hours, Disp: 30 tablet, Rfl: 2    pantoprazole (PROTONIX) 40 MG tablet, Take 1 tablet (40 mg total) by mouth once daily. (Patient taking differently: Take 40 mg by mouth every morning. ), Disp: 90 tablet, Rfl: 3    sumatriptan (IMITREX) 100 MG tablet, Take 1 tablet (100 mg total) by mouth as needed for Migraine. Take at the onset of headache, may take 1 more in 1 hour if needed., Disp: 12 tablet, Rfl: 5    topiramate (TOPAMAX) 100 MG tablet, Take 1 tablet (100 mg total) by mouth 2 (two) times daily., Disp: 60 tablet, Rfl: 11    treprostinil (REMODULIN) 2.5 mg/mL Soln, Mix cassette as directed and infuse continuously per physician titration orders on dosing sheet. Current dose 80ng/kg/min, Disp: 60 mL, Rfl: 11    traMADoL (ULTRAM) 50 mg tablet, Take 1 tablet (50 mg total) by mouth every 6 (six) hours. (Patient not taking: Reported on 8/19/2020), Disp: 12 tablet, Rfl: 0      Review of Systems   Constitutional: Positive for activity change and fatigue.    Respiratory: Positive for shortness of breath.    Neurological: Negative for dizziness and syncope.     Objective:     There were no vitals filed for this visit.      Physical Exam    Laboratory:   CBC:   WBC   Date Value Ref Range Status   07/31/2020 5.65 3.90 - 12.70 K/uL Final     RBC   Date Value Ref Range Status   07/31/2020 5.37 4.00 - 5.40 M/uL Final     Hemoglobin   Date Value Ref Range Status   07/31/2020 15.6 12.0 - 16.0 g/dL Final     POC Hematocrit   Date Value Ref Range Status   09/09/2014 38 36 - 54 %PCV Final     Hematocrit   Date Value Ref Range Status   07/31/2020 48.3 37.0 - 48.5 % Final     Platelets   Date Value Ref Range Status   07/31/2020 206 150 - 350 K/uL Final     Mean Corpuscular Volume   Date Value Ref Range Status   07/31/2020 90 82 - 98 fL Final     Mean Corpuscular Hemoglobin   Date Value Ref Range Status   07/31/2020 29.1 27.0 - 31.0 pg Final     Mean Corpuscular Hemoglobin Conc   Date Value Ref Range Status   07/31/2020 32.3 32.0 - 36.0 g/dL Final     CMP:   Glucose   Date Value Ref Range Status   07/31/2020 90 70 - 110 mg/dL Final     Calcium   Date Value Ref Range Status   07/31/2020 8.9 8.7 - 10.5 mg/dL Final     Albumin   Date Value Ref Range Status   07/31/2020 4.1 3.5 - 5.2 g/dL Final     Total Protein   Date Value Ref Range Status   07/31/2020 7.8 6.0 - 8.4 g/dL Final     Sodium   Date Value Ref Range Status   07/31/2020 140 136 - 145 mmol/L Final     Potassium   Date Value Ref Range Status   07/31/2020 4.1 3.5 - 5.1 mmol/L Final     CO2   Date Value Ref Range Status   07/31/2020 22 (L) 23 - 29 mmol/L Final     Chloride   Date Value Ref Range Status   07/31/2020 112 (H) 95 - 110 mmol/L Final     BUN, Bld   Date Value Ref Range Status   07/31/2020 7 6 - 20 mg/dL Final     Creatinine   Date Value Ref Range Status   07/31/2020 0.7 0.5 - 1.4 mg/dL Final     BNP   Date Value Ref Range Status   07/31/2020 121 (H) 0 - 99 pg/mL Final     Comment:     Values of less than 100 pg/ml  "are consistent with non-CHF populations.     Coumadin Monitoring INR   Date Value Ref Range Status   03/09/2010 2.3 (H) 0.8 - 1.2 Final     Comment:     ACCP Guidelline for Coumadin usage recommends a "target range" of  2.0 - 3.0 for INR for all indicators except mechanical heart valves  and antiphospholipid syndromes which should use 2.5 - 3.5.  .       INR   Date Value Ref Range Status   09/04/2019 0.9 0.8 - 1.2 Final     Comment:     Coumadin Therapy:  2.0 - 3.0 for INR for all indicators except mechanical heart valves  and antiphospholipid syndromes which should use 2.5 - 3.5.       Alkaline Phosphatase   Date Value Ref Range Status   07/31/2020 77 55 - 135 U/L Final     ALT   Date Value Ref Range Status   07/31/2020 17 10 - 44 U/L Final     AST   Date Value Ref Range Status   07/31/2020 19 10 - 40 U/L Final     Total Bilirubin   Date Value Ref Range Status   07/31/2020 0.4 0.1 - 1.0 mg/dL Final     Comment:     For infants and newborns, interpretation of results should be based  on gestational age, weight and in agreement with clinical  observations.  Premature Infant recommended reference ranges:  Up to 24 hours.............<8.0 mg/dL  Up to 48 hours............<12.0 mg/dL  3-5 days..................<15.0 mg/dL  6-29 days.................<15.0 mg/dL        60 min visit spent in chart review, discussion of goals of care,  advance care planning, symptom assessment, coordination of care and emotional support.    Nataliya Monroe, APRN, CNS,[facility]  Palliative Medicine  [facility]iative [facility] 9th Fl    "

## 2020-08-19 NOTE — PATIENT INSTRUCTIONS
What Is Palliative Care?  Palliative care is a way to improve quality of life for someone who is being treated for a serious illness. To palliate means to ease the symptoms of an illness. Palliative care providers are experts in easing symptoms that cause distress. These may include pain, nausea, vomiting, anxiety, constipation, sleeping, and breathing problems. The people who are being treated and their loved ones are given emotional and spiritual support. Palliative care is given at the same time as traditional medical care. Active treatment for the illness does not stop.     Both the person receiving treatment and family members help direct the plan of care with the palliative care team.   Goals of palliative care  · Easing symptoms that cause distress. The main goal of palliative care is to ease symptoms. Symptoms may affect a persons ability to eat, be active, or spend time with others. Medicines and other methods are used. This gives the person a better quality of life while the illness is being treated.  · Coordinating care. This helps to make sure that each care provider is aware of the goals of care. Communication is done on a regular basis among all team members to make sure that the care goals are met.  · Meeting emotional and spiritual needs. The care team helps both the person being treated and family members cope with stress, depression, anxiety, and other issues. They can set up meetings with a counselor or  as desired.  · Giving information and helping with decisions. Care providers can help people and their families get the information they need. They can also help when care decisions need to be made.  · Helping create an advance care plan. This is a series of legal documents that note a persons wishes for their future healthcare. It helps to make sure that if people cant speak for themselves, their wishes can still be carried out. The documents vary by state.  Working with  your palliative care team  Palliative care is given by a team of people who focus on the physical, emotional, and psychosocial aspects of advanced illness. The team may include a palliative care provider or nurse, , pharmacist, dietitian, counselor, , and others. To get the most of palliative care, both the person and his or her loved ones have a role.  What a person who is receiving medical treatment can do  Tell your healthcare provider you are thinking about palliative care. Ask what palliative services are available in your area.  To ensure the best care, learn what you can about your illness and the goals of your care. If you are having pain and other symptoms due to a serious illness, ask your healthcare provider for a palliative care referral.  Treating these symptoms is best for your health and quality of life. If you need support in other ways, speak up. The care team is there to help you get what you need.  What a family member can do  Talk with the palliative care team often. Do your best to understand your loved ones illness and goals of care. When decisions need to be made, act on your loved ones wishes. And if you have a concern or question, speak up. You can help the team make sure that your loved one has the best quality of life possible.  Date Last Reviewed: 3/1/2017  © 7036-7202 [employer]. [address] [place] [address], [place] [address]. All rights reserved. This information is not intended as a substitute for professional medical care. Always follow your healthcare professional's instructions.

## 2020-09-03 ENCOUNTER — TELEPHONE (OUTPATIENT)
Dept: TRANSPLANT | Facility: CLINIC | Age: 50
End: 2020-09-03

## 2020-09-03 NOTE — TELEPHONE ENCOUNTER
----- Message from Louise Saenz sent at 9/3/2020 11:58 AM CDT -----  Pt is calling to speak to coordinator      Pt contact [phone]    Contacted patient.  She wanted to discuss her upcoming appointments.  Reviewed the appointments with patient.  She stated that she wanted the same care team as last time (surgeon, ID, interventional cardiology).  Informed her that we had to schedule appointments based on provider availability.  Informed her that some physicians may cover hospital service, etc and may not have been available.  She also inquired as to why the appointments on Monday, 9/21/20 are so early.  Informed her that it may have been the only times that were available and she has to fast for the abdominal ultrasound and labs that morning.  Informed her that we can move the appointments to a later time, but it may be scheduled on another day.  She verbalized her understanding of all discussed and requested to keep all of the appointments as is.  Instructed her to contact me for any further questions or concerns.  She again verbalized her understanding.

## 2020-09-11 DIAGNOSIS — K21.9 GASTROESOPHAGEAL REFLUX DISEASE, ESOPHAGITIS PRESENCE NOT SPECIFIED: ICD-10-CM

## 2020-09-11 RX ORDER — PANTOPRAZOLE SODIUM 40 MG/1
TABLET, DELAYED RELEASE ORAL
Qty: 90 TABLET | Refills: 1 | Status: SHIPPED | OUTPATIENT
Start: 2020-09-11 | End: 2020-12-24

## 2020-09-18 ENCOUNTER — PATIENT OUTREACH (OUTPATIENT)
Dept: ADMINISTRATIVE | Facility: OTHER | Age: 50
End: 2020-09-18

## 2020-09-20 DIAGNOSIS — J98.4 CHRONIC PULMONARY DISEASE: ICD-10-CM

## 2020-09-20 DIAGNOSIS — Z76.82 LUNG TRANSPLANT CANDIDATE: Primary | ICD-10-CM

## 2020-09-20 DIAGNOSIS — Z01.812 PRE-PROCEDURE LAB EXAM: Primary | ICD-10-CM

## 2020-09-20 DIAGNOSIS — Z79.01 LONG TERM CURRENT USE OF ANTICOAGULANT THERAPY: ICD-10-CM

## 2020-09-20 RX ORDER — SODIUM CHLORIDE 9 MG/ML
INJECTION, SOLUTION INTRAVENOUS CONTINUOUS
Status: CANCELLED | OUTPATIENT
Start: 2020-09-20

## 2020-09-20 RX ORDER — DIPHENHYDRAMINE HCL 25 MG
50 CAPSULE ORAL ONCE
Status: CANCELLED | OUTPATIENT
Start: 2020-09-20 | End: 2020-09-20

## 2020-09-21 ENCOUNTER — OFFICE VISIT (OUTPATIENT)
Dept: CARDIOTHORACIC SURGERY | Facility: CLINIC | Age: 50
End: 2020-09-21
Payer: COMMERCIAL

## 2020-09-21 ENCOUNTER — INITIAL CONSULT (OUTPATIENT)
Dept: CARDIOLOGY | Facility: CLINIC | Age: 50
End: 2020-09-21
Payer: COMMERCIAL

## 2020-09-21 ENCOUNTER — HOSPITAL ENCOUNTER (OUTPATIENT)
Dept: RADIOLOGY | Facility: HOSPITAL | Age: 50
Discharge: HOME | End: 2020-09-21
Attending: INTERNAL MEDICINE
Payer: COMMERCIAL

## 2020-09-21 ENCOUNTER — TELEPHONE (OUTPATIENT)
Dept: CARDIOLOGY | Facility: CLINIC | Age: 50
End: 2020-09-21

## 2020-09-21 ENCOUNTER — CLINICAL SUPPORT (OUTPATIENT)
Dept: TRANSPLANT | Facility: CLINIC | Age: 50
End: 2020-09-21
Payer: COMMERCIAL

## 2020-09-21 ENCOUNTER — TELEPHONE (OUTPATIENT)
Dept: TRANSPLANT | Facility: CLINIC | Age: 50
End: 2020-09-21

## 2020-09-21 VITALS
TEMPERATURE: 97 F | SYSTOLIC BLOOD PRESSURE: 129 MMHG | DIASTOLIC BLOOD PRESSURE: 82 MMHG | OXYGEN SATURATION: 98 % | HEIGHT: 59 IN | WEIGHT: 134.81 LBS | BODY MASS INDEX: 27.18 KG/M2 | HEART RATE: 84 BPM

## 2020-09-21 VITALS
SYSTOLIC BLOOD PRESSURE: 123 MMHG | DIASTOLIC BLOOD PRESSURE: 74 MMHG | HEIGHT: 59 IN | WEIGHT: 134.69 LBS | HEART RATE: 64 BPM | BODY MASS INDEX: 27.15 KG/M2 | OXYGEN SATURATION: 95 %

## 2020-09-21 DIAGNOSIS — I27.0 PRIMARY PULMONARY HYPERTENSION: ICD-10-CM

## 2020-09-21 DIAGNOSIS — Z76.82 LUNG TRANSPLANT CANDIDATE: ICD-10-CM

## 2020-09-21 PROCEDURE — 71250 CT THORAX DX C-: CPT | Mod: TC,TXP

## 2020-09-21 PROCEDURE — 93880 EXTRACRANIAL BILAT STUDY: CPT | Mod: TC,TXP

## 2020-09-21 PROCEDURE — 3008F PR BODY MASS INDEX (BMI) DOCUMENTED: ICD-10-PCS | Mod: CPTII,S$GLB,TXP, | Performed by: THORACIC SURGERY (CARDIOTHORACIC VASCULAR SURGERY)

## 2020-09-21 PROCEDURE — 99204 OFFICE O/P NEW MOD 45 MIN: CPT | Mod: S$GLB,TXP,, | Performed by: INTERNAL MEDICINE

## 2020-09-21 PROCEDURE — 99205 PR OFFICE/OUTPT VISIT, NEW, LEVL V, 60-74 MIN: ICD-10-PCS | Mod: S$GLB,TXP,, | Performed by: THORACIC SURGERY (CARDIOTHORACIC VASCULAR SURGERY)

## 2020-09-21 PROCEDURE — 76700 US ABDOMEN COMPLETE: ICD-10-PCS | Mod: 26,TXP,, | Performed by: RADIOLOGY

## 2020-09-21 PROCEDURE — 99999 PR PBB SHADOW E&M-EST. PATIENT-LVL V: CPT | Mod: PBBFAC,TXP,, | Performed by: INTERNAL MEDICINE

## 2020-09-21 PROCEDURE — 71046 XR CHEST PA AND LATERAL: ICD-10-PCS | Mod: 26,TXP,, | Performed by: INTERNAL MEDICINE

## 2020-09-21 PROCEDURE — 76700 US EXAM ABDOM COMPLETE: CPT | Mod: TC,TXP

## 2020-09-21 PROCEDURE — 3008F BODY MASS INDEX DOCD: CPT | Mod: CPTII,S$GLB,TXP, | Performed by: INTERNAL MEDICINE

## 2020-09-21 PROCEDURE — 76700 US EXAM ABDOM COMPLETE: CPT | Mod: 26,TXP,, | Performed by: RADIOLOGY

## 2020-09-21 PROCEDURE — 99999 PR PBB SHADOW E&M-EST. PATIENT-LVL V: ICD-10-PCS | Mod: PBBFAC,TXP,, | Performed by: THORACIC SURGERY (CARDIOTHORACIC VASCULAR SURGERY)

## 2020-09-21 PROCEDURE — 93880 EXTRACRANIAL BILAT STUDY: CPT | Mod: 26,TXP,, | Performed by: RADIOLOGY

## 2020-09-21 PROCEDURE — 3008F BODY MASS INDEX DOCD: CPT | Mod: CPTII,S$GLB,TXP, | Performed by: THORACIC SURGERY (CARDIOTHORACIC VASCULAR SURGERY)

## 2020-09-21 PROCEDURE — 71250 CT CHEST WITHOUT CONTRAST: ICD-10-PCS | Mod: 26,TXP,, | Performed by: RADIOLOGY

## 2020-09-21 PROCEDURE — 71046 X-RAY EXAM CHEST 2 VIEWS: CPT | Mod: TC,TXP

## 2020-09-21 PROCEDURE — 3008F PR BODY MASS INDEX (BMI) DOCUMENTED: ICD-10-PCS | Mod: CPTII,S$GLB,TXP, | Performed by: INTERNAL MEDICINE

## 2020-09-21 PROCEDURE — 93880 US CAROTID BILATERAL: ICD-10-PCS | Mod: 26,TXP,, | Performed by: RADIOLOGY

## 2020-09-21 PROCEDURE — 99999 PR PBB SHADOW E&M-EST. PATIENT-LVL V: ICD-10-PCS | Mod: PBBFAC,TXP,, | Performed by: INTERNAL MEDICINE

## 2020-09-21 PROCEDURE — 71250 CT THORAX DX C-: CPT | Mod: 26,TXP,, | Performed by: RADIOLOGY

## 2020-09-21 PROCEDURE — 99205 OFFICE O/P NEW HI 60 MIN: CPT | Mod: S$GLB,TXP,, | Performed by: THORACIC SURGERY (CARDIOTHORACIC VASCULAR SURGERY)

## 2020-09-21 PROCEDURE — 99999 PR PBB SHADOW E&M-EST. PATIENT-LVL V: CPT | Mod: PBBFAC,TXP,, | Performed by: THORACIC SURGERY (CARDIOTHORACIC VASCULAR SURGERY)

## 2020-09-21 PROCEDURE — 99204 PR OFFICE/OUTPT VISIT, NEW, LEVL IV, 45-59 MIN: ICD-10-PCS | Mod: S$GLB,TXP,, | Performed by: INTERNAL MEDICINE

## 2020-09-21 PROCEDURE — 71046 X-RAY EXAM CHEST 2 VIEWS: CPT | Mod: 26,TXP,, | Performed by: INTERNAL MEDICINE

## 2020-09-21 RX ORDER — CLOPIDOGREL BISULFATE 75 MG/1
75 TABLET ORAL DAILY
Qty: 30 TABLET | Refills: 11 | Status: ON HOLD | OUTPATIENT
Start: 2020-09-21 | End: 2020-09-29 | Stop reason: HOSPADM

## 2020-09-21 RX ORDER — ASPIRIN 81 MG/1
81 TABLET ORAL DAILY
Qty: 90 TABLET | Refills: 5 | Status: ON HOLD | OUTPATIENT
Start: 2020-09-21 | End: 2020-09-29 | Stop reason: HOSPADM

## 2020-09-21 NOTE — PROGRESS NOTES
PRE EDUCATION NOTE    Met with Yuni Perez and her mother for pre-transplant education and to consent for lung transplant evaluation.  Pre-transplant educational binder given to patient.  Instructed patient to read the binder.  Thorough pre-transplant education conducted.    Information presented included:  · Evaluation process and testing  · Members of the transplant team  · Selection committee members and role of the committee  · Contraindications to lung transplantation  · Policy for absolute smoking / nicotine cessation for at least 6 months prior to listing  · Relocation pre- and post-transplant  · Listing process for transplant: explained the listing designations, active versus inactive (status 7), lung allocation score (LAS), and allocation of lungs within 250 nautical miles  · National graft survival statistics, our current survival statistics since reopening of the program to present  · Wait time on the list  · The need to reach patient within 15 minutes with donor offer  ·  [facility] donors with increased risk of disease transmission and acceptance of Hepatitis C antibody positive and LOWELL positive donors  · Donor criteria and testing on donor, procurement of donor lungs and ischemic time, blood transfusions, process for matching donor with recipient  · Transplant surgery, single vs. double, estimated length of surgery, surgical incision, chest tubes and purpose, and ventilator  · Post-transplant immunosuppression for life with the need to be able to afford post transplant medications, immediate post transplant ICU stay - extubation, explained the importance of getting out of bed (once extubated) and the importance of coughing and taking deep breaths despite the pain to prevent pneumonia  · Need for a caregiver to be with her at all times beginning with discharge from ICU and transfer to TSU, through at least the first 3 months post-transplant possibly longer  · Post-transplant  medications, their side effects, and self medications  · Discharge, follow-up clinic visits, testing with each visit, and surveillance bronchoscopies  · Rejection and treatment, how to reach team members at any time  · Health maintenance post-transplant, avoiding large crowds and sick contacts, wearing a mask, good handwashing, pet policy, fishing, dermatology, opthamology, and dental visits post-transplant  · Multiple listing information provided    Yuni Perez and her mother asked pertinent questions which were answered to their satisfaction.     Informed Consent to Undergo Evaluation for Lung Transplantation reviewed and discussed with patient and her mother.  Consent initialed and signed by patient.  Copy of consent given to patient.  Original to be sent to Medical Records for scanning.

## 2020-09-21 NOTE — PROGRESS NOTES
"  Subjective:      Patient ID: Yuni Perez is a 50 y.o. female.    Chief Complaint: No chief complaint on file.      HPI:  Yuni Preez is a 50 y.o. female with a medical history significant for idiopathic pulmonary hypertension, gerd, hypothyroidism, and tricuspid regurgitation who presents for lung transplant evaluation. Her New [place] Heart Association Class is III, a Karnofsky score of 60%, and is not on oxygen.  She reports that she has a near syncopal episode many years ago and that is how her pulmonary hypertension was discovered. She is followed by Dr. Rai in the PH clinic. She is currently on Letairis, Adempas, and Remodulin at 80mg. Patient reports that her SOB is progressive with minimal activities since November, 2019 compared to previous.  At that time, she did get empirically treated for PNA based on her CXR.  She was changed from Adcirca to Adempas prior to symptom onset in November.  She denies previous sternotomy but did have a bile bag placed.      Family and social history reviewed    Review of patient's allergies indicates:   Allergen Reactions    Fentanyl Anaphylaxis     Respiratory distress    Vibra-tabs [doxycycline hyclate] Anaphylaxis     "throat felt like it was closing"    Adhesive Hives     Silk tape    Amoxicillin Rash    Nsaids (non-steroidal anti-inflammatory drug) Swelling    Chlorhexidine Other (See Comments)     Blue Chlorhexidine causes hives     Past Medical History:   Diagnosis Date    AR (allergic rhinitis)     Cholelithiasis, common bile duct     Chronic low back pain     Eye pressure 2017    GERD (gastroesophageal reflux disease)     History of migraine headaches     Hypothyroidism     Lumbar disc disease     Menorrhagia     Mild asthma     Obesity     Plantar fasciitis of left foot     Primary pulmonary hypertension     followed by heart transplant/pulmonary     Seizure disorder     x 1 in 2008    Seizures     Sleep apnea  "    TMJ (dislocation of temporomandibular joint)     Tricuspid regurgitation      Past Surgical History:   Procedure Laterality Date    CARDIAC CATHETERIZATION      CENTRAL VENOUS CATHETER INSERTION      gallbladder drain  06/2019    INSERTION OF HAYNES CATHETER Right 6/17/2020    Procedure: INSERTION, CATHETER, CENTRAL VENOUS, HAYNES Replace Single Lumen for Remodulin Infusion;  Surgeon: Bertin Dewitt MD;  [facility];  Service: General;  Laterality: Right;    PORTACATH PLACEMENT      REMOVAL OF TUNNELED CENTRAL VENOUS CATHETER (CVC) N/A 6/17/2020    Procedure: REMOVAL, CATHETER, CENTRAL VENOUS, TUNNELED;  Surgeon: Bertin Dewitt MD;  [facility];  Service: General;  Laterality: N/A;    RIGHT HEART CATHETERIZATION Right 8/20/2018    Procedure: HEART CATH-RIGHT;  Surgeon: Ivonne Rai MD;  Location: [facility] CATH LAB;  Service: Cardiology;  Laterality: Right;    RIGHT HEART CATHETERIZATION Right 9/4/2019    Procedure: INSERTION, CATHETER, RIGHT HEART;  Surgeon: Ivonne Rai MD;  Location: [facility] CATH LAB;  Service: Cardiology;  Laterality: Right;    UPPER GASTROINTESTINAL ENDOSCOPY       Family History     Problem Relation (Age of Onset)    Breast cancer Maternal Aunt, Cousin    Cancer Maternal Grandmother, Maternal Aunt    Diabetes Mother, Paternal Grandfather, Maternal Grandfather    Glaucoma Father    Heart attack Paternal Grandfather, Maternal Grandfather    Heart disease Father, Paternal Grandfather    Hypertension     Leukemia Brother    No Known Problems Sister, Maternal Uncle, Paternal Aunt, Paternal Uncle, Paternal Grandmother        Social History     Socioeconomic History    Marital status: Single     Spouse name: Not on file    Number of children: 0    Years of education: Not on file    Highest education level: Not on file   Occupational History    Occupation: disabled     Employer: [employer]   Social Needs    Financial resource  strain: Not on file    Food insecurity     Worry: Not on file     Inability: Not on file    Transportation needs     Medical: Not on file     Non-medical: Not on file   Tobacco Use    Smoking status: Never Smoker    Smokeless tobacco: Never Used   Substance and Sexual Activity    Alcohol use: No     Alcohol/week: 0.8 standard drinks     Types: 1 Standard drinks or equivalent per week     Comment: 1 per year    Drug use: No    Sexual activity: Never     Birth control/protection: OCP, Pill     Comment: pt is a virgin   Lifestyle    Physical activity     Days per week: Not on file     Minutes per session: Not on file    Stress: Not on file   Relationships    Social connections     Talks on phone: Not on file     Gets together: Not on file     Attends Jew service: Not on file     Active member of club or organization: Not on file     Attends meetings of clubs or organizations: Not on file     Relationship status: Not on file   Other Topics Concern    Not on file   Social History Narrative    Not on file       Current Outpatient Medications:     acetaminophen (TYLENOL EXTRA STRENGTH) 500 MG tablet, Take 1,000 mg by mouth every 6 (six) hours as needed for Pain., Disp: , Rfl:     ADCIRCA 20 mg Tab, Take 2 tablets (40 mg total) by mouth once daily., Disp: 60 tablet, Rfl: 11    albuterol-ipratropium (DUO-NEB) 2.5 mg-0.5 mg/3 mL nebulizer solution, Take 3 mLs by nebulization every 6 (six) hours as needed for Wheezing. Rescue, Disp: 120 vial, Rfl: 5    ambrisentan (LETAIRIS) 10 MG Tab, Take 1 tablet (10 mg total) by mouth once daily. (Patient taking differently: Take 10 mg by mouth every morning. Patient takes about 10am daily), Disp: 30 tablet, Rfl: 11    azelastine (ASTELIN) 137 mcg (0.1 %) nasal spray, 2 sprays by Nasal route 2 (two) times daily. Patient uses prn in the evening, Disp: , Rfl:     butalbital-acetaminophen-caffeine -40 mg (FIORICET, ESGIC) -40 mg per tablet, Take 1 tablet by  mouth every 4 (four) hours as needed for Pain., Disp: , Rfl:     cyanocobalamin, vitamin B-12, 2,500 mcg Subl, Place 2,500 mcg under the tongue every morning. , Disp: , Rfl:     diphenhydrAMINE (BENADRYL) 25 mg capsule, Take 50 mg by mouth every 6 (six) hours as needed for Itching. Patient takes prn evenings, Disp: , Rfl:     fluticasone (FLONASE) 50 mcg/actuation nasal spray, INHALE 2 SPRAYS IN EACH NOSTRIL DAILY (Patient taking differently: every evening. ), Disp: 16 g, Rfl: 3    fluticasone furoate-vilanteroL (BREO ELLIPTA) 200-25 mcg/dose DsDv diskus inhaler, Inhale 1 puff into the lungs once daily. Controller (Patient taking differently: Inhale 1 puff into the lungs every evening. Controller), Disp: 1 each, Rfl: 5    folic acid (FOLVITE) 1 MG tablet, Take 1 tablet (1,000 mcg total) by mouth once daily. (Patient taking differently: Take 1,000 mcg by mouth every morning. ), Disp: 90 tablet, Rfl: 3    hyoscyamine (LEVSIN/SL) 0.125 mg Subl, Place 0.125 mg under the tongue every 6 (six) hours as needed. , Disp: , Rfl:     levothyroxine (SYNTHROID) 137 MCG Tab tablet, Take 1 tablet (137 mcg total) by mouth once daily. (Patient taking differently: Take 137 mcg by mouth before breakfast. ), Disp: 90 tablet, Rfl: 3    loratadine (CLARITIN) 10 mg tablet, Take 10 mg by mouth every evening. , Disp: , Rfl:     norethindrone-ethinyl estradiol (MICROGESTIN FE 1/20, 28,) 1 mg-20 mcg (21)/75 mg (7) per tablet, Take 1 tablet by mouth once daily., Disp: 28 tablet, Rfl: 6    pantoprazole (PROTONIX) 40 MG tablet, TAKE ONE TABLET BY MOUTH ONCE DAILY, Disp: 90 tablet, Rfl: 1    sumatriptan (IMITREX) 100 MG tablet, Take 1 tablet (100 mg total) by mouth as needed for Migraine. Take at the onset of headache, may take 1 more in 1 hour if needed., Disp: 12 tablet, Rfl: 5    topiramate (TOPAMAX) 100 MG tablet, Take 1 tablet (100 mg total) by mouth 2 (two) times daily., Disp: 60 tablet, Rfl: 11    treprostinil (REMODULIN) 2.5  mg/mL Soln, Mix cassette as directed and infuse continuously per physician titration orders on dosing sheet. Current dose 80ng/kg/min, Disp: 60 mL, Rfl: 11    citalopram (CELEXA) 10 MG tablet, Take 10 mg by mouth every evening. , Disp: , Rfl:     ferrous sulfate 325 (65 FE) MG EC tablet, Take 325 mg by mouth daily as needed. , Disp: , Rfl:     ondansetron (ZOFRAN) 4 MG tablet, Take 1 tablet (4 mg total) by mouth every 6 (six) hours as needed. 1 Tablet Oral Every 6 hours (Patient not taking: Reported on 9/21/2020), Disp: 30 tablet, Rfl: 2    traMADoL (ULTRAM) 50 mg tablet, Take 1 tablet (50 mg total) by mouth every 6 (six) hours. (Patient not taking: Reported on 8/19/2020), Disp: 12 tablet, Rfl: 0  Current medications Reviewed    Review of Systems   Constitutional: Positive for fatigue. Negative for activity change, appetite change and fever.   HENT: Negative for nosebleeds.    Respiratory: Positive for cough and shortness of breath.    Cardiovascular: Negative for chest pain, palpitations and leg swelling.   Gastrointestinal: Negative for abdominal distention, abdominal pain and nausea.   Genitourinary: Negative for frequency.   Musculoskeletal: Negative for arthralgias and myalgias.   Skin: Negative for rash.   Neurological: Negative for dizziness and numbness.   Hematological: Does not bruise/bleed easily.     Objective:   Physical Exam  Constitutional:       Appearance: She is well-developed.   Cardiovascular:      Rate and Rhythm: Normal rate and regular rhythm.      Heart sounds: Murmur present.   Pulmonary:      Effort: Pulmonary effort is normal.      Breath sounds: Normal breath sounds.   Abdominal:      Palpations: Abdomen is soft.   Musculoskeletal: Normal range of motion.   Skin:     General: Skin is warm and dry.   Neurological:      Mental Status: She is alert and oriented to person, place, and time.         Diagnostic Results:   CT Chest 8/2020  Impression:  Known PA hypertension.  No calcified  material observed in the pulmonary arterial walls or lumen.  Innumerable bilateral subcentimeter centrilobular pulmonary nodules suggesting possible hypersensitivity pneumonitis, drug-induced pneumonitis, infectious etiology, or vasculitis.  Clinical correlation advised.  Larger solid nodules within bilateral lower lobes measuring up to 0.5 cm (axial series 4, image 288 in 294).  For multiple solid nodules all <6 mm, Fleischner Society 2017 guidelines recommend no routine follow up for a low risk patient, or follow up with non-contrast chest CT at 12 months after discovery in a high risk patient.  Suspected cholelithiasis.  Indeterminate solid hypodensity in the right hepatic lobe, not significantly changed from prior MRI dated December 2015.     SIX Minute walk   PREVIOUS STUDY:   05/11/2020---------Distance: 426.72 meters (1400 feet)     O2 Sat % Supplemental Oxygen Heart Rate Blood Pressure Ab   Scale   Pre-exercise   (Resting) 96 % Room Air 95 bpm 92/63 mmHg 3   During Exercise    95 % Room Air 143 bpm (!) 129/92 mmHg 9   Post-exercise   (Recovery) 97 % Room Air  106 bpm   mmHg   \  CLINICAL INTERPRETATION:   Six minute walk distance is 426.72 meters (1400 feet) with very,   very heavy dyspnea.   During exercise, there was desaturation while breathing room air.   Blood pressure remained stable and Heart rate increased   significantly with walking.  Tachycardia was present prior to   exercise.   The patient reported non-pulmonary symptoms during exercise.   Since the previous study in May 2020, exercise capacity is   unchanged.   Based upon age and body mass index, exercise capacity is normal.  ECHO 7/31/2020  · Normal left ventricular systolic function. The estimated ejection fraction is 65%.  · Concentric left ventricular remodeling.  · Septal wall has abnormal motion.  · Indeterminate left ventricular diastolic function.  · Moderate right ventricular enlargement.  · Mildly reduced right ventricular systolic  function.  · Mild to moderate tricuspid regurgitation.  · Normal central venous pressure (3 mmHg).  · The estimated PA systolic pressure is 113 mmHg.  · Pulmonary hypertension present.  TTE 05/11/20  Conclusion  · Normal left ventricular systolic function. The estimated ejection fraction is 60%.  · Indeterminate left ventricular diastolic function.  · Septal wall has abnormal motion. Systolic flattening of the interventricular septum consistent with right ventricle pressure overload.  · Moderately reduced right ventricular systolic function.  · Moderate right ventricular enlargement.  · Mild to moderate tricuspid regurgitation.  · Severe pulmonary hypertension present.  · The estimated PA systolic pressure is 103 mmHg.  · Normal central venous pressure (3 mmHg).      TTE (02/22/2019):  · Normal left ventricular systolic function. The estimated ejection fraction is 65%  · Normal LV diastolic function.  · Septal wall has abnormal motion.  · Mildly reduced right ventricular systolic function.  · Mild tricuspid regurgitation.  · The estimated PA systolic pressure is 54 mm Hg  · Pulmonary hypertension present.  · Normal central venous pressure (3 mm Hg).    [facility] 8/20/18  PW: 8/6 (5)  PA: 101/35 (57)  RV: 92/0  RVEDP: 6   AO_SAT: 99  PA_SAT: 72  RA: 6/5 (4)  FICKCI: 2.5700  FICKCO: 4.0800  PVR: 1038.0000  RH 9/4/19  · Severe PAH on IV remodulin, adcirca and letairis.  · Normal right and left-sided filling pressures.  · Borderline low-normal cardiac output by Ebony method which is normal when indexed for BSA.  · No signficant change from [facility] 8/18.  · RA: 6/ 7/ 5   · RV: 105/ 3/ 10   · PA: 103/ 45/ 64  · PWP: 15/ 1512/ 13 .   · Cardiac output was 4.11 by Ebony.   · Cardiac index is 2.67 L/min/m2.   · O2 Sat: PA 70%. AO sat 96%  · PVR 12.4    Assessment:   1. Lung Transplant Evaluation   Plan:   Patient seen and pertinent studies reviewed.  At this time no surgical contraindications for proceeding with lung transplantation.   However, complete case will be discussed at the multidisciplinary lung selection meeting to assess for candidacy for this patient for lung transplantation.

## 2020-09-21 NOTE — TELEPHONE ENCOUNTER
Result note/message routed to primary coordinator for review/follow up on provider request.  ----- Message from Ian Lackey MD sent at 9/21/2020  2:36 PM CDT -----  Please obtain a triple phase MRI to follow up on the hepatic lesion

## 2020-09-21 NOTE — H&P (VIEW-ONLY)
Cardiology Clinic Note  Reason for Visit: Pre lung evaluation   Referring physician: Dr. Whyte   HPI:   Mrs. Perez is a pleasant 50-year-old lady with past medical history of idiopathic pulmonary hypertension and tricuspid regurgitation referred to our clinic for pre lung evaluation for left heart catheterization a right heart catheterization.    Patient reported she was diagnosed with idiopathic pulmonary hypertension 2008, denied any chest pain, reported she has dyspnea on exertion, she had coronary angiogram 2017 that was showing normal coronaries. Last echo from 07/31/2020 showing normal ejection fraction 65% with moderate RV enlargement, PA pressure at 113 and mild-to-moderate tricuspid regurgitation.    Patient is here today with her mom and denies any acute complaint.  Patient had respiratory distress previously with fentanyl will do the procedure with no fentanyl.    ROS:    Constitution: Negative for fever or chills. Negative for weight loss or gain.   HENT: Negative for sore throat or headaches. Negative for rhinorrhea.  Eyes: Negative for blurred or double vision.   Cardiovascular: See above  Pulmonary: Negative for SOB. Negative for cough.   Gastrointestinal: Negative for abdominal pain. Negative for nausea/ vomiting. Negative for diarrhea.   : Negative for dysuria.   Skin: Negative for rashes.  Neurological: Negative for focal weakness or sensory changes.  Psychological: Negative for depression or anxiety.  PMH:     Past Medical History:   Diagnosis Date    AR (allergic rhinitis)     Cholelithiasis, common bile duct     Chronic low back pain     Eye pressure 2017    GERD (gastroesophageal reflux disease)     History of migraine headaches     Hypothyroidism     Lumbar disc disease     Menorrhagia     Mild asthma     Obesity     Plantar fasciitis of left foot     Primary pulmonary hypertension     followed by heart transplant/pulmonary     Seizure disorder     x 1 in 2008     Seizures     Sleep apnea     TMJ (dislocation of temporomandibular joint)     Tricuspid regurgitation      Past Surgical History:   Procedure Laterality Date    CARDIAC CATHETERIZATION      CENTRAL VENOUS CATHETER INSERTION      gallbladder drain  06/2019    INSERTION OF HAYNES CATHETER Right 6/17/2020    Procedure: INSERTION, CATHETER, CENTRAL VENOUS, HAYNES Replace Single Lumen for Remodulin Infusion;  Surgeon: Bertin Dewitt MD;  [facility];  Service: General;  Laterality: Right;    PORTACATH PLACEMENT      REMOVAL OF TUNNELED CENTRAL VENOUS CATHETER (CVC) N/A 6/17/2020    Procedure: REMOVAL, CATHETER, CENTRAL VENOUS, TUNNELED;  Surgeon: Bertin Dewitt MD;  [facility];  Service: General;  Laterality: N/A;    RIGHT HEART CATHETERIZATION Right 8/20/2018    Procedure: HEART CATH-RIGHT;  Surgeon: Ivonne Rai MD;  Location: [facility] CATH LAB;  Service: Cardiology;  Laterality: Right;    RIGHT HEART CATHETERIZATION Right 9/4/2019    Procedure: INSERTION, CATHETER, RIGHT HEART;  Surgeon: Ivonne Rai MD;  Location: [facility] CATH LAB;  Service: Cardiology;  Laterality: Right;    UPPER GASTROINTESTINAL ENDOSCOPY       Current Outpatient Medications on File Prior to Visit   Medication Sig Dispense Refill    acetaminophen (TYLENOL EXTRA STRENGTH) 500 MG tablet Take 1,000 mg by mouth every 6 (six) hours as needed for Pain.      ADCIRCA 20 mg Tab Take 2 tablets (40 mg total) by mouth once daily. 60 tablet 11    albuterol-ipratropium (DUO-NEB) 2.5 mg-0.5 mg/3 mL nebulizer solution Take 3 mLs by nebulization every 6 (six) hours as needed for Wheezing. Rescue 120 vial 5    ambrisentan (LETAIRIS) 10 MG Tab Take 1 tablet (10 mg total) by mouth once daily. (Patient taking differently: Take 10 mg by mouth every morning. Patient takes about 10am daily) 30 tablet 11    azelastine (ASTELIN) 137 mcg (0.1 %) nasal spray 2 sprays by Nasal route 2 (two) times daily. Patient uses  prn in the evening      butalbital-acetaminophen-caffeine -40 mg (FIORICET, ESGIC) -40 mg per tablet Take 1 tablet by mouth every 4 (four) hours as needed for Pain.      citalopram (CELEXA) 10 MG tablet Take 10 mg by mouth every evening.       cyanocobalamin, vitamin B-12, 2,500 mcg Subl Place 2,500 mcg under the tongue every morning.       diphenhydrAMINE (BENADRYL) 25 mg capsule Take 50 mg by mouth every 6 (six) hours as needed for Itching. Patient takes prn evenings      ferrous sulfate 325 (65 FE) MG EC tablet Take 325 mg by mouth daily as needed.       fluticasone (FLONASE) 50 mcg/actuation nasal spray INHALE 2 SPRAYS IN EACH NOSTRIL DAILY (Patient taking differently: every evening. ) 16 g 3    fluticasone furoate-vilanteroL (BREO ELLIPTA) 200-25 mcg/dose DsDv diskus inhaler Inhale 1 puff into the lungs once daily. Controller (Patient taking differently: Inhale 1 puff into the lungs every evening. Controller) 1 each 5    folic acid (FOLVITE) 1 MG tablet Take 1 tablet (1,000 mcg total) by mouth once daily. (Patient taking differently: Take 1,000 mcg by mouth every morning. ) 90 tablet 3    hyoscyamine (LEVSIN/SL) 0.125 mg Subl Place 0.125 mg under the tongue every 6 (six) hours as needed.       levothyroxine (SYNTHROID) 137 MCG Tab tablet Take 1 tablet (137 mcg total) by mouth once daily. (Patient taking differently: Take 137 mcg by mouth before breakfast. ) 90 tablet 3    loratadine (CLARITIN) 10 mg tablet Take 10 mg by mouth every evening.       norethindrone-ethinyl estradiol (MICROGESTIN FE 1/20, 28,) 1 mg-20 mcg (21)/75 mg (7) per tablet Take 1 tablet by mouth once daily. 28 tablet 6    ondansetron (ZOFRAN) 4 MG tablet Take 1 tablet (4 mg total) by mouth every 6 (six) hours as needed. 1 Tablet Oral Every 6 hours (Patient not taking: Reported on 9/21/2020) 30 tablet 2    pantoprazole (PROTONIX) 40 MG tablet TAKE ONE TABLET BY MOUTH ONCE DAILY 90 tablet 1    sumatriptan (IMITREX)  "100 MG tablet Take 1 tablet (100 mg total) by mouth as needed for Migraine. Take at the onset of headache, may take 1 more in 1 hour if needed. 12 tablet 5    topiramate (TOPAMAX) 100 MG tablet Take 1 tablet (100 mg total) by mouth 2 (two) times daily. 60 tablet 11    traMADoL (ULTRAM) 50 mg tablet Take 1 tablet (50 mg total) by mouth every 6 (six) hours. (Patient not taking: Reported on 8/19/2020) 12 tablet 0    treprostinil (REMODULIN) 2.5 mg/mL Soln Mix cassette as directed and infuse continuously per physician titration orders on dosing sheet. Current dose 80ng/kg/min 60 mL 11     No current facility-administered medications on file prior to visit.      There were no vitals filed for this visit.  There is no height or weight on file to calculate BMI.  Allergies:     Review of patient's allergies indicates:   Allergen Reactions    Fentanyl Anaphylaxis     Respiratory distress    Vibra-tabs [doxycycline hyclate] Anaphylaxis     "throat felt like it was closing"    Adhesive Hives     Silk tape    Amoxicillin Rash    Nsaids (non-steroidal anti-inflammatory drug) Swelling    Chlorhexidine Other (See Comments)     Blue Chlorhexidine causes hives     Medications:     Current Outpatient Medications on File Prior to Visit   Medication Sig Dispense Refill    acetaminophen (TYLENOL EXTRA STRENGTH) 500 MG tablet Take 1,000 mg by mouth every 6 (six) hours as needed for Pain.      ADCIRCA 20 mg Tab Take 2 tablets (40 mg total) by mouth once daily. 60 tablet 11    albuterol-ipratropium (DUO-NEB) 2.5 mg-0.5 mg/3 mL nebulizer solution Take 3 mLs by nebulization every 6 (six) hours as needed for Wheezing. Rescue 120 vial 5    ambrisentan (LETAIRIS) 10 MG Tab Take 1 tablet (10 mg total) by mouth once daily. (Patient taking differently: Take 10 mg by mouth every morning. Patient takes about 10am daily) 30 tablet 11    azelastine (ASTELIN) 137 mcg (0.1 %) nasal spray 2 sprays by Nasal route 2 (two) times daily. Patient " uses prn in the evening      butalbital-acetaminophen-caffeine -40 mg (FIORICET, ESGIC) -40 mg per tablet Take 1 tablet by mouth every 4 (four) hours as needed for Pain.      citalopram (CELEXA) 10 MG tablet Take 10 mg by mouth every evening.       cyanocobalamin, vitamin B-12, 2,500 mcg Subl Place 2,500 mcg under the tongue every morning.       diphenhydrAMINE (BENADRYL) 25 mg capsule Take 50 mg by mouth every 6 (six) hours as needed for Itching. Patient takes prn evenings      ferrous sulfate 325 (65 FE) MG EC tablet Take 325 mg by mouth daily as needed.       fluticasone (FLONASE) 50 mcg/actuation nasal spray INHALE 2 SPRAYS IN EACH NOSTRIL DAILY (Patient taking differently: every evening. ) 16 g 3    fluticasone furoate-vilanteroL (BREO ELLIPTA) 200-25 mcg/dose DsDv diskus inhaler Inhale 1 puff into the lungs once daily. Controller (Patient taking differently: Inhale 1 puff into the lungs every evening. Controller) 1 each 5    folic acid (FOLVITE) 1 MG tablet Take 1 tablet (1,000 mcg total) by mouth once daily. (Patient taking differently: Take 1,000 mcg by mouth every morning. ) 90 tablet 3    hyoscyamine (LEVSIN/SL) 0.125 mg Subl Place 0.125 mg under the tongue every 6 (six) hours as needed.       levothyroxine (SYNTHROID) 137 MCG Tab tablet Take 1 tablet (137 mcg total) by mouth once daily. (Patient taking differently: Take 137 mcg by mouth before breakfast. ) 90 tablet 3    loratadine (CLARITIN) 10 mg tablet Take 10 mg by mouth every evening.       norethindrone-ethinyl estradiol (MICROGESTIN FE 1/20, 28,) 1 mg-20 mcg (21)/75 mg (7) per tablet Take 1 tablet by mouth once daily. 28 tablet 6    ondansetron (ZOFRAN) 4 MG tablet Take 1 tablet (4 mg total) by mouth every 6 (six) hours as needed. 1 Tablet Oral Every 6 hours (Patient not taking: Reported on 9/21/2020) 30 tablet 2    pantoprazole (PROTONIX) 40 MG tablet TAKE ONE TABLET BY MOUTH ONCE DAILY 90 tablet 1    sumatriptan  (IMITREX) 100 MG tablet Take 1 tablet (100 mg total) by mouth as needed for Migraine. Take at the onset of headache, may take 1 more in 1 hour if needed. 12 tablet 5    topiramate (TOPAMAX) 100 MG tablet Take 1 tablet (100 mg total) by mouth 2 (two) times daily. 60 tablet 11    traMADoL (ULTRAM) 50 mg tablet Take 1 tablet (50 mg total) by mouth every 6 (six) hours. (Patient not taking: Reported on 8/19/2020) 12 tablet 0    treprostinil (REMODULIN) 2.5 mg/mL Soln Mix cassette as directed and infuse continuously per physician titration orders on dosing sheet. Current dose 80ng/kg/min 60 mL 11     No current facility-administered medications on file prior to visit.      Social History:     Social History     Tobacco Use    Smoking status: Never Smoker    Smokeless tobacco: Never Used   Substance Use Topics    Alcohol use: No     Alcohol/week: 0.8 standard drinks     Types: 1 Standard drinks or equivalent per week     Comment: 1 per year     Family History:     Family History   Problem Relation Age of Onset    Heart disease Father     Glaucoma Father     Diabetes Mother     Cancer Maternal Grandmother         uterine    Cancer Maternal Aunt         breast    Breast cancer Maternal Aunt     Heart disease Paternal Grandfather     Heart attack Paternal Grandfather     Diabetes Paternal Grandfather     Leukemia Brother     Hypertension Unknown     Diabetes Maternal Grandfather     Heart attack Maternal Grandfather     Breast cancer Cousin     No Known Problems Sister     No Known Problems Maternal Uncle     No Known Problems Paternal Aunt     No Known Problems Paternal Uncle     No Known Problems Paternal Grandmother     Colon cancer Neg Hx     Ovarian cancer Neg Hx     Amblyopia Neg Hx     Blindness Neg Hx     Cataracts Neg Hx     Macular degeneration Neg Hx     Retinal detachment Neg Hx     Strabismus Neg Hx     Stroke Neg Hx     Thyroid disease Neg Hx     Esophageal cancer Neg Hx       Physical Exam:   There were no vitals taken for this visit.     Constitutional: No apparent distress, conversant  HEENT: Sclera anicteric, extraocular movements intact  Neck: No jugular venous distension, no carotid bruits  CV: Regular rate and rhythm, no murmurs rubs or gallops, normal S1/S2  Pulm: Clear to auscultation bilaterally, no wheezes, rales, or ronchi  GI: Abdomen soft, nontender, nondistended, normoactive bowel sounds  Extremities: No lower extremity edema, warm with palpable pulses  Skin: No ecchymosis, erythema, or ulcers  Psych: Alert and oriented to person place location, appropriate affect  Neuro: No focal deficits    Labs:     Lab Results   Component Value Date     09/21/2020    K 3.4 (L) 09/21/2020     09/21/2020    CO2 20 (L) 09/21/2020    BUN 11 09/21/2020    CREATININE 0.7 09/21/2020    ANIONGAP 8 09/21/2020     Lab Results   Component Value Date    AST 18 09/21/2020    ALT 19 09/21/2020    ALKPHOS 71 09/21/2020    BILITOT 0.4 09/21/2020    BILIDIR 0.1 03/08/2013    ALBUMIN 4.0 09/21/2020     Lab Results   Component Value Date    CALCIUM 9.0 09/21/2020    MG 2.1 09/21/2020    PHOS 2.8 09/10/2014     Lab Results   Component Value Date     (H) 09/21/2020     (H) 07/31/2020    BNP 69 05/11/2020    Lab Results   Component Value Date    WBC 4.81 09/21/2020    HGB 15.2 09/21/2020    HCT 47.2 09/21/2020    HCT 38 09/09/2014     09/21/2020    GRAN 3.2 09/21/2020    GRAN 65.7 09/21/2020     Lab Results   Component Value Date    INR 0.9 09/21/2020    INR 2.3 (H) 03/09/2010     Lab Results   Component Value Date    CHOL 125 09/21/2020    HDL 36 (L) 09/21/2020    LDLCALC 72.8 09/21/2020    TRIG 81 09/21/2020     Lab Results   Component Value Date    HGBA1C 5.2 01/16/2017     Lab Results   Component Value Date    TSH 0.169 (L) 09/21/2020    U3ELESD 12.5 (H) 01/16/2017          Imaging:     No results found for: EF    Assessment:            Chronic pulmonary heart  disease:  50-year-old lady with history of idiopathic pulmonary hypertension referred to us for right heart catheterization and left heart catheterization prior to lung transplant, patient denied any chest pain, had normal coronary angiogram 2017    Long term current use of anticoagulant therapy    Liver mass    Non-allergic vasomotor rhinitis    Allergic asthma    Primary pulmonary hypertension    Tricuspid regurgitation    Hypothyroidism (acquired)    Migraine without aura and without status migrainosus, not intractable    Lumbar disc disease    Mild asthma    Chronic low back pain    Overweight (BMI 25.0-29.9)    Menorrhagia    ABHIJEET (obstructive sleep apnea)    Awaiting organ transplant status    Chronic nonintractable headache    Chronic respiratory failure with hypoxia    Cholelithiasis    Borderline osteopenia    Pulmonary hypertension    Lung transplant candidate       Plan:     Idiopathic pulmonary hypertension control workup for lung transplant    Will proceed with coronary angiogram and right heart catheterization via right common femoral vein and right, femoral artery access.  Will do the procedure with no sedation, patient had prior respiratory distress with sentinel so no fentanyl.  Patient was started on aspirin, will load the patient with Plavix in anticipation for possible PCI.  The risks, benefits & alternatives of the procedure were explained to the patient.   The risks of coronary angiography and RHC include but are not limited to: Bleeding, infection, heart rhythm abnormalities, allergic reactions, kidney injury requiring dilaysis, stroke and death.   Should stenting be indicated, the patient has agreed to dual anti-platelet therapy for 1-consecutive year with a drug-eluting stent.  The risks of moderate sedation include hypotension, respiratory depression, arrhythmias, bronchospasm, & death.   Informed consent was obtained & the patient is agreeable to proceed with the  procedure.    Attending addendum to follow     Rc Huynh MD  Interventional Cardiovascular & Structural Fellow, PGY8  Pager: [phone]      9/21/2020 12:12 PM    Staff:  I have personally taken the history and examined this patient and agree with the fellow's note as stated above and amended it accordingly :-)  She had a normal coronary angiogram here 3 years ago and is allergic to Fentanyl with respiratory arrest and cardiovascular collapse when administered for a different procedure despite the fact that it was clearly listed in her allergies.  I will ask Dr. Whyte if he wants us to proceed with coronary angiography again and if so will proceed with RH and LH catheterization and coronary angiography and possible intervention with NO FENTANYL for sedation.  Versed only.

## 2020-09-21 NOTE — PROGRESS NOTES
Cardiology Clinic Note  Reason for Visit: Pre lung evaluation   Referring physician: Dr. Whyte   HPI:   Mrs. Perez is a pleasant 50-year-old lady with past medical history of idiopathic pulmonary hypertension and tricuspid regurgitation referred to our clinic for pre lung evaluation for left heart catheterization a right heart catheterization.    Patient reported she was diagnosed with idiopathic pulmonary hypertension 2008, denied any chest pain, reported she has dyspnea on exertion, she had coronary angiogram 2017 that was showing normal coronaries. Last echo from 07/31/2020 showing normal ejection fraction 65% with moderate RV enlargement, PA pressure at 113 and mild-to-moderate tricuspid regurgitation.    Patient is here today with her mom and denies any acute complaint.  Patient had respiratory distress previously with fentanyl will do the procedure with no fentanyl.    ROS:    Constitution: Negative for fever or chills. Negative for weight loss or gain.   HENT: Negative for sore throat or headaches. Negative for rhinorrhea.  Eyes: Negative for blurred or double vision.   Cardiovascular: See above  Pulmonary: Negative for SOB. Negative for cough.   Gastrointestinal: Negative for abdominal pain. Negative for nausea/ vomiting. Negative for diarrhea.   : Negative for dysuria.   Skin: Negative for rashes.  Neurological: Negative for focal weakness or sensory changes.  Psychological: Negative for depression or anxiety.  PMH:     Past Medical History:   Diagnosis Date    AR (allergic rhinitis)     Cholelithiasis, common bile duct     Chronic low back pain     Eye pressure 2017    GERD (gastroesophageal reflux disease)     History of migraine headaches     Hypothyroidism     Lumbar disc disease     Menorrhagia     Mild asthma     Obesity     Plantar fasciitis of left foot     Primary pulmonary hypertension     followed by heart transplant/pulmonary     Seizure disorder     x 1 in 2008     Seizures     Sleep apnea     TMJ (dislocation of temporomandibular joint)     Tricuspid regurgitation      Past Surgical History:   Procedure Laterality Date    CARDIAC CATHETERIZATION      CENTRAL VENOUS CATHETER INSERTION      gallbladder drain  06/2019    INSERTION OF HAYNES CATHETER Right 6/17/2020    Procedure: INSERTION, CATHETER, CENTRAL VENOUS, HAYNES Replace Single Lumen for Remodulin Infusion;  Surgeon: Bertin Dewitt MD;  [facility];  Service: General;  Laterality: Right;    PORTACATH PLACEMENT      REMOVAL OF TUNNELED CENTRAL VENOUS CATHETER (CVC) N/A 6/17/2020    Procedure: REMOVAL, CATHETER, CENTRAL VENOUS, TUNNELED;  Surgeon: Bertin Dewitt MD;  [facility];  Service: General;  Laterality: N/A;    RIGHT HEART CATHETERIZATION Right 8/20/2018    Procedure: HEART CATH-RIGHT;  Surgeon: Ivonne Rai MD;  Location: [facility] CATH LAB;  Service: Cardiology;  Laterality: Right;    RIGHT HEART CATHETERIZATION Right 9/4/2019    Procedure: INSERTION, CATHETER, RIGHT HEART;  Surgeon: Ivonne Rai MD;  Location: [facility] CATH LAB;  Service: Cardiology;  Laterality: Right;    UPPER GASTROINTESTINAL ENDOSCOPY       Current Outpatient Medications on File Prior to Visit   Medication Sig Dispense Refill    acetaminophen (TYLENOL EXTRA STRENGTH) 500 MG tablet Take 1,000 mg by mouth every 6 (six) hours as needed for Pain.      ADCIRCA 20 mg Tab Take 2 tablets (40 mg total) by mouth once daily. 60 tablet 11    albuterol-ipratropium (DUO-NEB) 2.5 mg-0.5 mg/3 mL nebulizer solution Take 3 mLs by nebulization every 6 (six) hours as needed for Wheezing. Rescue 120 vial 5    ambrisentan (LETAIRIS) 10 MG Tab Take 1 tablet (10 mg total) by mouth once daily. (Patient taking differently: Take 10 mg by mouth every morning. Patient takes about 10am daily) 30 tablet 11    azelastine (ASTELIN) 137 mcg (0.1 %) nasal spray 2 sprays by Nasal route 2 (two) times daily. Patient uses  prn in the evening      butalbital-acetaminophen-caffeine -40 mg (FIORICET, ESGIC) -40 mg per tablet Take 1 tablet by mouth every 4 (four) hours as needed for Pain.      citalopram (CELEXA) 10 MG tablet Take 10 mg by mouth every evening.       cyanocobalamin, vitamin B-12, 2,500 mcg Subl Place 2,500 mcg under the tongue every morning.       diphenhydrAMINE (BENADRYL) 25 mg capsule Take 50 mg by mouth every 6 (six) hours as needed for Itching. Patient takes prn evenings      ferrous sulfate 325 (65 FE) MG EC tablet Take 325 mg by mouth daily as needed.       fluticasone (FLONASE) 50 mcg/actuation nasal spray INHALE 2 SPRAYS IN EACH NOSTRIL DAILY (Patient taking differently: every evening. ) 16 g 3    fluticasone furoate-vilanteroL (BREO ELLIPTA) 200-25 mcg/dose DsDv diskus inhaler Inhale 1 puff into the lungs once daily. Controller (Patient taking differently: Inhale 1 puff into the lungs every evening. Controller) 1 each 5    folic acid (FOLVITE) 1 MG tablet Take 1 tablet (1,000 mcg total) by mouth once daily. (Patient taking differently: Take 1,000 mcg by mouth every morning. ) 90 tablet 3    hyoscyamine (LEVSIN/SL) 0.125 mg Subl Place 0.125 mg under the tongue every 6 (six) hours as needed.       levothyroxine (SYNTHROID) 137 MCG Tab tablet Take 1 tablet (137 mcg total) by mouth once daily. (Patient taking differently: Take 137 mcg by mouth before breakfast. ) 90 tablet 3    loratadine (CLARITIN) 10 mg tablet Take 10 mg by mouth every evening.       norethindrone-ethinyl estradiol (MICROGESTIN FE 1/20, 28,) 1 mg-20 mcg (21)/75 mg (7) per tablet Take 1 tablet by mouth once daily. 28 tablet 6    ondansetron (ZOFRAN) 4 MG tablet Take 1 tablet (4 mg total) by mouth every 6 (six) hours as needed. 1 Tablet Oral Every 6 hours (Patient not taking: Reported on 9/21/2020) 30 tablet 2    pantoprazole (PROTONIX) 40 MG tablet TAKE ONE TABLET BY MOUTH ONCE DAILY 90 tablet 1    sumatriptan (IMITREX)  "100 MG tablet Take 1 tablet (100 mg total) by mouth as needed for Migraine. Take at the onset of headache, may take 1 more in 1 hour if needed. 12 tablet 5    topiramate (TOPAMAX) 100 MG tablet Take 1 tablet (100 mg total) by mouth 2 (two) times daily. 60 tablet 11    traMADoL (ULTRAM) 50 mg tablet Take 1 tablet (50 mg total) by mouth every 6 (six) hours. (Patient not taking: Reported on 8/19/2020) 12 tablet 0    treprostinil (REMODULIN) 2.5 mg/mL Soln Mix cassette as directed and infuse continuously per physician titration orders on dosing sheet. Current dose 80ng/kg/min 60 mL 11     No current facility-administered medications on file prior to visit.      There were no vitals filed for this visit.  There is no height or weight on file to calculate BMI.  Allergies:     Review of patient's allergies indicates:   Allergen Reactions    Fentanyl Anaphylaxis     Respiratory distress    Vibra-tabs [doxycycline hyclate] Anaphylaxis     "throat felt like it was closing"    Adhesive Hives     Silk tape    Amoxicillin Rash    Nsaids (non-steroidal anti-inflammatory drug) Swelling    Chlorhexidine Other (See Comments)     Blue Chlorhexidine causes hives     Medications:     Current Outpatient Medications on File Prior to Visit   Medication Sig Dispense Refill    acetaminophen (TYLENOL EXTRA STRENGTH) 500 MG tablet Take 1,000 mg by mouth every 6 (six) hours as needed for Pain.      ADCIRCA 20 mg Tab Take 2 tablets (40 mg total) by mouth once daily. 60 tablet 11    albuterol-ipratropium (DUO-NEB) 2.5 mg-0.5 mg/3 mL nebulizer solution Take 3 mLs by nebulization every 6 (six) hours as needed for Wheezing. Rescue 120 vial 5    ambrisentan (LETAIRIS) 10 MG Tab Take 1 tablet (10 mg total) by mouth once daily. (Patient taking differently: Take 10 mg by mouth every morning. Patient takes about 10am daily) 30 tablet 11    azelastine (ASTELIN) 137 mcg (0.1 %) nasal spray 2 sprays by Nasal route 2 (two) times daily. Patient " uses prn in the evening      butalbital-acetaminophen-caffeine -40 mg (FIORICET, ESGIC) -40 mg per tablet Take 1 tablet by mouth every 4 (four) hours as needed for Pain.      citalopram (CELEXA) 10 MG tablet Take 10 mg by mouth every evening.       cyanocobalamin, vitamin B-12, 2,500 mcg Subl Place 2,500 mcg under the tongue every morning.       diphenhydrAMINE (BENADRYL) 25 mg capsule Take 50 mg by mouth every 6 (six) hours as needed for Itching. Patient takes prn evenings      ferrous sulfate 325 (65 FE) MG EC tablet Take 325 mg by mouth daily as needed.       fluticasone (FLONASE) 50 mcg/actuation nasal spray INHALE 2 SPRAYS IN EACH NOSTRIL DAILY (Patient taking differently: every evening. ) 16 g 3    fluticasone furoate-vilanteroL (BREO ELLIPTA) 200-25 mcg/dose DsDv diskus inhaler Inhale 1 puff into the lungs once daily. Controller (Patient taking differently: Inhale 1 puff into the lungs every evening. Controller) 1 each 5    folic acid (FOLVITE) 1 MG tablet Take 1 tablet (1,000 mcg total) by mouth once daily. (Patient taking differently: Take 1,000 mcg by mouth every morning. ) 90 tablet 3    hyoscyamine (LEVSIN/SL) 0.125 mg Subl Place 0.125 mg under the tongue every 6 (six) hours as needed.       levothyroxine (SYNTHROID) 137 MCG Tab tablet Take 1 tablet (137 mcg total) by mouth once daily. (Patient taking differently: Take 137 mcg by mouth before breakfast. ) 90 tablet 3    loratadine (CLARITIN) 10 mg tablet Take 10 mg by mouth every evening.       norethindrone-ethinyl estradiol (MICROGESTIN FE 1/20, 28,) 1 mg-20 mcg (21)/75 mg (7) per tablet Take 1 tablet by mouth once daily. 28 tablet 6    ondansetron (ZOFRAN) 4 MG tablet Take 1 tablet (4 mg total) by mouth every 6 (six) hours as needed. 1 Tablet Oral Every 6 hours (Patient not taking: Reported on 9/21/2020) 30 tablet 2    pantoprazole (PROTONIX) 40 MG tablet TAKE ONE TABLET BY MOUTH ONCE DAILY 90 tablet 1    sumatriptan  (IMITREX) 100 MG tablet Take 1 tablet (100 mg total) by mouth as needed for Migraine. Take at the onset of headache, may take 1 more in 1 hour if needed. 12 tablet 5    topiramate (TOPAMAX) 100 MG tablet Take 1 tablet (100 mg total) by mouth 2 (two) times daily. 60 tablet 11    traMADoL (ULTRAM) 50 mg tablet Take 1 tablet (50 mg total) by mouth every 6 (six) hours. (Patient not taking: Reported on 8/19/2020) 12 tablet 0    treprostinil (REMODULIN) 2.5 mg/mL Soln Mix cassette as directed and infuse continuously per physician titration orders on dosing sheet. Current dose 80ng/kg/min 60 mL 11     No current facility-administered medications on file prior to visit.      Social History:     Social History     Tobacco Use    Smoking status: Never Smoker    Smokeless tobacco: Never Used   Substance Use Topics    Alcohol use: No     Alcohol/week: 0.8 standard drinks     Types: 1 Standard drinks or equivalent per week     Comment: 1 per year     Family History:     Family History   Problem Relation Age of Onset    Heart disease Father     Glaucoma Father     Diabetes Mother     Cancer Maternal Grandmother         uterine    Cancer Maternal Aunt         breast    Breast cancer Maternal Aunt     Heart disease Paternal Grandfather     Heart attack Paternal Grandfather     Diabetes Paternal Grandfather     Leukemia Brother     Hypertension Unknown     Diabetes Maternal Grandfather     Heart attack Maternal Grandfather     Breast cancer Cousin     No Known Problems Sister     No Known Problems Maternal Uncle     No Known Problems Paternal Aunt     No Known Problems Paternal Uncle     No Known Problems Paternal Grandmother     Colon cancer Neg Hx     Ovarian cancer Neg Hx     Amblyopia Neg Hx     Blindness Neg Hx     Cataracts Neg Hx     Macular degeneration Neg Hx     Retinal detachment Neg Hx     Strabismus Neg Hx     Stroke Neg Hx     Thyroid disease Neg Hx     Esophageal cancer Neg Hx       Physical Exam:   There were no vitals taken for this visit.     Constitutional: No apparent distress, conversant  HEENT: Sclera anicteric, extraocular movements intact  Neck: No jugular venous distension, no carotid bruits  CV: Regular rate and rhythm, no murmurs rubs or gallops, normal S1/S2  Pulm: Clear to auscultation bilaterally, no wheezes, rales, or ronchi  GI: Abdomen soft, nontender, nondistended, normoactive bowel sounds  Extremities: No lower extremity edema, warm with palpable pulses  Skin: No ecchymosis, erythema, or ulcers  Psych: Alert and oriented to person place location, appropriate affect  Neuro: No focal deficits    Labs:     Lab Results   Component Value Date     09/21/2020    K 3.4 (L) 09/21/2020     09/21/2020    CO2 20 (L) 09/21/2020    BUN 11 09/21/2020    CREATININE 0.7 09/21/2020    ANIONGAP 8 09/21/2020     Lab Results   Component Value Date    AST 18 09/21/2020    ALT 19 09/21/2020    ALKPHOS 71 09/21/2020    BILITOT 0.4 09/21/2020    BILIDIR 0.1 03/08/2013    ALBUMIN 4.0 09/21/2020     Lab Results   Component Value Date    CALCIUM 9.0 09/21/2020    MG 2.1 09/21/2020    PHOS 2.8 09/10/2014     Lab Results   Component Value Date     (H) 09/21/2020     (H) 07/31/2020    BNP 69 05/11/2020    Lab Results   Component Value Date    WBC 4.81 09/21/2020    HGB 15.2 09/21/2020    HCT 47.2 09/21/2020    HCT 38 09/09/2014     09/21/2020    GRAN 3.2 09/21/2020    GRAN 65.7 09/21/2020     Lab Results   Component Value Date    INR 0.9 09/21/2020    INR 2.3 (H) 03/09/2010     Lab Results   Component Value Date    CHOL 125 09/21/2020    HDL 36 (L) 09/21/2020    LDLCALC 72.8 09/21/2020    TRIG 81 09/21/2020     Lab Results   Component Value Date    HGBA1C 5.2 01/16/2017     Lab Results   Component Value Date    TSH 0.169 (L) 09/21/2020    [id number] 12.5 (H) 01/16/2017          Imaging:     No results found for: EF    Assessment:            Chronic pulmonary heart  disease:  50-year-old lady with history of idiopathic pulmonary hypertension referred to us for right heart catheterization and left heart catheterization prior to lung transplant, patient denied any chest pain, had normal coronary angiogram 2017    Long term current use of anticoagulant therapy    Liver mass    Non-allergic vasomotor rhinitis    Allergic asthma    Primary pulmonary hypertension    Tricuspid regurgitation    Hypothyroidism (acquired)    Migraine without aura and without status migrainosus, not intractable    Lumbar disc disease    Mild asthma    Chronic low back pain    Overweight (BMI 25.0-29.9)    Menorrhagia    ABHIJEET (obstructive sleep apnea)    Awaiting organ transplant status    Chronic nonintractable headache    Chronic respiratory failure with hypoxia    Cholelithiasis    Borderline osteopenia    Pulmonary hypertension    Lung transplant candidate       Plan:     Idiopathic pulmonary hypertension control workup for lung transplant    Will proceed with coronary angiogram and right heart catheterization via right common femoral vein and right, femoral artery access.  Will do the procedure with no sedation, patient had prior respiratory distress with sentinel so no fentanyl.  Patient was started on aspirin, will load the patient with Plavix in anticipation for possible PCI.  The risks, benefits & alternatives of the procedure were explained to the patient.   The risks of coronary angiography and RHC include but are not limited to: Bleeding, infection, heart rhythm abnormalities, allergic reactions, kidney injury requiring dilaysis, stroke and death.   Should stenting be indicated, the patient has agreed to dual anti-platelet therapy for 1-consecutive year with a drug-eluting stent.  The risks of moderate sedation include hypotension, respiratory depression, arrhythmias, bronchospasm, & death.   Informed consent was obtained & the patient is agreeable to proceed with the  procedure.    Attending addendum to follow     Rc Huynh MD  Interventional Cardiovascular & Structural Fellow, PGY8  Pager: [phone]      9/21/2020 12:12 PM    Staff:  I have personally taken the history and examined this patient and agree with the fellow's note as stated above and amended it accordingly :-)  She had a normal coronary angiogram here 3 years ago and is allergic to Fentanyl with respiratory arrest and cardiovascular collapse when administered for a different procedure despite the fact that it was clearly listed in her allergies.  I will ask Dr. Whyte if he wants us to proceed with coronary angiography again and if so will proceed with RH and LH catheterization and coronary angiography and possible intervention with NO FENTANYL for sedation.  Versed only.

## 2020-09-21 NOTE — LETTER
September 23, 2020      Ian Lackey MD  [address]  [facility] [address]           [facility] 2nd [facility]  [address]  [facility] [address]  Phone: [phone]          Patient: Yuni Perez   MR Number: [id number]   YOB: 1970   Date of Visit: 9/21/2020       Dear Dr. Ian Lackey:    Thank you for referring Yuni Perez to me for evaluation. Attached you will find relevant portions of my assessment and plan of care.    If you have questions, please do not hesitate to call me. I look forward to following Yuni Perez along with you.    Sincerely,    Stalin Howard MD    Enclosure  CC:  No Recipients    If you would like to receive this communication electronically, please contact [email or web address] or [phone] to request more information on [employer] Link access.    For providers and/or their staff who would like to refer a patient to [facility], please contact us through our one-stop-shop provider referral line, [facility], at [phone].    If you feel you have received this communication in error or would no longer like to receive these types of communications, please e-mail [email or web address]

## 2020-09-21 NOTE — LETTER
September 21, 2020      Ralph Whyte MD  [address]  [facility] [address]           [facility] [address]  [address]  [facility] [address]  Phone: [phone]          Patient: Yuni Perez   MR Number: [id number]   YOB: 1970   Date of Visit: 9/21/2020       Dear Dr. Ralph Whyte:    Thank you for referring Yuni Perez to me for evaluation. Attached you will find relevant portions of my assessment and plan of care.    If you have questions, please do not hesitate to call me. I look forward to following Yuni Perez along with you.    Sincerely,    Gucci Pickett MD    Enclosure  CC:  No Recipients    If you would like to receive this communication electronically, please contact [email or web address] or [phone] to request more information on [employer] Link access.    For providers and/or their staff who would like to refer a patient to [facility], please contact us through our one-stop-shop provider referral line, [facility] , at [phone].    If you feel you have received this communication in error or would no longer like to receive these types of communications, please e-mail [email or web address]

## 2020-09-21 NOTE — TELEPHONE ENCOUNTER
OUTPATIENT CATHETERIZATION INSTRUCTIONS    You have been scheduled for a procedure in the catheterization lab on Tuesday, September 29, 2020.     Please report to the Cardiology Waiting Area on the Third floor of the hospital and check in at 10 AM.   You will then be taken to the SSCU (Short Stay Cardiac Unit) and prepared for your procedure. Please be aware that this is not the time of your procedure but the time you are to arrive. The procedures are scheduled on an hourly basis; however, emergency cases take precedence over all other cases.       You may not have anything to eat or drink after midnight the night before your test. You may take your regular morning medications with water. If there are any medications that you should not take you will be instructed to hold them that morning. If you are diabetic and on Metformin (Glucophage) do not take it the day before, the day of, and for 2 days after your procedure.      The procedure will take 1-2 hours to perform. After the procedure, you will return to SSCU on the third floor of the hospital. You will need to lie still (or keep your arm still) for the next 4 to 6 hours to minimize bleeding from the puncture site. Your family may remain in the room with you during this time.       You may be able to be discharged home that same afternoon if there is someone to drive you home and there were no complications. If you have one of the balloon, stent, or device procedures you may spend the night in the hospital. Your doctor will determine, based on your progress, the date and time of your discharge. The results of your procedure will be discussed with you before you are discharged. Any further testing or procedures will be scheduled for you either before you leave or you will be called with these appointments.       If you should have any questions, concerns, or need to change the date of your procedure, please call  DENNY Valverde @ [phone]    Special  Instructions:  Take 4 plavix tablets the day before then one daily  Aspirin 81 mg daily        THE ABOVE INSTRUCTIONS WERE GIVEN TO THE PATIENT VERBALLY AND THEY VERBALIZED UNDERSTANDING.  THEY DO NOT REQUIRE ANY SPECIAL NEEDS AND DO NOT HAVE ANY LEARNING BARRIERS.          Directions for Reporting to Cardiology Waiting Area in the Hospital  If you park in the Parking Garage:  Take elevators to the1st floor of the parking garage.  Continue past the gift shop, coffee shop, and piano.  Take a right and go to the gold elevators. (Elevator B)  Take the elevator to the 3rd floor.  Follow the arrow on the sign on the wall that says Cath Lab Registration/EP Lab Registration.  Follow the long hallway all the way around until you come to a big open area.  This is the registration area.  Check in at Reception Desk.    OR    If family is dropping you off:  Have them drop you off at the front of the Hospital under the green overhang.  Enter through the doors and take a right.  Take the E elevators to the 3rd floor Cardiology Waiting Area.  Check in at the Reception Desk in the waiting room.

## 2020-09-22 ENCOUNTER — OFFICE VISIT (OUTPATIENT)
Dept: INFECTIOUS DISEASES | Facility: CLINIC | Age: 50
End: 2020-09-22
Payer: COMMERCIAL

## 2020-09-22 ENCOUNTER — LAB VISIT (OUTPATIENT)
Dept: LAB | Facility: HOSPITAL | Age: 50
End: 2020-09-22
Attending: INTERNAL MEDICINE
Payer: COMMERCIAL

## 2020-09-22 ENCOUNTER — NUTRITION (OUTPATIENT)
Dept: TRANSPLANT | Facility: CLINIC | Age: 50
End: 2020-09-22
Payer: COMMERCIAL

## 2020-09-22 ENCOUNTER — TELEPHONE (OUTPATIENT)
Dept: TRANSPLANT | Facility: CLINIC | Age: 50
End: 2020-09-22

## 2020-09-22 ENCOUNTER — SOCIAL WORK (OUTPATIENT)
Dept: TRANSPLANT | Facility: CLINIC | Age: 50
End: 2020-09-22
Payer: COMMERCIAL

## 2020-09-22 ENCOUNTER — CLINICAL SUPPORT (OUTPATIENT)
Dept: INFECTIOUS DISEASES | Facility: CLINIC | Age: 50
End: 2020-09-22
Payer: COMMERCIAL

## 2020-09-22 VITALS — WEIGHT: 134.06 LBS | HEIGHT: 59 IN | BODY MASS INDEX: 27.03 KG/M2

## 2020-09-22 VITALS
TEMPERATURE: 98 F | SYSTOLIC BLOOD PRESSURE: 99 MMHG | HEIGHT: 59 IN | DIASTOLIC BLOOD PRESSURE: 69 MMHG | BODY MASS INDEX: 27.15 KG/M2 | WEIGHT: 134.69 LBS | HEART RATE: 83 BPM

## 2020-09-22 DIAGNOSIS — I27.0 PRIMARY PULMONARY HYPERTENSION: ICD-10-CM

## 2020-09-22 DIAGNOSIS — R16.0 LIVER MASS: Primary | ICD-10-CM

## 2020-09-22 DIAGNOSIS — Z71.85 VACCINE COUNSELING: Primary | ICD-10-CM

## 2020-09-22 DIAGNOSIS — Z71.85 VACCINE COUNSELING: ICD-10-CM

## 2020-09-22 DIAGNOSIS — Z76.82 LUNG TRANSPLANT CANDIDATE: ICD-10-CM

## 2020-09-22 DIAGNOSIS — E03.9 HYPOTHYROIDISM (ACQUIRED): ICD-10-CM

## 2020-09-22 DIAGNOSIS — Z76.82 AWAITING ORGAN TRANSPLANT STATUS: ICD-10-CM

## 2020-09-22 DIAGNOSIS — Z01.818 PRE-TRANSPLANT EVALUATION FOR LUNG TRANSPLANT: ICD-10-CM

## 2020-09-22 LAB
ABO + RH BLD: NORMAL
HIV 1+2 AB+HIV1 P24 AG SERPL QL IA: NEGATIVE

## 2020-09-22 PROCEDURE — 90694 FLU VACCINE - QUADRIVALENT - ADJUVANTED: ICD-10-PCS | Mod: S$GLB,TXP,, | Performed by: INTERNAL MEDICINE

## 2020-09-22 PROCEDURE — 86825 HLA X-MATH NON-CYTOTOXIC: CPT | Mod: 91,PO,TXP

## 2020-09-22 PROCEDURE — 90694 VACC AIIV4 NO PRSRV 0.5ML IM: CPT | Mod: S$GLB,TXP,, | Performed by: INTERNAL MEDICINE

## 2020-09-22 PROCEDURE — 86825 HLA X-MATH NON-CYTOTOXIC: CPT | Mod: PO,TXP

## 2020-09-22 PROCEDURE — 36415 COLL VENOUS BLD VENIPUNCTURE: CPT | Mod: TXP

## 2020-09-22 PROCEDURE — 3008F PR BODY MASS INDEX (BMI) DOCUMENTED: ICD-10-PCS | Mod: CPTII,S$GLB,TXP, | Performed by: STUDENT IN AN ORGANIZED HEALTH CARE EDUCATION/TRAINING PROGRAM

## 2020-09-22 PROCEDURE — 86832 HLA CLASS I HIGH DEFIN QUAL: CPT | Mod: 59,PO,TXP

## 2020-09-22 PROCEDURE — 86901 BLOOD TYPING SEROLOGIC RH(D): CPT | Mod: TXP

## 2020-09-22 PROCEDURE — 3008F BODY MASS INDEX DOCD: CPT | Mod: CPTII,S$GLB,TXP, | Performed by: STUDENT IN AN ORGANIZED HEALTH CARE EDUCATION/TRAINING PROGRAM

## 2020-09-22 PROCEDURE — 99999 PR PBB SHADOW E&M-EST. PATIENT-LVL V: CPT | Mod: PBBFAC,TXP,, | Performed by: STUDENT IN AN ORGANIZED HEALTH CARE EDUCATION/TRAINING PROGRAM

## 2020-09-22 PROCEDURE — 99999 PR PBB SHADOW E&M-EST. PATIENT-LVL V: ICD-10-PCS | Mod: PBBFAC,TXP,, | Performed by: STUDENT IN AN ORGANIZED HEALTH CARE EDUCATION/TRAINING PROGRAM

## 2020-09-22 PROCEDURE — 99999 PR PBB SHADOW E&M-EST. PATIENT-LVL II: CPT | Mod: PBBFAC,TXP,, | Performed by: DIETITIAN, REGISTERED

## 2020-09-22 PROCEDURE — 86833 HLA CLASS II HIGH DEFIN QUAL: CPT | Mod: 59,PO,TXP

## 2020-09-22 PROCEDURE — 90471 PR IMMUNIZ ADMIN,1 SINGLE/COMB VAC/TOXOID: ICD-10-PCS | Mod: S$GLB,TXP,, | Performed by: INTERNAL MEDICINE

## 2020-09-22 PROCEDURE — 86828 HLA CLASS I&II ANTIBODY QUAL: CPT | Mod: 91,PO,TXP

## 2020-09-22 PROCEDURE — 90471 IMMUNIZATION ADMIN: CPT | Mod: S$GLB,TXP,, | Performed by: INTERNAL MEDICINE

## 2020-09-22 PROCEDURE — 99213 PR OFFICE/OUTPT VISIT, EST, LEVL III, 20-29 MIN: ICD-10-PCS | Mod: S$GLB,TXP,, | Performed by: STUDENT IN AN ORGANIZED HEALTH CARE EDUCATION/TRAINING PROGRAM

## 2020-09-22 PROCEDURE — 97802 PR MED NUTR THER, 1ST, INDIV, EA 15 MIN: ICD-10-PCS | Mod: S$GLB,TXP,, | Performed by: DIETITIAN, REGISTERED

## 2020-09-22 PROCEDURE — 97802 MEDICAL NUTRITION INDIV IN: CPT | Mod: S$GLB,TXP,, | Performed by: DIETITIAN, REGISTERED

## 2020-09-22 PROCEDURE — 99999 PR PBB SHADOW E&M-EST. PATIENT-LVL II: ICD-10-PCS | Mod: PBBFAC,TXP,, | Performed by: DIETITIAN, REGISTERED

## 2020-09-22 PROCEDURE — 99213 OFFICE O/P EST LOW 20 MIN: CPT | Mod: S$GLB,TXP,, | Performed by: STUDENT IN AN ORGANIZED HEALTH CARE EDUCATION/TRAINING PROGRAM

## 2020-09-22 PROCEDURE — 86977 RBC SERUM PRETX INCUBJ/INHIB: CPT | Mod: PO,TXP

## 2020-09-22 PROCEDURE — 86829 HLA CLASS I/II ANTIBODY QUAL: CPT | Mod: PO,TXP

## 2020-09-22 PROCEDURE — 86703 HIV-1/HIV-2 1 RESULT ANTBDY: CPT | Mod: TXP

## 2020-09-22 RX ORDER — LEVOTHYROXINE SODIUM 125 UG/1
125 TABLET ORAL DAILY
Qty: 90 TABLET | Refills: 0 | Status: SHIPPED | OUTPATIENT
Start: 2020-09-22 | End: 2020-12-17

## 2020-09-22 NOTE — PROGRESS NOTES
Transplant Recipient Adult Psychosocial Assessment    Yuni Perez  [address]  Telephone Information:   Mobile [phone]   Home  [phone] (home)  Work  There is no work phone number on file.  E-mail  No e-mail address on record    Sex: female  YOB: 1970  Age: 50 y.o.    Encounter Date: 9/22/2020  [place] Citizen: yes  Primary Language: English   Needed: no    Emergency Contact:  Name: Britta Perez  Relationship: mother  Address: [address]  Phone Numbers: [phone] (mobile)    Family/Social Support:   Number of dependents/: 0  Marital history: Pt reports not .  Other family dynamics: Pt reports she lives with her father, Deuce Perez. Pt reports her mother, Britta Perez, has moved in with she and her father at the start of the COVID 19 pandemic.    Household Composition:  Name: Yuni Perez  Age: 50  Relationship: patient  Does person drive? yes    Name: Britta Perez  Age: 78  Relationship: mother  Does person drive? yes    Name: Deuce Perez  Age: 88  Relationship: father  Does person drive? yes    Do you and your caregivers have access to reliable transportation? yes  PRIMARY CAREGIVER: Britta Perez will be primary caregiver, phone number [phone].      provided in-depth information to patient and caregiver regarding pre- and post-transplant caregiver role.   strongly encourages patient and caregiver to have concrete plan regarding post-transplant care giving, including back-up caregiver(s) to ensure care giving needs are met as needed.    Patient and Caregiver states understanding all aspects of caregiver role/commitment and is able/willing/committed to being caregiver to the fullest extent necessary.    Patient and Caregiver verbalizes understanding of the education provided today and caregiver responsibilities.        Social  "worker remains available. Patient and Caregiver agree to contact  in a timely manner if concerns arise.      Able to take time off work without financial concerns: yes N/A, pt reports does not work.     Additional Significant Others who will Assist with Transplant:  Name: Scarlett Alvarez, ph: [phone]  Age: 40  City: [place] State: [place]  Relationship: cousin  Does person drive? yes    Name: Adrienne Estrada, ph: [phone]  Age: 51  City: [place] State: [place]  Relationship: friend  Does person drive? yes    Living Will: yes  Healthcare Power of : yes  Advance Directives on file: <<no information> per medical record.  Verbally reviewed LW/HCPA information.   provided patient with copy of LW/HCPA documents and provided education on completion of forms.    Living Donors: N/A    Highest Education Level: Attended College/Technical School  Reading Ability: college  Reports difficulty with: memory  Learns Best By:  Demonstration, hands on learning     Status: no  VA Benefits: no     Working for Income: No  If no, reason not working: Demands of Treatment; Pt reports previously employed as an . Pt could not recall when she stopped working and reports it was "a few years ago."    Spouse/Significant Other Employment: N/A    Disabled: no, pt reports has attempted to apply for disability before but was denied.    Monthly Income: Pt reports no income.   Able to afford all costs now and if transplanted, including medications: no. At this time, pt reports she receives medication assistance through "[employer]istance [employer]" kathie.  SW reviewed fundraising information with pt and pt's mother. SW encouraged pt to engage in fundraising prior to txp. Patient and Caregiver verbalizes understanding of personal responsibilities related to transplant costs and the importance of having a financial plan to ensure that patients transplant costs are fully covered.  provided " fundraising information/education.  Patient and Caregiver verbalizes understanding.   remains available.    Insurance:   Payor/Plan Subscr  Sex Relation Sub. Ins. ID Effective Group Num   1. BLUE CROSS BL* VERA TURNER* 1970 Female  [id number] 10/15/10 [id number]                                   [address] [address]     Primary Insurance (for UNOS reporting): Private Insurance  Secondary Insurance (for UNOS reporting): None     Patient and Caregiver verbalizes clear understanding that patient may experience difficulty obtaining and/or be denied insurance coverage post-surgery. This includes and is not limited to disability insurance, life insurance, health insurance, burial insurance, long term care insurance, and other insurances.    Patient and Caregiver also reports understanding that future health concerns related to or unrelated to transplantation may not be covered by patient's insurance.  Resources and information provided and reviewed.      Patient and Caregiver provides verbal permission to release any necessary information to outside resources for patient care and discharge planning.  Resources and information provided are reviewed.      Dialysis Adherence: N/A    Infusion Service: patient utilizing? no  Home Health: patient utilizing? yes pt reports has used [facility] in the past (ph: [phone])  DME: yes wheelchair, walker, cane  Pulmonary/Cardiac Rehab: yes / [facility] pulmonary rehab (ph: [phone])  ADLS: Pt reports independent with ADLS (such as walking, cooking, eating, bathing, housekeeping, and shopping)    Adherence:   Pt reports high level of adherence to current health care regiment.  Adherence education and counseling provided. Pt verbalized understanding of importance of taking medications as instructed, following all team and MD recommendations, and coming to all follow up appointments.    Per History Section:  Past Medical History:   Diagnosis Date    AR  (allergic rhinitis)     Cholelithiasis, common bile duct     Chronic low back pain     Eye pressure 2017    GERD (gastroesophageal reflux disease)     History of migraine headaches     Hypothyroidism     Lumbar disc disease     Menorrhagia     Mild asthma     Obesity     Plantar fasciitis of left foot     Primary pulmonary hypertension     followed by heart transplant/pulmonary     Seizure disorder     x 1 in 2008    Seizures     Sleep apnea     TMJ (dislocation of temporomandibular joint)     Tricuspid regurgitation      Social History     Tobacco Use    Smoking status: Never Smoker    Smokeless tobacco: Never Used   Substance Use Topics    Alcohol use: No     Alcohol/week: 0.8 standard drinks     Types: 1 Standard drinks or equivalent per week     Comment: 1 per year     Social History     Substance and Sexual Activity   Drug Use No     Social History     Substance and Sexual Activity   Sexual Activity Never    Birth control/protection: OCP, Pill    Comment: pt is a virgin       Per Today's Psychosocial:  Tobacco: none, patient denies any use.  Alcohol: none, patient denies any use.  Illicit Drugs/Non-prescribed Medications: none, patient denies any use.    Patient and Caregiver states clear understanding of the potential impact of substance use as it relates to transplant candidacy and is aware of possible random substance screening.  Substance abstinence/cessation counseling, education and resources provided and reviewed.     Arrests/DWI/Treatment/Rehab: patient denies    Psychiatric History:    Mental Health: depression and anxiety  Psychiatrist/Counselor: Pt reports does not see a psychiatrist or counselor.   Medications:  Pt reports she takes Celexa, prescribed by Dr. Rai  Suicide/Homicide Issues: Pt denies any past or current SI/HI.  Safety at home: Pt reports no issues. No physical/emotional abuse reported.    Knowledge: Patient and Caregiver states having clear understanding and  realistic expectations regarding the potential risks and potential benefits of organ transplantation and organ donation, agrees to discuss with health care team members and support system members and to utilize available resources and express questions and/or concerns in order to further facilitate the pt informed decision-making.  Resources and information provided and reviewed.     Patient and Caregiver is aware of [facility]'s affiliation and/or partnership with agencies in home health care, LTAC, SNF, [facility], and other hospitals and clinics.    Understanding: Patient and Caregiver reports having a clear understanding of the many lifetime commitments involved with being a transplant recipient, including costs, compliance, medications, lab work, procedures, appointments, concrete and financial planning, preparedness, timely and appropriate communication of concerns, abstinence (ETOH, tobacco, illicit non-prescribed drugs), adherence to all health care team recommendations, support system and caregiver involvement, appropriate and timely resource utilization and follow-through, mental health counseling as needed/recommended, and patient and caregiver responsibilities.  Social Service Handbook, resources and detailed educational information provided and reviewed.  Educational information provided.    Patient and Caregiver also reports current and expected compliance with health care regime and states having a clear understanding of the importance of compliance.      Patient and Caregiver reports a clear understanding that risks and benefits may be involved with organ transplantation and with organ donation.      Patient and Caregiver also reports clear understanding that psychosocial risk factors may affect patient, and include but are not limited to feelings of depression, generalized anxiety, anxiety regarding dependence on others, post traumatic stress disorder, feelings of guilt and other emotional and/or mental  "concerns, and/or exacerbation of existing mental health concerns.  Detailed resources provided and discussed.     Patient and Caregiver agrees to access appropriate resources in a timely manner as needed and/or as recommended, and to communicate concerns appropriately.  Patient and Caregiver also reports a clear understanding of treatment options available.      reviewed education, provided additional information, and answered questions.    Feelings or Concerns: Pt reports feeling "overwhelmed" by txp process. SW provided active listening and emotional support to pt and pt's mother.    Coping: Identify Patient & Caregiver Strategies to [place]:   1. Currently & Pre-transplant - stitching, reading, watching cooking shows, family support, connecting with friends via Facetime   2. At the time of surgery - stitching, reading, watching cooking shows, family support, connecting with friends via Facetime   3. During post-Transplant & Recovery Period - stitching, reading, watching cooking shows, family support, connecting with friends via Facetime    Goals: Pt reports the following goals: "travel around the [place], improve my health, feel better."  Patient referred to Vocational Rehabilitation.    Interview Behavior: Patient and Caregiver presents as alert and oriented x 4, pleasant, concentration/judgement good, calm, communicative, cooperative and asking and answering questions appropriately.       Transplant Social Work - Candidacy  Assessment/Plan:     Psychosocial Suitability: Based on psychosocial risk factors, patient presents as medium risk candidate for lung txp at this time due to the following financial concerns: limited income to meet needs, limited financial resources, and pt reporting needing financial assistance to afford medications prior to txp. Pt presents with the following protective factors: good family support, identifiable healthy coping skills, and pt appears motivated for " transplant.    Recommendations/Additional Comments: SW reviewed fundraising information with pt and pt's mother. SW encouraged pt to engage in fundraising prior to txp. SW recommends that pt remain aware of potential mental health concerns and contact the team if any concerns arise. SW recommends that pt remain abstinent from tobacco, ETOH and drug use. SW remains available to answer any questions or concerns that arise as the pt moves through the transplant process.     Pt lives further than 1-hour away from [facility] and will be asked to reside local (within1-hour of [facility]) for an estimated 3 months post transplant. Pt and caregiver were provided with education regarding local housing and given the options of finding their own housing accommodations (hotels/motels/apartments/etc) or residing at [facility]. [facility] are an option available to [facility] patients on a low-cost/sliding-scale rate. Pt was provided with a handout which includes information about [facility]. The patient can find relevant information regarding [facility] policies, sliding scale fee, apartment furnishings, a list of items the patient is expected to provide, as well as other pertinent instructions on this handout. Pt and caregiver were informed they are not required to make a decision regarding post-transplant housing until the time of transplant, at which point they will be asked to make a post transplant housing plan and share this information with the Transplant  and Lung Transplant Team. Pt and caregiver understand they are under no obligation to reside at [facility].     Final determination of transplant candidacy will be made once work up is complete and reviewed by the selection committee.      Brittanie Reed LMSW

## 2020-09-22 NOTE — PROGRESS NOTES
Pre Transplant Infectious Diseases Consult  Lung Transplant Recipient Evaluation    Requesting Physician: Dr. Lackey    Reason for Visit:  Pre Transplant Evaluation    Organ:  Lung    Etiology of Lung Disease:  idiopathic pulmonary hypertension    History of Prior Transplant:  No    Currently taking immunosuppressants/steroids:  No    History of Splenectomy:  No    Infectious History:  Current/recent infections or currently taking antibiotics?  No  History of recurrent infections (sinuses, throat, bladder/kidneys, intestines, skin, dental, lung, catheter (HD/PD) related, or peritonitis/SBP)?  No  Any major hospitalizations due to infection?  No  If diabetic, history of diabetic foot infection/osteomyelitis?  No  History of shingles?  No  History of STDs (syphilis, viral hepatitis, HIV)?  No  Exposure to TB or ever had a positive TB skin test?  No  History of residence in coccidioides endemic areas ([facility]?  No  Any foreign travel?  Yes - [place] 20 years ago  Any associated illness?  No    Social/Environmental:  Occupational:  Retired, was a  (farm animals and exotic animals (birds, tigers, etc))  Animal exposures (dogs, cats, farm animals, bird cages, fish tanks):  Yes - cats, fish tank but no fish  Hobbies (gardening, hike, fish/hunting, etc): gardening- wears gloves; stitching, painting, watching TV  Consumption of raw/undercooked meat or seafood?  No, medium to medium raw  Any injectable or smoked recreational drug use?  No    Immunization History:  Childhood vaccines:  Yes  Last Flu shot: 10/11/2019  Tetanus/TDAP: 1/18/2017  Hepatitis A/ Hepatitis B:: 7/19/2017, 2/17/2017, 1/18/2017  Prevnar-13: 8/22/2016  Pneumovax-23: 1/18/2017, 12/17/2008  Shingles (Zostavax/Shingrix): no  Other: Rabies    Serologies:  CMV IgG Interpretation   Date Value Ref Range Status   01/16/2017 Non-Reactive  Final     Comment:     These results were obtained with the IMMULITE 1000 CMV IgG assay.   Values  obtained with different manufacturers' assay methods   may not be used interchangeably.        Hepatitis A Antibody IgG   Date Value Ref Range Status   09/21/2020 Positive (A)  Final     Hep B Core Total Ab   Date Value Ref Range Status   09/21/2020 Negative  Final     Hep B S Ab   Date Value Ref Range Status   09/21/2020 Positive (A)  Final     Hepatitis B Surface Ag   Date Value Ref Range Status   09/21/2020 Negative Negative Final     HIV 1/2 Ag/Ab   Date Value Ref Range Status   01/17/2017 Negative Negative Final     RPR   Date Value Ref Range Status   01/16/2017 Non-reactive Non-reactive Final     Strongyloides Ab IgG   Date Value Ref Range Status   01/16/2017 Negative Negative Final     Comment:     No detectable levels of IgG antibodies to Strongyloides.  Repeat testing in 1-2 weeks if clinically indicated.  Test Performed by:  [place] [address]  : Stas Avila II, M.D., Ph.D.       Toxoplasma gondii IGG   Date Value Ref Range Status   01/16/2017 <5.0 0.0 - 6.4 IU/mL Final     Toxoplasma IGG Interpretation   Date Value Ref Range Status   01/16/2017 Non-reactive  Final     Comment:     The results were obtained with the Immulite 1000 Toxoplasma  Quantitative IgG EIA. Values obtained from other manufacturers'  assay methods may not be used interchangeably.       Varicella Interpretation   Date Value Ref Range Status   01/16/2017 Positive (A) Negative Final     Comment:     <or=0.90     Negative        No detectable IgG antibody to Varicella zoster  by the MEJIA test. Such individuals are presumed to be   uninfected with Varicella zoster and to be susceptible to   primary infection.  0.91-1.09    Equivocal  >or=1.10     Positive        Indicates presence of detectable IgG antibody to Varicella   zoster by the MEJIA test. Indicative of previous or current   infection.          Review of Systems   Constitution:  Negative for chills and fever.   HENT: Negative for sore throat.    Eyes: Negative for photophobia and visual disturbance.   Cardiovascular: Negative for chest pain.   Respiratory: Positive for cough and shortness of breath.    Skin: Negative for rash.   Musculoskeletal: Positive for arthritis, back pain (low back) and myalgias.   Gastrointestinal: Negative for jaundice.   Genitourinary: Negative for dysuria and hematuria.   Neurological: Positive for headaches.   Psychiatric/Behavioral: Negative for altered mental status.     Physical Exam  Constitutional:       General: She is not in acute distress.     Appearance: Normal appearance. She is not ill-appearing.   HENT:      Head: Normocephalic and atraumatic.      Mouth/Throat:      Mouth: Mucous membranes are moist.      Pharynx: No oropharyngeal exudate or posterior oropharyngeal erythema.   Eyes:      General: No scleral icterus.     Conjunctiva/sclera: Conjunctivae normal.   Neck:      Musculoskeletal: Normal range of motion and neck supple.   Cardiovascular:      Rate and Rhythm: Normal rate and regular rhythm.   Pulmonary:      Effort: Pulmonary effort is normal. No respiratory distress.      Breath sounds: Normal breath sounds. No wheezing, rhonchi or rales.   Abdominal:      General: Abdomen is flat. There is no distension.      Palpations: Abdomen is soft.      Tenderness: There is no abdominal tenderness.   Musculoskeletal:      Right lower leg: No edema.      Left lower leg: No edema.   Lymphadenopathy:      Cervical: No cervical adenopathy.   Skin:     Coloration: Skin is not jaundiced.      Findings: No rash.   Neurological:      Mental Status: She is alert and oriented to person, place, and time.   Psychiatric:         Mood and Affect: Mood normal.         Behavior: Behavior normal.          Counseling:   I discussed with the patient the risk for increased susceptibility to infections following transplantation including increased risk for infection  right after transplant and if rejection should occur.  The patient has been counseled on the importance of vaccinations including but not limited to a yearly flu vaccine. Patient was also instructed to encourage that family/caretakers receive their flu vaccine yearly. The patient was encouraged to contact us about any problems that may develop after immunizations and possible side effects were reviewed.     Specific guidance has been provided to the patient regarding the patient's occupation, hobbies and activities to avoid future infectious complications. These include but are not limited to: avoiding raw/undercooked meats and seafood, avoiding unpasteurized milk/cheeses, proper (hand) hygiene, contact with animals and appropriate vaccination of animals, use of mosquito/tick precautions, avoiding walking barefoot, avoiding sick contacts, and seeking medical advice prior to foreign travel (specifically developing countries).     Transplant Candidacy: Based on available information, there are no identified significant barriers to transplantation from an infectious disease standpoint pending acceptable serologies and subject to recommendations below.     Final determination of transplant candidacy will be made once evaluation is complete and reviewed by the Transplant Selection Committee.    ID recommendations: She is up to date on her vaccination with the exception of Influenza and Shingrix. Will give influenza vaccine today. If Varicella IgG negative recommend varicella vaccine and followed by Shingrix. If varicella IgG positive recommend Shingrix vaccine. Repeat Pneumovax at age 66. Repeat Tdap every 10 years (sooner if contaminated wound). Yearly influenza vaccine. Currently pending HIV ag/ab, RPR, quant gold, varicella IgG, strongyloides ab, CMV. Please call back if serologies HIV, RPR, quant gold, strongyloides positive.

## 2020-09-22 NOTE — TELEPHONE ENCOUNTER
Contacted patient.  Informed her that Dr. Sandhu agreed with decreasing the levothyroxine to 125 mcg daily based on her recent labs and she sent the prescription to the pharmacy.  Informed her that she will need repeat levels in 8 weeks.  Patient verbalized her understanding and had questions regarding the levothyroxine.  Instructed her to contact her PCP.  She stated that she would contact her PCP's office.     ----- Message from Lulu Sandhu MD sent at 9/22/2020  3:45 PM CDT -----  Regarding: RE: Thyroid studies  Yes, that will be fine. Please inform patient that I sent the Rx to the pharmacy. We should recheck the levels in 8 weeks.    Lulu  ----- Message -----  From: Cheryl Euceda RN  Sent: 9/22/2020   2:26 PM CDT  To: Lulu Sandhu MD  Subject: Thyroid studies                                  Patient is being evaluated for lung transplantation.  Thyroid studies were ordered as part of the evaluation.  Dr. Lackey made the below recommendations based on the results provided you agree.    Thanks,  Cheryl    ----- Message -----  From: Ian Lackey MD  Sent: 9/21/2020  12:56 PM CDT  To: Cheryl Euceda RN    Recommending that patient drop her levothyroxine to 125 mcg daily based on her recent labs.  Rx will be per PCP if agrees with the recs.

## 2020-09-22 NOTE — TELEPHONE ENCOUNTER
----- Message from Ian Lackey MD sent at 9/22/2020  9:50 AM CDT -----  Urine Yoon positive attributed to Fisadet

## 2020-09-22 NOTE — PROGRESS NOTES
TRANSPLANT NUTRITIONAL ASSESSMENT    Referring Provider: Ian Lackey MD    Reason for Visit: Pre-lung transplant work-up    Age: 50 y.o.  Sex: female    Patient Active Problem List   Diagnosis    Chronic pulmonary heart disease    Long term current use of anticoagulant therapy    Liver mass    Non-allergic vasomotor rhinitis    Allergic asthma    Primary pulmonary hypertension    Tricuspid regurgitation    Hypothyroidism (acquired)    Migraine without aura and without status migrainosus, not intractable    Lumbar disc disease    Mild asthma    Chronic low back pain    Overweight (BMI 25.0-29.9)    Menorrhagia    ABHIJEET (obstructive sleep apnea)    Awaiting organ transplant status    Chronic nonintractable headache    Chronic respiratory failure with hypoxia    Cholelithiasis    Borderline osteopenia    Pulmonary hypertension    Lung transplant candidate     Past Medical History:   Diagnosis Date    AR (allergic rhinitis)     Cholelithiasis, common bile duct     Chronic low back pain     Eye pressure 2017    GERD (gastroesophageal reflux disease)     History of migraine headaches     Hypothyroidism     Lumbar disc disease     Menorrhagia     Mild asthma     Obesity     Plantar fasciitis of left foot     Primary pulmonary hypertension     followed by heart transplant/pulmonary     Seizure disorder     x 1 in 2008    Seizures     Sleep apnea     TMJ (dislocation of temporomandibular joint)     Tricuspid regurgitation      Lab Results   Component Value Date    GLU 73 09/21/2020    K 3.4 (L) 09/21/2020    PHOS 2.8 09/10/2014    MG 2.1 09/21/2020    CHOL 125 09/21/2020    HDL 36 (L) 09/21/2020    TRIG 81 09/21/2020    ALBUMIN 4.0 09/21/2020    HGBA1C 4.9 09/21/2020    CALCIUM 9.0 09/21/2020     Other Pertinent Labs: none    Current Outpatient Medications   Medication Sig    acetaminophen (TYLENOL EXTRA STRENGTH) 500 MG tablet Take 1,000 mg by mouth every 6 (six) hours as needed for  Pain.    ADCIRCA 20 mg Tab Take 2 tablets (40 mg total) by mouth once daily.    albuterol-ipratropium (DUO-NEB) 2.5 mg-0.5 mg/3 mL nebulizer solution Take 3 mLs by nebulization every 6 (six) hours as needed for Wheezing. Rescue    ambrisentan (LETAIRIS) 10 MG Tab Take 1 tablet (10 mg total) by mouth once daily. (Patient taking differently: Take 10 mg by mouth every morning. Patient takes about 10am daily)    aspirin (ECOTRIN) 81 MG EC tablet Take 1 tablet (81 mg total) by mouth once daily.    azelastine (ASTELIN) 137 mcg (0.1 %) nasal spray 2 sprays by Nasal route 2 (two) times daily. Patient uses prn in the evening    butalbital-acetaminophen-caffeine -40 mg (FIORICET, ESGIC) -40 mg per tablet Take 1 tablet by mouth every 4 (four) hours as needed for Pain.    citalopram (CELEXA) 10 MG tablet Take 10 mg by mouth every evening.     clopidogreL (PLAVIX) 75 mg tablet Take 1 tablet (75 mg total) by mouth once daily.    cyanocobalamin, vitamin B-12, 2,500 mcg Subl Place 2,500 mcg under the tongue every morning.     diphenhydrAMINE (BENADRYL) 25 mg capsule Take 50 mg by mouth every 6 (six) hours as needed for Itching. Patient takes prn evenings    ferrous sulfate 325 (65 FE) MG EC tablet Take 325 mg by mouth daily as needed.     fluticasone (FLONASE) 50 mcg/actuation nasal spray INHALE 2 SPRAYS IN EACH NOSTRIL DAILY (Patient taking differently: every evening. )    fluticasone furoate-vilanteroL (BREO ELLIPTA) 200-25 mcg/dose DsDv diskus inhaler Inhale 1 puff into the lungs once daily. Controller (Patient taking differently: Inhale 1 puff into the lungs every evening. Controller)    folic acid (FOLVITE) 1 MG tablet Take 1 tablet (1,000 mcg total) by mouth once daily. (Patient taking differently: Take 1,000 mcg by mouth every morning. )    hyoscyamine (LEVSIN/SL) 0.125 mg Subl Place 0.125 mg under the tongue every 6 (six) hours as needed.     levothyroxine (SYNTHROID) 137 MCG Tab tablet Take 1  "tablet (137 mcg total) by mouth once daily. (Patient taking differently: Take 137 mcg by mouth before breakfast. )    loratadine (CLARITIN) 10 mg tablet Take 10 mg by mouth every evening.     norethindrone-ethinyl estradiol (MICROGESTIN FE 1/20, 28,) 1 mg-20 mcg (21)/75 mg (7) per tablet Take 1 tablet by mouth once daily.    ondansetron (ZOFRAN) 4 MG tablet Take 1 tablet (4 mg total) by mouth every 6 (six) hours as needed. 1 Tablet Oral Every 6 hours    pantoprazole (PROTONIX) 40 MG tablet TAKE ONE TABLET BY MOUTH ONCE DAILY    sumatriptan (IMITREX) 100 MG tablet Take 1 tablet (100 mg total) by mouth as needed for Migraine. Take at the onset of headache, may take 1 more in 1 hour if needed.    topiramate (TOPAMAX) 100 MG tablet Take 1 tablet (100 mg total) by mouth 2 (two) times daily.    traMADoL (ULTRAM) 50 mg tablet Take 1 tablet (50 mg total) by mouth every 6 (six) hours.    treprostinil (REMODULIN) 2.5 mg/mL Soln Mix cassette as directed and infuse continuously per physician titration orders on dosing sheet. Current dose 80ng/kg/min     No current facility-administered medications for this visit.      Allergies: Fentanyl, Vibra-tabs [doxycycline hyclate], Adhesive, Amoxicillin, Nsaids (non-steroidal anti-inflammatory drug), and Chlorhexidine    Ht Readings from Last 1 Encounters:   09/22/20 4' 11" (1.499 m)     Wt Readings from Last 1 Encounters:   09/22/20 60.8 kg (134 lb 0.6 oz)      BMI: Body mass index is 27.07 kg/m².    Usual Weight: 130-136 lb  Weight Change/Time: generally stable weight, no weight changes  Current Diet: regualr  Appetite/Current Intake: good   Exercise/Physical Activity: functional in ADL's, no limitations, walking around home/between appointments, no vigorous exercise  Nutritional/Herbal Supplements: none  Potential Food/Medication Interactions: reviewed  Chewing/Swallowing Problems: none  Symptoms: none  Assessment of Lab Values: K 3.4  Support System: pt's mother present and " "voices support, pt lives with mother, they both cook at home    Estimated Kcal Need: 3266-6964 kcal (25-30 kcal/kg)  Estimated Protein Need: 61-73 gm (1-1.2 gm/kg)    Nutritional History:   Pt has a good appetite, some nausea, no v/d/constipation, some side pain. She will drink bashir tea or eat bashir snap cookie that help nausea or abd pain. She drinks 1 coke/day or less, water, hot tea. She reports she has Raisin Bran or Frost Flakes and milk, occasionally Fruit Loops. Pt has 2 slices of ho 1 x/week. Lunch may be ham sandwich, peanut butter sandwich, cream cheese and crackers (Triscuit and pepper jelly), other "luncheon meat". Dinner may be beans & rice, nahun pork chops, baked chicken wings (occasionally fried), seafood on fridays usually, home made soup (stew beef & potatoes & carrots), boiled shrimp w/ beets salad, pea salad w/ avocados, sweet potatoes. She may have a single ice cream cone for dessert. She adds salt to some foods.     Nutritional Diagnoses  Problem: food- and nutrition-related knowledge deficit  Etiology: r/t no prior edu on high potassium foods  Symptoms: aeb pt questions    Educational Need? yes  Barriers: none identified  Discussed with: patient and mother  Interventions: Patient taught nutrition information regarding Pre-lung transplant work-up.    Potassium content of food list provided & reviewed w/ pt; encouraged to increase po intake of  high potassium foods and continue to monitor levels.  Encouraged physical activity daily, walking, stay mobile.   Encouraged limiting high sodium foods, limit adding salt.   Goals/Recommendations: diet adherence  Actions Taken: instruct/provide written information  Strategies Used: problem solving, goal setting, motivational interviewing  Patient and/or family comprehend instructions: yes , adherence expected  Outcome: Verbalizes understanding  Monitoring:     Contact information provided, will f/u in clinic and communicate with the care team as " needed.     Counseling Time: 30 minutes

## 2020-09-22 NOTE — TELEPHONE ENCOUNTER
----- Message from Blank Small sent at 9/21/2020  3:33 PM CDT -----    ----- Message -----  From: Ian Lackey MD  Sent: 9/21/2020   2:36 PM CDT  To: Blank Small    Please obtain a triple phase MRI to follow up on the hepatic lesion      9/22/20 -  Spoke with Dr. Lackey about the abdominal ultrasound results and her orders for a triple phase MRI.  Instructed to schedule a hepatology consult instead of ordering the MRI.      Contacted patient.  Notified her of the abdominal ultrasound results and Dr. Lackey's orders for a hepatology consult.  Notified her of the appointment with Dr. Hargrove on 9/24/20 at 09:00.  She requested to schedule an appointment with Dr. Yuan (if he is available) since he saw her in 2012 for the same issue.  Informed her that we will contact hepatology to check Dr. Yuan's availability.  She verbalized her understanding.

## 2020-09-22 NOTE — TELEPHONE ENCOUNTER
----- Message from Ian Lackey MD sent at 9/21/2020 12:56 PM CDT -----  Recommending that patient drop her levothyroxine to 125 mcg daily based on her recent labs.  Rx will be per PCP if agrees with the recs.      Notified patient of lab results and Dr. Lackey's recommendations.  Informed her that I would forward the message to her PCP.  She verbalized her understanding.    Message sent to patient's PCP, Dr. Sandhu, regarding Dr. Lackey's recommendations.

## 2020-09-23 ENCOUNTER — HOSPITAL ENCOUNTER (OUTPATIENT)
Dept: PULMONOLOGY | Facility: CLINIC | Age: 50
Discharge: HOME | End: 2020-09-23
Payer: COMMERCIAL

## 2020-09-23 ENCOUNTER — HOSPITAL ENCOUNTER (OUTPATIENT)
Dept: CARDIOLOGY | Facility: CLINIC | Age: 50
Discharge: HOME | End: 2020-09-23
Payer: COMMERCIAL

## 2020-09-23 ENCOUNTER — TELEPHONE (OUTPATIENT)
Dept: FAMILY MEDICINE | Facility: CLINIC | Age: 50
End: 2020-09-23

## 2020-09-23 ENCOUNTER — TELEPHONE (OUTPATIENT)
Dept: HEPATOLOGY | Facility: CLINIC | Age: 50
End: 2020-09-23

## 2020-09-23 ENCOUNTER — HOSPITAL ENCOUNTER (OUTPATIENT)
Dept: RADIOLOGY | Facility: HOSPITAL | Age: 50
Discharge: HOME | End: 2020-09-23
Attending: INTERNAL MEDICINE
Payer: COMMERCIAL

## 2020-09-23 VITALS — BODY MASS INDEX: 27.38 KG/M2 | WEIGHT: 135.81 LBS | HEIGHT: 59 IN

## 2020-09-23 DIAGNOSIS — I27.0 PRIMARY PULMONARY HYPERTENSION: ICD-10-CM

## 2020-09-23 DIAGNOSIS — Z76.82 LUNG TRANSPLANT CANDIDATE: ICD-10-CM

## 2020-09-23 DIAGNOSIS — I27.21 PULMONARY ARTERIAL HYPERTENSION: ICD-10-CM

## 2020-09-23 DIAGNOSIS — E03.9 HYPOTHYROIDISM (ACQUIRED): Primary | ICD-10-CM

## 2020-09-23 LAB
ALLENS TEST: ABNORMAL
DELSYS: ABNORMAL
FIO2: 0.21
HCO3 UR-SCNC: 15.6 MMOL/L (ref 24–28)
MODE: ABNORMAL
PCO2 BLDA: 27.4 MMHG (ref 35–45)
PH SMN: 7.37 [PH] (ref 7.35–7.45)
PO2 BLDA: 94 MMHG (ref 80–100)
POC BE: -10 MMOL/L
POC SATURATED O2: 97 % (ref 95–100)
POC TCO2: 16 MMOL/L (ref 23–27)
SAMPLE: ABNORMAL
SITE: ABNORMAL

## 2020-09-23 PROCEDURE — 82803 BLOOD GASES ANY COMBINATION: CPT | Mod: S$GLB,TXP,, | Performed by: INTERNAL MEDICINE

## 2020-09-23 PROCEDURE — 74220 X-RAY XM ESOPHAGUS 1CNTRST: CPT | Mod: 26,TXP,, | Performed by: RADIOLOGY

## 2020-09-23 PROCEDURE — 74220 FL ESOPHAGRAM COMPLETE: ICD-10-PCS | Mod: 26,TXP,, | Performed by: RADIOLOGY

## 2020-09-23 PROCEDURE — A9698 NON-RAD CONTRAST MATERIALNOC: HCPCS | Mod: TXP | Performed by: INTERNAL MEDICINE

## 2020-09-23 PROCEDURE — 82803 PR  BLOOD GASES: PH, PO2 & PCO2: ICD-10-PCS | Mod: S$GLB,TXP,, | Performed by: INTERNAL MEDICINE

## 2020-09-23 PROCEDURE — 94618 PULMONARY STRESS TESTING: ICD-10-PCS | Mod: S$GLB,TXP,, | Performed by: INTERNAL MEDICINE

## 2020-09-23 PROCEDURE — 74220 X-RAY XM ESOPHAGUS 1CNTRST: CPT | Mod: TC,TXP

## 2020-09-23 PROCEDURE — 93005 EKG 12-LEAD: ICD-10-PCS | Mod: S$GLB,TXP,, | Performed by: INTERNAL MEDICINE

## 2020-09-23 PROCEDURE — 36600 WITHDRAWAL OF ARTERIAL BLOOD: CPT | Mod: S$GLB,TXP,, | Performed by: INTERNAL MEDICINE

## 2020-09-23 PROCEDURE — 93010 ELECTROCARDIOGRAM REPORT: CPT | Mod: S$GLB,TXP,, | Performed by: INTERNAL MEDICINE

## 2020-09-23 PROCEDURE — 93005 ELECTROCARDIOGRAM TRACING: CPT | Mod: S$GLB,TXP,, | Performed by: INTERNAL MEDICINE

## 2020-09-23 PROCEDURE — 25500020 PHARM REV CODE 255: Mod: TXP | Performed by: INTERNAL MEDICINE

## 2020-09-23 PROCEDURE — 36600 PR WITHDRAWAL OF ARTERIAL BLOOD: ICD-10-PCS | Mod: S$GLB,TXP,, | Performed by: INTERNAL MEDICINE

## 2020-09-23 PROCEDURE — 93010 EKG 12-LEAD: ICD-10-PCS | Mod: S$GLB,TXP,, | Performed by: INTERNAL MEDICINE

## 2020-09-23 PROCEDURE — 94618 PULMONARY STRESS TESTING: CPT | Mod: S$GLB,TXP,, | Performed by: INTERNAL MEDICINE

## 2020-09-23 RX ADMIN — BARIUM SULFATE 30 ML: 0.6 SUSPENSION ORAL at 10:09

## 2020-09-23 NOTE — TELEPHONE ENCOUNTER
Spoke with patient and she wants to know why her medication dose was changed and when will they be checking her levels again.

## 2020-09-23 NOTE — TELEPHONE ENCOUNTER
----- Message from Viridiana Saha MA sent at 9/22/2020  5:12 PM CDT -----  Alec martin, is  Available to see the above patient? Patient is requesting him because he saw her in 2012. She would like to keep things consistent if he is available, if not she will see . Thank you

## 2020-09-23 NOTE — TELEPHONE ENCOUNTER
The dosage change was made by the doctors who are evaluating her for her lung transplant.  They had sent me a message asking me if I was okay with that change.  I assumed that they had mentioned this to her.  I am sorry if that is not the case.  I have a reminder to myself to recheck a level in 8 weeks.

## 2020-09-23 NOTE — TELEPHONE ENCOUNTER
----- Message from Theodora Willis sent at 9/23/2020  1:57 PM CDT -----  Type: Patient Call Back    Who called: Self     What is the request in detail: patient says the dosage on her medication was changed and she need to know why it was changed and why she was not notified from the doctor . Levothyroxine 137mcg    levothyroxine (SYNTHROID) 125 MCG tablet    Can the clinic reply by [facility]? No     Would the patient rather a call back or a response via [facility]?  Call     Best call back number:[phone] (home)

## 2020-09-24 NOTE — TELEPHONE ENCOUNTER
Spoke with pt informed her of message below.Pt verbalized understanding and requested appt for lab work be scheduled.

## 2020-09-24 NOTE — PROCEDURES
Yuni Perez is a 50 y.o.  female patient, who presents for a 6 minute walk test ordered by MD Ziyad.  The diagnosis is Pre Lung Transplant Evaluation; Pulmonary Hypertension.  The patient's BMI is 27.4 kg/m2.  Predicted distance (lower limit of normal) is 421.35 meters.      Test Results:    The test was completed without stopping.  The total time walked was 360 seconds.  During walking, the patient reported:  Chest pain, Dyspnea, Leg pain.  The patient used no assistive devices during testing.     09/23/2020---------Distance: 426.72 meters (1400 feet)     O2 Sat % Supplemental Oxygen Heart Rate Blood Pressure Ab Scale   Pre-exercise  (Resting) 98 % Room Air 87 bpm 114/73 mmHg 2   During Exercise 93 % Room Air 156 bpm 140/85 mmHg 9   Post-exercise  (Recovery) 98 % Room Air  94 bpm 110/69 mmHg      Recovery Time: 222 seconds    Performing nurse/tech: Cyril MANZANO      PREVIOUS STUDY:   07/31/2020---------Distance: 426.72 meters (1400 feet)       O2 Sat % Supplemental Oxygen Heart Rate Blood Pressure Ab Scale   Pre-exercise  (Resting) 96 % Room Air 101 bpm 121/69 mmHg 3   During Exercise   94 % Room Air 144 bpm 127/67 mmHg 9   Post-exercise  (Recovery) 98 % Room Air  108 bpm           CLINICAL INTERPRETATION:  Six minute walk distance is 426.72 meters (1400 feet) with very, very heavy dyspnea.  During exercise, there was significant desaturation while breathing room air.  Both blood pressure and heart rate increased significantly with walking.  This may represent a tachycardic response to exercise.  The patient reported non-pulmonary symptoms during exercise.  Since the previous study in July 2020, exercise capacity is unchanged.  Based upon age and body mass index, exercise capacity is normal.

## 2020-09-24 NOTE — PROGRESS NOTES
I have reviewed the notes, assessments, and/or procedures performed by Dr. Patino, I concur with her/his documentation of Yuni Perez.

## 2020-09-25 ENCOUNTER — TELEPHONE (OUTPATIENT)
Dept: TRANSPLANT | Facility: CLINIC | Age: 50
End: 2020-09-25

## 2020-09-25 DIAGNOSIS — I27.0 PRIMARY PULMONARY HYPERTENSION: Primary | ICD-10-CM

## 2020-09-25 DIAGNOSIS — Z01.818 ENCOUNTER FOR PRE-TRANSPLANT EVALUATION FOR LUNG TRANSPLANT: ICD-10-CM

## 2020-09-25 NOTE — TELEPHONE ENCOUNTER
Contacted patient and notified her of the date and time of her appointment with Dr. Yuan in Hepatology.  She stated that she was aware of her appointment.      Also notified her of Dr. Lackey's orders for a follow-up appointment with full PFTs.  Inquired if she wanted to do the PFTs on Tuesday, 9/29/20 prior to her arrival to [facility] [place] [place] since she already has a COVID swab ordered on Monday.  She stated that she would prefer to have the PFTs on Monday, 9/28.  She stated that she doesn't want to be late for her heart catheterization on Tuesday.  Informed her that she would need a rapid COVID swab on Monday.  Informed her that she would have the swab performed on the 9th floor in the pulmonary lab.  Informed her that she would need to wait 1 hour for the test to result, then she could have the PFTs provided the test is negative.  She verbalized her understanding of all discussed and she wrote down the instructions for the COVID swab.  Informed her that we will contact her with the times of the appointment or for any issues with scheduling.

## 2020-09-28 ENCOUNTER — HOSPITAL ENCOUNTER (OUTPATIENT)
Dept: PULMONOLOGY | Facility: CLINIC | Age: 50
Discharge: HOME | End: 2020-09-28
Payer: COMMERCIAL

## 2020-09-28 ENCOUNTER — TELEPHONE (OUTPATIENT)
Dept: ADMINISTRATIVE | Facility: HOSPITAL | Age: 50
End: 2020-09-28

## 2020-09-28 ENCOUNTER — TELEPHONE (OUTPATIENT)
Dept: TRANSPLANT | Facility: CLINIC | Age: 50
End: 2020-09-28

## 2020-09-28 DIAGNOSIS — I27.0 PRIMARY PULMONARY HYPERTENSION: ICD-10-CM

## 2020-09-28 DIAGNOSIS — Z01.818 ENCOUNTER FOR PRE-TRANSPLANT EVALUATION FOR LUNG TRANSPLANT: ICD-10-CM

## 2020-09-28 DIAGNOSIS — Z13.9 SCREENING PROCEDURE: Primary | ICD-10-CM

## 2020-09-28 LAB — SARS-COV-2 RDRP RESP QL NAA+PROBE: NEGATIVE

## 2020-09-28 PROCEDURE — 94010 BREATHING CAPACITY TEST: ICD-10-PCS | Mod: S$GLB,TXP,, | Performed by: INTERNAL MEDICINE

## 2020-09-28 PROCEDURE — 94729 PR C02/MEMBANE DIFFUSE CAPACITY: ICD-10-PCS | Mod: S$GLB,TXP,, | Performed by: INTERNAL MEDICINE

## 2020-09-28 PROCEDURE — 94727 PR PULM FUNCTION TEST BY GAS: ICD-10-PCS | Mod: S$GLB,TXP,, | Performed by: INTERNAL MEDICINE

## 2020-09-28 PROCEDURE — 94727 GAS DIL/WSHOT DETER LNG VOL: CPT | Mod: S$GLB,TXP,, | Performed by: INTERNAL MEDICINE

## 2020-09-28 PROCEDURE — 94010 BREATHING CAPACITY TEST: CPT | Mod: S$GLB,TXP,, | Performed by: INTERNAL MEDICINE

## 2020-09-28 PROCEDURE — 94729 DIFFUSING CAPACITY: CPT | Mod: S$GLB,TXP,, | Performed by: INTERNAL MEDICINE

## 2020-09-28 PROCEDURE — U0002 COVID-19 LAB TEST NON-CDC: HCPCS | Mod: TXP

## 2020-09-29 ENCOUNTER — HOSPITAL ENCOUNTER (OUTPATIENT)
Facility: HOSPITAL | Age: 50
Discharge: HOME | End: 2020-09-29
Attending: INTERNAL MEDICINE | Admitting: INTERNAL MEDICINE
Payer: COMMERCIAL

## 2020-09-29 VITALS
SYSTOLIC BLOOD PRESSURE: 108 MMHG | BODY MASS INDEX: 26.81 KG/M2 | DIASTOLIC BLOOD PRESSURE: 59 MMHG | RESPIRATION RATE: 18 BRPM | WEIGHT: 133 LBS | TEMPERATURE: 99 F | HEIGHT: 59 IN | HEART RATE: 95 BPM | OXYGEN SATURATION: 95 %

## 2020-09-29 DIAGNOSIS — J98.4 CHRONIC PULMONARY DISEASE: ICD-10-CM

## 2020-09-29 DIAGNOSIS — Z79.01 LONG TERM CURRENT USE OF ANTICOAGULANT THERAPY: ICD-10-CM

## 2020-09-29 DIAGNOSIS — Z76.82 LUNG TRANSPLANT CANDIDATE: ICD-10-CM

## 2020-09-29 LAB
ABO + RH BLD: NORMAL
ANION GAP SERPL CALC-SCNC: 9 MMOL/L (ref 8–16)
APTT BLDCRRT: 32.2 SEC (ref 21–32)
B-HCG UR QL: NEGATIVE
BLD GP AB SCN CELLS X3 SERPL QL: NORMAL
BUN SERPL-MCNC: 11 MG/DL (ref 6–20)
CALCIUM SERPL-MCNC: 8.5 MG/DL (ref 8.7–10.5)
CHLORIDE SERPL-SCNC: 112 MMOL/L (ref 95–110)
CO2 SERPL-SCNC: 18 MMOL/L (ref 23–29)
CREAT SERPL-MCNC: 0.7 MG/DL (ref 0.5–1.4)
CTP QC/QA: YES
ERYTHROCYTE [DISTWIDTH] IN BLOOD BY AUTOMATED COUNT: 14.5 % (ref 11.5–14.5)
EST. GFR  (AFRICAN AMERICAN): >60 ML/MIN/1.73 M^2
EST. GFR  (NON AFRICAN AMERICAN): >60 ML/MIN/1.73 M^2
GLUCOSE SERPL-MCNC: 84 MG/DL (ref 70–110)
HCT VFR BLD AUTO: 43.1 % (ref 37–48.5)
HGB BLD-MCNC: 14.4 G/DL (ref 12–16)
INR PPP: 1 (ref 0.8–1.2)
MCH RBC QN AUTO: 29.9 PG (ref 27–31)
MCHC RBC AUTO-ENTMCNC: 33.4 G/DL (ref 32–36)
MCV RBC AUTO: 89 FL (ref 82–98)
PLATELET # BLD AUTO: 183 K/UL (ref 150–350)
PMV BLD AUTO: 12.4 FL (ref 9.2–12.9)
POTASSIUM SERPL-SCNC: 3.9 MMOL/L (ref 3.5–5.1)
PROTHROMBIN TIME: 11 SEC (ref 9–12.5)
RBC # BLD AUTO: 4.82 M/UL (ref 4–5.4)
SODIUM SERPL-SCNC: 139 MMOL/L (ref 136–145)
WBC # BLD AUTO: 5.2 K/UL (ref 3.9–12.7)

## 2020-09-29 PROCEDURE — 86850 RBC ANTIBODY SCREEN: CPT | Mod: TXP

## 2020-09-29 PROCEDURE — 99152 MOD SED SAME PHYS/QHP 5/>YRS: CPT | Mod: TXP | Performed by: INTERNAL MEDICINE

## 2020-09-29 PROCEDURE — 99152 MOD SED SAME PHYS/QHP 5/>YRS: CPT | Mod: TXP,,, | Performed by: INTERNAL MEDICINE

## 2020-09-29 PROCEDURE — 81025 URINE PREGNANCY TEST: CPT | Mod: TXP | Performed by: INTERNAL MEDICINE

## 2020-09-29 PROCEDURE — C1760 CLOSURE DEV, VASC: HCPCS | Mod: TXP | Performed by: INTERNAL MEDICINE

## 2020-09-29 PROCEDURE — C1769 GUIDE WIRE: HCPCS | Mod: TXP | Performed by: INTERNAL MEDICINE

## 2020-09-29 PROCEDURE — C1887 CATHETER, GUIDING: HCPCS | Mod: TXP | Performed by: INTERNAL MEDICINE

## 2020-09-29 PROCEDURE — 63600175 PHARM REV CODE 636 W HCPCS: Mod: TXP | Performed by: INTERNAL MEDICINE

## 2020-09-29 PROCEDURE — 25000003 PHARM REV CODE 250: Mod: TXP | Performed by: INTERNAL MEDICINE

## 2020-09-29 PROCEDURE — 93005 ELECTROCARDIOGRAM TRACING: CPT | Mod: TXP,59

## 2020-09-29 PROCEDURE — 99153 MOD SED SAME PHYS/QHP EA: CPT | Mod: TXP | Performed by: INTERNAL MEDICINE

## 2020-09-29 PROCEDURE — 85610 PROTHROMBIN TIME: CPT | Mod: TXP

## 2020-09-29 PROCEDURE — C1894 INTRO/SHEATH, NON-LASER: HCPCS | Mod: TXP | Performed by: INTERNAL MEDICINE

## 2020-09-29 PROCEDURE — 93571 IV DOP VEL&/PRESS C FLO 1ST: CPT | Mod: 26,52,LM,TXP | Performed by: INTERNAL MEDICINE

## 2020-09-29 PROCEDURE — 93460 PR CATH PLACE/CORON ANGIO, IMG SUPER/INTERP,R&L HRT CATH, L HRT VENTRIC: ICD-10-PCS | Mod: 26,TXP,, | Performed by: INTERNAL MEDICINE

## 2020-09-29 PROCEDURE — 25000003 PHARM REV CODE 250: Mod: TXP | Performed by: STUDENT IN AN ORGANIZED HEALTH CARE EDUCATION/TRAINING PROGRAM

## 2020-09-29 PROCEDURE — 93460 R&L HRT ART/VENTRICLE ANGIO: CPT | Mod: TXP | Performed by: INTERNAL MEDICINE

## 2020-09-29 PROCEDURE — 85730 THROMBOPLASTIN TIME PARTIAL: CPT | Mod: TXP

## 2020-09-29 PROCEDURE — C1751 CATH, INF, PER/CENT/MIDLINE: HCPCS | Mod: TXP | Performed by: INTERNAL MEDICINE

## 2020-09-29 PROCEDURE — 93460 R&L HRT ART/VENTRICLE ANGIO: CPT | Mod: 26,TXP,, | Performed by: INTERNAL MEDICINE

## 2020-09-29 PROCEDURE — 93010 ELECTROCARDIOGRAM REPORT: CPT | Mod: TXP,,, | Performed by: INTERNAL MEDICINE

## 2020-09-29 PROCEDURE — 80048 BASIC METABOLIC PNL TOTAL CA: CPT | Mod: TXP

## 2020-09-29 PROCEDURE — 85027 COMPLETE CBC AUTOMATED: CPT | Mod: TXP

## 2020-09-29 PROCEDURE — 93010 EKG 12-LEAD: ICD-10-PCS | Mod: TXP,,, | Performed by: INTERNAL MEDICINE

## 2020-09-29 PROCEDURE — 93571 IV DOP VEL&/PRESS C FLO 1ST: CPT | Mod: 74,LM,TXP | Performed by: INTERNAL MEDICINE

## 2020-09-29 PROCEDURE — 99152 PR MOD CONSCIOUS SEDATION, SAME PHYS, 5+ YRS, FIRST 15 MIN: ICD-10-PCS | Mod: TXP,,, | Performed by: INTERNAL MEDICINE

## 2020-09-29 PROCEDURE — 93571 PR HEART FLOW RESERV MEASURE,INIT VESSL: ICD-10-PCS | Mod: 26,52,LM,TXP | Performed by: INTERNAL MEDICINE

## 2020-09-29 RX ORDER — ACETAMINOPHEN 325 MG/1
650 TABLET ORAL EVERY 4 HOURS PRN
Status: DISCONTINUED | OUTPATIENT
Start: 2020-09-29 | End: 2020-09-29 | Stop reason: HOSPADM

## 2020-09-29 RX ORDER — HEPARIN SODIUM 1000 [USP'U]/ML
INJECTION, SOLUTION INTRAVENOUS; SUBCUTANEOUS
Status: DISCONTINUED | OUTPATIENT
Start: 2020-09-29 | End: 2020-09-29 | Stop reason: HOSPADM

## 2020-09-29 RX ORDER — CLOPIDOGREL 300 MG/1
300 TABLET, FILM COATED ORAL ONCE
Status: COMPLETED | OUTPATIENT
Start: 2020-09-29 | End: 2020-09-29

## 2020-09-29 RX ORDER — HEPARIN SODIUM 200 [USP'U]/100ML
INJECTION, SOLUTION INTRAVENOUS
Status: DISCONTINUED | OUTPATIENT
Start: 2020-09-29 | End: 2020-09-29 | Stop reason: HOSPADM

## 2020-09-29 RX ORDER — ONDANSETRON 2 MG/ML
4 INJECTION INTRAMUSCULAR; INTRAVENOUS ONCE
Status: COMPLETED | OUTPATIENT
Start: 2020-09-29 | End: 2020-09-29

## 2020-09-29 RX ORDER — SODIUM CHLORIDE 9 MG/ML
INJECTION, SOLUTION INTRAVENOUS CONTINUOUS
Status: DISCONTINUED | OUTPATIENT
Start: 2020-09-29 | End: 2020-09-29 | Stop reason: HOSPADM

## 2020-09-29 RX ORDER — NAPROXEN SODIUM 220 MG/1
81 TABLET, FILM COATED ORAL ONCE
Status: COMPLETED | OUTPATIENT
Start: 2020-09-29 | End: 2020-09-29

## 2020-09-29 RX ORDER — PHENYLEPHRINE HYDROCHLORIDE 10 MG/ML
INJECTION INTRAVENOUS
Status: DISCONTINUED | OUTPATIENT
Start: 2020-09-29 | End: 2020-09-29 | Stop reason: HOSPADM

## 2020-09-29 RX ORDER — ALPRAZOLAM 0.5 MG/1
0.5 TABLET ORAL ONCE
Status: COMPLETED | OUTPATIENT
Start: 2020-09-29 | End: 2020-09-29

## 2020-09-29 RX ORDER — LIDOCAINE HYDROCHLORIDE 20 MG/ML
INJECTION, SOLUTION EPIDURAL; INFILTRATION; INTRACAUDAL; PERINEURAL
Status: DISCONTINUED | OUTPATIENT
Start: 2020-09-29 | End: 2020-09-29 | Stop reason: HOSPADM

## 2020-09-29 RX ORDER — MIDAZOLAM HYDROCHLORIDE 1 MG/ML
INJECTION, SOLUTION INTRAMUSCULAR; INTRAVENOUS
Status: DISCONTINUED | OUTPATIENT
Start: 2020-09-29 | End: 2020-09-29 | Stop reason: HOSPADM

## 2020-09-29 RX ORDER — DIPHENHYDRAMINE HCL 50 MG
50 CAPSULE ORAL ONCE
Status: COMPLETED | OUTPATIENT
Start: 2020-09-29 | End: 2020-09-29

## 2020-09-29 RX ORDER — NITROGLYCERIN 5 MG/ML
INJECTION, SOLUTION INTRAVENOUS
Status: DISCONTINUED | OUTPATIENT
Start: 2020-09-29 | End: 2020-09-29 | Stop reason: HOSPADM

## 2020-09-29 RX ORDER — CLINDAMYCIN PHOSPHATE 900 MG/50ML
INJECTION, SOLUTION INTRAVENOUS
Status: DISCONTINUED | OUTPATIENT
Start: 2020-09-29 | End: 2020-09-29 | Stop reason: HOSPADM

## 2020-09-29 RX ADMIN — CLOPIDOGREL BISULFATE 300 MG: 300 TABLET, FILM COATED ORAL at 10:09

## 2020-09-29 RX ADMIN — ALPRAZOLAM 0.5 MG: 0.5 TABLET ORAL at 11:09

## 2020-09-29 RX ADMIN — DIPHENHYDRAMINE HYDROCHLORIDE 50 MG: 50 CAPSULE ORAL at 10:09

## 2020-09-29 RX ADMIN — SODIUM CHLORIDE: 0.9 INJECTION, SOLUTION INTRAVENOUS at 10:09

## 2020-09-29 RX ADMIN — ASPIRIN 81 MG: 81 TABLET, CHEWABLE ORAL at 10:09

## 2020-09-29 RX ADMIN — ONDANSETRON 4 MG: 2 INJECTION INTRAMUSCULAR; INTRAVENOUS at 04:09

## 2020-09-29 NOTE — PLAN OF CARE
Received report from Adrienne Dewitt RN . Assumed car of patient until discharge. Aaox3. Vss. Right groin with dry gauze/tegaderm dressing intact. No bleeding noted. Patient with c/o nausea. Offer cold compress, but refused. Lavelle crackers and apple juice provided. Mom at bedside. Will monitor.

## 2020-09-29 NOTE — Clinical Note
The PA catheter is inserted into the main pulmonary artery. Hemodynamics performed. O2 saturation measured at 68%.

## 2020-09-29 NOTE — PLAN OF CARE
Patient has ambulated and voided without any complications. Right groin with dry gauze dressing /tegaderm intact. No bleeding noted.patient denies any complaints. Iv heplock removed. AVS print and reviewed with patient. Mom at bedside. Patient's chauhan is intact with personal infusion pump intact. Transport requested for discharge.

## 2020-09-29 NOTE — OP NOTE
"    Post Cath Note  Referring Physician: Gucci Pickett MD  Procedure: CATHETERIZATION, HEART, BOTH LEFT AND RIGHT (Bilateral), ANGIOGRAM, CORONARY ARTERY (N/A), Instantaneous Wave-Free Ratio      : Gucci Pickett MD     Referring Physician: Gucci Pickett     All Operators: Surgeon(s):  MD Toribio Aguirre MD Mohanad A. Hasan, MD     Preoperative Diagnosis: Lung transplant candidate [Z76.82]  Chronic pulmonary disease [J98.4]     Postop Diagnosis: Lung transplant candidate [Z76.82]  Chronic pulmonary disease [J98.4]    Treatments/Procedures: Procedure(s) (LRB):  CATHETERIZATION, HEART, BOTH LEFT AND RIGHT (Bilateral)  ANGIOGRAM, CORONARY ARTERY (N/A)  Instantaneous Wave-Free Ratio    Estimated Blood loss: <50 cc         Access: Right CFA/ CFV    LVEDP 8 mmHg  Normal coronaries by angiography  iFR of ostial LM 1.0      See full report for further details    Intervention:   None  Closure device: Perclosure    Post Cath Exam:   BP (!) 108/57 (BP Location: Right arm, Patient Position: Lying)   Pulse 84   Temp 98.4 °F (36.9 °C) (Oral)   Resp 18   Ht 4' 11" (1.499 m)   Wt 60.3 kg (133 lb)   SpO2 96%   Breastfeeding No   BMI 26.86 kg/m²   No unusual pain, hematoma, thrill or bruit at vascular access site.  Distal pulse present without signs of ischemia.    Recommendations:   - Routine post-cath care  - IVF at 150 cc/hr for 4 hrs  - Continue medical management, Risk factor reduction  - Stop plavix    Toribio Paula  "

## 2020-09-29 NOTE — INTERVAL H&P NOTE
The patient has been examined and the H&P has been reviewed:    I concur with the findings and no changes have occurred since H&P was written.    Surgery risks, benefits and alternative options discussed and understood by patient/family.    Active Hospital Problems    Diagnosis  POA    Lung transplant candidate [Z76.82]  Not Applicable      Resolved Hospital Problems   No resolved problems to display.   Coronary angiogram and right heart catheterization via right common femoral vein and right, femoral artery access.  Will do the procedure with no sedation, patient had prior respiratory distress with sentinel so no fentanyl.    Thank you for your consult    Rc Huynh MD  Interventional Cardiovascular & Structural Fellow, PGY8  Pager: [phone]

## 2020-09-29 NOTE — DISCHARGE SUMMARY
Discharge Summary  Interventional Cardiology      Admit Date: 9/29/2020    Discharge Date:  9/29/2020    Attending Physician: Gucci Pickett MD    Discharge Physician: Rc Huynh MD    Principal Diagnoses: <principal problem not specified>  Indication for Admission: CATHETERIZATION, HEART, BOTH LEFT AND RIGHT (Bilateral), ANGIOGRAM, CORONARY ARTERY (N/A), Instantaneous Wave-Free Ratio    Discharged Condition: Good    Hospital Course:   Patient presented for outpatient LHC and RHC which went without complication. For LHC and RHC informations please see full report.    Outpatient Plan:    - Can stop Aspirin and Plavix.  - No changes in home medications.  - Follow up with Dr Whyte as scheduled.     Diet: Cardiac diet    Activity: Ad gaudencio    Disposition: Home or Self Care    Discharge Medications:      Medication List      CHANGE how you take these medications    ambrisentan 10 MG Tab  Commonly known as: LETAIRIS  Take 1 tablet (10 mg total) by mouth once daily.  What changed:   · when to take this  · additional instructions     fluticasone propionate 50 mcg/actuation nasal spray  Commonly known as: FLONASE  INHALE 2 SPRAYS IN EACH NOSTRIL DAILY  What changed:   · how much to take  · how to take this  · when to take this     folic acid 1 MG tablet  Commonly known as: FOLVITE  Take 1 tablet (1,000 mcg total) by mouth once daily.  What changed: when to take this        CONTINUE taking these medications    ADCIRCA 20 mg Tab  Generic drug: tadalafil  Take 2 tablets (40 mg total) by mouth once daily.     albuterol-ipratropium 2.5 mg-0.5 mg/3 mL nebulizer solution  Commonly known as: DUO-NEB  Take 3 mLs by nebulization every 6 (six) hours as needed for Wheezing. Rescue     azelastine 137 mcg (0.1 %) nasal spray  Commonly known as: ASTELIN     BenadryL 25 mg capsule  Generic drug: diphenhydrAMINE     BREO ELLIPTA 200-25 mcg/dose Dsdv diskus inhaler  Generic drug: fluticasone furoate-vilanteroL  Inhale 1 puff into the  lungs every evening. Controller     butalbital-acetaminophen-caffeine -40 mg -40 mg per tablet  Commonly known as: FIORICET, ESGIC     citalopram 10 MG tablet  Commonly known as: CELEXA     CLARITIN 10 mg tablet  Generic drug: loratadine     cyanocobalamin (vitamin B-12) 2,500 mcg Subl  Commonly known as: VITAMIN B-12     ferrous sulfate 325 (65 FE) MG EC tablet     hyoscyamine 0.125 mg Subl  Commonly known as: LEVSIN/SL     levothyroxine 125 MCG tablet  Commonly known as: SYNTHROID  Take 1 tablet (125 mcg total) by mouth once daily.     norethindrone-ethinyl estradiol 1 mg-20 mcg (21)/75 mg (7) per tablet  Commonly known as: MICROGESTIN FE 1/20 (28)  Take 1 tablet by mouth once daily.     ondansetron 4 MG tablet  Commonly known as: ZOFRAN  Take 1 tablet (4 mg total) by mouth every 6 (six) hours as needed. 1 Tablet Oral Every 6 hours     pantoprazole 40 MG tablet  Commonly known as: PROTONIX  TAKE ONE TABLET BY MOUTH ONCE DAILY     sumatriptan 100 MG tablet  Commonly known as: IMITREX  Take 1 tablet (100 mg total) by mouth as needed for Migraine. Take at the onset of headache, may take 1 more in 1 hour if needed.     topiramate 100 MG tablet  Commonly known as: TOPAMAX  Take 1 tablet (100 mg total) by mouth 2 (two) times daily.     treprostinil 2.5 mg/mL Soln  Commonly known as: REMODULIN  Mix cassette as directed and infuse continuously per physician titration orders on dosing sheet. Current dose 80ng/kg/min     TYLENOL EXTRA STRENGTH 500 MG tablet  Generic drug: acetaminophen        STOP taking these medications    aspirin 81 MG EC tablet  Commonly known as: ECOTRIN     clopidogreL 75 mg tablet  Commonly known as: PLAVIX          Follow Up:     Follow up with Dr Whyte as scheduled.       Rc Huynh MD  Interventional Cardiovascular Fellow  Pager: [phone]

## 2020-09-29 NOTE — BRIEF OP NOTE
Brief Operative Note:    : Gucci Pickett MD     Referring Physician: Gucci Pickett     All Operators: Surgeon(s):  MD Toribio Aguirre MD Mohanad A. Hasan, MD     Preoperative Diagnosis: Lung transplant candidate [Z76.82]  Chronic pulmonary disease [J98.4]     Postop Diagnosis: Lung transplant candidate [Z76.82]  Chronic pulmonary disease [J98.4]    Treatments/Procedures: Procedure(s) (LRB):  CATHETERIZATION, HEART, BOTH LEFT AND RIGHT (Bilateral)  ANGIOGRAM, CORONARY ARTERY (N/A)  Instantaneous Wave-Free Ratio    Findings: Severe pulmonary HTN with normal coronaries.  See catheterization report for full details.    Estimated Blood loss: 20 cc    Specimens removed: No    Gucci Pickett

## 2020-09-30 ENCOUNTER — TELEPHONE (OUTPATIENT)
Dept: ADMINISTRATIVE | Facility: HOSPITAL | Age: 50
End: 2020-09-30

## 2020-10-01 ENCOUNTER — TELEPHONE (OUTPATIENT)
Dept: TRANSPLANT | Facility: CLINIC | Age: 50
End: 2020-10-01

## 2020-10-01 NOTE — TELEPHONE ENCOUNTER
SW contact pt via telephone to discussed caregiver plan. SW inquired whether pt had contact the two caregivers she listed in last week's psychosocial assessment. Pt reports she will need to make a new caregiver plan, as the caregivers she listed last week are no longer unavailable. Pt reports she will contact her new caregivers this weekend and contact SW next week. SW emphasized the importance of a clear caregiver plan and reviewed w/ patient the responsibilities of pt's primary and backup caregivers. Pt verbally confirmed her understanding. SW will f/u w/ patient next week. Pt aware of how to contact SW. SW remains available.

## 2020-10-05 LAB
CLASS I ANTIBODY COMMENTS - LUMINEX: NORMAL
CLASS I ANTIBODY COMMENTS - LUMINEX: NORMAL
CLASS II ANTIBODY COMMENTS - LUMINEX: NORMAL
CLASS II ANTIBODY COMMENTS - LUMINEX: NORMAL
SERUM COLLECTION DT - LUMINEX CLASS I: NORMAL
SERUM COLLECTION DT - LUMINEX CLASS I: NORMAL
SERUM COLLECTION DT - LUMINEX CLASS II: NORMAL
SERUM COLLECTION DT - LUMINEX CLASS II: NORMAL
SLAA1 TESTING DATE: NORMAL
SLAA2 TESTING DATE: NORMAL
SPCL1 TESTING DATE: NORMAL
SPCL2 TESTING DATE: NORMAL
SPLAA TESTING DATE: NORMAL
SPLUA TESTING DATE: NORMAL

## 2020-10-07 ENCOUNTER — TELEPHONE (OUTPATIENT)
Dept: TRANSPLANT | Facility: CLINIC | Age: 50
End: 2020-10-07
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
## 2020-10-09 LAB
B CELL RESULTS - XM AUTO: NEGATIVE
B MCS AVERAGE - XM AUTO: -6.1
FXMAU TESTING DATE: NORMAL
HLA AB QL: NEGATIVE
HLA AB SERPL: NEGATIVE
SCRFL TESTING DATE: NORMAL
SERUM COLLECTION DT - XM AUTO: NORMAL
SERUM COLLECTION DT: NORMAL
T CELL RESULTS - XM AUTO: NEGATIVE
T MCS AVERAGE - XM AUTO: -1

## 2020-10-12 ENCOUNTER — TELEPHONE (OUTPATIENT)
Dept: OBSTETRICS AND GYNECOLOGY | Facility: CLINIC | Age: 50
End: 2020-10-12

## 2020-10-12 NOTE — TELEPHONE ENCOUNTER
Reached out to patient, spoke with patient scheduled apt with Kalyn       ----- Message from Bridgett Art sent at 10/12/2020  2:47 PM CDT -----  Name of Who is Calling: KELSY TURNER  What is the request in detail: patient is calling to reschedule her appointment that is 10/15/2020. Please advise Can the clinic reply by [facility]: No [facility] [phone]

## 2020-10-14 LAB
CLASS I ANTIBODIES - FLOW: NORMAL
CLASS II ANTIBODIES - FLOW: NORMAL
SERUM COLLECTION DT: NORMAL
SERUM COLLECTION DT: NORMAL
SPCF1 TESTING DATE: NORMAL
SPCF2 TESTING DATE: NORMAL
SPCFL TESTING DATE: NORMAL

## 2020-10-15 ENCOUNTER — OFFICE VISIT (OUTPATIENT)
Dept: HEPATOLOGY | Facility: CLINIC | Age: 50
End: 2020-10-15
Attending: INTERNAL MEDICINE
Payer: COMMERCIAL

## 2020-10-15 ENCOUNTER — TELEPHONE (OUTPATIENT)
Dept: TRANSPLANT | Facility: CLINIC | Age: 50
End: 2020-10-15

## 2020-10-15 VITALS
SYSTOLIC BLOOD PRESSURE: 121 MMHG | WEIGHT: 132.94 LBS | BODY MASS INDEX: 26.8 KG/M2 | RESPIRATION RATE: 18 BRPM | OXYGEN SATURATION: 95 % | HEART RATE: 92 BPM | HEIGHT: 59 IN | DIASTOLIC BLOOD PRESSURE: 61 MMHG

## 2020-10-15 DIAGNOSIS — Z76.82 AWAITING ORGAN TRANSPLANT STATUS: Primary | ICD-10-CM

## 2020-10-15 DIAGNOSIS — R16.0 LIVER MASS: ICD-10-CM

## 2020-10-15 DIAGNOSIS — I27.9 CHRONIC PULMONARY HEART DISEASE: ICD-10-CM

## 2020-10-15 DIAGNOSIS — I27.0 PRIMARY PULMONARY HYPERTENSION: ICD-10-CM

## 2020-10-15 DIAGNOSIS — Z79.899 POLYPHARMACY: ICD-10-CM

## 2020-10-15 DIAGNOSIS — Z01.818 PRE-TRANSPLANT EVALUATION FOR LUNG TRANSPLANT: ICD-10-CM

## 2020-10-15 DIAGNOSIS — R06.82 TACHYPNEA: Primary | ICD-10-CM

## 2020-10-15 PROCEDURE — 3008F BODY MASS INDEX DOCD: CPT | Mod: CPTII,NTX,S$GLB, | Performed by: INTERNAL MEDICINE

## 2020-10-15 PROCEDURE — 99999 PR PBB SHADOW E&M-EST. PATIENT-LVL V: CPT | Mod: PBBFAC,TXP,, | Performed by: INTERNAL MEDICINE

## 2020-10-15 PROCEDURE — 3008F PR BODY MASS INDEX (BMI) DOCUMENTED: ICD-10-PCS | Mod: CPTII,NTX,S$GLB, | Performed by: INTERNAL MEDICINE

## 2020-10-15 PROCEDURE — 99243 OFF/OP CNSLTJ NEW/EST LOW 30: CPT | Mod: NTX,S$GLB,, | Performed by: INTERNAL MEDICINE

## 2020-10-15 PROCEDURE — 99999 PR PBB SHADOW E&M-EST. PATIENT-LVL V: ICD-10-PCS | Mod: PBBFAC,TXP,, | Performed by: INTERNAL MEDICINE

## 2020-10-15 PROCEDURE — 99243 PR OFFICE CONSULTATION,LEVEL III: ICD-10-PCS | Mod: NTX,S$GLB,, | Performed by: INTERNAL MEDICINE

## 2020-10-15 NOTE — PROGRESS NOTES
HEPATOLOGY CONSULTATION    Referring Physician: Dr. KELLEY Lackey  Current Corresponding Physician: Dr. KELLEY Lackey    Reason for Consultation: Consultation for evaluation of Abnormal Abdominal/Liver Imaging    History of Present Illness: Yuni Perez is a 50 y.o. femalewho presents for evaluation of   Chief Complaint   Patient presents with    Abnormal Abdominal/Liver Imaging    This 50-year-old woman is being followed by lung transplant for primary pulmonary hypertension.  She is being evaluated for lung transplant.  She was seen by myself in 2012 for liver mass.  A MRI in 2015 showed a right lobe liver mass with enhancing features characteristic of a hepatic adenoma.  More recently she had an abdominal ultrasound.  On ultrasound it appears that the liver lesion is now greater than 5 cm in size.  She has no symptoms of abdominal pain.  She has no history of chronic liver disease.  She states that she is required to be on estrogen because of 1 of the medication she is on further management of her pulmonary hypertension.  I did advise her that these adenomas are driven by hormones and ideally would be appropriate to get her off oral contraception.    Past Medical History:   Diagnosis Date    AR (allergic rhinitis)     Cholelithiasis, common bile duct     Chronic low back pain     Eye pressure 2017    GERD (gastroesophageal reflux disease)     History of migraine headaches     Hypothyroidism     Innocent heart murmur     Lumbar disc disease     Menorrhagia     Mild asthma     Obesity     Plantar fasciitis of left foot     Primary pulmonary hypertension     followed by heart transplant/pulmonary     Seizure disorder     x 1 in 2008    Seizures     Sleep apnea     TMJ (dislocation of temporomandibular joint)     Tricuspid regurgitation      Outpatient Encounter Medications as of 10/15/2020   Medication Sig Dispense Refill    acetaminophen (TYLENOL EXTRA STRENGTH) 500 MG tablet Take 1,000  mg by mouth every 6 (six) hours as needed for Pain.      ADCIRCA 20 mg Tab Take 2 tablets (40 mg total) by mouth once daily. 60 tablet 11    albuterol-ipratropium (DUO-NEB) 2.5 mg-0.5 mg/3 mL nebulizer solution Take 3 mLs by nebulization every 6 (six) hours as needed for Wheezing. Rescue 120 vial 5    ambrisentan (LETAIRIS) 10 MG Tab Take 1 tablet (10 mg total) by mouth once daily. (Patient taking differently: Take 10 mg by mouth every morning. Patient takes about 10am daily) 30 tablet 11    azelastine (ASTELIN) 137 mcg (0.1 %) nasal spray 2 sprays by Nasal route 2 (two) times daily. Patient uses prn in the evening      butalbital-acetaminophen-caffeine -40 mg (FIORICET, ESGIC) -40 mg per tablet Take 1 tablet by mouth every 4 (four) hours as needed for Pain.      citalopram (CELEXA) 10 MG tablet Take 10 mg by mouth every evening.       cyanocobalamin, vitamin B-12, 2,500 mcg Subl Place 2,500 mcg under the tongue every morning.       diphenhydrAMINE (BENADRYL) 25 mg capsule Take 50 mg by mouth every 6 (six) hours as needed for Itching. Patient takes prn evenings      ferrous sulfate 325 (65 FE) MG EC tablet Take 325 mg by mouth daily as needed.       fluticasone (FLONASE) 50 mcg/actuation nasal spray INHALE 2 SPRAYS IN EACH NOSTRIL DAILY (Patient taking differently: every evening. ) 16 g 3    fluticasone furoate-vilanteroL (BREO ELLIPTA) 200-25 mcg/dose DsDv diskus inhaler Inhale 1 puff into the lungs every evening. Controller 60 each 5    folic acid (FOLVITE) 1 MG tablet Take 1 tablet (1,000 mcg total) by mouth once daily. (Patient taking differently: Take 1,000 mcg by mouth every morning. ) 90 tablet 3    hyoscyamine (LEVSIN/SL) 0.125 mg Subl Place 0.125 mg under the tongue every 6 (six) hours as needed.       levothyroxine (SYNTHROID) 125 MCG tablet Take 1 tablet (125 mcg total) by mouth once daily. 90 tablet 0    loratadine (CLARITIN) 10 mg tablet Take 10 mg by mouth every evening.     "   norethindrone-ethinyl estradiol (MICROGESTIN FE 1/20, 28,) 1 mg-20 mcg (21)/75 mg (7) per tablet Take 1 tablet by mouth once daily. 28 tablet 6    ondansetron (ZOFRAN) 4 MG tablet Take 1 tablet (4 mg total) by mouth every 6 (six) hours as needed. 1 Tablet Oral Every 6 hours 30 tablet 2    pantoprazole (PROTONIX) 40 MG tablet TAKE ONE TABLET BY MOUTH ONCE DAILY 90 tablet 1    sumatriptan (IMITREX) 100 MG tablet Take 1 tablet (100 mg total) by mouth as needed for Migraine. Take at the onset of headache, may take 1 more in 1 hour if needed. 12 tablet 5    topiramate (TOPAMAX) 100 MG tablet Take 1 tablet (100 mg total) by mouth 2 (two) times daily. 60 tablet 11    treprostinil (REMODULIN) 2.5 mg/mL Soln Mix cassette as directed and infuse continuously per physician titration orders on dosing sheet. Current dose 80ng/kg/min 60 mL 11     No facility-administered encounter medications on file as of 10/15/2020.      Review of patient's allergies indicates:   Allergen Reactions    Fentanyl Anaphylaxis     Respiratory distress    Vibra-tabs [doxycycline hyclate] Anaphylaxis     "throat felt like it was closing"    Adhesive Hives     Silk tape    Amoxicillin Rash    Nsaids (non-steroidal anti-inflammatory drug) Swelling    Chlorhexidine Other (See Comments)     Blue Chlorhexidine causes hives     Family History   Problem Relation Age of Onset    Heart disease Father     Glaucoma Father     Diabetes Mother     Cancer Maternal Grandmother         uterine    Cancer Maternal Aunt         breast    Breast cancer Maternal Aunt     Heart disease Paternal Grandfather     Heart attack Paternal Grandfather     Diabetes Paternal Grandfather     Leukemia Brother     Hypertension Unknown     Diabetes Maternal Grandfather     Heart attack Maternal Grandfather     Breast cancer Cousin     No Known Problems Sister     No Known Problems Maternal Uncle     No Known Problems Paternal Aunt     No Known Problems " Paternal Uncle     No Known Problems Paternal Grandmother     Colon cancer Neg Hx     Ovarian cancer Neg Hx     Amblyopia Neg Hx     Blindness Neg Hx     Cataracts Neg Hx     Macular degeneration Neg Hx     Retinal detachment Neg Hx     Strabismus Neg Hx     Stroke Neg Hx     Thyroid disease Neg Hx     Esophageal cancer Neg Hx        Social History     Socioeconomic History    Marital status: Single     Spouse name: Not on file    Number of children: 0    Years of education: Not on file    Highest education level: Not on file   Occupational History    Occupation: disabled     Employer: [employer]   Social Needs    Financial resource strain: Not on file    Food insecurity     Worry: Not on file     Inability: Not on file    Transportation needs     Medical: Not on file     Non-medical: Not on file   Tobacco Use    Smoking status: Never Smoker    Smokeless tobacco: Never Used   Substance and Sexual Activity    Alcohol use: No     Alcohol/week: 0.8 standard drinks     Types: 1 Standard drinks or equivalent per week     Comment: 1 per year    Drug use: No    Sexual activity: Never     Birth control/protection: OCP, Pill     Comment: pt is a virgin   Lifestyle    Physical activity     Days per week: Not on file     Minutes per session: Not on file    Stress: Not on file   Relationships    Social connections     Talks on phone: Not on file     Gets together: Not on file     Attends Muslim service: Not on file     Active member of club or organization: Not on file     Attends meetings of clubs or organizations: Not on file     Relationship status: Not on file   Other Topics Concern    Not on file   Social History Narrative    Not on file     Review of Systems   Constitutional: Positive for fatigue. Negative for appetite change and unexpected weight change.   HENT: Negative for ear pain, hearing loss, sore throat and trouble swallowing.    Eyes: Negative for visual disturbance.    Respiratory: Positive for shortness of breath. Negative for cough.    Cardiovascular: Negative for chest pain and palpitations.   Gastrointestinal: Negative for abdominal pain, nausea and vomiting.   Genitourinary: Negative for difficulty urinating and dysuria.   Musculoskeletal: Negative for arthralgias and back pain.   Skin: Negative for rash.   Neurological: Negative for tremors, seizures and headaches.   Psychiatric/Behavioral: Negative for agitation and decreased concentration.     Vitals:    10/15/20 1132   BP: 121/61   Pulse: 92   Resp: 18       Physical Exam  Constitutional:       Appearance: She is well-developed. She is not diaphoretic.   HENT:      Right Ear: External ear normal.      Left Ear: External ear normal.   Eyes:      General: No scleral icterus.  Cardiovascular:      Rate and Rhythm: Normal rate and regular rhythm.      Heart sounds: Normal heart sounds. No murmur. No friction rub. No gallop.    Pulmonary:      Effort: Pulmonary effort is normal. No respiratory distress.      Breath sounds: Normal breath sounds. No wheezing.   Abdominal:      General: Bowel sounds are normal. There is no distension.      Palpations: Abdomen is soft. There is no mass.      Tenderness: There is no abdominal tenderness.   Musculoskeletal: Normal range of motion.   Lymphadenopathy:      Cervical: No cervical adenopathy.   Skin:     General: Skin is warm and dry.      Coloration: Skin is not pale.   Neurological:      Mental Status: She is alert and oriented to person, place, and time.         MELD-Na score: 6 at 9/21/2020 10:09 AM  MELD score: 6 at 9/21/2020 10:09 AM  Calculated from:  Serum Creatinine: 0.7 mg/dL (Rounded to 1 mg/dL) at 9/21/2020 10:09 AM  Serum Sodium: 138 mmol/L (Rounded to 137 mmol/L) at 9/21/2020 10:09 AM  Total Bilirubin: 0.4 mg/dL (Rounded to 1 mg/dL) at 9/21/2020 10:09 AM  INR(ratio): 0.9 (Rounded to 1) at 9/21/2020 10:09 AM  Age: 50 years 6 months    Lab Results   Component Value Date     GLU 84 09/29/2020    BUN 11 09/29/2020    CREATININE 0.7 09/29/2020    CALCIUM 8.5 (L) 09/29/2020     09/29/2020    K 3.9 09/29/2020     (H) 09/29/2020    PROT 7.9 09/21/2020    CO2 18 (L) 09/29/2020    ANIONGAP 9 09/29/2020    WBC 5.20 09/29/2020    RBC 4.82 09/29/2020    HGB 14.4 09/29/2020    HCT 43.1 09/29/2020    HCT 38 09/09/2014    MCV 89 09/29/2020    MCH 29.9 09/29/2020    MCHC 33.4 09/29/2020     Lab Results   Component Value Date    RDW 14.5 09/29/2020     09/29/2020    MPV 12.4 09/29/2020    GRAN 3.2 09/21/2020    GRAN 65.7 09/21/2020    LYMPH 1.1 09/21/2020    LYMPH 23.5 09/21/2020    MONO 0.4 09/21/2020    MONO 7.7 09/21/2020    EOSINOPHIL 2.1 09/21/2020    BASOPHIL 0.6 09/21/2020    EOS 0.1 09/21/2020    BASO 0.03 09/21/2020    APTT 32.2 (H) 09/29/2020    GROUPTRH A POS 09/29/2020    RUBY Positive (A) 10/08/2013     (H) 09/21/2020    CHOL 125 09/21/2020    TRIG 81 09/21/2020    HDL 36 (L) 09/21/2020    CHOLHDL 28.8 09/21/2020    TOTALCHOLEST 3.5 09/21/2020    ALBUMIN 4.0 09/21/2020    BILIDIR 0.1 03/08/2013    AST 18 09/21/2020    ALT 19 09/21/2020    ALKPHOS 71 09/21/2020    MG 2.1 09/21/2020    LABPROT 11.0 09/29/2020    INR 1.0 09/29/2020    INR 2.3 (H) 03/09/2010       Assessment and Plan:  Patient Active Problem List   Diagnosis    Chronic pulmonary heart disease    Long term current use of anticoagulant therapy    Liver mass    Non-allergic vasomotor rhinitis    Allergic asthma    Primary pulmonary hypertension    Tricuspid regurgitation    Hypothyroidism (acquired)    Migraine without aura and without status migrainosus, not intractable    Lumbar disc disease    Mild asthma    Chronic low back pain    Overweight (BMI 25.0-29.9)    Menorrhagia    ABHIJEET (obstructive sleep apnea)    Awaiting organ transplant status    Chronic nonintractable headache    Chronic respiratory failure with hypoxia    Cholelithiasis    Borderline osteopenia    Pulmonary  hypertension    Lung transplant candidate     Yuni Perez is a 50 y.o. female withAbnormal Abdominal/Liver Imaging    Diagnosis is likely a right lobe hepatic adenoma.  Since has been since 2015 since her last MRI I will be repeating this.  This MRI will be reviewed at our multidisciplinary interventional radiology conference.  5 cm is the threshold where, in general, there is a discussion about surgical resection.  Again, ideally I would like to explore avenues to get her off oral contraception.

## 2020-10-15 NOTE — LETTER
October 15, 2020      Ian Lackey MD  [address]  [facility] [address]           [facility] - Transplant 1st Fl  [address]  [facility] [address]  Phone: [phone]  Fax: [phone]          Patient: Yuni Perez   MR Number: [id number]   YOB: 1970   Date of Visit: 10/15/2020       Dear Dr. Ian Lackey:    Thank you for referring Yuni Perez to me for evaluation. Attached you will find relevant portions of my assessment and plan of care.    If you have questions, please do not hesitate to call me. I look forward to following Yuni Perez along with you.    Sincerely,    Benjamin Yuan MD    Enclosure  CC:  No Recipients    If you would like to receive this communication electronically, please contact [email or web address] or [phone] to request more information on [facility] Link access.    For providers and/or their staff who would like to refer a patient to [facility], please contact us through our one-stop-shop provider referral line, [facility], at [phone].    If you feel you have received this communication in error or would no longer like to receive these types of communications, please e-mail [email or web address]

## 2020-10-16 ENCOUNTER — TELEPHONE (OUTPATIENT)
Dept: TRANSPLANT | Facility: CLINIC | Age: 50
End: 2020-10-16

## 2020-10-16 NOTE — TELEPHONE ENCOUNTER
DEBBIE contacted pt for update on caregiver plan. Pt explained still does not have possible caregivers. Pt stated she and parents are considering hiring nurses to be caregiver post txp. SW explained caregiver must be someone pt knows personally. Reviewed caregiver policy and responsibilities of pt's primary and backup caregivers. Pt verbally confirmed her understanding. SW will f/u at later date/time. Pt aware of how to contact SW. SW remains available.

## 2020-10-19 ENCOUNTER — TELEPHONE (OUTPATIENT)
Dept: TRANSPLANT | Facility: CLINIC | Age: 50
End: 2020-10-19

## 2020-10-19 NOTE — TELEPHONE ENCOUNTER
----- Message from Krystyna Pfeiffer sent at 10/19/2020  9:24 AM CDT -----  Regarding: Appt  Contact: Pt @ [phone]  Pt stated that she is unsure what her appt is for today. She does not recall anyone calling her stating that they were setting her up with an appt and she does not have a paper reminder as well. She is stating she needs staff to call back to confirm what this is in regards to.    Call back: [phone]    Attempted to contact patient.  No answer.  Left her a message informing her that she has an appointment with Dr. Lackey scheduled for tomorrow.  Informed her that I don't see an appointment scheduled for today.  Instructed her to contact me to let me know that she received my message.    Received a return call from patient.  She called to inquire about her appointment tomorrow.  Informed patient that she has her 2 month follow-up appointment with Dr. Lackey scheduled for tomorrow without any testing.  Reminded her that she already completed the PFTs and 6 minute walk test.  She verbalized her understanding and confirmed that she will be able to make the appointment.

## 2020-10-20 ENCOUNTER — OFFICE VISIT (OUTPATIENT)
Dept: TRANSPLANT | Facility: CLINIC | Age: 50
End: 2020-10-20
Payer: COMMERCIAL

## 2020-10-20 VITALS
HEIGHT: 59 IN | TEMPERATURE: 97 F | RESPIRATION RATE: 20 BRPM | BODY MASS INDEX: 26.61 KG/M2 | OXYGEN SATURATION: 95 % | DIASTOLIC BLOOD PRESSURE: 68 MMHG | SYSTOLIC BLOOD PRESSURE: 114 MMHG | WEIGHT: 132 LBS | HEART RATE: 75 BPM

## 2020-10-20 DIAGNOSIS — I27.20 PULMONARY HYPERTENSION: ICD-10-CM

## 2020-10-20 DIAGNOSIS — D13.4 HEPATIC ADENOMA: ICD-10-CM

## 2020-10-20 DIAGNOSIS — Z76.82 LUNG TRANSPLANT CANDIDATE: ICD-10-CM

## 2020-10-20 DIAGNOSIS — Z01.818 PRE-TRANSPLANT EVALUATION FOR LUNG TRANSPLANT: ICD-10-CM

## 2020-10-20 PROCEDURE — 99214 OFFICE O/P EST MOD 30 MIN: CPT | Mod: 25,S$GLB,TXP, | Performed by: INTERNAL MEDICINE

## 2020-10-20 PROCEDURE — 3008F PR BODY MASS INDEX (BMI) DOCUMENTED: ICD-10-PCS | Mod: CPTII,S$GLB,TXP, | Performed by: INTERNAL MEDICINE

## 2020-10-20 PROCEDURE — 99214 PR OFFICE/OUTPT VISIT, EST, LEVL IV, 30-39 MIN: ICD-10-PCS | Mod: 25,S$GLB,TXP, | Performed by: INTERNAL MEDICINE

## 2020-10-20 PROCEDURE — 99999 PR PBB SHADOW E&M-EST. PATIENT-LVL V: CPT | Mod: PBBFAC,TXP,, | Performed by: INTERNAL MEDICINE

## 2020-10-20 PROCEDURE — 99999 PR PBB SHADOW E&M-EST. PATIENT-LVL V: ICD-10-PCS | Mod: PBBFAC,TXP,, | Performed by: INTERNAL MEDICINE

## 2020-10-20 PROCEDURE — 3008F BODY MASS INDEX DOCD: CPT | Mod: CPTII,S$GLB,TXP, | Performed by: INTERNAL MEDICINE

## 2020-10-20 NOTE — PROGRESS NOTES
LUNG TRANSPLANT PRE FOLLOW-UP    Referring Physician: Ivonne Rai    Reason for Visit:  Pre-lung transplant follow-up.         Date of Initial Evaluation: 9/21/2020                                                                                             History of Present Illness: Yuni Perez is a 50 y.o. female who is on 0L of oxygen. She is on no assisted ventilation.  Her New York Heart Association Class is III and a Karnofsky score of 60% - Requires occasional assistance but is able to care for needs. She is not diabetic.       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  She denies any hospitalizations/exacerbations since last visit.  But, reports ongoing shortness of breath due to her underlying disease.  Her activity level is fairly stable compared to last visit.  She feels otherwise well and denies any new respiratory concerns or limitations.  She is tolerating her medications well.  She denies syncope/presyncope, chest pain, or lower extremity swelling.    In the presence of nurse, Cheryl Euceda, I did discuss that the lack of a back-up care-giver and a back-up plan if her current financial situation changes concerns our team.  I explained to the patient that, we won't be able to present her as a optimal candidate to the commitee.  Despite being unhappy and upset, patient demonstrated understanding and reported no further questions.          Review of Systems   Constitutional: Positive for malaise/fatigue. Negative for chills, fever and weight loss.   HENT: Negative for congestion, nosebleeds and sinus pain.    Eyes: Negative for blurred vision, double vision and photophobia.   Respiratory: Positive for cough and shortness of breath. Negative for hemoptysis, sputum production and wheezing.    Cardiovascular: Negative for chest pain, orthopnea, claudication, leg swelling and PND.   Gastrointestinal: Negative for abdominal pain,  "constipation and diarrhea.   Genitourinary: Negative for flank pain and frequency.   Musculoskeletal: Negative for joint pain, myalgias and neck pain.   Skin: Negative for itching and rash.   Neurological: Negative for dizziness, focal weakness, weakness and headaches.   Endo/Heme/Allergies: Negative for environmental allergies.   Psychiatric/Behavioral: Negative for depression. The patient is nervous/anxious.      Objective:   /68   Pulse 75   Temp 96.7 °F (35.9 °C) (Oral)   Resp 20   Ht 4' 11" (1.499 m)   Wt 59.9 kg (132 lb)   SpO2 95% Comment: room air  BMI 26.66 kg/m²     Physical Exam   Constitutional: She is oriented to person, place, and time and well-developed, well-nourished, and in no distress. No distress.   HENT:   Head: Normocephalic and atraumatic.   Right Ear: External ear normal.   Left Ear: External ear normal.   Mouth/Throat: No oropharyngeal exudate.   Eyes: Conjunctivae and EOM are normal. No scleral icterus.   Neck: Normal range of motion. Neck supple. No JVD present.   Cardiovascular: Normal rate, regular rhythm and intact distal pulses. Exam reveals no gallop and no friction rub.   Murmur heard.  Pulmonary/Chest: Effort normal and breath sounds normal. No respiratory distress. She has no wheezes. She exhibits no tenderness.   Abdominal: Soft. Bowel sounds are normal. She exhibits no distension and no mass. There is no abdominal tenderness. There is no guarding.   Musculoskeletal: Normal range of motion.         General: No tenderness, deformity or edema.   Lymphadenopathy:     She has no cervical adenopathy.   Neurological: She is alert and oriented to person, place, and time. Gait normal. GCS score is 15.   Skin: Skin is warm and dry. No rash noted. She is not diaphoretic. No erythema. No pallor.   Psychiatric: Mood and affect normal.     Labs:  Lab Visit on 07/28/2020   Component Date Value    SARS-CoV2 (COVID-19) Ernst* 07/28/2020 Not Detected        Pulmonary Function Tests " 9/28/2020 7/31/2020 12/3/2019 5/23/2019 11/20/2018 5/10/2018 11/7/2017   FVC 1.84 1.94 1.84 1.99 2.32 2.11 2.24   FEV1 1.25 1.29 1.22 1.4 1.62 1.48 1.56   TLC (liters) - - - - - - -   DLCO (ml/mmHg sec) 14.5 - 12.8 13.8 - 14.3 -   FVC% 63.5 67 63 75 84 76 80   FEV1% 53.5 55 52 62 69 63 66   FEF 25-75 - - - - 0.99 0.92 0.96   FEF 25-75% - - - - 37 34 35   TLC% - - - - - - -   RV - - - - - - -   RV% - - - - - - -   DLCO% 68 - 60 73 - 75 -     Other:   6MW 9/23/2020 7/31/2020 5/11/2020 12/3/2019 5/23/2019 2/25/2019 11/20/2018   6MWT Status completed without stopping completed without stopping - completed without stopping completed without stopping completed without stopping completed without stopping   Patient Reported Chest pain;Dyspnea;Leg pain Dyspnea;Leg pain Dyspnea;Lightheadedness Dyspnea Dyspnea;Leg pain;Lightheadedness Dyspnea;Leg pain;Lightheadedness Dyspnea   Was O2 used? No No No No No No No   6MW Distance walked (feet) 1400 1400 1400 1500 1450 1600 1562   Distance walked (meters) 426.72 426.72 426.72 457.2 441.96 487.68 476.1   Did patient stop? No No No No No No No   Oxygen Saturation 98 96 96 98 98 97 97   Supplemental Oxygen Room Air Room Air Room Air Room Air Room Air Room Air Room Air   Heart Rate 87 101 95 91 65 82 76   Blood Pressure 114/73 121/69 92/63 103/80 112/72 135/78 129/71   Ab Dyspnea Rating  light moderate moderate light moderate light light   Oxygen Saturation 93 94 95 96 100 96 98   Supplemental Oxygen Room Air Room Air Room Air Room Air Room Air Room Air Room Air   Heart Rate 156 144 143 140 142 141 151   Blood Pressure 140/85 127/67 129/92 138/80 150/76 151/87 142/71   Ab Dyspnea Rating  very,very heavy very,very heavy very,very heavy very,very heavy very heavy very,very heavy very,very heavy   Recovery Time (seconds) 222 107 115 120 200 210 180   Oxygen Saturation 98 98 97 99 98 98 99   Supplemental Oxygen Room Air Room Air Room Air Room Air Room Air Room Air Room Air   Heart Rate  94 108 106 110 88 95 93       TTE 05/11/20  Conclusion    · Normal left ventricular systolic function. The estimated ejection fraction is 60%.  · Indeterminate left ventricular diastolic function.  · Septal wall has abnormal motion. Systolic flattening of the interventricular septum consistent with right ventricle pressure overload.  · Moderately reduced right ventricular systolic function.  · Moderate right ventricular enlargement.  · Mild to moderate tricuspid regurgitation.  · Severe pulmonary hypertension present.  · The estimated PA systolic pressure is 103 mmHg.  · Normal central venous pressure (3 mmHg).       TTE (02/22/2019):     · Normal left ventricular systolic function. The estimated ejection fraction is 65%  · Normal LV diastolic function.  · Septal wall has abnormal motion.  · Mildly reduced right ventricular systolic function.  · Mild tricuspid regurgitation.  · The estimated PA systolic pressure is 54 mm Hg  · Pulmonary hypertension present.  · Normal central venous pressure (3 mm Hg).     [facility] 09/29/20  MRAP: 8  PA: 119/44 (74)  CO: 3.4  CI: 2.1       8/20/18     PW: 8/6 (5)  PA: 101/35 (57)  RV: 92/0  RVEDP: 6   AO_SAT: 99  PA_SAT: 72  RA: 6/5 (4)  FICKCI: 2.5700  FICKCO: 4.0800  PVR: 1038.0000     [facility] 9/4/19  · Severe PAH on IV remodulin, adcirca and letairis.  · Normal right and left-sided filling pressures.  · Borderline low-normal cardiac output by Ebony method which is normal when indexed for BSA.  · No signficant change from [facility] 8/18.  · RA: 6/ 7/ 5   · RV: 105/ 3/ 10   · PA: 103/ 45/ 64  · PWP: 15/ 1512/ 13 .   · Cardiac output was 4.11 by Ebony.   · Cardiac index is 2.67 L/min/m2.   · O2 Sat: PA 70%. AO sat 96%  · PVR 12.4        Assessment:-  1. Lung transplant candidate    2. Pre-transplant evaluation for lung transplant    3. Pulmonary hypertension        Plan:   - Patient meets disease specific criteria for possible consideration of lung transplant.  Her pre-lung transplant evaluation  is on-going but based on her previous evaluations in 2010 and 2017, similar concerns have surfaced which pose potential barriers to consider her as an optimal candidate in an event lung transplant becomes a consideration.  These include: 1. Absence of an adequate or reliable social support system, and   2. Financial status post lung transplant.      1.  Patient has named her elderly (at this time 79 year old) mother to be her primary care-giver and despite multiple recommendations, till date patient is not able to come-up with a back-up caregiver.  Taking this unique situation into account, I recommend that the once the back-up care-giver is established, patient needs to bring both care-givers to a meeting with the  in order to vet their commitment to and understanding of patient's lung transplant process.      2.  Patient does not have her independent source of income and relies on her elderly parents' and medication funds to help her financially at the current time.  This poses a concern for the possibility of non-adherence to medical therapy in an event she loses her parents' financial support and/or has inadequate coverage from the current funds.      For these reasons, I have advised the patient, her case will not be presented to multidisciplinary selection committee till these aspects are addressed.   Despite being unhappy and upset, patient demonstrated understanding and reported no further questions.           - Folow-up with PH clinic as previously scheduled.  Current tx for PH includes: Riociguat, Ambrisentan, and Treprostinil.  Will defer on-going management of PH clinic    - Hepatic adenoma(R).  Pending MRI and recommendation from hepatology.      RTC once care-giver and financial status addressed.      Ian Lackey MD  Pulmonary/Critical Care Medicine/Transplant Pulmonology  [facility]  10/20/2020

## 2020-10-20 NOTE — LETTER
October 21, 2020        Ivonne Rai  [address]  [facility] [address]  Phone: [phone]  Fax: [phone]             [facility] Trans[facility] 1st Fl  [address]  [facility] [address]  Phone: [phone]   Patient: Yuni Perez   MR Number: [id number]   YOB: 1970   Date of Visit: 10/20/2020       Dear Dr. Ivonne Rai    Thank you for referring Yuni Perez to me for evaluation. Attached you will find relevant portions of my assessment and plan of care.    If you have questions, please do not hesitate to call me. I look forward to following Yuni Perez along with you.    Sincerely,    Ian Lackey MD    Enclosure    If you would like to receive this communication electronically, please contact [email or web address] or [phone] to request [employer] Link access.    [employer] is a tool which provides read-only access to select patient information with whom you have a relationship. Its easy to use and provides real time access to review your patients record including encounter summaries, notes, results, and demographic information.    If you feel you have received this communication in error or would no longer like to receive these types of communications, please e-mail [email or web address]

## 2020-10-26 ENCOUNTER — TELEPHONE (OUTPATIENT)
Dept: TRANSPLANT | Facility: CLINIC | Age: 50
End: 2020-10-26

## 2020-10-26 ENCOUNTER — HOSPITAL ENCOUNTER (OUTPATIENT)
Dept: RADIOLOGY | Facility: HOSPITAL | Age: 50
Discharge: HOME | End: 2020-10-26
Attending: INTERNAL MEDICINE
Payer: COMMERCIAL

## 2020-10-26 DIAGNOSIS — I27.0 PRIMARY PULMONARY HYPERTENSION: ICD-10-CM

## 2020-10-26 DIAGNOSIS — R16.0 LIVER MASS: ICD-10-CM

## 2020-10-26 DIAGNOSIS — Z01.818 PRE-TRANSPLANT EVALUATION FOR LUNG TRANSPLANT: ICD-10-CM

## 2020-10-26 DIAGNOSIS — Z76.82 AWAITING ORGAN TRANSPLANT STATUS: ICD-10-CM

## 2020-10-26 PROCEDURE — 74183 MRI ABDOMEN W WO CONTRAST: ICD-10-PCS | Mod: 26,TXP,, | Performed by: RADIOLOGY

## 2020-10-26 PROCEDURE — 25500020 PHARM REV CODE 255: Mod: TXP | Performed by: INTERNAL MEDICINE

## 2020-10-26 PROCEDURE — A9585 GADOBUTROL INJECTION: HCPCS | Mod: TXP | Performed by: INTERNAL MEDICINE

## 2020-10-26 PROCEDURE — 74183 MRI ABD W/O CNTR FLWD CNTR: CPT | Mod: TC,TXP

## 2020-10-26 PROCEDURE — 74183 MRI ABD W/O CNTR FLWD CNTR: CPT | Mod: 26,TXP,, | Performed by: RADIOLOGY

## 2020-10-26 RX ORDER — GADOBUTROL 604.72 MG/ML
10 INJECTION INTRAVENOUS
Status: COMPLETED | OUTPATIENT
Start: 2020-10-26 | End: 2020-10-26

## 2020-10-26 RX ADMIN — GADOBUTROL 10 ML: 604.72 INJECTION INTRAVENOUS at 12:10

## 2020-10-26 NOTE — TELEPHONE ENCOUNTER
----- Message from Claribel MARY August sent at 10/26/2020 12:22 PM CDT -----  Regarding: Advice  Contact: pt  Reason:Calling to speak with coordinator pertaining to testing    Communication: [phone]    Contacted patient.  Patient stated that she had a question regarding a bill that she received for Dr. Monterroso.  She inquired about Dr. Monterroso's specialty.  Informed her that he is one of the staff Infectious Disease physicians.  She verbalized her understanding.  She inquired who she should contact regarding the bill.  Instructed her to contact Mitesh, the .  Informed her that she may be referred to billing by Mitesh, but requested that she speak with Mitesh first.  She verbalized her understanding.

## 2020-10-27 ENCOUNTER — TELEPHONE (OUTPATIENT)
Dept: HEPATOLOGY | Facility: CLINIC | Age: 50
End: 2020-10-27

## 2020-10-27 ENCOUNTER — TELEPHONE (OUTPATIENT)
Dept: TRANSPLANT | Facility: CLINIC | Age: 50
End: 2020-10-27

## 2020-10-27 ENCOUNTER — TELEPHONE (OUTPATIENT)
Dept: ADMINISTRATIVE | Facility: HOSPITAL | Age: 50
End: 2020-10-27

## 2020-10-27 NOTE — TELEPHONE ENCOUNTER
Patient: Yuni Perez       MRN: [id number]      : 1970     Age: 50 y.o.  [address]  [place] [address]    Provider: KAILA Yuan    Priority of review: Benign/non Urgent    Patient Transplant Status:     Reason for presentation: Reassessment    Clinical Summary:       This 50-year-old woman is being followed by lung transplant for primary pulmonary hypertension.  She is being evaluated for lung transplant.  She was seen by myself in  for liver mass.  A MRI in  showed a right lobe liver mass with enhancing features characteristic of a hepatic adenoma.  More recently she had an abdominal ultrasound.  On ultrasound it appears that the liver lesion is now greater than 5 cm in size.  She has no symptoms of abdominal pain.  She has no history of chronic liver disease.  She states that she is required to be on estrogen because of 1 of the medication she is on further management of her pulmonary hypertension.  I did advise her that these adenomas are driven by hormones and ideally would be appropriate to get her off oral contraception.                     Imaging to be reviewed: MRI 10/26/20    HCC Treatment History:     ABO: A POS    Platelets:   Lab Results   Component Value Date/Time     2020 09:30 AM     Creatinine:   Lab Results   Component Value Date/Time    CREATININE 0.7 2020 09:30 AM     Bilirubin:   Lab Results   Component Value Date/Time    BILITOT 0.4 2020 10:09 AM     AFP Last 3 each if available:   Lab Results   Component Value Date/Time    AFP 2.8 10/09/2012 01:28 PM       MELD: MELD-Na score: 6 at 2020 10:09 AM  MELD score: 6 at 2020 10:09 AM  Calculated from:  Serum Creatinine: 0.7 mg/dL (Rounded to 1 mg/dL) at 2020 10:09 AM  Serum Sodium: 138 mmol/L (Rounded to 137 mmol/L) at 2020 10:09 AM  Total Bilirubin: 0.4 mg/dL (Rounded to 1 mg/dL) at 2020 10:09 AM  INR(ratio): 0.9 (Rounded to 1) at 2020 10:09 AM  Age: 50  years 6 months    Plan:     Follow-up Provider: Dr Yuan

## 2020-10-28 NOTE — TELEPHONE ENCOUNTER
----- Message from Mitesh Tesha sent at 10/27/2020  1:12 PM CDT -----  I spoke to the patient at length she advised that her Aunt is willing to donate up to $10,000 and she is  Willing to fund-raise but has not started yet she told me that if she just had a clear plan and or goal of what is expected of her such as fund-rasing and other things she could work on being a good candidate advised her oop for just hospital portion is $2700. She told me that she will talk to you about goals and how she an become a better candidate because she realizes she got very upset she felt like the team gave up on her.

## 2020-11-02 DIAGNOSIS — G89.29 CHRONIC NONINTRACTABLE HEADACHE, UNSPECIFIED HEADACHE TYPE: ICD-10-CM

## 2020-11-02 DIAGNOSIS — R51.9 CHRONIC NONINTRACTABLE HEADACHE, UNSPECIFIED HEADACHE TYPE: ICD-10-CM

## 2020-11-03 ENCOUNTER — CONFERENCE (OUTPATIENT)
Dept: TRANSPLANT | Facility: CLINIC | Age: 50
End: 2020-11-03

## 2020-11-03 RX ORDER — TOPIRAMATE 100 MG/1
100 TABLET, FILM COATED ORAL 2 TIMES DAILY
Qty: 60 TABLET | Refills: 11 | Status: SHIPPED | OUTPATIENT
Start: 2020-11-03 | End: 2021-09-28

## 2020-11-03 NOTE — TELEPHONE ENCOUNTER
Patient: Yuni Perez       MRN: [id number]      : 1970     Age: 50 y.o.  [address]  [place] [address]    Provider: KAILA Yuan    Priority of review: Benign/non Urgent    Patient Transplant Status:     Reason for presentation: Reassessment    Clinical Summary:       This 50-year-old woman is being followed by lung transplant for primary pulmonary hypertension.  She is being evaluated for lung transplant.  She was seen by myself in  for liver mass.  A MRI in  showed a right lobe liver mass with enhancing features characteristic of a hepatic adenoma.  More recently she had an abdominal ultrasound.  On ultrasound it appears that the liver lesion is now greater than 5 cm in size.  She has no symptoms of abdominal pain.  She has no history of chronic liver disease.  She states that she is required to be on estrogen because of 1 of the medication she is on further management of her pulmonary hypertension.  I did advise her that these adenomas are driven by hormones and ideally would be appropriate to get her off oral contraception.                     Imaging to be reviewed: MRI 10/26/20    HCC Treatment History:     ABO: A POS    Platelets:   Lab Results   Component Value Date/Time     2020 09:30 AM     Creatinine:   Lab Results   Component Value Date/Time    CREATININE 0.7 2020 09:30 AM     Bilirubin:   Lab Results   Component Value Date/Time    BILITOT 0.4 2020 10:09 AM     AFP Last 3 each if available:   Lab Results   Component Value Date/Time    AFP 2.8 10/09/2012 01:28 PM       MELD: MELD-Na score: 6 at 2020 10:09 AM  MELD score: 6 at 2020 10:09 AM  Calculated from:  Serum Creatinine: 0.7 mg/dL (Rounded to 1 mg/dL) at 2020 10:09 AM  Serum Sodium: 138 mmol/L (Rounded to 137 mmol/L) at 2020 10:09 AM  Total Bilirubin: 0.4 mg/dL (Rounded to 1 mg/dL) at 2020 10:09 AM  INR(ratio): 0.9 (Rounded to 1) at 2020 10:09 AM  Age: 50  years 6 months    Discussion/Plan:   No significant change, consistent with adenoma as noted in 2013 Eovist scan.    Committee Discussion/Plan:   There is a 4.7cm adenoma that looks stable from 2013 to now. No evidence of any other liver disease. Lesion is stable, but there is a risk of bleeding.    U/S in a year.       Follow-up Provider: Dr Yuan

## 2020-11-04 NOTE — NURSING
Patient's status changed to deferred per Dr. Lackey's recommendations during clinic visit on 10/20/20 due to psychosocial and financial concerns (inadequate caregivers / support system & financial concerns (no source of income; reliant on her parents).  See below recommendations from Dr. Lackey during clinic visit:    Plan:   - Patient meets disease specific criteria for possible consideration of lung transplant.  Her pre-lung transplant evaluation is on-going but based on her previous evaluations in 2010 and 2017, similar concerns have surfaced which pose potential barriers to consider her as an optimal candidate in an event lung transplant becomes a consideration.  These include: 1. Absence of an adequate or reliable social support system, and   2. Financial status post lung transplant.       1.  Patient has named her elderly (at this time 79 year old) mother to be her primary care-giver and despite multiple recommendations, till date patient is not able to come-up with a back-up caregiver.  Taking this unique situation into account, I recommend that the once the back-up care-giver is established, patient needs to bring both care-givers to a meeting with the  in order to vet their commitment to and understanding of patient's lung transplant process.       2.  Patient does not have her independent source of income and relies on her elderly parents' and medication funds to help her financially at the current time.  This poses a concern for the possibility of non-adherence to medical therapy in an event she loses her parents' financial support and/or has inadequate coverage from the current funds.       For these reasons, I have advised the patient, her case will not be presented to multidisciplinary selection committee till these aspects are addressed.   Despite being unhappy and upset, patient demonstrated understanding and reported no further questions.

## 2020-11-11 ENCOUNTER — DOCUMENTATION ONLY (OUTPATIENT)
Dept: TRANSPLANT | Facility: CLINIC | Age: 50
End: 2020-11-11

## 2020-11-13 ENCOUNTER — TELEPHONE (OUTPATIENT)
Dept: TRANSPLANT | Facility: CLINIC | Age: 50
End: 2020-11-13

## 2020-11-13 NOTE — TELEPHONE ENCOUNTER
PA completed for Letairis (brand) and approved. Gracy Dixono notified.    CaseId:[id number];Status:Approved;Review Type:Prior Auth;Coverage Start Date:11/13/2020;Coverage End Date:11/13/2021;

## 2020-11-16 ENCOUNTER — TELEPHONE (OUTPATIENT)
Dept: TRANSPLANT | Facility: CLINIC | Age: 50
End: 2020-11-16

## 2020-11-16 NOTE — TELEPHONE ENCOUNTER
Discussed patient during the Selection Committee Prep meeting.  Instructions received from Dr. Weill to decline patient for lung transplantation due to psychosocial issues (lack of caregivers).

## 2020-11-20 ENCOUNTER — TELEPHONE (OUTPATIENT)
Dept: TRANSPLANT | Facility: CLINIC | Age: 50
End: 2020-11-20

## 2020-11-20 NOTE — TELEPHONE ENCOUNTER
Contacted patient.  Notified her of the discussion this week and Dr. Weill's instructions to decline her for lung transplant due to psychosocial / financial issues (lack of caregivers / no income).   Informed her that she would receive a letter in the mail with the team's decision.  Patient verbalized her understanding and stated that she has caregivers.  She stated that she hasn't had time to contact Brittanie () with the information.  She stated that she can give me the names and numbers for Brittanie.  Names and numbers of caregivers obtained.  Patient also stated that her aunt told her that she would give her $10,000 for out of pocket expenses related to the transplant.  Informed patient that I would give Brittanie the names and numbers of her caregivers and would inform the team of our conversation.  She verbalized her complete understanding of all discussed.

## 2020-11-23 ENCOUNTER — OFFICE VISIT (OUTPATIENT)
Dept: OBSTETRICS AND GYNECOLOGY | Facility: CLINIC | Age: 50
End: 2020-11-23
Payer: COMMERCIAL

## 2020-11-23 VITALS
BODY MASS INDEX: 27.24 KG/M2 | HEIGHT: 59 IN | DIASTOLIC BLOOD PRESSURE: 74 MMHG | SYSTOLIC BLOOD PRESSURE: 114 MMHG | WEIGHT: 135.13 LBS

## 2020-11-23 DIAGNOSIS — Z30.41 ENCOUNTER FOR SURVEILLANCE OF CONTRACEPTIVE PILLS: ICD-10-CM

## 2020-11-23 DIAGNOSIS — Z01.419 VISIT FOR GYNECOLOGIC EXAMINATION: Primary | ICD-10-CM

## 2020-11-23 PROCEDURE — 87624 HPV HI-RISK TYP POOLED RSLT: CPT

## 2020-11-23 PROCEDURE — 99396 PR PREVENTIVE VISIT,EST,40-64: ICD-10-PCS | Mod: S$GLB,,, | Performed by: OBSTETRICS & GYNECOLOGY

## 2020-11-23 PROCEDURE — 99396 PREV VISIT EST AGE 40-64: CPT | Mod: S$GLB,,, | Performed by: OBSTETRICS & GYNECOLOGY

## 2020-11-23 PROCEDURE — 3008F BODY MASS INDEX DOCD: CPT | Mod: CPTII,S$GLB,, | Performed by: OBSTETRICS & GYNECOLOGY

## 2020-11-23 PROCEDURE — 1126F AMNT PAIN NOTED NONE PRSNT: CPT | Mod: S$GLB,,, | Performed by: OBSTETRICS & GYNECOLOGY

## 2020-11-23 PROCEDURE — 1126F PR PAIN SEVERITY QUANTIFIED, NO PAIN PRESENT: ICD-10-PCS | Mod: S$GLB,,, | Performed by: OBSTETRICS & GYNECOLOGY

## 2020-11-23 PROCEDURE — 99999 PR PBB SHADOW E&M-EST. PATIENT-LVL IV: CPT | Mod: PBBFAC,,, | Performed by: OBSTETRICS & GYNECOLOGY

## 2020-11-23 PROCEDURE — 88175 CYTOPATH C/V AUTO FLUID REDO: CPT

## 2020-11-23 PROCEDURE — 3008F PR BODY MASS INDEX (BMI) DOCUMENTED: ICD-10-PCS | Mod: CPTII,S$GLB,, | Performed by: OBSTETRICS & GYNECOLOGY

## 2020-11-23 PROCEDURE — 99999 PR PBB SHADOW E&M-EST. PATIENT-LVL IV: ICD-10-PCS | Mod: PBBFAC,,, | Performed by: OBSTETRICS & GYNECOLOGY
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
## 2020-11-24 ENCOUNTER — TELEPHONE (OUTPATIENT)
Dept: TRANSPLANT | Facility: CLINIC | Age: 50
End: 2020-11-24

## 2020-11-24 NOTE — TELEPHONE ENCOUNTER
DEBBIE attempted to get in touch w/ pt to review caregiver plan. No answer. SW left VM and requested call back. Provided contact number. DEBBIE remains available.    ----- Message from Cheryl Euceda RN sent at 11/20/2020  5:30 PM CST -----  Regarding: caregivers  I spoke with patient today regarding the decision to decline her for lung transplant (see my note from today 11/20/20).  She stated that she has the required caregivers.  Information below:      Alyse Nair (friend)  [phone] (cell); work [phone]    Arianna Robbins [phone] works during the day.      She also informed me that her aunt agreed to give her $10,000 for out of pocket expenses related to transplant.    Thanks,  Cheryl

## 2020-11-25 ENCOUNTER — TELEPHONE (OUTPATIENT)
Dept: TRANSPLANT | Facility: CLINIC | Age: 50
End: 2020-11-25

## 2020-11-25 NOTE — TELEPHONE ENCOUNTER
11/25/20    SW spoke to pt on telephone. Pt confirmed pt's mother will be pt's primary caregiver and pt's friends Alyse Korey and Arianna Robbins will serve as backup caregivers. SW explained will contact pt's backup caregivers to review caregiver role and explain expectations of caregivers after lung txp. Pt stated unaware of caregiver responsibilities and has not discussed with caregivers. SW reminded pt caregiver contract was sent home with pt on day of psychosocial assessment, and offered to provide pt with another copy of contract at pt's next apt. Pt stated will be at [facility] on 11/30 and 12/4. SW to contact pt's backup caregivers. Pt aware of how to contact SW. SW to remain available.       ----- Message from Cheryl Euceda RN sent at 11/20/2020  5:30 PM CST -----  Regarding: caregivers  I spoke with patient today regarding the decision to decline her for lung transplant (see my note from today 11/20/20).  She stated that she has the required caregivers.  Information below:      Alyse Nair (friend)  [phone] (cell); work [phone]    Arianna Robbins [phone] works during the day.      She also informed me that her aunt agreed to give her $10,000 for out of pocket expenses related to transplant.    Thanks,  Cheryl

## 2020-11-27 ENCOUNTER — TELEPHONE (OUTPATIENT)
Dept: TRANSPLANT | Facility: CLINIC | Age: 50
End: 2020-11-27

## 2020-11-27 LAB
HPV HR 12 DNA SPEC QL NAA+PROBE: NEGATIVE
HPV16 AG SPEC QL: NEGATIVE
HPV18 DNA SPEC QL NAA+PROBE: NEGATIVE

## 2020-11-27 NOTE — TELEPHONE ENCOUNTER
DEBBIE attempted to contact pt's backup caregiver Alyse Nair (cell, [phone]  work, [phone]) via telephone to provide caregiver education. No answer. DEBBIE left voicemail and requested call back. SW provided contact number.    DEBBIE spoke to pt's second backup caregiver Arianna Robbins (cell, [phone]) via telephone. DEBBIE explained post txp outline and caregiver role/responsibilities. Arianna verbally confirmed her understanding. Arianna provided e-mail address to DEBBIE. DEBBIE e-mailed Caregiver and Support System Agreement to Arianna and encouraged caregiver to contact DEBBIE with any future questions or concerns. Arianna asked SW questions which were answered to her satisfaction. Arianna thanked DEBBIE for calling. DEBBIE provided txp  contact information.

## 2020-11-30 ENCOUNTER — HOSPITAL ENCOUNTER (OUTPATIENT)
Dept: PULMONOLOGY | Facility: CLINIC | Age: 50
Discharge: HOME | End: 2020-11-30
Payer: COMMERCIAL

## 2020-11-30 ENCOUNTER — TELEPHONE (OUTPATIENT)
Dept: FAMILY MEDICINE | Facility: CLINIC | Age: 50
End: 2020-11-30

## 2020-11-30 ENCOUNTER — LAB VISIT (OUTPATIENT)
Dept: LAB | Facility: HOSPITAL | Age: 50
End: 2020-11-30
Attending: INTERNAL MEDICINE
Payer: COMMERCIAL

## 2020-11-30 ENCOUNTER — TELEPHONE (OUTPATIENT)
Dept: TRANSPLANT | Facility: CLINIC | Age: 50
End: 2020-11-30

## 2020-11-30 VITALS — WEIGHT: 132 LBS | BODY MASS INDEX: 26.61 KG/M2 | HEIGHT: 59 IN

## 2020-11-30 DIAGNOSIS — E03.9 HYPOTHYROIDISM (ACQUIRED): ICD-10-CM

## 2020-11-30 DIAGNOSIS — R06.82 TACHYPNEA: ICD-10-CM

## 2020-11-30 DIAGNOSIS — I27.9 CHRONIC PULMONARY HEART DISEASE: ICD-10-CM

## 2020-11-30 DIAGNOSIS — Z79.899 POLYPHARMACY: ICD-10-CM

## 2020-11-30 LAB
ALBUMIN SERPL BCP-MCNC: 3.6 G/DL (ref 3.5–5.2)
ALP SERPL-CCNC: 63 U/L (ref 55–135)
ALT SERPL W/O P-5'-P-CCNC: 13 U/L (ref 10–44)
ANION GAP SERPL CALC-SCNC: 7 MMOL/L (ref 8–16)
AST SERPL-CCNC: 15 U/L (ref 10–40)
BASOPHILS # BLD AUTO: 0.03 K/UL (ref 0–0.2)
BASOPHILS NFR BLD: 0.6 % (ref 0–1.9)
BILIRUB SERPL-MCNC: 0.3 MG/DL (ref 0.1–1)
BNP SERPL-MCNC: 131 PG/ML (ref 0–99)
BUN SERPL-MCNC: 10 MG/DL (ref 6–20)
CALCIUM SERPL-MCNC: 8.2 MG/DL (ref 8.7–10.5)
CHLORIDE SERPL-SCNC: 114 MMOL/L (ref 95–110)
CO2 SERPL-SCNC: 21 MMOL/L (ref 23–29)
CREAT SERPL-MCNC: 0.8 MG/DL (ref 0.5–1.4)
DIFFERENTIAL METHOD: NORMAL
EOSINOPHIL # BLD AUTO: 0.1 K/UL (ref 0–0.5)
EOSINOPHIL NFR BLD: 2.6 % (ref 0–8)
ERYTHROCYTE [DISTWIDTH] IN BLOOD BY AUTOMATED COUNT: 13.7 % (ref 11.5–14.5)
EST. GFR  (AFRICAN AMERICAN): >60 ML/MIN/1.73 M^2
EST. GFR  (NON AFRICAN AMERICAN): >60 ML/MIN/1.73 M^2
GLUCOSE SERPL-MCNC: 83 MG/DL (ref 70–110)
HCT VFR BLD AUTO: 45.9 % (ref 37–48.5)
HGB BLD-MCNC: 15 G/DL (ref 12–16)
IMM GRANULOCYTES # BLD AUTO: 0.01 K/UL (ref 0–0.04)
IMM GRANULOCYTES NFR BLD AUTO: 0.2 % (ref 0–0.5)
LYMPHOCYTES # BLD AUTO: 1 K/UL (ref 1–4.8)
LYMPHOCYTES NFR BLD: 18.5 % (ref 18–48)
MAGNESIUM SERPL-MCNC: 2.2 MG/DL (ref 1.6–2.6)
MCH RBC QN AUTO: 30.5 PG (ref 27–31)
MCHC RBC AUTO-ENTMCNC: 32.7 G/DL (ref 32–36)
MCV RBC AUTO: 94 FL (ref 82–98)
MONOCYTES # BLD AUTO: 0.4 K/UL (ref 0.3–1)
MONOCYTES NFR BLD: 7.2 % (ref 4–15)
NEUTROPHILS # BLD AUTO: 3.8 K/UL (ref 1.8–7.7)
NEUTROPHILS NFR BLD: 70.9 % (ref 38–73)
NRBC BLD-RTO: 0 /100 WBC
PLATELET # BLD AUTO: 166 K/UL (ref 150–350)
PMV BLD AUTO: 12.6 FL (ref 9.2–12.9)
POTASSIUM SERPL-SCNC: 4.2 MMOL/L (ref 3.5–5.1)
PROT SERPL-MCNC: 7.2 G/DL (ref 6–8.4)
RBC # BLD AUTO: 4.91 M/UL (ref 4–5.4)
SODIUM SERPL-SCNC: 142 MMOL/L (ref 136–145)
T4 FREE SERPL-MCNC: 1.29 NG/DL (ref 0.71–1.51)
TSH SERPL DL<=0.005 MIU/L-ACNC: 0.36 UIU/ML (ref 0.4–4)
WBC # BLD AUTO: 5.4 K/UL (ref 3.9–12.7)

## 2020-11-30 PROCEDURE — 94618 PULMONARY STRESS TESTING: CPT | Mod: S$GLB,,, | Performed by: INTERNAL MEDICINE

## 2020-11-30 PROCEDURE — 85025 COMPLETE CBC W/AUTO DIFF WBC: CPT

## 2020-11-30 PROCEDURE — 80053 COMPREHEN METABOLIC PANEL: CPT

## 2020-11-30 PROCEDURE — 94618 PULMONARY STRESS TESTING: ICD-10-PCS | Mod: S$GLB,,, | Performed by: INTERNAL MEDICINE

## 2020-11-30 PROCEDURE — 36415 COLL VENOUS BLD VENIPUNCTURE: CPT

## 2020-11-30 PROCEDURE — 83735 ASSAY OF MAGNESIUM: CPT

## 2020-11-30 PROCEDURE — 84443 ASSAY THYROID STIM HORMONE: CPT

## 2020-11-30 PROCEDURE — 84439 ASSAY OF FREE THYROXINE: CPT

## 2020-11-30 PROCEDURE — 83880 ASSAY OF NATRIURETIC PEPTIDE: CPT

## 2020-11-30 NOTE — TELEPHONE ENCOUNTER
SW again attempted to contact pt's backup caregiver Alyse Nair (cell, [phone]  work, [phone]) to confirm willingness to be a caregiver and provide caregiver education. No answer. SW left voicemail and requested call back. SW provided contact number.

## 2020-11-30 NOTE — PROCEDURES
Yuni Perez is a 50 y.o.  female patient, who presents for a 6 minute walk test ordered by MD Lui.  The diagnosis is Pulmonary Hypertension.  The patient's BMI is 26.6 kg/m2.  Predicted distance (lower limit of normal) is 426.35 meters.      Test Results:    The test was completed without stopping.   The total time walked was 360 seconds.  During walking, the patient reported:  Chest pain, Dyspnea. The patient used no assistive devices  during testing.     11/30/2020---------Distance: 426.72 meters (1400 feet)     O2 Sat % Supplemental Oxygen Heart Rate Blood Pressure Ab Scale   Pre-exercise  (Resting) 97 % Room Air 80 bpm 99/50 mmHg 2   During Exercise 96 % Room Air 145 bpm 127/64 mmHg 7-8   Post-exercise  (Recovery) 99 % Room Air  90 bpm 102/72 mmHg       Recovery Time: 210 seconds    Performing nurse/tech: Haim MANZANO      PREVIOUS STUDY:   The patient had a previous study.  09/23/2020---------Distance: 426.72 meters (1400 feet)       O2 Sat % Supplemental Oxygen Heart Rate Blood Pressure Ab Scale   Pre-exercise  (Resting) 98 % Room Air 87 bpm 114/73 mmHg 2   During Exercise 93 % Room Air 156 bpm 140/85 mmHg 9   Post-exercise  (Recovery) 98 % Room Air  94 bpm 110/69 mmHg            CLINICAL INTERPRETATION:  Six minute walk distance is 426.72 meters (1400 feet) with very heavy dyspnea.  During exercise, there was no significant desaturation while breathing room air.  Blood pressure remained stable and Heart rate increased significantly with walking.  This may represent a tachycardic response to exercise.  The patient reported non-pulmonary symptoms during exercise.  Since the previous study in September 2020, exercise capacity is unchanged.  Based upon age and body mass index, exercise capacity is normal.

## 2020-11-30 NOTE — TELEPHONE ENCOUNTER
Please call patient: her thyroid blood work is closer to the normal range. Continue current regimen.

## 2020-12-04 ENCOUNTER — OFFICE VISIT (OUTPATIENT)
Dept: TRANSPLANT | Facility: CLINIC | Age: 50
End: 2020-12-04
Payer: COMMERCIAL

## 2020-12-04 VITALS
WEIGHT: 136.88 LBS | BODY MASS INDEX: 27.6 KG/M2 | OXYGEN SATURATION: 94 % | SYSTOLIC BLOOD PRESSURE: 112 MMHG | DIASTOLIC BLOOD PRESSURE: 70 MMHG | HEART RATE: 72 BPM | HEIGHT: 59 IN

## 2020-12-04 DIAGNOSIS — I27.9 CHRONIC PULMONARY HEART DISEASE: Primary | ICD-10-CM

## 2020-12-04 DIAGNOSIS — Z79.01 LONG TERM CURRENT USE OF ANTICOAGULANT THERAPY: ICD-10-CM

## 2020-12-04 DIAGNOSIS — I27.0 PRIMARY PULMONARY HYPERTENSION: ICD-10-CM

## 2020-12-04 PROCEDURE — 3008F BODY MASS INDEX DOCD: CPT | Mod: CPTII,S$GLB,, | Performed by: INTERNAL MEDICINE

## 2020-12-04 PROCEDURE — 99214 OFFICE O/P EST MOD 30 MIN: CPT | Mod: S$GLB,,, | Performed by: INTERNAL MEDICINE

## 2020-12-04 PROCEDURE — 99999 PR PBB SHADOW E&M-EST. PATIENT-LVL V: CPT | Mod: PBBFAC,,, | Performed by: INTERNAL MEDICINE

## 2020-12-04 PROCEDURE — 1126F PR PAIN SEVERITY QUANTIFIED, NO PAIN PRESENT: ICD-10-PCS | Mod: S$GLB,,, | Performed by: INTERNAL MEDICINE

## 2020-12-04 PROCEDURE — 3008F PR BODY MASS INDEX (BMI) DOCUMENTED: ICD-10-PCS | Mod: CPTII,S$GLB,, | Performed by: INTERNAL MEDICINE

## 2020-12-04 PROCEDURE — 99999 PR PBB SHADOW E&M-EST. PATIENT-LVL V: ICD-10-PCS | Mod: PBBFAC,,, | Performed by: INTERNAL MEDICINE

## 2020-12-04 PROCEDURE — 1126F AMNT PAIN NOTED NONE PRSNT: CPT | Mod: S$GLB,,, | Performed by: INTERNAL MEDICINE

## 2020-12-04 PROCEDURE — 99214 PR OFFICE/OUTPT VISIT, EST, LEVL IV, 30-39 MIN: ICD-10-PCS | Mod: S$GLB,,, | Performed by: INTERNAL MEDICINE

## 2020-12-04 NOTE — Clinical Note
What are your thoughts on seeing if this patient can uptitrate her remodulin further? RHc with still very elevated PA pressures, and having some increased ZAVALA from what seems has been over the last few months.

## 2020-12-04 NOTE — PROGRESS NOTES
Subjective:   Ms. Perez is a 50 y.o. year old White female with PH who is been seen today for routine follow up.      HPI     50 y.o. White female who presents for PH follow-up. Patient was diagnosed with IPAH about 5 and a half years, ABHIJEET recently, currently deferred for LUT (was working on fund raising). Initial symptom- syncope.  Patient has been on triple therapy: Remodulin at  89 ng/kg/min infusion, Letairis 10mg qdaily, and Adcirca 40mg qdaily.     Patient underwent RHC/LHC 09/29/20 for lung transplant evaluation. LHC normal, however had persistently severely elevated PASP to over 100 systolic per RHC findings. Patient states she has been doing okay but feeling more short of breath in general, this has been going on for a few months now.     6MWT 11/30/20:  11/30/2020---------Distance: 426.72 meters (1400 feet)      O2 Sat % Supplemental Oxygen Heart Rate Blood Pressure Ab   Scale   Pre-exercise   (Resting) 97 % Room Air 80 bpm 99/50 mmHg 2   During Exercise 96 % Room Air 145 bpm 127/64 mmHg 7-8   Post-exercise   (Recovery) 99 % Room Air  90 bpm 102/72 mmHg       Recovery Time: 210 seconds    6mw  Previously:  427m unchanged from last in May(457m 476m 463m (457  1/18, 469m in Nov, 457 in Nov, 488m unchanged last 3 visits with me( 450 11/15/16,  457m, 518.5m, 533.75, 549m April, 463.6m prev visit)                                              O2 sat  96-> 94 % RA                                                          ->144                                                      BP  121/69-->127/67                                                       Ab 3->9    TTE today  RV 4.7 TAPSE 1.6  · Normal left ventricular systolic function. The estimated ejection fraction is 65%.  · Concentric left ventricular remodeling.  · Septal wall has abnormal motion.  · Indeterminate left ventricular diastolic function.  · Moderate right ventricular enlargement.  · Mildly reduced right ventricular systolic  function.  · Mild to moderate tricuspid regurgitation.  · Normal central venous pressure (3 mmHg).  · The estimated PA systolic pressure is 113 mmHg.  · Pulmonary hypertension present.      RHC 9/4/19  · RA: 6/ 7/ 5 RV: 105/ 3/ 10 PA: 103/ 45/ 64 PWP: 15/ 1512/ 13 . Cardiac output was 4.11 by Ebony. Cardiac index is 2.67 L/min/m2. O2 Sat: PA 70%. AO sat 96% PVR 12.4     TTE 5/11/20  · Normal left ventricular systolic function. The estimated ejection fraction is 60%.  · Indeterminate left ventricular diastolic function.  · Septal wall has abnormal motion. Systolic flattening of the interventricular septum consistent with right ventricle pressure overload.  · Moderately reduced right ventricular systolic function.  RV 4.3 TAPSE 1.9  · Moderate right ventricular enlargement.  · Mild to moderate tricuspid regurgitation.  · Seevere pulmonary hypertension present.  · The estimated PA systolic pressure is 103 mmHg.  · Normal central venous pressure (3 mmHg).    TTE (02/22/2019):    · Normal left ventricular systolic function. The estimated ejection fraction is 65%  · Normal LV diastolic function.  · Septal wall has abnormal motion.  · Mildly reduced right ventricular systolic function.  RV 3.9 TAPSE 1.9  · Mild tricuspid regurgitation.  · The estimated PA systolic pressure is 54 mm Hg  · Pulmonary hypertension present.  · Normal central venous pressure (3 mm Hg).      RHC 8/20/18     PW: 8/6 (5)  PA: 101/35 (57)  RV: 92/0  RVEDP: 6   AO_SAT: 99  PA_SAT: 72  RA: 6/5 (4)  FICKCI: 2.5700  FICKCO: 4.0800  PVR: 1038.0000    TTE 4/18  Right Ventricle: The right ventricle is normal in size measuring 3.1 cm at the base in the apical right ventricle-focused view. Global right ventricular systolic function appears mildly to moderately depressed. Tricuspid annular plane systolic excursion   (TAPSE) is 2.3 cm. The estimated PA systolic pressure is greater than 39 mmHg.       TTE 9/18/17  1 - Normal left ventricular systolic function  (EF 60-65%).     2 - No wall motion abnormalities.     3 - Normal left ventricular diastolic function.     4 - Right atrial enlargement.     5 - Right ventricular enlargement with moderately depressed systolic function.     6 - The estimated PA systolic pressure is 36 mmHg.     7 - Mild tricuspid regurgitation.   Right Ventricle: The right ventricle is enlarged measuring 4.1 cm at the base in the apical right ventricle-focused view. Global right ventricular systolic function appears moderately depressed. There is abnormal septal motion consistent with RV   pressure/volume overload. Tricuspid annular plane systolic excursion (TAPSE) is 1.7 cm. Tissue Doppler-derived tricuspid annular peak systolic velocity (S prime) is 11.0 cm/s. The estimated PA systolic pressure is 36 mmHg.           6mw 488 m 463m (457  1/18, 469m in Nov, 457 in Nov, 488m unchanged last 3 visits with me( 450 11/15/16,  457m, 518.5m, 533.75, 549m April, 463.6m prev visit)                                              O2 sat  97-> 96 % RA                                                          HR 82->141                                                     BP  135/78-->151/87                                                      Ab 2->9    R/LHC 1/17  PW:  (30)  RV: 102  RVEDP: 21     PA: 102/58  RA:  (28)  PA_SAT: 65  AO: 93/56  LV: 93  LVEDP: 14   FICKCO: 4.08  Normal cors    C 4/16  AOPRES: 122/85 (97)  AOSAT: 98  FICKCI: 2.81  FICKCO: 4.38  PAPRES: 101/34 (59)  PASAT: 71  PVR: 10.73  PWPRES: 14/8 (10)  RAPRES: 9/5 (4)  RVPRES: 91/-4, 8    TTE last visit   1 - Normal left ventricular systolic function (EF 60-65%).     2 - Right ventricular enlargement with mildly to moderately depressed systolic function.     3 - Normal left ventricular diastolic function.     4 - Pulmonary hypertension. The estimated PA systolic pressure is 41 mmHg.       TTE 3/4/16  1 - Normal left ventricular systolic function (EF 60-65%).   2 - Normal left ventricular  diastolic function.   3 - Right ventricle is upper limit of normal in size with low normal to mildly depressed systolic function. TAPSE 2.2 RV 3.7 cm   4 - Trivial to mild tricuspid regurgitation.   5 - Pulmonary hypertension. The estimated PA systolic pressure is 60 mmHg.     TTE Oct  1 - Normal left ventricular systolic function (EF 60-65%).   2 - Left ventricular diastolic dysfunction.   3 - Biatrial enlargement.   4 - Right ventricular enlargement with hypertrophy, with normal systolic function.   5 - Pulmonary hypertension.   6 - Trivial mitral regurgitation.   7 - Trivial tricuspid regurgitation.   PASp 46    CXR 2/3/16  Vascular catheter now identified, its tip in the superior vena cava just superior to its junction with the right atrium. Heart size is normal, as is the appearance of the pulmonary vascularity. Lung zones are clear, and free of significant airspace consolidation or volume loss. No pleural fluid. No hilar or mediastinal mass lesion. No pneumothorax.     VQ Scan 8/17/16: low probability      Review of Systems   Constitution: Negative for chills, fever, malaise/fatigue, night sweats, weight gain and weight loss.   HENT: Positive for congestion.    Eyes: Negative.    Cardiovascular: Positive for dyspnea on exertion (WHO FC II). Negative for chest pain, claudication, cyanosis, irregular heartbeat, leg swelling, near-syncope, orthopnea, palpitations, paroxysmal nocturnal dyspnea and syncope.   Respiratory: Positive for cough and wheezing. Negative for hemoptysis and shortness of breath.    Endocrine: Negative.    Skin: Negative.    Musculoskeletal: Negative.  Negative for joint pain.   Gastrointestinal: Negative for bloating, abdominal pain, change in bowel habit, constipation, hematemesis, hematochezia, melena and nausea.   Neurological: Negative for dizziness, light-headedness and weakness.   Psychiatric/Behavioral: Negative for depression.       Objective:   Blood pressure 112/70, pulse 72,  "height 4' 11" (1.499 m), weight 62.1 kg (136 lb 14.5 oz), SpO2 (!) 94 %.body mass index is 27.65 kg/m².    Physical Exam   Constitutional: She is oriented to person, place, and time. She appears well-developed.   HENT:   Head: Normocephalic.   Eyes: Pupils are equal, round, and reactive to light.   Neck: Normal range of motion. No JVD present.   Cardiovascular: Normal rate.   Murmur heard.  Pulmonary/Chest: Effort normal and breath sounds normal. No respiratory distress. She has no wheezes. She has no rales.   Abdominal: Soft. Bowel sounds are normal. She exhibits no distension. There is no abdominal tenderness. There is no rebound and no guarding.   Musculoskeletal: Normal range of motion.         General: No edema.   Neurological: She is alert and oriented to person, place, and time. She has normal reflexes. No cranial nerve deficit. Coordination normal.   Skin: Skin is warm and dry.       Lab Results   Component Value Date    WBC 5.40 11/30/2020    HGB 15.0 11/30/2020    HCT 45.9 11/30/2020    MCV 94 11/30/2020     11/30/2020    CO2 21 (L) 11/30/2020    CREATININE 0.8 11/30/2020    CALCIUM 8.2 (L) 11/30/2020    ALBUMIN 3.6 11/30/2020    AST 15 11/30/2020     (H) 11/30/2020    ALT 13 11/30/2020       Lab Results   Component Value Date    INR 1.0 09/29/2020    INR 0.9 09/21/2020    INR 0.9 09/04/2019       BNP   Date Value Ref Range Status   11/30/2020 131 (H) 0 - 99 pg/mL Final     Comment:     Values of less than 100 pg/ml are consistent with non-CHF populations.   09/21/2020 129 (H) 0 - 99 pg/mL Final     Comment:     Values of less than 100 pg/ml are consistent with non-CHF populations.   07/31/2020 121 (H) 0 - 99 pg/mL Final     Comment:     Values of less than 100 pg/ml are consistent with non-CHF populations.         Assessment:     1. Chronic pulmonary heart disease    2. Primary pulmonary hypertension    3. Long term current use of anticoagulant therapy      WHO group 1,  FC II-III    Plan: "   Discussed going back to adempas, patient wants to continue current regimen for now. Limited in going up on remodulin by side effects, but she is open to trying this. Will discuss with PH coordinator about this as well. Continue evaluation for LUT. If we are able to go up on dosing, will see how she does over the next 3 months.   Recommend 2 gram sodium restriction and 1500cc fluid restriction.  Encourage physical activity with graded exercise program.  Requested patient to weigh themselves daily, and to notify us if their weight increases by more than 3 lbs in 1 day or 5 lbs in 1 week.      Keshia Poe MD

## 2020-12-07 ENCOUNTER — OFFICE VISIT (OUTPATIENT)
Dept: NEUROLOGY | Facility: CLINIC | Age: 50
End: 2020-12-07
Payer: COMMERCIAL

## 2020-12-07 VITALS
HEART RATE: 92 BPM | SYSTOLIC BLOOD PRESSURE: 120 MMHG | HEIGHT: 59 IN | BODY MASS INDEX: 27.64 KG/M2 | DIASTOLIC BLOOD PRESSURE: 73 MMHG | WEIGHT: 137.13 LBS

## 2020-12-07 DIAGNOSIS — G43.009 MIGRAINE WITHOUT AURA AND WITHOUT STATUS MIGRAINOSUS, NOT INTRACTABLE: Primary | ICD-10-CM

## 2020-12-07 DIAGNOSIS — G44.229 CHRONIC TENSION-TYPE HEADACHE, NOT INTRACTABLE: ICD-10-CM

## 2020-12-07 PROCEDURE — 99999 PR PBB SHADOW E&M-EST. PATIENT-LVL III: CPT | Mod: PBBFAC,,, | Performed by: NEUROLOGICAL SURGERY

## 2020-12-07 PROCEDURE — 99214 OFFICE O/P EST MOD 30 MIN: CPT | Mod: S$GLB,,, | Performed by: NEUROLOGICAL SURGERY

## 2020-12-07 PROCEDURE — 1126F PR PAIN SEVERITY QUANTIFIED, NO PAIN PRESENT: ICD-10-PCS | Mod: S$GLB,,, | Performed by: NEUROLOGICAL SURGERY

## 2020-12-07 PROCEDURE — 3008F PR BODY MASS INDEX (BMI) DOCUMENTED: ICD-10-PCS | Mod: CPTII,S$GLB,, | Performed by: NEUROLOGICAL SURGERY

## 2020-12-07 PROCEDURE — 3008F BODY MASS INDEX DOCD: CPT | Mod: CPTII,S$GLB,, | Performed by: NEUROLOGICAL SURGERY

## 2020-12-07 PROCEDURE — 1126F AMNT PAIN NOTED NONE PRSNT: CPT | Mod: S$GLB,,, | Performed by: NEUROLOGICAL SURGERY

## 2020-12-07 PROCEDURE — 99999 PR PBB SHADOW E&M-EST. PATIENT-LVL III: ICD-10-PCS | Mod: PBBFAC,,, | Performed by: NEUROLOGICAL SURGERY

## 2020-12-07 PROCEDURE — 99214 PR OFFICE/OUTPT VISIT, EST, LEVL IV, 30-39 MIN: ICD-10-PCS | Mod: S$GLB,,, | Performed by: NEUROLOGICAL SURGERY

## 2020-12-07 RX ORDER — BUTALBITAL, ACETAMINOPHEN AND CAFFEINE 50; 325; 40 MG/1; MG/1; MG/1
1 TABLET ORAL EVERY 6 HOURS PRN
Qty: 40 TABLET | Refills: 5 | Status: SHIPPED | OUTPATIENT
Start: 2020-12-07 | End: 2021-11-01 | Stop reason: SDUPTHER

## 2020-12-07 RX ORDER — UBROGEPANT 100 MG/1
100 TABLET ORAL ONCE AS NEEDED
Qty: 10 TABLET | Refills: 2 | Status: SHIPPED | OUTPATIENT
Start: 2020-12-07 | End: 2020-12-07

## 2020-12-07 NOTE — PROGRESS NOTES
"Chief Complaint   Patient presents with    Headache        Yuni Perez is a 50 y.o. female with a history of multiple medical diagnoses as listed below that presents for evaluation of headaches. She is accompanied to this visit by her mother. She was diagnosed with pulmonary hypertension about 8 years ago and around the same time she was diagnosed with a seizure. She had workup at that time that included MRI that showed signs of "old injury" and she had EEG that showed "lots of spikes and waves" per her mother. She says that since that time she has been started on Topamax which has been titrated up. She has not been having any seizures but she has bene having some occasional word finding difficulty that she has attributed to the medication. She has tried tylenol because as she has been titrating her medication to treat pulmonary hypertension sh marie been having bilateral intense stabbing pains in her head. She does say that with time her headaches have been better but she has noticed many more headaches since she began the Remodulin. She has not taken any other headache medications in the past.    Interval History  11/17/2016  She has still been having headaches after taking her medications in particular her vasodilators. She has found that her PRN medications are somewhat helpful in minimizing the pain that she has from her headaches. She has not had any mew problems since she was last seen in clinic.    03/23/2017  Since last seen in clinic she has been approved to get on the lung transplant list and she will be making strides to get set up to to receive new lungs. She has been taking her topamax as directed and has felt that overall she has been having fewer headaches but she still has needed Tylenol and on rare occasions Fioricet to get rid of her headaches. She has been having some cognitive difficulty with her current medications but she feels that it is tolerable. She has not had any seizure " "like activity.    06/13/2017  Since last seen in clinic she says that she has been able to be placed on the donor list. She says that overall she feels that she has been stable with regards to her breathing. She continues to have chronic sinus issues that have been essentially refractory to any medications that she has tried including antihistamines and nasal rinses. She feels that she is able to clear her sinuses and have it come right back. She has headaches almost daily but despite the frequency of the headaches she feels that most are not migraines. She has not had any seizures since last seen. She is tolerating all of her medications well without any problems.    10/31/2017  She has continued to make preparations for lung transplant.  Said that she has been frustrated with the process and she does not have much guidance which is causing increased stress.  Headaches overall have been about the same, but are responsive to Fioricet.  She says the medication helps give her adequate relief of the pain.    03/22/2018  She feels that her headaches have been getting worse in the last week.  Overall prior to this week she feels that the headaches were about the same as when she was last seen in clinic and she was using Fioricet as needed which provided some relief from her headache pain.  In the last week however Fioricet is not provided much of any relief and headaches have been going on almost every day.  She says that she feels like she has "sinus headaches" which have lingered until she has started having pounding unilateral headaches that she feels her migraines.  These headaches seem to be much more frequent and much more intense that she is ever had in the past.    05/03/2018  headaches have been better as she has hardly had any since she was last seen in clinic. Lung transplant is till her recourse for treatm of pulmonary hypertension, but she has not been able to get placed on the transplant list just " yet.    11/06/2018  Headaches have still been frequent, but have been relieved effectively by using Fioricet. She has been taking her medications as directed without any complaints of side effects. She feels that the intensity has been about the same overall. Imitrex has been prescribed, but she has not used the medication yet to determine if it helps her headaches.    02/07/2019  She has been having headaches with about the same frequency as she had in the past, but they have continued to have some response to her current abortive regimen. She has not been comfortable with the CGRP receptor blockers as a treatment modality as she has been weary of using any injectable medication to treat her symptoms. She has been seeing her Pulmonary team as recommended.    05/09/2019  She presents to clinic for routine follow-up of her headaches.  The headaches be somewhat improved with her current medication regimen, but the ability river frequently.  She needs to use abortive medications several times per week to by relief in order to her level of functioning and her ability to care for her activities of daily where they are.    08/15/2019  Her headaches have been overall stable since she was last seen in clinic. She has been trying to use various OTC medications and prescription medications with intermittent effectivetness in alleviating her headaches. She has not been amendable to considering a CGRP modulating medication to control her headaches as she has been unwilling to commit in an injectable delivery for any medication.    12/13/2019  Since she has been placed on a new medication to address pulmonary hypertension she has had fewer headaches. headaches have been responding well to fioricet and she has not needed to use the sumatriptan as often as she had in the past. She has not had any change in her candidacy for lung transplant.    12/07/2020  Overall she feels that her headaches have been better since she was last  seen in clinic.  She is concerned however because she will soon be increasing the medication she uses to control her pulmonary hypertension.  Whenever this occurred she notices that she has had a sharp increase in her headache frequency and intensity.  Fioricet is been helpful to treat headaches when they have come about.  Sumatriptan has been used on emergency use.  She was recently denied once again for transplant.     PAST MEDICAL HISTORY:  Past Medical History:   Diagnosis Date    AR (allergic rhinitis)     Cholelithiasis, common bile duct     Chronic low back pain     Eye pressure 2017    GERD (gastroesophageal reflux disease)     History of migraine headaches     Hypothyroidism     Innocent heart murmur     Lumbar disc disease     Menorrhagia     Mild asthma     Obesity     Plantar fasciitis of left foot     Primary pulmonary hypertension     followed by heart transplant/pulmonary     Seizure disorder     x 1 in 2008    Seizures     Sleep apnea     TMJ (dislocation of temporomandibular joint)     Tricuspid regurgitation        PAST SURGICAL HISTORY:  Past Surgical History:   Procedure Laterality Date    CARDIAC CATHETERIZATION      CATHETERIZATION OF BOTH LEFT AND RIGHT HEART Bilateral 9/29/2020    Procedure: CATHETERIZATION, HEART, BOTH LEFT AND RIGHT;  Surgeon: Gucci Pickett MD;  Location: [facility] CATH LAB;  Service: Cardiology;  Laterality: Bilateral;    CENTRAL VENOUS CATHETER INSERTION      CORONARY ANGIOGRAPHY N/A 9/29/2020    Procedure: ANGIOGRAM, CORONARY ARTERY;  Surgeon: Gucci Pickett MD;  Location: [facility] CATH LAB;  Service: Cardiology;  Laterality: N/A;    gallbladder drain  06/2019    INSERTION OF HAYNES CATHETER Right 6/17/2020    Procedure: INSERTION, CATHETER, CENTRAL VENOUS, HAYNES Replace Single Lumen for Remodulin Infusion;  Surgeon: Bertin Dewitt MD;  Location: [facility] OR [address] [place] [address];  Service: General;  Laterality: Right;    PORTACATH PLACEMENT      REMOVAL OF  TUNNELED CENTRAL VENOUS CATHETER (CVC) N/A 6/17/2020    Procedure: REMOVAL, CATHETER, CENTRAL VENOUS, TUNNELED;  Surgeon: Bertin Dewitt MD;  Location: [facility] OR [address] [place] [address];  Service: General;  Laterality: N/A;    RIGHT HEART CATHETERIZATION Right 8/20/2018    Procedure: HEART CATH-RIGHT;  Surgeon: Ivonne Rai MD;  Location: [facility] CATH LAB;  Service: Cardiology;  Laterality: Right;    RIGHT HEART CATHETERIZATION Right 9/4/2019    Procedure: INSERTION, CATHETER, RIGHT HEART;  Surgeon: Ivonne Rai MD;  Location: [facility] CATH LAB;  Service: Cardiology;  Laterality: Right;    UPPER GASTROINTESTINAL ENDOSCOPY         SOCIAL HISTORY:  Social History     Socioeconomic History    Marital status: Single     Spouse name: Not on file    Number of children: 0    Years of education: Not on file    Highest education level: Not on file   Occupational History    Occupation: disabled     Employer: [employer]   Social Needs    Financial resource strain: Not on file    Food insecurity     Worry: Not on file     Inability: Not on file    Transportation needs     Medical: Not on file     Non-medical: Not on file   Tobacco Use    Smoking status: Never Smoker    Smokeless tobacco: Never Used   Substance and Sexual Activity    Alcohol use: No     Alcohol/week: 0.8 standard drinks     Types: 1 Standard drinks or equivalent per week     Comment: 1 per year    Drug use: No    Sexual activity: Never     Birth control/protection: OCP, Pill     Comment: pt is a virgin   Lifestyle    Physical activity     Days per week: Not on file     Minutes per session: Not on file    Stress: Not on file   Relationships    Social connections     Talks on phone: Not on file     Gets together: Not on file     Attends Hinduism service: Not on file     Active member of club or organization: Not on file     Attends meetings of clubs or organizations: Not on file     Relationship status: Not on file   Other Topics Concern    Not  on file   Social History Narrative    Not on file       FAMILY HISTORY:  Family History   Problem Relation Age of Onset    Heart disease Father     Glaucoma Father     Diabetes Mother     Cancer Maternal Grandmother         uterine    Cancer Maternal Aunt         breast    Breast cancer Maternal Aunt     Heart disease Paternal Grandfather     Heart attack Paternal Grandfather     Diabetes Paternal Grandfather     Leukemia Brother     Hypertension Unknown     Diabetes Maternal Grandfather     Heart attack Maternal Grandfather     Breast cancer Cousin     No Known Problems Sister     No Known Problems Maternal Uncle     No Known Problems Paternal Aunt     No Known Problems Paternal Uncle     No Known Problems Paternal Grandmother     Colon cancer Neg Hx     Ovarian cancer Neg Hx     Amblyopia Neg Hx     Blindness Neg Hx     Cataracts Neg Hx     Macular degeneration Neg Hx     Retinal detachment Neg Hx     Strabismus Neg Hx     Stroke Neg Hx     Thyroid disease Neg Hx     Esophageal cancer Neg Hx        ALLERGIES AND MEDICATIONS: updated and reviewed.  Review of patient's allergies indicates:   Allergen Reactions    Adhesive Hives     Silk tape    Amoxicillin Rash    Chlorhexidine Other (See Comments)     Current Outpatient Medications   Medication Sig Dispense Refill    acetaminophen (TYLENOL EXTRA STRENGTH) 500 MG tablet Take 1,000 mg by mouth every 6 (six) hours as needed for Pain.      ADCIRCA 20 mg Tab Take 2 tablets (40 mg total) by mouth once daily. 60 tablet 11    albuterol-ipratropium (DUO-NEB) 2.5 mg-0.5 mg/3 mL nebulizer solution Take 3 mLs by nebulization every 6 (six) hours as needed for Wheezing. Rescue 120 vial 5    ambrisentan (LETAIRIS) 10 MG Tab Take 1 tablet (10 mg total) by mouth once daily. (Patient taking differently: Take 10 mg by mouth every morning. Patient takes about 10am daily) 30 tablet 11    azelastine (ASTELIN) 137 mcg (0.1 %) nasal spray 2 sprays by  Nasal route 2 (two) times daily. Patient uses prn in the evening      butalbital-acetaminophen-caffeine -40 mg (FIORICET, ESGIC) -40 mg per tablet Take 1 tablet by mouth every 6 (six) hours as needed for Pain. 40 tablet 5    citalopram (CELEXA) 10 MG tablet Take 10 mg by mouth every evening.       cyanocobalamin, vitamin B-12, 2,500 mcg Subl Place 2,500 mcg under the tongue every morning.       diphenhydrAMINE (BENADRYL) 25 mg capsule Take 50 mg by mouth every 6 (six) hours as needed for Itching. Patient takes prn evenings      ferrous sulfate 325 (65 FE) MG EC tablet Take 325 mg by mouth daily as needed.       fluticasone (FLONASE) 50 mcg/actuation nasal spray INHALE 2 SPRAYS IN EACH NOSTRIL DAILY (Patient taking differently: every evening. ) 16 g 3    fluticasone furoate-vilanteroL (BREO ELLIPTA) 200-25 mcg/dose DsDv diskus inhaler Inhale 1 puff into the lungs every evening. Controller 60 each 5    folic acid (FOLVITE) 1 MG tablet Take 1 tablet (1,000 mcg total) by mouth once daily. (Patient taking differently: Take 1,000 mcg by mouth every morning. ) 90 tablet 3    hyoscyamine (LEVSIN/SL) 0.125 mg Subl Place 0.125 mg under the tongue every 6 (six) hours as needed.       levothyroxine (SYNTHROID) 125 MCG tablet Take 1 tablet (125 mcg total) by mouth once daily. 90 tablet 0    loratadine (CLARITIN) 10 mg tablet Take 10 mg by mouth every evening.       norethindrone-ethinyl estradiol (MICROGESTIN FE 1/20, 28,) 1 mg-20 mcg (21)/75 mg (7) per tablet Take 1 tablet by mouth once daily. 28 tablet 6    ondansetron (ZOFRAN) 4 MG tablet Take 1 tablet (4 mg total) by mouth every 6 (six) hours as needed. 1 Tablet Oral Every 6 hours 30 tablet 2    pantoprazole (PROTONIX) 40 MG tablet TAKE ONE TABLET BY MOUTH ONCE DAILY 90 tablet 1    sumatriptan (IMITREX) 100 MG tablet Take 1 tablet (100 mg total) by mouth as needed for Migraine. Take at the onset of headache, may take 1 more in 1 hour if needed. 12  tablet 5    topiramate (TOPAMAX) 100 MG tablet Take 1 tablet (100 mg total) by mouth 2 (two) times daily. 60 tablet 11    treprostinil (REMODULIN) 2.5 mg/mL Soln Mix cassette as directed and infuse continuously per physician titration orders on dosing sheet. Current dose 80ng/kg/min 60 mL 11    ubrogepant (UBRELVY)tablet 100 mg Take 1 tablet (100 mg total) by mouth once as needed for Migraine. 10 tablet 2     No current facility-administered medications for this visit.        Review of Systems   Constitutional: Negative for activity change, appetite change, fever and unexpected weight change.   HENT: Negative for trouble swallowing and voice change.    Eyes: Negative for photophobia and visual disturbance.   Respiratory: Negative for apnea and shortness of breath.    Cardiovascular: Negative for chest pain and leg swelling.   Gastrointestinal: Negative for constipation and nausea.   Genitourinary: Negative for difficulty urinating.   Musculoskeletal: Negative for back pain, gait problem and neck pain.   Skin: Negative for color change and pallor.   Neurological: Positive for dizziness and headaches. Negative for seizures, syncope, weakness and numbness.   Hematological: Negative for adenopathy.   Psychiatric/Behavioral: Negative for agitation, confusion and decreased concentration.       Neurologic Exam     Mental Status   Oriented to person, place, and time.   Registration: recalls 3 of 3 objects.   Attention: normal. Concentration: normal.   Speech: speech is normal   Level of consciousness: alert  Knowledge: good.     Cranial Nerves     CN II   Visual fields full to confrontation.   Right visual field deficit: none  Left visual field deficit: none     CN III, IV, VI   Pupils are equal, round, and reactive to light.  Extraocular motions are normal.   Right pupil: Size: 3 mm. Shape: regular. Accommodation: intact.   Left pupil: Size: 3 mm. Shape: regular. Accommodation: intact.   CN III: no CN III palsy  CN VI:  no CN VI palsy  Nystagmus: none   Diplopia: none  Ophthalmoparesis: none  Upgaze: normal  Downgaze: normal  Conjugate gaze: present    CN V   Facial sensation intact.   Right facial sensation deficit: none  Left facial sensation deficit: none    CN VII   Facial expression full, symmetric.   Right facial weakness: none  Left facial weakness: none    CN VIII   CN VIII normal.     CN IX, X   CN IX normal.   CN X normal.   Palate: symmetric    CN XI   CN XI normal.   Right sternocleidomastoid strength: normal  Left sternocleidomastoid strength: normal  Right trapezius strength: normal  Left trapezius strength: normal    CN XII   CN XII normal.   Tongue deviation: none    Motor Exam   Muscle bulk: normal  Overall muscle tone: normal  Right arm tone: normal  Left arm tone: normal  Right leg tone: normal  Left leg tone: normal    Strength   Strength 5/5 throughout.     Sensory Exam   Right arm light touch: normal  Left arm light touch: normal  Right leg light touch: normal  Left leg light touch: normal  Right arm vibration: normal  Left arm vibration: normal  Right leg vibration: normal  Left leg vibration: normal  Right arm proprioception: normal  Left arm proprioception: normal  Right leg proprioception: normal  Left leg proprioception: normal  Right arm pinprick: normal  Left arm pinprick: normal  Right leg pinprick: normal  Left leg pinprick: normal    Gait, Coordination, and Reflexes     Gait  Gait: normal    Coordination   Romberg: negative  Finger to nose coordination: normal  Heel to shin coordination: normal  Tandem walking coordination: normal    Tremor   Resting tremor: absent    Reflexes   Right brachioradialis: 2+  Left brachioradialis: 2+  Right biceps: 2+  Left biceps: 2+  Right triceps: 2+  Left triceps: 2+  Right patellar: 2+  Left patellar: 2+  Right achilles: 2+  Left achilles: 2+  Right plantar: normal  Left plantar: normal      Physical Exam  Vitals signs reviewed.   Constitutional:       Appearance:  "She is well-developed.   HENT:      Head: Normocephalic and atraumatic.   Eyes:      Extraocular Movements: EOM normal.      Pupils: Pupils are equal, round, and reactive to light.   Neck:      Musculoskeletal: Normal range of motion.   Cardiovascular:      Rate and Rhythm: Normal rate.   Pulmonary:      Effort: Pulmonary effort is normal. No respiratory distress.   Musculoskeletal: Normal range of motion.   Skin:     General: Skin is warm and dry.   Neurological:      Mental Status: She is alert and oriented to person, place, and time.      Coordination: Finger-Nose-Finger Test, Heel to Shin Test and Romberg Test normal.      Gait: Gait is intact. Tandem walk normal.      Deep Tendon Reflexes: Strength normal.      Reflex Scores:       Tricep reflexes are 2+ on the right side and 2+ on the left side.       Bicep reflexes are 2+ on the right side and 2+ on the left side.       Brachioradialis reflexes are 2+ on the right side and 2+ on the left side.       Patellar reflexes are 2+ on the right side and 2+ on the left side.       Achilles reflexes are 2+ on the right side and 2+ on the left side.  Psychiatric:         Speech: Speech normal.         Behavior: Behavior normal.         Thought Content: Thought content normal.         Vitals:    12/07/20 1120   BP: 120/73   BP Location: Right arm   Patient Position: Sitting   BP Method: Large (Automatic)   Pulse: 92   Weight: 62.2 kg (137 lb 2 oz)   Height: 4' 11" (1.499 m)       Assessment & Plan:  Problem List Items Addressed This Visit     Migraine without aura and without status migrainosus, not intractable - Primary    Overview     Despite frequency of headaches few have come in the form of migraine headaches.  She will benefit from Ubrelvy as a headache abortive rather than sumatriptan her to reduce the possibility of a vasoconstriction peripherally.         Relevant Medications    ubrogepant (UBRELVY)tablet 100 mg    butalbital-acetaminophen-caffeine -40 mg " (FIORICET, ESGIC) -40 mg per tablet    Chronic nonintractable headache    Overview     Daily headaches likely rebound from her medications, tension type, and sinus headaches.             Discussed use of CRGP directed therapies in order to treat her headaches, but she has been reluctant to accept any therapies that involve the use of needles.    Follow-up: Follow up in about 6 months (around 6/7/2021).   More than 50% of this 25 minute encounter was spent in counseling and coordinating care of headaches.

## 2020-12-15 ENCOUNTER — TELEPHONE (OUTPATIENT)
Dept: TRANSPLANT | Facility: CLINIC | Age: 50
End: 2020-12-15

## 2020-12-15 DIAGNOSIS — J30.0 NON-ALLERGIC VASOMOTOR RHINITIS: Primary | ICD-10-CM

## 2020-12-15 RX ORDER — FLUTICASONE PROPIONATE 50 MCG
2 SPRAY, SUSPENSION (ML) NASAL NIGHTLY
Qty: 16 G | Refills: 11 | Status: SHIPPED | OUTPATIENT
Start: 2020-12-15

## 2020-12-17 DIAGNOSIS — E03.9 HYPOTHYROIDISM (ACQUIRED): ICD-10-CM

## 2020-12-17 RX ORDER — LEVOTHYROXINE SODIUM 125 UG/1
TABLET ORAL
Qty: 90 TABLET | Refills: 1 | Status: SHIPPED | OUTPATIENT
Start: 2020-12-17 | End: 2021-06-07 | Stop reason: SDUPTHER

## 2020-12-23 ENCOUNTER — TELEPHONE (OUTPATIENT)
Dept: TRANSPLANT | Facility: CLINIC | Age: 50
End: 2020-12-23

## 2020-12-24 DIAGNOSIS — K21.9 GASTROESOPHAGEAL REFLUX DISEASE: ICD-10-CM

## 2020-12-24 RX ORDER — UBROGEPANT 100 MG/1
TABLET ORAL
COMMUNITY
Start: 2020-12-10

## 2020-12-24 RX ORDER — PANTOPRAZOLE SODIUM 40 MG/1
TABLET, DELAYED RELEASE ORAL
Qty: 90 TABLET | Refills: 1 | Status: SHIPPED | OUTPATIENT
Start: 2020-12-24 | End: 2021-06-07 | Stop reason: SDUPTHER

## 2020-12-24 NOTE — TELEPHONE ENCOUNTER
Contacted patient and notified her of her varicella IgG result and other serologies (Hepatitis panel & HIV).    ----- Message from SULY Razo, RN sent at 12/7/2020  8:08 AM CST -----  Thank you!  ----- Message -----  From: Cheryl Euceda RN  Sent: 12/4/2020   6:09 PM CST  To: SULY Razo, RN    I will call her.  ----- Message -----  From: SULY Razo, RN  Sent: 12/4/2020  10:28 AM CST  To: Cheryl Euceda RN    Good morning-    Ms Perez was asking if somebody could review the titers she had recently, w/regard to immunization, etc. Is this something your office is able to help her with?    Thank you,  Sarah

## 2021-01-06 ENCOUNTER — DOCUMENTATION ONLY (OUTPATIENT)
Dept: TRANSPLANT | Facility: CLINIC | Age: 51
End: 2021-01-06

## 2021-01-08 LAB
FINAL PATHOLOGIC DIAGNOSIS: NORMAL
Lab: NORMAL

## 2021-01-13 ENCOUNTER — TELEPHONE (OUTPATIENT)
Dept: OBSTETRICS AND GYNECOLOGY | Facility: CLINIC | Age: 51
End: 2021-01-13

## 2021-01-13 ENCOUNTER — TELEPHONE (OUTPATIENT)
Dept: TRANSPLANT | Facility: CLINIC | Age: 51
End: 2021-01-13

## 2021-01-13 DIAGNOSIS — N92.1 MENORRHAGIA WITH IRREGULAR CYCLE: ICD-10-CM

## 2021-01-13 RX ORDER — NORETHINDRONE ACETATE AND ETHINYL ESTRADIOL 1MG-20(21)
1 KIT ORAL DAILY
Qty: 28 TABLET | Refills: 6 | Status: SHIPPED | OUTPATIENT
Start: 2021-01-13 | End: 2021-07-19 | Stop reason: SDUPTHER

## 2021-01-15 ENCOUNTER — SOCIAL WORK (OUTPATIENT)
Dept: TRANSPLANT | Facility: HOSPITAL | Age: 51
End: 2021-01-15

## 2021-01-15 ENCOUNTER — TELEPHONE (OUTPATIENT)
Dept: TRANSPLANT | Facility: CLINIC | Age: 51
End: 2021-01-15

## 2021-01-19 ENCOUNTER — TELEPHONE (OUTPATIENT)
Dept: TRANSPLANT | Facility: CLINIC | Age: 51
End: 2021-01-19

## 2021-01-21 ENCOUNTER — TELEPHONE (OUTPATIENT)
Dept: TRANSPLANT | Facility: CLINIC | Age: 51
End: 2021-01-21

## 2021-01-26 ENCOUNTER — CONFERENCE (OUTPATIENT)
Dept: TRANSPLANT | Facility: CLINIC | Age: 51
End: 2021-01-26

## 2021-01-29 ENCOUNTER — TELEPHONE (OUTPATIENT)
Dept: TRANSPLANT | Facility: CLINIC | Age: 51
End: 2021-01-29

## 2021-02-02 ENCOUNTER — TELEPHONE (OUTPATIENT)
Dept: TRANSPLANT | Facility: CLINIC | Age: 51
End: 2021-02-02

## 2021-02-03 ENCOUNTER — TELEPHONE (OUTPATIENT)
Dept: TRANSPLANT | Facility: CLINIC | Age: 51
End: 2021-02-03

## 2021-02-04 ENCOUNTER — TELEPHONE (OUTPATIENT)
Dept: TRANSPLANT | Facility: CLINIC | Age: 51
End: 2021-02-04

## 2021-02-09 ENCOUNTER — TELEPHONE (OUTPATIENT)
Dept: TRANSPLANT | Facility: CLINIC | Age: 51
End: 2021-02-09

## 2021-02-19 ENCOUNTER — DOCUMENTATION ONLY (OUTPATIENT)
Dept: TRANSPLANT | Facility: CLINIC | Age: 51
End: 2021-02-19

## 2021-02-19 ENCOUNTER — TELEPHONE (OUTPATIENT)
Dept: TRANSPLANT | Facility: CLINIC | Age: 51
End: 2021-02-19

## 2021-03-11 ENCOUNTER — TELEPHONE (OUTPATIENT)
Dept: TRANSPLANT | Facility: CLINIC | Age: 51
End: 2021-03-11

## 2021-03-11 DIAGNOSIS — R06.02 SHORTNESS OF BREATH: Primary | ICD-10-CM

## 2021-03-12 ENCOUNTER — LAB VISIT (OUTPATIENT)
Dept: LAB | Facility: HOSPITAL | Age: 51
End: 2021-03-12
Payer: COMMERCIAL

## 2021-03-12 ENCOUNTER — TELEPHONE (OUTPATIENT)
Dept: TRANSPLANT | Facility: CLINIC | Age: 51
End: 2021-03-12

## 2021-03-12 DIAGNOSIS — R06.82 TACHYPNEA: ICD-10-CM

## 2021-03-12 DIAGNOSIS — Z79.899 POLYPHARMACY: ICD-10-CM

## 2021-03-12 LAB
ALBUMIN SERPL BCP-MCNC: 3.7 G/DL (ref 3.5–5.2)
ALP SERPL-CCNC: 75 U/L (ref 55–135)
ALT SERPL W/O P-5'-P-CCNC: 11 U/L (ref 10–44)
ANION GAP SERPL CALC-SCNC: 7 MMOL/L (ref 8–16)
AST SERPL-CCNC: 13 U/L (ref 10–40)
BASOPHILS # BLD AUTO: 0.03 K/UL (ref 0–0.2)
BASOPHILS NFR BLD: 0.5 % (ref 0–1.9)
BILIRUB SERPL-MCNC: 0.3 MG/DL (ref 0.1–1)
BNP SERPL-MCNC: 81 PG/ML (ref 0–99)
BUN SERPL-MCNC: 11 MG/DL (ref 6–20)
CALCIUM SERPL-MCNC: 8.6 MG/DL (ref 8.7–10.5)
CHLORIDE SERPL-SCNC: 111 MMOL/L (ref 95–110)
CO2 SERPL-SCNC: 21 MMOL/L (ref 23–29)
CREAT SERPL-MCNC: 0.8 MG/DL (ref 0.5–1.4)
DIFFERENTIAL METHOD: ABNORMAL
EOSINOPHIL # BLD AUTO: 0.3 K/UL (ref 0–0.5)
EOSINOPHIL NFR BLD: 5.3 % (ref 0–8)
ERYTHROCYTE [DISTWIDTH] IN BLOOD BY AUTOMATED COUNT: 13.6 % (ref 11.5–14.5)
EST. GFR  (AFRICAN AMERICAN): >60 ML/MIN/1.73 M^2
EST. GFR  (NON AFRICAN AMERICAN): >60 ML/MIN/1.73 M^2
GLUCOSE SERPL-MCNC: 92 MG/DL (ref 70–110)
HCT VFR BLD AUTO: 44.7 % (ref 37–48.5)
HGB BLD-MCNC: 15.2 G/DL (ref 12–16)
IMM GRANULOCYTES # BLD AUTO: 0.02 K/UL (ref 0–0.04)
IMM GRANULOCYTES NFR BLD AUTO: 0.4 % (ref 0–0.5)
LYMPHOCYTES # BLD AUTO: 1.4 K/UL (ref 1–4.8)
LYMPHOCYTES NFR BLD: 24.9 % (ref 18–48)
MCH RBC QN AUTO: 31.2 PG (ref 27–31)
MCHC RBC AUTO-ENTMCNC: 34 G/DL (ref 32–36)
MCV RBC AUTO: 92 FL (ref 82–98)
MONOCYTES # BLD AUTO: 0.5 K/UL (ref 0.3–1)
MONOCYTES NFR BLD: 9.3 % (ref 4–15)
NEUTROPHILS # BLD AUTO: 3.3 K/UL (ref 1.8–7.7)
NEUTROPHILS NFR BLD: 59.6 % (ref 38–73)
NRBC BLD-RTO: 0 /100 WBC
PLATELET # BLD AUTO: 196 K/UL (ref 150–350)
PMV BLD AUTO: 12.2 FL (ref 9.2–12.9)
POTASSIUM SERPL-SCNC: 3.7 MMOL/L (ref 3.5–5.1)
PROT SERPL-MCNC: 7.5 G/DL (ref 6–8.4)
RBC # BLD AUTO: 4.87 M/UL (ref 4–5.4)
SODIUM SERPL-SCNC: 139 MMOL/L (ref 136–145)
T4 SERPL-MCNC: 14.7 UG/DL (ref 4.5–11.5)
TSH SERPL DL<=0.005 MIU/L-ACNC: 0.78 UIU/ML (ref 0.4–4)
WBC # BLD AUTO: 5.51 K/UL (ref 3.9–12.7)

## 2021-03-12 PROCEDURE — 80053 COMPREHEN METABOLIC PANEL: CPT | Performed by: INTERNAL MEDICINE

## 2021-03-12 PROCEDURE — 83880 ASSAY OF NATRIURETIC PEPTIDE: CPT | Performed by: INTERNAL MEDICINE

## 2021-03-12 PROCEDURE — 85025 COMPLETE CBC W/AUTO DIFF WBC: CPT | Performed by: INTERNAL MEDICINE

## 2021-03-12 PROCEDURE — 36415 COLL VENOUS BLD VENIPUNCTURE: CPT | Performed by: INTERNAL MEDICINE

## 2021-03-12 PROCEDURE — 84443 ASSAY THYROID STIM HORMONE: CPT | Performed by: INTERNAL MEDICINE

## 2021-03-12 PROCEDURE — 84436 ASSAY OF TOTAL THYROXINE: CPT | Performed by: INTERNAL MEDICINE

## 2021-03-19 ENCOUNTER — HOSPITAL ENCOUNTER (OUTPATIENT)
Dept: PULMONOLOGY | Facility: CLINIC | Age: 51
Discharge: HOME | End: 2021-03-19
Payer: COMMERCIAL

## 2021-03-19 ENCOUNTER — OFFICE VISIT (OUTPATIENT)
Dept: TRANSPLANT | Facility: CLINIC | Age: 51
End: 2021-03-19
Payer: COMMERCIAL

## 2021-03-19 ENCOUNTER — HOSPITAL ENCOUNTER (OUTPATIENT)
Dept: CARDIOLOGY | Facility: HOSPITAL | Age: 51
Discharge: HOME | End: 2021-03-19
Attending: INTERNAL MEDICINE
Payer: COMMERCIAL

## 2021-03-19 VITALS
OXYGEN SATURATION: 95 % | DIASTOLIC BLOOD PRESSURE: 69 MMHG | BODY MASS INDEX: 26.61 KG/M2 | HEIGHT: 59 IN | HEART RATE: 90 BPM | WEIGHT: 132 LBS | HEIGHT: 59 IN | BODY MASS INDEX: 27.82 KG/M2 | SYSTOLIC BLOOD PRESSURE: 103 MMHG | WEIGHT: 138 LBS

## 2021-03-19 VITALS
DIASTOLIC BLOOD PRESSURE: 62 MMHG | SYSTOLIC BLOOD PRESSURE: 110 MMHG | WEIGHT: 137 LBS | HEIGHT: 59 IN | BODY MASS INDEX: 27.62 KG/M2 | HEART RATE: 91 BPM

## 2021-03-19 DIAGNOSIS — I27.9 CHRONIC PULMONARY HEART DISEASE: ICD-10-CM

## 2021-03-19 DIAGNOSIS — J96.11 CHRONIC RESPIRATORY FAILURE WITH HYPOXIA: ICD-10-CM

## 2021-03-19 DIAGNOSIS — G47.33 OSA (OBSTRUCTIVE SLEEP APNEA): ICD-10-CM

## 2021-03-19 DIAGNOSIS — R06.02 SHORTNESS OF BREATH: ICD-10-CM

## 2021-03-19 DIAGNOSIS — I27.0 PRIMARY PULMONARY HYPERTENSION: Primary | ICD-10-CM

## 2021-03-19 DIAGNOSIS — I27.0 PRIMARY PULMONARY HYPERTENSION: ICD-10-CM

## 2021-03-19 LAB
ASCENDING AORTA: 2.39 CM
AV INDEX (PROSTH): 0.73
AV MEAN GRADIENT: 5 MMHG
AV PEAK GRADIENT: 8 MMHG
AV VALVE AREA: 2.4 CM2
AV VELOCITY RATIO: 0.91
BSA FOR ECHO PROCEDURE: 1.61 M2
CV ECHO LV RWT: 0.42 CM
DOP CALC AO PEAK VEL: 1.43 M/S
DOP CALC AO VTI: 26.84 CM
DOP CALC LVOT AREA: 3.3 CM2
DOP CALC LVOT DIAMETER: 2.05 CM
DOP CALC LVOT PEAK VEL: 1.3 M/S
DOP CALC LVOT STROKE VOLUME: 64.53 CM3
DOP CALCLVOT PEAK VEL VTI: 19.56 CM
E WAVE DECELERATION TIME: 164.45 MSEC
E/A RATIO: 1.21
E/E' RATIO: 9.79 M/S
ECHO LV POSTERIOR WALL: 0.73 CM (ref 0.6–1.1)
FRACTIONAL SHORTENING: 47 % (ref 28–44)
INTERVENTRICULAR SEPTUM: 0.85 CM (ref 0.6–1.1)
LA MAJOR: 4.62 CM
LA MINOR: 3.93 CM
LA WIDTH: 3.68 CM
LEFT ATRIUM SIZE: 2.96 CM
LEFT ATRIUM VOLUME INDEX MOD: 22.8 ML/M2
LEFT ATRIUM VOLUME INDEX: 25 ML/M2
LEFT ATRIUM VOLUME MOD: 35.72 CM3
LEFT ATRIUM VOLUME: 39.32 CM3
LEFT INTERNAL DIMENSION IN SYSTOLE: 1.86 CM (ref 2.1–4)
LEFT VENTRICLE DIASTOLIC VOLUME INDEX: 32.05 ML/M2
LEFT VENTRICLE DIASTOLIC VOLUME: 50.32 ML
LEFT VENTRICLE MASS INDEX: 47 G/M2
LEFT VENTRICLE SYSTOLIC VOLUME INDEX: 6.7 ML/M2
LEFT VENTRICLE SYSTOLIC VOLUME: 10.51 ML
LEFT VENTRICULAR INTERNAL DIMENSION IN DIASTOLE: 3.48 CM (ref 3.5–6)
LEFT VENTRICULAR MASS: 73.33 G
LV LATERAL E/E' RATIO: 8.45 M/S
LV SEPTAL E/E' RATIO: 11.63 M/S
MV A" WAVE DURATION": 6.57 MSEC
MV PEAK A VEL: 0.77 M/S
MV PEAK E VEL: 0.93 M/S
MV STENOSIS PRESSURE HALF TIME: 47.69 MS
MV VALVE AREA P 1/2 METHOD: 4.61 CM2
PISA TR MAX VEL: 5.27 M/S
PULM VEIN S/D RATIO: 1.1
PV PEAK D VEL: 0.42 M/S
PV PEAK S VEL: 0.46 M/S
RA MAJOR: 4.66 CM
RA PRESSURE: 3 MMHG
RA WIDTH: 4.72 CM
RIGHT VENTRICULAR END-DIASTOLIC DIMENSION: 5.3 CM
RV TISSUE DOPPLER FREE WALL SYSTOLIC VELOCITY 1 (APICAL 4 CHAMBER VIEW): 9.26 CM/S
SINUS: 2.61 CM
STJ: 2.32 CM
TDI LATERAL: 0.11 M/S
TDI SEPTAL: 0.08 M/S
TDI: 0.1 M/S
TR MAX PG: 111 MMHG
TRICUSPID ANNULAR PLANE SYSTOLIC EXCURSION: 1.57 CM
TV REST PULMONARY ARTERY PRESSURE: 114 MMHG

## 2021-03-19 PROCEDURE — 94618 PULMONARY STRESS TESTING: ICD-10-PCS | Mod: S$GLB,,, | Performed by: INTERNAL MEDICINE

## 2021-03-19 PROCEDURE — 93306 TTE W/DOPPLER COMPLETE: CPT | Mod: 26,,, | Performed by: INTERNAL MEDICINE

## 2021-03-19 PROCEDURE — 99999 PR PBB SHADOW E&M-EST. PATIENT-LVL IV: CPT | Mod: PBBFAC,,, | Performed by: INTERNAL MEDICINE

## 2021-03-19 PROCEDURE — 94618 PULMONARY STRESS TESTING: CPT | Mod: S$GLB,,, | Performed by: INTERNAL MEDICINE

## 2021-03-19 PROCEDURE — 99214 PR OFFICE/OUTPT VISIT, EST, LEVL IV, 30-39 MIN: ICD-10-PCS | Mod: S$GLB,,, | Performed by: INTERNAL MEDICINE

## 2021-03-19 PROCEDURE — 99999 PR PBB SHADOW E&M-EST. PATIENT-LVL IV: ICD-10-PCS | Mod: PBBFAC,,, | Performed by: INTERNAL MEDICINE

## 2021-03-19 PROCEDURE — 1126F AMNT PAIN NOTED NONE PRSNT: CPT | Mod: S$GLB,,, | Performed by: INTERNAL MEDICINE

## 2021-03-19 PROCEDURE — 3008F BODY MASS INDEX DOCD: CPT | Mod: CPTII,S$GLB,, | Performed by: INTERNAL MEDICINE

## 2021-03-19 PROCEDURE — 93306 TTE W/DOPPLER COMPLETE: CPT

## 2021-03-19 PROCEDURE — 3008F PR BODY MASS INDEX (BMI) DOCUMENTED: ICD-10-PCS | Mod: CPTII,S$GLB,, | Performed by: INTERNAL MEDICINE

## 2021-03-19 PROCEDURE — 1126F PR PAIN SEVERITY QUANTIFIED, NO PAIN PRESENT: ICD-10-PCS | Mod: S$GLB,,, | Performed by: INTERNAL MEDICINE

## 2021-03-19 PROCEDURE — 99214 OFFICE O/P EST MOD 30 MIN: CPT | Mod: S$GLB,,, | Performed by: INTERNAL MEDICINE

## 2021-03-19 PROCEDURE — 93306 ECHO (CUPID ONLY): ICD-10-PCS | Mod: 26,,, | Performed by: INTERNAL MEDICINE

## 2021-04-07 DIAGNOSIS — I27.0 PRIMARY PULMONARY HYPERTENSION: ICD-10-CM

## 2021-04-07 RX ORDER — AMBRISENTAN 10 MG/1
10 TABLET, FILM COATED ORAL DAILY
Qty: 30 TABLET | Refills: 11
Start: 2021-04-07 | End: 2022-10-21 | Stop reason: SDUPTHER

## 2021-04-08 ENCOUNTER — TELEPHONE (OUTPATIENT)
Dept: TRANSPLANT | Facility: CLINIC | Age: 51
End: 2021-04-08

## 2021-04-08 RX ORDER — FLUTICASONE FUROATE AND VILANTEROL TRIFENATATE 200; 25 UG/1; UG/1
1 POWDER RESPIRATORY (INHALATION) NIGHTLY
Qty: 60 EACH | Refills: 1 | Status: SHIPPED | OUTPATIENT
Start: 2021-04-08 | End: 2021-05-05

## 2021-04-17 ENCOUNTER — PATIENT OUTREACH (OUTPATIENT)
Dept: ADMINISTRATIVE | Facility: OTHER | Age: 51
End: 2021-04-17

## 2021-04-17 DIAGNOSIS — Z12.11 ENCOUNTER FOR FIT (FECAL IMMUNOCHEMICAL TEST) SCREENING: Primary | ICD-10-CM

## 2021-05-05 RX ORDER — FLUTICASONE FUROATE AND VILANTEROL TRIFENATATE 200; 25 UG/1; UG/1
POWDER RESPIRATORY (INHALATION)
Qty: 90 EACH | Refills: 0 | Status: SHIPPED | OUTPATIENT
Start: 2021-05-05 | End: 2021-06-07 | Stop reason: SDUPTHER

## 2021-05-12 ENCOUNTER — EPISODE CHANGES (OUTPATIENT)
Dept: TRANSPLANT | Facility: CLINIC | Age: 51
End: 2021-05-12

## 2021-05-24 ENCOUNTER — PATIENT OUTREACH (OUTPATIENT)
Dept: ADMINISTRATIVE | Facility: HOSPITAL | Age: 51
End: 2021-05-24

## 2021-05-24 DIAGNOSIS — Z13.820 ENCOUNTER FOR SCREENING FOR OSTEOPOROSIS: Primary | ICD-10-CM

## 2021-05-26 DIAGNOSIS — I27.0 PRIMARY PULMONARY HYPERTENSION: ICD-10-CM

## 2021-05-26 RX ORDER — FOLIC ACID 1 MG/1
TABLET ORAL
Qty: 90 TABLET | Refills: 3 | Status: SHIPPED | OUTPATIENT
Start: 2021-05-26 | End: 2022-06-02

## 2021-06-01 PROBLEM — I27.20 PULMONARY HYPERTENSION: Status: RESOLVED | Noted: 2020-06-17 | Resolved: 2021-06-01

## 2021-06-07 ENCOUNTER — OFFICE VISIT (OUTPATIENT)
Dept: FAMILY MEDICINE | Facility: CLINIC | Age: 51
End: 2021-06-07
Payer: COMMERCIAL

## 2021-06-07 VITALS
BODY MASS INDEX: 27.12 KG/M2 | SYSTOLIC BLOOD PRESSURE: 110 MMHG | HEIGHT: 59 IN | RESPIRATION RATE: 18 BRPM | DIASTOLIC BLOOD PRESSURE: 70 MMHG | HEART RATE: 97 BPM | WEIGHT: 134.5 LBS | OXYGEN SATURATION: 96 %

## 2021-06-07 DIAGNOSIS — K21.9 GASTROESOPHAGEAL REFLUX DISEASE, UNSPECIFIED WHETHER ESOPHAGITIS PRESENT: ICD-10-CM

## 2021-06-07 DIAGNOSIS — E66.3 OVERWEIGHT (BMI 25.0-29.9): ICD-10-CM

## 2021-06-07 DIAGNOSIS — Z12.11 COLON CANCER SCREENING: ICD-10-CM

## 2021-06-07 DIAGNOSIS — M85.80 BORDERLINE OSTEOPENIA: ICD-10-CM

## 2021-06-07 DIAGNOSIS — G47.33 OSA (OBSTRUCTIVE SLEEP APNEA): ICD-10-CM

## 2021-06-07 DIAGNOSIS — J96.11 CHRONIC RESPIRATORY FAILURE WITH HYPOXIA: ICD-10-CM

## 2021-06-07 DIAGNOSIS — Z12.31 ENCOUNTER FOR SCREENING MAMMOGRAM FOR BREAST CANCER: ICD-10-CM

## 2021-06-07 DIAGNOSIS — E03.9 HYPOTHYROIDISM (ACQUIRED): ICD-10-CM

## 2021-06-07 DIAGNOSIS — Z00.00 ROUTINE MEDICAL EXAM: Primary | ICD-10-CM

## 2021-06-07 DIAGNOSIS — G43.009 MIGRAINE WITHOUT AURA AND WITHOUT STATUS MIGRAINOSUS, NOT INTRACTABLE: ICD-10-CM

## 2021-06-07 DIAGNOSIS — Z23 NEED FOR SHINGLES VACCINE: ICD-10-CM

## 2021-06-07 DIAGNOSIS — I27.0 PRIMARY PULMONARY HYPERTENSION: ICD-10-CM

## 2021-06-07 PROCEDURE — 99396 PR PREVENTIVE VISIT,EST,40-64: ICD-10-PCS | Mod: S$GLB,,, | Performed by: INTERNAL MEDICINE

## 2021-06-07 PROCEDURE — 3008F PR BODY MASS INDEX (BMI) DOCUMENTED: ICD-10-PCS | Mod: CPTII,S$GLB,, | Performed by: INTERNAL MEDICINE

## 2021-06-07 PROCEDURE — 99999 PR PBB SHADOW E&M-EST. PATIENT-LVL V: ICD-10-PCS | Mod: PBBFAC,,, | Performed by: INTERNAL MEDICINE

## 2021-06-07 PROCEDURE — 3008F BODY MASS INDEX DOCD: CPT | Mod: CPTII,S$GLB,, | Performed by: INTERNAL MEDICINE

## 2021-06-07 PROCEDURE — 99396 PREV VISIT EST AGE 40-64: CPT | Mod: S$GLB,,, | Performed by: INTERNAL MEDICINE

## 2021-06-07 PROCEDURE — 1126F PR PAIN SEVERITY QUANTIFIED, NO PAIN PRESENT: ICD-10-PCS | Mod: S$GLB,,, | Performed by: INTERNAL MEDICINE

## 2021-06-07 PROCEDURE — 1126F AMNT PAIN NOTED NONE PRSNT: CPT | Mod: S$GLB,,, | Performed by: INTERNAL MEDICINE

## 2021-06-07 PROCEDURE — 99999 PR PBB SHADOW E&M-EST. PATIENT-LVL V: CPT | Mod: PBBFAC,,, | Performed by: INTERNAL MEDICINE

## 2021-06-07 RX ORDER — LEVOTHYROXINE SODIUM 125 UG/1
TABLET ORAL
Qty: 90 TABLET | Refills: 1 | Status: SHIPPED | OUTPATIENT
Start: 2021-06-07 | End: 2021-12-09

## 2021-06-07 RX ORDER — PANTOPRAZOLE SODIUM 40 MG/1
40 TABLET, DELAYED RELEASE ORAL DAILY PRN
Qty: 90 TABLET | Refills: 1 | Status: SHIPPED | OUTPATIENT
Start: 2021-06-07 | End: 2022-02-25 | Stop reason: SDUPTHER

## 2021-06-07 RX ORDER — FLUTICASONE FUROATE AND VILANTEROL TRIFENATATE 200; 25 UG/1; UG/1
POWDER RESPIRATORY (INHALATION)
Qty: 30 EACH | Refills: 11 | Status: SHIPPED | OUTPATIENT
Start: 2021-06-07 | End: 2022-06-16

## 2021-06-14 ENCOUNTER — PATIENT OUTREACH (OUTPATIENT)
Dept: ADMINISTRATIVE | Facility: OTHER | Age: 51
End: 2021-06-14

## 2021-06-14 ENCOUNTER — TELEPHONE (OUTPATIENT)
Dept: TRANSPLANT | Facility: CLINIC | Age: 51
End: 2021-06-14

## 2021-06-16 ENCOUNTER — TELEPHONE (OUTPATIENT)
Dept: TRANSPLANT | Facility: CLINIC | Age: 51
End: 2021-06-16

## 2021-06-17 ENCOUNTER — OFFICE VISIT (OUTPATIENT)
Dept: OPTOMETRY | Facility: CLINIC | Age: 51
End: 2021-06-17
Payer: COMMERCIAL

## 2021-06-17 DIAGNOSIS — H35.413 BILATERAL RETINAL LATTICE DEGENERATION: Primary | ICD-10-CM

## 2021-06-17 DIAGNOSIS — H52.13 HIGH MYOPIA, BOTH EYES: ICD-10-CM

## 2021-06-17 DIAGNOSIS — H52.13 MYOPIA OF BOTH EYES WITH ASTIGMATISM: ICD-10-CM

## 2021-06-17 DIAGNOSIS — H52.203 MYOPIA OF BOTH EYES WITH ASTIGMATISM: ICD-10-CM

## 2021-06-17 DIAGNOSIS — H52.4 PRESBYOPIA OF BOTH EYES: ICD-10-CM

## 2021-06-17 DIAGNOSIS — H35.431 COBBLESTONE RETINAL DEGENERATION, RIGHT: ICD-10-CM

## 2021-06-17 DIAGNOSIS — Z13.5 SCREENING FOR EYE CONDITION: ICD-10-CM

## 2021-06-17 PROCEDURE — 92015 DETERMINE REFRACTIVE STATE: CPT | Mod: S$GLB,,, | Performed by: OPTOMETRIST

## 2021-06-17 PROCEDURE — 1125F PR PAIN SEVERITY QUANTIFIED, PAIN PRESENT: ICD-10-PCS | Mod: S$GLB,,, | Performed by: OPTOMETRIST

## 2021-06-17 PROCEDURE — 99999 PR PBB SHADOW E&M-EST. PATIENT-LVL IV: ICD-10-PCS | Mod: PBBFAC,,, | Performed by: OPTOMETRIST

## 2021-06-17 PROCEDURE — 92014 PR EYE EXAM, EST PATIENT,COMPREHESV: ICD-10-PCS | Mod: S$GLB,,, | Performed by: OPTOMETRIST

## 2021-06-17 PROCEDURE — 92015 PR REFRACTION: ICD-10-PCS | Mod: S$GLB,,, | Performed by: OPTOMETRIST

## 2021-06-17 PROCEDURE — 92014 COMPRE OPH EXAM EST PT 1/>: CPT | Mod: S$GLB,,, | Performed by: OPTOMETRIST

## 2021-06-17 PROCEDURE — 99999 PR PBB SHADOW E&M-EST. PATIENT-LVL IV: CPT | Mod: PBBFAC,,, | Performed by: OPTOMETRIST

## 2021-06-17 PROCEDURE — 1125F AMNT PAIN NOTED PAIN PRSNT: CPT | Mod: S$GLB,,, | Performed by: OPTOMETRIST

## 2021-06-25 ENCOUNTER — HOSPITAL ENCOUNTER (OUTPATIENT)
Dept: PULMONOLOGY | Facility: CLINIC | Age: 51
Discharge: HOME | End: 2021-06-25
Payer: COMMERCIAL

## 2021-06-25 ENCOUNTER — OFFICE VISIT (OUTPATIENT)
Dept: TRANSPLANT | Facility: CLINIC | Age: 51
End: 2021-06-25
Payer: COMMERCIAL

## 2021-06-25 ENCOUNTER — HOSPITAL ENCOUNTER (OUTPATIENT)
Dept: RADIOLOGY | Facility: CLINIC | Age: 51
Discharge: HOME | End: 2021-06-25
Attending: INTERNAL MEDICINE
Payer: COMMERCIAL

## 2021-06-25 VITALS
BODY MASS INDEX: 27.6 KG/M2 | HEIGHT: 59 IN | DIASTOLIC BLOOD PRESSURE: 66 MMHG | SYSTOLIC BLOOD PRESSURE: 104 MMHG | OXYGEN SATURATION: 96 % | HEART RATE: 99 BPM | WEIGHT: 136.88 LBS

## 2021-06-25 VITALS — BODY MASS INDEX: 27.01 KG/M2 | WEIGHT: 134 LBS | HEIGHT: 59 IN

## 2021-06-25 DIAGNOSIS — J96.11 CHRONIC RESPIRATORY FAILURE WITH HYPOXIA: ICD-10-CM

## 2021-06-25 DIAGNOSIS — I27.0 PRIMARY PULMONARY HYPERTENSION: Primary | ICD-10-CM

## 2021-06-25 DIAGNOSIS — I27.0 PRIMARY PULMONARY HYPERTENSION: ICD-10-CM

## 2021-06-25 DIAGNOSIS — G47.33 OSA (OBSTRUCTIVE SLEEP APNEA): ICD-10-CM

## 2021-06-25 DIAGNOSIS — I27.9 CHRONIC PULMONARY HEART DISEASE: ICD-10-CM

## 2021-06-25 DIAGNOSIS — Z13.820 ENCOUNTER FOR SCREENING FOR OSTEOPOROSIS: ICD-10-CM

## 2021-06-25 PROCEDURE — 99999 PR PBB SHADOW E&M-EST. PATIENT-LVL IV: CPT | Mod: PBBFAC,,, | Performed by: INTERNAL MEDICINE

## 2021-06-25 PROCEDURE — 77080 DXA BONE DENSITY AXIAL: CPT | Mod: 26,,, | Performed by: INTERNAL MEDICINE

## 2021-06-25 PROCEDURE — 99999 PR PBB SHADOW E&M-EST. PATIENT-LVL IV: ICD-10-PCS | Mod: PBBFAC,,, | Performed by: INTERNAL MEDICINE

## 2021-06-25 PROCEDURE — 94618 PULMONARY STRESS TESTING: CPT | Mod: S$GLB,,, | Performed by: INTERNAL MEDICINE

## 2021-06-25 PROCEDURE — 3008F BODY MASS INDEX DOCD: CPT | Mod: CPTII,S$GLB,, | Performed by: INTERNAL MEDICINE

## 2021-06-25 PROCEDURE — 99214 OFFICE O/P EST MOD 30 MIN: CPT | Mod: S$GLB,,, | Performed by: INTERNAL MEDICINE

## 2021-06-25 PROCEDURE — 77080 DEXA BONE DENSITY SPINE HIP: ICD-10-PCS | Mod: 26,,, | Performed by: INTERNAL MEDICINE

## 2021-06-25 PROCEDURE — 77080 DXA BONE DENSITY AXIAL: CPT | Mod: TC

## 2021-06-25 PROCEDURE — 3008F PR BODY MASS INDEX (BMI) DOCUMENTED: ICD-10-PCS | Mod: CPTII,S$GLB,, | Performed by: INTERNAL MEDICINE

## 2021-06-25 PROCEDURE — 1126F PR PAIN SEVERITY QUANTIFIED, NO PAIN PRESENT: ICD-10-PCS | Mod: S$GLB,,, | Performed by: INTERNAL MEDICINE

## 2021-06-25 PROCEDURE — 1126F AMNT PAIN NOTED NONE PRSNT: CPT | Mod: S$GLB,,, | Performed by: INTERNAL MEDICINE

## 2021-06-25 PROCEDURE — 94618 PULMONARY STRESS TESTING: ICD-10-PCS | Mod: S$GLB,,, | Performed by: INTERNAL MEDICINE

## 2021-06-25 PROCEDURE — 99214 PR OFFICE/OUTPT VISIT, EST, LEVL IV, 30-39 MIN: ICD-10-PCS | Mod: S$GLB,,, | Performed by: INTERNAL MEDICINE

## 2021-07-17 PROCEDURE — G0180 MD CERTIFICATION HHA PATIENT: HCPCS | Mod: ,,, | Performed by: INTERNAL MEDICINE

## 2021-07-17 PROCEDURE — G0180 PR HOME HEALTH MD CERTIFICATION: ICD-10-PCS | Mod: ,,, | Performed by: INTERNAL MEDICINE

## 2021-07-19 DIAGNOSIS — N92.1 MENORRHAGIA WITH IRREGULAR CYCLE: ICD-10-CM

## 2021-07-19 RX ORDER — NORETHINDRONE ACETATE AND ETHINYL ESTRADIOL 1MG-20(21)
1 KIT ORAL DAILY
Qty: 28 TABLET | Refills: 0 | Status: SHIPPED | OUTPATIENT
Start: 2021-07-19 | End: 2021-08-23

## 2021-07-20 ENCOUNTER — TELEPHONE (OUTPATIENT)
Dept: OBSTETRICS AND GYNECOLOGY | Facility: CLINIC | Age: 51
End: 2021-07-20

## 2021-07-21 ENCOUNTER — TELEPHONE (OUTPATIENT)
Dept: OBSTETRICS AND GYNECOLOGY | Facility: CLINIC | Age: 51
End: 2021-07-21

## 2021-07-29 ENCOUNTER — TELEPHONE (OUTPATIENT)
Dept: TRANSPLANT | Facility: CLINIC | Age: 51
End: 2021-07-29

## 2021-08-05 ENCOUNTER — PATIENT OUTREACH (OUTPATIENT)
Dept: ADMINISTRATIVE | Facility: OTHER | Age: 51
End: 2021-08-05

## 2021-08-09 ENCOUNTER — TELEPHONE (OUTPATIENT)
Dept: FAMILY MEDICINE | Facility: CLINIC | Age: 51
End: 2021-08-09

## 2021-08-09 DIAGNOSIS — Z78.0 MENOPAUSE: Primary | ICD-10-CM

## 2021-08-12 ENCOUNTER — EXTERNAL HOME HEALTH (OUTPATIENT)
Dept: HOME HEALTH SERVICES | Facility: HOSPITAL | Age: 51
End: 2021-08-12
Payer: COMMERCIAL

## 2021-09-10 ENCOUNTER — PATIENT OUTREACH (OUTPATIENT)
Dept: ADMINISTRATIVE | Facility: OTHER | Age: 51
End: 2021-09-10

## 2021-09-21 ENCOUNTER — OFFICE VISIT (OUTPATIENT)
Dept: HEPATOLOGY | Facility: CLINIC | Age: 51
End: 2021-09-21
Attending: INTERNAL MEDICINE
Payer: COMMERCIAL

## 2021-09-21 VITALS
SYSTOLIC BLOOD PRESSURE: 110 MMHG | TEMPERATURE: 97 F | HEART RATE: 78 BPM | OXYGEN SATURATION: 95 % | RESPIRATION RATE: 18 BRPM | WEIGHT: 133.19 LBS | BODY MASS INDEX: 26.85 KG/M2 | DIASTOLIC BLOOD PRESSURE: 65 MMHG | HEIGHT: 59 IN

## 2021-09-21 DIAGNOSIS — D13.4 HEPATIC ADENOMA: Chronic | ICD-10-CM

## 2021-09-21 PROCEDURE — 3074F PR MOST RECENT SYSTOLIC BLOOD PRESSURE < 130 MM HG: ICD-10-PCS | Mod: CPTII,S$GLB,, | Performed by: INTERNAL MEDICINE

## 2021-09-21 PROCEDURE — 1159F MED LIST DOCD IN RCRD: CPT | Mod: CPTII,S$GLB,, | Performed by: INTERNAL MEDICINE

## 2021-09-21 PROCEDURE — 3008F PR BODY MASS INDEX (BMI) DOCUMENTED: ICD-10-PCS | Mod: CPTII,S$GLB,, | Performed by: INTERNAL MEDICINE

## 2021-09-21 PROCEDURE — 99214 OFFICE O/P EST MOD 30 MIN: CPT | Mod: S$GLB,,, | Performed by: INTERNAL MEDICINE

## 2021-09-21 PROCEDURE — 99999 PR PBB SHADOW E&M-EST. PATIENT-LVL V: CPT | Mod: PBBFAC,,, | Performed by: INTERNAL MEDICINE

## 2021-09-21 PROCEDURE — 99214 PR OFFICE/OUTPT VISIT, EST, LEVL IV, 30-39 MIN: ICD-10-PCS | Mod: S$GLB,,, | Performed by: INTERNAL MEDICINE

## 2021-09-21 PROCEDURE — 1160F RVW MEDS BY RX/DR IN RCRD: CPT | Mod: CPTII,S$GLB,, | Performed by: INTERNAL MEDICINE

## 2021-09-21 PROCEDURE — 1159F PR MEDICATION LIST DOCUMENTED IN MEDICAL RECORD: ICD-10-PCS | Mod: CPTII,S$GLB,, | Performed by: INTERNAL MEDICINE

## 2021-09-21 PROCEDURE — 3078F PR MOST RECENT DIASTOLIC BLOOD PRESSURE < 80 MM HG: ICD-10-PCS | Mod: CPTII,S$GLB,, | Performed by: INTERNAL MEDICINE

## 2021-09-21 PROCEDURE — 3074F SYST BP LT 130 MM HG: CPT | Mod: CPTII,S$GLB,, | Performed by: INTERNAL MEDICINE

## 2021-09-21 PROCEDURE — 3008F BODY MASS INDEX DOCD: CPT | Mod: CPTII,S$GLB,, | Performed by: INTERNAL MEDICINE

## 2021-09-21 PROCEDURE — 99999 PR PBB SHADOW E&M-EST. PATIENT-LVL V: ICD-10-PCS | Mod: PBBFAC,,, | Performed by: INTERNAL MEDICINE

## 2021-09-21 PROCEDURE — 3078F DIAST BP <80 MM HG: CPT | Mod: CPTII,S$GLB,, | Performed by: INTERNAL MEDICINE

## 2021-09-21 PROCEDURE — 1160F PR REVIEW ALL MEDS BY PRESCRIBER/CLIN PHARMACIST DOCUMENTED: ICD-10-PCS | Mod: CPTII,S$GLB,, | Performed by: INTERNAL MEDICINE

## 2021-09-29 ENCOUNTER — HOSPITAL ENCOUNTER (OUTPATIENT)
Dept: RADIOLOGY | Facility: HOSPITAL | Age: 51
Discharge: HOME | End: 2021-09-29
Attending: INTERNAL MEDICINE
Payer: COMMERCIAL

## 2021-09-29 VITALS — HEIGHT: 59 IN | BODY MASS INDEX: 27.01 KG/M2 | WEIGHT: 134 LBS

## 2021-09-29 DIAGNOSIS — D13.4 HEPATIC ADENOMA: ICD-10-CM

## 2021-09-29 DIAGNOSIS — Z78.0 MENOPAUSE: ICD-10-CM

## 2021-09-29 PROCEDURE — 77063 MAMMO DIGITAL SCREENING BILAT WITH TOMO: ICD-10-PCS | Mod: 26,,, | Performed by: RADIOLOGY

## 2021-09-29 PROCEDURE — 77067 MAMMO DIGITAL SCREENING BILAT WITH TOMO: ICD-10-PCS | Mod: 26,,, | Performed by: RADIOLOGY

## 2021-09-29 PROCEDURE — 77063 BREAST TOMOSYNTHESIS BI: CPT | Mod: 26,,, | Performed by: RADIOLOGY

## 2021-09-29 PROCEDURE — 76700 US EXAM ABDOM COMPLETE: CPT | Mod: TC

## 2021-09-29 PROCEDURE — 77067 SCR MAMMO BI INCL CAD: CPT | Mod: TC

## 2021-09-29 PROCEDURE — 77067 SCR MAMMO BI INCL CAD: CPT | Mod: 26,,, | Performed by: RADIOLOGY

## 2021-09-29 PROCEDURE — 76700 US ABDOMEN COMPLETE: ICD-10-PCS | Mod: 26,,, | Performed by: INTERNAL MEDICINE

## 2021-09-29 PROCEDURE — 76700 US EXAM ABDOM COMPLETE: CPT | Mod: 26,,, | Performed by: INTERNAL MEDICINE

## 2021-10-04 ENCOUNTER — OFFICE VISIT (OUTPATIENT)
Dept: OBSTETRICS AND GYNECOLOGY | Facility: CLINIC | Age: 51
End: 2021-10-04
Payer: COMMERCIAL

## 2021-10-04 VITALS
WEIGHT: 136.38 LBS | HEIGHT: 59 IN | SYSTOLIC BLOOD PRESSURE: 146 MMHG | BODY MASS INDEX: 27.49 KG/M2 | DIASTOLIC BLOOD PRESSURE: 82 MMHG

## 2021-10-04 DIAGNOSIS — Z01.419 VISIT FOR GYNECOLOGIC EXAMINATION: Primary | ICD-10-CM

## 2021-10-04 DIAGNOSIS — Z30.8 ENCOUNTER FOR OTHER CONTRACEPTIVE MANAGEMENT: ICD-10-CM

## 2021-10-04 PROCEDURE — 1160F RVW MEDS BY RX/DR IN RCRD: CPT | Mod: CPTII,S$GLB,, | Performed by: OBSTETRICS & GYNECOLOGY

## 2021-10-04 PROCEDURE — 3079F PR MOST RECENT DIASTOLIC BLOOD PRESSURE 80-89 MM HG: ICD-10-PCS | Mod: CPTII,S$GLB,, | Performed by: OBSTETRICS & GYNECOLOGY

## 2021-10-04 PROCEDURE — 99999 PR PBB SHADOW E&M-EST. PATIENT-LVL II: ICD-10-PCS | Mod: PBBFAC,,, | Performed by: OBSTETRICS & GYNECOLOGY

## 2021-10-04 PROCEDURE — 99396 PR PREVENTIVE VISIT,EST,40-64: ICD-10-PCS | Mod: S$GLB,,, | Performed by: OBSTETRICS & GYNECOLOGY

## 2021-10-04 PROCEDURE — 99396 PREV VISIT EST AGE 40-64: CPT | Mod: S$GLB,,, | Performed by: OBSTETRICS & GYNECOLOGY

## 2021-10-04 PROCEDURE — 3077F SYST BP >= 140 MM HG: CPT | Mod: CPTII,S$GLB,, | Performed by: OBSTETRICS & GYNECOLOGY

## 2021-10-04 PROCEDURE — 3008F BODY MASS INDEX DOCD: CPT | Mod: CPTII,S$GLB,, | Performed by: OBSTETRICS & GYNECOLOGY

## 2021-10-04 PROCEDURE — 1159F MED LIST DOCD IN RCRD: CPT | Mod: CPTII,S$GLB,, | Performed by: OBSTETRICS & GYNECOLOGY

## 2021-10-04 PROCEDURE — 1160F PR REVIEW ALL MEDS BY PRESCRIBER/CLIN PHARMACIST DOCUMENTED: ICD-10-PCS | Mod: CPTII,S$GLB,, | Performed by: OBSTETRICS & GYNECOLOGY

## 2021-10-04 PROCEDURE — 99999 PR PBB SHADOW E&M-EST. PATIENT-LVL II: CPT | Mod: PBBFAC,,, | Performed by: OBSTETRICS & GYNECOLOGY

## 2021-10-04 PROCEDURE — 3077F PR MOST RECENT SYSTOLIC BLOOD PRESSURE >= 140 MM HG: ICD-10-PCS | Mod: CPTII,S$GLB,, | Performed by: OBSTETRICS & GYNECOLOGY

## 2021-10-04 PROCEDURE — 1159F PR MEDICATION LIST DOCUMENTED IN MEDICAL RECORD: ICD-10-PCS | Mod: CPTII,S$GLB,, | Performed by: OBSTETRICS & GYNECOLOGY

## 2021-10-04 PROCEDURE — 3079F DIAST BP 80-89 MM HG: CPT | Mod: CPTII,S$GLB,, | Performed by: OBSTETRICS & GYNECOLOGY

## 2021-10-04 PROCEDURE — 3008F PR BODY MASS INDEX (BMI) DOCUMENTED: ICD-10-PCS | Mod: CPTII,S$GLB,, | Performed by: OBSTETRICS & GYNECOLOGY

## 2021-10-04 RX ORDER — LACTIC ACID, L-, CITRIC ACID MONOHYDRATE, AND POTASSIUM BITARTRATE 90; 50; 20 MG/5G; MG/5G; MG/5G
1 GEL VAGINAL
Qty: 5 G | Refills: 2 | Status: SHIPPED | OUTPATIENT
Start: 2021-10-04

## 2021-10-18 ENCOUNTER — TELEPHONE (OUTPATIENT)
Dept: NEUROLOGY | Facility: CLINIC | Age: 51
End: 2021-10-18

## 2021-10-22 ENCOUNTER — TELEPHONE (OUTPATIENT)
Dept: TRANSPLANT | Facility: CLINIC | Age: 51
End: 2021-10-22

## 2021-10-22 ENCOUNTER — OFFICE VISIT (OUTPATIENT)
Dept: TRANSPLANT | Facility: CLINIC | Age: 51
End: 2021-10-22
Payer: COMMERCIAL

## 2021-10-22 ENCOUNTER — HOSPITAL ENCOUNTER (OUTPATIENT)
Dept: PULMONOLOGY | Facility: CLINIC | Age: 51
Discharge: HOME | End: 2021-10-22
Payer: COMMERCIAL

## 2021-10-22 ENCOUNTER — LAB VISIT (OUTPATIENT)
Dept: LAB | Facility: HOSPITAL | Age: 51
End: 2021-10-22
Attending: INTERNAL MEDICINE
Payer: COMMERCIAL

## 2021-10-22 VITALS
WEIGHT: 134 LBS | HEART RATE: 84 BPM | WEIGHT: 139.56 LBS | HEIGHT: 59 IN | BODY MASS INDEX: 27.01 KG/M2 | SYSTOLIC BLOOD PRESSURE: 123 MMHG | DIASTOLIC BLOOD PRESSURE: 77 MMHG | BODY MASS INDEX: 28.13 KG/M2 | HEIGHT: 59 IN | OXYGEN SATURATION: 93 %

## 2021-10-22 DIAGNOSIS — I27.0 PRIMARY PULMONARY HYPERTENSION: ICD-10-CM

## 2021-10-22 DIAGNOSIS — I27.9 CHRONIC PULMONARY HEART DISEASE: ICD-10-CM

## 2021-10-22 DIAGNOSIS — I27.9 CHRONIC PULMONARY HEART DISEASE: Primary | ICD-10-CM

## 2021-10-22 DIAGNOSIS — G47.33 OSA (OBSTRUCTIVE SLEEP APNEA): ICD-10-CM

## 2021-10-22 DIAGNOSIS — Z79.01 LONG TERM CURRENT USE OF ANTICOAGULANT THERAPY: ICD-10-CM

## 2021-10-22 PROBLEM — Z76.82 LUNG TRANSPLANT CANDIDATE: Status: RESOLVED | Noted: 2020-09-20 | Resolved: 2021-10-22

## 2021-10-22 LAB
ALBUMIN SERPL BCP-MCNC: 3.7 G/DL (ref 3.5–5.2)
ALP SERPL-CCNC: 72 U/L (ref 55–135)
ALT SERPL W/O P-5'-P-CCNC: 13 U/L (ref 10–44)
ANION GAP SERPL CALC-SCNC: 5 MMOL/L (ref 8–16)
AST SERPL-CCNC: 14 U/L (ref 10–40)
BASOPHILS # BLD AUTO: 0.04 K/UL (ref 0–0.2)
BASOPHILS NFR BLD: 0.8 % (ref 0–1.9)
BILIRUB SERPL-MCNC: 0.4 MG/DL (ref 0.1–1)
BNP SERPL-MCNC: 100 PG/ML (ref 0–99)
BUN SERPL-MCNC: 12 MG/DL (ref 6–20)
CALCIUM SERPL-MCNC: 9.3 MG/DL (ref 8.7–10.5)
CHLORIDE SERPL-SCNC: 109 MMOL/L (ref 95–110)
CO2 SERPL-SCNC: 23 MMOL/L (ref 23–29)
CREAT SERPL-MCNC: 0.8 MG/DL (ref 0.5–1.4)
DIFFERENTIAL METHOD: NORMAL
EOSINOPHIL # BLD AUTO: 0.3 K/UL (ref 0–0.5)
EOSINOPHIL NFR BLD: 5.7 % (ref 0–8)
ERYTHROCYTE [DISTWIDTH] IN BLOOD BY AUTOMATED COUNT: 14.1 % (ref 11.5–14.5)
EST. GFR  (AFRICAN AMERICAN): >60 ML/MIN/1.73 M^2
EST. GFR  (NON AFRICAN AMERICAN): >60 ML/MIN/1.73 M^2
GLUCOSE SERPL-MCNC: 85 MG/DL (ref 70–110)
HCT VFR BLD AUTO: 46.6 % (ref 37–48.5)
HGB BLD-MCNC: 16 G/DL (ref 12–16)
IMM GRANULOCYTES # BLD AUTO: 0.02 K/UL (ref 0–0.04)
IMM GRANULOCYTES NFR BLD AUTO: 0.4 % (ref 0–0.5)
LYMPHOCYTES # BLD AUTO: 1.2 K/UL (ref 1–4.8)
LYMPHOCYTES NFR BLD: 23.9 % (ref 18–48)
MCH RBC QN AUTO: 30.7 PG (ref 27–31)
MCHC RBC AUTO-ENTMCNC: 34.3 G/DL (ref 32–36)
MCV RBC AUTO: 89 FL (ref 82–98)
MONOCYTES # BLD AUTO: 0.5 K/UL (ref 0.3–1)
MONOCYTES NFR BLD: 9.1 % (ref 4–15)
NEUTROPHILS # BLD AUTO: 3 K/UL (ref 1.8–7.7)
NEUTROPHILS NFR BLD: 60.1 % (ref 38–73)
NRBC BLD-RTO: 0 /100 WBC
PLATELET # BLD AUTO: 178 K/UL (ref 150–450)
PMV BLD AUTO: 12 FL (ref 9.2–12.9)
POTASSIUM SERPL-SCNC: 4.3 MMOL/L (ref 3.5–5.1)
PROT SERPL-MCNC: 7.4 G/DL (ref 6–8.4)
RBC # BLD AUTO: 5.21 M/UL (ref 4–5.4)
SODIUM SERPL-SCNC: 137 MMOL/L (ref 136–145)
WBC # BLD AUTO: 4.94 K/UL (ref 3.9–12.7)

## 2021-10-22 PROCEDURE — 3078F DIAST BP <80 MM HG: CPT | Mod: CPTII,S$GLB,, | Performed by: INTERNAL MEDICINE

## 2021-10-22 PROCEDURE — 1159F PR MEDICATION LIST DOCUMENTED IN MEDICAL RECORD: ICD-10-PCS | Mod: CPTII,S$GLB,, | Performed by: INTERNAL MEDICINE

## 2021-10-22 PROCEDURE — 36415 COLL VENOUS BLD VENIPUNCTURE: CPT | Performed by: INTERNAL MEDICINE

## 2021-10-22 PROCEDURE — 94618 PULMONARY STRESS TESTING: CPT | Mod: S$GLB,,, | Performed by: INTERNAL MEDICINE

## 2021-10-22 PROCEDURE — 99214 OFFICE O/P EST MOD 30 MIN: CPT | Mod: S$GLB,,, | Performed by: INTERNAL MEDICINE

## 2021-10-22 PROCEDURE — 85025 COMPLETE CBC W/AUTO DIFF WBC: CPT | Performed by: INTERNAL MEDICINE

## 2021-10-22 PROCEDURE — 94618 PULMONARY STRESS TESTING: ICD-10-PCS | Mod: S$GLB,,, | Performed by: INTERNAL MEDICINE

## 2021-10-22 PROCEDURE — 99214 PR OFFICE/OUTPT VISIT, EST, LEVL IV, 30-39 MIN: ICD-10-PCS | Mod: S$GLB,,, | Performed by: INTERNAL MEDICINE

## 2021-10-22 PROCEDURE — 3008F BODY MASS INDEX DOCD: CPT | Mod: CPTII,S$GLB,, | Performed by: INTERNAL MEDICINE

## 2021-10-22 PROCEDURE — 3008F PR BODY MASS INDEX (BMI) DOCUMENTED: ICD-10-PCS | Mod: CPTII,S$GLB,, | Performed by: INTERNAL MEDICINE

## 2021-10-22 PROCEDURE — 3074F PR MOST RECENT SYSTOLIC BLOOD PRESSURE < 130 MM HG: ICD-10-PCS | Mod: CPTII,S$GLB,, | Performed by: INTERNAL MEDICINE

## 2021-10-22 PROCEDURE — 1159F MED LIST DOCD IN RCRD: CPT | Mod: CPTII,S$GLB,, | Performed by: INTERNAL MEDICINE

## 2021-10-22 PROCEDURE — 3074F SYST BP LT 130 MM HG: CPT | Mod: CPTII,S$GLB,, | Performed by: INTERNAL MEDICINE

## 2021-10-22 PROCEDURE — 3078F PR MOST RECENT DIASTOLIC BLOOD PRESSURE < 80 MM HG: ICD-10-PCS | Mod: CPTII,S$GLB,, | Performed by: INTERNAL MEDICINE

## 2021-10-22 PROCEDURE — 99999 PR PBB SHADOW E&M-EST. PATIENT-LVL IV: CPT | Mod: PBBFAC,,, | Performed by: INTERNAL MEDICINE

## 2021-10-22 PROCEDURE — 80053 COMPREHEN METABOLIC PANEL: CPT | Performed by: INTERNAL MEDICINE

## 2021-10-22 PROCEDURE — 99999 PR PBB SHADOW E&M-EST. PATIENT-LVL IV: ICD-10-PCS | Mod: PBBFAC,,, | Performed by: INTERNAL MEDICINE

## 2021-10-22 PROCEDURE — 83880 ASSAY OF NATRIURETIC PEPTIDE: CPT | Performed by: INTERNAL MEDICINE

## 2021-10-22 RX ORDER — IPRATROPIUM BROMIDE AND ALBUTEROL SULFATE 2.5; .5 MG/3ML; MG/3ML
3 SOLUTION RESPIRATORY (INHALATION) EVERY 6 HOURS PRN
Qty: 6 EACH | Refills: 6 | Status: SHIPPED | OUTPATIENT
Start: 2021-10-22 | End: 2024-02-02

## 2021-10-22 RX ORDER — ALBUTEROL SULFATE 90 UG/1
2 AEROSOL, METERED RESPIRATORY (INHALATION) EVERY 6 HOURS PRN
Qty: 18 G | Refills: 6 | Status: SHIPPED | OUTPATIENT
Start: 2021-10-22 | End: 2023-09-27 | Stop reason: SDUPTHER

## 2021-10-26 DIAGNOSIS — R06.02 SHORTNESS OF BREATH: Primary | ICD-10-CM

## 2021-10-29 ENCOUNTER — PATIENT OUTREACH (OUTPATIENT)
Dept: ADMINISTRATIVE | Facility: OTHER | Age: 51
End: 2021-10-29
Payer: COMMERCIAL

## 2021-11-01 ENCOUNTER — OFFICE VISIT (OUTPATIENT)
Dept: NEUROLOGY | Facility: CLINIC | Age: 51
End: 2021-11-01
Payer: COMMERCIAL

## 2021-11-01 VITALS
BODY MASS INDEX: 27.78 KG/M2 | SYSTOLIC BLOOD PRESSURE: 115 MMHG | HEART RATE: 81 BPM | WEIGHT: 137.81 LBS | HEIGHT: 59 IN | DIASTOLIC BLOOD PRESSURE: 74 MMHG

## 2021-11-01 DIAGNOSIS — G43.009 MIGRAINE WITHOUT AURA AND WITHOUT STATUS MIGRAINOSUS, NOT INTRACTABLE: ICD-10-CM

## 2021-11-01 DIAGNOSIS — G89.29 CHRONIC NONINTRACTABLE HEADACHE, UNSPECIFIED HEADACHE TYPE: ICD-10-CM

## 2021-11-01 DIAGNOSIS — R51.9 CHRONIC NONINTRACTABLE HEADACHE, UNSPECIFIED HEADACHE TYPE: ICD-10-CM

## 2021-11-01 PROCEDURE — 3074F SYST BP LT 130 MM HG: CPT | Mod: CPTII,S$GLB,, | Performed by: NEUROLOGICAL SURGERY

## 2021-11-01 PROCEDURE — 1159F MED LIST DOCD IN RCRD: CPT | Mod: CPTII,S$GLB,, | Performed by: NEUROLOGICAL SURGERY

## 2021-11-01 PROCEDURE — 99214 OFFICE O/P EST MOD 30 MIN: CPT | Mod: S$GLB,,, | Performed by: NEUROLOGICAL SURGERY

## 2021-11-01 PROCEDURE — 3078F DIAST BP <80 MM HG: CPT | Mod: CPTII,S$GLB,, | Performed by: NEUROLOGICAL SURGERY

## 2021-11-01 PROCEDURE — 99999 PR PBB SHADOW E&M-EST. PATIENT-LVL III: CPT | Mod: PBBFAC,,, | Performed by: NEUROLOGICAL SURGERY

## 2021-11-01 PROCEDURE — 99999 PR PBB SHADOW E&M-EST. PATIENT-LVL III: ICD-10-PCS | Mod: PBBFAC,,, | Performed by: NEUROLOGICAL SURGERY

## 2021-11-01 PROCEDURE — 3008F BODY MASS INDEX DOCD: CPT | Mod: CPTII,S$GLB,, | Performed by: NEUROLOGICAL SURGERY

## 2021-11-01 PROCEDURE — 3078F PR MOST RECENT DIASTOLIC BLOOD PRESSURE < 80 MM HG: ICD-10-PCS | Mod: CPTII,S$GLB,, | Performed by: NEUROLOGICAL SURGERY

## 2021-11-01 PROCEDURE — 3074F PR MOST RECENT SYSTOLIC BLOOD PRESSURE < 130 MM HG: ICD-10-PCS | Mod: CPTII,S$GLB,, | Performed by: NEUROLOGICAL SURGERY

## 2021-11-01 PROCEDURE — 3008F PR BODY MASS INDEX (BMI) DOCUMENTED: ICD-10-PCS | Mod: CPTII,S$GLB,, | Performed by: NEUROLOGICAL SURGERY

## 2021-11-01 PROCEDURE — 1159F PR MEDICATION LIST DOCUMENTED IN MEDICAL RECORD: ICD-10-PCS | Mod: CPTII,S$GLB,, | Performed by: NEUROLOGICAL SURGERY

## 2021-11-01 PROCEDURE — 99214 PR OFFICE/OUTPT VISIT, EST, LEVL IV, 30-39 MIN: ICD-10-PCS | Mod: S$GLB,,, | Performed by: NEUROLOGICAL SURGERY

## 2021-11-01 RX ORDER — TOPIRAMATE 100 MG/1
100 TABLET, FILM COATED ORAL 2 TIMES DAILY
Qty: 60 TABLET | Refills: 11 | Status: SHIPPED | OUTPATIENT
Start: 2021-11-01 | End: 2022-10-03

## 2021-11-01 RX ORDER — BUTALBITAL, ACETAMINOPHEN AND CAFFEINE 50; 325; 40 MG/1; MG/1; MG/1
1 TABLET ORAL EVERY 6 HOURS PRN
Qty: 40 TABLET | Refills: 5 | Status: SHIPPED | OUTPATIENT
Start: 2021-11-01 | End: 2023-05-22

## 2021-11-09 ENCOUNTER — TELEPHONE (OUTPATIENT)
Dept: TRANSPLANT | Facility: CLINIC | Age: 51
End: 2021-11-09
Payer: COMMERCIAL

## 2021-12-09 DIAGNOSIS — E03.9 HYPOTHYROIDISM (ACQUIRED): ICD-10-CM

## 2021-12-09 RX ORDER — LEVOTHYROXINE SODIUM 125 UG/1
TABLET ORAL
Qty: 30 TABLET | Refills: 3 | Status: SHIPPED | OUTPATIENT
Start: 2021-12-09 | End: 2022-04-11

## 2022-01-25 ENCOUNTER — TELEPHONE (OUTPATIENT)
Dept: FAMILY MEDICINE | Facility: CLINIC | Age: 52
End: 2022-01-25
Payer: COMMERCIAL

## 2022-01-25 NOTE — TELEPHONE ENCOUNTER
Spoke to pt and scheduled her annual for her as she requested. Pt stated she was getting flu shot at [facility] appt with Dr. Poe.

## 2022-01-25 NOTE — TELEPHONE ENCOUNTER
----- Message from Lulu Sandhu MD sent at 12/9/2021 12:30 PM CST -----  Please call patient to complete flu shot.

## 2022-01-27 ENCOUNTER — TELEPHONE (OUTPATIENT)
Dept: TRANSPLANT | Facility: CLINIC | Age: 52
End: 2022-01-27
Payer: COMMERCIAL

## 2022-01-27 NOTE — TELEPHONE ENCOUNTER
Patient called in about grants/funds closing. Patient was given the information for the [employer]. Patient verbalized understanding and is getting assistance through [employer] and copay card for [employer]

## 2022-02-07 ENCOUNTER — OFFICE VISIT (OUTPATIENT)
Dept: TRANSPLANT | Facility: CLINIC | Age: 52
End: 2022-02-07
Payer: COMMERCIAL

## 2022-02-07 ENCOUNTER — HOSPITAL ENCOUNTER (OUTPATIENT)
Dept: PULMONOLOGY | Facility: CLINIC | Age: 52
Discharge: HOME | End: 2022-02-07
Payer: COMMERCIAL

## 2022-02-07 ENCOUNTER — LAB VISIT (OUTPATIENT)
Dept: LAB | Facility: HOSPITAL | Age: 52
End: 2022-02-07
Attending: INTERNAL MEDICINE
Payer: COMMERCIAL

## 2022-02-07 ENCOUNTER — HOSPITAL ENCOUNTER (OUTPATIENT)
Dept: CARDIOLOGY | Facility: HOSPITAL | Age: 52
Discharge: HOME | End: 2022-02-07
Attending: INTERNAL MEDICINE
Payer: COMMERCIAL

## 2022-02-07 VITALS
BODY MASS INDEX: 28.89 KG/M2 | OXYGEN SATURATION: 95 % | HEART RATE: 100 BPM | WEIGHT: 143.31 LBS | DIASTOLIC BLOOD PRESSURE: 68 MMHG | SYSTOLIC BLOOD PRESSURE: 109 MMHG | HEIGHT: 59 IN

## 2022-02-07 VITALS
BODY MASS INDEX: 27.21 KG/M2 | HEIGHT: 59 IN | WEIGHT: 135 LBS | WEIGHT: 137 LBS | HEART RATE: 82 BPM | HEIGHT: 59 IN | SYSTOLIC BLOOD PRESSURE: 125 MMHG | DIASTOLIC BLOOD PRESSURE: 85 MMHG | BODY MASS INDEX: 27.62 KG/M2

## 2022-02-07 DIAGNOSIS — I27.9 CHRONIC PULMONARY HEART DISEASE: ICD-10-CM

## 2022-02-07 DIAGNOSIS — I27.0 PRIMARY PULMONARY HYPERTENSION: Primary | ICD-10-CM

## 2022-02-07 DIAGNOSIS — R06.02 SHORTNESS OF BREATH: ICD-10-CM

## 2022-02-07 DIAGNOSIS — J45.909 MILD ASTHMA WITHOUT COMPLICATION, UNSPECIFIED WHETHER PERSISTENT: ICD-10-CM

## 2022-02-07 DIAGNOSIS — I27.0 PRIMARY PULMONARY HYPERTENSION: ICD-10-CM

## 2022-02-07 LAB
ALBUMIN SERPL BCP-MCNC: 3.8 G/DL (ref 3.5–5.2)
ALP SERPL-CCNC: 85 U/L (ref 55–135)
ALT SERPL W/O P-5'-P-CCNC: 14 U/L (ref 10–44)
ANION GAP SERPL CALC-SCNC: 6 MMOL/L (ref 8–16)
ASCENDING AORTA: 2.93 CM
AST SERPL-CCNC: 16 U/L (ref 10–40)
AV INDEX (PROSTH): 0.94
AV MEAN GRADIENT: 4 MMHG
AV PEAK GRADIENT: 9 MMHG
AV VALVE AREA: 2.48 CM2
AV VELOCITY RATIO: 0.89
BASOPHILS # BLD AUTO: 0.04 K/UL (ref 0–0.2)
BASOPHILS NFR BLD: 0.7 % (ref 0–1.9)
BILIRUB SERPL-MCNC: 0.3 MG/DL (ref 0.1–1)
BNP SERPL-MCNC: 82 PG/ML (ref 0–99)
BSA FOR ECHO PROCEDURE: 1.61 M2
BUN SERPL-MCNC: 12 MG/DL (ref 6–20)
CALCIUM SERPL-MCNC: 9.6 MG/DL (ref 8.7–10.5)
CHLORIDE SERPL-SCNC: 109 MMOL/L (ref 95–110)
CO2 SERPL-SCNC: 24 MMOL/L (ref 23–29)
CREAT SERPL-MCNC: 0.7 MG/DL (ref 0.5–1.4)
CV ECHO LV RWT: 0.37 CM
DIFFERENTIAL METHOD: NORMAL
DOP CALC AO PEAK VEL: 1.46 M/S
DOP CALC AO VTI: 21.49 CM
DOP CALC LVOT AREA: 2.6 CM2
DOP CALC LVOT DIAMETER: 1.83 CM
DOP CALC LVOT PEAK VEL: 1.3 M/S
DOP CALC LVOT STROKE VOLUME: 53.29 CM3
DOP CALCLVOT PEAK VEL VTI: 20.27 CM
E WAVE DECELERATION TIME: 217.99 MSEC
E/A RATIO: 0.93
E/E' RATIO: 8.8 M/S
ECHO LV POSTERIOR WALL: 0.68 CM (ref 0.6–1.1)
EJECTION FRACTION: 65 %
EOSINOPHIL # BLD AUTO: 0.3 K/UL (ref 0–0.5)
EOSINOPHIL NFR BLD: 6 % (ref 0–8)
ERYTHROCYTE [DISTWIDTH] IN BLOOD BY AUTOMATED COUNT: 14.1 % (ref 11.5–14.5)
EST. GFR  (AFRICAN AMERICAN): >60 ML/MIN/1.73 M^2
EST. GFR  (NON AFRICAN AMERICAN): >60 ML/MIN/1.73 M^2
FRACTIONAL SHORTENING: 36 % (ref 28–44)
GLUCOSE SERPL-MCNC: 84 MG/DL (ref 70–110)
HCT VFR BLD AUTO: 46.9 % (ref 37–48.5)
HGB BLD-MCNC: 15.6 G/DL (ref 12–16)
IMM GRANULOCYTES # BLD AUTO: 0.03 K/UL (ref 0–0.04)
IMM GRANULOCYTES NFR BLD AUTO: 0.5 % (ref 0–0.5)
INTERVENTRICULAR SEPTUM: 0.76 CM (ref 0.6–1.1)
IVRT: 99.9 MSEC
LA MAJOR: 4.57 CM
LA MINOR: 3.66 CM
LA WIDTH: 2.77 CM
LEFT ATRIUM SIZE: 3.06 CM
LEFT ATRIUM VOLUME INDEX MOD: 17.8 ML/M2
LEFT ATRIUM VOLUME INDEX: 18.7 ML/M2
LEFT ATRIUM VOLUME MOD: 27.9 CM3
LEFT ATRIUM VOLUME: 29.29 CM3
LEFT INTERNAL DIMENSION IN SYSTOLE: 2.34 CM (ref 2.1–4)
LEFT VENTRICLE DIASTOLIC VOLUME INDEX: 36.15 ML/M2
LEFT VENTRICLE DIASTOLIC VOLUME: 56.76 ML
LEFT VENTRICLE MASS INDEX: 45 G/M2
LEFT VENTRICLE SYSTOLIC VOLUME INDEX: 12 ML/M2
LEFT VENTRICLE SYSTOLIC VOLUME: 18.88 ML
LEFT VENTRICULAR INTERNAL DIMENSION IN DIASTOLE: 3.66 CM (ref 3.5–6)
LEFT VENTRICULAR MASS: 70.17 G
LV LATERAL E/E' RATIO: 5.87 M/S
LV SEPTAL E/E' RATIO: 17.6 M/S
LYMPHOCYTES # BLD AUTO: 1.3 K/UL (ref 1–4.8)
LYMPHOCYTES NFR BLD: 23.2 % (ref 18–48)
MAGNESIUM SERPL-MCNC: 2.1 MG/DL (ref 1.6–2.6)
MCH RBC QN AUTO: 30.2 PG (ref 27–31)
MCHC RBC AUTO-ENTMCNC: 33.3 G/DL (ref 32–36)
MCV RBC AUTO: 91 FL (ref 82–98)
MONOCYTES # BLD AUTO: 0.5 K/UL (ref 0.3–1)
MONOCYTES NFR BLD: 8.5 % (ref 4–15)
MV A" WAVE DURATION": 8.28 MSEC
MV PEAK A VEL: 0.95 M/S
MV PEAK E VEL: 0.88 M/S
MV STENOSIS PRESSURE HALF TIME: 63.22 MS
MV VALVE AREA P 1/2 METHOD: 3.48 CM2
NEUTROPHILS # BLD AUTO: 3.4 K/UL (ref 1.8–7.7)
NEUTROPHILS NFR BLD: 61.1 % (ref 38–73)
NRBC BLD-RTO: 0 /100 WBC
PISA TR MAX VEL: 4.8 M/S
PLATELET # BLD AUTO: 199 K/UL (ref 150–450)
PMV BLD AUTO: 12.6 FL (ref 9.2–12.9)
POTASSIUM SERPL-SCNC: 4.2 MMOL/L (ref 3.5–5.1)
PROT SERPL-MCNC: 7.5 G/DL (ref 6–8.4)
PULM VEIN S/D RATIO: 1.54
PV PEAK D VEL: 0.39 M/S
PV PEAK S VEL: 0.6 M/S
RA MAJOR: 4.97 CM
RA PRESSURE: 3 MMHG
RA WIDTH: 4.73 CM
RBC # BLD AUTO: 5.17 M/UL (ref 4–5.4)
RIGHT VENTRICULAR END-DIASTOLIC DIMENSION: 5.01 CM
RV TISSUE DOPPLER FREE WALL SYSTOLIC VELOCITY 1 (APICAL 4 CHAMBER VIEW): 9.07 CM/S
SINUS: 2.47 CM
SODIUM SERPL-SCNC: 139 MMOL/L (ref 136–145)
STJ: 2.2 CM
TDI LATERAL: 0.15 M/S
TDI SEPTAL: 0.05 M/S
TDI: 0.1 M/S
TR MAX PG: 92 MMHG
TRICUSPID ANNULAR PLANE SYSTOLIC EXCURSION: 1.68 CM
TV REST PULMONARY ARTERY PRESSURE: 95 MMHG
WBC # BLD AUTO: 5.64 K/UL (ref 3.9–12.7)

## 2022-02-07 PROCEDURE — 3074F SYST BP LT 130 MM HG: CPT | Mod: CPTII,S$GLB,,

## 2022-02-07 PROCEDURE — 1160F RVW MEDS BY RX/DR IN RCRD: CPT | Mod: CPTII,S$GLB,,

## 2022-02-07 PROCEDURE — 3078F PR MOST RECENT DIASTOLIC BLOOD PRESSURE < 80 MM HG: ICD-10-PCS | Mod: CPTII,S$GLB,,

## 2022-02-07 PROCEDURE — 85025 COMPLETE CBC W/AUTO DIFF WBC: CPT | Performed by: INTERNAL MEDICINE

## 2022-02-07 PROCEDURE — 99214 PR OFFICE/OUTPT VISIT, EST, LEVL IV, 30-39 MIN: ICD-10-PCS | Mod: S$GLB,,,

## 2022-02-07 PROCEDURE — 80053 COMPREHEN METABOLIC PANEL: CPT | Performed by: INTERNAL MEDICINE

## 2022-02-07 PROCEDURE — 3078F DIAST BP <80 MM HG: CPT | Mod: CPTII,S$GLB,,

## 2022-02-07 PROCEDURE — 1159F MED LIST DOCD IN RCRD: CPT | Mod: CPTII,S$GLB,,

## 2022-02-07 PROCEDURE — 93306 ECHO (CUPID ONLY): ICD-10-PCS | Mod: 26,,, | Performed by: INTERNAL MEDICINE

## 2022-02-07 PROCEDURE — 1160F PR REVIEW ALL MEDS BY PRESCRIBER/CLIN PHARMACIST DOCUMENTED: ICD-10-PCS | Mod: CPTII,S$GLB,,

## 2022-02-07 PROCEDURE — 99999 PR PBB SHADOW E&M-EST. PATIENT-LVL V: CPT | Mod: PBBFAC,,,

## 2022-02-07 PROCEDURE — 99214 OFFICE O/P EST MOD 30 MIN: CPT | Mod: S$GLB,,,

## 2022-02-07 PROCEDURE — 94618 PULMONARY STRESS TESTING: CPT | Mod: S$GLB,,, | Performed by: INTERNAL MEDICINE

## 2022-02-07 PROCEDURE — 83880 ASSAY OF NATRIURETIC PEPTIDE: CPT | Performed by: INTERNAL MEDICINE

## 2022-02-07 PROCEDURE — 93306 TTE W/DOPPLER COMPLETE: CPT

## 2022-02-07 PROCEDURE — 3008F BODY MASS INDEX DOCD: CPT | Mod: CPTII,S$GLB,,

## 2022-02-07 PROCEDURE — 83735 ASSAY OF MAGNESIUM: CPT | Performed by: INTERNAL MEDICINE

## 2022-02-07 PROCEDURE — 93306 TTE W/DOPPLER COMPLETE: CPT | Mod: 26,,, | Performed by: INTERNAL MEDICINE

## 2022-02-07 PROCEDURE — 3074F PR MOST RECENT SYSTOLIC BLOOD PRESSURE < 130 MM HG: ICD-10-PCS | Mod: CPTII,S$GLB,,

## 2022-02-07 PROCEDURE — 99999 PR PBB SHADOW E&M-EST. PATIENT-LVL V: ICD-10-PCS | Mod: PBBFAC,,,

## 2022-02-07 PROCEDURE — 36415 COLL VENOUS BLD VENIPUNCTURE: CPT | Performed by: INTERNAL MEDICINE

## 2022-02-07 PROCEDURE — 3008F PR BODY MASS INDEX (BMI) DOCUMENTED: ICD-10-PCS | Mod: CPTII,S$GLB,,

## 2022-02-07 PROCEDURE — 94618 PULMONARY STRESS TESTING: ICD-10-PCS | Mod: S$GLB,,, | Performed by: INTERNAL MEDICINE

## 2022-02-07 PROCEDURE — 1159F PR MEDICATION LIST DOCUMENTED IN MEDICAL RECORD: ICD-10-PCS | Mod: CPTII,S$GLB,,

## 2022-02-07 NOTE — PATIENT INSTRUCTIONS
No med changes today.    6 month f/u labs, walk, RHC.    Follow-up with GYN for menopause symptom relief.    Encourage to increase physical activity. Will refer to pulm rehab at [facility].  Recommend 2 gram sodium restriction and 1500cc fluid restriction.  Requested patient to weigh themselves daily, and to notify us if their weight increases by more than 3 lbs in 1 day or 5 lbs in 1 week.

## 2022-02-07 NOTE — PROGRESS NOTES
".  Subjective:    Patient ID:  Yuni Perez is a 51 y.o. female who presents for follow-up of Pulmonary Hypertension.    HPI   Mrs. Perez is a pleasant 51 y.o. white female who presents for PH follow-up. Patient was diagnosed with IPAH in 2009 and started on Remodulin, Letairis, and Adcirca. She has a history of ABHIJEET, GERD, and Asthma. Current therapy is Remodulin infusing at 107ng/kg/min (pre-filled cassettes), Letairis 10mg qdaily, and Adcirca 40mg qdaily. Has T/F Adempas.    Patient underwent RHC/LHC 09/29/20 for lung transplant evaluation. LHC normal, however had persistently severely elevated PASP to over 100 systolic per RHC findings. Had everything set up for LUT, but says her caregivers were deemed not acceptable. She has come to terms with not being a LUT candidate here at [facility]. She will consider having records sent to another LUT center.    Today she feels well. She has not been exercising as much as she feels she should. Stays inside, avoids crowds due to covid. Had to stop during her walk. Says there were a lot of people and she was doing "pursed lip breathing," and trying to walk fast. Felt a "black wave" come over her and had to pause. Feels she may have hypoventilated and feels okay now. Has not had this experience when walking or exerting herself, otherwise. Thinks she is maddie- menopausal/menopausal and has had symptoms of sleepiness, mood swings, hot flashes. Her Gyn will give her medications for symptom management but she wants to know if they are safe to take with her PH meds and heart condition. Feels like her anxiety is well-managed at this point.  I  Patient denies N/V/F/C, lightheadedness, dizziness, PND, orthopnea, LE edema, abdominal pain, abdominal pressure, chest pain, chest pressure, syncope, pre-syncope.      6MWT:  6MW 2/7/2022   6MWT Status completed with stops   Patient Reported Dyspnea   Was O2 used? No   6MW Distance walked (feet) 1075   Distance walked (meters) " 327.66   Did patient stop? Yes   Oxygen Saturation 97   Supplemental Oxygen Room Air   Heart Rate 83   Blood Pressure 95/63   Ab Dyspnea Rating  moderate   Oxygen Saturation 95   Supplemental Oxygen Room Air   Heart Rate 124   Blood Pressure 121/73   Ab Dyspnea Rating  very heavy   Recovery Time (seconds) 180   Oxygen Saturation 98   Supplemental Oxygen Room Air   Heart Rate 93     ECHO: 2/7/2022  · The left ventricle is normal in size with normal systolic function. The estimated ejection fraction is 65%.  · Severe right ventricular enlargement with hypertrophy and moderately to severely reduced right ventricular systolic function.  · Indeterminate left ventricular diastolic function.  · Mild tricuspid regurgitation.  · There is pulmonary hypertension. The estimated PA systolic pressure is 95 mmHg.  · Normal central venous pressure (3 mmHg).    RHC:   · Estimated blood loss: none  · Severe PAH on IV remodulin, adcirca and letairis.  · Normal right and left-sided filling pressures.  · Borderline low-normal cardiac output by Ebony method which is normal when indexed for BSA.  · No signficant change from RHC 8/18.  · RA: 6/ 7/ 5 RV: 105/ 3/ 10 PA: 103/ 45/ 64 PWP: 15/ 1512/ 13 . Cardiac output was 4.11 by Ebony. Cardiac index is 2.67 L/min/m2. O2 Sat: PA 70%. AO sat 96% PVR 12.4    PFTs: 9/8/2020  Spirometry shows moderate obstruction. Lung volume determination is normal. DLCO is mildly decreased - 68%    IV/SC:  Pulmonary Hypertension Review Flowsheet 2/8/2022   Pump Type CADD   Medication type Remodulin   Vial size 2.5mg/ml   Dose (ng/kg/min) 107 ng/kg/min   DW (kg) 60.5 kg   Amt of Med used 8 ml   Amt of Diluent Used NS 92 ml   Final Concentration (ng/ml) 200,000 ng/ml   Pump Rate ml/24 hr 47 ml/24 hours     Review of Systems   Constitutional: Positive for malaise/fatigue. Negative for decreased appetite, diaphoresis, fever and night sweats.   HENT: Positive for congestion.    Cardiovascular: Positive for dyspnea  "on exertion. Negative for chest pain, irregular heartbeat and syncope.   Respiratory: Positive for shortness of breath. Negative for cough and wheezing.         Occasional SOB at night and morning   Endocrine: Negative.    Skin: Negative.    Musculoskeletal: Negative.    Gastrointestinal: Negative.    Genitourinary: Negative.    Neurological: Negative.    Psychiatric/Behavioral: The patient is nervous/anxious.         Controlled with medication        Objective: /68 (BP Location: Left arm, Patient Position: Sitting, BP Method: Medium (Automatic))   Pulse 100   Ht 4' 11" (1.499 m)   Wt 65 kg (143 lb 4.8 oz)   SpO2 95%   BMI 28.94 kg/m²       Physical Exam  Constitutional:       Appearance: Normal appearance.   HENT:      Head: Normocephalic and atraumatic.   Eyes:      Pupils: Pupils are equal, round, and reactive to light.   Neck:      Vascular: No JVD.   Cardiovascular:      Rate and Rhythm: Normal rate and regular rhythm.      Pulses: Normal pulses.      Heart sounds: Murmur heard.        Comments: Loud S2  Pulmonary:      Effort: Pulmonary effort is normal.      Breath sounds: Normal breath sounds.   Chest:       Abdominal:      General: Abdomen is flat.      Palpations: Abdomen is soft.   Musculoskeletal:         General: Normal range of motion.      Cervical back: Normal range of motion and neck supple.   Skin:     General: Skin is warm and dry.      Capillary Refill: Capillary refill takes less than 2 seconds.   Neurological:      Mental Status: She is alert and oriented to person, place, and time.   Psychiatric:         Mood and Affect: Mood normal.         Behavior: Behavior normal.           Lab Results   Component Value Date    BNP 82 02/07/2022     02/07/2022    K 4.2 02/07/2022    MG 2.1 02/07/2022     02/07/2022    CO2 24 02/07/2022    BUN 12 02/07/2022    CREATININE 0.7 02/07/2022    GLU 84 02/07/2022    HGBA1C 4.9 09/21/2020    AST 16 02/07/2022    ALT 14 02/07/2022    ALBUMIN " 3.8 02/07/2022    PROT 7.5 02/07/2022    BILITOT 0.3 02/07/2022    CHOL 125 09/21/2020    HDL 36 (L) 09/21/2020    LDLCALC 72.8 09/21/2020    TRIG 81 09/21/2020       Magnesium   Date Value Ref Range Status   02/07/2022 2.1 1.6 - 2.6 mg/dL Final       Lab Results   Component Value Date    WBC 5.64 02/07/2022    HGB 15.6 02/07/2022    HCT 46.9 02/07/2022    MCV 91 02/07/2022     02/07/2022       Lab Results   Component Value Date    INR 1.0 09/29/2020    INR 0.9 09/21/2020    INR 0.9 09/04/2019       BNP   Date Value Ref Range Status   02/07/2022 82 0 - 99 pg/mL Final     Comment:     Values of less than 100 pg/ml are consistent with non-CHF populations.   10/22/2021 100 (H) 0 - 99 pg/mL Final     Comment:     Values of less than 100 pg/ml are consistent with non-CHF populations.   06/25/2021 99 0 - 99 pg/mL Final     Comment:     Values of less than 100 pg/ml are consistent with non-CHF populations.       No results found for: LDH    ..  WHO Group:  1 Functional Class: II  REVEAL Score: 8 (Intermediate Risk)    Assessment:       1. Primary pulmonary hypertension    2. Chronic pulmonary heart disease    3. Mild asthma without complication, unspecified whether persistent         Plan:       No med changes today.    6 month f/u labs, walk, RHC.    Follow-up with GYN for menopause symptom relief.    Encourage to increase physical activity. Will refer to pulm rehab at [facility].    Recommend 2 gram sodium restriction and 1500cc fluid restriction.  Requested patient to weigh themselves daily, and to notify us if their weight increases by more than 3 lbs in 1 day or 5 lbs in 1 week.

## 2022-02-07 NOTE — LETTER
February 9, 2022        Ivonne Rai  [address]  [address]  [facility] [address]  Phone: [phone]  Fax: [phone]             [id number]  [address]  [facility] [address]  Phone: [phone]   Patient: Yuni Perez   MR Number: [id number]   YOB: 1970   Date of Visit: 2/7/2022       Dear Dr. Ivonne Rai    Thank you for referring Yuni Perez to me for evaluation. Attached you will find relevant portions of my assessment and plan of care.    If you have questions, please do not hesitate to call me. I look forward to following Yuni Perez along with you.    Sincerely,    Bárbara Langford, NP    Enclosure    If you would like to receive this communication electronically, please contact [email or web address] or [phone] to request [employer] access.    [employer] is a tool which provides read-only access to select patient information with whom you have a relationship. Its easy to use and provides real time access to review your patients record including encounter summaries, notes, results, and demographic information.    If you feel you have received this communication in error or would no longer like to receive these types of communications, please e-mail [email or web address]

## 2022-02-08 NOTE — PROCEDURES
Yuni Perez is a 51 y.o.  female patient, who presents for a 6 minute walk test ordered by MD Lui.  The diagnosis is Pulmonary Hypertension.  The patient's BMI is 27.3 kg/m2.  Predicted distance (lower limit of normal) is 416.15 meters.      Test Results:    The test was completed with stops.  The patient stopped 2 times for a total of 60 seconds.  The total time walked was 300 seconds.  During walking, the patient reported:  Dyspnea. The patient used no assistive devices  during testing.     02/07/2022---------Distance: 327.66 meters (1075 feet)     O2 Sat % Supplemental Oxygen Heart Rate Blood Pressure Ab Scale   Pre-exercise  (Resting) 97 % Room Air 83 bpm 95/63 mmHg 3   During Exercise 95 % Room Air 124 bpm 121/73 mmHg 7-8   Post-exercise  (Recovery) 98 % Room Air  93 bpm 100/67 mmHg       Recovery Time: 180 seconds    Performing nurse/tech: [employer]op[employer] RRT      PREVIOUS STUDY:   The patient had a previous study.     10/22/2021---------Distance: 417.58 meters (1370 feet)       O2 Sat % Supplemental Oxygen Heart Rate Blood Pressure Ab Scale   Pre-exercise  (Resting) 97 % Room Air 89 bpm 106/65 mmHg 2   During Exercise 94 % Room Air 141 bpm 131/71 mmHg 9   Post-exercise  (Recovery) 97 % Room Air  94 bpm 118/69 mmHg            CLINICAL INTERPRETATION:  Six minute walk distance is 327.66 meters (1075 feet) with very heavy dyspnea.  During exercise, there was no significant desaturation while breathing room air.  Blood pressure remained stable and Heart rate increased significantly with walking.  This may represent a tachycardic response to exercise.  The patient reported non-pulmonary symptoms during exercise.  The patient did complete the study, walking 300 seconds of the 360 second test.  Since the previous study in October 2021, exercise capacity is significantly worse.  Based upon age and body mass index, exercise capacity is less than predicted.

## 2022-02-24 ENCOUNTER — TELEPHONE (OUTPATIENT)
Dept: TRANSPLANT | Facility: CLINIC | Age: 52
End: 2022-02-24
Payer: COMMERCIAL

## 2022-02-24 NOTE — TELEPHONE ENCOUNTER
Patient called and LM on nurse navigator VM that she received notification that her letairis was changed to generic and she does not want generic. Spoke with SP and they corrected Rx to send her out brand letaitis.    LVM on patients phone informing her that she should still be getting brand.

## 2022-02-25 DIAGNOSIS — K21.9 GASTROESOPHAGEAL REFLUX DISEASE, UNSPECIFIED WHETHER ESOPHAGITIS PRESENT: ICD-10-CM

## 2022-02-25 RX ORDER — PANTOPRAZOLE SODIUM 40 MG/1
40 TABLET, DELAYED RELEASE ORAL DAILY PRN
Qty: 90 TABLET | Refills: 1 | Status: SHIPPED | OUTPATIENT
Start: 2022-02-25 | End: 2022-09-08

## 2022-02-25 NOTE — TELEPHONE ENCOUNTER
No new care gaps identified.  Powered by [employer]. Reference number: [id number].   2/25/2022 8:58:28 AM CST

## 2022-03-08 ENCOUNTER — TELEPHONE (OUTPATIENT)
Dept: TRANSPLANT | Facility: CLINIC | Age: 52
End: 2022-03-08
Payer: COMMERCIAL

## 2022-03-08 NOTE — TELEPHONE ENCOUNTER
Courtesy call to patient to confirm medication Remodulin dosage and how patient was feeling    No answer left message to contact office.

## 2022-03-17 NOTE — TELEPHONE ENCOUNTER
Called pt and she has't gotten flu or covid booster. Pt will get covid booster in future closer to home..

## 2022-04-26 ENCOUNTER — TELEPHONE (OUTPATIENT)
Dept: TRANSPLANT | Facility: CLINIC | Age: 52
End: 2022-04-26
Payer: COMMERCIAL

## 2022-04-26 DIAGNOSIS — I27.9 CHRONIC PULMONARY HEART DISEASE: Primary | ICD-10-CM

## 2022-04-26 DIAGNOSIS — I27.0 PRIMARY PULMONARY HYPERTENSION: ICD-10-CM

## 2022-04-26 DIAGNOSIS — Z79.899 POLYPHARMACY: ICD-10-CM

## 2022-04-26 NOTE — TELEPHONE ENCOUNTER
Patient called in today with complaints of cough, wheezing, and arm tightness, which is what she feels when her PA pressures are up. Patient has not seen any other provider but wants to go up on Remodulin (just by 1 ng). After speaking with KAI Davidson she was advised to get her RHC that she has yet to schedule before making any alterations to her remodulin. Patient was also advised to see someone in regard to cough and wheezing. Patient states that she has tried to see Dr. Hollins but has not been able to because she is not available. Nurse navigator explained to patient that she can see her PCP if needed to rule out other causes that could be causing wheezing and cough because elevated PA would not typically cause wheezing. Patient was frustrated that we would not advise to titrate up on remodulin. Nurse navigator apologized but stated that the RHC is needed and other causes for cough and wheezing should also be ruled out. Message sent to schedulers to help schedule patient's RHC.

## 2022-05-12 ENCOUNTER — TELEPHONE (OUTPATIENT)
Dept: FAMILY MEDICINE | Facility: CLINIC | Age: 52
End: 2022-05-12
Payer: COMMERCIAL

## 2022-05-12 NOTE — TELEPHONE ENCOUNTER
----- Message from Jennifer Olmedo sent at 5/11/2022  4:06 PM CDT -----  Regarding: self [phone]  Type:  Sooner Appointment Request    Patient is requesting a sooner appointment.  Patient declined first available appointment listed as well as another facility and provider .  Patient will not accept being placed on the waitlist and is requesting a message be sent to doctor.    Name of Caller:  self    When is the first available appointment? 5/16    Symptoms: patient would like to be seen for a frozen shoulder.    Would the patient rather a call back or a response via [facility]?  Call back    Best Call Back Number: [phone]

## 2022-05-13 ENCOUNTER — OFFICE VISIT (OUTPATIENT)
Dept: FAMILY MEDICINE | Facility: CLINIC | Age: 52
End: 2022-05-13
Payer: COMMERCIAL

## 2022-05-13 VITALS
OXYGEN SATURATION: 96 % | DIASTOLIC BLOOD PRESSURE: 70 MMHG | HEIGHT: 59 IN | WEIGHT: 145.38 LBS | HEART RATE: 82 BPM | BODY MASS INDEX: 29.31 KG/M2 | SYSTOLIC BLOOD PRESSURE: 110 MMHG | TEMPERATURE: 98 F

## 2022-05-13 DIAGNOSIS — Z79.01 LONG TERM CURRENT USE OF ANTICOAGULANT THERAPY: ICD-10-CM

## 2022-05-13 DIAGNOSIS — E03.9 HYPOTHYROIDISM (ACQUIRED): ICD-10-CM

## 2022-05-13 DIAGNOSIS — M79.622 LEFT UPPER ARM PAIN: Primary | ICD-10-CM

## 2022-05-13 DIAGNOSIS — I27.9 CHRONIC PULMONARY HEART DISEASE: ICD-10-CM

## 2022-05-13 PROCEDURE — 99214 OFFICE O/P EST MOD 30 MIN: CPT | Mod: S$GLB,,, | Performed by: NURSE PRACTITIONER

## 2022-05-13 PROCEDURE — 1160F RVW MEDS BY RX/DR IN RCRD: CPT | Mod: CPTII,S$GLB,, | Performed by: NURSE PRACTITIONER

## 2022-05-13 PROCEDURE — 99999 PR PBB SHADOW E&M-EST. PATIENT-LVL V: ICD-10-PCS | Mod: PBBFAC,,, | Performed by: NURSE PRACTITIONER

## 2022-05-13 PROCEDURE — 3074F SYST BP LT 130 MM HG: CPT | Mod: CPTII,S$GLB,, | Performed by: NURSE PRACTITIONER

## 2022-05-13 PROCEDURE — 3078F PR MOST RECENT DIASTOLIC BLOOD PRESSURE < 80 MM HG: ICD-10-PCS | Mod: CPTII,S$GLB,, | Performed by: NURSE PRACTITIONER

## 2022-05-13 PROCEDURE — 1160F PR REVIEW ALL MEDS BY PRESCRIBER/CLIN PHARMACIST DOCUMENTED: ICD-10-PCS | Mod: CPTII,S$GLB,, | Performed by: NURSE PRACTITIONER

## 2022-05-13 PROCEDURE — 3074F PR MOST RECENT SYSTOLIC BLOOD PRESSURE < 130 MM HG: ICD-10-PCS | Mod: CPTII,S$GLB,, | Performed by: NURSE PRACTITIONER

## 2022-05-13 PROCEDURE — 3008F PR BODY MASS INDEX (BMI) DOCUMENTED: ICD-10-PCS | Mod: CPTII,S$GLB,, | Performed by: NURSE PRACTITIONER

## 2022-05-13 PROCEDURE — 1159F MED LIST DOCD IN RCRD: CPT | Mod: CPTII,S$GLB,, | Performed by: NURSE PRACTITIONER

## 2022-05-13 PROCEDURE — 3078F DIAST BP <80 MM HG: CPT | Mod: CPTII,S$GLB,, | Performed by: NURSE PRACTITIONER

## 2022-05-13 PROCEDURE — 99214 PR OFFICE/OUTPT VISIT, EST, LEVL IV, 30-39 MIN: ICD-10-PCS | Mod: S$GLB,,, | Performed by: NURSE PRACTITIONER

## 2022-05-13 PROCEDURE — 1159F PR MEDICATION LIST DOCUMENTED IN MEDICAL RECORD: ICD-10-PCS | Mod: CPTII,S$GLB,, | Performed by: NURSE PRACTITIONER

## 2022-05-13 PROCEDURE — 99999 PR PBB SHADOW E&M-EST. PATIENT-LVL V: CPT | Mod: PBBFAC,,, | Performed by: NURSE PRACTITIONER

## 2022-05-13 PROCEDURE — 3008F BODY MASS INDEX DOCD: CPT | Mod: CPTII,S$GLB,, | Performed by: NURSE PRACTITIONER

## 2022-05-13 RX ORDER — HYOSCYAMINE SULFATE 0.12 MG/1
0.12 TABLET SUBLINGUAL EVERY 6 HOURS PRN
Qty: 60 TABLET | Refills: 0 | Status: SHIPPED | OUTPATIENT
Start: 2022-05-13 | End: 2023-12-13 | Stop reason: SDUPTHER

## 2022-05-13 RX ORDER — HYOSCYAMINE SULFATE 0.12 MG/1
0.12 TABLET SUBLINGUAL EVERY 6 HOURS PRN
Status: CANCELLED | OUTPATIENT
Start: 2022-05-13

## 2022-05-13 RX ORDER — BACLOFEN 10 MG/1
10 TABLET ORAL 3 TIMES DAILY PRN
Qty: 90 TABLET | Refills: 11 | Status: SHIPPED | OUTPATIENT
Start: 2022-05-13 | End: 2022-06-08 | Stop reason: ALTCHOICE

## 2022-05-13 RX ORDER — LIDOCAINE 50 MG/G
1 PATCH TOPICAL DAILY
Qty: 15 PATCH | Refills: 0 | Status: SHIPPED | OUTPATIENT
Start: 2022-05-13

## 2022-05-13 RX ORDER — ACETAMINOPHEN AND CODEINE PHOSPHATE 300; 30 MG/1; MG/1
1 TABLET ORAL EVERY 4 HOURS PRN
Status: CANCELLED | OUTPATIENT
Start: 2022-05-13 | End: 2022-05-23

## 2022-05-13 NOTE — PROGRESS NOTES
Chief Complaint  Chief Complaint   Patient presents with    Arm Pain       HPI  Yuni Perez is a 52 y.o. female with medical diagnoses as listed in the medical history and problem list that presents for Left Upper Arm Pain. This patient is new to me.     1. Left Upper Arm Pain x 2 weeks: She reports falling asleep with her arm about her head. When she woke up she had to manually use there ight hand to move her eft arm.  Reports during that day she reached for an object and injured the arm. Pain radiates down toward the forearm. Pain starts at the acromiohumeral junction. She rates pain 6/10 during rest during sleeping hours and activity 8/10. She uses Tylenol Extra strength with mild to moderate relief.      PAST MEDICAL HISTORY:  Past Medical History:   Diagnosis Date    AR (allergic rhinitis)     Cholelithiasis, common bile duct     Chronic low back pain     Eye pressure 2017    GERD (gastroesophageal reflux disease)     History of migraine headaches     Hypothyroidism     Innocent heart murmur     Lumbar disc disease     Menorrhagia     Mild asthma     Obesity     Plantar fasciitis of left foot     Primary pulmonary hypertension     followed by heart transplant/pulmonary     Seizure disorder     x 1 in 2008    Seizures     Sleep apnea     TMJ (dislocation of temporomandibular joint)     Tricuspid regurgitation        PAST SURGICAL HISTORY:  Past Surgical History:   Procedure Laterality Date    CARDIAC CATHETERIZATION      CATHETERIZATION OF BOTH LEFT AND RIGHT HEART Bilateral 9/29/2020    Procedure: CATHETERIZATION, HEART, BOTH LEFT AND RIGHT;  Surgeon: Gucci Pickett MD;  Location: [facility] CATH LAB;  Service: Cardiology;  Laterality: Bilateral;    CENTRAL VENOUS CATHETER INSERTION      CORONARY ANGIOGRAPHY N/A 9/29/2020    Procedure: ANGIOGRAM, CORONARY ARTERY;  Surgeon: Gucci Pickett MD;  Location: [facility] CATH LAB;  Service: Cardiology;  Laterality: N/A;    gallbladder  drain  06/2019    INSERTION OF HAYNES CATHETER Right 6/17/2020    Procedure: INSERTION, CATHETER, CENTRAL VENOUS, HAYNES Replace Single Lumen for Remodulin Infusion;  Surgeon: Bertin Dewitt MD;  Location: [facility] FLR;  Service: General;  Laterality: Right;    PORTACATH PLACEMENT      REMOVAL OF TUNNELED CENTRAL VENOUS CATHETER (CVC) N/A 6/17/2020    Procedure: REMOVAL, CATHETER, CENTRAL VENOUS, TUNNELED;  Surgeon: Bertin Dewitt MD;  Location: [facility] FLR;  Service: General;  Laterality: N/A;    RIGHT HEART CATHETERIZATION Right 8/20/2018    Procedure: HEART CATH-RIGHT;  Surgeon: Ivonne Rai MD;  Location: [facility] CATH LAB;  Service: Cardiology;  Laterality: Right;    RIGHT HEART CATHETERIZATION Right 9/4/2019    Procedure: INSERTION, CATHETER, RIGHT HEART;  Surgeon: Ivonne Rai MD;  Location: [facility] CATH LAB;  Service: Cardiology;  Laterality: Right;    UPPER GASTROINTESTINAL ENDOSCOPY         SOCIAL HISTORY:  Social History     Socioeconomic History    Marital status: Single    Number of children: 0   Occupational History    Occupation: disabled     Employer: [employer]   Tobacco Use    Smoking status: Never Smoker    Smokeless tobacco: Never Used   Substance and Sexual Activity    Alcohol use: No     Alcohol/week: 0.8 standard drinks     Types: 1 Standard drinks or equivalent per week     Comment: 1 per year    Drug use: No    Sexual activity: Never     Birth control/protection: OCP, Pill     Comment: pt is a virgin       FAMILY HISTORY:  Family History   Problem Relation Age of Onset    Heart disease Father     Glaucoma Father     Diabetes Mother     Cancer Maternal Grandmother         uterine    Cancer Maternal Aunt         breast    Breast cancer Maternal Aunt     Heart disease Paternal Grandfather     Heart attack Paternal Grandfather     Diabetes Paternal Grandfather     Leukemia Brother     Hypertension Unknown     Diabetes Maternal Grandfather   "   Heart attack Maternal Grandfather     Breast cancer Cousin     No Known Problems Sister     No Known Problems Maternal Uncle     No Known Problems Paternal Aunt     No Known Problems Paternal Uncle     No Known Problems Paternal Grandmother     Colon cancer Neg Hx     Ovarian cancer Neg Hx     Amblyopia Neg Hx     Blindness Neg Hx     Cataracts Neg Hx     Macular degeneration Neg Hx     Retinal detachment Neg Hx     Strabismus Neg Hx     Stroke Neg Hx     Thyroid disease Neg Hx     Esophageal cancer Neg Hx        ALLERGIES AND MEDICATIONS: updated and reviewed.  Review of patient's allergies indicates:   Allergen Reactions    Fentanyl Anaphylaxis     Respiratory distress    Vibra-tabs [doxycycline hyclate] Anaphylaxis     "throat felt like it was closing"    Adhesive Hives     Silk tape    Amoxicillin Rash    Nsaids (non-steroidal anti-inflammatory drug) Swelling    Chlorhexidine Other (See Comments)     Blue Chlorhexidine causes hives     Current Outpatient Medications   Medication Sig Dispense Refill    acetaminophen (TYLENOL) 500 MG tablet Take 1,000 mg by mouth every 6 (six) hours as needed for Pain.      ADCIRCA 20 mg Tab Take 2 tablets (40 mg total) by mouth once daily. 60 tablet 11    albuterol (PROAIR HFA) 90 mcg/actuation inhaler Inhale 2 puffs into the lungs every 6 (six) hours as needed for Wheezing. Rescue 18 g 6    albuterol-ipratropium (DUO-NEB) 2.5 mg-0.5 mg/3 mL nebulizer solution Take 3 mLs by nebulization every 6 (six) hours as needed for Wheezing. Rescue 6 each 6    ambrisentan (LETAIRIS) 10 MG Tab Take 1 tablet (10 mg total) by mouth once daily. 30 tablet 11    azelastine (ASTELIN) 137 mcg (0.1 %) nasal spray 2 sprays by Nasal route 2 (two) times daily. Patient uses prn in the evening      butalbital-acetaminophen-caffeine -40 mg (FIORICET, ESGIC) -40 mg per tablet Take 1 tablet by mouth every 6 (six) hours as needed for Pain. 40 tablet 5    " cyanocobalamin, vitamin B-12, 2,500 mcg Subl Place 2,500 mcg under the tongue every morning.       diphenhydrAMINE (BENADRYL) 25 mg capsule Take 50 mg by mouth every 6 (six) hours as needed for Itching. Patient takes prn evenings      ferrous sulfate 325 (65 FE) MG EC tablet Take 325 mg by mouth daily as needed.       fluticasone furoate-vilanteroL (BREO ELLIPTA) 200-25 mcg/dose DsDv diskus inhaler INHALE 1 PUFF BY MOUTH into the lungs EVERY EVENING. controller 30 each 11    fluticasone propionate (FLONASE) 50 mcg/actuation nasal spray 2 sprays (100 mcg total) by Each Nostril route every evening. 16 g 11    folic acid (FOLVITE) 1 MG tablet TAKE ONE TABLET BY MOUTH ONCE DAILY 90 tablet 3    lactic acid-citric-potassium (PHEXXI) 1.8-1-0.4 % Gel Place 1 Applicatorful vaginally as needed (CONTRACEPTIVE). 5 g 2    levothyroxine (SYNTHROID) 125 MCG tablet TAKE ONE TABLET BY MOUTH EVERY MORNING 1 HOUR BEFORE BREAKFAST AND OTHER MEDICATIONS (FOR THYROID). 30 tablet 0    loratadine (CLARITIN) 10 mg tablet Take 10 mg by mouth every evening.       ondansetron (ZOFRAN) 4 MG tablet Take 1 tablet (4 mg total) by mouth every 6 (six) hours as needed. 1 Tablet Oral Every 6 hours 30 tablet 2    pantoprazole (PROTONIX) 40 MG tablet Take 1 tablet (40 mg total) by mouth daily as needed (HEARTBURN). Take only as needed. 90 tablet 1    topiramate (TOPAMAX) 100 MG tablet Take 1 tablet (100 mg total) by mouth 2 (two) times daily. 60 tablet 11    treprostinil (REMODULIN) 2.5 mg/mL Soln Mix cassette as directed and infuse continuously per physician titration orders on dosing sheet. Current dose 80ng/kg/min 60 mL 11    ubrogepant (UBRELVY)tablet 100 mg TAKE ONE TABLET BY MOUTH AS A SINGLE DOSE AS NEEDED FOR MIGRAINE      baclofen (LIORESAL) 10 MG tablet Take 1 tablet (10 mg total) by mouth 3 (three) times daily as needed (As needed). 90 tablet 11    citalopram (CELEXA) 10 MG tablet Take 10 mg by mouth every evening.        "hyoscyamine (LEVSIN/SL) 0.125 mg Subl Place 1 tablet (0.125 mg total) under the tongue every 6 (six) hours as needed (Aa needed). 60 tablet 0    LIDOcaine (LIDODERM) 5 % Place 1 patch onto the skin once daily. 15 patch 0     No current facility-administered medications for this visit.         ROS  Review of Systems   Constitutional: Negative for appetite change, chills, fatigue and fever.   HENT: Negative for congestion, ear pain, postnasal drip, rhinorrhea, sinus pressure, sneezing and sore throat.    Respiratory: Negative for shortness of breath.    Cardiovascular: Negative for chest pain and palpitations.   Gastrointestinal: Negative for abdominal pain, constipation, diarrhea, nausea and vomiting.   Genitourinary: Negative for dysuria and urgency.   Musculoskeletal: Positive for myalgias. Negative for arthralgias and neck pain.   Neurological: Negative for headaches.   Psychiatric/Behavioral: Negative for sleep disturbance.           Physical Exam  Vitals:    05/13/22 1334   BP: 110/70   BP Location: Right arm   Patient Position: Sitting   BP Method: Medium (Manual)   Pulse: 82   Temp: 97.5 °F (36.4 °C)   TempSrc: Oral   SpO2: 96%   Weight: 65.9 kg (145 lb 6.3 oz)   Height: 4' 11" (1.499 m)    Body mass index is 29.37 kg/m².  Weight: 65.9 kg (145 lb 6.3 oz)   Height: 4' 11" (149.9 cm)   Physical Exam  Constitutional:       General: She is not in acute distress.  HENT:      Head: Normocephalic and atraumatic.      Right Ear: External ear normal.      Left Ear: External ear normal.      Nose: Nose normal.   Eyes:      Pupils: Pupils are equal, round, and reactive to light.   Cardiovascular:      Rate and Rhythm: Normal rate and regular rhythm.   Pulmonary:      Effort: Pulmonary effort is normal. No respiratory distress.      Breath sounds: Normal breath sounds. No wheezing.   Abdominal:      General: Bowel sounds are normal.      Palpations: Abdomen is soft.   Musculoskeletal:      Left upper arm: Tenderness " present.      Cervical back: Neck supple.   Lymphadenopathy:      Cervical: No cervical adenopathy.   Skin:     General: Skin is warm and dry.   Neurological:      Mental Status: She is alert and oriented to person, place, and time.   Psychiatric:         Mood and Affect: Mood normal.             Health Maintenance       Date Due Completion Date    Colorectal Cancer Screening Never done ---    Shingles Vaccine (1 of 2) Never done ---    COVID-19 Vaccine (3 - Booster for Moderna series) 09/17/2021 4/17/2021    Influenza Vaccine (Season Ended) 09/01/2022 9/22/2020    Mammogram 09/29/2022 9/29/2021    Override on 9/2/2013: Done    Override on 3/1/2011: Done    DEXA Scan 06/25/2023 6/25/2021    Lipid Panel 09/21/2025 9/21/2020    Cervical Cancer Screening 11/23/2025 11/23/2020    Override on 9/17/2014: Done (Dr. Khan)    Override on 2/1/2012: Done    Override on 6/10/2003: Done    TETANUS VACCINE 01/18/2027 1/18/2017            Assessment & Plan  Problem List Items Addressed This Visit        Cardiac/Vascular    Chronic pulmonary heart disease  -The current medical regimen is effective;  continue present plan and medications.        Hematology    Long term current use of anticoagulant therapy  -The current medical regimen is effective;  continue present plan and medications.        Endocrine    Hypothyroidism (acquired)  -The current medical regimen is effective;  continue present plan and medications.       Other Visit Diagnoses     Left upper arm pain    -  Primary  -We discussed proper administration of Baclofen, adverse effects and side effects with education given for reinforcement  -Physical Therapy as ordered for education and strengthening of long bones, joints and muscles    Relevant Medications    LIDOcaine (LIDODERM) 5 %, Daily    baclofen (LIORESAL) 10 MG tablet, TID PRN    Other Relevant Orders    Ambulatory referral/consult to Physical/Occupational Therapy            Health Maintenance reviewed: Denied  S[facility]OR at this time    Follow-up: As needed

## 2022-05-16 ENCOUNTER — CLINICAL SUPPORT (OUTPATIENT)
Dept: REHABILITATION | Facility: HOSPITAL | Age: 52
End: 2022-05-16
Payer: COMMERCIAL

## 2022-05-16 DIAGNOSIS — M62.81 MUSCLE WEAKNESS OF LEFT ARM: ICD-10-CM

## 2022-05-16 DIAGNOSIS — Z74.1 SELF-CARE DEFICIT FOR DRESSING AND GROOMING: ICD-10-CM

## 2022-05-16 DIAGNOSIS — M79.622 LEFT UPPER ARM PAIN: ICD-10-CM

## 2022-05-16 DIAGNOSIS — R46.0 SELF-CARE DEFICIT FOR BATHING: ICD-10-CM

## 2022-05-16 DIAGNOSIS — M25.612 DECREASED RANGE OF MOTION OF LEFT SHOULDER: ICD-10-CM

## 2022-05-16 PROCEDURE — 97165 OT EVAL LOW COMPLEX 30 MIN: CPT | Mod: PN

## 2022-05-16 NOTE — PLAN OF CARE
[facility] Occupational Therapy  Initial Evaluation     Date: 5/16/2022  Name: Yuni Perez  Clinic Number: [id number]    Therapy Diagnosis:   Encounter Diagnoses   Name Primary?    Left upper arm pain     Decreased range of motion of left shoulder     Muscle weakness of left arm     Self-care deficit for dressing and grooming     Self-care deficit for bathing      Physician: Kenn Ray NP    Physician Orders: Eval and treat  Medical Diagnosis: Left upper arm pain  Surgical Procedure and Date: n/a  Evaluation Date: 05/16/2022  Insurance Authorization Period Expiration: tbd  Plan of Care Certification Period: 5/16/22 to 7/11/22  Date of Return to MD: not appointed    Visit # / Visits authorized: 1 / 1  Time In: 9:19  Time Out: 10:07 am  Total Billable Time: 45 minutes    Precautions:  pulmonary hypotension, seizures    Subjective     Involved Side: left   Dominant Side: Ambidextrous  Date of Onset: 2 weeks ago  Mechanism of Injury:  Pt states she tends to sleep with her hand over her head is she sleeps on her back. She had to force her shoulder back to her side.   History of Current Condition: Still having stiffness and pain in her shoulder, fast movements are painful.  Surgical Procedure: n/a  Imaging: none   Previous Therapy: none    Past Medical History/Physical Systems Review:   Yuni Perez  has a past medical history of AR (allergic rhinitis), Cholelithiasis, common bile duct, Chronic low back pain, Eye pressure, GERD (gastroesophageal reflux disease), History of migraine headaches, Hypothyroidism, Innocent heart murmur, Lumbar disc disease, Menorrhagia, Mild asthma, Obesity, Plantar fasciitis of left foot, Primary pulmonary hypertension, Seizure disorder, Seizures, Sleep apnea, TMJ (dislocation of temporomandibular joint), and Tricuspid regurgitation.    Yuni Perez  has a past surgical history that includes Portacath placement; Cardiac  "catheterization; Upper gastrointestinal endoscopy; Central venous catheter insertion; Right heart catheterization (Right, 8/20/2018); Right heart catheterization (Right, 9/4/2019); gallbladder drain (06/2019); Insertion of Eason catheter (Right, 6/17/2020); Removal of tunneled central venous catheter (CVC) (N/A, 6/17/2020); Catheterization of both left and right heart (Bilateral, 9/29/2020); and Coronary angiography (N/A, 9/29/2020).    Yuni has a current medication list which includes the following prescription(s): acetaminophen, adcirca, albuterol, albuterol-ipratropium, ambrisentan, azelastine, baclofen, butalbital-acetaminophen-caffeine -40 mg, citalopram, cyanocobalamin (vitamin b-12), diphenhydramine, ferrous sulfate, breo ellipta, fluticasone propionate, folic acid, hyoscyamine, phexxi, levothyroxine, lidocaine, loratadine, ondansetron, pantoprazole, topiramate, treprostinil, and ubrogepant.    Review of patient's allergies indicates:   Allergen Reactions    Fentanyl Anaphylaxis     Respiratory distress    Vibra-tabs [doxycycline hyclate] Anaphylaxis     "throat felt like it was closing"    Adhesive Hives     Silk tape    Amoxicillin Rash    Nsaids (non-steroidal anti-inflammatory drug) Swelling    Chlorhexidine Other (See Comments)     Blue Chlorhexidine causes hives        Patient's Goals for Therapy: To have full range of motion if possible    Pain:  Functional Pain Scale Rating 0-10:   2/10 on average  3/10 at best  7/10 at worst  Location: anterior chest wall. Lateral arm   Description: tight, burning, knotty  Aggravating Factors: reaching up and across  Easing Factors: muscle relaxor    Occupation:  Not working    Functional Limitations/Social History:    Previous functional status includes: Independent with all ADLs.     Current FunctionalStatus   Home/Living environment : lives with Dad      Limitation of Functional Status as follows:   ADLs/IADLs:     - Feeding: Independent    - " Bathing: difficulty with reaching to the right     - Dressing/Grooming: difficulty with donning bra, unable to use left hand for hair washing    - Driving: reaches with right to close car door, difficulty with reach seatbelt     Leisure: needle work, reading      Objective     Sensation Test: reports some numbness in left forearm, sensation is intact    Observation/Inspection:  rounded shoulders and forward head, lipoma medial left scapula    Range of Motion:      (L) UE (R) UE     AROM AROM Norm   Shoulder Flexion 110 145 180   Shoulder Abduction 110 150 0-180   Shoulder Extension 27 35 0-50   Shoulder Internal Rotation To gluteal To bra 0-90   Shoulder External Rotation 27 50 0-90   Horz Shoulder Adduction 11 20 0-40     ROM Comments:   Pain at end range all motions    Painful Arc:   Patient demonstrates no painful arc in shoulder flexion or abduction    Right cervical rotation some pulling into left shoulder     Strength  Shoulder Flexion RUE: 4+/5   Shoulder Extension RUE: 4+/5   Shoulder Abduction RUE:  3+/5   Internal Rotation RUE: 3+/5   External Rotation RUE: 3+/5       Shoulder Flexion LUE: 4/5   Shoulder Extension LUE: 4-/5   Shoulder Abduction LUE: 3/5**   Internal Rotation LUE:  3/5   External Rotation LUE:  3/5        ** denotes pain    Pull Tests:  Abduction: positive  Ext. Rotation: n/t    Special Tests:  Positive: Neer Impingement  Negative: Empty can test and Speed's Test    Palpation: (for pain)     Positive: tender left deltoid and bicep region     CMS Impairment/Limitation/Restriction for FOTO shoulder Survey    Therapist reviewed FOTO scores for Yuni Díaz Chris on 5/16/2022.   FOTO documents entered into [employer] - see Media section.    Limitation Score: 59%       Treatment     Home Exercise Program/Education:  Issued HEP (see patient instructions in EMR) and educated on modality use for pain management . Exercises were reviewed and Yuni was able to demonstrate them prior to the end  of the session.   Pt received a written copy of exercises to perform at home. Yuni demonstrated good  understanding of the education provided.  Pt was advised to perform these exercises free of pain, and to stop performing them if pain occurs.    Patient/Family Education: role of OT, goals for OT, scheduling/cancellations - pt verbalized understanding. Discussed insurance limitations with patient.    Additional Education provided: shoulder structure and pathology of shoulder, avoidance of impingement position with arm use and sleeping positions, ice to assist with her pain    Assessment     Yuni Perez is a 52 y.o. female referred to outpatient occupational therapy and presents with a medical diagnosis of Left upper arm pain, resulting in Decreased ROM, Decreased muscle strength and self care limitations and demonstrates limitations as described in the chart below. Following medical record review it is determined that pt will benefit from occupational therapy services in order to maximize pain free and/or functional use of left shoulder. The following goals were discussed with the patient and patient is in agreement with them as to be addressed in the treatment plan. The patient's rehab potential is Good.     Anticipated barriers to occupational therapy: none noted  Pt has no cultural, educational or language barriers to learning provided.    Profile and History Assessment of Occupational Performance Level of Clinical Decision Making Complexity Score   Occupational Profile:   Yuni Perez is a 52 y.o. female who lives with her father and is currently not employed . Yuni Perez has difficulty with  bathing, grooming and dressing  reaching  affecting his/her daily functional abilities. His/her main goal for therapy is To have full range of motion if possible.     Comorbidities:   seizures, TMJ, primary pulmonary hypertension    Medical and Therapy History Review:    Extensive               Performance Deficits    Physical:  Joint Mobility  Muscle Power/Strength  Postural Control  Pain    Cognitive:  No Deficits    Psychosocial:    No Deficits     Clinical Decision Making:  low    Assessment Process:  Problem-Focused Assessments    Modification/Need for Assistance:  Minimal-Moderate Modifications/Assistance    Intervention Selection:  Several Treatment Options       low  Based on PMHX, co morbidities , data from assessments and functional level of assistance required with task and clinical presentation directly impacting function.       The following goals were discussed with the patient and patient is in agreement with them as to be addressed in the treatment plan.     Goals to be met on  6/22 22:  1) Pt will be independent and report performing HEP to maximize (R) shoulder functional capacity.  2) Pt will increase left shoulder AROM in elevations and IR by 10 degrees to A with daily task.  3) Pt will report use of home modalities for pain management.  4) Pt will report  MA at worst 5/10.  5) Pt will report interrupted sleep no more than once a night.    Long term goals to be met by d/c  Pt will increase left shoulder AROM to allow for performance of dressing tasks.  Pt will increase left shoulder strength to 3+/5 for use during ADL  Pt will report MA of 2/10 at worst with activities and rest  Pt will report pain free sleep 3 out of 7 days  Pt will score 40 % on FOTO survey    Plan   Certification Period/Plan of care expiration: 5/16/2022 to 7/11/22.    Outpatient Occupational Therapy 2 times weekly for 8 weeks to include the following interventions: Manual therapy/joint mobilizations, Modalities for pain management, Therapeutic exercises/activities. and Strengthening.      GRACE Lin      I certify the need for these services furnished under this plan of treatment and while under my care    ____________________________________  Physician/Referring  Practitioner    _______________  Date of Signature

## 2022-05-20 ENCOUNTER — TELEPHONE (OUTPATIENT)
Dept: FAMILY MEDICINE | Facility: CLINIC | Age: 52
End: 2022-05-20
Payer: COMMERCIAL

## 2022-05-20 NOTE — TELEPHONE ENCOUNTER
Spoke with patient requesting PA be done to for Lidocaine Patches. Informed pt. That PA will be initiated, and she will be contacted when completed. Patient voiced understanding at this time.

## 2022-05-20 NOTE — TELEPHONE ENCOUNTER
----- Message from Christine Cohn sent at 5/17/2022  2:32 PM CDT -----  Type: Patient Call Back    Who called: Self    What is the request in detail: Pt calling to see if authorization for her medication and for her Physical therapy has be sent    Can the clinic reply by [facility]? no    Would the patient rather a call back or a response via [facility]? Call back    Best call back number: [phone] (home)

## 2022-05-31 NOTE — PROGRESS NOTES
[facility]  Occupational Therapy Treatment Note    Date: 6/1/2022  Name: Yuni Perez  Clinic Number: [id number]    Therapy Diagnosis: No diagnosis found.     Physician: Kenn Ray NP  Physician Orders: Eval and treat  Medical Diagnosis: Left upper arm pain  Surgical Procedure and Date: n/a  Evaluation Date: 05/16/2022  Insurance Authorization Period Expiration: 12/31/22  Plan of Care Certification Period: 5/16/22 to 7/11/22  Date of Return to MD: not appointed     Visit # / Visits authorized: 2 / 20  Time In: 1:08 pm  Time Out: 1:57 pm  Total Billable Time: 49 minutes     Precautions:  pulmonary hypotension, seizures    SUBJECTIVE     Pt reports: I tripped over my cat and hit the wall with my left shoulder, that hurt.  was compliant with home exercise program given last session.   Response to previous treatment: a little sore  Functional change: no change reported    Pain: 4/10 pre therapy  6.5/10 at worst  Location: left shoulder      OBJECTIVE     Yuni received therapeutic exercises for 41 minutes including:  -Sci fit with BUE attempted reverse direction but increase left shoulder pain.   -friction massage to left proximal bicep tendon until decrease in pain  -Supine passive left shoulder IR/ER and flexion x 10 reps each               Dowel flexion x 10 reps               Yellow band h. Abduction x 10 reps  Poor eccentric control cues provided                                   Lat pull down x 10 reps  -seated scapular retraction x 10 reps               Dowel flexion x 10 reps    Yuni received the following supervised modalities after being cleared for contradictions for 8 minutes:   -ice application to left shoulder arm supported on plinth       Patient Education and Home Exercises      Education provided:   - encouraged to perform HEP  - Progress towards goals     Written Home Exercises Provided: Patient instructed to cont prior HEP.  Exercises were  See Note Below from pharmacy.     [facility] pharmacy is closed at this time. I wanted to find out when patient had filled the FLUoxetine (PROZAC) 20 MG capsules. Last written on 1/8/22 for 30 capsules with 1 refill.      Was patient supposed to take Fluoxetine 20 mg daily x 2 months and then decrease to 10 mg ?     Please review and advise.    Berkley Khanna RN BSN  [facility]     reviewed and Yuni was able to demonstrate them prior to the end of the session.  Yuni demonstrated good  understanding of the HEP provided. See EMR under Patient Instructions for exercises provided during therapy sessions.       Assessment     Pt would continue to benefit from skilled OT. Yuni tolerated therapy well with slight improvement in her shoulder pain post therapy.  Some motor planning issues and control with light resistive exercise.      Yuni is progressing well towards her goals and there are no updates to goals at this time. Pt prognosis is Good.     Pt will continue to benefit from skilled outpatient occupational therapy to address the deficits listed in the problem list on initial evaluation provide pt/family education and to maximize pt's level of independence in the home and community environment.     Pt's spiritual, cultural and educational needs considered and pt agreeable to plan of care and goals.    Anticipated barriers to occupational therapy: none noted    Goals:  Goals to be met on  6/22 22:  1) Pt will be independent and report performing HEP to maximize (R) shoulder functional capacity.  2) Pt will increase left shoulder AROM in elevations and IR by 10 degrees to A with daily task.  3) Pt will report use of home modalities for pain management.  4) Pt will report  [place] at worst 5/10.  5) Pt will report interrupted sleep no more than once a night.     Long term goals to be met by d/c  Pt will increase left shoulder AROM to allow for performance of dressing tasks.  Pt will increase left shoulder strength to 3+/5 for use during ADL  Pt will report [place] of 2/10 at worst with activities and rest  Pt will report pain free sleep 3 out of 7 days  Pt will score 40 % on FOTO survey    PLAN     Progress with scapular and postural exercises as well as left shoulder ROM.       GRACE Lin

## 2022-06-01 ENCOUNTER — CLINICAL SUPPORT (OUTPATIENT)
Dept: REHABILITATION | Facility: HOSPITAL | Age: 52
End: 2022-06-01
Payer: COMMERCIAL

## 2022-06-01 DIAGNOSIS — E03.9 HYPOTHYROIDISM (ACQUIRED): ICD-10-CM

## 2022-06-01 DIAGNOSIS — R46.0 SELF-CARE DEFICIT FOR BATHING: ICD-10-CM

## 2022-06-01 DIAGNOSIS — I27.0 PRIMARY PULMONARY HYPERTENSION: ICD-10-CM

## 2022-06-01 DIAGNOSIS — Z74.1 SELF-CARE DEFICIT FOR DRESSING AND GROOMING: ICD-10-CM

## 2022-06-01 DIAGNOSIS — M62.81 MUSCLE WEAKNESS OF LEFT ARM: ICD-10-CM

## 2022-06-01 DIAGNOSIS — M25.612 DECREASED RANGE OF MOTION OF LEFT SHOULDER: Primary | ICD-10-CM

## 2022-06-01 PROCEDURE — 97110 THERAPEUTIC EXERCISES: CPT | Mod: PN

## 2022-06-01 RX ORDER — LEVOTHYROXINE SODIUM 125 UG/1
TABLET ORAL
Qty: 30 TABLET | Refills: 0 | Status: SHIPPED | OUTPATIENT
Start: 2022-06-01 | End: 2022-07-11 | Stop reason: SDUPTHER

## 2022-06-01 NOTE — TELEPHONE ENCOUNTER
No new care gaps identified.  [facility] Embedded Care Gaps. Reference number: [id number]. 6/01/2022   4:18:51 PM CDT

## 2022-06-02 RX ORDER — FOLIC ACID 1 MG/1
TABLET ORAL
Qty: 90 TABLET | Refills: 0 | Status: SHIPPED | OUTPATIENT
Start: 2022-06-02 | End: 2022-09-09

## 2022-06-02 NOTE — TELEPHONE ENCOUNTER
Refill Routing Note   Medication(s) are not appropriate for processing by [facility] for the following reason(s):      - Outside of protocol    [facility] action(s):  Route  Approve          Medication reconciliation completed: No     Appointments  past 12m or future 3m with PCP    Date Provider   Last Visit   6/7/2021 Lulu Sandhu MD   Next Visit   7/11/2022 Lulu Sandhu MD   ED visits in past 90 days: 0        Note composed:8:59 PM 06/01/2022

## 2022-06-03 NOTE — PROGRESS NOTES
[facility]  Occupational Therapy Treatment Note    Date: 6/6/2022  Name: Yuni Perez  Clinic Number: [id number]    Therapy Diagnosis:   Encounter Diagnoses   Name Primary?    Decreased range of motion of left shoulder Yes    Muscle weakness of left arm     Self-care deficit for dressing and grooming     Self-care deficit for bathing         Physician: Kenn Ray NP  Physician Orders: Eval and treat  Medical Diagnosis: Left upper arm pain  Surgical Procedure and Date: n/a  Evaluation Date: 05/16/2022  Insurance Authorization Period Expiration: 12/31/22  Plan of Care Certification Period: 5/16/22 to 7/11/22  Date of Return to MD: not appointed     Visit # / Visits authorized: 3 / 20  Time In: 1:10 pm  Time Out: 1:56 pm  Total Billable Time: 38 minutes     Precautions:  pulmonary hypotension, seizures    SUBJECTIVE     Pt reports: I tripped over my cat and hit the wall with my left shoulder, that hurt.  was compliant with home exercise program given last session.   Response to previous treatment: it felt okay  Functional change: no change reported    Pain: 4/10 pre therapy   Location: left shoulder      OBJECTIVE     Yuni received therapeutic exercises for 38 minutes including:  -Sci fit with BUE attempted reverse direction but increase left shoulder pain.   -IASTM with boat tool to left bicep muscle  -trigger point release in bicep muscle  -friction massage to left proximal bicep tendon until decrease in pain  -Supine passive left shoulder IR/ER and flexion x 10 reps each               Passive flexion to approx 100* x 10 reps               Hand behind head ER stretch x 5 reps hold 5 seconds    Yuni received the following supervised modalities after being cleared for contradictions for 6 minutes:   -ice application to left shoulder in supine       Patient Education and Home Exercises      Education provided:   - encouraged to continue to  perform HEP  -  Progress towards goals     Written Home Exercises Provided: Patient instructed to cont prior HEP.  Exercises were reviewed and Yuni was able to demonstrate them prior to the end of the session.  Yuni demonstrated good  understanding of the HEP provided. See EMR under Patient Instructions for exercises provided during therapy sessions.       Assessment     Pt would continue to benefit from skilled OT. Yuni with increased shoulder pain this weekend. Good tolerance to manual therapy, very firm end range ER and flexion post therapy session.   Yuni is progressing well towards her goals and there are no updates to goals at this time. Pt prognosis is Good.     Pt will continue to benefit from skilled outpatient occupational therapy to address the deficits listed in the problem list on initial evaluation provide pt/family education and to maximize pt's level of independence in the home and community environment.     Pt's spiritual, cultural and educational needs considered and pt agreeable to plan of care and goals.    Anticipated barriers to occupational therapy: none noted    Goals:  Goals to be met on  6/22 22:  1) Pt will be independent and report performing HEP to maximize (R) shoulder functional capacity.  2) Pt will increase left shoulder AROM in elevations and IR by 10 degrees to A with daily task.  3) Pt will report use of home modalities for pain management.  4) Pt will report  [place] at worst 5/10.  5) Pt will report interrupted sleep no more than once a night.     Long term goals to be met by d/c  Pt will increase left shoulder AROM to allow for performance of dressing tasks.  Pt will increase left shoulder strength to 3+/5 for use during ADL  Pt will report [place] of 2/10 at worst with activities and rest  Pt will report pain free sleep 3 out of 7 days  Pt will score 40 % on FOTO survey    PLAN     Progress with scapular and postural exercises as well as left shoulder ROM.       Natalia Herrera  LOTR

## 2022-06-06 ENCOUNTER — CLINICAL SUPPORT (OUTPATIENT)
Dept: REHABILITATION | Facility: HOSPITAL | Age: 52
End: 2022-06-06
Payer: COMMERCIAL

## 2022-06-06 ENCOUNTER — TELEPHONE (OUTPATIENT)
Dept: FAMILY MEDICINE | Facility: CLINIC | Age: 52
End: 2022-06-06
Payer: COMMERCIAL

## 2022-06-06 DIAGNOSIS — M62.81 MUSCLE WEAKNESS OF LEFT ARM: ICD-10-CM

## 2022-06-06 DIAGNOSIS — M25.612 DECREASED RANGE OF MOTION OF LEFT SHOULDER: Primary | ICD-10-CM

## 2022-06-06 DIAGNOSIS — Z74.1 SELF-CARE DEFICIT FOR DRESSING AND GROOMING: ICD-10-CM

## 2022-06-06 DIAGNOSIS — R46.0 SELF-CARE DEFICIT FOR BATHING: ICD-10-CM

## 2022-06-06 PROCEDURE — 97110 THERAPEUTIC EXERCISES: CPT | Mod: PN

## 2022-06-06 NOTE — TELEPHONE ENCOUNTER
----- Message from Lulu Sandhu MD sent at 6/2/2022 12:07 PM CDT -----  Regarding: Due for ov/address hm  Please call patient to schedule office visit and address health maintenance.

## 2022-06-08 ENCOUNTER — OFFICE VISIT (OUTPATIENT)
Dept: NEUROLOGY | Facility: CLINIC | Age: 52
End: 2022-06-08
Payer: COMMERCIAL

## 2022-06-08 VITALS
HEIGHT: 59 IN | BODY MASS INDEX: 30.76 KG/M2 | WEIGHT: 152.56 LBS | DIASTOLIC BLOOD PRESSURE: 64 MMHG | SYSTOLIC BLOOD PRESSURE: 117 MMHG | HEART RATE: 76 BPM

## 2022-06-08 DIAGNOSIS — G43.009 MIGRAINE WITHOUT AURA AND WITHOUT STATUS MIGRAINOSUS, NOT INTRACTABLE: Primary | ICD-10-CM

## 2022-06-08 DIAGNOSIS — G44.229 CHRONIC TENSION-TYPE HEADACHE, NOT INTRACTABLE: ICD-10-CM

## 2022-06-08 PROCEDURE — 99999 PR PBB SHADOW E&M-EST. PATIENT-LVL IV: CPT | Mod: PBBFAC,,, | Performed by: NEUROLOGICAL SURGERY

## 2022-06-08 PROCEDURE — 3008F PR BODY MASS INDEX (BMI) DOCUMENTED: ICD-10-PCS | Mod: CPTII,S$GLB,, | Performed by: NEUROLOGICAL SURGERY

## 2022-06-08 PROCEDURE — 99214 OFFICE O/P EST MOD 30 MIN: CPT | Mod: S$GLB,,, | Performed by: NEUROLOGICAL SURGERY

## 2022-06-08 PROCEDURE — 3074F SYST BP LT 130 MM HG: CPT | Mod: CPTII,S$GLB,, | Performed by: NEUROLOGICAL SURGERY

## 2022-06-08 PROCEDURE — 3078F PR MOST RECENT DIASTOLIC BLOOD PRESSURE < 80 MM HG: ICD-10-PCS | Mod: CPTII,S$GLB,, | Performed by: NEUROLOGICAL SURGERY

## 2022-06-08 PROCEDURE — 3074F PR MOST RECENT SYSTOLIC BLOOD PRESSURE < 130 MM HG: ICD-10-PCS | Mod: CPTII,S$GLB,, | Performed by: NEUROLOGICAL SURGERY

## 2022-06-08 PROCEDURE — 3078F DIAST BP <80 MM HG: CPT | Mod: CPTII,S$GLB,, | Performed by: NEUROLOGICAL SURGERY

## 2022-06-08 PROCEDURE — 99999 PR PBB SHADOW E&M-EST. PATIENT-LVL IV: ICD-10-PCS | Mod: PBBFAC,,, | Performed by: NEUROLOGICAL SURGERY

## 2022-06-08 PROCEDURE — 3008F BODY MASS INDEX DOCD: CPT | Mod: CPTII,S$GLB,, | Performed by: NEUROLOGICAL SURGERY

## 2022-06-08 PROCEDURE — 99214 PR OFFICE/OUTPT VISIT, EST, LEVL IV, 30-39 MIN: ICD-10-PCS | Mod: S$GLB,,, | Performed by: NEUROLOGICAL SURGERY

## 2022-06-08 RX ORDER — TIZANIDINE 4 MG/1
4 TABLET ORAL EVERY 6 HOURS PRN
Qty: 60 TABLET | Refills: 3 | Status: SHIPPED | OUTPATIENT
Start: 2022-06-08 | End: 2022-06-18

## 2022-06-10 ENCOUNTER — HOSPITAL ENCOUNTER (OUTPATIENT)
Facility: HOSPITAL | Age: 52
Discharge: HOME | End: 2022-06-10
Attending: INTERNAL MEDICINE | Admitting: INTERNAL MEDICINE
Payer: COMMERCIAL

## 2022-06-10 VITALS
RESPIRATION RATE: 18 BRPM | DIASTOLIC BLOOD PRESSURE: 66 MMHG | WEIGHT: 144.19 LBS | SYSTOLIC BLOOD PRESSURE: 126 MMHG | OXYGEN SATURATION: 95 % | BODY MASS INDEX: 29.07 KG/M2 | HEIGHT: 59 IN | HEART RATE: 79 BPM | TEMPERATURE: 98 F

## 2022-06-10 DIAGNOSIS — I27.20 PULMONARY HYPERTENSION: ICD-10-CM

## 2022-06-10 PROCEDURE — 36000 PR PLACE NEEDLE IN VEIN: ICD-10-PCS | Mod: 50,,, | Performed by: INTERNAL MEDICINE

## 2022-06-10 PROCEDURE — C1894 INTRO/SHEATH, NON-LASER: HCPCS | Performed by: INTERNAL MEDICINE

## 2022-06-10 PROCEDURE — 25000003 PHARM REV CODE 250: Performed by: INTERNAL MEDICINE

## 2022-06-10 PROCEDURE — 36000 PLACE NEEDLE IN VEIN: CPT | Mod: 50,,, | Performed by: INTERNAL MEDICINE

## 2022-06-10 PROCEDURE — 36000 PLACE NEEDLE IN VEIN: CPT | Mod: 50 | Performed by: INTERNAL MEDICINE

## 2022-06-10 RX ORDER — SODIUM CHLORIDE 0.9 G/100ML
IRRIGANT IRRIGATION
Status: DISCONTINUED | OUTPATIENT
Start: 2022-06-10 | End: 2022-06-10 | Stop reason: HOSPADM

## 2022-06-10 RX ORDER — LIDOCAINE HYDROCHLORIDE 20 MG/ML
INJECTION, SOLUTION EPIDURAL; INFILTRATION; INTRACAUDAL; PERINEURAL
Status: DISCONTINUED | OUTPATIENT
Start: 2022-06-10 | End: 2022-06-10 | Stop reason: HOSPADM

## 2022-06-10 NOTE — DISCHARGE SUMMARY
[facility] Lab  Discharge Note  Short Stay    Procedure(s) (LRB):  INSERTION, CATHETER, RIGHT HEART (Right)    OUTCOME: Patient tolerated treatment/procedure well without complication and is now ready for discharge.    DISPOSITION: Home or Self Care    FINAL DIAGNOSIS:  <principal problem not specified>    FOLLOWUP: In clinic    DISCHARGE INSTRUCTIONS:  No discharge procedures on file.     TIME SPENT ON DISCHARGE: 20 minutes

## 2022-06-10 NOTE — PATIENT INSTRUCTIONS

## 2022-06-10 NOTE — PROGRESS NOTES
Patient discharged per MD orders. Instructions given on medications, wound care, activity, signs of infection, when to call MD, and follow up appointments. Pt verbalized understanding.  Patient AAOx3, VSS, no c/o pain or discomfort at this time. Patient and family waiting for wheelchair.

## 2022-06-10 NOTE — H&P
Subjective:    Patient ID:  Yuni Perez is a 51 y.o. female who presents for follow-up of Pulmonary Hypertension.     HPI   Mrs. Perez is a pleasant 51 y.o. white female who presents for PH follow-up. Patient was diagnosed with IPAH in 2009 and started on Remodulin, Letairis, and Adcirca. She has a history of ABHIJEET, GERD, and Asthma. Current therapy is Remodulin infusing at 107ng/kg/min (pre-filled cassettes), Letairis 10mg qdaily, and Adcirca 40mg qdaily. Has T/F Adempas.     Patient underwent RHC/LHC 09/29/20 for lung transplant evaluation. LHC normal, however had persistently severely elevated PASP to over 100 systolic per RHC findings.    Patient here for repeat RHC today. Doing well overall. Did well on last 6MWT. Patient denies N/V/F/C, lightheadedness, dizziness, PND, orthopnea, LE edema, abdominal pain, abdominal pressure, chest pain, chest pressure, syncope, pre-syncope.          6MWT:  6MW 2/7/2022   6MWT Status completed with stops   Patient Reported Dyspnea   Was O2 used? No   6MW Distance walked (feet) 1075   Distance walked (meters) 327.66   Did patient stop? Yes   Oxygen Saturation 97   Supplemental Oxygen Room Air   Heart Rate 83   Blood Pressure 95/63   Ab Dyspnea Rating  moderate   Oxygen Saturation 95   Supplemental Oxygen Room Air   Heart Rate 124   Blood Pressure 121/73   Ab Dyspnea Rating  very heavy   Recovery Time (seconds) 180   Oxygen Saturation 98   Supplemental Oxygen Room Air   Heart Rate 93      ECHO: 2/7/2022  · The left ventricle is normal in size with normal systolic function. The estimated ejection fraction is 65%.  · Severe right ventricular enlargement with hypertrophy and moderately to severely reduced right ventricular systolic function.  · Indeterminate left ventricular diastolic function.  · Mild tricuspid regurgitation.  · There is pulmonary hypertension. The estimated PA systolic pressure is 95 mmHg.  · Normal central venous pressure (3 mmHg).     RHC:  "  · Estimated blood loss: none  · Severe PAH on IV remodulin, adcirca and letairis.  · Normal right and left-sided filling pressures.  · Borderline low-normal cardiac output by Ebony method which is normal when indexed for BSA.  · No signficant change from [facility] 8/18.  · RA: 6/ 7/ 5 RV: 105/ 3/ 10 PA: 103/ 45/ 64 PWP: 15/ 1512/ 13 . Cardiac output was 4.11 by Ebony. Cardiac index is 2.67 L/min/m2. O2 Sat: PA 70%. AO sat 96% PVR 12.4     PFTs: 9/8/2020  Spirometry shows moderate obstruction. Lung volume determination is normal. DLCO is mildly decreased - 68%     IV/SC:  Pulmonary Hypertension Review Flowsheet 2/8/2022   Pump Type CADD   Medication type Remodulin   Vial size 2.5mg/ml   Dose (ng/kg/min) 107 ng/kg/min   DW (kg) 60.5 kg   Amt of Med used 8 ml   Amt of Diluent Used NS 92 ml   Final Concentration (ng/ml) 200,000 ng/ml   Pump Rate ml/24 hr 47 ml/24 hours      Review of Systems   Constitutional: Positive for malaise/fatigue. Negative for decreased appetite, diaphoresis, fever and night sweats.   HENT: Positive for congestion.    Cardiovascular: Positive for dyspnea on exertion. Negative for chest pain, irregular heartbeat and syncope.   Respiratory: Positive for shortness of breath. Negative for cough and wheezing.         Occasional SOB at night and morning   Endocrine: Negative.    Skin: Negative.    Musculoskeletal: Negative.    Gastrointestinal: Negative.    Genitourinary: Negative.    Neurological: Negative.    Psychiatric/Behavioral: The patient is nervous/anxious.         Controlled with medication      Objective:/64 (BP Location: Right arm, Patient Position: Sitting)   Pulse 88   Temp 97.9 °F (36.6 °C) (Temporal)   Resp 18   Ht 4' 11" (1.499 m)   Wt 65.4 kg (144 lb 2.9 oz)   SpO2 95%   Breastfeeding No   BMI 29.12 kg/m²          Physical Exam  Constitutional:       Appearance: Normal appearance.   HENT:      Head: Normocephalic and atraumatic.   Eyes:      Pupils: Pupils are equal, round, " and reactive to light.   Neck:      Vascular: No JVD.   Cardiovascular:      Rate and Rhythm: Normal rate and regular rhythm.      Pulses: Normal pulses.      Heart sounds: Murmur heard.        Comments: Loud S2  Pulmonary:      Effort: Pulmonary effort is normal.      Breath sounds: Normal breath sounds.   Chest:       Abdominal:      General: Abdomen is flat.      Palpations: Abdomen is soft.   Musculoskeletal:         General: Normal range of motion.      Cervical back: Normal range of motion and neck supple.   Skin:     General: Skin is warm and dry.      Capillary Refill: Capillary refill takes less than 2 seconds.   Neurological:      Mental Status: She is alert and oriented to person, place, and time.   Psychiatric:         Mood and Affect: Mood normal.         Behavior: Behavior normal.         Lab Results   Component Value Date    BNP 82 02/07/2022     06/10/2022    K 4.1 06/10/2022    MG 2.1 02/07/2022     (H) 06/10/2022    CO2 19 (L) 06/10/2022    BUN 13 06/10/2022    CREATININE 0.8 06/10/2022    GLU 83 06/10/2022    HGBA1C 4.9 09/21/2020    AST 16 06/10/2022    ALT 15 06/10/2022    ALBUMIN 3.9 06/10/2022    PROT 7.1 06/10/2022    BILITOT 0.4 06/10/2022    CHOL 125 09/21/2020    HDL 36 (L) 09/21/2020    LDLCALC 72.8 09/21/2020    TRIG 81 09/21/2020       Magnesium   Date Value Ref Range Status   02/07/2022 2.1 1.6 - 2.6 mg/dL Final       Lab Results   Component Value Date    WBC 5.35 06/10/2022    HGB 14.8 06/10/2022    HCT 43.5 06/10/2022    MCV 88 06/10/2022     06/10/2022       Lab Results   Component Value Date    INR 1.0 09/29/2020    INR 0.9 09/21/2020    INR 0.9 09/04/2019       BNP   Date Value Ref Range Status   02/07/2022 82 0 - 99 pg/mL Final     Comment:     Values of less than 100 pg/ml are consistent with non-CHF populations.   10/22/2021 100 (H) 0 - 99 pg/mL Final     Comment:     Values of less than 100 pg/ml are consistent with non-CHF populations.   06/25/2021 99 0 -  99 pg/mL Final     Comment:     Values of less than 100 pg/ml are consistent with non-CHF populations.       No results found for: LDH            WHO Group:              1          Functional Class:      II                      REVEAL Score: 8 (Intermediate Risk)     Assessment:       1. Primary pulmonary hypertension    2. Chronic pulmonary heart disease    3. Mild asthma without complication, unspecified whether persistent       Plan:       Patient with severe pulmonary hypertension, here for RHC to evaluate filling pressures and cardiac flows. Proceed with RHC.   I have explained the risks, benefits, and alternatives of the procedure in detail.  The patient voices understanding and all questions have been answered.  The patient agrees to proceed as planned.

## 2022-06-10 NOTE — Clinical Note
The left neck was prepped. The site was prepped with ChloraPrep. The site was clipped. The patient was draped. The patient was positioned supine.

## 2022-06-10 NOTE — PLAN OF CARE
Received report from DENNY White. Patient s/p attempted RHC, AAOx3. VSS, no c/o pain or discomfort at this time, resp even and unlabored. Bandaid to sarah neck is CDI. No active bleeding. No hematoma noted. Post procedure protocol reviewed with patient and patient's family. Understanding verbalized. Family members at bedside. Nurse call bell within reach. Will continue to monitor per post procedure protocol.

## 2022-06-10 NOTE — Clinical Note
A percutaneous stick to the left internal jugular and right internal jugular vein was performed. Ultrasound guidance was used to obtain access.

## 2022-06-10 NOTE — BRIEF OP NOTE
Unable to get access on RIJ or LIJ. Will proceed with groin access in 3-4 months, see if we can have her continue with uptitration of medication in the meantime.

## 2022-06-14 NOTE — PROGRESS NOTES
[facility]  Occupational Therapy Treatment Note    Date: 6/15/2022  Name: Yuni Perez  Clinic Number: [id number]    Therapy Diagnosis:   Encounter Diagnoses   Name Primary?    Decreased range of motion of left shoulder Yes    Muscle weakness of left arm     Self-care deficit for dressing and grooming     Self-care deficit for bathing         Physician: Kenn Ray NP  Physician Orders: Eval and treat  Medical Diagnosis: Left upper arm pain  Surgical Procedure and Date: n/a  Evaluation Date: 05/16/2022  Insurance Authorization Period Expiration: 12/31/22  Plan of Care Certification Period: 5/16/22 to 7/11/22  Date of Return to MD: not appointed     Visit # / Visits authorized: 4/ 20  Time In: 10:50 am  Time Out: 11:32 am  Total Billable Time: 42 minutes     Precautions:  pulmonary hypotension, seizures    SUBJECTIVE     Pt reports: I had that procedure on Friday and I am a little sore still .  Yuni was compliant with home exercise program given last session.   Response to previous treatment: it felt okay  Functional change: no change reported    Pain: 5/10 pre therapy   Location: left shoulder      OBJECTIVE     Yuni received therapeutic exercises for 42 minutes including:  -Sci fit with BUE with alternating directions every minute x 6 min    -IASTM with boat tool to left bicep muscle  -trigger point release in bicep muscle  -friction massage to left proximal bicep tendon   Moist heat applied to left shoulder for stretching  -Supine passive left shoulder IR/ER x 10 reps each                Dowel chest press x 10 reps                           Flexion x 10 reps  -seated scapular retraction x 10 reps manual cues  Yuni received the following supervised modalities after being cleared for contradictions for 6 minutes:   -ice application to left shoulder in supine       Patient Education and Home Exercises      Education provided:   - encouraged to continue  to  perform HEP  - Progress towards goals     Written Home Exercises Provided: Patient instructed to cont prior HEP.  Exercises were reviewed and Yuni was able to demonstrate them prior to the end of the session.  Yuni demonstrated good  understanding of the HEP provided. See EMR under Patient Instructions for exercises provided during therapy sessions.       Assessment     Pt would continue to benefit from skilled OT. Yuni has pain in shoulder and firm end range noted.  Moist heat during stretching assisted with reduction in her pain while exercising.     Yuni is progressing well towards her goals and there are no updates to goals at this time. Pt prognosis is Good.     Pt will continue to benefit from skilled outpatient occupational therapy to address the deficits listed in the problem list on initial evaluation provide pt/family education and to maximize pt's level of independence in the home and community environment.     Pt's spiritual, cultural and educational needs considered and pt agreeable to plan of care and goals.    Anticipated barriers to occupational therapy: none noted    Goals:  Goals to be met on  6/22/22:  1) Pt will be independent and report performing HEP to maximize (R) shoulder functional capacity.  2) Pt will increase left shoulder AROM in elevations and IR by 10 degrees to A with daily task.  3) Pt will report use of home modalities for pain management.  4) Pt will report  [place] at worst 5/10.  5) Pt will report interrupted sleep no more than once a night.     Long term goals to be met by d/c  Pt will increase left shoulder AROM to allow for performance of dressing tasks.  Pt will increase left shoulder strength to 3+/5 for use during ADL  Pt will report [place] of 2/10 at worst with activities and rest  Pt will report pain free sleep 3 out of 7 days  Pt will score 40 % on FOTO survey    PLAN     Progress with scapular and postural exercises as well as left shoulder ROM.        GRACE Lin

## 2022-06-15 ENCOUNTER — CLINICAL SUPPORT (OUTPATIENT)
Dept: REHABILITATION | Facility: HOSPITAL | Age: 52
End: 2022-06-15
Payer: COMMERCIAL

## 2022-06-15 DIAGNOSIS — M25.612 DECREASED RANGE OF MOTION OF LEFT SHOULDER: Primary | ICD-10-CM

## 2022-06-15 DIAGNOSIS — M62.81 MUSCLE WEAKNESS OF LEFT ARM: ICD-10-CM

## 2022-06-15 DIAGNOSIS — Z74.1 SELF-CARE DEFICIT FOR DRESSING AND GROOMING: ICD-10-CM

## 2022-06-15 DIAGNOSIS — R46.0 SELF-CARE DEFICIT FOR BATHING: ICD-10-CM

## 2022-06-15 PROCEDURE — 97110 THERAPEUTIC EXERCISES: CPT | Mod: PN

## 2022-06-15 NOTE — PROGRESS NOTES
"Chief Complaint   Patient presents with    Migraine        Yuni Perez is a 52 y.o. female with a history of multiple medical diagnoses as listed below that presents for evaluation of headaches. She is accompanied to this visit by her mother. She was diagnosed with pulmonary hypertension about 8 years ago and around the same time she was diagnosed with a seizure. She had workup at that time that included MRI that showed signs of "old injury" and she had EEG that showed "lots of spikes and waves" per her mother. She says that since that time she has been started on Topamax which has been titrated up. She has not been having any seizures but she has bene having some occasional word finding difficulty that she has attributed to the medication. She has tried tylenol because as she has been titrating her medication to treat pulmonary hypertension sh marie been having bilateral intense stabbing pains in her head. She does say that with time her headaches have been better but she has noticed many more headaches since she began the Remodulin. She has not taken any other headache medications in the past.    Interval History  11/17/2016  She has still been having headaches after taking her medications in particular her vasodilators. She has found that her PRN medications are somewhat helpful in minimizing the pain that she has from her headaches. She has not had any mew problems since she was last seen in clinic.    03/23/2017  Since last seen in clinic she has been approved to get on the lung transplant list and she will be making strides to get set up to to receive new lungs. She has been taking her topamax as directed and has felt that overall she has been having fewer headaches but she still has needed Tylenol and on rare occasions Fioricet to get rid of her headaches. She has been having some cognitive difficulty with her current medications but she feels that it is tolerable. She has not had any seizure " "like activity.    06/13/2017  Since last seen in clinic she says that she has been able to be placed on the donor list. She says that overall she feels that she has been stable with regards to her breathing. She continues to have chronic sinus issues that have been essentially refractory to any medications that she has tried including antihistamines and nasal rinses. She feels that she is able to clear her sinuses and have it come right back. She has headaches almost daily but despite the frequency of the headaches she feels that most are not migraines. She has not had any seizures since last seen. She is tolerating all of her medications well without any problems.    10/31/2017  She has continued to make preparations for lung transplant.  Said that she has been frustrated with the process and she does not have much guidance which is causing increased stress.  Headaches overall have been about the same, but are responsive to Fioricet.  She says the medication helps give her adequate relief of the pain.    03/22/2018  She feels that her headaches have been getting worse in the last week.  Overall prior to this week she feels that the headaches were about the same as when she was last seen in clinic and she was using Fioricet as needed which provided some relief from her headache pain.  In the last week however Fioricet is not provided much of any relief and headaches have been going on almost every day.  She says that she feels like she has "sinus headaches" which have lingered until she has started having pounding unilateral headaches that she feels her migraines.  These headaches seem to be much more frequent and much more intense that she is ever had in the past.    05/03/2018  headaches have been better as she has hardly had any since she was last seen in clinic. Lung transplant is till her recourse for treatm of pulmonary hypertension, but she has not been able to get placed on the transplant list just " yet.    11/06/2018  Headaches have still been frequent, but have been relieved effectively by using Fioricet. She has been taking her medications as directed without any complaints of side effects. She feels that the intensity has been about the same overall. Imitrex has been prescribed, but she has not used the medication yet to determine if it helps her headaches.    02/07/2019  She has been having headaches with about the same frequency as she had in the past, but they have continued to have some response to her current abortive regimen. She has not been comfortable with the CGRP receptor blockers as a treatment modality as she has been weary of using any injectable medication to treat her symptoms. She has been seeing her Pulmonary team as recommended.    05/09/2019  She presents to clinic for routine follow-up of her headaches.  The headaches be somewhat improved with her current medication regimen, but the ability river frequently.  She needs to use abortive medications several times per week to by relief in order to her level of functioning and her ability to care for her activities of daily where they are.    08/15/2019  Her headaches have been overall stable since she was last seen in clinic. She has been trying to use various OTC medications and prescription medications with intermittent effectivetness in alleviating her headaches. She has not been amendable to considering a CGRP modulating medication to control her headaches as she has been unwilling to commit in an injectable delivery for any medication.    12/13/2019  Since she has been placed on a new medication to address pulmonary hypertension she has had fewer headaches. headaches have been responding well to fioricet and she has not needed to use the sumatriptan as often as she had in the past. She has not had any change in her candidacy for lung transplant.    12/07/2020  Overall she feels that her headaches have been better since she was last  seen in clinic.  She is concerned however because she will soon be increasing the medication she uses to control her pulmonary hypertension.  Whenever this occurred she notices that she has had a sharp increase in her headache frequency and intensity.  Fioricet is been helpful to treat headaches when they have come about.  Sumatriptan has been used on emergency use.  She was recently denied once again for transplant.    11/01/2021  No updates on her status with regards to being placed on the transplant list. Medications for pulmonary hypertension have been well tolerated. She feels that her headaches have been stable and have been responsive to her abortive therapies when she has needed to use them.    06/08/2022  Headaches have been less frequent and less intense overall compared to she was last seen in clinic.  Medications have been well tolerated with no complaints of side effects..     PAST MEDICAL HISTORY:  Past Medical History:   Diagnosis Date    AR (allergic rhinitis)     Cholelithiasis, common bile duct     Chronic low back pain     Eye pressure 2017    General anesthetics causing adverse effect in therapeutic use     GERD (gastroesophageal reflux disease)     History of migraine headaches     Hypothyroidism     Innocent heart murmur     Lumbar disc disease     Menorrhagia     Mild asthma     Obesity     Plantar fasciitis of left foot     Primary pulmonary hypertension     followed by heart transplant/pulmonary     Seizure disorder     x 1 in 2008    Seizures     Sleep apnea     TMJ (dislocation of temporomandibular joint)     Tricuspid regurgitation        PAST SURGICAL HISTORY:  Past Surgical History:   Procedure Laterality Date    CARDIAC CATHETERIZATION      CATHETERIZATION OF BOTH LEFT AND RIGHT HEART Bilateral 9/29/2020    Procedure: CATHETERIZATION, HEART, BOTH LEFT AND RIGHT;  Surgeon: Gucci Pickett MD;  Location: [facility] CATH LAB;  Service: Cardiology;  Laterality: Bilateral;     CENTRAL VENOUS CATHETER INSERTION      CORONARY ANGIOGRAPHY N/A 9/29/2020    Procedure: ANGIOGRAM, CORONARY ARTERY;  Surgeon: Gucci Pickett MD;  Location: [facility] CATH LAB;  Service: Cardiology;  Laterality: N/A;    gallbladder drain  06/2019    INSERTION OF HAYNES CATHETER Right 6/17/2020    Procedure: INSERTION, CATHETER, CENTRAL VENOUS, HAYNES Replace Single Lumen for Remodulin Infusion;  Surgeon: Bertin Dewitt MD;  [facility];  Service: General;  Laterality: Right;    PORTACATH PLACEMENT      REMOVAL OF TUNNELED CENTRAL VENOUS CATHETER (CVC) N/A 6/17/2020    Procedure: REMOVAL, CATHETER, CENTRAL VENOUS, TUNNELED;  Surgeon: Bertin Dewitt MD;  [facility];  Service: General;  Laterality: N/A;    RIGHT HEART CATHETERIZATION Right 8/20/2018    Procedure: HEART CATH-RIGHT;  Surgeon: Ivonne Rai MD;  Location: [facility] CATH LAB;  Service: Cardiology;  Laterality: Right;    RIGHT HEART CATHETERIZATION Right 9/4/2019    Procedure: INSERTION, CATHETER, RIGHT HEART;  Surgeon: Ivonne Rai MD;  Location: [facility] CATH LAB;  Service: Cardiology;  Laterality: Right;    RIGHT HEART CATHETERIZATION Right 6/10/2022    Procedure: INSERTION, CATHETER, RIGHT HEART;  Surgeon: Keshia Poe MD;  Location: [facility] CATH LAB;  Service: Cardiology;  Laterality: Right;    UPPER GASTROINTESTINAL ENDOSCOPY         SOCIAL HISTORY:  Social History     Socioeconomic History    Marital status: Single    Number of children: 0   Occupational History    Occupation: disabled     Employer: [employer]   Tobacco Use    Smoking status: Never Smoker    Smokeless tobacco: Never Used   Substance and Sexual Activity    Alcohol use: No     Alcohol/week: 0.8 standard drinks     Types: 1 Standard drinks or equivalent per week     Comment: socially    Drug use: No    Sexual activity: Never     Birth control/protection: OCP, Pill     Comment: pt is a virgin       FAMILY HISTORY:  Family  History   Problem Relation Age of Onset    Heart disease Father     Glaucoma Father     Diabetes Mother     Cancer Maternal Grandmother         uterine    Cancer Maternal Aunt         breast    Breast cancer Maternal Aunt     Heart disease Paternal Grandfather     Heart attack Paternal Grandfather     Diabetes Paternal Grandfather     Leukemia Brother     Hypertension Unknown     Diabetes Maternal Grandfather     Heart attack Maternal Grandfather     Breast cancer Cousin     No Known Problems Sister     No Known Problems Maternal Uncle     No Known Problems Paternal Aunt     No Known Problems Paternal Uncle     No Known Problems Paternal Grandmother     Colon cancer Neg Hx     Ovarian cancer Neg Hx     Amblyopia Neg Hx     Blindness Neg Hx     Cataracts Neg Hx     Macular degeneration Neg Hx     Retinal detachment Neg Hx     Strabismus Neg Hx     Stroke Neg Hx     Thyroid disease Neg Hx     Esophageal cancer Neg Hx        ALLERGIES AND MEDICATIONS: updated and reviewed.  Review of patient's allergies indicates:   Allergen Reactions    Adhesive Hives     Silk tape    Amoxicillin Rash    Chlorhexidine Other (See Comments)     Current Outpatient Medications   Medication Sig Dispense Refill    acetaminophen (TYLENOL) 500 MG tablet Take 1,000 mg by mouth every 6 (six) hours as needed for Pain.      ADCIRCA 20 mg Tab Take 2 tablets (40 mg total) by mouth once daily. 60 tablet 11    albuterol (PROAIR HFA) 90 mcg/actuation inhaler Inhale 2 puffs into the lungs every 6 (six) hours as needed for Wheezing. Rescue 18 g 6    albuterol-ipratropium (DUO-NEB) 2.5 mg-0.5 mg/3 mL nebulizer solution Take 3 mLs by nebulization every 6 (six) hours as needed for Wheezing. Rescue 6 each 6    ambrisentan (LETAIRIS) 10 MG Tab Take 1 tablet (10 mg total) by mouth once daily. 30 tablet 11    azelastine (ASTELIN) 137 mcg (0.1 %) nasal spray 2 sprays by Nasal route 2 (two) times daily. Patient uses prn in  the evening      butalbital-acetaminophen-caffeine -40 mg (FIORICET, ESGIC) -40 mg per tablet Take 1 tablet by mouth every 6 (six) hours as needed for Pain. 40 tablet 5    citalopram (CELEXA) 10 MG tablet Take 10 mg by mouth every evening.       cyanocobalamin, vitamin B-12, 2,500 mcg Subl Place 2,500 mcg under the tongue every morning.       diphenhydrAMINE (BENADRYL) 25 mg capsule Take 50 mg by mouth every 6 (six) hours as needed for Itching. Patient takes prn evenings      ferrous sulfate 325 (65 FE) MG EC tablet Take 325 mg by mouth daily as needed.       fluticasone furoate-vilanteroL (BREO ELLIPTA) 200-25 mcg/dose DsDv diskus inhaler INHALE 1 PUFF BY MOUTH into the lungs EVERY EVENING. controller 30 each 11    fluticasone propionate (FLONASE) 50 mcg/actuation nasal spray 2 sprays (100 mcg total) by Each Nostril route every evening. 16 g 11    folic acid (FOLVITE) 1 MG tablet TAKE ONE TABLET BY MOUTH ONCE DAILY 90 tablet 0    hyoscyamine (LEVSIN/SL) 0.125 mg Subl Place 1 tablet (0.125 mg total) under the tongue every 6 (six) hours as needed (Aa needed). 60 tablet 0    lactic acid-citric-potassium (PHEXXI) 1.8-1-0.4 % Gel Place 1 Applicatorful vaginally as needed (CONTRACEPTIVE). 5 g 2    levothyroxine (SYNTHROID) 125 MCG tablet TAKE ONE TABLET BY MOUTH EVERY MORNING 1 HOUR BEFORE BREAKFAST AND OTHER MEDICATIONS (FOR THYROID) 30 tablet 0    LIDOcaine (LIDODERM) 5 % Place 1 patch onto the skin once daily. 15 patch 0    loratadine (CLARITIN) 10 mg tablet Take 10 mg by mouth every evening.       ondansetron (ZOFRAN) 4 MG tablet Take 1 tablet (4 mg total) by mouth every 6 (six) hours as needed. 1 Tablet Oral Every 6 hours 30 tablet 2    pantoprazole (PROTONIX) 40 MG tablet Take 1 tablet (40 mg total) by mouth daily as needed (HEARTBURN). Take only as needed. 90 tablet 1    topiramate (TOPAMAX) 100 MG tablet Take 1 tablet (100 mg total) by mouth 2 (two) times daily. 60 tablet 11     treprostinil (REMODULIN) 2.5 mg/mL Soln Mix cassette as directed and infuse continuously per physician titration orders on dosing sheet. Current dose 80ng/kg/min 60 mL 11    ubrogepant (UBRELVY)tablet 100 mg TAKE ONE TABLET BY MOUTH AS A SINGLE DOSE AS NEEDED FOR MIGRAINE      alprazolam (XANAX ORAL) Take by mouth.      tiZANidine (ZANAFLEX) 4 MG tablet Take 1 tablet (4 mg total) by mouth every 6 (six) hours as needed (muscle spasms). 60 tablet 3     No current facility-administered medications for this visit.       Review of Systems   Constitutional: Negative for activity change, appetite change, fever and unexpected weight change.   HENT: Negative for trouble swallowing and voice change.    Eyes: Negative for photophobia and visual disturbance.   Respiratory: Negative for apnea and shortness of breath.    Cardiovascular: Negative for chest pain and leg swelling.   Gastrointestinal: Negative for constipation and nausea.   Genitourinary: Negative for difficulty urinating.   Musculoskeletal: Negative for back pain, gait problem and neck pain.   Skin: Negative for color change and pallor.   Neurological: Positive for dizziness and headaches. Negative for seizures, syncope, weakness and numbness.   Hematological: Negative for adenopathy.   Psychiatric/Behavioral: Negative for agitation, confusion, decreased concentration and dysphoric mood.       Neurologic Exam     Mental Status   Oriented to person, place, and time.   Registration: recalls 3 of 3 objects.   Attention: normal. Concentration: normal.   Speech: speech is normal   Level of consciousness: alert  Knowledge: good.     Cranial Nerves     CN II   Visual fields full to confrontation.   Right visual field deficit: none  Left visual field deficit: none     CN III, IV, VI   Pupils are equal, round, and reactive to light.  Extraocular motions are normal.   Right pupil: Size: 3 mm. Shape: regular. Accommodation: intact.   Left pupil: Size: 3 mm. Shape: regular.  Accommodation: intact.   CN III: no CN III palsy  CN VI: no CN VI palsy  Nystagmus: none   Diplopia: none  Ophthalmoparesis: none  Upgaze: normal  Downgaze: normal  Conjugate gaze: present    CN V   Facial sensation intact.   Right facial sensation deficit: none  Left facial sensation deficit: none    CN VII   Facial expression full, symmetric.   Right facial weakness: none  Left facial weakness: none    CN VIII   CN VIII normal.     CN IX, X   CN IX normal.   CN X normal.   Palate: symmetric    CN XI   CN XI normal.   Right sternocleidomastoid strength: normal  Left sternocleidomastoid strength: normal  Right trapezius strength: normal  Left trapezius strength: normal    CN XII   CN XII normal.   Tongue deviation: none    Motor Exam   Muscle bulk: normal  Overall muscle tone: normal  Right arm tone: normal  Left arm tone: normal  Right leg tone: normal  Left leg tone: normal    Strength   Strength 5/5 throughout.     Sensory Exam   Right arm light touch: normal  Left arm light touch: normal  Right leg light touch: normal  Left leg light touch: normal  Right arm vibration: normal  Left arm vibration: normal  Right leg vibration: normal  Left leg vibration: normal  Right arm proprioception: normal  Left arm proprioception: normal  Right leg proprioception: normal  Left leg proprioception: normal  Right arm pinprick: normal  Left arm pinprick: normal  Right leg pinprick: normal  Left leg pinprick: normal    Gait, Coordination, and Reflexes     Gait  Gait: normal    Coordination   Romberg: negative  Finger to nose coordination: normal  Heel to shin coordination: normal  Tandem walking coordination: normal    Tremor   Resting tremor: absent    Reflexes   Right brachioradialis: 2+  Left brachioradialis: 2+  Right biceps: 2+  Left biceps: 2+  Right triceps: 2+  Left triceps: 2+  Right patellar: 2+  Left patellar: 2+  Right achilles: 2+  Left achilles: 2+  Right plantar: normal  Left plantar: normal      Physical  "Exam  Vitals reviewed.   Constitutional:       Appearance: She is well-developed.   HENT:      Head: Normocephalic and atraumatic.   Eyes:      Extraocular Movements: EOM normal.      Pupils: Pupils are equal, round, and reactive to light.   Cardiovascular:      Rate and Rhythm: Normal rate.   Pulmonary:      Effort: Pulmonary effort is normal. No respiratory distress.   Musculoskeletal:         General: Normal range of motion.      Cervical back: Normal range of motion.   Skin:     General: Skin is warm and dry.   Neurological:      Mental Status: She is alert and oriented to person, place, and time.      Coordination: Finger-Nose-Finger Test, Heel to Shin Test and Romberg Test normal.      Gait: Gait is intact. Tandem walk normal.      Deep Tendon Reflexes: Strength normal.      Reflex Scores:       Tricep reflexes are 2+ on the right side and 2+ on the left side.       Bicep reflexes are 2+ on the right side and 2+ on the left side.       Brachioradialis reflexes are 2+ on the right side and 2+ on the left side.       Patellar reflexes are 2+ on the right side and 2+ on the left side.       Achilles reflexes are 2+ on the right side and 2+ on the left side.  Psychiatric:         Speech: Speech normal.         Behavior: Behavior normal.         Thought Content: Thought content normal.         Vitals:    06/08/22 1149   BP: 117/64   BP Location: Right arm   Patient Position: Sitting   Pulse: 76   Weight: 69.2 kg (152 lb 8.9 oz)   Height: 4' 11" (1.499 m)       Assessment & Plan:  Problem List Items Addressed This Visit     Migraine without aura and without status migrainosus, not intractable - Primary    Overview     Despite frequency of headaches few have come in the form of migraine headaches.  She will benefit from Ubrelvy as a headache abortive rather than sumatriptan her to reduce the possibility of a vasoconstriction peripherally.           Chronic nonintractable headache    Overview     Daily headaches " likely rebound from her medications, tension type, and sinus headaches.           Relevant Medications    tiZANidine (ZANAFLEX) 4 MG tablet        Discussed use of CRGP directed therapies in order to treat her headaches, but she has been reluctant to accept any therapies that involve the use of needles.    Follow-up: No follow-ups on file.

## 2022-06-16 RX ORDER — FLUTICASONE FUROATE AND VILANTEROL TRIFENATATE 200; 25 UG/1; UG/1
1 POWDER RESPIRATORY (INHALATION) NIGHTLY
Qty: 180 EACH | Refills: 0 | Status: SHIPPED | OUTPATIENT
Start: 2022-06-16 | End: 2022-10-18

## 2022-06-16 NOTE — TELEPHONE ENCOUNTER
Refill Decision Note   Yuni Chris  is requesting a refill authorization.  Brief Assessment and Rationale for Refill:  Approve     Medication Therapy Plan:       Medication Reconciliation Completed: No   Comments:     No Care Gaps recommended.     Note composed:8:30 AM 06/16/2022

## 2022-06-16 NOTE — TELEPHONE ENCOUNTER
No new care gaps identified.  [facility] Embedded Care Gaps. Reference number: [id number]. 6/16/2022   8:25:59 AM CDT

## 2022-06-21 NOTE — PROGRESS NOTES
[facility]  Occupational Therapy Progress / Treatment Note    Date: 6/22/2022  Name: Yuni Perez  Clinic Number: [id number]    Therapy Diagnosis:   Encounter Diagnoses   Name Primary?    Decreased range of motion of left shoulder Yes    Muscle weakness of left arm     Self-care deficit for dressing and grooming     Self-care deficit for bathing         Physician: Kenn Ray NP  Physician Orders: Eval and treat  Medical Diagnosis: Left upper arm pain  Surgical Procedure and Date: n/a  Evaluation Date: 05/16/2022  Insurance Authorization Period Expiration: 12/31/22  Plan of Care Certification Period: 5/16/22 to 7/11/22  Date of Return to MD: not appointed     Visit # / Visits authorized: 5/ 20  Time In: 10:50 am  Time Out: 11:35 am  Total Billable Time: 45 minutes     Precautions:  pulmonary hypotension, seizures    SUBJECTIVE     Pt reports:  I am still having pain in my shoulder, if my arm is jolted  Yuni was compliant with home exercise program given last session.   Response to previous treatment: felt good  Functional change: can reach slowly with left arm without pain    Pain: 5/10 pre therapy   7/10 at worst   Location: left shoulder      OBJECTIVE     Yuni received therapeutic exercises for 45 minutes including:  -Sci fit with BUE with alternating directions every minute x 8 min    Reassessment:   Range of Motion:        (L) UE 5/16/22 (R) UE LUE 6/22/22       AROM AROM AROM Norm   Shoulder Flexion 110 145 115 180   Shoulder Abduction 110 150 95 0-180   Shoulder Extension 27 35 30 0-50   Shoulder Internal Rotation To gluteal To bra To gluteal 0-90   Shoulder External Rotation 27 50 20 0-90   Horz Shoulder Adduction 11 20 10 0-40      Moist heat applied to shoulder during treatment  -Supine GH joint distraction               passive left shoulder IR/ER/flexion  x 15 reps each - firm end feel noted               Dowel flexion stretch  x 10 reps                            Vertical Flexion stretch x 10 reps      Patient Education and Home Exercises      Education provided:   - encouraged to continue to  perform HEP  - Progress towards goals     Written Home Exercises Provided: yes  Exercises were reviewed and Yuni was able to demonstrate them prior to the end of the session.  Yuni demonstrated good  understanding of the HEP provided. See EMR under Patient Instructions for exercises provided during therapy sessions.       Assessment     Pt would continue to benefit from skilled OT. Yuni is demonstrating a decrease in shoulder flexion, abduction and ER today.  HEP modified to avoid compensatory patterns.    Yuni is progressing well towards her goals and there are no updates to goals at this time. Pt prognosis is Good.     Pt will continue to benefit from skilled outpatient occupational therapy to address the deficits listed in the problem list on initial evaluation provide pt/family education and to maximize pt's level of independence in the home and community environment.     Pt's spiritual, cultural and educational needs considered and pt agreeable to plan of care and goals.    Anticipated barriers to occupational therapy: none noted    Goals:  Goals to be met on  6/22/22:  1) Pt will be independent and report performing HEP to maximize (R) shoulder functional capacity- in progress  2) Pt will increase left shoulder AROM in elevations and IR by 10 degrees to A with daily task- not met  3) Pt will report use of home modalities for pain management- met prn  4) Pt will report  DE at worst 5/10 - not met  5) Pt will report interrupted sleep no more than once a night.- not assessed.     Long term goals to be met by d/c  Pt will increase left shoulder AROM to allow for performance of dressing tasks.  Pt will increase left shoulder strength to 3+/5 for use during ADL  Pt will report DE of 2/10 at worst with activities and rest  Pt will report pain free  sleep 3 out of 7 days  Pt will score 40 % on FOTO survey    PLAN     Progress with scapular and postural exercises as well as left shoulder ROM.       GRACE Lin

## 2022-06-22 ENCOUNTER — CLINICAL SUPPORT (OUTPATIENT)
Dept: REHABILITATION | Facility: HOSPITAL | Age: 52
End: 2022-06-22
Payer: COMMERCIAL

## 2022-06-22 DIAGNOSIS — M62.81 MUSCLE WEAKNESS OF LEFT ARM: ICD-10-CM

## 2022-06-22 DIAGNOSIS — M25.612 DECREASED RANGE OF MOTION OF LEFT SHOULDER: Primary | ICD-10-CM

## 2022-06-22 DIAGNOSIS — Z74.1 SELF-CARE DEFICIT FOR DRESSING AND GROOMING: ICD-10-CM

## 2022-06-22 DIAGNOSIS — R46.0 SELF-CARE DEFICIT FOR BATHING: ICD-10-CM

## 2022-06-22 PROCEDURE — 97110 THERAPEUTIC EXERCISES: CPT | Mod: PN

## 2022-06-28 NOTE — PROGRESS NOTES
[facility]  Occupational Therapy Progress / Treatment Note    Date: 6/29/2022  Name: Yuni Perez  Clinic Number: [id number]    Therapy Diagnosis:   Encounter Diagnoses   Name Primary?    Decreased range of motion of left shoulder Yes    Muscle weakness of left arm     Self-care deficit for dressing and grooming     Self-care deficit for bathing         Physician: Kenn Ray NP  Physician Orders: Eval and treat  Medical Diagnosis: Left upper arm pain  Surgical Procedure and Date: n/a  Evaluation Date: 05/16/2022  Insurance Authorization Period Expiration: 12/31/22  Plan of Care Certification Period: 5/16/22 to 7/11/22  Date of Return to MD: not appointed     Visit # / Visits authorized: 6/ 20  Time In: 12:22 pm  Time Out: 12:58 pm  Total Billable Time: 36 minutes     Precautions:  pulmonary hypotension, seizures    SUBJECTIVE     Pt reports:  I am doing okay, I still have pain in my shoulder.  Yuni was compliant with home exercise program given last session.   Response to previous treatment: felt better post therapy  Functional change: nothing reported     Pain: 4/10 pre therapy    Location: left shoulder      OBJECTIVE     Yuni received therapeutic exercises for 36 minutes including:  -Sci fit with BUE with alternating directions every minute x 8 min     Moist heat applied to shoulder during treatment  -Supine GH joint distraction               passive left shoulder IR/ER/flexion  x 15 reps each - firm end feel noted               Dowel flexion stretch  x 10 reps                           Vertical Flexion stretch x 10 reps               Active supine flexion x 10 reps  -prone left shoulder extension with ER x 10 reps      Patient Education and Home Exercises      Education provided:   - encouraged to continue to  perform HEP  - Progress towards goals     Written Home Exercises Provided: yes  Exercises were reviewed and Yuni was able to demonstrate  them prior to the end of the session.  Yuni demonstrated good  understanding of the HEP provided. See EMR under Patient Instructions for exercises provided during therapy sessions.       Assessment     Pt would continue to benefit from skilled OT. Yuni tolerated therapy with some discomfort with end range. No significant pain post therapy reported.    Yuni is progressing well towards her goals and there are no updates to goals at this time. Pt prognosis is Good.     Pt will continue to benefit from skilled outpatient occupational therapy to address the deficits listed in the problem list on initial evaluation provide pt/family education and to maximize pt's level of independence in the home and community environment.     Pt's spiritual, cultural and educational needs considered and pt agreeable to plan of care and goals.    Anticipated barriers to occupational therapy: none noted    Goals:  Goals to be met on  6/22/22:  1) Pt will be independent and report performing HEP to maximize (R) shoulder functional capacity- in progress  2) Pt will increase left shoulder AROM in elevations and IR by 10 degrees to A with daily task- not met  3) Pt will report use of home modalities for pain management- met prn  4) Pt will report  IN at worst 5/10 - not met  5) Pt will report interrupted sleep no more than once a night.- not assessed.     Long term goals to be met by d/c  Pt will increase left shoulder AROM to allow for performance of dressing tasks.  Pt will increase left shoulder strength to 3+/5 for use during ADL  Pt will report IN of 2/10 at worst with activities and rest  Pt will report pain free sleep 3 out of 7 days  Pt will score 40 % on FOTO survey    PLAN     Progress with scapular and postural exercises as well as left shoulder ROM.       GRACE Lin

## 2022-06-29 ENCOUNTER — CLINICAL SUPPORT (OUTPATIENT)
Dept: REHABILITATION | Facility: HOSPITAL | Age: 52
End: 2022-06-29
Payer: COMMERCIAL

## 2022-06-29 DIAGNOSIS — M62.81 MUSCLE WEAKNESS OF LEFT ARM: ICD-10-CM

## 2022-06-29 DIAGNOSIS — R46.0 SELF-CARE DEFICIT FOR BATHING: ICD-10-CM

## 2022-06-29 DIAGNOSIS — M25.612 DECREASED RANGE OF MOTION OF LEFT SHOULDER: Primary | ICD-10-CM

## 2022-06-29 DIAGNOSIS — Z74.1 SELF-CARE DEFICIT FOR DRESSING AND GROOMING: ICD-10-CM

## 2022-06-29 PROCEDURE — 97110 THERAPEUTIC EXERCISES: CPT | Mod: PN

## 2022-07-01 PROBLEM — M62.81 MUSCLE WEAKNESS OF LEFT ARM: Status: RESOLVED | Noted: 2022-05-16 | Resolved: 2022-07-01

## 2022-07-05 DIAGNOSIS — R06.82 TACHYPNEA: ICD-10-CM

## 2022-07-05 DIAGNOSIS — I27.9 CHRONIC PULMONARY HEART DISEASE: ICD-10-CM

## 2022-07-05 DIAGNOSIS — Z79.899 POLYPHARMACY: Primary | ICD-10-CM

## 2022-07-05 NOTE — PROGRESS NOTES
[facility]  Occupational Therapy Treatment Note    Date: 7/6/2022  Name: Yuni Perez  Clinic Number: [id number]    Therapy Diagnosis:   Encounter Diagnoses   Name Primary?    Decreased range of motion of left shoulder Yes    Self-care deficit for dressing and grooming     Self-care deficit for bathing         Physician: Kenn Ray NP  Physician Orders: Eval and treat  Medical Diagnosis: Left upper arm pain  Surgical Procedure and Date: n/a  Evaluation Date: 05/16/2022  Insurance Authorization Period Expiration: 12/31/22  Plan of Care Certification Period: 5/16/22 to 7/11/22  Date of Return to MD: not appointed     Visit # / Visits authorized: 7/ 20  Time In: 10:55 am  Time Out: 11:35 am  Total Billable Time: 40 minutes     Precautions:  pulmonary hypotension, seizures    SUBJECTIVE     Pt reports:  My shoulder blade is sore today.  Yuni was compliant with home exercise program   Response to previous treatment: felt fine   Functional change: sometime able to reach the microwave     Pain: 5/10 pre therapy    Location: left scapula      OBJECTIVE     Yuni received therapeutic exercises for 40 minutes including:  Non resistive pulley stretch for left shoulder stretch x 20 reps hold 5 seconds  -Supine GH joint distraction               passive left shoulder IR/ER/flexion  x 10reps each - firm end feel noted              Manual soft tissue management of left bicep, infraspinatus and rhomboid  - use of tennis ball for self trigger point release of rhomboid and infraspinatus.      Patient Education and Home Exercises      Education provided:   - encouraged to continue to  perform HEP  - Progress towards goals     Written Home Exercises Provided: continue with previously provided HEP  Exercises were reviewed and Yuni was able to demonstrate them prior to the end of the session.  Yuni demonstrated good  understanding of the HEP provided. See EMR under  Patient Instructions for exercises provided during therapy sessions.       Assessment     Pt would continue to benefit from skilled OT. Yuni with difficulty relaxing her arm this day. Firm end range with rotations noted.  Improvement in her pain post therapy.     Yuni is progressing well towards her goals and there are no updates to goals at this time. Pt prognosis is Good.     Pt will continue to benefit from skilled outpatient occupational therapy to address the deficits listed in the problem list on initial evaluation provide pt/family education and to maximize pt's level of independence in the home and community environment.     Pt's spiritual, cultural and educational needs considered and pt agreeable to plan of care and goals.    Anticipated barriers to occupational therapy: none noted    Goals:  Goals to be met on  6/22/22:  1) Pt will be independent and report performing HEP to maximize (R) shoulder functional capacity- in progress  2) Pt will increase left shoulder AROM in elevations and IR by 10 degrees to A with daily task- not met  3) Pt will report use of home modalities for pain management- met prn  4) Pt will report  MD at worst 5/10 - not met  5) Pt will report interrupted sleep no more than once a night.- not assessed.     Long term goals to be met by d/c  Pt will increase left shoulder AROM to allow for performance of dressing tasks.  Pt will increase left shoulder strength to 3+/5 for use during ADL  Pt will report MD of 2/10 at worst with activities and rest  Pt will report pain free sleep 3 out of 7 days  Pt will score 40 % on FOTO survey    PLAN     Progress with scapular and postural exercises as well as left shoulder ROM.       GRACE Lin

## 2022-07-06 ENCOUNTER — CLINICAL SUPPORT (OUTPATIENT)
Dept: REHABILITATION | Facility: HOSPITAL | Age: 52
End: 2022-07-06
Payer: COMMERCIAL

## 2022-07-06 DIAGNOSIS — R46.0 SELF-CARE DEFICIT FOR BATHING: ICD-10-CM

## 2022-07-06 DIAGNOSIS — M25.612 DECREASED RANGE OF MOTION OF LEFT SHOULDER: Primary | ICD-10-CM

## 2022-07-06 DIAGNOSIS — Z74.1 SELF-CARE DEFICIT FOR DRESSING AND GROOMING: ICD-10-CM

## 2022-07-06 PROCEDURE — 97110 THERAPEUTIC EXERCISES: CPT | Mod: PN

## 2022-07-07 NOTE — TELEPHONE ENCOUNTER
Briefly spoke w/pt regarding upcoming plan to start Adempas, as per Dr Rai discussion w/pt following [facility]. Pt will also stop Letairis when she begins adempas, because she has struggled w/chronic sinus congestion for years, and wonders if d/c might improve this (also okayed by Dr Rai).     Discussed Adempas briefly w/pt. Noted [employer] guidelines. Pt still technically of childbearing potential, but inquiring w/Dr Rai if monthly home PT ok. Test added to today's labs negative.    Possible SEs of Adempas reviewed w/pt (dr Rai had already discussed, per pt). Also noted that med has nursing visits for titration assessment. Pt very well versed in dealing w/SP and highly adherent w/taking calls from them for delivery of med. Pt also readily communicates any issues she is having w/medications, SEs, etc, w/our office.    All questions/concerns addressed. Will either provide pt w/written adempas info via mail, or she will come by to  and sign enrollment form. Pt stated she will contact prior to coming to clinic if she'd like to sign adempas enrollment in person, rather than via mail or electronically, with [place].   Recommended observation.

## 2022-07-11 ENCOUNTER — OFFICE VISIT (OUTPATIENT)
Dept: FAMILY MEDICINE | Facility: CLINIC | Age: 52
End: 2022-07-11
Payer: COMMERCIAL

## 2022-07-11 VITALS
OXYGEN SATURATION: 94 % | HEIGHT: 59 IN | TEMPERATURE: 98 F | HEART RATE: 86 BPM | BODY MASS INDEX: 29.29 KG/M2 | DIASTOLIC BLOOD PRESSURE: 66 MMHG | SYSTOLIC BLOOD PRESSURE: 102 MMHG | WEIGHT: 145.31 LBS

## 2022-07-11 DIAGNOSIS — M25.512 ACUTE PAIN OF LEFT SHOULDER: ICD-10-CM

## 2022-07-11 DIAGNOSIS — J96.11 CHRONIC RESPIRATORY FAILURE WITH HYPOXIA: ICD-10-CM

## 2022-07-11 DIAGNOSIS — I27.0 PRIMARY PULMONARY HYPERTENSION: ICD-10-CM

## 2022-07-11 DIAGNOSIS — E03.9 HYPOTHYROIDISM (ACQUIRED): ICD-10-CM

## 2022-07-11 DIAGNOSIS — Z23 NEED FOR SHINGLES VACCINE: ICD-10-CM

## 2022-07-11 DIAGNOSIS — M85.80 BORDERLINE OSTEOPENIA: ICD-10-CM

## 2022-07-11 DIAGNOSIS — E66.3 OVERWEIGHT (BMI 25.0-29.9): ICD-10-CM

## 2022-07-11 DIAGNOSIS — Z00.00 ROUTINE MEDICAL EXAM: Primary | ICD-10-CM

## 2022-07-11 DIAGNOSIS — I27.9 CHRONIC PULMONARY HEART DISEASE: ICD-10-CM

## 2022-07-11 PROCEDURE — 3074F SYST BP LT 130 MM HG: CPT | Mod: CPTII,S$GLB,, | Performed by: INTERNAL MEDICINE

## 2022-07-11 PROCEDURE — 3078F DIAST BP <80 MM HG: CPT | Mod: CPTII,S$GLB,, | Performed by: INTERNAL MEDICINE

## 2022-07-11 PROCEDURE — 3074F PR MOST RECENT SYSTOLIC BLOOD PRESSURE < 130 MM HG: ICD-10-PCS | Mod: CPTII,S$GLB,, | Performed by: INTERNAL MEDICINE

## 2022-07-11 PROCEDURE — 99999 PR PBB SHADOW E&M-EST. PATIENT-LVL V: CPT | Mod: PBBFAC,,, | Performed by: INTERNAL MEDICINE

## 2022-07-11 PROCEDURE — 1159F MED LIST DOCD IN RCRD: CPT | Mod: CPTII,S$GLB,, | Performed by: INTERNAL MEDICINE

## 2022-07-11 PROCEDURE — 99396 PREV VISIT EST AGE 40-64: CPT | Mod: S$GLB,,, | Performed by: INTERNAL MEDICINE

## 2022-07-11 PROCEDURE — 3008F BODY MASS INDEX DOCD: CPT | Mod: CPTII,S$GLB,, | Performed by: INTERNAL MEDICINE

## 2022-07-11 PROCEDURE — 3078F PR MOST RECENT DIASTOLIC BLOOD PRESSURE < 80 MM HG: ICD-10-PCS | Mod: CPTII,S$GLB,, | Performed by: INTERNAL MEDICINE

## 2022-07-11 PROCEDURE — 99999 PR PBB SHADOW E&M-EST. PATIENT-LVL V: ICD-10-PCS | Mod: PBBFAC,,, | Performed by: INTERNAL MEDICINE

## 2022-07-11 PROCEDURE — 1159F PR MEDICATION LIST DOCUMENTED IN MEDICAL RECORD: ICD-10-PCS | Mod: CPTII,S$GLB,, | Performed by: INTERNAL MEDICINE

## 2022-07-11 PROCEDURE — 1160F RVW MEDS BY RX/DR IN RCRD: CPT | Mod: CPTII,S$GLB,, | Performed by: INTERNAL MEDICINE

## 2022-07-11 PROCEDURE — 3008F PR BODY MASS INDEX (BMI) DOCUMENTED: ICD-10-PCS | Mod: CPTII,S$GLB,, | Performed by: INTERNAL MEDICINE

## 2022-07-11 PROCEDURE — 1160F PR REVIEW ALL MEDS BY PRESCRIBER/CLIN PHARMACIST DOCUMENTED: ICD-10-PCS | Mod: CPTII,S$GLB,, | Performed by: INTERNAL MEDICINE

## 2022-07-11 PROCEDURE — 99396 PR PREVENTIVE VISIT,EST,40-64: ICD-10-PCS | Mod: S$GLB,,, | Performed by: INTERNAL MEDICINE

## 2022-07-11 RX ORDER — LEVOTHYROXINE SODIUM 125 UG/1
125 TABLET ORAL
Qty: 30 TABLET | Refills: 11 | Status: SHIPPED | OUTPATIENT
Start: 2022-07-11 | End: 2023-07-23

## 2022-07-11 RX ORDER — TIZANIDINE 4 MG/1
4 TABLET ORAL EVERY 6 HOURS PRN
COMMUNITY
Start: 2022-07-02

## 2022-07-11 NOTE — PROGRESS NOTES
HISTORY OF PRESENT ILLNESS:  Yuni Perez is a 52 y.o. female who presents to the clinic today for a routine physical exam. Her last physical exam was approximately 1 years(s) ago.    Contraception: ; followed by gynecology      PAST MEDICAL HISTORY:  Past Medical History:   Diagnosis Date    AR (allergic rhinitis)     Cholelithiasis, common bile duct     Chronic low back pain     Eye pressure 2017    General anesthetics causing adverse effect in therapeutic use     GERD (gastroesophageal reflux disease)     History of migraine headaches     Hypothyroidism     Innocent heart murmur     Lumbar disc disease     Menorrhagia     Mild asthma     Obesity     Plantar fasciitis of left foot     Primary pulmonary hypertension     followed by heart transplant/pulmonary     Seizure disorder     x 1 in 2008    Seizures     Sleep apnea     TMJ (dislocation of temporomandibular joint)     Tricuspid regurgitation        PAST SURGICAL HISTORY:  Past Surgical History:   Procedure Laterality Date    CARDIAC CATHETERIZATION      CATHETERIZATION OF BOTH LEFT AND RIGHT HEART Bilateral 9/29/2020    Procedure: CATHETERIZATION, HEART, BOTH LEFT AND RIGHT;  Surgeon: Gucci Pickett MD;  Location: [facility] CATH LAB;  Service: Cardiology;  Laterality: Bilateral;    CENTRAL VENOUS CATHETER INSERTION      CORONARY ANGIOGRAPHY N/A 9/29/2020    Procedure: ANGIOGRAM, CORONARY ARTERY;  Surgeon: Gucci Pickett MD;  Location: [facility] CATH LAB;  Service: Cardiology;  Laterality: N/A;    gallbladder drain  06/2019    INSERTION OF HAYNES CATHETER Right 6/17/2020    Procedure: INSERTION, CATHETER, CENTRAL VENOUS, HAYNES Replace Single Lumen for Remodulin Infusion;  Surgeon: Bertin Dewitt MD;  Location: [facility] OR [address] [place] [address];  Service: General;  Laterality: Right;    PORTACATH PLACEMENT      REMOVAL OF TUNNELED CENTRAL VENOUS CATHETER (CVC) N/A 6/17/2020    Procedure: REMOVAL, CATHETER, CENTRAL VENOUS, TUNNELED;   Surgeon: Bertin Dewitt MD;  [facility];  Service: General;  Laterality: N/A;    RIGHT HEART CATHETERIZATION Right 8/20/2018    Procedure: HEART CATH-RIGHT;  Surgeon: Ivonne Rai MD;  Location: [facility] CATH LAB;  Service: Cardiology;  Laterality: Right;    RIGHT HEART CATHETERIZATION Right 9/4/2019    Procedure: INSERTION, CATHETER, RIGHT HEART;  Surgeon: Ivonne Rai MD;  Location: [facility] CATH LAB;  Service: Cardiology;  Laterality: Right;    RIGHT HEART CATHETERIZATION Right 6/10/2022    Procedure: INSERTION, CATHETER, RIGHT HEART;  Surgeon: Keshia Poe MD;  Location: [facility] CATH LAB;  Service: Cardiology;  Laterality: Right;    UPPER GASTROINTESTINAL ENDOSCOPY         SOCIAL HISTORY:  Social History     Socioeconomic History    Marital status: Single    Number of children: 0   Occupational History    Occupation: disabled     Employer: [employer]   Tobacco Use    Smoking status: Never Smoker    Smokeless tobacco: Never Used   Substance and Sexual Activity    Alcohol use: No     Alcohol/week: 0.8 standard drinks     Types: 1 Standard drinks or equivalent per week     Comment: socially    Drug use: No    Sexual activity: Never     Birth control/protection: OCP, Pill     Comment: pt is a virgin       FAMILY HISTORY:  Family History   Problem Relation Age of Onset    Heart disease Father     Glaucoma Father     Diabetes Mother     Cancer Maternal Grandmother         uterine    Cancer Maternal Aunt         breast    Breast cancer Maternal Aunt     Heart disease Paternal Grandfather     Heart attack Paternal Grandfather     Diabetes Paternal Grandfather     Leukemia Brother     Hypertension Unknown     Diabetes Maternal Grandfather     Heart attack Maternal Grandfather     Breast cancer Cousin     No Known Problems Sister     No Known Problems Maternal Uncle     No Known Problems Paternal Aunt     No Known Problems Paternal Uncle     No Known Problems  "Paternal Grandmother     Colon cancer Neg Hx     Ovarian cancer Neg Hx     Amblyopia Neg Hx     Blindness Neg Hx     Cataracts Neg Hx     Macular degeneration Neg Hx     Retinal detachment Neg Hx     Strabismus Neg Hx     Stroke Neg Hx     Thyroid disease Neg Hx     Esophageal cancer Neg Hx        ALLERGIES AND MEDICATIONS: updated and reviewed.  Review of patient's allergies indicates:   Allergen Reactions    Fentanyl Anaphylaxis     Respiratory distress    Vibra-tabs [doxycycline hyclate] Anaphylaxis     "throat felt like it was closing"    Adhesive Hives     Silk tape    Amoxicillin Rash    Nsaids (non-steroidal anti-inflammatory drug) Swelling    Chlorhexidine Other (See Comments)     Blue Chlorhexidine causes hives     Medication List with Changes/Refills   Current Medications    ACETAMINOPHEN (TYLENOL) 500 MG TABLET    Take 1,000 mg by mouth every 6 (six) hours as needed for Pain.    ADCIRCA 20 MG TAB    Take 2 tablets (40 mg total) by mouth once daily.    ALBUTEROL (PROAIR HFA) 90 MCG/ACTUATION INHALER    Inhale 2 puffs into the lungs every 6 (six) hours as needed for Wheezing. Rescue    ALBUTEROL-IPRATROPIUM (DUO-NEB) 2.5 MG-0.5 MG/3 ML NEBULIZER SOLUTION    Take 3 mLs by nebulization every 6 (six) hours as needed for Wheezing. Rescue    ALPRAZOLAM (XANAX ORAL)    Take by mouth.    AMBRISENTAN (LETAIRIS) 10 MG TAB    Take 1 tablet (10 mg total) by mouth once daily.    AZELASTINE (ASTELIN) 137 MCG (0.1 %) NASAL SPRAY    2 sprays by Nasal route 2 (two) times daily. Patient uses prn in the evening    BUTALBITAL-ACETAMINOPHEN-CAFFEINE -40 MG (FIORICET, ESGIC) -40 MG PER TABLET    Take 1 tablet by mouth every 6 (six) hours as needed for Pain.    CITALOPRAM (CELEXA) 10 MG TABLET    Take 10 mg by mouth every evening.     CYANOCOBALAMIN, VITAMIN B-12, 2,500 MCG SUBL    Place 2,500 mcg under the tongue every morning.     DIPHENHYDRAMINE (BENADRYL) 25 MG CAPSULE    Take 50 mg by mouth " every 6 (six) hours as needed for Itching. Patient takes prn evenings    FERROUS SULFATE 325 (65 FE) MG EC TABLET    Take 325 mg by mouth daily as needed.     FLUTICASONE FUROATE-VILANTEROL (BREO ELLIPTA) 200-25 MCG/DOSE DSDV DISKUS INHALER    Inhale 1 puff into the lungs every evening. Controller    FLUTICASONE PROPIONATE (FLONASE) 50 MCG/ACTUATION NASAL SPRAY    2 sprays (100 mcg total) by Each Nostril route every evening.    FOLIC ACID (FOLVITE) 1 MG TABLET    TAKE ONE TABLET BY MOUTH ONCE DAILY    GLUCOSAM/CHOND-MSM1/C/TRACEE/BOR (GLUCOSAMINE-CHOND-MSM COMPLEX ORAL)    Take by mouth.    HYOSCYAMINE (LEVSIN/SL) 0.125 MG SUBL    Place 1 tablet (0.125 mg total) under the tongue every 6 (six) hours as needed (Aa needed).    LACTIC ACID-CITRIC-POTASSIUM (PHEXXI) 1.8-1-0.4 % GEL    Place 1 Applicatorful vaginally as needed (CONTRACEPTIVE).    LIDOCAINE (LIDODERM) 5 %    Place 1 patch onto the skin once daily.    LORATADINE (CLARITIN) 10 MG TABLET    Take 10 mg by mouth every evening.     ONDANSETRON (ZOFRAN) 4 MG TABLET    Take 1 tablet (4 mg total) by mouth every 6 (six) hours as needed. 1 Tablet Oral Every 6 hours    PANTOPRAZOLE (PROTONIX) 40 MG TABLET    Take 1 tablet (40 mg total) by mouth daily as needed (HEARTBURN). Take only as needed.    TIZANIDINE (ZANAFLEX) 4 MG TABLET    Take 4 mg by mouth every 6 (six) hours as needed.    TOPIRAMATE (TOPAMAX) 100 MG TABLET    Take 1 tablet (100 mg total) by mouth 2 (two) times daily.    TREPROSTINIL (REMODULIN) 2.5 MG/ML SOLN    Mix cassette as directed and infuse continuously per physician titration orders on dosing sheet. Current dose 80ng/kg/min    UBROGEPANT (UBRELVY)TABLET 100 MG    TAKE ONE TABLET BY MOUTH AS A SINGLE DOSE AS NEEDED FOR MIGRAINE   Changed and/or Refilled Medications    Modified Medication Previous Medication    LEVOTHYROXINE (SYNTHROID) 125 MCG TABLET levothyroxine (SYNTHROID) 125 MCG tablet       Take 1 tablet (125 mcg total) by mouth before  breakfast.    TAKE ONE TABLET BY MOUTH EVERY MORNING 1 HOUR BEFORE BREAKFAST AND OTHER MEDICATIONS (FOR THYROID)         CARE TEAM:  Patient Care Team:  Lulu Sandhu MD as PCP - General (Internal Medicine)  Ralph Whyte MD (Inactive) as Referring Physician (Intensive Care)  Ian Lackey MD as Consulting Physician (Pulmonary Disease)  Monika Dalton LPN as Licensed Practical Nurse  Dorene Ny, RN as Registered Nurse  Bárbara Langford, NP as Nurse Practitioner (Cardiology)           SCREENING HISTORY:  Health Maintenance       Date Due Completion Date    Colorectal Cancer Screening Never done ---    Shingles Vaccine (1 of 2) Never done ---    Influenza Vaccine (1) 09/01/2022 9/22/2020    Mammogram 09/29/2022 9/29/2021    Override on 9/2/2013: Done    Override on 3/1/2011: Done    COVID-19 Vaccine (4 - Booster for Moderna series) 11/01/2022 7/1/2022    DEXA Scan 06/25/2023 6/25/2021    Lipid Panel 09/21/2025 9/21/2020    Cervical Cancer Screening 11/23/2025 11/23/2020    Override on 9/17/2014: Done (Dr. Khan)    Override on 2/1/2012: Done    Override on 6/10/2003: Done    TETANUS VACCINE 01/18/2027 1/18/2017    Pneumococcal Vaccines (Age 0-64) (3 - PPSV23 or PCV20) 03/18/2036 1/18/2017            REVIEW OF SYSTEMS:   The patient reports: fair dietary habits.  The patient reports : that they do not exercise regularly  Review of Systems   Constitutional: Negative for chills, fatigue, fever and unexpected weight change.   HENT: Negative for congestion and postnasal drip.    Eyes: Negative for pain and visual disturbance.   Respiratory: Positive for shortness of breath (- stable/baseline). Negative for cough and wheezing.    Cardiovascular: Negative for chest pain, palpitations and leg swelling.   Gastrointestinal: Negative for abdominal pain, constipation, diarrhea, nausea and vomiting.   Genitourinary: Negative for dysuria.   Musculoskeletal: Positive for arthralgias (left shoulder  "pain with decreased ROM x about 1 month; currently in PT and slowly improving). Negative for back pain.   Skin: Negative for rash.   Neurological: Negative for weakness and headaches.   Psychiatric/Behavioral: Negative for dysphoric mood and sleep disturbance. The patient is not nervous/anxious.       ROS (Optional)-: no pelvic pain  Breast ROS (Optional)-: negative for breast lumps/discharge      Physical Examination: 274}  Vitals:    07/11/22 0856   BP: 102/66   Pulse: 86   Temp: 97.5 °F (36.4 °C)     Weight: 65.9 kg (145 lb 4.5 oz)   Height: 4' 11" (149.9 cm)   Body mass index is 29.34 kg/m².      Patient did not require to have a chaperone present during the exam today.    General appearance - alert, well appearing, and in no distress, overweight  Psychiatric - alert, oriented to person, place, and time, normal behavior, speech, dress, motor activity and thought process  Eyes - pupils equal and reactive, extraocular eye movements intact, sclera anicteric  Mouth - not examined  Neck - supple, no significant adenopathy, carotids upstroke normal bilaterally, no bruits  Lymphatics - no palpable cervical lymphadenopathy  Chest - clear to auscultation, no wheezes, rales or rhonchi, symmetric air entry  Heart - normal rate and regular rhythm  Neurological - alert, normal speech, no focal findings, cranial nerves II through XII intact  Musculoskeletal - no joint tenderness, deformity or swelling, no muscular tenderness noted; she had decreased active ROM of left shoulder with pain/no obvious deformities noted  Extremities - no pedal edema noted  Skin - normal coloration, no suspicious skin lesions      Labs:  Ordered/Scheduled      ASSESSMENT AND PLAN:  274}  1. Routine medical exam  Counseled on age appropriate medical preventative services including age appropriate cancer screenings, age appropriate eye and dental exams, over all nutritional health, need for a consistent exercise regimen, and an over all push " towards maintaining a vigorous and active lifestyle.  Counseled on age appropriate vaccines and discussed upcoming health care needs based on age/gender. Discussed good sleep hygiene and stress management.    2. Primary pulmonary hypertension/3. Chronic respiratory failure with hypoxia/4. Chronic pulmonary heart disease  The current medical regimen is effective;  continue present plan and medications.   Followed by: pulmonology/transplant.     5. Hypothyroidism (acquired)  Patient is clinically euthyroid. Continue current regimen.  - TSH; Future  - levothyroxine (SYNTHROID) 125 MCG tablet; Take 1 tablet (125 mcg total) by mouth before breakfast.  Dispense: 30 tablet; Refill: 11    6. Borderline osteopenia  We discussed adequate calcium and vitamin D supplementation. We discussed fall precautions. She is up to date on her BMD. No need for prescription medication at this time.    7. Overweight (BMI 25.0-29.9)  BMI Readings from Last 3 Encounters:   07/11/22 29.34 kg/m²   06/10/22 29.12 kg/m²   06/08/22 30.81 kg/m²     The patient is asked to make an attempt to improve diet and exercise patterns to aid in medical management of this problem.    8. Need for shingles vaccine  Patient was advised to get immunization at the pharmacy.    9. Acute pain of left shoulder  Slowly improving. Continue PT and tylenol for pain. Will add dry needling therapy. Consider referral to orthopaedics for further evaluation if symptoms worsen or persist.  - Ambulatory referral/consult to Physical/Occupational Therapy; Future           Orders Placed This Encounter   Procedures    TSH    Ambulatory referral/consult to Physical/Occupational Therapy      Follow up in about 1 year (around 7/11/2023), or if symptoms worsen or fail to improve, for annual exam. or sooner as needed.

## 2022-07-12 NOTE — PROGRESS NOTES
[facility]  Occupational Therapy Treatment Note    Date: 7/13/2022  Name: Yuni Perez  Clinic Number: [id number]    Therapy Diagnosis:   Encounter Diagnoses   Name Primary?    Decreased range of motion of left shoulder Yes    Self-care deficit for dressing and grooming     Self-care deficit for bathing         Physician: Kenn Ray NP  Physician Orders: Eval and treat  Medical Diagnosis: Left upper arm pain  Surgical Procedure and Date: n/a  Evaluation Date: 05/16/2022  Insurance Authorization Period Expiration: 12/31/22  Plan of Care Certification Period: 5/16/22 to 7/11/22  Date of Return to MD: not appointed     Visit # / Visits authorized: 8/ 20  Time In: 12:18 pm  Time Out: 1:05 pm  Total Billable Time: 42 minutes     Precautions:  pulmonary hypotension, seizures    SUBJECTIVE     Pt reports:  My shoulder blade is sore today.  Yuni was compliant with home exercise program   Response to previous treatment: felt fine   Functional change: nothing reported     Pain: 3.5-4/10 pre therapy    Location: left scapula      OBJECTIVE     Yuni received therapeutic exercises for 42  minutes including:  sci fit with BUE with verbal cues for directional changes for increases scapular activation.  -Supine GH joint distraction -pt had difficulty relaxing today              passive left shoulder IR/ER  x 10 reps each - firm end feel noted  -table stretch for ER x 5 reps - difficulty with positioning  -flexion stretch on stool x 5 reps  -pendulum dangle with 3# x 3 min    Ice applied to left shoulder with arm supported on plinth after contra indications cleared    Patient Education and Home Exercises      Education provided:   - encouraged to continue to  perform HEP  - Progress towards goals     Written Home Exercises Provided: continue with previously provided HEP  Exercises were reviewed and Yuni was able to demonstrate them prior to the end of the session.   Yuni demonstrated good  understanding of the HEP provided. See EMR under Patient Instructions for exercises provided during therapy sessions.       Assessment     Pt would continue to benefit from skilled OT. Yuni reported improvement in her pain post therapy.  She verbalized multiple pain locations in her arm throughout the therapy session.     Yuni is progressing well towards her goals and there are no updates to goals at this time. Pt prognosis is Good.     Pt will continue to benefit from skilled outpatient occupational therapy to address the deficits listed in the problem list on initial evaluation provide pt/family education and to maximize pt's level of independence in the home and community environment.     Pt's spiritual, cultural and educational needs considered and pt agreeable to plan of care and goals.    Anticipated barriers to occupational therapy: none noted    Goals:  Goals to be met on  6/22/22:  1) Pt will be independent and report performing HEP to maximize (R) shoulder functional capacity- in progress  2) Pt will increase left shoulder AROM in elevations and IR by 10 degrees to A with daily task- not met  3) Pt will report use of home modalities for pain management- met prn  4) Pt will report  NH at worst 5/10 - not met  5) Pt will report interrupted sleep no more than once a night.- not assessed.     Long term goals to be met by d/c  Pt will increase left shoulder AROM to allow for performance of dressing tasks.  Pt will increase left shoulder strength to 3+/5 for use during ADL  Pt will report NH of 2/10 at worst with activities and rest  Pt will report pain free sleep 3 out of 7 days  Pt will score 40 % on FOTO survey    PLAN     Progress with scapular and postural exercises as well as left shoulder ROM.       GRACE Lin

## 2022-07-13 ENCOUNTER — CLINICAL SUPPORT (OUTPATIENT)
Dept: REHABILITATION | Facility: HOSPITAL | Age: 52
End: 2022-07-13
Payer: COMMERCIAL

## 2022-07-13 DIAGNOSIS — M25.512 ACUTE PAIN OF LEFT SHOULDER: ICD-10-CM

## 2022-07-13 DIAGNOSIS — R46.0 SELF-CARE DEFICIT FOR BATHING: ICD-10-CM

## 2022-07-13 DIAGNOSIS — Z74.1 SELF-CARE DEFICIT FOR DRESSING AND GROOMING: ICD-10-CM

## 2022-07-13 DIAGNOSIS — M25.612 DECREASED RANGE OF MOTION OF LEFT SHOULDER: Primary | ICD-10-CM

## 2022-07-13 PROCEDURE — 97110 THERAPEUTIC EXERCISES: CPT | Mod: PN

## 2022-07-18 NOTE — PROGRESS NOTES
[facility]  Occupational Therapy Progress / Treatment Note    Date: 7/20/2022  Name: Yuni Perez  Clinic Number: [id number]    Therapy Diagnosis:   Encounter Diagnoses   Name Primary?    Decreased range of motion of left shoulder Yes    Self-care deficit for dressing and grooming     Self-care deficit for bathing         Physician: Kenn Ray NP  Physician Orders: Eval and treat  Medical Diagnosis: Left upper arm pain  Surgical Procedure and Date: n/a  Evaluation Date: 05/16/2022  Insurance Authorization Period Expiration: 12/31/22  Plan of Care Certification Period: 5/16/22 to 7/11/22  Date of Return to MD: not appointed     Visit # / Visits authorized: 9/ 20  Time In: 11:00 m  Time Out: 11:50 pm  Total Billable Time: 45 minutes     Precautions:  pulmonary hypotension, seizures    SUBJECTIVE     Pt reports:  I am doing okay,   Yuni was compliant with home exercise program   Response to previous treatment: sore the second day after therapy   Functional change: reaching better.     Pain: 3/10 pre therapy    Location: left scapula      OBJECTIVE     Yuni received therapeutic exercises for 40 minutes including:  sci fit with BUE with verbal cues for directional changes for  increases scapular activation every minute  -Supine GH joint distraction prone with 3# cuff                 Vertical dowel flexion stretch x 10 reps                Passive IR of left shoulder x 10 reps with end range hold  -seated scapular retraction x 15 reps               Dowel flexion to greater then 90  -standing belt stretch for IR x 10 reps 5 second hold      Reassessment:   Range of Motion:        (L) UE 5/16/22 (R) UE LUE 6/22/22 LUE 7/20/22       AROM AROM AROM AROM Norm   Shoulder Flexion 110 145 115 120 180   Shoulder Abduction 110 150 95 103 0-180   Shoulder Extension 27 35 30 30 0-50   Shoulder Internal Rotation To gluteal To bra To gluteal gluteal 0-90   Shoulder External  Rotation 27 50 20 25 0-90   Horz Shoulder Adduction 11 20 10 10 0-40       Ice applied to left shoulder with arm supported on plinth after contra indications cleared x 5 min     Patient Education and Home Exercises      Education provided:   - encouraged to continue to  perform HEP  - Progress towards goals     Written Home Exercises Provided: continue with previously provided HEP  Exercises were reviewed and Yuni was able to demonstrate them prior to the end of the session.  Yuni demonstrated good  understanding of the HEP provided. See EMR under Patient Instructions for exercises provided during therapy sessions.       Assessment     Pt would continue to benefit from skilled OT. Yuni is demonstrating some improvement in elevation and ER of her left shoulder although limitations remain. Significant pain with stretching of left arm are reported.     Yuni is progressing well towards her goals and there are no updates to goals at this time. Pt prognosis is Good.     Pt will continue to benefit from skilled outpatient occupational therapy to address the deficits listed in the problem list on initial evaluation provide pt/family education and to maximize pt's level of independence in the home and community environment.     Pt's spiritual, cultural and educational needs considered and pt agreeable to plan of care and goals.    Anticipated barriers to occupational therapy: none noted    Goals:  Goals to be met on  6/22/22:  1) Pt will be independent and report performing HEP to maximize (R) shoulder functional capacity- in progress  2) Pt will increase left shoulder AROM in elevations and IR by 10 degrees to A with daily task- met with flexion 7/20/22  3) Pt will report use of home modalities for pain management- met prn  4) Pt will report  AK at worst 5/10 - not met  5) Pt will report interrupted sleep no more than once a night.-met     Long term goals to be met by d/c  Pt will increase left shoulder  AROM to allow for performance of dressing tasks.  Pt will increase left shoulder strength to 3+/5 for use during ADL  Pt will report FL of 2/10 at worst with activities and rest  Pt will report pain free sleep 3 out of 7 days  Pt will score 40 % on FOTO survey    PLAN     Progress with scapular and postural exercises as well as left shoulder ROM.       GRACE Lin

## 2022-07-20 ENCOUNTER — CLINICAL SUPPORT (OUTPATIENT)
Dept: REHABILITATION | Facility: HOSPITAL | Age: 52
End: 2022-07-20
Payer: COMMERCIAL

## 2022-07-20 DIAGNOSIS — Z74.1 SELF-CARE DEFICIT FOR DRESSING AND GROOMING: ICD-10-CM

## 2022-07-20 DIAGNOSIS — R46.0 SELF-CARE DEFICIT FOR BATHING: ICD-10-CM

## 2022-07-20 DIAGNOSIS — M25.612 DECREASED RANGE OF MOTION OF LEFT SHOULDER: Primary | ICD-10-CM

## 2022-07-20 PROCEDURE — 97110 THERAPEUTIC EXERCISES: CPT | Mod: PN

## 2022-07-20 NOTE — PLAN OF CARE
Outpatient Therapy Updated Plan of Care     Visit Date: 7/20/2022  Name: Yuni Perez  Clinic Number: [id number]    Therapy Diagnosis:   Encounter Diagnoses   Name Primary?    Decreased range of motion of left shoulder Yes    Self-care deficit for dressing and grooming     Self-care deficit for bathing      Physician: Kenn Ray NP    Physician Orders: Eval and treat  Medical Diagnosis: Left upper arm pain  Evaluation Date: 5/16/22    Total Visits Received: 9  Cancelled Visits: 0  No Show Visits: 0    Current Certification Period:  5/16/22 to 7/11/22  Precautions:  pulmonary hypotension, seizures  Visits from Evaluation Date:  8  Functional Level Prior to Evaluation:  Difficulty with moving and using her left arm during daily tasks    Subjective     Update:  Pt reports:  I am doing okay,   Yuni was compliant with home exercise program   Response to previous treatment: sore the second day after therapy   Functional change: reaching better.     Pain: 3/10 pre therapy    Location: left scapula  / shoulder    Objective     Update:   Reassessment:   Range of Motion:        (L) UE 5/16/22 (R) UE LUE 6/22/22 LUE 7/20/22       AROM AROM AROM AROM Norm   Shoulder Flexion 110 145 115 120 180   Shoulder Abduction 110 150 95 103 0-180   Shoulder Extension 27 35 30 30 0-50   Shoulder Internal Rotation To gluteal To bra To gluteal gluteal 0-90   Shoulder External Rotation 27 50 20 25 0-90   Horz Shoulder Adduction 11 20 10 10 0-40         Assessment     Update: Yuni is demonstrating some improvement in elevation and ER of her left shoulder although limitations remain. Significant pain with stretching of left arm are reported.  Less overall pain in arm with some mild improvements in function.    Previous Short Term Goals Status:     Goals to be met on  6/22/22:  1) Pt will be independent and report performing HEP to maximize (R) shoulder functional capacity- in progress  2) Pt will increase left  shoulder AROM in elevations and IR by 10 degrees to A with daily task- met with flexion 7/20/22  3) Pt will report use of home modalities for pain management- met prn  4) Pt will report  IA at worst 5/10 - not met  5) Pt will report interrupted sleep no more than once a night.-met     Long term goals to be met by d/c  Pt will increase left shoulder AROM to allow for performance of dressing tasks.  Pt will increase left shoulder strength to 3+/5 for use during ADL  Pt will report IA of 2/10 at worst with activities and rest  Pt will report pain free sleep 3 out of 7 days  Pt will score 40 % on FOTO survey    Long Term Goal Status:   continue per initial plan of care.  Reasons for Recertification of Therapy:   Some progression of motion and decrease in pain.     Plan     Updated Certification Period: 7/13/22 to 9/7/22  Recommended Treatment Plan: 1 times per week for 8 weeks: Manual Therapy, Moist Heat/ Ice, Patient Education, Therapeutic Activities and Therapeutic Exercise  Other Recommendations:     GRACE Lin  7/20/2022      I CERTIFY THE NEED FOR THESE SERVICES FURNISHED UNDER THIS PLAN OF TREATMENT AND WHILE UNDER MY CARE    Physician's comments:        Physician's Signature: ___________________________________________________

## 2022-07-27 ENCOUNTER — OFFICE VISIT (OUTPATIENT)
Dept: OPTOMETRY | Facility: CLINIC | Age: 52
End: 2022-07-27
Payer: COMMERCIAL

## 2022-07-27 DIAGNOSIS — H52.4 PRESBYOPIA OF BOTH EYES: ICD-10-CM

## 2022-07-27 DIAGNOSIS — H52.13 MYOPIA OF BOTH EYES WITH ASTIGMATISM: ICD-10-CM

## 2022-07-27 DIAGNOSIS — H35.431 COBBLESTONE RETINAL DEGENERATION, RIGHT: ICD-10-CM

## 2022-07-27 DIAGNOSIS — H52.13 HIGH MYOPIA, BOTH EYES: ICD-10-CM

## 2022-07-27 DIAGNOSIS — H35.413 BILATERAL RETINAL LATTICE DEGENERATION: Primary | ICD-10-CM

## 2022-07-27 DIAGNOSIS — Z13.5 SCREENING FOR EYE CONDITION: ICD-10-CM

## 2022-07-27 DIAGNOSIS — H52.203 MYOPIA OF BOTH EYES WITH ASTIGMATISM: ICD-10-CM

## 2022-07-27 PROCEDURE — 1159F MED LIST DOCD IN RCRD: CPT | Mod: CPTII,S$GLB,, | Performed by: OPTOMETRIST

## 2022-07-27 PROCEDURE — 92014 PR EYE EXAM, EST PATIENT,COMPREHESV: ICD-10-PCS | Mod: S$GLB,,, | Performed by: OPTOMETRIST

## 2022-07-27 PROCEDURE — 99999 PR PBB SHADOW E&M-EST. PATIENT-LVL IV: CPT | Mod: PBBFAC,,, | Performed by: OPTOMETRIST

## 2022-07-27 PROCEDURE — 1159F PR MEDICATION LIST DOCUMENTED IN MEDICAL RECORD: ICD-10-PCS | Mod: CPTII,S$GLB,, | Performed by: OPTOMETRIST

## 2022-07-27 PROCEDURE — 92015 PR REFRACTION: ICD-10-PCS | Mod: S$GLB,,, | Performed by: OPTOMETRIST

## 2022-07-27 PROCEDURE — 92014 COMPRE OPH EXAM EST PT 1/>: CPT | Mod: S$GLB,,, | Performed by: OPTOMETRIST

## 2022-07-27 PROCEDURE — 92015 DETERMINE REFRACTIVE STATE: CPT | Mod: S$GLB,,, | Performed by: OPTOMETRIST

## 2022-07-27 PROCEDURE — 99999 PR PBB SHADOW E&M-EST. PATIENT-LVL IV: ICD-10-PCS | Mod: PBBFAC,,, | Performed by: OPTOMETRIST

## 2022-07-27 NOTE — PROGRESS NOTES
HPI     general eye examination and refraction      Additional comments: General eye exam and refraction.    Wears bifocal lenses.  Feels near VA with glasses has decreased.  Allergy problems with tearing in both eyes.  Takes Bendryl for relief of   symptoms               Comments     Patient's age: 52 y.o. WF  Approximate date of last eye examination:  06/17/2021  Name of last eye doctor seen: Dr Casas  City/State: [facility]  Wears glasses? Yes     If yes, wears  Full-time or part-time?  Full-time  Present glasses are: Bifocal, SV Distance, SV Reading?  Progressive lenses  Approximate age of present glasses: 2 years old  Got new glasses following last exam, or subsequently?:  yes   Any problem with VA with glasses?  Pt can't see near as clearly as she   would like    Wears CLs?:  No  Headaches?  No  Eye pain/discomfort?  No                                                                                     Flashes?  No  Floaters?  Yes    Diplopia/Double vision?  Questionable - more blurry than doubled, and   mostly while reading and only late at night  Patient's Ocular History:         Any eye surgeries? No         Any eye injury?  No         Any treatment for eye disease?  No  Family history of eye disease?    Father + Glaucoma + Retinal detachment   Significant patient medical history:         1. Diabetes?  No   2. HBP?  Yes, controlled by medication and diet              3. Other (describe):                                  Idiopathic Pulmonary Hypertension                                Seizures                                 Migraines    ! OTC eyedrops currently using:  Visine prn eye wash    ! Prescription eye meds currently using:  None   ! Any history of allergy/adverse reaction to any eye meds used   previously?  No    ! Any history of allergy/adverse reaction to eyedrops used during prior   eye exam(s)? No    ! Any history of allergy/adverse reaction to Novacaine or similar meds?   Yes   ! Any history of  "allergy/adverse reaction to Epinephrine or similar meds?   No    ! Patient okay with use of anesthetic eyedrops to check eye pressure?    Yes        ! Patient okay with use of eyedrops to dilate pupils today?  Yes   !  Allergies/Medications/Medical History/Family History reviewed today?    Yes      PD =   57/54  Desired reading distance =  14.25"                                                                            Last edited by Jude Casas, OD on 7/27/2022 11:43 AM. (History)            Assessment /Plan     For exam results, see Encounter Report.    1. Bilateral retinal lattice degeneration     2. Cobblestone retinal degeneration, right     3. High myopia, both eyes     4. Myopia of both eyes with astigmatism     5. Presbyopia of both eyes     6. Screening for eye condition                    Peripheral lattice retinal degeneration in each eye, without evidence of secondary retinal tear/hole.break.     Focal peripheral cobblestone retinal degeneration in the right eye.       Otherwise, ocular health appears good in each eye.     High myopia (nearsightedness) with astigmatism in each eye.   Satisfactory correctable VA in each eye.  Presbyopia consistent with age.  New spectacle lens Rx issued for full-time wear.  Recheck in 12 - 18 months, or prior if any problems or apparent changes in vision in either eye in the interim.           "

## 2022-08-04 ENCOUNTER — DOCUMENTATION ONLY (OUTPATIENT)
Dept: REHABILITATION | Facility: HOSPITAL | Age: 52
End: 2022-08-04
Payer: COMMERCIAL

## 2022-08-04 DIAGNOSIS — Z74.1 SELF-CARE DEFICIT FOR DRESSING AND GROOMING: ICD-10-CM

## 2022-08-04 DIAGNOSIS — M25.612 DECREASED RANGE OF MOTION OF LEFT SHOULDER: Primary | ICD-10-CM

## 2022-08-04 DIAGNOSIS — R46.0 SELF-CARE DEFICIT FOR BATHING: ICD-10-CM

## 2022-08-04 NOTE — PROGRESS NOTES
No Show Note/Documentation    Patient: Yuni Perez  Date of Session: 8/4/2022  Diagnosis:   1. Decreased range of motion of left shoulder     2. Self-care deficit for dressing and grooming     3. Self-care deficit for bathing       MRN: [id number]    Yuni Perez did not attend her scheduled therapy appointment today. She did not call to cancel nor reschedule. This is the first appointment that Yuni has not attended. Next appointment is scheduled for 8/10/22 and will follow up with patient at that time. No charges have been posted today.     Pt arrived 20 min after her appointment time.       GRACE Lin  8/4/2022

## 2022-08-08 NOTE — PROGRESS NOTES
[facility]  Occupational Therapy Treatment Note    Date: 8/10/2022  Name: Yuni Perez  Clinic Number: [id number]    Therapy Diagnosis:   Encounter Diagnoses   Name Primary?    Decreased range of motion of left shoulder Yes    Self-care deficit for dressing and grooming     Self-care deficit for bathing         Physician: Kenn Ray NP  Physician Orders: Eval and treat  Medical Diagnosis: Left upper arm pain  Surgical Procedure and Date: n/a  Evaluation Date: 05/16/2022  Insurance Authorization Period Expiration: 12/31/22  Plan of Care Certification Period:  7/13/22 to 9/7/22  Date of Return to MD: not appointed     Visit # / Visits authorized: 10/ 20  Time In: 12:14 pm  Time Out: 1:08 pm  Total Billable Time: 45 minutes     Precautions:  pulmonary hypotension, seizures    SUBJECTIVE     Pt reports:  I still have stiffness in my shoulder but it is better.  Yuni was compliant with home exercise program   Response to previous treatment: to long ago   Functional change: can place dishes on drain board with a little effort..     Pain: 2/10 pre therapy    Location: left scapula      OBJECTIVE     uYni received therapeutic exercises for 45 minutes including:  sci fit with BUE with verbal cues for directional changes for  increases scapular activation every minute  -GH joint distraction  -passive stretching of left shoulder ER / IR with firm end range   -Supine vertical dowel left  flexion stretch x 10 reps hold for 5 seconds                Dowel flexion x 10 reps  -prone left shoulder extension with ER x 10 reps  -pulley stretch for IR x 10 reps 5 second hold  -chair table stretch for flexion x 6 reps with 5 second hold    Ice applied to left shoulder with arm supported on plinth after contra indications cleared x 8 min     Patient Education and Home Exercises      Education provided:   - encouraged to continue to  perform HEP  - Progress towards goals     Written  Home Exercises Provided: continue with previously provided HEP  Exercises were reviewed and Yuni was able to demonstrate them prior to the end of the session.  Yuni demonstrated good  understanding of the HEP provided. See EMR under Patient Instructions for exercises provided during therapy sessions.       Assessment     Pt would continue to benefit from skilled OT. Yuni requires prompting to avoid compensatory patterns with active motion.  Reduction in her overall pain reported.     Yuni is progressing well towards her goals and there are no updates to goals at this time. Pt prognosis is Good.     Pt will continue to benefit from skilled outpatient occupational therapy to address the deficits listed in the problem list on initial evaluation provide pt/family education and to maximize pt's level of independence in the home and community environment.     Pt's spiritual, cultural and educational needs considered and pt agreeable to plan of care and goals.    Anticipated barriers to occupational therapy: none noted    Goals:  Goals to be met on  6/22/22:  1) Pt will be independent and report performing HEP to maximize (R) shoulder functional capacity- in progress  2) Pt will increase left shoulder AROM in elevations and IR by 10 degrees to A with daily task- met with flexion 7/20/22  3) Pt will report use of home modalities for pain management- met prn  4) Pt will report  MS at worst 5/10 - not met  5) Pt will report interrupted sleep no more than once a night.-met     Long term goals to be met by d/c  Pt will increase left shoulder AROM to allow for performance of dressing tasks.  Pt will increase left shoulder strength to 3+/5 for use during ADL  Pt will report MS of 2/10 at worst with activities and rest  Pt will report pain free sleep 3 out of 7 days  Pt will score 40 % on FOTO survey    PLAN     Progress with scapular and postural exercises as well as left shoulder ROM.       Natalia Herrera ,  LOTR

## 2022-08-10 ENCOUNTER — CLINICAL SUPPORT (OUTPATIENT)
Dept: REHABILITATION | Facility: HOSPITAL | Age: 52
End: 2022-08-10
Payer: COMMERCIAL

## 2022-08-10 DIAGNOSIS — M25.612 DECREASED RANGE OF MOTION OF LEFT SHOULDER: Primary | ICD-10-CM

## 2022-08-10 DIAGNOSIS — Z74.1 SELF-CARE DEFICIT FOR DRESSING AND GROOMING: ICD-10-CM

## 2022-08-10 DIAGNOSIS — R46.0 SELF-CARE DEFICIT FOR BATHING: ICD-10-CM

## 2022-08-10 PROCEDURE — 97110 THERAPEUTIC EXERCISES: CPT | Mod: PN

## 2022-08-11 ENCOUNTER — TELEPHONE (OUTPATIENT)
Dept: TRANSPLANT | Facility: CLINIC | Age: 52
End: 2022-08-11
Payer: COMMERCIAL

## 2022-08-11 NOTE — TELEPHONE ENCOUNTER
Approved today Adcrica 20 mg BRAND  CaseId:[id number];Status:Approved;Review Type:Prior Auth;Coverage Start Date:08/11/2022;Coverage End Date:08/11/2023;

## 2022-08-15 ENCOUNTER — TELEPHONE (OUTPATIENT)
Dept: TRANSPLANT | Facility: CLINIC | Age: 52
End: 2022-08-15
Payer: COMMERCIAL

## 2022-08-15 ENCOUNTER — HOSPITAL ENCOUNTER (OUTPATIENT)
Dept: CARDIOLOGY | Facility: HOSPITAL | Age: 52
Discharge: HOME | End: 2022-08-15
Attending: INTERNAL MEDICINE
Payer: COMMERCIAL

## 2022-08-15 ENCOUNTER — OFFICE VISIT (OUTPATIENT)
Dept: TRANSPLANT | Facility: CLINIC | Age: 52
End: 2022-08-15
Payer: COMMERCIAL

## 2022-08-15 VITALS
DIASTOLIC BLOOD PRESSURE: 75 MMHG | OXYGEN SATURATION: 95 % | DIASTOLIC BLOOD PRESSURE: 70 MMHG | HEART RATE: 90 BPM | HEART RATE: 99 BPM | HEIGHT: 59 IN | BODY MASS INDEX: 29.23 KG/M2 | WEIGHT: 145 LBS | WEIGHT: 144.19 LBS | HEIGHT: 59 IN | BODY MASS INDEX: 29.07 KG/M2 | SYSTOLIC BLOOD PRESSURE: 128 MMHG | SYSTOLIC BLOOD PRESSURE: 110 MMHG

## 2022-08-15 DIAGNOSIS — I27.0 PRIMARY PULMONARY HYPERTENSION: Primary | ICD-10-CM

## 2022-08-15 DIAGNOSIS — R06.02 SHORTNESS OF BREATH: ICD-10-CM

## 2022-08-15 LAB
ASCENDING AORTA: 2.59 CM
AV INDEX (PROSTH): 0.71
AV MEAN GRADIENT: 6 MMHG
AV PEAK GRADIENT: 9 MMHG
AV VALVE AREA: 1.85 CM2
AV VELOCITY RATIO: 0.85
BSA FOR ECHO PROCEDURE: 1.65 M2
CV ECHO LV RWT: 0.44 CM
DOP CALC AO PEAK VEL: 1.54 M/S
DOP CALC AO VTI: 30.36 CM
DOP CALC LVOT AREA: 2.6 CM2
DOP CALC LVOT DIAMETER: 1.82 CM
DOP CALC LVOT PEAK VEL: 1.31 M/S
DOP CALC LVOT STROKE VOLUME: 56.09 CM3
DOP CALCLVOT PEAK VEL VTI: 21.57 CM
ECHO LV POSTERIOR WALL: 0.84 CM (ref 0.6–1.1)
EJECTION FRACTION: 55 %
FRACTIONAL SHORTENING: 41 % (ref 28–44)
INTERVENTRICULAR SEPTUM: 0.85 CM (ref 0.6–1.1)
LA MAJOR: 4.61 CM
LA MINOR: 4.19 CM
LA WIDTH: 2.5 CM
LEFT ATRIUM SIZE: 2.73 CM
LEFT ATRIUM VOLUME INDEX MOD: 15.1 ML/M2
LEFT ATRIUM VOLUME INDEX: 15.8 ML/M2
LEFT ATRIUM VOLUME MOD: 24.29 CM3
LEFT ATRIUM VOLUME: 25.47 CM3
LEFT INTERNAL DIMENSION IN SYSTOLE: 2.24 CM (ref 2.1–4)
LEFT VENTRICLE DIASTOLIC VOLUME INDEX: 38.55 ML/M2
LEFT VENTRICLE DIASTOLIC VOLUME: 62.07 ML
LEFT VENTRICLE MASS INDEX: 58 G/M2
LEFT VENTRICLE SYSTOLIC VOLUME INDEX: 10.6 ML/M2
LEFT VENTRICLE SYSTOLIC VOLUME: 17.02 ML
LEFT VENTRICULAR INTERNAL DIMENSION IN DIASTOLE: 3.8 CM (ref 3.5–6)
LEFT VENTRICULAR MASS: 92.62 G
MV A" WAVE DURATION": 9.71 MSEC
PISA TR MAX VEL: 5.02 M/S
PULM VEIN S/D RATIO: 1.48
PV PEAK D VEL: 0.27 M/S
PV PEAK S VEL: 0.4 M/S
RA MAJOR: 4.94 CM
RA PRESSURE: 3 MMHG
RA WIDTH: 3.35 CM
RIGHT VENTRICULAR END-DIASTOLIC DIMENSION: 4.3 CM
RV TISSUE DOPPLER FREE WALL SYSTOLIC VELOCITY 1 (APICAL 4 CHAMBER VIEW): 9.48 CM/S
SINUS: 2.73 CM
STJ: 2.3 CM
TDI LATERAL: 0.13 M/S
TDI SEPTAL: 0.06 M/S
TDI: 0.1 M/S
TR MAX PG: 101 MMHG
TRICUSPID ANNULAR PLANE SYSTOLIC EXCURSION: 1.82 CM
TV REST PULMONARY ARTERY PRESSURE: 104 MMHG

## 2022-08-15 PROCEDURE — 3008F PR BODY MASS INDEX (BMI) DOCUMENTED: ICD-10-PCS | Mod: CPTII,S$GLB,,

## 2022-08-15 PROCEDURE — 1160F RVW MEDS BY RX/DR IN RCRD: CPT | Mod: CPTII,S$GLB,,

## 2022-08-15 PROCEDURE — 99214 PR OFFICE/OUTPT VISIT, EST, LEVL IV, 30-39 MIN: ICD-10-PCS | Mod: S$GLB,,,

## 2022-08-15 PROCEDURE — 3008F BODY MASS INDEX DOCD: CPT | Mod: CPTII,S$GLB,,

## 2022-08-15 PROCEDURE — 3074F SYST BP LT 130 MM HG: CPT | Mod: CPTII,S$GLB,,

## 2022-08-15 PROCEDURE — 1160F PR REVIEW ALL MEDS BY PRESCRIBER/CLIN PHARMACIST DOCUMENTED: ICD-10-PCS | Mod: CPTII,S$GLB,,

## 2022-08-15 PROCEDURE — 99999 PR PBB SHADOW E&M-EST. PATIENT-LVL V: ICD-10-PCS | Mod: PBBFAC,,,

## 2022-08-15 PROCEDURE — 3078F PR MOST RECENT DIASTOLIC BLOOD PRESSURE < 80 MM HG: ICD-10-PCS | Mod: CPTII,S$GLB,,

## 2022-08-15 PROCEDURE — 99214 OFFICE O/P EST MOD 30 MIN: CPT | Mod: S$GLB,,,

## 2022-08-15 PROCEDURE — 93306 TTE W/DOPPLER COMPLETE: CPT

## 2022-08-15 PROCEDURE — 3078F DIAST BP <80 MM HG: CPT | Mod: CPTII,S$GLB,,

## 2022-08-15 PROCEDURE — 1159F PR MEDICATION LIST DOCUMENTED IN MEDICAL RECORD: ICD-10-PCS | Mod: CPTII,S$GLB,,

## 2022-08-15 PROCEDURE — 1159F MED LIST DOCD IN RCRD: CPT | Mod: CPTII,S$GLB,,

## 2022-08-15 PROCEDURE — 93306 ECHO (CUPID ONLY): ICD-10-PCS | Mod: 26,,, | Performed by: INTERNAL MEDICINE

## 2022-08-15 PROCEDURE — 3074F PR MOST RECENT SYSTOLIC BLOOD PRESSURE < 130 MM HG: ICD-10-PCS | Mod: CPTII,S$GLB,,

## 2022-08-15 PROCEDURE — 99999 PR PBB SHADOW E&M-EST. PATIENT-LVL V: CPT | Mod: PBBFAC,,,

## 2022-08-15 PROCEDURE — 93306 TTE W/DOPPLER COMPLETE: CPT | Mod: 26,,, | Performed by: INTERNAL MEDICINE

## 2022-08-15 RX ORDER — FUROSEMIDE 20 MG/1
TABLET ORAL
Qty: 30 TABLET | Refills: 11 | Status: SHIPPED | OUTPATIENT
Start: 2022-08-15 | End: 2023-06-23

## 2022-08-15 NOTE — TELEPHONE ENCOUNTER
Patient presented to clinic to see KAI Davidson for follow up. Patient denies titrating up on remodulin because she wanted to wait for RHC. RHC had to be cancelled and will be rescheduled but patient wants to wait until next year. Patient denies swelling or any other complaints other than Asthma but has breo inhaler. Patient will discuss up-titrating with KAI Davidson. KAI Davidson notified.

## 2022-08-15 NOTE — PROGRESS NOTES
"  Subjective:    Patient ID:  Yuni Perez is a 52 y.o. female who presents for follow-up of Pulmonary Hypertension.    HPI   Mrs. Perez is a pleasant 51 y.o. white female who presents for PH follow-up. Patient was diagnosed with IPAH in 2009 and started on Remodulin, Letairis, and Adcirca. She has a history of ABHIJEET, GERD, and Asthma. Current therapy is Remodulin infusing at 107ng/kg/min (pre-filled cassettes), Letairis 10mg qdaily, and Adcirca 40mg qdaily. Has T/F Adempas.    Patient underwent RHC/LHC 09/29/20 for lung transplant evaluation. LHC normal, however had persistently severely elevated PASP to over 100 systolic per RHC findings. Had everything set up for LUT, but says her caregivers were deemed not acceptable. She has come to terms with not being a LUT candidate here at [facility]. She will consider having records sent to another LUT center.    2/7/2022: Today she feels well. She has not been exercising as much as she feels she should. Stays inside, avoids crowds due to covid. Had to stop during her walk. Says there were a lot of people and she was doing "pursed lip breathing," and trying to walk fast. Felt a "black wave" come over her and had to pause. Feels she may have hypoventilated and feels okay now. Has not had this experience when walking or exerting herself, otherwise. Thinks she is maddie- menopausal/menopausal and has had symptoms of sleepiness, mood swings, hot flashes. Her Gyn will give her medications for symptom management but she wants to know if they are safe to take with her PH meds and heart condition. Feels like her anxiety is well-managed at this point.  Patient denies N/V/F/C, lightheadedness, dizziness, PND, orthopnea, LE edema, abdominal pain, abdominal pressure, chest pain, chest pressure, syncope, pre-syncope.    8/15/2022: Mrs. Perez is feeling about baseline today in terms of breathing. She notes that she really only gets SOB when bending down. She does not wear " "O2. Did not do a walk today. Is not on any diuretics for fluid management. She is on 107 ng/kg/min and adcirca and letairis. In June, they attempted a RHC but were not able to access either IJ. She wants to wait until next year before they try again. Her main concern right now is her "frozen shoulder." She has been participating in PT/OT for about a month. Had an appointment with her PCP in July. Mrs. Perez  denies chest pain, chest pressure, syncope, pre-syncope, lightheadedness, dizziness, PND, orthopnea, LE edema, abdominal pain, abdominal pressure, or N/V/F/C.      6MWT:  6MW 2/7/2022   6MWT Status completed with stops   Patient Reported Dyspnea   Was O2 used? No   6MW Distance walked (feet) 1075   Distance walked (meters) 327.66   Did patient stop? Yes   Oxygen Saturation 97   Supplemental Oxygen Room Air   Heart Rate 83   Blood Pressure 95/63   Ab Dyspnea Rating  moderate   Oxygen Saturation 95   Supplemental Oxygen Room Air   Heart Rate 124   Blood Pressure 121/73   Ab Dyspnea Rating  very heavy   Recovery Time (seconds) 180   Oxygen Saturation 98   Supplemental Oxygen Room Air   Heart Rate 93     ECHO: 8/15/2022  · The left ventricle is normal in size with concentric remodeling and low normal systolic function.  · The estimated ejection fraction is 55%.  · There is abnormal septal wall motion.  · Indeterminate left ventricular diastolic function.  · Mild right ventricular enlargement with mildly reduced right ventricular systolic function.  · Mild right atrial enlargement.  · Moderate tricuspid regurgitation.  · Mild pulmonic regurgitation.  · Normal central venous pressure (3 mmHg).  · The estimated PA systolic pressure is 104 mmHg.  · There is very severe pulmonary hypertension.    ECHO: 2/7/2022  · The left ventricle is normal in size with normal systolic function. The estimated ejection fraction is 65%.  · Severe right ventricular enlargement with hypertrophy and moderately to severely reduced " "right ventricular systolic function.  · Indeterminate left ventricular diastolic function.  · Mild tricuspid regurgitation.  · There is pulmonary hypertension. The estimated PA systolic pressure is 95 mmHg.  · Normal central venous pressure (3 mmHg).    RHC:   · Estimated blood loss: none  · Severe PAH on IV remodulin, adcirca and letairis.  · Normal right and left-sided filling pressures.  · Borderline low-normal cardiac output by Ebony method which is normal when indexed for BSA.  · No signficant change from RHC 8/18.  · RA: 6/ 7/ 5 RV: 105/ 3/ 10 PA: 103/ 45/ 64 PWP: 15/ 1512/ 13 . Cardiac output was 4.11 by Ebony. Cardiac index is 2.67 L/min/m2. O2 Sat: PA 70%. AO sat 96% PVR 12.4    PFTs: 9/8/2020  Spirometry shows moderate obstruction. Lung volume determination is normal. DLCO is mildly decreased - 68%    IV/SC:  Pulmonary Hypertension Review Flowsheet 8/15/2022   Pump Type CADD   Medication type Remodulin   Vial size 2.5 mg/ml   Dose (ng/kg/min) 107/ng/kg/min   DW (kg) 60.5kg   Amt of Med used 10ml   Amt of Diluent Used NS 90 ml   Final Concentration (ng/ml) 952615 ng/ml   Pump Rate ml/24 hr 37 ml/24hr     Review of Systems   Constitutional: Positive for malaise/fatigue. Negative for decreased appetite, diaphoresis, fever and night sweats.   HENT: Positive for congestion.    Cardiovascular: Positive for dyspnea on exertion. Negative for chest pain, irregular heartbeat and syncope.   Respiratory: Positive for shortness of breath. Negative for cough and wheezing.         Occasional SOB at night and morning   Endocrine: Negative.    Skin: Negative.    Musculoskeletal: Negative for back pain, falls and joint swelling.        L shoulder pain   Gastrointestinal: Negative.    Genitourinary: Negative.    Neurological: Negative.    Psychiatric/Behavioral: The patient is nervous/anxious.         Controlled with medication        Objective: /75   Pulse 99   Ht 4' 11" (1.499 m)   Wt 65.4 kg (144 lb 2.9 oz)   SpO2 " 95%   BMI 29.12 kg/m²       Physical Exam  Constitutional:       Appearance: Normal appearance.   HENT:      Head: Normocephalic and atraumatic.   Eyes:      Pupils: Pupils are equal, round, and reactive to light.   Neck:      Vascular: No JVD.   Cardiovascular:      Rate and Rhythm: Normal rate and regular rhythm.      Pulses: Normal pulses.      Heart sounds: Murmur heard.      Comments: Loud S2  Pulmonary:      Effort: Pulmonary effort is normal.      Breath sounds: Normal breath sounds.   Chest:       Abdominal:      General: Abdomen is flat.      Palpations: Abdomen is soft.   Musculoskeletal:         General: Normal range of motion.      Cervical back: Normal range of motion and neck supple.   Skin:     General: Skin is warm and dry.      Capillary Refill: Capillary refill takes less than 2 seconds.   Neurological:      Mental Status: She is alert and oriented to person, place, and time.   Psychiatric:         Mood and Affect: Mood normal.         Behavior: Behavior normal.           Lab Results   Component Value Date     (H) 08/15/2022     08/15/2022    K 3.8 08/15/2022    MG 2.1 08/15/2022     (H) 08/15/2022    CO2 21 (L) 08/15/2022    BUN 14 08/15/2022    CREATININE 0.7 08/15/2022    GLU 84 08/15/2022    HGBA1C 4.9 09/21/2020    AST 13 08/15/2022    ALT 8 (L) 08/15/2022    ALBUMIN 3.9 08/15/2022    PROT 7.3 08/15/2022    BILITOT 0.3 08/15/2022    CHOL 125 09/21/2020    HDL 36 (L) 09/21/2020    LDLCALC 72.8 09/21/2020    TRIG 81 09/21/2020       Magnesium   Date Value Ref Range Status   08/15/2022 2.1 1.6 - 2.6 mg/dL Final       Lab Results   Component Value Date    WBC 5.70 08/15/2022    HGB 15.2 08/15/2022    HCT 44.3 08/15/2022    MCV 92 08/15/2022     08/15/2022       Lab Results   Component Value Date    INR 1.0 09/29/2020    INR 0.9 09/21/2020    INR 0.9 09/04/2019       BNP   Date Value Ref Range Status   08/15/2022 121 (H) 0 - 99 pg/mL Final     Comment:     Values of less  than 100 pg/ml are consistent with non-CHF populations.   02/07/2022 82 0 - 99 pg/mL Final     Comment:     Values of less than 100 pg/ml are consistent with non-CHF populations.   10/22/2021 100 (H) 0 - 99 pg/mL Final     Comment:     Values of less than 100 pg/ml are consistent with non-CHF populations.       No results found for: LDH    ..  WHO Group: 1 Functional Class: II  REVEAL Score: 8 (Intermediate Risk)    Assessment:       No diagnosis found.     Plan:       Mrs. Perez is subjectively stable. ECHO about the same, showing severe PAH with RV enlargement and dysfunction. Will have her try to increase remodulin by 1-2 ng/kg/min, as tolerated. She has not been able to increase for awhile, but she can take it slow.    Will add some lasix to manage fluid. Start taking lasix 20 mg tablet, once weekly. May take an extra lasix as needed, for weight gain 3lbs in one day and 5lbs in one week.    TSH is low. Have asked her to f/u with her PCP.    RTC in 4 months with labs, walk, ECHO.    Recommend 2 gram sodium restriction and 1500cc fluid restriction.  Encourage physical activity with graded exercise program.  Requested patient to weigh themselves daily, and to notify us if their weight increases by more than 3 lbs in 1 day or 5 lbs in 1 week.

## 2022-08-15 NOTE — PATIENT INSTRUCTIONS
Start taking lasix 20 mg tablet, once weekly. May take an extra lasix as needed, for weight gain 3lbs in one day and 5lbs in one week.    Will try to increase remodulin by 1-2 ng/kg/min, as tolerated.     RTC in 4 months with labs, walk, ECHO.    Recommend 2 gram sodium restriction and 1500cc fluid restriction.  Encourage physical activity with graded exercise program.  Requested patient to weigh themselves daily, and to notify us if their weight increases by more than 3 lbs in 1 day or 5 lbs in 1 week.

## 2022-08-15 NOTE — LETTER
August 21, 2022        Ivonne Rai  [address]  [address]  [facility] [address]  Phone: [phone]  Fax: [phone]             [facility]  [address]  [facility] [address]  Phone: [phone]   Patient: Yuni Perez   MR Number: [id number]   YOB: 1970   Date of Visit: 8/15/2022       Dear Dr. Ivonne Rai    Thank you for referring Yuni Perez to me for evaluation. Attached you will find relevant portions of my assessment and plan of care.    If you have questions, please do not hesitate to call me. I look forward to following Yuni Perez along with you.    Sincerely,    Bárbara Langford, NP    Enclosure    If you would like to receive this communication electronically, please contact [email or web address] or [phone] to request [employer] access.    [employer] is a tool which provides read-only access to select patient information with whom you have a relationship. Its easy to use and provides real time access to review your patients record including encounter summaries, notes, results, and demographic information.    If you feel you have received this communication in error or would no longer like to receive these types of communications, please e-mail [email or web address]

## 2022-08-15 NOTE — PROGRESS NOTES
[facility]  Occupational Therapy Progress / Treatment Note    Date: 8/17/2022  Name: Yuni Perez  Clinic Number: [id number]    Therapy Diagnosis:   Encounter Diagnoses   Name Primary?    Decreased range of motion of left shoulder Yes    Self-care deficit for dressing and grooming     Self-care deficit for bathing         Physician: Kenn Ray NP  Physician Orders: Eval and treat  Medical Diagnosis: Left upper arm pain  Surgical Procedure and Date: n/a  Evaluation Date: 05/16/2022  Insurance Authorization Period Expiration: 12/31/22  Plan of Care Certification Period:  7/13/22 to 9/7/22  Date of Return to MD: not appointed     Visit # / Visits authorized: 11/ 20  Time In: 10:50  am  Time Out: 11:45  pm  Total Billable Time: 45 minutes     Precautions:  pulmonary hypotension, seizures    SUBJECTIVE     Pt reports:  I feel like my shoulder is better.  Yuni was compliant with home exercise program   Response to previous treatment: good sore   Functional change: nothing new reported    Pain: 4/10 pre therapy    Location: left scapula      OBJECTIVE     Yuni received therapeutic exercises for 45 minutes including:  sci fit with BUE with verbal cues for directional changes for  increases scapular activation every minute    Reassessment:   Range of Motion:        (L) UE 5/16/22 (R) UE LUE 6/22/22 LUE 7/20/22 LUE 8/17/22     AROM AROM AROM AROM AROM Norm   Shoulder Flexion 110 145 115 120 115 180   Shoulder Abduction 110 150 95 103 100 0-180   Shoulder Extension 27 35 30 30 30 0-50   Shoulder Internal Rotation To gluteal To bra To gluteal gluteal gluteal 0-90   Shoulder External Rotation 27 50 20 25 18 0-90   Horz Shoulder Adduction 11 20 10 10 5 0-40     Strength                                5/16/22 8/17/22  Shoulder Flexion RUE: 4+/5    Shoulder Extension RUE: 4+/5    Shoulder Abduction RUE:  3+/5    Internal Rotation RUE: 3+/5    External Rotation RUE: 3+/5            Shoulder Flexion LUE: 4/5    Shoulder Extension LUE: 4-/5    Shoulder Abduction LUE: 3/5**  4/5**   Internal Rotation LUE:  3/5  3+/5   External Rotation LUE:  3/5   3/5          ** denotes pain      -GH joint distraction  -passive stretching of left shoulder ER / IR / flexion with firm end range for extended time   -Supine  dowel  flexion stretch x 10 reps hold for 5 seconds                            ER stretch x 10 reps hold x 5 second  -prone left shoulder extension with ER x 10 reps    Ice applied to left shoulder with arm supported on plinth after contra indications cleared x 8 min     Patient Education and Home Exercises      Education provided:   - encouraged to continue to  perform HEP  - Progress towards goals     Written Home Exercises Provided: continue with previously provided HEP  Exercises were reviewed and Yuni was able to demonstrate them prior to the end of the session.  Yuni demonstrated good  understanding of the HEP provided. See EMR under Patient Instructions for exercises provided during therapy sessions.       Assessment     Pt would continue to benefit from skilled OT. Yuni is demonstrating decreases in AROM of left shoulder but some slight improvement in strength noted. Pain of shoulder remains.     Yuni is progressing well towards her goals and there are no updates to goals at this time. Pt prognosis is Good.     Pt will continue to benefit from skilled outpatient occupational therapy to address the deficits listed in the problem list on initial evaluation provide pt/family education and to maximize pt's level of independence in the home and community environment.     Pt's spiritual, cultural and educational needs considered and pt agreeable to plan of care and goals.    Anticipated barriers to occupational therapy: none noted    Goals:  Goals to be met on  6/22/22:  1) Pt will be independent and report performing HEP to maximize (R) shoulder functional  capacity- in progress  2) Pt will increase left shoulder AROM in elevations and IR by 10 degrees to A with daily task- met with flexion 7/20/22  3) Pt will report use of home modalities for pain management- met prn  4) Pt will report  ME at worst 5/10 - not met  5) Pt will report interrupted sleep no more than once a night.-met     Long term goals to be met by d/c  Pt will increase left shoulder AROM to allow for performance of dressing tasks.  Pt will increase left shoulder strength to 3+/5 for use during ADL- progressing  Pt will report ME of 2/10 at worst with activities and rest  Pt will report pain free sleep 3 out of 7 days  Pt will score 40 % on FOTO survey    PLAN     Progress with scapular and postural exercises as well as left shoulder ROM and joint mobilization.       GRACE Lin

## 2022-08-17 ENCOUNTER — CLINICAL SUPPORT (OUTPATIENT)
Dept: REHABILITATION | Facility: HOSPITAL | Age: 52
End: 2022-08-17
Payer: COMMERCIAL

## 2022-08-17 DIAGNOSIS — M25.612 DECREASED RANGE OF MOTION OF LEFT SHOULDER: Primary | ICD-10-CM

## 2022-08-17 DIAGNOSIS — R46.0 SELF-CARE DEFICIT FOR BATHING: ICD-10-CM

## 2022-08-17 DIAGNOSIS — Z74.1 SELF-CARE DEFICIT FOR DRESSING AND GROOMING: ICD-10-CM

## 2022-08-17 PROCEDURE — 97110 THERAPEUTIC EXERCISES: CPT | Mod: PN

## 2022-08-22 NOTE — PROGRESS NOTES
[facility]  Occupational Therapy Treatment Note    Date: 8/24/2022  Name: Yuni Perez  Clinic Number: [id number]    Therapy Diagnosis:   Encounter Diagnoses   Name Primary?    Decreased range of motion of left shoulder Yes    Self-care deficit for dressing and grooming     Self-care deficit for bathing         Physician: Kenn Ray NP  Physician Orders: Eval and treat  Medical Diagnosis: Left upper arm pain  Surgical Procedure and Date: n/a  Evaluation Date: 05/16/2022  Insurance Authorization Period Expiration: 12/31/22  Plan of Care Certification Period:  7/13/22 to 9/7/22  Date of Return to MD: not appointed     Visit # / Visits authorized: 12/ 20  Time In: 10:48 am  Time Out: 11:43  pm  Total Billable Time: 47 minutes     Precautions:  pulmonary hypotension, seizures    SUBJECTIVE     Pt reports:  My pain is in different places all the time.  Yuni was compliant with home exercise program   Response to previous treatment: good sore   Functional change: nothing new reported    Pain: 3/10 pre therapy    Location: left scapula      OBJECTIVE     Yuni received therapeutic exercises for 47 minutes including:  sci fit with BUE with verbal cues for directional changes for  increases scapular activation every minute  -GH joint distraction  -passive stretching of left shoulder ER / IR / flexion with firm end range for extended time   -Supine 2# dowel  flexion stretch x 10 reps hold for 5 seconds               Active left shoulder flexion x 10 reps               1# tricep curl x 10 reps                            ER stretch x 10 reps hold x 5 second  -prone left shoulder h. Abduction x 10 reps                                  extension with ER x 10 reps  -seated shoulder flexion to 90 with minimal to no scapular compensation x 5 reps    Ice applied to left shoulder with arm supported on plinth after contra indications cleared x 8 min     Patient Education and Home  Exercises      Education provided:   - encouraged to continue to  perform HEP  - Progress towards goals     Written Home Exercises Provided: continue with previously provided HEP  Exercises were reviewed and Yuni was able to demonstrate them prior to the end of the session.  Yuni demonstrated good  understanding of the HEP provided. See EMR under Patient Instructions for exercises provided during therapy sessions.       Assessment     Pt would continue to benefit from skilled OT. Yuni with some improvement in scapular control during upright flexion.  Joint stiffness still significant but has improved slightly.     Yuni is progressing towards her goals and there are no updates to goals at this time. Pt prognosis is Good.     Pt will continue to benefit from skilled outpatient occupational therapy to address the deficits listed in the problem list on initial evaluation provide pt/family education and to maximize pt's level of independence in the home and community environment.     Pt's spiritual, cultural and educational needs considered and pt agreeable to plan of care and goals.    Anticipated barriers to occupational therapy: none noted    Goals:  Goals to be met on  6/22/22:  1) Pt will be independent and report performing HEP to maximize (R) shoulder functional capacity- in progress  2) Pt will increase left shoulder AROM in elevations and IR by 10 degrees to A with daily task- met with flexion 7/20/22  3) Pt will report use of home modalities for pain management- met prn  4) Pt will report  UT at worst 5/10 - not met  5) Pt will report interrupted sleep no more than once a night.-met     Long term goals to be met by d/c  Pt will increase left shoulder AROM to allow for performance of dressing tasks.  Pt will increase left shoulder strength to 3+/5 for use during ADL- progressing  Pt will report UT of 2/10 at worst with activities and rest  Pt will report pain free sleep 3 out of 7 days  Pt  will score 40 % on FOTO survey    PLAN     Progress with scapular and postural exercises as well as left shoulder ROM and joint mobilization.       GRACE Lin

## 2022-08-24 ENCOUNTER — CLINICAL SUPPORT (OUTPATIENT)
Dept: REHABILITATION | Facility: HOSPITAL | Age: 52
End: 2022-08-24
Payer: COMMERCIAL

## 2022-08-24 DIAGNOSIS — M25.612 DECREASED RANGE OF MOTION OF LEFT SHOULDER: Primary | ICD-10-CM

## 2022-08-24 DIAGNOSIS — Z74.1 SELF-CARE DEFICIT FOR DRESSING AND GROOMING: ICD-10-CM

## 2022-08-24 DIAGNOSIS — R46.0 SELF-CARE DEFICIT FOR BATHING: ICD-10-CM

## 2022-08-24 PROCEDURE — 97110 THERAPEUTIC EXERCISES: CPT | Mod: PN

## 2022-08-30 NOTE — PROGRESS NOTES
[facility]  Occupational Therapy Treatment Note    Date: 8/31/2022  Name: Yuni Perez  Clinic Number: [id number]    Therapy Diagnosis:   Encounter Diagnoses   Name Primary?    Decreased range of motion of left shoulder Yes    Self-care deficit for dressing and grooming     Self-care deficit for bathing           Physician: Kenn Ray NP  Physician Orders: Eval and treat  Medical Diagnosis: Left upper arm pain  Surgical Procedure and Date: n/a  Evaluation Date: 05/16/2022  Insurance Authorization Period Expiration: 12/31/22  Plan of Care Certification Period:  7/13/22 to 9/7/22  Date of Return to MD: not appointed     Visit # / Visits authorized: 13/ 20  Time In: 12:16 pm  Time Out: `1:00  pm  Total Billable Time: 40 minutes     Precautions:  pulmonary hypotension, seizures    SUBJECTIVE     Pt reports:  I have been sore today, not sure if I am pushing my exercises too much or what.  Yuni was compliant with home exercise program   Response to previous treatment: a little sore   Functional change: nothing new reported    Pain: 4/10 pre therapy    Location: left scapula      OBJECTIVE     Yuni received therapeutic exercises for 40 minutes including:  sci fit with BUE with verbal cues for directional changes for  increases scapular activation every minute  -GH joint distraction  -passive stretching of left shoulder ER / IR / flexion with firm end range for extended time   -Supine 3# dowel  flexion stretch x 10 reps hold for 5 seconds               Active left shoulder flexion x 10 reps               ER stretch x 10 reps hold x 5 second  -prone left shoulder 1# extension with ER x 10 reps                                       Patient Education and Home Exercises      Education provided:   - encouraged to continue to  perform HEP  - Progress towards goals     Written Home Exercises Provided: continue with previously provided HEP  Exercises were reviewed and  Yuni was able to demonstrate them prior to the end of the session.  Yuni demonstrated good  understanding of the HEP provided. See EMR under Patient Instructions for exercises provided during therapy sessions.       Assessment     Pt would continue to benefit from skilled OT. Yuni tolerated therapy well with minimal increase in shoulder pain post session.  Scapular compensation remains with left shoulder elevation.    Yuni is progressing towards her goals and there are no updates to goals at this time. Pt prognosis is Good.     Pt will continue to benefit from skilled outpatient occupational therapy to address the deficits listed in the problem list on initial evaluation provide pt/family education and to maximize pt's level of independence in the home and community environment.     Pt's spiritual, cultural and educational needs considered and pt agreeable to plan of care and goals.    Anticipated barriers to occupational therapy: none noted    Goals:  Goals to be met on  6/22/22:  1) Pt will be independent and report performing HEP to maximize (R) shoulder functional capacity- in progress  2) Pt will increase left shoulder AROM in elevations and IR by 10 degrees to A with daily task- met with flexion 7/20/22  3) Pt will report use of home modalities for pain management- met prn  4) Pt will report  MT at worst 5/10 - not met  5) Pt will report interrupted sleep no more than once a night.-met     Long term goals to be met by d/c  Pt will increase left shoulder AROM to allow for performance of dressing tasks.  Pt will increase left shoulder strength to 3+/5 for use during ADL- progressing  Pt will report MT of 2/10 at worst with activities and rest  Pt will report pain free sleep 3 out of 7 days  Pt will score 40 % on FOTO survey    PLAN     Progress with scapular and postural exercises as well as left shoulder ROM and joint mobilization.       GRACE Lin

## 2022-08-31 ENCOUNTER — CLINICAL SUPPORT (OUTPATIENT)
Dept: REHABILITATION | Facility: HOSPITAL | Age: 52
End: 2022-08-31
Payer: COMMERCIAL

## 2022-08-31 DIAGNOSIS — R46.0 SELF-CARE DEFICIT FOR BATHING: ICD-10-CM

## 2022-08-31 DIAGNOSIS — M25.612 DECREASED RANGE OF MOTION OF LEFT SHOULDER: Primary | ICD-10-CM

## 2022-08-31 DIAGNOSIS — D13.4 HEPATIC ADENOMA: Primary | ICD-10-CM

## 2022-08-31 DIAGNOSIS — M25.512 LEFT SHOULDER PAIN, UNSPECIFIED CHRONICITY: ICD-10-CM

## 2022-08-31 DIAGNOSIS — Z74.1 SELF-CARE DEFICIT FOR DRESSING AND GROOMING: ICD-10-CM

## 2022-08-31 PROCEDURE — 97140 MANUAL THERAPY 1/> REGIONS: CPT | Mod: PN

## 2022-08-31 PROCEDURE — 97161 PT EVAL LOW COMPLEX 20 MIN: CPT | Mod: PN

## 2022-08-31 PROCEDURE — 97110 THERAPEUTIC EXERCISES: CPT | Mod: PN

## 2022-08-31 NOTE — PLAN OF CARE
[facility]   Physical Therapy Initial Evaluation     Date: 8/31/2022   Name: uYni Perez  Clinic Number: [id number]    Therapy Diagnosis:   Encounter Diagnoses   Name Primary?    Left shoulder pain, unspecified chronicity     Decreased range of motion of left shoulder Yes     Physician: Lulu Sandhu MD    Physician Orders: PT Eval and Treat , dry needling  Medical Diagnosis from Referral: Acute pain of left shoulder  Evaluation Date: 8/31/2022  Authorization Period Expiration: 07/11/2023  Plan of Care Expiration: 10/14/2022  Visit # / Visits authorized: 1/ 1       Precautions:  pulmonary hypotension, seizures      Time In: 1305  Time Out: 1330  Total Appointment Time (timed & untimed codes): 25 minutes (LCE-1, MT-1)      SUBJECTIVE     Date of onset: early May 2022    History of current condition - Yuni reports: Pt states she fell asleep with Left her hand over her head as she sleeps on her back. When she woke, her entire Left upper extremity was numb and tingling. She had to force her arm back to her side with her Right upper extremity. She has had pain and decreased range of motion in her Left shoulder ever since. She would like to try dry needling to help with the pain    Prior Therapy: currently receiving OT for Left shoulder  Social History:  lives with her father  Occupation: not working  Prior Level of Function: Independent with all ADLs  Current Level of Function: Bathing: difficulty with reaching to the right, Dressing/Grooming: difficulty with donning bra, unable to use left hand for hair washing, Driving: reaches with right to close car door, difficulty with reach seatbelt    Pain:  Current 2/10, worst 7/10, best 2/10   Location: left neck  and shoulder  left arm(s) and Left pectorails   Description: Burning, Tight, and knotty  Aggravating Factors: reaching up and across  Easing Factors:  muscle relaxors    Patients goals: To have full range of motion if  possible     Medical History:   Past Medical History:   Diagnosis Date    AR (allergic rhinitis)     Cholelithiasis, common bile duct     Chronic low back pain     Eye pressure 2017    General anesthetics causing adverse effect in therapeutic use     GERD (gastroesophageal reflux disease)     History of migraine headaches     Hypothyroidism     Innocent heart murmur     Lumbar disc disease     Menorrhagia     Mild asthma     Obesity     Plantar fasciitis of left foot     Primary pulmonary hypertension     followed by heart transplant/pulmonary     Seizure disorder     x 1 in 2008    Seizures     Sleep apnea     TMJ (dislocation of temporomandibular joint)     Tricuspid regurgitation        Surgical History:   Yuni Perez  has a past surgical history that includes Portacath placement; Cardiac catheterization; Upper gastrointestinal endoscopy; Central venous catheter insertion; Right heart catheterization (Right, 8/20/2018); Right heart catheterization (Right, 9/4/2019); gallbladder drain (06/2019); Insertion of Eason catheter (Right, 6/17/2020); Removal of tunneled central venous catheter (CVC) (N/A, 6/17/2020); Catheterization of both left and right heart (Bilateral, 9/29/2020); Coronary angiography (N/A, 9/29/2020); and Right heart catheterization (Right, 6/10/2022).    Medications:   Yuni has a current medication list which includes the following prescription(s): acetaminophen, adcirca, albuterol, albuterol-ipratropium, alprazolam, ambrisentan, azelastine, butalbital-acetaminophen-caffeine -40 mg, cyanocobalamin (vitamin b-12), diphenhydramine, ferrous sulfate, breo ellipta, fluticasone propionate, folic acid, furosemide, glucosam/chond-msm1/c/michele/bor, hyoscyamine, phexxi, levothyroxine, lidocaine, loratadine, ondansetron, pantoprazole, tizanidine, topiramate, treprostinil, and ubrelvy.    Allergies:   Review of patient's allergies indicates:   Allergen Reactions    Fentanyl Anaphylaxis  "    Respiratory distress    Vibra-tabs [doxycycline hyclate] Anaphylaxis     "throat felt like it was closing"    Adhesive Hives     Silk tape    Amoxicillin Rash    Nsaids (non-steroidal anti-inflammatory drug) Swelling    Chlorhexidine Other (See Comments)     Blue Chlorhexidine causes hives          OBJECTIVE     Cervical AROM (degrees):  Flexion 44  Extension 58  Right rotation 65   Left rotation 64  Right sidebending 30  Left sidebending 25     Left shoulder     AROM   Shoulder Flexion 120   Shoulder Abduction 103   Shoulder Extension 30   Shoulder Internal Rotation Left iliac crest   Shoulder External Rotation 25   Horz Shoulder Adduction 10       TREATMENT     Total Treatment time (time-based codes) separate from Evaluation: 10 minutes      Yuni received the treatments listed below:        See EMR under MEDIA for written consent provided 8/31/2022.     Palpation Assessment to determine the necessity for Functional Dry Needling: tenderness to Left biceps tendon, Left pectoralis musculature, Left deltoid.     Yuni received the following manual therapy techniques:  Functional Dry Needling to Left pectoralis major, Left anterior and posterior deltoid with pistoning and winding      PATIENT EDUCATION AND HOME EXERCISES     Education provided:   - functional dry needling      ASSESSMENT     Yuni is a 52 y.o. female referred to outpatient Physical Therapy with a medical diagnosis of Acute pain of left shoulder. Patient presents with decreased Left shoulder range of motion and joint stiffness.     Patient prognosis is Fair.   Patient will benefit from skilled outpatient Physical Therapy to address the deficits stated above and in the chart below, provide patient /family education, and to maximize patientt's level of independence.     Plan of care discussed with patient: Yes  Patient's spiritual, cultural and educational needs considered and patient is agreeable to the plan of care and goals as stated " below:     Anticipated Barriers for therapy: comorbidities    Medical Necessity is demonstrated by the following  History  Co-morbidities and personal factors that may impact the plan of care Co-morbidities:   pulmonary hypotension, seizures      Personal Factors:   no deficits     moderate   Examination  Body Structures and Functions, activity limitations and participation restrictions that may impact the plan of care Body Regions:   head  neck  upper extremities  trunk    Body Systems:    gross symmetry  ROM    Participation Restrictions:   None stated    Activity limitations:   Learning and applying knowledge  no deficits    General Tasks and Commands  no deficits    Communication  no deficits    Mobility  lifting and carrying objects    Self care  washing oneself (bathing, drying, washing hands)  caring for body parts (brushing teeth, shaving, grooming)  dressing    Domestic Life  cooking  doing house work (cleaning house, washing dishes, laundry)  assisting others    Interactions/Relationships  no deficits    Life Areas  no deficits    Community and Social Life  no deficits         high   Clinical Presentation stable and uncomplicated low   Decision Making/ Complexity Score: low     Goals:    Long Term Goals: 6 weeks   Pt will increase left shoulder AROM by 10 degrees all deficit motions allow for performance of dressing tasks.  Pt will report CO of 2/10 at worst with activities and rest to improve quality of life      PLAN   Plan of care Certification: 8/31/2022 to 10/14/2022.    Outpatient Physical Therapy 1 times weekly for 6 weeks to include the following interventions:  dry needling in conjuction with OT .     Chey Birch, PT      I CERTIFY THE NEED FOR THESE SERVICES FURNISHED UNDER THIS PLAN OF TREATMENT AND WHILE UNDER MY CARE   Physician's comments:     Physician's Signature: ___________________________________________________

## 2022-09-01 ENCOUNTER — TELEPHONE (OUTPATIENT)
Dept: HEPATOLOGY | Facility: CLINIC | Age: 52
End: 2022-09-01
Payer: COMMERCIAL

## 2022-09-01 PROBLEM — M25.512 LEFT SHOULDER PAIN: Status: ACTIVE | Noted: 2022-09-01

## 2022-09-01 NOTE — PATIENT INSTRUCTIONS
What To Expect After Functional Dry Needling ® Treatments           How will I feel after a session of FDN?    You may feel some soreness immediately after treatment in the area of the body you were treated. This does not always occur but should be expected and is considered normal. It can also take up to a few hours, or even until the next day, to feel an onset of soreness. The soreness may vary from person to person and based on the area of the body that was treated, but it typically feels like you had an intense workout at the gym. Soreness typically lasts 24-48 hours. Make sure to indicate to your provider at a follow-up appointment how long the soreness lasted.  Bruising from the treatment is possible, somehow uncommon, but it is not of concern. Some areas are more likely to bruise than others, including the shoulders, chest, face, and portions of the extremities. Large bruising rarely occurs, but is possible. Use ice to help decrease the bruising and if you feel concern, please call your provider.   It is common to feel tired/fatigued, energized, emotional, giggly, or out of it after treatment. This is a normal response that can last up to an hour or two after treatment. If this lasts beyond a day, contact your provider as a precaution.  There are times when treatment may actually exacerbate your symptoms. This is normal and may indicate that you need to follow up sooner with your practitioner to continue treatment. If this continues past the 24-48 hour window, keep note of it, as this can help your provider adjust your treatment plan if needed based on your report. This does not mean that FDN cannot help your condition.    What should I do after my treatment and what is recommended?    We highly recommend increasing your water intake for the next 24 hours after treatment to help avoid or reduce soreness. We also recommend soaking in a hot bath or hot tub to help relieve post treatment soreness and to soften  the symptoms associated with the treatment you received. After dry needling treatment, you may do the following based on your comfort level. Please note that if it hurts or exacerbates your symptoms, then discontinuing the activity is best.    Work out and/or stretch.  Participate in normal physical activity.  Massage the area.  Use heat or ice as preferred for post treatment soreness.  Stay hydrated.   If you have prescription medicines, continue to take them as prescribed.    What should I avoid after treatment?    Unfamiliar physical activities or sports.  Doing more than you normally do.  Excessive alcohol intake.    If you are feeling light headed, or experience difficulty breathing, chest pain, or any other concerning symptoms after treatment, call us immediately. If you are unable to get a hold of us, please call your physician.

## 2022-09-06 NOTE — PROGRESS NOTES
[facility]  Occupational Therapy Progress / Treatment Note    Date: 9/7/2022  Name: Yuni Perez  Clinic Number: [id number]    Therapy Diagnosis:   Encounter Diagnoses   Name Primary?    Decreased range of motion of left shoulder Yes    Self-care deficit for dressing and grooming     Self-care deficit for bathing      Physician: Kenn Ray NP  Physician Orders: Eval and treat  Medical Diagnosis: Left upper arm pain  Surgical Procedure and Date: n/a  Evaluation Date: 05/16/2022  Insurance Authorization Period Expiration: 12/31/22  Plan of Care Certification Period:  7/13/22 to 9/7/22  Date of Return to MD: not appointed     Visit # / Visits authorized: 14/ 20  Time In: 12:15 pm  Time Out: 1:00 pm  Total Billable Time: 45 minutes     Precautions:  pulmonary hypotension, seizures    SUBJECTIVE     Pt reports:  I am doing okay today, I stretched in my bed a little better.  Yuni was compliant with home exercise program   Response to previous treatment: was sore   Functional change: nothing new     Pain: 3/10 pre therapy    Location: left scapula      OBJECTIVE     Yuni received therapeutic exercises for 45 minutes including:  sci fit with BUE with verbal cues for directional changes for  increases scapular activation every minute    Reassessment:   Range of Motion:        (L) UE 5/16/22 (R) UE LUE 6/22/22 LUE 7/20/22 LUE 8/17/22      AROM AROM AROM AROM AROM AROM 9/7 Norm   Shoulder Flexion 110 145 115 120 115 105 180   Shoulder Abduction 110 150 95 103 100 100 0-180   Shoulder Extension 27 35 30 30 30 30 0-50   Shoulder Internal Rotation To gluteal To bra To gluteal gluteal gluteal Mid glut 0-90   Shoulder External Rotation 27 50 20 25 18 20 0-90   Horz Shoulder Adduction 11 20 10 10 5 10 0-40     Strength                                5/16/22 8/17/22  Shoulder Flexion RUE: 4+/5    Shoulder Extension RUE: 4+/5    Shoulder Abduction RUE:  3+/5    Internal  Rotation RUE: 3+/5    External Rotation RUE: 3+/5           Shoulder Flexion LUE: 4/5    Shoulder Extension LUE: 4-/5    Shoulder Abduction LUE: 3/5**  4/5**   Internal Rotation LUE:  3/5  3+/5   External Rotation LUE:  3/5   3/5          ** denotes pain    -GH joint distraction  -passive stretching of left shoulder ER / IR / flexion with firm end range for extended time   -Supine 3# dowel  vertical flexion stretch x 10 reps hold for 5 seconds                                                     Patient Education and Home Exercises      Education provided:   - encouraged to continue to  perform HEP  - Progress towards goals     Written Home Exercises Provided: continue with previously provided HEP  Exercises were reviewed and Yuni was able to demonstrate them prior to the end of the session.  Yuni demonstrated good  understanding of the HEP provided. See EMR under Patient Instructions for exercises provided during therapy sessions.       Assessment     Pt would continue to benefit from skilled OT. Yuni with no significant improvements in her AROM as noted above.  More reduced motion compared to initial evaluation then gains.  Pt is reporting improvement in function.    Pt's spiritual, cultural and educational needs considered and pt agreeable to plan of care and goals.    Anticipated barriers to occupational therapy: none noted    Goals:  Goals to be met on  6/22/22:  1) Pt will be independent and report performing HEP to maximize (R) shoulder functional capacity- in progress  2) Pt will increase left shoulder AROM in elevations and IR by 10 degrees to A with daily task- met with flexion 7/20/22  3) Pt will report use of home modalities for pain management- met prn  4) Pt will report  SC at worst 5/10 - not met  5) Pt will report interrupted sleep no more than once a night.-met     Long term goals to be met by d/c  Pt will increase left shoulder AROM to allow for performance of dressing tasks - not  met  Pt will increase left shoulder strength to 3+/5 for use during ADL- progressing  Pt will report ID of 2/10 at worst with activities and rest- not met  Pt will report pain free sleep 3 out of 7 days- improving  Pt will score 40 % on FOTO survey - not met 43%    PLAN     Probable discharge from Occupational therapy.       GRACE Lin

## 2022-09-07 ENCOUNTER — CLINICAL SUPPORT (OUTPATIENT)
Dept: REHABILITATION | Facility: HOSPITAL | Age: 52
End: 2022-09-07
Payer: COMMERCIAL

## 2022-09-07 DIAGNOSIS — M25.612 DECREASED RANGE OF MOTION OF LEFT SHOULDER: Primary | ICD-10-CM

## 2022-09-07 DIAGNOSIS — R46.0 SELF-CARE DEFICIT FOR BATHING: ICD-10-CM

## 2022-09-07 DIAGNOSIS — Z74.1 SELF-CARE DEFICIT FOR DRESSING AND GROOMING: ICD-10-CM

## 2022-09-07 DIAGNOSIS — M25.512 LEFT SHOULDER PAIN, UNSPECIFIED CHRONICITY: ICD-10-CM

## 2022-09-07 PROCEDURE — 97110 THERAPEUTIC EXERCISES: CPT | Mod: PN

## 2022-09-07 PROCEDURE — 97140 MANUAL THERAPY 1/> REGIONS: CPT | Mod: PN

## 2022-09-20 ENCOUNTER — HOSPITAL ENCOUNTER (OUTPATIENT)
Dept: RADIOLOGY | Facility: HOSPITAL | Age: 52
Discharge: HOME | End: 2022-09-20
Attending: INTERNAL MEDICINE
Payer: COMMERCIAL

## 2022-09-20 DIAGNOSIS — D13.4 HEPATIC ADENOMA: ICD-10-CM

## 2022-09-20 PROCEDURE — 76700 US EXAM ABDOM COMPLETE: CPT | Mod: 26,,, | Performed by: RADIOLOGY

## 2022-09-20 PROCEDURE — 76700 US EXAM ABDOM COMPLETE: CPT | Mod: TC

## 2022-09-20 PROCEDURE — 76700 US ABDOMEN COMPLETE: ICD-10-PCS | Mod: 26,,, | Performed by: RADIOLOGY

## 2022-09-28 ENCOUNTER — TELEPHONE (OUTPATIENT)
Dept: FAMILY MEDICINE | Facility: CLINIC | Age: 52
End: 2022-09-28
Payer: COMMERCIAL

## 2022-09-28 DIAGNOSIS — Z12.31 BREAST CANCER SCREENING BY MAMMOGRAM: Primary | ICD-10-CM

## 2022-09-28 NOTE — TELEPHONE ENCOUNTER
Spoke with patient she says that she finished PT for her shoulder and did have small improvement but not enough to satisfy the therapist. She says when you ordered the dry needles it took them 3 weeks to get her evaluated and have her fist treatment. Patient says that she had 3 treatments, last one was 08/31 and 09/07, and every since then she has not been able to get in contact with them to schedule. She says they never called her back and she would like to know where she stands with that. Patient says that she is going though a  to get her therapist but she says she the therapist never calls her, she says that she wants to schedule it's also hard to get in touch with them. She mentioned that she knows you were saying something about ortho for next steps but she does not know if she is at the next step.

## 2022-09-28 NOTE — TELEPHONE ENCOUNTER
----- Message from Ashlyn Saba sent at 9/28/2022  2:38 PM CDT -----  Regarding: Orders  Contact: [phone]  Calling to get orders for annual mammogram placed in Epic for scheduling. Please call

## 2022-10-04 NOTE — TELEPHONE ENCOUNTER
Spoke with Abi loya at PT office she says that she will reach out to her supervisor Chey about getting patient scheduled for dry needling. She says that she is not sure why it stopped but they will call our office back on tomorrow.

## 2022-10-05 NOTE — PROGRESS NOTES
"Physical Therapy Functional Dry Needling Note       Date:  9/7/2022    Name: Yuni Perez  Clinic Number: [id number]    Therapy Diagnosis:   Encounter Diagnoses   Name Primary?    Decreased range of motion of left shoulder Yes    Self-care deficit for dressing and grooming     Self-care deficit for bathing     Left shoulder pain, unspecified chronicity      Physician: Kenn Ray NP    Start Time:  1300  Stop Time:  1315  Total Billable Time: 15 minutes (MT-1)    Subjective     Pt reports: she had increased pain after last needling session, but she contributes it to the needling possible "waking up" her muscles.    Pain: 3/10  Location: left shoulder      Objective     See EMR under MEDIA for written consent provided 8.31.22     Palpation Assessment to determine the necessity for Functional Dry Needling: tenderness to Left infraspinatus     Yuni received the following manual therapy techniques:  Functional Dry Needling to Left infraspinatus    Home Exercises and Patient Education Provided     Education provided:   - role of needling      Assessment     Patient demonstrated appropriate response to Functional Dry Needling.     Plan     Monitor response to Functional Dry Needling. Continue with Functional Dry Needling in POC as appropriate.       "

## 2022-10-12 ENCOUNTER — DOCUMENTATION ONLY (OUTPATIENT)
Dept: REHABILITATION | Facility: HOSPITAL | Age: 52
End: 2022-10-12
Payer: COMMERCIAL

## 2022-10-12 DIAGNOSIS — Z74.1 SELF-CARE DEFICIT FOR DRESSING AND GROOMING: ICD-10-CM

## 2022-10-12 DIAGNOSIS — M25.612 DECREASED RANGE OF MOTION OF LEFT SHOULDER: Primary | ICD-10-CM

## 2022-10-12 DIAGNOSIS — R46.0 SELF-CARE DEFICIT FOR BATHING: ICD-10-CM

## 2022-10-12 NOTE — PROGRESS NOTES
[facility]  Occupational Therapy Discharge Note    Name: Yuni Perez  Clinic Number: [id number]    Therapy Diagnosis:   Encounter Diagnoses   Name Primary?    Decreased range of motion of left shoulder Yes    Self-care deficit for dressing and grooming     Self-care deficit for bathing      Physician: Kenn Ray NP    Physician Orders: Eval and treat  Medical Diagnosis: Left upper arm pain  Surgical Procedure and Date: n/a  Evaluation Date: 05/16/2022      Date of Last visit: 9/7/22  Total Visits Received: 13    ASSESSMENT    Yuni with no significant improvements in her AROM as noted above.  More reduced motion compared to initial evaluation then gains.  Pt is reporting improvement in function.    Discharge reason: Patient has reached the maximum rehab potential for the present time    Discharge FOTO Score: 43%    Goals: Goals to be met on  6/22/22:  1) Pt will be independent and report performing HEP to maximize (R) shoulder functional capacity- in progress  2) Pt will increase left shoulder AROM in elevations and IR by 10 degrees to A with daily task- met with flexion 7/20/22  3) Pt will report use of home modalities for pain management- met prn  4) Pt will report  ND at worst 5/10 - not met  5) Pt will report interrupted sleep no more than once a night.-met     Long term goals to be met by d/c  Pt will increase left shoulder AROM to allow for performance of dressing tasks - not met  Pt will increase left shoulder strength to 3+/5 for use during ADL- progressing  Pt will report ND of 2/10 at worst with activities and rest- not met  Pt will report pain free sleep 3 out of 7 days- improving  Pt will score 40 % on FOTO survey - not met 43%    PLAN   This patient is discharged from Occupational Therapy      GRACE Lin

## 2022-10-18 RX ORDER — FLUTICASONE FUROATE AND VILANTEROL TRIFENATATE 200; 25 UG/1; UG/1
1 POWDER RESPIRATORY (INHALATION) NIGHTLY
Qty: 180 EACH | Refills: 2 | Status: SHIPPED | OUTPATIENT
Start: 2022-10-18 | End: 2023-08-21

## 2022-10-18 NOTE — TELEPHONE ENCOUNTER
No new care gaps identified.  [facility] Embedded Care Gaps. Reference number: [id number]. 10/18/2022   5:05:37 PM CDT

## 2022-10-19 NOTE — TELEPHONE ENCOUNTER
Refill Decision Note   Yuni Chris  is requesting a refill authorization.  Brief Assessment and Rationale for Refill:  Approve     Medication Therapy Plan:       Medication Reconciliation Completed: No   Comments:     No Care Gaps recommended.     Note composed:11:20 PM 10/18/2022

## 2022-10-28 ENCOUNTER — TELEPHONE (OUTPATIENT)
Dept: TRANSPLANT | Facility: CLINIC | Age: 52
End: 2022-10-28
Payer: COMMERCIAL

## 2022-10-28 NOTE — TELEPHONE ENCOUNTER
Approved today  CaseId:[id number];Status:Approved;Review Type:Prior Auth;Coverage Start Date:10/28/2022;Coverage End Date:10/28/2023; Letairis 10 mg

## 2022-11-15 ENCOUNTER — TELEPHONE (OUTPATIENT)
Dept: NEUROLOGY | Facility: CLINIC | Age: 52
End: 2022-11-15
Payer: COMMERCIAL

## 2022-11-15 NOTE — TELEPHONE ENCOUNTER
----- Message from Stefanie Martinez sent at 11/14/2022  3:28 PM CST -----  .Type: Patient Call Back    Who called: self     What is the request in detail: checking to see when she needs to return to clinic for follow up- states she comes in every 6 months. Please advise     Can the clinic reply by [facility]?    Would the patient rather a call back or a response via [facility]? Call     Best call back number: .[phone]        Appointment booked with patient

## 2022-11-16 NOTE — LETTER
March 3, 2017        Ivonne Rai  [address] [facility] [address]  Phone: [phone]  Fax: [phone]             [facility] [address]  [facility] [address]  Phone: [phone]   Patient: Yuni Perez   MR Number: [id number]   YOB: 1970   Date of Visit: 3/3/2017       Dear Dr. Ivonne Rai    Thank you for referring Yuni Perez to me for evaluation. Attached you will find relevant portions of my assessment and plan of care.    If you have questions, please do not hesitate to call me. I look forward to following Yuni Perez along with you.    Sincerely,    Ivonne Rai MD    Enclosure    If you would like to receive this communication electronically, please contact [email or web address] or [phone] to request [employer] access.    [employer] is a tool which provides read-only access to select patient information with whom you have a relationship. Its easy to use and provides real time access to review your patients record including encounter summaries, notes, results, and demographic information.    If you feel you have received this communication in error or would no longer like to receive these types of communications, please e-mail [email or web address]        Bexarotene Pregnancy And Lactation Text: This medication is Pregnancy Category X and should not be given to women who are pregnant or may become pregnant. This medication should not be used if you are breast feeding.

## 2022-11-17 ENCOUNTER — HOSPITAL ENCOUNTER (OUTPATIENT)
Dept: RADIOLOGY | Facility: HOSPITAL | Age: 52
Discharge: HOME | End: 2022-11-17
Attending: INTERNAL MEDICINE
Payer: COMMERCIAL

## 2022-11-17 DIAGNOSIS — Z12.31 BREAST CANCER SCREENING BY MAMMOGRAM: ICD-10-CM

## 2022-11-17 PROCEDURE — 77063 BREAST TOMOSYNTHESIS BI: CPT | Mod: TC

## 2022-11-17 PROCEDURE — 77063 MAMMO DIGITAL SCREENING BILAT WITH TOMO: ICD-10-PCS | Mod: 26,,, | Performed by: RADIOLOGY

## 2022-11-17 PROCEDURE — 77063 BREAST TOMOSYNTHESIS BI: CPT | Mod: 26,,, | Performed by: RADIOLOGY

## 2022-11-17 PROCEDURE — 77067 MAMMO DIGITAL SCREENING BILAT WITH TOMO: ICD-10-PCS | Mod: 26,,, | Performed by: RADIOLOGY

## 2022-11-17 PROCEDURE — 77067 SCR MAMMO BI INCL CAD: CPT | Mod: 26,,, | Performed by: RADIOLOGY

## 2022-12-07 ENCOUNTER — TELEPHONE (OUTPATIENT)
Dept: FAMILY MEDICINE | Facility: CLINIC | Age: 52
End: 2022-12-07
Payer: COMMERCIAL

## 2022-12-07 DIAGNOSIS — R94.6 ABNORMAL THYROID FUNCTION TEST: ICD-10-CM

## 2022-12-07 DIAGNOSIS — E03.9 HYPOTHYROIDISM (ACQUIRED): Primary | ICD-10-CM

## 2022-12-07 NOTE — TELEPHONE ENCOUNTER
----- Message from Elizabeth Zamudio sent at 12/7/2022  4:02 PM CST -----  Regarding: Return Call  .Type:  Patient Returning Call    Who Called: Self     Who Left Message for Patient: Kelin     Does the patient know what this is regarding?: Labs     Would the patient rather a call back or a response via [facility]? Call     Best Call Back Number: .[phone]

## 2022-12-07 NOTE — TELEPHONE ENCOUNTER
Her TSH is slightly decreased (indicating her brain thinks she may be on too much medication), but her free T4 is normal so I feel that overall her dose is ok.  I see that the cardiology NP ordered for new labs. Would see what that shows. If they do not like those results then she can see endocrinology to discuss.

## 2022-12-07 NOTE — TELEPHONE ENCOUNTER
Tried calling patient to give her results from her tsh. Patient didn't answer the phone but raymond.

## 2022-12-07 NOTE — TELEPHONE ENCOUNTER
Spoke to patient and she sts that Np Bárbara Langford in cardiology, sts that her thyroid levels is not ok. She would like for you and Lety Langford Np to get on the same page. She is hearing two different results. Please advise!

## 2022-12-07 NOTE — TELEPHONE ENCOUNTER
----- Message from Kathryn Jackson MA sent at 12/6/2022 12:54 PM CST -----  Type: Patient Call Back    Who called:Self      What is the request in detail: pt. Would like a call regarding blood work for her thyroid..     Can the clinic reply by [facility]?No    Would the patient rather a call back or a response via [facility]? yes    Best call back number: [phone]

## 2022-12-08 NOTE — TELEPHONE ENCOUNTER
With her normal T4 (actual thyroid hormone) level being normal I would not think this is contributing to hot flashes or weight gain. If she is off hormones then she is likely having hot flashes due to menopause. I would recommend she start tracking her food intake using an emile to see if she can pinpoint where she may be consuming more calories or sugar than necessary which may be contributing to weight gain.  Recommend office visit for discussion if she has any further questions.

## 2022-12-08 NOTE — TELEPHONE ENCOUNTER
Patient was informed of provider's recommendations/results below. ELLY.    Asking would her thyroid has anything to do with having very bad hot flashes? Stated about a year ago, her OB took her off birth control. States that she gets very sweating and hot sometimes to a point where she feels like she wants to pass out. Also has been having weight gain since last year.

## 2022-12-15 ENCOUNTER — OFFICE VISIT (OUTPATIENT)
Dept: NEUROLOGY | Facility: CLINIC | Age: 52
End: 2022-12-15
Payer: COMMERCIAL

## 2022-12-15 VITALS
SYSTOLIC BLOOD PRESSURE: 120 MMHG | BODY MASS INDEX: 29.42 KG/M2 | DIASTOLIC BLOOD PRESSURE: 73 MMHG | HEIGHT: 59 IN | HEART RATE: 82 BPM | WEIGHT: 145.94 LBS

## 2022-12-15 DIAGNOSIS — G43.009 MIGRAINE WITHOUT AURA AND WITHOUT STATUS MIGRAINOSUS, NOT INTRACTABLE: Primary | ICD-10-CM

## 2022-12-15 PROCEDURE — 3078F PR MOST RECENT DIASTOLIC BLOOD PRESSURE < 80 MM HG: ICD-10-PCS | Mod: CPTII,S$GLB,, | Performed by: NEUROLOGICAL SURGERY

## 2022-12-15 PROCEDURE — 99999 PR PBB SHADOW E&M-EST. PATIENT-LVL IV: ICD-10-PCS | Mod: PBBFAC,,, | Performed by: NEUROLOGICAL SURGERY

## 2022-12-15 PROCEDURE — 3008F PR BODY MASS INDEX (BMI) DOCUMENTED: ICD-10-PCS | Mod: CPTII,S$GLB,, | Performed by: NEUROLOGICAL SURGERY

## 2022-12-15 PROCEDURE — 3078F DIAST BP <80 MM HG: CPT | Mod: CPTII,S$GLB,, | Performed by: NEUROLOGICAL SURGERY

## 2022-12-15 PROCEDURE — 99213 PR OFFICE/OUTPT VISIT, EST, LEVL III, 20-29 MIN: ICD-10-PCS | Mod: S$GLB,,, | Performed by: NEUROLOGICAL SURGERY

## 2022-12-15 PROCEDURE — 1159F MED LIST DOCD IN RCRD: CPT | Mod: CPTII,S$GLB,, | Performed by: NEUROLOGICAL SURGERY

## 2022-12-15 PROCEDURE — 99999 PR PBB SHADOW E&M-EST. PATIENT-LVL IV: CPT | Mod: PBBFAC,,, | Performed by: NEUROLOGICAL SURGERY

## 2022-12-15 PROCEDURE — 3074F PR MOST RECENT SYSTOLIC BLOOD PRESSURE < 130 MM HG: ICD-10-PCS | Mod: CPTII,S$GLB,, | Performed by: NEUROLOGICAL SURGERY

## 2022-12-15 PROCEDURE — 3008F BODY MASS INDEX DOCD: CPT | Mod: CPTII,S$GLB,, | Performed by: NEUROLOGICAL SURGERY

## 2022-12-15 PROCEDURE — 3074F SYST BP LT 130 MM HG: CPT | Mod: CPTII,S$GLB,, | Performed by: NEUROLOGICAL SURGERY

## 2022-12-15 PROCEDURE — 1159F PR MEDICATION LIST DOCUMENTED IN MEDICAL RECORD: ICD-10-PCS | Mod: CPTII,S$GLB,, | Performed by: NEUROLOGICAL SURGERY

## 2022-12-15 PROCEDURE — 99213 OFFICE O/P EST LOW 20 MIN: CPT | Mod: S$GLB,,, | Performed by: NEUROLOGICAL SURGERY

## 2022-12-15 NOTE — PROGRESS NOTES
"Chief Complaint   Patient presents with    Migraine        Yuni Perez is a 52 y.o. female with a history of multiple medical diagnoses as listed below that presents for evaluation of headaches. She is accompanied to this visit by her mother. She was diagnosed with pulmonary hypertension about 8 years ago and around the same time she was diagnosed with a seizure. She had workup at that time that included MRI that showed signs of "old injury" and she had EEG that showed "lots of spikes and waves" per her mother. She says that since that time she has been started on Topamax which has been titrated up. She has not been having any seizures but she has bene having some occasional word finding difficulty that she has attributed to the medication. She has tried tylenol because as she has been titrating her medication to treat pulmonary hypertension sh marie been having bilateral intense stabbing pains in her head. She does say that with time her headaches have been better but she has noticed many more headaches since she began the Remodulin. She has not taken any other headache medications in the past.    Interval History  11/17/2016  She has still been having headaches after taking her medications in particular her vasodilators. She has found that her PRN medications are somewhat helpful in minimizing the pain that she has from her headaches. She has not had any mew problems since she was last seen in clinic.    03/23/2017  Since last seen in clinic she has been approved to get on the lung transplant list and she will be making strides to get set up to to receive new lungs. She has been taking her topamax as directed and has felt that overall she has been having fewer headaches but she still has needed Tylenol and on rare occasions Fioricet to get rid of her headaches. She has been having some cognitive difficulty with her current medications but she feels that it is tolerable. She has not had any seizure like " "activity.    06/13/2017  Since last seen in clinic she says that she has been able to be placed on the donor list. She says that overall she feels that she has been stable with regards to her breathing. She continues to have chronic sinus issues that have been essentially refractory to any medications that she has tried including antihistamines and nasal rinses. She feels that she is able to clear her sinuses and have it come right back. She has headaches almost daily but despite the frequency of the headaches she feels that most are not migraines. She has not had any seizures since last seen. She is tolerating all of her medications well without any problems.    10/31/2017  She has continued to make preparations for lung transplant.  Said that she has been frustrated with the process and she does not have much guidance which is causing increased stress.  Headaches overall have been about the same, but are responsive to Fioricet.  She says the medication helps give her adequate relief of the pain.    03/22/2018  She feels that her headaches have been getting worse in the last week.  Overall prior to this week she feels that the headaches were about the same as when she was last seen in clinic and she was using Fioricet as needed which provided some relief from her headache pain.  In the last week however Fioricet is not provided much of any relief and headaches have been going on almost every day.  She says that she feels like she has "sinus headaches" which have lingered until she has started having pounding unilateral headaches that she feels her migraines.  These headaches seem to be much more frequent and much more intense that she is ever had in the past.    05/03/2018  headaches have been better as she has hardly had any since she was last seen in clinic. Lung transplant is till her recourse for treatm of pulmonary hypertension, but she has not been able to get placed on the transplant list just " yet.    11/06/2018  Headaches have still been frequent, but have been relieved effectively by using Fioricet. She has been taking her medications as directed without any complaints of side effects. She feels that the intensity has been about the same overall. Imitrex has been prescribed, but she has not used the medication yet to determine if it helps her headaches.    02/07/2019  She has been having headaches with about the same frequency as she had in the past, but they have continued to have some response to her current abortive regimen. She has not been comfortable with the CGRP receptor blockers as a treatment modality as she has been weary of using any injectable medication to treat her symptoms. She has been seeing her Pulmonary team as recommended.    05/09/2019  She presents to clinic for routine follow-up of her headaches.  The headaches be somewhat improved with her current medication regimen, but the ability river frequently.  She needs to use abortive medications several times per week to by relief in order to her level of functioning and her ability to care for her activities of daily where they are.    08/15/2019  Her headaches have been overall stable since she was last seen in clinic. She has been trying to use various OTC medications and prescription medications with intermittent effectivetness in alleviating her headaches. She has not been amendable to considering a CGRP modulating medication to control her headaches as she has been unwilling to commit in an injectable delivery for any medication.    12/13/2019  Since she has been placed on a new medication to address pulmonary hypertension she has had fewer headaches. headaches have been responding well to fioricet and she has not needed to use the sumatriptan as often as she had in the past. She has not had any change in her candidacy for lung transplant.    12/07/2020  Overall she feels that her headaches have been better since she was last  seen in clinic.  She is concerned however because she will soon be increasing the medication she uses to control her pulmonary hypertension.  Whenever this occurred she notices that she has had a sharp increase in her headache frequency and intensity.  Fioricet is been helpful to treat headaches when they have come about.  Sumatriptan has been used on emergency use.  She was recently denied once again for transplant.    11/01/2021  No updates on her status with regards to being placed on the transplant list. Medications for pulmonary hypertension have been well tolerated. She feels that her headaches have been stable and have been responsive to her abortive therapies when she has needed to use them.    06/08/2022  Headaches have been less frequent and less intense overall compared to she was last seen in clinic.  Medications have been well tolerated with no complaints of side effects.    12/15/2022  Overall she says that she is been doing better with her headaches since she was last seen in clinic.  She is only had approximately 1 headache that she can recall in the last few months.  This headache was controlled when she took Fioricet as recommended.  She is been apprehensive about taking Ubrelvy as she was unsure about when to use this medication in relation to her Fioricet..     PAST MEDICAL HISTORY:  Past Medical History:   Diagnosis Date    AR (allergic rhinitis)     Cholelithiasis, common bile duct     Chronic low back pain     Eye pressure 2017    General anesthetics causing adverse effect in therapeutic use     GERD (gastroesophageal reflux disease)     History of migraine headaches     Hypothyroidism     Innocent heart murmur     Lumbar disc disease     Menorrhagia     Mild asthma     Obesity     Plantar fasciitis of left foot     Primary pulmonary hypertension     followed by heart transplant/pulmonary     Seizure disorder     x 1 in 2008    Seizures     Sleep apnea     TMJ (dislocation of temporomandibular  joint)     Tricuspid regurgitation        PAST SURGICAL HISTORY:  Past Surgical History:   Procedure Laterality Date    CARDIAC CATHETERIZATION      CATHETERIZATION OF BOTH LEFT AND RIGHT HEART Bilateral 9/29/2020    Procedure: CATHETERIZATION, HEART, BOTH LEFT AND RIGHT;  Surgeon: Gucci Pickett MD;  Location: [facility] CATH LAB;  Service: Cardiology;  Laterality: Bilateral;    CENTRAL VENOUS CATHETER INSERTION      CORONARY ANGIOGRAPHY N/A 9/29/2020    Procedure: ANGIOGRAM, CORONARY ARTERY;  Surgeon: Gucci Pickett MD;  Location: [facility] CATH LAB;  Service: Cardiology;  Laterality: N/A;    gallbladder drain  06/2019    INSERTION OF HAYNES CATHETER Right 6/17/2020    Procedure: INSERTION, CATHETER, CENTRAL VENOUS, HAYNES Replace Single Lumen for Remodulin Infusion;  Surgeon: Bertin Dewitt MD;  Location: [facility] OR [address] [place] [address];  Service: General;  Laterality: Right;    PORTACATH PLACEMENT      REMOVAL OF TUNNELED CENTRAL VENOUS CATHETER (CVC) N/A 6/17/2020    Procedure: REMOVAL, CATHETER, CENTRAL VENOUS, TUNNELED;  Surgeon: Bertin Dewitt MD;  Location: [facility] OR [address] [place] [address];  Service: General;  Laterality: N/A;    RIGHT HEART CATHETERIZATION Right 8/20/2018    Procedure: HEART CATH-RIGHT;  Surgeon: Ivonne Rai MD;  Location: [facility] CATH LAB;  Service: Cardiology;  Laterality: Right;    RIGHT HEART CATHETERIZATION Right 9/4/2019    Procedure: INSERTION, CATHETER, RIGHT HEART;  Surgeon: Ivonne Rai MD;  Location: [facility] CATH LAB;  Service: Cardiology;  Laterality: Right;    RIGHT HEART CATHETERIZATION Right 6/10/2022    Procedure: INSERTION, CATHETER, RIGHT HEART;  Surgeon: Keshia Poe MD;  Location: [facility] CATH LAB;  Service: Cardiology;  Laterality: Right;    UPPER GASTROINTESTINAL ENDOSCOPY         SOCIAL HISTORY:  Social History     Socioeconomic History    Marital status: Single    Number of children: 0   Occupational History    Occupation: disabled     Employer: [employer]   Tobacco Use     Smoking status: Never    Smokeless tobacco: Never   Substance and Sexual Activity    Alcohol use: No     Alcohol/week: 0.8 standard drinks     Types: 1 Standard drinks or equivalent per week     Comment: socially    Drug use: No    Sexual activity: Never     Birth control/protection: OCP, Pill     Comment: pt is a virgin       FAMILY HISTORY:  Family History   Problem Relation Age of Onset    Diabetes Mother     Heart disease Father     Glaucoma Father     No Known Problems Sister     Cancer Maternal Aunt         breast    Breast cancer Maternal Aunt     No Known Problems Maternal Uncle     No Known Problems Paternal Aunt     No Known Problems Paternal Uncle     Cancer Maternal Grandmother         uterine    Diabetes Maternal Grandfather     Heart attack Maternal Grandfather     No Known Problems Paternal Grandmother     Heart disease Paternal Grandfather     Heart attack Paternal Grandfather     Diabetes Paternal Grandfather     Leukemia Brother     Breast cancer Cousin     Breast cancer Cousin     Hypertension Other     Colon cancer Neg Hx     Ovarian cancer Neg Hx     Amblyopia Neg Hx     Blindness Neg Hx     Cataracts Neg Hx     Macular degeneration Neg Hx     Retinal detachment Neg Hx     Strabismus Neg Hx     Stroke Neg Hx     Thyroid disease Neg Hx     Esophageal cancer Neg Hx        ALLERGIES AND MEDICATIONS: updated and reviewed.  Review of patient's allergies indicates:   Allergen Reactions    Adhesive Hives     Silk tape    Amoxicillin Rash    Chlorhexidine Other (See Comments)     Current Outpatient Medications   Medication Sig Dispense Refill    acetaminophen (TYLENOL) 500 MG tablet Take 1,000 mg by mouth every 6 (six) hours as needed for Pain.      ADCIRCA 20 mg Tab Take 2 tablets (40 mg total) by mouth once daily. 60 tablet 11    albuterol (PROAIR HFA) 90 mcg/actuation inhaler Inhale 2 puffs into the lungs every 6 (six) hours as needed for Wheezing. Rescue 18 g 6    albuterol-ipratropium (DUO-NEB) 2.5  mg-0.5 mg/3 mL nebulizer solution Take 3 mLs by nebulization every 6 (six) hours as needed for Wheezing. Rescue 6 each 6    alprazolam (XANAX ORAL) Take by mouth.      ambrisentan (LETAIRIS) 10 MG Tab Take 1 tablet (10 mg total) by mouth once daily. 30 tablet 11    azelastine (ASTELIN) 137 mcg (0.1 %) nasal spray 2 sprays by Nasal route 2 (two) times daily. Patient uses prn in the evening      butalbital-acetaminophen-caffeine -40 mg (FIORICET, ESGIC) -40 mg per tablet Take 1 tablet by mouth every 6 (six) hours as needed for Pain. 40 tablet 5    cyanocobalamin, vitamin B-12, 2,500 mcg Subl Place 2,500 mcg under the tongue every morning.       diphenhydrAMINE (BENADRYL) 25 mg capsule Take 50 mg by mouth every 6 (six) hours as needed for Itching. Patient takes prn evenings      ferrous sulfate 325 (65 FE) MG EC tablet Take 325 mg by mouth daily as needed.       fluticasone furoate-vilanteroL (BREO ELLIPTA) 200-25 mcg/dose DsDv diskus inhaler INHALE 1 PUFF INTO THE LUNGS EVERY EVENING; controller 180 each 2    fluticasone propionate (FLONASE) 50 mcg/actuation nasal spray 2 sprays (100 mcg total) by Each Nostril route every evening. 16 g 11    folic acid (FOLVITE) 1 MG tablet TAKE ONE TABLET BY MOUTH ONCE DAILY 90 tablet 1    furosemide (LASIX) 20 MG tablet Take 1 tablet, one day a week. 30 tablet 11    glucosam/chond-msm1/C/michele/bor (GLUCOSAMINE-CHOND-MSM COMPLEX ORAL) Take by mouth.      hyoscyamine (LEVSIN/SL) 0.125 mg Subl Place 1 tablet (0.125 mg total) under the tongue every 6 (six) hours as needed (Aa needed). 60 tablet 0    lactic acid-citric-potassium (PHEXXI) 1.8-1-0.4 % Gel Place 1 Applicatorful vaginally as needed (CONTRACEPTIVE). 5 g 2    levothyroxine (SYNTHROID) 125 MCG tablet Take 1 tablet (125 mcg total) by mouth before breakfast. 30 tablet 11    LIDOcaine (LIDODERM) 5 % Place 1 patch onto the skin once daily. 15 patch 0    loratadine (CLARITIN) 10 mg tablet Take 10 mg by mouth every  evening.       ondansetron (ZOFRAN) 4 MG tablet Take 1 tablet (4 mg total) by mouth every 6 (six) hours as needed. 1 Tablet Oral Every 6 hours 30 tablet 2    pantoprazole (PROTONIX) 40 MG tablet TAKE ONE TABLET BY MOUTH DAILY AS NEEDED FOR HEARTBURN 90 tablet 3    tiZANidine (ZANAFLEX) 4 MG tablet Take 4 mg by mouth every 6 (six) hours as needed.      topiramate (TOPAMAX) 100 MG tablet TAKE ONE TABLET BY MOUTH TWICE DAILY. 60 tablet 11    treprostinil (REMODULIN) 2.5 mg/mL Soln Mix cassette as directed and infuse continuously per physician titration orders on dosing sheet. Current dose 80ng/kg/min 60 mL 11    ubrogepant (UBRELVY)tablet 100 mg TAKE ONE TABLET BY MOUTH AS A SINGLE DOSE AS NEEDED FOR MIGRAINE       No current facility-administered medications for this visit.       Review of Systems   Constitutional:  Negative for activity change, appetite change, fever and unexpected weight change.   HENT:  Negative for trouble swallowing and voice change.    Eyes:  Negative for photophobia and visual disturbance.   Respiratory:  Negative for apnea and shortness of breath.    Cardiovascular:  Negative for chest pain and leg swelling.   Gastrointestinal:  Negative for constipation and nausea.   Genitourinary:  Negative for difficulty urinating.   Musculoskeletal:  Negative for back pain, gait problem and neck pain.   Skin:  Negative for color change and pallor.   Neurological:  Positive for dizziness and headaches. Negative for seizures, syncope, weakness and numbness.   Hematological:  Negative for adenopathy.   Psychiatric/Behavioral:  Negative for agitation, confusion, decreased concentration and dysphoric mood.      Neurologic Exam     Mental Status   Oriented to person, place, and time.   Registration: recalls 3 of 3 objects.   Attention: normal. Concentration: normal.   Speech: speech is normal   Level of consciousness: alert  Knowledge: good.     Cranial Nerves     CN II   Visual fields full to confrontation.    Right visual field deficit: none  Left visual field deficit: none     CN III, IV, VI   Pupils are equal, round, and reactive to light.  Extraocular motions are normal.   Right pupil: Size: 3 mm. Shape: regular. Accommodation: intact.   Left pupil: Size: 3 mm. Shape: regular. Accommodation: intact.   CN III: no CN III palsy  CN VI: no CN VI palsy  Nystagmus: none   Diplopia: none  Ophthalmoparesis: none  Upgaze: normal  Downgaze: normal  Conjugate gaze: present    CN V   Facial sensation intact.   Right facial sensation deficit: none  Left facial sensation deficit: none    CN VII   Facial expression full, symmetric.   Right facial weakness: none  Left facial weakness: none    CN VIII   CN VIII normal.     CN IX, X   CN IX normal.   CN X normal.   Palate: symmetric    CN XI   CN XI normal.   Right sternocleidomastoid strength: normal  Left sternocleidomastoid strength: normal  Right trapezius strength: normal  Left trapezius strength: normal    CN XII   CN XII normal.   Tongue deviation: none    Motor Exam   Muscle bulk: normal  Overall muscle tone: normal  Right arm tone: normal  Left arm tone: normal  Right leg tone: normal  Left leg tone: normal    Strength   Strength 5/5 throughout.     Sensory Exam   Right arm light touch: normal  Left arm light touch: normal  Right leg light touch: normal  Left leg light touch: normal  Right arm vibration: normal  Left arm vibration: normal  Right leg vibration: normal  Left leg vibration: normal  Right arm proprioception: normal  Left arm proprioception: normal  Right leg proprioception: normal  Left leg proprioception: normal  Right arm pinprick: normal  Left arm pinprick: normal  Right leg pinprick: normal  Left leg pinprick: normal    Gait, Coordination, and Reflexes     Gait  Gait: normal    Coordination   Romberg: negative  Finger to nose coordination: normal  Heel to shin coordination: normal  Tandem walking coordination: normal    Tremor   Resting tremor:  "absent    Reflexes   Right brachioradialis: 2+  Left brachioradialis: 2+  Right biceps: 2+  Left biceps: 2+  Right triceps: 2+  Left triceps: 2+  Right patellar: 2+  Left patellar: 2+  Right achilles: 2+  Left achilles: 2+  Right plantar: normal  Left plantar: normal    Physical Exam  Vitals reviewed.   Constitutional:       Appearance: She is well-developed.   HENT:      Head: Normocephalic and atraumatic.   Eyes:      Extraocular Movements: EOM normal.      Pupils: Pupils are equal, round, and reactive to light.   Cardiovascular:      Rate and Rhythm: Normal rate.   Pulmonary:      Effort: Pulmonary effort is normal. No respiratory distress.   Musculoskeletal:         General: Normal range of motion.      Cervical back: Normal range of motion.   Skin:     General: Skin is warm and dry.   Neurological:      Mental Status: She is alert and oriented to person, place, and time.      Motor: Motor strength is normal.      Coordination: Finger-Nose-Finger Test, Heel to Shin Test and Romberg Test normal.      Gait: Gait is intact. Tandem walk normal.      Deep Tendon Reflexes:      Reflex Scores:       Tricep reflexes are 2+ on the right side and 2+ on the left side.       Bicep reflexes are 2+ on the right side and 2+ on the left side.       Brachioradialis reflexes are 2+ on the right side and 2+ on the left side.       Patellar reflexes are 2+ on the right side and 2+ on the left side.       Achilles reflexes are 2+ on the right side and 2+ on the left side.  Psychiatric:         Speech: Speech normal.         Behavior: Behavior normal.         Thought Content: Thought content normal.       Vitals:    12/15/22 1125   BP: 120/73   Pulse: 82   Weight: 66.2 kg (145 lb 15.1 oz)   Height: 4' 11" (1.499 m)       Assessment & Plan:  Problem List Items Addressed This Visit       Migraine without aura and without status migrainosus, not intractable - Primary    Overview     Despite frequency of headaches few have come in the " form of migraine headaches.  She will benefit from Ubrelvy as a headache abortive rather than sumatriptan her to reduce the possibility of a vasoconstriction peripherally.            Discussed use of CRGP directed therapies in order to treat her headaches, but she has been reluctant to accept any therapies that involve the use of needles.    Follow-up: No follow-ups on file.

## 2022-12-16 ENCOUNTER — HOSPITAL ENCOUNTER (OUTPATIENT)
Dept: PULMONOLOGY | Facility: CLINIC | Age: 52
Discharge: HOME | End: 2022-12-16
Payer: COMMERCIAL

## 2022-12-16 ENCOUNTER — OFFICE VISIT (OUTPATIENT)
Dept: TRANSPLANT | Facility: CLINIC | Age: 52
End: 2022-12-16
Payer: COMMERCIAL

## 2022-12-16 ENCOUNTER — HOSPITAL ENCOUNTER (OUTPATIENT)
Dept: CARDIOLOGY | Facility: HOSPITAL | Age: 52
Discharge: HOME | End: 2022-12-16
Payer: COMMERCIAL

## 2022-12-16 VITALS
OXYGEN SATURATION: 97 % | DIASTOLIC BLOOD PRESSURE: 68 MMHG | BODY MASS INDEX: 29.07 KG/M2 | WEIGHT: 144.19 LBS | HEIGHT: 59 IN | HEART RATE: 95 BPM | SYSTOLIC BLOOD PRESSURE: 103 MMHG

## 2022-12-16 VITALS — BODY MASS INDEX: 29.23 KG/M2 | HEIGHT: 59 IN | WEIGHT: 145 LBS

## 2022-12-16 VITALS
DIASTOLIC BLOOD PRESSURE: 65 MMHG | WEIGHT: 144 LBS | BODY MASS INDEX: 29.03 KG/M2 | HEIGHT: 59 IN | HEART RATE: 75 BPM | SYSTOLIC BLOOD PRESSURE: 108 MMHG

## 2022-12-16 DIAGNOSIS — I27.0 PRIMARY PULMONARY HYPERTENSION: ICD-10-CM

## 2022-12-16 DIAGNOSIS — I27.0 PRIMARY PULMONARY HYPERTENSION: Primary | ICD-10-CM

## 2022-12-16 DIAGNOSIS — M25.512 LEFT SHOULDER PAIN, UNSPECIFIED CHRONICITY: ICD-10-CM

## 2022-12-16 DIAGNOSIS — E03.9 HYPOTHYROIDISM (ACQUIRED): ICD-10-CM

## 2022-12-16 DIAGNOSIS — I27.9 CHRONIC PULMONARY HEART DISEASE: ICD-10-CM

## 2022-12-16 DIAGNOSIS — G43.009 MIGRAINE WITHOUT AURA AND WITHOUT STATUS MIGRAINOSUS, NOT INTRACTABLE: ICD-10-CM

## 2022-12-16 LAB
ASCENDING AORTA: 2.57 CM
AV INDEX (PROSTH): 0.98
AV MEAN GRADIENT: 4 MMHG
AV PEAK GRADIENT: 9 MMHG
AV VALVE AREA: 2.53 CM2
AV VELOCITY RATIO: 0.82
BSA FOR ECHO PROCEDURE: 1.65 M2
CV ECHO LV RWT: 0.42 CM
DOP CALC AO PEAK VEL: 1.51 M/S
DOP CALC AO VTI: 22.23 CM
DOP CALC LVOT AREA: 2.6 CM2
DOP CALC LVOT DIAMETER: 1.81 CM
DOP CALC LVOT PEAK VEL: 1.24 M/S
DOP CALC LVOT STROKE VOLUME: 56.14 CM3
DOP CALCLVOT PEAK VEL VTI: 21.83 CM
E WAVE DECELERATION TIME: 210 MSEC
E/A RATIO: 0.92
E/E' RATIO: 10.67 M/S
ECHO LV POSTERIOR WALL: 0.76 CM (ref 0.6–1.1)
EJECTION FRACTION: 68 %
FRACTIONAL SHORTENING: 35 % (ref 28–44)
INTERVENTRICULAR SEPTUM: 0.84 CM (ref 0.6–1.1)
IVRT: 77.07 MSEC
LA MAJOR: 5.18 CM
LA MINOR: 4.46 CM
LA WIDTH: 2.48 CM
LEFT ATRIUM SIZE: 3.38 CM
LEFT ATRIUM VOLUME INDEX MOD: 14.2 ML/M2
LEFT ATRIUM VOLUME INDEX: 21.3 ML/M2
LEFT ATRIUM VOLUME MOD: 22.79 CM3
LEFT ATRIUM VOLUME: 34.15 CM3
LEFT INTERNAL DIMENSION IN SYSTOLE: 2.36 CM (ref 2.1–4)
LEFT VENTRICLE DIASTOLIC VOLUME INDEX: 35.11 ML/M2
LEFT VENTRICLE DIASTOLIC VOLUME: 56.17 ML
LEFT VENTRICLE MASS INDEX: 50 G/M2
LEFT VENTRICLE SYSTOLIC VOLUME INDEX: 12.1 ML/M2
LEFT VENTRICLE SYSTOLIC VOLUME: 19.4 ML
LEFT VENTRICULAR INTERNAL DIMENSION IN DIASTOLE: 3.65 CM (ref 3.5–6)
LEFT VENTRICULAR MASS: 80.54 G
LV LATERAL E/E' RATIO: 8 M/S
LV SEPTAL E/E' RATIO: 16 M/S
MV A" WAVE DURATION": 11.7 MSEC
MV PEAK A VEL: 1.04 M/S
MV PEAK E VEL: 0.96 M/S
MV STENOSIS PRESSURE HALF TIME: 60.9 MS
MV VALVE AREA P 1/2 METHOD: 3.61 CM2
PISA TR MAX VEL: 5.11 M/S
PULM VEIN S/D RATIO: 1.17
PV PEAK D VEL: 0.46 M/S
PV PEAK S VEL: 0.54 M/S
RA MAJOR: 4.89 CM
RA PRESSURE: 3 MMHG
RA WIDTH: 2.74 CM
RIGHT VENTRICULAR END-DIASTOLIC DIMENSION: 4.58 CM
RV TISSUE DOPPLER FREE WALL SYSTOLIC VELOCITY 1 (APICAL 4 CHAMBER VIEW): 8.1 CM/S
SINUS: 2.77 CM
STJ: 2.38 CM
TDI LATERAL: 0.12 M/S
TDI SEPTAL: 0.06 M/S
TDI: 0.09 M/S
TR MAX PG: 104 MMHG
TV REST PULMONARY ARTERY PRESSURE: 107 MMHG

## 2022-12-16 PROCEDURE — 93306 ECHO (CUPID ONLY): ICD-10-PCS | Mod: 26,,, | Performed by: INTERNAL MEDICINE

## 2022-12-16 PROCEDURE — 99214 PR OFFICE/OUTPT VISIT, EST, LEVL IV, 30-39 MIN: ICD-10-PCS | Mod: S$GLB,,,

## 2022-12-16 PROCEDURE — 1160F RVW MEDS BY RX/DR IN RCRD: CPT | Mod: CPTII,S$GLB,,

## 2022-12-16 PROCEDURE — 93306 TTE W/DOPPLER COMPLETE: CPT

## 2022-12-16 PROCEDURE — 3074F SYST BP LT 130 MM HG: CPT | Mod: CPTII,S$GLB,,

## 2022-12-16 PROCEDURE — 99214 OFFICE O/P EST MOD 30 MIN: CPT | Mod: S$GLB,,,

## 2022-12-16 PROCEDURE — 99999 PR PBB SHADOW E&M-EST. PATIENT-LVL V: CPT | Mod: PBBFAC,,,

## 2022-12-16 PROCEDURE — 3074F PR MOST RECENT SYSTOLIC BLOOD PRESSURE < 130 MM HG: ICD-10-PCS | Mod: CPTII,S$GLB,,

## 2022-12-16 PROCEDURE — 1159F PR MEDICATION LIST DOCUMENTED IN MEDICAL RECORD: ICD-10-PCS | Mod: CPTII,S$GLB,,

## 2022-12-16 PROCEDURE — 3078F PR MOST RECENT DIASTOLIC BLOOD PRESSURE < 80 MM HG: ICD-10-PCS | Mod: CPTII,S$GLB,,

## 2022-12-16 PROCEDURE — 94618 PULMONARY STRESS TESTING: CPT | Mod: S$GLB,,, | Performed by: INTERNAL MEDICINE

## 2022-12-16 PROCEDURE — 1159F MED LIST DOCD IN RCRD: CPT | Mod: CPTII,S$GLB,,

## 2022-12-16 PROCEDURE — 99999 PR PBB SHADOW E&M-EST. PATIENT-LVL V: ICD-10-PCS | Mod: PBBFAC,,,

## 2022-12-16 PROCEDURE — 1160F PR REVIEW ALL MEDS BY PRESCRIBER/CLIN PHARMACIST DOCUMENTED: ICD-10-PCS | Mod: CPTII,S$GLB,,

## 2022-12-16 PROCEDURE — 94618 PULMONARY STRESS TESTING: ICD-10-PCS | Mod: S$GLB,,, | Performed by: INTERNAL MEDICINE

## 2022-12-16 PROCEDURE — 3078F DIAST BP <80 MM HG: CPT | Mod: CPTII,S$GLB,,

## 2022-12-16 PROCEDURE — 93306 TTE W/DOPPLER COMPLETE: CPT | Mod: 26,,, | Performed by: INTERNAL MEDICINE

## 2022-12-16 PROCEDURE — 3008F BODY MASS INDEX DOCD: CPT | Mod: CPTII,S$GLB,,

## 2022-12-16 PROCEDURE — 3008F PR BODY MASS INDEX (BMI) DOCUMENTED: ICD-10-PCS | Mod: CPTII,S$GLB,,

## 2022-12-16 NOTE — PROGRESS NOTES
Subjective:    Patient ID:  Yuni Perez is a 52 y.o. female who presents for follow-up of Pulmonary Hypertension.    HPI   Mrs. Perez is a pleasant 51 y.o. white female who presents for PH follow-up. Patient was diagnosed with IPAH in 2009 and started on Remodulin, Letairis, and Adcirca. She has a history of ABHIJEET, GERD, and Asthma. Current therapy is Remodulin infusing at 107ng/kg/min (pre-filled cassettes), Letairis 10mg qdaily, and Adcirca 40mg qdaily. Has T/F Adempas.  Patient underwent RHC/LHC 09/29/20 for lung transplant evaluation. LHC normal, however had persistently severely elevated PASP to over 100 systolic per RHC findings. Had everything set up for LUT, but says her caregivers were deemed not acceptable. She has come to terms with not being a LUT candidate here at [facility]. She is not interested in lung transplant.    Mrs. Perez feels well today. Breathing is at baseline. Does not use O2. Walked 426m. She reports taking lasix PRN. Denies CP, palpitations, pre-syncope, syncope. Mrs. Perez denies trouble sleeping. Says she was unable to do sleep study because her PH meds make her too congested. Sleep med had suggested a mouthpiece but she does not use. Mrs. Perez ist still having trouble with menopause symptoms. Wore a neck fan to walk because she gets so overheated that it makes her feel week. She notes fatigue in the evenings. She needs to see a new GYN. Is worried about taking other meds with her PH meds.      6MWT:  6MW 12/16/2022   6MWT Status completed without stopping   Patient Reported Dyspnea;Leg pain   Was O2 used? No   6MW Distance walked (feet) 1400   Distance walked (meters) 426.72   Did patient stop? No   Oxygen Saturation 98   Supplemental Oxygen Room Air   Heart Rate 95   Blood Pressure 133/71   Ab Dyspnea Rating  moderate   Oxygen Saturation 95   Supplemental Oxygen Room Air   Heart Rate 152   Blood Pressure 126/82   Ab Dyspnea Rating  very,very heavy    Recovery Time (seconds) 85   Oxygen Saturation 97   Supplemental Oxygen Room Air   Heart Rate 115     ECHO: 12/16/2022  The left ventricle is normal in size with normal systolic function.  The estimated ejection fraction is 68%.  Indeterminate left ventricular diastolic function.  Mild right ventricular enlargement with mildly reduced right ventricular systolic function.  Mild right atrial enlargement.  Mild to moderate tricuspid regurgitation.  Mild pulmonic regurgitation.  Normal central venous pressure (3 mmHg).  The estimated PA systolic pressure is 107 mmHg.  There is Quite Severe pulmonary hypertension.  Trivial posterior pericardial effusion.    ECHO: 8/15/2022  The left ventricle is normal in size with concentric remodeling and low normal systolic function.  The estimated ejection fraction is 55%.  There is abnormal septal wall motion.  Indeterminate left ventricular diastolic function.  Mild right ventricular enlargement with mildly reduced right ventricular systolic function.  Mild right atrial enlargement.  Moderate tricuspid regurgitation.  Mild pulmonic regurgitation.  Normal central venous pressure (3 mmHg).  The estimated PA systolic pressure is 104 mmHg.  There is very severe pulmonary hypertension.    RHC: 6/10/2022  Estimated blood loss: none  Severe PAH on IV remodulin, adcirca and letairis.  Normal right and left-sided filling pressures.  Borderline low-normal cardiac output by Ebony method which is normal when indexed for BSA.  No signficant change from RHC 8/18.  RA: 6/ 7/ 5 RV: 105/ 3/ 10 PA: 103/ 45/ 64 PWP: 15/ 1512/ 13 . Cardiac output was 4.11 by Ebony. Cardiac index is 2.67 L/min/m2. O2 Sat: PA 70%. AO sat 96% PVR 12.4    PFTs: 9/8/2020  Spirometry shows moderate obstruction. Lung volume determination is normal. DLCO is mildly decreased - 68%    IV/SC:  Pulmonary Hypertension Review Flowsheet 12/16/2022   Pump Type CADD   Medication type Remodulin   Vial size 2.5 mg/ml   Dose (ng/kg/min)  "117/ng/kg/min   DW (kg) 60.5kg   Amt of Med used 10ml   Amt of Diluent Used NS 90 ml   Final Concentration (ng/ml) 366653 ng/ml   Pump Rate ml/24 hr 41 ml/24hr     Review of Systems   Constitutional: Positive for malaise/fatigue. Negative for decreased appetite, diaphoresis, fever and night sweats.   HENT:  Positive for congestion.    Cardiovascular:  Positive for dyspnea on exertion. Negative for chest pain, irregular heartbeat, leg swelling and syncope.   Respiratory:  Positive for shortness of breath. Negative for cough and wheezing.         Occasional SOB at night and morning   Endocrine: Negative.    Skin: Negative.    Musculoskeletal:  Negative for back pain, falls and joint swelling.        L shoulder pain   Gastrointestinal: Negative.    Genitourinary: Negative.    Neurological: Negative.    Psychiatric/Behavioral:  The patient is nervous/anxious.         Controlled with medication      Objective: /68 (BP Location: Left arm, Patient Position: Sitting, BP Method: Medium (Automatic))   Pulse 95   Ht 4' 11" (1.499 m)   Wt 65.4 kg (144 lb 2.9 oz)   SpO2 97%   BMI 29.12 kg/m²     Physical Exam  Constitutional:       Appearance: Normal appearance.   HENT:      Head: Normocephalic and atraumatic.   Eyes:      Pupils: Pupils are equal, round, and reactive to light.   Neck:      Vascular: No JVD.   Cardiovascular:      Rate and Rhythm: Normal rate and regular rhythm.      Pulses: Normal pulses.      Heart sounds: Murmur heard.      Comments: Loud S2  Pulmonary:      Effort: Pulmonary effort is normal.      Breath sounds: Normal breath sounds.   Chest:      Comments: RCW - Indwelling catheter - Eason for Remodulin infusion. Dressing is C/D/I    Abdominal:      General: Abdomen is flat.      Palpations: Abdomen is soft.   Musculoskeletal:         General: Normal range of motion.      Cervical back: Normal range of motion and neck supple.   Skin:     General: Skin is warm and dry.      Capillary Refill: " Capillary refill takes less than 2 seconds.   Neurological:      Mental Status: She is alert and oriented to person, place, and time.   Psychiatric:         Mood and Affect: Mood normal.         Behavior: Behavior normal.         Lab Results   Component Value Date     (H) 12/16/2022     12/16/2022    K 3.8 12/16/2022    MG 2.1 12/16/2022     12/16/2022    CO2 22 (L) 12/16/2022    BUN 12 12/16/2022    CREATININE 0.7 12/16/2022    GLU 80 12/16/2022    HGBA1C 4.9 09/21/2020    AST 13 12/16/2022    ALT 10 12/16/2022    ALBUMIN 3.8 12/16/2022    PROT 7.1 12/16/2022    BILITOT 0.4 12/16/2022    CHOL 125 09/21/2020    HDL 36 (L) 09/21/2020    LDLCALC 72.8 09/21/2020    TRIG 81 09/21/2020       Magnesium   Date Value Ref Range Status   12/16/2022 2.1 1.6 - 2.6 mg/dL Final       Lab Results   Component Value Date    WBC 5.22 12/16/2022    HGB 14.9 12/16/2022    HCT 45.5 12/16/2022    MCV 91 12/16/2022     12/16/2022       Lab Results   Component Value Date    INR 1.0 09/29/2020    INR 0.9 09/21/2020    INR 0.9 09/04/2019       BNP   Date Value Ref Range Status   12/16/2022 119 (H) 0 - 99 pg/mL Final     Comment:     Values of less than 100 pg/ml are consistent with non-CHF populations.   08/15/2022 121 (H) 0 - 99 pg/mL Final     Comment:     Values of less than 100 pg/ml are consistent with non-CHF populations.   02/07/2022 82 0 - 99 pg/mL Final     Comment:     Values of less than 100 pg/ml are consistent with non-CHF populations.       No results found for: LDH    ..  WHO Group: 1 Functional Class: II  REVEAL Score: 6 (Low Risk)    Assessment:       1. Primary pulmonary hypertension    2. Hypothyroidism (acquired)    3. Migraine without aura and without status migrainosus, not intractable    4. Left shoulder pain, unspecified chronicity         Plan:       Mrs. Perez is subjectively stable. ECHO about the same, showing severe PAH with RV enlargement and dysfunction. Increased remodulin last  time and is now at 117ng.    Will obtain updated pulmonary studies.    No medication changes.    Schedule CT CHEST and PFTs    Return to clinic in 3 months, labs, walk      Recommend 2 gram sodium restriction and 1500cc fluid restriction.  Encourage physical activity with graded exercise program.  Requested patient to weigh themselves daily, and to notify us if their weight increases by more than 3 lbs in 1 day or 5 lbs in 1 week.

## 2022-12-16 NOTE — PATIENT INSTRUCTIONS
No medication changes.    Schedule CT CHEST and PFTs    Return to clinic in 3 months, labs, walk      Recommend 2 gram sodium restriction and 1500cc fluid restriction.  Encourage physical activity with graded exercise program.  Requested patient to weigh themselves daily, and to notify us if their weight increases by more than 3 lbs in 1 day or 5 lbs in 1 week.

## 2022-12-16 NOTE — LETTER
December 19, 2022        Ivonne Rai  [address]  [address]  [facility] [address]  Phone: [phone]  Fax: [phone]             [id number]  [address]  [facility] [address]  Phone: [phone]   Patient: Yuni Perez   MR Number: [id number]   YOB: 1970   Date of Visit: 12/16/2022       Dear Dr. Ivonne Rai    Thank you for referring Yuni Perez to me for evaluation. Attached you will find relevant portions of my assessment and plan of care.    If you have questions, please do not hesitate to call me. I look forward to following Yuni Perez along with you.    Sincerely,    Bárbara Langford, NP    Enclosure    If you would like to receive this communication electronically, please contact [email or web address] or [phone] to request [employer] access.    [employer] is a tool which provides read-only access to select patient information with whom you have a relationship. Its easy to use and provides real time access to review your patients record including encounter summaries, notes, results, and demographic information.    If you feel you have received this communication in error or would no longer like to receive these types of communications, please e-mail [email or web address]

## 2022-12-16 NOTE — PROCEDURES
Yuni Perez is a 52 y.o.  female patient, who presents for a 6 minute walk test ordered by KAI Langford.  The diagnosis is Pulmonary Hypertension.  The patient's BMI is 29.3 kg/m2.  Predicted distance (lower limit of normal) is 397.84 meters.      Test Results:    The test was completed without stopping.  The total time walked was 360 seconds.  During walking, the patient reported:  Dyspnea, Leg pain.  The patient used no assistive devices during testing.     12/16/2022---------Distance: 426.72 meters (1400 feet)     O2 Sat % Supplemental Oxygen Heart Rate Blood Pressure Ab Scale   Pre-exercise  (Resting) 98 % Room Air 95 bpm 133/71 mmHg 3   During Exercise 95 % Room Air 152 bpm 126/82 mmHg 9   Post-exercise  (Recovery) 97 % Room Air  115 bpm       Recovery Time: 85 seconds    Performing nurse/tech: Estopinal NATACHA      PREVIOUS STUDY:   02/07/2022---------Distance: 327.66 meters (1075 feet)       O2 Sat % Supplemental Oxygen Heart Rate Blood Pressure Ab Scale   Pre-exercise  (Resting) 97 % Room Air 83 bpm 95/63 mmHg 3   During Exercise 95 % Room Air 124 bpm 121/73 mmHg 7-8   Post-exercise  (Recovery) 98 % Room Air  93 bpm 100/67 mmHg        CLINICAL INTERPRETATION:  Six minute walk distance is 426.72 meters (1400 feet) with very, very heavy dyspnea.  During exercise, there was no significant desaturation while breathing room air.  Blood pressure remained stable and Heart rate increased significantly with walking.  This may represent a tachycardic response to exercise.  The patient reported non-pulmonary symptoms during exercise.  Since the previous study in February 2022, exercise capacity is significantly improved.  Based upon age and body mass index, exercise capacity is normal.

## 2023-01-31 ENCOUNTER — OFFICE VISIT (OUTPATIENT)
Dept: OBSTETRICS AND GYNECOLOGY | Facility: CLINIC | Age: 53
End: 2023-01-31
Payer: COMMERCIAL

## 2023-01-31 VITALS
WEIGHT: 144.94 LBS | BODY MASS INDEX: 29.27 KG/M2 | SYSTOLIC BLOOD PRESSURE: 104 MMHG | DIASTOLIC BLOOD PRESSURE: 60 MMHG

## 2023-01-31 DIAGNOSIS — N95.1 MENOPAUSAL SYMPTOMS: Primary | ICD-10-CM

## 2023-01-31 PROCEDURE — 3008F BODY MASS INDEX DOCD: CPT | Mod: CPTII,S$GLB,, | Performed by: OBSTETRICS & GYNECOLOGY

## 2023-01-31 PROCEDURE — 3074F PR MOST RECENT SYSTOLIC BLOOD PRESSURE < 130 MM HG: ICD-10-PCS | Mod: CPTII,S$GLB,, | Performed by: OBSTETRICS & GYNECOLOGY

## 2023-01-31 PROCEDURE — 99213 PR OFFICE/OUTPT VISIT, EST, LEVL III, 20-29 MIN: ICD-10-PCS | Mod: S$GLB,,, | Performed by: OBSTETRICS & GYNECOLOGY

## 2023-01-31 PROCEDURE — 3008F PR BODY MASS INDEX (BMI) DOCUMENTED: ICD-10-PCS | Mod: CPTII,S$GLB,, | Performed by: OBSTETRICS & GYNECOLOGY

## 2023-01-31 PROCEDURE — 3074F SYST BP LT 130 MM HG: CPT | Mod: CPTII,S$GLB,, | Performed by: OBSTETRICS & GYNECOLOGY

## 2023-01-31 PROCEDURE — 99999 PR PBB SHADOW E&M-EST. PATIENT-LVL III: ICD-10-PCS | Mod: PBBFAC,,, | Performed by: OBSTETRICS & GYNECOLOGY

## 2023-01-31 PROCEDURE — 3078F PR MOST RECENT DIASTOLIC BLOOD PRESSURE < 80 MM HG: ICD-10-PCS | Mod: CPTII,S$GLB,, | Performed by: OBSTETRICS & GYNECOLOGY

## 2023-01-31 PROCEDURE — 99999 PR PBB SHADOW E&M-EST. PATIENT-LVL III: CPT | Mod: PBBFAC,,, | Performed by: OBSTETRICS & GYNECOLOGY

## 2023-01-31 PROCEDURE — 1159F PR MEDICATION LIST DOCUMENTED IN MEDICAL RECORD: ICD-10-PCS | Mod: CPTII,S$GLB,, | Performed by: OBSTETRICS & GYNECOLOGY

## 2023-01-31 PROCEDURE — 99213 OFFICE O/P EST LOW 20 MIN: CPT | Mod: S$GLB,,, | Performed by: OBSTETRICS & GYNECOLOGY

## 2023-01-31 PROCEDURE — 1159F MED LIST DOCD IN RCRD: CPT | Mod: CPTII,S$GLB,, | Performed by: OBSTETRICS & GYNECOLOGY

## 2023-01-31 PROCEDURE — 3078F DIAST BP <80 MM HG: CPT | Mod: CPTII,S$GLB,, | Performed by: OBSTETRICS & GYNECOLOGY

## 2023-01-31 RX ORDER — CONJUGATED ESTROGENS AND MEDROXYPROGESTERONE ACETATE .625; 2.5 MG/1; MG/1
1 TABLET, SUGAR COATED ORAL DAILY
Qty: 90 TABLET | Refills: 3 | Status: SHIPPED | OUTPATIENT
Start: 2023-01-31 | End: 2024-02-02

## 2023-02-03 NOTE — PROGRESS NOTES
Subjective:       Patient ID: Yuni Perez is a 52 y.o. female.    Chief Complaint:  No chief complaint on file.      History of Present Illness  HPI  Hormone Replacement Counseling  Patient presents to discuss hormone replacement therapy. Patient is requesting hormone replacement therapy due to hot flashes, insomnia, moodiness, and no energy. The patient is not taking hormone replacement therapy. Patient denies post-menopausal vaginal bleeding. The patient is not sexually active. GYN screening history: last pap: approximate date 2020 and was normal and last mammogram: approximate date 2022 and was normal. Patient is hormone deficient due to menopause, which occurred at age approx 46. The patient currently has symptoms of anxiety, hot flashes, insomnia, moodiness, no energy.     Pt was taking OCP's on prior occasion for symptom control but had to stop due to hepatic cysts.  Pt also has hx of Pulmonary Artery Hypertension.    Pt is due for annual exam; however, pt refuses.  Pt states due to prior trauma she will need to reschedule with a female provier    Current hormone therapy:   none    Previous hormone therapy:   none  Reason for starting HRT: hot flashes, insomnia, moodiness, and no energy  Bleeding in past on HRT: none                    GYN & OB History  No LMP recorded (lmp unknown). Patient is perimenopausal.   Date of Last Pap: 2021    OB History    Para Term  AB Living   0   0         SAB IAB Ectopic Multiple Live Births                   Review of Systems  Review of Systems   Constitutional: Negative.    Respiratory: Negative.     Cardiovascular: Negative.    Gastrointestinal:  Positive for abdominal pain and bloating.   Endocrine: Positive for diabetes and hypothyroidism. Negative for hair loss, hot flashes and hyperthyroidism.   Musculoskeletal: Negative.    Neurological: Negative.    Hematological: Negative.    Psychiatric/Behavioral:  The patient is  nervous/anxious.          Objective:    Physical Exam:   Constitutional: She appears well-developed and well-nourished. No distress.    HENT:   Head: Normocephalic and atraumatic.     Neck: No thyromegaly present.     Pulmonary/Chest: Effort normal. No respiratory distress.        Abdominal: Soft. She exhibits no distension. There is no abdominal tenderness.             Musculoskeletal: Normal range of motion and moves all extremeties.       Neurological: She is alert.    Skin: She is not diaphoretic.    Psychiatric: She has a normal mood and affect. Her behavior is normal. Judgment and thought content normal.        Assessment:        1. Menopausal symptoms               Plan:       Rx prempro 0.625/2.5; pt to further discuss this medication with her gastroenterologist to see if pt can continue longterm

## 2023-02-27 ENCOUNTER — OFFICE VISIT (OUTPATIENT)
Dept: HEPATOLOGY | Facility: CLINIC | Age: 53
End: 2023-02-27
Attending: INTERNAL MEDICINE
Payer: COMMERCIAL

## 2023-02-27 ENCOUNTER — HOSPITAL ENCOUNTER (OUTPATIENT)
Dept: PULMONOLOGY | Facility: CLINIC | Age: 53
Discharge: HOME | End: 2023-02-27
Payer: COMMERCIAL

## 2023-02-27 ENCOUNTER — HOSPITAL ENCOUNTER (OUTPATIENT)
Dept: RADIOLOGY | Facility: HOSPITAL | Age: 53
Discharge: HOME | End: 2023-02-27
Payer: COMMERCIAL

## 2023-02-27 VITALS
BODY MASS INDEX: 29.56 KG/M2 | RESPIRATION RATE: 18 BRPM | HEIGHT: 59 IN | SYSTOLIC BLOOD PRESSURE: 115 MMHG | DIASTOLIC BLOOD PRESSURE: 61 MMHG | TEMPERATURE: 99 F | HEART RATE: 78 BPM | WEIGHT: 146.63 LBS | OXYGEN SATURATION: 96 %

## 2023-02-27 DIAGNOSIS — I27.0 PRIMARY PULMONARY HYPERTENSION: ICD-10-CM

## 2023-02-27 DIAGNOSIS — R16.0 LIVER MASS: Primary | ICD-10-CM

## 2023-02-27 PROCEDURE — 94729 DIFFUSING CAPACITY: CPT | Mod: S$GLB,,, | Performed by: INTERNAL MEDICINE

## 2023-02-27 PROCEDURE — 94729 PR C02/MEMBANE DIFFUSE CAPACITY: ICD-10-PCS | Mod: S$GLB,,, | Performed by: INTERNAL MEDICINE

## 2023-02-27 PROCEDURE — 3074F PR MOST RECENT SYSTOLIC BLOOD PRESSURE < 130 MM HG: ICD-10-PCS | Mod: CPTII,S$GLB,, | Performed by: INTERNAL MEDICINE

## 2023-02-27 PROCEDURE — 99999 PR PBB SHADOW E&M-EST. PATIENT-LVL IV: ICD-10-PCS | Mod: PBBFAC,,, | Performed by: INTERNAL MEDICINE

## 2023-02-27 PROCEDURE — 3078F DIAST BP <80 MM HG: CPT | Mod: CPTII,S$GLB,, | Performed by: INTERNAL MEDICINE

## 2023-02-27 PROCEDURE — 1160F RVW MEDS BY RX/DR IN RCRD: CPT | Mod: CPTII,S$GLB,, | Performed by: INTERNAL MEDICINE

## 2023-02-27 PROCEDURE — 1159F PR MEDICATION LIST DOCUMENTED IN MEDICAL RECORD: ICD-10-PCS | Mod: CPTII,S$GLB,, | Performed by: INTERNAL MEDICINE

## 2023-02-27 PROCEDURE — 99213 OFFICE O/P EST LOW 20 MIN: CPT | Mod: S$GLB,,, | Performed by: INTERNAL MEDICINE

## 2023-02-27 PROCEDURE — 71250 CT THORAX DX C-: CPT | Mod: 26,,, | Performed by: RADIOLOGY

## 2023-02-27 PROCEDURE — 3078F PR MOST RECENT DIASTOLIC BLOOD PRESSURE < 80 MM HG: ICD-10-PCS | Mod: CPTII,S$GLB,, | Performed by: INTERNAL MEDICINE

## 2023-02-27 PROCEDURE — 3008F BODY MASS INDEX DOCD: CPT | Mod: CPTII,S$GLB,, | Performed by: INTERNAL MEDICINE

## 2023-02-27 PROCEDURE — 1160F PR REVIEW ALL MEDS BY PRESCRIBER/CLIN PHARMACIST DOCUMENTED: ICD-10-PCS | Mod: CPTII,S$GLB,, | Performed by: INTERNAL MEDICINE

## 2023-02-27 PROCEDURE — 3008F PR BODY MASS INDEX (BMI) DOCUMENTED: ICD-10-PCS | Mod: CPTII,S$GLB,, | Performed by: INTERNAL MEDICINE

## 2023-02-27 PROCEDURE — 71250 CT CHEST WITHOUT CONTRAST: ICD-10-PCS | Mod: 26,,, | Performed by: RADIOLOGY

## 2023-02-27 PROCEDURE — 99213 PR OFFICE/OUTPT VISIT, EST, LEVL III, 20-29 MIN: ICD-10-PCS | Mod: S$GLB,,, | Performed by: INTERNAL MEDICINE

## 2023-02-27 PROCEDURE — 3074F SYST BP LT 130 MM HG: CPT | Mod: CPTII,S$GLB,, | Performed by: INTERNAL MEDICINE

## 2023-02-27 PROCEDURE — 1159F MED LIST DOCD IN RCRD: CPT | Mod: CPTII,S$GLB,, | Performed by: INTERNAL MEDICINE

## 2023-02-27 PROCEDURE — 99999 PR PBB SHADOW E&M-EST. PATIENT-LVL IV: CPT | Mod: PBBFAC,,, | Performed by: INTERNAL MEDICINE

## 2023-02-27 PROCEDURE — 71250 CT THORAX DX C-: CPT | Mod: TC

## 2023-02-27 PROCEDURE — 94727 GAS DIL/WSHOT DETER LNG VOL: CPT | Mod: S$GLB,,, | Performed by: INTERNAL MEDICINE

## 2023-02-27 PROCEDURE — 94727 PR PULM FUNCTION TEST BY GAS: ICD-10-PCS | Mod: S$GLB,,, | Performed by: INTERNAL MEDICINE

## 2023-02-27 PROCEDURE — 94010 BREATHING CAPACITY TEST: ICD-10-PCS | Mod: S$GLB,,, | Performed by: INTERNAL MEDICINE

## 2023-02-27 PROCEDURE — 94010 BREATHING CAPACITY TEST: CPT | Mod: S$GLB,,, | Performed by: INTERNAL MEDICINE

## 2023-03-03 ENCOUNTER — HOSPITAL ENCOUNTER (OUTPATIENT)
Dept: RADIOLOGY | Facility: HOSPITAL | Age: 53
Discharge: HOME | End: 2023-03-03
Attending: INTERNAL MEDICINE
Payer: COMMERCIAL

## 2023-03-03 ENCOUNTER — TELEPHONE (OUTPATIENT)
Dept: OBSTETRICS AND GYNECOLOGY | Facility: CLINIC | Age: 53
End: 2023-03-03
Payer: COMMERCIAL

## 2023-03-03 DIAGNOSIS — R16.0 LIVER MASS: ICD-10-CM

## 2023-03-03 PROCEDURE — 76700 US EXAM ABDOM COMPLETE: CPT | Mod: 26,,, | Performed by: RADIOLOGY

## 2023-03-03 PROCEDURE — 76700 US ABDOMEN COMPLETE: ICD-10-PCS | Mod: 26,,, | Performed by: RADIOLOGY

## 2023-03-03 PROCEDURE — 76700 US EXAM ABDOM COMPLETE: CPT | Mod: TC

## 2023-03-03 NOTE — TELEPHONE ENCOUNTER
----- Message from Jayshree Whitmore sent at 3/3/2023  3:12 PM CST -----  Type:  Pharmacy Calling to Clarify an RX    Name of Caller: Renae monroe/ [facility]     Pharmacy Name: .    [id number] - [place], [place] - [address]  [address] [address]  Phone: [phone] Fax: [phone]        Prescription Name:estrogen, conjugated,-medroxyprogesterone 0.625-2.5mg (PREMPRO) 0.625-2.5 mg per tablet    What do they need to clarify? On back order, can you send an alternate    Can you be contacted via [facility]? Call    Best Call Back Number: [phone]    Additional Information:

## 2023-03-06 NOTE — PROGRESS NOTES
Hepatology Follow Up Note    Referring provider: No ref. provider found  PCP: Lulu Sandhu MD    Chief complaint: follow up liver lesion    HPI:  Yuni Perez is a 52 y.o. female with history of pulmonary HTN who presents for scheduled follow up.    Doing well today without any current complaints. Review of imaging at radiology conference favors adenoma.  Due to her pulmonary hypertension therapy she had been on hormonal replacement therapy.  Despite this serial ultrasounds have shown no interval change in the size of the liver lesion.  She had been able to come off estrogen therapy briefly but was restarted this a month ago.  I will discuss with her gynecologist whether there are other options.  She currently has no abdominal symptoms.    Past Medical History:   Diagnosis Date    AR (allergic rhinitis)     Cholelithiasis, common bile duct     Chronic low back pain     Eye pressure 2017    General anesthetics causing adverse effect in therapeutic use     GERD (gastroesophageal reflux disease)     History of migraine headaches     Hypothyroidism     Innocent heart murmur     Lumbar disc disease     Menorrhagia     Mild asthma     Obesity     Plantar fasciitis of left foot     Primary pulmonary hypertension     followed by heart transplant/pulmonary     Seizure disorder     x 1 in 2008    Seizures     Sleep apnea     TMJ (dislocation of temporomandibular joint)     Tricuspid regurgitation        Past Surgical History:   Procedure Laterality Date    CARDIAC CATHETERIZATION      CATHETERIZATION OF BOTH LEFT AND RIGHT HEART Bilateral 9/29/2020    Procedure: CATHETERIZATION, HEART, BOTH LEFT AND RIGHT;  Surgeon: Gucci Pickett MD;  Location: [facility] CATH LAB;  Service: Cardiology;  Laterality: Bilateral;    CENTRAL VENOUS CATHETER INSERTION      CORONARY ANGIOGRAPHY N/A 9/29/2020    Procedure: ANGIOGRAM, CORONARY ARTERY;  Surgeon: Gucci Pickett MD;  Location: [facility] CATH LAB;  Service: Cardiology;   Laterality: N/A;    gallbladder drain  06/2019    INSERTION OF HAYNES CATHETER Right 6/17/2020    Procedure: INSERTION, CATHETER, CENTRAL VENOUS, HAYNES Replace Single Lumen for Remodulin Infusion;  Surgeon: Bertin Dewitt MD;  [facility];  Service: General;  Laterality: Right;    PORTACATH PLACEMENT      REMOVAL OF TUNNELED CENTRAL VENOUS CATHETER (CVC) N/A 6/17/2020    Procedure: REMOVAL, CATHETER, CENTRAL VENOUS, TUNNELED;  Surgeon: Bertin Dewitt MD;  [facility];  Service: General;  Laterality: N/A;    RIGHT HEART CATHETERIZATION Right 8/20/2018    Procedure: HEART CATH-RIGHT;  Surgeon: Ivonne Rai MD;  Location: [facility] CATH LAB;  Service: Cardiology;  Laterality: Right;    RIGHT HEART CATHETERIZATION Right 9/4/2019    Procedure: INSERTION, CATHETER, RIGHT HEART;  Surgeon: Ivonne Rai MD;  Location: [facility] CATH LAB;  Service: Cardiology;  Laterality: Right;    RIGHT HEART CATHETERIZATION Right 6/10/2022    Procedure: INSERTION, CATHETER, RIGHT HEART;  Surgeon: Keshia Poe MD;  Location: [facility] CATH LAB;  Service: Cardiology;  Laterality: Right;    UPPER GASTROINTESTINAL ENDOSCOPY         Family History   Problem Relation Age of Onset    Diabetes Mother     Heart disease Father     Glaucoma Father     No Known Problems Sister     Cancer Maternal Aunt         breast    Breast cancer Maternal Aunt     No Known Problems Maternal Uncle     No Known Problems Paternal Aunt     No Known Problems Paternal Uncle     Cancer Maternal Grandmother         uterine    Diabetes Maternal Grandfather     Heart attack Maternal Grandfather     No Known Problems Paternal Grandmother     Heart disease Paternal Grandfather     Heart attack Paternal Grandfather     Diabetes Paternal Grandfather     Leukemia Brother     Breast cancer Cousin     Breast cancer Cousin     Hypertension Other     Colon cancer Neg Hx     Ovarian cancer Neg Hx     Amblyopia Neg Hx     Blindness Neg Hx      Cataracts Neg Hx     Macular degeneration Neg Hx     Retinal detachment Neg Hx     Strabismus Neg Hx     Stroke Neg Hx     Thyroid disease Neg Hx     Esophageal cancer Neg Hx        Social History     Tobacco Use    Smoking status: Never    Smokeless tobacco: Never   Substance Use Topics    Alcohol use: No     Alcohol/week: 0.8 standard drinks     Types: 1 Standard drinks or equivalent per week     Comment: socially    Drug use: No       Current Outpatient Medications   Medication Sig Dispense Refill    acetaminophen (TYLENOL) 500 MG tablet Take 1,000 mg by mouth every 6 (six) hours as needed for Pain.      ADCIRCA 20 mg Tab Take 2 tablets (40 mg total) by mouth once daily. 60 tablet 11    alprazolam (XANAX ORAL) Take by mouth.      ambrisentan (LETAIRIS) 10 MG Tab Take 1 tablet (10 mg total) by mouth once daily. 30 tablet 11    azelastine (ASTELIN) 137 mcg (0.1 %) nasal spray 2 sprays by Nasal route 2 (two) times daily. Patient uses prn in the evening      butalbital-acetaminophen-caffeine -40 mg (FIORICET, ESGIC) -40 mg per tablet Take 1 tablet by mouth every 6 (six) hours as needed for Pain. 40 tablet 5    cyanocobalamin, vitamin B-12, 2,500 mcg Subl Place 2,500 mcg under the tongue every morning.       diphenhydrAMINE (BENADRYL) 25 mg capsule Take 50 mg by mouth every 6 (six) hours as needed for Itching. Patient takes prn evenings      estrogen, conjugated,-medroxyprogesterone 0.625-2.5mg (PREMPRO) 0.625-2.5 mg per tablet Take 1 tablet by mouth once daily. 90 tablet 3    ferrous sulfate 325 (65 FE) MG EC tablet Take 325 mg by mouth daily as needed.       fluticasone furoate-vilanteroL (BREO ELLIPTA) 200-25 mcg/dose DsDv diskus inhaler INHALE 1 PUFF INTO THE LUNGS EVERY EVENING; controller 180 each 2    fluticasone propionate (FLONASE) 50 mcg/actuation nasal spray 2 sprays (100 mcg total) by Each Nostril route every evening. 16 g 11    folic acid (FOLVITE) 1 MG tablet TAKE ONE TABLET BY MOUTH ONCE  DAILY. 90 tablet 0    furosemide (LASIX) 20 MG tablet Take 1 tablet, one day a week. 30 tablet 11    glucosam/chond-msm1/C/michele/bor (GLUCOSAMINE-CHOND-MSM COMPLEX ORAL) Take by mouth.      hyoscyamine (LEVSIN/SL) 0.125 mg Subl Place 1 tablet (0.125 mg total) under the tongue every 6 (six) hours as needed (Aa needed). 60 tablet 0    lactic acid-citric-potassium (PHEXXI) 1.8-1-0.4 % Gel Place 1 Applicatorful vaginally as needed (CONTRACEPTIVE). 5 g 2    levothyroxine (SYNTHROID) 125 MCG tablet Take 1 tablet (125 mcg total) by mouth before breakfast. 30 tablet 11    LIDOcaine (LIDODERM) 5 % Place 1 patch onto the skin once daily. 15 patch 0    loratadine (CLARITIN) 10 mg tablet Take 10 mg by mouth every evening.       ondansetron (ZOFRAN) 4 MG tablet Take 1 tablet (4 mg total) by mouth every 6 (six) hours as needed. 1 Tablet Oral Every 6 hours 30 tablet 2    pantoprazole (PROTONIX) 40 MG tablet TAKE ONE TABLET BY MOUTH DAILY AS NEEDED FOR HEARTBURN 90 tablet 3    tiZANidine (ZANAFLEX) 4 MG tablet Take 4 mg by mouth every 6 (six) hours as needed.      topiramate (TOPAMAX) 100 MG tablet TAKE ONE TABLET BY MOUTH TWICE DAILY. 60 tablet 11    treprostinil (REMODULIN) 2.5 mg/mL Soln Mix cassette as directed and infuse continuously per physician titration orders on dosing sheet. Current dose 80ng/kg/min 60 mL 11    ubrogepant (UBRELVY)tablet 100 mg TAKE ONE TABLET BY MOUTH AS A SINGLE DOSE AS NEEDED FOR MIGRAINE      albuterol (PROAIR HFA) 90 mcg/actuation inhaler Inhale 2 puffs into the lungs every 6 (six) hours as needed for Wheezing. Rescue 18 g 6    albuterol-ipratropium (DUO-NEB) 2.5 mg-0.5 mg/3 mL nebulizer solution Take 3 mLs by nebulization every 6 (six) hours as needed for Wheezing. Rescue 6 each 6     No current facility-administered medications for this visit.       Review of patient's allergies indicates:   Allergen Reactions    Fentanyl Anaphylaxis     Respiratory distress    Vibra-tabs [doxycycline hyclate]  "Anaphylaxis     "throat felt like it was closing"    Adhesive Hives     Silk tape    Amoxicillin Rash    Nsaids (non-steroidal anti-inflammatory drug) Swelling    Chlorhexidine Other (See Comments)     Blue Chlorhexidine causes hives         Vitals:    02/27/23 0957   BP: 115/61   Pulse: 78   Resp: 18   Temp: 98.5 °F (36.9 °C)   TempSrc: Oral   SpO2: 96%   Weight: 66.5 kg (146 lb 9.7 oz)   Height: 4' 11" (1.499 m)       Physical Exam  Constitutional:       General: She is not in acute distress.  HENT:      Head: Normocephalic and atraumatic.   Eyes:      General: No scleral icterus.     Conjunctiva/sclera: Conjunctivae normal.      Pupils: Pupils are equal, round, and reactive to light.   Cardiovascular:      Rate and Rhythm: Normal rate and regular rhythm.      Pulses: Normal pulses.      Heart sounds: Normal heart sounds.   Pulmonary:      Effort: No respiratory distress.      Breath sounds: No wheezing or rales.   Abdominal:      General: Bowel sounds are normal. There is no distension.      Palpations: Abdomen is soft.      Tenderness: There is no abdominal tenderness.   Musculoskeletal:         General: No deformity. Normal range of motion.      Left lower leg: No edema.   Skin:     General: Skin is warm and dry.   Neurological:      General: No focal deficit present.      Mental Status: She is alert and oriented to person, place, and time.      Cranial Nerves: No cranial nerve deficit.   Psychiatric:         Mood and Affect: Mood normal.         Thought Content: Thought content normal.         Judgment: Judgment normal.       LABS: I personally reviewed pertinent laboratory findings.    Lab Results   Component Value Date    WBC 5.22 12/16/2022    HGB 14.9 12/16/2022    HCT 45.5 12/16/2022    MCV 91 12/16/2022     12/16/2022       Lab Results   Component Value Date     12/16/2022    K 3.8 12/16/2022     12/16/2022    CO2 22 (L) 12/16/2022    BUN 12 12/16/2022    CREATININE 0.7 12/16/2022    " CALCIUM 9.2 12/16/2022    ANIONGAP 6 (L) 12/16/2022    ESTGFRAFRICA >60.0 06/10/2022    EGFRNONAA >60.0 06/10/2022       Lab Results   Component Value Date    ALT 10 12/16/2022    AST 13 12/16/2022    ALKPHOS 81 12/16/2022    BILITOT 0.4 12/16/2022       Lab Results   Component Value Date    HEPBCAB Negative 09/21/2020    HEPCAB Negative 09/21/2020       Lab Results   Component Value Date    RUBY Positive (A) 10/08/2013        Computed MELD-Na score unavailable. Necessary lab results were not found in the last year.  Computed MELD score unavailable. Necessary lab results were not found in the last year.     IMAGING: I personally reviewed imaging studies.    Assessment:  52 y.o. female w/ pulmonary HTN on remodulin (required to be on birth control until menopause) who presents for follow up of 5.6cm heterogenous enhancing lesion stable since 2015. Discussed in radiology conference with plans for US at 1 year and favored to be an adenoma.    1. Liver mass        Recommendations:  I will continue to serially watch her liver lesion by ultrasound.  If there is any change in the size I think it be appropriate to get an MRI.  I will speak with her gynecologist to see whether there are other options in terms of hormonal replacement therapy.  She will return to clinic in 1 year's time.

## 2023-03-08 ENCOUNTER — TELEPHONE (OUTPATIENT)
Dept: OBSTETRICS AND GYNECOLOGY | Facility: CLINIC | Age: 53
End: 2023-03-08
Payer: COMMERCIAL

## 2023-03-17 ENCOUNTER — DOCUMENTATION ONLY (OUTPATIENT)
Dept: TRANSPLANT | Facility: CLINIC | Age: 53
End: 2023-03-17
Payer: COMMERCIAL

## 2023-03-17 ENCOUNTER — OFFICE VISIT (OUTPATIENT)
Dept: TRANSPLANT | Facility: CLINIC | Age: 53
End: 2023-03-17
Payer: COMMERCIAL

## 2023-03-17 ENCOUNTER — HOSPITAL ENCOUNTER (OUTPATIENT)
Dept: PULMONOLOGY | Facility: CLINIC | Age: 53
Discharge: HOME | End: 2023-03-17
Payer: COMMERCIAL

## 2023-03-17 ENCOUNTER — LAB VISIT (OUTPATIENT)
Dept: LAB | Facility: HOSPITAL | Age: 53
End: 2023-03-17
Payer: COMMERCIAL

## 2023-03-17 VITALS
WEIGHT: 143.31 LBS | OXYGEN SATURATION: 94 % | SYSTOLIC BLOOD PRESSURE: 110 MMHG | DIASTOLIC BLOOD PRESSURE: 53 MMHG | BODY MASS INDEX: 28.89 KG/M2 | HEIGHT: 59 IN | HEART RATE: 84 BPM

## 2023-03-17 VITALS — WEIGHT: 147.69 LBS | BODY MASS INDEX: 31 KG/M2 | HEIGHT: 58 IN

## 2023-03-17 DIAGNOSIS — J45.909 MILD ASTHMA WITHOUT COMPLICATION, UNSPECIFIED WHETHER PERSISTENT: ICD-10-CM

## 2023-03-17 DIAGNOSIS — I27.0 PRIMARY PULMONARY HYPERTENSION: Primary | ICD-10-CM

## 2023-03-17 DIAGNOSIS — I27.0 PRIMARY PULMONARY HYPERTENSION: ICD-10-CM

## 2023-03-17 DIAGNOSIS — G47.33 OSA (OBSTRUCTIVE SLEEP APNEA): ICD-10-CM

## 2023-03-17 DIAGNOSIS — Z79.899 POLYPHARMACY: ICD-10-CM

## 2023-03-17 DIAGNOSIS — R06.82 TACHYPNEA: ICD-10-CM

## 2023-03-17 LAB
ALBUMIN SERPL BCP-MCNC: 3.8 G/DL (ref 3.5–5.2)
ALP SERPL-CCNC: 82 U/L (ref 55–135)
ALT SERPL W/O P-5'-P-CCNC: 9 U/L (ref 10–44)
ANION GAP SERPL CALC-SCNC: 6 MMOL/L (ref 8–16)
AST SERPL-CCNC: 14 U/L (ref 10–40)
BASOPHILS # BLD AUTO: 0.03 K/UL (ref 0–0.2)
BASOPHILS NFR BLD: 0.5 % (ref 0–1.9)
BILIRUB SERPL-MCNC: 0.3 MG/DL (ref 0.1–1)
BNP SERPL-MCNC: 110 PG/ML (ref 0–99)
BUN SERPL-MCNC: 8 MG/DL (ref 6–20)
CALCIUM SERPL-MCNC: 8.9 MG/DL (ref 8.7–10.5)
CHLORIDE SERPL-SCNC: 112 MMOL/L (ref 95–110)
CO2 SERPL-SCNC: 22 MMOL/L (ref 23–29)
CREAT SERPL-MCNC: 0.7 MG/DL (ref 0.5–1.4)
DIFFERENTIAL METHOD: ABNORMAL
EOSINOPHIL # BLD AUTO: 0.2 K/UL (ref 0–0.5)
EOSINOPHIL NFR BLD: 2.9 % (ref 0–8)
ERYTHROCYTE [DISTWIDTH] IN BLOOD BY AUTOMATED COUNT: 14.5 % (ref 11.5–14.5)
EST. GFR  (NO RACE VARIABLE): >60 ML/MIN/1.73 M^2
GLUCOSE SERPL-MCNC: 91 MG/DL (ref 70–110)
HCT VFR BLD AUTO: 46.6 % (ref 37–48.5)
HGB BLD-MCNC: 14.8 G/DL (ref 12–16)
IMM GRANULOCYTES # BLD AUTO: 0.01 K/UL (ref 0–0.04)
IMM GRANULOCYTES NFR BLD AUTO: 0.2 % (ref 0–0.5)
LYMPHOCYTES # BLD AUTO: 1.3 K/UL (ref 1–4.8)
LYMPHOCYTES NFR BLD: 23.2 % (ref 18–48)
MAGNESIUM SERPL-MCNC: 2.1 MG/DL (ref 1.6–2.6)
MCH RBC QN AUTO: 29.4 PG (ref 27–31)
MCHC RBC AUTO-ENTMCNC: 31.8 G/DL (ref 32–36)
MCV RBC AUTO: 93 FL (ref 82–98)
MONOCYTES # BLD AUTO: 0.5 K/UL (ref 0.3–1)
MONOCYTES NFR BLD: 8.8 % (ref 4–15)
NEUTROPHILS # BLD AUTO: 3.5 K/UL (ref 1.8–7.7)
NEUTROPHILS NFR BLD: 64.4 % (ref 38–73)
NRBC BLD-RTO: 0 /100 WBC
PLATELET # BLD AUTO: 194 K/UL (ref 150–450)
PMV BLD AUTO: 11.7 FL (ref 9.2–12.9)
POTASSIUM SERPL-SCNC: 4.1 MMOL/L (ref 3.5–5.1)
PROT SERPL-MCNC: 7.2 G/DL (ref 6–8.4)
RBC # BLD AUTO: 5.04 M/UL (ref 4–5.4)
SODIUM SERPL-SCNC: 140 MMOL/L (ref 136–145)
WBC # BLD AUTO: 5.48 K/UL (ref 3.9–12.7)

## 2023-03-17 PROCEDURE — 94618 PULMONARY STRESS TESTING: ICD-10-PCS | Mod: S$GLB,,, | Performed by: INTERNAL MEDICINE

## 2023-03-17 PROCEDURE — 1159F PR MEDICATION LIST DOCUMENTED IN MEDICAL RECORD: ICD-10-PCS | Mod: CPTII,S$GLB,,

## 2023-03-17 PROCEDURE — 94618 PULMONARY STRESS TESTING: CPT | Mod: S$GLB,,, | Performed by: INTERNAL MEDICINE

## 2023-03-17 PROCEDURE — 3078F DIAST BP <80 MM HG: CPT | Mod: CPTII,S$GLB,,

## 2023-03-17 PROCEDURE — 1159F MED LIST DOCD IN RCRD: CPT | Mod: CPTII,S$GLB,,

## 2023-03-17 PROCEDURE — 36415 COLL VENOUS BLD VENIPUNCTURE: CPT

## 2023-03-17 PROCEDURE — 1160F PR REVIEW ALL MEDS BY PRESCRIBER/CLIN PHARMACIST DOCUMENTED: ICD-10-PCS | Mod: CPTII,S$GLB,,

## 2023-03-17 PROCEDURE — 99214 PR OFFICE/OUTPT VISIT, EST, LEVL IV, 30-39 MIN: ICD-10-PCS | Mod: S$GLB,,,

## 2023-03-17 PROCEDURE — 85025 COMPLETE CBC W/AUTO DIFF WBC: CPT

## 2023-03-17 PROCEDURE — 99999 PR PBB SHADOW E&M-EST. PATIENT-LVL IV: ICD-10-PCS | Mod: PBBFAC,,,

## 2023-03-17 PROCEDURE — 83735 ASSAY OF MAGNESIUM: CPT

## 2023-03-17 PROCEDURE — 3008F PR BODY MASS INDEX (BMI) DOCUMENTED: ICD-10-PCS | Mod: CPTII,S$GLB,,

## 2023-03-17 PROCEDURE — 80053 COMPREHEN METABOLIC PANEL: CPT

## 2023-03-17 PROCEDURE — 99214 OFFICE O/P EST MOD 30 MIN: CPT | Mod: S$GLB,,,

## 2023-03-17 PROCEDURE — 3008F BODY MASS INDEX DOCD: CPT | Mod: CPTII,S$GLB,,

## 2023-03-17 PROCEDURE — 3074F PR MOST RECENT SYSTOLIC BLOOD PRESSURE < 130 MM HG: ICD-10-PCS | Mod: CPTII,S$GLB,,

## 2023-03-17 PROCEDURE — 83880 ASSAY OF NATRIURETIC PEPTIDE: CPT

## 2023-03-17 PROCEDURE — 99999 PR PBB SHADOW E&M-EST. PATIENT-LVL IV: CPT | Mod: PBBFAC,,,

## 2023-03-17 PROCEDURE — 3078F PR MOST RECENT DIASTOLIC BLOOD PRESSURE < 80 MM HG: ICD-10-PCS | Mod: CPTII,S$GLB,,

## 2023-03-17 PROCEDURE — 1160F RVW MEDS BY RX/DR IN RCRD: CPT | Mod: CPTII,S$GLB,,

## 2023-03-17 PROCEDURE — 3074F SYST BP LT 130 MM HG: CPT | Mod: CPTII,S$GLB,,

## 2023-03-17 NOTE — PATIENT INSTRUCTIONS
No medication changes today.    Schedule pulmonology appointment.    Return to clinic in 3 months, walk, lab, ECHO, EKG.    Recommend 2 gram sodium restriction and 1500cc fluid restriction.  Encourage physical activity with graded exercise program.  Requested patient to weigh themselves daily, and to notify us if their weight increases by more than 3 lbs in 1 day or 5 lbs in 1 week.

## 2023-03-17 NOTE — PROGRESS NOTES
"NN met with patient in PH clinic.   Verified patient's Remodullin IV dose today as 41 ng/kg/min. Patient states that she would like to increase dose, but she needs new dosing sheet.  Patient states that she did have an episode of coughing "about two days ago," that resulted in one episode of hemoptysis. Patient states that it has resolved and that it only happened once.  Advised patient that she should contact providers when that happens. Notified provider BELEN Langford DNP APRN.  "

## 2023-03-17 NOTE — PROGRESS NOTES
"  Subjective:    Patient ID:  Yuni Perez is a 52 y.o. female who presents for follow-up of Pulmonary Hypertension.    HPI   Mrs. Perez is a pleasant 51 y.o. white female who presents for PH follow-up. Patient was diagnosed with IPAH in 2009 and started on Remodulin, Letairis, and Adcirca. She has a history of ABHIJEET (untreated - not able to tolerate CPAP 2/2 severe congestion), GERD, and Asthma. Current therapy is Remodulin infusing at 117ng/kg/min (pre-filled cassettes), Letairis 10mg qdaily, and Adcirca 40mg qdaily. Has T/F Adempas.  Patient evaluated by lung transplant in 2020. Had everything set up for LUT, but says her caregivers were deemed not acceptable. She has come to terms with not being a LUT candidate here at [facility]. She is not interested in lung transplant.    Mrs. Perez feels okay today. Breathing is at baseline but she has had a recent bout of congestion and cough. Thinks her allergies are worse at the moment. Had one day with major coughing that led to "spitting akila blood into a tissue." Says it only last one day so she did not go to the doctor. Also, felt like she had a lot of mucous that would not clear, but has improved.  's walk was stable at 426m. Does not require O2. Denies fluid retention. She states that she is still having trouble with menopause symptoms. Has started taking PREMPRO but not sure if it's helping. Noted that she has not been offered any natural or alternative treatments for menopause.      6MWT:  6MW 3/17/2023   6MWT Status completed without stopping   Patient Reported Dyspnea   Was O2 used? No   6MW Distance walked (feet) 1400   Distance walked (meters) 426.72   Did patient stop? No   Oxygen Saturation 95   Supplemental Oxygen Room Air   Heart Rate 73   Blood Pressure 116/79   Ab Dyspnea Rating  light   Oxygen Saturation 93   Supplemental Oxygen Room Air   Heart Rate 153   Blood Pressure 144/97   Ab Dyspnea Rating  very,very heavy "   Recovery Time (seconds) 193   Oxygen Saturation 98   Supplemental Oxygen Room Air   Heart Rate 93     ECHO: 12/16/2022  The left ventricle is normal in size with normal systolic function.  The estimated ejection fraction is 68%.  Indeterminate left ventricular diastolic function.  Mild right ventricular enlargement with mildly reduced right ventricular systolic function.  Mild right atrial enlargement.  Mild to moderate tricuspid regurgitation.  Mild pulmonic regurgitation.  Normal central venous pressure (3 mmHg).  The estimated PA systolic pressure is 107 mmHg.  There is Quite Severe pulmonary hypertension.  Trivial posterior pericardial effusion.    ECHO: 8/15/2022  The left ventricle is normal in size with concentric remodeling and low normal systolic function.  The estimated ejection fraction is 55%.  There is abnormal septal wall motion.  Indeterminate left ventricular diastolic function.  Mild right ventricular enlargement with mildly reduced right ventricular systolic function.  Mild right atrial enlargement.  Moderate tricuspid regurgitation.  Mild pulmonic regurgitation.  Normal central venous pressure (3 mmHg).  The estimated PA systolic pressure is 104 mmHg.  There is very severe pulmonary hypertension.    RHC: 6/10/2022  Estimated blood loss: none  Severe PAH on IV remodulin, adcirca and letairis.  Normal right and left-sided filling pressures.  Borderline low-normal cardiac output by Ebony method which is normal when indexed for BSA.  No signficant change from RHC 8/18.  RA: 6/ 7/ 5 RV: 105/ 3/ 10 PA: 103/ 45/ 64 PWP: 15/ 1512/ 13 . Cardiac output was 4.11 by Ebony. Cardiac index is 2.67 L/min/m2. O2 Sat: PA 70%. AO sat 96% PVR 12.4    PFTs: 3/17/2023  Spirometry is consistent with restriction. The significantly reduced FEF 25-75 suggests superimposed obstruction may be present. Lung volume determination is normal. The mildly elevated RV/TLC ratio suggests possible early air trapping. Diffusion  "capacity is moderately reduced. This interpretation of DLCO assumes normal hemoglobin level.    PFTs: 9/8/2020  Spirometry shows moderate obstruction. Lung volume determination is normal. DLCO is mildly decreased - 68%    IV/SC:  Pulmonary Hypertension Review Flowsheet 3/17/2023   Pump Type CADD   Medication type Remodulin   Vial size 2.5 mg/ml   Dose (ng/kg/min) 117/ng/kg/min   DW (kg) 60.5kg   Amt of Med used 10ml   Amt of Diluent Used NS 90 ml   Final Concentration (ng/ml) 411477 ng/ml   Pump Rate ml/24 hr 41 ml/24hr     Review of Systems   Constitutional: Positive for malaise/fatigue. Negative for decreased appetite, diaphoresis, fever and night sweats.   HENT:  Positive for congestion.    Cardiovascular:  Positive for dyspnea on exertion. Negative for chest pain, irregular heartbeat, leg swelling and syncope.   Respiratory:  Positive for shortness of breath. Negative for cough and wheezing.         Occasional SOB at night and morning   Endocrine: Negative.    Skin: Negative.    Musculoskeletal:  Negative for back pain, falls and joint swelling.   Gastrointestinal: Negative.    Genitourinary: Negative.    Neurological: Negative.    Psychiatric/Behavioral:  The patient is nervous/anxious.         Controlled with medication      Objective: BP (!) 110/53 (BP Location: Left arm, Patient Position: Sitting, BP Method: Medium (Automatic))   Pulse 84   Ht 4' 11" (1.499 m)   Wt 65 kg (143 lb 4.8 oz)   SpO2 (!) 94%   BMI 28.94 kg/m²     Physical Exam  Constitutional:       Appearance: Normal appearance.   HENT:      Head: Normocephalic and atraumatic.   Eyes:      Pupils: Pupils are equal, round, and reactive to light.   Neck:      Vascular: No JVD.   Cardiovascular:      Rate and Rhythm: Normal rate and regular rhythm.      Pulses: Normal pulses.      Heart sounds: Murmur heard.      Comments: Loud S2  Pulmonary:      Effort: Pulmonary effort is normal.      Breath sounds: Normal breath sounds.   Chest:      " Comments: RCW - Indwelling catheter - Eason for Remodulin infusion. Dressing is C/D/I    Abdominal:      General: Abdomen is flat.      Palpations: Abdomen is soft.   Musculoskeletal:         General: Normal range of motion.      Cervical back: Normal range of motion and neck supple.   Skin:     General: Skin is warm and dry.      Capillary Refill: Capillary refill takes less than 2 seconds.   Neurological:      Mental Status: She is alert and oriented to person, place, and time.   Psychiatric:         Mood and Affect: Mood normal.         Behavior: Behavior normal.         Lab Results   Component Value Date     (H) 03/17/2023     03/17/2023    K 4.1 03/17/2023    MG 2.1 03/17/2023     (H) 03/17/2023    CO2 22 (L) 03/17/2023    BUN 8 03/17/2023    CREATININE 0.7 03/17/2023    GLU 91 03/17/2023    HGBA1C 4.9 09/21/2020    AST 14 03/17/2023    ALT 9 (L) 03/17/2023    ALBUMIN 3.8 03/17/2023    PROT 7.2 03/17/2023    BILITOT 0.3 03/17/2023    CHOL 125 09/21/2020    HDL 36 (L) 09/21/2020    LDLCALC 72.8 09/21/2020    TRIG 81 09/21/2020       Magnesium   Date Value Ref Range Status   03/17/2023 2.1 1.6 - 2.6 mg/dL Final       Lab Results   Component Value Date    WBC 5.48 03/17/2023    HGB 14.8 03/17/2023    HCT 46.6 03/17/2023    MCV 93 03/17/2023     03/17/2023       Lab Results   Component Value Date    INR 1.0 09/29/2020    INR 0.9 09/21/2020    INR 0.9 09/04/2019       BNP   Date Value Ref Range Status   03/17/2023 110 (H) 0 - 99 pg/mL Final     Comment:     Values of less than 100 pg/ml are consistent with non-CHF populations.   12/16/2022 119 (H) 0 - 99 pg/mL Final     Comment:     Values of less than 100 pg/ml are consistent with non-CHF populations.   08/15/2022 121 (H) 0 - 99 pg/mL Final     Comment:     Values of less than 100 pg/ml are consistent with non-CHF populations.       No results found for: LDH    ..  WHO Group: 1 Functional Class: II  REVEAL Score: 6 (Low  Risk)    Assessment:       1. Primary pulmonary hypertension    2. Mild asthma without complication, unspecified whether persistent    3. ABHIJEET (obstructive sleep apnea)         Plan:       Mrs. Perez is subjectively stable. Her ECHO and RHC, though indicating severe PAH, are stable. Will plan for ECHO next visit. Noted drop in her DLCO and other values on PFTs. Has not seen a pulmonologist since 2020.     Would really like OBGYN to explore alternatives to HRT.     No medication changes today.    Schedule pulmonology appointment.    Return to clinic in 3 months, walk, lab, ECHO, EKG.    Recommend 2 gram sodium restriction and 1500cc fluid restriction.  Encourage physical activity with graded exercise program.  Requested patient to weigh themselves daily, and to notify us if their weight increases by more than 3 lbs in 1 day or 5 lbs in 1 week.

## 2023-03-17 NOTE — PROCEDURES
Yuni Perez is a 52 y.o.  female patient, who presents for a 6 minute walk test ordered by KAI Langford.  The diagnosis is Pulmonary Hypertension.  The patient's BMI is 30.9 kg/m2.  Predicted distance (lower limit of normal) is 387.85 meters.      Test Results:    The test was completed without stopping.  The total time walked was 360 seconds.  During walking, the patient reported:  Dyspnea.  The patient used no assistive devices during testing.     03/17/2023---------Distance: 426.72 meters (1400 feet)     O2 Sat % Supplemental Oxygen Heart Rate Blood Pressure Ab Scale   Pre-exercise  (Resting) 95 % Room Air 73 bpm 116/79 mmHg 2   During Exercise 93 % Room Air 153 bpm 144/97 mmHg 9   Post-exercise  (Recovery) 98 % Room Air  93 bpm 115/58 mmHg      Recovery Time: 193 seconds    Performing nurse/tech: Maylin MANZANO      PREVIOUS STUDY:   12/16/2022---------Distance: 426.72 meters (1400 feet)       O2 Sat % Supplemental Oxygen Heart Rate Blood Pressure Ab Scale   Pre-exercise  (Resting) 98 % Room Air 95 bpm 133/71 mmHg 3   During Exercise 95 % Room Air 152 bpm 126/82 mmHg 9   Post-exercise  (Recovery) 97 % Room Air  115 bpm           CLINICAL INTERPRETATION:  Six minute walk distance is 426.72 meters (1400 feet) with very, very heavy dyspnea.  During exercise, there was significant desaturation while breathing room air.  Both blood pressure and heart rate increased significantly with walking.  This may represent a tachycardic response to exercise.  The patient did not report non-pulmonary symptoms during exercise.  Since the previous study in December 2022, exercise capacity is unchanged.  Based upon age and body mass index, exercise capacity is normal.

## 2023-03-17 NOTE — LETTER
March 21, 2023        Ivonne Rai  [address]  [address]  [facility] [address]  Phone: [phone]  Fax: [phone]             [id number]  [address]  [facility] [address]  Phone: [phone]   Patient: Yuni Perez   MR Number: [id number]   YOB: 1970   Date of Visit: 3/17/2023       Dear Dr. Ivonne Rai    Thank you for referring Yuni Perez to me for evaluation. Attached you will find relevant portions of my assessment and plan of care.    If you have questions, please do not hesitate to call me. I look forward to following Yuni Perez along with you.    Sincerely,    Bárbara Langford, NP    Enclosure    If you would like to receive this communication electronically, please contact [email or web address] or [phone] to request [employer] access.    [employer] is a tool which provides read-only access to select patient information with whom you have a relationship. Its easy to use and provides real time access to review your patients record including encounter summaries, notes, results, and demographic information.    If you feel you have received this communication in error or would no longer like to receive these types of communications, please e-mail [email or web address]

## 2023-04-05 ENCOUNTER — DOCUMENTATION ONLY (OUTPATIENT)
Dept: HEMATOLOGY/ONCOLOGY | Facility: CLINIC | Age: 53
End: 2023-04-05
Payer: COMMERCIAL

## 2023-04-05 NOTE — PROGRESS NOTES
"Yuni Perez ("Yuni",  1970) accompanied another patient to that patient's visit today (2023).  Yuni verbally provided me with her permission to access her medical record and asked me to review her most recent mammogram report.    Phoned Yuni at [phone] on 2023 at ~5:02 PM and at [phone] 5:04 PM.  No answer x2.  Left voicemail x2 with my contact information.    Was phoning Yuni to advise her that I did review her 2022 mammogram report, which indicates that she has breasts of average mammographic density and was negative (BI-RADS 1), although she did have difficulty with optimal positioning, particularly on the right, due to presence of indwelling device/catheter -- and also to see how I can assist her moving forward.  Will await return call.  "

## 2023-04-12 ENCOUNTER — TELEPHONE (OUTPATIENT)
Dept: NEUROLOGY | Facility: CLINIC | Age: 53
End: 2023-04-12
Payer: COMMERCIAL

## 2023-04-12 NOTE — TELEPHONE ENCOUNTER
----- Message from Ryanne Hernandez, Patient Care Assistant sent at 4/6/2023  3:15 PM CDT -----  Theresa Reyes Staff  Caller: Unspecified (Today,  3:12 PM)  Type: Patient Call Back     Who called: self     What is the request in detail: please call patient     Can the clinic reply by [facility]? no     Would the patient rather a call back or a response via [facility]?  call     Best call back number: .[phone] (home)       Additional Information:   ( I called pt she wants to speak with you about her doctor changing her meds)      Her heart Dr is wanting to put her on Gabapentin in place of her hormones. She just wants to make sure it's ok to take with her other medications because the Gabapentin works the same as the topamax

## 2023-05-22 ENCOUNTER — OFFICE VISIT (OUTPATIENT)
Dept: NEUROLOGY | Facility: CLINIC | Age: 53
End: 2023-05-22
Payer: COMMERCIAL

## 2023-05-22 VITALS
DIASTOLIC BLOOD PRESSURE: 57 MMHG | HEART RATE: 79 BPM | BODY MASS INDEX: 29.11 KG/M2 | WEIGHT: 144.38 LBS | SYSTOLIC BLOOD PRESSURE: 117 MMHG | HEIGHT: 59 IN

## 2023-05-22 DIAGNOSIS — G43.009 MIGRAINE WITHOUT AURA AND WITHOUT STATUS MIGRAINOSUS, NOT INTRACTABLE: ICD-10-CM

## 2023-05-22 DIAGNOSIS — G44.229 CHRONIC TENSION-TYPE HEADACHE, NOT INTRACTABLE: ICD-10-CM

## 2023-05-22 DIAGNOSIS — R51.9 CHRONIC NONINTRACTABLE HEADACHE, UNSPECIFIED HEADACHE TYPE: Primary | ICD-10-CM

## 2023-05-22 DIAGNOSIS — G89.29 CHRONIC NONINTRACTABLE HEADACHE, UNSPECIFIED HEADACHE TYPE: Primary | ICD-10-CM

## 2023-05-22 DIAGNOSIS — G44.40 MEDICATION OVERUSE HEADACHE: ICD-10-CM

## 2023-05-22 PROCEDURE — 3074F PR MOST RECENT SYSTOLIC BLOOD PRESSURE < 130 MM HG: ICD-10-PCS | Mod: CPTII,S$GLB,, | Performed by: STUDENT IN AN ORGANIZED HEALTH CARE EDUCATION/TRAINING PROGRAM

## 2023-05-22 PROCEDURE — 3074F SYST BP LT 130 MM HG: CPT | Mod: CPTII,S$GLB,, | Performed by: STUDENT IN AN ORGANIZED HEALTH CARE EDUCATION/TRAINING PROGRAM

## 2023-05-22 PROCEDURE — 99999 PR PBB SHADOW E&M-EST. PATIENT-LVL IV: ICD-10-PCS | Mod: PBBFAC,,, | Performed by: STUDENT IN AN ORGANIZED HEALTH CARE EDUCATION/TRAINING PROGRAM

## 2023-05-22 PROCEDURE — 99215 PR OFFICE/OUTPT VISIT, EST, LEVL V, 40-54 MIN: ICD-10-PCS | Mod: S$GLB,,, | Performed by: STUDENT IN AN ORGANIZED HEALTH CARE EDUCATION/TRAINING PROGRAM

## 2023-05-22 PROCEDURE — 1159F MED LIST DOCD IN RCRD: CPT | Mod: CPTII,S$GLB,, | Performed by: STUDENT IN AN ORGANIZED HEALTH CARE EDUCATION/TRAINING PROGRAM

## 2023-05-22 PROCEDURE — 1159F PR MEDICATION LIST DOCUMENTED IN MEDICAL RECORD: ICD-10-PCS | Mod: CPTII,S$GLB,, | Performed by: STUDENT IN AN ORGANIZED HEALTH CARE EDUCATION/TRAINING PROGRAM

## 2023-05-22 PROCEDURE — 99999 PR PBB SHADOW E&M-EST. PATIENT-LVL IV: CPT | Mod: PBBFAC,,, | Performed by: STUDENT IN AN ORGANIZED HEALTH CARE EDUCATION/TRAINING PROGRAM

## 2023-05-22 PROCEDURE — 3078F PR MOST RECENT DIASTOLIC BLOOD PRESSURE < 80 MM HG: ICD-10-PCS | Mod: CPTII,S$GLB,, | Performed by: STUDENT IN AN ORGANIZED HEALTH CARE EDUCATION/TRAINING PROGRAM

## 2023-05-22 PROCEDURE — 99215 OFFICE O/P EST HI 40 MIN: CPT | Mod: S$GLB,,, | Performed by: STUDENT IN AN ORGANIZED HEALTH CARE EDUCATION/TRAINING PROGRAM

## 2023-05-22 PROCEDURE — 3008F PR BODY MASS INDEX (BMI) DOCUMENTED: ICD-10-PCS | Mod: CPTII,S$GLB,, | Performed by: STUDENT IN AN ORGANIZED HEALTH CARE EDUCATION/TRAINING PROGRAM

## 2023-05-22 PROCEDURE — 3078F DIAST BP <80 MM HG: CPT | Mod: CPTII,S$GLB,, | Performed by: STUDENT IN AN ORGANIZED HEALTH CARE EDUCATION/TRAINING PROGRAM

## 2023-05-22 PROCEDURE — 3008F BODY MASS INDEX DOCD: CPT | Mod: CPTII,S$GLB,, | Performed by: STUDENT IN AN ORGANIZED HEALTH CARE EDUCATION/TRAINING PROGRAM

## 2023-05-22 RX ORDER — TOPIRAMATE 100 MG/1
100 TABLET, FILM COATED ORAL 2 TIMES DAILY
Qty: 60 TABLET | Refills: 5 | Status: SHIPPED | OUTPATIENT
Start: 2023-05-22 | End: 2023-11-18

## 2023-05-22 RX ORDER — LANOLIN ALCOHOL/MO/W.PET/CERES
400 CREAM (GRAM) TOPICAL DAILY
Qty: 30 TABLET | Refills: 5 | Status: SHIPPED | OUTPATIENT
Start: 2023-05-22 | End: 2023-11-18

## 2023-05-22 RX ORDER — UBROGEPANT 100 MG/1
100 TABLET ORAL ONCE AS NEEDED
Qty: 16 TABLET | Refills: 5 | Status: SHIPPED | OUTPATIENT
Start: 2023-05-22 | End: 2023-05-22

## 2023-05-22 NOTE — PROGRESS NOTES
"Chief Complaint   Patient presents with    Headache        Yuni Perez is a 53 y.o. female with a history of multiple medical diagnoses as listed below that presents for evaluation of headaches. She is accompanied to this visit by her mother. She was diagnosed with pulmonary hypertension about 8 years ago and around the same time she was diagnosed with a seizure. She had workup at that time that included MRI that showed signs of "old injury" and she had EEG that showed "lots of spikes and waves" per her mother. She says that since that time she has been started on Topamax which has been titrated up. She has not been having any seizures but she has bene having some occasional word finding difficulty that she has attributed to the medication. She has tried tylenol because as she has been titrating her medication to treat pulmonary hypertension sh marie been having bilateral intense stabbing pains in her head. She does say that with time her headaches have been better but she has noticed many more headaches since she began the Remodulin. She has not taken any other headache medications in the past.    Interval History  11/17/2016  She has still been having headaches after taking her medications in particular her vasodilators. She has found that her PRN medications are somewhat helpful in minimizing the pain that she has from her headaches. She has not had any mew problems since she was last seen in clinic.    03/23/2017  Since last seen in clinic she has been approved to get on the lung transplant list and she will be making strides to get set up to to receive new lungs. She has been taking her topamax as directed and has felt that overall she has been having fewer headaches but she still has needed Tylenol and on rare occasions Fioricet to get rid of her headaches. She has been having some cognitive difficulty with her current medications but she feels that it is tolerable. She has not had any seizure like " "activity.    06/13/2017  Since last seen in clinic she says that she has been able to be placed on the donor list. She says that overall she feels that she has been stable with regards to her breathing. She continues to have chronic sinus issues that have been essentially refractory to any medications that she has tried including antihistamines and nasal rinses. She feels that she is able to clear her sinuses and have it come right back. She has headaches almost daily but despite the frequency of the headaches she feels that most are not migraines. She has not had any seizures since last seen. She is tolerating all of her medications well without any problems.    10/31/2017  She has continued to make preparations for lung transplant.  Said that she has been frustrated with the process and she does not have much guidance which is causing increased stress.  Headaches overall have been about the same, but are responsive to Fioricet.  She says the medication helps give her adequate relief of the pain.    03/22/2018  She feels that her headaches have been getting worse in the last week.  Overall prior to this week she feels that the headaches were about the same as when she was last seen in clinic and she was using Fioricet as needed which provided some relief from her headache pain.  In the last week however Fioricet is not provided much of any relief and headaches have been going on almost every day.  She says that she feels like she has "sinus headaches" which have lingered until she has started having pounding unilateral headaches that she feels her migraines.  These headaches seem to be much more frequent and much more intense that she is ever had in the past.    05/03/2018  headaches have been better as she has hardly had any since she was last seen in clinic. Lung transplant is till her recourse for treatm of pulmonary hypertension, but she has not been able to get placed on the transplant list just " yet.    11/06/2018  Headaches have still been frequent, but have been relieved effectively by using Fioricet. She has been taking her medications as directed without any complaints of side effects. She feels that the intensity has been about the same overall. Imitrex has been prescribed, but she has not used the medication yet to determine if it helps her headaches.    02/07/2019  She has been having headaches with about the same frequency as she had in the past, but they have continued to have some response to her current abortive regimen. She has not been comfortable with the CGRP receptor blockers as a treatment modality as she has been weary of using any injectable medication to treat her symptoms. She has been seeing her Pulmonary team as recommended.    05/09/2019  She presents to clinic for routine follow-up of her headaches.  The headaches be somewhat improved with her current medication regimen, but the ability river frequently.  She needs to use abortive medications several times per week to by relief in order to her level of functioning and her ability to care for her activities of daily where they are.    08/15/2019  Her headaches have been overall stable since she was last seen in clinic. She has been trying to use various OTC medications and prescription medications with intermittent effectivetness in alleviating her headaches. She has not been amendable to considering a CGRP modulating medication to control her headaches as she has been unwilling to commit in an injectable delivery for any medication.    12/13/2019  Since she has been placed on a new medication to address pulmonary hypertension she has had fewer headaches. headaches have been responding well to fioricet and she has not needed to use the sumatriptan as often as she had in the past. She has not had any change in her candidacy for lung transplant.    12/07/2020  Overall she feels that her headaches have been better since she was last  "seen in clinic.  She is concerned however because she will soon be increasing the medication she uses to control her pulmonary hypertension.  Whenever this occurred she notices that she has had a sharp increase in her headache frequency and intensity.  Fioricet is been helpful to treat headaches when they have come about.  Sumatriptan has been used on emergency use.  She was recently denied once again for transplant.    11/01/2021  No updates on her status with regards to being placed on the transplant list. Medications for pulmonary hypertension have been well tolerated. She feels that her headaches have been stable and have been responsive to her abortive therapies when she has needed to use them.    06/08/2022  Headaches have been less frequent and less intense overall compared to she was last seen in clinic.  Medications have been well tolerated with no complaints of side effects.    12/15/2022  Overall she says that she is been doing better with her headaches since she was last seen in clinic.  She is only had approximately 1 headache that she can recall in the last few months.  This headache was controlled when she took Fioricet as recommended.  She is been apprehensive about taking Ubrelvy as she was unsure about when to use this medication in relation to her Fioricet..     5/22/23  Patient is new to me. States headaches are doing well, can be triggered w stress or foods.    Medication regiment:  Topiramate 100mg BID (hx of "spikes and waves")  Fioricet  Ubrelvy     PAST MEDICAL HISTORY:  Past Medical History:   Diagnosis Date    AR (allergic rhinitis)     Cholelithiasis, common bile duct     Chronic low back pain     Eye pressure 2017    General anesthetics causing adverse effect in therapeutic use     GERD (gastroesophageal reflux disease)     History of migraine headaches     Hypothyroidism     Innocent heart murmur     Lumbar disc disease     Menorrhagia     Mild asthma     Obesity     Plantar fasciitis " of left foot     Primary pulmonary hypertension     followed by heart transplant/pulmonary     Seizure disorder     x 1 in 2008    Seizures     Sleep apnea     TMJ (dislocation of temporomandibular joint)     Tricuspid regurgitation        PAST SURGICAL HISTORY:  Past Surgical History:   Procedure Laterality Date    CARDIAC CATHETERIZATION      CATHETERIZATION OF BOTH LEFT AND RIGHT HEART Bilateral 9/29/2020    Procedure: CATHETERIZATION, HEART, BOTH LEFT AND RIGHT;  Surgeon: Gucci Pcikett MD;  Location: [facility] CATH LAB;  Service: Cardiology;  Laterality: Bilateral;    CENTRAL VENOUS CATHETER INSERTION      CORONARY ANGIOGRAPHY N/A 9/29/2020    Procedure: ANGIOGRAM, CORONARY ARTERY;  Surgeon: Gucci Pickett MD;  Location: [facility] CATH LAB;  Service: Cardiology;  Laterality: N/A;    gallbladder drain  06/2019    INSERTION OF HAYNES CATHETER Right 6/17/2020    Procedure: INSERTION, CATHETER, CENTRAL VENOUS, HAYNES Replace Single Lumen for Remodulin Infusion;  Surgeon: Bertin Dewitt MD;  [facility];  Service: General;  Laterality: Right;    PORTACATH PLACEMENT      REMOVAL OF TUNNELED CENTRAL VENOUS CATHETER (CVC) N/A 6/17/2020    Procedure: REMOVAL, CATHETER, CENTRAL VENOUS, TUNNELED;  Surgeon: Bertin Dewitt MD;  [facility];  Service: General;  Laterality: N/A;    RIGHT HEART CATHETERIZATION Right 8/20/2018    Procedure: HEART CATH-RIGHT;  Surgeon: Ivonne Rai MD;  Location: [facility] CATH LAB;  Service: Cardiology;  Laterality: Right;    RIGHT HEART CATHETERIZATION Right 9/4/2019    Procedure: INSERTION, CATHETER, RIGHT HEART;  Surgeon: Ivonne Rai MD;  Location: [facility] CATH LAB;  Service: Cardiology;  Laterality: Right;    RIGHT HEART CATHETERIZATION Right 6/10/2022    Procedure: INSERTION, CATHETER, RIGHT HEART;  Surgeon: Keshia Poe MD;  Location: [facility] CATH LAB;  Service: Cardiology;  Laterality: Right;    UPPER GASTROINTESTINAL ENDOSCOPY         SOCIAL  HISTORY:  Social History     Socioeconomic History    Marital status: Single    Number of children: 0   Occupational History    Occupation: disabled     Employer: [employer]   Tobacco Use    Smoking status: Never    Smokeless tobacco: Never   Substance and Sexual Activity    Alcohol use: No     Alcohol/week: 0.8 standard drinks     Types: 1 Standard drinks or equivalent per week     Comment: socially    Drug use: No    Sexual activity: Never     Birth control/protection: OCP, Pill     Comment: pt is a virgin       FAMILY HISTORY:  Family History   Problem Relation Age of Onset    Diabetes Mother     Heart disease Father     Glaucoma Father     No Known Problems Sister     Cancer Maternal Aunt         breast    Breast cancer Maternal Aunt     No Known Problems Maternal Uncle     No Known Problems Paternal Aunt     No Known Problems Paternal Uncle     Cancer Maternal Grandmother         uterine    Diabetes Maternal Grandfather     Heart attack Maternal Grandfather     No Known Problems Paternal Grandmother     Heart disease Paternal Grandfather     Heart attack Paternal Grandfather     Diabetes Paternal Grandfather     Leukemia Brother     Breast cancer Cousin     Breast cancer Cousin     Hypertension Other     Colon cancer Neg Hx     Ovarian cancer Neg Hx     Amblyopia Neg Hx     Blindness Neg Hx     Cataracts Neg Hx     Macular degeneration Neg Hx     Retinal detachment Neg Hx     Strabismus Neg Hx     Stroke Neg Hx     Thyroid disease Neg Hx     Esophageal cancer Neg Hx        ALLERGIES AND MEDICATIONS: updated and reviewed.  Review of patient's allergies indicates:   Allergen Reactions    Adhesive Hives     Silk tape    Amoxicillin Rash    Chlorhexidine Other (See Comments)     Current Outpatient Medications   Medication Sig Dispense Refill    acetaminophen (TYLENOL) 500 MG tablet Take 1,000 mg by mouth every 6 (six) hours as needed for Pain.      ADCIRCA 20 mg Tab Take 2 tablets (40 mg total) by mouth  once daily. 60 tablet 11    alprazolam (XANAX ORAL) Take by mouth.      ambrisentan (LETAIRIS) 10 MG Tab Take 1 tablet (10 mg total) by mouth once daily. 30 tablet 11    azelastine (ASTELIN) 137 mcg (0.1 %) nasal spray 2 sprays by Nasal route 2 (two) times daily. Patient uses prn in the evening      cyanocobalamin, vitamin B-12, 2,500 mcg Subl Place 2,500 mcg under the tongue every morning.       diphenhydrAMINE (BENADRYL) 25 mg capsule Take 50 mg by mouth every 6 (six) hours as needed for Itching. Patient takes prn evenings      estrogen, conjugated,-medroxyprogesterone 0.625-2.5mg (PREMPRO) 0.625-2.5 mg per tablet Take 1 tablet by mouth once daily. 90 tablet 3    ferrous sulfate 325 (65 FE) MG EC tablet Take 325 mg by mouth daily as needed.       fluticasone furoate-vilanteroL (BREO ELLIPTA) 200-25 mcg/dose DsDv diskus inhaler INHALE 1 PUFF INTO THE LUNGS EVERY EVENING; controller 180 each 2    fluticasone propionate (FLONASE) 50 mcg/actuation nasal spray 2 sprays (100 mcg total) by Each Nostril route every evening. 16 g 11    folic acid (FOLVITE) 1 MG tablet TAKE ONE TABLET BY MOUTH ONCE DAILY. 90 tablet 0    furosemide (LASIX) 20 MG tablet Take 1 tablet, one day a week. 30 tablet 11    glucosam/chond-msm1/C/michele/bor (GLUCOSAMINE-CHOND-MSM COMPLEX ORAL) Take by mouth.      hyoscyamine (LEVSIN/SL) 0.125 mg Subl Place 1 tablet (0.125 mg total) under the tongue every 6 (six) hours as needed (Aa needed). 60 tablet 0    lactic acid-citric-potassium (PHEXXI) 1.8-1-0.4 % Gel Place 1 Applicatorful vaginally as needed (CONTRACEPTIVE). 5 g 2    levothyroxine (SYNTHROID) 125 MCG tablet Take 1 tablet (125 mcg total) by mouth before breakfast. 30 tablet 11    LIDOcaine (LIDODERM) 5 % Place 1 patch onto the skin once daily. 15 patch 0    loratadine (CLARITIN) 10 mg tablet Take 10 mg by mouth every evening.       ondansetron (ZOFRAN) 4 MG tablet Take 1 tablet (4 mg total) by mouth every 6 (six) hours as needed. 1 Tablet Oral  "Every 6 hours 30 tablet 2    pantoprazole (PROTONIX) 40 MG tablet TAKE ONE TABLET BY MOUTH DAILY AS NEEDED FOR HEARTBURN 90 tablet 3    treprostinil (REMODULIN) 2.5 mg/mL Soln Mix cassette as directed and infuse continuously per physician titration orders on dosing sheet. Current dose 80ng/kg/min 60 mL 11    ubrogepant (UBRELVY)tablet 100 mg TAKE ONE TABLET BY MOUTH AS A SINGLE DOSE AS NEEDED FOR MIGRAINE      albuterol (PROAIR HFA) 90 mcg/actuation inhaler Inhale 2 puffs into the lungs every 6 (six) hours as needed for Wheezing. Rescue 18 g 6    albuterol-ipratropium (DUO-NEB) 2.5 mg-0.5 mg/3 mL nebulizer solution Take 3 mLs by nebulization every 6 (six) hours as needed for Wheezing. Rescue 6 each 6    magnesium oxide (MAG-OX) 400 mg (241.3 mg magnesium) tablet Take 1 tablet (400 mg total) by mouth once daily. 30 tablet 5    tiZANidine (ZANAFLEX) 4 MG tablet Take 4 mg by mouth every 6 (six) hours as needed.      topiramate (TOPAMAX) 100 MG tablet Take 1 tablet (100 mg total) by mouth 2 (two) times daily. 60 tablet 5     No current facility-administered medications for this visit.         Vitals:    05/22/23 0950   BP: (!) 117/57   Pulse: 79   Weight: 65.5 kg (144 lb 6.4 oz)   Height: 4' 11" (1.499 m)     Neurologic Exam     Mental Status   Oriented to person, place, and time.   Attention: normal. Concentration: normal.   Speech: speech is normal   Level of consciousness: alert  Knowledge: good.     Cranial Nerves     CN II   Visual fields full to confrontation.   Right visual field deficit: none  Left visual field deficit: none     CN III, IV, VI   Pupils are equal, round, and reactive to light.  Extraocular motions are normal.   Right pupil: Size: 3 mm. Shape: regular. Accommodation: intact.   Left pupil: Size: 3 mm. Shape: regular. Accommodation: intact.   CN III: no CN III palsy  CN VI: no CN VI palsy  Nystagmus: none     CN V   Facial sensation intact.   Right facial sensation deficit: none  Left facial " sensation deficit: none    CN VII   Facial expression full, symmetric.   Right facial weakness: none  Left facial weakness: none    CN VIII   CN VIII normal.     CN IX, X   CN IX normal.   CN X normal.   Palate: symmetric    CN XI   CN XI normal.   Right sternocleidomastoid strength: normal  Left sternocleidomastoid strength: normal  Right trapezius strength: normal  Left trapezius strength: normal    CN XII   CN XII normal.   Tongue deviation: none    Motor Exam   Muscle bulk: normal  Overall muscle tone: normal  Right arm tone: normal  Left arm tone: normal  Right leg tone: normal  Left leg tone: normal    Strength   Strength 5/5 throughout.     Sensory Exam   Right arm light touch: normal  Left arm light touch: normal  Right leg light touch: normal  Left leg light touch: normal  Right arm pinprick: normal  Left arm pinprick: normal    Gait, Coordination, and Reflexes     Gait  Gait: normal    Coordination   Romberg: negative  Finger to nose coordination: normal  Heel to shin coordination: normal    Tremor   Resting tremor: absent    Reflexes   Right brachioradialis: 2+  Left brachioradialis: 2+  Right biceps: 2+  Left biceps: 2+  Right triceps: 2+  Left triceps: 2+  Right patellar: 2+  Left patellar: 2+  Right achilles: 2+  Left achilles: 2+  Right plantar: normal  Left plantar: normal    Labs and Imagin2018 MRI brain w and wo contrast personally reviewed - no acute intracranial abnormality    Assessment & Plan:  Problem List Items Addressed This Visit          Neuro    Migraine without aura and without status migrainosus, not intractable    Overview     Despite frequency of headaches few have come in the form of migraine headaches.  She will benefit from Ubrelvy as a headache abortive rather than sumatriptan her to reduce the possibility of a vasoconstriction peripherally.         Medication overuse headache - Primary    Overview     Daily headaches likely rebound from her medications, tension type, and  sinus headaches.         Relevant Medications    topiramate (TOPAMAX) 100 MG tablet    magnesium oxide (MAG-OX) 400 mg (241.3 mg magnesium) tablet    Chronic tension-type headache, not intractable     Patient is new to me.     Very multifactorial - discussed lifestyle.   Chronic migraines - will continue topamax for prevention, add on magnesium. Encouraged hydration.  Ubrelvy PRN for abortive.   Will not prescribe fioricet.     Follow-up:6 months    Time spent on this encounter:40 minutes. This includes face to face time and non-face to face time preparing to see the patient (eg, review of tests), obtaining and/or reviewing separately obtained history, documenting clinical information in the electronic or other health record, independently interpreting results and communicating results to the patient/family/caregiver, or care coordinator.

## 2023-05-29 ENCOUNTER — TELEPHONE (OUTPATIENT)
Dept: TRANSPLANT | Facility: CLINIC | Age: 53
End: 2023-05-29
Payer: COMMERCIAL

## 2023-05-29 PROBLEM — G44.229 CHRONIC TENSION-TYPE HEADACHE, NOT INTRACTABLE: Status: ACTIVE | Noted: 2023-05-29

## 2023-05-29 PROBLEM — G44.40 MEDICATION OVERUSE HEADACHE: Status: ACTIVE | Noted: 2017-03-23

## 2023-05-29 NOTE — TELEPHONE ENCOUNTER
Patient had called in last week asking about gabapentin helping with hormones because she had a conversation with KAI Davidson last clinic visit about alternatives. Per KAI Davidson she wanted the patient to discuss alternatives with OBGYN since it has nothing to do with PH and they would know better if Gabapentin would work as an alternative. Patient was upset because she was under the impression that KAI Davidson would prescribe medication for patient. Patient was re-directed to OBGYN for hormonal treatment and alternatives.

## 2023-06-20 DIAGNOSIS — I27.0 PRIMARY PULMONARY HYPERTENSION: ICD-10-CM

## 2023-06-20 RX ORDER — FOLIC ACID 1 MG/1
TABLET ORAL
Qty: 90 TABLET | Refills: 0 | Status: SHIPPED | OUTPATIENT
Start: 2023-06-20 | End: 2023-09-19

## 2023-06-20 NOTE — TELEPHONE ENCOUNTER
Refill Routing Note   Medication(s) are not appropriate for processing by [facility] for the following reason(s):      Medication outside of protocol    [facility] action(s):  Route None identified          Appointments  past 12m or future 3m with PCP    Date Provider   Last Visit   7/11/2022 Lulu Sandhu MD   Next Visit   8/21/2023 Lulu Sandhu MD   ED visits in past 90 days: 0        Note composed:8:32 AM 06/20/2023

## 2023-06-23 ENCOUNTER — OFFICE VISIT (OUTPATIENT)
Dept: TRANSPLANT | Facility: CLINIC | Age: 53
End: 2023-06-23
Payer: COMMERCIAL

## 2023-06-23 ENCOUNTER — TELEPHONE (OUTPATIENT)
Dept: TRANSPLANT | Facility: CLINIC | Age: 53
End: 2023-06-23
Payer: COMMERCIAL

## 2023-06-23 ENCOUNTER — HOSPITAL ENCOUNTER (OUTPATIENT)
Dept: PULMONOLOGY | Facility: CLINIC | Age: 53
Discharge: HOME | End: 2023-06-23
Payer: COMMERCIAL

## 2023-06-23 ENCOUNTER — HOSPITAL ENCOUNTER (OUTPATIENT)
Dept: CARDIOLOGY | Facility: HOSPITAL | Age: 53
Discharge: HOME | End: 2023-06-23
Payer: COMMERCIAL

## 2023-06-23 ENCOUNTER — HOSPITAL ENCOUNTER (OUTPATIENT)
Dept: CARDIOLOGY | Facility: CLINIC | Age: 53
Discharge: HOME | End: 2023-06-23
Payer: COMMERCIAL

## 2023-06-23 VITALS
WEIGHT: 144 LBS | HEIGHT: 59 IN | HEART RATE: 78 BPM | SYSTOLIC BLOOD PRESSURE: 115 MMHG | BODY MASS INDEX: 29.03 KG/M2 | BODY MASS INDEX: 29.33 KG/M2 | DIASTOLIC BLOOD PRESSURE: 60 MMHG | WEIGHT: 145.5 LBS | DIASTOLIC BLOOD PRESSURE: 63 MMHG | OXYGEN SATURATION: 95 % | SYSTOLIC BLOOD PRESSURE: 99 MMHG | HEIGHT: 59 IN | HEART RATE: 65 BPM

## 2023-06-23 VITALS — WEIGHT: 143 LBS | BODY MASS INDEX: 28.83 KG/M2 | HEIGHT: 59 IN

## 2023-06-23 DIAGNOSIS — J45.909 MODERATE ASTHMA WITHOUT COMPLICATION, UNSPECIFIED WHETHER PERSISTENT: ICD-10-CM

## 2023-06-23 DIAGNOSIS — I27.0 PRIMARY PULMONARY HYPERTENSION: ICD-10-CM

## 2023-06-23 DIAGNOSIS — I27.9 CHRONIC PULMONARY HEART DISEASE: ICD-10-CM

## 2023-06-23 DIAGNOSIS — G47.33 OSA (OBSTRUCTIVE SLEEP APNEA): ICD-10-CM

## 2023-06-23 DIAGNOSIS — I27.21 WHO GROUP 1 PULMONARY ARTERIAL HYPERTENSION: Primary | ICD-10-CM

## 2023-06-23 DIAGNOSIS — E03.9 HYPOTHYROIDISM (ACQUIRED): ICD-10-CM

## 2023-06-23 LAB
ASCENDING AORTA: 2.67 CM
AV INDEX (PROSTH): 0.62
AV MEAN GRADIENT: 6 MMHG
AV PEAK GRADIENT: 12 MMHG
AV VALVE AREA: 1.26 CM2
AV VELOCITY RATIO: 0.59
BSA FOR ECHO PROCEDURE: 1.65 M2
CV ECHO LV RWT: 0.42 CM
DOP CALC AO PEAK VEL: 1.75 M/S
DOP CALC AO VTI: 31.73 CM
DOP CALC LVOT AREA: 2 CM2
DOP CALC LVOT DIAMETER: 1.61 CM
DOP CALC LVOT PEAK VEL: 1.04 M/S
DOP CALC LVOT STROKE VOLUME: 40.07 CM3
DOP CALCLVOT PEAK VEL VTI: 19.69 CM
E WAVE DECELERATION TIME: 176.4 MSEC
E/A RATIO: 0.84
E/E' RATIO: 9.4 M/S
ECHO LV POSTERIOR WALL: 0.79 CM (ref 0.6–1.1)
FRACTIONAL SHORTENING: 70 % (ref 28–44)
INTERVENTRICULAR SEPTUM: 0.63 CM (ref 0.6–1.1)
IVRT: 102.76 MSEC
LA MAJOR: 5.33 CM
LA MINOR: 4.41 CM
LA WIDTH: 2.56 CM
LEFT ATRIUM SIZE: 3.36 CM
LEFT ATRIUM VOLUME INDEX MOD: 19.4 ML/M2
LEFT ATRIUM VOLUME INDEX: 22.1 ML/M2
LEFT ATRIUM VOLUME MOD: 31.07 CM3
LEFT ATRIUM VOLUME: 35.29 CM3
LEFT INTERNAL DIMENSION IN SYSTOLE: 1.12 CM (ref 2.1–4)
LEFT VENTRICLE DIASTOLIC VOLUME INDEX: 37.02 ML/M2
LEFT VENTRICLE DIASTOLIC VOLUME: 59.23 ML
LEFT VENTRICLE MASS INDEX: 44 G/M2
LEFT VENTRICLE SYSTOLIC VOLUME INDEX: 1.7 ML/M2
LEFT VENTRICLE SYSTOLIC VOLUME: 2.77 ML
LEFT VENTRICULAR INTERNAL DIMENSION IN DIASTOLE: 3.73 CM (ref 3.5–6)
LEFT VENTRICULAR MASS: 71.07 G
LV LATERAL E/E' RATIO: 7.83 M/S
LV SEPTAL E/E' RATIO: 11.75 M/S
MV PEAK A VEL: 1.12 M/S
MV PEAK E VEL: 0.94 M/S
MV STENOSIS PRESSURE HALF TIME: 51.16 MS
MV VALVE AREA P 1/2 METHOD: 4.3 CM2
PISA TR MAX VEL: 5.07 M/S
RA MAJOR: 5.19 CM
RA PRESSURE: 3 MMHG
RA WIDTH: 3.68 CM
RIGHT VENTRICULAR END-DIASTOLIC DIMENSION: 5.04 CM
SINUS: 2.78 CM
STJ: 2.05 CM
TDI LATERAL: 0.12 M/S
TDI SEPTAL: 0.08 M/S
TDI: 0.1 M/S
TR MAX PG: 103 MMHG
TRICUSPID ANNULAR PLANE SYSTOLIC EXCURSION: 1.6 CM
TV REST PULMONARY ARTERY PRESSURE: 106 MMHG

## 2023-06-23 PROCEDURE — 99999 PR PBB SHADOW E&M-EST. PATIENT-LVL V: ICD-10-PCS | Mod: PBBFAC,,,

## 2023-06-23 PROCEDURE — 93306 ECHO (CUPID ONLY): ICD-10-PCS | Mod: 26,,, | Performed by: INTERNAL MEDICINE

## 2023-06-23 PROCEDURE — 3074F SYST BP LT 130 MM HG: CPT | Mod: CPTII,S$GLB,,

## 2023-06-23 PROCEDURE — 99999 PR PBB SHADOW E&M-EST. PATIENT-LVL V: CPT | Mod: PBBFAC,,,

## 2023-06-23 PROCEDURE — 1159F MED LIST DOCD IN RCRD: CPT | Mod: CPTII,S$GLB,,

## 2023-06-23 PROCEDURE — 3008F PR BODY MASS INDEX (BMI) DOCUMENTED: ICD-10-PCS | Mod: CPTII,S$GLB,,

## 2023-06-23 PROCEDURE — 3008F BODY MASS INDEX DOCD: CPT | Mod: CPTII,S$GLB,,

## 2023-06-23 PROCEDURE — 93005 ELECTROCARDIOGRAM TRACING: CPT | Mod: S$GLB,,,

## 2023-06-23 PROCEDURE — 93010 EKG 12-LEAD: ICD-10-PCS | Mod: S$GLB,,, | Performed by: INTERNAL MEDICINE

## 2023-06-23 PROCEDURE — 93306 TTE W/DOPPLER COMPLETE: CPT

## 2023-06-23 PROCEDURE — 3074F PR MOST RECENT SYSTOLIC BLOOD PRESSURE < 130 MM HG: ICD-10-PCS | Mod: CPTII,S$GLB,,

## 2023-06-23 PROCEDURE — 93010 ELECTROCARDIOGRAM REPORT: CPT | Mod: S$GLB,,, | Performed by: INTERNAL MEDICINE

## 2023-06-23 PROCEDURE — 3078F PR MOST RECENT DIASTOLIC BLOOD PRESSURE < 80 MM HG: ICD-10-PCS | Mod: CPTII,S$GLB,,

## 2023-06-23 PROCEDURE — 93005 EKG 12-LEAD: ICD-10-PCS | Mod: S$GLB,,,

## 2023-06-23 PROCEDURE — 1160F PR REVIEW ALL MEDS BY PRESCRIBER/CLIN PHARMACIST DOCUMENTED: ICD-10-PCS | Mod: CPTII,S$GLB,,

## 2023-06-23 PROCEDURE — 99214 PR OFFICE/OUTPT VISIT, EST, LEVL IV, 30-39 MIN: ICD-10-PCS | Mod: S$GLB,,,

## 2023-06-23 PROCEDURE — 3078F DIAST BP <80 MM HG: CPT | Mod: CPTII,S$GLB,,

## 2023-06-23 PROCEDURE — 1159F PR MEDICATION LIST DOCUMENTED IN MEDICAL RECORD: ICD-10-PCS | Mod: CPTII,S$GLB,,

## 2023-06-23 PROCEDURE — 1160F RVW MEDS BY RX/DR IN RCRD: CPT | Mod: CPTII,S$GLB,,

## 2023-06-23 PROCEDURE — 99214 OFFICE O/P EST MOD 30 MIN: CPT | Mod: S$GLB,,,

## 2023-06-23 PROCEDURE — 93306 TTE W/DOPPLER COMPLETE: CPT | Mod: 26,,, | Performed by: INTERNAL MEDICINE

## 2023-06-23 PROCEDURE — 94618 PULMONARY STRESS TESTING: ICD-10-PCS | Mod: S$GLB,,, | Performed by: INTERNAL MEDICINE

## 2023-06-23 PROCEDURE — 94618 PULMONARY STRESS TESTING: CPT | Mod: S$GLB,,, | Performed by: INTERNAL MEDICINE

## 2023-06-23 RX ORDER — BUTALBITAL, ACETAMINOPHEN AND CAFFEINE 50; 325; 40 MG/1; MG/1; MG/1
1 TABLET ORAL EVERY 4 HOURS PRN
COMMUNITY

## 2023-06-23 RX ORDER — GABAPENTIN 300 MG/1
300 CAPSULE ORAL 3 TIMES DAILY
Qty: 90 EACH | Refills: 11 | Status: SHIPPED | OUTPATIENT
Start: 2023-06-23 | End: 2023-07-07 | Stop reason: SDUPTHER

## 2023-06-23 RX ORDER — FUROSEMIDE 20 MG/1
20 TABLET ORAL DAILY
Qty: 30 TABLET | Refills: 11 | Status: SHIPPED | OUTPATIENT
Start: 2023-06-23 | End: 2024-06-22

## 2023-06-23 NOTE — LETTER
July 5, 2023        Ivonne Rai  [address]  [address]  [facility] [address]  Phone: [phone]  Fax: [phone]             [id number]  [address]  [facility] [address]  Phone: [phone]   Patient: Yuni Perez   MR Number: [id number]   YOB: 1970   Date of Visit: 6/23/2023       Dear Dr. Ivonne Rai    Thank you for referring Yuni Perez to me for evaluation. Attached you will find relevant portions of my assessment and plan of care.    If you have questions, please do not hesitate to call me. I look forward to following Yuni Perez along with you.    Sincerely,    Bárbara Langford, NP    Enclosure    If you would like to receive this communication electronically, please contact [email or web address] or [phone] to request [employer] access.    [employer] is a tool which provides read-only access to select patient information with whom you have a relationship. Its easy to use and provides real time access to review your patients record including encounter summaries, notes, results, and demographic information.    If you feel you have received this communication in error or would no longer like to receive these types of communications, please e-mail [email or web address]

## 2023-06-23 NOTE — PATIENT INSTRUCTIONS
Add gabapentin 1 tablet at hour of sleep.    May increases lasix as needed.    Would prefer not to be on HRT. Explore other options with PCP/ObGyn (SSRI, SNRI?)    Return to clinic 4 months with labs, walk.      Recommend 2 gram sodium restriction and 1500cc fluid restriction.  Encourage physical activity with graded exercise program.  Requested patient to weigh themselves daily, and to notify us if their weight increases by more than 3 lbs in 1 day or 5 lbs in 1 week.

## 2023-06-23 NOTE — NURSING
Patient identified by 2 identifiers.   Allergies reviewed.  22 g IV placed to Lt AC .  Bubble study explained to patient, patient verbalized understanding.  Bubble performed, with & without Valsalva.  Pt tolerated procedure well.  IV d/c with catheter intact, pressure dsg applied.

## 2023-06-23 NOTE — TELEPHONE ENCOUNTER
Patient presents in clinic today for a follow up appointment. Patient denies having any symptoms or SE currently. Patient denies needing refills on medications. Walk was less than last time but patient states that she is just feeling tired today. Patient confirmed that she is taking 117 ng/kg/min remodulin, brand adcirca and brand letairis. Overall, patient is doing well and has no complaints. KAI Davidson notified.

## 2023-06-26 NOTE — PROCEDURES
Yuni Perez is a 53 y.o.  female patient, who presents for a 6 minute walk test ordered by KAI Langford.  The diagnosis is Pulmonary Hypertension.  The patient's BMI is 28.9 kg/m2.  Predicted distance (lower limit of normal) is 394.5 meters.      Test Results:    The test was completed without stopping.  The total time walked was 360 seconds.  During walking, the patient reported:  Dyspnea, Leg pain, Lightheadedness.  The patient used no assistive devices during testing.     06/23/2023---------Distance: 396.24 meters (1300 feet)     O2 Sat % Supplemental Oxygen Heart Rate Blood Pressure Ab Scale   Pre-exercise  (Resting) 96 % Room Air 86 bpm 112/63 mmHg 3   During Exercise 96 % Room Air 148 bpm 116/58 mmHg 9   Post-exercise  (Recovery) 96 % Room Air  98 bpm       Recovery Time: 90 seconds    Performing nurse/tech: Estopinal NATACHA      PREVIOUS STUDY:   03/17/2023---------Distance: 426.72 meters (1400 feet)       O2 Sat % Supplemental Oxygen Heart Rate Blood Pressure Ab Scale   Pre-exercise  (Resting) 95 % Room Air 73 bpm 116/79 mmHg 2   During Exercise 93 % Room Air 153 bpm 144/97 mmHg 9   Post-exercise  (Recovery) 98 % Room Air  93 bpm 115/58 mmHg         CLINICAL INTERPRETATION:  Six minute walk distance is 396.24 meters (1300 feet) with very, very heavy dyspnea.  During exercise, there was no desaturation while breathing room air.  Blood pressure remained stable and Heart rate increased significantly with walking.  This may represent a tachycardic response to exercise.  The patient reported non-pulmonary symptoms during exercise.  Since the previous study in March 2023, exercise capacity is unchanged.  Based upon age and body mass index, exercise capacity is normal.

## 2023-07-05 NOTE — PROGRESS NOTES
Subjective:    Patient ID:  Yuni Perez is a 53 y.o. female who presents for follow-up of Pulmonary Hypertension.    HPI   Mrs. Perez is a pleasant 51 y.o. white female who presents for PH follow-up. Patient was diagnosed with IPAH in 2009 and started on Remodulin, Letairis, and Adcirca. She has a history of ABHIJEET (untreated - not able to tolerate CPAP 2/2 severe congestion), GERD, and Asthma. Current therapy is Remodulin infusing at 117ng/kg/min (pre-filled cassettes), Letairis 10mg qdaily, and Adcirca 40mg qdaily. Has T/F Adempas.  Patient evaluated by lung transplant in 2020. Had everything set up for LUT, but says her caregivers were deemed not acceptable. She has come to terms with not being a LUT candidate here at [facility]. She is not interested in lung transplant.    Mrs. Perez is doing okay. Reports baseline symptoms. Walk was shorter but felt about the same. Has been taking lasix as needed. She is taking PREMPRO for menopause symptoms, but not sure it's helping. Mrs. Perez saw a new neurologist in May who is managing her migraines. Patient denies chest pain, chest pressure, syncope, pre-syncope, lightheadedness, dizziness, PND, orthopnea, LE edema, abdominal pain, abdominal pressure, or N/V/F/C      6MWT:  6MW 6/23/2023   6MWT Status completed without stopping   Patient Reported Dyspnea;Leg pain;Lightheadedness   Was O2 used? No   6MW Distance walked (feet) 1300   Distance walked (meters) 396.24   Did patient stop? No   Oxygen Saturation 96   Supplemental Oxygen Room Air   Heart Rate 86   Blood Pressure 112/63   Ab Dyspnea Rating  moderate   Oxygen Saturation 96   Supplemental Oxygen Room Air   Heart Rate 148   Blood Pressure 116/58   Ab Dyspnea Rating  very,very heavy   Recovery Time (seconds) 90   Oxygen Saturation 96   Supplemental Oxygen Room Air   Heart Rate 98     6MW 3/17/2023   6MWT Status completed without stopping   Patient Reported Dyspnea   Was O2 used? No   6MW  Distance walked (feet) 1400   Distance walked (meters) 426.72   Did patient stop? No   Oxygen Saturation 95   Supplemental Oxygen Room Air   Heart Rate 73   Blood Pressure 116/79   Ab Dyspnea Rating  light   Oxygen Saturation 93   Supplemental Oxygen Room Air   Heart Rate 153   Blood Pressure 144/97   Ab Dyspnea Rating  very,very heavy   Recovery Time (seconds) 193   Oxygen Saturation 98   Supplemental Oxygen Room Air   Heart Rate 93     ECHO: 6/23/2023  There is abnormal septal wall motion. There is systolic flattening of the interventricular septum consistent with right ventricle pressure overload.  The left ventricle is normal in size with  Moderate right ventricular enlargement with mildly to moderately reduced right ventricular systolic function. Free Wall Strain 11%, global RV strain 11%.  Mild pulmonic regurgitation.  Moderate tricuspid regurgitation.  Normal central venous pressure (3 mmHg).  The estimated PA systolic pressure is 106 mmHg.  There is pulmonary hypertension.  There is evidence of at least moderate right to left venous-arterial shunting with agitated saline contrast. Unfortunately continuous capture not obtained thus cannot specify whether intra or extracardiac.    ECHO: 12/16/2022  The left ventricle is normal in size with normal systolic function.  The estimated ejection fraction is 68%.  Indeterminate left ventricular diastolic function.  Mild right ventricular enlargement with mildly reduced right ventricular systolic function.  Mild right atrial enlargement.  Mild to moderate tricuspid regurgitation.  Mild pulmonic regurgitation.  Normal central venous pressure (3 mmHg).  The estimated PA systolic pressure is 107 mmHg.  There is Quite Severe pulmonary hypertension.  Trivial posterior pericardial effusion.    ECHO: 8/15/2022  The left ventricle is normal in size with concentric remodeling and low normal systolic function.  The estimated ejection fraction is 55%.  There is abnormal  septal wall motion.  Indeterminate left ventricular diastolic function.  Mild right ventricular enlargement with mildly reduced right ventricular systolic function.  Mild right atrial enlargement.  Moderate tricuspid regurgitation.  Mild pulmonic regurgitation.  Normal central venous pressure (3 mmHg).  The estimated PA systolic pressure is 104 mmHg.  There is very severe pulmonary hypertension.    RHC: 6/10/2022  Estimated blood loss: none  Severe PAH on IV remodulin, adcirca and letairis.  Normal right and left-sided filling pressures.  Borderline low-normal cardiac output by Ebony method which is normal when indexed for BSA.  No signficant change from RHC 8/18.  RA: 6/ 7/ 5 RV: 105/ 3/ 10 PA: 103/ 45/ 64 PWP: 15/ 1512/ 13 . Cardiac output was 4.11 by Ebony. Cardiac index is 2.67 L/min/m2. O2 Sat: PA 70%. AO sat 96% PVR 12.4    PFTs: 3/17/2023  Spirometry is consistent with restriction. The significantly reduced FEF 25-75 suggests superimposed obstruction may be present. Lung volume determination is normal. The mildly elevated RV/TLC ratio suggests possible early air trapping. Diffusion capacity is moderately reduced. This interpretation of DLCO assumes normal hemoglobin level (41%)    PFTs: 9/8/2020  Spirometry shows moderate obstruction. Lung volume determination is normal. DLCO is mildly decreased - 68%    IV/SC:  Pulmonary Hypertension Review Flowsheet 6/23/2023   Pump Type CADD   Medication type Remodulin   Vial size (No Data)   Dose (ng/kg/min) 117/ng/kg/min   DW (kg) 60.5kg   Amt of Med used    Amt of Diluent Used NS    Final Concentration (ng/ml) 365667 ng/ml   Pump Rate ml/24 hr 41 ml/ 24 hr     Review of Systems   Constitutional: Positive for malaise/fatigue. Negative for decreased appetite, diaphoresis, fever and night sweats.   HENT:  Positive for congestion.    Cardiovascular:  Positive for dyspnea on exertion. Negative for chest pain, irregular heartbeat, leg swelling and syncope.   Respiratory:   "Positive for shortness of breath. Negative for cough and wheezing.         Occasional SOB at night and morning   Endocrine: Negative.    Skin: Negative.    Musculoskeletal:  Negative for back pain, falls and joint swelling.   Gastrointestinal: Negative.    Genitourinary: Negative.    Neurological: Negative.    Psychiatric/Behavioral:  The patient is nervous/anxious.         Controlled with medication      Objective: BP 99/63 (BP Location: Right arm, Patient Position: Sitting, BP Method: Small (Automatic))   Pulse 78   Ht 4' 11" (1.499 m)   Wt 66 kg (145 lb 8.1 oz)   SpO2 95%   BMI 29.39 kg/m²     Physical Exam  Constitutional:       Appearance: Normal appearance.   HENT:      Head: Normocephalic and atraumatic.   Eyes:      Pupils: Pupils are equal, round, and reactive to light.   Neck:      Vascular: No JVD.   Cardiovascular:      Rate and Rhythm: Normal rate and regular rhythm.      Pulses: Normal pulses.      Heart sounds: Murmur heard.      Comments: Loud S2  Pulmonary:      Effort: Pulmonary effort is normal.      Breath sounds: Normal breath sounds.   Chest:      Comments: RCW - Indwelling catheter - Eason for Remodulin infusion. Dressing is C/D/I    Abdominal:      General: Abdomen is flat.      Palpations: Abdomen is soft.   Musculoskeletal:         General: Normal range of motion.      Cervical back: Normal range of motion and neck supple.   Skin:     General: Skin is warm and dry.      Capillary Refill: Capillary refill takes less than 2 seconds.   Neurological:      Mental Status: She is alert and oriented to person, place, and time.   Psychiatric:         Mood and Affect: Mood normal.         Behavior: Behavior normal.         Lab Results   Component Value Date     (H) 06/23/2023     06/23/2023    K 4.2 06/23/2023    MG 2.1 06/23/2023     (H) 06/23/2023    CO2 21 (L) 06/23/2023    BUN 12 06/23/2023    CREATININE 0.7 06/23/2023    GLU 88 06/23/2023    HGBA1C 4.9 09/21/2020    AST " 12 06/23/2023    ALT 10 06/23/2023    ALBUMIN 3.6 06/23/2023    PROT 6.8 06/23/2023    BILITOT 0.2 06/23/2023    CHOL 125 09/21/2020    HDL 36 (L) 09/21/2020    LDLCALC 72.8 09/21/2020    TRIG 81 09/21/2020       Magnesium   Date Value Ref Range Status   06/23/2023 2.1 1.6 - 2.6 mg/dL Final       Lab Results   Component Value Date    WBC 5.31 06/23/2023    HGB 14.8 06/23/2023    HCT 43.8 06/23/2023    MCV 91 06/23/2023     06/23/2023       Lab Results   Component Value Date    INR 1.0 09/29/2020    INR 0.9 09/21/2020    INR 0.9 09/04/2019       BNP   Date Value Ref Range Status   06/23/2023 126 (H) 0 - 99 pg/mL Final     Comment:     Values of less than 100 pg/ml are consistent with non-CHF populations.   03/17/2023 110 (H) 0 - 99 pg/mL Final     Comment:     Values of less than 100 pg/ml are consistent with non-CHF populations.   12/16/2022 119 (H) 0 - 99 pg/mL Final     Comment:     Values of less than 100 pg/ml are consistent with non-CHF populations.       No results found for: LDH    ..  WHO Group: 1 Functional Class: II  REVEAL Score: 6 (Low Risk)    Assessment:       1. WHO group 1 pulmonary arterial hypertension    2. ABHIJEET (obstructive sleep apnea)    3. Hypothyroidism (acquired)    4. Moderate asthma without complication, unspecified whether persistent         Plan:       Mrs. Perez is subjectively stable. Her ECHO and RHC, though indicating severe PAH, are stable. Will plan for ECHO next visit. Noted drop in her DLCO and other values on PFTs. Has not seen a pulmonologist since 2020.     Add gabapentin 1 tablet at hour of sleep.    May increases lasix as needed.    Would prefer not to be on HRT. Explore other options with PCP/ObGyn (SSRI, SNRI?)    Return to clinic 4 months with labs, walk.      Recommend 2 gram sodium restriction and 1500cc fluid restriction.  Encourage physical activity with graded exercise program.  Requested patient to weigh themselves daily, and to notify us if their weight  increases by more than 3 lbs in 1 day or 5 lbs in 1 week.

## 2023-07-07 RX ORDER — GABAPENTIN 300 MG/1
300 CAPSULE ORAL NIGHTLY
Qty: 30 CAPSULE | Refills: 11 | Status: SHIPPED | OUTPATIENT
Start: 2023-07-07 | End: 2024-02-02

## 2023-07-14 ENCOUNTER — TELEPHONE (OUTPATIENT)
Dept: PULMONOLOGY | Facility: CLINIC | Age: 53
End: 2023-07-14
Payer: COMMERCIAL

## 2023-07-14 NOTE — TELEPHONE ENCOUNTER
----- Message from Dorene Ny RN sent at 7/13/2023 10:06 AM CDT -----  Good morning,    Can someone call Mrs. Perez? She called our office about an appointment with pulmonology because she was not able to reach anyone by phone in that department.    Thank you!

## 2023-07-14 NOTE — TELEPHONE ENCOUNTER
I spoke with patient. Patient had an appointment scheduled with Dr Guzman on 7/14/23 at 3:30pm and patient canceled. Patient stated she wants to be scheduled with Dr Hollins. I let her know a message would sent to Dr Hollins staff to schedule a follow up appointment when schedule becomes available. Patient verbalized understanding.

## 2023-07-23 DIAGNOSIS — E03.9 HYPOTHYROIDISM (ACQUIRED): ICD-10-CM

## 2023-07-23 RX ORDER — LEVOTHYROXINE SODIUM 125 UG/1
TABLET ORAL
Qty: 90 TABLET | Refills: 0 | Status: SHIPPED | OUTPATIENT
Start: 2023-07-23 | End: 2023-10-20

## 2023-07-23 NOTE — TELEPHONE ENCOUNTER
Refill Decision Note   Yuni Perez  is requesting a refill authorization.  Brief Assessment and Rationale for Refill:  Approve     Medication Therapy Plan:         Comments:     Note composed:2:51 PM 07/23/2023

## 2023-07-27 ENCOUNTER — TELEPHONE (OUTPATIENT)
Dept: PULMONOLOGY | Facility: CLINIC | Age: 53
End: 2023-07-27
Payer: COMMERCIAL

## 2023-07-27 NOTE — TELEPHONE ENCOUNTER
----- Message from Lex Cross sent at 7/26/2023  2:31 PM CDT -----  Regarding: Appointment  Contact: [phone]  Pt calling to get an appt. Pt has referral in system. Please call to schedule

## 2023-07-27 NOTE — TELEPHONE ENCOUNTER
Left voicemail advising patient I have received her message regarding follow up appointment with .  My name and number was provided to receive a call back.

## 2023-07-27 NOTE — TELEPHONE ENCOUNTER
----- Message from Tiffanie Irvin sent at 7/27/2023 12:30 PM CDT -----  Regarding: Missed Call  Contact: [phone]  Returning a Missed Call         Caller: Yuni Perez           Returning call to: Aryan IRVING           Caller can be reached @:[phone]         Nature of the call:  Missed call from Aryan IRVING

## 2023-08-01 ENCOUNTER — TELEPHONE (OUTPATIENT)
Dept: TRANSPLANT | Facility: CLINIC | Age: 53
End: 2023-08-01
Payer: COMMERCIAL

## 2023-08-08 ENCOUNTER — NURSE TRIAGE (OUTPATIENT)
Dept: ADMINISTRATIVE | Facility: CLINIC | Age: 53
End: 2023-08-08
Payer: COMMERCIAL

## 2023-08-08 NOTE — TELEPHONE ENCOUNTER
[place]    PCP:  Dr. Lulu Sandhu    C/O infected bite on top of her Lt hand, hand swelling, redness, and purulent drainage.  She washed the wound, peroxide, soaking with epsom salt bid, antibiotic ointment applied, and bandage applied.  It was a bite from a pet cat.  He is vaccinated/rabies.  See below for last tetanus per LINKS.  T: 99.7.  Per protocol, care advised is go to [facility].  Pt VU and refuses care advised.  Care advice reinforced but pt continues to refuse care advice.  She wants to speak with her PCP and go to see her PCP.  Advised message will be routed high priority to PCP for recommendations.  Advised to call for worsening/questions/concerns.  VU.     Tdap 1/18/2017, 12/17/2008    Reason for Disposition   Cut (length > 1/8 inch or 3 mm) or skin tear and any animal  (Exception: Superficial scratch that doesn't go through dermis.)    Additional Information   Negative: Major bleeding (actively dripping or spurting) that can't be stopped   Negative: Sounds like a life-threatening emergency to the triager   Negative: Any break in skin from BITE (e.g., cut, puncture, or scratch) and WILD animal at risk for RABIES (e.g., bat, raccoon, olsen, skunk, coyote, other carnivores)   Negative: Any break in skin from BITE (e.g., cut, puncture, or scratch) and PET animal (e.g., dog, cat, or ferret) at risk for RABIES (e.g., sick, stray, unprovoked bite, developing country)   Negative: Any break in skin from BITE (e.g., cut, puncture, or scratch) and monkey    Protocols used: Animal Bite-A-[place]

## 2023-08-09 ENCOUNTER — OFFICE VISIT (OUTPATIENT)
Dept: FAMILY MEDICINE | Facility: CLINIC | Age: 53
End: 2023-08-09
Payer: COMMERCIAL

## 2023-08-09 ENCOUNTER — HOSPITAL ENCOUNTER (OUTPATIENT)
Dept: RADIOLOGY | Facility: HOSPITAL | Age: 53
Discharge: HOME | End: 2023-08-09
Payer: COMMERCIAL

## 2023-08-09 VITALS
WEIGHT: 139.75 LBS | BODY MASS INDEX: 28.17 KG/M2 | HEART RATE: 88 BPM | SYSTOLIC BLOOD PRESSURE: 92 MMHG | DIASTOLIC BLOOD PRESSURE: 60 MMHG | HEIGHT: 59 IN | OXYGEN SATURATION: 93 % | TEMPERATURE: 98 F
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
## 2023-08-09 DIAGNOSIS — E03.9 HYPOTHYROIDISM (ACQUIRED): ICD-10-CM
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
## 2023-08-09 PROCEDURE — 73130 XR HAND COMPLETE 3 VIEW LEFT: ICD-10-PCS | Mod: 26,LT,, | Performed by: RADIOLOGY

## 2023-08-09 PROCEDURE — 99214 PR OFFICE/OUTPT VISIT, EST, LEVL IV, 30-39 MIN: ICD-10-PCS | Mod: 25,S$GLB,,

## 2023-08-09 PROCEDURE — 73130 X-RAY EXAM OF HAND: CPT | Mod: 26,LT,, | Performed by: RADIOLOGY

## 2023-08-09 PROCEDURE — 90471 TD VACCINE GREATER THAN OR EQUAL TO 7YO PRESERVATIVE FREE IM: ICD-10-PCS | Mod: S$GLB,,,

## 2023-08-09 PROCEDURE — 73130 X-RAY EXAM OF HAND: CPT | Mod: TC,FY,PO,LT

## 2023-08-09 PROCEDURE — 90714 TD VACC NO PRESV 7 YRS+ IM: CPT | Mod: S$GLB,,,

## 2023-08-09 PROCEDURE — 87070 CULTURE OTHR SPECIMN AEROBIC: CPT

## 2023-08-09 PROCEDURE — 99999 PR PBB SHADOW E&M-EST. PATIENT-LVL V: ICD-10-PCS | Mod: PBBFAC,,,

## 2023-08-09 PROCEDURE — 3074F PR MOST RECENT SYSTOLIC BLOOD PRESSURE < 130 MM HG: ICD-10-PCS | Mod: CPTII,S$GLB,,

## 2023-08-09 PROCEDURE — 3074F SYST BP LT 130 MM HG: CPT | Mod: CPTII,S$GLB,,

## 2023-08-09 PROCEDURE — 3008F BODY MASS INDEX DOCD: CPT | Mod: CPTII,S$GLB,,

## 2023-08-09 PROCEDURE — 1159F PR MEDICATION LIST DOCUMENTED IN MEDICAL RECORD: ICD-10-PCS | Mod: CPTII,S$GLB,,

## 2023-08-09 PROCEDURE — 99999 PR PBB SHADOW E&M-EST. PATIENT-LVL V: CPT | Mod: PBBFAC,,,

## 2023-08-09 PROCEDURE — 3078F PR MOST RECENT DIASTOLIC BLOOD PRESSURE < 80 MM HG: ICD-10-PCS | Mod: CPTII,S$GLB,,

## 2023-08-09 PROCEDURE — 90714 TD VACCINE GREATER THAN OR EQUAL TO 7YO PRESERVATIVE FREE IM: ICD-10-PCS | Mod: S$GLB,,,

## 2023-08-09 PROCEDURE — 90471 IMMUNIZATION ADMIN: CPT | Mod: S$GLB,,,

## 2023-08-09 PROCEDURE — 1160F PR REVIEW ALL MEDS BY PRESCRIBER/CLIN PHARMACIST DOCUMENTED: ICD-10-PCS | Mod: CPTII,S$GLB,,

## 2023-08-09 PROCEDURE — 1159F MED LIST DOCD IN RCRD: CPT | Mod: CPTII,S$GLB,,

## 2023-08-09 PROCEDURE — 1160F RVW MEDS BY RX/DR IN RCRD: CPT | Mod: CPTII,S$GLB,,

## 2023-08-09 PROCEDURE — 3078F DIAST BP <80 MM HG: CPT | Mod: CPTII,S$GLB,,

## 2023-08-09 PROCEDURE — 99214 OFFICE O/P EST MOD 30 MIN: CPT | Mod: 25,S$GLB,,

## 2023-08-09 PROCEDURE — 3008F PR BODY MASS INDEX (BMI) DOCUMENTED: ICD-10-PCS | Mod: CPTII,S$GLB,,

## 2023-08-09 RX ORDER — CLINDAMYCIN HYDROCHLORIDE 300 MG/1
300 CAPSULE ORAL 3 TIMES DAILY
Qty: 30 CAPSULE | Refills: 0 | Status: SHIPPED | OUTPATIENT
Start: 2023-08-09 | End: 2023-08-19

## 2023-08-09 RX ORDER — SULFAMETHOXAZOLE AND TRIMETHOPRIM 800; 160 MG/1; MG/1
1 TABLET ORAL 2 TIMES DAILY
Qty: 20 TABLET | Refills: 0 | Status: SHIPPED | OUTPATIENT
Start: 2023-08-09 | End: 2023-08-19

## 2023-08-09 NOTE — PROGRESS NOTES
HPI     Chief Complaint:  Chief Complaint   Patient presents with    Hand Pain     Left hand pain redness to hand and swelling       Yuni Perez is a 53 y.o. female with multiple medical diagnoses as listed in the medical history and problem list that presents for cat bite.  Pt is new to me but seen in clinic with last appointment Visit date not found.      HPI    Cat bite Sunday after cleaning cats ears. Sunday evening became swollen now with increased pain and discharge. Has been soaking in epsom salt. Tried to express from single puncture wound but no discharge. Washed with abx soap. No fever or striations. Limited strength because of pain/swelling. Current pain 4/10.Takes gabapentin nightly. Taking 500mg bid. Unable to take NSAIDs. PC allergy. Pt does report pain and swelling has improved some.       Assessment & Plan       Problem List Items Addressed This Visit    None  Visit Diagnoses       Cat bite, initial encounter    -  Primary  Complete all antibiotics. I would like to see you back in clinic in 10-2 weeks for woudn check.     Go to ED for fever, worsening pain, redness, striations, etc.     Low concern for rabies, domesticated house cat.             Relevant Orders    X-Ray Hand Complete Left            --------------------------------------------      Health Maintenance:  Health Maintenance         Date Due Completion Date    Colorectal Cancer Screening Never done ---    Shingles Vaccine (1 of 2) Never done ---    COVID-19 Vaccine (4 - Moderna series) 08/26/2022 7/1/2022    DEXA Scan 06/25/2023 6/25/2021    Influenza Vaccine (1) 09/01/2023 9/22/2020    Hemoglobin A1c (Diabetic Prevention Screening) 09/21/2023 9/21/2020    Mammogram 11/17/2023 11/17/2022    Override on 9/2/2013: Done    Override on 3/1/2011: Done    Lipid Panel 09/21/2025 9/21/2020    Cervical Cancer Screening 11/23/2025 11/23/2020    Override on 9/17/2014: Done (Dr. Khan)    Override on 2/1/2012: Done    Override on  6/10/2003: Done    TETANUS VACCINE 01/18/2027 1/18/2017    Pneumococcal Vaccines (Age 0-64) (3 - PPSV23 or PCV20) 03/18/2036 1/18/2017            Health maintenance reviewed and Tetanus vaccine ordered    Follow Up:  No follow-ups on file.    Discussed DDx, condition, and treatment.   Education sent to patient portal/included in after visit summary.  ED precautions given.   Notify provider if symptoms do not resolve or increase in severity.   Patient verbalizes understanding and agrees with plan of care.    Exam     Review of Systems:  (as noted above)  Review of Systems   Constitutional:  Negative for activity change, fatigue and fever.   Respiratory:  Negative for chest tightness and shortness of breath.    Cardiovascular:  Negative for chest pain and palpitations.   Neurological:  Negative for dizziness and light-headedness.       Physical Exam:   Physical Exam  Vitals reviewed.   Constitutional:       General: She is not in acute distress.     Appearance: Normal appearance. She is not toxic-appearing.   HENT:      Nose: Congestion and rhinorrhea present.   Cardiovascular:      Rate and Rhythm: Normal rate and regular rhythm.      Heart sounds: Normal heart sounds.   Pulmonary:      Effort: Pulmonary effort is normal. No respiratory distress.      Breath sounds: Normal breath sounds.   Musculoskeletal:         General: Normal range of motion.      Cervical back: No rigidity.   Skin:     General: Skin is warm.      Capillary Refill: Capillary refill takes less than 2 seconds.      Findings: Erythema and wound present. No abscess, bruising, petechiae or rash. Rash is not crusting, purpuric, scaling or vesicular.             Comments: Scant clear/yellow drainage to puncture wound.    Neurological:      General: No focal deficit present.      Mental Status: She is alert and oriented to person, place, and time.   Psychiatric:         Mood and Affect: Mood normal.       Vitals:    08/09/23 1126   BP: 92/60   Pulse: 88  "  Temp: 97.8 °F (36.6 °C)   TempSrc: Oral   SpO2: (!) 93%   Weight: 63.4 kg (139 lb 12.4 oz)   Height: 4' 11" (1.499 m)      Body mass index is 28.23 kg/m².        History     Past Medical History:  Past Medical History:   Diagnosis Date    AR (allergic rhinitis)     Cholelithiasis, common bile duct     Chronic low back pain     Eye pressure 2017    General anesthetics causing adverse effect in therapeutic use     GERD (gastroesophageal reflux disease)     History of migraine headaches     Hypothyroidism     Innocent heart murmur     Lumbar disc disease     Menorrhagia     Mild asthma     Obesity     Plantar fasciitis of left foot     Primary pulmonary hypertension     followed by heart transplant/pulmonary     Seizure disorder     x 1 in 2008    Seizures     Sleep apnea     TMJ (dislocation of temporomandibular joint)     Tricuspid regurgitation        Past Surgical History:  Past Surgical History:   Procedure Laterality Date    CARDIAC CATHETERIZATION      CATHETERIZATION OF BOTH LEFT AND RIGHT HEART Bilateral 9/29/2020    Procedure: CATHETERIZATION, HEART, BOTH LEFT AND RIGHT;  Surgeon: Gucci Pickett MD;  Location: [facility] CATH LAB;  Service: Cardiology;  Laterality: Bilateral;    CENTRAL VENOUS CATHETER INSERTION      CORONARY ANGIOGRAPHY N/A 9/29/2020    Procedure: ANGIOGRAM, CORONARY ARTERY;  Surgeon: Gucci Pickett MD;  Location: [facility] CATH LAB;  Service: Cardiology;  Laterality: N/A;    gallbladder drain  06/2019    INSERTION OF HAYNES CATHETER Right 6/17/2020    Procedure: INSERTION, CATHETER, CENTRAL VENOUS, HAYNES Replace Single Lumen for Remodulin Infusion;  Surgeon: Bertin Dewitt MD;  Location: [facility] OR [address] [place] [address];  Service: General;  Laterality: Right;    PORTACATH PLACEMENT      REMOVAL OF TUNNELED CENTRAL VENOUS CATHETER (CVC) N/A 6/17/2020    Procedure: REMOVAL, CATHETER, CENTRAL VENOUS, TUNNELED;  Surgeon: Bertin Dewitt MD;  Location: [facility] OR [address] [place] [address];  Service: General;  Laterality: N/A; "    RIGHT HEART CATHETERIZATION Right 8/20/2018    Procedure: HEART CATH-RIGHT;  Surgeon: Ivonne Rai MD;  Location: [facility] CATH LAB;  Service: Cardiology;  Laterality: Right;    RIGHT HEART CATHETERIZATION Right 9/4/2019    Procedure: INSERTION, CATHETER, RIGHT HEART;  Surgeon: Ivonne Rai MD;  Location: [facility] CATH LAB;  Service: Cardiology;  Laterality: Right;    RIGHT HEART CATHETERIZATION Right 6/10/2022    Procedure: INSERTION, CATHETER, RIGHT HEART;  Surgeon: Keshia Poe MD;  Location: [facility] CATH LAB;  Service: Cardiology;  Laterality: Right;    UPPER GASTROINTESTINAL ENDOSCOPY         Social History:  Social History     Socioeconomic History    Marital status: Single    Number of children: 0   Occupational History    Occupation: [employer]     Employer: [employer]   Tobacco Use    Smoking status: Never    Smokeless tobacco: Never   Substance and Sexual Activity    Alcohol use: No     Alcohol/week: 0.8 standard drinks of alcohol     Types: 1 Standard drinks or equivalent per week     Comment: socially    Drug use: No    Sexual activity: Never     Birth control/protection: OCP, Pill     Comment: pt is a virgin       Family History:  Family History   Problem Relation Age of Onset    Diabetes Mother     Heart disease Father     Glaucoma Father     No Known Problems Sister     Cancer Maternal Aunt         breast    Breast cancer Maternal Aunt     No Known Problems Maternal Uncle     No Known Problems Paternal Aunt     No Known Problems Paternal Uncle     Cancer Maternal Grandmother         uterine    Diabetes Maternal Grandfather     Heart attack Maternal Grandfather     No Known Problems Paternal Grandmother     Heart disease Paternal Grandfather     Heart attack Paternal Grandfather     Diabetes Paternal Grandfather     Leukemia Brother     Breast cancer Cousin     Breast cancer Cousin     Hypertension Other     Colon cancer Neg Hx     Ovarian cancer Neg Hx     Amblyopia Neg Hx     Blindness  "Neg Hx     Cataracts Neg Hx     Macular degeneration Neg Hx     Retinal detachment Neg Hx     Strabismus Neg Hx     Stroke Neg Hx     Thyroid disease Neg Hx     Esophageal cancer Neg Hx        Allergies and Medications: (updated and reviewed)  Review of patient's allergies indicates:   Allergen Reactions    Fentanyl Anaphylaxis     Respiratory distress    Vibra-tabs [doxycycline hyclate] Anaphylaxis     "throat felt like it was closing"    Adhesive Hives     Silk tape    Amoxicillin Rash    Nsaids (non-steroidal anti-inflammatory drug) Swelling    Chlorhexidine Other (See Comments)     Blue Chlorhexidine causes hives     Current Outpatient Medications   Medication Sig Dispense Refill    acetaminophen (TYLENOL) 500 MG tablet Take 1,000 mg by mouth every 6 (six) hours as needed for Pain.      ADCIRCA 20 mg Tab Take 2 tablets (40 mg total) by mouth once daily. 60 tablet 11    alprazolam (XANAX ORAL) Take by mouth as needed.      ambrisentan (LETAIRIS) 10 MG Tab Take 1 tablet (10 mg total) by mouth once daily. 30 tablet 11    azelastine (ASTELIN) 137 mcg (0.1 %) nasal spray 2 sprays by Nasal route 2 (two) times daily. Patient uses prn in the evening      butalbital-acetaminophen-caffeine -40 mg (FIORICET, ESGIC) -40 mg per tablet Take 1 tablet by mouth every 4 (four) hours as needed for Pain.      cyanocobalamin, vitamin B-12, 2,500 mcg Subl Place 2,500 mcg under the tongue every morning.       diphenhydrAMINE (BENADRYL) 25 mg capsule Take 50 mg by mouth every 6 (six) hours as needed for Itching. Patient takes prn evenings      estrogen, conjugated,-medroxyprogesterone 0.625-2.5mg (PREMPRO) 0.625-2.5 mg per tablet Take 1 tablet by mouth once daily. 90 tablet 3    ferrous sulfate 325 (65 FE) MG EC tablet Take 325 mg by mouth daily as needed.       fluticasone furoate-vilanteroL (BREO ELLIPTA) 200-25 mcg/dose DsDv diskus inhaler INHALE 1 PUFF INTO THE LUNGS EVERY EVENING; controller 180 each 2    fluticasone " propionate (FLONASE) 50 mcg/actuation nasal spray 2 sprays (100 mcg total) by Each Nostril route every evening. 16 g 11    folic acid (FOLVITE) 1 MG tablet TAKE ONE TABLET BY MOUTH ONCE DAILY. 90 tablet 0    furosemide (LASIX) 20 MG tablet Take 1 tablet (20 mg total) by mouth once daily. 30 tablet 11    gabapentin (NEURONTIN) 300 MG capsule Take 1 capsule (300 mg total) by mouth every evening. 30 capsule 11    glucosam/chond-msm1/C/michele/bor (GLUCOSAMINE-CHOND-MSM COMPLEX ORAL) Take by mouth.      hyoscyamine (LEVSIN/SL) 0.125 mg Subl Place 1 tablet (0.125 mg total) under the tongue every 6 (six) hours as needed (Aa needed). 60 tablet 0    lactic acid-citric-potassium (PHEXXI) 1.8-1-0.4 % Gel Place 1 Applicatorful vaginally as needed (CONTRACEPTIVE). 5 g 2    levothyroxine (SYNTHROID) 125 MCG tablet TAKE ONE TABLET BY MOUTH EVERY MORNING 1 HOUR BEFORE BREAKFAST AND OTHER MEDICATIONS (FOR THYROID). 90 tablet 0    LIDOcaine (LIDODERM) 5 % Place 1 patch onto the skin once daily. (Patient taking differently: Place 1 patch onto the skin as needed.) 15 patch 0    loratadine (CLARITIN) 10 mg tablet Take 10 mg by mouth every evening.       magnesium oxide (MAG-OX) 400 mg (241.3 mg magnesium) tablet Take 1 tablet (400 mg total) by mouth once daily. 30 tablet 5    ondansetron (ZOFRAN) 4 MG tablet Take 1 tablet (4 mg total) by mouth every 6 (six) hours as needed. 1 Tablet Oral Every 6 hours 30 tablet 2    pantoprazole (PROTONIX) 40 MG tablet TAKE ONE TABLET BY MOUTH DAILY AS NEEDED FOR HEARTBURN 90 tablet 3    tiZANidine (ZANAFLEX) 4 MG tablet Take 4 mg by mouth every 6 (six) hours as needed.      topiramate (TOPAMAX) 100 MG tablet Take 1 tablet (100 mg total) by mouth 2 (two) times daily. 60 tablet 5    treprostinil (REMODULIN) 2.5 mg/mL Soln Mix cassette as directed and infuse continuously per physician titration orders on dosing sheet. Current dose 80ng/kg/min 60 mL 11    ubrogepant (UBRELVY)tablet 100 mg TAKE ONE TABLET BY  MOUTH AS A SINGLE DOSE AS NEEDED FOR MIGRAINE      albuterol (PROAIR HFA) 90 mcg/actuation inhaler Inhale 2 puffs into the lungs every 6 (six) hours as needed for Wheezing. Rescue 18 g 6    albuterol-ipratropium (DUO-NEB) 2.5 mg-0.5 mg/3 mL nebulizer solution Take 3 mLs by nebulization every 6 (six) hours as needed for Wheezing. Rescue 6 each 6     No current facility-administered medications for this visit.       Patient Care Team:  Lulu Sandhu MD as PCP - General (Internal Medicine)  Ralph Whyte MD (Inactive) as Referring Physician (Intensive Care)  Ian Lackey MD as Consulting Physician (Pulmonary Disease)  Monika Dalton LPN as Licensed Practical Nurse  Dorene Ny RN as Registered Nurse  Bárbara Langford NP as Nurse Practitioner (Cardiology)  Mayra Daniel RN as Registered Nurse         - The patient is given an After Visit Summary that lists all medications with directions, allergies, education, orders placed during this encounter and follow-up instructions.      - I have reviewed the patient's medical information including past medical, family, and social history sections including the medications and allergies.      - We discussed the patient's current medications.     This note was created by combination of typed  and MModal dictation.  Transcription errors may be present.  If there are any questions, please contact me.

## 2023-08-09 NOTE — PROGRESS NOTES
Patient tolerated Td vaccines. Patient given VIS,and advised to remain in clinic for 15 mins. to monitor for adverse reactions.

## 2023-08-09 NOTE — PATIENT INSTRUCTIONS
Complete all antibiotics. I would like to see you back in clinic in 10-2 weeks for woudn check.     Go to ED for fever, worsening pain, redness, striations, etc.

## 2023-08-10 ENCOUNTER — TELEPHONE (OUTPATIENT)
Dept: FAMILY MEDICINE | Facility: CLINIC | Age: 53
End: 2023-08-10
Payer: COMMERCIAL

## 2023-08-10 NOTE — TELEPHONE ENCOUNTER
----- Message from Jaimie Butler NP sent at 8/9/2023  1:43 PM CDT -----  Please call pt and let her know hand xray normal. I will see her in 2 weeks for f/u visit. Complete all antibiotics. Thanks!

## 2023-08-12 LAB — BACTERIA SPEC AEROBE CULT: ABNORMAL

## 2023-08-14 ENCOUNTER — TELEPHONE (OUTPATIENT)
Dept: FAMILY MEDICINE | Facility: CLINIC | Age: 53
End: 2023-08-14
Payer: COMMERCIAL

## 2023-08-14 DIAGNOSIS — A28.0 PASTEURELLA CELLULITIS DUE TO CAT BITE: ICD-10-CM
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
## 2023-08-14 DIAGNOSIS — L03.90 PASTEURELLA CELLULITIS DUE TO CAT BITE: ICD-10-CM

## 2023-08-15 NOTE — TELEPHONE ENCOUNTER
Called pt to check on hand. Pt admits hand still sore, redness has improved but still has core where cat bite was and can't make a fist. Still has pus coming out but improved. Top of hand is starting to peel. No fever or striations.     Has f/u appt 8/23.     Urgent referral placed to ID.

## 2023-08-21 ENCOUNTER — OFFICE VISIT (OUTPATIENT)
Dept: INFECTIOUS DISEASES | Facility: CLINIC | Age: 53
End: 2023-08-21
Payer: COMMERCIAL

## 2023-08-21 VITALS
DIASTOLIC BLOOD PRESSURE: 83 MMHG | HEART RATE: 74 BPM | HEIGHT: 59 IN | SYSTOLIC BLOOD PRESSURE: 123 MMHG | BODY MASS INDEX: 28.76 KG/M2 | WEIGHT: 142.63 LBS
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
## 2023-08-21 DIAGNOSIS — A28.0 PASTEURELLA CELLULITIS DUE TO CAT BITE: ICD-10-CM

## 2023-08-21 DIAGNOSIS — L03.90 PASTEURELLA CELLULITIS DUE TO CAT BITE: ICD-10-CM

## 2023-08-21 PROCEDURE — 99214 PR OFFICE/OUTPT VISIT, EST, LEVL IV, 30-39 MIN: ICD-10-PCS | Mod: S$GLB,,, | Performed by: PHYSICIAN ASSISTANT

## 2023-08-21 PROCEDURE — 1159F MED LIST DOCD IN RCRD: CPT | Mod: CPTII,S$GLB,, | Performed by: PHYSICIAN ASSISTANT

## 2023-08-21 PROCEDURE — 99999 PR PBB SHADOW E&M-EST. PATIENT-LVL V: ICD-10-PCS | Mod: PBBFAC,,, | Performed by: PHYSICIAN ASSISTANT

## 2023-08-21 PROCEDURE — 1160F PR REVIEW ALL MEDS BY PRESCRIBER/CLIN PHARMACIST DOCUMENTED: ICD-10-PCS | Mod: CPTII,S$GLB,, | Performed by: PHYSICIAN ASSISTANT

## 2023-08-21 PROCEDURE — 1159F PR MEDICATION LIST DOCUMENTED IN MEDICAL RECORD: ICD-10-PCS | Mod: CPTII,S$GLB,, | Performed by: PHYSICIAN ASSISTANT

## 2023-08-21 PROCEDURE — 3074F PR MOST RECENT SYSTOLIC BLOOD PRESSURE < 130 MM HG: ICD-10-PCS | Mod: CPTII,S$GLB,, | Performed by: PHYSICIAN ASSISTANT

## 2023-08-21 PROCEDURE — 99999 PR PBB SHADOW E&M-EST. PATIENT-LVL V: CPT | Mod: PBBFAC,,, | Performed by: PHYSICIAN ASSISTANT

## 2023-08-21 PROCEDURE — 3008F BODY MASS INDEX DOCD: CPT | Mod: CPTII,S$GLB,, | Performed by: PHYSICIAN ASSISTANT

## 2023-08-21 PROCEDURE — 99214 OFFICE O/P EST MOD 30 MIN: CPT | Mod: S$GLB,,, | Performed by: PHYSICIAN ASSISTANT

## 2023-08-21 PROCEDURE — 3079F DIAST BP 80-89 MM HG: CPT | Mod: CPTII,S$GLB,, | Performed by: PHYSICIAN ASSISTANT

## 2023-08-21 PROCEDURE — 1160F RVW MEDS BY RX/DR IN RCRD: CPT | Mod: CPTII,S$GLB,, | Performed by: PHYSICIAN ASSISTANT

## 2023-08-21 PROCEDURE — 3008F PR BODY MASS INDEX (BMI) DOCUMENTED: ICD-10-PCS | Mod: CPTII,S$GLB,, | Performed by: PHYSICIAN ASSISTANT

## 2023-08-21 PROCEDURE — 3074F SYST BP LT 130 MM HG: CPT | Mod: CPTII,S$GLB,, | Performed by: PHYSICIAN ASSISTANT

## 2023-08-21 PROCEDURE — 3079F PR MOST RECENT DIASTOLIC BLOOD PRESSURE 80-89 MM HG: ICD-10-PCS | Mod: CPTII,S$GLB,, | Performed by: PHYSICIAN ASSISTANT

## 2023-08-21 RX ORDER — LEVOFLOXACIN 750 MG/1
750 TABLET ORAL DAILY
Qty: 10 TABLET | Refills: 0 | Status: SHIPPED | OUTPATIENT
Start: 2023-08-21 | End: 2023-08-31

## 2023-08-21 RX ORDER — FLUTICASONE FUROATE AND VILANTEROL 200; 25 UG/1; UG/1
1 POWDER RESPIRATORY (INHALATION) NIGHTLY
Qty: 180 EACH | Refills: 0 | Status: SHIPPED | OUTPATIENT
Start: 2023-08-21 | End: 2023-12-07

## 2023-08-21 RX ORDER — METRONIDAZOLE 500 MG/1
500 TABLET ORAL 3 TIMES DAILY
Qty: 30 TABLET | Refills: 0 | Status: SHIPPED | OUTPATIENT
Start: 2023-08-21 | End: 2023-08-31

## 2023-08-21 NOTE — PROGRESS NOTES
Subjective:      Patient ID: Yuni Perez is a 53 y.o. female.    Chief Complaint:Animal Bite      History of Present Illness    HPI    Yuni Perez is a 53 year old male referred to ID for evaluation of cat bite to her left hand. She was cleaning her cat's ears on 8/6 and her cat bit the dorsum of her left hand. A few days later, she developed redness, swelling and pain and purulent drainage. She was seen by a primary care provider who cultured the drainage which grew pasturella. X-ray hand did not show fracture or dislocation. Soft tissue swelling noted on the dorsum of the hand. She was prescribed bactrim and clindamycin x 10 days which she completed yesterday. Her hand swelling and pain have improved though still present and she still has pain with ROM and unable to fully make a fist. Erythema present. She is soaking her hand in episom salt. Her Tdap has been updated. Cat is vaccinated. She has history of hives with amoxicillin and throat swelling with doxycyline.       Review of Systems   Constitutional: Negative for chills, decreased appetite, fever, malaise/fatigue, night sweats, weight gain and weight loss.   HENT:  Negative for congestion, ear pain, hearing loss, hoarse voice, sore throat and tinnitus.    Eyes:  Negative for blurred vision, redness and visual disturbance.   Cardiovascular:  Negative for chest pain and palpitations.   Respiratory:  Positive for shortness of breath (chronic). Negative for cough and sputum production.    Hematologic/Lymphatic: Negative for adenopathy. Does not bruise/bleed easily.   Skin:  Positive for color change and dry skin. Negative for itching, rash and suspicious lesions.        redness   Musculoskeletal:  Positive for joint pain and joint swelling. Negative for back pain, myalgias and neck pain.   Gastrointestinal:  Positive for diarrhea (IBS). Negative for abdominal pain, constipation, heartburn, nausea and vomiting.   Genitourinary:   "Negative for dysuria and urgency.   Neurological:  Negative for dizziness, headaches, numbness, paresthesias and weakness.   Psychiatric/Behavioral:  Negative for depression and memory loss. The patient does not have insomnia and is not nervous/anxious.    Allergic/Immunologic: Negative for environmental allergies, HIV exposure and hives.     Objective:     Vitals:    08/21/23 0926   BP: 123/83   Pulse: 74   Weight: 64.7 kg (142 lb 10.2 oz)   Height: 4' 11" (1.499 m)   PainSc:   3   PainLoc: Hand     Physical Exam  Constitutional:       General: She is not in acute distress.     Appearance: Normal appearance. She is well-developed. She is not ill-appearing or diaphoretic.   HENT:      Head: Normocephalic and atraumatic.      Right Ear: External ear normal.      Left Ear: External ear normal.      Nose: Nose normal.   Eyes:      General: No scleral icterus.        Right eye: No discharge.         Left eye: No discharge.      Extraocular Movements: Extraocular movements intact.      Conjunctiva/sclera: Conjunctivae normal.   Pulmonary:      Effort: Pulmonary effort is normal. No respiratory distress.      Breath sounds: No stridor.   Musculoskeletal:         General: Swelling, tenderness and signs of injury present.   Skin:     General: Skin is warm and dry.      Findings: No erythema or rash.      Comments: Left hand swelling and redness. TTP. No open wound or drainage. Decreased ROM 2/2 pain.    Neurological:      General: No focal deficit present.      Mental Status: She is alert and oriented to person, place, and time. Mental status is at baseline.      Cranial Nerves: No cranial nerve deficit.   Psychiatric:         Mood and Affect: Mood normal.         Behavior: Behavior normal.         Thought Content: Thought content normal.         Judgment: Judgment normal.       Assessment:       1. Cat bite, initial encounter    2. Pasteurella cellulitis due to cat bite        Plan:   Start Levofloxacin 750 mg PO once " daily and Flagyl 500 mg PO TID x 10 days  Monitor symptoms closely. If symptoms do not improve/resolve, will plan for CT vs MRI hand to assess deeper structures of the hand and for potential joint involvement and orthopedic surgery referral.   The patient understands and agrees with the plan of care. All questions were answered.   RTC in 1 week          35 minutes of total time was spent on this encounter, which includes face to face time and non-face to face time preparing to see the patient (eg, review of tests), Obtaining and/or reviewing separately obtained history, documenting clinical information in the electronic or other health record, independently interpreting results (not separately reported) and communicating results to the patient/family/caregiver, or care coordination (not separately reported).

## 2023-08-21 NOTE — TELEPHONE ENCOUNTER
No care due was identified.  [facility] Embedded Care Due Messages. Reference number: [id number].   8/21/2023 1:00:53 AM CDT

## 2023-08-21 NOTE — TELEPHONE ENCOUNTER
Refill Decision Note   Yuni Perez  is requesting a refill authorization.  Brief Assessment and Rationale for Refill:  Approve     Medication Therapy Plan:         Comments:     Note composed:3:09 PM 08/21/2023

## 2023-08-23 ENCOUNTER — OFFICE VISIT (OUTPATIENT)
Dept: FAMILY MEDICINE | Facility: CLINIC | Age: 53
End: 2023-08-23
Payer: COMMERCIAL

## 2023-08-23 VITALS
WEIGHT: 141.56 LBS | HEART RATE: 78 BPM | SYSTOLIC BLOOD PRESSURE: 110 MMHG | DIASTOLIC BLOOD PRESSURE: 60 MMHG | OXYGEN SATURATION: 98 % | TEMPERATURE: 99 F | HEIGHT: 59 IN | BODY MASS INDEX: 28.54 KG/M2
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
## 2023-08-23 DIAGNOSIS — A28.0 PASTEURELLA CELLULITIS DUE TO CAT BITE: ICD-10-CM

## 2023-08-23 DIAGNOSIS — I27.0 PRIMARY PULMONARY HYPERTENSION: ICD-10-CM
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
## 2023-08-23 DIAGNOSIS — L03.90 PASTEURELLA CELLULITIS DUE TO CAT BITE: ICD-10-CM

## 2023-08-23 PROCEDURE — 3078F PR MOST RECENT DIASTOLIC BLOOD PRESSURE < 80 MM HG: ICD-10-PCS | Mod: CPTII,S$GLB,,

## 2023-08-23 PROCEDURE — 3078F DIAST BP <80 MM HG: CPT | Mod: CPTII,S$GLB,,

## 2023-08-23 PROCEDURE — 99214 OFFICE O/P EST MOD 30 MIN: CPT | Mod: S$GLB,,,

## 2023-08-23 PROCEDURE — 3008F PR BODY MASS INDEX (BMI) DOCUMENTED: ICD-10-PCS | Mod: CPTII,S$GLB,,

## 2023-08-23 PROCEDURE — 3008F BODY MASS INDEX DOCD: CPT | Mod: CPTII,S$GLB,,

## 2023-08-23 PROCEDURE — 3074F PR MOST RECENT SYSTOLIC BLOOD PRESSURE < 130 MM HG: ICD-10-PCS | Mod: CPTII,S$GLB,,

## 2023-08-23 PROCEDURE — 1159F MED LIST DOCD IN RCRD: CPT | Mod: CPTII,S$GLB,,

## 2023-08-23 PROCEDURE — 3074F SYST BP LT 130 MM HG: CPT | Mod: CPTII,S$GLB,,

## 2023-08-23 PROCEDURE — 1159F PR MEDICATION LIST DOCUMENTED IN MEDICAL RECORD: ICD-10-PCS | Mod: CPTII,S$GLB,,

## 2023-08-23 PROCEDURE — 99999 PR PBB SHADOW E&M-EST. PATIENT-LVL V: ICD-10-PCS | Mod: PBBFAC,,,

## 2023-08-23 PROCEDURE — 99999 PR PBB SHADOW E&M-EST. PATIENT-LVL V: CPT | Mod: PBBFAC,,,

## 2023-08-23 PROCEDURE — 99214 PR OFFICE/OUTPT VISIT, EST, LEVL IV, 30-39 MIN: ICD-10-PCS | Mod: S$GLB,,,

## 2023-08-28 DIAGNOSIS — I27.0 PRIMARY PULMONARY HYPERTENSION: Primary | ICD-10-CM

## 2023-09-07 NOTE — TELEPHONE ENCOUNTER
Sent refill request to Dr. Diggs.                ----- Message from Renee Hager sent at 11/2/2020 10:56 AM CST -----  Regarding: self [phone]  .Type: Patient Call Back    Who called: Self    What is the request in detail: In regards to refill request for topiramate (TOPAMAX) 100 MG tablet, Needs more information on if it's been sent or not also pharmacy power is still down .    [facility] - [place] [id number] [address]  Phone: [phone] Fax: [phone]    Can the clinic reply by [facility]? Call back    Would the patient rather a call back or a response via [facility]? Call back    Best call back number: [phone]            
Detail Level: Zone
Detail Level: Generalized
Sunscreen Recommendations: I recommended a broad spectrum sunscreen with a SPF of 30 or higher. I explained that SPF 30 sunscreens block approximately 97 percent of the sun's harmful rays. Sunscreens should be applied at least 15 minutes prior to expected sun exposure and then every 2 hours after that as long as sun exposure continues. If swimming or exercising sunscreen should be reapplied every 45 minutes to an hour after getting wet or sweating. One ounce, or the equivalent of a shot glass full of sunscreen, is adequate to protect the skin not covered by a bathing suit. I also recommended a lip balm with a sunscreen as well. Sun protective clothing can be used in lieu of sunscreen but must be worn the entire time you are exposed to the sun's rays. \\n\\nProducts Recommended: Jj Banks MD
Detail Level: Detailed
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
## 2023-09-13 DIAGNOSIS — M79.642 PAIN OF LEFT HAND: ICD-10-CM

## 2023-09-13 RX ORDER — METRONIDAZOLE 500 MG/1
500 TABLET ORAL EVERY 8 HOURS
Qty: 42 TABLET | Refills: 0 | Status: SHIPPED | OUTPATIENT
Start: 2023-09-13 | End: 2023-09-27

## 2023-09-13 RX ORDER — LEVOFLOXACIN 750 MG/1
750 TABLET ORAL DAILY
Qty: 14 TABLET | Refills: 0 | Status: SHIPPED | OUTPATIENT
Start: 2023-09-13 | End: 2023-11-15 | Stop reason: ALTCHOICE

## 2023-09-25 DIAGNOSIS — K21.9 GASTROESOPHAGEAL REFLUX DISEASE, UNSPECIFIED WHETHER ESOPHAGITIS PRESENT: ICD-10-CM

## 2023-09-25 RX ORDER — PANTOPRAZOLE SODIUM 40 MG/1
TABLET, DELAYED RELEASE ORAL
Qty: 90 TABLET | Refills: 0 | Status: SHIPPED | OUTPATIENT
Start: 2023-09-25 | End: 2023-12-21

## 2023-09-25 NOTE — TELEPHONE ENCOUNTER
No care due was identified.  [facility] Embedded Care Due Messages. Reference number: [id number].   9/25/2023 1:05:23 AM CDT

## 2023-09-25 NOTE — TELEPHONE ENCOUNTER
Refill Routing Note     Refill Routing Note   Medication(s) are not appropriate for processing by [facility] for the following reason(s):      Medication outside of protocol    [facility] action(s):  Route Care Due:  None identified     Medication Therapy Plan: pantoprazole PRN outside protocol      Appointments  past 12m or future 3m with PCP    Date Provider   Last Visit   7/11/2022 Lulu Sandhu MD   Next Visit   11/15/2023 Lulu Sandhu MD   ED visits in past 90 days: 0        Note composed:8:18 AM 09/25/2023

## 2023-09-27 ENCOUNTER — HOSPITAL ENCOUNTER (OUTPATIENT)
Dept: PULMONOLOGY | Facility: CLINIC | Age: 53
Discharge: HOME | End: 2023-09-27
Attending: INTERNAL MEDICINE
Payer: COMMERCIAL

## 2023-09-27 ENCOUNTER — OFFICE VISIT (OUTPATIENT)
Dept: PULMONOLOGY | Facility: CLINIC | Age: 53
End: 2023-09-27
Payer: COMMERCIAL

## 2023-09-27 ENCOUNTER — HOSPITAL ENCOUNTER (OUTPATIENT)
Dept: RADIOLOGY | Facility: HOSPITAL | Age: 53
Discharge: HOME | End: 2023-09-27
Attending: INTERNAL MEDICINE
Payer: COMMERCIAL

## 2023-09-27 ENCOUNTER — OFFICE VISIT (OUTPATIENT)
Dept: INFECTIOUS DISEASES | Facility: CLINIC | Age: 53
End: 2023-09-27
Payer: COMMERCIAL

## 2023-09-27 ENCOUNTER — HOSPITAL ENCOUNTER (OUTPATIENT)
Dept: RADIOLOGY | Facility: HOSPITAL | Age: 53
Discharge: HOME | End: 2023-09-27
Attending: PHYSICIAN ASSISTANT
Payer: COMMERCIAL

## 2023-09-27 VITALS — BODY MASS INDEX: 28.17 KG/M2 | WEIGHT: 139.75 LBS | HEIGHT: 59 IN

## 2023-09-27 VITALS
SYSTOLIC BLOOD PRESSURE: 93 MMHG | WEIGHT: 140.63 LBS | HEIGHT: 59 IN | HEART RATE: 85 BPM | BODY MASS INDEX: 28.35 KG/M2 | DIASTOLIC BLOOD PRESSURE: 67 MMHG

## 2023-09-27 VITALS
BODY MASS INDEX: 28.17 KG/M2 | HEIGHT: 59 IN | WEIGHT: 139.75 LBS | OXYGEN SATURATION: 96 % | HEART RATE: 92 BPM | DIASTOLIC BLOOD PRESSURE: 78 MMHG | RESPIRATION RATE: 18 BRPM | SYSTOLIC BLOOD PRESSURE: 96 MMHG

## 2023-09-27 DIAGNOSIS — I27.0 PRIMARY PULMONARY HYPERTENSION: ICD-10-CM

## 2023-09-27 DIAGNOSIS — L03.90 PASTEURELLA CELLULITIS DUE TO CAT BITE: ICD-10-CM

## 2023-09-27 DIAGNOSIS — A28.0 PASTEURELLA CELLULITIS DUE TO CAT BITE: ICD-10-CM

## 2023-09-27 DIAGNOSIS — J45.20 MILD INTERMITTENT ASTHMA, UNSPECIFIED WHETHER COMPLICATED: Primary | ICD-10-CM
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
## 2023-09-27 DIAGNOSIS — M79.642 PAIN OF LEFT HAND: ICD-10-CM
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
## 2023-09-27 DIAGNOSIS — I27.9 CHRONIC PULMONARY HEART DISEASE: ICD-10-CM

## 2023-09-27 LAB
DLCO SINGLE BREATH LLN: 15.08
DLCO SINGLE BREATH PRE REF: 42 %
DLCO SINGLE BREATH REF: 20.81
DLCOC SBVA LLN: 3.22
DLCOC SBVA REF: 5.08
DLCOC SINGLE BREATH LLN: 15.08
DLCOC SINGLE BREATH REF: 20.81
DLCOCSBVAULN: 6.93
DLCOCSINGLEBREATHULN: 26.55
DLCOSINGLEBREATHULN: 26.55
DLCOVA LLN: 3.22
DLCOVA PRE REF: 62.2 %
DLCOVA PRE: 3.16 ML/(MIN*MMHG*L) (ref 3.22–6.93)
DLCOVA REF: 5.08
DLCOVAULN: 6.93
ERV LLN: -16449.14
ERV PRE REF: 91.2 %
ERV REF: 0.86
ERVULN: ABNORMAL
FEF 25 75 LLN: 1.28
FEF 25 75 PRE REF: 27.8 %
FEF 25 75 REF: 2.33
FEV05 LLN: 0.86
FEV05 REF: 1.71
FEV1 FVC LLN: 70
FEV1 FVC PRE REF: 81.8 %
FEV1 FVC REF: 81
FEV1 LLN: 1.76
FEV1 PRE REF: 54.3 %
FEV1 REF: 2.27
FRCPLETH LLN: 1.59
FRCPLETH PREREF: 123.2 %
FRCPLETH REF: 2.41
FRCPLETHULN: 3.23
FVC LLN: 2.19
FVC PRE REF: 66.2 %
FVC REF: 2.82
IVC PRE: 1.73 L (ref 2.19–3.47)
IVC SINGLE BREATH LLN: 2.19
IVC SINGLE BREATH PRE REF: 61.3 %
IVC SINGLE BREATH REF: 2.82
IVCSINGLEBREATHULN: 3.47
LLN IC: -16448.41
PEF LLN: 4.47
PEF PRE REF: 64.1 %
PEF REF: 5.92
PHYSICIAN COMMENT: ABNORMAL
PRE DLCO: 8.75 ML/(MIN*MMHG) (ref 15.08–26.55)
PRE ERV: 0.79 L (ref -16449.14–16450.86)
PRE FEF 25 75: 0.65 L/S (ref 1.28–3.68)
PRE FET 100: 6.78 SEC
PRE FEV05 REF: 56.2 %
PRE FEV1 FVC: 66.23 % (ref 69.52–90.56)
PRE FEV1: 1.24 L (ref 1.76–2.77)
PRE FEV5: 0.96 L (ref 0.86–2.57)
PRE FRC PL: 2.97 L (ref 1.59–3.23)
PRE FVC: 1.86 L (ref 2.19–3.47)
PRE IC: 1.08 L (ref -16448.41–16451.59)
PRE PEF: 3.8 L/S (ref 4.47–7.38)
PRE REF IC: 68.1 %
PRE RV: 2.18 L (ref 0.97–2.12)
PRE TLC: 4.05 L (ref 3.11–5.09)
RAW PRE REF: 169 %
RAW PRE: 5.17 CMH2O*S/L (ref 3.06–3.06)
RAW REF: 3.06
REF IC: 1.59
RV LLN: 0.97
RV PRE REF: 141.1 %
RV REF: 1.54
RVTLC LLN: 27
RVTLC PRE REF: 146.9 %
RVTLC PRE: 53.81 % (ref 27.05–46.23)
RVTLC REF: 37
RVTLCULN: 46
RVULN: 2.12
SGAW PRE REF: 57.3 %
SGAW PRE: 0.06 1/(CMH2O*S) (ref 0.1–0.1)
SGAW REF: 0.1
TLC LLN: 3.11
TLC PRE REF: 98.8 %
TLC REF: 4.1
TLC ULN: 5.09
ULN IC: ABNORMAL
VA PRE: 2.77 L (ref 3.95–3.95)
VA SINGLE BREATH LLN: 3.95
VA SINGLE BREATH PRE REF: 70.1 %
VA SINGLE BREATH REF: 3.95
VASINGLEBREATHULN: 3.95
VC LLN: 2.19
VC PRE REF: 66.4 %
VC PRE: 1.87 L (ref 2.19–3.47)
VC REF: 2.82
VC ULN: 3.47

## 2023-09-27 PROCEDURE — 73220 MRI UPPR EXTREMITY W/O&W/DYE: CPT | Mod: 26,LT,, | Performed by: RADIOLOGY

## 2023-09-27 PROCEDURE — 94726 PULM FUNCT TST PLETHYSMOGRAP: ICD-10-PCS | Mod: S$GLB,,, | Performed by: INTERNAL MEDICINE

## 2023-09-27 PROCEDURE — 99214 OFFICE O/P EST MOD 30 MIN: CPT | Mod: S$GLB,,, | Performed by: PHYSICIAN ASSISTANT

## 2023-09-27 PROCEDURE — 94726 PLETHYSMOGRAPHY LUNG VOLUMES: CPT | Mod: S$GLB,,, | Performed by: INTERNAL MEDICINE

## 2023-09-27 PROCEDURE — 94010 BREATHING CAPACITY TEST: ICD-10-PCS | Mod: 59,S$GLB,, | Performed by: INTERNAL MEDICINE

## 2023-09-27 PROCEDURE — 3078F PR MOST RECENT DIASTOLIC BLOOD PRESSURE < 80 MM HG: ICD-10-PCS | Mod: CPTII,S$GLB,, | Performed by: INTERNAL MEDICINE

## 2023-09-27 PROCEDURE — 3074F SYST BP LT 130 MM HG: CPT | Mod: CPTII,S$GLB,, | Performed by: PHYSICIAN ASSISTANT

## 2023-09-27 PROCEDURE — 3074F PR MOST RECENT SYSTOLIC BLOOD PRESSURE < 130 MM HG: ICD-10-PCS | Mod: CPTII,S$GLB,, | Performed by: INTERNAL MEDICINE

## 2023-09-27 PROCEDURE — 3008F PR BODY MASS INDEX (BMI) DOCUMENTED: ICD-10-PCS | Mod: CPTII,S$GLB,, | Performed by: PHYSICIAN ASSISTANT

## 2023-09-27 PROCEDURE — 3008F BODY MASS INDEX DOCD: CPT | Mod: CPTII,S$GLB,, | Performed by: INTERNAL MEDICINE

## 2023-09-27 PROCEDURE — 25500020 PHARM REV CODE 255: Performed by: PHYSICIAN ASSISTANT

## 2023-09-27 PROCEDURE — 94618 PULMONARY STRESS TESTING: ICD-10-PCS | Mod: S$GLB,,, | Performed by: INTERNAL MEDICINE

## 2023-09-27 PROCEDURE — 99999 PR PBB SHADOW E&M-EST. PATIENT-LVL V: ICD-10-PCS | Mod: PBBFAC,,, | Performed by: PHYSICIAN ASSISTANT

## 2023-09-27 PROCEDURE — 3078F PR MOST RECENT DIASTOLIC BLOOD PRESSURE < 80 MM HG: ICD-10-PCS | Mod: CPTII,S$GLB,, | Performed by: PHYSICIAN ASSISTANT

## 2023-09-27 PROCEDURE — 3074F PR MOST RECENT SYSTOLIC BLOOD PRESSURE < 130 MM HG: ICD-10-PCS | Mod: CPTII,S$GLB,, | Performed by: PHYSICIAN ASSISTANT

## 2023-09-27 PROCEDURE — 99214 PR OFFICE/OUTPT VISIT, EST, LEVL IV, 30-39 MIN: ICD-10-PCS | Mod: S$GLB,,, | Performed by: PHYSICIAN ASSISTANT

## 2023-09-27 PROCEDURE — 3078F DIAST BP <80 MM HG: CPT | Mod: CPTII,S$GLB,, | Performed by: PHYSICIAN ASSISTANT

## 2023-09-27 PROCEDURE — 1160F RVW MEDS BY RX/DR IN RCRD: CPT | Mod: CPTII,S$GLB,, | Performed by: INTERNAL MEDICINE

## 2023-09-27 PROCEDURE — 71046 X-RAY EXAM CHEST 2 VIEWS: CPT | Mod: 26,,, | Performed by: RADIOLOGY

## 2023-09-27 PROCEDURE — 3074F SYST BP LT 130 MM HG: CPT | Mod: CPTII,S$GLB,, | Performed by: INTERNAL MEDICINE

## 2023-09-27 PROCEDURE — 3078F DIAST BP <80 MM HG: CPT | Mod: CPTII,S$GLB,, | Performed by: INTERNAL MEDICINE

## 2023-09-27 PROCEDURE — 99999 PR PBB SHADOW E&M-EST. PATIENT-LVL V: CPT | Mod: PBBFAC,,, | Performed by: PHYSICIAN ASSISTANT

## 2023-09-27 PROCEDURE — 3008F PR BODY MASS INDEX (BMI) DOCUMENTED: ICD-10-PCS | Mod: CPTII,S$GLB,, | Performed by: INTERNAL MEDICINE

## 2023-09-27 PROCEDURE — 71046 X-RAY EXAM CHEST 2 VIEWS: CPT | Mod: TC,FY

## 2023-09-27 PROCEDURE — 1160F PR REVIEW ALL MEDS BY PRESCRIBER/CLIN PHARMACIST DOCUMENTED: ICD-10-PCS | Mod: CPTII,S$GLB,, | Performed by: INTERNAL MEDICINE

## 2023-09-27 PROCEDURE — 1159F MED LIST DOCD IN RCRD: CPT | Mod: CPTII,S$GLB,, | Performed by: INTERNAL MEDICINE

## 2023-09-27 PROCEDURE — 99999 PR PBB SHADOW E&M-EST. PATIENT-LVL III: CPT | Mod: PBBFAC,,, | Performed by: INTERNAL MEDICINE

## 2023-09-27 PROCEDURE — 73220 MRI UPPR EXTREMITY W/O&W/DYE: CPT | Mod: TC,LT

## 2023-09-27 PROCEDURE — 1159F MED LIST DOCD IN RCRD: CPT | Mod: CPTII,S$GLB,, | Performed by: PHYSICIAN ASSISTANT

## 2023-09-27 PROCEDURE — 1159F PR MEDICATION LIST DOCUMENTED IN MEDICAL RECORD: ICD-10-PCS | Mod: CPTII,S$GLB,, | Performed by: PHYSICIAN ASSISTANT

## 2023-09-27 PROCEDURE — 3008F BODY MASS INDEX DOCD: CPT | Mod: CPTII,S$GLB,, | Performed by: PHYSICIAN ASSISTANT

## 2023-09-27 PROCEDURE — 73220 MRI HAND FINGERS W WO CONTRAST LEFT: ICD-10-PCS | Mod: 26,LT,, | Performed by: RADIOLOGY

## 2023-09-27 PROCEDURE — A9585 GADOBUTROL INJECTION: HCPCS | Performed by: PHYSICIAN ASSISTANT

## 2023-09-27 PROCEDURE — 94729 DIFFUSING CAPACITY: CPT | Mod: S$GLB,,, | Performed by: INTERNAL MEDICINE

## 2023-09-27 PROCEDURE — 99203 PR OFFICE/OUTPT VISIT, NEW, LEVL III, 30-44 MIN: ICD-10-PCS | Mod: 25,S$GLB,, | Performed by: INTERNAL MEDICINE

## 2023-09-27 PROCEDURE — 94729 PR C02/MEMBANE DIFFUSE CAPACITY: ICD-10-PCS | Mod: S$GLB,,, | Performed by: INTERNAL MEDICINE

## 2023-09-27 PROCEDURE — 99203 OFFICE O/P NEW LOW 30 MIN: CPT | Mod: 25,S$GLB,, | Performed by: INTERNAL MEDICINE

## 2023-09-27 PROCEDURE — 94618 PULMONARY STRESS TESTING: CPT | Mod: S$GLB,,, | Performed by: INTERNAL MEDICINE

## 2023-09-27 PROCEDURE — 71046 XR CHEST PA AND LATERAL: ICD-10-PCS | Mod: 26,,, | Performed by: RADIOLOGY

## 2023-09-27 PROCEDURE — 99999 PR PBB SHADOW E&M-EST. PATIENT-LVL III: ICD-10-PCS | Mod: PBBFAC,,, | Performed by: INTERNAL MEDICINE

## 2023-09-27 PROCEDURE — 1159F PR MEDICATION LIST DOCUMENTED IN MEDICAL RECORD: ICD-10-PCS | Mod: CPTII,S$GLB,, | Performed by: INTERNAL MEDICINE

## 2023-09-27 PROCEDURE — 94010 BREATHING CAPACITY TEST: CPT | Mod: 59,S$GLB,, | Performed by: INTERNAL MEDICINE

## 2023-09-27 RX ORDER — GADOBUTROL 604.72 MG/ML
7 INJECTION INTRAVENOUS
Status: COMPLETED | OUTPATIENT
Start: 2023-09-27 | End: 2023-09-27

## 2023-09-27 RX ORDER — ALBUTEROL SULFATE 90 UG/1
2 AEROSOL, METERED RESPIRATORY (INHALATION) EVERY 6 HOURS PRN
Qty: 18 G | Refills: 6 | Status: SHIPPED | OUTPATIENT
Start: 2023-09-27 | End: 2024-09-26

## 2023-09-27 RX ADMIN — GADOBUTROL 7 ML: 604.72 INJECTION INTRAVENOUS at 10:09

## 2023-09-27 NOTE — PROGRESS NOTES
"[facility]    SUBJECTIVE:     Chief Complaint: ***    History of Present Illness:  Yuni Perez is a 53 y.o. female who presents for ***    Idiopathic PAH     {Pulmonary CC:50163}    Smoking: ***  Other substances: ***  Inhalers: ***  Travel: ***  Incarceration/homelessness: ***  Occupational/environmental exposures: ***  Pets/hobbies: ***  Recent illness: ***  B-Symptoms: ***  Autoimmune symptoms/features: ***  Intubation Hx: ***  Hx childhood asthma/atopy: ***  Family Hx: ***      Review of patient's allergies indicates:   Allergen Reactions    Fentanyl Anaphylaxis     Respiratory distress    Vibra-tabs [doxycycline hyclate] Anaphylaxis     "throat felt like it was closing"    Adhesive Hives     Silk tape    Amoxicillin Rash    Nsaids (non-steroidal anti-inflammatory drug) Swelling    Chlorhexidine Other (See Comments)     Blue Chlorhexidine causes hives       Past Medical History:   Diagnosis Date    AR (allergic rhinitis)     Cholelithiasis, common bile duct     Chronic low back pain     Eye pressure 2017    General anesthetics causing adverse effect in therapeutic use     GERD (gastroesophageal reflux disease)     History of migraine headaches     Hypothyroidism     Innocent heart murmur     Lumbar disc disease     Menorrhagia     Mild asthma     Obesity     Plantar fasciitis of left foot     Primary pulmonary hypertension     followed by heart transplant/pulmonary     Seizure disorder     x 1 in 2008    Seizures     Sleep apnea     TMJ (dislocation of temporomandibular joint)     Tricuspid regurgitation      Past Surgical History:   Procedure Laterality Date    CARDIAC CATHETERIZATION      CATHETERIZATION OF BOTH LEFT AND RIGHT HEART Bilateral 9/29/2020    Procedure: CATHETERIZATION, HEART, BOTH LEFT AND RIGHT;  Surgeon: Gucci Pickett MD;  Location: [facility] CATH LAB;  Service: Cardiology;  Laterality: Bilateral;    CENTRAL VENOUS CATHETER INSERTION      CORONARY ANGIOGRAPHY N/A " 9/29/2020    Procedure: ANGIOGRAM, CORONARY ARTERY;  Surgeon: Gucci Pickett MD;  Location: [facility] CATH LAB;  Service: Cardiology;  Laterality: N/A;    gallbladder drain  06/2019    INSERTION OF HAYNES CATHETER Right 6/17/2020    Procedure: INSERTION, CATHETER, CENTRAL VENOUS, HAYNES Replace Single Lumen for Remodulin Infusion;  Surgeon: Bertin Dewitt MD;  Location: [facility] OR [facility] FLR;  Service: General;  Laterality: Right;    PORTACATH PLACEMENT      REMOVAL OF TUNNELED CENTRAL VENOUS CATHETER (CVC) N/A 6/17/2020    Procedure: REMOVAL, CATHETER, CENTRAL VENOUS, TUNNELED;  Surgeon: Bertin Dewitt MD;  Location: [facility] OR [facility] FLR;  Service: General;  Laterality: N/A;    RIGHT HEART CATHETERIZATION Right 8/20/2018    Procedure: HEART CATH-RIGHT;  Surgeon: Ivonne Rai MD;  Location: [facility] CATH LAB;  Service: Cardiology;  Laterality: Right;    RIGHT HEART CATHETERIZATION Right 9/4/2019    Procedure: INSERTION, CATHETER, RIGHT HEART;  Surgeon: Ivonne Rai MD;  Location: [facility] CATH LAB;  Service: Cardiology;  Laterality: Right;    RIGHT HEART CATHETERIZATION Right 6/10/2022    Procedure: INSERTION, CATHETER, RIGHT HEART;  Surgeon: Keshia Poe MD;  Location: [facility] CATH LAB;  Service: Cardiology;  Laterality: Right;    UPPER GASTROINTESTINAL ENDOSCOPY       Family History   Problem Relation Age of Onset    Diabetes Mother     Heart disease Father     Glaucoma Father     No Known Problems Sister     Cancer Maternal Aunt         breast    Breast cancer Maternal Aunt     No Known Problems Maternal Uncle     No Known Problems Paternal Aunt     No Known Problems Paternal Uncle     Cancer Maternal Grandmother         uterine    Diabetes Maternal Grandfather     Heart attack Maternal Grandfather     No Known Problems Paternal Grandmother     Heart disease Paternal Grandfather     Heart attack Paternal Grandfather     Diabetes Paternal Grandfather     Leukemia Brother     Breast cancer Cousin     Breast cancer  Cousin     Hypertension Other     Colon cancer Neg Hx     Ovarian cancer Neg Hx     Amblyopia Neg Hx     Blindness Neg Hx     Cataracts Neg Hx     Macular degeneration Neg Hx     Retinal detachment Neg Hx     Strabismus Neg Hx     Stroke Neg Hx     Thyroid disease Neg Hx     Esophageal cancer Neg Hx      Social History     Socioeconomic History    Marital status: Single    Number of children: 0   Occupational History    Occupation: disabled     Employer: [employer]   Tobacco Use    Smoking status: Never    Smokeless tobacco: Never   Substance and Sexual Activity    Alcohol use: No     Alcohol/week: 0.8 standard drinks of alcohol     Types: 1 Standard drinks or equivalent per week     Comment: socially    Drug use: No    Sexual activity: Never     Birth control/protection: OCP, Pill     Comment: pt is a virgin       Review of Systems:  ROS  CV: no syncope  ENT: no sore throat  Resp: per hpi  Eyes: no eye pain  Gastrointestinal: no nausea or vomiting  Integument/Breast: no rash  Musculoskeletal: no arthralgias  Neurological: no headaches  Behavioral/Psych: no confusion or depression  Heme: no bleeding      OBJECTIVE:     Vital Signs  There were no vitals filed for this visit.  There is no height or weight on file to calculate BMI.    Physical Exam:  Physical Exam  General: no distress  Eyes:  conjunctivae/corneas clear  Nose: no discharge  Neck: trachea midline with no masses appreciated  Lungs:  normal respiratory effort, no wheezes, no rales  Heart: regular rate and rhythm and no murmur  Abdomen: non-distended  Extremities: no cyanosis, no edema, no clubbing  Skin: No rashes or lesions. good skin turgor  Neurologic: alert, oriented, thought content appropriate    Laboratory:  CBC  Lab Results   Component Value Date    WBC 5.31 06/23/2023    HGB 14.8 06/23/2023    HCT 43.8 06/23/2023     06/23/2023    MCV 91 06/23/2023    RDW 14.2 06/23/2023     BMP  Lab Results   Component Value Date      06/23/2023    K 4.2 06/23/2023     (H) 06/23/2023    CO2 21 (L) 06/23/2023    BUN 12 06/23/2023    CREATININE 0.7 06/23/2023    GLU 88 06/23/2023    CALCIUM 8.4 (L) 06/23/2023    MG 2.1 06/23/2023    PHOS 2.8 09/10/2014     LFTs  Lab Results   Component Value Date    PROT 6.8 06/23/2023    ALBUMIN 3.6 06/23/2023    BILITOT 0.2 06/23/2023    AST 12 06/23/2023    ALKPHOS 76 06/23/2023    ALT 10 06/23/2023       PFTs ***       FVC (Pre) ***   FVC % (Pre) ***   FVC (Post) ***   FVC % Change ***   FEV1 (Pre) ***   FEV1 % (Pre) ***   FEV1 (Post) ***   FEV1 % Change ***   FEV1/FVC ***   TLC ***   TLC% ***   RV ***   RV% ***   DLCOunc ***   DLCOunc% ***   DLCOcor ***   DLCOcor% ***   VA ***   IVC ***       {PUL PFT OBSTRUCTION:21516}  {PUL PFT RESPONSE TO BRONCH:21527}  {PUL PFT RESTRICTION:21524}  {PUL PFT DIFFUSION CAPACITY:21533:::1}      *** 6MWT:  Patient ambulated *** meters  Baseline SpO2 ***% desaturated to ***%  SpO2 recovered to ***% with ***LPM nasal cannula  Ab Scale - Legs *** Breathing ***    {PUL SIX MINUTE WALK CLINICAL INTERPRETATION:85773}    Chest Imaging, My Impression:   ***      Diagnostic Results:  {Results; Diagnostics:[id number]}    ASSESSMENT/PLAN:     No problems updated.  Problem List Items Addressed This Visit    None      DARNELL Hdz MD  [facility]

## 2023-09-27 NOTE — PROGRESS NOTES
Subjective:      Patient ID: Yuni Perez is a 53 y.o. female.    Chief Complaint:Follow-up      History of Present Illness    HPI    Yuni Perez is a 53 year old female referred to ID for evaluation of cat bite to her left hand. She was cleaning her cat's ears on 8/6 and her cat bit the dorsum of her left hand. A few days later, she developed redness, swelling and pain and purulent drainage. She was seen by a primary care provider who cultured the drainage which grew pasturella. X-ray hand did not show fracture or dislocation. Soft tissue swelling noted on the dorsum of the hand. She was prescribed bactrim and clindamycin x 10 days which she completed yesterday. Her hand swelling and pain have improved though still present and she still has pain with ROM and unable to fully make a fist. Erythema present. She is soaking her hand in episom salt. Her Tdap has been updated. Cat is vaccinated. She has history of hives with amoxicillin and throat swelling with doxycyline.     9/27/23 Patient follows up today in ID clinic.  She has been on Levaquin and flagyl, which she was started on at her initial ID visit on 8/21/23, essentially continuously x 4 weeks.  She had MRI of hand today.  Her hand has improved and wound has healed but still slightly swollen impairing her ability to completely close her hand.  She denies abx side effects.  The patient denies any recent fever, chills, or sweats.         Review of Systems   Constitutional: Negative for chills, decreased appetite, fever, malaise/fatigue, night sweats, weight gain and weight loss.   HENT:  Negative for congestion, ear pain, hearing loss, hoarse voice, sore throat and tinnitus.    Eyes:  Negative for blurred vision, redness and visual disturbance.   Cardiovascular:  Negative for chest pain and palpitations.   Respiratory:  Positive for shortness of breath (chronic). Negative for cough and sputum production.    Hematologic/Lymphatic:  "Negative for adenopathy. Does not bruise/bleed easily.   Skin:  Positive for color change and dry skin. Negative for itching, rash and suspicious lesions.        redness   Musculoskeletal:  Positive for stiffness. Negative for back pain, joint pain, joint swelling, myalgias and neck pain.   Gastrointestinal:  Positive for abdominal pain and diarrhea (IBS). Negative for constipation, heartburn, nausea and vomiting.   Genitourinary:  Negative for dysuria and urgency.   Neurological:  Negative for dizziness, headaches, numbness, paresthesias and weakness.   Psychiatric/Behavioral:  Negative for depression and memory loss. The patient does not have insomnia and is not nervous/anxious.    Allergic/Immunologic: Negative for environmental allergies, HIV exposure and hives.     Objective:     Vitals:    09/27/23 1444   BP: 93/67   Pulse: 85   Weight: 63.8 kg (140 lb 10.5 oz)   Height: 4' 11" (1.499 m)   PainSc: 0-No pain     Physical Exam  Constitutional:       General: She is not in acute distress.     Appearance: Normal appearance. She is well-developed. She is not ill-appearing or diaphoretic.   HENT:      Head: Normocephalic and atraumatic.      Right Ear: External ear normal.      Left Ear: External ear normal.      Nose: Nose normal.   Eyes:      General: No scleral icterus.        Right eye: No discharge.         Left eye: No discharge.      Extraocular Movements: Extraocular movements intact.      Conjunctiva/sclera: Conjunctivae normal.   Pulmonary:      Effort: Pulmonary effort is normal. No respiratory distress.      Breath sounds: No stridor.   Musculoskeletal:         General: Swelling and signs of injury present. No tenderness.        Arms:    Skin:     General: Skin is warm and dry.      Findings: No erythema or rash.      Comments: Left hand swelling and redness. TTP. No open wound or drainage. Decreased ROM 2/2 pain.    Neurological:      General: No focal deficit present.      Mental Status: She is alert " and oriented to person, place, and time. Mental status is at baseline.      Cranial Nerves: No cranial nerve deficit.   Psychiatric:         Mood and Affect: Mood normal.         Behavior: Behavior normal.         Thought Content: Thought content normal.         Judgment: Judgment normal.               8/23/23 below      8/9/23 below           Latest Reference Range & Units 09/27/23 11:04   CRP 0.0 - 8.2 mg/L 4.5     Imaging:  MRI Hand Fingers W WO Contrast Left  Order: [id number]  Status: Final result     Visible to patient: No (inaccessible in Patient Portal)     Next appt: 10/25/2023 at 10:45 AM in Optometry (Jude Casas, OD)     Dx: Pain of left hand; Cat bite, subseque...     0 Result Notes  Details    Reading Physician Reading Date Result Priority   Cullen Olmedo MD  [phone] 9/27/2023 Routine     Narrative & Impression  EXAMINATION:  MRI HAND FINGERS W WO CONTRAST LEFT     CLINICAL HISTORY:  Septic arthritis suspected, hand, xray done;Soft tissue infection suspected, hand, xray done;  Bitten by cat, subsequent encounter     TECHNIQUE:  Multiplanar multisequence MRI examination of the left hand performed with and without the use of Gadavist 7 ml     COMPARISON:  8/9/23     FINDINGS:  There is mild dorsal subcutaneous edema/enhancement at 4/5 extensor level, particularly peritendinous.  There is no muscle edema.  There is no significant superficial or deep fascial fluid.  There is no large drainable fluid collection .  There is no focal bone marrow edema.  There is no large joint effusion.     Joints:     Alignment: Normal     Fluid: Normal     Synovial thickening/synovitis: Absent     Bony productive changes: Absent     Tenosynovitis: Absent     Joint space/cartilage: Normal     Erosion/cystic change: Absent     Bone marrow edema: Absent     Ankylosis: Absent     Incidental findings: None     Impression:     No evidence of septic arthritis or osteomyelitis.Subcutaneous edema and  edema/enhancement dorsal 4/5 extensor level (peritendinous) without significant tenosynovitis.        Electronically signed by: Cullen Olmedo MD  Date:                                            09/27/2023  Time:                                           11:08           Exam Ended: 09/27/23 10:24 Last Resulted: 09/27/23 11:08       Order Details     View Encounter     Lab and Collection Details     Routing     Result History     View All Conversations on this Encounter             Assessment:       1. Pasteurella cellulitis due to cat bite       52 yo female s/p cat bite in Aug on 4 weeks with Levaquin and Flagyl with improvement.  Hand with mild residual edema but no active sign of infection.  MRI without fluid collection or tenosynovitis or osteomyelitis.  CRP WNL.     Plan:   Stop Levofloxacin 750 mg PO once daily and Flagyl 500 mg PO TID x 10 days  Monitor symptoms closely and notify ID for any sign of infection.   The patient understands and agrees with the plan of care. All questions were answered.  Rec hand elevation for edema.  If edema not improving then contact me and she can bet referred to ortho hand   RTC PRN           35 minutes of total time was spent on this encounter, which includes face to face time and non-face to face time preparing to see the patient (eg, review of tests), Obtaining and/or reviewing separately obtained history, documenting clinical information in the electronic or other health record, independently interpreting results (not separately reported) and communicating results to the patient/family/caregiver, or care coordination (not separately reported).

## 2023-09-27 NOTE — PROCEDURES
Yuni Perez is a 53 y.o.  female patient, who presents for a 6 minute walk test ordered by MD Gurvinder.  The diagnosis is Pulmonary Hypertension.  The patient's BMI is 28.2 kg/m2.  Predicted distance (lower limit of normal) is 398.87 meters.      Test Results:    The test was completed without stopping. The total time walked was 360 seconds. During walking, the patient reported:  Chest pain, Dyspnea, Lightheadedness. The patient used no assistive devices during testing.     09/27/2023---------Distance: 389.23 meters (1277 feet)     O2 Sat % Supplemental Oxygen Heart Rate Blood Pressure Ab Scale   Pre-exercise  (Resting) 96 % Room Air 90 bpm 106/60 mmHg 3   During Exercise 95 % Room Air 147 bpm 123/81 mmHg 7-8   Post-exercise  (Recovery) 96 % Room Air  100 bpm 116/71 mmHg      Recovery Time: 152 seconds    Performing nurse/tech: Haim MANZANO      PREVIOUS STUDY:   06/23/2023---------Distance: 396.24 meters (1300 feet)       O2 Sat % Supplemental Oxygen Heart Rate Blood Pressure Ab Scale   Pre-exercise  (Resting) 96 % Room Air 86 bpm 112/63 mmHg 3   During Exercise 96 % Room Air 148 bpm 116/58 mmHg 9   Post-exercise  (Recovery) 96 % Room Air  98 bpm          CLINICAL INTERPRETATION:  Six minute walk distance is 389.23 meters (1277 feet) with very heavy dyspnea.  During exercise, there was no significant desaturation while breathing room air.  Blood pressure remained stable and Heart rate increased significantly with walking.  This may represent a tachycardic response to exercise.  The patient reported non-pulmonary symptoms during exercise.  Since the previous study in June 2023, exercise capacity is unchanged.  Based upon age and body mass index, exercise capacity is less than predicted.

## 2023-09-27 NOTE — PROGRESS NOTES
Subjective:      Patient ID: Yuni Perez is a 53 y.o. female.    Chief Complaint: Pulmonary Hypertension (Patient states since last seen in  she has not had any major problems. She is back to [facility] care after seeing her cardiologist.  )    HPI  53-year-old lady with PH who presents for follow-up of asthma.  Patient has a past medical history of group 1 PH diagnosed in  started on Remodulin Letairis and Adcirca.  Patient is currently on 117 ng of Remodulin Letairis and Adcirca and has been doing well.  She is on room air has asthma that is well controlled on Breo 200 once a day.  She rinses her mouth out afterwards has not used her albuterol prescription at all, in fact it has actually .  Patient was evaluated for lung transplant however was not deemed to be a candidate.  She is done very well.  Denies fevers chills night sweats nausea vomiting diarrhea constipation.  Does not exercise except for housework.  I recommended that she walk 30 minutes of a brisk walk every day  Review of Systems  Objective:     Physical Exam   Constitutional: She is oriented to person, place, and time. She appears well-developed and well-nourished. She appears not cachectic. No distress.   HENT:   Head: Normocephalic.   Right Ear: External ear normal.   Left Ear: External ear normal.   Neck: No thyromegaly present.   Cardiovascular: Normal rate, regular rhythm, normal heart sounds and intact distal pulses. Exam reveals no gallop and no friction rub.   No murmur heard.  Pulmonary/Chest: Normal expansion, symmetric chest wall expansion, effort normal and breath sounds normal. No stridor. No respiratory distress. She has no decreased breath sounds. She has no wheezes. She has no rhonchi. She has no rales. Chest wall is not dull to percussion. She exhibits no tenderness. Negative for egophony. Negative for tactile fremitus.   Abdominal: She exhibits no distension.   Musculoskeletal:         General: No tenderness,  "deformity or edema.      Cervical back: Normal range of motion.   Lymphadenopathy:     She has no cervical adenopathy.   Neurological: She is alert and oriented to person, place, and time. No cranial nerve deficit. Gait normal.   Skin: Skin is warm and dry. No rash noted. She is not diaphoretic. No cyanosis or erythema. No pallor. Nails show no clubbing.   Psychiatric: She has a normal mood and affect. Her behavior is normal. Judgment and thought content normal.     Personal Diagnostic Review        9/27/2023     1:56 PM 9/27/2023    12:33 PM 8/23/2023     9:39 AM 8/21/2023     9:26 AM 8/9/2023    11:26 AM 6/23/2023    10:56 AM 6/23/2023    10:22 AM   Pulmonary Function Tests   SpO2 96 %  98 %  93 % 95 %    Ordering Provider  MD Gurvinder        Performing nurse/tech/RT  [place] RRT        Diagnosis  Pulmonary Hypertension        Height 4' 11" (1.499 m) 4' 11" (1.499 m) 4' 11" (1.499 m) 4' 11" (1.499 m) 4' 11" (1.499 m) 4' 11" (1.499 m) 4' 11.02" (1.499 m)   Weight 63.4 kg (139 lb 12.4 oz) 63.4 kg (139 lb 12.4 oz) 64.2 kg (141 lb 8.6 oz) 64.7 kg (142 lb 10.2 oz) 63.4 kg (139 lb 12.4 oz) 66 kg (145 lb 8.1 oz) 65.3 kg (144 lb)   BMI (Calculated) 28.2 28.2 28.6 28.8 28.2 29.4 29.1   Patient Race          6MWT Status  completed without stopping        Patient Reported  Chest pain;Dyspnea;Lightheadedness        Was O2 used?  No        6MW Distance walked (feet)  1277 feet        Distance walked (meters)  389.23 meters        Did patient stop?  No        Type of assistive device(s) used?  no assistive devices        Oxygen Saturation  96 %        Supplemental Oxygen  Room Air        Heart Rate  90 bpm        Blood Pressure  106/60        Ab Dyspnea Rating   moderate        Oxygen Saturation  95 %        Supplemental Oxygen  Room Air        Heart Rate  147 bpm        Blood Pressure  123/81        Ab Dyspnea Rating   very heavy        Recovery Time (seconds)  152 seconds        Oxygen Saturation  96 %      "   Supplemental Oxygen  Room Air        Heart Rate  100 bpm        Blood Pressure  116/71        Is procedure ready for interpretation?  Yes        Oxygen Qualification?  No             Assessment:     1. Mild intermittent asthma, unspecified whether complicated    2. Chronic pulmonary heart disease    3. Primary pulmonary hypertension         Outpatient Encounter Medications as of 9/27/2023   Medication Sig Dispense Refill    acetaminophen (TYLENOL) 500 MG tablet Take 1,000 mg by mouth every 6 (six) hours as needed for Pain.      ADCIRCA 20 mg Tab Take 2 tablets (40 mg total) by mouth once daily. 60 tablet 11    alprazolam (XANAX ORAL) Take by mouth as needed.      ambrisentan (LETAIRIS) 10 MG Tab Take 1 tablet (10 mg total) by mouth once daily. 30 tablet 11    azelastine (ASTELIN) 137 mcg (0.1 %) nasal spray 2 sprays by Nasal route 2 (two) times daily. Patient uses prn in the evening      butalbital-acetaminophen-caffeine -40 mg (FIORICET, ESGIC) -40 mg per tablet Take 1 tablet by mouth every 4 (four) hours as needed for Pain.      cyanocobalamin, vitamin B-12, 2,500 mcg Subl Place 2,500 mcg under the tongue every morning.       diphenhydrAMINE (BENADRYL) 25 mg capsule Take 50 mg by mouth every 6 (six) hours as needed for Itching. Patient takes prn evenings      estrogen, conjugated,-medroxyprogesterone 0.625-2.5mg (PREMPRO) 0.625-2.5 mg per tablet Take 1 tablet by mouth once daily. 90 tablet 3    ferrous sulfate 325 (65 FE) MG EC tablet Take 325 mg by mouth daily as needed.       fluticasone furoate-vilanteroL (BREO ELLIPTA) 200-25 mcg/dose DsDv diskus inhaler Inhale 1 puff into the lungs every evening. Controller 180 each 0    fluticasone propionate (FLONASE) 50 mcg/actuation nasal spray 2 sprays (100 mcg total) by Each Nostril route every evening. 16 g 11    folic acid (FOLVITE) 1 MG tablet TAKE 1 TABLET BY MOUTH EVERY DAY 90 tablet 0    furosemide (LASIX) 20 MG tablet Take 1 tablet (20 mg total) by  mouth once daily. 30 tablet 11    gabapentin (NEURONTIN) 300 MG capsule Take 1 capsule (300 mg total) by mouth every evening. 30 capsule 11    glucosam/chond-msm1/C/michele/bor (GLUCOSAMINE-CHOND-MSM COMPLEX ORAL) Take by mouth.      hyoscyamine (LEVSIN/SL) 0.125 mg Subl Place 1 tablet (0.125 mg total) under the tongue every 6 (six) hours as needed (Aa needed). 60 tablet 0    lactic acid-citric-potassium (PHEXXI) 1.8-1-0.4 % Gel Place 1 Applicatorful vaginally as needed (CONTRACEPTIVE). 5 g 2    levoFLOXacin (LEVAQUIN) 750 MG tablet Take 1 tablet (750 mg total) by mouth once daily. 14 tablet 0    levothyroxine (SYNTHROID) 125 MCG tablet TAKE ONE TABLET BY MOUTH EVERY MORNING 1 HOUR BEFORE BREAKFAST AND OTHER MEDICATIONS (FOR THYROID). 90 tablet 0    loratadine (CLARITIN) 10 mg tablet Take 10 mg by mouth every evening.       magnesium oxide (MAG-OX) 400 mg (241.3 mg magnesium) tablet Take 1 tablet (400 mg total) by mouth once daily. 30 tablet 5    ondansetron (ZOFRAN) 4 MG tablet Take 1 tablet (4 mg total) by mouth every 6 (six) hours as needed. 1 Tablet Oral Every 6 hours 30 tablet 2    pantoprazole (PROTONIX) 40 MG tablet TAKE ONE TABLET BY MOUTH ONCE DAILY AS NEEDED FOR HEARTBURN 90 tablet 0    tiZANidine (ZANAFLEX) 4 MG tablet Take 4 mg by mouth every 6 (six) hours as needed.      topiramate (TOPAMAX) 100 MG tablet Take 1 tablet (100 mg total) by mouth 2 (two) times daily. 60 tablet 5    treprostinil (REMODULIN) 2.5 mg/mL Soln Mix cassette as directed and infuse continuously per physician titration orders on dosing sheet. Current dose 80ng/kg/min 60 mL 11    ubrogepant (UBRELVY)tablet 100 mg TAKE ONE TABLET BY MOUTH AS A SINGLE DOSE AS NEEDED FOR MIGRAINE      albuterol (PROAIR HFA) 90 mcg/actuation inhaler Inhale 2 puffs into the lungs every 6 (six) hours as needed for Wheezing. Rescue 18 g 6    albuterol-ipratropium (DUO-NEB) 2.5 mg-0.5 mg/3 mL nebulizer solution Take 3 mLs by nebulization every 6 (six) hours as  needed for Wheezing. Rescue 6 each 6    [] levoFLOXacin (LEVAQUIN) 750 MG tablet Take 1 tablet (750 mg total) by mouth once daily. for 10 days 10 tablet 0    LIDOcaine (LIDODERM) 5 % Place 1 patch onto the skin once daily. (Patient not taking: Reported on 2023) 15 patch 0    [] metroNIDAZOLE (FLAGYL) 500 MG tablet Take 1 tablet (500 mg total) by mouth 3 (three) times daily. for 10 days 30 tablet 0    metroNIDAZOLE (FLAGYL) 500 MG tablet Take 1 tablet (500 mg total) by mouth every 8 (eight) hours. for 14 days (Patient not taking: Reported on 2023) 42 tablet 0    [DISCONTINUED] albuterol (PROAIR HFA) 90 mcg/actuation inhaler Inhale 2 puffs into the lungs every 6 (six) hours as needed for Wheezing. Rescue 18 g 6    [DISCONTINUED] folic acid (FOLVITE) 1 MG tablet TAKE ONE TABLET BY MOUTH ONCE DAILY. 90 tablet 0    [DISCONTINUED] pantoprazole (PROTONIX) 40 MG tablet TAKE ONE TABLET BY MOUTH DAILY AS NEEDED FOR HEARTBURN 90 tablet 3     Facility-Administered Encounter Medications as of 2023   Medication Dose Route Frequency Provider Last Rate Last Admin    [COMPLETED] gadobutroL (GADAVIST) injection 7 mL  7 mL Intravenous ONCE PRN Citlaly Shell PA-C   7 mL at 23 1002     No orders of the defined types were placed in this encounter.      Plan:          Assessment:  Yuni was seen today for pulmonary hypertension.    Diagnoses and all orders for this visit:    Mild intermittent asthma, unspecified whether complicated    Chronic pulmonary heart disease  -     albuterol (PROAIR HFA) 90 mcg/actuation inhaler; Inhale 2 puffs into the lungs every 6 (six) hours as needed for Wheezing. Rescue    Primary pulmonary hypertension  -     albuterol (PROAIR HFA) 90 mcg/actuation inhaler; Inhale 2 puffs into the lungs every 6 (six) hours as needed for Wheezing. Rescue        Plan:  Problem List Items Addressed This Visit       Chronic pulmonary heart disease    Relevant Medications    albuterol  (PROAIR HFA) 90 mcg/actuation inhaler    Mild intermittent asthma - Primary    Overview     Patient has done well from an asthma standpoint currently on Breo 200 once a day and tolerating well.  Denies any issues with chest tightness cough shortness of breath.  I recommended that she continue her current regimen refilled her albuterol and recommended that she walk 30 minutes of brisk walk every day.  Follow-up in 1 year         Primary pulmonary hypertension    Relevant Medications    albuterol (PROAIR HFA) 90 mcg/actuation inhaler     I spent a total of 30 minutes on the day of the visit.          No follow-ups on file.    There are no Patient Instructions on file for this visit.    Immunization History   Administered Date(s) Administered    COVID-19, MRNA, LN-S, PF (MODERNA FULL 0.5 ML DOSE) 03/18/2021, 04/17/2021, 07/01/2022    Hepatitis A / Hepatitis B 01/18/2017, 02/17/2017, 07/19/2017    Influenza 12/05/2006, 10/22/2008, 10/05/2010, 10/18/2011, 11/19/2012    Influenza (FLUAD) - Quadrivalent - Adjuvanted - PF *Preferred* (65+) 09/22/2020    Influenza - High Dose - PF (65 years and older) 01/12/2018, 11/20/2018, 10/11/2019    Influenza - Quadrivalent 10/16/2014, 10/30/2015    Influenza - Quadrivalent - PF *Preferred* (6 months and older) 12/05/2006, 10/22/2008, 10/05/2010, 10/18/2011, 11/19/2012, 01/18/2017    Influenza Split 11/19/2012    PPD Test 08/28/2006    Pneumococcal Conjugate - 13 Valent 08/22/2016    Pneumococcal Polysaccharide - 23 Valent 12/17/2008, 01/18/2017    Td - PF (ADULT) 08/09/2023    Tdap 12/17/2008, 01/18/2017

## 2023-10-10 ENCOUNTER — PATIENT OUTREACH (OUTPATIENT)
Dept: ADMINISTRATIVE | Facility: HOSPITAL | Age: 53
End: 2023-10-10
Payer: COMMERCIAL

## 2023-10-10 DIAGNOSIS — Z13.820 ENCOUNTER FOR SCREENING FOR OSTEOPOROSIS: ICD-10-CM

## 2023-10-10 DIAGNOSIS — Z12.31 ENCOUNTER FOR SCREENING MAMMOGRAM FOR MALIGNANT NEOPLASM OF BREAST: Primary | ICD-10-CM

## 2023-10-10 NOTE — PROGRESS NOTES
[facility] 1 Comm Gap Report 10.09.23 Colorectal Screening - Left message for patient to call our office. Please schedule.    Overdue Bone Mineral Density - Left message for patient to call our office. Please schedule.    Mammogram will be due soon - Left message for patient to call our office. Please schedule.

## 2023-10-11 ENCOUNTER — TELEPHONE (OUTPATIENT)
Dept: NEUROLOGY | Facility: CLINIC | Age: 53
End: 2023-10-11
Payer: COMMERCIAL

## 2023-10-12 ENCOUNTER — TELEPHONE (OUTPATIENT)
Dept: NEUROLOGY | Facility: CLINIC | Age: 53
End: 2023-10-12
Payer: COMMERCIAL

## 2023-10-20 DIAGNOSIS — E03.9 HYPOTHYROIDISM (ACQUIRED): ICD-10-CM

## 2023-10-20 RX ORDER — LEVOTHYROXINE SODIUM 125 UG/1
TABLET ORAL
Qty: 90 TABLET | Refills: 0 | Status: SHIPPED | OUTPATIENT
Start: 2023-10-20 | End: 2024-01-17

## 2023-10-20 NOTE — TELEPHONE ENCOUNTER
No care due was identified.  [facility] Embedded Care Due Messages. Reference number: [id number].   10/20/2023 2:11:06 AM CDT

## 2023-11-06 ENCOUNTER — TELEPHONE (OUTPATIENT)
Dept: TRANSPLANT | Facility: CLINIC | Age: 53
End: 2023-11-06
Payer: COMMERCIAL

## 2023-11-06 NOTE — TELEPHONE ENCOUNTER
KELSY TURNER (Key: [id number]) - [id number]  Letairis 10MG tablets  Status: PA Response - ApprovedCreated: October 14th, 2023 [phone]: November 6th, 2023    Approved today  CaseId:[id number];Status: Approved;Review Type:Prior Auth;Coverage Start Date:11/06/2023;Coverage End Date:11/05/2024;

## 2023-11-08 ENCOUNTER — TELEPHONE (OUTPATIENT)
Dept: TRANSPLANT | Facility: CLINIC | Age: 53
End: 2023-11-08
Payer: COMMERCIAL

## 2023-11-08 ENCOUNTER — TELEPHONE (OUTPATIENT)
Dept: FAMILY MEDICINE | Facility: CLINIC | Age: 53
End: 2023-11-08
Payer: COMMERCIAL

## 2023-11-08 DIAGNOSIS — Z12.31 BREAST CANCER SCREENING BY MAMMOGRAM: Primary | ICD-10-CM

## 2023-11-08 NOTE — TELEPHONE ENCOUNTER
Patient called in because she caught a cold from her father as did her mother but her mother now has pneumonia. Patient is concerned about getting pneumonia. NN advised patient to be seen by a provider if she has not done so already, she can also monitor for changes in symptoms or if they do not go away than she should be seen. Patient was also advised that she could take precautions like washing her hands and wearing a mask. Patient verbalized understanding.

## 2023-11-08 NOTE — TELEPHONE ENCOUNTER
----- Message from Abi Forte sent at 11/8/2023 12:34 PM CST -----  Regarding: req orders for Mammo  Contact: Pt @[phone]  Pt is calling to speak with someone in the office to request and order for  mammogram          . Pt received a letter in the mail to schedule; no order in Epic to schedule from. Pt is asking for a return call back once the order is in, so that they can be scheduled. Please call pt to advise. Thanks.        Additional Info: pt prefers an appt on 11/21 or 11/22 in the morning around 11am if neither days work pt states December is fine.

## 2023-11-15 ENCOUNTER — OFFICE VISIT (OUTPATIENT)
Dept: FAMILY MEDICINE | Facility: CLINIC | Age: 53
End: 2023-11-15
Payer: COMMERCIAL

## 2023-11-15 VITALS
SYSTOLIC BLOOD PRESSURE: 98 MMHG | HEART RATE: 79 BPM | DIASTOLIC BLOOD PRESSURE: 68 MMHG | HEIGHT: 59 IN | OXYGEN SATURATION: 93 % | BODY MASS INDEX: 27.6 KG/M2 | WEIGHT: 136.88 LBS | TEMPERATURE: 98 F

## 2023-11-15 DIAGNOSIS — Z12.31 ENCOUNTER FOR SCREENING MAMMOGRAM FOR BREAST CANCER: ICD-10-CM

## 2023-11-15 DIAGNOSIS — Z23 NEED FOR SHINGLES VACCINE: ICD-10-CM

## 2023-11-15 DIAGNOSIS — I27.21 WHO GROUP 1 PULMONARY ARTERIAL HYPERTENSION: ICD-10-CM

## 2023-11-15 DIAGNOSIS — G89.29 CHRONIC LOW BACK PAIN, UNSPECIFIED BACK PAIN LATERALITY, UNSPECIFIED WHETHER SCIATICA PRESENT: ICD-10-CM

## 2023-11-15 DIAGNOSIS — Z23 FLU VACCINE NEED: ICD-10-CM

## 2023-11-15 DIAGNOSIS — G47.33 OSA (OBSTRUCTIVE SLEEP APNEA): ICD-10-CM

## 2023-11-15 DIAGNOSIS — E03.9 HYPOTHYROIDISM (ACQUIRED): ICD-10-CM

## 2023-11-15 DIAGNOSIS — M85.80 BORDERLINE OSTEOPENIA: ICD-10-CM

## 2023-11-15 DIAGNOSIS — Z00.00 ROUTINE MEDICAL EXAM: Primary | ICD-10-CM

## 2023-11-15 DIAGNOSIS — M54.50 CHRONIC LOW BACK PAIN, UNSPECIFIED BACK PAIN LATERALITY, UNSPECIFIED WHETHER SCIATICA PRESENT: ICD-10-CM

## 2023-11-15 DIAGNOSIS — J96.11 CHRONIC RESPIRATORY FAILURE WITH HYPOXIA: ICD-10-CM

## 2023-11-15 DIAGNOSIS — E66.3 OVERWEIGHT (BMI 25.0-29.9): ICD-10-CM

## 2023-11-15 PROCEDURE — 99396 PR PREVENTIVE VISIT,EST,40-64: ICD-10-PCS | Mod: 25,S$GLB,, | Performed by: INTERNAL MEDICINE

## 2023-11-15 PROCEDURE — 3008F PR BODY MASS INDEX (BMI) DOCUMENTED: ICD-10-PCS | Mod: CPTII,S$GLB,, | Performed by: INTERNAL MEDICINE

## 2023-11-15 PROCEDURE — 90471 FLU VACCINE - QUADRIVALENT - ADJUVANTED: ICD-10-PCS | Mod: S$GLB,,, | Performed by: INTERNAL MEDICINE

## 2023-11-15 PROCEDURE — 99396 PREV VISIT EST AGE 40-64: CPT | Mod: 25,S$GLB,, | Performed by: INTERNAL MEDICINE

## 2023-11-15 PROCEDURE — 1159F PR MEDICATION LIST DOCUMENTED IN MEDICAL RECORD: ICD-10-PCS | Mod: CPTII,S$GLB,, | Performed by: INTERNAL MEDICINE

## 2023-11-15 PROCEDURE — 90471 IMMUNIZATION ADMIN: CPT | Mod: S$GLB,,, | Performed by: INTERNAL MEDICINE

## 2023-11-15 PROCEDURE — 3008F BODY MASS INDEX DOCD: CPT | Mod: CPTII,S$GLB,, | Performed by: INTERNAL MEDICINE

## 2023-11-15 PROCEDURE — 3074F SYST BP LT 130 MM HG: CPT | Mod: CPTII,S$GLB,, | Performed by: INTERNAL MEDICINE

## 2023-11-15 PROCEDURE — 99999 PR PBB SHADOW E&M-EST. PATIENT-LVL V: ICD-10-PCS | Mod: PBBFAC,,, | Performed by: INTERNAL MEDICINE

## 2023-11-15 PROCEDURE — 3078F DIAST BP <80 MM HG: CPT | Mod: CPTII,S$GLB,, | Performed by: INTERNAL MEDICINE

## 2023-11-15 PROCEDURE — 90694 FLU VACCINE - QUADRIVALENT - ADJUVANTED: ICD-10-PCS | Mod: S$GLB,,, | Performed by: INTERNAL MEDICINE

## 2023-11-15 PROCEDURE — 3074F PR MOST RECENT SYSTOLIC BLOOD PRESSURE < 130 MM HG: ICD-10-PCS | Mod: CPTII,S$GLB,, | Performed by: INTERNAL MEDICINE

## 2023-11-15 PROCEDURE — 90694 VACC AIIV4 NO PRSRV 0.5ML IM: CPT | Mod: S$GLB,,, | Performed by: INTERNAL MEDICINE

## 2023-11-15 PROCEDURE — 1159F MED LIST DOCD IN RCRD: CPT | Mod: CPTII,S$GLB,, | Performed by: INTERNAL MEDICINE

## 2023-11-15 PROCEDURE — 1160F RVW MEDS BY RX/DR IN RCRD: CPT | Mod: CPTII,S$GLB,, | Performed by: INTERNAL MEDICINE

## 2023-11-15 PROCEDURE — 3078F PR MOST RECENT DIASTOLIC BLOOD PRESSURE < 80 MM HG: ICD-10-PCS | Mod: CPTII,S$GLB,, | Performed by: INTERNAL MEDICINE

## 2023-11-15 PROCEDURE — 99999 PR PBB SHADOW E&M-EST. PATIENT-LVL V: CPT | Mod: PBBFAC,,, | Performed by: INTERNAL MEDICINE

## 2023-11-15 PROCEDURE — 1160F PR REVIEW ALL MEDS BY PRESCRIBER/CLIN PHARMACIST DOCUMENTED: ICD-10-PCS | Mod: CPTII,S$GLB,, | Performed by: INTERNAL MEDICINE

## 2023-11-15 NOTE — PROGRESS NOTES
CHIEF COMPLAINT:   Chief Complaint   Patient presents with    Annual Exam          HISTORY OF PRESENT ILLNESS:  Yuni Perez is a 53 y.o. female who presents to the clinic today for a routine physical exam. Her last physical exam was approximately 1 years(s) ago.    Subjective    PAST MEDICAL HISTORY:  Past Medical History:   Diagnosis Date    AR (allergic rhinitis)     Cholelithiasis, common bile duct     Chronic low back pain     Eye pressure 2017    General anesthetics causing adverse effect in therapeutic use     GERD (gastroesophageal reflux disease)     History of migraine headaches     Hypothyroidism     Innocent heart murmur     Lumbar disc disease     Menorrhagia     Mild asthma     Obesity     Plantar fasciitis of left foot     Primary pulmonary hypertension     followed by heart transplant/pulmonary     Seizure disorder     x 1 in 2008    Seizures     Sleep apnea     TMJ (dislocation of temporomandibular joint)     Tricuspid regurgitation        PAST SURGICAL HISTORY:  Past Surgical History:   Procedure Laterality Date    CARDIAC CATHETERIZATION      CATHETERIZATION OF BOTH LEFT AND RIGHT HEART Bilateral 9/29/2020    Procedure: CATHETERIZATION, HEART, BOTH LEFT AND RIGHT;  Surgeon: Gucci Pickett MD;  Location: [facility] CATH LAB;  Service: Cardiology;  Laterality: Bilateral;    CENTRAL VENOUS CATHETER INSERTION      CORONARY ANGIOGRAPHY N/A 9/29/2020    Procedure: ANGIOGRAM, CORONARY ARTERY;  Surgeon: Gucci Pickett MD;  Location: [facility] CATH LAB;  Service: Cardiology;  Laterality: N/A;    gallbladder drain  06/2019    INSERTION OF HAYNES CATHETER Right 6/17/2020    Procedure: INSERTION, CATHETER, CENTRAL VENOUS, HAYNES Replace Single Lumen for Remodulin Infusion;  Surgeon: Bertin Dewitt MD;  Location: [facility] OR [address] [place] [address];  Service: General;  Laterality: Right;    PORTACATH PLACEMENT      REMOVAL OF TUNNELED CENTRAL VENOUS CATHETER (CVC) N/A 6/17/2020    Procedure: REMOVAL, CATHETER,  CENTRAL VENOUS, TUNNELED;  Surgeon: Bertin Dewitt MD;  [facility];  Service: General;  Laterality: N/A;    RIGHT HEART CATHETERIZATION Right 8/20/2018    Procedure: HEART CATH-RIGHT;  Surgeon: Ivonne Rai MD;  Location: [facility] CATH LAB;  Service: Cardiology;  Laterality: Right;    RIGHT HEART CATHETERIZATION Right 9/4/2019    Procedure: INSERTION, CATHETER, RIGHT HEART;  Surgeon: Ivonne Rai MD;  Location: [facility] CATH LAB;  Service: Cardiology;  Laterality: Right;    RIGHT HEART CATHETERIZATION Right 6/10/2022    Procedure: INSERTION, CATHETER, RIGHT HEART;  Surgeon: Keshia Poe MD;  Location: [facility] CATH LAB;  Service: Cardiology;  Laterality: Right;    UPPER GASTROINTESTINAL ENDOSCOPY         SOCIAL HISTORY:  Social History     Socioeconomic History    Marital status: Single    Number of children: 0   Occupational History    Occupation: disabled     Employer: [employer]   Tobacco Use    Smoking status: Never    Smokeless tobacco: Never   Substance and Sexual Activity    Alcohol use: No     Alcohol/week: 0.8 standard drinks of alcohol     Types: 1 Standard drinks or equivalent per week     Comment: socially    Drug use: No    Sexual activity: Never     Birth control/protection: OCP, Pill     Comment: pt is a virgin       FAMILY HISTORY:  Family History   Problem Relation Age of Onset    Diabetes Mother     Heart disease Father     Glaucoma Father     No Known Problems Sister     Cancer Maternal Aunt         breast    Breast cancer Maternal Aunt     No Known Problems Maternal Uncle     No Known Problems Paternal Aunt     No Known Problems Paternal Uncle     Cancer Maternal Grandmother         uterine    Diabetes Maternal Grandfather     Heart attack Maternal Grandfather     No Known Problems Paternal Grandmother     Heart disease Paternal Grandfather     Heart attack Paternal Grandfather     Diabetes Paternal Grandfather     Leukemia Brother     Breast cancer Cousin     Breast  "cancer Cousin     Hypertension Other     Colon cancer Neg Hx     Ovarian cancer Neg Hx     Amblyopia Neg Hx     Blindness Neg Hx     Cataracts Neg Hx     Macular degeneration Neg Hx     Retinal detachment Neg Hx     Strabismus Neg Hx     Stroke Neg Hx     Thyroid disease Neg Hx     Esophageal cancer Neg Hx        ALLERGIES AND MEDICATIONS: updated and reviewed.  Review of patient's allergies indicates:   Allergen Reactions    Fentanyl Anaphylaxis     Respiratory distress    Vibra-tabs [doxycycline hyclate] Anaphylaxis     "throat felt like it was closing"    Adhesive Hives     Silk tape    Amoxicillin Rash    Nsaids (non-steroidal anti-inflammatory drug) Swelling    Chlorhexidine Other (See Comments)     Blue Chlorhexidine causes hives     Medication List with Changes/Refills   Current Medications    ACETAMINOPHEN (TYLENOL) 500 MG TABLET    Take 1,000 mg by mouth every 6 (six) hours as needed for Pain.    ADCIRCA 20 MG TAB    Take 2 tablets (40 mg total) by mouth once daily.    ALBUTEROL (PROAIR HFA) 90 MCG/ACTUATION INHALER    Inhale 2 puffs into the lungs every 6 (six) hours as needed for Wheezing. Rescue    ALBUTEROL-IPRATROPIUM (DUO-NEB) 2.5 MG-0.5 MG/3 ML NEBULIZER SOLUTION    Take 3 mLs by nebulization every 6 (six) hours as needed for Wheezing. Rescue    ALPRAZOLAM (XANAX ORAL)    Take by mouth as needed.    AMBRISENTAN (LETAIRIS) 10 MG TAB    Take 1 tablet (10 mg total) by mouth once daily.    AZELASTINE (ASTELIN) 137 MCG (0.1 %) NASAL SPRAY    2 sprays by Nasal route 2 (two) times daily. Patient uses prn in the evening    BUTALBITAL-ACETAMINOPHEN-CAFFEINE -40 MG (FIORICET, ESGIC) -40 MG PER TABLET    Take 1 tablet by mouth every 4 (four) hours as needed for Pain.    CYANOCOBALAMIN, VITAMIN B-12, 2,500 MCG SUBL    Place 2,500 mcg under the tongue every morning.     DIPHENHYDRAMINE (BENADRYL) 25 MG CAPSULE    Take 50 mg by mouth every 6 (six) hours as needed for Itching. Patient takes prn evenings "    ESTROGEN, CONJUGATED,-MEDROXYPROGESTERONE 0.625-2.5MG (PREMPRO) 0.625-2.5 MG PER TABLET    Take 1 tablet by mouth once daily.    FERROUS SULFATE 325 (65 FE) MG EC TABLET    Take 325 mg by mouth daily as needed.     FLUTICASONE FUROATE-VILANTEROL (BREO ELLIPTA) 200-25 MCG/DOSE DSDV DISKUS INHALER    Inhale 1 puff into the lungs every evening. Controller    FLUTICASONE PROPIONATE (FLONASE) 50 MCG/ACTUATION NASAL SPRAY    2 sprays (100 mcg total) by Each Nostril route every evening.    FOLIC ACID (FOLVITE) 1 MG TABLET    TAKE 1 TABLET BY MOUTH EVERY DAY    FUROSEMIDE (LASIX) 20 MG TABLET    Take 1 tablet (20 mg total) by mouth once daily.    GABAPENTIN (NEURONTIN) 300 MG CAPSULE    Take 1 capsule (300 mg total) by mouth every evening.    GLUCOSAM/CHOND-MSM1/C/TRACEE/BOR (GLUCOSAMINE-CHOND-MSM COMPLEX ORAL)    Take by mouth.    HYOSCYAMINE (LEVSIN/SL) 0.125 MG SUBL    Place 1 tablet (0.125 mg total) under the tongue every 6 (six) hours as needed (Aa needed).    LACTIC ACID-CITRIC-POTASSIUM (PHEXXI) 1.8-1-0.4 % GEL    Place 1 Applicatorful vaginally as needed (CONTRACEPTIVE).    LEVOTHYROXINE (SYNTHROID) 125 MCG TABLET    TAKE ONE TABLET BY MOUTH EVERY MORNING 1 HOUR BEFORE BREAKFAST AND OTHER MEDICATIONS (FOR THYROID).    LIDOCAINE (LIDODERM) 5 %    Place 1 patch onto the skin once daily.    LORATADINE (CLARITIN) 10 MG TABLET    Take 10 mg by mouth every evening.     MAGNESIUM OXIDE (MAG-OX) 400 MG (241.3 MG MAGNESIUM) TABLET    Take 1 tablet (400 mg total) by mouth once daily.    ONDANSETRON (ZOFRAN) 4 MG TABLET    Take 1 tablet (4 mg total) by mouth every 6 (six) hours as needed. 1 Tablet Oral Every 6 hours    PANTOPRAZOLE (PROTONIX) 40 MG TABLET    TAKE ONE TABLET BY MOUTH ONCE DAILY AS NEEDED FOR HEARTBURN    TIZANIDINE (ZANAFLEX) 4 MG TABLET    Take 4 mg by mouth every 6 (six) hours as needed.    TOPIRAMATE (TOPAMAX) 100 MG TABLET    Take 1 tablet (100 mg total) by mouth 2 (two) times daily.    TREPROSTINIL  (REMODULIN) 2.5 MG/ML SOLN    Mix cassette as directed and infuse continuously per physician titration orders on dosing sheet. Current dose 80ng/kg/min    UBROGEPANT (UBRELVY)TABLET 100 MG    TAKE ONE TABLET BY MOUTH AS A SINGLE DOSE AS NEEDED FOR MIGRAINE   Discontinued Medications    LEVOFLOXACIN (LEVAQUIN) 750 MG TABLET    Take 1 tablet (750 mg total) by mouth once daily.          CARE TEAM:  Patient Care Team:  Lulu Sandhu MD as PCP - General (Internal Medicine)  Ralph Whyte MD (Inactive) as Referring Physician (Intensive Care)  Ian Lackey MD as Consulting Physician (Pulmonary Disease)  Monika Dalton LPN as Care Coordinator  Dorene Ny RN as Registered Nurse  Bárbara Langford NP as Nurse Practitioner (Cardiology)       SCREENING HISTORY:  Health Maintenance         Date Due Completion Date    Colorectal Cancer Screening Never done ---    Shingles Vaccine (1 of 2) Never done ---    DEXA Scan 06/25/2023 6/25/2021    COVID-19 Vaccine (4 - 2023-24 season) 09/01/2023 7/1/2022    Hemoglobin A1c (Diabetic Prevention Screening) 09/21/2023 9/21/2020    Mammogram 11/17/2023 11/17/2022    Override on 9/2/2013: Done    Override on 3/1/2011: Done    Lipid Panel 09/21/2025 9/21/2020    Cervical Cancer Screening 11/23/2025 11/23/2020    Override on 9/17/2014: Done (Dr. Khan)    Override on 2/1/2012: Done    Override on 6/10/2003: Done    TETANUS VACCINE 08/09/2033 8/9/2023    Pneumococcal Vaccines (Age 0-64) (3 - PPSV23 or PCV20) 03/18/2036 1/18/2017              REVIEW OF SYSTEMS:  The patient reports : fair dietary habits.  The patient reports  : that they do not exercise regularly  Review of Systems   Constitutional:  Positive for fatigue. Negative for chills and fever.   HENT:  Positive for congestion (chronic due to medications for pulmonary hypertension).    Respiratory:  Positive for cough (had a recent URI) and shortness of breath (chronic).    Genitourinary:   "Negative for dysuria.      ROS (Optional)-: no pelvic pain  Breast ROS (Optional)-: negative for breast lumps/discharge          Objective    PHYSICAL EXAMINATION/VITALS:  Vitals:    11/15/23 1558   BP: 98/68   Pulse: 79   Temp: 97.9 °F (36.6 °C)   TempSrc: Oral   SpO2: (!) 93%   Weight: 62.1 kg (136 lb 14.5 oz)   Height: 4' 11" (1.499 m)        Body mass index is 27.65 kg/m².      General appearance - alert, well appearing, and in no distress, overweight  Psychiatric - alert, oriented to person, place, and time, normal behavior, speech, dress, motor activity and thought process  Eyes - pupils equal and reactive, extraocular eye movements intact, sclera anicteric  Neck - supple, no significant adenopathy, carotids upstroke normal bilaterally, no bruits  Lymphatics - no palpable cervical lymphadenopathy  Chest - clear to auscultation, no wheezes, rales or rhonchi, symmetric air entry  Heart - normal rate and regular rhythm  Neurological - alert, normal speech, no focal findings; cranial nerves II through XII intact  Musculoskeletal - patient noted to have Mild osteoarthritic changes to both knee joints. No joint effusions noted.  Extremities - no pedal edema noted, healing cat bite on left hand; mosquito bite with some bruising on LLE  Skin - normal coloration, no suspicious skin lesions      LABS:  Ordered/Scheduled            ASSESSMENT AND PLAN:   1. Routine medical exam  Counseled on age appropriate medical preventative services including age appropriate cancer screenings, age appropriate eye and dental exams, over all nutritional health, need for a consistent exercise regimen, and an over all push towards maintaining a vigorous and active lifestyle.  Counseled on age appropriate vaccines and discussed upcoming health care needs based on age/gender. Discussed good sleep hygiene and stress management.    2. WHO group 1 pulmonary arterial hypertension/3. Chronic respiratory failure with hypoxia  The current " medical regimen is effective;  continue present plan and medications.   Followed by: Pulmonology.   Overview:  followed by heart transplant/pulmonary       4. Hypothyroidism (acquired)  Lab Results   Component Value Date    TSH 0.025 (L) 12/16/2022     Patient is clinically euthyroid. Continue current regimen. Will check levels with next blood draw.  -     TSH; Future; Expected date: 11/15/2023    5. Chronic low back pain, unspecified back pain laterality, unspecified whether sciatica present  Doing ok at this time. Continue HEP.    6. Borderline osteopenia  We discussed adequate calcium intake (preferably from diet) and vitamin D supplementation. We discussed fall precautions. She is scheduled for her BMD. No need for prescription medication at this time. Further treatment plan will be based on results of bone density testing.  Overview:  - last BMD 2/2019 - No need for Rx tx at this time.   BMD 7/2021 - No need for Rx tx at this time.        7. ABHIJEET (obstructive sleep apnea)  CPAP compliance: no - unable to tolerate CPAP machine.  We discussed the potential ramifications of untreated sleep apnea, which could include daytime sleepiness, hypertension, heart disease including CHF, sudden death while sleeping and/or stroke. The patient was advised to abstain from driving should they feel sleepy or drowsy.  We discussed potential treatment options, which could include weight loss, body positioning, continuous positive airway pressure (CPAP), or referral for surgical consideration.     8. Overweight (BMI 25.0-29.9)  BMI Readings from Last 3 Encounters:   11/15/23 27.65 kg/m²   09/27/23 28.23 kg/m²   09/27/23 28.41 kg/m²     The patient is asked to make an attempt to improve diet and exercise patterns to aid in medical management of this problem.  -     Hemoglobin A1C; Future; Expected date: 11/15/2023    9. Need for shingles vaccine  Declined.    10. Flu vaccine need    -     Cancel: Influenza - Quadrivalent *Preferred*  (6 months+) (PF)  -     Influenza (FLUAD) - Quadrivalent (Adjuvanted) *Preferred* (65+) (PF)    11. Encounter for screening mammogram for breast cancer  Scheduled.             Orders Placed This Encounter   Procedures    Influenza (FLUAD) - Quadrivalent (Adjuvanted) *Preferred* (65+) (PF)    Hemoglobin A1C    TSH      FOLLOW UP: Follow up in about 1 year (around 11/15/2024), or if symptoms worsen or fail to improve, for annual exam. or sooner as needed.

## 2023-12-06 ENCOUNTER — HOSPITAL ENCOUNTER (OUTPATIENT)
Dept: RADIOLOGY | Facility: HOSPITAL | Age: 53
Discharge: HOME | End: 2023-12-06
Attending: INTERNAL MEDICINE
Payer: COMMERCIAL

## 2023-12-06 DIAGNOSIS — Z12.31 BREAST CANCER SCREENING BY MAMMOGRAM: ICD-10-CM

## 2023-12-06 PROCEDURE — 77063 BREAST TOMOSYNTHESIS BI: CPT | Mod: 26,,, | Performed by: RADIOLOGY

## 2023-12-06 PROCEDURE — 77063 MAMMO DIGITAL SCREENING BILAT WITH TOMO: ICD-10-PCS | Mod: 26,,, | Performed by: RADIOLOGY

## 2023-12-06 PROCEDURE — 77067 SCR MAMMO BI INCL CAD: CPT | Mod: TC

## 2023-12-06 PROCEDURE — 77067 SCR MAMMO BI INCL CAD: CPT | Mod: 26,,, | Performed by: RADIOLOGY

## 2023-12-06 PROCEDURE — 77067 MAMMO DIGITAL SCREENING BILAT WITH TOMO: ICD-10-PCS | Mod: 26,,, | Performed by: RADIOLOGY

## 2023-12-07 RX ORDER — FLUTICASONE FUROATE AND VILANTEROL TRIFENATATE 200; 25 UG/1; UG/1
POWDER RESPIRATORY (INHALATION)
Qty: 180 EACH | Refills: 3 | Status: SHIPPED | OUTPATIENT
Start: 2023-12-07

## 2023-12-07 NOTE — TELEPHONE ENCOUNTER
Refill Decision Note   Yuni Perez  is requesting a refill authorization.  Brief Assessment and Rationale for Refill:  Approve     Medication Therapy Plan:         Comments:     Note composed:7:46 AM 12/07/2023

## 2023-12-07 NOTE — TELEPHONE ENCOUNTER
No care due was identified.  Health [facility] Embedded Care Due Messages. Reference number: [id number].   12/07/2023 1:23:24 AM CST

## 2023-12-13 ENCOUNTER — TELEPHONE (OUTPATIENT)
Dept: TRANSPLANT | Facility: CLINIC | Age: 53
End: 2023-12-13
Payer: COMMERCIAL

## 2023-12-13 RX ORDER — HYOSCYAMINE SULFATE 0.12 MG/1
0.12 TABLET SUBLINGUAL EVERY 6 HOURS PRN
Qty: 60 TABLET | Refills: 0 | Status: SHIPPED | OUTPATIENT
Start: 2023-12-13

## 2023-12-13 NOTE — TELEPHONE ENCOUNTER
No care due was identified.  [facility] Embedded Care Due Messages. Reference number: [id number].   12/13/2023 12:50:52 PM CST

## 2023-12-13 NOTE — TELEPHONE ENCOUNTER
----- Message from Jennifer Olmedo sent at 12/13/2023 12:11 PM CST -----  Regarding: self [phone]  Type: RX Refill Request    Who Called: self     Have you contacted your pharmacy: yes    Refill or New Rx: refill     RX Name and Strength:hyoscyamine (LEVSIN/SL) 0.125 mg Subl    Preferred Pharmacy with phone number:    [facility]/pharmacy #[id number] - [place] - [address]  [address] [address]  Phone: [phone] Fax: [phone]       Local or Mail Order: local     Would the patient rather a call back or a response via [facility]?  Call back     Best Call Back Number: [phone]

## 2023-12-13 NOTE — TELEPHONE ENCOUNTER
Patient lef voicemail for NN about getting labs done with her MD and wanted to see if any additional labs were need from PH. NN tried to reach patient but could not so message was left.

## 2023-12-20 ENCOUNTER — HOSPITAL ENCOUNTER (OUTPATIENT)
Dept: RADIOLOGY | Facility: CLINIC | Age: 53
Discharge: HOME | End: 2023-12-20
Attending: INTERNAL MEDICINE
Payer: COMMERCIAL

## 2023-12-20 DIAGNOSIS — Z13.820 ENCOUNTER FOR SCREENING FOR OSTEOPOROSIS: ICD-10-CM

## 2023-12-20 PROCEDURE — 77080 DXA BONE DENSITY AXIAL SKELETON 1 OR MORE SITES: ICD-10-PCS | Mod: 26,,, | Performed by: INTERNAL MEDICINE

## 2023-12-20 PROCEDURE — 77080 DXA BONE DENSITY AXIAL: CPT | Mod: 26,,, | Performed by: INTERNAL MEDICINE

## 2023-12-20 PROCEDURE — 77080 DXA BONE DENSITY AXIAL: CPT | Mod: TC

## 2023-12-21 DIAGNOSIS — K21.9 GASTROESOPHAGEAL REFLUX DISEASE, UNSPECIFIED WHETHER ESOPHAGITIS PRESENT: ICD-10-CM

## 2023-12-21 RX ORDER — PANTOPRAZOLE SODIUM 40 MG/1
TABLET, DELAYED RELEASE ORAL
Qty: 90 TABLET | Refills: 3 | Status: SHIPPED | OUTPATIENT
Start: 2023-12-21

## 2023-12-21 NOTE — TELEPHONE ENCOUNTER
Refill Decision Note   Yuni Perez  is requesting a refill authorization.  Brief Assessment and Rationale for Refill:  Approve     Medication Therapy Plan:         Comments:     Note composed:3:18 PM 12/21/2023

## 2023-12-21 NOTE — TELEPHONE ENCOUNTER
No care due was identified.  [facility] Embedded Care Due Messages. Reference number: [id number].   12/21/2023 12:50:30 AM CST

## 2023-12-22 DIAGNOSIS — I27.0 PRIMARY PULMONARY HYPERTENSION: ICD-10-CM

## 2023-12-22 RX ORDER — FOLIC ACID 1 MG/1
1000 TABLET ORAL
Qty: 90 TABLET | Refills: 3 | Status: SHIPPED | OUTPATIENT
Start: 2023-12-22

## 2023-12-22 NOTE — TELEPHONE ENCOUNTER
No care due was identified.  Health [facility] Embedded Care Due Messages. Reference number: [id number].   12/22/2023 12:47:42 AM CST

## 2023-12-22 NOTE — TELEPHONE ENCOUNTER
Refill Routing Note   Medication(s) are not appropriate for processing by [facility] for the following reason(s):        Outside of protocol    [facility] action(s):  Route               Appointments  past 12m or future 3m with PCP    Date Provider   Last Visit   11/15/2023 Lulu Sandhu MD   Next Visit   Visit date not found Lulu Sandhu MD   ED visits in past 90 days: 0        Note composed:8:15 AM 12/22/2023

## 2023-12-26 DIAGNOSIS — R06.02 SHORTNESS OF BREATH: ICD-10-CM

## 2023-12-26 DIAGNOSIS — I27.9 CHRONIC PULMONARY HEART DISEASE: ICD-10-CM

## 2023-12-26 DIAGNOSIS — Z79.899 POLYPHARMACY: ICD-10-CM

## 2023-12-26 DIAGNOSIS — R06.82 TACHYPNEA: Primary | ICD-10-CM

## 2024-01-04 NOTE — TELEPHONE ENCOUNTER
"Patient called reporting that site on wrist where she had angiogram 1/30/17 " feels tender at the edge of my wrist. There is no bruising and it all looks healed. But my whole arm from my wrist to my shoulder feels 'under pressure'. " Pt states that there is no redness, no increased warmth to arm, and no red lines/streaking. She says it feels worse at night, "kind of like when my pressures are elevated and I get a tightness in the back of my neck." She does not believe the symptoms have gotten any worse since the procedure.  Informed pt that Dr Rai would be notified for feedback. Pt will continue to monitor.   "
74.8

## 2024-01-17 ENCOUNTER — TELEPHONE (OUTPATIENT)
Dept: HEPATOLOGY | Facility: CLINIC | Age: 54
End: 2024-01-17
Payer: COMMERCIAL

## 2024-01-17 DIAGNOSIS — R16.0 LIVER MASS: Primary | ICD-10-CM

## 2024-01-17 DIAGNOSIS — E03.9 HYPOTHYROIDISM (ACQUIRED): ICD-10-CM

## 2024-01-17 RX ORDER — LEVOTHYROXINE SODIUM 125 UG/1
TABLET ORAL
Qty: 90 TABLET | Refills: 1 | Status: SHIPPED | OUTPATIENT
Start: 2024-01-17

## 2024-01-17 NOTE — TELEPHONE ENCOUNTER
Refill Routing Note   Medication(s) are not appropriate for processing by [facility] for the following reason(s):        Required labs outdated    [facility] action(s):  Defer               Appointments  past 12m or future 3m with PCP    Date Provider   Last Visit   11/15/2023 Lulu Sandhu MD   Next Visit   Visit date not found Lulu Sandhu MD   ED visits in past 90 days: 0        Note composed:4:01 PM 01/17/2024

## 2024-01-17 NOTE — TELEPHONE ENCOUNTER
----- Message from Nataliya Hernández RN sent at 1/17/2024 12:20 PM CST -----  Regarding: FW: Scheduling Request  Contact: Yuni Cruzmartin Meadowsjaswantzoe,    All Orders are in.    Thanks Again,  Nataliya    ----- Message -----  From: Sofia Palmer MA  Sent: 1/17/2024  11:55 AM CST  To: Nataliya Hernández RN  Subject: FW: Scheduling Request                           Ranjit Hodges,    Can you please place labs and u/s order for this patient?  Thank you in advance.  ----- Message -----  From: Miley Uribe MA  Sent: 1/16/2024   4:27 PM CST  To: Sofia Palmer MA  Subject: FW: Scheduling Request                             ----- Message -----  From: Eliseo Bruno  Sent: 1/16/2024   3:13 PM CST  To: Lissy Alexander Staff  Subject: Scheduling Request                               Scheduling Request          Appt Type:  Annual    Date/Time Preference: Next available    Treating Provider: Dr. Yuan    Caller Name: Yuni Perez     Contact Preference: [phone] (home)      Comments/notes: Patient calling to schedule appt by recall. Patient also inquiring if doctor needs any testings done for US and/or CT.  Requesting a call back.

## 2024-01-17 NOTE — TELEPHONE ENCOUNTER
No care due was identified.  Health [facility] Embedded Care Due Messages. Reference number: [id number].   1/17/2024 12:37:29 AM CST

## 2024-01-17 NOTE — TELEPHONE ENCOUNTER
Spoke with patient. Scheduled labs, u/s and follow up visit with Dr. Yuan. Mailed appt reminder to patient.

## 2024-02-02 ENCOUNTER — TELEPHONE (OUTPATIENT)
Dept: TRANSPLANT | Facility: CLINIC | Age: 54
End: 2024-02-02
Payer: COMMERCIAL

## 2024-02-02 ENCOUNTER — HOSPITAL ENCOUNTER (OUTPATIENT)
Dept: PULMONOLOGY | Facility: CLINIC | Age: 54
Discharge: HOME | End: 2024-02-02
Payer: COMMERCIAL

## 2024-02-02 ENCOUNTER — OFFICE VISIT (OUTPATIENT)
Dept: TRANSPLANT | Facility: CLINIC | Age: 54
End: 2024-02-02
Payer: COMMERCIAL

## 2024-02-02 ENCOUNTER — HOSPITAL ENCOUNTER (OUTPATIENT)
Dept: CARDIOLOGY | Facility: HOSPITAL | Age: 54
Discharge: HOME | End: 2024-02-02
Payer: COMMERCIAL

## 2024-02-02 VITALS
SYSTOLIC BLOOD PRESSURE: 92 MMHG | DIASTOLIC BLOOD PRESSURE: 62 MMHG | WEIGHT: 136 LBS | HEART RATE: 82 BPM | BODY MASS INDEX: 27.42 KG/M2 | HEIGHT: 59 IN

## 2024-02-02 VITALS
SYSTOLIC BLOOD PRESSURE: 94 MMHG | OXYGEN SATURATION: 97 % | DIASTOLIC BLOOD PRESSURE: 55 MMHG | BODY MASS INDEX: 27.51 KG/M2 | WEIGHT: 136.44 LBS | HEART RATE: 95 BPM | HEIGHT: 59 IN

## 2024-02-02 VITALS — HEIGHT: 59 IN | BODY MASS INDEX: 28.02 KG/M2 | WEIGHT: 139 LBS

## 2024-02-02 DIAGNOSIS — I27.9 CHRONIC PULMONARY HEART DISEASE: ICD-10-CM

## 2024-02-02 DIAGNOSIS — J45.909 MODERATE ASTHMA WITHOUT COMPLICATION, UNSPECIFIED WHETHER PERSISTENT: ICD-10-CM

## 2024-02-02 DIAGNOSIS — I27.21 WHO GROUP 1 PULMONARY ARTERIAL HYPERTENSION: Primary | ICD-10-CM

## 2024-02-02 DIAGNOSIS — G47.33 OSA (OBSTRUCTIVE SLEEP APNEA): ICD-10-CM

## 2024-02-02 DIAGNOSIS — R06.02 SHORTNESS OF BREATH: ICD-10-CM

## 2024-02-02 LAB
ASCENDING AORTA: 2.45 CM
AV INDEX (PROSTH): 0.79
AV MEAN GRADIENT: 5 MMHG
AV PEAK GRADIENT: 11 MMHG
AV VALVE AREA BY VELOCITY RATIO: 2.01 CM²
AV VALVE AREA: 2 CM²
AV VELOCITY RATIO: 0.79
BSA FOR ECHO PROCEDURE: 1.6 M2
CV ECHO LV RWT: 0.37 CM
DOP CALC AO PEAK VEL: 1.63 M/S
DOP CALC AO VTI: 29.24 CM
DOP CALC LVOT AREA: 2.5 CM2
DOP CALC LVOT DIAMETER: 1.8 CM
DOP CALC LVOT PEAK VEL: 1.29 M/S
DOP CALC LVOT STROKE VOLUME: 58.6 CM3
DOP CALCLVOT PEAK VEL VTI: 23.04 CM
E WAVE DECELERATION TIME: 258.9 MSEC
E/A RATIO: 0.94
E/E' RATIO: 7.62 M/S
ECHO LV POSTERIOR WALL: 0.64 CM (ref 0.6–1.1)
FRACTIONAL SHORTENING: 34 % (ref 28–44)
INTERVENTRICULAR SEPTUM: 0.99 CM (ref 0.6–1.1)
IVRT: 85.63 MSEC
LA MAJOR: 5.25 CM
LA MINOR: 5.17 CM
LA WIDTH: 2.79 CM
LEFT ATRIUM SIZE: 2.73 CM
LEFT ATRIUM VOLUME INDEX MOD: 17.4 ML/M2
LEFT ATRIUM VOLUME INDEX: 21.5 ML/M2
LEFT ATRIUM VOLUME MOD: 27.39 CM3
LEFT ATRIUM VOLUME: 33.73 CM3
LEFT INTERNAL DIMENSION IN SYSTOLE: 2.25 CM (ref 2.1–4)
LEFT VENTRICLE DIASTOLIC VOLUME INDEX: 30.68 ML/M2
LEFT VENTRICLE DIASTOLIC VOLUME: 48.17 ML
LEFT VENTRICLE MASS INDEX: 47 G/M2
LEFT VENTRICLE SYSTOLIC VOLUME INDEX: 10.9 ML/M2
LEFT VENTRICLE SYSTOLIC VOLUME: 17.09 ML
LEFT VENTRICULAR INTERNAL DIMENSION IN DIASTOLE: 3.42 CM (ref 3.5–6)
LEFT VENTRICULAR MASS: 74.47 G
LV LATERAL E/E' RATIO: 5.71 M/S
LV SEPTAL E/E' RATIO: 11.43 M/S
MV A" WAVE DURATION": 8.85 MSEC
MV PEAK A VEL: 0.85 M/S
MV PEAK E VEL: 0.8 M/S
MV STENOSIS PRESSURE HALF TIME: 75.08 MS
MV VALVE AREA P 1/2 METHOD: 2.93 CM2
PISA TR MAX VEL: 4.91 M/S
PULM VEIN S/D RATIO: 1.16
PV PEAK D VEL: 0.55 M/S
PV PEAK S VEL: 0.64 M/S
RA MAJOR: 4.8 CM
RA PRESSURE ESTIMATED: 3 MMHG
RA WIDTH: 3.52 CM
RIGHT VENTRICULAR END-DIASTOLIC DIMENSION: 5.2 CM
RV TB RVSP: 8 MMHG
SINUS: 2.41 CM
STJ: 2.21 CM
TDI LATERAL: 0.14 M/S
TDI SEPTAL: 0.07 M/S
TDI: 0.11 M/S
TR MAX PG: 96 MMHG
TRICUSPID ANNULAR PLANE SYSTOLIC EXCURSION: 1.81 CM
TV REST PULMONARY ARTERY PRESSURE: 99 MMHG
Z-SCORE OF LEFT VENTRICULAR DIMENSION IN END DIASTOLE: -2.77
Z-SCORE OF LEFT VENTRICULAR DIMENSION IN END SYSTOLE: -1.73

## 2024-02-02 PROCEDURE — 94618 PULMONARY STRESS TESTING: CPT | Mod: S$GLB,,, | Performed by: INTERNAL MEDICINE

## 2024-02-02 PROCEDURE — 1159F MED LIST DOCD IN RCRD: CPT | Mod: CPTII,S$GLB,,

## 2024-02-02 PROCEDURE — 3074F SYST BP LT 130 MM HG: CPT | Mod: CPTII,S$GLB,,

## 2024-02-02 PROCEDURE — 93306 TTE W/DOPPLER COMPLETE: CPT

## 2024-02-02 PROCEDURE — 3008F BODY MASS INDEX DOCD: CPT | Mod: CPTII,S$GLB,,

## 2024-02-02 PROCEDURE — 3078F DIAST BP <80 MM HG: CPT | Mod: CPTII,S$GLB,,

## 2024-02-02 PROCEDURE — 93306 TTE W/DOPPLER COMPLETE: CPT | Mod: 26,,, | Performed by: INTERNAL MEDICINE

## 2024-02-02 PROCEDURE — 99999 PR PBB SHADOW E&M-EST. PATIENT-LVL V: CPT | Mod: PBBFAC,,,

## 2024-02-02 PROCEDURE — 99214 OFFICE O/P EST MOD 30 MIN: CPT | Mod: S$GLB,,,

## 2024-02-02 PROCEDURE — 1160F RVW MEDS BY RX/DR IN RCRD: CPT | Mod: CPTII,S$GLB,,

## 2024-02-02 NOTE — TELEPHONE ENCOUNTER
NN met with patient for clinic visit. Patient states that she is on 117 ng/kg/min and doing well. She has had a rough few months but because her mother passed away and burial is tomorrow. Patient is having some issues with financial assistance so NN will send message to [facility] to see if they can help assist.

## 2024-02-02 NOTE — LETTER
March 11, 2024        Ivonne Rai  [address]  [address]  [facility] [address]  Phone: [phone]  Fax: [phone]             [id number]  [address]  [facility] [address]  Phone: [phone]   Patient: Yuni Perez   MR Number: [id number]   YOB: 1970   Date of Visit: 2/2/2024       Dear Dr. Ivonne Rai    Thank you for referring Yuni Perez to me for evaluation. Attached you will find relevant portions of my assessment and plan of care.    If you have questions, please do not hesitate to call me. I look forward to following Yuni Perez along with you.    Sincerely,    Bárbara Langford, NP    Enclosure    If you would like to receive this communication electronically, please contact [email or web address] or [phone] to request [facility] [employer] access.    [facility] is a tool which provides read-only access to select patient information with whom you have a relationship. Its easy to use and provides real time access to review your patients record including encounter summaries, notes, results, and demographic information.    If you feel you have received this communication in error or would no longer like to receive these types of communications, please e-mail [email or web address]

## 2024-02-02 NOTE — PATIENT INSTRUCTIONS
No medication changes today.    Return to clinic in 3 months with labs and walk.    [email or web address]    Recommend 2 gram sodium restriction and 1500cc fluid restriction.  Encourage physical activity with graded exercise program.  Requested patient to weigh themselves daily, and to notify us if their weight increases by more than 3 lbs in 1 day or 5 lbs in 1 week.

## 2024-02-02 NOTE — PROGRESS NOTES
Subjective:    Patient ID:  Yuni Perez is a 53 y.o. female who presents for follow-up of Pulmonary Hypertension.    HPI   Mrs. Perez is a pleasant 51 y.o. white female who presents for PH follow-up. Patient was diagnosed with IPAH in  and started on Remodulin, Letairis, and Adcirca. She has a history of ABHIJEET (untreated - not able to tolerate CPAP 2/2 severe congestion), GERD, and Asthma. Current therapy is Remodulin infusing at 117ng/kg/min (pre-filled cassettes), Letairis 10mg qdaily, and Adcirca 40mg qdaily. Has T/F Adempas.  Patient evaluated by lung transplant in . Had everything set up for LUT, but says her caregivers were deemed not acceptable. She has come to terms with not being a LUT candidate here at [facility]. She is not interested in lung transplant.    Mrs. Perez is here for follow-up. She reports baseline symptoms on triple therapy. Remodulin infusing at 117ng/kg/min. Denies issues with her site. Walked 426m. Does not use supplemental O2. Saw pulmonology in September and asthma treatment optimized. Mrs. Perez reports that she has been having a rough time because her mother passed away and  is tomorrow.       6MWT:   2024   6MW    6MWT Status completed without stopping    Patient Reported Dyspnea;Other (Comment)    Was O2 used? No    6MW Distance walked (feet) 1400 feet    Distance walked (meters) 426.72 meters    Did patient stop? No    Oxygen Saturation 98 %    Supplemental Oxygen Room Air    Heart Rate 80 bpm    Blood Pressure 121/61    Ab Dyspnea Rating  light    Oxygen Saturation 97 %    Supplemental Oxygen Room Air    Heart Rate 137 bpm    Blood Pressure 133/73    Ab Dyspnea Rating  very heavy    Recovery Time (seconds) 68 seconds    Oxygen Saturation 98 %    Supplemental Oxygen Room Air    Heart Rate 98 bpm      ECHO: 2024    Left Ventricle: The left ventricle is normal in size. Ventricular mass is normal. Normal wall thickness. Normal wall  motion. Septal flattening in systole consistent with right ventricular pressure overload. There is normal systolic function with a visually estimated ejection fraction of 60 - 65%. There is normal diastolic function.    Right Ventricle: Moderate right ventricular enlargement. Wall thickness is normal. Right ventricle wall motion  is normal. Systolic function is mild - moderately reduced visually although the TAPSE.    Right Atrium: Right atrium is mildly dilated.    Aortic Valve: The aortic valve is a trileaflet valve. There is low normal AFSANEH to trivial stenosis. Aortic valve area by VTI is 2.00 cm². Aortic valve peak velocity is 1.63 m/s. Mean gradient is 5 mmHg. The dimensionless index is 0.79.    Tricuspid Valve: Mildly thickened leaflets. There is mild to moderate regurgitation.    Pulmonic Valve: There is mild regurgitation.    Pulmonary Artery: There is very severe pulmonary hypertension. The estimated pulmonary artery systolic pressure is 99 mmHg.    IVC/SVC: Normal venous pressure at 3 mmHg.    ECHO: 6/23/2023  There is abnormal septal wall motion. There is systolic flattening of the interventricular septum consistent with right ventricle pressure overload.  The left ventricle is normal in size with  Moderate right ventricular enlargement with mildly to moderately reduced right ventricular systolic function. Free Wall Strain 11%, global RV strain 11%.  Mild pulmonic regurgitation.  Moderate tricuspid regurgitation.  Normal central venous pressure (3 mmHg).  The estimated PA systolic pressure is 106 mmHg.  There is pulmonary hypertension.  There is evidence of at least moderate right to left venous-arterial shunting with agitated saline contrast. Unfortunately continuous capture not obtained thus cannot specify whether intra or extracardiac.    ECHO: 12/16/2022  The left ventricle is normal in size with normal systolic function.  The estimated ejection fraction is 68%.  Indeterminate left ventricular  diastolic function.  Mild right ventricular enlargement with mildly reduced right ventricular systolic function.  Mild right atrial enlargement.  Mild to moderate tricuspid regurgitation.  Mild pulmonic regurgitation.  Normal central venous pressure (3 mmHg).  The estimated PA systolic pressure is 107 mmHg.  There is Quite Severe pulmonary hypertension.  Trivial posterior pericardial effusion.    RHC: 6/10/2022  Estimated blood loss: none  Severe PAH on IV remodulin, adcirca and letairis.  Normal right and left-sided filling pressures.  Borderline low-normal cardiac output by Ebony method which is normal when indexed for BSA.  No signficant change from RHC 8/18.  RA: 6/ 7/ 5 RV: 105/ 3/ 10 PA: 103/ 45/ 64 PWP: 15/ 1512/ 13 . Cardiac output was 4.11 by Ebony. Cardiac index is 2.67 L/min/m2. O2 Sat: PA 70%. AO sat 96% PVR 12.4    PFTS: 9/27/2023  Spirometry shows moderate-severe obstruction. Lung volume determination shows TLC is normal with evidence air trapping is present. Airway mechanics are abnormal showing increased airway resistance and decreased conductance. DLCO is moderately decreased (42%).     PFTs: 3/17/2023  Spirometry is consistent with restriction. The significantly reduced FEF 25-75 suggests superimposed obstruction may be present. Lung volume determination is normal. The mildly elevated RV/TLC ratio suggests possible early air trapping. Diffusion capacity is moderately reduced. This interpretation of DLCO assumes normal hemoglobin level (41%)    PFTs: 9/8/2020  Spirometry shows moderate obstruction. Lung volume determination is normal. DLCO is mildly decreased - 68%    CT Chest: 2/27/2023  FINDINGS:  There are innumerable upper lobe predominant vague, centrilobular micro nodules.  Due to their very small size in their shear numbers, they are not amenable to exact comparison is.  However, the gross appearance is similar to the previous exam.  These nodules are seen in all lobes.     There are stable 5  mm right lower lobe posterior segment (4-309) and 4 mm left lower lobe posterior segment (4-313) pulmonary nodules.     The tip of a right catheter is seen overlying the cavoatrial junction.     No new lymphadenopathy.     The heart size is normal.  The main pulmonary segment is again noted to be severely dilated, up to 3.9 cm in diameter.     No new abnormalities are seen in the upper abdominal structures.     The bone density appears diminished.     Impression:     1. Redemonstrated innumerable bilateral centrilobular pulmonary micro nodules.  The differential diagnosis could include hypersensitivity pneumonitis, respiratory bronchiolitis (if the patient is a smoker), infection, vasculitis.  This is not an exhaustive list.  2. Severely dilated main pulmonary artery up to 3.9 cm.  3. Stable bilateral lower lobe subcentimeter pulmonary nodules.    IV/SC:  Pulmonary Hypertension Review Flowsheet 6/23/2023   Pump Type CADD   Medication type Remodulin   Vial size (No Data)   Dose (ng/kg/min) 117/ng/kg/min   DW (kg) 60.5kg   Amt of Med used    Amt of Diluent Used NS    Final Concentration (ng/ml) 517850 ng/ml   Pump Rate ml/24 hr 41 ml/ 24 hr     Review of Systems   Constitutional: Positive for malaise/fatigue. Negative for decreased appetite, diaphoresis, fever and night sweats.   HENT:  Positive for congestion.    Cardiovascular:  Positive for dyspnea on exertion. Negative for chest pain, irregular heartbeat, leg swelling and syncope.   Respiratory:  Positive for shortness of breath. Negative for cough and wheezing.         Occasional SOB at night and morning   Endocrine: Negative.    Skin: Negative.    Musculoskeletal:  Negative for back pain, falls and joint swelling.   Gastrointestinal: Negative.    Genitourinary: Negative.    Neurological: Negative.    Psychiatric/Behavioral:  The patient is nervous/anxious.         Controlled with medication        Objective: BP (!) 94/55 (BP Location: Left arm, Patient Position:  "Sitting, BP Method: Medium (Automatic))   Pulse 95   Ht 4' 11" (1.499 m)   Wt 61.9 kg (136 lb 7.4 oz)   SpO2 97%   BMI 27.56 kg/m²     Physical Exam  Constitutional:       Appearance: Normal appearance.   HENT:      Head: Normocephalic and atraumatic.   Eyes:      Pupils: Pupils are equal, round, and reactive to light.   Neck:      Vascular: No JVD.   Cardiovascular:      Rate and Rhythm: Normal rate and regular rhythm.      Pulses: Normal pulses.      Heart sounds: Murmur heard.      Comments: Loud S2  Pulmonary:      Effort: Pulmonary effort is normal.      Breath sounds: Normal breath sounds.   Chest:      Comments: RCW - Indwelling catheter - Eason for Remodulin infusion. Dressing is C/D/I    Abdominal:      General: Abdomen is flat.      Palpations: Abdomen is soft.   Musculoskeletal:         General: Normal range of motion.      Cervical back: Normal range of motion and neck supple.   Skin:     General: Skin is warm and dry.      Capillary Refill: Capillary refill takes less than 2 seconds.   Neurological:      Mental Status: She is alert and oriented to person, place, and time.   Psychiatric:         Mood and Affect: Mood normal.         Behavior: Behavior normal.           Lab Results   Component Value Date    BNP 72 02/02/2024     02/02/2024    K 4.2 02/02/2024    MG 2.2 02/02/2024     02/02/2024    CO2 24 02/02/2024    BUN 12 02/02/2024    CREATININE 0.7 02/02/2024    GLU 83 02/02/2024    HGBA1C 4.9 09/21/2020    AST 13 02/02/2024    ALT 11 02/02/2024    ALBUMIN 3.9 02/02/2024    PROT 7.2 02/02/2024    BILITOT 0.4 02/02/2024    CHOL 125 09/21/2020    HDL 36 (L) 09/21/2020    LDLCALC 72.8 09/21/2020    TRIG 81 09/21/2020       Magnesium   Date Value Ref Range Status   02/02/2024 2.2 1.6 - 2.6 mg/dL Final       Lab Results   Component Value Date    WBC 4.98 02/02/2024    HGB 15.2 02/02/2024    HCT 45.8 02/02/2024    MCV 91 02/02/2024     02/02/2024       BNP   Date Value Ref Range " "Status   02/02/2024 72 0 - 99 pg/mL Final     Comment:     Values of less than 100 pg/ml are consistent with non-CHF populations.   06/23/2023 126 (H) 0 - 99 pg/mL Final     Comment:     Values of less than 100 pg/ml are consistent with non-CHF populations.   03/17/2023 110 (H) 0 - 99 pg/mL Final     Comment:     Values of less than 100 pg/ml are consistent with non-CHF populations.       No results found for: "LDH"    ..  WHO Group: 1 Functional Class: II  REVEAL Score: 6 (Low Risk)    Assessment:       1. WHO group 1 pulmonary arterial hypertension    2. Moderate asthma without complication, unspecified whether persistent    3. ABHIJEET (obstructive sleep apnea)         Plan:       Mrs. Perez is subjectively stable on triple therapy, to include high dose, continuous remodulin infusion(117ng). Her ECHO today still indicated severe PAH. Will continue to medically manage as we can. She is not a candidate for lung transplant. Plan to re-introduce palliative care.    No medication changes today.    Return to clinic in 3 months with labs and walk.    [email or web address]    Recommend 2 gram sodium restriction and 1500cc fluid restriction.  Encourage physical activity with graded exercise program.  Requested patient to weigh themselves daily, and to notify us if their weight increases by more than 3 lbs in 1 day or 5 lbs in 1 week.      "

## 2024-02-02 NOTE — PROCEDURES
Yuni Perez is a 53 y.o.  female patient, who presents for a 6 minute walk test ordered by KAI Langford.  The diagnosis is Pulmonary Hypertension.  The patient's BMI is 28.1 kg/m2.  Predicted distance (lower limit of normal) is 399.5 meters.      Test Results:    The test was completed without stopping. The total time walked was 360 seconds. During walking, the patient reported:  Dyspnea, Other (Comment) (leg pain). The patient used no assistive devices during testing.     02/02/2024---------Distance: 426.72 meters (1400 feet)     O2 Sat % Supplemental Oxygen Heart Rate Blood Pressure Ab Scale   Pre-exercise  (Resting) 98 % Room Air 80 bpm 121/61 mmHg 2   During Exercise 97 % Room Air 137 bpm 133/73 mmHg 7-8   Post-exercise  (Recovery) 98 % Room Air  98 bpm       Recovery Time: 68 seconds    Performing nurse/tech: [employer]op[employer] NATACHA      PREVIOUS STUDY:   09/27/2023---------Distance: 389.23 meters (1277 feet)       O2 Sat % Supplemental Oxygen Heart Rate Blood Pressure Ab Scale   Pre-exercise  (Resting) 96 % Room Air 90 bpm 106/60 mmHg 3   During Exercise 95 % Room Air 147 bpm 123/81 mmHg 7-8   Post-exercise  (Recovery) 96 % Room Air  100 bpm 116/71 mmHg         CLINICAL INTERPRETATION:  Six minute walk distance is 426.72 meters (1400 feet) with very heavy dyspnea.  During exercise, there was no significant desaturation while breathing room air.  Blood pressure remained stable and Heart rate increased significantly with walking.  The patient reported non-pulmonary symptoms during exercise.  Since the previous study in September 2023, exercise capacity is unchanged.  Based upon age and body mass index, exercise capacity is normal.

## 2024-02-05 NOTE — H&P (VIEW-ONLY)
INTERVENTIONAL CARDIOLOGY CONSULT      Referring Physician:  No att. providers found  Indication: Pre-Lung transplant evaluation     HPI:  46 year old female with a history of idiopathic PH(2008) on remodulin, ambrisentan and tadalafil who is referred by Dr. Whyte for pre-lung transplant evaluation.     Currently doing well, she uses no oxygen and can tolerate walking about half a block without incline before getting short of breath. She notes some headaches with the remodulin. Her shortness of breath has been getting worse since the end of the last year. She has noted some episodes of chest pain that are pleuritic in nature, worsening with breathing since her diagnosis.    She takes coumadin for her PH but denies any clots in the past. She had a exercise treadmill test which was reported normal many years ago. She is a non-smoker.      ROS:  Constitution: Negative for fever, chills, weight loss or gain.   HENT: Negative for sore throat, rhinorrhea, or headache.  Eyes: Negative for blurred or double vision.   Cardiovascular: See above  Pulmonary: Positive for SOB   Gastrointestinal: Negative for abdominal pain, nausea, vomiting, or diarrhea.   : Negative for dysuria.   Neurological: Negative for focal weakness or sensory changes.    Past Medical History   Diagnosis Date    AR (allergic rhinitis)     Cholelithiasis, common bile duct     Chronic low back pain     GERD (gastroesophageal reflux disease)     History of migraine headaches     Hypothyroidism     Lumbar disc disease     Menorrhagia     Mild asthma     Obesity     Plantar fasciitis of left foot     Primary pulmonary hypertension      followed by heart transplant/pulmonary     Seizure disorder      x 1 in 2008    Sleep apnea     TMJ (dislocation of temporomandibular joint)     Tricuspid regurgitation      Past Surgical History   Procedure Laterality Date    Portacath placement      Cardiac catheterization      Upper gastrointestinal  endoscopy       Family History   Problem Relation Age of Onset    Heart disease Father     Glaucoma Father     Diabetes Mother     Cancer Maternal Grandmother      uterine    Cancer Maternal Aunt      breast    Breast cancer Maternal Aunt     Heart disease Paternal Grandfather     Heart attack Paternal Grandfather     Leukemia Brother     Hypertension      Diabetes Maternal Grandfather     Heart attack Maternal Grandfather     Breast cancer Cousin     No Known Problems Sister     No Known Problems Maternal Uncle     No Known Problems Paternal Aunt     No Known Problems Paternal Uncle     No Known Problems Paternal Grandmother     Colon cancer Neg Hx     Ovarian cancer Neg Hx     Amblyopia Neg Hx     Blindness Neg Hx     Cataracts Neg Hx     Macular degeneration Neg Hx     Retinal detachment Neg Hx     Strabismus Neg Hx     Stroke Neg Hx     Thyroid disease Neg Hx      Social History   Substance Use Topics    Smoking status: Never Smoker    Smokeless tobacco: Never Used    Alcohol use No      Comment: 1 per year     Review of patient's allergies indicates:   Allergen Reactions    Adhesive Hives     Silk tape    Amoxicillin Rash    Chlorhexidine Other (See Comments)       Current Outpatient Prescriptions on File Prior to Visit   Medication Sig Dispense Refill    0.9 % SODIUM CHLORIDE (SODIUM CHLORIDE 0.9%) 0.9 % Soln Inject 3 mLs into the vein every 8 (eight) hours as needed. (Patient taking differently: Inject 3 mLs into the vein every other day. ) 900 mL 11    ADVAIR DISKUS 100-50 mcg/dose diskus inhaler INHALE ONE PUFF TWICE DAILY 60 each 11    albuterol 90 mcg/actuation inhaler Inhale 2 puffs into the lungs every 4 (four) hours as needed. 2 Aerosol Inhalation Every 4-6 hours 1 Inhaler 3    ambrisentan (LETAIRIS) 10 MG Tab Take 1 tablet (10 mg total) by mouth once daily. 30 tablet 11    azelastine (ASTELIN) 137 mcg (0.1 %) nasal spray       butalbital-acetaminophen-caffeine  -40 mg (FIORICET, ESGIC) -40 mg per tablet Take 1 tablet by mouth every 4 (four) hours as needed for Pain or Headaches.      cetirizine (ZYRTEC) 10 MG tablet Take 10 mg by mouth. 1 Tablet Oral Every day      clindamycin (CLEOCIN T) 1 % lotion       cyanocobalamin, vitamin B-12, 2,500 mcg Subl Place 2,500 mcg under the tongue once daily.       desonide (DESOWEN) 0.05 % ointment 0.05 %.  Ointment Topical As directed.  Apply to affected area twice a daily over face      diphenhydrAMINE (BENADRYL) 25 mg capsule Take 25 mg by mouth every 6 (six) hours as needed for Itching.      esomeprazole (NEXIUM) 40 MG capsule Take 1 capsule (40 mg total) by mouth before breakfast. 30 capsule 11    ferrous sulfate 325 (65 FE) MG EC tablet Take 325 mg by mouth daily as needed.       fluticasone (FLONASE) 50 mcg/actuation nasal spray 2 sprays by Each Nare route once daily. 1 Bottle 6    folic acid (FOLVITE) 1 MG tablet Take 1 mg by mouth.  Tablet Oral Every day      levothyroxine (SYNTHROID) 137 MCG Tab tablet Take 1 tablet (137 mcg total) by mouth once daily. 30 tablet 5    norethindrone-ethinyl estradiol (MICROGESTIN FE 1/20, 28,) 1 mg-20 mcg (21)/75 mg (7) per tablet Take 1 tablet by mouth once daily. 28 tablet 5    ondansetron (ZOFRAN) 4 MG tablet Take 4 mg by mouth every 6 (six) hours as needed. 1 Tablet Oral Every 6 hours      PRAMOSONE 2.5-1 % Lotn lotion       promethazine (PHENERGAN) 25 MG tablet Take 25 mg by mouth every 4 to 6 hours as needed.       rifAXIMin (XIFAXAN) 550 mg Tab Take 1 tablet (550 mg total) by mouth 3 (three) times daily. 42 tablet 0    tadalafil (ADCIRCA) 20 MG Tab Take 2 tablets (40 mg total) by mouth once daily. 60 tablet 11    topiramate (TOPAMAX) 100 MG tablet Take 100 mg by mouth 2 (two) times daily.      treprostinil (REMODULIN) 2.5 mg/mL Soln Mix cassette as directed and infuse continuously per physician titration orders on dosing sheet. Current dose 46 ng/kg/min 60 mL  "11    VITAMIN D2 50,000 unit capsule TAKE ONE CAPSULE BY MOUTH EVERY 14 DAYS 6 capsule 0    warfarin (COUMADIN) 2.5 MG tablet Take 2.5-3 tablets (6.25-7.5 mg total) by mouth Daily. As directed by coumadin clinic 90 tablet 11    [DISCONTINUED] ADCIRCA 20 mg Tab       [DISCONTINUED] dicyclomine (BENTYL) 10 MG capsule Take 1 capsule (10 mg total) by mouth before meals as needed (TID PRN). 90 capsule 3    [DISCONTINUED] FINACEA 15 % Foam Place 1 Pump onto the skin 2 (two) times daily.       No current facility-administered medications on file prior to visit.        OBJECTIVE:     Vitals:    01/17/17 1246 01/17/17 1255   BP: (!) 127/57 115/71   Pulse: 72    SpO2: 99%    Weight: 61.1 kg (134 lb 11.2 oz)    Height: 4' 11" (1.499 m)    PainSc:   2    PainLoc: Head      Gen: Lying in bed, no acute distress  HEENT: Supple, no JVD  Cvs: Regular rate and rhythm, loud P2  Resp: Normal work of breathing, clear bilaterally  Abd: Soft, non-tender and not distended  Ext: Warm, no edema  Vascular:   LEFT RIGHT   RADIAL 2+ 2+   BRACHIAL 2+ 2+   FEMORAL 2+ 2+   DP 2+ 2+   TP 2+ 2+   Sreedhar's Test Normal Normal     Labs:     Results for KELSY TURNER (MRN [id number]) as of 1/17/2017 13:28   Ref. Range 1/16/2017 08:38   WBC Latest Ref Range: 3.90 - 12.70 K/uL 3.76 (L)   RBC Latest Ref Range: 4.00 - 5.40 M/uL 4.75   Hemoglobin Latest Ref Range: 12.0 - 16.0 g/dL 11.9 (L)   Hematocrit Latest Ref Range: 37.0 - 48.5 % 37.1   MCV Latest Ref Range: 82 - 98 fL 78 (L)   MCH Latest Ref Range: 27.0 - 31.0 pg 25.1 (L)   MCHC Latest Ref Range: 32.0 - 36.0 % 32.1   RDW Latest Ref Range: 11.5 - 14.5 % 16.8 (H)   Platelets Latest Ref Range: 150 - 350 K/uL 258   MPV Latest Ref Range: 9.2 - 12.9 fL SEE COMMENT   Gran% Latest Ref Range: 38.0 - 73.0 % 57.2   Gran # Latest Ref Range: 1.8 - 7.7 K/uL 2.1   Lymph% Latest Ref Range: 18.0 - 48.0 % 27.9   Lymph # Latest Ref Range: 1.0 - 4.8 K/uL 1.1   Mono% Latest Ref Range: 4.0 - 15.0 % 7.2   Mono # " Latest Ref Range: 0.3 - 1.0 K/uL 0.3   Eosinophil% Latest Ref Range: 0.0 - 8.0 % 7.2   Eos # Latest Ref Range: 0.0 - 0.5 K/uL 0.3   Basophil% Latest Ref Range: 0.0 - 1.9 % 0.5   Baso # Latest Ref Range: 0.00 - 0.20 K/uL 0.02   Ovalocytes Unknown Occasional   Aniso Unknown Slight   Poik Unknown Slight   Poly Unknown Occasional   Hypo Unknown Occasional       INR 1/16/17 2.2 Results for KELSY TURNER (MRN [id number]) as of 1/17/2017 13:28   Ref. Range 1/16/2017 08:38   Sodium Latest Ref Range: 136 - 145 mmol/L 139   Potassium Latest Ref Range: 3.5 - 5.1 mmol/L 3.5   Chloride Latest Ref Range: 95 - 110 mmol/L 113 (H)   CO2 Latest Ref Range: 23 - 29 mmol/L 21 (L)   Anion Gap Latest Ref Range: 8 - 16 mmol/L 5 (L)   BUN, Bld Latest Ref Range: 6 - 20 mg/dL 8   Creatinine Latest Ref Range: 0.5 - 1.4 mg/dL 0.7   eGFR if non African American Latest Ref Range: >60 mL/min/1.73 m^2 >60.0   eGFR if African American Latest Ref Range: >60 mL/min/1.73 m^2 >60.0   Glucose Latest Ref Range: 70 - 110 mg/dL 80   Calcium Latest Ref Range: 8.7 - 10.5 mg/dL 8.6 (L)   Magnesium Latest Ref Range: 1.6 - 2.6 mg/dL 2.0   Alkaline Phosphatase Latest Ref Range: 55 - 135 U/L 74   Total Protein Latest Ref Range: 6.0 - 8.4 g/dL 7.5   Albumin Latest Ref Range: 3.5 - 5.2 g/dL 3.5   Total Bilirubin Latest Ref Range: 0.1 - 1.0 mg/dL 0.2   AST Latest Ref Range: 10 - 40 U/L 15   ALT Latest Ref Range: 10 - 44 U/L 16   Triglycerides Latest Ref Range: 30 - 150 mg/dL 101   Cholesterol Latest Ref Range: 120 - 199 mg/dL 128   HDL Latest Ref Range: 40 - 75 mg/dL 31 (L)   LDL Cholesterol Latest Ref Range: 63.0 - 159.0 mg/dL 76.8   Total Cholesterol/HDL Ratio Latest Ref Range: 2.0 - 5.0  4.1   BNP Latest Ref Range: 0 - 99 pg/mL 14   Hemoglobin A1C Latest Ref Range: 4.5 - 6.2 % 5.2   Estimated Avg Glucose Latest Ref Range: 68 - 131 mg/dL 103   TSH Latest Ref Range: 0.400 - 4.000 uIU/mL 1.808   T3, Total Latest Ref Range: 60 - 180 ng/dL 136   T3, Free  Latest Ref Range: 2.3 - 4.2 pg/mL 2.6   T4 Total Latest Ref Range: 4.5 - 11.5 ug/dL 12.5 (H)   Free T4 Latest Ref Range: 0.71 - 1.51 ng/dL 1.35      Micro:   Blood Cultures  Lab Results   Component Value Date    LABBLOO NO GROWTH AFTER 5 DAYS. FINAL REPORT 04/17/2012    LABBLOO NO GROWTH AFTER 5 DAYS. FINAL REPORT 04/17/2012    LABBLOO NO GROWTH AFTER 5 DAYS - FINAL REPORT. 07/19/2010    LABBLOO NO GROWTH AFTER 5 DAYS - FINAL REPORT. 07/19/2010    LABBLOO NO GROWTH AFTER 4 DAYS - FINAL REPORT 05/17/2009     Urine Cultures  No results found for: LABURIN  IMAGING:   EKGs:  NSR    TTE:  12/9/16  CONCLUSIONS     1 - Normal left ventricular systolic function (EF 60-65%).     2 - Right ventricular enlargement with mildly to moderately depressed systolic function.     3 - Normal left ventricular diastolic function.     4 - Pulmonary hypertension. The estimated PA systolic pressure is 41 mmHg.     EF   Date Value Ref Range Status   12/09/2016 60 55 - 65    03/04/2016 60 55 - 65    10/13/2014 60 55 - 65        Prior Ischemic Evaluation:  None    IMPRESSION:   46 year old female with a history of idiopathic PH(2008) on remodulin, ambrisentan and tadalafil who is referred by Dr. Whyte for pre-lung transplant evaluation.      PROCEDURAL RISK STRATIFICATION:   Contrast Nephropathy Post-PCI/Renal Risk Score: 7.5%    Prior Contrast Allergy:  No  Sedation   Prior Sedation: Yes   History of Obstructive Sleep Apnea/SDB:  No  Pertinent Allergies:   Latex: No    ASA: No   Heparin-Induced Thrombocytopenia: No  PLAN:   -plan for LHC via right radial and RHC via right AC vs IJ  -patient to hold coumadin for 3 days prior  -The risks, benefits & alternatives of the procedure were explained to the patient.    -The risks of coronary angiography include but are not limited to:  Bleeding, infection, heart rhythm abnormalities, allergic reactions, kidney injury, stroke and death.    -Should stenting be indicated, the patient has agreed to  dual anti-platelet therapy for 1-consecutive year with a drug-eluting stent and a minimum of 1-month with the use of a bare metal stent.    -The risks of moderate sedation include hypotension, respiratory depression, arrhythmias, bronchospasm, & death.    -Informed consent was obtained & the patient is agreeable to proceed with the procedure.  -This patient was discussed with the attending interventional cardiologist who agrees with the above assessment & plan.      Jeremi Wen DO  Cardiology Fellow  Pager [phone]    I have personally seen, taken the history and examined the patient.  I agree with the housestaff whose note reflects my findings and plan as above.     18

## 2024-02-13 DIAGNOSIS — I27.0 PRIMARY PULMONARY HYPERTENSION: ICD-10-CM

## 2024-02-13 RX ORDER — AMBRISENTAN 10 MG/1
10 TABLET, FILM COATED ORAL DAILY
Qty: 30 TABLET | Refills: 11
Start: 2024-02-13 | End: 2024-02-14 | Stop reason: SDUPTHER

## 2024-02-14 DIAGNOSIS — I27.0 PRIMARY PULMONARY HYPERTENSION: ICD-10-CM

## 2024-02-14 RX ORDER — AMBRISENTAN 10 MG/1
10 TABLET, FILM COATED ORAL DAILY
Qty: 30 TABLET | Refills: 11 | Status: SHIPPED | OUTPATIENT
Start: 2024-02-14

## 2024-02-21 NOTE — TELEPHONE ENCOUNTER
Attempted to call pt regarding vit D levels pt didn't answer left vm instructing pt to call back.    35-year-old man, history of intellectual disability, impulse control disorder, asthma, complains of about 7 to 8 days of diarrhea and lower abdominal pain.  He denies nausea/vomiting/fever/chills/dysuria.  He also states that he was at his mother's house today and he used a knife to cut his forearms because he was "not happy".

## 2024-03-08 ENCOUNTER — TELEPHONE (OUTPATIENT)
Dept: TRANSPLANT | Facility: CLINIC | Age: 54
End: 2024-03-08
Payer: COMMERCIAL

## 2024-03-08 NOTE — TELEPHONE ENCOUNTER
Spoke with patient about the disability letter that she is requesting. Patient states that her mother recently  and has been left an inheritance. Patient states that she needs a letter of disability to submit to the IRS.     Will discuss patient's letter request with Lety Langford NP and will follow up with patient.

## 2024-03-08 NOTE — TELEPHONE ENCOUNTER
----- Message from Anaya Christianson sent at 3/7/2024  9:02 AM CST -----  Regarding: disability letter  Pt is calling to check the status of her disability letter and can be reached at [phone].    Thank you

## 2024-03-11 ENCOUNTER — HOSPITAL ENCOUNTER (OUTPATIENT)
Dept: RADIOLOGY | Facility: HOSPITAL | Age: 54
Discharge: HOME | End: 2024-03-11
Attending: INTERNAL MEDICINE
Payer: COMMERCIAL

## 2024-03-11 DIAGNOSIS — R16.0 LIVER MASS: ICD-10-CM

## 2024-03-11 PROCEDURE — 76700 US EXAM ABDOM COMPLETE: CPT | Mod: 26,,, | Performed by: STUDENT IN AN ORGANIZED HEALTH CARE EDUCATION/TRAINING PROGRAM

## 2024-03-11 PROCEDURE — 76700 US EXAM ABDOM COMPLETE: CPT | Mod: TC

## 2024-03-18 ENCOUNTER — OFFICE VISIT (OUTPATIENT)
Dept: HEPATOLOGY | Facility: CLINIC | Age: 54
End: 2024-03-18
Attending: INTERNAL MEDICINE
Payer: COMMERCIAL

## 2024-03-18 VITALS
HEIGHT: 59 IN | DIASTOLIC BLOOD PRESSURE: 72 MMHG | WEIGHT: 137.13 LBS | BODY MASS INDEX: 27.64 KG/M2 | HEART RATE: 86 BPM | OXYGEN SATURATION: 96 % | RESPIRATION RATE: 18 BRPM | TEMPERATURE: 98 F | SYSTOLIC BLOOD PRESSURE: 109 MMHG

## 2024-03-18 DIAGNOSIS — R16.0 LIVER MASS: Primary | ICD-10-CM

## 2024-03-18 DIAGNOSIS — D13.4 HEPATIC ADENOMA: Chronic | ICD-10-CM

## 2024-03-18 PROCEDURE — 99213 OFFICE O/P EST LOW 20 MIN: CPT | Mod: S$GLB,,, | Performed by: INTERNAL MEDICINE

## 2024-03-18 PROCEDURE — 1160F RVW MEDS BY RX/DR IN RCRD: CPT | Mod: CPTII,S$GLB,, | Performed by: INTERNAL MEDICINE

## 2024-03-18 PROCEDURE — 3008F BODY MASS INDEX DOCD: CPT | Mod: CPTII,S$GLB,, | Performed by: INTERNAL MEDICINE

## 2024-03-18 PROCEDURE — 3074F SYST BP LT 130 MM HG: CPT | Mod: CPTII,S$GLB,, | Performed by: INTERNAL MEDICINE

## 2024-03-18 PROCEDURE — 3078F DIAST BP <80 MM HG: CPT | Mod: CPTII,S$GLB,, | Performed by: INTERNAL MEDICINE

## 2024-03-18 PROCEDURE — 99999 PR PBB SHADOW E&M-EST. PATIENT-LVL V: CPT | Mod: PBBFAC,,, | Performed by: INTERNAL MEDICINE

## 2024-03-18 PROCEDURE — 1159F MED LIST DOCD IN RCRD: CPT | Mod: CPTII,S$GLB,, | Performed by: INTERNAL MEDICINE

## 2024-03-18 NOTE — PROGRESS NOTES
Hepatology Follow Up Note    Referring provider: No ref. provider found  PCP: Lulu Sandhu MD    Chief complaint: follow up liver lesion    HPI:  Yuni Perez is a 54 y.o. female with history of pulmonary HTN who presents for scheduled follow up.      This 54-year-old woman is being followed for a hepatic adenoma.  She is free of any symptoms.  She is now off hormonal replacement therapy.  There has been stability in size and appearance over the past several years.  She had a recent abdominal ultrasound which confirmed stability.    Past Medical History:   Diagnosis Date    AR (allergic rhinitis)     Cholelithiasis, common bile duct     Chronic low back pain     Eye pressure 2017    General anesthetics causing adverse effect in therapeutic use     GERD (gastroesophageal reflux disease)     History of migraine headaches     Hypothyroidism     Innocent heart murmur     Lumbar disc disease     Menorrhagia     Mild asthma     Obesity     Plantar fasciitis of left foot     Primary pulmonary hypertension     followed by heart transplant/pulmonary     Seizure disorder     x 1 in 2008    Seizures     Sleep apnea     TMJ (dislocation of temporomandibular joint)     Tricuspid regurgitation        Past Surgical History:   Procedure Laterality Date    CARDIAC CATHETERIZATION      CATHETERIZATION OF BOTH LEFT AND RIGHT HEART Bilateral 9/29/2020    Procedure: CATHETERIZATION, HEART, BOTH LEFT AND RIGHT;  Surgeon: Gucci Pickett MD;  Location: [facility] CATH LAB;  Service: Cardiology;  Laterality: Bilateral;    CENTRAL VENOUS CATHETER INSERTION      CORONARY ANGIOGRAPHY N/A 9/29/2020    Procedure: ANGIOGRAM, CORONARY ARTERY;  Surgeon: Gucci Pickett MD;  Location: [facility] CATH LAB;  Service: Cardiology;  Laterality: N/A;    gallbladder drain  06/2019    INSERTION OF HAYNES CATHETER Right 6/17/2020    Procedure: INSERTION, CATHETER, CENTRAL VENOUS, HAYNES Replace Single Lumen for Remodulin Infusion;  Surgeon:  Bertin Dewitt MD;  Location: [facility] OR 2ND FLR;  Service: General;  Laterality: Right;    PORTACATH PLACEMENT      REMOVAL OF TUNNELED CENTRAL VENOUS CATHETER (CVC) N/A 6/17/2020    Procedure: REMOVAL, CATHETER, CENTRAL VENOUS, TUNNELED;  Surgeon: Bertin Dewitt MD;  Location: [facility] OR 2ND FLR;  Service: General;  Laterality: N/A;    RIGHT HEART CATHETERIZATION Right 8/20/2018    Procedure: HEART CATH-RIGHT;  Surgeon: Ivonne Rai MD;  Location: [facility] CATH LAB;  Service: Cardiology;  Laterality: Right;    RIGHT HEART CATHETERIZATION Right 9/4/2019    Procedure: INSERTION, CATHETER, RIGHT HEART;  Surgeon: Ivonne Rai MD;  Location: [facility] CATH LAB;  Service: Cardiology;  Laterality: Right;    RIGHT HEART CATHETERIZATION Right 6/10/2022    Procedure: INSERTION, CATHETER, RIGHT HEART;  Surgeon: Keshia Poe MD;  Location: [facility] CATH LAB;  Service: Cardiology;  Laterality: Right;    UPPER GASTROINTESTINAL ENDOSCOPY         Family History   Problem Relation Age of Onset    Diabetes Mother     Heart disease Father     Glaucoma Father     No Known Problems Sister     Cancer Maternal Aunt         breast    Breast cancer Maternal Aunt     No Known Problems Maternal Uncle     No Known Problems Paternal Aunt     No Known Problems Paternal Uncle     Cancer Maternal Grandmother         uterine    Diabetes Maternal Grandfather     Heart attack Maternal Grandfather     No Known Problems Paternal Grandmother     Heart disease Paternal Grandfather     Heart attack Paternal Grandfather     Diabetes Paternal Grandfather     Leukemia Brother     Breast cancer Cousin     Breast cancer Cousin     Hypertension Other     Colon cancer Neg Hx     Ovarian cancer Neg Hx     Amblyopia Neg Hx     Blindness Neg Hx     Cataracts Neg Hx     Macular degeneration Neg Hx     Retinal detachment Neg Hx     Strabismus Neg Hx     Stroke Neg Hx     Thyroid disease Neg Hx     Esophageal cancer Neg Hx        Social History     Tobacco Use     Smoking status: Never    Smokeless tobacco: Never   Substance Use Topics    Alcohol use: No     Alcohol/week: 0.8 standard drinks of alcohol     Types: 1 Standard drinks or equivalent per week     Comment: socially    Drug use: No       Current Outpatient Medications   Medication Sig Dispense Refill    acetaminophen (TYLENOL) 500 MG tablet Take 1,000 mg by mouth every 6 (six) hours as needed for Pain.      ADCIRCA 20 mg Tab Take 2 tablets (40 mg total) by mouth once daily. 60 tablet 11    albuterol (PROAIR HFA) 90 mcg/actuation inhaler Inhale 2 puffs into the lungs every 6 (six) hours as needed for Wheezing. Rescue 18 g 6    alprazolam (XANAX ORAL) Take by mouth as needed.      ambrisentan (LETAIRIS) 10 MG Tab Take 1 tablet (10 mg total) by mouth once daily. 30 tablet 11    azelastine (ASTELIN) 137 mcg (0.1 %) nasal spray 2 sprays by Nasal route 2 (two) times daily. Patient uses prn in the evening      BREO ELLIPTA 200-25 mcg/dose DsDv diskus inhaler INHALE 1 PUFF INTO THE LUNGS EVERY EVENING. CONTROLLER 180 each 3    butalbital-acetaminophen-caffeine -40 mg (FIORICET, ESGIC) -40 mg per tablet Take 1 tablet by mouth every 4 (four) hours as needed for Pain.      cyanocobalamin, vitamin B-12, 2,500 mcg Subl Place 2,500 mcg under the tongue every morning.       diphenhydrAMINE (BENADRYL) 25 mg capsule Take 50 mg by mouth every 6 (six) hours as needed for Itching. Patient takes prn evenings      ferrous sulfate 325 (65 FE) MG EC tablet Take 325 mg by mouth daily as needed.       fluticasone propionate (FLONASE) 50 mcg/actuation nasal spray 2 sprays (100 mcg total) by Each Nostril route every evening. 16 g 11    folic acid (FOLVITE) 1 MG tablet TAKE 1 TABLET BY MOUTH EVERY DAY 90 tablet 3    furosemide (LASIX) 20 MG tablet Take 1 tablet (20 mg total) by mouth once daily. 30 tablet 11    glucosam/chond-msm1/C/michele/bor (GLUCOSAMINE-CHOND-MSM COMPLEX ORAL) Take by mouth.      hyoscyamine (LEVSIN/SL) 0.125 mg  "Subl Place 1 tablet (0.125 mg total) under the tongue every 6 (six) hours as needed (Aa needed). 60 tablet 0    lactic acid-citric-potassium (PHEXXI) 1.8-1-0.4 % Gel Place 1 Applicatorful vaginally as needed (CONTRACEPTIVE). 5 g 2    levothyroxine (SYNTHROID) 125 MCG tablet TAKE ONE TABLET BY MOUTH EVERY MORNING 1 HOUR BEFORE BREAKFAST AND OTHER MEDICATIONS (FOR THYROID). 90 tablet 1    LIDOcaine (LIDODERM) 5 % Place 1 patch onto the skin once daily. 15 patch 0    loratadine (CLARITIN) 10 mg tablet Take 10 mg by mouth every evening.       ondansetron (ZOFRAN) 4 MG tablet Take 1 tablet (4 mg total) by mouth every 6 (six) hours as needed. 1 Tablet Oral Every 6 hours 30 tablet 2    pantoprazole (PROTONIX) 40 MG tablet TAKE ONE TABLET BY MOUTH ONCE DAILY AS NEEDED FOR HEARTBURN 90 tablet 3    tiZANidine (ZANAFLEX) 4 MG tablet Take 4 mg by mouth every 6 (six) hours as needed.      treprostinil (REMODULIN) 2.5 mg/mL Soln Mix cassette as directed and infuse continuously per physician titration orders on dosing sheet. Current dose 80ng/kg/min 60 mL 11    ubrogepant (UBRELVY)tablet 100 mg TAKE ONE TABLET BY MOUTH AS A SINGLE DOSE AS NEEDED FOR MIGRAINE      albuterol-ipratropium (DUO-NEB) 2.5 mg-0.5 mg/3 mL nebulizer solution Take 3 mLs by nebulization every 6 (six) hours as needed for Wheezing. Rescue 6 each 6    topiramate (TOPAMAX) 100 MG tablet Take 1 tablet (100 mg total) by mouth 2 (two) times daily. 60 tablet 5     No current facility-administered medications for this visit.       Review of patient's allergies indicates:   Allergen Reactions    Fentanyl Anaphylaxis     Respiratory distress    Vibra-tabs [doxycycline hyclate] Anaphylaxis     "throat felt like it was closing"    Adhesive Hives     Silk tape    Amoxicillin Rash    Nsaids (non-steroidal anti-inflammatory drug) Swelling    Chlorhexidine Other (See Comments)     Blue Chlorhexidine causes hives         Vitals:    03/18/24 0843   BP: 109/72   Pulse: 86 " "  Resp: 18   Temp: 97.8 °F (36.6 °C)   TempSrc: Oral   SpO2: 96%   Weight: 62.2 kg (137 lb 2 oz)   Height: 4' 11" (1.499 m)       Physical Exam  Constitutional:       General: She is not in acute distress.  HENT:      Head: Normocephalic and atraumatic.   Eyes:      General: No scleral icterus.     Conjunctiva/sclera: Conjunctivae normal.      Pupils: Pupils are equal, round, and reactive to light.   Cardiovascular:      Rate and Rhythm: Normal rate and regular rhythm.      Pulses: Normal pulses.      Heart sounds: Normal heart sounds.   Pulmonary:      Effort: No respiratory distress.      Breath sounds: No wheezing or rales.   Abdominal:      General: Bowel sounds are normal. There is no distension.      Palpations: Abdomen is soft.      Tenderness: There is no abdominal tenderness.   Musculoskeletal:         General: No deformity. Normal range of motion.      Left lower leg: No edema.   Skin:     General: Skin is warm and dry.   Neurological:      General: No focal deficit present.      Mental Status: She is alert and oriented to person, place, and time.      Cranial Nerves: No cranial nerve deficit.   Psychiatric:         Mood and Affect: Mood normal.         Thought Content: Thought content normal.         Judgment: Judgment normal.         LABS: I personally reviewed pertinent laboratory findings.    Lab Results   Component Value Date    WBC 6.35 03/11/2024    HGB 15.3 03/11/2024    HCT 47.5 03/11/2024    MCV 94 03/11/2024     03/11/2024       Lab Results   Component Value Date     03/11/2024    K 4.4 03/11/2024     (H) 03/11/2024    CO2 21 (L) 03/11/2024    BUN 19 03/11/2024    CREATININE 0.7 03/11/2024    CALCIUM 10.0 03/11/2024    ANIONGAP 8 03/11/2024    ESTGFRAFRICA >60.0 06/10/2022    EGFRNONAA >60.0 06/10/2022       Lab Results   Component Value Date    ALT 11 03/11/2024    AST 12 03/11/2024    ALKPHOS 88 03/11/2024    BILITOT 0.2 03/11/2024       Lab Results   Component Value Date    " HEPBCAB Negative 09/21/2020    HEPCAB Negative 09/21/2020       Lab Results   Component Value Date    RUBY Positive (A) 10/08/2013        MELD 3.0: 7 at 3/11/2024  8:34 AM  MELD-Na: 6 at 3/11/2024  8:34 AM  Calculated from:  Serum Creatinine: 0.7 mg/dL (Using min of 1 mg/dL) at 3/11/2024  8:34 AM  Serum Sodium: 141 mmol/L (Using max of 137 mmol/L) at 3/11/2024  8:34 AM  Total Bilirubin: 0.2 mg/dL (Using min of 1 mg/dL) at 3/11/2024  8:34 AM  Serum Albumin: 4.0 g/dL (Using max of 3.5 g/dL) at 3/11/2024  8:34 AM  INR(ratio): 0.9 (Using min of 1) at 3/11/2024  8:34 AM  Age at listing (hypothetical): 53 years  Sex: Female at 3/11/2024  8:34 AM       IMAGING: I personally reviewed imaging studies.    Assessment:  2.5 cm liver lesion stable in size, likely hepatic adenoma off hormonal therapy    1. Liver mass    2. Hepatic adenoma        Recommendations:   Given its stability I will continue ultrasound surveillance on an annual basis.  She will return to clinic at that time.

## 2024-03-19 ENCOUNTER — PATIENT OUTREACH (OUTPATIENT)
Dept: ADMINISTRATIVE | Facility: HOSPITAL | Age: 54
End: 2024-03-19
Payer: COMMERCIAL

## 2024-03-19 NOTE — PROGRESS NOTES
March 2024 Comm. Gap Report [facility] 1 03.18.24 Colorectal Screening - the patient declined a colonoscopy / alternative screenings due her Pulmonary Hypertension and non-tolerance to anesthesia.

## 2024-04-11 DIAGNOSIS — G89.29 CHRONIC NONINTRACTABLE HEADACHE, UNSPECIFIED HEADACHE TYPE: ICD-10-CM

## 2024-04-11 DIAGNOSIS — R51.9 CHRONIC NONINTRACTABLE HEADACHE, UNSPECIFIED HEADACHE TYPE: ICD-10-CM

## 2024-04-16 ENCOUNTER — TELEPHONE (OUTPATIENT)
Dept: NEUROSURGERY | Facility: CLINIC | Age: 54
End: 2024-04-16
Payer: COMMERCIAL

## 2024-04-16 RX ORDER — TOPIRAMATE 100 MG/1
100 TABLET, FILM COATED ORAL 2 TIMES DAILY
Qty: 60 TABLET | Refills: 0 | Status: SHIPPED | OUTPATIENT
Start: 2024-04-16 | End: 2024-05-22

## 2024-04-18 ENCOUNTER — TELEPHONE (OUTPATIENT)
Dept: TRANSPLANT | Facility: CLINIC | Age: 54
End: 2024-04-18
Payer: COMMERCIAL

## 2024-04-18 ENCOUNTER — HOSPITAL ENCOUNTER (OUTPATIENT)
Dept: RADIOLOGY | Facility: HOSPITAL | Age: 54
Discharge: HOME | End: 2024-04-18
Payer: COMMERCIAL

## 2024-04-18 ENCOUNTER — OFFICE VISIT (OUTPATIENT)
Dept: GASTROENTEROLOGY | Facility: CLINIC | Age: 54
End: 2024-04-18
Payer: COMMERCIAL

## 2024-04-18 VITALS
SYSTOLIC BLOOD PRESSURE: 97 MMHG | HEART RATE: 78 BPM | BODY MASS INDEX: 27.87 KG/M2 | DIASTOLIC BLOOD PRESSURE: 65 MMHG | HEIGHT: 59 IN | WEIGHT: 138.25 LBS

## 2024-04-18 DIAGNOSIS — R07.9 CHEST PAIN, UNSPECIFIED TYPE: Primary | ICD-10-CM

## 2024-04-18 DIAGNOSIS — R07.9 CHEST PAIN, UNSPECIFIED TYPE: ICD-10-CM

## 2024-04-18 DIAGNOSIS — K58.0 IRRITABLE BOWEL SYNDROME WITH DIARRHEA: Primary | ICD-10-CM

## 2024-04-18 DIAGNOSIS — R10.9 ABDOMINAL CRAMPING: ICD-10-CM

## 2024-04-18 DIAGNOSIS — Z53.20 COLON CANCER SCREENING DECLINED: ICD-10-CM

## 2024-04-18 PROCEDURE — 3074F SYST BP LT 130 MM HG: CPT | Mod: CPTII,S$GLB,,

## 2024-04-18 PROCEDURE — 99204 OFFICE O/P NEW MOD 45 MIN: CPT | Mod: S$GLB,,,

## 2024-04-18 PROCEDURE — 3078F DIAST BP <80 MM HG: CPT | Mod: CPTII,S$GLB,,

## 2024-04-18 PROCEDURE — 99999 PR PBB SHADOW E&M-EST. PATIENT-LVL V: CPT | Mod: PBBFAC,,,

## 2024-04-18 PROCEDURE — 71046 X-RAY EXAM CHEST 2 VIEWS: CPT | Mod: 26,,, | Performed by: STUDENT IN AN ORGANIZED HEALTH CARE EDUCATION/TRAINING PROGRAM

## 2024-04-18 PROCEDURE — 71046 X-RAY EXAM CHEST 2 VIEWS: CPT | Mod: TC

## 2024-04-18 PROCEDURE — 3008F BODY MASS INDEX DOCD: CPT | Mod: CPTII,S$GLB,,

## 2024-04-18 PROCEDURE — 1159F MED LIST DOCD IN RCRD: CPT | Mod: CPTII,S$GLB,,

## 2024-04-18 RX ORDER — HYOSCYAMINE SULFATE 0.12 MG/1
0.12 TABLET SUBLINGUAL EVERY 6 HOURS PRN
Qty: 60 TABLET | Refills: 3 | Status: SHIPPED | OUTPATIENT
Start: 2024-04-18

## 2024-04-18 NOTE — TELEPHONE ENCOUNTER
Patient called in and states that she is concerned about her chauhan line. She noticied that there was some backed up blood and she has been having some pains around the site, pain when moving her neck and back pain. Patient denies discharge or SOB. Per KAI Davidson, steven order xray and will make further recommendations.

## 2024-04-18 NOTE — PROGRESS NOTES
Gastroenterology Clinic Consultation Note    Reason for Visit:  The primary encounter diagnosis was Irritable bowel syndrome with diarrhea. A diagnosis of Abdominal cramping was also pertinent to this visit.    PCP:   Lulu Sandhu   No address on file      Initial HPI   This is a 54 y.o. female presenting to [facility] GI care. She reports feeling well for several years until recently. Patient reports that she lost her mom in January which has caused her some undue stress and her GI symptoms have return. She has been having diarrhea with fecal urgency. This will happen about about 2 times a day, typically in the AM. Then it will happen again in the evening, typically after dinner. Has tried eliminating onions, garlic, eggplant, but this has not improved her symptoms. She will try to add cheese, which helps her stools be more formed. Taking Hyoscyamine as needed for her abdominal cramping that is associated with her diarrhea. States that this does work for her, but she rations this as she does not have many left. Not currently taking a fiber supplement. Will incorporate raisin bran. Denies unintentional weight loss, nausea, vomiting, abdominal pain, blood in stool. Does report associated bloating.     History of pulmonary hypertension currently on Remodulin.     Denies family history of colon cancer. No prior colon cancer screening.    Of note, not cleared by her pulmonary HTN MD as the last time she received anesthesia she coded. This happened about 5 years ago. She also coded during her hysterectomy about 10 years ago.     ROS:  Review of Systems   Constitutional:  Negative for chills, diaphoresis, fever, malaise/fatigue and weight loss.   HENT:  Negative for sore throat.    Eyes:  Negative for redness.   Gastrointestinal:  Positive for diarrhea. Negative for abdominal pain, blood in stool, constipation, heartburn, melena, nausea and  vomiting.   Skin:  Negative for itching and rash.   Neurological:  Negative for dizziness, loss of consciousness and weakness.        Medical History:  has a past medical history of AR (allergic rhinitis), Cholelithiasis, common bile duct, Chronic low back pain, Eye pressure (2017), General anesthetics causing adverse effect in therapeutic use, GERD (gastroesophageal reflux disease), History of migraine headaches, Hypothyroidism, Innocent heart murmur, Lumbar disc disease, Menorrhagia, Mild asthma, Obesity, Plantar fasciitis of left foot, Primary pulmonary hypertension, Seizure disorder, Seizures, Sleep apnea, TMJ (dislocation of temporomandibular joint), and Tricuspid regurgitation.    Surgical History:  has a past surgical history that includes Portacath placement; Cardiac catheterization; Upper gastrointestinal endoscopy; Central venous catheter insertion; Right heart catheterization (Right, 8/20/2018); Right heart catheterization (Right, 9/4/2019); gallbladder drain (06/2019); Insertion of Eason catheter (Right, 6/17/2020); Removal of tunneled central venous catheter (CVC) (N/A, 6/17/2020); Catheterization of both left and right heart (Bilateral, 9/29/2020); Coronary angiography (N/A, 9/29/2020); and Right heart catheterization (Right, 6/10/2022).    Family History: family history includes Breast cancer in her cousin, cousin, and maternal aunt; Cancer in her maternal aunt and maternal grandmother; Diabetes in her maternal grandfather, mother, and paternal grandfather; Glaucoma in her father; Heart attack in her maternal grandfather and paternal grandfather; Heart disease in her father and paternal grandfather; Hypertension in an other family member; Leukemia in her brother; No Known Problems in her maternal uncle, paternal aunt, paternal grandmother, paternal uncle, and sister..       Review of patient's allergies indicates:   Allergen Reactions    Fentanyl Anaphylaxis     Respiratory distress    Vibra-tabs  "[doxycycline hyclate] Anaphylaxis     "throat felt like it was closing"    Adhesive Hives     Silk tape    Amoxicillin Rash    Nsaids (non-steroidal anti-inflammatory drug) Swelling    Chlorhexidine Other (See Comments)     Blue Chlorhexidine causes hives       Current Outpatient Medications on File Prior to Visit   Medication Sig Dispense Refill    acetaminophen (TYLENOL) 500 MG tablet Take 1,000 mg by mouth every 6 (six) hours as needed for Pain.      ADCIRCA 20 mg Tab Take 2 tablets (40 mg total) by mouth once daily. 60 tablet 11    albuterol (PROAIR HFA) 90 mcg/actuation inhaler Inhale 2 puffs into the lungs every 6 (six) hours as needed for Wheezing. Rescue 18 g 6    alprazolam (XANAX ORAL) Take by mouth as needed.      ambrisentan (LETAIRIS) 10 MG Tab Take 1 tablet (10 mg total) by mouth once daily. 30 tablet 11    azelastine (ASTELIN) 137 mcg (0.1 %) nasal spray 2 sprays by Nasal route 2 (two) times daily. Patient uses prn in the evening      BREO ELLIPTA 200-25 mcg/dose DsDv diskus inhaler INHALE 1 PUFF INTO THE LUNGS EVERY EVENING. CONTROLLER 180 each 3    butalbital-acetaminophen-caffeine -40 mg (FIORICET, ESGIC) -40 mg per tablet Take 1 tablet by mouth every 4 (four) hours as needed for Pain.      cyanocobalamin, vitamin B-12, 2,500 mcg Subl Place 2,500 mcg under the tongue every morning.       diphenhydrAMINE (BENADRYL) 25 mg capsule Take 50 mg by mouth every 6 (six) hours as needed for Itching. Patient takes prn evenings      ferrous sulfate 325 (65 FE) MG EC tablet Take 325 mg by mouth daily as needed.       fluticasone propionate (FLONASE) 50 mcg/actuation nasal spray 2 sprays (100 mcg total) by Each Nostril route every evening. 16 g 11    folic acid (FOLVITE) 1 MG tablet TAKE 1 TABLET BY MOUTH EVERY DAY 90 tablet 3    furosemide (LASIX) 20 MG tablet Take 1 tablet (20 mg total) by mouth once daily. 30 tablet 11    glucosam/chond-msm1/C/michele/bor (GLUCOSAMINE-CHOND-MSM COMPLEX ORAL) Take by " "mouth.      lactic acid-citric-potassium (PHEXXI) 1.8-1-0.4 % Gel Place 1 Applicatorful vaginally as needed (CONTRACEPTIVE). 5 g 2    levothyroxine (SYNTHROID) 125 MCG tablet TAKE ONE TABLET BY MOUTH EVERY MORNING 1 HOUR BEFORE BREAKFAST AND OTHER MEDICATIONS (FOR THYROID). 90 tablet 1    LIDOcaine (LIDODERM) 5 % Place 1 patch onto the skin once daily. 15 patch 0    loratadine (CLARITIN) 10 mg tablet Take 10 mg by mouth every evening.       ondansetron (ZOFRAN) 4 MG tablet Take 1 tablet (4 mg total) by mouth every 6 (six) hours as needed. 1 Tablet Oral Every 6 hours 30 tablet 2    pantoprazole (PROTONIX) 40 MG tablet TAKE ONE TABLET BY MOUTH ONCE DAILY AS NEEDED FOR HEARTBURN 90 tablet 3    tiZANidine (ZANAFLEX) 4 MG tablet Take 4 mg by mouth every 6 (six) hours as needed.      topiramate (TOPAMAX) 100 MG tablet Take 1 tablet (100 mg total) by mouth 2 (two) times daily. 60 tablet 0    treprostinil (REMODULIN) 2.5 mg/mL Soln Mix cassette as directed and infuse continuously per physician titration orders on dosing sheet. Current dose 80ng/kg/min 60 mL 11    ubrogepant (UBRELVY)tablet 100 mg TAKE ONE TABLET BY MOUTH AS A SINGLE DOSE AS NEEDED FOR MIGRAINE      [DISCONTINUED] hyoscyamine (LEVSIN/SL) 0.125 mg Subl Place 1 tablet (0.125 mg total) under the tongue every 6 (six) hours as needed (Aa needed). 60 tablet 0    albuterol-ipratropium (DUO-NEB) 2.5 mg-0.5 mg/3 mL nebulizer solution Take 3 mLs by nebulization every 6 (six) hours as needed for Wheezing. Rescue 6 each 6     No current facility-administered medications on file prior to visit.         Objective Findings:    Vital Signs:  BP 97/65   Pulse 78   Ht 4' 11" (1.499 m)   Wt 62.7 kg (138 lb 3.7 oz)   BMI 27.92 kg/m²   Body mass index is 27.92 kg/m².    Physical Exam:  Physical Exam  Vitals reviewed.   Constitutional:       Appearance: She is normal weight. She is not ill-appearing.   HENT:      Mouth/Throat:      Mouth: Mucous membranes are moist.      " Pharynx: Oropharynx is clear.   Eyes:      General: No scleral icterus.  Abdominal:      General: Bowel sounds are normal. There is no distension.      Palpations: Abdomen is soft. There is no mass.   Skin:     General: Skin is warm and dry.      Capillary Refill: Capillary refill takes less than 2 seconds.      Coloration: Skin is not jaundiced or pale.   Neurological:      Mental Status: She is alert and oriented to person, place, and time. Mental status is at baseline.             Labs:  Lab Results   Component Value Date    WBC 6.35 03/11/2024    HGB 15.3 03/11/2024    HCT 47.5 03/11/2024     03/11/2024    CRP 4.5 09/27/2023    CHOL 125 09/21/2020    TRIG 81 09/21/2020    HDL 36 (L) 09/21/2020    ALKPHOS 88 03/11/2024    LIPASE 25 06/05/2019    ALT 11 03/11/2024    AST 12 03/11/2024     03/11/2024    K 4.4 03/11/2024     (H) 03/11/2024    CREATININE 0.7 03/11/2024    BUN 19 03/11/2024    CO2 21 (L) 03/11/2024    TSH 0.025 (L) 12/16/2022    INR 0.9 03/11/2024    HGBA1C 4.9 09/21/2020       Imaging reviewed: No pertinent imaging reviewed      Endoscopy reviewed: EGD 3/26/2012  Findings:       The esophagus was normal.        Pancreatic rest.The exam of the stomach was otherwise normal.        Biopsies taken for H pylori.The examined duodenum was normal.        Biopsies were taken with a cold forceps for evaluation of celiac        disease.   Impression:          - Normal esophagus.                        - Normal examined duodenum. Biopsy was performed.                        - Biopsies were taken with a cold forceps for                        evaluation of celiac disease.   Recommendation:      - Await pathology results.   Attending Participation:        I personally performed the entire procedure.   HERMINIA Clements MD   3/26/2012 1:44 PM     Assessment:  1. Irritable bowel syndrome with diarrhea    2. Abdominal cramping      Orders Placed This Encounter    rifAXIMin (XIFAXAN) 550 mg Tab     hyoscyamine (LEVSIN) 0.125 mg Subl         Plan:    Patient with long history of IBS-D that improved for about 6 years. Pts symptoms reemerged after the death of her mother. After chart review, she did well after Rifaximin trial with about 7 years without GI symptoms. Will start patient on Rifaximin 550 mg PO TID for 2 weeks. Would also benefit from fiber supplement.  Levsin as needed.  Will defer colonoscopy as patient high risk for anesthesia given her history of adverse events during procedures in the past.   RTC 2 months if no improvement      Thank you for allowing me to participate in this patient's care.    Sincerely,     Leandra Acevedo NP  Gastroenterology Department  [facility]

## 2024-04-22 ENCOUNTER — TELEPHONE (OUTPATIENT)
Dept: TRANSPLANT | Facility: CLINIC | Age: 54
End: 2024-04-22
Payer: COMMERCIAL

## 2024-04-22 NOTE — TELEPHONE ENCOUNTER
"Patient called in because she was considering taking a trip to [place] and wanted to know what she needed to do or if it was recommended since she has not flown since being diagnosed with PH.     NN consulted with KAI Davidson: "I would definitely have her do high altitude testing. We can do a letter for her and give her the number to the closest PH center. Not sure where in [place], but there are good ones."    NN relayed information to the patient who agreed and would like to proceed for high altitude testing. Referral sent to [facility] and patient provided the number to their scheduling department.     Patient was also informed that xray was normal nad chauhan was in place. Patient states that the blood return was no longer an issue and that some of the pain has subsided and is mainly in her neck but it is sore. Patient denies chest pain, SOB, fatgiue or any other symptom. Patient will inform NN if there is any other concerns.      "

## 2024-05-10 DIAGNOSIS — I27.21 WHO GROUP 1 PULMONARY ARTERIAL HYPERTENSION: Primary | ICD-10-CM

## 2024-05-10 DIAGNOSIS — T70.20XA EFFECTS OF HIGH ALTITUDE, INITIAL ENCOUNTER: ICD-10-CM

## 2024-05-15 ENCOUNTER — OFFICE VISIT (OUTPATIENT)
Dept: NEUROLOGY | Facility: CLINIC | Age: 54
End: 2024-05-15
Payer: COMMERCIAL

## 2024-05-15 VITALS
WEIGHT: 140.63 LBS | DIASTOLIC BLOOD PRESSURE: 55 MMHG | SYSTOLIC BLOOD PRESSURE: 98 MMHG | HEIGHT: 59 IN | BODY MASS INDEX: 28.35 KG/M2 | HEART RATE: 93 BPM

## 2024-05-15 DIAGNOSIS — G44.86 CERVICOGENIC HEADACHE: ICD-10-CM

## 2024-05-15 DIAGNOSIS — G44.229 CHRONIC TENSION-TYPE HEADACHE, NOT INTRACTABLE: ICD-10-CM

## 2024-05-15 DIAGNOSIS — G43.009 MIGRAINE WITHOUT AURA AND WITHOUT STATUS MIGRAINOSUS, NOT INTRACTABLE: Primary | ICD-10-CM

## 2024-05-15 DIAGNOSIS — G43.909 EPISODIC MIGRAINE: ICD-10-CM

## 2024-05-15 PROCEDURE — 1159F MED LIST DOCD IN RCRD: CPT | Mod: CPTII,S$GLB,, | Performed by: STUDENT IN AN ORGANIZED HEALTH CARE EDUCATION/TRAINING PROGRAM

## 2024-05-15 PROCEDURE — 99999 PR PBB SHADOW E&M-EST. PATIENT-LVL IV: CPT | Mod: PBBFAC,,, | Performed by: STUDENT IN AN ORGANIZED HEALTH CARE EDUCATION/TRAINING PROGRAM

## 2024-05-15 PROCEDURE — 3074F SYST BP LT 130 MM HG: CPT | Mod: CPTII,S$GLB,, | Performed by: STUDENT IN AN ORGANIZED HEALTH CARE EDUCATION/TRAINING PROGRAM

## 2024-05-15 PROCEDURE — 3078F DIAST BP <80 MM HG: CPT | Mod: CPTII,S$GLB,, | Performed by: STUDENT IN AN ORGANIZED HEALTH CARE EDUCATION/TRAINING PROGRAM

## 2024-05-15 PROCEDURE — 3008F BODY MASS INDEX DOCD: CPT | Mod: CPTII,S$GLB,, | Performed by: STUDENT IN AN ORGANIZED HEALTH CARE EDUCATION/TRAINING PROGRAM

## 2024-05-15 PROCEDURE — 99214 OFFICE O/P EST MOD 30 MIN: CPT | Mod: S$GLB,,, | Performed by: STUDENT IN AN ORGANIZED HEALTH CARE EDUCATION/TRAINING PROGRAM

## 2024-05-15 RX ORDER — RIMEGEPANT SULFATE 75 MG/75MG
75 TABLET, ORALLY DISINTEGRATING ORAL
Qty: 16 TABLET | Refills: 5 | Status: SHIPPED | OUTPATIENT
Start: 2024-05-15

## 2024-05-15 NOTE — PROGRESS NOTES
"Chief Complaint   Patient presents with    Follow-up     F/u hx of migraines        Yuni Perez is a 54 y.o. female with a history of multiple medical diagnoses as listed below that presents for evaluation of headaches. She is accompanied to this visit by her mother. She was diagnosed with pulmonary hypertension about 8 years ago and around the same time she was diagnosed with a seizure. She had workup at that time that included MRI that showed signs of "old injury" and she had EEG that showed "lots of spikes and waves" per her mother. She says that since that time she has been started on Topamax which has been titrated up. She has not been having any seizures but she has bene having some occasional word finding difficulty that she has attributed to the medication. She has tried tylenol because as she has been titrating her medication to treat pulmonary hypertension sh marie been having bilateral intense stabbing pains in her head. She does say that with time her headaches have been better but she has noticed many more headaches since she began the Remodulin. She has not taken any other headache medications in the past.    Interval History  11/17/2016  She has still been having headaches after taking her medications in particular her vasodilators. She has found that her PRN medications are somewhat helpful in minimizing the pain that she has from her headaches. She has not had any mew problems since she was last seen in clinic.    03/23/2017  Since last seen in clinic she has been approved to get on the lung transplant list and she will be making strides to get set up to to receive new lungs. She has been taking her topamax as directed and has felt that overall she has been having fewer headaches but she still has needed Tylenol and on rare occasions Fioricet to get rid of her headaches. She has been having some cognitive difficulty with her current medications but she feels that it is tolerable. She has " "not had any seizure like activity.    06/13/2017  Since last seen in clinic she says that she has been able to be placed on the donor list. She says that overall she feels that she has been stable with regards to her breathing. She continues to have chronic sinus issues that have been essentially refractory to any medications that she has tried including antihistamines and nasal rinses. She feels that she is able to clear her sinuses and have it come right back. She has headaches almost daily but despite the frequency of the headaches she feels that most are not migraines. She has not had any seizures since last seen. She is tolerating all of her medications well without any problems.    10/31/2017  She has continued to make preparations for lung transplant.  Said that she has been frustrated with the process and she does not have much guidance which is causing increased stress.  Headaches overall have been about the same, but are responsive to Fioricet.  She says the medication helps give her adequate relief of the pain.    03/22/2018  She feels that her headaches have been getting worse in the last week.  Overall prior to this week she feels that the headaches were about the same as when she was last seen in clinic and she was using Fioricet as needed which provided some relief from her headache pain.  In the last week however Fioricet is not provided much of any relief and headaches have been going on almost every day.  She says that she feels like she has "sinus headaches" which have lingered until she has started having pounding unilateral headaches that she feels her migraines.  These headaches seem to be much more frequent and much more intense that she is ever had in the past.    05/03/2018  headaches have been better as she has hardly had any since she was last seen in clinic. Lung transplant is till her recourse for treatm of pulmonary hypertension, but she has not been able to get placed on the transplant " list just yet.    11/06/2018  Headaches have still been frequent, but have been relieved effectively by using Fioricet. She has been taking her medications as directed without any complaints of side effects. She feels that the intensity has been about the same overall. Imitrex has been prescribed, but she has not used the medication yet to determine if it helps her headaches.    02/07/2019  She has been having headaches with about the same frequency as she had in the past, but they have continued to have some response to her current abortive regimen. She has not been comfortable with the CGRP receptor blockers as a treatment modality as she has been weary of using any injectable medication to treat her symptoms. She has been seeing her Pulmonary team as recommended.    05/09/2019  She presents to clinic for routine follow-up of her headaches.  The headaches be somewhat improved with her current medication regimen, but the ability river frequently.  She needs to use abortive medications several times per week to by relief in order to her level of functioning and her ability to care for her activities of daily where they are.    08/15/2019  Her headaches have been overall stable since she was last seen in clinic. She has been trying to use various OTC medications and prescription medications with intermittent effectivetness in alleviating her headaches. She has not been amendable to considering a CGRP modulating medication to control her headaches as she has been unwilling to commit in an injectable delivery for any medication.    12/13/2019  Since she has been placed on a new medication to address pulmonary hypertension she has had fewer headaches. headaches have been responding well to fioricet and she has not needed to use the sumatriptan as often as she had in the past. She has not had any change in her candidacy for lung transplant.    12/07/2020  Overall she feels that her headaches have been better since she  "was last seen in clinic.  She is concerned however because she will soon be increasing the medication she uses to control her pulmonary hypertension.  Whenever this occurred she notices that she has had a sharp increase in her headache frequency and intensity.  Fioricet is been helpful to treat headaches when they have come about.  Sumatriptan has been used on emergency use.  She was recently denied once again for transplant.    11/01/2021  No updates on her status with regards to being placed on the transplant list. Medications for pulmonary hypertension have been well tolerated. She feels that her headaches have been stable and have been responsive to her abortive therapies when she has needed to use them.    06/08/2022  Headaches have been less frequent and less intense overall compared to she was last seen in clinic.  Medications have been well tolerated with no complaints of side effects.    12/15/2022  Overall she says that she is been doing better with her headaches since she was last seen in clinic.  She is only had approximately 1 headache that she can recall in the last few months.  This headache was controlled when she took Fioricet as recommended.  She is been apprehensive about taking Ubrelvy as she was unsure about when to use this medication in relation to her Fioricet..     5/22/23  Patient is new to me. States headaches are doing well, can be triggered w stress or foods.     5/15/24 - No changes in characteristics, no horowitz.  Continues to be sensitive to noise and light. No nausea or vomiting. Had 4 migraines since last saw me. Does have chronic neck pain, sleep could be better.   Medication regiment:  Topiramate 100mg BID (hx of "spikes and waves")  Ubrelvy   Patient feels that the Ubrelvy does not work as well.        PAST MEDICAL HISTORY:  Past Medical History:   Diagnosis Date    AR (allergic rhinitis)     Cholelithiasis, common bile duct     Chronic low back pain     Eye pressure 2017    " General anesthetics causing adverse effect in therapeutic use     GERD (gastroesophageal reflux disease)     History of migraine headaches     Hypothyroidism     Innocent heart murmur     Lumbar disc disease     Menorrhagia     Mild asthma     Obesity     Plantar fasciitis of left foot     Primary pulmonary hypertension     followed by heart transplant/pulmonary     Seizure disorder     x 1 in 2008    Seizures     Sleep apnea     TMJ (dislocation of temporomandibular joint)     Tricuspid regurgitation        PAST SURGICAL HISTORY:  Past Surgical History:   Procedure Laterality Date    CARDIAC CATHETERIZATION      CATHETERIZATION OF BOTH LEFT AND RIGHT HEART Bilateral 9/29/2020    Procedure: CATHETERIZATION, HEART, BOTH LEFT AND RIGHT;  Surgeon: Gucci Pickett MD;  Location: [facility] CATH LAB;  Service: Cardiology;  Laterality: Bilateral;    CENTRAL VENOUS CATHETER INSERTION      CORONARY ANGIOGRAPHY N/A 9/29/2020    Procedure: ANGIOGRAM, CORONARY ARTERY;  Surgeon: Gucci Pickett MD;  Location: [facility] CATH LAB;  Service: Cardiology;  Laterality: N/A;    gallbladder drain  06/2019    INSERTION OF HAYNES CATHETER Right 6/17/2020    Procedure: INSERTION, CATHETER, CENTRAL VENOUS, HAYNES Replace Single Lumen for Remodulin Infusion;  Surgeon: Bertin Dewitt MD;  [facility];  Service: General;  Laterality: Right;    PORTACATH PLACEMENT      REMOVAL OF TUNNELED CENTRAL VENOUS CATHETER (CVC) N/A 6/17/2020    Procedure: REMOVAL, CATHETER, CENTRAL VENOUS, TUNNELED;  Surgeon: Bertin Dewitt MD;  [facility];  Service: General;  Laterality: N/A;    RIGHT HEART CATHETERIZATION Right 8/20/2018    Procedure: HEART CATH-RIGHT;  Surgeon: Ivonne Rai MD;  Location: [facility] CATH LAB;  Service: Cardiology;  Laterality: Right;    RIGHT HEART CATHETERIZATION Right 9/4/2019    Procedure: INSERTION, CATHETER, RIGHT HEART;  Surgeon: Ivonne Rai MD;  Location: [facility] CATH LAB;  Service:  Cardiology;  Laterality: Right;    RIGHT HEART CATHETERIZATION Right 6/10/2022    Procedure: INSERTION, CATHETER, RIGHT HEART;  Surgeon: Keshia Poe MD;  Location: [facility] CATH LAB;  Service: Cardiology;  Laterality: Right;    UPPER GASTROINTESTINAL ENDOSCOPY         SOCIAL HISTORY:  Social History     Socioeconomic History    Marital status: Single    Number of children: 0   Occupational History    Occupation: disabled     Employer: [employer]   Tobacco Use    Smoking status: Never    Smokeless tobacco: Never   Substance and Sexual Activity    Alcohol use: No     Alcohol/week: 0.8 standard drinks of alcohol     Types: 1 Standard drinks or equivalent per week     Comment: socially    Drug use: No    Sexual activity: Never     Birth control/protection: OCP, Pill     Comment: pt is a virgin       FAMILY HISTORY:  Family History   Problem Relation Name Age of Onset    Diabetes Mother      Heart disease Father      Glaucoma Father      No Known Problems Sister      Cancer Maternal Aunt          breast    Breast cancer Maternal Aunt      No Known Problems Maternal Uncle      No Known Problems Paternal Aunt      No Known Problems Paternal Uncle      Cancer Maternal Grandmother          uterine    Diabetes Maternal Grandfather      Heart attack Maternal Grandfather      No Known Problems Paternal Grandmother      Heart disease Paternal Grandfather      Heart attack Paternal Grandfather      Diabetes Paternal Grandfather      Leukemia Brother      Breast cancer Cousin      Breast cancer Cousin      Hypertension Other      Colon cancer Neg Hx      Ovarian cancer Neg Hx      Amblyopia Neg Hx      Blindness Neg Hx      Cataracts Neg Hx      Macular degeneration Neg Hx      Retinal detachment Neg Hx      Strabismus Neg Hx      Stroke Neg Hx      Thyroid disease Neg Hx      Esophageal cancer Neg Hx         ALLERGIES AND MEDICATIONS: updated and reviewed.  Review of patient's allergies indicates:   Allergen Reactions     Adhesive Hives     Silk tape    Amoxicillin Rash    Chlorhexidine Other (See Comments)     Current Outpatient Medications   Medication Sig Dispense Refill    acetaminophen (TYLENOL) 500 MG tablet Take 1,000 mg by mouth every 6 (six) hours as needed for Pain.      ADCIRCA 20 mg Tab Take 2 tablets (40 mg total) by mouth once daily. 60 tablet 11    albuterol (PROAIR HFA) 90 mcg/actuation inhaler Inhale 2 puffs into the lungs every 6 (six) hours as needed for Wheezing. Rescue 18 g 6    alprazolam (XANAX ORAL) Take by mouth as needed.      ambrisentan (LETAIRIS) 10 MG Tab Take 1 tablet (10 mg total) by mouth once daily. 30 tablet 11    BREO ELLIPTA 200-25 mcg/dose DsDv diskus inhaler INHALE 1 PUFF INTO THE LUNGS EVERY EVENING. CONTROLLER 180 each 3    cyanocobalamin, vitamin B-12, 2,500 mcg Subl Place 2,500 mcg under the tongue every morning.       diphenhydrAMINE (BENADRYL) 25 mg capsule Take 50 mg by mouth every 6 (six) hours as needed for Itching. Patient takes prn evenings      ferrous sulfate 325 (65 FE) MG EC tablet Take 325 mg by mouth daily as needed.       fluticasone propionate (FLONASE) 50 mcg/actuation nasal spray 2 sprays (100 mcg total) by Each Nostril route every evening. 16 g 11    folic acid (FOLVITE) 1 MG tablet TAKE 1 TABLET BY MOUTH EVERY DAY 90 tablet 3    furosemide (LASIX) 20 MG tablet Take 1 tablet (20 mg total) by mouth once daily. 30 tablet 11    glucosam/chond-msm1/C/michele/bor (GLUCOSAMINE-CHOND-MSM COMPLEX ORAL) Take by mouth.      hyoscyamine (LEVSIN) 0.125 mg Subl Place 1 tablet (0.125 mg total) under the tongue every 6 (six) hours as needed (Aa needed). 60 tablet 3    lactic acid-citric-potassium (PHEXXI) 1.8-1-0.4 % Gel Place 1 Applicatorful vaginally as needed (CONTRACEPTIVE). 5 g 2    levothyroxine (SYNTHROID) 125 MCG tablet TAKE ONE TABLET BY MOUTH EVERY MORNING 1 HOUR BEFORE BREAKFAST AND OTHER MEDICATIONS (FOR THYROID). 90 tablet 1    LIDOcaine (LIDODERM) 5 % Place 1 patch onto the  "skin once daily. 15 patch 0    loratadine (CLARITIN) 10 mg tablet Take 10 mg by mouth every evening.       ondansetron (ZOFRAN) 4 MG tablet Take 1 tablet (4 mg total) by mouth every 6 (six) hours as needed. 1 Tablet Oral Every 6 hours 30 tablet 2    pantoprazole (PROTONIX) 40 MG tablet TAKE ONE TABLET BY MOUTH ONCE DAILY AS NEEDED FOR HEARTBURN 90 tablet 3    tiZANidine (ZANAFLEX) 4 MG tablet Take 4 mg by mouth every 6 (six) hours as needed.      topiramate (TOPAMAX) 100 MG tablet Take 1 tablet (100 mg total) by mouth 2 (two) times daily. 60 tablet 0    treprostinil (REMODULIN) 2.5 mg/mL Soln Mix cassette as directed and infuse continuously per physician titration orders on dosing sheet. Current dose 80ng/kg/min 60 mL 11    albuterol-ipratropium (DUO-NEB) 2.5 mg-0.5 mg/3 mL nebulizer solution Take 3 mLs by nebulization every 6 (six) hours as needed for Wheezing. Rescue 6 each 6    azelastine (ASTELIN) 137 mcg (0.1 %) nasal spray 2 sprays by Nasal route 2 (two) times daily. Patient uses prn in the evening (Patient not taking: Reported on 5/15/2024)      rimegepant (NURTEC) 75 mg odt Take 1 tablet (75 mg total) by mouth as needed for Migraine. Place ODT tablet on the tongue; alternatively the ODT tablet may be placed under the tongue. Can take 2nd dose in 24 hrs if mild relief, no more than 2 in 24 hrs 16 tablet 5     No current facility-administered medications for this visit.         Vitals:    05/15/24 1304   BP: (!) 98/55   BP Location: Left arm   Patient Position: Sitting   BP Method: Large (Automatic)   Pulse: 93   Weight: 63.8 kg (140 lb 10.5 oz)   Height: 4' 11" (1.499 m)       Neurologic Exam     Mental Status   Oriented to person, place, and time.   Attention: normal. Concentration: normal.   Speech: speech is normal   Level of consciousness: alert  Knowledge: good.     Cranial Nerves     CN II   Visual fields full to confrontation.   Right visual field deficit: none  Left visual field deficit: none     CN " III, IV, VI   Pupils are equal, round, and reactive to light.  Extraocular motions are normal.   Right pupil: Size: 3 mm. Shape: regular. Accommodation: intact.   Left pupil: Size: 3 mm. Shape: regular. Accommodation: intact.   CN III: no CN III palsy  CN VI: no CN VI palsy  Nystagmus: none     CN V   Facial sensation intact.   Right facial sensation deficit: none  Left facial sensation deficit: none    CN VII   Facial expression full, symmetric.   Right facial weakness: none  Left facial weakness: none    CN VIII   CN VIII normal.     CN IX, X   CN IX normal.   CN X normal.   Palate: symmetric    CN XI   CN XI normal.   Right sternocleidomastoid strength: normal  Left sternocleidomastoid strength: normal  Right trapezius strength: normal  Left trapezius strength: normal    CN XII   CN XII normal.   Tongue deviation: none    Motor Exam   Muscle bulk: normal  Overall muscle tone: normal  Right arm tone: normal  Left arm tone: normal  Right leg tone: normal  Left leg tone: normal    Strength   Strength 5/5 throughout.     Sensory Exam   Right arm light touch: normal  Left arm light touch: normal  Right leg light touch: normal  Left leg light touch: normal  Right arm pinprick: normal  Left arm pinprick: normal    Gait, Coordination, and Reflexes     Gait  Gait: normal    Coordination   Romberg: negative  Finger to nose coordination: normal  Heel to shin coordination: normal    Tremor   Resting tremor: absent    Reflexes   Right brachioradialis: 2+  Left brachioradialis: 2+  Right biceps: 2+  Left biceps: 2+  Right triceps: 2+  Left triceps: 2+  Right patellar: 2+  Left patellar: 2+  Right achilles: 2+  Left achilles: 2+  Right plantar: normal  Left plantar: normal    Labs and Imagin2018 MRI brain w and wo contrast personally reviewed - no acute intracranial abnormality    Assessment & Plan:  Problem List Items Addressed This Visit          Neuro    Migraine without aura and without status migrainosus, not  intractable - Primary    Overview     Despite frequency of headaches few have come in the form of migraine headaches.  She will benefit from CGRP as a headache abortive rather than sumatriptan her to reduce the possibility of a vasoconstriction peripherally. Failed ubrelvy         Chronic tension-type headache, not intractable    Cervicogenic headache    Episodic migraine    Relevant Medications    rimegepant (NURTEC) 75 mg odt     Very multifactorial - discussed lifestyle.   Chronic migraines - will continue topamax for prevention. Encouraged hydration and lifestyle modifications including diet and exercise, discussed with the patient multifactorial nature of her headaches and migraines.  Also has chronic neck pain..  Failed Ubrelvy p.r.n..  Not a candidate for a triptan given concern for cardiovascular risk factors.  We will start patient on Nurtec p.r.n..  Will not prescribe fioricet.     Follow-up:  4 months for migraine control, continue Topamax for prevention and Nurtec p.r.n..

## 2024-05-20 ENCOUNTER — LAB VISIT (OUTPATIENT)
Dept: LAB | Facility: HOSPITAL | Age: 54
End: 2024-05-20
Payer: COMMERCIAL

## 2024-05-20 ENCOUNTER — HOSPITAL ENCOUNTER (OUTPATIENT)
Dept: PULMONOLOGY | Facility: CLINIC | Age: 54
Discharge: HOME | End: 2024-05-20
Payer: COMMERCIAL

## 2024-05-20 ENCOUNTER — OFFICE VISIT (OUTPATIENT)
Dept: TRANSPLANT | Facility: CLINIC | Age: 54
End: 2024-05-20
Payer: COMMERCIAL

## 2024-05-20 VITALS
BODY MASS INDEX: 28.22 KG/M2 | HEIGHT: 59 IN | HEIGHT: 59 IN | DIASTOLIC BLOOD PRESSURE: 55 MMHG | WEIGHT: 139 LBS | WEIGHT: 140 LBS | SYSTOLIC BLOOD PRESSURE: 102 MMHG | BODY MASS INDEX: 28.02 KG/M2 | HEART RATE: 87 BPM

## 2024-05-20 DIAGNOSIS — I27.9 CHRONIC PULMONARY HEART DISEASE: ICD-10-CM

## 2024-05-20 DIAGNOSIS — G47.33 OSA (OBSTRUCTIVE SLEEP APNEA): ICD-10-CM

## 2024-05-20 DIAGNOSIS — R06.82 TACHYPNEA: ICD-10-CM

## 2024-05-20 DIAGNOSIS — E03.9 HYPOTHYROIDISM (ACQUIRED): ICD-10-CM

## 2024-05-20 DIAGNOSIS — Z79.899 POLYPHARMACY: ICD-10-CM

## 2024-05-20 DIAGNOSIS — I27.21 WHO GROUP 1 PULMONARY ARTERIAL HYPERTENSION: Primary | ICD-10-CM

## 2024-05-20 PROBLEM — J96.11 CHRONIC RESPIRATORY FAILURE WITH HYPOXIA: Status: RESOLVED | Noted: 2017-05-12 | Resolved: 2024-05-20

## 2024-05-20 LAB
ALBUMIN SERPL BCP-MCNC: 4.1 G/DL (ref 3.5–5.2)
ALP SERPL-CCNC: 94 U/L (ref 55–135)
ALT SERPL W/O P-5'-P-CCNC: 9 U/L (ref 10–44)
ANION GAP SERPL CALC-SCNC: 8 MMOL/L (ref 8–16)
AST SERPL-CCNC: 13 U/L (ref 10–40)
BASOPHILS # BLD AUTO: 0.04 K/UL (ref 0–0.2)
BASOPHILS NFR BLD: 0.6 % (ref 0–1.9)
BILIRUB SERPL-MCNC: 0.3 MG/DL (ref 0.1–1)
BNP SERPL-MCNC: 67 PG/ML (ref 0–99)
BUN SERPL-MCNC: 19 MG/DL (ref 6–20)
CALCIUM SERPL-MCNC: 9.7 MG/DL (ref 8.7–10.5)
CHLORIDE SERPL-SCNC: 109 MMOL/L (ref 95–110)
CO2 SERPL-SCNC: 23 MMOL/L (ref 23–29)
CREAT SERPL-MCNC: 0.8 MG/DL (ref 0.5–1.4)
DIFFERENTIAL METHOD BLD: ABNORMAL
EOSINOPHIL # BLD AUTO: 0.1 K/UL (ref 0–0.5)
EOSINOPHIL NFR BLD: 1.9 % (ref 0–8)
ERYTHROCYTE [DISTWIDTH] IN BLOOD BY AUTOMATED COUNT: 13.8 % (ref 11.5–14.5)
EST. GFR  (NO RACE VARIABLE): >60 ML/MIN/1.73 M^2
GLUCOSE SERPL-MCNC: 81 MG/DL (ref 70–110)
HCT VFR BLD AUTO: 48.7 % (ref 37–48.5)
HGB BLD-MCNC: 15.8 G/DL (ref 12–16)
IMM GRANULOCYTES # BLD AUTO: 0.03 K/UL (ref 0–0.04)
IMM GRANULOCYTES NFR BLD AUTO: 0.4 % (ref 0–0.5)
LYMPHOCYTES # BLD AUTO: 1.2 K/UL (ref 1–4.8)
LYMPHOCYTES NFR BLD: 18.1 % (ref 18–48)
MAGNESIUM SERPL-MCNC: 2.3 MG/DL (ref 1.6–2.6)
MCH RBC QN AUTO: 30.2 PG (ref 27–31)
MCHC RBC AUTO-ENTMCNC: 32.4 G/DL (ref 32–36)
MCV RBC AUTO: 93 FL (ref 82–98)
MONOCYTES # BLD AUTO: 0.6 K/UL (ref 0.3–1)
MONOCYTES NFR BLD: 9.2 % (ref 4–15)
NEUTROPHILS # BLD AUTO: 4.7 K/UL (ref 1.8–7.7)
NEUTROPHILS NFR BLD: 69.8 % (ref 38–73)
NRBC BLD-RTO: 0 /100 WBC
PLATELET # BLD AUTO: 227 K/UL (ref 150–450)
PMV BLD AUTO: 12.6 FL (ref 9.2–12.9)
POTASSIUM SERPL-SCNC: 4.5 MMOL/L (ref 3.5–5.1)
PROT SERPL-MCNC: 7.8 G/DL (ref 6–8.4)
RBC # BLD AUTO: 5.24 M/UL (ref 4–5.4)
SODIUM SERPL-SCNC: 140 MMOL/L (ref 136–145)
WBC # BLD AUTO: 6.73 K/UL (ref 3.9–12.7)

## 2024-05-20 PROCEDURE — 3078F DIAST BP <80 MM HG: CPT | Mod: CPTII,S$GLB,,

## 2024-05-20 PROCEDURE — 83880 ASSAY OF NATRIURETIC PEPTIDE: CPT

## 2024-05-20 PROCEDURE — 80053 COMPREHEN METABOLIC PANEL: CPT

## 2024-05-20 PROCEDURE — 83735 ASSAY OF MAGNESIUM: CPT

## 2024-05-20 PROCEDURE — 1160F RVW MEDS BY RX/DR IN RCRD: CPT | Mod: CPTII,S$GLB,,

## 2024-05-20 PROCEDURE — 36415 COLL VENOUS BLD VENIPUNCTURE: CPT

## 2024-05-20 PROCEDURE — 85025 COMPLETE CBC W/AUTO DIFF WBC: CPT

## 2024-05-20 PROCEDURE — 1159F MED LIST DOCD IN RCRD: CPT | Mod: CPTII,S$GLB,,

## 2024-05-20 PROCEDURE — 3074F SYST BP LT 130 MM HG: CPT | Mod: CPTII,S$GLB,,

## 2024-05-20 PROCEDURE — 3008F BODY MASS INDEX DOCD: CPT | Mod: CPTII,S$GLB,,

## 2024-05-20 PROCEDURE — 94618 PULMONARY STRESS TESTING: CPT | Mod: S$GLB,,, | Performed by: INTERNAL MEDICINE

## 2024-05-20 PROCEDURE — 99999 PR PBB SHADOW E&M-EST. PATIENT-LVL V: CPT | Mod: PBBFAC,,,

## 2024-05-20 PROCEDURE — 99214 OFFICE O/P EST MOD 30 MIN: CPT | Mod: S$GLB,,,

## 2024-05-20 NOTE — PROCEDURES
Yuni Perez is a 54 y.o.  female patient, who presents for a 6 minute walk test ordered by KAI Langford.  The diagnosis is Pulmonary Hypertension.  The patient's BMI is 28.1 kg/m2.  Predicted distance (lower limit of normal) is 393.67 meters.      Test Results:    The test was completed without stopping.  The total time walked was 360 seconds.  During walking, the patient reported:  Dyspnea, Leg pain, Other (Comment) (left arm and leg pain).  The patient used no assistive devices during testing.     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  During exercise, there was no significant desaturation while breathing room air.  Blood pressure remained stable and Heart rate increased significantly with walking.  This may represent a tachycardic response to exercise.  The patient reported non-pulmonary symptoms during exercise.  Since the previous study in February 2024, exercise capacity is unchanged.  Based upon age and body mass index, exercise capacity is normal.

## 2024-05-20 NOTE — PROGRESS NOTES
Subjective:    Patient ID:  Yuni Perez is a 54 y.o. female who presents for follow-up of Pulmonary Hypertension.    HPI   Mrs. Perez is a pleasant 51 y.o. white female who presents for PH follow-up. Patient was diagnosed with IPAH in 2009 and started on Remodulin, Letairis, and Adcirca. She has a history of ABHIJEET (untreated - not able to tolerate CPAP 2/2 severe congestion), GERD, and Asthma. Current therapy is Remodulin infusing at 117ng/kg/min (pre-filled cassettes), Letairis 10mg qdaily, and Adcirca 40mg qdaily. Has T/F Adempas.  Patient evaluated by lung transplant in 2020. Had everything set up for LUT, but says her caregivers were deemed not acceptable. She has come to terms with not being a LUT candidate here at [facility]. She is not interested in lung transplant.    Mrs. Perez is here for follow-up. She is doing pretty good. She reports baseline symptoms on triple therapy. Remodulin infusing at 117ng/kg/min. Denies issues with her site. Walk is stable and >400m, maintaining O2 sats at 95%.   Patient denies chest pain, chest pressure, syncope, pre-syncope, lightheadedness, dizziness, PND, orthopnea, LE edema, abdominal pain, abdominal pressure, or N/V/F/C.      6MWT:   2/2/2024 5/20/2024   6MW     6MWT Status completed without stopping  completed without stopping    Patient Reported Dyspnea;Other (Comment)  Dyspnea;Leg pain;Other (Comment)    Was O2 used? No  No    6MW Distance walked (feet) 1400 feet  1362 feet    Distance walked (meters) 426.72 meters  415.14 meters    Did patient stop? No  No    Oxygen Saturation 98 %  97 %    Supplemental Oxygen Room Air  Room Air    Heart Rate 80 bpm  88 bpm    Blood Pressure 121/61  106/75    Ab Dyspnea Rating  light  moderate    Oxygen Saturation 97 %  95 %    Supplemental Oxygen Room Air  Room Air    Heart Rate 137 bpm  147 bpm    Blood Pressure 133/73  125/74    Ab Dyspnea Rating  very heavy  very,very heavy    Recovery Time (seconds) 68  seconds  70 seconds    Oxygen Saturation 98 %  96 %    Supplemental Oxygen Room Air  Room Air    Heart Rate 98 bpm  98 bpm        ECHO: 2/2/2024    Left Ventricle: The left ventricle is normal in size. Ventricular mass is normal. Normal wall thickness. Normal wall motion. Septal flattening in systole consistent with right ventricular pressure overload. There is normal systolic function with a visually estimated ejection fraction of 60 - 65%. There is normal diastolic function.    Right Ventricle: Moderate right ventricular enlargement. Wall thickness is normal. Right ventricle wall motion  is normal. Systolic function is mild - moderately reduced visually although the TAPSE.    Right Atrium: Right atrium is mildly dilated.    Aortic Valve: The aortic valve is a trileaflet valve. There is low normal AFSANEH to trivial stenosis. Aortic valve area by VTI is 2.00 cm². Aortic valve peak velocity is 1.63 m/s. Mean gradient is 5 mmHg. The dimensionless index is 0.79.    Tricuspid Valve: Mildly thickened leaflets. There is mild to moderate regurgitation.    Pulmonic Valve: There is mild regurgitation.    Pulmonary Artery: There is very severe pulmonary hypertension. The estimated pulmonary artery systolic pressure is 99 mmHg.    IVC/SVC: Normal venous pressure at 3 mmHg.    ECHO: 6/23/2023  There is abnormal septal wall motion. There is systolic flattening of the interventricular septum consistent with right ventricle pressure overload.  The left ventricle is normal in size with  Moderate right ventricular enlargement with mildly to moderately reduced right ventricular systolic function. Free Wall Strain 11%, global RV strain 11%.  Mild pulmonic regurgitation.  Moderate tricuspid regurgitation.  Normal central venous pressure (3 mmHg).  The estimated PA systolic pressure is 106 mmHg.  There is pulmonary hypertension.  There is evidence of at least moderate right to left venous-arterial shunting with agitated saline contrast.  Unfortunately continuous capture not obtained thus cannot specify whether intra or extracardiac.    ECHO: 12/16/2022  The left ventricle is normal in size with normal systolic function.  The estimated ejection fraction is 68%.  Indeterminate left ventricular diastolic function.  Mild right ventricular enlargement with mildly reduced right ventricular systolic function.  Mild right atrial enlargement.  Mild to moderate tricuspid regurgitation.  Mild pulmonic regurgitation.  Normal central venous pressure (3 mmHg).  The estimated PA systolic pressure is 107 mmHg.  There is Quite Severe pulmonary hypertension.  Trivial posterior pericardial effusion.    RHC: 6/10/2022  Estimated blood loss: none  Severe PAH on IV remodulin, adcirca and letairis.  Normal right and left-sided filling pressures.  Borderline low-normal cardiac output by Ebony method which is normal when indexed for BSA.  No signficant change from RHC 8/18.  RA: 6/ 7/ 5 RV: 105/ 3/ 10 PA: 103/ 45/ 64 PWP: 15/ 1512/ 13 . Cardiac output was 4.11 by Ebony. Cardiac index is 2.67 L/min/m2. O2 Sat: PA 70%. AO sat 96% PVR 12.4    PFTS: 9/27/2023  Spirometry shows moderate-severe obstruction. Lung volume determination shows TLC is normal with evidence air trapping is present. Airway mechanics are abnormal showing increased airway resistance and decreased conductance. DLCO is moderately decreased (42%).     PFTs: 3/17/2023  Spirometry is consistent with restriction. The significantly reduced FEF 25-75 suggests superimposed obstruction may be present. Lung volume determination is normal. The mildly elevated RV/TLC ratio suggests possible early air trapping. Diffusion capacity is moderately reduced. This interpretation of DLCO assumes normal hemoglobin level (41%)    PFTs: 9/8/2020  Spirometry shows moderate obstruction. Lung volume determination is normal. DLCO is mildly decreased - 68%    CT Chest: 2/27/2023  FINDINGS:  There are innumerable upper lobe predominant  vague, centrilobular micro nodules.  Due to their very small size in their shear numbers, they are not amenable to exact comparison is.  However, the gross appearance is similar to the previous exam.  These nodules are seen in all lobes.     There are stable 5 mm right lower lobe posterior segment (4-309) and 4 mm left lower lobe posterior segment (4-313) pulmonary nodules.     The tip of a right catheter is seen overlying the cavoatrial junction.     No new lymphadenopathy.     The heart size is normal.  The main pulmonary segment is again noted to be severely dilated, up to 3.9 cm in diameter.     No new abnormalities are seen in the upper abdominal structures.     The bone density appears diminished.     Impression:     1. Redemonstrated innumerable bilateral centrilobular pulmonary micro nodules.  The differential diagnosis could include hypersensitivity pneumonitis, respiratory bronchiolitis (if the patient is a smoker), infection, vasculitis.  This is not an exhaustive list.  2. Severely dilated main pulmonary artery up to 3.9 cm.  3. Stable bilateral lower lobe subcentimeter pulmonary nodules.    IV/SC:  Pulmonary Hypertension Review Flowsheet 5/20/2024   Pump Type CADD   Medication type Remodulin   Vial size (No Data)   Dose (ng/kg/min) 117/ng/kg/min   DW (kg) 60.5kg   Amt of Med used    Amt of Diluent Used NS    Final Concentration (ng/ml) 853978 ng/ml   Pump Rate ml/24 hr 41 ml/ 24 hr     Review of Systems   Constitutional: Negative for decreased appetite, diaphoresis, fever and night sweats.   HENT:  Positive for congestion.    Cardiovascular:  Positive for dyspnea on exertion. Negative for chest pain, irregular heartbeat, leg swelling and syncope.   Respiratory:  Positive for shortness of breath. Negative for cough and wheezing.         Occasional SOB at night and morning   Endocrine: Negative.    Skin: Negative.    Musculoskeletal:  Positive for myalgias. Negative for back pain, falls and joint  "swelling.   Gastrointestinal: Negative.    Genitourinary: Negative.    Neurological: Negative.    Psychiatric/Behavioral:  The patient is nervous/anxious.         Controlled with medication        Objective: BP (!) 102/55 (BP Location: Left arm, Patient Position: Sitting, BP Method: Medium (Automatic))   Pulse 87   Ht 4' 11" (1.499 m)   Wt 63.5 kg (139 lb 15.9 oz)   BMI 28.27 kg/m²     Physical Exam  Constitutional:       Appearance: Normal appearance.   HENT:      Head: Normocephalic and atraumatic.   Eyes:      Pupils: Pupils are equal, round, and reactive to light.   Neck:      Vascular: No JVD.   Cardiovascular:      Rate and Rhythm: Normal rate and regular rhythm.      Pulses: Normal pulses.      Heart sounds: Murmur heard.      Comments: Loud S2  Pulmonary:      Effort: Pulmonary effort is normal.      Breath sounds: Normal breath sounds.   Chest:      Comments: RCW - Indwelling catheter - Eason for Remodulin infusion. Dressing is C/D/I    Abdominal:      General: Abdomen is flat.      Palpations: Abdomen is soft.   Musculoskeletal:         General: Normal range of motion.      Cervical back: Normal range of motion and neck supple.   Skin:     General: Skin is warm and dry.      Capillary Refill: Capillary refill takes less than 2 seconds.   Neurological:      Mental Status: She is alert and oriented to person, place, and time.   Psychiatric:         Mood and Affect: Mood normal.         Behavior: Behavior normal.           Lab Results   Component Value Date    BNP 67 05/20/2024     05/20/2024    K 4.5 05/20/2024    MG 2.3 05/20/2024     05/20/2024    CO2 23 05/20/2024    BUN 19 05/20/2024    CREATININE 0.8 05/20/2024    GLU 81 05/20/2024    HGBA1C 4.9 09/21/2020    AST 13 05/20/2024    ALT 9 (L) 05/20/2024    ALBUMIN 4.1 05/20/2024    PROT 7.8 05/20/2024    BILITOT 0.3 05/20/2024    CHOL 125 09/21/2020    HDL 36 (L) 09/21/2020    LDLCALC 72.8 09/21/2020    TRIG 81 09/21/2020       Magnesium " "  Date Value Ref Range Status   05/20/2024 2.3 1.6 - 2.6 mg/dL Final       Lab Results   Component Value Date    WBC 6.73 05/20/2024    HGB 15.8 05/20/2024    HCT 48.7 (H) 05/20/2024    MCV 93 05/20/2024     05/20/2024       BNP   Date Value Ref Range Status   05/20/2024 67 0 - 99 pg/mL Final     Comment:     Values of less than 100 pg/ml are consistent with non-CHF populations.   02/02/2024 72 0 - 99 pg/mL Final     Comment:     Values of less than 100 pg/ml are consistent with non-CHF populations.   06/23/2023 126 (H) 0 - 99 pg/mL Final     Comment:     Values of less than 100 pg/ml are consistent with non-CHF populations.       No results found for: "LDH"    ..  WHO Group: 1 Functional Class: II  REVEAL Score: 6 (Low Risk)    Assessment:       1. WHO group 1 pulmonary arterial hypertension    2. Chronic pulmonary heart disease    3. Hypothyroidism (acquired)    4. ABHIJEET (obstructive sleep apnea)           Plan:   Mrs. Perez is subjectively stable on triple therapy, to include high dose, continuous remodulin infusion (117ng). Discussed new medication Winrevair. She is not interested in this therapy as she does not like needles. She is also reluctant to have another RHC.    Mrs. Perez did qualify for O2 while flying (testing in Care Everywhere), but will likely need to rent a machine, as needed. [facility] is sending the order to [employer] in case they can supply a device. She should call her insurance to see what they might cover.    No medication changes today.    Return to clinic in 4 months with labs, walk.    Recommend 2 gram sodium restriction and 1500cc fluid restriction.  Encourage physical activity with graded exercise program.  Requested patient to weigh themselves daily, and to notify us if their weight increases by more than 3 lbs in 1 day or 5 lbs in 1 week.    Bárbara Langford, DNP, APRN  Pulmonary Hypertension Department      [facility]  Dr. Rod " Pepe Langford, DNP, APRN  Dorene Ny, BSN, RN

## 2024-05-20 NOTE — PATIENT INSTRUCTIONS
No medication changes today.    Return to clinic in 4 months with labs, walk.      Recommend 2 gram sodium restriction and 1500cc fluid restriction.  Encourage physical activity with graded exercise program.  Requested patient to weigh themselves daily, and to notify us if their weight increases by more than 3 lbs in 1 day or 5 lbs in 1 week.

## 2024-05-22 DIAGNOSIS — G89.29 CHRONIC NONINTRACTABLE HEADACHE, UNSPECIFIED HEADACHE TYPE: ICD-10-CM

## 2024-05-22 DIAGNOSIS — R51.9 CHRONIC NONINTRACTABLE HEADACHE, UNSPECIFIED HEADACHE TYPE: ICD-10-CM

## 2024-05-22 RX ORDER — TOPIRAMATE 100 MG/1
100 TABLET, FILM COATED ORAL 2 TIMES DAILY
Qty: 60 TABLET | Refills: 5 | Status: SHIPPED | OUTPATIENT
Start: 2024-05-22 | End: 2024-11-18

## 2024-05-29 ENCOUNTER — TELEPHONE (OUTPATIENT)
Dept: TRANSPLANT | Facility: CLINIC | Age: 54
End: 2024-05-29
Payer: COMMERCIAL

## 2024-05-29 NOTE — TELEPHONE ENCOUNTER
NN spoke to patient about oxygen. Per KAI Davidson, she was informed by [facility] that they are unable to provide oxygen just for flying and patient may need to look at other companies about renting. Patient was informed of this information and was provided names to other companies but patient was also advised to call [facility] to get a better understanding of what she needs, if they can rent, and if they know of another company that can provide her with oxygen for flying. Patient states that she will start calling other locations and [facility].

## 2024-05-29 NOTE — TELEPHONE ENCOUNTER
----- Message from Anaya Christianson sent at 5/29/2024  1:56 PM CDT -----  Regarding: oxygen  Pls call pt at [phone].  She says that oxygen was ordered for her a few weeks ago and she has never heard from anyone as of yet.    Thank you

## 2024-06-20 DIAGNOSIS — E03.9 HYPOTHYROIDISM (ACQUIRED): ICD-10-CM

## 2024-06-20 NOTE — TELEPHONE ENCOUNTER
No care due was identified.  [facility] Embedded Care Due Messages. Reference number: [id number].   6/20/2024 2:37:21 PM CDT

## 2024-06-21 RX ORDER — LEVOTHYROXINE SODIUM 125 UG/1
TABLET ORAL
Qty: 90 TABLET | Refills: 0 | Status: SHIPPED | OUTPATIENT
Start: 2024-06-21

## 2024-06-21 NOTE — TELEPHONE ENCOUNTER
Patient due for office visit in November with myself or Renee Schumacher NP- please schedule.  Please address HM now, if applicable.

## 2024-06-21 NOTE — TELEPHONE ENCOUNTER
Refill Routing Note   Medication(s) are not appropriate for processing by [facility] for the following reason(s):        Required labs outdated    [facility] action(s):  Defer               Appointments  past 12m or future 3m with PCP    Date Provider   Last Visit   11/15/2023 Lulu Sandhu MD   Next Visit   Visit date not found Lulu Sandhu MD   ED visits in past 90 days: 0        Note composed:8:12 PM 06/20/2024

## 2024-07-08 ENCOUNTER — TELEPHONE (OUTPATIENT)
Dept: TRANSPLANT | Facility: CLINIC | Age: 54
End: 2024-07-08
Payer: COMMERCIAL

## 2024-07-10 ENCOUNTER — OFFICE VISIT (OUTPATIENT)
Dept: OPTOMETRY | Facility: CLINIC | Age: 54
End: 2024-07-10
Payer: COMMERCIAL

## 2024-07-10 DIAGNOSIS — Z13.5 SCREENING FOR EYE CONDITION: ICD-10-CM

## 2024-07-10 DIAGNOSIS — H52.4 PRESBYOPIA OF BOTH EYES: ICD-10-CM

## 2024-07-10 DIAGNOSIS — Z01.00 EXAMINATION OF EYES AND VISION: Primary | ICD-10-CM

## 2024-07-10 DIAGNOSIS — H52.203 MYOPIA OF BOTH EYES WITH ASTIGMATISM: ICD-10-CM

## 2024-07-10 DIAGNOSIS — H35.413 BILATERAL RETINAL LATTICE DEGENERATION: ICD-10-CM

## 2024-07-10 DIAGNOSIS — H35.431 COBBLESTONE RETINAL DEGENERATION, RIGHT: ICD-10-CM

## 2024-07-10 DIAGNOSIS — H52.13 HIGH MYOPIA, BOTH EYES: ICD-10-CM

## 2024-07-10 DIAGNOSIS — H52.13 MYOPIA OF BOTH EYES WITH ASTIGMATISM: ICD-10-CM

## 2024-07-10 PROCEDURE — 92014 COMPRE OPH EXAM EST PT 1/>: CPT | Mod: S$GLB,,, | Performed by: OPTOMETRIST

## 2024-07-10 PROCEDURE — 99999 PR PBB SHADOW E&M-EST. PATIENT-LVL III: CPT | Mod: PBBFAC,,, | Performed by: OPTOMETRIST

## 2024-07-10 PROCEDURE — 92015 DETERMINE REFRACTIVE STATE: CPT | Mod: S$GLB,,, | Performed by: OPTOMETRIST

## 2024-07-10 NOTE — PATIENT INSTRUCTIONS
Peripheral lattice retinal degeneration in each eye, without evidence of secondary retinal tear/hole.break.     Focal peripheral cobblestone retinal degeneration in the right eye.       Otherwise, ocular health appears good in each eye.     High myopia (nearsightedness) with astigmatism in each eye.   Satisfactory best-correctable VA in each eye.  Presbyopia consistent with age.  New spectacle lens Rx issued for full-time wear.    Family history of macular degeneration.  Suggest take Preservision supplement (AREDS-2 formulation) by mouth as directed on package.     Recheck in 12 - 18 months, or prior if any problems or apparent changes in vision in either eye in the interim.

## 2024-07-10 NOTE — PROGRESS NOTES
"HPI     eye exam            Comments: Overdue eye exam and refraction.  Family history of macular degeneration, and father has glaucoma and   "thinning retina"  Wears glasses full-time.  Happy with VA with glasses at distance, but   finds need for OTC reading glasses used on top of her glasses for very   fine work. But in general, general reading okay with present glasses.           Comments    Patient's age: 54 y.o. WF   Approximate date of last eye examination:  07/27/2022  Name of last eye doctor seen: Dr Casas  City/State: [facility]   Wears glasses? Yes      If yes, wears  Full-time or part-time?  Full-time   Present glasses are: Bifocal, SV Distance, SV Reading?  Progressive lenses     Approximate age of present glasses: 4 years old   Got new glasses following last exam, or subsequently?:  No (!)   Any problem with VA with glasses?  Pt can't see near as clearly as she   would like for some near vision tasks.    Wears CLs?:  No   Headaches?  No   Eye pain/discomfort?  No                                                                                     Flashes? No   Floaters?  Yes    Diplopia/Double vision?  No   Patient's Ocular History:          Any eye surgeries? No          Any eye injury?  No          Any treatment for eye disease?  No   Family history of eye disease?    Father:   + Glaucoma + Retinal detachment   Significant patient medical history:         1. Diabetes?  No   2. HBP?  Yes, controlled by medication and diet               3. Other (describe):                                  Idiopathic Pulmonary Hypertension                                 Seizures                                 Migraines (episode last month)   ! OTC eyedrops currently using:  Visine prn eye wash    ! Prescription eye meds currently using:  None    ! Any history of allergy/adverse reaction to any eye meds used   previously?  No    ! Any history of allergy/adverse reaction to eyedrops used during prior   eye exam(s)? No    ! " "Any history of allergy/adverse reaction to Novacaine or similar meds?   Yes    ! Any history of allergy/adverse reaction to Epinephrine or similar meds?   No    ! Patient okay with use of anesthetic eyedrops to check eye pressure?    Yes        ! Patient okay with use of eyedrops to dilate pupils today?  Yes    !  Allergies/Medications/Medical History/Family History reviewed today?    Yes       PD =   57/54   Desired reading distance =  14.25"                 Last edited by Jude Casas OD on 7/10/2024 11:25 AM.            Assessment /Plan     For exam results, see Encounter Report.  1. Examination of eyes and vision        2. High myopia, both eyes        3. Myopia of both eyes with astigmatism        4. Presbyopia of both eyes        5. Bilateral retinal lattice degeneration        6. Cobblestone retinal degeneration, right        7. Screening for eye condition                     Peripheral lattice retinal degeneration in each eye, without evidence of secondary retinal tear/hole.break.     Focal peripheral cobblestone retinal degeneration in the right eye.       Otherwise, ocular health appears good in each eye.     High myopia (nearsightedness) with astigmatism in each eye.   Satisfactory best-correctable VA in each eye.  Presbyopia consistent with age.  New spectacle lens Rx issued for full-time wear.    Family history of macular degeneration.  Suggest take Preservision supplement (AREDS-2 formulation) by mouth as directed on package.     Recheck in 12 - 18 months, or prior if any problems or apparent changes in vision in either eye in the interim.               "

## 2024-07-16 ENCOUNTER — TELEPHONE (OUTPATIENT)
Dept: HEMATOLOGY/ONCOLOGY | Facility: CLINIC | Age: 54
End: 2024-07-16
Payer: COMMERCIAL

## 2024-07-16 NOTE — TELEPHONE ENCOUNTER
I previously called pt and it appears she returned my call but we did not make contact.  Phoned pt on 07/16/2024 to advise that I did review her 11/2022 mmg report:  average density, rated BI-RADS 1, but difficulty with optimal positioning particularly on right due to indwelling catheter, and similar findings on 12/2023 mmg report; however, pt did not answer--Would like to know how I can assist pt moving forward; left brief VM with my contact info (2 phone numbers) and asking pt to return my call at her earliest convenience.  Will await call back.

## 2024-08-05 DIAGNOSIS — T82.514S: Primary | ICD-10-CM

## 2024-08-06 ENCOUNTER — LAB VISIT (OUTPATIENT)
Dept: LAB | Facility: HOSPITAL | Age: 54
End: 2024-08-06
Payer: COMMERCIAL

## 2024-08-06 ENCOUNTER — TELEPHONE (OUTPATIENT)
Dept: TRANSPLANT | Facility: CLINIC | Age: 54
End: 2024-08-06
Payer: COMMERCIAL

## 2024-08-06 DIAGNOSIS — R06.82 TACHYPNEA: ICD-10-CM

## 2024-08-06 DIAGNOSIS — Z79.899 POLYPHARMACY: ICD-10-CM

## 2024-08-06 LAB
ALBUMIN SERPL BCP-MCNC: 3.8 G/DL (ref 3.5–5.2)
ALP SERPL-CCNC: 81 U/L (ref 55–135)
ALT SERPL W/O P-5'-P-CCNC: 10 U/L (ref 10–44)
ANION GAP SERPL CALC-SCNC: 6 MMOL/L (ref 8–16)
AST SERPL-CCNC: 12 U/L (ref 10–40)
BASOPHILS # BLD AUTO: 0.03 K/UL (ref 0–0.2)
BASOPHILS NFR BLD: 0.6 % (ref 0–1.9)
BILIRUB SERPL-MCNC: 0.4 MG/DL (ref 0.1–1)
BNP SERPL-MCNC: 79 PG/ML (ref 0–99)
BUN SERPL-MCNC: 8 MG/DL (ref 6–20)
CALCIUM SERPL-MCNC: 9 MG/DL (ref 8.7–10.5)
CHLORIDE SERPL-SCNC: 113 MMOL/L (ref 95–110)
CO2 SERPL-SCNC: 21 MMOL/L (ref 23–29)
CREAT SERPL-MCNC: 0.7 MG/DL (ref 0.5–1.4)
DIFFERENTIAL METHOD BLD: ABNORMAL
EOSINOPHIL # BLD AUTO: 0.1 K/UL (ref 0–0.5)
EOSINOPHIL NFR BLD: 2.2 % (ref 0–8)
ERYTHROCYTE [DISTWIDTH] IN BLOOD BY AUTOMATED COUNT: 13.8 % (ref 11.5–14.5)
EST. GFR  (NO RACE VARIABLE): >60 ML/MIN/1.73 M^2
GLUCOSE SERPL-MCNC: 85 MG/DL (ref 70–110)
HCT VFR BLD AUTO: 45 % (ref 37–48.5)
HGB BLD-MCNC: 14.4 G/DL (ref 12–16)
IMM GRANULOCYTES # BLD AUTO: 0.03 K/UL (ref 0–0.04)
IMM GRANULOCYTES NFR BLD AUTO: 0.6 % (ref 0–0.5)
LYMPHOCYTES # BLD AUTO: 1.2 K/UL (ref 1–4.8)
LYMPHOCYTES NFR BLD: 23.1 % (ref 18–48)
MCH RBC QN AUTO: 29.4 PG (ref 27–31)
MCHC RBC AUTO-ENTMCNC: 32 G/DL (ref 32–36)
MCV RBC AUTO: 92 FL (ref 82–98)
MONOCYTES # BLD AUTO: 0.4 K/UL (ref 0.3–1)
MONOCYTES NFR BLD: 8.6 % (ref 4–15)
NEUTROPHILS # BLD AUTO: 3.3 K/UL (ref 1.8–7.7)
NEUTROPHILS NFR BLD: 64.9 % (ref 38–73)
NRBC BLD-RTO: 0 /100 WBC
PLATELET # BLD AUTO: 209 K/UL (ref 150–450)
PMV BLD AUTO: 11.5 FL (ref 9.2–12.9)
POTASSIUM SERPL-SCNC: 4.1 MMOL/L (ref 3.5–5.1)
PROT SERPL-MCNC: 6.9 G/DL (ref 6–8.4)
RBC # BLD AUTO: 4.9 M/UL (ref 4–5.4)
SODIUM SERPL-SCNC: 140 MMOL/L (ref 136–145)
WBC # BLD AUTO: 5.1 K/UL (ref 3.9–12.7)

## 2024-08-06 PROCEDURE — 83880 ASSAY OF NATRIURETIC PEPTIDE: CPT

## 2024-08-06 PROCEDURE — 80053 COMPREHEN METABOLIC PANEL: CPT

## 2024-08-06 PROCEDURE — 85025 COMPLETE CBC W/AUTO DIFF WBC: CPT

## 2024-08-06 PROCEDURE — 36415 COLL VENOUS BLD VENIPUNCTURE: CPT

## 2024-08-08 ENCOUNTER — HOSPITAL ENCOUNTER (OUTPATIENT)
Dept: RADIOLOGY | Facility: HOSPITAL | Age: 54
Discharge: HOME | End: 2024-08-08
Payer: COMMERCIAL

## 2024-08-08 DIAGNOSIS — T82.514S: ICD-10-CM

## 2024-08-08 PROCEDURE — 71046 X-RAY EXAM CHEST 2 VIEWS: CPT | Mod: TC

## 2024-08-09 ENCOUNTER — TELEPHONE (OUTPATIENT)
Dept: TRANSPLANT | Facility: CLINIC | Age: 54
End: 2024-08-09
Payer: COMMERCIAL

## 2024-08-30 NOTE — Clinical Note
"History    Past Medical History:   Diagnosis Date    Disease of thyroid gland     Stroke (Multi)       Past Surgical History:   Procedure Laterality Date    CT ANGIO NECK  11/16/2023    CT ANGIO NECK 11/16/2023    OTHER SURGICAL HISTORY  01/17/2020    Tonsillectomy    OTHER SURGICAL HISTORY  01/17/2020    Lumpectomy    OTHER SURGICAL HISTORY  07/29/2021    Wrist surgery    OTHER SURGICAL HISTORY  08/31/2021    Colonoscopy      Family History   Problem Relation Name Age of Onset    Diabetes Mother      Diabetes Sister      Diabetes Brother      Stomach cancer Maternal Grandmother        Social History     Social History Narrative    Not on file      Allergies   Allergen Reactions    Amoxicillin Nausea Only    Azithromycin Diarrhea    Ampicillin Diarrhea        PMH: Reviewed  PSH: Reviewed  Social History: Reviewed.   Allergies reviewed.     HPI: Khadijah Hong is a 75 y.o. female who presents to the ED today unaccompanied via EMS with complaints of syncope/near syncope.  Patient states that this morning she walked to [employer] from her independent living apartment at the [facility].  She was walking home, with groceries in her arms, when she started to feel hot and dizzy.  She states that she made it back to the [facility], was able to sit down on the swing, and then vomited.  EMS states the patient had passed out but the patient denies this to me.  She states she did not eat breakfast and did not drink any fluids before walking.  Denies having any chest pain or shortness of breath.  States she feels better on ED arrival as she is in the cold air.  Denies recent illness.      REVIEW OF SYSTEMS:  All other systems reviewed and negative except as listed in HPI.    PHYSICAL " Percutaneous stick to the right femoral artery EXAM:    GENERAL: Vitals noted, no distress. Alert and oriented x 3. Non-toxic.      EENT: TMs clear. Posterior oropharynx unremarkable. EOMI, no nystagmus noted.     NECK: Supple. No masses. No midline tenderness. No meningeal signs.     CARDIAC: Regular rate, rhythm. No murmurs rubs or gallops. No JVD.    PULMONARY: Lungs clear and equal bilaterally. No wheezes rales or rhonchi. No respiratory distress.     ABDOMEN: Soft, nondistended, and nontender. No peritoneal signs. Bowel sounds are present and normoactive in all 4 quadrants. No pulsatile masses.     EXTREMITIES: No peripheral edema.     SKIN: No rash. Warm, dry, and intact.     NEURO: No focal neurologic deficits.  NIH 0.    Labs Reviewed   CBC WITH AUTO DIFFERENTIAL - Abnormal       Result Value    WBC 6.0      nRBC 0.0      RBC 3.99 (*)     Hemoglobin 13.7      Hematocrit 40.3       (*)     MCH 34.3 (*)     MCHC 34.0      RDW 12.4      Platelets 180      Neutrophils % 55.9      Immature Granulocytes %, Automated 0.2      Lymphocytes % 29.8      Monocytes % 11.6      Eosinophils % 2.0      Basophils % 0.5      Neutrophils Absolute 3.34      Immature Granulocytes Absolute, Automated 0.01      Lymphocytes Absolute 1.78      Monocytes Absolute 0.69      Eosinophils Absolute 0.12      Basophils Absolute 0.03     COMPREHENSIVE METABOLIC PANEL - Abnormal    Glucose 111 (*)     Sodium 134 (*)     Potassium 3.8      Chloride 98      Bicarbonate 19 (*)     Anion Gap 21 (*)     Urea Nitrogen 6      Creatinine 0.78      eGFR 80      Calcium 9.6      Albumin 4.4      Alkaline Phosphatase 56      Total Protein 7.3      AST 30      Bilirubin, Total 0.9      ALT 17     URINALYSIS WITH REFLEX CULTURE AND MICROSCOPIC - Abnormal    Color, Urine Light-Yellow      Appearance, Urine Turbid (*)     Specific Gravity, Urine 1.006      pH, Urine 6.0      Protein, Urine NEGATIVE      Glucose, Urine Normal      Blood, Urine NEGATIVE      Ketones, Urine TRACE (*)     Bilirubin,  Bacteria, Urine 1+ (*)     Hyaline Casts, Urine 1+ (*)     Fine Granular Casts, Urine 1+ (*)    MAGNESIUM - Normal    Magnesium 1.64     CREATINE KINASE - Normal    Creatine Kinase 57     SERIAL TROPONIN-INITIAL - Normal    Troponin I, High Sensitivity 4      Narrative:     Less than 99th percentile of normal range cutoff-  Female and children under 18 years old <14 ng/L; Male <21 ng/L: Negative  Repeat testing should be performed if clinically indicated.     Female and children under 18 years old 14-50 ng/L; Male 21-50 ng/L:  Consistent with possible cardiac damage and possible increased clinical   risk. Serial measurements may help to assess extent of myocardial damage.     >50 ng/L: Consistent with cardiac damage, increased clinical risk and  myocardial infarction. Serial measurements may help assess extent of   myocardial damage.      NOTE: Children less than 1 year old may have higher baseline troponin   levels and results should be interpreted in conjunction with the overall   clinical context.     NOTE: Troponin I testing is performed using a different   testing methodology at [facility] than at other   [facility]. Direct result comparisons should only   be made within the same method.   URINE CULTURE   TROPONIN SERIES- (INITIAL, 1 HR)    Narrative:     The following orders were created for panel order Troponin I Series, High Sensitivity (0, 1 HR).  Procedure                               Abnormality         Status                     ---------                               -----------         ------                     Troponin I, High Sensiti...[[id number]]  Normal              Final result               Troponin, High  Sensitivi...[[id number]]                                                   Please view results for these tests on the individual orders.   URINALYSIS WITH REFLEX CULTURE AND MICROSCOPIC    Narrative:     The following orders were created for panel order Urinalysis with Reflex Culture and Microscopic.  Procedure                               Abnormality         Status                     ---------                               -----------         ------                     Urinalysis with Reflex C...[[id number]]  Abnormal            Final result               Extra Urine Gray Tube[[id number]]                            In process                   Please view results for these tests on the individual orders.   EXTRA URINE GRAY TUBE   SERIAL TROPONIN, 1 HOUR        XR chest 1 view   Final Result   1.  No evidence of acute cardiopulmonary process.             Signed by: Lorenzo Balderas 8/30/2024 11:22 AM   Dictation workstation:   [id number]           Medical Decision Making  Amount and/or Complexity of Data Reviewed  Labs: ordered.  Radiology: ordered.  ECG/medicine tests: ordered.    EKG interpreted by myself shows SR with rate of 70. Left axis. MA interval 154. QRS interval 76. QT interval 420. QTc interval 453. Non-specific ST-T wave changes. No acute ischemia or injury pattern.       ED COURSE: This patient was seen and examined by myself and Dr. Joseph. She is placed on a continuous cardiac monitor with pulse oximetry monitoring. Old records and EKGs are obtained and reviewed. IV heplock is established, labs are obtained and noted above.  First troponin negative, second troponin was refused by the patient.  Chest x-ray shows no evidence of acute cardiopulmonary process.  She is feeling better after normal saline 1 L bolus given.  Her urine does show evidence of urinary tract infection.  She states that she was just treated for 1 with Cipro.  I did look back at her old records, her most recent urine culture grew  out normal mihai.  repeat blood pressure check.  Advised to increase her oral fluids at home and rest, preferably out of the heat for the rest of the day and tomorrow.  Patient verbalized understanding.  She will be discharged in stable condition after her IV antibiotics have infused.          Differential Diagnoses Considered: Syncope, presyncope, heat exhaustion, dehydration, lecture abnormality, infectious etiology    Chronic Medical Conditions Significantly Affecting Care: see above    External Records Reviewed: I reviewed recent and relevant outside records including: PCP notes, prior discharge summary, previous radiologic studies    Diagnostic testing considered: Blood, urine, chest x-ray, EKG    Escalation of Care: Appropriate for outpatient management    Prescription Drug Consideration: Antibiotics            DIAGNOSTIC IMPRESSION: #1 UTI #2 heat syncope     Kaylen Argueta, TRINI-CNP  08/30/24 [id number]

## 2024-09-18 ENCOUNTER — LAB VISIT (OUTPATIENT)
Dept: LAB | Facility: HOSPITAL | Age: 54
End: 2024-09-18
Payer: COMMERCIAL

## 2024-09-18 DIAGNOSIS — Z79.899 POLYPHARMACY: ICD-10-CM

## 2024-09-18 DIAGNOSIS — R06.82 TACHYPNEA: ICD-10-CM

## 2024-09-18 LAB
ALBUMIN SERPL BCP-MCNC: 3.9 G/DL (ref 3.5–5.2)
ALP SERPL-CCNC: 82 U/L (ref 55–135)
ALT SERPL W/O P-5'-P-CCNC: 7 U/L (ref 10–44)
ANION GAP SERPL CALC-SCNC: 6 MMOL/L (ref 8–16)
AST SERPL-CCNC: 11 U/L (ref 10–40)
BASOPHILS # BLD AUTO: 0.04 K/UL (ref 0–0.2)
BASOPHILS NFR BLD: 0.7 % (ref 0–1.9)
BILIRUB SERPL-MCNC: 0.3 MG/DL (ref 0.1–1)
BNP SERPL-MCNC: 52 PG/ML (ref 0–99)
BUN SERPL-MCNC: 12 MG/DL (ref 6–20)
CALCIUM SERPL-MCNC: 9.5 MG/DL (ref 8.7–10.5)
CHLORIDE SERPL-SCNC: 111 MMOL/L (ref 95–110)
CO2 SERPL-SCNC: 22 MMOL/L (ref 23–29)
CREAT SERPL-MCNC: 0.8 MG/DL (ref 0.5–1.4)
DIFFERENTIAL METHOD BLD: NORMAL
EOSINOPHIL # BLD AUTO: 0.2 K/UL (ref 0–0.5)
EOSINOPHIL NFR BLD: 3.1 % (ref 0–8)
ERYTHROCYTE [DISTWIDTH] IN BLOOD BY AUTOMATED COUNT: 14 % (ref 11.5–14.5)
EST. GFR  (NO RACE VARIABLE): >60 ML/MIN/1.73 M^2
GLUCOSE SERPL-MCNC: 85 MG/DL (ref 70–110)
HCT VFR BLD AUTO: 45.6 % (ref 37–48.5)
HGB BLD-MCNC: 15.3 G/DL (ref 12–16)
IMM GRANULOCYTES # BLD AUTO: 0.03 K/UL (ref 0–0.04)
IMM GRANULOCYTES NFR BLD AUTO: 0.5 % (ref 0–0.5)
LYMPHOCYTES # BLD AUTO: 1.3 K/UL (ref 1–4.8)
LYMPHOCYTES NFR BLD: 23.9 % (ref 18–48)
MAGNESIUM SERPL-MCNC: 2.2 MG/DL (ref 1.6–2.6)
MCH RBC QN AUTO: 31 PG (ref 27–31)
MCHC RBC AUTO-ENTMCNC: 33.6 G/DL (ref 32–36)
MCV RBC AUTO: 93 FL (ref 82–98)
MONOCYTES # BLD AUTO: 0.5 K/UL (ref 0.3–1)
MONOCYTES NFR BLD: 9 % (ref 4–15)
NEUTROPHILS # BLD AUTO: 3.4 K/UL (ref 1.8–7.7)
NEUTROPHILS NFR BLD: 62.8 % (ref 38–73)
NRBC BLD-RTO: 0 /100 WBC
PLATELET # BLD AUTO: 208 K/UL (ref 150–450)
PMV BLD AUTO: 12.1 FL (ref 9.2–12.9)
POTASSIUM SERPL-SCNC: 3.9 MMOL/L (ref 3.5–5.1)
PROT SERPL-MCNC: 7.1 G/DL (ref 6–8.4)
RBC # BLD AUTO: 4.93 M/UL (ref 4–5.4)
SODIUM SERPL-SCNC: 139 MMOL/L (ref 136–145)
WBC # BLD AUTO: 5.47 K/UL (ref 3.9–12.7)

## 2024-09-18 PROCEDURE — 85025 COMPLETE CBC W/AUTO DIFF WBC: CPT

## 2024-09-18 PROCEDURE — 80053 COMPREHEN METABOLIC PANEL: CPT

## 2024-09-18 PROCEDURE — 36415 COLL VENOUS BLD VENIPUNCTURE: CPT

## 2024-09-18 PROCEDURE — 83880 ASSAY OF NATRIURETIC PEPTIDE: CPT

## 2024-09-18 PROCEDURE — 83735 ASSAY OF MAGNESIUM: CPT

## 2024-09-19 DIAGNOSIS — E03.9 HYPOTHYROIDISM (ACQUIRED): ICD-10-CM

## 2024-09-19 RX ORDER — LEVOTHYROXINE SODIUM 125 UG/1
TABLET ORAL
Qty: 90 TABLET | Refills: 0 | Status: SHIPPED | OUTPATIENT
Start: 2024-09-19

## 2024-09-19 NOTE — TELEPHONE ENCOUNTER
No care due was identified.  Health [facility] Embedded Care Due Messages. Reference number: [id number].   9/19/2024 12:52:04 AM CDT

## 2024-09-19 NOTE — TELEPHONE ENCOUNTER
Refill Routing Note   Medication(s) are not appropriate for processing by [facility] for the following reason(s):        Required labs outdated    [facility] action(s):  Defer               Appointments  past 12m or future 3m with PCP    Date Provider   Last Visit   Visit date not found Lulu Sandhu MD   Next Visit   11/27/2024 Lulu Sandhu MD   ED visits in past 90 days: 0        Note composed:2:11 PM 09/19/2024

## 2024-09-25 ENCOUNTER — OFFICE VISIT (OUTPATIENT)
Dept: TRANSPLANT | Facility: CLINIC | Age: 54
End: 2024-09-25
Payer: COMMERCIAL

## 2024-09-25 ENCOUNTER — TELEPHONE (OUTPATIENT)
Dept: TRANSPLANT | Facility: CLINIC | Age: 54
End: 2024-09-25

## 2024-09-25 ENCOUNTER — HOSPITAL ENCOUNTER (OUTPATIENT)
Dept: PULMONOLOGY | Facility: CLINIC | Age: 54
Discharge: HOME | End: 2024-09-25
Payer: COMMERCIAL

## 2024-09-25 VITALS
HEART RATE: 86 BPM | OXYGEN SATURATION: 95 % | BODY MASS INDEX: 28.02 KG/M2 | WEIGHT: 145.06 LBS | DIASTOLIC BLOOD PRESSURE: 61 MMHG | WEIGHT: 139 LBS | BODY MASS INDEX: 29.24 KG/M2 | HEIGHT: 59 IN | SYSTOLIC BLOOD PRESSURE: 104 MMHG | HEIGHT: 59 IN

## 2024-09-25 DIAGNOSIS — I27.9 CHRONIC PULMONARY HEART DISEASE: ICD-10-CM

## 2024-09-25 DIAGNOSIS — E03.9 HYPOTHYROIDISM (ACQUIRED): ICD-10-CM

## 2024-09-25 DIAGNOSIS — G47.33 OSA (OBSTRUCTIVE SLEEP APNEA): ICD-10-CM

## 2024-09-25 DIAGNOSIS — E66.3 OVERWEIGHT (BMI 25.0-29.9): ICD-10-CM

## 2024-09-25 DIAGNOSIS — I27.21 WHO GROUP 1 PULMONARY ARTERIAL HYPERTENSION: Primary | ICD-10-CM

## 2024-09-25 DIAGNOSIS — J45.909 MODERATE ASTHMA WITHOUT COMPLICATION, UNSPECIFIED WHETHER PERSISTENT: ICD-10-CM

## 2024-09-25 PROCEDURE — 99214 OFFICE O/P EST MOD 30 MIN: CPT | Mod: S$GLB,,,

## 2024-09-25 PROCEDURE — 1159F MED LIST DOCD IN RCRD: CPT | Mod: CPTII,S$GLB,,

## 2024-09-25 PROCEDURE — 3078F DIAST BP <80 MM HG: CPT | Mod: CPTII,S$GLB,,

## 2024-09-25 PROCEDURE — 94618 PULMONARY STRESS TESTING: CPT | Mod: S$GLB,,, | Performed by: INTERNAL MEDICINE

## 2024-09-25 PROCEDURE — 99999 PR PBB SHADOW E&M-EST. PATIENT-LVL V: CPT | Mod: PBBFAC,,,

## 2024-09-25 PROCEDURE — 3074F SYST BP LT 130 MM HG: CPT | Mod: CPTII,S$GLB,,

## 2024-09-25 PROCEDURE — 3008F BODY MASS INDEX DOCD: CPT | Mod: CPTII,S$GLB,,

## 2024-09-25 RX ORDER — FUROSEMIDE 20 MG/1
20 TABLET ORAL DAILY
Qty: 30 TABLET | Refills: 11 | Status: SHIPPED | OUTPATIENT
Start: 2024-09-25 | End: 2025-09-25

## 2024-09-25 RX ORDER — DIPHENOXYLATE HYDROCHLORIDE AND ATROPINE SULFATE 2.5; .025 MG/1; MG/1
1 TABLET ORAL 4 TIMES DAILY PRN
Qty: 120 TABLET | Refills: 0 | Status: SHIPPED | OUTPATIENT
Start: 2024-09-25

## 2024-09-25 NOTE — PROGRESS NOTES
Subjective:    Patient ID:  Yuni Perez is a 54 y.o. female who presents for follow-up of Pulmonary Hypertension.    HPI   Mrs. Perez is a pleasant 51 y.o. white female who presents for PH follow-up. Patient was diagnosed with IPAH in 2009 and started on Remodulin, Letairis, and Adcirca. She has a history of ABHIJEET (untreated - not able to tolerate CPAP 2/2 severe congestion), GERD, and Asthma. Current therapy is Remodulin infusing at 117ng/kg/min (pre-filled cassettes), Letairis 10mg qdaily, and Adcirca 40mg qdaily. Has T/F Adempas.  Patient evaluated by lung transplant in 2020. Had everything set up for LUT, but says her caregivers were deemed not acceptable. She has come to terms with not being a LUT candidate here at [facility]. She is not interested in lung transplant.    Mrs. Perez is here for follow-up. She is doing well. She reports baseline symptoms on triple therapy. Remodulin infusing at 117ng/kg/min. Denies issues with her site. Walk was a little shorter today, but she felt like she did fine. Still has some shoulder/neck pain, but feels muscular. Patient denies chest pain, chest pressure, syncope, pre-syncope, lightheadedness, dizziness, PND, orthopnea, LE edema, abdominal pain, abdominal pressure, or N/V/F/C.      6MWT:   5/20/2024 9/25/2024   6MW     6MWT Status completed without stopping  completed without stopping    Patient Reported Dyspnea;Leg pain;Other (Comment)  Dyspnea;Calf pain    Was O2 used? No  No    6MW Distance walked (feet) 1362 feet  1200 feet    Distance walked (meters) 415.14 meters  365.76 meters    Did patient stop? No     Oxygen Saturation 97 %  98 %    Supplemental Oxygen Room Air  Room Air    Heart Rate 88 bpm  71 bpm    Blood Pressure 106/75  112/76    Ab Dyspnea Rating  moderate  moderate    Oxygen Saturation 95 %  95 %    Supplemental Oxygen Room Air  Room Air    Heart Rate 147 bpm  144 bpm    Blood Pressure 125/74  122/87    Ab Dyspnea Rating  very,very  heavy  very,very heavy    Recovery Time (seconds) 70 seconds  182 seconds    Oxygen Saturation 96 %  99 %    Supplemental Oxygen Room Air  Room Air    Heart Rate 98 bpm  90 bpm      ECHO: 2/2/2024    Left Ventricle: The left ventricle is normal in size. Ventricular mass is normal. Normal wall thickness. Normal wall motion. Septal flattening in systole consistent with right ventricular pressure overload. There is normal systolic function with a visually estimated ejection fraction of 60 - 65%. There is normal diastolic function.    Right Ventricle: Moderate right ventricular enlargement. Wall thickness is normal. Right ventricle wall motion  is normal. Systolic function is mild - moderately reduced visually although the TAPSE.    Right Atrium: Right atrium is mildly dilated.    Aortic Valve: The aortic valve is a trileaflet valve. There is low normal AFSANEH to trivial stenosis. Aortic valve area by VTI is 2.00 cm². Aortic valve peak velocity is 1.63 m/s. Mean gradient is 5 mmHg. The dimensionless index is 0.79.    Tricuspid Valve: Mildly thickened leaflets. There is mild to moderate regurgitation.    Pulmonic Valve: There is mild regurgitation.    Pulmonary Artery: There is very severe pulmonary hypertension. The estimated pulmonary artery systolic pressure is 99 mmHg.    IVC/SVC: Normal venous pressure at 3 mmHg.    ECHO: 6/23/2023  There is abnormal septal wall motion. There is systolic flattening of the interventricular septum consistent with right ventricle pressure overload.  The left ventricle is normal in size with  Moderate right ventricular enlargement with mildly to moderately reduced right ventricular systolic function. Free Wall Strain 11%, global RV strain 11%.  Mild pulmonic regurgitation.  Moderate tricuspid regurgitation.  Normal central venous pressure (3 mmHg).  The estimated PA systolic pressure is 106 mmHg.  There is pulmonary hypertension.  There is evidence of at least moderate right to left  venous-arterial shunting with agitated saline contrast. Unfortunately continuous capture not obtained thus cannot specify whether intra or extracardiac.    ECHO: 12/16/2022  The left ventricle is normal in size with normal systolic function.  The estimated ejection fraction is 68%.  Indeterminate left ventricular diastolic function.  Mild right ventricular enlargement with mildly reduced right ventricular systolic function.  Mild right atrial enlargement.  Mild to moderate tricuspid regurgitation.  Mild pulmonic regurgitation.  Normal central venous pressure (3 mmHg).  The estimated PA systolic pressure is 107 mmHg.  There is Quite Severe pulmonary hypertension.  Trivial posterior pericardial effusion.    RHC: 6/10/2022  Estimated blood loss: none  Severe PAH on IV remodulin, adcirca and letairis.  Normal right and left-sided filling pressures.  Borderline low-normal cardiac output by Ebony method which is normal when indexed for BSA.  No signficant change from RHC 8/18.  RA: 6/ 7/ 5 RV: 105/ 3/ 10 PA: 103/ 45/ 64 PWP: 15/ 1512/ 13 . Cardiac output was 4.11 by Ebony. Cardiac index is 2.67 L/min/m2. O2 Sat: PA 70%. AO sat 96% PVR 12.4    PFTS: 9/27/2023  Spirometry shows moderate-severe obstruction. Lung volume determination shows TLC is normal with evidence air trapping is present. Airway mechanics are abnormal showing increased airway resistance and decreased conductance. DLCO is moderately decreased (42%).     PFTs: 3/17/2023  Spirometry is consistent with restriction. The significantly reduced FEF 25-75 suggests superimposed obstruction may be present. Lung volume determination is normal. The mildly elevated RV/TLC ratio suggests possible early air trapping. Diffusion capacity is moderately reduced. This interpretation of DLCO assumes normal hemoglobin level (41%)    PFTs: 9/8/2020  Spirometry shows moderate obstruction. Lung volume determination is normal. DLCO is mildly decreased - 68%    CT Chest:  2/27/2023  FINDINGS:  There are innumerable upper lobe predominant vague, centrilobular micro nodules.  Due to their very small size in their shear numbers, they are not amenable to exact comparison is.  However, the gross appearance is similar to the previous exam.  These nodules are seen in all lobes.     There are stable 5 mm right lower lobe posterior segment (4-309) and 4 mm left lower lobe posterior segment (4-313) pulmonary nodules.     The tip of a right catheter is seen overlying the cavoatrial junction.     No new lymphadenopathy.     The heart size is normal.  The main pulmonary segment is again noted to be severely dilated, up to 3.9 cm in diameter.     No new abnormalities are seen in the upper abdominal structures.     The bone density appears diminished.     Impression:     1. Redemonstrated innumerable bilateral centrilobular pulmonary micro nodules.  The differential diagnosis could include hypersensitivity pneumonitis, respiratory bronchiolitis (if the patient is a smoker), infection, vasculitis.  This is not an exhaustive list.  2. Severely dilated main pulmonary artery up to 3.9 cm.  3. Stable bilateral lower lobe subcentimeter pulmonary nodules.    IV/SC:  Pulmonary Hypertension Review Flowsheet 9/25/2024   Pump Type CADD   Medication type Remodulin   Vial size (No Data)   Dose (ng/kg/min) 117/ng/kg/min   DW (kg) 60.5kg   Amt of Med used    Amt of Diluent Used NS    Final Concentration (ng/ml) 028886 ng/ml   Pump Rate ml/24 hr 41 ml/ 24 hr     Review of Systems   Constitutional: Negative for decreased appetite, diaphoresis, fever and night sweats.   HENT:  Positive for congestion.    Cardiovascular:  Positive for dyspnea on exertion. Negative for chest pain, irregular heartbeat, leg swelling and syncope.   Respiratory:  Positive for shortness of breath. Negative for cough and wheezing.         Occasional SOB at night and morning   Endocrine: Negative.    Skin: Negative.    Musculoskeletal:   "Positive for myalgias. Negative for back pain, falls and joint swelling.   Gastrointestinal: Negative.    Genitourinary: Negative.    Neurological: Negative.    Psychiatric/Behavioral:  The patient is nervous/anxious.         Controlled with medication        Objective: /61 (BP Location: Left arm, Patient Position: Sitting, BP Method: Small (Automatic))   Pulse 86   Ht 4' 11" (1.499 m)   Wt 65.8 kg (145 lb 1 oz)   SpO2 95%   BMI 29.30 kg/m²     Physical Exam  Constitutional:       Appearance: Normal appearance.   HENT:      Head: Normocephalic and atraumatic.   Eyes:      Pupils: Pupils are equal, round, and reactive to light.   Neck:      Vascular: No JVD.   Cardiovascular:      Rate and Rhythm: Normal rate and regular rhythm.      Pulses: Normal pulses.      Heart sounds: Murmur heard.      Comments: Loud S2  Pulmonary:      Effort: Pulmonary effort is normal.      Breath sounds: Normal breath sounds.   Chest:      Comments: RCW - Indwelling catheter - Eason for Remodulin infusion. Dressing is C/D/I    Abdominal:      General: Abdomen is flat.      Palpations: Abdomen is soft.   Musculoskeletal:         General: Normal range of motion.      Cervical back: Normal range of motion and neck supple.   Skin:     General: Skin is warm and dry.      Capillary Refill: Capillary refill takes less than 2 seconds.   Neurological:      Mental Status: She is alert and oriented to person, place, and time.   Psychiatric:         Mood and Affect: Mood normal.         Behavior: Behavior normal.           Lab Results   Component Value Date    BNP 52 09/18/2024     09/18/2024    K 3.9 09/18/2024    MG 2.2 09/18/2024     (H) 09/18/2024    CO2 22 (L) 09/18/2024    BUN 12 09/18/2024    CREATININE 0.8 09/18/2024    GLU 85 09/18/2024    HGBA1C 4.9 09/21/2020    AST 11 09/18/2024    ALT 7 (L) 09/18/2024    ALBUMIN 3.9 09/18/2024    PROT 7.1 09/18/2024    BILITOT 0.3 09/18/2024    CHOL 125 09/21/2020    HDL 36 (L) " "09/21/2020    LDLCALC 72.8 09/21/2020    TRIG 81 09/21/2020       Magnesium   Date Value Ref Range Status   09/18/2024 2.2 1.6 - 2.6 mg/dL Final       Lab Results   Component Value Date    WBC 5.47 09/18/2024    HGB 15.3 09/18/2024    HCT 45.6 09/18/2024    MCV 93 09/18/2024     09/18/2024       BNP   Date Value Ref Range Status   09/18/2024 52 0 - 99 pg/mL Final     Comment:     Values of less than 100 pg/ml are consistent with non-CHF populations.   08/06/2024 79 0 - 99 pg/mL Final     Comment:     Values of less than 100 pg/ml are consistent with non-CHF populations.   05/20/2024 67 0 - 99 pg/mL Final     Comment:     Values of less than 100 pg/ml are consistent with non-CHF populations.       No results found for: "LDH"    ..  WHO Group: 1 Functional Class: II  REVEAL Score: 6 (Low Risk)    Assessment:       1. WHO group 1 pulmonary arterial hypertension    2. Moderate asthma without complication, unspecified whether persistent    3. Hypothyroidism (acquired)    4. Overweight (BMI 25.0-29.9)    5. ABHIJEET (obstructive sleep apnea)         Plan:   Mrs. Perez is subjectively stable on triple therapy, to include high dose, continuous remodulin infusion (117ng). We discussed Winrevair again and she will research. Also talked about the need for a new RHC. She is tearful and explains that the last time was extremely traumatizing.     No medication changes today.      No medication changes today.     New medication to research - Winrevair/Sotatercept     Return to clinic in 3 months with labs, walk, ECHO, PFTs     Recommend 2 gram sodium restriction and 1500cc fluid restriction.  Encourage physical activity with graded exercise program.  Requested patient to weigh themselves daily, and to notify us if their weight increases by more than 3 lbs in 1 day or 5 lbs in 1 week.     Bárbara Langford, KISHAN, APRN  [facility]        "

## 2024-09-25 NOTE — PATIENT INSTRUCTIONS
No medication changes today.    New medication to research - Winrevair/Sotatercept    Return to clinic in 3 months with labs, walk, ECHO, PFTs    Recommend 2 gram sodium restriction and 1500cc fluid restriction.  Encourage physical activity with graded exercise program.  Requested patient to weigh themselves daily, and to notify us if their weight increases by more than 3 lbs in 1 day or 5 lbs in 1 week.

## 2024-09-25 NOTE — TELEPHONE ENCOUNTER
NN verified the patient's Remodulin dose of 120 ng/kg/min. Patient and NN discussed issues the patient is having with her specialty pharmacy [facility]. NN emailed [facility] regarding the patient's Remodulin and co-pay card. NN will follow up with the patient after the Specialty pharmacy replies to the email.

## 2024-09-26 NOTE — PROCEDURES
Yuni Perez is a 54 y.o.   female patient, who presents for a 6 minute walk test ordered by KAI Langford.  The diagnosis is Pulmonary Hypertension.  The patient's BMI is 28.1 kg/m2.  Predicted distance (lower limit of normal) is 393.67 meters.      Test Results:    The test was completed without stopping.  The total time walked was 360 seconds.  During walking, the patient reported:  Dyspnea, Calf pain. The patient used    during testing.     09/26/2024---------Distance: 365.76 meters (1200 feet)     O2 Sat % Supplemental Oxygen Heart Rate Blood Pressure Ab Scale   Pre-exercise  (Resting) 98 % Room Air 71 bpm 112/76 mmHg 3   During Exercise 95 % Room Air 144 bpm 122/87 mmHg 9   Post-exercise  (Recovery) 99 % Room Air  90 bpm   mmHg       Recovery Time: 182 seconds    Performing nurse/tech: Maylin MANZANO      PREVIOUS STUDY:   The patient had a previous study.    05/20/2024---------Distance: 415.14 meters (1362 feet)       O2 Sat % Supplemental Oxygen Heart Rate Blood Pressure Ab Scale   Pre-exercise  (Resting) 97 % Room Air 88 bpm 106/75 mmHg 3   During Exercise 95 % Room Air 147 bpm 125/74 mmHg 9   Post-exercise  (Recovery) 96 % Room Air  98 bpm   4      Recovery Time: 70 seconds  CLINICAL INTERPRETATION:  Six minute walk distance is 365.76 meters (1200 feet) with maximal severe dyspnea.  During exercise, there was no significant desaturation while breathing room air.  Both blood pressure and heart rate remained stable with walking.  The patient reported non-pulmonary symptoms during exercise.  The patient did complete the study, walking 360 seconds of the 360 second test.  Since the previous study in 5/20/2024, exercise capacity is significantly worse.  Based upon age and body mass index, exercise capacity is less than predicted.

## 2024-10-03 ENCOUNTER — TELEPHONE (OUTPATIENT)
Dept: TRANSPLANT | Facility: CLINIC | Age: 54
End: 2024-10-03
Payer: COMMERCIAL

## 2024-10-03 DIAGNOSIS — I27.9 CHRONIC PULMONARY HEART DISEASE: Primary | ICD-10-CM

## 2024-10-03 NOTE — TELEPHONE ENCOUNTER
"NN received call from patient who states that she was concerned about an episode she had last night where, she bent over and her HR went to 175, her chest was sore like someone had done a sternal rub, a "black cloud was hanging over her" but her oxygen was fine. She was calling in thinking maybe medications needed to be adjusted since the last time she felt this way was before she was diagnosed with PH. Patient states that she is feeling better today, no more "black cloud", her chest is slightly sore but her HR is back to normal and everything seems to be fine. NN consulted with Lety who recommended that patient continue to monitor, no adjustments will be made since it was just one incident but when she has another episode, it is imperative that she go to ER to be evaluated. Patient verbalized undestanding.   " Chief Complaints and History of Present Illnesses   Patient presents with     Annual Eye Exam     Chief Complaint(s) and History of Present Illness(es)     Annual Eye Exam     Laterality: both eyes    Onset: years ago    Frequency: constantly    Course: stable    Treatments tried: no treatments    Pain scale: 0/10              Comments     CLs having to wear readers over CLs. In glasses having to remove glasses to read at near. No eye pain or irritation. Seeing well at distance with glasses. Patient denies eye pain or irritation. Does not use any eye drops.    Pt has MS Gabriella Saleh COT 12:30 PM August 27, 2019

## 2024-11-19 ENCOUNTER — OFFICE VISIT (OUTPATIENT)
Dept: NEUROLOGY | Facility: CLINIC | Age: 54
End: 2024-11-19
Payer: COMMERCIAL

## 2024-11-19 VITALS
WEIGHT: 145.94 LBS | DIASTOLIC BLOOD PRESSURE: 69 MMHG | SYSTOLIC BLOOD PRESSURE: 125 MMHG | BODY MASS INDEX: 29.42 KG/M2 | HEART RATE: 74 BPM | HEIGHT: 59 IN

## 2024-11-19 DIAGNOSIS — R51.9 CHRONIC NONINTRACTABLE HEADACHE, UNSPECIFIED HEADACHE TYPE: ICD-10-CM

## 2024-11-19 DIAGNOSIS — M54.2 CHRONIC NECK PAIN: ICD-10-CM

## 2024-11-19 DIAGNOSIS — G89.29 CHRONIC NONINTRACTABLE HEADACHE, UNSPECIFIED HEADACHE TYPE: ICD-10-CM

## 2024-11-19 DIAGNOSIS — M54.2 CERVICALGIA: ICD-10-CM

## 2024-11-19 DIAGNOSIS — G43.909 EPISODIC MIGRAINE: ICD-10-CM

## 2024-11-19 DIAGNOSIS — G43.009 MIGRAINE WITHOUT AURA AND WITHOUT STATUS MIGRAINOSUS, NOT INTRACTABLE: Primary | ICD-10-CM

## 2024-11-19 DIAGNOSIS — G89.29 CHRONIC NECK PAIN: ICD-10-CM

## 2024-11-19 DIAGNOSIS — G47.33 OSA (OBSTRUCTIVE SLEEP APNEA): ICD-10-CM

## 2024-11-19 PROCEDURE — 99999 PR PBB SHADOW E&M-EST. PATIENT-LVL V: CPT | Mod: PBBFAC,,, | Performed by: STUDENT IN AN ORGANIZED HEALTH CARE EDUCATION/TRAINING PROGRAM

## 2024-11-19 RX ORDER — TOPIRAMATE 100 MG/1
100 TABLET, FILM COATED ORAL 2 TIMES DAILY
Qty: 60 TABLET | Refills: 5 | Status: SHIPPED | OUTPATIENT
Start: 2024-11-19 | End: 2025-05-18

## 2024-11-19 RX ORDER — RIMEGEPANT SULFATE 75 MG/75MG
75 TABLET, ORALLY DISINTEGRATING ORAL
Qty: 16 TABLET | Refills: 5 | Status: SHIPPED | OUTPATIENT
Start: 2024-11-19

## 2024-11-19 NOTE — PROGRESS NOTES
"Chief Complaint   Patient presents with    Follow-up     4 month f/u          Yuni Perez is a 54 y.o. female with a history of multiple medical diagnoses as listed below that presents for evaluation of headaches. She is accompanied to this visit by her mother. She was diagnosed with pulmonary hypertension about 8 years ago and around the same time she was diagnosed with a seizure. She had workup at that time that included MRI that showed signs of "old injury" and she had EEG that showed "lots of spikes and waves" per her mother. She says that since that time she has been started on Topamax which has been titrated up. She has not been having any seizures but she has bene having some occasional word finding difficulty that she has attributed to the medication. She has tried tylenol because as she has been titrating her medication to treat pulmonary hypertension sh marie been having bilateral intense stabbing pains in her head. She does say that with time her headaches have been better but she has noticed many more headaches since she began the Remodulin. She has not taken any other headache medications in the past.    Interval History  11/17/2016  She has still been having headaches after taking her medications in particular her vasodilators. She has found that her PRN medications are somewhat helpful in minimizing the pain that she has from her headaches. She has not had any mew problems since she was last seen in clinic.    03/23/2017  Since last seen in clinic she has been approved to get on the lung transplant list and she will be making strides to get set up to to receive new lungs. She has been taking her topamax as directed and has felt that overall she has been having fewer headaches but she still has needed Tylenol and on rare occasions Fioricet to get rid of her headaches. She has been having some cognitive difficulty with her current medications but she feels that it is tolerable. She has not " "had any seizure like activity.    06/13/2017  Since last seen in clinic she says that she has been able to be placed on the donor list. She says that overall she feels that she has been stable with regards to her breathing. She continues to have chronic sinus issues that have been essentially refractory to any medications that she has tried including antihistamines and nasal rinses. She feels that she is able to clear her sinuses and have it come right back. She has headaches almost daily but despite the frequency of the headaches she feels that most are not migraines. She has not had any seizures since last seen. She is tolerating all of her medications well without any problems.    10/31/2017  She has continued to make preparations for lung transplant.  Said that she has been frustrated with the process and she does not have much guidance which is causing increased stress.  Headaches overall have been about the same, but are responsive to Fioricet.  She says the medication helps give her adequate relief of the pain.    03/22/2018  She feels that her headaches have been getting worse in the last week.  Overall prior to this week she feels that the headaches were about the same as when she was last seen in clinic and she was using Fioricet as needed which provided some relief from her headache pain.  In the last week however Fioricet is not provided much of any relief and headaches have been going on almost every day.  She says that she feels like she has "sinus headaches" which have lingered until she has started having pounding unilateral headaches that she feels her migraines.  These headaches seem to be much more frequent and much more intense that she is ever had in the past.    05/03/2018  headaches have been better as she has hardly had any since she was last seen in clinic. Lung transplant is till her recourse for treatm of pulmonary hypertension, but she has not been able to get placed on the transplant " list just yet.    11/06/2018  Headaches have still been frequent, but have been relieved effectively by using Fioricet. She has been taking her medications as directed without any complaints of side effects. She feels that the intensity has been about the same overall. Imitrex has been prescribed, but she has not used the medication yet to determine if it helps her headaches.    02/07/2019  She has been having headaches with about the same frequency as she had in the past, but they have continued to have some response to her current abortive regimen. She has not been comfortable with the CGRP receptor blockers as a treatment modality as she has been weary of using any injectable medication to treat her symptoms. She has been seeing her Pulmonary team as recommended.    05/09/2019  She presents to clinic for routine follow-up of her headaches.  The headaches be somewhat improved with her current medication regimen, but the ability river frequently.  She needs to use abortive medications several times per week to by relief in order to her level of functioning and her ability to care for her activities of daily where they are.    08/15/2019  Her headaches have been overall stable since she was last seen in clinic. She has been trying to use various OTC medications and prescription medications with intermittent effectivetness in alleviating her headaches. She has not been amendable to considering a CGRP modulating medication to control her headaches as she has been unwilling to commit in an injectable delivery for any medication.    12/13/2019  Since she has been placed on a new medication to address pulmonary hypertension she has had fewer headaches. headaches have been responding well to fioricet and she has not needed to use the sumatriptan as often as she had in the past. She has not had any change in her candidacy for lung transplant.    12/07/2020  Overall she feels that her headaches have been better since she  "was last seen in clinic.  She is concerned however because she will soon be increasing the medication she uses to control her pulmonary hypertension.  Whenever this occurred she notices that she has had a sharp increase in her headache frequency and intensity.  Fioricet is been helpful to treat headaches when they have come about.  Sumatriptan has been used on emergency use.  She was recently denied once again for transplant.    11/01/2021  No updates on her status with regards to being placed on the transplant list. Medications for pulmonary hypertension have been well tolerated. She feels that her headaches have been stable and have been responsive to her abortive therapies when she has needed to use them.    06/08/2022  Headaches have been less frequent and less intense overall compared to she was last seen in clinic.  Medications have been well tolerated with no complaints of side effects.    12/15/2022  Overall she says that she is been doing better with her headaches since she was last seen in clinic.  She is only had approximately 1 headache that she can recall in the last few months.  This headache was controlled when she took Fioricet as recommended.  She is been apprehensive about taking Ubrelvy as she was unsure about when to use this medication in relation to her Fioricet..     5/22/23  Patient is new to me. States headaches are doing well, can be triggered w stress or foods.     5/15/24 - No changes in characteristics, no horowitz.  Continues to be sensitive to noise and light. No nausea or vomiting. Had 4 migraines since last saw me. Does have chronic neck pain, sleep could be better.   Medication regiment:  Topiramate 100mg BID (hx of "spikes and waves")  Ubrelvy   Patient feels that the Ubrelvy does not work as well.    11/19/24   Migraines doing well.   Does have chronic neck pain, cervicalgia component.  On nurtec, doing well for abortive. Also on topamax for preventative.     PAST MEDICAL " HISTORY:  Past Medical History:   Diagnosis Date    AR (allergic rhinitis)     Cholelithiasis, common bile duct     Chronic low back pain     Eye pressure 2017    General anesthetics causing adverse effect in therapeutic use     GERD (gastroesophageal reflux disease)     History of migraine headaches     Hypothyroidism     Innocent heart murmur     Lumbar disc disease     Menorrhagia     Mild asthma     Obesity     Plantar fasciitis of left foot     Primary pulmonary hypertension     followed by heart transplant/pulmonary     Seizure disorder     x 1 in 2008    Seizures     Sleep apnea     TMJ (dislocation of temporomandibular joint)     Tricuspid regurgitation        PAST SURGICAL HISTORY:  Past Surgical History:   Procedure Laterality Date    CARDIAC CATHETERIZATION      CATHETERIZATION OF BOTH LEFT AND RIGHT HEART Bilateral 9/29/2020    Procedure: CATHETERIZATION, HEART, BOTH LEFT AND RIGHT;  Surgeon: Gucci Pickett MD;  Location: [facility] CATH LAB;  Service: Cardiology;  Laterality: Bilateral;    CENTRAL VENOUS CATHETER INSERTION      CORONARY ANGIOGRAPHY N/A 9/29/2020    Procedure: ANGIOGRAM, CORONARY ARTERY;  Surgeon: Gucci Pickett MD;  Location: [facility] CATH LAB;  Service: Cardiology;  Laterality: N/A;    gallbladder drain  06/2019    INSERTION OF HAYNES CATHETER Right 6/17/2020    Procedure: INSERTION, CATHETER, CENTRAL VENOUS, HAYNES Replace Single Lumen for Remodulin Infusion;  Surgeon: Bertin Dewitt MD;  Location: [facility] OR [address] [place] [address];  Service: General;  Laterality: Right;    PORTACATH PLACEMENT      REMOVAL OF TUNNELED CENTRAL VENOUS CATHETER (CVC) N/A 6/17/2020    Procedure: REMOVAL, CATHETER, CENTRAL VENOUS, TUNNELED;  Surgeon: Bertin Dewitt MD;  [facility];  Service: General;  Laterality: N/A;    RIGHT HEART CATHETERIZATION Right 8/20/2018    Procedure: HEART CATH-RIGHT;  Surgeon: Ivonne Rai MD;  Location: [facility] CATH LAB;  Service: Cardiology;  Laterality: Right;    RIGHT  HEART CATHETERIZATION Right 9/4/2019    Procedure: INSERTION, CATHETER, RIGHT HEART;  Surgeon: Ivonne Rai MD;  Location: [facility] CATH LAB;  Service: Cardiology;  Laterality: Right;    RIGHT HEART CATHETERIZATION Right 6/10/2022    Procedure: INSERTION, CATHETER, RIGHT HEART;  Surgeon: Keshia Poe MD;  Location: [facility] CATH LAB;  Service: Cardiology;  Laterality: Right;    UPPER GASTROINTESTINAL ENDOSCOPY         SOCIAL HISTORY:  Social History     Socioeconomic History    Marital status: Single    Number of children: 0   Occupational History    Occupation: [employer]     Employer: [employer]   Tobacco Use    Smoking status: Never    Smokeless tobacco: Never   Substance and Sexual Activity    Alcohol use: No     Alcohol/week: 0.8 standard drinks of alcohol     Types: 1 Standard drinks or equivalent per week     Comment: socially    Drug use: No    Sexual activity: Never     Birth control/protection: OCP, Pill     Comment: pt is a virgin       FAMILY HISTORY:  Family History   Problem Relation Name Age of Onset    Diabetes Mother      Heart disease Father      Glaucoma Father      No Known Problems Sister      Cancer Maternal Aunt          breast    Breast cancer Maternal Aunt      No Known Problems Maternal Uncle      No Known Problems Paternal Aunt      No Known Problems Paternal Uncle      Cancer Maternal Grandmother          uterine    Diabetes Maternal Grandfather      Heart attack Maternal Grandfather      No Known Problems Paternal Grandmother      Heart disease Paternal Grandfather      Heart attack Paternal Grandfather      Diabetes Paternal Grandfather      Leukemia Brother      Breast cancer Cousin      Breast cancer Cousin      Hypertension Other      Colon cancer Neg Hx      Ovarian cancer Neg Hx      Amblyopia Neg Hx      Blindness Neg Hx      Cataracts Neg Hx      Macular degeneration Neg Hx      Retinal detachment Neg Hx      Strabismus Neg Hx      Stroke Neg Hx      Thyroid disease Neg  Hx      Esophageal cancer Neg Hx         ALLERGIES AND MEDICATIONS: updated and reviewed.  Review of patient's allergies indicates:   Allergen Reactions    Adhesive Hives     Silk tape    Amoxicillin Rash    Chlorhexidine Other (See Comments)     Current Outpatient Medications   Medication Sig Dispense Refill    acetaminophen (TYLENOL) 500 MG tablet Take 1,000 mg by mouth every 6 (six) hours as needed for Pain.      ADCIRCA 20 mg Tab Take 2 tablets (40 mg total) by mouth once daily. 60 tablet 11    alprazolam (XANAX ORAL) Take by mouth as needed.      ambrisentan (LETAIRIS) 10 MG Tab Take 1 tablet (10 mg total) by mouth once daily. 30 tablet 11    BREO ELLIPTA 200-25 mcg/dose DsDv diskus inhaler INHALE 1 PUFF INTO THE LUNGS EVERY EVENING. CONTROLLER 180 each 3    cyanocobalamin, vitamin B-12, 2,500 mcg Subl Place 2,500 mcg under the tongue every morning.       diphenhydrAMINE (BENADRYL) 25 mg capsule Take 50 mg by mouth every 6 (six) hours as needed for Itching. Patient takes prn evenings      diphenoxylate-atropine 2.5-0.025 mg (LOMOTIL) 2.5-0.025 mg per tablet Take 1 tablet by mouth 4 (four) times daily as needed for Diarrhea. 120 tablet 0    ferrous sulfate 325 (65 FE) MG EC tablet Take 325 mg by mouth daily as needed.       fluticasone propionate (FLONASE) 50 mcg/actuation nasal spray 2 sprays (100 mcg total) by Each Nostril route every evening. 16 g 11    folic acid (FOLVITE) 1 MG tablet TAKE 1 TABLET BY MOUTH EVERY DAY 90 tablet 3    furosemide (LASIX) 20 MG tablet Take 1 tablet (20 mg total) by mouth once daily. 30 tablet 11    glucosam/chond-msm1/C/michele/bor (GLUCOSAMINE-CHOND-MSM COMPLEX ORAL) Take by mouth.      hyoscyamine (LEVSIN) 0.125 mg Subl Place 1 tablet (0.125 mg total) under the tongue every 6 (six) hours as needed (Aa needed). 60 tablet 3    lactic acid-citric-potassium (PHEXXI) 1.8-1-0.4 % Gel Place 1 Applicatorful vaginally as needed (CONTRACEPTIVE). 5 g 2    levothyroxine (SYNTHROID) 125 MCG  "tablet TAKE ONE TABLET BY MOUTH EVERY MORNING 1 HOUR BEFORE BREAKFAST AND OTHER MEDICATIONS (FOR THYROID). 90 tablet 0    LIDOcaine (LIDODERM) 5 % Place 1 patch onto the skin once daily. 15 patch 0    loratadine (CLARITIN) 10 mg tablet Take 10 mg by mouth every evening.       ondansetron (ZOFRAN) 4 MG tablet Take 1 tablet (4 mg total) by mouth every 6 (six) hours as needed. 1 Tablet Oral Every 6 hours 30 tablet 2    pantoprazole (PROTONIX) 40 MG tablet TAKE ONE TABLET BY MOUTH ONCE DAILY AS NEEDED FOR HEARTBURN 90 tablet 3    tiZANidine (ZANAFLEX) 4 MG tablet Take 4 mg by mouth every 6 (six) hours as needed.      treprostinil (REMODULIN) 2.5 mg/mL Soln Mix cassette as directed and infuse continuously per physician titration orders on dosing sheet. Current dose 80ng/kg/min 60 mL 11    albuterol (PROAIR HFA) 90 mcg/actuation inhaler Inhale 2 puffs into the lungs every 6 (six) hours as needed for Wheezing. Rescue 18 g 6    albuterol-ipratropium (DUO-NEB) 2.5 mg-0.5 mg/3 mL nebulizer solution Take 3 mLs by nebulization every 6 (six) hours as needed for Wheezing. Rescue 6 each 6    rimegepant (NURTEC) 75 mg odt Take 1 tablet (75 mg total) by mouth as needed for Migraine. Place ODT tablet on the tongue; alternatively the ODT tablet may be placed under the tongue. Can take 2nd dose in 24 hrs if mild relief, no more than 2 in 24 hrs 16 tablet 5    topiramate (TOPAMAX) 100 MG tablet Take 1 tablet (100 mg total) by mouth 2 (two) times daily. 60 tablet 5     No current facility-administered medications for this visit.         Vitals:    11/19/24 1107   BP: 125/69   BP Location: Left arm   Patient Position: Sitting   Pulse: 74   Weight: 66.2 kg (145 lb 15.1 oz)   Height: 4' 11" (1.499 m)       Neurologic Exam     Mental Status   Oriented to person, place, and time.   Attention: normal. Concentration: normal.   Speech: speech is normal   Level of consciousness: alert  Knowledge: good.     Cranial Nerves     CN II   Visual fields " full to confrontation.   Right visual field deficit: none  Left visual field deficit: none     CN III, IV, VI   Pupils are equal, round, and reactive to light.  Extraocular motions are normal.   Right pupil: Size: 3 mm. Shape: regular. Accommodation: intact.   Left pupil: Size: 3 mm. Shape: regular. Accommodation: intact.   CN III: no CN III palsy  CN VI: no CN VI palsy  Nystagmus: none     CN V   Facial sensation intact.   Right facial sensation deficit: none  Left facial sensation deficit: none    CN VII   Facial expression full, symmetric.   Right facial weakness: none  Left facial weakness: none    CN VIII   CN VIII normal.     CN IX, X   CN IX normal.   CN X normal.   Palate: symmetric    CN XI   CN XI normal.   Right sternocleidomastoid strength: normal  Left sternocleidomastoid strength: normal  Right trapezius strength: normal  Left trapezius strength: normal    CN XII   CN XII normal.   Tongue deviation: none    Motor Exam   Muscle bulk: normal  Overall muscle tone: normal  Right arm tone: normal  Left arm tone: normal  Right leg tone: normal  Left leg tone: normal    Strength   Strength 5/5 throughout.     Sensory Exam   Right arm light touch: normal  Left arm light touch: normal  Right leg light touch: normal  Left leg light touch: normal  Right arm pinprick: normal  Left arm pinprick: normal    Gait, Coordination, and Reflexes     Gait  Gait: normal    Coordination   Romberg: negative  Finger to nose coordination: normal  Heel to shin coordination: normal    Tremor   Resting tremor: absent    Reflexes   Right brachioradialis: 2+  Left brachioradialis: 2+  Right biceps: 2+  Left biceps: 2+  Right triceps: 2+  Left triceps: 2+  Right patellar: 2+  Left patellar: 2+  Right achilles: 2+  Left achilles: 2+  Right plantar: normal  Left plantar: normal    Labs and Imagin/2024 magnesium levels intact, tsh normal    2018 MRI brain w and wo contrast personally reviewed - no acute intracranial  abnormality    Assessment & Plan:  Problem List Items Addressed This Visit          Neuro    Migraine without aura and without status migrainosus, not intractable - Primary    Overview     Despite frequency of headaches few have come in the form of migraine headaches.  She will benefit from CGRP as a headache abortive rather than sumatriptan her to reduce the possibility of a vasoconstriction peripherally. Failed ubrelvy         Relevant Medications    topiramate (TOPAMAX) 100 MG tablet    Episodic migraine    Relevant Medications    rimegepant (NURTEC) 75 mg odt       Orthopedic    Chronic neck pain    Relevant Orders    Ambulatory referral/consult to Physical/Occupational Therapy       Other    ABHIJEET (obstructive sleep apnea)    Overview     - not able to tolerate CPAP due to severe nasal congestion          Other Visit Diagnoses       Chronic nonintractable headache, unspecified headache type              Very multifactorial - discussed lifestyle.   Chronic migraines - will continue topamax for prevention. Tolerating well, no reported side effects. On 100mg BID. Also on lasix for her pulm htn - denies any side effects at this time.     Does have ABHIJEET - unable to tolerate mask, needs mouthpiece.     Encouraged hydration and lifestyle modifications including diet and exercise, discussed with the patient multifactorial nature of her headaches and migraines.  Also has chronic neck pain.. PT referral, also has cervicalgia component.    Failed Ubrelvy p.r.n..  Not a candidate for a triptan given concern for cardiovascular risk factors.  Continue nurtec. Will not prescribe fioricet.     For cervicalgia component - tried icy hot, biofreeze.  Discussed w patient - could try voltaren gel - however unable to tolerate nsaids bc of pulm htn - to discuss w pulmnologist at upcoming appt in December.     Has upcoming R heart cath as she has been having worsening fatigue.     Follow-up:  6 months for migraine control, continue  Topamax for prevention and [facility] p.r.n.. recall placed. Appreciate opportunity to care for this patient.     Time spent on this encounter: 40 minutes. This includes face to face time and non-face to face time preparing to see the patient (eg, review of tests), obtaining and/or reviewing separately obtained history, documenting clinical information in the electronic or other health record, independently interpreting results and communicating results to the patient/family/caregiver, or care coordinator. Significant time spent reviewing chart.

## 2024-11-22 ENCOUNTER — OFFICE VISIT (OUTPATIENT)
Dept: FAMILY MEDICINE | Facility: CLINIC | Age: 54
End: 2024-11-22
Payer: COMMERCIAL

## 2024-11-22 ENCOUNTER — TELEPHONE (OUTPATIENT)
Dept: FAMILY MEDICINE | Facility: CLINIC | Age: 54
End: 2024-11-22
Payer: COMMERCIAL

## 2024-11-22 VITALS
HEART RATE: 88 BPM | WEIGHT: 145.31 LBS | OXYGEN SATURATION: 95 % | SYSTOLIC BLOOD PRESSURE: 112 MMHG | BODY MASS INDEX: 29.29 KG/M2 | TEMPERATURE: 98 F | HEIGHT: 59 IN | DIASTOLIC BLOOD PRESSURE: 60 MMHG

## 2024-11-22 DIAGNOSIS — Z12.31 BREAST CANCER SCREENING BY MAMMOGRAM: Primary | ICD-10-CM

## 2024-11-22 DIAGNOSIS — E66.3 OVERWEIGHT (BMI 25.0-29.9): ICD-10-CM

## 2024-11-22 DIAGNOSIS — Z00.00 ANNUAL PHYSICAL EXAM: Primary | ICD-10-CM

## 2024-11-22 DIAGNOSIS — E03.9 HYPOTHYROIDISM (ACQUIRED): ICD-10-CM

## 2024-11-22 DIAGNOSIS — I27.0 PRIMARY PULMONARY HYPERTENSION: ICD-10-CM

## 2024-11-22 DIAGNOSIS — I27.9 CHRONIC PULMONARY HEART DISEASE: Primary | ICD-10-CM

## 2024-11-22 DIAGNOSIS — K21.9 GASTROESOPHAGEAL REFLUX DISEASE, UNSPECIFIED WHETHER ESOPHAGITIS PRESENT: ICD-10-CM

## 2024-11-22 PROCEDURE — 3008F BODY MASS INDEX DOCD: CPT | Mod: CPTII,S$GLB,,

## 2024-11-22 PROCEDURE — 99396 PREV VISIT EST AGE 40-64: CPT | Mod: S$GLB,,,

## 2024-11-22 PROCEDURE — 3074F SYST BP LT 130 MM HG: CPT | Mod: CPTII,S$GLB,,

## 2024-11-22 PROCEDURE — 3078F DIAST BP <80 MM HG: CPT | Mod: CPTII,S$GLB,,

## 2024-11-22 PROCEDURE — 99999 PR PBB SHADOW E&M-EST. PATIENT-LVL III: CPT | Mod: PBBFAC,,,

## 2024-11-22 RX ORDER — PANTOPRAZOLE SODIUM 40 MG/1
40 TABLET, DELAYED RELEASE ORAL DAILY
Qty: 90 TABLET | Refills: 3 | Status: SHIPPED | OUTPATIENT
Start: 2024-11-22

## 2024-11-22 RX ORDER — LEVOTHYROXINE SODIUM 125 UG/1
125 TABLET ORAL
Qty: 90 TABLET | Refills: 2 | Status: SHIPPED | OUTPATIENT
Start: 2024-11-22

## 2024-11-22 RX ORDER — FOLIC ACID 1 MG/1
1000 TABLET ORAL DAILY
Qty: 90 TABLET | Refills: 3 | Status: SHIPPED | OUTPATIENT
Start: 2024-11-22

## 2024-11-22 NOTE — Clinical Note
Vinny Hollins, I'm seeing Ms. Morrissey today. I was about to refill her Breo inhaler but insurance is wanting me to switch to an alternative. Do you have any advice about which alternative inhaler would be best for the patient?

## 2024-11-22 NOTE — PATIENT INSTRUCTIONS
Medical Fitness--[phone]  Imaging, Xray, CT, MRI, Ultrasound---[phone]  Bariatrics---[phone]  Breast Surgery---[phone]  Case Management---[phone]  Colonoscopy---[phone]  DME---[phone]  Infectious Disease---[phone]  Interventional Radiology---[phone]  Medical Records---[phone]  [facility] On [facility]---[phone]  Optometry/Ophthalmology---[phone]  [facility]---[phone]  Physical Therapy---[phone]  Psychiatry---[phone] or [phone]  Plastic Surgery---[phone]  Recovery--[phone] option 2, or [phone].  Sleep Study---[phone]  Smoking Cessation---[phone]  Wound Care---[phone]  Referral Desk---[phone]

## 2024-11-22 NOTE — TELEPHONE ENCOUNTER
----- Message from Liliane sent at 11/22/2024  2:42 PM CST -----  Regarding: Mammogram order requested  Contact: [phone]  Mammo  Caller is requesting to schedule their annual mammogram appointment.  Order is not listed in EPIC.  Please enter order and contact patient to schedule.  Name of Caller:KELSY TURNER   Where would they like the mammogram performed?[facility] [place]  Call Back Number:##[phone]    Thank you.

## 2024-11-22 NOTE — PROGRESS NOTES
HPI     Chief Complaint:  Chief Complaint   Patient presents with    Annual Exam       Yuni Perez is a 54 y.o. female with multiple medical diagnoses as listed in the medical history and problem list that presents for   Chief Complaint   Patient presents with    Annual Exam    .     Patient is know to me with her last appointment with me on 8/23/2023.     HPI  Pt presents for annual exam.  Dealing with sinus congestion and taking benadryl.  Starting PT soon for recurrent neck pain.        Assessment & Plan     Problem List Items Addressed This Visit       Hypothyroidism (acquired)  Stable. Managed with Synthroid. Will refill Synthroid.    Relevant Medications    levothyroxine (SYNTHROID) 125 MCG tablet    Overweight (BMI 25.0-29.9)  Continue healthy diet and exercise for weight loss.  Will order A1C today.    Relevant Orders    HEMOGLOBIN A1C     Other Visit Diagnoses       Annual physical exam    -  Primary  Counseled on age appropriate medical preventative services including age appropriate cancer screenings, age appropriate eye and dental exams, over all nutritional health, need for a consistent exercise regimen, and an over all push towards maintaining a vigorous and active lifestyle.  Counseled on age appropriate vaccines and discussed upcoming health care needs based on age/gender. Discussed good sleep hygiene and stress management.      Primary pulmonary hypertension      Stable. Followed by pulmonology. Will refill Folic acid.    Relevant Medications    folic acid (FOLVITE) 1 MG tablet    Gastroesophageal reflux disease, unspecified whether esophagitis present      Stable with no acute symptoms. Will refill Protonix.    Relevant Medications    pantoprazole (PROTONIX) 40 MG tablet              --------------------------------------------      Health Maintenance:  Health Maintenance         Date Due Completion Date    Colorectal Cancer Screening Never done ---    Shingles Vaccine (1 of 2)  "Never done ---    Hemoglobin A1c (Diabetic Prevention Screening) 09/21/2023 9/21/2020    COVID-19 Vaccine (4 - 2024-25 season) 09/01/2024 7/1/2022    Mammogram 12/06/2024 12/6/2023    Override on 9/2/2013: Done    Override on 3/1/2011: Done    Lipid Panel 09/21/2025 9/21/2020    Cervical Cancer Screening 11/23/2025 11/23/2020    Override on 9/17/2014: Done (Dr. Khan)    Override on 2/1/2012: Done    Override on 6/10/2003: Done    DEXA Scan 12/20/2025 12/20/2023    TETANUS VACCINE 08/09/2033 8/9/2023    Pneumococcal Vaccines (Age 0-64) (3 of 3 - PPSV23 or PCV20) 03/18/2036 1/18/2017    RSV Vaccine (Age 60+ and Pregnant patients) (1 - 1-dose 75+ series) 03/18/2046 ---            Health maintenance reviewed and A1c ordered    Follow Up:  No follow-ups on file.    Exam     Review of Systems:  (as noted above)  Review of Systems    Physical Exam:   Physical Exam  Constitutional:       General: She is not in acute distress.     Appearance: Normal appearance. She is not ill-appearing or toxic-appearing.   Cardiovascular:      Rate and Rhythm: Normal rate and regular rhythm.      Heart sounds: Murmur heard.   Pulmonary:      Effort: Pulmonary effort is normal.      Breath sounds: Normal breath sounds.      Comments: Infusion pump present  Neurological:      Mental Status: She is alert.       Vitals:    11/22/24 1122   BP: 112/60   BP Location: Right arm   Patient Position: Sitting   Pulse: 88   Temp: 97.8 °F (36.6 °C)   TempSrc: Oral   SpO2: 95%   Weight: 65.9 kg (145 lb 4.5 oz)   Height: 4' 11" (1.499 m)      Body mass index is 29.34 kg/m².        History     Past Medical History:  Past Medical History:   Diagnosis Date    AR (allergic rhinitis)     Cholelithiasis, common bile duct     Chronic low back pain     Eye pressure 2017    General anesthetics causing adverse effect in therapeutic use     GERD (gastroesophageal reflux disease)     History of migraine headaches     Hypothyroidism     Innocent heart murmur     " Lumbar disc disease     Menorrhagia     Mild asthma     Obesity     Plantar fasciitis of left foot     Primary pulmonary hypertension     followed by heart transplant/pulmonary     Seizure disorder     x 1 in 2008    Seizures     Sleep apnea     TMJ (dislocation of temporomandibular joint)     Tricuspid regurgitation        Past Surgical History:  Past Surgical History:   Procedure Laterality Date    CARDIAC CATHETERIZATION      CATHETERIZATION OF BOTH LEFT AND RIGHT HEART Bilateral 9/29/2020    Procedure: CATHETERIZATION, HEART, BOTH LEFT AND RIGHT;  Surgeon: Gucci Pickett MD;  Location: [facility] CATH LAB;  Service: Cardiology;  Laterality: Bilateral;    CENTRAL VENOUS CATHETER INSERTION      CORONARY ANGIOGRAPHY N/A 9/29/2020    Procedure: ANGIOGRAM, CORONARY ARTERY;  Surgeon: Gucci Pickett MD;  Location: [facility] CATH LAB;  Service: Cardiology;  Laterality: N/A;    gallbladder drain  06/2019    INSERTION OF HAYNES CATHETER Right 6/17/2020    Procedure: INSERTION, CATHETER, CENTRAL VENOUS, HAYNES Replace Single Lumen for Remodulin Infusion;  Surgeon: Bertin Dewitt MD;  [facility];  Service: General;  Laterality: Right;    PORTACATH PLACEMENT      REMOVAL OF TUNNELED CENTRAL VENOUS CATHETER (CVC) N/A 6/17/2020    Procedure: REMOVAL, CATHETER, CENTRAL VENOUS, TUNNELED;  Surgeon: Bertin Dewitt MD;  [facility];  Service: General;  Laterality: N/A;    RIGHT HEART CATHETERIZATION Right 8/20/2018    Procedure: HEART CATH-RIGHT;  Surgeon: Ivonne Rai MD;  Location: [facility] CATH LAB;  Service: Cardiology;  Laterality: Right;    RIGHT HEART CATHETERIZATION Right 9/4/2019    Procedure: INSERTION, CATHETER, RIGHT HEART;  Surgeon: Ivonne Rai MD;  Location: [facility] CATH LAB;  Service: Cardiology;  Laterality: Right;    RIGHT HEART CATHETERIZATION Right 6/10/2022    Procedure: INSERTION, CATHETER, RIGHT HEART;  Surgeon: Keshia Poe MD;  Location: [facility] CATH LAB;  Service:  "Cardiology;  Laterality: Right;    UPPER GASTROINTESTINAL ENDOSCOPY         Social History:  Social History     Socioeconomic History    Marital status: Single    Number of children: 0   Occupational History    Occupation: disabled     Employer: [employer]   Tobacco Use    Smoking status: Never    Smokeless tobacco: Never   Substance and Sexual Activity    Alcohol use: No     Alcohol/week: 0.8 standard drinks of alcohol     Types: 1 Standard drinks or equivalent per week     Comment: socially    Drug use: No    Sexual activity: Never     Birth control/protection: OCP, Pill     Comment: pt is a virgin       Family History:  Family History   Problem Relation Name Age of Onset    Diabetes Mother      Heart disease Father      Glaucoma Father      No Known Problems Sister      Cancer Maternal Aunt          breast    Breast cancer Maternal Aunt      No Known Problems Maternal Uncle      No Known Problems Paternal Aunt      No Known Problems Paternal Uncle      Cancer Maternal Grandmother          uterine    Diabetes Maternal Grandfather      Heart attack Maternal Grandfather      No Known Problems Paternal Grandmother      Heart disease Paternal Grandfather      Heart attack Paternal Grandfather      Diabetes Paternal Grandfather      Leukemia Brother      Breast cancer Cousin      Breast cancer Cousin      Hypertension Other      Colon cancer Neg Hx      Ovarian cancer Neg Hx      Amblyopia Neg Hx      Blindness Neg Hx      Cataracts Neg Hx      Macular degeneration Neg Hx      Retinal detachment Neg Hx      Strabismus Neg Hx      Stroke Neg Hx      Thyroid disease Neg Hx      Esophageal cancer Neg Hx         Allergies and Medications: (updated and reviewed)  Review of patient's allergies indicates:   Allergen Reactions    Fentanyl Anaphylaxis     Respiratory distress    Vibra-tabs [doxycycline hyclate] Anaphylaxis     "throat felt like it was closing"    Adhesive Hives     Silk tape    Amoxicillin Rash    " Nsaids (non-steroidal anti-inflammatory drug) Swelling    Chlorhexidine Other (See Comments)     Blue Chlorhexidine causes hives     Current Outpatient Medications   Medication Sig Dispense Refill    acetaminophen (TYLENOL) 500 MG tablet Take 1,000 mg by mouth every 6 (six) hours as needed for Pain.      ADCIRCA 20 mg Tab Take 2 tablets (40 mg total) by mouth once daily. 60 tablet 11    alprazolam (XANAX ORAL) Take by mouth as needed.      ambrisentan (LETAIRIS) 10 MG Tab Take 1 tablet (10 mg total) by mouth once daily. 30 tablet 11    BREO ELLIPTA 200-25 mcg/dose DsDv diskus inhaler INHALE 1 PUFF INTO THE LUNGS EVERY EVENING. CONTROLLER 180 each 3    cyanocobalamin, vitamin B-12, 2,500 mcg Subl Place 2,500 mcg under the tongue every morning.       diphenhydrAMINE (BENADRYL) 25 mg capsule Take 50 mg by mouth every 6 (six) hours as needed for Itching. Patient takes prn evenings      diphenoxylate-atropine 2.5-0.025 mg (LOMOTIL) 2.5-0.025 mg per tablet Take 1 tablet by mouth 4 (four) times daily as needed for Diarrhea. 120 tablet 0    ferrous sulfate 325 (65 FE) MG EC tablet Take 325 mg by mouth daily as needed.       fluticasone propionate (FLONASE) 50 mcg/actuation nasal spray 2 sprays (100 mcg total) by Each Nostril route every evening. 16 g 11    furosemide (LASIX) 20 MG tablet Take 1 tablet (20 mg total) by mouth once daily. 30 tablet 11    glucosam/chond-msm1/C/michele/bor (GLUCOSAMINE-CHOND-MSM COMPLEX ORAL) Take by mouth.      hyoscyamine (LEVSIN) 0.125 mg Subl Place 1 tablet (0.125 mg total) under the tongue every 6 (six) hours as needed (Aa needed). 60 tablet 3    lactic acid-citric-potassium (PHEXXI) 1.8-1-0.4 % Gel Place 1 Applicatorful vaginally as needed (CONTRACEPTIVE). 5 g 2    LIDOcaine (LIDODERM) 5 % Place 1 patch onto the skin once daily. 15 patch 0    loratadine (CLARITIN) 10 mg tablet Take 10 mg by mouth every evening.       ondansetron (ZOFRAN) 4 MG tablet Take 1 tablet (4 mg total) by mouth every 6  (six) hours as needed. 1 Tablet Oral Every 6 hours 30 tablet 2    rimegepant (NURTEC) 75 mg odt Take 1 tablet (75 mg total) by mouth as needed for Migraine. Place ODT tablet on the tongue; alternatively the ODT tablet may be placed under the tongue. Can take 2nd dose in 24 hrs if mild relief, no more than 2 in 24 hrs 16 tablet 5    tiZANidine (ZANAFLEX) 4 MG tablet Take 4 mg by mouth every 6 (six) hours as needed.      topiramate (TOPAMAX) 100 MG tablet Take 1 tablet (100 mg total) by mouth 2 (two) times daily. 60 tablet 5    treprostinil (REMODULIN) 2.5 mg/mL Soln Mix cassette as directed and infuse continuously per physician titration orders on dosing sheet. Current dose 80ng/kg/min 60 mL 11    albuterol (PROAIR HFA) 90 mcg/actuation inhaler Inhale 2 puffs into the lungs every 6 (six) hours as needed for Wheezing. Rescue 18 g 6    albuterol-ipratropium (DUO-NEB) 2.5 mg-0.5 mg/3 mL nebulizer solution Take 3 mLs by nebulization every 6 (six) hours as needed for Wheezing. Rescue 6 each 6    folic acid (FOLVITE) 1 MG tablet Take 1 tablet (1,000 mcg total) by mouth once daily. 90 tablet 3    levothyroxine (SYNTHROID) 125 MCG tablet Take 1 tablet (125 mcg total) by mouth before breakfast. 90 tablet 2    pantoprazole (PROTONIX) 40 MG tablet Take 1 tablet (40 mg total) by mouth once daily. 90 tablet 3     No current facility-administered medications for this visit.       Patient Care Team:  Lulu Sandhu MD as PCP - General (Internal Medicine)  Ralph Whyte MD (Inactive) as Referring Physician (Intensive Care)  Ian Lackey MD (Inactive) as Consulting Physician (Pulmonary Disease)  Monika Dalton LPN as Care Coordinator  Dorene Ny, RN as Pulmonary Hypertension Coordinator (Advanced Heart Failure & Transplant Cardiology)  Bárbara Langford NP as Nurse Practitioner (Cardiology)  Aye Stout RN as Pulmonary Hypertension Coordinator (Advanced Heart Failure & Transplant  Cardiology)         - The patient is given an After Visit Summary that lists all medications with directions, allergies, education, orders placed during this encounter and follow-up instructions.      - I have reviewed the patient's medical information including past medical, family, and social history sections including the medications and allergies.      - We discussed the patient's current medications.     This note was created by combination of typed  and MModal dictation.  Transcription errors may be present.  If there are any questions, please contact me.       Renee Schumacher NP

## 2024-11-22 NOTE — Clinical Note
Ms. Morrissey's Breo will not be covered by insurance anymore. Do you know what would be a comparable alternative?

## 2024-11-26 ENCOUNTER — TELEPHONE (OUTPATIENT)
Dept: TRANSPLANT | Facility: CLINIC | Age: 54
End: 2024-11-26
Payer: COMMERCIAL

## 2024-11-26 NOTE — TELEPHONE ENCOUNTER
Approved today by [employer] 2017  CaseId:[id number];Status:Approved;Review Type:Prior Auth;Coverage Start Date:11/26/2024;Coverage End Date:11/26/2025;  Authorization Expiration Date: 11/25/2025

## 2024-11-27 ENCOUNTER — TELEPHONE (OUTPATIENT)
Dept: FAMILY MEDICINE | Facility: CLINIC | Age: 54
End: 2024-11-27
Payer: COMMERCIAL

## 2024-11-27 NOTE — TELEPHONE ENCOUNTER
----- Message from Lulu Sandhu MD sent at 11/22/2024  3:18 PM CST -----  No, recommend she contact her insurance to see what they will cover and we can see what is closest to this. She can also discuss with her pulmonary team.  ----- Message -----  From: Renee Schumacher NP  Sent: 11/22/2024   3:17 PM CST  To: Lulu Sandhu MD    Ms. Bhumika Edgeo will not be covered by insurance anymore. Do you know what would be a comparable alternative?

## 2024-11-29 ENCOUNTER — TELEPHONE (OUTPATIENT)
Dept: FAMILY MEDICINE | Facility: CLINIC | Age: 54
End: 2024-11-29
Payer: COMMERCIAL

## 2024-11-29 NOTE — TELEPHONE ENCOUNTER
Patient informed will need to call insurance company to see what medication alternative will be covered. Patient also advised may contact pulmonary team for recommendations as well. Patient voiced understanding.

## 2024-11-29 NOTE — TELEPHONE ENCOUNTER
----- Message from Med Assistant Humphreys sent at 11/27/2024  3:57 PM CST -----  Type:  Patient Returning Call    Who Called:  Pt   Who Left Message for Patient:  Renee Schumacher NP   Does the patient know what this is regarding?:    Would the patient rather a call back or a response via [facility]?  Call back  Callback   Best Call Back Number:  Telephone Information:  [facility]          [phone]     Additional Information:    Thank you.

## 2024-12-10 ENCOUNTER — TELEPHONE (OUTPATIENT)
Dept: TRANSPLANT | Facility: CLINIC | Age: 54
End: 2024-12-10
Payer: COMMERCIAL

## 2024-12-10 NOTE — TELEPHONE ENCOUNTER
Patient called in about issues she was just informed about with her remodulin and other PH medications. She is receiving a bill from [employer] for 12/2022, 12/2023, and a couple of months in 2024. Patient was informed at some point that she had a remodulin copay card that was covering her remodulin copays but was never told whether or not that limit was exceeded or that she did not have funds. Patient was concerned and called [facility] to see why the remodulin was not covered those months but were covered the others and patient was told that her remodulin and other PH medications were not on formulary therefore they were not covered. NN sent a message to [employer] to have it looked in to but also find out why patient is being billed and not directed to financial assistance. NN awaiting response from [employer]. Patient will receive call back from  once new information is received from [employer].

## 2024-12-13 ENCOUNTER — HOSPITAL ENCOUNTER (OUTPATIENT)
Dept: PULMONOLOGY | Facility: CLINIC | Age: 54
Discharge: HOME | End: 2024-12-13
Payer: COMMERCIAL

## 2024-12-13 ENCOUNTER — HOSPITAL ENCOUNTER (OUTPATIENT)
Dept: RADIOLOGY | Facility: HOSPITAL | Age: 54
Discharge: HOME | End: 2024-12-13
Attending: INTERNAL MEDICINE
Payer: COMMERCIAL

## 2024-12-13 VITALS — BODY MASS INDEX: 29.23 KG/M2 | WEIGHT: 145 LBS | HEIGHT: 59 IN

## 2024-12-13 DIAGNOSIS — I27.9 CHRONIC PULMONARY HEART DISEASE: ICD-10-CM

## 2024-12-13 DIAGNOSIS — Z12.31 BREAST CANCER SCREENING BY MAMMOGRAM: ICD-10-CM

## 2024-12-13 LAB
DLCO SINGLE BREATH LLN: 14.93
DLCO SINGLE BREATH PRE REF: 52.5 %
DLCO SINGLE BREATH REF: 20.67
DLCOC SBVA LLN: 3.19
DLCOC SBVA REF: 5.04
DLCOC SINGLE BREATH LLN: 14.93
DLCOC SINGLE BREATH REF: 20.67
DLCOCSBVAULN: 6.89
DLCOCSINGLEBREATHULN: 26.4
DLCOSINGLEBREATHULN: 26.4
DLCOSINGLEBREATHZSCORE: -2.82
DLCOVA LLN: 3.19
DLCOVA PRE REF: 72 %
DLCOVA PRE: 3.63 ML/(MIN*MMHG*L) (ref 3.19–6.89)
DLCOVA REF: 5.04
DLCOVAULN: 6.89
ERV LLN: -16449.15
ERV PRE REF: 25.5 %
ERV REF: 0.85
ERVULN: ABNORMAL
FEF 25 75 LLN: 1.59
FEF 25 75 PRE REF: 18.7 %
FEF 25 75 REF: 2.99
FEV05 LLN: 0.84
FEV05 REF: 1.7
FEV1 FVC LLN: 69
FEV1 FVC PRE REF: 83.1 %
FEV1 FVC REF: 81
FEV1 LLN: 1.73
FEV1 PRE REF: 48 %
FEV1 REF: 2.25
FEV1FVCZSCORE: -1.92
FEV1ZSCORE: -3.64
FRCPLETH LLN: 1.59
FRCPLETH PREREF: 99.7 %
FRCPLETH REF: 2.41
FRCPLETHULN: 3.23
FVC LLN: 2.16
FVC PRE REF: 57.6 %
FVC REF: 2.79
FVCZSCORE: -3.16
IVC PRE: 1.63 L (ref 2.16–3.45)
IVC SINGLE BREATH LLN: 2.16
IVC SINGLE BREATH PRE REF: 58.3 %
IVC SINGLE BREATH REF: 2.79
IVCSINGLEBREATHULN: 3.45
LLN IC: -16448.42
PEF LLN: 4.43
PEF PRE REF: 61.4 %
PEF REF: 5.89
PHYSICIAN COMMENT: ABNORMAL
PRE DLCO: 10.85 ML/(MIN*MMHG) (ref 14.93–26.4)
PRE ERV: 0.22 L (ref -16449.15–16450.85)
PRE FEF 25 75: 0.56 L/S (ref 1.59–4.39)
PRE FET 100: 7.46 SEC
PRE FEV05 REF: 50.2 %
PRE FEV1 FVC: 67.13 % (ref 69.27–90.51)
PRE FEV1: 1.08 L (ref 1.73–2.74)
PRE FEV5: 0.85 L (ref 0.84–2.55)
PRE FRC PL: 2.4 L (ref 1.59–3.23)
PRE FVC: 1.61 L (ref 2.16–3.45)
PRE IC: 1.54 L (ref -16448.42–16451.58)
PRE PEF: 3.61 L/S (ref 4.43–7.34)
PRE REF IC: 97.5 %
PRE RV: 2.19 L (ref 0.98–2.14)
PRE TLC: 3.95 L (ref 3.11–5.09)
RAW PRE REF: 200.6 %
RAW PRE: 6.14 CMH2O*S/L (ref 3.06–3.06)
RAW REF: 3.06
REF IC: 1.58
RV LLN: 0.98
RV PRE REF: 140.1 %
RV REF: 1.56
RVTLC LLN: 27
RVTLC PRE REF: 149.8 %
RVTLC PRE: 55.4 % (ref 27.39–46.57)
RVTLC REF: 37
RVTLCULN: 47
RVULN: 2.14
SGAW PRE REF: 59.6 %
SGAW PRE: 0.06 1/(CMH2O*S) (ref 0.1–0.1)
SGAW REF: 0.1
TLC LLN: 3.11
TLC PRE REF: 96.2 %
TLC REF: 4.1
TLC ULN: 5.09
TLCZSCORE: -0.26
ULN IC: ABNORMAL
VA PRE: 2.99 L (ref 3.95–3.95)
VA SINGLE BREATH LLN: 3.95
VA SINGLE BREATH PRE REF: 75.6 %
VA SINGLE BREATH REF: 3.95
VASINGLEBREATHULN: 3.95
VC LLN: 2.16
VC PRE REF: 63.1 %
VC PRE: 1.76 L (ref 2.16–3.45)
VC REF: 2.79
VC ULN: 3.45

## 2024-12-13 PROCEDURE — 94726 PLETHYSMOGRAPHY LUNG VOLUMES: CPT | Mod: S$GLB,,, | Performed by: INTERNAL MEDICINE

## 2024-12-13 PROCEDURE — 94729 DIFFUSING CAPACITY: CPT | Mod: S$GLB,,, | Performed by: INTERNAL MEDICINE

## 2024-12-13 PROCEDURE — 77063 BREAST TOMOSYNTHESIS BI: CPT | Mod: TC

## 2024-12-13 PROCEDURE — 77067 SCR MAMMO BI INCL CAD: CPT | Mod: 26,,, | Performed by: RADIOLOGY

## 2024-12-13 PROCEDURE — 94010 BREATHING CAPACITY TEST: CPT | Mod: 59,S$GLB,, | Performed by: INTERNAL MEDICINE

## 2024-12-13 PROCEDURE — 94618 PULMONARY STRESS TESTING: CPT | Mod: S$GLB,,, | Performed by: INTERNAL MEDICINE

## 2024-12-13 PROCEDURE — 77063 BREAST TOMOSYNTHESIS BI: CPT | Mod: 26,,, | Performed by: RADIOLOGY

## 2024-12-13 NOTE — PROCEDURES
Yuni Perez is a 54 y.o.   female patient, who presents for a 6 minute walk test ordered by KAI Langford.  The diagnosis is Pulmonary Hypertension.  The patient's BMI is 29.3 kg/m2.  Predicted distance (lower limit of normal) is 386.18 meters.      Test Results:    The test was completed without stopping.  The total time walked was 360 seconds.  During walking, the patient reported:  Dyspnea.  The patient used no assistive devices during testing.     12/13/2024---------Distance: 396.24 meters (1300 feet)     O2 Sat % Supplemental Oxygen Heart Rate Blood Pressure Ab Scale   Pre-exercise  (Resting) 97 % Room Air 85 bpm 109/74 mmHg 2   During Exercise 93 % Room Air 143 bpm 137/97 mmHg 9   Post-exercise  (Recovery) 98 % Room Air  112 bpm 136/82 mmHg      Recovery Time: 114 seconds    Performing nurse/tech: NATACHA Newman      PREVIOUS STUDY:   09/25/2024---------Distance: 365.76 meters (1200 feet)        O2 Sat % Supplemental Oxygen Heart Rate Blood Pressure Ab Scale   Pre-exercise  (Resting) 98 % Room Air 71 bpm 112/76 mmHg 3   During Exercise 95 % Room Air 144 bpm 122/87 mmHg 9   Post-exercise  (Recovery) 99 % Room Air  90 bpm   mmHg        CLINICAL INTERPRETATION:  Six minute walk distance is 396.24 meters (1300 feet) with very, very heavy dyspnea.  During exercise, there was significant desaturation while breathing room air.  Both blood pressure and heart rate increased significantly with walking.  This may represent a tachycardic response to exercise.  The patient did not report non-pulmonary symptoms during exercise.  Since the previous study in September 2024, exercise capacity is unchanged.  Based upon age and body mass index, exercise capacity is normal.

## 2024-12-16 ENCOUNTER — HOSPITAL ENCOUNTER (OUTPATIENT)
Dept: CARDIOLOGY | Facility: HOSPITAL | Age: 54
Discharge: HOME | End: 2024-12-16
Payer: COMMERCIAL

## 2024-12-16 ENCOUNTER — OFFICE VISIT (OUTPATIENT)
Dept: TRANSPLANT | Facility: CLINIC | Age: 54
End: 2024-12-16
Payer: COMMERCIAL

## 2024-12-16 VITALS
OXYGEN SATURATION: 96 % | SYSTOLIC BLOOD PRESSURE: 140 MMHG | HEIGHT: 59 IN | DIASTOLIC BLOOD PRESSURE: 71 MMHG | BODY MASS INDEX: 29.29 KG/M2 | HEART RATE: 84 BPM

## 2024-12-16 VITALS
WEIGHT: 145 LBS | DIASTOLIC BLOOD PRESSURE: 68 MMHG | HEIGHT: 59 IN | SYSTOLIC BLOOD PRESSURE: 114 MMHG | BODY MASS INDEX: 29.23 KG/M2

## 2024-12-16 DIAGNOSIS — G47.33 OSA (OBSTRUCTIVE SLEEP APNEA): ICD-10-CM

## 2024-12-16 DIAGNOSIS — E03.9 HYPOTHYROIDISM (ACQUIRED): ICD-10-CM

## 2024-12-16 DIAGNOSIS — I27.21 WHO GROUP 1 PULMONARY ARTERIAL HYPERTENSION: Primary | ICD-10-CM

## 2024-12-16 DIAGNOSIS — J45.909 MODERATE ASTHMA WITHOUT COMPLICATION, UNSPECIFIED WHETHER PERSISTENT: ICD-10-CM

## 2024-12-16 DIAGNOSIS — R06.02 SHORTNESS OF BREATH: ICD-10-CM

## 2024-12-16 LAB
ASCENDING AORTA: 2.59 CM
AV AREA BY CONTINUOUS VTI: 2.4 CM2
AV INDEX (PROSTH): 0.86
AV LVOT MEAN GRADIENT: 4 MMHG
AV LVOT PEAK GRADIENT: 7 MMHG
AV MEAN GRADIENT: 5.2 MMHG
AV PEAK GRADIENT: 10.2 MMHG
AV VALVE AREA BY VELOCITY RATIO: 2.3 CM²
AV VALVE AREA: 2.4 CM2
AV VELOCITY RATIO: 0.81
BSA FOR ECHO PROCEDURE: 1.65 M2
CV ECHO LV RWT: 0.26 CM
DOP CALC AO PEAK VEL: 1.6 M/S
DOP CALC AO VTI: 31.8 CM
DOP CALC LVOT AREA: 2.8 CM2
DOP CALC LVOT DIAMETER: 1.9 CM
DOP CALC LVOT PEAK VEL: 1.3 M/S
DOP CALC LVOT STROKE VOLUME: 77.4 CM3
DOP CALCLVOT PEAK VEL VTI: 27.3 CM
E WAVE DECELERATION TIME: 314.65 MS
E/A RATIO: 0.95
E/E' RATIO: 8.42 M/S
ECHO EF ESTIMATED: 48 %
ECHO LV POSTERIOR WALL: 0.6 CM (ref 0.6–1.1)
FRACTIONAL SHORTENING: 25.5 % (ref 28–44)
INTERVENTRICULAR SEPTUM: 0.5 CM (ref 0.6–1.1)
IVC DIAMETER: 1.25 CM
LA MAJOR: 5.51 CM
LA MINOR: 5.25 CM
LA WIDTH: 2.6 CM
LEFT ATRIUM SIZE: 2.79 CM
LEFT ATRIUM VOLUME INDEX MOD: 19 ML/M2
LEFT ATRIUM VOLUME INDEX: 20.6 ML/M2
LEFT ATRIUM VOLUME MOD: 30.6 ML
LEFT ATRIUM VOLUME: 33.15 CM3
LEFT INTERNAL DIMENSION IN SYSTOLE: 3.5 CM (ref 2.1–4)
LEFT VENTRICLE DIASTOLIC VOLUME INDEX: 63.04 ML/M2
LEFT VENTRICLE DIASTOLIC VOLUME: 101.49 ML
LEFT VENTRICLE MASS INDEX: 47.5 G/M2
LEFT VENTRICLE SYSTOLIC VOLUME INDEX: 32.5 ML/M2
LEFT VENTRICLE SYSTOLIC VOLUME: 52.37 ML
LEFT VENTRICULAR INTERNAL DIMENSION IN DIASTOLE: 4.7 CM (ref 3.5–6)
LEFT VENTRICULAR MASS: 76.6 G
LV LATERAL E/E' RATIO: 6.67
LV SEPTAL E/E' RATIO: 11.43
MV PEAK A VEL: 0.84 M/S
MV PEAK E VEL: 0.8 M/S
OHS CV RV/LV RATIO: 1.17 CM
PISA TR MAX VEL: 4 M/S
RA MAJOR: 5.14 CM
RA PRESSURE ESTIMATED: 3 MMHG
RA WIDTH: 3.29 CM
RIGHT ATRIAL AREA: 18.1 CM2
RIGHT VENTRICLE DIASTOLIC BASEL DIMENSION: 5.5 CM
RV TB RVSP: 7 MMHG
RV TISSUE DOPPLER FREE WALL SYSTOLIC VELOCITY 1 (APICAL 4 CHAMBER VIEW): 10.37 CM/S
SINUS: 2.63 CM
STJ: 2.17 CM
TDI LATERAL: 0.12 M/S
TDI SEPTAL: 0.07 M/S
TDI: 0.1 M/S
TR MAX PG: 64 MMHG
TRICUSPID ANNULAR PLANE SYSTOLIC EXCURSION: 1.78 CM
TV PEAK GRADIENT: 64 MMHG
TV REST PULMONARY ARTERY PRESSURE: 67 MMHG
Z-SCORE OF LEFT VENTRICULAR DIMENSION IN END DIASTOLE: 0.29
Z-SCORE OF LEFT VENTRICULAR DIMENSION IN END SYSTOLE: 1.67

## 2024-12-16 PROCEDURE — 99999 PR PBB SHADOW E&M-EST. PATIENT-LVL IV: CPT | Mod: PBBFAC,,,

## 2024-12-16 PROCEDURE — 93306 TTE W/DOPPLER COMPLETE: CPT

## 2024-12-16 PROCEDURE — 93306 TTE W/DOPPLER COMPLETE: CPT | Mod: 26,,, | Performed by: INTERNAL MEDICINE

## 2024-12-16 PROCEDURE — 3077F SYST BP >= 140 MM HG: CPT | Mod: CPTII,S$GLB,,

## 2024-12-16 PROCEDURE — 1159F MED LIST DOCD IN RCRD: CPT | Mod: CPTII,S$GLB,,

## 2024-12-16 PROCEDURE — 3078F DIAST BP <80 MM HG: CPT | Mod: CPTII,S$GLB,,

## 2024-12-16 PROCEDURE — 99214 OFFICE O/P EST MOD 30 MIN: CPT | Mod: S$GLB,,,

## 2024-12-16 PROCEDURE — 3008F BODY MASS INDEX DOCD: CPT | Mod: CPTII,S$GLB,,

## 2024-12-16 NOTE — PROGRESS NOTES
Subjective:    Patient ID:  Yuni Perez is a 54 y.o. female who presents for follow-up of Pulmonary Hypertension.    HPI   Mrs. Perez is a pleasant 51 y.o. white female who presents for PH follow-up. Patient was diagnosed with IPAH in 2009 and started on Remodulin, Letairis, and Adcirca. She has a history of ABHIJEET (untreated - not able to tolerate CPAP 2/2 severe congestion), GERD, and Asthma. Current therapy is Remodulin infusing at 117ng/kg/min (pre-filled cassettes), Letairis 10mg qdaily, and Adcirca 40mg qdaily. Has T/F Adempas.  Patient evaluated by lung transplant in 2020. Had everything set up for LUT, but says her caregivers were deemed not acceptable. She has come to terms with not being a LUT candidate here at [facility]. She is not interested in lung transplant.    Mrs. Perez is here for follow-up. She is doing well. She reports baseline symptoms on triple therapy. Remodulin infusing at 117ng/kg/min. Denies issues with her site. Walk was better today. Independent of ADLs/IADLS. Patient denies chest pain, chest pressure, syncope, pre-syncope, lightheadedness, dizziness, PND, orthopnea, LE edema, abdominal pain, abdominal pressure, or N/V/F/C.      6MWT:   9/25/2024 12/13/2024   6MW     6MWT Status completed without stopping  completed without stopping    Patient Reported Dyspnea;Calf pain     Was O2 used? No  No    6MW Distance walked (feet) 1200 feet  1300 feet    Distance walked (meters) 365.76 meters  396.24 meters    Did patient stop?  No    Oxygen Saturation 98 %  97 %    Supplemental Oxygen Room Air  Room Air    Heart Rate 71 bpm  85 bpm    Blood Pressure 112/76  109/74    Ab Dyspnea Rating  moderate  light    Oxygen Saturation 95 %  93 %    Supplemental Oxygen Room Air  Room Air    Heart Rate 144 bpm  143 bpm    Blood Pressure 122/87  137/97 (H)    Ab Dyspnea Rating  very,very heavy  very,very heavy    Recovery Time (seconds) 182 seconds  114 seconds    Oxygen Saturation 99 %   98 %    Supplemental Oxygen Room Air  Room Air    Heart Rate 90 bpm  112 bpm      ECHO: 12/16/2024    Left Ventricle: The left ventricle is normal in size. Normal wall thickness. Normal wall motion. Septal flattening in systole consistent with right ventricular pressure overload. There is normal systolic function with a visually estimated ejection fraction of 60 - 65%. There is normal diastolic function.    Right Ventricle: Moderate right ventricular enlargement with hypertrophy. Systolic function is mildly to moderately reduced.    The right atrium is mildly dilated.    Tricuspid Valve: There is mild regurgitation.    Pulmonary Artery: There is moderate to severe pulmonary hypertension. The estimated pulmonary artery systolic pressure is 67 mmHg.    IVC/SVC: Normal venous pressure at 3 mmHg.    ECHO: 2/2/2024    Left Ventricle: The left ventricle is normal in size. Ventricular mass is normal. Normal wall thickness. Normal wall motion. Septal flattening in systole consistent with right ventricular pressure overload. There is normal systolic function with a visually estimated ejection fraction of 60 - 65%. There is normal diastolic function.    Right Ventricle: Moderate right ventricular enlargement. Wall thickness is normal. Right ventricle wall motion  is normal. Systolic function is mild - moderately reduced visually although the TAPSE.    Right Atrium: Right atrium is mildly dilated.    Aortic Valve: The aortic valve is a trileaflet valve. There is low normal AFSANEH to trivial stenosis. Aortic valve area by VTI is 2.00 cm². Aortic valve peak velocity is 1.63 m/s. Mean gradient is 5 mmHg. The dimensionless index is 0.79.    Tricuspid Valve: Mildly thickened leaflets. There is mild to moderate regurgitation.    Pulmonic Valve: There is mild regurgitation.    Pulmonary Artery: There is very severe pulmonary hypertension. The estimated pulmonary artery systolic pressure is 99 mmHg.    IVC/SVC: Normal venous pressure at  3 mmHg.    ECHO: 6/23/2023  There is abnormal septal wall motion. There is systolic flattening of the interventricular septum consistent with right ventricle pressure overload.  The left ventricle is normal in size with  Moderate right ventricular enlargement with mildly to moderately reduced right ventricular systolic function. Free Wall Strain 11%, global RV strain 11%.  Mild pulmonic regurgitation.  Moderate tricuspid regurgitation.  Normal central venous pressure (3 mmHg).  The estimated PA systolic pressure is 106 mmHg.  There is pulmonary hypertension.  There is evidence of at least moderate right to left venous-arterial shunting with agitated saline contrast. Unfortunately continuous capture not obtained thus cannot specify whether intra or extracardiac.    ECHO: 12/16/2022  The left ventricle is normal in size with normal systolic function.  The estimated ejection fraction is 68%.  Indeterminate left ventricular diastolic function.  Mild right ventricular enlargement with mildly reduced right ventricular systolic function.  Mild right atrial enlargement.  Mild to moderate tricuspid regurgitation.  Mild pulmonic regurgitation.  Normal central venous pressure (3 mmHg).  The estimated PA systolic pressure is 107 mmHg.  There is Quite Severe pulmonary hypertension.  Trivial posterior pericardial effusion.    RHC: 6/10/2022  Estimated blood loss: none  Severe PAH on IV remodulin, adcirca and letairis.  Normal right and left-sided filling pressures.  Borderline low-normal cardiac output by Ebony method which is normal when indexed for BSA.  No signficant change from RHC 8/18.  RA: 6/ 7/ 5 RV: 105/ 3/ 10 PA: 103/ 45/ 64 PWP: 15/ 1512/ 13 . Cardiac output was 4.11 by Ebony. Cardiac index is 2.67 L/min/m2. O2 Sat: PA 70%. AO sat 96% PVR 12.4    PFTS: 9/27/2023  Spirometry shows moderate-severe obstruction. Lung volume determination shows TLC is normal with evidence air trapping is present. Airway mechanics are abnormal  showing increased airway resistance and decreased conductance. DLCO is moderately decreased (42%).     PFTs: 3/17/2023  Spirometry is consistent with restriction. The significantly reduced FEF 25-75 suggests superimposed obstruction may be present. Lung volume determination is normal. The mildly elevated RV/TLC ratio suggests possible early air trapping. Diffusion capacity is moderately reduced. This interpretation of DLCO assumes normal hemoglobin level (41%)    PFTs: 9/8/2020  Spirometry shows moderate obstruction. Lung volume determination is normal. DLCO is mildly decreased - 68%    CT Chest: 2/27/2023  FINDINGS:  There are innumerable upper lobe predominant vague, centrilobular micro nodules.  Due to their very small size in their shear numbers, they are not amenable to exact comparison is.  However, the gross appearance is similar to the previous exam.  These nodules are seen in all lobes.     There are stable 5 mm right lower lobe posterior segment (4-309) and 4 mm left lower lobe posterior segment (4-313) pulmonary nodules.     The tip of a right catheter is seen overlying the cavoatrial junction.     No new lymphadenopathy.     The heart size is normal.  The main pulmonary segment is again noted to be severely dilated, up to 3.9 cm in diameter.     No new abnormalities are seen in the upper abdominal structures.     The bone density appears diminished.     Impression:     1. Redemonstrated innumerable bilateral centrilobular pulmonary micro nodules.  The differential diagnosis could include hypersensitivity pneumonitis, respiratory bronchiolitis (if the patient is a smoker), infection, vasculitis.  This is not an exhaustive list.  2. Severely dilated main pulmonary artery up to 3.9 cm.  3. Stable bilateral lower lobe subcentimeter pulmonary nodules.    IV/SC:  Pulmonary Hypertension Review Flowsheet 12/16/2024   Pump Type CADD   Medication type Remodulin   Vial size (No Data)   Dose (ng/kg/min)  "117/ng/kg/min   DW (kg) 60.5kg   Amt of Med used    Amt of Diluent Used NS    Final Concentration (ng/ml) 396335 ng/ml   Pump Rate ml/24 hr 41 ml/ 24 hr     Review of Systems   Constitutional: Negative for decreased appetite, diaphoresis, fever and night sweats.   HENT:  Positive for congestion.    Cardiovascular:  Positive for dyspnea on exertion. Negative for chest pain, irregular heartbeat, leg swelling and syncope.   Respiratory:  Positive for shortness of breath. Negative for cough and wheezing.         Occasional SOB at night and morning   Endocrine: Negative.    Skin: Negative.    Musculoskeletal:  Positive for myalgias. Negative for back pain, falls and joint swelling.   Gastrointestinal: Negative.    Genitourinary: Negative.    Neurological: Negative.    Psychiatric/Behavioral:  The patient is nervous/anxious.         Controlled with medication        Objective: BP (!) 140/71 (BP Location: Left arm, Patient Position: Sitting)   Pulse 84   Ht 4' 11" (1.499 m)   SpO2 96%   BMI 29.29 kg/m²     Physical Exam  Constitutional:       Appearance: Normal appearance.   HENT:      Head: Normocephalic and atraumatic.   Eyes:      Pupils: Pupils are equal, round, and reactive to light.   Neck:      Vascular: No JVD.   Cardiovascular:      Rate and Rhythm: Normal rate and regular rhythm.      Pulses: Normal pulses.      Heart sounds: Murmur heard.      Comments: Loud S2  Pulmonary:      Effort: Pulmonary effort is normal.      Breath sounds: Normal breath sounds.   Chest:      Comments: RCW - Indwelling catheter - Eason for Remodulin infusion. Dressing is C/D/I    Abdominal:      General: Abdomen is flat.      Palpations: Abdomen is soft.   Musculoskeletal:         General: Normal range of motion.      Cervical back: Normal range of motion and neck supple.   Skin:     General: Skin is warm and dry.      Capillary Refill: Capillary refill takes less than 2 seconds.   Neurological:      Mental Status: She is alert " "and oriented to person, place, and time.   Psychiatric:         Mood and Affect: Mood normal.         Behavior: Behavior normal.           Lab Results   Component Value Date    BNP 64 12/13/2024     12/13/2024    K 3.9 12/13/2024    MG 2.2 12/13/2024     (H) 12/13/2024    CO2 20 (L) 12/13/2024    BUN 12 12/13/2024    CREATININE 0.7 12/13/2024     12/13/2024    HGBA1C 4.9 09/21/2020    AST 15 12/13/2024    ALT 11 12/13/2024    ALBUMIN 3.9 12/13/2024    PROT 7.2 12/13/2024    BILITOT 0.4 12/13/2024    CHOL 125 09/21/2020    HDL 36 (L) 09/21/2020    LDLCALC 72.8 09/21/2020    TRIG 81 09/21/2020       Magnesium   Date Value Ref Range Status   12/13/2024 2.2 1.6 - 2.6 mg/dL Final       Lab Results   Component Value Date    WBC 5.22 12/13/2024    HGB 14.8 12/13/2024    HCT 44.9 12/13/2024    MCV 91 12/13/2024     12/13/2024       BNP   Date Value Ref Range Status   12/13/2024 64 0 - 99 pg/mL Final     Comment:     Values of less than 100 pg/ml are consistent with non-CHF populations.   09/18/2024 52 0 - 99 pg/mL Final     Comment:     Values of less than 100 pg/ml are consistent with non-CHF populations.   08/06/2024 79 0 - 99 pg/mL Final     Comment:     Values of less than 100 pg/ml are consistent with non-CHF populations.       No results found for: "LDH"    ..  WHO Group: 1 Functional Class: II  REVEAL Score: 6 (Low Risk)  Assessment/Plan:   Yuni Perez was seen today for pulmonary hypertension.    Diagnoses and all orders for this visit:    WHO group 1 pulmonary arterial hypertension  Miss Perez is subjectively and clinically stable. ECHO shows moderate RV enlargement and elevated PASP on triple therapy. She is euvolemic on exam. REVEAL score is low risk. Does not want another right heart cath done here. She asked about seeking treatment in [place] for lung transplant and would consider a RHC there. She is not eligible for lung transplant here and [place] is not a good option. " [place] is closer to family and is where her former physician (Ivonne Rai) is located. She would also like to still follow here, as well. Advised that we could send records but it would be insurance dependent.     -     Comprehensive Metabolic Panel; Future  -     CBC Auto Differential; Future  -     BNP; Future  -     Magnesium; Future  -     Stress test, pulmonary; Future    Hypothyroidism (acquired)  On therapy    Moderate asthma without complication, unspecified whether persistent  Sees pulmonary, but needs follow-up. PFTs done this week that show severe obstruction. Would like pulm read.    ABHIJEET (obstructive sleep apnea)  Not able to tolerate CPAP.    Return to clinic in 4 months with labs, walk. Please contact our department if you need to be seen sooner or have any concerns.    Recommend 2 gram sodium restriction and 1500cc fluid restriction.  Encourage physical activity with graded exercise program.  Requested patient to weigh themselves daily, and to notify us if their weight increases by more than 3 lbs in 1 day or 5 lbs in 1 week.      Bárbara Langford, KISHAN, APRN  [facility]

## 2025-01-30 ENCOUNTER — TELEPHONE (OUTPATIENT)
Dept: NEUROLOGY | Facility: CLINIC | Age: 55
End: 2025-01-30
Payer: COMMERCIAL

## 2025-01-30 NOTE — TELEPHONE ENCOUNTER
"Prior auth has been resubmitted     ----- Message from Pau sent at 1/30/2025  2:33 PM CST -----  Regarding: Refill  "Type:  Patient Call Back    Who Called:PT    What is the reqeust in detail:Following up on refill requesting for rimegepant (NURTEC) 75 mg odt. Please advise    Can the clinic reply by [facility]?no    Best Call Back Number:[phone]      Additional Information:Pt states her pharmacy has been trying to get Authorization since December  "

## 2025-01-31 ENCOUNTER — OFFICE VISIT (OUTPATIENT)
Dept: FAMILY MEDICINE | Facility: CLINIC | Age: 55
End: 2025-01-31
Payer: COMMERCIAL

## 2025-01-31 VITALS
TEMPERATURE: 98 F | HEART RATE: 72 BPM | WEIGHT: 145.19 LBS | SYSTOLIC BLOOD PRESSURE: 100 MMHG | BODY MASS INDEX: 29.27 KG/M2 | DIASTOLIC BLOOD PRESSURE: 70 MMHG | HEIGHT: 59 IN | OXYGEN SATURATION: 93 %

## 2025-01-31 DIAGNOSIS — J32.9 BACTERIAL SINUSITIS: Primary | ICD-10-CM

## 2025-01-31 DIAGNOSIS — B96.89 BACTERIAL SINUSITIS: Primary | ICD-10-CM

## 2025-01-31 DIAGNOSIS — M54.2 NECK PAIN ON RIGHT SIDE: ICD-10-CM

## 2025-01-31 DIAGNOSIS — I27.0 PRIMARY PULMONARY HYPERTENSION: ICD-10-CM

## 2025-01-31 DIAGNOSIS — H10.31 ACUTE BACTERIAL CONJUNCTIVITIS OF RIGHT EYE: ICD-10-CM

## 2025-01-31 PROCEDURE — G2211 COMPLEX E/M VISIT ADD ON: HCPCS | Mod: S$GLB,,,

## 2025-01-31 PROCEDURE — 99214 OFFICE O/P EST MOD 30 MIN: CPT | Mod: S$GLB,,,

## 2025-01-31 PROCEDURE — 99999 PR PBB SHADOW E&M-EST. PATIENT-LVL IV: CPT | Mod: PBBFAC,,,

## 2025-01-31 PROCEDURE — 1160F RVW MEDS BY RX/DR IN RCRD: CPT | Mod: CPTII,S$GLB,,

## 2025-01-31 PROCEDURE — 3074F SYST BP LT 130 MM HG: CPT | Mod: CPTII,S$GLB,,

## 2025-01-31 PROCEDURE — 1159F MED LIST DOCD IN RCRD: CPT | Mod: CPTII,S$GLB,,

## 2025-01-31 PROCEDURE — 3078F DIAST BP <80 MM HG: CPT | Mod: CPTII,S$GLB,,

## 2025-01-31 PROCEDURE — 3008F BODY MASS INDEX DOCD: CPT | Mod: CPTII,S$GLB,,

## 2025-01-31 RX ORDER — CEFDINIR 300 MG/1
300 CAPSULE ORAL 2 TIMES DAILY
Qty: 14 CAPSULE | Refills: 0 | Status: SHIPPED | OUTPATIENT
Start: 2025-01-31 | End: 2025-02-07

## 2025-01-31 RX ORDER — POLYMYXIN B SULFATE AND TRIMETHOPRIM 1; 10000 MG/ML; [USP'U]/ML
1 SOLUTION OPHTHALMIC EVERY 6 HOURS
Qty: 7.89 ML | Refills: 0 | Status: SHIPPED | OUTPATIENT
Start: 2025-01-31 | End: 2025-02-07

## 2025-01-31 NOTE — PROGRESS NOTES
HPI     Yuni Perez is a 54 y.o. female with multiple medical diagnoses as listed in the medical history and problem list that presents for ear fullness and ringing. PCP Dr. Sandhu with last visit in this clinic on 11/22/24.     Chief Complaint   Patient presents with    Tinnitus       HPI    Pleasant patient here due to recent tension headaches, ear fullness, and sinus congestion for about 10 days. Recently had bad flare up of her allergies. Denies any fevers, SOB/CP.     She has also noted right sided neck pain for about a week. Has new mattress and pillows, but the pillows are not very supportive. She sleeps on her right side.   Takes tizidine for frozen shoulder PRN.     Has been using Pataday for right eye itching and weeping for last week or so. Notices dry crust to eye in the morning. Eye is red and bloodshot.     Assessment & Plan     1. Bacterial sinusitis    Maxillary sinus TTP. Nasal turbinate swelling bilaterally. Given clinical appearance and symptoms, will treat for bacterial sinusitis. Follow up with clinic for any worsening symptoms, or if symptoms fail to improve.       - cefdinir (OMNICEF) 300 MG capsule; Take 1 capsule (300 mg total) by mouth 2 (two) times daily. for 7 days  Dispense: 14 capsule; Refill: 0    2. Acute bacterial conjunctivitis of right eye    Conjuctiva erythemic, weeping. Given symptoms and failure of OTC eye drops, will treat for bacterial conjunctivitis. Follow up with clinic for any worsening symptoms, or if symptoms fail to improve.       - polymyxin B sulf-trimethoprim (POLYTRIM) 10,000 unit- 1 mg/mL Drop; Place 1 drop into the right eye every 6 (six) hours. for 7 days  Dispense: 7.89 mL; Refill: 0    3. Neck pain on right side    Mild TTP of right SCM muscle down to shoulder. Neurological exam normal, no radiation or tingling/numbness to arms or hands. Likely related to new mattress/pillows. Discussed finding a cervical support pillow for side sleepers. PRN  tylenol or voltaren gel for pain relief. Follow up with clinic for any worsening symptoms, or if symptoms fail to improve.       4. Primary pulmonary hypertension    Stable, followed by pulmonology and cardiology. Discussed her lifestyle modifications due to pHTN. Patient very knowledgeable about disease process and eager to share.         Discussed DDx, condition, and treatment.   Education sent to patient portal/included in after visit summary.  ED precautions given.   Notify provider if symptoms do not resolve or increase in severity.   Patient verbalizes understanding and agrees with plan of care.  --------------------------------------------      Health Maintenance:  Health Maintenance         Date Due Completion Date    Colorectal Cancer Screening Never done ---    Shingles Vaccine (1 of 2) Never done ---    Pneumococcal Vaccines (Age 50+) (3 of 3 - PCV20 or PCV21) 01/18/2022 1/18/2017    Hemoglobin A1c (Diabetic Prevention Screening) 09/21/2023 9/21/2020    COVID-19 Vaccine (4 - 2024-25 season) 09/01/2024 7/1/2022    Lipid Panel 09/21/2025 9/21/2020    Cervical Cancer Screening 11/23/2025 11/23/2020    Override on 9/17/2014: Done (Dr. Khan)    Override on 2/1/2012: Done    Override on 6/10/2003: Done    Mammogram 12/13/2025 12/13/2024    Override on 9/2/2013: Done    Override on 3/1/2011: Done    DEXA Scan 12/20/2025 12/20/2023    TETANUS VACCINE 08/09/2033 8/9/2023    RSV Vaccine (Age 60+ and Pregnant patients) (1 - 1-dose 75+ series) 03/18/2046 ---            Discussed the importance of overdue vaccines which were offered during this encounter. Patient declined overdue vaccines at this time and Advised patient on the importance of completing overdue health maintenance items    Follow Up:  Follow up in about 6 months (around 7/31/2025).    Exam     Review of Systems:  (as noted above)  Review of Systems   Constitutional:  Negative for fever.   HENT:  Positive for congestion, rhinorrhea, sinus pressure  "and sinus pain. Negative for trouble swallowing.    Respiratory:  Negative for shortness of breath.    Cardiovascular:  Negative for chest pain.   Gastrointestinal:  Negative for blood in stool and vomiting.   Genitourinary:  Negative for hematuria.   Skin:  Negative for rash.   Neurological:  Positive for headaches.       Physical Exam:   Physical Exam  Constitutional:       General: She is not in acute distress.     Appearance: Normal appearance. She is not ill-appearing.   HENT:      Head: Normocephalic and atraumatic.      Right Ear: Tympanic membrane normal.      Left Ear: Tympanic membrane normal.      Nose: Congestion and rhinorrhea present.      Right Turbinates: Enlarged.      Left Turbinates: Enlarged.      Right Sinus: Maxillary sinus tenderness present.      Left Sinus: Maxillary sinus tenderness present.      Mouth/Throat:      Mouth: Mucous membranes are moist.      Pharynx: No oropharyngeal exudate or posterior oropharyngeal erythema.   Cardiovascular:      Rate and Rhythm: Normal rate and regular rhythm.      Pulses: Normal pulses.      Heart sounds: Normal heart sounds. No murmur heard.  Pulmonary:      Effort: Pulmonary effort is normal. No respiratory distress.      Breath sounds: Normal breath sounds. No wheezing.   Musculoskeletal:      Cervical back: Muscular tenderness present. No pain with movement. Normal range of motion.   Skin:     General: Skin is warm and dry.      Capillary Refill: Capillary refill takes less than 2 seconds.   Neurological:      General: No focal deficit present.      Mental Status: She is alert and oriented to person, place, and time. Mental status is at baseline.   Psychiatric:         Mood and Affect: Mood normal.         Behavior: Behavior normal.       Vitals:    01/31/25 0910   BP: 100/70   BP Location: Left arm   Patient Position: Sitting   Pulse: 72   Temp: 97.9 °F (36.6 °C)   TempSrc: Oral   SpO2: (!) 93%   Weight: 65.9 kg (145 lb 2.8 oz)   Height: 4' 11" " (1.499 m)      Body mass index is 29.32 kg/m².        History     Past Medical History:  Past Medical History:   Diagnosis Date    AR (allergic rhinitis)     Cholelithiasis, common bile duct     Chronic low back pain     Eye pressure 2017    General anesthetics causing adverse effect in therapeutic use     GERD (gastroesophageal reflux disease)     History of migraine headaches     Hypothyroidism     Innocent heart murmur     Lumbar disc disease     Menorrhagia     Mild asthma     Obesity     Plantar fasciitis of left foot     Primary pulmonary hypertension     followed by heart transplant/pulmonary     Seizure disorder     x 1 in 2008    Seizures     Sleep apnea     TMJ (dislocation of temporomandibular joint)     Tricuspid regurgitation        Past Surgical History:  Past Surgical History:   Procedure Laterality Date    CARDIAC CATHETERIZATION      CATHETERIZATION OF BOTH LEFT AND RIGHT HEART Bilateral 9/29/2020    Procedure: CATHETERIZATION, HEART, BOTH LEFT AND RIGHT;  Surgeon: Gucci Pickett MD;  Location: [facility] CATH LAB;  Service: Cardiology;  Laterality: Bilateral;    CENTRAL VENOUS CATHETER INSERTION      CORONARY ANGIOGRAPHY N/A 9/29/2020    Procedure: ANGIOGRAM, CORONARY ARTERY;  Surgeon: Gucci Pickett MD;  Location: [facility] CATH LAB;  Service: Cardiology;  Laterality: N/A;    gallbladder drain  06/2019    INSERTION OF HAYNES CATHETER Right 6/17/2020    Procedure: INSERTION, CATHETER, CENTRAL VENOUS, HAYNES Replace Single Lumen for Remodulin Infusion;  Surgeon: Bertin Dewitt MD;  Location: [facility] OR [address] [place] [address];  Service: General;  Laterality: Right;    PORTACATH PLACEMENT      REMOVAL OF TUNNELED CENTRAL VENOUS CATHETER (CVC) N/A 6/17/2020    Procedure: REMOVAL, CATHETER, CENTRAL VENOUS, TUNNELED;  Surgeon: Bertin Dewitt MD;  [facility];  Service: General;  Laterality: N/A;    RIGHT HEART CATHETERIZATION Right 8/20/2018    Procedure: HEART CATH-RIGHT;  Surgeon: Ivonne Rai,  MD;  Location: [facility] CATH LAB;  Service: Cardiology;  Laterality: Right;    RIGHT HEART CATHETERIZATION Right 9/4/2019    Procedure: INSERTION, CATHETER, RIGHT HEART;  Surgeon: Ivonne Rai MD;  Location: [facility] CATH LAB;  Service: Cardiology;  Laterality: Right;    RIGHT HEART CATHETERIZATION Right 6/10/2022    Procedure: INSERTION, CATHETER, RIGHT HEART;  Surgeon: Keshia Poe MD;  Location: [facility] CATH LAB;  Service: Cardiology;  Laterality: Right;    UPPER GASTROINTESTINAL ENDOSCOPY         Social History:  Social History     Socioeconomic History    Marital status: Single    Number of children: 0   Occupational History    Occupation: disabled     Employer: [employer]   Tobacco Use    Smoking status: Never    Smokeless tobacco: Never   Substance and Sexual Activity    Alcohol use: No     Alcohol/week: 0.8 standard drinks of alcohol     Types: 1 Standard drinks or equivalent per week     Comment: socially    Drug use: No    Sexual activity: Never     Birth control/protection: OCP, Pill     Comment: pt is a virgin       Family History:  Family History   Problem Relation Name Age of Onset    Diabetes Mother      Heart disease Father      Glaucoma Father      No Known Problems Sister      Cancer Maternal Aunt          breast    Breast cancer Maternal Aunt      No Known Problems Maternal Uncle      No Known Problems Paternal Aunt      No Known Problems Paternal Uncle      Cancer Maternal Grandmother          uterine    Diabetes Maternal Grandfather      Heart attack Maternal Grandfather      No Known Problems Paternal Grandmother      Heart disease Paternal Grandfather      Heart attack Paternal Grandfather      Diabetes Paternal Grandfather      Leukemia Brother      Breast cancer Cousin      Breast cancer Cousin      Hypertension Other      Colon cancer Neg Hx      Ovarian cancer Neg Hx      Amblyopia Neg Hx      Blindness Neg Hx      Cataracts Neg Hx      Macular degeneration Neg Hx      Retinal  "detachment Neg Hx      Strabismus Neg Hx      Stroke Neg Hx      Thyroid disease Neg Hx      Esophageal cancer Neg Hx         Allergies and Medications: (updated and reviewed)  Review of patient's allergies indicates:   Allergen Reactions    Fentanyl Anaphylaxis     Respiratory distress    Vibra-tabs [doxycycline hyclate] Anaphylaxis     "throat felt like it was closing"    Adhesive Hives     Silk tape    Amoxicillin Rash    Nsaids (non-steroidal anti-inflammatory drug) Swelling    Chlorhexidine Other (See Comments)     Blue Chlorhexidine causes hives     Current Outpatient Medications   Medication Sig Dispense Refill    acetaminophen (TYLENOL) 500 MG tablet Take 1,000 mg by mouth every 6 (six) hours as needed for Pain.      ADCIRCA 20 mg Tab Take 2 tablets (40 mg total) by mouth once daily. 60 tablet 11    albuterol (PROAIR HFA) 90 mcg/actuation inhaler Inhale 2 puffs into the lungs every 6 (six) hours as needed for Wheezing. Rescue 18 g 6    albuterol-ipratropium (DUO-NEB) 2.5 mg-0.5 mg/3 mL nebulizer solution Take 3 mLs by nebulization every 6 (six) hours as needed for Wheezing. Rescue 6 each 6    alprazolam (XANAX ORAL) Take by mouth as needed.      ambrisentan (LETAIRIS) 10 MG Tab Take 1 tablet (10 mg total) by mouth once daily. 30 tablet 11    BREO ELLIPTA 200-25 mcg/dose DsDv diskus inhaler INHALE 1 PUFF INTO THE LUNGS EVERY EVENING. CONTROLLER 180 each 0    cyanocobalamin, vitamin B-12, 2,500 mcg Subl Place 2,500 mcg under the tongue every morning.       diphenhydrAMINE (BENADRYL) 25 mg capsule Take 50 mg by mouth every 6 (six) hours as needed for Itching. Patient takes prn evenings      diphenoxylate-atropine 2.5-0.025 mg (LOMOTIL) 2.5-0.025 mg per tablet Take 1 tablet by mouth 4 (four) times daily as needed for Diarrhea. 120 tablet 0    ferrous sulfate 325 (65 FE) MG EC tablet Take 325 mg by mouth daily as needed.       fluticasone propionate (FLONASE) 50 mcg/actuation nasal spray 2 sprays (100 mcg total) " by Each Nostril route every evening. 16 g 11    folic acid (FOLVITE) 1 MG tablet Take 1 tablet (1,000 mcg total) by mouth once daily. 90 tablet 3    furosemide (LASIX) 20 MG tablet Take 1 tablet (20 mg total) by mouth once daily. 30 tablet 11    glucosam/chond-msm1/C/michele/bor (GLUCOSAMINE-CHOND-MSM COMPLEX ORAL) Take by mouth.      hyoscyamine (LEVSIN) 0.125 mg Subl Place 1 tablet (0.125 mg total) under the tongue every 6 (six) hours as needed (Aa needed). 60 tablet 3    lactic acid-citric-potassium (PHEXXI) 1.8-1-0.4 % Gel Place 1 Applicatorful vaginally as needed (CONTRACEPTIVE). 5 g 2    levothyroxine (SYNTHROID) 125 MCG tablet Take 1 tablet (125 mcg total) by mouth before breakfast. 90 tablet 2    LIDOcaine (LIDODERM) 5 % Place 1 patch onto the skin once daily. 15 patch 0    loratadine (CLARITIN) 10 mg tablet Take 10 mg by mouth every evening.       ondansetron (ZOFRAN) 4 MG tablet Take 1 tablet (4 mg total) by mouth every 6 (six) hours as needed. 1 Tablet Oral Every 6 hours 30 tablet 2    pantoprazole (PROTONIX) 40 MG tablet Take 1 tablet (40 mg total) by mouth once daily. 90 tablet 3    rimegepant (NURTEC) 75 mg odt Take 1 tablet (75 mg total) by mouth as needed for Migraine. Place ODT tablet on the tongue; alternatively the ODT tablet may be placed under the tongue. Can take 2nd dose in 24 hrs if mild relief, no more than 2 in 24 hrs 16 tablet 5    tiZANidine (ZANAFLEX) 4 MG tablet Take 4 mg by mouth every 6 (six) hours as needed.      topiramate (TOPAMAX) 100 MG tablet Take 1 tablet (100 mg total) by mouth 2 (two) times daily. 60 tablet 5    treprostinil (REMODULIN) 2.5 mg/mL Soln Mix cassette as directed and infuse continuously per physician titration orders on dosing sheet. Current dose 80ng/kg/min 60 mL 11    cefdinir (OMNICEF) 300 MG capsule Take 1 capsule (300 mg total) by mouth 2 (two) times daily. for 7 days 14 capsule 0    polymyxin B sulf-trimethoprim (POLYTRIM) 10,000 unit- 1 mg/mL Drop Place 1  drop into the right eye every 6 (six) hours. for 7 days 7.89 mL 0     No current facility-administered medications for this visit.       Patient Care Team:  Lulu Sandhu MD as PCP - General (Internal Medicine)  Ralph Whyte MD (Inactive) as Referring Physician (Intensive Care)  Ian Lackey MD (Inactive) as Consulting Physician (Pulmonary Disease)  Monika Dalton LPN as Care Coordinator  Dorene Ny RN as Pulmonary Hypertension Coordinator (Advanced Heart Failure & Transplant Cardiology)  Bárbara Langford NP as Nurse Practitioner (Cardiology)  Aye Stout RN as Pulmonary Hypertension Coordinator (Advanced Heart Failure & Transplant Cardiology)         - The patient is given an After Visit Summary that lists all medications with directions, allergies, education, orders placed during this encounter and follow-up instructions.      - I have reviewed the patient's medical information including past medical, family, and social history sections including the medications and allergies.      - We discussed the patient's current medications.     This note was created by combination of typed  and MModal dictation.  Transcription errors may be present.  If there are any questions, please contact me.                 NAHID Nam

## 2025-02-03 ENCOUNTER — CLINICAL SUPPORT (OUTPATIENT)
Dept: REHABILITATION | Facility: HOSPITAL | Age: 55
End: 2025-02-03
Attending: STUDENT IN AN ORGANIZED HEALTH CARE EDUCATION/TRAINING PROGRAM
Payer: COMMERCIAL

## 2025-02-03 DIAGNOSIS — M54.2 CERVICALGIA: ICD-10-CM

## 2025-02-03 DIAGNOSIS — M54.2 CHRONIC NECK PAIN: ICD-10-CM

## 2025-02-03 DIAGNOSIS — R29.3 POSTURE ABNORMALITY: ICD-10-CM

## 2025-02-03 DIAGNOSIS — G89.29 CHRONIC NECK PAIN: ICD-10-CM

## 2025-02-03 DIAGNOSIS — R29.898 DECREASED RANGE OF MOTION OF NECK: Primary | ICD-10-CM

## 2025-02-03 PROCEDURE — 97110 THERAPEUTIC EXERCISES: CPT | Mod: PN

## 2025-02-03 PROCEDURE — 97161 PT EVAL LOW COMPLEX 20 MIN: CPT | Mod: PN

## 2025-02-03 NOTE — PROGRESS NOTES
[facility]  Physical Therapy Initial Evaluation    Name: Yuni Perez  Clinic Number: [id number]    Therapy Diagnosis:   Encounter Diagnoses   Name Primary?    Cervicalgia     Chronic neck pain     Decreased range of motion of neck Yes    Posture abnormality      Physician: Africa Garcia MD    Physician Orders: PT Eval and Treat   Medical Diagnosis from Referral:   M54.2 (ICD-10-CM) - Cervicalgia   M54.2,G89.29 (ICD-10-CM) - Chronic neck pain     Evaluation Date: 2/3/2025  Plan of Care Expiration: 5/3/25    Authorization Period Expiration: 12/31/25  Visit # / Visits authorized: 1/ 1    Time In:  1130  Time Out: 1230    Total Billable Time: 60 minutes    Precautions: Standard    Subjective   Date of onset:  3 months    History of current condition:     Yuni  is a 54 year old right handed female presenting with c/o right sided neck pain and headaches. She reports an insidious onset 3 months ago.  She also c/o tightness in her hands. She states she suffers from migraines triggered by certain foods and high stress.  She states last week she had ringing in the ears and is being treated for ear infections. She states she has been experiencing different side effects from medication for coronary hypertension. She states she is not working. Her goal is to get rid of the pain.      Medical History:   Past Medical History:   Diagnosis Date    AR (allergic rhinitis)     Cholelithiasis, common bile duct     Chronic low back pain     Eye pressure 2017    General anesthetics causing adverse effect in therapeutic use     GERD (gastroesophageal reflux disease)     History of migraine headaches     Hypothyroidism     Innocent heart murmur     Lumbar disc disease     Menorrhagia     Mild asthma     Obesity     Plantar fasciitis of left foot     Primary pulmonary hypertension     followed by heart transplant/pulmonary     Seizure disorder     x 1 in 2008    Seizures     Sleep apnea     TMJ  "(dislocation of temporomandibular joint)     Tricuspid regurgitation        Surgical History:   Yuni Perez  has a past surgical history that includes Portacath placement; Cardiac catheterization; Upper gastrointestinal endoscopy; Central venous catheter insertion; Right heart catheterization (Right, 8/20/2018); Right heart catheterization (Right, 9/4/2019); gallbladder drain (06/2019); Insertion of Eason catheter (Right, 6/17/2020); Removal of tunneled central venous catheter (CVC) (N/A, 6/17/2020); Catheterization of both left and right heart (Bilateral, 9/29/2020); Coronary angiography (N/A, 9/29/2020); and Right heart catheterization (Right, 6/10/2022).    Medications:   Yuni has a current medication list which includes the following prescription(s): acetaminophen, adcirca, albuterol, albuterol-ipratropium, alprazolam, ambrisentan, breo ellipta, cefdinir, cyanocobalamin (vitamin b-12), diphenhydramine, diphenoxylate-atropine 2.5-0.025 mg, ferrous sulfate, fluticasone propionate, folic acid, furosemide, glucosam/chond-msm1/c/michele/bor, hyoscyamine, phexxi, levothyroxine, lidocaine, loratadine, ondansetron, pantoprazole, polymyxin b sulf-trimethoprim, nurtec, tizanidine, topiramate, and treprostinil.    Allergies:   Review of patient's allergies indicates:   Allergen Reactions    Fentanyl Anaphylaxis     Respiratory distress    Vibra-tabs [doxycycline hyclate] Anaphylaxis     "throat felt like it was closing"    Adhesive Hives     Silk tape    Amoxicillin Rash    Nsaids (non-steroidal anti-inflammatory drug) Swelling    Chlorhexidine Other (See Comments)     Blue Chlorhexidine causes hives        Imaging: none    Prior Therapy: left shoulder OT, recent  Social History:  unknown  Occupation: not working  Prior Level of Function: independent  Current Level of Function: independent    Pain:  Current 5/10, worst 6/10, best 3/10   Location: right cervical     Aggravating Factors: housework, turning " head  Easing Factors: exercises      Objective     Observation: pt stands with rounded shoulders, forward head posture  Palpation: mild tenderness left CT junction, left sub-occipitals      Range of Motion/Strength:      Cervical AROM: Degrees   Flexion 30   Extension 45   Right side bending 18   Left side bending 28   Right rotation 30   Left rotation 30   Cervical quadrant reveals:     AROM: Bilateral UE: Grossly WFL  MMT:  Right UE: 4-   Left UE: 4-  Abdominal Strength: 3+    Mobility: C/S p/a grade 1+  : 4/5 bilaterally      Bed Mobility:Independent  Transfers: Independent    Special Tests:   Spurlings: positive right      FOTO: in media    TREATMENT     Treatment Time In: 1200  Treatment Time Out: 1230    Total Treatment time separate from Evaluation: 30 minutes    Yuni received therapeutic exercises to develop strength, endurance, ROM, flexibility, posture and core stabilization for 15 minutes including:   Scapular retraction 2 x 10  Upper trap stretch 20 sec x 4  Pec stretch on 1/2 roll 1 min   Corner stretch 20 sec x 4  Supine wand flexion: next visit  Pulley: next visit    Yuni received the following manual therapy techniques:  were applied to the: cervical spine for 10 minutes, including:  C/s distraction, c/s stretching, sub-occipital release    Education provided:   - HEP compliance    Written Home Exercises Provided: yes.    Exercises were reviewed and Yuni was able to demonstrate them prior to the end of the session.  Yuni demonstrated good  understanding of the education provided.     See EMR under Patient Instructions for exercises provided 2/3/2025.    Assessment     Pt presents with signs and symptoms consistent with referring diagnosis. Evaluation has determined a decrease in functional status and subjective and objective deficits that can be addressed by physical therapy intervention. Pt demonstrates pain limiting functional activities. Decreased flexibility and strength  limiting normal movement patterns. Decreased segmental motion. Decreased postural strength and awareness. Positive special testing. Decreased participation in functional and recreational activities. Subjective and objective measures are addressed by goals in the plan of care.  Patient/family are involved in the development of these goals. Patient/family are educated about current injury and treatment.       Plan of care was dicussed with patient. Pt will benefit from skilled outpatient Physical Therapy to address the deficits stated above and in the chart below, provide pt/family education, and to maximize pt's level of independence. Pt's spiritual, cultural and educational needs considered and patient is agreeable to the plan of care and goals as stated below:     Pt prognosis is Good.  Anticipated Barriers for therapy: none    Medical Necessity is demonstrated by the following  History  Co-morbidities and personal factors that may impact the plan of care Co-morbidities:   AR (allergic rhinitis)    Cholelithiasis, common bile duct    Chronic low back pain    Eye pressure    General anesthetics causing adverse effect in therapeutic use    GERD (gastroesophageal reflux disease)    History of migraine headaches    Hypothyroidism    Innocent heart murmur    Lumbar disc disease    Menorrhagia    Mild asthma    Obesity    Plantar fasciitis of left foot    Primary pulmonary hypertension    followed by heart transplant/pulmonary    Seizure disorder    x 1 in 2008   Seizures    Sleep apnea    TMJ (dislocation of temporomandibular joint)    Tricuspid regurgitation        Personal Factors:   no deficits     low   Examination  Body Structures and Functions, activity limitations and participation restrictions that may impact the plan of care Body Regions:   neck    Body Systems:    gross symmetry  ROM  strength    Participation Restrictions:   Housework, reading    Activity limitations:   Learning and applying knowledge  no  deficits    General Tasks and Commands  no deficits    Communication  no deficits    Mobility  lifting and carrying objects    Self care  no deficits    Domestic Life  shopping  cooking  doing house work (cleaning house, washing dishes, laundry)    Interactions/Relationships  no deficits    Life Areas  no deficits    Community and Social Life  community life         low   Clinical Presentation stable and uncomplicated low   Decision Making/ Complexity Score: low     Goals:    Short Term Goals (4 Weeks):     1.Pt to increase strength by a 1/2 grade of muscles test to allow for improvement in functional activities such as performing chores.  2.Pt to improve range of motion by 25% to allow for improved functional mobility to allow for improvement in IADLs.   3.Pt to report compliance with HEP and demonstrate proper exercise technique to PT to show competence with self management of condition.  4.Decrease pain by 25% during functional activities.    Long Term Goals (12 Weeks):     1. Increase ROM to allow improved joint biomechanics during functional activities.   2.Increase trunk and upper extremity strength to within normal limits during functional activities.   3. Independent with home exercise program.   4. Full return to functional activities with manageable complaints.  5. Patient to demonstrate improved posture and body mechanics.  6. Decrease pain by 75% during functional activities.    Plan     Plan of care Certification: 2/3/2025 to 5/3/25.    Recommended Treatment Plan: 2 times per week for 12 weeks with treatments to consist of:  Neuromuscular and postural re-education,  training, therapeutic exercise, therapeutic activities,balance training, gait training, manual therapy, soft tissue mobilization, ROM exercises, Cardiovascular,  Postural stabilization, manual traction, spinal mobilization, moist heat, cryotherapy, electrical stimulation, ultrasound, home exercise education and  planning.    Dino Liu, PT

## 2025-02-11 ENCOUNTER — CLINICAL SUPPORT (OUTPATIENT)
Dept: REHABILITATION | Facility: HOSPITAL | Age: 55
End: 2025-02-11
Payer: COMMERCIAL

## 2025-02-11 DIAGNOSIS — R29.898 DECREASED RANGE OF MOTION OF NECK: Primary | ICD-10-CM

## 2025-02-11 PROCEDURE — 97140 MANUAL THERAPY 1/> REGIONS: CPT | Mod: PN,CQ

## 2025-02-11 PROCEDURE — 97112 NEUROMUSCULAR REEDUCATION: CPT | Mod: PN,CQ

## 2025-02-11 NOTE — PROGRESS NOTES
"  [facility]   Physical Therapy Treatment Note     Name: Yuni Perez  Clinic Number: [id number]    Therapy Diagnosis:   Encounter Diagnosis   Name Primary?    Decreased range of motion of neck Yes     Physician: Africa Garcia MD    Visit Date: 2/11/2025    Physician Orders: PT Eval and Treat   Medical Diagnosis from Referral:   M54.2 (ICD-10-CM) - Cervicalgia   M54.2,G89.29 (ICD-10-CM) - Chronic neck pain      Evaluation Date: 2/3/2025  Plan of Care Expiration: 5/3/25  Progress Note: 3/3/2025  Authorization Period Expiration: 12/31/25  Visit # / Visits authorized: 1/ 20     Precautions: Standard     Visit #: 1 of 20  PTA Visit #: 1/5  Time In: 9:07 am  Time Out: 10:02 am  Total Billable Time: 55 minutes    Subjective     Pt reports: her condition is getting better, she has been doing HEP which helps. .  She was compliant with home exercise program.  Response to previous treatment: first treatment day  Functional change: ongoing    Pain: 5/10  Location: right neck      Objective     Yuni participated in dynamic functional therapeutic activities to improve functional performance for 00  minutes, including:      Yuni received the following manual therapy techniques: Manual traction and Soft tissue Mobilization were applied to the: cervical and shoulders for 8 minutes, including:  C/s distraction, c/s stretching, sub-occipital release    Yuni participated in neuromuscular re-education activities to improve: Coordination, Proprioception, Posture, and muscle control for 47 minutes. The following activities were included:  +Monserrat Flexion/scaption 3 min x 3 sec hold  Upper trap + postural control 3x20"  Levator scapulae + postural control 3x20"  Cervical rotation SNAGs stretch + postural control 3x20"  Chin tucks + postural control 2x10x3"  Wrist Flex stretch + postural control 3x20"  Wrist Ext stretch + postural control 3x20"  Scapular Retraction + postural control " "2x10x3"  Pec stretch on 1/2 roll 1 min   +Supine Shoulder Flexion with wand 2x10  +Supine Chest press with wand 2x10  Doorway stretch + postural control 3x30"    Yuni received therapeutic exercises to develop strength, endurance, ROM, flexibility, and posture for 00 minutes including:    Home Exercises Provided and Patient Education Provided     Written Home Exercises Provided: Patient instructed to cont prior HEP.  Exercises were reviewed and Yuni was able to demonstrate them prior to the end of the session.  Yuni demonstrated good  understanding of the education provided.     Education provided:    HEP      Assessment     Patient came for her first therapy treatment after the initial evaluation, reporting her condition is getting better, she has been doing HEP which helps. Focus on improving flexibility/ROM/muscles strength for functional mobility and pain relief. Verbal and tactile instructions to ensure patient perform all exercises with good form, proper technique, and muscle activation. Noted increased cervical muscles tightness during palpation, Manual therapy performed for tension relief, patient responded well, may try cupping next visit if possible. Printed HEP with instructions and demonstrations provided to patient this visit, instructed her to cont to work on her HEP, she verbalized understanding. Will continue to progress per patient tolerance.      Yuni Is progressing well towards her goals.   Pt prognosis is Good.     Pt will continue to benefit from skilled outpatient physical therapy to address the deficits listed in the problem list box on initial evaluation, provide pt/family education and to maximize pt's level of independence in the home and community environment.     Pt's spiritual, cultural and educational needs considered and pt agreeable to plan of care and goals.     Anticipated barriers to physical therapy: none    Goals:   Short Term Goals (4 Weeks):      1.Pt to " increase strength by a 1/2 grade of muscles test to allow for improvement in functional activities such as performing chores.  2.Pt to improve range of motion by 25% to allow for improved functional mobility to allow for improvement in IADLs.   3.Pt to report compliance with HEP and demonstrate proper exercise technique to PT to show competence with self management of condition.  4.Decrease pain by 25% during functional activities.     Long Term Goals (12 Weeks):      1. Increase ROM to allow improved joint biomechanics during functional activities.   2.Increase trunk and upper extremity strength to within normal limits during functional activities.   3. Independent with home exercise program.   4. Full return to functional activities with manageable complaints.  5. Patient to demonstrate improved posture and body mechanics.  6. Decrease pain by 75% during functional activities.    Plan     Plan of care Certification: 2/3/2025 to 5/3/25.     Recommended Treatment Plan: 2 times per week for 12 weeks with treatments to consist of:  Neuromuscular and postural re-education,  training, therapeutic exercise, therapeutic activities,balance training, gait training, manual therapy, soft tissue mobilization, ROM exercises, Cardiovascular,  Postural stabilization, manual traction, spinal mobilization, moist heat, cryotherapy, electrical stimulation, ultrasound, home exercise education and planning.    Hai To, PTA   2/11/2025

## 2025-02-13 ENCOUNTER — TELEPHONE (OUTPATIENT)
Dept: FAMILY MEDICINE | Facility: CLINIC | Age: 55
End: 2025-02-13
Payer: COMMERCIAL

## 2025-02-13 DIAGNOSIS — H93.13 RINGING IN THE EAR, BILATERAL: Primary | ICD-10-CM

## 2025-02-13 DIAGNOSIS — H60.333 ACUTE SWIMMER'S EAR OF BOTH SIDES: ICD-10-CM

## 2025-02-13 RX ORDER — CIPROFLOXACIN AND DEXAMETHASONE 3; 1 MG/ML; MG/ML
4 SUSPENSION/ DROPS AURICULAR (OTIC) 2 TIMES DAILY
Qty: 7.5 ML | Refills: 0 | Status: SHIPPED | OUTPATIENT
Start: 2025-02-13 | End: 2025-02-14

## 2025-02-13 NOTE — TELEPHONE ENCOUNTER
----- Message from Sen sent at 2/13/2025  3:15 PM CST -----  Regarding: joselin    Type: Patient Call Back    Who called:    What is the request in detail:pt saw Deborah Lebron on 01/31/25 (Friday) and states Dr tam gave her some antibiotics to help with the ringing in her ears but it has not helped. Wants to know what to do next?    Can the clinic reply by ALSSHANTA? No    Would the patient rather a call back or a response via [facility]? Call back    Best call back number:[phone]      Additional Information:    Thank you.

## 2025-02-13 NOTE — TELEPHONE ENCOUNTER
Spoke with patient about continued ear discomfort and ringing in ears. Will treat for swimmer's ear and recommended follow up with ENT. Patient agreeable to plan and encouraged to reach out with any further concerns or questions.

## 2025-02-14 RX ORDER — CIPROFLOXACIN AND DEXAMETHASONE 3; 1 MG/ML; MG/ML
4 SUSPENSION/ DROPS AURICULAR (OTIC) 2 TIMES DAILY
Qty: 7.5 ML | Refills: 0 | Status: SHIPPED | OUTPATIENT
Start: 2025-02-14 | End: 2025-02-19

## 2025-02-27 NOTE — PROGRESS NOTES
" [facility]   Physical Therapy Treatment Note     Name: Yuni Perez  Clinic Number: [id number]    Therapy Diagnosis:   No diagnosis found.    Physician: Africa Garcia MD    Visit Date: 2/28/2025    Physician Orders: PT Eval and Treat   Medical Diagnosis from Referral:   M54.2 (ICD-10-CM) - Cervicalgia   M54.2,G89.29 (ICD-10-CM) - Chronic neck pain      Evaluation Date: 2/3/2025  Plan of Care Expiration: 5/3/25  Progress Note: 3/3/2025  Authorization Period Expiration: 12/31/25  Visit # / Visits authorized: 2/ 20     Precautions: Standard     PTA Visit #: 1/5  Time In: 9:00 am  Time Out: 9:54 am  Total Billable Time: 55 minutes    Subjective     Pt reports: last time she was here she broke out in hives in the same area that they worked on her neck with a massage.  She's not sure if it's something they used but it took a while to go away.  Otherwise, she's doing good!  She feels like she's improving and her main complaint is the pain just at the base of the neck.      She was compliant with home exercise program.  Response to previous treatment: first treatment day  Functional change: ongoing    Pain: 5/10  Location: right neck      Objective     Yuni participated in dynamic functional therapeutic activities to improve functional performance for 00  minutes, including:      Yuni received the following manual therapy techniques: Manual traction and Soft tissue Mobilization were applied to the: cervical and shoulders for 9 minutes, including:  C/s distraction, c/s stretching, sub-occipital release    Yuni participated in neuromuscular re-education activities to improve: Coordination, Proprioception, Posture, and muscle control for 30 minutes. The following activities were included:  +Monserrat Flexion/scaption 3 min x 3 sec hold  Cervical rotation SNAGs stretch + postural control 10x 5" hold   Chin tucks + postural control 2x10x3"  Wrist Flex stretch + postural " "control 3x20"  Wrist Ext stretch + postural control 3x20"  Scapular Retraction + postural control 2x10x3"  Pec stretch on 1/2 roll 1 min     Yuni received therapeutic exercises to develop strength, endurance, ROM, flexibility, and posture for 15 minutes including:  Upper trap + postural control 3x20"  Levator scapulae + postural control 3x20"  Doorway stretch + postural control 3x30"  Supine Shoulder Flexion with wand +3x10  Supine Chest press with wand +3x10    Home Exercises Provided and Patient Education Provided     Written Home Exercises Provided: Patient instructed to cont prior HEP.  Exercises were reviewed and Yuni was able to demonstrate them prior to the end of the session.  Yuni demonstrated good  understanding of the education provided.     Education provided:    HEP      Assessment   Ms. Morrissey presented to PT today reporting slight allergic reaction which she possibly attributes to whatever cream used for STM last visit.  However she also reports continued improvements with cervical pain, noting it to be limited around suboccipital mm area.  Minimal progressions today as Ms. Morrissey was challenge with current level of difficulty.  She required moderate verbal/tactile cues to perform scap retractions and cervical SNAGs with proper form and mm activation, as she displayed upper trap mm compensation. Concluded today's session with gentle suboccipital release for decrease suboccipital mm tension and decreased cervical pain, which     Yuni Is progressing well towards her goals.   Pt prognosis is Good.     Pt will continue to benefit from skilled outpatient physical therapy to address the deficits listed in the problem list box on initial evaluation, provide pt/family education and to maximize pt's level of independence in the home and community environment.     Pt's spiritual, cultural and educational needs considered and pt agreeable to plan of care and goals.     Anticipated " barriers to physical therapy: none    Goals:   Short Term Goals (4 Weeks):      1.Pt to increase strength by a 1/2 grade of muscles test to allow for improvement in functional activities such as performing chores.  2.Pt to improve range of motion by 25% to allow for improved functional mobility to allow for improvement in IADLs.   3.Pt to report compliance with HEP and demonstrate proper exercise technique to PT to show competence with self management of condition.  4.Decrease pain by 25% during functional activities.     Long Term Goals (12 Weeks):      1. Increase ROM to allow improved joint biomechanics during functional activities.   2.Increase trunk and upper extremity strength to within normal limits during functional activities.   3. Independent with home exercise program.   4. Full return to functional activities with manageable complaints.  5. Patient to demonstrate improved posture and body mechanics.  6. Decrease pain by 75% during functional activities.    Plan     Plan of care Certification: 2/3/2025 to 5/3/25.     Recommended Treatment Plan: 2 times per week for 12 weeks with treatments to consist of:  Neuromuscular and postural re-education,  training, therapeutic exercise, therapeutic activities,balance training, gait training, manual therapy, soft tissue mobilization, ROM exercises, Cardiovascular,  Postural stabilization, manual traction, spinal mobilization, moist heat, cryotherapy, electrical stimulation, ultrasound, home exercise education and planning.    Tri Santos, PTA   2/28/2025

## 2025-02-28 ENCOUNTER — OFFICE VISIT (OUTPATIENT)
Dept: OTOLARYNGOLOGY | Facility: CLINIC | Age: 55
End: 2025-02-28
Payer: COMMERCIAL

## 2025-02-28 ENCOUNTER — CLINICAL SUPPORT (OUTPATIENT)
Dept: AUDIOLOGY | Facility: CLINIC | Age: 55
End: 2025-02-28
Payer: COMMERCIAL

## 2025-02-28 ENCOUNTER — CLINICAL SUPPORT (OUTPATIENT)
Dept: REHABILITATION | Facility: HOSPITAL | Age: 55
End: 2025-02-28
Payer: COMMERCIAL

## 2025-02-28 VITALS
DIASTOLIC BLOOD PRESSURE: 67 MMHG | HEIGHT: 59 IN | SYSTOLIC BLOOD PRESSURE: 109 MMHG | BODY MASS INDEX: 29.29 KG/M2 | WEIGHT: 145.31 LBS

## 2025-02-28 DIAGNOSIS — H93.13 TINNITUS OF BOTH EARS: Primary | ICD-10-CM

## 2025-02-28 DIAGNOSIS — R29.898 DECREASED RANGE OF MOTION OF NECK: Primary | ICD-10-CM

## 2025-02-28 DIAGNOSIS — H90.42 SENSORINEURAL HEARING LOSS (SNHL) OF LEFT EAR WITH UNRESTRICTED HEARING OF RIGHT EAR: ICD-10-CM

## 2025-02-28 DIAGNOSIS — H93.13 RINGING IN THE EAR, BILATERAL: ICD-10-CM

## 2025-02-28 PROCEDURE — 97112 NEUROMUSCULAR REEDUCATION: CPT | Mod: PN,CQ

## 2025-02-28 PROCEDURE — 97110 THERAPEUTIC EXERCISES: CPT | Mod: PN,CQ

## 2025-02-28 PROCEDURE — 97140 MANUAL THERAPY 1/> REGIONS: CPT | Mod: PN,CQ

## 2025-02-28 NOTE — PROGRESS NOTES
OTOLARYNGOLOGY CLINIC NOTE  Date:  02/28/2025     Chief complaint:  Chief Complaint   Patient presents with    Tinnitus     History of Present Illness  Yuni Perez is a 54 y.o. female  presenting today for a new evaluation and treatment of tinnitus.  Pt reports ringing in her ears started about 3 weeks ago.  Pt reports due to the medication she is taking for her hypertension she stays congested all the time.  Pt reports having severe allergy attack causing swelling and bumps to her face and neck.  Pt reports this made her congestion even worse.  Pt reports she has been having migraines daily for the past several weeks also.  Pt reports they were previously controlled but now are occurring every day.  Pt reports she doesn't use any medication prescription sprays because she prefers things that are natural and the other ones have something in them.  Pt reports she is currently taking Claritin during the day and benadryl at night.  Pt denies any ear pain or pressure but sometimes her ears pop.  Pt reports they have been needing to pop more lately also.      Review of medical records and prior documentation  Past medical records were reviewed with data pertinent to the chief complaint summarized in the HPI. Information obtained from review of medical records is attributed to respective sources in the HPI with reference to sources of information at their mention. Records reviewed included all recent notes from referring provider, primary care, and related subspecialty evaluations as available. This review of records was performed and additional data obtained to supplement history obtained from the patient and further inform medical decision making involved in formulating a plan of care accounting for all history and treatment relevant to the issues addressed.    Past Medical History  Past Medical History:   Diagnosis Date    AR (allergic rhinitis)     Cholelithiasis, common bile duct     Chronic low back  pain     Eye pressure 2017    General anesthetics causing adverse effect in therapeutic use     GERD (gastroesophageal reflux disease)     History of migraine headaches     Hypothyroidism     Innocent heart murmur     Lumbar disc disease     Menorrhagia     Mild asthma     Obesity     Plantar fasciitis of left foot     Primary pulmonary hypertension     followed by heart transplant/pulmonary     Seizure disorder     x 1 in 2008    Seizures     Sleep apnea     TMJ (dislocation of temporomandibular joint)     Tricuspid regurgitation       Past Surgical History  Past Surgical History:   Procedure Laterality Date    CARDIAC CATHETERIZATION      CATHETERIZATION OF BOTH LEFT AND RIGHT HEART Bilateral 9/29/2020    Procedure: CATHETERIZATION, HEART, BOTH LEFT AND RIGHT;  Surgeon: Gucci Pickett MD;  Location: [facility] CATH LAB;  Service: Cardiology;  Laterality: Bilateral;    CENTRAL VENOUS CATHETER INSERTION      CORONARY ANGIOGRAPHY N/A 9/29/2020    Procedure: ANGIOGRAM, CORONARY ARTERY;  Surgeon: Gucci Pickett MD;  Location: [facility] CATH LAB;  Service: Cardiology;  Laterality: N/A;    gallbladder drain  06/2019    INSERTION OF HAYNES CATHETER Right 6/17/2020    Procedure: INSERTION, CATHETER, CENTRAL VENOUS, HAYNES Replace Single Lumen for Remodulin Infusion;  Surgeon: Bertin Dewitt MD;  [facility];  Service: General;  Laterality: Right;    PORTACATH PLACEMENT      REMOVAL OF TUNNELED CENTRAL VENOUS CATHETER (CVC) N/A 6/17/2020    Procedure: REMOVAL, CATHETER, CENTRAL VENOUS, TUNNELED;  Surgeon: Bertin Dewitt MD;  [facility];  Service: General;  Laterality: N/A;    RIGHT HEART CATHETERIZATION Right 8/20/2018    Procedure: HEART CATH-RIGHT;  Surgeon: Ivonne Rai MD;  Location: [facility] CATH LAB;  Service: Cardiology;  Laterality: Right;    RIGHT HEART CATHETERIZATION Right 9/4/2019    Procedure: INSERTION, CATHETER, RIGHT HEART;  Surgeon: Ivonne Rai MD;  Location: [facility]  CATH LAB;  Service: Cardiology;  Laterality: Right;    RIGHT HEART CATHETERIZATION Right 6/10/2022    Procedure: INSERTION, CATHETER, RIGHT HEART;  Surgeon: Keshia Poe MD;  Location: [facility] CATH LAB;  Service: Cardiology;  Laterality: Right;    UPPER GASTROINTESTINAL ENDOSCOPY        Medications  Medications Ordered Prior to Encounter[1]    Review of Systems  Review of Systems   Constitutional:  Positive for malaise/fatigue.   HENT:  Positive for congestion.    Eyes:  Positive for photophobia.   Respiratory:  Positive for shortness of breath and wheezing.    Gastrointestinal:  Positive for diarrhea.   Genitourinary: Negative.    Musculoskeletal:  Positive for neck pain.   Skin:  Positive for rash.   Neurological:  Positive for headaches.   Psychiatric/Behavioral: Negative.        Social History   reports that she has never smoked. She has never used smokeless tobacco. She reports that she does not drink alcohol and does not use drugs.     Family History  Family History   Problem Relation Name Age of Onset    Diabetes Mother      Heart disease Father      Glaucoma Father      No Known Problems Sister      Cancer Maternal Aunt          breast    Breast cancer Maternal Aunt      No Known Problems Maternal Uncle      No Known Problems Paternal Aunt      No Known Problems Paternal Uncle      Cancer Maternal Grandmother          uterine    Diabetes Maternal Grandfather      Heart attack Maternal Grandfather      No Known Problems Paternal Grandmother      Heart disease Paternal Grandfather      Heart attack Paternal Grandfather      Diabetes Paternal Grandfather      Leukemia Brother      Breast cancer Cousin      Breast cancer Cousin      Hypertension Other      Colon cancer Neg Hx      Ovarian cancer Neg Hx      Amblyopia Neg Hx      Blindness Neg Hx      Cataracts Neg Hx      Macular degeneration Neg Hx      Retinal detachment Neg Hx      Strabismus Neg Hx      Stroke Neg Hx      Thyroid disease Neg Hx       "Esophageal cancer Neg Hx        Physical Exam   Vitals:    02/28/25 1049   BP: 109/67    Body mass index is 29.34 kg/m².  Weight: 65.9 kg (145 lb 4.5 oz)   Height: 4' 11" (149.9 cm)     GENERAL: no acute distress.  HEAD: normocephalic.   EYES: lids and lashes normal. No scleral icterus  EARS: external ear without lesion, normal pinna shape and position.  External auditory canal with normal cerumen, tympanic membrane fully visible, no perforation , no retraction. No middle ear effusion. Ossicles intact.   NOSE: external nose without significant bony abnormality; Turbinate hypertrophy  ORAL CAVITY/OROPHARYNX: tongue midline and mobile. Symmetric palate rise.    NECK: trachea midline.   LYMPH NODES:No cervical lymphadenopathy.  RESPIRATORY: no stridor, no stertor. Voice normal. Respirations nonlabored.  NEURO: alert, responds to questions appropriately.   PSYCH:mood appropriate    Imaging:  The patient does not have any pertinent and/or recent imaging of the head and neck.     Labs:  CBC  Recent Labs   Lab 08/06/24  1156 09/18/24  1044 12/13/24  1107   WBC 5.10 5.47 5.22   Hemoglobin 14.4 15.3 14.8   Hematocrit 45.0 45.6 44.9   MCV 92 93 91   Platelets 209 208 214     BMP  Recent Labs   Lab 05/20/24  0921 08/06/24  1156 09/18/24  1044 12/13/24  1107   Glucose 81 85 85 100   Sodium 140 140 139 140   Potassium 4.5 4.1 3.9 3.9   Chloride 109 113 H 111 H 112 H   CO2 23 21 L 22 L 20 L   BUN 19 8 12 12   Creatinine 0.8 0.7 0.8 0.7   Calcium 9.7 9.0 9.5 8.8   Magnesium 2.3  --  2.2 2.2     COAGS  Recent Labs   Lab 03/11/24  0834   INR 0.9         AUDIOLOGY RESULTS  Audiometric evaluation including audiogram, tympanometry, acoustic reflexes, and speech discrimination which was performed  was personally reviewed and interpreted.  Notable findings on the audiogram were essentially normal hearing with a mild hearing loss at 3000 Hz for the right ear and a mild high frequency sensorineural hearing loss (SNHL) for the left ear. " Tympanometry revealed Type A tympanogram on the left and Type A tympanogram on the right. Speech discrimination was 100% on the left, and 100% on the right.   Report of the audiologist performing this audiometric testing was also reviewed     Assessment  1. Tinnitus of both ears    2. Sensorineural hearing loss (SNHL) of left ear with unrestricted hearing of right ear     Plan:  SNHL:  Audiogram reviewed and discussed with patient. Recheck hearing in 1 year or sooner if subjective change noted. Encouraged hearing protection while in noisy environment.  Additionally, amplification with hearing aids is generally the best option for hearing rehabilitation, except where the hearing loss is profound.  Tinnitus:  Pt advised there is no cure for condition but methods to help control. Pt advised to avoid caffeine, alcohol, tobacco and stress.  Pt advised hearing aids sometimes help with tinnitus.  Pt can also try using white noise machine in quiet environment. Pt advised to wear hearing protection in noisy environment.  F/u prn  Discussed plan of care with patient in detail and all questions answered. Patient reported understanding of plan of care.        [1]   Current Outpatient Medications on File Prior to Visit   Medication Sig Dispense Refill    acetaminophen (TYLENOL) 500 MG tablet Take 1,000 mg by mouth every 6 (six) hours as needed for Pain.      ADCIRCA 20 mg Tab Take 2 tablets (40 mg total) by mouth once daily. 60 tablet 11    alprazolam (XANAX ORAL) Take by mouth as needed.      ambrisentan (LETAIRIS) 10 MG Tab Take 1 tablet (10 mg total) by mouth once daily. 30 tablet 11    BREO ELLIPTA 200-25 mcg/dose DsDv diskus inhaler INHALE 1 PUFF INTO THE LUNGS EVERY EVENING. CONTROLLER 180 each 0    cyanocobalamin, vitamin B-12, 2,500 mcg Subl Place 2,500 mcg under the tongue every morning.       diphenhydrAMINE (BENADRYL) 25 mg capsule Take 50 mg by mouth every 6 (six) hours as needed for Itching. Patient takes prn  evenings      diphenoxylate-atropine 2.5-0.025 mg (LOMOTIL) 2.5-0.025 mg per tablet Take 1 tablet by mouth 4 (four) times daily as needed for Diarrhea. 120 tablet 0    ferrous sulfate 325 (65 FE) MG EC tablet Take 325 mg by mouth daily as needed.       fluticasone propionate (FLONASE) 50 mcg/actuation nasal spray 2 sprays (100 mcg total) by Each Nostril route every evening. 16 g 11    folic acid (FOLVITE) 1 MG tablet Take 1 tablet (1,000 mcg total) by mouth once daily. 90 tablet 3    furosemide (LASIX) 20 MG tablet Take 1 tablet (20 mg total) by mouth once daily. 30 tablet 11    glucosam/chond-msm1/C/michele/bor (GLUCOSAMINE-CHOND-MSM COMPLEX ORAL) Take by mouth.      hyoscyamine (LEVSIN) 0.125 mg Subl Place 1 tablet (0.125 mg total) under the tongue every 6 (six) hours as needed (Aa needed). 60 tablet 3    lactic acid-citric-potassium (PHEXXI) 1.8-1-0.4 % Gel Place 1 Applicatorful vaginally as needed (CONTRACEPTIVE). 5 g 2    levothyroxine (SYNTHROID) 125 MCG tablet Take 1 tablet (125 mcg total) by mouth before breakfast. 90 tablet 2    LIDOcaine (LIDODERM) 5 % Place 1 patch onto the skin once daily. 15 patch 0    loratadine (CLARITIN) 10 mg tablet Take 10 mg by mouth every evening.       ondansetron (ZOFRAN) 4 MG tablet Take 1 tablet (4 mg total) by mouth every 6 (six) hours as needed. 1 Tablet Oral Every 6 hours 30 tablet 2    pantoprazole (PROTONIX) 40 MG tablet Take 1 tablet (40 mg total) by mouth once daily. 90 tablet 3    rimegepant (NURTEC) 75 mg odt Take 1 tablet (75 mg total) by mouth as needed for Migraine. Place ODT tablet on the tongue; alternatively the ODT tablet may be placed under the tongue. Can take 2nd dose in 24 hrs if mild relief, no more than 2 in 24 hrs 16 tablet 5    tiZANidine (ZANAFLEX) 4 MG tablet Take 4 mg by mouth every 6 (six) hours as needed.      topiramate (TOPAMAX) 100 MG tablet Take 1 tablet (100 mg total) by mouth 2 (two) times daily. 60 tablet 5    treprostinil (REMODULIN) 2.5  mg/mL Soln Mix cassette as directed and infuse continuously per physician titration orders on dosing sheet. Current dose 80ng/kg/min 60 mL 11    albuterol (PROAIR HFA) 90 mcg/actuation inhaler Inhale 2 puffs into the lungs every 6 (six) hours as needed for Wheezing. Rescue 18 g 6    albuterol-ipratropium (DUO-NEB) 2.5 mg-0.5 mg/3 mL nebulizer solution Take 3 mLs by nebulization every 6 (six) hours as needed for Wheezing. Rescue 6 each 6     No current facility-administered medications on file prior to visit.

## 2025-02-28 NOTE — PROGRESS NOTES
Please click on link to view Audiogram:  Document on 2/28/2025 10:49 AM by Brittanie Melendez AU.D: Audiogram    Ms. Yuni Perez, a 54 y.o. female, was seen in the clinic today for an audiological evaluation. Ms. Perez's main complaint was bilateral tinnitus.    Otoscopy clear bilaterally. Tympanometry testing revealed a Type A tympanogram for the right ear and a Type A tympanogram for the left ear.    Audiological testing revealed essentially normal hearing with a mild hearing loss at 3000 Hz for the right ear and a mild high frequency sensorineural hearing loss (SNHL) for the left ear. A speech reception threshold was obtained at 20 dBHL for the right ear and at 20 dBHL for the left ear. Speech discrimination was 100% for the right ear and 100% for the left ear.      Recommendations:  1. Otologic evaluation  2. Repeat audiological evaluation if hearing changes  3. Hearing protection when in noise   4. Utilize [employer] HUD Relief emile and other tinnitus management strategies discussed during appointment (lower salt/caffeine intake, monitor stress/anxiety levels, soft background noise in quiet)

## 2025-03-06 ENCOUNTER — NURSE TRIAGE (OUTPATIENT)
Dept: ADMINISTRATIVE | Facility: CLINIC | Age: 55
End: 2025-03-06
Payer: COMMERCIAL

## 2025-03-06 ENCOUNTER — TELEPHONE (OUTPATIENT)
Dept: HEPATOLOGY | Facility: CLINIC | Age: 55
End: 2025-03-06
Payer: COMMERCIAL

## 2025-03-06 DIAGNOSIS — D13.4 HEPATIC ADENOMA: ICD-10-CM

## 2025-03-06 DIAGNOSIS — R16.0 LIVER MASS: Primary | ICD-10-CM

## 2025-03-06 NOTE — TELEPHONE ENCOUNTER
----- Message from Buffy sent at 3/6/2025 12:39 PM CST -----  Regarding: Yuni  Who called:Shaheed is the request in detail: Patient has an appt on March 17, 2025 and would like to reschedule it please reach out to the patient with informationCan the clinic reply by [facility]? NoWould the patient rather a call back or a response via [facility]? CallbackBe call back number: .[phone] Information:

## 2025-03-06 NOTE — TELEPHONE ENCOUNTER
Pt reports she has a hx of primary pulmonary hypertension      Wheezing  Coughing up phlegm; yellow, green, and red in color  Hot flashes but reports that she does not feel like she is running a fever  SOB at rest at times    Reports it hurts in diaphragm area when coughing  Decreased appetite  Reports she is starting to feel dehydrated    T now is 99.3  02 92%  P 103    Reports she has been using albuterol and extra strength tylenol. Reports that she has been feeling like she needs albuterol more frequently than every 4 hours at times.     Dispo is to go to ER now  Pt refusing ER dispo    Pt told NT that this is ridiculous and began yelling at NT asking NT to describe amount of blood she is coughing up and why she needs to go to ER. I reiterated dispo to pt mulitple times and she refused disposition. Also advised pt mulitple times that the hemoptysis and other symptoms she is describing have triggered an ER dispo. Pt still refusing. Did send urgent message to MD office.             Reason for Disposition   Oxygen level (e.g., pulse oximetry) 91 to 94%   [1] MODERATE difficulty breathing (e.g., speaks in phrases, SOB even at rest, pulse 100-120) AND [2] still present when not coughing   [1] MODERATE difficulty breathing (e.g., speaks in phrases, SOB even at rest, pulse 100-120) AND [2] still present when not coughing    Additional Information   Negative: SEVERE difficulty breathing (e.g., struggling for each breath, speaks in single words, pulse > 120)   Negative: Breathing stopped and hasn't returned   Negative: Choking on something   Negative: Bluish (or gray) lips or face   Negative: Difficult to awaken or acting confused (e.g., disoriented, slurred speech)   Negative: Passed out (e.g., fainted, lost consciousness, blacked out and was not responding)   Negative: Wheezing started suddenly after medicine, an allergic food, or bee sting   Negative: Stridor (harsh sound while breathing in)   Negative: Slow, shallow  "and weak breathing   Negative: Sounds like a life-threatening emergency to the triager   Negative: MODERATE difficulty breathing (e.g., speaks in phrases, SOB even at rest, pulse 100-120) of new-onset or worse than normal   Negative: Wheezing can be heard across the room   Negative: Drooling or spitting out saliva (because can't swallow)   Negative: Any history of prior "blood clot" in leg or lungs   Negative: Illness requiring prolonged bedrest in past month (e.g., immobilization, long hospital stay)   Negative: Hip or leg fracture (broken bone) in past month (or had cast on leg or ankle in past month)   Negative: Major surgery in the past month   Negative: Long-distance travel in past month (e.g., car, bus, train, plane; with trip lasting 6 or more hours)   Negative: Cancer treatment in past six months (or has cancer now)   Negative: Extra heartbeats, irregular heart beating, or heart is beating very fast (i.e., 'palpitations')   Negative: Oxygen level (e.g., pulse oximetry) 90% or lower   Negative: Fever > 103 F (39.4 C)   Negative: Fever > 101 F (38.3 C) and over 60 years of age   Negative: Fever > 100 F (37.8 C) and bedridden (e.g., nursing home patient, stroke, chronic illness, recovering from surgery)   Negative: Fever > 100 F (37.8 C) and diabetes mellitus or weak immune system (e.g., HIV positive, cancer chemo, splenectomy, organ transplant, chronic steroids)   Negative: Periods where breathing stops and then resumes normally and bedridden (e.g., nursing home patient, CVA)   Negative: Pregnant or postpartum (from 0 to 6 weeks after delivery)   Negative: Patient sounds very sick or weak to the triager   Negative: MILD difficulty breathing (e.g., minimal/no SOB at rest, SOB with walking, pulse < 100) of new-onset or worse than normal   Negative: Longstanding difficulty breathing (e.g., CHF, COPD, emphysema) and worse than normal   Negative: Longstanding difficulty breathing and not responding to usual " "therapy   Negative: Continuous (nonstop) coughing   Negative: Patient wants to be seen   Negative: SEVERE difficulty breathing (e.g., struggling for each breath, speaks in single words)   Negative: Bluish (or gray) lips or face now   Negative: [1] Difficulty breathing AND [2] exposure to flames, smoke, or fumes   Negative: [1] Stridor AND [2] difficulty breathing   Negative: Sounds like a life-threatening emergency to the triager   Negative: SEVERE difficulty breathing (e.g., struggling for each breath, speaks in single words)   Negative: [1] Chest pain AND [2] difficulty breathing   Negative: Bluish (or gray) lips or face now   Negative: Passed out (e.g., fainted, lost consciousness, blacked out and was not responding)   Negative: Shock suspected (e.g., cold/pale/clammy skin, too weak to stand, low BP, rapid pulse)   Negative: Difficult to awaken or acting confused (e.g., disoriented, slurred speech)   Negative: Recent chest injury (i.e., past 24 hours)   Negative: [1] Coughed up blood AND [2] large amount (Such as: "a half cup of blood")   Negative: Sounds like a life-threatening emergency to the triager    Protocols used: Breathing Difficulty-A-OH, Cough - Acute Bhtivbupdy-P-VW, Coughing Up Blood-A-AH    "

## 2025-03-06 NOTE — TELEPHONE ENCOUNTER
The patient called stating that she has bronchitis and would like to reschedule her ultrasound and a follow up visit on 3/17,3/10 for U/S.I informed her that the provider is fully booked until July.,and no slots are currently available.I assured her if an appointment becomes available due to cancellation I will schedule her follow up visit..I rescheduled her lab and u/s on 4/7/2025 and mailed her appointment details.

## 2025-03-11 DIAGNOSIS — I27.0 PRIMARY PULMONARY HYPERTENSION: ICD-10-CM

## 2025-03-11 RX ORDER — AMBRISENTAN 10 MG/1
10 TABLET, FILM COATED ORAL DAILY
Qty: 30 TABLET | Refills: 11 | Status: SHIPPED | OUTPATIENT
Start: 2025-03-11

## 2025-03-11 RX ORDER — TADALAFIL 20 MG/1
40 TABLET ORAL DAILY
Qty: 60 TABLET | Refills: 11 | Status: SHIPPED | OUTPATIENT
Start: 2025-03-11

## 2025-03-12 ENCOUNTER — OFFICE VISIT (OUTPATIENT)
Dept: FAMILY MEDICINE | Facility: CLINIC | Age: 55
End: 2025-03-12
Payer: COMMERCIAL

## 2025-03-12 ENCOUNTER — HOSPITAL ENCOUNTER (OUTPATIENT)
Dept: RADIOLOGY | Facility: HOSPITAL | Age: 55
Discharge: HOME | End: 2025-03-12
Payer: COMMERCIAL

## 2025-03-12 VITALS
DIASTOLIC BLOOD PRESSURE: 64 MMHG | TEMPERATURE: 98 F | HEIGHT: 59 IN | OXYGEN SATURATION: 97 % | SYSTOLIC BLOOD PRESSURE: 96 MMHG | WEIGHT: 142.75 LBS | HEART RATE: 94 BPM | BODY MASS INDEX: 28.78 KG/M2

## 2025-03-12 DIAGNOSIS — B96.89 BACTERIAL RESPIRATORY INFECTION: ICD-10-CM

## 2025-03-12 DIAGNOSIS — J98.8 BACTERIAL RESPIRATORY INFECTION: Primary | ICD-10-CM

## 2025-03-12 DIAGNOSIS — I27.0 PRIMARY PULMONARY HYPERTENSION: ICD-10-CM

## 2025-03-12 DIAGNOSIS — B96.89 BACTERIAL RESPIRATORY INFECTION: Primary | ICD-10-CM

## 2025-03-12 DIAGNOSIS — R05.1 ACUTE COUGH: ICD-10-CM

## 2025-03-12 DIAGNOSIS — J45.20 MILD INTERMITTENT ASTHMA WITHOUT COMPLICATION: ICD-10-CM

## 2025-03-12 DIAGNOSIS — J98.8 BACTERIAL RESPIRATORY INFECTION: ICD-10-CM

## 2025-03-12 PROCEDURE — 1160F RVW MEDS BY RX/DR IN RCRD: CPT | Mod: CPTII,S$GLB,,

## 2025-03-12 PROCEDURE — 71046 X-RAY EXAM CHEST 2 VIEWS: CPT | Mod: TC,FY,PO

## 2025-03-12 PROCEDURE — 99214 OFFICE O/P EST MOD 30 MIN: CPT | Mod: S$GLB,,,

## 2025-03-12 PROCEDURE — 71046 X-RAY EXAM CHEST 2 VIEWS: CPT | Mod: 26,,, | Performed by: RADIOLOGY

## 2025-03-12 PROCEDURE — G2211 COMPLEX E/M VISIT ADD ON: HCPCS | Mod: S$GLB,,,

## 2025-03-12 PROCEDURE — 3074F SYST BP LT 130 MM HG: CPT | Mod: CPTII,S$GLB,,

## 2025-03-12 PROCEDURE — 3078F DIAST BP <80 MM HG: CPT | Mod: CPTII,S$GLB,,

## 2025-03-12 PROCEDURE — 1159F MED LIST DOCD IN RCRD: CPT | Mod: CPTII,S$GLB,,

## 2025-03-12 PROCEDURE — 99999 PR PBB SHADOW E&M-EST. PATIENT-LVL V: CPT | Mod: PBBFAC,,,

## 2025-03-12 PROCEDURE — 3008F BODY MASS INDEX DOCD: CPT | Mod: CPTII,S$GLB,,

## 2025-03-12 RX ORDER — ALBUTEROL SULFATE 90 UG/1
2 AEROSOL, METERED RESPIRATORY (INHALATION) EVERY 6 HOURS PRN
Qty: 18 G | Refills: 6 | Status: SHIPPED | OUTPATIENT
Start: 2025-03-12 | End: 2026-03-12

## 2025-03-12 RX ORDER — BENZONATATE 100 MG/1
200 CAPSULE ORAL 3 TIMES DAILY PRN
Qty: 30 CAPSULE | Refills: 0 | Status: SHIPPED | OUTPATIENT
Start: 2025-03-12 | End: 2025-03-22

## 2025-03-12 RX ORDER — AZITHROMYCIN 250 MG/1
TABLET, FILM COATED ORAL
Qty: 6 TABLET | Refills: 0 | Status: SHIPPED | OUTPATIENT
Start: 2025-03-12 | End: 2025-03-17

## 2025-03-12 RX ORDER — PROMETHAZINE HYDROCHLORIDE AND DEXTROMETHORPHAN HYDROBROMIDE 6.25; 15 MG/5ML; MG/5ML
5 SYRUP ORAL EVERY 8 HOURS PRN
Qty: 118 ML | Refills: 0 | Status: SHIPPED | OUTPATIENT
Start: 2025-03-12 | End: 2025-03-22

## 2025-03-12 RX ORDER — METHYLPREDNISOLONE 4 MG/1
TABLET ORAL
Qty: 21 EACH | Refills: 0 | Status: SHIPPED | OUTPATIENT
Start: 2025-03-12 | End: 2025-04-02

## 2025-03-12 NOTE — PROGRESS NOTES
HPI     Yuni Perez is a 54 y.o. female with multiple medical diagnoses as listed in the medical history and problem list that presents for follow up on cough and wheezing. PCP Dr. Sandhu with last visit in this clinic on 1/31/25.     Chief Complaint   Patient presents with    Follow-up    Cough    Otalgia     HPI    CHIEF COMPLAINT:  Patient presents with persistent cough, wheezing, and respiratory symptoms following a recent illness.  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     HPI:  Patient reports being ill for approximately 10 days with symptoms including coughing, wheezing, and production of yellow-green mucus. The mucus has since cleared, but a residual cough and wheezing persist, which she attributes to inflammation. She had initial fevers and chills, with temperatures exceeding 100.5°F, though they have since reduced to low-grade fevers. She describes the symptoms as similar to bronchitis, with no crackles in the chest but concentrated in the throat area. There has been constant nasal drip, causing persistent coughing and sleep disturbances. She has a history of allergies and allergic asthma, with previous episodes of wheezing.    She has been using an albuterol inhaler and Breo Ellipta to manage symptoms and has a nebulizer at home. She reports occasional chest pain with hard coughing, with initial discomfort in the diaphragm area that has since resolved.  At the onset of illness, she had a loss of appetite and difficulty maintaining nutrition, consuming chicken noodle soup and ginger ale. She has been avoiding contact with her elderly father due to concerns about the infectious nature of her illness.    She was recently evaluated by an ENT specialist, who diagnosed some hearing loss in left ear and recommended avoiding caffeine and stress that can worsen tinnitus. She has been having dry mouth and using Biotin lozenges to  manage this symptom. She has also been taking Nurtec for headaches daily.    She denies shortness of breath, lightheadedness, and weakness.    MEDICATIONS:  Patient is on Albuterol inhaler as needed for wheezing and Breo Ellipta for allergic asthma. She takes Nurtec daily and Fioricet as needed for headaches. She recently completed a course of Cefdinir for sinusitis at the end of January.    MEDICAL HISTORY:  Patient has a history of allergies, allergic asthma, tinnitus, hearing loss in one ear, and pulmonary hypertension.    FAMILY HISTORY:  Family history is significant for her father who is 92 years old and still teaching and functioning.    ALLERGIES:  Patient is allergic to Doxycycline and Amoxicillin.       Assessment & Plan     1. Bacterial respiratory infection  2. Acute cough    Lungs with expiratory wheeze to left lower lobe; otherwise CTA bilaterally. Denies shortness of breath, except with coughing fits. Given history and clinical appearance, will obtain chest xray. Given history of asthma and fevers, will treat with Z-pack and PO steroids. Follow up with clinic for any worsening symptoms, or if symptoms fail to improve.     ED precautions given.     - azithromycin (Z-VINH) 250 MG tablet; Take 2 tablets by mouth on day 1; Take 1 tablet by mouth on days 2-5  Dispense: 6 tablet; Refill: 0  - X-Ray Chest PA And Lateral; Future  - methylPREDNISolone (MEDROL DOSEPACK) 4 mg tablet; use as directed  Dispense: 21 each; Refill: 0  - promethazine-dextromethorphan (PROMETHAZINE-DM) 6.25-15 mg/5 mL Syrp; Take 5 mLs by mouth every 8 (eight) hours as needed (cough).  Dispense: 118 mL; Refill: 0  - benzonatate (TESSALON) 100 MG capsule; Take 2 capsules (200 mg total) by mouth 3 (three) times daily as needed for Cough.  Dispense: 30 capsule; Refill: 0    3. Mild intermittent asthma without complication    Currently having flare up due to respiratory infection; recommended continued albuterol inhaler q 6 hours PRN. Denies  shortness of breath. Follow up with clinic for any worsening symptoms, or if symptoms fail to improve.       - albuterol (VENTOLIN HFA) 90 mcg/actuation inhaler; Inhale 2 puffs into the lungs every 6 (six) hours as needed for Wheezing or Shortness of Breath. Rescue  Dispense: 18 g; Refill: 6    4. Primary pulmonary hypertension    Stable, followed by Cardiology. The current medical regimen is effective;  continue present plan and medications.            Discussed DDx, condition, and treatment.   Education sent to patient portal/included in after visit summary.  ED precautions given.   Notify provider if symptoms do not resolve or increase in severity.   Patient verbalizes understanding and agrees with plan of care.  --------------------------------------------      Health Maintenance:  Health Maintenance         Date Due Completion Date    Colorectal Cancer Screening Never done ---    Shingles Vaccine (1 of 2) Never done ---    Pneumococcal Vaccines (Age 50+) (3 of 3 - PCV20 or PCV21) 01/18/2022 1/18/2017    Hemoglobin A1c (Diabetic Prevention Screening) 09/21/2023 9/21/2020    COVID-19 Vaccine (4 - 2024-25 season) 09/01/2024 7/1/2022    Lipid Panel 09/21/2025 9/21/2020    Cervical Cancer Screening 11/23/2025 11/23/2020    Override on 9/17/2014: Done (Dr. Khan)    Override on 2/1/2012: Done    Override on 6/10/2003: Done    Mammogram 12/13/2025 12/13/2024    Override on 9/2/2013: Done    Override on 3/1/2011: Done    DEXA Scan 12/20/2025 12/20/2023    TETANUS VACCINE 08/09/2033 8/9/2023    RSV Vaccine (Age 60+ and Pregnant patients) (1 - 1-dose 75+ series) 03/18/2046 ---            Discussed the importance of overdue vaccines which were offered during this encounter. Patient declined overdue vaccines at this time and Advised patient on the importance of completing overdue health maintenance items    Follow Up:  Follow up if symptoms worsen or fail to improve.    Exam     Review of Systems:  (as noted  above)  Review of Systems   Constitutional:  Positive for appetite change, fatigue and fever. Negative for activity change.   HENT:  Positive for congestion and rhinorrhea. Negative for sore throat and trouble swallowing.    Respiratory:  Positive for cough and wheezing. Negative for shortness of breath.    Cardiovascular:  Negative for chest pain.   Gastrointestinal:  Negative for blood in stool and vomiting.   Genitourinary:  Negative for hematuria.   Skin:  Negative for rash.       Physical Exam:   Physical Exam  Constitutional:       General: She is not in acute distress.     Appearance: Normal appearance. She is not ill-appearing.   HENT:      Head: Normocephalic and atraumatic.      Right Ear: Tympanic membrane normal.      Left Ear: Tympanic membrane normal.      Nose: Congestion and rhinorrhea present.      Mouth/Throat:      Mouth: Mucous membranes are moist.      Pharynx: Posterior oropharyngeal erythema present. No oropharyngeal exudate.   Cardiovascular:      Rate and Rhythm: Normal rate and regular rhythm.      Pulses: Normal pulses.      Heart sounds: Normal heart sounds. No murmur heard.  Pulmonary:      Effort: Pulmonary effort is normal. No respiratory distress.      Breath sounds: Examination of the left-lower field reveals wheezing. Wheezing present.   Musculoskeletal:      Cervical back: Normal range of motion and neck supple. Tenderness present. No rigidity.   Lymphadenopathy:      Cervical: Cervical adenopathy present.   Skin:     General: Skin is warm and dry.      Capillary Refill: Capillary refill takes less than 2 seconds.   Neurological:      General: No focal deficit present.      Mental Status: She is alert and oriented to person, place, and time.   Psychiatric:         Mood and Affect: Mood normal.         Behavior: Behavior normal.       Vitals:    03/12/25 1402 03/12/25 1430   BP: 96/64    BP Location: Right arm    Patient Position: Sitting    Pulse: 94    Temp: 98.2 °F (36.8 °C)   "  [facility]    SpO2: (!) 92% 97%   Weight: 64.8 kg (142 lb 12 oz)    Height: 4' 11" (1.499 m)       Body mass index is 28.83 kg/m².        History     Past Medical History:  Past Medical History:   Diagnosis Date    AR (allergic rhinitis)     Cholelithiasis, common bile duct     Chronic low back pain     Eye pressure 2017    General anesthetics causing adverse effect in therapeutic use     GERD (gastroesophageal reflux disease)     History of migraine headaches     Hypothyroidism     Innocent heart murmur     Lumbar disc disease     Menorrhagia     Mild asthma     Obesity     Plantar fasciitis of left foot     Primary pulmonary hypertension     followed by heart transplant/pulmonary     Seizure disorder     x 1 in 2008    Seizures     Sleep apnea     TMJ (dislocation of temporomandibular joint)     Tricuspid regurgitation        Past Surgical History:  Past Surgical History:   Procedure Laterality Date    CARDIAC CATHETERIZATION      CATHETERIZATION OF BOTH LEFT AND RIGHT HEART Bilateral 9/29/2020    Procedure: CATHETERIZATION, HEART, BOTH LEFT AND RIGHT;  Surgeon: Gucci Pickett MD;  Location: [facility] CATH LAB;  Service: Cardiology;  Laterality: Bilateral;    CENTRAL VENOUS CATHETER INSERTION      CORONARY ANGIOGRAPHY N/A 9/29/2020    Procedure: ANGIOGRAM, CORONARY ARTERY;  Surgeon: Gucci Pickett MD;  Location: [facility] CATH LAB;  Service: Cardiology;  Laterality: N/A;    gallbladder drain  06/2019    INSERTION OF HAYNES CATHETER Right 6/17/2020    Procedure: INSERTION, CATHETER, CENTRAL VENOUS, HAYNES Replace Single Lumen for Remodulin Infusion;  Surgeon: Bertin Dewitt MD;  Location: [facility] OR [address] [place] [address];  Service: General;  Laterality: Right;    PORTACATH PLACEMENT      REMOVAL OF TUNNELED CENTRAL VENOUS CATHETER (CVC) N/A 6/17/2020    Procedure: REMOVAL, CATHETER, CENTRAL VENOUS, TUNNELED;  Surgeon: Bertin Dewitt MD;  Location: [facility] OR [address] [place] [address];  Service: General;  Laterality: N/A;    RIGHT HEART " "CATHETERIZATION Right 8/20/2018    Procedure: HEART CATH-RIGHT;  Surgeon: Ivonne Rai MD;  Location: [facility] CATH LAB;  Service: Cardiology;  Laterality: Right;    RIGHT HEART CATHETERIZATION Right 9/4/2019    Procedure: INSERTION, CATHETER, RIGHT HEART;  Surgeon: Ivonne Rai MD;  Location: [facility] CATH LAB;  Service: Cardiology;  Laterality: Right;    RIGHT HEART CATHETERIZATION Right 6/10/2022    Procedure: INSERTION, CATHETER, RIGHT HEART;  Surgeon: Keshia Poe MD;  Location: [facility] CATH LAB;  Service: Cardiology;  Laterality: Right;    UPPER GASTROINTESTINAL ENDOSCOPY         Social History:  Social History[1]    Family History:  Family History   Problem Relation Name Age of Onset    Diabetes Mother      Heart disease Father      Glaucoma Father      No Known Problems Sister      Cancer Maternal Aunt          breast    Breast cancer Maternal Aunt      No Known Problems Maternal Uncle      No Known Problems Paternal Aunt      No Known Problems Paternal Uncle      Cancer Maternal Grandmother          uterine    Diabetes Maternal Grandfather      Heart attack Maternal Grandfather      No Known Problems Paternal Grandmother      Heart disease Paternal Grandfather      Heart attack Paternal Grandfather      Diabetes Paternal Grandfather      Leukemia Brother      Breast cancer Cousin      Breast cancer Cousin      Hypertension Other      Colon cancer Neg Hx      Ovarian cancer Neg Hx      Amblyopia Neg Hx      Blindness Neg Hx      Cataracts Neg Hx      Macular degeneration Neg Hx      Retinal detachment Neg Hx      Strabismus Neg Hx      Stroke Neg Hx      Thyroid disease Neg Hx      Esophageal cancer Neg Hx         Allergies and Medications: (updated and reviewed)  Review of patient's allergies indicates:   Allergen Reactions    Fentanyl Anaphylaxis     Respiratory distress    Vibra-tabs [doxycycline hyclate] Anaphylaxis     "throat felt like it was closing"    Adhesive Hives     Silk tape    Amoxicillin " Rash    Nsaids (non-steroidal anti-inflammatory drug) Swelling    Chlorhexidine Other (See Comments)     Blue Chlorhexidine causes hives     Current Medications[2]    Patient Care Team:  Lulu Sandhu MD as PCP - General (Internal Medicine)  Ralph Whyte MD (Inactive) as Referring Physician (Intensive Care)  Ian Lackey MD (Inactive) as Consulting Physician (Pulmonary Disease)  Monika Dalton LPN as Care Coordinator  Dorene Ny RN as Pulmonary Hypertension Coordinator (Advanced Heart Failure & Transplant Cardiology)  Bárbara Langford NP as Nurse Practitioner (Cardiology)  Aye Stout RN as Pulmonary Hypertension Coordinator (Advanced Heart Failure & Transplant Cardiology)         - The patient is given an After Visit Summary that lists all medications with directions, allergies, education, orders placed during this encounter and follow-up instructions.      - I have reviewed the patient's medical information including past medical, family, and social history sections including the medications and allergies.      - We discussed the patient's current medications.     This note was created by combination of typed  and MModal dictation.  Transcription errors may be present.  If there are any questions, please contact me.     This note was generated with the assistance of ambient listening technology. Verbal consent was obtained by the patient and accompanying visitor(s) for the recording of patient appointment to facilitate this note. I attest to having reviewed and edited the generated note for accuracy, though some syntax or spelling errors may persist. Please contact the author of this note for any clarification.                  NAHID Nam           [1]   Social History  Socioeconomic History    Marital status: Single    Number of children: 0   Occupational History    Occupation: disabled     Employer: [employer]   Tobacco Use     Smoking status: Never    Smokeless tobacco: Never   Substance and Sexual Activity    Alcohol use: No     Alcohol/week: 0.8 standard drinks of alcohol     Types: 1 Standard drinks or equivalent per week     Comment: socially    Drug use: No    Sexual activity: Never     Birth control/protection: OCP, Pill     Comment: pt is a virgin     Social Drivers of Health     Financial Resource Strain: Low Risk  (2/28/2025)    Overall Financial Resource Strain (CARDIA)     Difficulty of Paying Living Expenses: Not very hard   Food Insecurity: Patient Declined (2/28/2025)    Hunger Vital Sign     Worried About Running Out of Food in the Last Year: Patient declined     Ran Out of Food in the Last Year: Patient declined   Transportation Needs: Patient Declined (2/28/2025)    PRAPARE - Transportation     Lack of Transportation (Medical): Patient declined     Lack of Transportation (Non-Medical): Patient declined   Physical Activity: Unknown (2/28/2025)    Exercise Vital Sign     Days of Exercise per Week: 1 day   Stress: Stress Concern Present (2/28/2025)    [employer] - Occupational Stress Questionnaire     Feeling of Stress : To some extent   Housing Stability: Patient Declined (2/28/2025)    Housing Stability Vital Sign     Unable to Pay for Housing in the Last Year: Patient declined     Homeless in the Last Year: Patient declined   [2]   Current Outpatient Medications   Medication Sig Dispense Refill    acetaminophen (TYLENOL) 500 MG tablet Take 1,000 mg by mouth every 6 (six) hours as needed for Pain.      ADCIRCA 20 mg Tab Take 2 tablets (40 mg total) by mouth once daily. 60 tablet 11    alprazolam (XANAX ORAL) Take by mouth as needed.      ambrisentan (LETAIRIS) 10 MG Tab Take 1 tablet (10 mg total) by mouth once daily. 30 tablet 11    BREO ELLIPTA 200-25 mcg/dose DsDv diskus inhaler INHALE 1 PUFF INTO THE LUNGS EVERY EVENING. CONTROLLER 180 each 0    cyanocobalamin, vitamin B-12, 2,500 mcg Subl  Place 2,500 mcg under the tongue every morning.       diphenhydrAMINE (BENADRYL) 25 mg capsule Take 50 mg by mouth every 6 (six) hours as needed for Itching. Patient takes prn evenings      diphenoxylate-atropine 2.5-0.025 mg (LOMOTIL) 2.5-0.025 mg per tablet Take 1 tablet by mouth 4 (four) times daily as needed for Diarrhea. 120 tablet 0    ferrous sulfate 325 (65 FE) MG EC tablet Take 325 mg by mouth daily as needed.       fluticasone propionate (FLONASE) 50 mcg/actuation nasal spray 2 sprays (100 mcg total) by Each Nostril route every evening. 16 g 11    folic acid (FOLVITE) 1 MG tablet Take 1 tablet (1,000 mcg total) by mouth once daily. 90 tablet 3    furosemide (LASIX) 20 MG tablet Take 1 tablet (20 mg total) by mouth once daily. 30 tablet 11    glucosam/chond-msm1/C/michele/bor (GLUCOSAMINE-CHOND-MSM COMPLEX ORAL) Take by mouth.      hyoscyamine (LEVSIN) 0.125 mg Subl Place 1 tablet (0.125 mg total) under the tongue every 6 (six) hours as needed (Aa needed). 60 tablet 3    lactic acid-citric-potassium (PHEXXI) 1.8-1-0.4 % Gel Place 1 Applicatorful vaginally as needed (CONTRACEPTIVE). 5 g 2    levothyroxine (SYNTHROID) 125 MCG tablet Take 1 tablet (125 mcg total) by mouth before breakfast. 90 tablet 2    LIDOcaine (LIDODERM) 5 % Place 1 patch onto the skin once daily. 15 patch 0    loratadine (CLARITIN) 10 mg tablet Take 10 mg by mouth every evening.       ondansetron (ZOFRAN) 4 MG tablet Take 1 tablet (4 mg total) by mouth every 6 (six) hours as needed. 1 Tablet Oral Every 6 hours 30 tablet 2    pantoprazole (PROTONIX) 40 MG tablet Take 1 tablet (40 mg total) by mouth once daily. 90 tablet 3    rimegepant (NURTEC) 75 mg odt Take 1 tablet (75 mg total) by mouth as needed for Migraine. Place ODT tablet on the tongue; alternatively the ODT tablet may be placed under the tongue. Can take 2nd dose in 24 hrs if mild relief, no more than 2 in 24 hrs 16 tablet 5    tiZANidine (ZANAFLEX) 4 MG tablet Take 4 mg by mouth  every 6 (six) hours as needed.      topiramate (TOPAMAX) 100 MG tablet Take 1 tablet (100 mg total) by mouth 2 (two) times daily. 60 tablet 5    treprostinil (REMODULIN) 2.5 mg/mL Soln Mix cassette as directed and infuse continuously per physician titration orders on dosing sheet. Current dose 80ng/kg/min 60 mL 11    albuterol (VENTOLIN HFA) 90 mcg/actuation inhaler Inhale 2 puffs into the lungs every 6 (six) hours as needed for Wheezing or Shortness of Breath. Rescue 18 g 6    albuterol-ipratropium (DUO-NEB) 2.5 mg-0.5 mg/3 mL nebulizer solution Take 3 mLs by nebulization every 6 (six) hours as needed for Wheezing. Rescue 6 each 6    azithromycin (Z-VINH) 250 MG tablet Take 2 tablets by mouth on day 1; Take 1 tablet by mouth on days 2-5 6 tablet 0    benzonatate (TESSALON) 100 MG capsule Take 2 capsules (200 mg total) by mouth 3 (three) times daily as needed for Cough. 30 capsule 0    methylPREDNISolone (MEDROL DOSEPACK) 4 mg tablet use as directed 21 each 0    promethazine-dextromethorphan (PROMETHAZINE-DM) 6.25-15 mg/5 mL Syrp Take 5 mLs by mouth every 8 (eight) hours as needed (cough). 118 mL 0     No current facility-administered medications for this visit.

## 2025-03-13 ENCOUNTER — RESULTS FOLLOW-UP (OUTPATIENT)
Dept: FAMILY MEDICINE | Facility: CLINIC | Age: 55
End: 2025-03-13

## 2025-03-14 ENCOUNTER — CLINICAL SUPPORT (OUTPATIENT)
Dept: REHABILITATION | Facility: HOSPITAL | Age: 55
End: 2025-03-14
Payer: COMMERCIAL

## 2025-03-14 DIAGNOSIS — R29.898 DECREASED RANGE OF MOTION OF NECK: Primary | ICD-10-CM

## 2025-03-14 PROCEDURE — 97112 NEUROMUSCULAR REEDUCATION: CPT | Mod: PN

## 2025-03-14 PROCEDURE — 97110 THERAPEUTIC EXERCISES: CPT | Mod: PN

## 2025-03-14 NOTE — PROGRESS NOTES
"  [facility]   Physical Therapy Treatment Note     Name: Yuni Perez  Clinic Number: [id number]    Therapy Diagnosis:   Encounter Diagnosis   Name Primary?    Decreased range of motion of neck Yes       Physician: Africa Garcia MD    Visit Date: 3/14/2025    Physician Orders: PT Eval and Treat   Medical Diagnosis from Referral:   M54.2 (ICD-10-CM) - Cervicalgia   M54.2,G89.29 (ICD-10-CM) - Chronic neck pain      Evaluation Date: 2/3/2025  Plan of Care Expiration: 5/3/25  Progress Note: 3/3/2025  Authorization Period Expiration: 12/31/25  Visit # / Visits authorized: 3/ 20     Precautions: Standard     PTA Visit #: 1/5  Time In: 10:00 am  Time Out: 10:40 am   Total Billable Time: 40  minutes    Subjective     Pt reports: that she feels allergies. She is not feeing good today.     She was compliant with home exercise program.  Response to previous treatment: first treatment day  Functional change: ongoing    Pain: 5/10  Location: right neck      Objective     Yuni participated in dynamic functional therapeutic activities to improve functional performance for 00  minutes, including:      Yuni received the following manual therapy techniques: Manual traction and Soft tissue Mobilization were applied to the: cervical and shoulders for 00  minutes, including:      Yuni participated in neuromuscular re-education activities to improve: Coordination, Proprioception, Posture, and muscle control for 30 minutes. The following activities were included:  +Monserrat Flexion/scaption 3 min x 3 sec hold  Cervical rotation SNAGs stretch + postural control 10x 5" hold   Chin tucks + postural control 2x10x3"  Wrist Flex stretch + postural control 3x20"  Wrist Ext stretch + postural control 3x20"  Scapular Retraction + postural control 2x10x3"  Pec stretch on 1/2 roll 1 min     Yuni received therapeutic exercises to develop strength, endurance, ROM, flexibility, and posture for " "10 minutes including:  Upper trap + postural control 3x20"  Levator scapulae + postural control 3x20"  Doorway stretch + postural control 3x30"  Supine Shoulder Flexion with wand +3x10  Supine Chest press with wand +3x10    Home Exercises Provided and Patient Education Provided     Written Home Exercises Provided: Patient instructed to cont prior HEP.  Exercises were reviewed and Yuni was able to demonstrate them prior to the end of the session.  Yuni demonstrated good  understanding of the education provided.     Education provided:    HEP      Assessment   Ms. Morrissey presented to PT today reporting slight allergic reaction. Pt is not feeling good today. We cont with postural exercises today. We cont with same exercise as last visit. No supine or STM today. Pt required Min v/c's to perform exercises with proper form and proper muscles activation.  Plan to cont with POC.     Yuni Is progressing well towards her goals.   Pt prognosis is Good.     Pt will continue to benefit from skilled outpatient physical therapy to address the deficits listed in the problem list box on initial evaluation, provide pt/family education and to maximize pt's level of independence in the home and community environment.     Pt's spiritual, cultural and educational needs considered and pt agreeable to plan of care and goals.     Anticipated barriers to physical therapy: none    Goals:   Short Term Goals (4 Weeks):      1.Pt to increase strength by a 1/2 grade of muscles test to allow for improvement in functional activities such as performing chores.  2.Pt to improve range of motion by 25% to allow for improved functional mobility to allow for improvement in IADLs.   3.Pt to report compliance with HEP and demonstrate proper exercise technique to PT to show competence with self management of condition.  4.Decrease pain by 25% during functional activities.     Long Term Goals (12 Weeks):      1. Increase ROM to allow " improved joint biomechanics during functional activities.   2.Increase trunk and upper extremity strength to within normal limits during functional activities.   3. Independent with home exercise program.   4. Full return to functional activities with manageable complaints.  5. Patient to demonstrate improved posture and body mechanics.  6. Decrease pain by 75% during functional activities.    Plan     Plan of care Certification: 2/3/2025 to 5/3/25.     Recommended Treatment Plan: 2 times per week for 12 weeks with treatments to consist of:  Neuromuscular and postural re-education,  training, therapeutic exercise, therapeutic activities,balance training, gait training, manual therapy, soft tissue mobilization, ROM exercises, Cardiovascular,  Postural stabilization, manual traction, spinal mobilization, moist heat, cryotherapy, electrical stimulation, ultrasound, home exercise education and planning.    Max Reddy, PT   3/14/2025

## 2025-03-21 ENCOUNTER — TELEPHONE (OUTPATIENT)
Dept: FAMILY MEDICINE | Facility: CLINIC | Age: 55
End: 2025-03-21
Payer: COMMERCIAL

## 2025-03-23 NOTE — TELEPHONE ENCOUNTER
Care Due:                  Date            Visit Type   Department     Provider  --------------------------------------------------------------------------------                                 -         [facility]                              PRIMARY      MED/ INTERNAL  [facility]  Last Visit: 11-      CARE ([facility])   MED/ PEDS      Devon Sandhu                               -         [facility]                              PRIMARY      MED[facility]  [facility]  Next Visit: 08-      CARE ([facility])   MED/ PEDS      Devon Sandhu                                                            Last  Test          Frequency    Reason                     Performed    Due Date  --------------------------------------------------------------------------------    Office Visit  15 months..  BREO.....................  11-   02-    [employer] Embedded Care Due Messages. Reference number: [id number].   3/23/2025 12:22:20 AM TARAS

## 2025-03-24 RX ORDER — FLUTICASONE FUROATE AND VILANTEROL TRIFENATATE 200; 25 UG/1; UG/1
POWDER RESPIRATORY (INHALATION)
Qty: 180 EACH | Refills: 1 | Status: SHIPPED | OUTPATIENT
Start: 2025-03-24

## 2025-03-24 NOTE — TELEPHONE ENCOUNTER
Refill Routing Note   Medication(s) are not appropriate for processing by [facility] for the following reason(s):        Patient not seen by provider within 15 months    [facility] action(s):  Defer        Medication Therapy Plan: FOV      Appointments  past 12m or future 3m with PCP    Date Provider   Last Visit   11/15/2023 Lulu Sandhu MD   Next Visit   8/6/2025 Lulu Sandhu MD   ED visits in past 90 days: 0        Note composed:10:28 AM 03/24/2025

## 2025-03-25 ENCOUNTER — CLINICAL SUPPORT (OUTPATIENT)
Dept: REHABILITATION | Facility: HOSPITAL | Age: 55
End: 2025-03-25
Payer: COMMERCIAL

## 2025-03-25 DIAGNOSIS — R29.898 DECREASED RANGE OF MOTION OF NECK: Primary | ICD-10-CM

## 2025-03-25 PROCEDURE — 97140 MANUAL THERAPY 1/> REGIONS: CPT | Mod: PN

## 2025-03-25 PROCEDURE — 97112 NEUROMUSCULAR REEDUCATION: CPT | Mod: PN

## 2025-03-25 PROCEDURE — 97110 THERAPEUTIC EXERCISES: CPT | Mod: PN

## 2025-03-25 NOTE — PROGRESS NOTES
"  [facility]   Physical Therapy Treatment Note     Name: Yuni Perez  Clinic Number: [id number]    Therapy Diagnosis:   Encounter Diagnosis   Name Primary?    Decreased range of motion of neck Yes         Physician: Africa Garcia MD    Visit Date: 3/25/2025    Physician Orders: PT Eval and Treat   Medical Diagnosis from Referral:   M54.2 (ICD-10-CM) - Cervicalgia   M54.2,G89.29 (ICD-10-CM) - Chronic neck pain      Evaluation Date: 2/3/2025  Plan of Care Expiration: 5/3/25  Progress Note: 3/3/2025  Authorization Period Expiration: 12/31/25  Visit # / Visits authorized: 4/ 20     Precautions: Standard     PTA Visit #: 1/5  Time In: 9:00 am  Time Out: 9:55 am   Total Billable Time: 55  minutes    Subjective     Pt reports: that she feels allergies. She is not feeing good today.     She was compliant with home exercise program.  Response to previous treatment: first treatment day  Functional change: ongoing    Pain: 5/10  Location: right neck      Objective     Yuni participated in dynamic functional therapeutic activities to improve functional performance for 00  minutes, including:      Yuni received the following manual therapy techniques: Manual traction and Soft tissue Mobilization were applied to the: cervical and shoulders for 10  minutes, including:  STM R cervical     Yuni participated in neuromuscular re-education activities to improve: Coordination, Proprioception, Posture, and muscle control for 35   minutes. The following activities were included:  +Monserrat Flexion/scaption 3 min x 3 sec hold  Cervical rotation SNAGs stretch + postural control 10x 5" hold   Chin tucks + postural control 2x10x3"  Wrist Flex stretch + postural control 3x20"  Wrist Ext stretch + postural control 3x20"  Scapular Retraction + postural control 2x10x3"  Pec stretch on 1/2 roll 1 min     Yuni received therapeutic exercises to develop strength, endurance, ROM, " "flexibility, and posture for 10 minutes including:  Upper trap + postural control 3x20"  Levator scapulae + postural control 3x20"  Doorway stretch + postural control 3x30"  Supine Shoulder Flexion with wand +3x10  Supine Chest press with wand +3x10    Home Exercises Provided and Patient Education Provided     Written Home Exercises Provided: Patient instructed to cont prior HEP.  Exercises were reviewed and Yuni was able to demonstrate them prior to the end of the session.  Yuni demonstrated good  understanding of the education provided.     Education provided:    HEP      Assessment   Ms. Morrissey presented to PT today reporting feeling better. Pt still have R neck pain. We cont with postural exercises today. We cont with same exercise as last visit. STM performed to R cervical today. STM restriction was noted. Pt required Min v/c's to perform exercises with proper form and proper muscles activation.  Plan to cont with POC.     Yuni Is progressing well towards her goals.   Pt prognosis is Good.     Pt will continue to benefit from skilled outpatient physical therapy to address the deficits listed in the problem list box on initial evaluation, provide pt/family education and to maximize pt's level of independence in the home and community environment.     Pt's spiritual, cultural and educational needs considered and pt agreeable to plan of care and goals.     Anticipated barriers to physical therapy: none    Goals:   Short Term Goals (4 Weeks):      1.Pt to increase strength by a 1/2 grade of muscles test to allow for improvement in functional activities such as performing chores.  2.Pt to improve range of motion by 25% to allow for improved functional mobility to allow for improvement in IADLs.   3.Pt to report compliance with HEP and demonstrate proper exercise technique to PT to show competence with self management of condition.  4.Decrease pain by 25% during functional activities.     Long Term " Goals (12 Weeks):      1. Increase ROM to allow improved joint biomechanics during functional activities.   2.Increase trunk and upper extremity strength to within normal limits during functional activities.   3. Independent with home exercise program.   4. Full return to functional activities with manageable complaints.  5. Patient to demonstrate improved posture and body mechanics.  6. Decrease pain by 75% during functional activities.    Plan     Plan of care Certification: 2/3/2025 to 5/3/25.     Recommended Treatment Plan: 2 times per week for 12 weeks with treatments to consist of:  Neuromuscular and postural re-education,  training, therapeutic exercise, therapeutic activities,balance training, gait training, manual therapy, soft tissue mobilization, ROM exercises, Cardiovascular,  Postural stabilization, manual traction, spinal mobilization, moist heat, cryotherapy, electrical stimulation, ultrasound, home exercise education and planning.    Max Reddy, PT   3/25/2025

## 2025-03-26 ENCOUNTER — TELEPHONE (OUTPATIENT)
Dept: HEPATOLOGY | Facility: CLINIC | Age: 55
End: 2025-03-26
Payer: COMMERCIAL

## 2025-03-26 NOTE — TELEPHONE ENCOUNTER
Contacted the pt requesting to be schedule her follow up,I informed and apologized to the pt that July slot is not open yet.I informed the pt that I will call her once its open.Pt verbalized understanding.

## 2025-03-26 NOTE — TELEPHONE ENCOUNTER
----- Message from Jory sent at 3/26/2025 10:02 AM CDT -----  Regarding: sched f/u  Contact: Patient can be contacted @# [phone]  PT called needing to schedule f/u w/Dr. Yuan - Fausto and  sched 4/7. Please advsiePatient can be contacted @# [phone]

## 2025-03-30 ENCOUNTER — HOSPITAL ENCOUNTER (INPATIENT)
Facility: HOSPITAL | Age: 55
LOS: 8 days | Discharge: HOME HEALTH | DRG: 445 | End: 2025-04-07
Attending: EMERGENCY MEDICINE | Admitting: INTERNAL MEDICINE
Payer: COMMERCIAL

## 2025-03-30 DIAGNOSIS — K80.00 CALCULUS OF GALLBLADDER WITH ACUTE CHOLECYSTITIS WITHOUT OBSTRUCTION: ICD-10-CM

## 2025-03-30 DIAGNOSIS — R10.10 PAIN OF UPPER ABDOMEN: ICD-10-CM

## 2025-03-30 DIAGNOSIS — K81.9 CHOLECYSTITIS: Primary | ICD-10-CM

## 2025-03-30 DIAGNOSIS — R11.2 NAUSEA & VOMITING: ICD-10-CM

## 2025-03-30 DIAGNOSIS — I27.21 WHO GROUP 1 PULMONARY ARTERIAL HYPERTENSION: ICD-10-CM

## 2025-03-30 DIAGNOSIS — R00.0 TACHYCARDIA: ICD-10-CM

## 2025-03-30 DIAGNOSIS — I27.20 PULMONARY HYPERTENSION: ICD-10-CM

## 2025-03-30 DIAGNOSIS — E87.6 HYPOKALEMIA: ICD-10-CM

## 2025-03-30 LAB
ABSOLUTE EOSINOPHIL (OHS): 0 K/UL
ABSOLUTE MONOCYTE (OHS): 1.07 K/UL (ref 0.3–1)
ABSOLUTE NEUTROPHIL COUNT (OHS): 9.73 K/UL (ref 1.8–7.7)
AFP SERPL-MCNC: 2.6 NG/ML
ALBUMIN SERPL BCP-MCNC: 4.2 G/DL (ref 3.5–5.2)
ALP SERPL-CCNC: 90 UNIT/L (ref 40–150)
ALT SERPL W/O P-5'-P-CCNC: 11 UNIT/L (ref 10–44)
ANION GAP (OHS): 12 MMOL/L (ref 8–16)
AST SERPL-CCNC: 19 UNIT/L (ref 11–45)
B-HCG UR QL: NEGATIVE
BASOPHILS # BLD AUTO: 0.02 K/UL
BASOPHILS NFR BLD AUTO: 0.2 %
BILIRUB SERPL-MCNC: 0.6 MG/DL (ref 0.1–1)
BILIRUB UR QL STRIP.AUTO: NEGATIVE
BUN SERPL-MCNC: 11 MG/DL (ref 6–20)
CALCIUM SERPL-MCNC: 9.5 MG/DL (ref 8.7–10.5)
CHLORIDE SERPL-SCNC: 106 MMOL/L (ref 95–110)
CLARITY UR: CLEAR
CO2 SERPL-SCNC: 21 MMOL/L (ref 23–29)
COLOR UR AUTO: YELLOW
CREAT SERPL-MCNC: 0.7 MG/DL (ref 0.5–1.4)
ERYTHROCYTE [DISTWIDTH] IN BLOOD BY AUTOMATED COUNT: 14 % (ref 11.5–14.5)
GFR SERPLBLD CREATININE-BSD FMLA CKD-EPI: >60 ML/MIN/1.73/M2
GLUCOSE SERPL-MCNC: 114 MG/DL (ref 70–110)
GLUCOSE UR QL STRIP: NEGATIVE
HCT VFR BLD AUTO: 47.8 % (ref 37–48.5)
HCV AB SERPL QL IA: NORMAL
HGB BLD-MCNC: 16.2 GM/DL (ref 12–16)
HGB UR QL STRIP: ABNORMAL
HIV 1+2 AB+HIV1 P24 AG SERPL QL IA: NORMAL
IMM GRANULOCYTES # BLD AUTO: 0.04 K/UL (ref 0–0.04)
IMM GRANULOCYTES NFR BLD AUTO: 0.3 % (ref 0–0.5)
INR PPP: 1.1 (ref 0.8–1.2)
KETONES UR QL STRIP: ABNORMAL
LACTATE SERPL-SCNC: 1.1 MMOL/L (ref 0.5–2.2)
LEUKOCYTE ESTERASE UR QL STRIP: NEGATIVE
LIPASE SERPL-CCNC: 18 U/L (ref 4–60)
LYMPHOCYTES # BLD AUTO: 0.69 K/UL (ref 1–4.8)
MAGNESIUM SERPL-MCNC: 2.2 MG/DL (ref 1.6–2.6)
MCH RBC QN AUTO: 29.7 PG (ref 27–50)
MCHC RBC AUTO-ENTMCNC: 33.9 G/DL (ref 32–36)
MCV RBC AUTO: 88 FL (ref 82–98)
NITRITE UR QL STRIP: NEGATIVE
NUCLEATED RBC (/100WBC) (OHS): 0 /100 WBC
PH UR STRIP: 6 [PH]
PLATELET # BLD AUTO: 217 K/UL (ref 150–450)
PMV BLD AUTO: 12.2 FL (ref 9.2–12.9)
POTASSIUM SERPL-SCNC: 3.4 MMOL/L (ref 3.5–5.1)
PROT SERPL-MCNC: 8.1 GM/DL (ref 6–8.4)
PROT UR QL STRIP: ABNORMAL
PROTHROMBIN TIME: 12 SECONDS (ref 9–12.5)
RBC # BLD AUTO: 5.46 M/UL (ref 4–5.4)
RELATIVE EOSINOPHIL (OHS): 0 %
RELATIVE LYMPHOCYTE (OHS): 6 % (ref 18–48)
RELATIVE MONOCYTE (OHS): 9.3 % (ref 4–15)
RELATIVE NEUTROPHIL (OHS): 84.2 % (ref 38–73)
SODIUM SERPL-SCNC: 139 MMOL/L (ref 136–145)
SP GR UR STRIP: >=1.03
TROPONIN I SERPL HS-MCNC: 33 NG/L
UROBILINOGEN UR STRIP-ACNC: NEGATIVE EU/DL
WBC # BLD AUTO: 11.55 K/UL (ref 3.9–12.7)

## 2025-03-30 PROCEDURE — 87086 URINE CULTURE/COLONY COUNT: CPT | Performed by: PHYSICIAN ASSISTANT

## 2025-03-30 PROCEDURE — 25000003 PHARM REV CODE 250: Performed by: PHYSICIAN ASSISTANT

## 2025-03-30 PROCEDURE — 80053 COMPREHEN METABOLIC PANEL: CPT | Performed by: PHYSICIAN ASSISTANT

## 2025-03-30 PROCEDURE — 93005 ELECTROCARDIOGRAM TRACING: CPT

## 2025-03-30 PROCEDURE — 25500020 PHARM REV CODE 255: Performed by: EMERGENCY MEDICINE

## 2025-03-30 PROCEDURE — 99285 EMERGENCY DEPT VISIT HI MDM: CPT | Mod: 25

## 2025-03-30 PROCEDURE — 81025 URINE PREGNANCY TEST: CPT | Performed by: INTERNAL MEDICINE

## 2025-03-30 PROCEDURE — 93010 ELECTROCARDIOGRAM REPORT: CPT | Mod: ,,, | Performed by: INTERNAL MEDICINE

## 2025-03-30 PROCEDURE — 96375 TX/PRO/DX INJ NEW DRUG ADDON: CPT

## 2025-03-30 PROCEDURE — 83735 ASSAY OF MAGNESIUM: CPT | Performed by: PHYSICIAN ASSISTANT

## 2025-03-30 PROCEDURE — 83690 ASSAY OF LIPASE: CPT | Performed by: PHYSICIAN ASSISTANT

## 2025-03-30 PROCEDURE — 20600001 HC STEP DOWN PRIVATE ROOM

## 2025-03-30 PROCEDURE — 87040 BLOOD CULTURE FOR BACTERIA: CPT | Performed by: PHYSICIAN ASSISTANT

## 2025-03-30 PROCEDURE — 94761 N-INVAS EAR/PLS OXIMETRY MLT: CPT

## 2025-03-30 PROCEDURE — 84484 ASSAY OF TROPONIN QUANT: CPT | Performed by: PHYSICIAN ASSISTANT

## 2025-03-30 PROCEDURE — 25000003 PHARM REV CODE 250: Performed by: STUDENT IN AN ORGANIZED HEALTH CARE EDUCATION/TRAINING PROGRAM

## 2025-03-30 PROCEDURE — 83605 ASSAY OF LACTIC ACID: CPT | Performed by: PHYSICIAN ASSISTANT

## 2025-03-30 PROCEDURE — 81003 URINALYSIS AUTO W/O SCOPE: CPT | Performed by: PHYSICIAN ASSISTANT

## 2025-03-30 PROCEDURE — 63600175 PHARM REV CODE 636 W HCPCS: Performed by: PHYSICIAN ASSISTANT

## 2025-03-30 PROCEDURE — 87389 HIV-1 AG W/HIV-1&-2 AB AG IA: CPT | Performed by: NEUROLOGICAL SURGERY

## 2025-03-30 PROCEDURE — 99223 1ST HOSP IP/OBS HIGH 75: CPT | Mod: ,,, | Performed by: INTERNAL MEDICINE

## 2025-03-30 PROCEDURE — 96374 THER/PROPH/DIAG INJ IV PUSH: CPT

## 2025-03-30 PROCEDURE — 85610 PROTHROMBIN TIME: CPT | Performed by: PHYSICIAN ASSISTANT

## 2025-03-30 PROCEDURE — 86803 HEPATITIS C AB TEST: CPT | Performed by: NEUROLOGICAL SURGERY

## 2025-03-30 PROCEDURE — 96376 TX/PRO/DX INJ SAME DRUG ADON: CPT

## 2025-03-30 PROCEDURE — 63600175 PHARM REV CODE 636 W HCPCS: Performed by: STUDENT IN AN ORGANIZED HEALTH CARE EDUCATION/TRAINING PROGRAM

## 2025-03-30 PROCEDURE — 82105 ALPHA-FETOPROTEIN SERUM: CPT | Performed by: PHYSICIAN ASSISTANT

## 2025-03-30 PROCEDURE — 85025 COMPLETE CBC W/AUTO DIFF WBC: CPT | Performed by: PHYSICIAN ASSISTANT

## 2025-03-30 RX ORDER — IPRATROPIUM BROMIDE AND ALBUTEROL SULFATE 2.5; .5 MG/3ML; MG/3ML
3 SOLUTION RESPIRATORY (INHALATION) EVERY 6 HOURS PRN
Status: DISCONTINUED | OUTPATIENT
Start: 2025-03-30 | End: 2025-04-07 | Stop reason: HOSPADM

## 2025-03-30 RX ORDER — CEFTRIAXONE 1 G/1
1 INJECTION, POWDER, FOR SOLUTION INTRAMUSCULAR; INTRAVENOUS
Status: DISCONTINUED | OUTPATIENT
Start: 2025-03-30 | End: 2025-03-30

## 2025-03-30 RX ORDER — CEFEPIME HYDROCHLORIDE 2 G/1
2 INJECTION, POWDER, FOR SOLUTION INTRAVENOUS
Status: DISCONTINUED | OUTPATIENT
Start: 2025-03-30 | End: 2025-04-01

## 2025-03-30 RX ORDER — FLUTICASONE FUROATE AND VILANTEROL 200; 25 UG/1; UG/1
1 POWDER RESPIRATORY (INHALATION) NIGHTLY
Status: DISCONTINUED | OUTPATIENT
Start: 2025-03-30 | End: 2025-04-07 | Stop reason: HOSPADM

## 2025-03-30 RX ORDER — TREPROSTINIL 2.5 MG/ML
80 INJECTION, SOLUTION INTRAVENOUS; SUBCUTANEOUS ONCE
Status: DISCONTINUED | OUTPATIENT
Start: 2025-03-30 | End: 2025-03-30

## 2025-03-30 RX ORDER — MORPHINE SULFATE 4 MG/ML
4 INJECTION, SOLUTION INTRAMUSCULAR; INTRAVENOUS
Refills: 0 | Status: DISCONTINUED | OUTPATIENT
Start: 2025-03-30 | End: 2025-03-30

## 2025-03-30 RX ORDER — DIPHENHYDRAMINE HCL 25 MG
25 CAPSULE ORAL ONCE
Status: COMPLETED | OUTPATIENT
Start: 2025-03-30 | End: 2025-03-31

## 2025-03-30 RX ORDER — OXYCODONE AND ACETAMINOPHEN 5; 325 MG/1; MG/1
1 TABLET ORAL
Refills: 0 | Status: COMPLETED | OUTPATIENT
Start: 2025-03-30 | End: 2025-03-30

## 2025-03-30 RX ORDER — ACETAMINOPHEN 325 MG/1
650 TABLET ORAL EVERY 6 HOURS PRN
Status: DISCONTINUED | OUTPATIENT
Start: 2025-03-30 | End: 2025-04-07 | Stop reason: HOSPADM

## 2025-03-30 RX ORDER — DIPHENHYDRAMINE HCL 25 MG
25 CAPSULE ORAL ONCE
Status: COMPLETED | OUTPATIENT
Start: 2025-03-30 | End: 2025-03-30

## 2025-03-30 RX ORDER — MORPHINE SULFATE 2 MG/ML
2 INJECTION, SOLUTION INTRAMUSCULAR; INTRAVENOUS
Status: COMPLETED | OUTPATIENT
Start: 2025-03-30 | End: 2025-03-30

## 2025-03-30 RX ORDER — METRONIDAZOLE 500 MG/100ML
500 INJECTION, SOLUTION INTRAVENOUS
Status: DISCONTINUED | OUTPATIENT
Start: 2025-03-30 | End: 2025-03-30

## 2025-03-30 RX ORDER — METRONIDAZOLE 500 MG/100ML
500 INJECTION, SOLUTION INTRAVENOUS
Status: DISCONTINUED | OUTPATIENT
Start: 2025-03-30 | End: 2025-04-01

## 2025-03-30 RX ORDER — DIPHENOXYLATE HYDROCHLORIDE AND ATROPINE SULFATE 2.5; .025 MG/1; MG/1
1 TABLET ORAL 4 TIMES DAILY PRN
Status: DISCONTINUED | OUTPATIENT
Start: 2025-03-30 | End: 2025-03-30

## 2025-03-30 RX ORDER — PANTOPRAZOLE SODIUM 40 MG/10ML
40 INJECTION, POWDER, LYOPHILIZED, FOR SOLUTION INTRAVENOUS DAILY
Status: DISCONTINUED | OUTPATIENT
Start: 2025-03-30 | End: 2025-04-07 | Stop reason: HOSPADM

## 2025-03-30 RX ORDER — LANOLIN ALCOHOL/MO/W.PET/CERES
1 CREAM (GRAM) TOPICAL DAILY
Status: DISCONTINUED | OUTPATIENT
Start: 2025-03-31 | End: 2025-04-07 | Stop reason: HOSPADM

## 2025-03-30 RX ORDER — DIPHENHYDRAMINE HCL 25 MG
50 CAPSULE ORAL EVERY 6 HOURS PRN
Status: DISCONTINUED | OUTPATIENT
Start: 2025-03-30 | End: 2025-04-07 | Stop reason: HOSPADM

## 2025-03-30 RX ORDER — SODIUM CHLORIDE 9 MG/ML
INJECTION, SOLUTION INTRAVENOUS CONTINUOUS
Status: ACTIVE | OUTPATIENT
Start: 2025-03-30 | End: 2025-03-31

## 2025-03-30 RX ORDER — ONDANSETRON HYDROCHLORIDE 2 MG/ML
4 INJECTION, SOLUTION INTRAVENOUS EVERY 8 HOURS PRN
Status: DISCONTINUED | OUTPATIENT
Start: 2025-03-30 | End: 2025-04-07 | Stop reason: HOSPADM

## 2025-03-30 RX ORDER — TADALAFIL 20 MG/1
40 TABLET ORAL DAILY
Status: DISCONTINUED | OUTPATIENT
Start: 2025-03-31 | End: 2025-03-30

## 2025-03-30 RX ORDER — SODIUM CHLORIDE 0.9 % (FLUSH) 0.9 %
10 SYRINGE (ML) INJECTION
Status: DISCONTINUED | OUTPATIENT
Start: 2025-03-30 | End: 2025-04-07 | Stop reason: HOSPADM

## 2025-03-30 RX ORDER — ACETAMINOPHEN 325 MG/1
650 TABLET ORAL EVERY 4 HOURS PRN
Status: DISCONTINUED | OUTPATIENT
Start: 2025-03-30 | End: 2025-03-30

## 2025-03-30 RX ORDER — ONDANSETRON 8 MG/1
8 TABLET, ORALLY DISINTEGRATING ORAL EVERY 8 HOURS PRN
Status: DISCONTINUED | OUTPATIENT
Start: 2025-03-30 | End: 2025-04-07 | Stop reason: HOSPADM

## 2025-03-30 RX ORDER — ONDANSETRON HYDROCHLORIDE 2 MG/ML
4 INJECTION, SOLUTION INTRAVENOUS
Status: COMPLETED | OUTPATIENT
Start: 2025-03-30 | End: 2025-03-30

## 2025-03-30 RX ORDER — TADALAFIL 20 MG/1
40 TABLET ORAL EVERY EVENING
Status: DISCONTINUED | OUTPATIENT
Start: 2025-03-30 | End: 2025-04-01

## 2025-03-30 RX ORDER — TRIAMCINOLONE ACETONIDE 5 MG/G
1 OINTMENT TOPICAL 2 TIMES DAILY
COMMUNITY
Start: 2024-11-07

## 2025-03-30 RX ORDER — LEVOTHYROXINE SODIUM 125 UG/1
125 TABLET ORAL
Status: DISCONTINUED | OUTPATIENT
Start: 2025-03-31 | End: 2025-04-07 | Stop reason: HOSPADM

## 2025-03-30 RX ORDER — POLYETHYLENE GLYCOL 3350 17 G/17G
17 POWDER, FOR SOLUTION ORAL 2 TIMES DAILY PRN
Status: DISCONTINUED | OUTPATIENT
Start: 2025-03-30 | End: 2025-04-07 | Stop reason: HOSPADM

## 2025-03-30 RX ORDER — FOLIC ACID 1 MG/1
1000 TABLET ORAL DAILY
Status: DISCONTINUED | OUTPATIENT
Start: 2025-03-31 | End: 2025-04-07 | Stop reason: HOSPADM

## 2025-03-30 RX ORDER — FLUTICASONE PROPIONATE 50 MCG
2 SPRAY, SUSPENSION (ML) NASAL NIGHTLY
Status: DISCONTINUED | OUTPATIENT
Start: 2025-03-30 | End: 2025-04-07 | Stop reason: HOSPADM

## 2025-03-30 RX ORDER — TOPIRAMATE 25 MG/1
100 TABLET ORAL 2 TIMES DAILY
Status: DISCONTINUED | OUTPATIENT
Start: 2025-03-30 | End: 2025-04-07 | Stop reason: HOSPADM

## 2025-03-30 RX ORDER — AMBRISENTAN 5 MG/1
10 TABLET, FILM COATED ORAL DAILY
Status: DISCONTINUED | OUTPATIENT
Start: 2025-03-31 | End: 2025-04-07 | Stop reason: HOSPADM

## 2025-03-30 RX ADMIN — OXYCODONE HYDROCHLORIDE AND ACETAMINOPHEN 1 TABLET: 5; 325 TABLET ORAL at 01:03

## 2025-03-30 RX ADMIN — METRONIDAZOLE 500 MG: 500 INJECTION, SOLUTION INTRAVENOUS at 09:03

## 2025-03-30 RX ADMIN — PANTOPRAZOLE SODIUM 40 MG: 40 INJECTION, POWDER, FOR SOLUTION INTRAVENOUS at 10:03

## 2025-03-30 RX ADMIN — SODIUM CHLORIDE: 9 INJECTION, SOLUTION INTRAVENOUS at 07:03

## 2025-03-30 RX ADMIN — IOHEXOL 75 ML: 350 INJECTION, SOLUTION INTRAVENOUS at 03:03

## 2025-03-30 RX ADMIN — MORPHINE SULFATE 2 MG: 2 INJECTION, SOLUTION INTRAMUSCULAR; INTRAVENOUS at 02:03

## 2025-03-30 RX ADMIN — TADALAFIL 40 MG: 20 TABLET, FILM COATED ORAL at 10:03

## 2025-03-30 RX ADMIN — ONDANSETRON 4 MG: 2 INJECTION INTRAMUSCULAR; INTRAVENOUS at 01:03

## 2025-03-30 RX ADMIN — CEFEPIME 2 G: 2 INJECTION, POWDER, FOR SOLUTION INTRAVENOUS at 08:03

## 2025-03-30 RX ADMIN — ONDANSETRON 8 MG: 8 TABLET, ORALLY DISINTEGRATING ORAL at 10:03

## 2025-03-30 RX ADMIN — TOPIRAMATE 100 MG: 25 TABLET, FILM COATED ORAL at 10:03

## 2025-03-30 RX ADMIN — MORPHINE SULFATE 2 MG: 2 INJECTION, SOLUTION INTRAMUSCULAR; INTRAVENOUS at 03:03

## 2025-03-30 RX ADMIN — ONDANSETRON 4 MG: 2 INJECTION INTRAMUSCULAR; INTRAVENOUS at 02:03

## 2025-03-30 RX ADMIN — DIPHENHYDRAMINE HYDROCHLORIDE 25 MG: 25 CAPSULE ORAL at 08:03

## 2025-03-30 NOTE — ED TRIAGE NOTES
"Yuni Perez, a 55 y.o. female presents to the ED w/ complaint of nausea and vomiting x2 days, abd pain. Last BM 3 days ago. On a continuous Remodulin gtt for pulmonary htn. Home O2 1lr.    Triage note:  Chief Complaint   Patient presents with    Weakness     Nausea vomiting x 2 days. Flu last week. General malaise      Review of patient's allergies indicates:   Allergen Reactions    Fentanyl Anaphylaxis     Respiratory distress    Vibra-tabs [doxycycline hyclate] Anaphylaxis     "throat felt like it was closing"    Adhesive Hives     Silk tape    Amoxicillin Rash    Nsaids (non-steroidal anti-inflammatory drug) Swelling    Chlorhexidine Other (See Comments)     Blue Chlorhexidine causes hives     Past Medical History:   Diagnosis Date    AR (allergic rhinitis)     Cholelithiasis, common bile duct     Chronic low back pain     Eye pressure 2017    General anesthetics causing adverse effect in therapeutic use     GERD (gastroesophageal reflux disease)     History of migraine headaches     Hypothyroidism     Innocent heart murmur     Lumbar disc disease     Menorrhagia     Mild asthma     Obesity     Plantar fasciitis of left foot     Primary pulmonary hypertension     followed by heart transplant/pulmonary     Seizure disorder     x 1 in 2008    Seizures     Sleep apnea     TMJ (dislocation of temporomandibular joint)     Tricuspid regurgitation        "

## 2025-03-30 NOTE — ED PROVIDER NOTES
"Encounter Date: 3/30/2025       History     Chief Complaint   Patient presents with    Weakness     Nausea vomiting x 2 days. Flu last week. General malaise      55-year-old female with pulmonary hypertension on Remodulin, GERD, hepatic adenoma, cholelithiasis with prior cholecystotomy tube presents for nausea, vomiting and abdominal pain.  She has been throwing up for several days, yesterday morning started to have upper abdominal pain which she describes as a "gallbladder attack".  Has been unable to tolerate p.o. for 2 days.  She reports associated generalized weakness and feeling hot which she attributes to menopause.  She has been constipated, had a bowel movement 3 days ago.  She some chest pain yesterday but none today.  She denies fever, shortness of breath, urinary symptoms or sick contacts.  Reports that she had the flu several weeks ago and has a persistent cough.        Review of patient's allergies indicates:   Allergen Reactions    Fentanyl Anaphylaxis     Respiratory distress    Vibra-tabs [doxycycline hyclate] Anaphylaxis     "throat felt like it was closing"    Adhesive Hives     Silk tape    Amoxicillin Rash    Nsaids (non-steroidal anti-inflammatory drug) Swelling    Chlorhexidine Other (See Comments)     Blue Chlorhexidine causes hives     Past Medical History:   Diagnosis Date    AR (allergic rhinitis)     Cholelithiasis, common bile duct     Chronic low back pain     Eye pressure 2017    General anesthetics causing adverse effect in therapeutic use     GERD (gastroesophageal reflux disease)     History of migraine headaches     Hypothyroidism     Innocent heart murmur     Lumbar disc disease     Menorrhagia     Mild asthma     Obesity     Plantar fasciitis of left foot     Primary pulmonary hypertension     followed by heart transplant/pulmonary     Seizure disorder     x 1 in 2008    Seizures     Sleep apnea     TMJ (dislocation of temporomandibular joint)     Tricuspid regurgitation      Past " Surgical History:   Procedure Laterality Date    CARDIAC CATHETERIZATION      CATHETERIZATION OF BOTH LEFT AND RIGHT HEART Bilateral 9/29/2020    Procedure: CATHETERIZATION, HEART, BOTH LEFT AND RIGHT;  Surgeon: Gucci Pickett MD;  Location: [facility] CATH LAB;  Service: Cardiology;  Laterality: Bilateral;    CENTRAL VENOUS CATHETER INSERTION      CORONARY ANGIOGRAPHY N/A 9/29/2020    Procedure: ANGIOGRAM, CORONARY ARTERY;  Surgeon: Gucci Pickett MD;  Location: [facility] CATH LAB;  Service: Cardiology;  Laterality: N/A;    gallbladder drain  06/2019    INSERTION OF HAYNES CATHETER Right 6/17/2020    Procedure: INSERTION, CATHETER, CENTRAL VENOUS, HAYNES Replace Single Lumen for Remodulin Infusion;  Surgeon: Bertin Dewitt MD;  Location: [facility] OR [address] [place] [address];  Service: General;  Laterality: Right;    PORTACATH PLACEMENT      REMOVAL OF TUNNELED CENTRAL VENOUS CATHETER (CVC) N/A 6/17/2020    Procedure: REMOVAL, CATHETER, CENTRAL VENOUS, TUNNELED;  Surgeon: Bertin Dewitt MD;  [facility];  Service: General;  Laterality: N/A;    RIGHT HEART CATHETERIZATION Right 8/20/2018    Procedure: HEART CATH-RIGHT;  Surgeon: Ivonne Rai MD;  Location: [facility] CATH LAB;  Service: Cardiology;  Laterality: Right;    RIGHT HEART CATHETERIZATION Right 9/4/2019    Procedure: INSERTION, CATHETER, RIGHT HEART;  Surgeon: Ivonne Rai MD;  Location: [facility] CATH LAB;  Service: Cardiology;  Laterality: Right;    RIGHT HEART CATHETERIZATION Right 6/10/2022    Procedure: INSERTION, CATHETER, RIGHT HEART;  Surgeon: Keshia Poe MD;  Location: [facility] CATH LAB;  Service: Cardiology;  Laterality: Right;    UPPER GASTROINTESTINAL ENDOSCOPY       Family History   Problem Relation Name Age of Onset    Diabetes Mother      Heart disease Father      Glaucoma Father      No Known Problems Sister      Cancer Maternal Aunt          breast    Breast cancer Maternal Aunt      No Known Problems Maternal Uncle      No Known Problems  Paternal Aunt      No Known Problems Paternal Uncle      Cancer Maternal Grandmother          uterine    Diabetes Maternal Grandfather      Heart attack Maternal Grandfather      No Known Problems Paternal Grandmother      Heart disease Paternal Grandfather      Heart attack Paternal Grandfather      Diabetes Paternal Grandfather      Leukemia Brother      Breast cancer Cousin      Breast cancer Cousin      Hypertension Other      Colon cancer Neg Hx      Ovarian cancer Neg Hx      Amblyopia Neg Hx      Blindness Neg Hx      Cataracts Neg Hx      Macular degeneration Neg Hx      Retinal detachment Neg Hx      Strabismus Neg Hx      Stroke Neg Hx      Thyroid disease Neg Hx      Esophageal cancer Neg Hx       Social History[1]  Review of Systems    Physical Exam     Initial Vitals   BP Pulse Resp Temp SpO2   03/30/25 1211 03/30/25 1211 03/30/25 1211 03/30/25 1253 03/30/25 1210   (!) 140/86 98 18 98.3 °F (36.8 °C) 96 %      MAP       --                Physical Exam    Nursing note and vitals reviewed.  Constitutional: She appears well-developed and well-nourished. She is not diaphoretic. She has a sickly appearance. No distress. Nasal cannula in place.   HENT:   Head: Normocephalic and atraumatic.   Dry mucous membranes   Eyes: No scleral icterus.   Cardiovascular:  Normal rate, regular rhythm, normal heart sounds and intact distal pulses.     Exam reveals no gallop and no friction rub.       No murmur heard.  Central venous catheter right chest   Pulmonary/Chest: Breath sounds normal. No respiratory distress. She has no wheezes. She has no rhonchi. She has no rales. She exhibits no tenderness.   Abdominal: Abdomen is soft. Bowel sounds are normal. She exhibits no distension and no mass. There is abdominal tenderness in the right upper quadrant, epigastric area and left upper quadrant. There is no rebound and no guarding.   Musculoskeletal:         General: Normal range of motion.     Neurological: She is alert and  oriented to person, place, and time.   Skin: Skin is warm and dry.   No tenting with skin pinch   Psychiatric: She has a normal mood and affect.         ED Course   Procedures  Labs Reviewed   COMPREHENSIVE METABOLIC PANEL - Abnormal       Result Value    Sodium 139      Potassium 3.4 (*)     Chloride 106      CO2 21 (*)     Glucose 114 (*)     BUN 11      Creatinine 0.7      Calcium 9.5      Protein Total 8.1      Albumin 4.2      Bilirubin Total 0.6      ALP 90      AST 19      ALT 11      Anion Gap 12      eGFR >60     TROPONIN I HIGH SENSITIVITY - Abnormal    Troponin High Sensitive 33 (*)    CBC WITH DIFFERENTIAL - Abnormal    WBC 11.55      RBC 5.46 (*)     HGB 16.2 (*)     HCT 47.8      MCV 88      MCH 29.7      MCHC 33.9      RDW 14.0      Platelet Count 217      MPV 12.2      Nucleated RBC 0      Neut % 84.2 (*)     Lymph % 6.0 (*)     Mono % 9.3      Eos % 0.0      Basophil % 0.2      Imm Grans % 0.3      Neut # 9.73 (*)     Lymph # 0.69 (*)     Mono # 1.07 (*)     Eos # 0.00      Baso # 0.02      Imm Grans # 0.04     HEPATITIS C ANTIBODY - Normal    Hep C Ab Interp Non-Reactive     HIV 1 / 2 ANTIBODY - Normal    HIV 1/2 Ag/Ab Non-Reactive     LACTIC ACID, PLASMA - Normal    Lactic Acid Level 1.1      Narrative:     Falsely low lactic acid results can be found in samples containing >=13.0 mg/dL total bilirubin and/or >=3.5 mg/dL direct bilirubin.    LIPASE - Normal    Lipase Level 18     MAGNESIUM - Normal    Magnesium  2.2     PROTIME-INR - Normal    PT 12.0      INR 1.1     AFP TUMOR MARKER - Normal    AFP 2.6      Narrative:     The testing method is a chemiluminescent microparticle immunoassay manufactured by [employer] and performed on the [employer] or [employer] system. Values obtained with different assay manufacturers for methods may be different and cannot be used interchangeably.   CULTURE, BLOOD   CULTURE, BLOOD   CBC W/ AUTO DIFFERENTIAL    Narrative:     The following orders were  created for panel order CBC auto differential.  Procedure                               Abnormality         Status                     ---------                               -----------         ------                     CBC with Differential[[id number]]       Abnormal            Final result                 Please view results for these tests on the individual orders.   URINALYSIS, REFLEX TO URINE CULTURE     EKG Readings: (Independently Interpreted)   Initial Reading: No STEMI. Previous EKG: Compared with most recent EKG Rhythm: Normal Sinus Rhythm. Heart Rate: 97. Ectopy: No Ectopy. ST Segments: Non-Specific ST Segment Depression. T Waves Flipped: II, III, AVF, V1, V2, V3, V4, V5 and V6. Axis: Right Axis Deviation. Clinical Impression: Normal Sinus Rhythm       Imaging Results               CT Abdomen Pelvis With IV Contrast NO Oral Contrast (Final result)  Result time 03/30/25 17:23:14      Final result by Markos Mixon MD (03/30/25 17:23:14)                   Impression:      This report was flagged in Epic as abnormal.    1. The gallbladder is distended noting cholelithiasis, and suspected wall thickening/induration about the gallbladder walls.  There is distal choledocholithiasis.  Evaluation with ultrasound is recommended as developing acute cholecystitis is a consideration.  2. Liquid content within a few distal small bowel loops and colon.  Correlation with any history of diarrheal illness.  3. Stable pulmonary nodules, please see CT 02/27/2023.  4. Hepatomegaly, correlation with LFTs recommended.  5. Please see above for several additional findings.      Electronically signed by: Markos Mixon MD  Date:    03/30/2025  Time:    17:23               Narrative:    EXAMINATION:  CT ABDOMEN PELVIS WITH IV CONTRAST    CLINICAL HISTORY:  Epigastric pain;    TECHNIQUE:  Low dose axial images, sagittal and coronal reformations were obtained from the lung bases to the pubic symphysis following the IV  administration of 75 mL of Omnipaque 350 .  Oral contrast was not given.    COMPARISON:  Ultrasound 03/11/2024, CT chest 02/27/2023    FINDINGS:  Images of the lower thorax are remarkable for bilateral dependent atelectasis.  There is a 2 mm pulmonary nodule within the posterior aspect of the right lower lobe, stable.  An additional 2 mm pulmonary nodule is noted within the posterior aspect of the right lower lobe, stable.    Allowing for motion artifact, the liver is enlarged, correlation with LFTs recommended.  The spleen, pancreas and adrenal glands are unremarkable. There is cholelithiasis. There is some indistinctness about the gallbladder walls noting mild gallbladder wall thickening. There is intrahepatic biliary dilation. There is a calculus within the distal aspect of the common duct measuring 2 mm. No significant common ductal dilation.  The stomach is nondistended, no gastric wall thickening. The portal vein, splenic vein, SMV, celiac axis and SMA all are patent.  There are a few scattered mildly prominent abdominal lymph nodes.    The kidneys enhance symmetrically without hydronephrosis or nephrolithiasis. The bilateral ureters are unremarkable without calculi seen. Subcentimeter low attenuating lesions arise from the kidneys, too small for characterization. The urinary bladder is unremarkable. The uterus and adnexa are unremarkable. There are cervical nabothian cysts.    There is scattered liquid stool within the large bowel. There are a few scattered colonic diverticula without inflammation to suggest diverticulitis. The appendix is not confidently identified. The terminal ileum is unremarkable. There are a few scattered fluid-filled mid to distal small bowel loops. There are a few scattered shotty periaortic, pericaval, and mesenteric lymph nodes. There is atherosclerotic calcification of the aorta and its branches.  No focal organized pelvic fluid collection.    There are degenerative changes of the  spine. No significant inguinal lymphadenopathy.                                       X-Ray Chest AP Portable (Final result)  Result time 03/30/25 16:22:22      Final result by Markos Mixon MD (03/30/25 16:22:22)                   Impression:      As above      Electronically signed by: Markos Mixon MD  Date:    03/30/2025  Time:    16:22               Narrative:    EXAMINATION:  XR CHEST AP PORTABLE    CLINICAL HISTORY:  chest pain;    TECHNIQUE:  Single frontal view of the chest was performed.    COMPARISON:  03/12/2025    FINDINGS:  Right central venous catheter tip projects over the distal SVC.  The cardiomediastinal silhouette is prominent, similar to previous exam noting magnification by technique.  There is calcification of the aorta..  There is no pleural effusion.  The trachea is midline.  The lungs are symmetrically expanded bilaterally with coarse interstitial attenuation bilaterally, may reflect edema or infection..  No large focal consolidation seen.  There is no pneumothorax.  The osseous structures are remarkable for degenerative change..                                       Medications   sodium chloride 0.9% flush 10 mL (has no administration in time range)   polyethylene glycol packet 17 g (has no administration in time range)   ondansetron disintegrating tablet 8 mg (has no administration in time range)   ondansetron injection 4 mg (has no administration in time range)   albuterol-ipratropium 2.5 mg-0.5 mg/3 mL nebulizer solution 3 mL (has no administration in time range)   ambrisentan tablet 10 mg (has no administration in time range)   fluticasone furoate-vilanteroL 200-25 mcg/dose diskus inhaler 1 puff (has no administration in time range)   diphenhydrAMINE capsule 50 mg (has no administration in time range)   ferrous sulfate tablet 1 each (has no administration in time range)   fluticasone propionate 50 mcg/actuation nasal spray 100 mcg (has no administration in time range)   folic  acid tablet 1,000 mcg (has no administration in time range)   levothyroxine tablet 125 mcg (has no administration in time range)   topiramate tablet 100 mg (has no administration in time range)   0.9% NaCl infusion ( Intravenous New Bag 3/30/25 1913)   acetaminophen tablet 650 mg (has no administration in time range)   treprostinil 2.5 mg/mL vial (has no administration in time range)   ceFEPIme injection 2 g (has no administration in time range)   metronidazole IVPB 500 mg (has no administration in time range)   diphenhydrAMINE capsule 25 mg (has no administration in time range)   pantoprazole injection 40 mg (has no administration in time range)   tadalafil tablet 40 mg (has no administration in time range)   ondansetron injection 4 mg (4 mg Intravenous Given 3/30/25 1341)   oxyCODONE-acetaminophen 5-325 mg per tablet 1 tablet (1 tablet Oral Given 3/30/25 1341)   ondansetron injection 4 mg (4 mg Intravenous Given 3/30/25 1443)   morphine injection 2 mg (2 mg Intravenous Given 3/30/25 1443)   morphine injection 2 mg (2 mg Intravenous Given 3/30/25 1553)   iohexoL (OMNIPAQUE 350) injection 75 mL (75 mLs Intravenous Given 3/30/25 1553)     Medical Decision Making  55-year-old female with advanced pulmonary hypertension presenting for abdominal pain with vomiting for several days duration.  Mildly hypertensive at 140/86 with otherwise normal vitals.    Differential diagnosis:  Acute cholecystitis   Biliary colic   Pancreatitis   Dehydration   Electrolyte derangement  Patient with significant cardiac history, doubt cardiac cause primary    Patient received 500 cc fluids in route with EMS, will defer further fluid management given her pulmonary hypertension, check labs, analgesics, antiemetics, CT reassess.    CT is concerning for acute cholecystitis.  Patient seen and evaluated by general surgery who recommended HIDA scan, possible I are placement of cholecystotomy tube.  Case discussed with heart transplant Service,  patient will be admitted to their service for further management.  Patient comfortable with admission.  I discussed this with the supervising physician.    Amount and/or Complexity of Data Reviewed  Labs: ordered. Decision-making details documented in ED Course.  Radiology: ordered and independent interpretation performed. Decision-making details documented in ED Course.  ECG/medicine tests: ordered and independent interpretation performed. Decision-making details documented in ED Course.    Risk  Prescription drug management.  Parenteral controlled substances.  Decision regarding hospitalization.              Attending Attestation:     Physician Attestation Statement for NP/PA:   I personally made/approved the management plan and take responsibility for the patient management.    Other NP/PA Attestation Additions:    History of Present Illness: 55-year-old woman with comorbidities of chronic pulmonary hypertension requiring a Remodulin infusion presents to the ED for evaluation of weakness associated with nausea and vomiting with abdominal pain.               ED Course as of 03/30/25 2025   Sun Mar 30, 2025   1412 Hemoglobin(!): 16.2  Erythrocytosis, suspect secondary to hemoconcentration from dehydration [CC]   1412 WBC: 11.55 [CC]   1412 Creatinine: 0.7 [CC]   1413 Lipase: 18 [CC]   1413 Lactic Acid Level: 1.1 [CC]   1413 BILIRUBIN TOTAL: 0.6 [CC]   1418 Troponin I High Sensitivity(!): 33  Mildly elevated, suspect chronic due to pulmonary hypertension [CC]   1419 Still reporting severe pain despite therapy [CC]   1609 CT Abdomen Pelvis With IV Contrast NO Oral Contrast  Per my independent interpretation, there is distention of the gallbladder with a large stone.  Fluid-filled bowel loops, no air-fluid levels [CC]   1727 CT Abdomen Pelvis With IV Contrast NO Oral Contrast(!)  Gallbladder distention with wall thickening.  Will discuss with General surgery, cover with ceftriaxone, metronidazole [CC]   1730 Case  discussed with General surgery who will evaluate the patient [CC]   9733 Discussed with heart transplant fellow who will evaluate [CC]      ED Course User Index  [CC] Yancy Mc PA-C                           Clinical Impression:  Final diagnoses:  [R11.2] Nausea & vomiting  [I27.20] Pulmonary hypertension  [E87.6] Hypokalemia  [R10.10] Pain of upper abdomen  [K81.9] Cholecystitis (Primary)          ED Disposition Condition    Admit Stable                  [1]   Social History  Tobacco Use    Smoking status: Never    Smokeless tobacco: Never   Substance Use Topics    Alcohol use: No     Alcohol/week: 0.8 standard drinks of alcohol     Types: 1 Standard drinks or equivalent per week     Comment: socially    Drug use: No        Yancy Mc PA-C  03/30/25 2025

## 2025-03-31 PROBLEM — K81.9 CHOLECYSTITIS: Status: ACTIVE | Noted: 2025-03-31

## 2025-03-31 LAB
ABSOLUTE EOSINOPHIL (OHS): 0 K/UL
ABSOLUTE MONOCYTE (OHS): 1.29 K/UL (ref 0.3–1)
ABSOLUTE NEUTROPHIL COUNT (OHS): 9.07 K/UL (ref 1.8–7.7)
ALBUMIN SERPL BCP-MCNC: 3.2 G/DL (ref 3.5–5.2)
ALP SERPL-CCNC: 73 UNIT/L (ref 40–150)
ALT SERPL W/O P-5'-P-CCNC: 12 UNIT/L (ref 10–44)
ANION GAP (OHS): 7 MMOL/L (ref 8–16)
AST SERPL-CCNC: 15 UNIT/L (ref 11–45)
BACTERIA #/AREA URNS AUTO: ABNORMAL /HPF
BASOPHILS # BLD AUTO: 0.02 K/UL
BASOPHILS NFR BLD AUTO: 0.2 %
BILIRUB SERPL-MCNC: 0.5 MG/DL (ref 0.1–1)
BUN SERPL-MCNC: 8 MG/DL (ref 6–20)
CALCIUM SERPL-MCNC: 8.3 MG/DL (ref 8.7–10.5)
CHLORIDE SERPL-SCNC: 111 MMOL/L (ref 95–110)
CO2 SERPL-SCNC: 20 MMOL/L (ref 23–29)
CREAT SERPL-MCNC: 0.6 MG/DL (ref 0.5–1.4)
ERYTHROCYTE [DISTWIDTH] IN BLOOD BY AUTOMATED COUNT: 14.3 % (ref 11.5–14.5)
GFR SERPLBLD CREATININE-BSD FMLA CKD-EPI: >60 ML/MIN/1.73/M2
GLUCOSE SERPL-MCNC: 101 MG/DL (ref 70–110)
HCT VFR BLD AUTO: 43.6 % (ref 37–48.5)
HGB BLD-MCNC: 14.5 GM/DL (ref 12–16)
HYALINE CASTS UR QL AUTO: 0 /LPF (ref 0–1)
IMM GRANULOCYTES # BLD AUTO: 0.04 K/UL (ref 0–0.04)
IMM GRANULOCYTES NFR BLD AUTO: 0.4 % (ref 0–0.5)
INR PPP: 1.1 (ref 0.8–1.2)
LYMPHOCYTES # BLD AUTO: 0.88 K/UL (ref 1–4.8)
MAGNESIUM SERPL-MCNC: 2 MG/DL (ref 1.6–2.6)
MCH RBC QN AUTO: 29.8 PG (ref 27–50)
MCHC RBC AUTO-ENTMCNC: 33.3 G/DL (ref 32–36)
MCV RBC AUTO: 90 FL (ref 82–98)
MICROSCOPIC COMMENT: ABNORMAL
NUCLEATED RBC (/100WBC) (OHS): 0 /100 WBC
OHS QRS DURATION: 84 MS
OHS QTC CALCULATION: 459 MS
PHOSPHATE SERPL-MCNC: 3.1 MG/DL (ref 2.7–4.5)
PLATELET # BLD AUTO: 179 K/UL (ref 150–450)
PMV BLD AUTO: 12.4 FL (ref 9.2–12.9)
POTASSIUM SERPL-SCNC: 3.5 MMOL/L (ref 3.5–5.1)
PROT SERPL-MCNC: 6.4 GM/DL (ref 6–8.4)
PROTHROMBIN TIME: 12.2 SECONDS (ref 9–12.5)
RBC # BLD AUTO: 4.86 M/UL (ref 4–5.4)
RBC #/AREA URNS AUTO: 5 /HPF (ref 0–4)
RELATIVE EOSINOPHIL (OHS): 0 %
RELATIVE LYMPHOCYTE (OHS): 7.8 % (ref 18–48)
RELATIVE MONOCYTE (OHS): 11.4 % (ref 4–15)
RELATIVE NEUTROPHIL (OHS): 80.2 % (ref 38–73)
SODIUM SERPL-SCNC: 138 MMOL/L (ref 136–145)
SQUAMOUS #/AREA URNS AUTO: 1 /HPF
WBC # BLD AUTO: 11.3 K/UL (ref 3.9–12.7)
WBC #/AREA URNS AUTO: 13 /HPF (ref 0–5)

## 2025-03-31 PROCEDURE — 85610 PROTHROMBIN TIME: CPT | Performed by: STUDENT IN AN ORGANIZED HEALTH CARE EDUCATION/TRAINING PROGRAM

## 2025-03-31 PROCEDURE — 94799 UNLISTED PULMONARY SVC/PX: CPT

## 2025-03-31 PROCEDURE — 63600175 PHARM REV CODE 636 W HCPCS: Mod: TB | Performed by: INTERNAL MEDICINE

## 2025-03-31 PROCEDURE — 83735 ASSAY OF MAGNESIUM: CPT | Performed by: STUDENT IN AN ORGANIZED HEALTH CARE EDUCATION/TRAINING PROGRAM

## 2025-03-31 PROCEDURE — 25000003 PHARM REV CODE 250: Performed by: NURSE PRACTITIONER

## 2025-03-31 PROCEDURE — 25000003 PHARM REV CODE 250: Performed by: INTERNAL MEDICINE

## 2025-03-31 PROCEDURE — 0F9430Z DRAINAGE OF GALLBLADDER WITH DRAINAGE DEVICE, PERCUTANEOUS APPROACH: ICD-10-PCS | Performed by: RADIOLOGY

## 2025-03-31 PROCEDURE — 63600175 PHARM REV CODE 636 W HCPCS: Performed by: STUDENT IN AN ORGANIZED HEALTH CARE EDUCATION/TRAINING PROGRAM

## 2025-03-31 PROCEDURE — 84100 ASSAY OF PHOSPHORUS: CPT | Performed by: STUDENT IN AN ORGANIZED HEALTH CARE EDUCATION/TRAINING PROGRAM

## 2025-03-31 PROCEDURE — 36415 COLL VENOUS BLD VENIPUNCTURE: CPT | Performed by: STUDENT IN AN ORGANIZED HEALTH CARE EDUCATION/TRAINING PROGRAM

## 2025-03-31 PROCEDURE — 27000221 HC OXYGEN, UP TO 24 HOURS

## 2025-03-31 PROCEDURE — 80053 COMPREHEN METABOLIC PANEL: CPT | Performed by: STUDENT IN AN ORGANIZED HEALTH CARE EDUCATION/TRAINING PROGRAM

## 2025-03-31 PROCEDURE — 94761 N-INVAS EAR/PLS OXIMETRY MLT: CPT

## 2025-03-31 PROCEDURE — 63600175 PHARM REV CODE 636 W HCPCS: Performed by: RADIOLOGY

## 2025-03-31 PROCEDURE — 63600175 PHARM REV CODE 636 W HCPCS: Performed by: NURSE PRACTITIONER

## 2025-03-31 PROCEDURE — 25000242 PHARM REV CODE 250 ALT 637 W/ HCPCS: Performed by: STUDENT IN AN ORGANIZED HEALTH CARE EDUCATION/TRAINING PROGRAM

## 2025-03-31 PROCEDURE — A9537 TC99M MEBROFENIN: HCPCS | Performed by: INTERNAL MEDICINE

## 2025-03-31 PROCEDURE — 20600001 HC STEP DOWN PRIVATE ROOM

## 2025-03-31 PROCEDURE — 25500020 PHARM REV CODE 255: Performed by: INTERNAL MEDICINE

## 2025-03-31 PROCEDURE — 25000003 PHARM REV CODE 250: Performed by: STUDENT IN AN ORGANIZED HEALTH CARE EDUCATION/TRAINING PROGRAM

## 2025-03-31 PROCEDURE — 99232 SBSQ HOSP IP/OBS MODERATE 35: CPT | Mod: ,,, | Performed by: STUDENT IN AN ORGANIZED HEALTH CARE EDUCATION/TRAINING PROGRAM

## 2025-03-31 PROCEDURE — 85025 COMPLETE CBC W/AUTO DIFF WBC: CPT | Performed by: STUDENT IN AN ORGANIZED HEALTH CARE EDUCATION/TRAINING PROGRAM

## 2025-03-31 RX ORDER — MORPHINE SULFATE 2 MG/ML
INJECTION, SOLUTION INTRAMUSCULAR; INTRAVENOUS
Status: DISPENSED
Start: 2025-03-31 | End: 2025-04-01

## 2025-03-31 RX ORDER — KIT FOR THE PREPARATION OF TECHNETIUM TC 99M MEBROFENIN 45 MG/10ML
5.09 INJECTION, POWDER, LYOPHILIZED, FOR SOLUTION INTRAVENOUS
Status: COMPLETED | OUTPATIENT
Start: 2025-03-31 | End: 2025-03-31

## 2025-03-31 RX ORDER — TADALAFIL 20 MG/1
40 TABLET ORAL NIGHTLY
Status: DISCONTINUED | OUTPATIENT
Start: 2025-03-31 | End: 2025-04-07 | Stop reason: HOSPADM

## 2025-03-31 RX ORDER — MORPHINE SULFATE 2 MG/ML
2 INJECTION, SOLUTION INTRAMUSCULAR; INTRAVENOUS EVERY 4 HOURS PRN
Status: DISCONTINUED | OUTPATIENT
Start: 2025-03-31 | End: 2025-04-02

## 2025-03-31 RX ORDER — HYDROMORPHONE HYDROCHLORIDE 2 MG/ML
INJECTION, SOLUTION INTRAMUSCULAR; INTRAVENOUS; SUBCUTANEOUS
Status: COMPLETED | OUTPATIENT
Start: 2025-03-31 | End: 2025-03-31

## 2025-03-31 RX ORDER — MIDAZOLAM HYDROCHLORIDE 1 MG/ML
INJECTION, SOLUTION INTRAMUSCULAR; INTRAVENOUS
Status: COMPLETED | OUTPATIENT
Start: 2025-03-31 | End: 2025-03-31

## 2025-03-31 RX ADMIN — MEBROFENIN 5.09 MILLICURIE: 45 INJECTION, POWDER, LYOPHILIZED, FOR SOLUTION INTRAVENOUS at 11:03

## 2025-03-31 RX ADMIN — ACETAMINOPHEN 650 MG: 325 TABLET ORAL at 04:03

## 2025-03-31 RX ADMIN — HYDROMORPHONE HYDROCHLORIDE 0.5 MG: 2 INJECTION, SOLUTION INTRAMUSCULAR; INTRAVENOUS; SUBCUTANEOUS at 05:03

## 2025-03-31 RX ADMIN — ONDANSETRON 4 MG: 2 INJECTION INTRAMUSCULAR; INTRAVENOUS at 03:03

## 2025-03-31 RX ADMIN — CEFEPIME 2 G: 2 INJECTION, POWDER, FOR SOLUTION INTRAVENOUS at 04:03

## 2025-03-31 RX ADMIN — CEFEPIME 2 G: 2 INJECTION, POWDER, FOR SOLUTION INTRAVENOUS at 02:03

## 2025-03-31 RX ADMIN — TADALAFIL 40 MG: 20 TABLET ORAL at 08:03

## 2025-03-31 RX ADMIN — METRONIDAZOLE 500 MG: 500 INJECTION, SOLUTION INTRAVENOUS at 02:03

## 2025-03-31 RX ADMIN — TOPIRAMATE 100 MG: 25 TABLET, FILM COATED ORAL at 09:03

## 2025-03-31 RX ADMIN — MORPHINE SULFATE 2 MG: 2 INJECTION, SOLUTION INTRAMUSCULAR; INTRAVENOUS at 01:03

## 2025-03-31 RX ADMIN — FLUTICASONE FUROATE AND VILANTEROL TRIFENATATE 1 PUFF: 200; 25 POWDER RESPIRATORY (INHALATION) at 12:03

## 2025-03-31 RX ADMIN — IOHEXOL 5 ML: 300 INJECTION, SOLUTION INTRAVENOUS at 05:03

## 2025-03-31 RX ADMIN — METRONIDAZOLE 500 MG: 500 INJECTION, SOLUTION INTRAVENOUS at 08:03

## 2025-03-31 RX ADMIN — ONDANSETRON 8 MG: 8 TABLET, ORALLY DISINTEGRATING ORAL at 02:03

## 2025-03-31 RX ADMIN — MIDAZOLAM HYDROCHLORIDE 0.5 MG: 2 INJECTION, SOLUTION INTRAMUSCULAR; INTRAVENOUS at 05:03

## 2025-03-31 RX ADMIN — METRONIDAZOLE 500 MG: 500 INJECTION, SOLUTION INTRAVENOUS at 04:03

## 2025-03-31 RX ADMIN — AMBRISENTAN 10 MG: 5 TABLET, FILM COATED ORAL at 09:03

## 2025-03-31 RX ADMIN — PANTOPRAZOLE SODIUM 40 MG: 40 INJECTION, POWDER, FOR SOLUTION INTRAVENOUS at 08:03

## 2025-03-31 RX ADMIN — TREPROSTINIL 7078.5 NG/MIN: 200 INJECTION, SOLUTION INTRAVENOUS; SUBCUTANEOUS at 03:03

## 2025-03-31 RX ADMIN — TOPIRAMATE 100 MG: 25 TABLET, FILM COATED ORAL at 08:03

## 2025-03-31 RX ADMIN — MORPHINE SULFATE 2 MG: 2 INJECTION, SOLUTION INTRAMUSCULAR; INTRAVENOUS at 09:03

## 2025-03-31 RX ADMIN — DIPHENHYDRAMINE HYDROCHLORIDE 50 MG: 25 CAPSULE ORAL at 05:03

## 2025-03-31 RX ADMIN — MORPHINE SULFATE 2 MG: 2 INJECTION, SOLUTION INTRAMUSCULAR; INTRAVENOUS at 06:03

## 2025-03-31 RX ADMIN — DIPHENHYDRAMINE HYDROCHLORIDE 50 MG: 25 CAPSULE ORAL at 08:03

## 2025-03-31 RX ADMIN — DIPHENHYDRAMINE HYDROCHLORIDE 25 MG: 25 CAPSULE ORAL at 12:03

## 2025-03-31 RX ADMIN — LEVOTHYROXINE SODIUM 125 MCG: 0.12 TABLET ORAL at 06:03

## 2025-03-31 RX ADMIN — CEFEPIME 2 G: 2 INJECTION, POWDER, FOR SOLUTION INTRAVENOUS at 08:03

## 2025-03-31 RX ADMIN — FLUTICASONE FUROATE AND VILANTEROL TRIFENATATE 1 PUFF: 200; 25 POWDER RESPIRATORY (INHALATION) at 08:03

## 2025-03-31 NOTE — CARE UPDATE
Pt arrived to MPU 4 for recovery of a kita tube placement. Pt to recover for 30 mins then may transfer back to floor. See vs and assessment in computer.

## 2025-03-31 NOTE — SUBJECTIVE & OBJECTIVE
"Interval History: Remains in pain. Planning for HIDA this am.     Continuous Infusions:   veletri/remodulin cassette   Intravenous Continuous        veletri/remodulin tubing   Intravenous Continuous         Scheduled Meds:   ambrisentan  10 mg Oral Daily    ceFEPime IV (PEDS and ADULTS)  2 g Intravenous Q8H    ferrous sulfate  1 tablet Oral Daily    fluticasone furoate-vilanteroL  1 puff Inhalation QHS    fluticasone propionate  2 spray Each Nostril QHS    folic acid  1,000 mcg Oral Daily    levothyroxine  125 mcg Oral Before breakfast    metroNIDAZOLE IV (PEDS and ADULTS)  500 mg Intravenous Q8H    pantoprazole  40 mg Intravenous Daily    tadalafil  40 mg Oral Daily PM    topiramate  100 mg Oral BID    treprostinil (REMODULIN) 12,000,000 ng in 0.9% NaCl 100 mL infusion  117 ng/kg/min (Order-Specific) Intravenous Q24H     PRN Meds:  Current Facility-Administered Medications:     acetaminophen, 650 mg, Oral, Q6H PRN    albuterol-ipratropium, 3 mL, Nebulization, Q6H PRN    diphenhydrAMINE, 50 mg, Oral, Q6H PRN    morphine, 2 mg, Intravenous, Q4H PRN    ondansetron, 8 mg, Oral, Q8H PRN    ondansetron, 4 mg, Intravenous, Q8H PRN    polyethylene glycol, 17 g, Oral, BID PRN    sodium chloride 0.9%, 10 mL, Intravenous, PRN    Review of patient's allergies indicates:   Allergen Reactions    Fentanyl Anaphylaxis     Respiratory distress    Vibra-tabs [doxycycline hyclate] Anaphylaxis     "throat felt like it was closing"    Adhesive Hives     Silk tape    Amoxicillin Rash    Nsaids (non-steroidal anti-inflammatory drug) Swelling    Chlorhexidine Other (See Comments)     Blue Chlorhexidine causes hives     Objective:     Vital Signs (Most Recent):  Temp: 97.9 °F (36.6 °C) (03/31/25 0738)  Pulse: 102 (03/31/25 1041)  Resp: 18 (03/31/25 0738)  BP: 117/76 (03/31/25 0738)  SpO2: (!) 92 % (03/31/25 0738) Vital Signs (24h Range):  Temp:  [97.9 °F (36.6 °C)-98.4 °F (36.9 °C)] 97.9 °F (36.6 °C)  Pulse:  [] 102  Resp:  [18-23] " "18  SpO2:  [92 %-98 %] 92 %  BP: (110-141)/(70-86) 117/76     Patient Vitals for the past 72 hrs (Last 3 readings):   Weight   03/31/25 0642 62.9 kg (138 lb 12.5 oz)   03/30/25 1211 64.9 kg (143 lb)     Body mass index is 28.03 kg/m².      Intake/Output Summary (Last 24 hours) at 3/31/2025 1114  Last data filed at 3/31/2025 1053  Gross per 24 hour   Intake 0 ml   Output 500 ml   Net -500 ml        Physical Exam  Vitals and nursing note reviewed.   Constitutional:       Appearance: She is well-developed.   HENT:      Head: Normocephalic.   Eyes:      Pupils: Pupils are equal, round, and reactive to light.   Cardiovascular:      Rate and Rhythm: Normal rate and regular rhythm.   Pulmonary:      Effort: Pulmonary effort is normal.      Breath sounds: Normal breath sounds.   Abdominal:      General: Bowel sounds are normal.      Palpations: Abdomen is soft.      Tenderness: There is abdominal tenderness.   Musculoskeletal:         General: Normal range of motion.      Cervical back: Normal range of motion and neck supple.   Skin:     General: Skin is warm and dry.   Neurological:      Mental Status: She is alert and oriented to person, place, and time.   Psychiatric:         Behavior: Behavior normal.            Significant Labs:  CBC:  Recent Labs   Lab 03/30/25  1334 03/31/25  0845   WBC 11.55 11.30   RBC 5.46* 4.86   HGB 16.2* 14.5   HCT 47.8 43.6    179   MCV 88 90   MCH 29.7 29.8   MCHC 33.9 33.3     BNP:  No results for input(s): "BNP" in the last 168 hours.    Invalid input(s): "BNPTRIAGELBLO"  CMP:  Recent Labs   Lab 03/30/25  1334 03/31/25  0845   CALCIUM 9.5 8.3*   ALBUMIN 4.2 3.2*    138   K 3.4* 3.5   CO2 21* 20*    111*   BUN 11 8   CREATININE 0.7 0.6   ALKPHOS 90 73   ALT 11 12   AST 19 15   BILITOT 0.6 0.5      Coagulation:   Recent Labs   Lab 03/30/25  1334 03/31/25  0845   INR 1.1 1.1     LDH:  No results for input(s): "LDH" in the last 72 hours.  Microbiology:  Microbiology Results " (last 7 days)       Procedure Component Value Units Date/Time    Urine culture [[id number]] Collected: 03/30/25 2314    Order Status: Sent Specimen: Urine, Clean Catch Updated: 03/31/25 0000    Blood culture #1 **CANNOT BE ORDERED STAT** [[id number]]  (Normal) Collected: 03/30/25 1336    Order Status: Completed Specimen: Blood from Peripheral, Antecubital, Right Updated: 03/30/25 2101     Blood Culture No Growth After 6 Hours    Blood culture #2 **CANNOT BE ORDERED STAT** [[id number]]  (Normal) Collected: 03/30/25 1336    Order Status: Completed Specimen: Blood from Peripheral, Antecubital, Left Updated: 03/30/25 2101     Blood Culture No Growth After 6 Hours          I have reviewed all pertinent labs within the past 24 hours.    Estimated Creatinine Clearance: 92.2 mL/min (based on SCr of 0.6 mg/dL).    Diagnostic Results:  I have reviewed all pertinent imaging results/findings within the past 24 hours.

## 2025-03-31 NOTE — HPI
Mrs. Perez is a 55 y.o. W has PAH and is currently on triple therapy who presented to the ED with abdominal pain and n/v. Patient was diagnosed with IPAH in 2009 and started on Remodulin, Letairis, and Adcirca. She has a history of ABHIJEET (untreated - not able to tolerate CPAP 2/2 severe congestion), GERD, and Asthma. Current therapy is Remodulin infusing at 80ng/kg/min (pre-filled cassettes), [id number] qdaily, and tadalafil 40mg qdaily. Has T/F Adempas. Patient denies chest pain, chest pressure, syncope, pre-syncope, lightheadedness, dizziness, PND, orthopnea, or LE edema.        CT Abd/pelvi 3/30/25:     1. The gallbladder is distended noting cholelithiasis, and suspected wall thickening/induration about the gallbladder walls.  There is distal choledocholithiasis.  Evaluation with ultrasound is recommended as developing acute cholecystitis is a consideration.  2. Liquid content within a few distal small bowel loops and colon.  Correlation with any history of diarrheal illness.  3. Stable pulmonary nodules, please see CT 02/27/2023.  4. Hepatomegaly, correlation with LFTs recommended.      TTE 12/16/25      Left Ventricle: The left ventricle is normal in size. Normal wall thickness. Normal wall motion. Septal flattening in systole consistent with right ventricular pressure overload. There is normal systolic function with a visually estimated ejection fraction of 60 - 65%. There is normal diastolic function.    Right Ventricle: Moderate right ventricular enlargement with hypertrophy. Systolic function is mildly to moderately reduced.    The right atrium is mildly dilated.    Tricuspid Valve: There is mild regurgitation.    Pulmonary Artery: There is moderate to severe pulmonary hypertension. The estimated pulmonary artery systolic pressure is 67 mmHg.    IVC/SVC: Normal venous pressure at 3 mmHg.

## 2025-03-31 NOTE — CONSULTS
[facility] - Emergency Dept  General Surgery  Consult Note    Patient Name: Yuni Perez  MRN: [id number]  Code Status: Full Code  Admission Date: 3/30/2025  Hospital Length of Stay: 0 days  Attending Physician: Jerry Zuñiga, *  Primary Care Provider: Lulu Sandhu MD    Patient information was obtained from patient and ER records.     Inpatient consult to General Surgery  Consult performed by: Sandeep Del Cid MD  Consult ordered by: Yancy Mc PA-C        Subjective:     Principal Problem: Cholecystitis     History of Present Illness: 55F with hx of kita tube that has been removed and Pulm HTN on remodulin ggt at home here with abdominal pain, nausea and vomiting. She does report fevers. Notes diarrhea that she states she has because of meds. CT scan with distended and thickened gallbladder with multiple stones and choledocholithiasis. US pending. No white count but has shift.  Never had surgery before. Walks but does get SOB. No MI or stroke. No surgeries. No blood thinners. Surgery consulted for cholecystitis.     No current facility-administered medications on file prior to encounter.     Current Outpatient Medications on File Prior to Encounter   Medication Sig    acetaminophen (TYLENOL) 500 MG tablet Take 1,000 mg by mouth every 6 (six) hours as needed for Pain.    ADCIRCA 20 mg Tab Take 2 tablets (40 mg total) by mouth once daily.    albuterol (VENTOLIN HFA) 90 mcg/actuation inhaler Inhale 2 puffs into the lungs every 6 (six) hours as needed for Wheezing or Shortness of Breath. Rescue    ambrisentan (LETAIRIS) 10 MG Tab Take 1 tablet (10 mg total) by mouth once daily.    BREO ELLIPTA 200-25 mcg/dose DsDv diskus inhaler INHALE 1 PUFF INTO THE LUNGS EVERY EVENING. CONTROLLER    cyanocobalamin, vitamin B-12, 2,500 mcg Subl Place 2,500 mcg under the tongue every morning.     diphenhydrAMINE (BENADRYL) 25 mg capsule Take 50 mg by mouth every 6 (six) hours as needed for Itching.  Patient takes prn evenings    folic acid (FOLVITE) 1 MG tablet Take 1 tablet (1,000 mcg total) by mouth once daily.    furosemide (LASIX) 20 MG tablet Take 1 tablet (20 mg total) by mouth once daily.    glucosam/chond-msm1/C/michele/bor (GLUCOSAMINE-CHOND-MSM COMPLEX ORAL) Take by mouth.    hyoscyamine (LEVSIN) 0.125 mg Subl Place 1 tablet (0.125 mg total) under the tongue every 6 (six) hours as needed (Aa needed).    levothyroxine (SYNTHROID) 125 MCG tablet Take 1 tablet (125 mcg total) by mouth before breakfast.    loratadine (CLARITIN) 10 mg tablet Take 10 mg by mouth every evening.     methylPREDNISolone (MEDROL DOSEPACK) 4 mg tablet use as directed    ondansetron (ZOFRAN) 4 MG tablet Take 1 tablet (4 mg total) by mouth every 6 (six) hours as needed. 1 Tablet Oral Every 6 hours    pantoprazole (PROTONIX) 40 MG tablet Take 1 tablet (40 mg total) by mouth once daily.    rimegepant (NURTEC) 75 mg odt Take 1 tablet (75 mg total) by mouth as needed for Migraine. Place ODT tablet on the tongue; alternatively the ODT tablet may be placed under the tongue. Can take 2nd dose in 24 hrs if mild relief, no more than 2 in 24 hrs    topiramate (TOPAMAX) 100 MG tablet Take 1 tablet (100 mg total) by mouth 2 (two) times daily.    treprostinil (REMODULIN) 2.5 mg/mL Soln Mix cassette as directed and infuse continuously per physician titration orders on dosing sheet. Current dose 80ng/kg/min    albuterol-ipratropium (DUO-NEB) 2.5 mg-0.5 mg/3 mL nebulizer solution Take 3 mLs by nebulization every 6 (six) hours as needed for Wheezing. Rescue    alprazolam (XANAX ORAL) Take by mouth as needed.    diphenoxylate-atropine 2.5-0.025 mg (LOMOTIL) 2.5-0.025 mg per tablet Take 1 tablet by mouth 4 (four) times daily as needed for Diarrhea.    ferrous sulfate 325 (65 FE) MG EC tablet Take 325 mg by mouth daily as needed.     fluticasone propionate (FLONASE) 50 mcg/actuation nasal spray 2 sprays (100 mcg total) by Each Nostril route every  "evening.    lactic acid-citric-potassium (PHEXXI) 1.8-1-0.4 % Gel Place 1 Applicatorful vaginally as needed (CONTRACEPTIVE).    LIDOcaine (LIDODERM) 5 % Place 1 patch onto the skin once daily.    tiZANidine (ZANAFLEX) 4 MG tablet Take 4 mg by mouth every 6 (six) hours as needed.    triamcinolone (KENALOG) 0.5 % ointment Apply 1 application  topically 2 (two) times daily.       Review of patient's allergies indicates:   Allergen Reactions    Fentanyl Anaphylaxis     Respiratory distress    Vibra-tabs [doxycycline hyclate] Anaphylaxis     "throat felt like it was closing"    Adhesive Hives     Silk tape    Amoxicillin Rash    Nsaids (non-steroidal anti-inflammatory drug) Swelling    Chlorhexidine Other (See Comments)     Blue Chlorhexidine causes hives       Past Medical History:   Diagnosis Date    AR (allergic rhinitis)     Cholelithiasis, common bile duct     Chronic low back pain     Eye pressure 2017    General anesthetics causing adverse effect in therapeutic use     GERD (gastroesophageal reflux disease)     History of migraine headaches     Hypothyroidism     Innocent heart murmur     Lumbar disc disease     Menorrhagia     Mild asthma     Obesity     Plantar fasciitis of left foot     Primary pulmonary hypertension     followed by heart transplant/pulmonary     Seizure disorder     x 1 in 2008    Seizures     Sleep apnea     TMJ (dislocation of temporomandibular joint)     Tricuspid regurgitation      Past Surgical History:   Procedure Laterality Date    CARDIAC CATHETERIZATION      CATHETERIZATION OF BOTH LEFT AND RIGHT HEART Bilateral 9/29/2020    Procedure: CATHETERIZATION, HEART, BOTH LEFT AND RIGHT;  Surgeon: Gucci Pickett MD;  Location: [facility] CATH LAB;  Service: Cardiology;  Laterality: Bilateral;    CENTRAL VENOUS CATHETER INSERTION      CORONARY ANGIOGRAPHY N/A 9/29/2020    Procedure: ANGIOGRAM, CORONARY ARTERY;  Surgeon: Gucci Pickett MD;  Location: [facility] CATH LAB;  Service: Cardiology;  " Laterality: N/A;    gallbladder drain  06/2019    INSERTION OF HAYNES CATHETER Right 6/17/2020    Procedure: INSERTION, CATHETER, CENTRAL VENOUS, HAYNES Replace Single Lumen for Remodulin Infusion;  Surgeon: Bertin Dewitt MD;  [facility];  Service: General;  Laterality: Right;    PORTACATH PLACEMENT      REMOVAL OF TUNNELED CENTRAL VENOUS CATHETER (CVC) N/A 6/17/2020    Procedure: REMOVAL, CATHETER, CENTRAL VENOUS, TUNNELED;  Surgeon: Bertin Dewitt MD;  [facility];  Service: General;  Laterality: N/A;    RIGHT HEART CATHETERIZATION Right 8/20/2018    Procedure: HEART CATH-RIGHT;  Surgeon: Ivonne Rai MD;  Location: [facility] CATH LAB;  Service: Cardiology;  Laterality: Right;    RIGHT HEART CATHETERIZATION Right 9/4/2019    Procedure: INSERTION, CATHETER, RIGHT HEART;  Surgeon: Ivonne Rai MD;  Location: [facility] CATH LAB;  Service: Cardiology;  Laterality: Right;    RIGHT HEART CATHETERIZATION Right 6/10/2022    Procedure: INSERTION, CATHETER, RIGHT HEART;  Surgeon: Keshia Poe MD;  Location: [facility] CATH LAB;  Service: Cardiology;  Laterality: Right;    UPPER GASTROINTESTINAL ENDOSCOPY       Family History       Problem Relation (Age of Onset)    Breast cancer Maternal Aunt, Cousin, Cousin    Cancer Maternal Aunt, Maternal Grandmother    Diabetes Mother, Maternal Grandfather, Paternal Grandfather    Glaucoma Father    Heart attack Maternal Grandfather, Paternal Grandfather    Heart disease Father, Paternal Grandfather    Hypertension Other    Leukemia Brother    No Known Problems Sister, Maternal Uncle, Paternal Aunt, Paternal Uncle, Paternal Grandmother          Tobacco Use    Smoking status: Never    Smokeless tobacco: Never   Substance and Sexual Activity    Alcohol use: No     Alcohol/week: 0.8 standard drinks of alcohol     Types: 1 Standard drinks or equivalent per week     Comment: socially    Drug use: No    Sexual activity: Not Currently     Birth  control/protection: OCP, Pill     Comment: pt is a virgin     Review of Systems   Constitutional:  Positive for activity change.   Respiratory:  Positive for shortness of breath.    Gastrointestinal:  Positive for abdominal pain, nausea and vomiting.     Objective:     Vital Signs (Most Recent):  Temp: 98.3 °F (36.8 °C) (03/30/25 1253)  Pulse: 104 (03/30/25 1430)  Resp: 20 (03/30/25 1553)  BP: (!) 141/75 (03/30/25 1430)  SpO2: 95 % (03/30/25 1600) Vital Signs (24h Range):  Temp:  [98.3 °F (36.8 °C)] 98.3 °F (36.8 °C)  Pulse:  [] 104  Resp:  [18-20] 20  SpO2:  [92 %-98 %] 95 %  BP: (140-141)/(75-86) 141/75     Weight: 64.9 kg (143 lb)  Body mass index is 28.88 kg/m².     Physical Exam  Vitals reviewed.   Constitutional:       General: She is not in acute distress.  HENT:      Head: Normocephalic and atraumatic.      Nose: Nose normal.   Eyes:      General:         Right eye: No discharge.         Left eye: No discharge.   Cardiovascular:      Rate and Rhythm: Normal rate and regular rhythm.   Pulmonary:      Effort: Pulmonary effort is normal. No respiratory distress.      Breath sounds: No stridor.   Abdominal:      Comments: Soft, ND,  Generalized upper abdominal tenderness worse in RUQ   Musculoskeletal:         General: Normal range of motion.      Cervical back: Normal range of motion.   Skin:     General: Skin is warm and dry.      Capillary Refill: Capillary refill takes 2 to 3 seconds.   Neurological:      General: No focal deficit present.      Mental Status: She is alert and oriented to person, place, and time.   Psychiatric:         Mood and Affect: Mood normal.         Behavior: Behavior normal.            I have reviewed all pertinent lab results within the past 24 hours.    Significant Diagnostics:  I have reviewed all pertinent imaging results/findings within the past 24 hours.    Assessment/Plan:     Cholelithiasis  55F with severe pulm HTN on remodulin ggt here with possible recurrent  cholecystitis and non obstructive choledocholithiasis.     Admit to medicine or cards given her history   Obtain HIDA scan to confirm diagnosis  US pending  Her pulm HTN makes her a very high risk surgical candidate and likely not worth the risk in this setting. If HIDA positive would have IR place kita tube  Consult GI given choledocholithiasis. Although, given it is not obstructive I'm not sure they would offer much in this scenario.   Can have clears and advance as tolerated when nausea improves after her studies from our standpoint   PRN pain and nausea control         VTE Risk Mitigation (From admission, onward)           Ordered     IP VTE HIGH RISK PATIENT  Once         03/30/25 1844     Place sequential compression device  Until discontinued         03/30/25 1844                    Thank you for your consult. I will follow-up with patient. Please contact us if you have any additional questions.    Sandeep Del Cid MD  General Surgery  [facility] - Emergency Dept

## 2025-03-31 NOTE — NURSING
To IR per stretcher, NPO mainatined. Prn IV Zofran given prior to leaving as little relief obtained from odt route.Consents signed earlier by YING CHADWICK

## 2025-03-31 NOTE — CONSULTS
Interventional Radiology Consult/Pre-Procedure Note      Chief Complaint/Reason for Consult: cholecystitis     History of Present Illness:  Yuni Perez is a 55 y.o. female with the PMH listed below who presents for cholecystostomy tube placement. Patient had previous cholecystostomy tube which was removed. Patient has indwelling stones.     Admission H&P reviewed.    Past Medical History:   Diagnosis Date    AR (allergic rhinitis)     Cholelithiasis, common bile duct     Chronic low back pain     Eye pressure 2017    General anesthetics causing adverse effect in therapeutic use     GERD (gastroesophageal reflux disease)     History of migraine headaches     Hypothyroidism     Innocent heart murmur     Lumbar disc disease     Menorrhagia     Mild asthma     Obesity     Plantar fasciitis of left foot     Primary pulmonary hypertension     followed by heart transplant/pulmonary     Seizure disorder     x 1 in 2008    Seizures     Sleep apnea     TMJ (dislocation of temporomandibular joint)     Tricuspid regurgitation      Past Surgical History:   Procedure Laterality Date    CARDIAC CATHETERIZATION      CATHETERIZATION OF BOTH LEFT AND RIGHT HEART Bilateral 9/29/2020    Procedure: CATHETERIZATION, HEART, BOTH LEFT AND RIGHT;  Surgeon: Gucic Pickett MD;  Location: [facility] CATH LAB;  Service: Cardiology;  Laterality: Bilateral;    CENTRAL VENOUS CATHETER INSERTION      CORONARY ANGIOGRAPHY N/A 9/29/2020    Procedure: ANGIOGRAM, CORONARY ARTERY;  Surgeon: Gucci Pickett MD;  Location: [facility] CATH LAB;  Service: Cardiology;  Laterality: N/A;    gallbladder drain  06/2019    INSERTION OF HAYNES CATHETER Right 6/17/2020    Procedure: INSERTION, CATHETER, CENTRAL VENOUS, HAYNES Replace Single Lumen for Remodulin Infusion;  Surgeon: Bertin Dewitt MD;  Location: [facility] OR [address] [place] [address];  Service: General;  Laterality: Right;    PORTACATH PLACEMENT      REMOVAL OF TUNNELED CENTRAL VENOUS CATHETER (CVC) N/A  "6/17/2020    Procedure: REMOVAL, CATHETER, CENTRAL VENOUS, TUNNELED;  Surgeon: Bertin Dewitt MD;  Location: [facility] OR [address] [place] [address];  Service: General;  Laterality: N/A;    RIGHT HEART CATHETERIZATION Right 8/20/2018    Procedure: HEART CATH-RIGHT;  Surgeon: Ivonne Rai MD;  Location: [facility] CATH LAB;  Service: Cardiology;  Laterality: Right;    RIGHT HEART CATHETERIZATION Right 9/4/2019    Procedure: INSERTION, CATHETER, RIGHT HEART;  Surgeon: Ivonne Rai MD;  Location: [facility] CATH LAB;  Service: Cardiology;  Laterality: Right;    RIGHT HEART CATHETERIZATION Right 6/10/2022    Procedure: INSERTION, CATHETER, RIGHT HEART;  Surgeon: Keshia Poe MD;  Location: [facility] CATH LAB;  Service: Cardiology;  Laterality: Right;    UPPER GASTROINTESTINAL ENDOSCOPY         Allergies:   Review of patient's allergies indicates:   Allergen Reactions    Fentanyl Anaphylaxis     Respiratory distress    Vibra-tabs [doxycycline hyclate] Anaphylaxis     "throat felt like it was closing"    Adhesive Hives     Silk tape    Amoxicillin Rash    Nsaids (non-steroidal anti-inflammatory drug) Swelling    Chlorhexidine Other (See Comments)     Blue Chlorhexidine causes hives       Scheduled Meds:    ambrisentan  10 mg Oral Daily    ceFEPime IV (PEDS and ADULTS)  2 g Intravenous Q8H    ferrous sulfate  1 tablet Oral Daily    fluticasone furoate-vilanteroL  1 puff Inhalation QHS    fluticasone propionate  2 spray Each Nostril QHS    folic acid  1,000 mcg Oral Daily    levothyroxine  125 mcg Oral Before breakfast    metroNIDAZOLE IV (PEDS and ADULTS)  500 mg Intravenous Q8H    pantoprazole  40 mg Intravenous Daily    tadalafil  40 mg Oral Daily PM    topiramate  100 mg Oral BID    treprostinil (REMODULIN) 12,000,000 ng in 0.9% NaCl 100 mL infusion  117 ng/kg/min (Order-Specific) Intravenous Q24H     Continuous Infusions:    veletri/remodulin cassette   Intravenous Continuous        veletri/remodulin tubing   Intravenous Continuous     "     PRN Meds:  Current Facility-Administered Medications:     acetaminophen, 650 mg, Oral, Q6H PRN    albuterol-ipratropium, 3 mL, Nebulization, Q6H PRN    diphenhydrAMINE, 50 mg, Oral, Q6H PRN    morphine, 2 mg, Intravenous, Q4H PRN    ondansetron, 8 mg, Oral, Q8H PRN    ondansetron, 4 mg, Intravenous, Q8H PRN    polyethylene glycol, 17 g, Oral, BID PRN    sodium chloride 0.9%, 10 mL, Intravenous, PRN    Anticoagulation/Antiplatelet Meds: no anticoagulation    Review of Systems:   As documented in admission H&P.    Physical Exam:  Temp: 97.9 °F (36.6 °C) (03/31/25 0738)  Pulse: 102 (03/31/25 1041)  Resp: 16 (03/31/25 1316)  BP: 117/76 (03/31/25 0738)  SpO2: (!) 92 % (03/31/25 0738)     General: WNWD, NAD  HEENT: Normocephalic, sclera anicteric  Neck: Supple, no palpable lymphadenopathy  Heart: RRR  Lungs: breathing unlabored  Abd:diffuse mild abdominal pain  Extremities: no CCE on exposed skin  Neuro: Gross nonfocal    Labs:  Recent Labs   Lab 03/31/25  0845   INR 1.1       Recent Labs   Lab 03/31/25  0845   WBC 11.30   HGB 14.5   HCT 43.6   MCV 90         Recent Labs   Lab 03/31/25  0845      K 3.5   *   CO2 20*   BUN 8   CREATININE 0.6   CALCIUM 8.3*   MG 2.0   ALT 12   AST 15   ALBUMIN 3.2*   BILITOT 0.5       Imaging:  HIDA, CT were reviewed.    Assessment/Plan:   Yuni Perez is a 55 y.o. female with the PMH listed below who presents for cholecystostomy tube placement. Patient had previous cholecystostomy tube which was removed. Patient has indwelling stones.     Sedation:  Sedation history: have not been any systemic reactions  ASA: 3 / Mallampati: 3  Sedation plan: Up to moderate (Versed, fentanyl)     Risks (including, but not limited to, pain, bleeding, infection, damage to nearby structures, treatment failure/recurrence, and the need for additional procedures), potential benefits, and alternatives were discussed with the patient. All questions were answered to the best of  my abilities. The patient wishes to proceed. Written informed consent was obtained.      Roselyn August MD

## 2025-03-31 NOTE — ASSESSMENT & PLAN NOTE
Mrs. Perez is a 55 y.o. W has PAH and is currently on triple therapy who presented to the ED with abdominal pain and n/v. Due to concerns for cholecystitis general surgery was consulted who evaluated patient and reviewed imaging.     CT Abd/pelvi 3/30/25: 1. The gallbladder is distended noting cholelithiasis, and suspected wall thickening/induration about the gallbladder walls.  There is distal choledocholithiasis.  Evaluation with ultrasound is recommended as developing acute cholecystitis is a consideration. 2. Liquid content within a few distal small bowel loops and colon.  Correlation with any history of diarrheal illness. 3. Stable pulmonary nodules, please see CT 02/27/2023.4. Hepatomegaly, correlation with LFTs recommended.    Plan:   - concerns for cholelithiasis and possible choledocholithiasis.US abd pending. HIDA scan pending and GI consulted given choledocholithiasis as per recs.   - Per GS: Her pulm HTN makes her a very high risk surgical candidate and likely not worth the risk in this setting. If HIDA positive would have IR place kita tube  - patient has not vomited since she has been in the ED and no longer has nausea. Will start liquid diet and advance as tolerate once test have been done.   - f/u bcx x 2  - added cefepime + flagyl and can deescalate as needed.   - dehydrated will give 500cc in 5 hours.

## 2025-03-31 NOTE — NURSING
Returned to room after HIDA scan completed. NOLAN Hurtado NP to visit and update with plan of care.

## 2025-03-31 NOTE — ASSESSMENT & PLAN NOTE
Patient was diagnosed with IPAH in 2009 and started on Remodulin, Letairis, and Adcirca. She has a history of ABHIJEET (untreated - not able to tolerate CPAP 2/2 severe congestion), GERD, and Asthma. Not interested in lung transplant.       - continue triple therapy w/ Remodulin infusing at 80ng/kg/min (pre-filled cassettes), [id number] qdaily, and tadalafil 40mg qhs. Admitting to PH floor.   - order set completed.

## 2025-03-31 NOTE — ASSESSMENT & PLAN NOTE
Patient is a 55 year old female with severe pulm HTN on remodulin ggt here with possible recurrent cholecystitis and non obstructive choledocholithiasis.     - NPO pending studies. When given diet, would start with CLD   - US ABD equivocal for acute cholecysitis. HIDA pending  - If HIDA positive, would consult IR for kita tube.   - Her pulm HTN makes her a very high risk surgical candidate and likely not worth the risk in this setting. Will likely need to keep tube   - Consult GI given choledocholithiasis. Although, given it is not obstructive, not sure they would offer much in this scenario.   - Continue IV abx  - PRN pain and nausea control  - Serial abdominal exams  - Remainder of care per primary team  - Please contact general surgery with any questions, concerns, or clinical status changes

## 2025-03-31 NOTE — PLAN OF CARE
Pharmacy called to confirm cassette is needed today    Confirmed that patient has enough till the standard 1500 protocol time     Patient to be switched over the pharmacy mixed cassette at 1500   Hospital DOse = 117ngs/kr/min  Hospital Final Concentration =120,000ngs/ml   DW = 60.5KG  HOSPITAL CASSETTE CADD RATE = 85mls/24hrs

## 2025-03-31 NOTE — ASSESSMENT & PLAN NOTE
55F with severe pulm HTN on remodulin ggt here with possible recurrent cholecystitis and non obstructive choledocholithiasis.     Admit to medicine or cards given her history   Obtain HIDA scan to confirm diagnosis  US pending  Her pulm HTN makes her a very high risk surgical candidate and likely not worth the risk in this setting. If HIDA positive would have IR place kita tube  Consult GI given choledocholithiasis. Although, given it is not obstructive I'm not sure they would offer much in this scenario.   Can have clears and advance as tolerated when nausea improves after her studies from our standpoint   PRN pain and nausea control

## 2025-03-31 NOTE — PROGRESS NOTES
[facility]  General Surgery  Progress Note    Subjective:     History of Present Illness:  55F with hx of kita tube that has been removed and Pulm HTN on remodulin ggt at home here with abdominal pain, nausea and vomiting. She does report fevers. Notes diarrhea that she states she has because of meds. CT scan with distended and thickened gallbladder with multiple stones and choledocholithiasis. US pending. No white count but has shift.  Never had surgery before. Walks but does get SOB. No MI or stroke. No surgeries. No blood thinners. Surgery consulted for cholecystitis.     Post-Op Info:  * No surgery found *         Interval History: NAEON. Afebrile. HDS. continued abdominal pain this AM, controlled with current regimen. No new symptoms or concerns this morning. All questions answered.   AM labs not yet collected       Medications:  Continuous Infusions:   veletri/remodulin cassette   Intravenous Continuous        veletri/remodulin tubing   Intravenous Continuous         Scheduled Meds:   ambrisentan  10 mg Oral Daily    ceFEPime IV (PEDS and ADULTS)  2 g Intravenous Q8H    ferrous sulfate  1 tablet Oral Daily    fluticasone furoate-vilanteroL  1 puff Inhalation QHS    fluticasone propionate  2 spray Each Nostril QHS    folic acid  1,000 mcg Oral Daily    levothyroxine  125 mcg Oral Before breakfast    metroNIDAZOLE IV (PEDS and ADULTS)  500 mg Intravenous Q8H    pantoprazole  40 mg Intravenous Daily    tadalafil  40 mg Oral Daily PM    topiramate  100 mg Oral BID    treprostinil (REMODULIN) 12,000,000 ng in 0.9% NaCl 100 mL infusion  117 ng/kg/min (Order-Specific) Intravenous Q24H     PRN Meds:  Current Facility-Administered Medications:     acetaminophen, 650 mg, Oral, Q6H PRN    albuterol-ipratropium, 3 mL, Nebulization, Q6H PRN    diphenhydrAMINE, 50 mg, Oral, Q6H PRN    ondansetron, 8 mg, Oral, Q8H PRN    ondansetron, 4 mg, Intravenous, Q8H PRN    polyethylene glycol, 17 g, Oral, BID  "PRN    sodium chloride 0.9%, 10 mL, Intravenous, PRN     Review of patient's allergies indicates:   Allergen Reactions    Fentanyl Anaphylaxis     Respiratory distress    Vibra-tabs [doxycycline hyclate] Anaphylaxis     "throat felt like it was closing"    Adhesive Hives     Silk tape    Amoxicillin Rash    Nsaids (non-steroidal anti-inflammatory drug) Swelling    Chlorhexidine Other (See Comments)     Blue Chlorhexidine causes hives     Objective:     Vital Signs (Most Recent):  Temp: 97.9 °F (36.6 °C) (03/31/25 0738)  Pulse: 99 (03/31/25 0738)  Resp: 18 (03/31/25 0738)  BP: 117/76 (03/31/25 0738)  SpO2: (!) 92 % (03/31/25 0738) Vital Signs (24h Range):  Temp:  [97.9 °F (36.6 °C)-98.4 °F (36.9 °C)] 97.9 °F (36.6 °C)  Pulse:  [] 99  Resp:  [18-23] 18  SpO2:  [92 %-98 %] 92 %  BP: (110-141)/(70-86) 117/76     Weight: 62.9 kg (138 lb 12.5 oz)  Body mass index is 28.03 kg/m².    Intake/Output - Last 3 Shifts         03/29 0700  03/30 0659 03/30 0700  03/31 0659 03/31 0700 04/01 0659    P.O.  0     Total Intake(mL/kg)  0 (0)     Urine (mL/kg/hr)  300     Emesis/NG output  0     Stool  0     Total Output  300     Net  -300            Urine Occurrence  0 x     Stool Occurrence  0 x     Emesis Occurrence  0 x              Physical Exam  Vitals and nursing note reviewed.   Constitutional:       General: She is not in acute distress.     Appearance: She is not diaphoretic.      Comments: 2L O2 via NC   HENT:      Head: Normocephalic and atraumatic.      Mouth/Throat:      Mouth: Mucous membranes are moist.      Pharynx: Oropharynx is clear.   Eyes:      Extraocular Movements: Extraocular movements intact.      Conjunctiva/sclera: Conjunctivae normal.   Cardiovascular:      Rate and Rhythm: Normal rate.   Pulmonary:      Effort: Pulmonary effort is normal. No respiratory distress.   Abdominal:      General: There is no distension.      Palpations: Abdomen is soft.      Tenderness: There is no guarding or rebound.      " Comments: TTP in RUQ. No peritonitic signs    Musculoskeletal:         General: No deformity.   Skin:     General: Skin is warm and dry.   Neurological:      Mental Status: She is alert and oriented to person, place, and time.          Significant Labs:  I have reviewed all pertinent lab results within the past 24 hours.    Significant Diagnostics:  I have reviewed all pertinent imaging results/findings within the past 24 hours.  Assessment/Plan:     * Cholelithiasis  Patient is a 55 year old female with severe pulm HTN on remodulin ggt here with possible recurrent cholecystitis and non obstructive choledocholithiasis.     - NPO pending studies. When given diet, would start with CLD   - US ABD equivocal for acute cholecysitis. HIDA pending  - If HIDA positive, would consult IR for kita tube.   - Her pulm HTN makes her a very high risk surgical candidate and likely not worth the risk in this setting. Will likely need to keep tube   - Consult GI given choledocholithiasis. Although, given it is not obstructive, not sure they would offer much in this scenario.   - Continue IV abx  - PRN pain and nausea control  - Serial abdominal exams  - Remainder of care per primary team  - Please contact general surgery with any questions, concerns, or clinical status changes         Patient and POC discussed with general surgery staff, Dr. Shah. They are in agreement with current POC.       Oliver Marshall PA-C  General Surgery  [facility]

## 2025-03-31 NOTE — PLAN OF CARE
Pt NPO since midnight. Plan for possible kita tube placement pending HIDA scan results.     VSS on 2L NC. Commode at bedside. OOB with standby assist d/t weakness. Baseline--prior to hospitalization, was independent and ambulatory without assistive devices.

## 2025-03-31 NOTE — SUBJECTIVE & OBJECTIVE
"Interval History: NAEON. Afebrile. HDS. continued abdominal pain this AM, controlled with current regimen. No new symptoms or concerns this morning. All questions answered.   AM labs not yet collected       Medications:  Continuous Infusions:   veletri/remodulin cassette   Intravenous Continuous        veletri/remodulin tubing   Intravenous Continuous         Scheduled Meds:   ambrisentan  10 mg Oral Daily    ceFEPime IV (PEDS and ADULTS)  2 g Intravenous Q8H    ferrous sulfate  1 tablet Oral Daily    fluticasone furoate-vilanteroL  1 puff Inhalation QHS    fluticasone propionate  2 spray Each Nostril QHS    folic acid  1,000 mcg Oral Daily    levothyroxine  125 mcg Oral Before breakfast    metroNIDAZOLE IV (PEDS and ADULTS)  500 mg Intravenous Q8H    pantoprazole  40 mg Intravenous Daily    tadalafil  40 mg Oral Daily PM    topiramate  100 mg Oral BID    treprostinil (REMODULIN) 12,000,000 ng in 0.9% NaCl 100 mL infusion  117 ng/kg/min (Order-Specific) Intravenous Q24H     PRN Meds:  Current Facility-Administered Medications:     acetaminophen, 650 mg, Oral, Q6H PRN    albuterol-ipratropium, 3 mL, Nebulization, Q6H PRN    diphenhydrAMINE, 50 mg, Oral, Q6H PRN    ondansetron, 8 mg, Oral, Q8H PRN    ondansetron, 4 mg, Intravenous, Q8H PRN    polyethylene glycol, 17 g, Oral, BID PRN    sodium chloride 0.9%, 10 mL, Intravenous, PRN     Review of patient's allergies indicates:   Allergen Reactions    Fentanyl Anaphylaxis     Respiratory distress    Vibra-tabs [doxycycline hyclate] Anaphylaxis     "throat felt like it was closing"    Adhesive Hives     Silk tape    Amoxicillin Rash    Nsaids (non-steroidal anti-inflammatory drug) Swelling    Chlorhexidine Other (See Comments)     Blue Chlorhexidine causes hives     Objective:     Vital Signs (Most Recent):  Temp: 97.9 °F (36.6 °C) (03/31/25 0738)  Pulse: 99 (03/31/25 0738)  Resp: 18 (03/31/25 0738)  BP: 117/76 (03/31/25 0738)  SpO2: (!) 92 % (03/31/25 0738) Vital Signs " (24h Range):  Temp:  [97.9 °F (36.6 °C)-98.4 °F (36.9 °C)] 97.9 °F (36.6 °C)  Pulse:  [] 99  Resp:  [18-23] 18  SpO2:  [92 %-98 %] 92 %  BP: (110-141)/(70-86) 117/76     Weight: 62.9 kg (138 lb 12.5 oz)  Body mass index is 28.03 kg/m².    Intake/Output - Last 3 Shifts         03/29 0700  03/30 0659 03/30 0700 03/31 0659 03/31 0700 04/01 0659    P.O.  0     Total Intake(mL/kg)  0 (0)     Urine (mL/kg/hr)  300     Emesis/NG output  0     Stool  0     Total Output  300     Net  -300            Urine Occurrence  0 x     Stool Occurrence  0 x     Emesis Occurrence  0 x              Physical Exam  Vitals and nursing note reviewed.   Constitutional:       General: She is not in acute distress.     Appearance: She is not diaphoretic.      Comments: 2L O2 via NC   HENT:      Head: Normocephalic and atraumatic.      Mouth/Throat:      Mouth: Mucous membranes are moist.      Pharynx: Oropharynx is clear.   Eyes:      Extraocular Movements: Extraocular movements intact.      Conjunctiva/sclera: Conjunctivae normal.   Cardiovascular:      Rate and Rhythm: Normal rate.   Pulmonary:      Effort: Pulmonary effort is normal. No respiratory distress.   Abdominal:      General: There is no distension.      Palpations: Abdomen is soft.      Tenderness: There is no guarding or rebound.      Comments: TTP in RUQ. No peritonitic signs    Musculoskeletal:         General: No deformity.   Skin:     General: Skin is warm and dry.   Neurological:      Mental Status: She is alert and oriented to person, place, and time.          Significant Labs:  I have reviewed all pertinent lab results within the past 24 hours.    Significant Diagnostics:  I have reviewed all pertinent imaging results/findings within the past 24 hours.

## 2025-03-31 NOTE — PLAN OF CARE
Procedure completed. Patient tolerated well; VSS. Site CDI without hematoma. Patient to be transported to [facility] accompanied by this RN; report to be given at the bedside.

## 2025-03-31 NOTE — H&P
[facility]  Heart Transplant  H&P    Patient Name: Yuni Perez  MRN: [id number]  Admission Date: 3/30/2025  Attending Physician: Jerry Zuñiga, *  Primary Care Provider: Lulu Sandhu MD  Principal Problem:Cholelithiasis    Subjective:     History of Present Illness:  Mrs. Perez is a 55 y.o. W has PAH and is currently on triple therapy who presented to the ED with abdominal pain and n/v. Patient was diagnosed with IPAH in 2009 and started on Remodulin, Letairis, and Adcirca. She has a history of ABHIJEET (untreated - not able to tolerate CPAP 2/2 severe congestion), GERD, and Asthma. Current therapy is Remodulin infusing at 80ng/kg/min (pre-filled cassettes), [id number] qdaily, and tadalafil 40mg qdaily. Has T/F Adempas. Patient denies chest pain, chest pressure, syncope, pre-syncope, lightheadedness, dizziness, PND, orthopnea, or LE edema.        CT Abd/pelvi 3/30/25:     1. The gallbladder is distended noting cholelithiasis, and suspected wall thickening/induration about the gallbladder walls.  There is distal choledocholithiasis.  Evaluation with ultrasound is recommended as developing acute cholecystitis is a consideration.  2. Liquid content within a few distal small bowel loops and colon.  Correlation with any history of diarrheal illness.  3. Stable pulmonary nodules, please see CT 02/27/2023.  4. Hepatomegaly, correlation with LFTs recommended.      TTE 12/16/25      Left Ventricle: The left ventricle is normal in size. Normal wall thickness. Normal wall motion. Septal flattening in systole consistent with right ventricular pressure overload. There is normal systolic function with a visually estimated ejection fraction of 60 - 65%. There is normal diastolic function.    Right Ventricle: Moderate right ventricular enlargement with hypertrophy. Systolic function is mildly to moderately reduced.    The right atrium is mildly dilated.    Tricuspid Valve: There is mild  regurgitation.    Pulmonary Artery: There is moderate to severe pulmonary hypertension. The estimated pulmonary artery systolic pressure is 67 mmHg.    IVC/SVC: Normal venous pressure at 3 mmHg.       Past Medical History:   Diagnosis Date    AR (allergic rhinitis)     Cholelithiasis, common bile duct     Chronic low back pain     Eye pressure 2017    General anesthetics causing adverse effect in therapeutic use     GERD (gastroesophageal reflux disease)     History of migraine headaches     Hypothyroidism     Innocent heart murmur     Lumbar disc disease     Menorrhagia     Mild asthma     Obesity     Plantar fasciitis of left foot     Primary pulmonary hypertension     followed by heart transplant/pulmonary     Seizure disorder     x 1 in 2008    Seizures     Sleep apnea     TMJ (dislocation of temporomandibular joint)     Tricuspid regurgitation        Past Surgical History:   Procedure Laterality Date    CARDIAC CATHETERIZATION      CATHETERIZATION OF BOTH LEFT AND RIGHT HEART Bilateral 9/29/2020    Procedure: CATHETERIZATION, HEART, BOTH LEFT AND RIGHT;  Surgeon: Gucci Pickett MD;  Location: [facility] CATH LAB;  Service: Cardiology;  Laterality: Bilateral;    CENTRAL VENOUS CATHETER INSERTION      CORONARY ANGIOGRAPHY N/A 9/29/2020    Procedure: ANGIOGRAM, CORONARY ARTERY;  Surgeon: Gucci Pickett MD;  Location: [facility] CATH LAB;  Service: Cardiology;  Laterality: N/A;    gallbladder drain  06/2019    INSERTION OF HAYNES CATHETER Right 6/17/2020    Procedure: INSERTION, CATHETER, CENTRAL VENOUS, HAYNES Replace Single Lumen for Remodulin Infusion;  Surgeon: Bertin Dewitt MD;  Location: [facility] OR [address] [place] [address];  Service: General;  Laterality: Right;    PORTACATH PLACEMENT      REMOVAL OF TUNNELED CENTRAL VENOUS CATHETER (CVC) N/A 6/17/2020    Procedure: REMOVAL, CATHETER, CENTRAL VENOUS, TUNNELED;  Surgeon: Bertin Dewitt MD;  Location: [facility] OR [address] [place] [address];  Service: General;  Laterality: N/A;    RIGHT HEART  "CATHETERIZATION Right 8/20/2018    Procedure: HEART CATH-RIGHT;  Surgeon: Ivonne Rai MD;  Location: [facility] CATH LAB;  Service: Cardiology;  Laterality: Right;    RIGHT HEART CATHETERIZATION Right 9/4/2019    Procedure: INSERTION, CATHETER, RIGHT HEART;  Surgeon: Ivonne Rai MD;  Location: [facility] CATH LAB;  Service: Cardiology;  Laterality: Right;    RIGHT HEART CATHETERIZATION Right 6/10/2022    Procedure: INSERTION, CATHETER, RIGHT HEART;  Surgeon: Keshia Poe MD;  Location: [facility] CATH LAB;  Service: Cardiology;  Laterality: Right;    UPPER GASTROINTESTINAL ENDOSCOPY         Review of patient's allergies indicates:   Allergen Reactions    Fentanyl Anaphylaxis     Respiratory distress    Vibra-tabs [doxycycline hyclate] Anaphylaxis     "throat felt like it was closing"    Adhesive Hives     Silk tape    Amoxicillin Rash    Nsaids (non-steroidal anti-inflammatory drug) Swelling    Chlorhexidine Other (See Comments)     Blue Chlorhexidine causes hives       Current Facility-Administered Medications   Medication    0.9% NaCl infusion    acetaminophen tablet 650 mg    albuterol-ipratropium 2.5 mg-0.5 mg/3 mL nebulizer solution 3 mL    [START ON 3/31/2025] ambrisentan tablet 10 mg    ceFEPIme injection 2 g    diphenhydrAMINE capsule 25 mg    diphenhydrAMINE capsule 50 mg    [START ON 3/31/2025] ferrous sulfate tablet 1 each    fluticasone furoate-vilanteroL 200-25 mcg/dose diskus inhaler 1 puff    fluticasone propionate 50 mcg/actuation nasal spray 100 mcg    [START ON 3/31/2025] folic acid tablet 1,000 mcg    [START ON 3/31/2025] levothyroxine tablet 125 mcg    metronidazole IVPB 500 mg    ondansetron disintegrating tablet 8 mg    ondansetron injection 4 mg    pantoprazole injection 40 mg    polyethylene glycol packet 17 g    sodium chloride 0.9% flush 10 mL    tadalafil tablet 40 mg    topiramate tablet 100 mg    treprostinil 2.5 mg/mL vial     Current Outpatient Medications   Medication Sig    " acetaminophen (TYLENOL) 500 MG tablet Take 1,000 mg by mouth every 6 (six) hours as needed for Pain.    ADCIRCA 20 mg Tab Take 2 tablets (40 mg total) by mouth once daily.    albuterol (VENTOLIN HFA) 90 mcg/actuation inhaler Inhale 2 puffs into the lungs every 6 (six) hours as needed for Wheezing or Shortness of Breath. Rescue    ambrisentan (LETAIRIS) 10 MG Tab Take 1 tablet (10 mg total) by mouth once daily.    BREO ELLIPTA 200-25 mcg/dose DsDv diskus inhaler INHALE 1 PUFF INTO THE LUNGS EVERY EVENING. CONTROLLER    cyanocobalamin, vitamin B-12, 2,500 mcg Subl Place 2,500 mcg under the tongue every morning.     diphenhydrAMINE (BENADRYL) 25 mg capsule Take 50 mg by mouth every 6 (six) hours as needed for Itching. Patient takes prn evenings    folic acid (FOLVITE) 1 MG tablet Take 1 tablet (1,000 mcg total) by mouth once daily.    furosemide (LASIX) 20 MG tablet Take 1 tablet (20 mg total) by mouth once daily.    glucosam/chond-msm1/C/michele/bor (GLUCOSAMINE-CHOND-MSM COMPLEX ORAL) Take by mouth.    hyoscyamine (LEVSIN) 0.125 mg Subl Place 1 tablet (0.125 mg total) under the tongue every 6 (six) hours as needed (Aa needed).    levothyroxine (SYNTHROID) 125 MCG tablet Take 1 tablet (125 mcg total) by mouth before breakfast.    loratadine (CLARITIN) 10 mg tablet Take 10 mg by mouth every evening.     methylPREDNISolone (MEDROL DOSEPACK) 4 mg tablet use as directed    ondansetron (ZOFRAN) 4 MG tablet Take 1 tablet (4 mg total) by mouth every 6 (six) hours as needed. 1 Tablet Oral Every 6 hours    pantoprazole (PROTONIX) 40 MG tablet Take 1 tablet (40 mg total) by mouth once daily.    rimegepant (NURTEC) 75 mg odt Take 1 tablet (75 mg total) by mouth as needed for Migraine. Place ODT tablet on the tongue; alternatively the ODT tablet may be placed under the tongue. Can take 2nd dose in 24 hrs if mild relief, no more than 2 in 24 hrs    topiramate (TOPAMAX) 100 MG tablet Take 1 tablet (100 mg total) by mouth 2 (two) times  daily.    treprostinil (REMODULIN) 2.5 mg/mL Soln Mix cassette as directed and infuse continuously per physician titration orders on dosing sheet. Current dose 80ng/kg/min    albuterol-ipratropium (DUO-NEB) 2.5 mg-0.5 mg/3 mL nebulizer solution Take 3 mLs by nebulization every 6 (six) hours as needed for Wheezing. Rescue    alprazolam (XANAX ORAL) Take by mouth as needed.    diphenoxylate-atropine 2.5-0.025 mg (LOMOTIL) 2.5-0.025 mg per tablet Take 1 tablet by mouth 4 (four) times daily as needed for Diarrhea.    ferrous sulfate 325 (65 FE) MG EC tablet Take 325 mg by mouth daily as needed.     fluticasone propionate (FLONASE) 50 mcg/actuation nasal spray 2 sprays (100 mcg total) by Each Nostril route every evening.    lactic acid-citric-potassium (PHEXXI) 1.8-1-0.4 % Gel Place 1 Applicatorful vaginally as needed (CONTRACEPTIVE).    LIDOcaine (LIDODERM) 5 % Place 1 patch onto the skin once daily.    tiZANidine (ZANAFLEX) 4 MG tablet Take 4 mg by mouth every 6 (six) hours as needed.    triamcinolone (KENALOG) 0.5 % ointment Apply 1 application  topically 2 (two) times daily.     Family History       Problem Relation (Age of Onset)    Breast cancer Maternal Aunt, Cousin, Cousin    Cancer Maternal Aunt, Maternal Grandmother    Diabetes Mother, Maternal Grandfather, Paternal Grandfather    Glaucoma Father    Heart attack Maternal Grandfather, Paternal Grandfather    Heart disease Father, Paternal Grandfather    Hypertension Other    Leukemia Brother    No Known Problems Sister, Maternal Uncle, Paternal Aunt, Paternal Uncle, Paternal Grandmother          Tobacco Use    Smoking status: Never    Smokeless tobacco: Never   Substance and Sexual Activity    Alcohol use: No     Alcohol/week: 0.8 standard drinks of alcohol     Types: 1 Standard drinks or equivalent per week     Comment: socially    Drug use: No    Sexual activity: Not Currently     Birth control/protection: OCP, Pill     Comment: pt is a virgin     Review of  Systems   Constitutional:  Negative for chills, diaphoresis and fever.   HENT:  Negative for congestion and sore throat.    Gastrointestinal:  Positive for abdominal pain and nausea. Negative for abdominal distention, blood in stool and vomiting.   Genitourinary:  Negative for difficulty urinating.   Neurological:  Negative for dizziness and light-headedness.   Psychiatric/Behavioral:  The patient is not nervous/anxious.    All other systems reviewed and are negative.    Objective:     Vital Signs (Most Recent):  Temp: 98.3 °F (36.8 °C) (03/30/25 1253)  Pulse: 104 (03/30/25 1430)  Resp: 20 (03/30/25 1553)  BP: (!) 141/75 (03/30/25 1430)  SpO2: 95 % (03/30/25 1600) Vital Signs (24h Range):  Temp:  [98.3 °F (36.8 °C)] 98.3 °F (36.8 °C)  Pulse:  [] 104  Resp:  [18-20] 20  SpO2:  [92 %-98 %] 95 %  BP: (140-141)/(75-86) 141/75     Patient Vitals for the past 72 hrs (Last 3 readings):   Weight   03/30/25 1211 64.9 kg (143 lb)     Body mass index is 28.88 kg/m².    No intake or output data in the 24 hours ending 03/30/25 2000       Physical Exam  Vitals and nursing note reviewed.   Constitutional:       General: She is not in acute distress.     Appearance: Normal appearance. She is not ill-appearing.   HENT:      Head: Normocephalic and atraumatic.      Nose: No congestion.      Mouth/Throat:      Mouth: Mucous membranes are dry.      Pharynx: Oropharynx is clear.   Eyes:      General: No scleral icterus.     Extraocular Movements: Extraocular movements intact.   Cardiovascular:      Rate and Rhythm: Regular rhythm. Tachycardia present.      Pulses: Normal pulses.      Heart sounds: No murmur heard.  Pulmonary:      Effort: Pulmonary effort is normal. No respiratory distress.      Breath sounds: No wheezing.   Abdominal:      General: Abdomen is flat. There is no distension.      Tenderness: There is abdominal tenderness. There is no guarding.   Musculoskeletal:         General: No swelling or tenderness. Normal  "range of motion.      Cervical back: Normal range of motion and neck supple.   Skin:     General: Skin is warm and dry.   Neurological:      General: No focal deficit present.      Mental Status: She is alert and oriented to person, place, and time.      Motor: No weakness.   Psychiatric:         Mood and Affect: Mood normal.         Behavior: Behavior normal.         Thought Content: Thought content normal.            Significant Labs:  CBC:  Recent Labs   Lab 03/30/25  1334   WBC 11.55   RBC 5.46*   HGB 16.2*   HCT 47.8      MCV 88   MCH 29.7   MCHC 33.9     BNP:  No results for input(s): "BNP" in the last 168 hours.    Invalid input(s): "BNPTRIAGELBLO"  CMP:  Recent Labs   Lab 03/30/25  1334   CALCIUM 9.5   ALBUMIN 4.2      K 3.4*   CO2 21*      BUN 11   CREATININE 0.7   ALKPHOS 90   ALT 11   AST 19   BILITOT 0.6      Coagulation:   Recent Labs   Lab 03/30/25  1334   INR 1.1     LDH:  No results for input(s): "LDH" in the last 72 hours.  Microbiology:  Microbiology Results (last 7 days)       Procedure Component Value Units Date/Time    Blood culture #2 **CANNOT BE ORDERED STAT** [[id number]] Collected: 03/30/25 1336    Order Status: Resulted Specimen: Blood from Peripheral, Antecubital, Left Updated: 03/30/25 1341    Blood culture #1 **CANNOT BE ORDERED STAT** [[id number]] Collected: 03/30/25 1336    Order Status: Resulted Specimen: Blood from Peripheral, Antecubital, Right Updated: 03/30/25 1341            I have reviewed all pertinent labs within the past 24 hours.    Diagnostic Results:  I have reviewed and interpreted all pertinent imaging results/findings within the past 24 hours.    Assessment/Plan:     * Cholelithiasis  Mrs. Perez is a 55 y.o. W has PAH and is currently on triple therapy who presented to the ED with abdominal pain and n/v. Due to concerns for cholecystitis general surgery was consulted who evaluated patient and reviewed imaging.     CT Abd/pelvi 3/30/25: 1. The " gallbladder is distended noting cholelithiasis, and suspected wall thickening/induration about the gallbladder walls.  There is distal choledocholithiasis.  Evaluation with ultrasound is recommended as developing acute cholecystitis is a consideration. 2. Liquid content within a few distal small bowel loops and colon.  Correlation with any history of diarrheal illness. 3. Stable pulmonary nodules, please see CT 02/27/2023.4. Hepatomegaly, correlation with LFTs recommended.    Plan:   - concerns for cholelithiasis and possible choledocholithiasis.US abd pending. HIDA scan pending and GI consulted given choledocholithiasis as per recs.   - Per GS: Her pulm HTN makes her a very high risk surgical candidate and likely not worth the risk in this setting. If HIDA positive would have IR place kita tube  - patient has not vomited since she has been in the ED and no longer has nausea. Will start liquid diet and advance as tolerate once test have been done.   - f/u bcx x 2  - added cefepime + flagyl and can deescalate as needed.   - dehydrated will give 500cc in 5 hours.         WHO group 1 pulmonary arterial hypertension  Patient was diagnosed with IPAH in 2009 and started on Remodulin, Letairis, and Adcirca. She has a history of ABHIJEET (untreated - not able to tolerate CPAP 2/2 severe congestion), GERD, and Asthma. Not interested in lung transplant.       - continue triple therapy w/ Remodulin infusing at 80ng/kg/min (pre-filled cassettes), [id number] qdaily, and tadalafil 40mg qhs. Admitting to PH floor.   - order set completed.     Mild intermittent asthma  -continue home inhalers    Chronic tension-type headache, not intractable  -continue home medications    Hypothyroidism (acquired)  -resuming home synthroid    Allergic asthma  -continue home meds        Elza Hawkins MD  Heart Transplant  [facility]

## 2025-03-31 NOTE — CARE UPDATE
Pt fully recovered from procedure and updated report called to [facility]. Pt left with transport for room via stretcher.

## 2025-03-31 NOTE — SUBJECTIVE & OBJECTIVE
No current facility-administered medications on file prior to encounter.     Current Outpatient Medications on File Prior to Encounter   Medication Sig    acetaminophen (TYLENOL) 500 MG tablet Take 1,000 mg by mouth every 6 (six) hours as needed for Pain.    ADCIRCA 20 mg Tab Take 2 tablets (40 mg total) by mouth once daily.    albuterol (VENTOLIN HFA) 90 mcg/actuation inhaler Inhale 2 puffs into the lungs every 6 (six) hours as needed for Wheezing or Shortness of Breath. Rescue    ambrisentan (LETAIRIS) 10 MG Tab Take 1 tablet (10 mg total) by mouth once daily.    BREO ELLIPTA 200-25 mcg/dose DsDv diskus inhaler INHALE 1 PUFF INTO THE LUNGS EVERY EVENING. CONTROLLER    cyanocobalamin, vitamin B-12, 2,500 mcg Subl Place 2,500 mcg under the tongue every morning.     diphenhydrAMINE (BENADRYL) 25 mg capsule Take 50 mg by mouth every 6 (six) hours as needed for Itching. Patient takes prn evenings    folic acid (FOLVITE) 1 MG tablet Take 1 tablet (1,000 mcg total) by mouth once daily.    furosemide (LASIX) 20 MG tablet Take 1 tablet (20 mg total) by mouth once daily.    glucosam/chond-msm1/C/michele/bor (GLUCOSAMINE-CHOND-MSM COMPLEX ORAL) Take by mouth.    hyoscyamine (LEVSIN) 0.125 mg Subl Place 1 tablet (0.125 mg total) under the tongue every 6 (six) hours as needed (Aa needed).    levothyroxine (SYNTHROID) 125 MCG tablet Take 1 tablet (125 mcg total) by mouth before breakfast.    loratadine (CLARITIN) 10 mg tablet Take 10 mg by mouth every evening.     methylPREDNISolone (MEDROL DOSEPACK) 4 mg tablet use as directed    ondansetron (ZOFRAN) 4 MG tablet Take 1 tablet (4 mg total) by mouth every 6 (six) hours as needed. 1 Tablet Oral Every 6 hours    pantoprazole (PROTONIX) 40 MG tablet Take 1 tablet (40 mg total) by mouth once daily.    rimegepant (NURTEC) 75 mg odt Take 1 tablet (75 mg total) by mouth as needed for Migraine. Place ODT tablet on the tongue; alternatively the ODT tablet may be placed under the tongue. Can  "take 2nd dose in 24 hrs if mild relief, no more than 2 in 24 hrs    topiramate (TOPAMAX) 100 MG tablet Take 1 tablet (100 mg total) by mouth 2 (two) times daily.    treprostinil (REMODULIN) 2.5 mg/mL Soln Mix cassette as directed and infuse continuously per physician titration orders on dosing sheet. Current dose 80ng/kg/min    albuterol-ipratropium (DUO-NEB) 2.5 mg-0.5 mg/3 mL nebulizer solution Take 3 mLs by nebulization every 6 (six) hours as needed for Wheezing. Rescue    alprazolam (XANAX ORAL) Take by mouth as needed.    diphenoxylate-atropine 2.5-0.025 mg (LOMOTIL) 2.5-0.025 mg per tablet Take 1 tablet by mouth 4 (four) times daily as needed for Diarrhea.    ferrous sulfate 325 (65 FE) MG EC tablet Take 325 mg by mouth daily as needed.     fluticasone propionate (FLONASE) 50 mcg/actuation nasal spray 2 sprays (100 mcg total) by Each Nostril route every evening.    lactic acid-citric-potassium (PHEXXI) 1.8-1-0.4 % Gel Place 1 Applicatorful vaginally as needed (CONTRACEPTIVE).    LIDOcaine (LIDODERM) 5 % Place 1 patch onto the skin once daily.    tiZANidine (ZANAFLEX) 4 MG tablet Take 4 mg by mouth every 6 (six) hours as needed.    triamcinolone (KENALOG) 0.5 % ointment Apply 1 application  topically 2 (two) times daily.       Review of patient's allergies indicates:   Allergen Reactions    Fentanyl Anaphylaxis     Respiratory distress    Vibra-tabs [doxycycline hyclate] Anaphylaxis     "throat felt like it was closing"    Adhesive Hives     Silk tape    Amoxicillin Rash    Nsaids (non-steroidal anti-inflammatory drug) Swelling    Chlorhexidine Other (See Comments)     Blue Chlorhexidine causes hives       Past Medical History:   Diagnosis Date    AR (allergic rhinitis)     Cholelithiasis, common bile duct     Chronic low back pain     Eye pressure 2017    General anesthetics causing adverse effect in therapeutic use     GERD (gastroesophageal reflux disease)     History of migraine headaches     Hypothyroidism "     Innocent heart murmur     Lumbar disc disease     Menorrhagia     Mild asthma     Obesity     Plantar fasciitis of left foot     Primary pulmonary hypertension     followed by heart transplant/pulmonary     Seizure disorder     x 1 in 2008    Seizures     Sleep apnea     TMJ (dislocation of temporomandibular joint)     Tricuspid regurgitation      Past Surgical History:   Procedure Laterality Date    CARDIAC CATHETERIZATION      CATHETERIZATION OF BOTH LEFT AND RIGHT HEART Bilateral 9/29/2020    Procedure: CATHETERIZATION, HEART, BOTH LEFT AND RIGHT;  Surgeon: Gucci Pickett MD;  Location: [facility] CATH LAB;  Service: Cardiology;  Laterality: Bilateral;    CENTRAL VENOUS CATHETER INSERTION      CORONARY ANGIOGRAPHY N/A 9/29/2020    Procedure: ANGIOGRAM, CORONARY ARTERY;  Surgeon: Gucci Pickett MD;  Location: [facility] CATH LAB;  Service: Cardiology;  Laterality: N/A;    gallbladder drain  06/2019    INSERTION OF HAYNES CATHETER Right 6/17/2020    Procedure: INSERTION, CATHETER, CENTRAL VENOUS, HAYNES Replace Single Lumen for Remodulin Infusion;  Surgeon: Bertin Deiwtt MD;  [facility];  Service: General;  Laterality: Right;    PORTACATH PLACEMENT      REMOVAL OF TUNNELED CENTRAL VENOUS CATHETER (CVC) N/A 6/17/2020    Procedure: REMOVAL, CATHETER, CENTRAL VENOUS, TUNNELED;  Surgeon: Bertin Dewitt MD;  [facility];  Service: General;  Laterality: N/A;    RIGHT HEART CATHETERIZATION Right 8/20/2018    Procedure: HEART CATH-RIGHT;  Surgeon: Ivonne Rai MD;  Location: [facility] CATH LAB;  Service: Cardiology;  Laterality: Right;    RIGHT HEART CATHETERIZATION Right 9/4/2019    Procedure: INSERTION, CATHETER, RIGHT HEART;  Surgeon: Ivonne Rai MD;  Location: [facility] CATH LAB;  Service: Cardiology;  Laterality: Right;    RIGHT HEART CATHETERIZATION Right 6/10/2022    Procedure: INSERTION, CATHETER, RIGHT HEART;  Surgeon: Keshia Poe MD;  Location: [facility] CATH LAB;  Service:  Cardiology;  Laterality: Right;    UPPER GASTROINTESTINAL ENDOSCOPY       Family History       Problem Relation (Age of Onset)    Breast cancer Maternal Aunt, Cousin, Cousin    Cancer Maternal Aunt, Maternal Grandmother    Diabetes Mother, Maternal Grandfather, Paternal Grandfather    Glaucoma Father    Heart attack Maternal Grandfather, Paternal Grandfather    Heart disease Father, Paternal Grandfather    Hypertension Other    Leukemia Brother    No Known Problems Sister, Maternal Uncle, Paternal Aunt, Paternal Uncle, Paternal Grandmother          Tobacco Use    Smoking status: Never    Smokeless tobacco: Never   Substance and Sexual Activity    Alcohol use: No     Alcohol/week: 0.8 standard drinks of alcohol     Types: 1 Standard drinks or equivalent per week     Comment: socially    Drug use: No    Sexual activity: Not Currently     Birth control/protection: OCP, Pill     Comment: pt is a virgin     Review of Systems   Constitutional:  Positive for activity change.   Respiratory:  Positive for shortness of breath.    Gastrointestinal:  Positive for abdominal pain, nausea and vomiting.     Objective:     Vital Signs (Most Recent):  Temp: 98.3 °F (36.8 °C) (03/30/25 1253)  Pulse: 104 (03/30/25 1430)  Resp: 20 (03/30/25 1553)  BP: (!) 141/75 (03/30/25 1430)  SpO2: 95 % (03/30/25 1600) Vital Signs (24h Range):  Temp:  [98.3 °F (36.8 °C)] 98.3 °F (36.8 °C)  Pulse:  [] 104  Resp:  [18-20] 20  SpO2:  [92 %-98 %] 95 %  BP: (140-141)/(75-86) 141/75     Weight: 64.9 kg (143 lb)  Body mass index is 28.88 kg/m².     Physical Exam  Vitals reviewed.   Constitutional:       General: She is not in acute distress.  HENT:      Head: Normocephalic and atraumatic.      Nose: Nose normal.   Eyes:      General:         Right eye: No discharge.         Left eye: No discharge.   Cardiovascular:      Rate and Rhythm: Normal rate and regular rhythm.   Pulmonary:      Effort: Pulmonary effort is normal. No respiratory distress.       Breath sounds: No stridor.   Abdominal:      Comments: Soft, ND,  Generalized upper abdominal tenderness worse in RUQ   Musculoskeletal:         General: Normal range of motion.      Cervical back: Normal range of motion.   Skin:     General: Skin is warm and dry.      Capillary Refill: Capillary refill takes 2 to 3 seconds.   Neurological:      General: No focal deficit present.      Mental Status: She is alert and oriented to person, place, and time.   Psychiatric:         Mood and Affect: Mood normal.         Behavior: Behavior normal.            I have reviewed all pertinent lab results within the past 24 hours.    Significant Diagnostics:  I have reviewed all pertinent imaging results/findings within the past 24 hours.

## 2025-03-31 NOTE — PLAN OF CARE
Plan of care reviewed on am rounds, afrebrile, vss Tele ST ( low 100's ) O2 sats > 91% on 2 liters ( home flow rate )  wbc 11 h/h stable HIDA scan completed/ to IR for kita tube placement.Medicated x 1 with prn Morphine for c/o gen abd pain, prn Zofran for nausea. Voiding rey urine .Remodulin switched to hospital concentration per orders.Up to commode with standby assist.emotional support provided throughout shift.

## 2025-03-31 NOTE — PROGRESS NOTES
"Admit Note     Met with patient to assess needs. Patient is a 55 y.o. single female, admitted for  cholelthiasis, WHO group 1 pulmonary hypertension, mild intermittent asthma, chronic tension type headache, hypothyroidism, allergic asthma with a history of ABHIJEET (untreated, not able to tolerate CPAP) .      Patient admitted to [facility] on 3/30/2025 .  Pt presents alone in room.  At this time, patient presents as alert and oriented x 4, pleasant, good eye contact, concentration/judgement good, calm, communicative, cooperative, and asking and answering questions appropriately.      Household/Family Systems     Patient resides with patient's fatherDeuce    Address:  [facility] [address]  [place] [address].    Pt phone number: [phone]    Emergency contact  Deuce Perez, father, 92 years old, retired but teaches music on the side, drives: [phone]    Pt's mother is listed as emergency contact on pt's facesheet.  Pt reports her mother  last year.    At this time, pt does not wish to list an additional emergency contact at this time.  Pt reports that she eventually wants to list her cousin, who is a doctor but doesn't want to list her cousin until pt has had a conversation with them first.     Support system includes pt's father. Pt does not have any children and has never been .  Pt reports her only sibling (brother) is also . SW attempted to elicit more information regarding pt's supports and family history.  Pt responded "It's just, me, myself and I" and declined to discuss further.     Patients primary caregiver is self with support from her father, Deuce when needed.   During admission, patient's caregiver plans to stay at home.  Confirmed patient and patients caregivers do have access to reliable transportation.    Cognitive Status/Learning     Patient reports reading ability as college and states patient does not have difficulty with reading, writing, comprehension, learning, and " memory.  Pt does wear glasses.  Pt reports some hearing loss in her left ear due to fluid buildup.  Patient reports patient learns best by hands on and viusal.   Needed: No.   Highest education level: Associate/Bachelor Degree    Vocation/Disability   .  Working for Income: No  If no, reason not working: Demands of Treatment  Patient is  unable to work due to her health issues.  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  Pt reports that she receives social security survivors benefits due to her mother's death.  Pt reports receiving money from her mother's estate.  Pt reports that her father is retired from being a principal and served in the .  Pt reports that her father receives snf social security,  pension, as well as his teacher's pension.  Pt reports she applied for disability this year and was denied due to income for her household being too high.     Pt denies financial difficulties at this time    Adherence     Patient reports a high level of adherence to patients health care regimen.  Pt reports she attends her doctor's appointments as scheduled, takes her medications as prescribed, and adheres to a Mediterranean and low sodium diet. Pt reports she only eats red meat on special occasions and mainly eats fish and pork.    Substance Use    Patient reports the following substance usage.    Tobacco: none, patient denies any use.  Alcohol:  Pt reports that she has some ruben during the holidays but otherwise does not drink .  Illicit Drugs/Non-prescribed Medications: none, patient denies any use.  Patient states clear understanding of the potential impact of substance use.  Substance abstinence/cessation counseling, education and resources provided and reviewed.     Services Utilizing/ADLS    Infusion Service: Prior to admission, patient utilizing? yes.  Pt is on IV Remodulin supplied by [facility] ( [phone])  Home Health: Prior to  admission, patient utilizing? no.  DEBBIE reviewed pt's chart and observed that pt has used [facility] in the past.  DME: Prior to admission, yes.  Pt utilizes a cane and shower bench.  Pt also uses a rollator and wheelchair that belonged to her mother  Pulmonary/Cardiac Rehab: Prior to admission, no. Pt reports that in the past she went to [facility] for pulmonary rehab.  Pt reports that she doesn't remember how long ago she went.   Dialysis:  Prior to admission, no  Transplant Specialty Pharmacy:  Prior to admission, no.    Prior to admission, patient reports patient was somewhat independent with ADLS and was driving.  Pt reports chores like dusting and vacuming are becoming more difficult for her and has considered hiring some help.  Patient reports patient is not able to care for self at this time due to compromised medical condition (as documented in medical record) and physical weakness..  Patient indicates a willingness to care for self once medically cleared to do so.    Insurance/Medications    Insured by   Payer/Plan Subscr  Sex Relation Sub. Ins. ID Effective Group Num   1. BLUE CROSS BL* VERA TURNER* 1970 Female Self [id number] 10/15/10 [id number]                                   [address] [address]   2. EYEMED VISION* VERA TURNER* 1970 Female Self [id number]                                   [address] [address] [address]      Primary Insurance (for UNOS reporting): Private Insurance  Secondary Insurance (for UNOS reporting): None    Patient reports patient is able to obtain and afford medications at this time and at time of discharge.    Living Will/Healthcare Power of     Patient states patient has a LW and/or HCPA.  Pt living will and HCPA uploaded in Epic.  HCPA lists her mother, Britta as her sole contact but her mother is .  DEBBIE engaged pt in a conversation regarding who she would want to list.  Pt reports that she would  like to list her cousin but wants to discuss it with them first. provided education regarding LW and HCPA and the completion of forms.    Coping/Mental Health    Patient is coping adequately with the aid of  family members and engaging in hobbies: needlepoint, reading, watching tv .  Patient denies mental health difficulties. Pt reports that she is still grieving the loss of her mother but will watch her mother's favorite tv shows and talk about her with her father.  SW provided empathetic listening and support.  Pt reports that she does not see a counselor and does not take any psychiatric medication.  SW informed pt of psych and spiritual care resources and how to access them.  Pt declined at this time but will inform SW if she changes her mind.     Discharge Planning    At time of discharge, patient plans to return to patient's home under the care of self with support from her father.  Patients father will transport patient.  Per rounds today, expected discharge date has not been medically determined at this time. Patient and patients caregiver  verbalize understanding and are involved in treatment planning and discharge process.    Additional Concerns    Patient is being followed for needs, education, resources, information, emotional support, supportive counseling, and for supportive and skilled discharge plan of care.  providing ongoing psychosocial support, education, resources and d/c planning as needed.  SW remains available. Patient's caregiver verbalizes understanding and agreement with information reviewed,  availability and how to access available resources as needed. Patient verbalizes understanding and agreement with information reviewed, social work availability, and how to access available resources as needed.

## 2025-03-31 NOTE — PROGRESS NOTES
"Dictation #1  MRN:[id number]  CSN:[id number] [address] - Transplant Stepdown  Heart Transplant  Progress Note    Patient Name: Yuni Perez  MRN: [id number]  Admission Date: 3/30/2025  Hospital Length of Stay: 1 days  Attending Physician: Doyle De Dios MD  Primary Care Provider: Lulu Sandhu MD  Principal Problem:Cholelithiasis    Subjective:   Interval History: Remains in pain. Planning for HIDA this am.     Continuous Infusions:   veletri/remodulin cassette   Intravenous Continuous        veletri/remodulin tubing   Intravenous Continuous         Scheduled Meds:   ambrisentan  10 mg Oral Daily    ceFEPime IV (PEDS and ADULTS)  2 g Intravenous Q8H    ferrous sulfate  1 tablet Oral Daily    fluticasone furoate-vilanteroL  1 puff Inhalation QHS    fluticasone propionate  2 spray Each Nostril QHS    folic acid  1,000 mcg Oral Daily    levothyroxine  125 mcg Oral Before breakfast    metroNIDAZOLE IV (PEDS and ADULTS)  500 mg Intravenous Q8H    pantoprazole  40 mg Intravenous Daily    tadalafil  40 mg Oral Daily PM    topiramate  100 mg Oral BID    treprostinil (REMODULIN) 12,000,000 ng in 0.9% NaCl 100 mL infusion  117 ng/kg/min (Order-Specific) Intravenous Q24H     PRN Meds:  Current Facility-Administered Medications:     acetaminophen, 650 mg, Oral, Q6H PRN    albuterol-ipratropium, 3 mL, Nebulization, Q6H PRN    diphenhydrAMINE, 50 mg, Oral, Q6H PRN    morphine, 2 mg, Intravenous, Q4H PRN    ondansetron, 8 mg, Oral, Q8H PRN    ondansetron, 4 mg, Intravenous, Q8H PRN    polyethylene glycol, 17 g, Oral, BID PRN    sodium chloride 0.9%, 10 mL, Intravenous, PRN    Review of patient's allergies indicates:   Allergen Reactions    Fentanyl Anaphylaxis     Respiratory distress    Vibra-tabs [doxycycline hyclate] Anaphylaxis     "throat felt like it was closing"    Adhesive Hives     Silk tape    Amoxicillin Rash    Nsaids (non-steroidal anti-inflammatory drug) Swelling    Chlorhexidine Other (See Comments)     " "Blue Chlorhexidine causes hives     Objective:     Vital Signs (Most Recent):  Temp: 97.9 °F (36.6 °C) (03/31/25 0738)  Pulse: 102 (03/31/25 1041)  Resp: 18 (03/31/25 0738)  BP: 117/76 (03/31/25 0738)  SpO2: (!) 92 % (03/31/25 0738) Vital Signs (24h Range):  Temp:  [97.9 °F (36.6 °C)-98.4 °F (36.9 °C)] 97.9 °F (36.6 °C)  Pulse:  [] 102  Resp:  [18-23] 18  SpO2:  [92 %-98 %] 92 %  BP: (110-141)/(70-86) 117/76     Patient Vitals for the past 72 hrs (Last 3 readings):   Weight   03/31/25 0642 62.9 kg (138 lb 12.5 oz)   03/30/25 1211 64.9 kg (143 lb)     Body mass index is 28.03 kg/m².      Intake/Output Summary (Last 24 hours) at 3/31/2025 1114  Last data filed at 3/31/2025 1053  Gross per 24 hour   Intake 0 ml   Output 500 ml   Net -500 ml        Physical Exam  Vitals and nursing note reviewed.   Constitutional:       Appearance: She is well-developed.   HENT:      Head: Normocephalic.   Eyes:      Pupils: Pupils are equal, round, and reactive to light.   Cardiovascular:      Rate and Rhythm: Normal rate and regular rhythm.   Pulmonary:      Effort: Pulmonary effort is normal.      Breath sounds: Normal breath sounds.   Abdominal:      General: Bowel sounds are normal.      Palpations: Abdomen is soft.      Tenderness: There is abdominal tenderness.   Musculoskeletal:         General: Normal range of motion.      Cervical back: Normal range of motion and neck supple.   Skin:     General: Skin is warm and dry.   Neurological:      Mental Status: She is alert and oriented to person, place, and time.   Psychiatric:         Behavior: Behavior normal.            Significant Labs:  CBC:  Recent Labs   Lab 03/30/25  1334 03/31/25  0845   WBC 11.55 11.30   RBC 5.46* 4.86   HGB 16.2* 14.5   HCT 47.8 43.6    179   MCV 88 90   MCH 29.7 29.8   MCHC 33.9 33.3     BNP:  No results for input(s): "BNP" in the last 168 hours.    Invalid input(s): "BNPTRIAGELBLO"  CMP:  Recent Labs   Lab 03/30/25  1334 03/31/25  0845 " "  CALCIUM 9.5 8.3*   ALBUMIN 4.2 3.2*    138   K 3.4* 3.5   CO2 21* 20*    111*   BUN 11 8   CREATININE 0.7 0.6   ALKPHOS 90 73   ALT 11 12   AST 19 15   BILITOT 0.6 0.5      Coagulation:   Recent Labs   Lab 03/30/25  1334 03/31/25  0845   INR 1.1 1.1     LDH:  No results for input(s): "LDH" in the last 72 hours.  Microbiology:  Microbiology Results (last 7 days)       Procedure Component Value Units Date/Time    Urine culture [[id number]] Collected: 03/30/25 2314    Order Status: Sent Specimen: Urine, Clean Catch Updated: 03/31/25 0000    Blood culture #1 **CANNOT BE ORDERED STAT** [[id number]]  (Normal) Collected: 03/30/25 1336    Order Status: Completed Specimen: Blood from Peripheral, Antecubital, Right Updated: 03/30/25 2101     Blood Culture No Growth After 6 Hours    Blood culture #2 **CANNOT BE ORDERED STAT** [[id number]]  (Normal) Collected: 03/30/25 1336    Order Status: Completed Specimen: Blood from Peripheral, Antecubital, Left Updated: 03/30/25 2101     Blood Culture No Growth After 6 Hours          I have reviewed all pertinent labs within the past 24 hours.    Estimated Creatinine Clearance: 92.2 mL/min (based on SCr of 0.6 mg/dL).    Diagnostic Results:  I have reviewed all pertinent imaging results/findings within the past 24 hours.  Assessment and Plan:     Mrs. Perez is a 55 y.o. W has PAH and is currently on triple therapy who presented to the ED with abdominal pain and n/v. Patient was diagnosed with IPAH in 2009 and started on Remodulin, Letairis, and Adcirca. She has a history of ABHIJEET (untreated - not able to tolerate CPAP 2/2 severe congestion), GERD, and Asthma. Current therapy is Remodulin infusing at 80ng/kg/min (pre-filled cassettes), [id number] qdaily, and tadalafil 40mg qdaily. Has T/F Adempas. Patient denies chest pain, chest pressure, syncope, pre-syncope, lightheadedness, dizziness, PND, orthopnea, or LE edema.        CT Abd/pelvi 3/30/25:     1. The gallbladder " is distended noting cholelithiasis, and suspected wall thickening/induration about the gallbladder walls.  There is distal choledocholithiasis.  Evaluation with ultrasound is recommended as developing acute cholecystitis is a consideration.  2. Liquid content within a few distal small bowel loops and colon.  Correlation with any history of diarrheal illness.  3. Stable pulmonary nodules, please see CT 02/27/2023.  4. Hepatomegaly, correlation with LFTs recommended.      TTE 12/16/25      Left Ventricle: The left ventricle is normal in size. Normal wall thickness. Normal wall motion. Septal flattening in systole consistent with right ventricular pressure overload. There is normal systolic function with a visually estimated ejection fraction of 60 - 65%. There is normal diastolic function.    Right Ventricle: Moderate right ventricular enlargement with hypertrophy. Systolic function is mildly to moderately reduced.    The right atrium is mildly dilated.    Tricuspid Valve: There is mild regurgitation.    Pulmonary Artery: There is moderate to severe pulmonary hypertension. The estimated pulmonary artery systolic pressure is 67 mmHg.    IVC/SVC: Normal venous pressure at 3 mmHg.       * Cholelithiasis  Mrs. Perez is a 55 y.o. W has PAH and is currently on triple therapy who presented to the ED with abdominal pain and n/v. Due to concerns for cholecystitis general surgery was consulted who evaluated patient and reviewed imaging.   CT Abd/pelvi 3/30/25: 1. The gallbladder is distended noting cholelithiasis, and suspected wall thickening/induration about the gallbladder walls.  There is distal choledocholithiasis.  Evaluation with ultrasound is recommended as developing acute cholecystitis is a consideration. 2. Liquid content within a few distal small bowel loops and colon.  Correlation with any history of diarrheal illness. 3. Stable pulmonary nodules, please see CT 02/27/2023.4. Hepatomegaly, correlation with LFTs  recommended.      - concerns for cholelithiasis and possible choledocholithiasis. HIDA scan pending and GI consulted given choledocholithiasis as per recs.   - Per GS: Her pulm HTN makes her a very high risk surgical candidate and likely not worth the risk in this setting. If HIDA positive would have IR place kita tube  - patient has not vomited since she has been in the ED and no longer has nausea. Will start liquid diet and advance as tolerate once test have been done.   - f/u bcx x 2  - cefepime + flagyl and can deescalate as needed.   - Pain/nausea management.   - NPO         WHO group 1 pulmonary arterial hypertension  Patient was diagnosed with IPAH in 2009 and started on Remodulin, Letairis, and Adcirca. She has a history of ABHIJEET (untreated - not able to tolerate CPAP 2/2 severe congestion), GERD, and Asthma. Not interested in lung transplant.       - continue triple therapy w/ Remodulin infusing at 80ng/kg/min (pre-filled cassettes), [id number] qdaily, and tadalafil 40mg qhs. Admitting to PH floor.   - order set completed.     Mild intermittent asthma  -continue home inhalers    Chronic tension-type headache, not intractable  -continue home medications    Hypothyroidism (acquired)  -resuming home synthroid    Allergic asthma  -continue home meds        Naveen Hurtado NP  Heart Transplant  [facility] - Transplant Stepdown

## 2025-03-31 NOTE — PLAN OF CARE
Pt arrived to  for kita tube placement. Pt oriented to unit and staff, Pt safely transferred from stretcher to procedural table. Fall risk reviewed and comfort measures utilized with interventions. Safety strap applied, position pillows to minimize pressure points. Blankets applied. Pt prepped and draped utilizing standard sterile technique. Patient placed on continuous monitoring, as required by sedation policy. Timeouts implemented utilizing standard universal time-out per department and facility policy. RN to remain at bedside with continuous monitoring. Pt resting comfortably. Denies pain/discomfort. Will continue to monitor. See flow sheets for monitoring, medication administration, and updates. patient verbalizes understanding.

## 2025-03-31 NOTE — HPI
55F with hx of kita tube that has been removed and Pulm HTN on remodulin ggt at home here with abdominal pain, nausea and vomiting. She does report fevers. Notes diarrhea that she states she has because of meds. CT scan with distended and thickened gallbladder with multiple stones and choledocholithiasis. US pending. No white count but has shift.  Never had surgery before. Walks but does get SOB. No MI or stroke. No surgeries. No blood thinners. Surgery consulted for cholecystitis.

## 2025-03-31 NOTE — PROGRESS NOTES
Home Parenteral Prostacyclin Continuation: Pharmacist Verification Note  Home medication variables  Specialty Pharmacy Contacted: [facility]: [phone]   Date of latest prescription (mm/dd/yyyy): 3/26/2025  Type of ambulatory infusion pump: CADD  Ambulatory pump rate (mL/day): 41 mL/24 hours  Drug: Treprostinil (Remodulin)  Route: Intravenous  Dosing weight (kg): 60.5 kg  How supplied: Prefilled Cassettes  Home vial concentration: n/a  Final concentration:  250,000 ng/ml  Verified current dose: 117 ng/kg/min  Patient is titrating therapy (yes or no) No  Volume of drug solution remaining in the patient's pump (mL): 59.3 mL @2300 on 3/30/25  Time until depletion of drug solution in the patient's pump (hours): 34.7 hours remaining @2300 on 3/30/25    Hospital medication variables  Drug : Treprostinil (Remodulin)  Route: Intravenous  Dosing weight (kg): 60.5 kg        Hospital concentration (mg/mL or ng/mL): 120,000 ng/mL         Dose to be administered in hospital (ng/kg/min): 117 ng/kg/min         Hospital infusion initiation time (stat vs standard administration time): Standard admin time starting 3/31/25 @1500    Contacted HTS attending: (yes/no): No    Pharmacist: Marlyn Nieto PharmD  Extension: [phone]

## 2025-03-31 NOTE — SUBJECTIVE & OBJECTIVE
Past Medical History:   Diagnosis Date    AR (allergic rhinitis)     Cholelithiasis, common bile duct     Chronic low back pain     Eye pressure 2017    General anesthetics causing adverse effect in therapeutic use     GERD (gastroesophageal reflux disease)     History of migraine headaches     Hypothyroidism     Innocent heart murmur     Lumbar disc disease     Menorrhagia     Mild asthma     Obesity     Plantar fasciitis of left foot     Primary pulmonary hypertension     followed by heart transplant/pulmonary     Seizure disorder     x 1 in 2008    Seizures     Sleep apnea     TMJ (dislocation of temporomandibular joint)     Tricuspid regurgitation        Past Surgical History:   Procedure Laterality Date    CARDIAC CATHETERIZATION      CATHETERIZATION OF BOTH LEFT AND RIGHT HEART Bilateral 9/29/2020    Procedure: CATHETERIZATION, HEART, BOTH LEFT AND RIGHT;  Surgeon: Gucci Pickett MD;  Location: [facility] CATH LAB;  Service: Cardiology;  Laterality: Bilateral;    CENTRAL VENOUS CATHETER INSERTION      CORONARY ANGIOGRAPHY N/A 9/29/2020    Procedure: ANGIOGRAM, CORONARY ARTERY;  Surgeon: Gucci Pickett MD;  Location: [facility] CATH LAB;  Service: Cardiology;  Laterality: N/A;    gallbladder drain  06/2019    INSERTION OF HAYNES CATHETER Right 6/17/2020    Procedure: INSERTION, CATHETER, CENTRAL VENOUS, HAYNES Replace Single Lumen for Remodulin Infusion;  Surgeon: Bertin Dweitt MD;  [facility];  Service: General;  Laterality: Right;    PORTACATH PLACEMENT      REMOVAL OF TUNNELED CENTRAL VENOUS CATHETER (CVC) N/A 6/17/2020    Procedure: REMOVAL, CATHETER, CENTRAL VENOUS, TUNNELED;  Surgeon: Bertin Dewitt MD;  [facility];  Service: General;  Laterality: N/A;    RIGHT HEART CATHETERIZATION Right 8/20/2018    Procedure: HEART CATH-RIGHT;  Surgeon: Ivonne Rai MD;  Location: [facility] CATH LAB;  Service: Cardiology;  Laterality: Right;    RIGHT HEART CATHETERIZATION Right  "9/4/2019    Procedure: INSERTION, CATHETER, RIGHT HEART;  Surgeon: Ivonne Rai MD;  Location: [facility] CATH LAB;  Service: Cardiology;  Laterality: Right;    RIGHT HEART CATHETERIZATION Right 6/10/2022    Procedure: INSERTION, CATHETER, RIGHT HEART;  Surgeon: Keshia Poe MD;  Location: [facility] CATH LAB;  Service: Cardiology;  Laterality: Right;    UPPER GASTROINTESTINAL ENDOSCOPY         Review of patient's allergies indicates:   Allergen Reactions    Fentanyl Anaphylaxis     Respiratory distress    Vibra-tabs [doxycycline hyclate] Anaphylaxis     "throat felt like it was closing"    Adhesive Hives     Silk tape    Amoxicillin Rash    Nsaids (non-steroidal anti-inflammatory drug) Swelling    Chlorhexidine Other (See Comments)     Blue Chlorhexidine causes hives       Current Facility-Administered Medications   Medication    0.9% NaCl infusion    acetaminophen tablet 650 mg    albuterol-ipratropium 2.5 mg-0.5 mg/3 mL nebulizer solution 3 mL    [START ON 3/31/2025] ambrisentan tablet 10 mg    ceFEPIme injection 2 g    diphenhydrAMINE capsule 25 mg    diphenhydrAMINE capsule 50 mg    [START ON 3/31/2025] ferrous sulfate tablet 1 each    fluticasone furoate-vilanteroL 200-25 mcg/dose diskus inhaler 1 puff    fluticasone propionate 50 mcg/actuation nasal spray 100 mcg    [START ON 3/31/2025] folic acid tablet 1,000 mcg    [START ON 3/31/2025] levothyroxine tablet 125 mcg    metronidazole IVPB 500 mg    ondansetron disintegrating tablet 8 mg    ondansetron injection 4 mg    pantoprazole injection 40 mg    polyethylene glycol packet 17 g    sodium chloride 0.9% flush 10 mL    tadalafil tablet 40 mg    topiramate tablet 100 mg    treprostinil 2.5 mg/mL vial     Current Outpatient Medications   Medication Sig    acetaminophen (TYLENOL) 500 MG tablet Take 1,000 mg by mouth every 6 (six) hours as needed for Pain.    ADCIRCA 20 mg Tab Take 2 tablets (40 mg total) by mouth once daily.    albuterol (VENTOLIN HFA) 90 " mcg/actuation inhaler Inhale 2 puffs into the lungs every 6 (six) hours as needed for Wheezing or Shortness of Breath. Rescue    ambrisentan (LETAIRIS) 10 MG Tab Take 1 tablet (10 mg total) by mouth once daily.    BREO ELLIPTA 200-25 mcg/dose DsDv diskus inhaler INHALE 1 PUFF INTO THE LUNGS EVERY EVENING. CONTROLLER    cyanocobalamin, vitamin B-12, 2,500 mcg Subl Place 2,500 mcg under the tongue every morning.     diphenhydrAMINE (BENADRYL) 25 mg capsule Take 50 mg by mouth every 6 (six) hours as needed for Itching. Patient takes prn evenings    folic acid (FOLVITE) 1 MG tablet Take 1 tablet (1,000 mcg total) by mouth once daily.    furosemide (LASIX) 20 MG tablet Take 1 tablet (20 mg total) by mouth once daily.    glucosam/chond-msm1/C/michele/bor (GLUCOSAMINE-CHOND-MSM COMPLEX ORAL) Take by mouth.    hyoscyamine (LEVSIN) 0.125 mg Subl Place 1 tablet (0.125 mg total) under the tongue every 6 (six) hours as needed (Aa needed).    levothyroxine (SYNTHROID) 125 MCG tablet Take 1 tablet (125 mcg total) by mouth before breakfast.    loratadine (CLARITIN) 10 mg tablet Take 10 mg by mouth every evening.     methylPREDNISolone (MEDROL DOSEPACK) 4 mg tablet use as directed    ondansetron (ZOFRAN) 4 MG tablet Take 1 tablet (4 mg total) by mouth every 6 (six) hours as needed. 1 Tablet Oral Every 6 hours    pantoprazole (PROTONIX) 40 MG tablet Take 1 tablet (40 mg total) by mouth once daily.    rimegepant (NURTEC) 75 mg odt Take 1 tablet (75 mg total) by mouth as needed for Migraine. Place ODT tablet on the tongue; alternatively the ODT tablet may be placed under the tongue. Can take 2nd dose in 24 hrs if mild relief, no more than 2 in 24 hrs    topiramate (TOPAMAX) 100 MG tablet Take 1 tablet (100 mg total) by mouth 2 (two) times daily.    treprostinil (REMODULIN) 2.5 mg/mL Soln Mix cassette as directed and infuse continuously per physician titration orders on dosing sheet. Current dose 80ng/kg/min    albuterol-ipratropium  (DUO-NEB) 2.5 mg-0.5 mg/3 mL nebulizer solution Take 3 mLs by nebulization every 6 (six) hours as needed for Wheezing. Rescue    alprazolam (XANAX ORAL) Take by mouth as needed.    diphenoxylate-atropine 2.5-0.025 mg (LOMOTIL) 2.5-0.025 mg per tablet Take 1 tablet by mouth 4 (four) times daily as needed for Diarrhea.    ferrous sulfate 325 (65 FE) MG EC tablet Take 325 mg by mouth daily as needed.     fluticasone propionate (FLONASE) 50 mcg/actuation nasal spray 2 sprays (100 mcg total) by Each Nostril route every evening.    lactic acid-citric-potassium (PHEXXI) 1.8-1-0.4 % Gel Place 1 Applicatorful vaginally as needed (CONTRACEPTIVE).    LIDOcaine (LIDODERM) 5 % Place 1 patch onto the skin once daily.    tiZANidine (ZANAFLEX) 4 MG tablet Take 4 mg by mouth every 6 (six) hours as needed.    triamcinolone (KENALOG) 0.5 % ointment Apply 1 application  topically 2 (two) times daily.     Family History       Problem Relation (Age of Onset)    Breast cancer Maternal Aunt, Cousin, Cousin    Cancer Maternal Aunt, Maternal Grandmother    Diabetes Mother, Maternal Grandfather, Paternal Grandfather    Glaucoma Father    Heart attack Maternal Grandfather, Paternal Grandfather    Heart disease Father, Paternal Grandfather    Hypertension Other    Leukemia Brother    No Known Problems Sister, Maternal Uncle, Paternal Aunt, Paternal Uncle, Paternal Grandmother          Tobacco Use    Smoking status: Never    Smokeless tobacco: Never   Substance and Sexual Activity    Alcohol use: No     Alcohol/week: 0.8 standard drinks of alcohol     Types: 1 Standard drinks or equivalent per week     Comment: socially    Drug use: No    Sexual activity: Not Currently     Birth control/protection: OCP, Pill     Comment: pt is a virgin     Review of Systems   Constitutional:  Negative for chills, diaphoresis and fever.   HENT:  Negative for congestion and sore throat.    Gastrointestinal:  Positive for abdominal pain and nausea. Negative for  abdominal distention, blood in stool and vomiting.   Genitourinary:  Negative for difficulty urinating.   Neurological:  Negative for dizziness and light-headedness.   Psychiatric/Behavioral:  The patient is not nervous/anxious.    All other systems reviewed and are negative.    Objective:     Vital Signs (Most Recent):  Temp: 98.3 °F (36.8 °C) (03/30/25 1253)  Pulse: 104 (03/30/25 1430)  Resp: 20 (03/30/25 1553)  BP: (!) 141/75 (03/30/25 1430)  SpO2: 95 % (03/30/25 1600) Vital Signs (24h Range):  Temp:  [98.3 °F (36.8 °C)] 98.3 °F (36.8 °C)  Pulse:  [] 104  Resp:  [18-20] 20  SpO2:  [92 %-98 %] 95 %  BP: (140-141)/(75-86) 141/75     Patient Vitals for the past 72 hrs (Last 3 readings):   Weight   03/30/25 1211 64.9 kg (143 lb)     Body mass index is 28.88 kg/m².    No intake or output data in the 24 hours ending 03/30/25 2000       Physical Exam  Vitals and nursing note reviewed.   Constitutional:       General: She is not in acute distress.     Appearance: Normal appearance. She is not ill-appearing.   HENT:      Head: Normocephalic and atraumatic.      Nose: No congestion.      Mouth/Throat:      Mouth: Mucous membranes are dry.      Pharynx: Oropharynx is clear.   Eyes:      General: No scleral icterus.     Extraocular Movements: Extraocular movements intact.   Cardiovascular:      Rate and Rhythm: Regular rhythm. Tachycardia present.      Pulses: Normal pulses.      Heart sounds: No murmur heard.  Pulmonary:      Effort: Pulmonary effort is normal. No respiratory distress.      Breath sounds: No wheezing.   Abdominal:      General: Abdomen is flat. There is no distension.      Tenderness: There is abdominal tenderness. There is no guarding.   Musculoskeletal:         General: No swelling or tenderness. Normal range of motion.      Cervical back: Normal range of motion and neck supple.   Skin:     General: Skin is warm and dry.   Neurological:      General: No focal deficit present.      Mental Status: She  "is alert and oriented to person, place, and time.      Motor: No weakness.   Psychiatric:         Mood and Affect: Mood normal.         Behavior: Behavior normal.         Thought Content: Thought content normal.            Significant Labs:  CBC:  Recent Labs   Lab 03/30/25  1334   WBC 11.55   RBC 5.46*   HGB 16.2*   HCT 47.8      MCV 88   MCH 29.7   MCHC 33.9     BNP:  No results for input(s): "BNP" in the last 168 hours.    Invalid input(s): "BNPTRIAGELBLO"  CMP:  Recent Labs   Lab 03/30/25  1334   CALCIUM 9.5   ALBUMIN 4.2      K 3.4*   CO2 21*      BUN 11   CREATININE 0.7   ALKPHOS 90   ALT 11   AST 19   BILITOT 0.6      Coagulation:   Recent Labs   Lab 03/30/25  1334   INR 1.1     LDH:  No results for input(s): "LDH" in the last 72 hours.  Microbiology:  Microbiology Results (last 7 days)       Procedure Component Value Units Date/Time    Blood culture #2 **CANNOT BE ORDERED STAT** [[id number]] Collected: 03/30/25 1336    Order Status: Resulted Specimen: Blood from Peripheral, Antecubital, Left Updated: 03/30/25 1341    Blood culture #1 **CANNOT BE ORDERED STAT** [[id number]] Collected: 03/30/25 1336    Order Status: Resulted Specimen: Blood from Peripheral, Antecubital, Right Updated: 03/30/25 1341            I have reviewed all pertinent labs within the past 24 hours.    Diagnostic Results:  I have reviewed and interpreted all pertinent imaging results/findings within the past 24 hours.    "

## 2025-03-31 NOTE — ASSESSMENT & PLAN NOTE
Mrs. Perez is a 55 y.o. W has PAH and is currently on triple therapy who presented to the ED with abdominal pain and n/v. Due to concerns for cholecystitis general surgery was consulted who evaluated patient and reviewed imaging.   CT Abd/pelvi 3/30/25: 1. The gallbladder is distended noting cholelithiasis, and suspected wall thickening/induration about the gallbladder walls.  There is distal choledocholithiasis.  Evaluation with ultrasound is recommended as developing acute cholecystitis is a consideration. 2. Liquid content within a few distal small bowel loops and colon.  Correlation with any history of diarrheal illness. 3. Stable pulmonary nodules, please see CT 02/27/2023.4. Hepatomegaly, correlation with LFTs recommended.      - concerns for cholelithiasis and possible choledocholithiasis. HIDA scan pending and GI consulted given choledocholithiasis as per recs.   - Per GS: Her pulm HTN makes her a very high risk surgical candidate and likely not worth the risk in this setting. If HIDA positive would have IR place kita tube  - patient has not vomited since she has been in the ED and no longer has nausea. Will start liquid diet and advance as tolerate once test have been done.   - f/u bcx x 2  - cefepime + flagyl and can deescalate as needed.   - Pain/nausea management.   - NPO

## 2025-04-01 PROBLEM — E44.0 MODERATE PROTEIN-CALORIE MALNUTRITION: Status: ACTIVE | Noted: 2025-04-01

## 2025-04-01 LAB
ABSOLUTE EOSINOPHIL (OHS): 0 K/UL
ABSOLUTE MONOCYTE (OHS): 1.31 K/UL (ref 0.3–1)
ABSOLUTE NEUTROPHIL COUNT (OHS): 8.8 K/UL (ref 1.8–7.7)
ALBUMIN SERPL BCP-MCNC: 2.8 G/DL (ref 3.5–5.2)
ALP SERPL-CCNC: 63 UNIT/L (ref 40–150)
ALT SERPL W/O P-5'-P-CCNC: 9 UNIT/L (ref 10–44)
ANION GAP (OHS): 6 MMOL/L (ref 8–16)
AST SERPL-CCNC: 9 UNIT/L (ref 11–45)
BACTERIA UR CULT: NO GROWTH
BASOPHILS # BLD AUTO: 0.03 K/UL
BASOPHILS NFR BLD AUTO: 0.3 %
BILIRUB SERPL-MCNC: 0.6 MG/DL (ref 0.1–1)
BUN SERPL-MCNC: 10 MG/DL (ref 6–20)
CALCIUM SERPL-MCNC: 8.3 MG/DL (ref 8.7–10.5)
CHLORIDE SERPL-SCNC: 108 MMOL/L (ref 95–110)
CO2 SERPL-SCNC: 19 MMOL/L (ref 23–29)
CREAT SERPL-MCNC: 0.7 MG/DL (ref 0.5–1.4)
ERYTHROCYTE [DISTWIDTH] IN BLOOD BY AUTOMATED COUNT: 14.3 % (ref 11.5–14.5)
GFR SERPLBLD CREATININE-BSD FMLA CKD-EPI: >60 ML/MIN/1.73/M2
GLUCOSE SERPL-MCNC: 108 MG/DL (ref 70–110)
HCT VFR BLD AUTO: 41.8 % (ref 37–48.5)
HGB BLD-MCNC: 13.7 GM/DL (ref 12–16)
IMM GRANULOCYTES # BLD AUTO: 0.05 K/UL (ref 0–0.04)
IMM GRANULOCYTES NFR BLD AUTO: 0.5 % (ref 0–0.5)
LYMPHOCYTES # BLD AUTO: 0.8 K/UL (ref 1–4.8)
MAGNESIUM SERPL-MCNC: 2 MG/DL (ref 1.6–2.6)
MCH RBC QN AUTO: 29.9 PG (ref 27–31)
MCHC RBC AUTO-ENTMCNC: 32.8 G/DL (ref 32–36)
MCV RBC AUTO: 91 FL (ref 82–98)
NUCLEATED RBC (/100WBC) (OHS): 0 /100 WBC
PHOSPHATE SERPL-MCNC: 2.8 MG/DL (ref 2.7–4.5)
PLATELET # BLD AUTO: 117 K/UL (ref 150–450)
PMV BLD AUTO: 13.4 FL (ref 9.2–12.9)
POTASSIUM SERPL-SCNC: 3.2 MMOL/L (ref 3.5–5.1)
PROT SERPL-MCNC: 6 GM/DL (ref 6–8.4)
RBC # BLD AUTO: 4.58 M/UL (ref 4–5.4)
RELATIVE EOSINOPHIL (OHS): 0 %
RELATIVE LYMPHOCYTE (OHS): 7.3 % (ref 18–48)
RELATIVE MONOCYTE (OHS): 11.9 % (ref 4–15)
RELATIVE NEUTROPHIL (OHS): 80 % (ref 38–73)
SODIUM SERPL-SCNC: 133 MMOL/L (ref 136–145)
WBC # BLD AUTO: 10.99 K/UL (ref 3.9–12.7)

## 2025-04-01 PROCEDURE — 63600175 PHARM REV CODE 636 W HCPCS: Performed by: STUDENT IN AN ORGANIZED HEALTH CARE EDUCATION/TRAINING PROGRAM

## 2025-04-01 PROCEDURE — 36415 COLL VENOUS BLD VENIPUNCTURE: CPT | Performed by: STUDENT IN AN ORGANIZED HEALTH CARE EDUCATION/TRAINING PROGRAM

## 2025-04-01 PROCEDURE — 99233 SBSQ HOSP IP/OBS HIGH 50: CPT | Mod: ,,, | Performed by: PHYSICIAN ASSISTANT

## 2025-04-01 PROCEDURE — 25000003 PHARM REV CODE 250: Performed by: PHYSICIAN ASSISTANT

## 2025-04-01 PROCEDURE — 94761 N-INVAS EAR/PLS OXIMETRY MLT: CPT

## 2025-04-01 PROCEDURE — 94640 AIRWAY INHALATION TREATMENT: CPT

## 2025-04-01 PROCEDURE — 20600001 HC STEP DOWN PRIVATE ROOM

## 2025-04-01 PROCEDURE — 83735 ASSAY OF MAGNESIUM: CPT | Performed by: STUDENT IN AN ORGANIZED HEALTH CARE EDUCATION/TRAINING PROGRAM

## 2025-04-01 PROCEDURE — 25000003 PHARM REV CODE 250: Performed by: STUDENT IN AN ORGANIZED HEALTH CARE EDUCATION/TRAINING PROGRAM

## 2025-04-01 PROCEDURE — 85025 COMPLETE CBC W/AUTO DIFF WBC: CPT | Performed by: STUDENT IN AN ORGANIZED HEALTH CARE EDUCATION/TRAINING PROGRAM

## 2025-04-01 PROCEDURE — 63600175 PHARM REV CODE 636 W HCPCS: Performed by: NURSE PRACTITIONER

## 2025-04-01 PROCEDURE — 27000221 HC OXYGEN, UP TO 24 HOURS

## 2025-04-01 PROCEDURE — 25000003 PHARM REV CODE 250: Performed by: NURSE PRACTITIONER

## 2025-04-01 PROCEDURE — 99900035 HC TECH TIME PER 15 MIN (STAT)

## 2025-04-01 PROCEDURE — 25000003 PHARM REV CODE 250: Performed by: INTERNAL MEDICINE

## 2025-04-01 PROCEDURE — 25000242 PHARM REV CODE 250 ALT 637 W/ HCPCS: Performed by: STUDENT IN AN ORGANIZED HEALTH CARE EDUCATION/TRAINING PROGRAM

## 2025-04-01 PROCEDURE — 84100 ASSAY OF PHOSPHORUS: CPT | Performed by: STUDENT IN AN ORGANIZED HEALTH CARE EDUCATION/TRAINING PROGRAM

## 2025-04-01 PROCEDURE — 80053 COMPREHEN METABOLIC PANEL: CPT | Performed by: STUDENT IN AN ORGANIZED HEALTH CARE EDUCATION/TRAINING PROGRAM

## 2025-04-01 PROCEDURE — 63600175 PHARM REV CODE 636 W HCPCS: Mod: TB | Performed by: INTERNAL MEDICINE

## 2025-04-01 RX ORDER — HYDROXYZINE HYDROCHLORIDE 25 MG/1
25 TABLET, FILM COATED ORAL ONCE
Status: DISCONTINUED | OUTPATIENT
Start: 2025-04-01 | End: 2025-04-07 | Stop reason: HOSPADM

## 2025-04-01 RX ADMIN — TOPIRAMATE 100 MG: 25 TABLET, FILM COATED ORAL at 08:04

## 2025-04-01 RX ADMIN — MORPHINE SULFATE 2 MG: 2 INJECTION, SOLUTION INTRAMUSCULAR; INTRAVENOUS at 08:04

## 2025-04-01 RX ADMIN — TOPIRAMATE 100 MG: 25 TABLET, FILM COATED ORAL at 10:04

## 2025-04-01 RX ADMIN — FLUTICASONE PROPIONATE 100 MCG: 50 SPRAY, METERED NASAL at 08:04

## 2025-04-01 RX ADMIN — IPRATROPIUM BROMIDE AND ALBUTEROL SULFATE 3 ML: 2.5; .5 SOLUTION RESPIRATORY (INHALATION) at 11:04

## 2025-04-01 RX ADMIN — METRONIDAZOLE 500 MG: 500 INJECTION, SOLUTION INTRAVENOUS at 04:04

## 2025-04-01 RX ADMIN — CEFEPIME 2 G: 2 INJECTION, POWDER, FOR SOLUTION INTRAVENOUS at 04:04

## 2025-04-01 RX ADMIN — PANTOPRAZOLE SODIUM 40 MG: 40 INJECTION, POWDER, FOR SOLUTION INTRAVENOUS at 08:04

## 2025-04-01 RX ADMIN — FOLIC ACID 1000 MCG: 1 TABLET ORAL at 08:04

## 2025-04-01 RX ADMIN — ACETAMINOPHEN 650 MG: 325 TABLET ORAL at 11:04

## 2025-04-01 RX ADMIN — ONDANSETRON 4 MG: 2 INJECTION INTRAMUSCULAR; INTRAVENOUS at 04:04

## 2025-04-01 RX ADMIN — TADALAFIL 40 MG: 20 TABLET ORAL at 09:04

## 2025-04-01 RX ADMIN — FERROUS SULFATE TAB EC 325 MG (65 MG FE EQUIVALENT) 1 EACH: 325 (65 FE) TABLET DELAYED RESPONSE at 08:04

## 2025-04-01 RX ADMIN — TREPROSTINIL 7078.5 NG/MIN: 200 INJECTION, SOLUTION INTRAVENOUS; SUBCUTANEOUS at 02:04

## 2025-04-01 RX ADMIN — LEVOTHYROXINE SODIUM 125 MCG: 0.12 TABLET ORAL at 05:04

## 2025-04-01 RX ADMIN — POTASSIUM BICARBONATE 50 MEQ: 978 TABLET, EFFERVESCENT ORAL at 02:04

## 2025-04-01 RX ADMIN — FLUTICASONE FUROATE AND VILANTEROL TRIFENATATE 1 PUFF: 200; 25 POWDER RESPIRATORY (INHALATION) at 08:04

## 2025-04-01 RX ADMIN — MORPHINE SULFATE 2 MG: 2 INJECTION, SOLUTION INTRAMUSCULAR; INTRAVENOUS at 09:04

## 2025-04-01 RX ADMIN — AMBRISENTAN 10 MG: 5 TABLET, FILM COATED ORAL at 08:04

## 2025-04-01 RX ADMIN — CEFEPIME 2 G: 2 INJECTION, POWDER, FOR SOLUTION INTRAVENOUS at 12:04

## 2025-04-01 RX ADMIN — METRONIDAZOLE 500 MG: 500 INJECTION, SOLUTION INTRAVENOUS at 12:04

## 2025-04-01 NOTE — PROGRESS NOTES
[facility] - Transplant Stepdown  Heart Transplant  Progress Note    Patient Name: Yuni Perez  MRN: [id number]  Admission Date: 3/30/2025  Hospital Length of Stay: 2 days  Attending Physician: Doyle De Dios MD  Primary Care Provider: Lulu Sandhu MD  Principal Problem:Cholecystitis    Subjective:   Interval History: Patient is s/p IR kita tube placement yesterday.  General surgery is following.  She is complaining on persistent pain more like spasms this monring.  Per general surgery; no further antibiotics needed. Will likely need kita tube in place indefinitely.  Per gen surgery recommendations: [facility] consulted given non obstructive choledocholithiasis.      Continuous Infusions:   veletri/remodulin cassette   Intravenous Continuous        veletri/remodulin tubing   Intravenous Continuous         Scheduled Meds:   ambrisentan  10 mg Oral Daily    ceFEPime IV (PEDS and ADULTS)  2 g Intravenous Q8H    ferrous sulfate  1 tablet Oral Daily    fluticasone furoate-vilanteroL  1 puff Inhalation QHS    fluticasone propionate  2 spray Each Nostril QHS    folic acid  1,000 mcg Oral Daily    levothyroxine  125 mcg Oral Before breakfast    metroNIDAZOLE IV (PEDS and ADULTS)  500 mg Intravenous Q8H    pantoprazole  40 mg Intravenous Daily    potassium bicarbonate  50 mEq Oral Once    tadalafil  40 mg Oral QHS    topiramate  100 mg Oral BID    treprostinil (REMODULIN) 12,000,000 ng in 0.9% NaCl 100 mL infusion  117 ng/kg/min (Order-Specific) Intravenous Q24H     PRN Meds:  Current Facility-Administered Medications:     acetaminophen, 650 mg, Oral, Q6H PRN    albuterol-ipratropium, 3 mL, Nebulization, Q6H PRN    diphenhydrAMINE, 50 mg, Oral, Q6H PRN    morphine, 2 mg, Intravenous, Q4H PRN    ondansetron, 8 mg, Oral, Q8H PRN    ondansetron, 4 mg, Intravenous, Q8H PRN    polyethylene glycol, 17 g, Oral, BID PRN    sodium chloride 0.9%, 10 mL, Intravenous, PRN    Review of patient's  "allergies indicates:   Allergen Reactions    Fentanyl Anaphylaxis     Respiratory distress    Vibra-tabs [doxycycline hyclate] Anaphylaxis     "throat felt like it was closing"    Adhesive Hives     Silk tape    Amoxicillin Rash    Nsaids (non-steroidal anti-inflammatory drug) Swelling    Chlorhexidine Other (See Comments)     Blue Chlorhexidine causes hives     Objective:     Vital Signs (Most Recent):  Temp: 98.2 °F (36.8 °C) (04/01/25 1218)  Pulse: 107 (04/01/25 1218)  Resp: 20 (04/01/25 0807)  BP: (!) 88/60 (04/01/25 1221)  SpO2: (!) 93 % (04/01/25 1218) Vital Signs (24h Range):  Temp:  [97.9 °F (36.6 °C)-99.5 °F (37.5 °C)] 98.2 °F (36.8 °C)  Pulse:  [100-125] 107  Resp:  [16-23] 20  SpO2:  [90 %-96 %] 93 %  BP: ()/(51-73) 88/60     Patient Vitals for the past 72 hrs (Last 3 readings):   Weight   03/31/25 0642 62.9 kg (138 lb 12.5 oz)   03/30/25 1211 64.9 kg (143 lb)     Body mass index is 28.03 kg/m².      Intake/Output Summary (Last 24 hours) at 4/1/2025 1236  Last data filed at 4/1/2025 1134  Gross per 24 hour   Intake 670 ml   Output 860 ml   Net -190 ml        Physical Exam  Vitals and nursing note reviewed.   Constitutional:       Appearance: She is well-developed.   HENT:      Head: Normocephalic.   Eyes:      Pupils: Pupils are equal, round, and reactive to light.   Cardiovascular:      Rate and Rhythm: Normal rate and regular rhythm.   Pulmonary:      Effort: Pulmonary effort is normal.      Breath sounds: Normal breath sounds.   Abdominal:      General: Bowel sounds are normal.      Palpations: Abdomen is soft.      Tenderness: There is abdominal tenderness.      Comments: Sue tube in place    Musculoskeletal:         General: Normal range of motion.      Cervical back: Normal range of motion and neck supple.   Skin:     General: Skin is warm and dry.   Neurological:      Mental Status: She is alert and oriented to person, place, and time.   Psychiatric:         Behavior: Behavior normal. " "           Significant Labs:  CBC:  Recent Labs   Lab 03/30/25  1334 03/31/25  0845 04/01/25  0614   WBC 11.55 11.30 10.99   RBC 5.46* 4.86 4.58   HGB 16.2* 14.5 13.7   HCT 47.8 43.6 41.8    179 117*   MCV 88 90 91   MCH 29.7 29.8 29.9   MCHC 33.9 33.3 32.8     BNP:  No results for input(s): "BNP" in the last 168 hours.    Invalid input(s): "BNPTRIAGELBLO"  CMP:  Recent Labs   Lab 03/30/25  1334 03/31/25  0845 04/01/25  0614   CALCIUM 9.5 8.3* 8.3*   ALBUMIN 4.2 3.2* 2.8*    138 133*   K 3.4* 3.5 3.2*   CO2 21* 20* 19*    111* 108   BUN 11 8 10   CREATININE 0.7 0.6 0.7   ALKPHOS 90 73 63   ALT 11 12 9*   AST 19 15 9*   BILITOT 0.6 0.5 0.6      Coagulation:   Recent Labs   Lab 03/30/25  1334 03/31/25  0845   INR 1.1 1.1     LDH:  No results for input(s): "LDH" in the last 72 hours.  Microbiology:  Microbiology Results (last 7 days)       Procedure Component Value Units Date/Time    Urine culture [[id number]] Collected: 03/30/25 2314    Order Status: Completed Specimen: Urine, Clean Catch Updated: 04/01/25 0937     Urine Culture No Growth    Blood culture #1 **CANNOT BE ORDERED STAT** [[id number]]  (Normal) Collected: 03/30/25 1336    Order Status: Completed Specimen: Blood from Peripheral, Antecubital, Right Updated: 04/01/25 0302     Blood Culture No Growth After 36 Hours    Blood culture #2 **CANNOT BE ORDERED STAT** [[id number]]  (Normal) Collected: 03/30/25 1336    Order Status: Completed Specimen: Blood from Peripheral, Antecubital, Left Updated: 04/01/25 0302     Blood Culture No Growth After 36 Hours          I have reviewed all pertinent labs within the past 24 hours.    Estimated Creatinine Clearance: 79 mL/min (based on SCr of 0.7 mg/dL).    Diagnostic Results:  I have reviewed all pertinent imaging results/findings within the past 24 hours.  Assessment and Plan:     Mrs. Perez is a 55 y.o. W has PAH and is currently on triple therapy who presented to the ED with abdominal pain and " n/v. Patient was diagnosed with IPAH in 2009 and started on Remodulin, Letairis, and Adcirca. She has a history of ABHIJEET (untreated - not able to tolerate CPAP 2/2 severe congestion), GERD, and Asthma. Current therapy is Remodulin infusing at 80ng/kg/min (pre-filled cassettes), [id number] qdaily, and tadalafil 40mg qdaily. Has T/F Adempas. Patient denies chest pain, chest pressure, syncope, pre-syncope, lightheadedness, dizziness, PND, orthopnea, or LE edema.        CT Abd/pelvi 3/30/25:     1. The gallbladder is distended noting cholelithiasis, and suspected wall thickening/induration about the gallbladder walls.  There is distal choledocholithiasis.  Evaluation with ultrasound is recommended as developing acute cholecystitis is a consideration.  2. Liquid content within a few distal small bowel loops and colon.  Correlation with any history of diarrheal illness.  3. Stable pulmonary nodules, please see CT 02/27/2023.  4. Hepatomegaly, correlation with LFTs recommended.      TTE 12/16/25      Left Ventricle: The left ventricle is normal in size. Normal wall thickness. Normal wall motion. Septal flattening in systole consistent with right ventricular pressure overload. There is normal systolic function with a visually estimated ejection fraction of 60 - 65%. There is normal diastolic function.    Right Ventricle: Moderate right ventricular enlargement with hypertrophy. Systolic function is mildly to moderately reduced.    The right atrium is mildly dilated.    Tricuspid Valve: There is mild regurgitation.    Pulmonary Artery: There is moderate to severe pulmonary hypertension. The estimated pulmonary artery systolic pressure is 67 mmHg.    IVC/SVC: Normal venous pressure at 3 mmHg.       * Cholecystitis  -hx of kita tube that had been removed  -CT scan with distended and thickened gallbladder with multiple stone and choledocolithiasis.  -HIDA scan with Scintigraphic evidence of cystic duct obstruction and acute  cholecystitis   -Given Pulm HTN: high risk for surgery  -Sue tube placed 4/1  -Per general surgery: no further need for abx  -On liquid diet and advancing as tolerated.   -[facility] consulted for choledocholithiasis     Cholelithiasis  -CT Abd/pelvi 3/30/25: 1. The gallbladder is distended noting cholelithiasis, and suspected wall thickening/induration about the gallbladder walls.  There is distal choledocholithiasis.  Evaluation with ultrasound is recommended as developing acute cholecystitis is a consideration. 2. Liquid content within a few distal small bowel loops and colon.  Correlation with any history of diarrheal illness. 3. Stable pulmonary nodules, please see CT 02/27/2023.4. Hepatomegaly, correlation with LFTs recommended.  - concerns for cholelithiasis and possible choledocholithiasis.   -[facility] consulted for Choledocholithiasis management.           WHO group 1 pulmonary arterial hypertension  -Patient was diagnosed with IPAH in 2009 and started on Remodulin, Letairis, and Adcirca. She has a history of ABHIJEET (untreated - not able to tolerate CPAP 2/2 severe congestion), GERD, and Asthma. Not interested in lung transplant.   - continue triple therapy w/ Remodulin infusing at 80ng/kg/min (pre-filled cassettes), [id number] qdaily, and tadalafil 40mg qhs. Admitting to PH floor.       Hypothyroidism (acquired)  -resuming home synthroid    Mild intermittent asthma  -continue home inhalers    Chronic tension-type headache, not intractable  -continue home medications    Allergic asthma  -continue home meds        GUMARO Frazier  Heart Transplant  [facility] - Transplant Step[facility]

## 2025-04-01 NOTE — CARE UPDATE
"RAPID RESPONSE NURSE CHART REVIEW        Chart Reviewed: 04/01/2025, 7:40 AM    MRN: [id number]  Bed: 84017/17053 A    Dx: Cholelithiasis    Yuni Perez has a past medical history of AR (allergic rhinitis), Cholelithiasis, common bile duct, Chronic low back pain, Eye pressure, General anesthetics causing adverse effect in therapeutic use, GERD (gastroesophageal reflux disease), History of migraine headaches, Hypothyroidism, Innocent heart murmur, Lumbar disc disease, Menorrhagia, Mild asthma, Obesity, Plantar fasciitis of left foot, Primary pulmonary hypertension, Seizure disorder, Seizures, Sleep apnea, TMJ (dislocation of temporomandibular joint), and Tricuspid regurgitation.    Last VS: BP (!) 90/59 (BP Location: Right arm, Patient Position: Lying)   Pulse (!) 113   Temp 99.5 °F (37.5 °C) (Oral)   Resp 20   Ht 4' 11" (1.499 m)   Wt 62.9 kg (138 lb 12.5 oz)   SpO2 (!) 90%   Breastfeeding No   BMI 28.03 kg/m²     24H Vital Sign Range:  Temp:  [97.9 °F (36.6 °C)-99.5 °F (37.5 °C)]   Pulse:  [100-125]   Resp:  [16-23]   BP: ()/(59-73)   SpO2:  [90 %-96 %]     Level of Consciousness (AVPU): alert    Recent Labs     03/30/25  1334 03/31/25  0845   WBC 11.55 11.30   HGB 16.2* 14.5   HCT 47.8 43.6    179       Recent Labs     03/30/25  1334 03/31/25  0845 04/01/25  0614    138 133*   K 3.4* 3.5 3.2*    111* 108   CO2 21* 20* 19*   BUN 11 8 10   CREATININE 0.7 0.6 0.7   PHOS  --  3.1 2.8   MG 2.2 2.0 2.0          OXYGEN:  Flow (L/min) (Oxygen Therapy): 2          MEWS score: 5    Rounding completed at 0855.    Rounding completed with charge nurse Anamika for elevated MEWs  reports patient tachycardic otherwise VSS. K was 3. 2. Will reach out to team about replacements. No acute concerns verbalized at this time. Instructed to call [phone] for further concerns or assistance.    Harshal Fletcher RN      "

## 2025-04-01 NOTE — ASSESSMENT & PLAN NOTE
-Patient was diagnosed with IPAH in 2009 and started on Remodulin, Letairis, and Adcirca. She has a history of ABHIJEET (untreated - not able to tolerate CPAP 2/2 severe congestion), GERD, and Asthma. Not interested in lung transplant.   - continue triple therapy w/ Remodulin infusing at 80ng/kg/min (pre-filled cassettes), [id number] qdaily, and tadalafil 40mg qhs. Admitting to PH floor.

## 2025-04-01 NOTE — ASSESSMENT & PLAN NOTE
Malnutrition Type:  Context: acute illness or injury  Level: moderate    Related to (etiology):   Decreased ability to consume sufficient energy 2/2 poor oral intake PTA     Signs and Symptoms (as evidenced by):  Malnutrition Characteristic Summary:  Energy Intake (Malnutrition): less than or equal to 50% for greater than or equal to 5 days  Subcutaneous Fat (Malnutrition): mild depletion  Muscle Mass (Malnutrition): moderate depletion    Interventions(treatment strategy):  1. Continue Clear liquid   2. Add Boost Breeze TID for optimization of protein/calorie intake  3. RD following    Nutrition Diagnosis Status:   New

## 2025-04-01 NOTE — TREATMENT PLAN
AES Treatment Plan    Contacted by primary team about evidence of choledocholithiasis seen on CT. Reviewed chart with attending Dr. Candelaria. Noted LFTs have been normal and no ductal dilatation seen on US. CBD measured 2 mm on CT. HIDA scan did confirm cholecystitis and also noted patent CBD. If small stone is present in distal CBD it is not obstructing biliary drainage. Due to severe pulmonary HTN, risks of EUS/ERCP would outweigh any benefits of removing nonobstructing stone at this time. Discussed with primary team. Please reach out if LFTs become elevated or if there is concern for acute cholangitis (fever, jaundice, worsening RUQ pain, ductal dilatation on imaging).    Bryant Casas  Gastroenterology and Hepatology Fellow, PGY-VI

## 2025-04-01 NOTE — PROGRESS NOTES
[facility] - Transplant Stepdown  Adult Nutrition  Progress Note    SUMMARY       Recommendations  1. Continue Clear liquid   2. Add Boost Breeze TID for optimization of protein/calorie intake   3. RD following    Goals: Meet % of EEN/EPN by RD f/u date  Nutrition Goal Status: goal not met  Communication of RD Recs: POC    Nutrition Discharge Planning    Nutrition Discharge Planning: General healthy diet    Assessment and Plan    Endocrine  Moderate protein-calorie malnutrition  Malnutrition Type:  Context: acute illness or injury  Level: moderate    Related to (etiology):   Decreased ability to consume sufficient energy 2/2 poor oral intake PTA     Signs and Symptoms (as evidenced by):  Malnutrition Characteristic Summary:  Energy Intake (Malnutrition): less than or equal to 50% for greater than or equal to 5 days  Subcutaneous Fat (Malnutrition): mild depletion  Muscle Mass (Malnutrition): moderate depletion    Interventions(treatment strategy):  1. Continue Clear liquid   2. Add Boost Breeze TID for optimization of protein/calorie intake  3. RD following    Nutrition Diagnosis Status:   New     Malnutrition Assessment  Malnutrition Context: acute illness or injury  Malnutrition Level: moderate    Energy Intake (Malnutrition): less than or equal to 50% for greater than or equal to 5 days  Subcutaneous Fat (Malnutrition): mild depletion  Muscle Mass (Malnutrition): moderate depletion   Upper Arm Region (Subcutaneous Fat Loss): mild depletion   Clavicle Bone Region (Muscle Loss): moderate depletion  Dorsal Hand (Muscle Loss): moderate depletion       Reason for Assessment    Reason For Assessment: (MST)  Diagnosis:  (cholecystitis)  General Information Comments: RD triggered for MST. Spoke w/ pt at bedside, reports tolerating clear liquids provided at this time. Pt reports vomiting since Saturday. Discussed optimizing nutrition status w/ pt at bedside- pt interested in trying supplemental intake for extra  "calories/protein. Reports UBW of 140-145 lbs. 4.8% weight loss x 3 months- not significant. NFPE completed, 4/1, pt meets criteria for moderate calorie malnutrition in the context of chronic illness per ASPEN guidelines. RD following. LBM noted-3/29  Nutrition Related Social Determinants of Health: SDOH: None Identified    Nutrition/Diet History    Spiritual, Cultural Beliefs, Confucianism Practices, Values that Affect Care: no  Food Allergies: NKFA    Anthropometrics    Height: 4' 11" (149.9 cm)  Height (inches): 59 in  Weight: 62.9 kg (138 lb 12.5 oz)  Weight (lb): 138.78 lb  Weight Method: Standard Scale  Ideal Body Weight (IBW), Female: 95 lb  % Ideal Body Weight, Female (lb): 150.53 %  BMI (Calculated): 28  BMI Grade: 25 - 29.9 - overweight       Lab/Procedures/Meds    Pertinent Labs Reviewed: reviewed  Pertinent Labs Comments: Sodium 133, Potassium 3.2  Pertinent Medications Reviewed: reviewed  Pertinent Medications Comments: levothyroxine    Estimated/Assessed Needs    Weight Used For Calorie Calculations: 62.9 kg (138 lb 10.7 oz)  Energy Calorie Requirements (kcal): 1887  Energy Need Method: Kcal/kg (30 kcal/kg)  Protein Requirements: 75 g (1.2 g/kg)  Weight Used For Protein Calculations: 62.9 kg (138 lb 10.7 oz)  RDA Method (mL): 1887     Nutrition Prescription Ordered    Current Diet Order: Clear liquid    Evaluation of Received Nutrient/Fluid Intake    I/O: -  Energy Calories Required: not meeting needs  Protein Required: not meeting needs  Fluid Required: not meeting needs  Total Fluid Intake (mL/kg): 1 ml or fluid per MD  Tolerance: tolerating  % Intake of Estimated Energy Needs: 0-50%  % Meal Intake: 0-50%    Nutrition Risk    Level of Risk/Frequency of Follow-up: low ((1-2x/week))     Monitor and Evaluation    Monitor and Evaluation: Energy intake, Food and beverage intake, Diet order, Food and nutrition knowledge, Weight, Beliefs and attitudes     Nutrition Follow-Up    RD Follow-up?: Yes      "

## 2025-04-01 NOTE — ASSESSMENT & PLAN NOTE
-CT Abd/pelvi 3/30/25: 1. The gallbladder is distended noting cholelithiasis, and suspected wall thickening/induration about the gallbladder walls.  There is distal choledocholithiasis.  Evaluation with ultrasound is recommended as developing acute cholecystitis is a consideration. 2. Liquid content within a few distal small bowel loops and colon.  Correlation with any history of diarrheal illness. 3. Stable pulmonary nodules, please see CT 02/27/2023.4. Hepatomegaly, correlation with LFTs recommended.  - concerns for cholelithiasis and possible choledocholithiasis.   -[facility] consulted for Choledocholithiasis management.

## 2025-04-01 NOTE — SUBJECTIVE & OBJECTIVE
"Interval History: Patient is s/p IR kita tube placement yesterday.  General surgery is following.  She is complaining on persistent pain more like spasms this monring.  Per general surgery; no further antibiotics needed. Will likely need kita tube in place indefinitely.  Per gen surgery recommendations: [facility] consulted given non obstructive choledocholithiasis.      Continuous Infusions:   veletri/remodulin cassette   Intravenous Continuous        veletri/remodulin tubing   Intravenous Continuous         Scheduled Meds:   ambrisentan  10 mg Oral Daily    ceFEPime IV (PEDS and ADULTS)  2 g Intravenous Q8H    ferrous sulfate  1 tablet Oral Daily    fluticasone furoate-vilanteroL  1 puff Inhalation QHS    fluticasone propionate  2 spray Each Nostril QHS    folic acid  1,000 mcg Oral Daily    levothyroxine  125 mcg Oral Before breakfast    metroNIDAZOLE IV (PEDS and ADULTS)  500 mg Intravenous Q8H    pantoprazole  40 mg Intravenous Daily    potassium bicarbonate  50 mEq Oral Once    tadalafil  40 mg Oral QHS    topiramate  100 mg Oral BID    treprostinil (REMODULIN) 12,000,000 ng in 0.9% NaCl 100 mL infusion  117 ng/kg/min (Order-Specific) Intravenous Q24H     PRN Meds:  Current Facility-Administered Medications:     acetaminophen, 650 mg, Oral, Q6H PRN    albuterol-ipratropium, 3 mL, Nebulization, Q6H PRN    diphenhydrAMINE, 50 mg, Oral, Q6H PRN    morphine, 2 mg, Intravenous, Q4H PRN    ondansetron, 8 mg, Oral, Q8H PRN    ondansetron, 4 mg, Intravenous, Q8H PRN    polyethylene glycol, 17 g, Oral, BID PRN    sodium chloride 0.9%, 10 mL, Intravenous, PRN    Review of patient's allergies indicates:   Allergen Reactions    Fentanyl Anaphylaxis     Respiratory distress    Vibra-tabs [doxycycline hyclate] Anaphylaxis     "throat felt like it was closing"    Adhesive Hives     Silk tape    Amoxicillin Rash    Nsaids (non-steroidal anti-inflammatory drug) Swelling    Chlorhexidine Other (See Comments) " "    Blue Chlorhexidine causes hives     Objective:     Vital Signs (Most Recent):  Temp: 98.2 °F (36.8 °C) (04/01/25 1218)  Pulse: 107 (04/01/25 1218)  Resp: 20 (04/01/25 0807)  BP: (!) 88/60 (04/01/25 1221)  SpO2: (!) 93 % (04/01/25 1218) Vital Signs (24h Range):  Temp:  [97.9 °F (36.6 °C)-99.5 °F (37.5 °C)] 98.2 °F (36.8 °C)  Pulse:  [100-125] 107  Resp:  [16-23] 20  SpO2:  [90 %-96 %] 93 %  BP: ()/(51-73) 88/60     Patient Vitals for the past 72 hrs (Last 3 readings):   Weight   03/31/25 0642 62.9 kg (138 lb 12.5 oz)   03/30/25 1211 64.9 kg (143 lb)     Body mass index is 28.03 kg/m².      Intake/Output Summary (Last 24 hours) at 4/1/2025 1236  Last data filed at 4/1/2025 1134  Gross per 24 hour   Intake 670 ml   Output 860 ml   Net -190 ml        Physical Exam  Vitals and nursing note reviewed.   Constitutional:       Appearance: She is well-developed.   HENT:      Head: Normocephalic.   Eyes:      Pupils: Pupils are equal, round, and reactive to light.   Cardiovascular:      Rate and Rhythm: Normal rate and regular rhythm.   Pulmonary:      Effort: Pulmonary effort is normal.      Breath sounds: Normal breath sounds.   Abdominal:      General: Bowel sounds are normal.      Palpations: Abdomen is soft.      Tenderness: There is abdominal tenderness.      Comments: Sue tube in place    Musculoskeletal:         General: Normal range of motion.      Cervical back: Normal range of motion and neck supple.   Skin:     General: Skin is warm and dry.   Neurological:      Mental Status: She is alert and oriented to person, place, and time.   Psychiatric:         Behavior: Behavior normal.            Significant Labs:  CBC:  Recent Labs   Lab 03/30/25  1334 03/31/25  0845 04/01/25  0614   WBC 11.55 11.30 10.99   RBC 5.46* 4.86 4.58   HGB 16.2* 14.5 13.7   HCT 47.8 43.6 41.8    179 117*   MCV 88 90 91   MCH 29.7 29.8 29.9   MCHC 33.9 33.3 32.8     BNP:  No results for input(s): "BNP" in the last 168 " "hours.    Invalid input(s): "BNPTRIAGELBLO"  CMP:  Recent Labs   Lab 03/30/25  1334 03/31/25  0845 04/01/25  0614   CALCIUM 9.5 8.3* 8.3*   ALBUMIN 4.2 3.2* 2.8*    138 133*   K 3.4* 3.5 3.2*   CO2 21* 20* 19*    111* 108   BUN 11 8 10   CREATININE 0.7 0.6 0.7   ALKPHOS 90 73 63   ALT 11 12 9*   AST 19 15 9*   BILITOT 0.6 0.5 0.6      Coagulation:   Recent Labs   Lab 03/30/25  1334 03/31/25  0845   INR 1.1 1.1     LDH:  No results for input(s): "LDH" in the last 72 hours.  Microbiology:  Microbiology Results (last 7 days)       Procedure Component Value Units Date/Time    Urine culture [[id number]] Collected: 03/30/25 2314    Order Status: Completed Specimen: Urine, Clean Catch Updated: 04/01/25 0937     Urine Culture No Growth    Blood culture #1 **CANNOT BE ORDERED STAT** [[id number]]  (Normal) Collected: 03/30/25 1336    Order Status: Completed Specimen: Blood from Peripheral, Antecubital, Right Updated: 04/01/25 0302     Blood Culture No Growth After 36 Hours    Blood culture #2 **CANNOT BE ORDERED STAT** [[id number]]  (Normal) Collected: 03/30/25 1336    Order Status: Completed Specimen: Blood from Peripheral, Antecubital, Left Updated: 04/01/25 0302     Blood Culture No Growth After 36 Hours          I have reviewed all pertinent labs within the past 24 hours.    Estimated Creatinine Clearance: 79 mL/min (based on SCr of 0.7 mg/dL).    Diagnostic Results:  I have reviewed all pertinent imaging results/findings within the past 24 hours.  "

## 2025-04-01 NOTE — ASSESSMENT & PLAN NOTE
Patient is a 55 year old female with severe pulm HTN on remodulin ggt here with possible recurrent cholecystitis and non obstructive choledocholithiasis. Now s/p cholecystostomy tube placement 3/31/25    - Okay for diet  - Will need to keep kita tube in place indefinitely.  - Her pulm HTN makes her a very high risk surgical candidate and likely not worth the risk in this setting. Will likely need to keep tube   - Does not need any more antibiotics from surgery perspective.  - PRN pain and nausea control  - Remainder of care per primary team  - Please contact general surgery with any questions, concerns, or clinical status changes. General surgery will sign off at this time.

## 2025-04-01 NOTE — ASSESSMENT & PLAN NOTE
-hx of kita tube that had been removed  -CT scan with distended and thickened gallbladder with multiple stone and choledocolithiasis.  -HIDA scan with Scintigraphic evidence of cystic duct obstruction and acute cholecystitis   -Given Pulm HTN: high risk for surgery  -Kita tube placed 4/1  -Per general surgery: no further need for abx  -On liquid diet and advancing as tolerated.   -[facility] consulted for choledocholithiasis

## 2025-04-01 NOTE — PLAN OF CARE
Pt AAOx4, VSS, afebrile. Remodulin infusing at 117 ng/gk/min, DW 60.5 kg. Bili tube placed in IR, 50 cc output. IV abx cont as ordered. Pain controlled with PRN morphine. AM labs ordered.

## 2025-04-01 NOTE — SUBJECTIVE & OBJECTIVE
"Interval History: Got IR kita tube yesterday. Reports still having some abdominal pain. Otherwise, no major changes. No new complaints at this time.     Medications:  Continuous Infusions:   veletri/remodulin cassette   Intravenous Continuous        veletri/remodulin tubing   Intravenous Continuous         Scheduled Meds:   ambrisentan  10 mg Oral Daily    ceFEPime IV (PEDS and ADULTS)  2 g Intravenous Q8H    ferrous sulfate  1 tablet Oral Daily    fluticasone furoate-vilanteroL  1 puff Inhalation QHS    fluticasone propionate  2 spray Each Nostril QHS    folic acid  1,000 mcg Oral Daily    levothyroxine  125 mcg Oral Before breakfast    metroNIDAZOLE IV (PEDS and ADULTS)  500 mg Intravenous Q8H    pantoprazole  40 mg Intravenous Daily    tadalafil  40 mg Oral Daily PM    tadalafil  40 mg Oral QHS    topiramate  100 mg Oral BID    treprostinil (REMODULIN) 12,000,000 ng in 0.9% NaCl 100 mL infusion  117 ng/kg/min (Order-Specific) Intravenous Q24H     PRN Meds:  Current Facility-Administered Medications:     acetaminophen, 650 mg, Oral, Q6H PRN    albuterol-ipratropium, 3 mL, Nebulization, Q6H PRN    diphenhydrAMINE, 50 mg, Oral, Q6H PRN    morphine, 2 mg, Intravenous, Q4H PRN    ondansetron, 8 mg, Oral, Q8H PRN    ondansetron, 4 mg, Intravenous, Q8H PRN    polyethylene glycol, 17 g, Oral, BID PRN    sodium chloride 0.9%, 10 mL, Intravenous, PRN     Review of patient's allergies indicates:   Allergen Reactions    Fentanyl Anaphylaxis     Respiratory distress    Vibra-tabs [doxycycline hyclate] Anaphylaxis     "throat felt like it was closing"    Adhesive Hives     Silk tape    Amoxicillin Rash    Nsaids (non-steroidal anti-inflammatory drug) Swelling    Chlorhexidine Other (See Comments)     Blue Chlorhexidine causes hives     Objective:     Vital Signs (Most Recent):  Temp: 99.5 °F (37.5 °C) (04/01/25 0737)  Pulse: (!) 113 (04/01/25 0737)  Resp: 20 (04/01/25 0415)  BP: (!) 90/59 (04/01/25 0737)  SpO2: (!) 90 % " (04/01/25 0737) Vital Signs (24h Range):  Temp:  [97.9 °F (36.6 °C)-99.5 °F (37.5 °C)] 99.5 °F (37.5 °C)  Pulse:  [100-125] 113  Resp:  [16-23] 20  SpO2:  [90 %-96 %] 90 %  BP: ()/(59-73) 90/59     Weight: 62.9 kg (138 lb 12.5 oz)  Body mass index is 28.03 kg/m².    Intake/Output - Last 3 Shifts         03/30 0700  03/31 0659 03/31 0700  04/01 0659 04/01 0700 04/02 0659    P.O. 0 0     IV Piggyback  100     Total Intake(mL/kg) 0 (0) 100 (1.6)     Urine (mL/kg/hr) 300 700 (0.5)     Emesis/NG output 0 0     Drains  60     Stool 0 0     Total Output 300 760     Net -300 -660            Urine Occurrence 0 x 0 x     Stool Occurrence 0 x 0 x     Emesis Occurrence 0 x 0 x              Physical Exam  Vitals and nursing note reviewed.   Constitutional:       General: She is not in acute distress.     Appearance: She is not diaphoretic.      Comments: 2L O2 via NC   HENT:      Head: Normocephalic and atraumatic.      Mouth/Throat:      Mouth: Mucous membranes are moist.      Pharynx: Oropharynx is clear.   Eyes:      Extraocular Movements: Extraocular movements intact.      Conjunctiva/sclera: Conjunctivae normal.   Cardiovascular:      Rate and Rhythm: Normal rate.   Pulmonary:      Effort: Pulmonary effort is normal. No respiratory distress.   Abdominal:      General: There is no distension.      Palpations: Abdomen is soft.      Tenderness: There is no guarding or rebound.      Comments: TTP in RUQ. No peritonitic signs. Sue tube in place with seropurulent drainage   Musculoskeletal:         General: No deformity.   Skin:     General: Skin is warm and dry.   Neurological:      Mental Status: She is alert and oriented to person, place, and time.          Significant Labs:  I have reviewed all pertinent lab results within the past 24 hours.    Significant Diagnostics:  I have reviewed all pertinent imaging results/findings within the past 24 hours.

## 2025-04-01 NOTE — PROGRESS NOTES
[facility]  General Surgery  Progress Note    Subjective:     History of Present Illness:  55F with hx of kita tube that has been removed and Pulm HTN on remodulin ggt at home here with abdominal pain, nausea and vomiting. She does report fevers. Notes diarrhea that she states she has because of meds. CT scan with distended and thickened gallbladder with multiple stones and choledocholithiasis. US pending. No white count but has shift.  Never had surgery before. Walks but does get SOB. No MI or stroke. No surgeries. No blood thinners. Surgery consulted for cholecystitis.     Post-Op Info:  * No surgery found *         Interval History: Got IR kita tube yesterday. Reports still having some abdominal pain. Otherwise, no major changes. No new complaints at this time.     Medications:  Continuous Infusions:   veletri/remodulin cassette   Intravenous Continuous        veletri/remodulin tubing   Intravenous Continuous         Scheduled Meds:   ambrisentan  10 mg Oral Daily    ceFEPime IV (PEDS and ADULTS)  2 g Intravenous Q8H    ferrous sulfate  1 tablet Oral Daily    fluticasone furoate-vilanteroL  1 puff Inhalation QHS    fluticasone propionate  2 spray Each Nostril QHS    folic acid  1,000 mcg Oral Daily    levothyroxine  125 mcg Oral Before breakfast    metroNIDAZOLE IV (PEDS and ADULTS)  500 mg Intravenous Q8H    pantoprazole  40 mg Intravenous Daily    tadalafil  40 mg Oral Daily PM    tadalafil  40 mg Oral QHS    topiramate  100 mg Oral BID    treprostinil (REMODULIN) 12,000,000 ng in 0.9% NaCl 100 mL infusion  117 ng/kg/min (Order-Specific) Intravenous Q24H     PRN Meds:  Current Facility-Administered Medications:     acetaminophen, 650 mg, Oral, Q6H PRN    albuterol-ipratropium, 3 mL, Nebulization, Q6H PRN    diphenhydrAMINE, 50 mg, Oral, Q6H PRN    morphine, 2 mg, Intravenous, Q4H PRN    ondansetron, 8 mg, Oral, Q8H PRN    ondansetron, 4 mg, Intravenous, Q8H PRN    polyethylene glycol, 17  "g, Oral, BID PRN    sodium chloride 0.9%, 10 mL, Intravenous, PRN     Review of patient's allergies indicates:   Allergen Reactions    Fentanyl Anaphylaxis     Respiratory distress    Vibra-tabs [doxycycline hyclate] Anaphylaxis     "throat felt like it was closing"    Adhesive Hives     Silk tape    Amoxicillin Rash    Nsaids (non-steroidal anti-inflammatory drug) Swelling    Chlorhexidine Other (See Comments)     Blue Chlorhexidine causes hives     Objective:     Vital Signs (Most Recent):  Temp: 99.5 °F (37.5 °C) (04/01/25 0737)  Pulse: (!) 113 (04/01/25 0737)  Resp: 20 (04/01/25 0415)  BP: (!) 90/59 (04/01/25 0737)  SpO2: (!) 90 % (04/01/25 0737) Vital Signs (24h Range):  Temp:  [97.9 °F (36.6 °C)-99.5 °F (37.5 °C)] 99.5 °F (37.5 °C)  Pulse:  [100-125] 113  Resp:  [16-23] 20  SpO2:  [90 %-96 %] 90 %  BP: ()/(59-73) 90/59     Weight: 62.9 kg (138 lb 12.5 oz)  Body mass index is 28.03 kg/m².    Intake/Output - Last 3 Shifts         03/30 0700  03/31 0659 03/31 0700  04/01 0659 04/01 0700  04/02 0659    P.O. 0 0     IV Piggyback  100     Total Intake(mL/kg) 0 (0) 100 (1.6)     Urine (mL/kg/hr) 300 700 (0.5)     Emesis/NG output 0 0     Drains  60     Stool 0 0     Total Output 300 760     Net -300 -660            Urine Occurrence 0 x 0 x     Stool Occurrence 0 x 0 x     Emesis Occurrence 0 x 0 x              Physical Exam  Vitals and nursing note reviewed.   Constitutional:       General: She is not in acute distress.     Appearance: She is not diaphoretic.      Comments: 2L O2 via NC   HENT:      Head: Normocephalic and atraumatic.      Mouth/Throat:      Mouth: Mucous membranes are moist.      Pharynx: Oropharynx is clear.   Eyes:      Extraocular Movements: Extraocular movements intact.      Conjunctiva/sclera: Conjunctivae normal.   Cardiovascular:      Rate and Rhythm: Normal rate.   Pulmonary:      Effort: Pulmonary effort is normal. No respiratory distress.   Abdominal:      General: There is no " distension.      Palpations: Abdomen is soft.      Tenderness: There is no guarding or rebound.      Comments: TTP in RUQ. No peritonitic signs. Kita tube in place with seropurulent drainage   Musculoskeletal:         General: No deformity.   Skin:     General: Skin is warm and dry.   Neurological:      Mental Status: She is alert and oriented to person, place, and time.          Significant Labs:  I have reviewed all pertinent lab results within the past 24 hours.    Significant Diagnostics:  I have reviewed all pertinent imaging results/findings within the past 24 hours.  Assessment/Plan:     * Cholelithiasis  Patient is a 55 year old female with severe pulm HTN on remodulin ggt here with possible recurrent cholecystitis and non obstructive choledocholithiasis. Now s/p cholecystostomy tube placement 3/31/25    - Okay for diet  - Will need to keep kita tube in place indefinitely.  - Her pulm HTN makes her a very high risk surgical candidate and likely not worth the risk in this setting. Will likely need to keep tube   - Does not need any more antibiotics from surgery perspective.  - PRN pain and nausea control  - Remainder of care per primary team  - Please contact general surgery with any questions, concerns, or clinical status changes. General surgery will sign off at this time.                Graciela Vela MD  General Surgery  [facility] - [facility]

## 2025-04-01 NOTE — PLAN OF CARE
Recommendations  1. Continue Clear liquid   2. Add Boost Breeze TID for optimization of protein/calorie intake   3. RD following     Goals: Meet % of EEN/EPN by RD f/u date  Nutrition Goal Status: goal not met  Communication of RD Recs: POC

## 2025-04-01 NOTE — PLAN OF CARE
Plan of care reviewed on am rounds, afrebrile, sbp 80's- 90's ) Patient states baseline 90's Tele st ( low 100's -110's) wbc 10  h/h stable K 3.2 ( replaced) continues to c/o gen abd discomfort, some relief obtained with am prn Morphine dose, declined dose this afternoon as sbp 88, prn Tylenol given,  bili tube to gravity with small amount output,. Awaiting AES consult. IV antibiotics dcd. Remodulin continued at home dose rate 117 ng via right tunnellled cath.Prn Zofran for intermittent nausea. Advanced to reg diet with FR/ Breeze added per patient requestVoiding ok, incontinent episode of loose brown stool/ states diarrhea at home sometimes due to Remodulin.Standby assist to commode chair.

## 2025-04-02 LAB
ABSOLUTE EOSINOPHIL (OHS): 0.05 K/UL
ABSOLUTE EOSINOPHIL (OHS): 0.08 K/UL
ABSOLUTE MONOCYTE (OHS): 0.65 K/UL (ref 0.3–1)
ABSOLUTE MONOCYTE (OHS): 0.84 K/UL (ref 0.3–1)
ABSOLUTE NEUTROPHIL COUNT (OHS): 6.99 K/UL (ref 1.8–7.7)
ABSOLUTE NEUTROPHIL COUNT (OHS): 7.65 K/UL (ref 1.8–7.7)
ALBUMIN SERPL BCP-MCNC: 2.6 G/DL (ref 3.5–5.2)
ALBUMIN SERPL BCP-MCNC: 2.6 G/DL (ref 3.5–5.2)
ALP SERPL-CCNC: 62 UNIT/L (ref 40–150)
ALP SERPL-CCNC: 82 UNIT/L (ref 40–150)
ALT SERPL W/O P-5'-P-CCNC: 11 UNIT/L (ref 10–44)
ALT SERPL W/O P-5'-P-CCNC: 8 UNIT/L (ref 10–44)
ANION GAP (OHS): 7 MMOL/L (ref 8–16)
ANION GAP (OHS): 9 MMOL/L (ref 8–16)
APTT PPP: 30.3 SECONDS (ref 21–32)
AST SERPL-CCNC: 20 UNIT/L (ref 11–45)
AST SERPL-CCNC: 9 UNIT/L (ref 11–45)
BASOPHILS # BLD AUTO: 0.03 K/UL
BASOPHILS # BLD AUTO: 0.03 K/UL
BASOPHILS NFR BLD AUTO: 0.3 %
BASOPHILS NFR BLD AUTO: 0.3 %
BILIRUB SERPL-MCNC: 0.3 MG/DL (ref 0.1–1)
BILIRUB SERPL-MCNC: 0.3 MG/DL (ref 0.1–1)
BUN SERPL-MCNC: 10 MG/DL (ref 6–20)
BUN SERPL-MCNC: 9 MG/DL (ref 6–20)
CALCIUM SERPL-MCNC: 8.2 MG/DL (ref 8.7–10.5)
CALCIUM SERPL-MCNC: 8.3 MG/DL (ref 8.7–10.5)
CHLORIDE SERPL-SCNC: 106 MMOL/L (ref 95–110)
CHLORIDE SERPL-SCNC: 106 MMOL/L (ref 95–110)
CO2 SERPL-SCNC: 20 MMOL/L (ref 23–29)
CO2 SERPL-SCNC: 21 MMOL/L (ref 23–29)
CREAT SERPL-MCNC: 0.6 MG/DL (ref 0.5–1.4)
CREAT SERPL-MCNC: 0.6 MG/DL (ref 0.5–1.4)
ERYTHROCYTE [DISTWIDTH] IN BLOOD BY AUTOMATED COUNT: 14.1 % (ref 11.5–14.5)
ERYTHROCYTE [DISTWIDTH] IN BLOOD BY AUTOMATED COUNT: 14.1 % (ref 11.5–14.5)
GFR SERPLBLD CREATININE-BSD FMLA CKD-EPI: >60 ML/MIN/1.73/M2
GFR SERPLBLD CREATININE-BSD FMLA CKD-EPI: >60 ML/MIN/1.73/M2
GLUCOSE SERPL-MCNC: 111 MG/DL (ref 70–110)
GLUCOSE SERPL-MCNC: 93 MG/DL (ref 70–110)
HCT VFR BLD AUTO: 39.6 % (ref 37–48.5)
HCT VFR BLD AUTO: 39.8 % (ref 37–48.5)
HGB BLD-MCNC: 13.1 GM/DL (ref 12–16)
HGB BLD-MCNC: 13.3 GM/DL (ref 12–16)
IMM GRANULOCYTES # BLD AUTO: 0.04 K/UL (ref 0–0.04)
IMM GRANULOCYTES # BLD AUTO: 0.05 K/UL (ref 0–0.04)
IMM GRANULOCYTES NFR BLD AUTO: 0.5 % (ref 0–0.5)
IMM GRANULOCYTES NFR BLD AUTO: 0.5 % (ref 0–0.5)
INR PPP: 1.2 (ref 0.8–1.2)
LYMPHOCYTES # BLD AUTO: 0.84 K/UL (ref 1–4.8)
LYMPHOCYTES # BLD AUTO: 1.03 K/UL (ref 1–4.8)
MAGNESIUM SERPL-MCNC: 1.8 MG/DL (ref 1.6–2.6)
MAGNESIUM SERPL-MCNC: 1.8 MG/DL (ref 1.6–2.6)
MCH RBC QN AUTO: 29.6 PG (ref 27–31)
MCH RBC QN AUTO: 30.5 PG (ref 27–31)
MCHC RBC AUTO-ENTMCNC: 32.9 G/DL (ref 32–36)
MCHC RBC AUTO-ENTMCNC: 33.6 G/DL (ref 32–36)
MCV RBC AUTO: 90 FL (ref 82–98)
MCV RBC AUTO: 91 FL (ref 82–98)
NUCLEATED RBC (/100WBC) (OHS): 0 /100 WBC
NUCLEATED RBC (/100WBC) (OHS): 0 /100 WBC
OHS QRS DURATION: 70 MS
OHS QTC CALCULATION: 430 MS
PHOSPHATE SERPL-MCNC: 2.1 MG/DL (ref 2.7–4.5)
PHOSPHATE SERPL-MCNC: 2.4 MG/DL (ref 2.7–4.5)
PLATELET # BLD AUTO: 91 K/UL (ref 150–450)
PLATELET # BLD AUTO: 95 K/UL (ref 150–450)
PMV BLD AUTO: 12.5 FL (ref 9.2–12.9)
PMV BLD AUTO: 13.9 FL (ref 9.2–12.9)
POCT GLUCOSE: 117 MG/DL (ref 70–110)
POTASSIUM SERPL-SCNC: 3.4 MMOL/L (ref 3.5–5.1)
POTASSIUM SERPL-SCNC: 3.4 MMOL/L (ref 3.5–5.1)
PROT SERPL-MCNC: 6 GM/DL (ref 6–8.4)
PROT SERPL-MCNC: 6 GM/DL (ref 6–8.4)
PROTHROMBIN TIME: 12.8 SECONDS (ref 9–12.5)
RBC # BLD AUTO: 4.36 M/UL (ref 4–5.4)
RBC # BLD AUTO: 4.42 M/UL (ref 4–5.4)
RELATIVE EOSINOPHIL (OHS): 0.6 %
RELATIVE EOSINOPHIL (OHS): 0.8 %
RELATIVE LYMPHOCYTE (OHS): 10.9 % (ref 18–48)
RELATIVE LYMPHOCYTE (OHS): 9.6 % (ref 18–48)
RELATIVE MONOCYTE (OHS): 6.8 % (ref 4–15)
RELATIVE MONOCYTE (OHS): 9.6 % (ref 4–15)
RELATIVE NEUTROPHIL (OHS): 79.4 % (ref 38–73)
RELATIVE NEUTROPHIL (OHS): 80.7 % (ref 38–73)
SODIUM SERPL-SCNC: 134 MMOL/L (ref 136–145)
SODIUM SERPL-SCNC: 135 MMOL/L (ref 136–145)
WBC # BLD AUTO: 8.79 K/UL (ref 3.9–12.7)
WBC # BLD AUTO: 9.49 K/UL (ref 3.9–12.7)

## 2025-04-02 PROCEDURE — 63600175 PHARM REV CODE 636 W HCPCS: Mod: TB | Performed by: INTERNAL MEDICINE

## 2025-04-02 PROCEDURE — 25000003 PHARM REV CODE 250: Performed by: NURSE PRACTITIONER

## 2025-04-02 PROCEDURE — 25000003 PHARM REV CODE 250: Performed by: INTERNAL MEDICINE

## 2025-04-02 PROCEDURE — 25000242 PHARM REV CODE 250 ALT 637 W/ HCPCS: Performed by: STUDENT IN AN ORGANIZED HEALTH CARE EDUCATION/TRAINING PROGRAM

## 2025-04-02 PROCEDURE — 25000003 PHARM REV CODE 250: Performed by: PHYSICIAN ASSISTANT

## 2025-04-02 PROCEDURE — 94640 AIRWAY INHALATION TREATMENT: CPT

## 2025-04-02 PROCEDURE — 83735 ASSAY OF MAGNESIUM: CPT | Performed by: PHYSICIAN ASSISTANT

## 2025-04-02 PROCEDURE — 20000000 HC ICU ROOM

## 2025-04-02 PROCEDURE — 99900035 HC TECH TIME PER 15 MIN (STAT)

## 2025-04-02 PROCEDURE — 93010 ELECTROCARDIOGRAM REPORT: CPT | Mod: ,,, | Performed by: INTERNAL MEDICINE

## 2025-04-02 PROCEDURE — 84075 ASSAY ALKALINE PHOSPHATASE: CPT | Performed by: PHYSICIAN ASSISTANT

## 2025-04-02 PROCEDURE — 80053 COMPREHEN METABOLIC PANEL: CPT | Performed by: STUDENT IN AN ORGANIZED HEALTH CARE EDUCATION/TRAINING PROGRAM

## 2025-04-02 PROCEDURE — 27000221 HC OXYGEN, UP TO 24 HOURS

## 2025-04-02 PROCEDURE — 36415 COLL VENOUS BLD VENIPUNCTURE: CPT | Performed by: STUDENT IN AN ORGANIZED HEALTH CARE EDUCATION/TRAINING PROGRAM

## 2025-04-02 PROCEDURE — 63600175 PHARM REV CODE 636 W HCPCS: Performed by: STUDENT IN AN ORGANIZED HEALTH CARE EDUCATION/TRAINING PROGRAM

## 2025-04-02 PROCEDURE — 25000003 PHARM REV CODE 250: Performed by: STUDENT IN AN ORGANIZED HEALTH CARE EDUCATION/TRAINING PROGRAM

## 2025-04-02 PROCEDURE — 99233 SBSQ HOSP IP/OBS HIGH 50: CPT | Mod: ,,, | Performed by: PHYSICIAN ASSISTANT

## 2025-04-02 PROCEDURE — 85730 THROMBOPLASTIN TIME PARTIAL: CPT | Performed by: PHYSICIAN ASSISTANT

## 2025-04-02 PROCEDURE — 94761 N-INVAS EAR/PLS OXIMETRY MLT: CPT

## 2025-04-02 PROCEDURE — 85025 COMPLETE CBC W/AUTO DIFF WBC: CPT | Performed by: STUDENT IN AN ORGANIZED HEALTH CARE EDUCATION/TRAINING PROGRAM

## 2025-04-02 PROCEDURE — 84100 ASSAY OF PHOSPHORUS: CPT | Performed by: STUDENT IN AN ORGANIZED HEALTH CARE EDUCATION/TRAINING PROGRAM

## 2025-04-02 PROCEDURE — 83735 ASSAY OF MAGNESIUM: CPT | Performed by: STUDENT IN AN ORGANIZED HEALTH CARE EDUCATION/TRAINING PROGRAM

## 2025-04-02 PROCEDURE — 85025 COMPLETE CBC W/AUTO DIFF WBC: CPT | Performed by: PHYSICIAN ASSISTANT

## 2025-04-02 PROCEDURE — 85610 PROTHROMBIN TIME: CPT | Performed by: PHYSICIAN ASSISTANT

## 2025-04-02 PROCEDURE — 51798 US URINE CAPACITY MEASURE: CPT

## 2025-04-02 PROCEDURE — 63600175 PHARM REV CODE 636 W HCPCS: Performed by: INTERNAL MEDICINE

## 2025-04-02 PROCEDURE — 93005 ELECTROCARDIOGRAM TRACING: CPT

## 2025-04-02 PROCEDURE — 84100 ASSAY OF PHOSPHORUS: CPT | Performed by: PHYSICIAN ASSISTANT

## 2025-04-02 RX ORDER — POTASSIUM CHLORIDE 750 MG/1
50 CAPSULE, EXTENDED RELEASE ORAL ONCE
Status: COMPLETED | OUTPATIENT
Start: 2025-04-02 | End: 2025-04-02

## 2025-04-02 RX ORDER — LANOLIN ALCOHOL/MO/W.PET/CERES
400 CREAM (GRAM) TOPICAL ONCE
Status: COMPLETED | OUTPATIENT
Start: 2025-04-02 | End: 2025-04-02

## 2025-04-02 RX ORDER — ADENOSINE 3 MG/ML
12 INJECTION, SOLUTION INTRAVENOUS ONCE
Status: COMPLETED | OUTPATIENT
Start: 2025-04-02 | End: 2025-04-02

## 2025-04-02 RX ORDER — TRAMADOL HYDROCHLORIDE 50 MG/1
100 TABLET ORAL EVERY 6 HOURS PRN
Status: DISCONTINUED | OUTPATIENT
Start: 2025-04-02 | End: 2025-04-03

## 2025-04-02 RX ADMIN — TOPIRAMATE 100 MG: 25 TABLET, FILM COATED ORAL at 08:04

## 2025-04-02 RX ADMIN — POTASSIUM CHLORIDE 50 MEQ: 750 CAPSULE, EXTENDED RELEASE ORAL at 12:04

## 2025-04-02 RX ADMIN — PANTOPRAZOLE SODIUM 40 MG: 40 INJECTION, POWDER, FOR SOLUTION INTRAVENOUS at 08:04

## 2025-04-02 RX ADMIN — Medication 400 MG: at 03:04

## 2025-04-02 RX ADMIN — FLUTICASONE PROPIONATE 100 MCG: 50 SPRAY, METERED NASAL at 09:04

## 2025-04-02 RX ADMIN — POTASSIUM CHLORIDE 50 MEQ: 750 CAPSULE, EXTENDED RELEASE ORAL at 03:04

## 2025-04-02 RX ADMIN — TADALAFIL 40 MG: 20 TABLET ORAL at 09:04

## 2025-04-02 RX ADMIN — ADENOSINE 12 MG: 3 INJECTION, SOLUTION INTRAVENOUS at 12:04

## 2025-04-02 RX ADMIN — LEVOTHYROXINE SODIUM 125 MCG: 0.12 TABLET ORAL at 04:04

## 2025-04-02 RX ADMIN — FOLIC ACID 1000 MCG: 1 TABLET ORAL at 08:04

## 2025-04-02 RX ADMIN — DIPHENHYDRAMINE HYDROCHLORIDE 50 MG: 25 CAPSULE ORAL at 11:04

## 2025-04-02 RX ADMIN — TOPIRAMATE 100 MG: 25 TABLET, FILM COATED ORAL at 09:04

## 2025-04-02 RX ADMIN — FERROUS SULFATE TAB EC 325 MG (65 MG FE EQUIVALENT) 1 EACH: 325 (65 FE) TABLET DELAYED RESPONSE at 08:04

## 2025-04-02 RX ADMIN — FLUTICASONE FUROATE AND VILANTEROL TRIFENATATE 1 PUFF: 200; 25 POWDER RESPIRATORY (INHALATION) at 09:04

## 2025-04-02 RX ADMIN — AMBRISENTAN 10 MG: 5 TABLET, FILM COATED ORAL at 08:04

## 2025-04-02 RX ADMIN — TRAMADOL HYDROCHLORIDE 100 MG: 50 TABLET, COATED ORAL at 02:04

## 2025-04-02 RX ADMIN — TREPROSTINIL 7078.5 NG/MIN: 200 INJECTION, SOLUTION INTRAVENOUS; SUBCUTANEOUS at 03:04

## 2025-04-02 RX ADMIN — TRAMADOL HYDROCHLORIDE 100 MG: 50 TABLET, COATED ORAL at 11:04

## 2025-04-02 NOTE — RESPIRATORY THERAPY
"RAPID RESPONSE RESPIRATORY THERAPY NOTE             Code Status: Full Code   : 1970  Bed: 00268 [facility]/38387 [facility]*:   MRN: [id number]  Time page Received: 1239  Time Rapid Response RT at Bedside: 1239  Time Rapid Response RT left Bedside: 1320    SITUATION    Evaluated patient for: Increased HR    BACKGROUND    Why is the patient in the hospital?: Cholecystitis    Patient has a past medical history of AR (allergic rhinitis), Cholelithiasis, common bile duct, Chronic low back pain, Eye pressure, General anesthetics causing adverse effect in therapeutic use, GERD (gastroesophageal reflux disease), History of migraine headaches, Hypothyroidism, Innocent heart murmur, Lumbar disc disease, Menorrhagia, Mild asthma, Obesity, Plantar fasciitis of left foot, Primary pulmonary hypertension, Seizure disorder, Seizures, Sleep apnea, TMJ (dislocation of temporomandibular joint), and Tricuspid regurgitation.    24 Hours Vitals Range:  Temp:  [97.7 °F (36.5 °C)-99.3 °F (37.4 °C)]   Pulse:  []   Resp:  [18-39]   BP: ()/(54-73)   SpO2:  [86 %-95 %]     Labs:    Recent Labs     25  0845 25  0614 25  0619    133* 135*   K 3.5 3.2* 3.4*   * 108 106   CO2 20* 19* 20*   BUN 8 10 9   CREATININE 0.6 0.7 0.6   PHOS 3.1 2.8 2.4*   MG 2.0 2.0 1.8        No results for input(s): "PH", "PCO2", "PO2", "HCO3", "POCSATURATED", "BE" in the last 72 hours.    ASSESSMENT/INTERVENTIONS  Patient alert and oriented. Slightly SOB HR 220s. NC on 5L SpO2 92%. Obtained EKG, pushed adenosine and transported patient to ICU with no adverse events.    Last Vitals: Temp: 97.8 °F (36.6 °C) (1312)  Pulse: 112 (1315)  Resp: 31 (1315)  BP: 123/66 (1315)  SpO2: 92 % (1315)  Level of Consciousness: Level of Consciousness (AVPU): alert  Respiratory Effort: Respiratory Effort: Mild, Labored, Short of breath  Expansion/Accessory Muscle Usage: Expansion/Accessory Muscles/Retractions: abdominal " muscle use  All Lung Field Breath Sounds: All Lung Fields Breath Sounds: Anterior:, Lateral:  PEDRO LUIS Breath Sounds: clear  LLL Breath Sounds: crackles, fine  RUL Breath Sounds: clear  RML Breath Sounds: crackles, fine  RLL Breath Sounds: crackles, fine  O2 Device/Concentration: 5L NC  NIPPV: No Surgical airway: No Vent: No  ETCO2 monitored: ETCO2 (mmHg): 28 mmHg  Ambu at bedside: Yes    Active Orders   Respiratory Care    Oxygen PRN     Frequency: PRN     Number of Occurrences: Until Specified     Order Questions:      Device type: Low flow      Device: Nasal Cannula (1- 5 Liters)      LPM: 2      Titrate O2 per Oxygen Titration Protocol: Yes      To maintain SpO2 goal of: >= 90%      Notify MD of: Inability to achieve desired SpO2; Sudden change in patient status and requires 20% increase in FiO2; Patient requires >60% FiO2       RECOMMENDATIONS  ?  We recommend: RRT Recs: Continue POC per primary team.    FOLLOW-UP    Disposition: Tx in ICU bed 62066.    Please call back the Rapid Response RTKarishma RRT at x [phone] for any questions or concerns.

## 2025-04-02 NOTE — PROGRESS NOTES
Update    Pt's presents in room, sitting up in chair, with nasal canula AAOx4 pleasant affect.  Pt reports coping well and reports that she feels better than she did yesterday.  Pt reports that she is talking to her father for support but hasn't visited her yet in the hospital due to having symptoms of a cold.  Pt reports she is not comfortable with her father visiting her until his symptoms subside.  SW provided general support.  Pt denies additional needs or concerns at this time. SW remains available.

## 2025-04-02 NOTE — PROGRESS NOTES
[facility] - Transplant Stepdown  Heart Transplant  Progress Note    Patient Name: Yuni Perez  MRN: [id number]  Admission Date: 3/30/2025  Hospital Length of Stay: 3 days  Attending Physician: Doyle De Dios MD  Primary Care Provider: Lulu Sandhu MD  Principal Problem:Cholecystitis    Subjective:   Interval History: Patient is s/p IR kita tube placement 3/31.  General surgery: no further antibiotics needed, and will likely need kita tube in place indefinitely.  Per gen surgery recommendations: [facility] consulted given non obstructive choledocholithiasis, but per their recommendations no intervention needed.  Liver enzymes are normal. She is tolerating regular diet and had bowel movement yesterday.  Pain control with Tramadol.     Continuous Infusions:   veletri/remodulin cassette   Intravenous Continuous        veletri/remodulin tubing   Intravenous Continuous         Scheduled Meds:   ambrisentan  10 mg Oral Daily    ferrous sulfate  1 tablet Oral Daily    fluticasone furoate-vilanteroL  1 puff Inhalation QHS    fluticasone propionate  2 spray Each Nostril QHS    folic acid  1,000 mcg Oral Daily    hydrOXYzine HCL  25 mg Oral Once    levothyroxine  125 mcg Oral Before breakfast    pantoprazole  40 mg Intravenous Daily    tadalafil  40 mg Oral QHS    topiramate  100 mg Oral BID    treprostinil (REMODULIN) 12,000,000 ng in 0.9% NaCl 100 mL infusion  117 ng/kg/min (Order-Specific) Intravenous Q24H     PRN Meds:  Current Facility-Administered Medications:     acetaminophen, 650 mg, Oral, Q6H PRN    albuterol-ipratropium, 3 mL, Nebulization, Q6H PRN    diphenhydrAMINE, 50 mg, Oral, Q6H PRN    ondansetron, 8 mg, Oral, Q8H PRN    ondansetron, 4 mg, Intravenous, Q8H PRN    polyethylene glycol, 17 g, Oral, BID PRN    sodium chloride 0.9%, 10 mL, Intravenous, PRN    traMADoL, 100 mg, Oral, Q6H PRN    Review of patient's allergies indicates:   Allergen Reactions    Fentanyl Anaphylaxis     " Respiratory distress    Vibra-tabs [doxycycline hyclate] Anaphylaxis     "throat felt like it was closing"    Adhesive Hives     Silk tape    Amoxicillin Rash    Nsaids (non-steroidal anti-inflammatory drug) Swelling    Chlorhexidine Other (See Comments)     Blue Chlorhexidine causes hives     Objective:     Vital Signs (Most Recent):  Temp: 99.3 °F (37.4 °C) (04/02/25 0731)  Pulse: 102 (04/02/25 1127)  Resp: 18 (04/02/25 0731)  BP: (!) 93/56 (04/02/25 0731)  SpO2: (!) 92 % (04/02/25 0731) Vital Signs (24h Range):  Temp:  [98 °F (36.7 °C)-99.3 °F (37.4 °C)] 99.3 °F (37.4 °C)  Pulse:  [] 102  Resp:  [18-22] 18  SpO2:  [86 %-95 %] 92 %  BP: ()/(51-62) 93/56     Patient Vitals for the past 72 hrs (Last 3 readings):   Weight   03/31/25 0642 62.9 kg (138 lb 12.5 oz)   03/30/25 1211 64.9 kg (143 lb)     Body mass index is 28.03 kg/m².      Intake/Output Summary (Last 24 hours) at 4/2/2025 1131  Last data filed at 4/2/2025 0457  Gross per 24 hour   Intake 280 ml   Output 360 ml   Net -80 ml        Physical Exam  Vitals and nursing note reviewed.   Constitutional:       Appearance: She is well-developed.   HENT:      Head: Normocephalic.   Eyes:      Pupils: Pupils are equal, round, and reactive to light.   Cardiovascular:      Rate and Rhythm: Normal rate and regular rhythm.   Pulmonary:      Effort: Pulmonary effort is normal.      Breath sounds: Normal breath sounds.   Abdominal:      General: Bowel sounds are normal.      Palpations: Abdomen is soft.      Tenderness: There is abdominal tenderness.      Comments: Sue tube in place    Musculoskeletal:         General: Normal range of motion.      Cervical back: Normal range of motion and neck supple.   Skin:     General: Skin is warm and dry.   Neurological:      Mental Status: She is alert and oriented to person, place, and time.   Psychiatric:         Behavior: Behavior normal.            Significant Labs:  CBC:  Recent Labs   Lab 03/31/25  0845 " "04/01/25  0614 04/02/25  0620   WBC 11.30 10.99 8.79   RBC 4.86 4.58 4.42   HGB 14.5 13.7 13.1   HCT 43.6 41.8 39.8    117* 91*   MCV 90 91 90   MCH 29.8 29.9 29.6   MCHC 33.3 32.8 32.9     BNP:  No results for input(s): "BNP" in the last 168 hours.    Invalid input(s): "BNPTRIAGELBLO"  CMP:  Recent Labs   Lab 03/31/25  0845 04/01/25  0614 04/02/25  0619   CALCIUM 8.3* 8.3* 8.3*   ALBUMIN 3.2* 2.8* 2.6*    133* 135*   K 3.5 3.2* 3.4*   CO2 20* 19* 20*   * 108 106   BUN 8 10 9   CREATININE 0.6 0.7 0.6   ALKPHOS 73 63 62   ALT 12 9* 8*   AST 15 9* 9*   BILITOT 0.5 0.6 0.3      Coagulation:   Recent Labs   Lab 03/30/25  1334 03/31/25  0845   INR 1.1 1.1     LDH:  No results for input(s): "LDH" in the last 72 hours.  Microbiology:  Microbiology Results (last 7 days)       Procedure Component Value Units Date/Time    Blood culture #2 **CANNOT BE ORDERED STAT** [[id number]]  (Normal) Collected: 03/30/25 1336    Order Status: Completed Specimen: Blood from Peripheral, Antecubital, Left Updated: 04/01/25 1501     Blood Culture No Growth After 48 Hours    Blood culture #1 **CANNOT BE ORDERED STAT** [[id number]]  (Normal) Collected: 03/30/25 1336    Order Status: Completed Specimen: Blood from Peripheral, Antecubital, Right Updated: 04/01/25 1500     Blood Culture No Growth After 48 Hours    Urine culture [[id number]] Collected: 03/30/25 2314    Order Status: Completed Specimen: Urine, Clean Catch Updated: 04/01/25 0937     Urine Culture No Growth          I have reviewed all pertinent labs within the past 24 hours.    Estimated Creatinine Clearance: 92.2 mL/min (based on SCr of 0.6 mg/dL).    Diagnostic Results:  I have reviewed all pertinent imaging results/findings within the past 24 hours.  Assessment and Plan:     Mrs. Perez is a 55 y.o. W has PAH and is currently on triple therapy who presented to the ED with abdominal pain and n/v. Patient was diagnosed with IPAH in 2009 and started on Remodulin, " Letairis, and Adcirca. She has a history of ABHIJEET (untreated - not able to tolerate CPAP 2/2 severe congestion), GERD, and Asthma. Current therapy is Remodulin infusing at 80ng/kg/min (pre-filled cassettes), [id number] qdaily, and tadalafil 40mg qdaily. Has T/F Adempas. Patient denies chest pain, chest pressure, syncope, pre-syncope, lightheadedness, dizziness, PND, orthopnea, or LE edema.        CT Abd/pelvi 3/30/25:     1. The gallbladder is distended noting cholelithiasis, and suspected wall thickening/induration about the gallbladder walls.  There is distal choledocholithiasis.  Evaluation with ultrasound is recommended as developing acute cholecystitis is a consideration.  2. Liquid content within a few distal small bowel loops and colon.  Correlation with any history of diarrheal illness.  3. Stable pulmonary nodules, please see CT 02/27/2023.  4. Hepatomegaly, correlation with LFTs recommended.      TTE 12/16/25      Left Ventricle: The left ventricle is normal in size. Normal wall thickness. Normal wall motion. Septal flattening in systole consistent with right ventricular pressure overload. There is normal systolic function with a visually estimated ejection fraction of 60 - 65%. There is normal diastolic function.    Right Ventricle: Moderate right ventricular enlargement with hypertrophy. Systolic function is mildly to moderately reduced.    The right atrium is mildly dilated.    Tricuspid Valve: There is mild regurgitation.    Pulmonary Artery: There is moderate to severe pulmonary hypertension. The estimated pulmonary artery systolic pressure is 67 mmHg.    IVC/SVC: Normal venous pressure at 3 mmHg.       * Cholecystitis  -hx of kita tube that had been removed  -CT scan with distended and thickened gallbladder with multiple stone and choledocolithiasis.  -HIDA scan with Scintigraphic evidence of cystic duct obstruction and acute cholecystitis   -Given Pulm HTN: high risk for surgery  -Kita tube  placed 4/1  -Per general surgery: no further need for abx  -Patient tolerating regular diet   -[facility] consulted for choledocholithiasis     Cholelithiasis  -CT Abd/pelvi 3/30/25: 1. The gallbladder is distended noting cholelithiasis, and suspected wall thickening/induration about the gallbladder walls.  There is distal choledocholithiasis.  Evaluation with ultrasound is recommended as developing acute cholecystitis is a consideration. 2. Liquid content within a few distal small bowel loops and colon.  Correlation with any history of diarrheal illness. 3. Stable pulmonary nodules, please see CT 02/27/2023.4. Hepatomegaly, correlation with LFTs recommended.  - concerns for cholelithiasis and possible choledocholithiasis.   -[facility] consulted for Choledocholithiasis management: Due to severe pulmonary HTN, risks of EUS/ERCP would outweigh any benefits of removing nonobstructing stone at this time. Discussed with primary team. Please reach out if LFTs become elevated or if there is concern for acute cholangitis (fever, jaundice, worsening RUQ pain, ductal dilatation on imaging).           WHO group 1 pulmonary arterial hypertension  -Patient was diagnosed with IPAH in 2009 and started on Remodulin, Letairis, and Adcirca. She has a history of ABHIJEET (untreated - not able to tolerate CPAP 2/2 severe congestion), GERD, and Asthma. Not interested in lung transplant.   - continue triple therapy w/ Remodulin infusing at 80ng/kg/min (pre-filled cassettes), [id number] qdaily, and tadalafil 40mg qhs. Admitting to PH floor.       Hypothyroidism (acquired)  -resuming home synthroid    Mild intermittent asthma  -continue home inhalers    Chronic tension-type headache, not intractable  -continue home medications    Allergic asthma  -continue home meds        GUMARO Frazier  Heart Transplant  [facility] - Trans[facility] Stepdown

## 2025-04-02 NOTE — CODE/ RAPID DOCUMENTATION
RAPID RESPONSE NURSE NOTE        Admit Date: 3/30/2025  LOS: 3  Code Status: Full Code   Date of Consult: 2025  : 1970  Age: 55 y.o.  Weight:   Wt Readings from Last 1 Encounters:   25 62.9 kg (138 lb 12.5 oz)     Sex: female  Race: White   [facility]   MRN: [id number]  Time Rapid Response Team page Received: 1238  Time Rapid Response Team at Bedside: 1240  Time Rapid Response Team left Bedside: 1304  Was the patient discharged from an ICU this admission? No   Was the patient discharged from a PACU within last 24 hours? No   Did the patient receive conscious sedation/general anesthesia in last 24 hours? No  Was the patient in the ED within the past 24 hours? No  Was the patient on NIPPV within the past 24 hours? No   Did this progress into an ARC or CPA: No  Attending Physician: Doyle De Dios MD  [place] [facility] [place]       SITUATION    Notified by Charge RN via phone call.  Reason for alert: SVT  Called to evaluate the patient for Circulatory.    BACKGROUND     Why is the patient in the hospital?: Cholecystitis    Patient has a past medical history of AR (allergic rhinitis), Cholelithiasis, common bile duct, Chronic low back pain, Eye pressure, General anesthetics causing adverse effect in therapeutic use, GERD (gastroesophageal reflux disease), History of migraine headaches, Hypothyroidism, Innocent heart murmur, Lumbar disc disease, Menorrhagia, Mild asthma, Obesity, Plantar fasciitis of left foot, Primary pulmonary hypertension, Seizure disorder, Seizures, Sleep apnea, TMJ (dislocation of temporomandibular joint), and Tricuspid regurgitation.    Last Vitals:  Temp: 97.8 °F (36.6 °C) (1312)  Pulse: 112 (1315)  Resp: 31 (1315)  BP: 123/66 (1315)  SpO2: 92 % (1315)    24 Hours Vitals Range:  Temp:  [97.7 °F (36.5 °C)-99.3 °F (37.4 °C)]   Pulse:  []   Resp:  [18-39]   BP: ()/(54-73)   SpO2:  [86 %-95 %]      Labs:  Recent Labs     03/31/25  0845 04/01/25  0614 04/02/25  0620   WBC 11.30 10.99 8.79   HGB 14.5 13.7 13.1   HCT 43.6 41.8 39.8    117* 91*       Recent Labs     03/31/25  0845 04/01/25  0614 04/02/25  0619    133* 135*   K 3.5 3.2* 3.4*   * 108 106   CO2 20* 19* 20*   BUN 8 10 9   CREATININE 0.6 0.7 0.6   PHOS 3.1 2.8 2.4*   MG 2.0 2.0 1.8        Patient denies chest pain, light headedness or blurry vision. Patient does endorse feeling hot. On EKG Hr 221. Patient given 12mg IV push of adensione. Patient converted to Sinus tachycardia. Decided to transfer to ICU for     ASSESSMENT     Physical Exam  Constitutional:       Appearance: She is ill-appearing.   Eyes:      Pupils: Pupils are equal, round, and reactive to light.   Cardiovascular:      Rate and Rhythm: Tachycardia present.   Pulmonary:      Effort: Pulmonary effort is normal.   Abdominal:      General: There is distension.   Skin:     General: Skin is warm.      Capillary Refill: Capillary refill takes less than 2 seconds.   Neurological:      General: No focal deficit present.      Mental Status: She is alert and oriented to person, place, and time.   Psychiatric:         Mood and Affect: Mood normal.         Behavior: Behavior normal.         Thought Content: Thought content normal.         Judgment: Judgment normal.         INTERVENTIONS    The patient was seen for Cardiac problem. Staff concerns included tachycardia. The following interventions were performed: EKG, continuous cardiac monitoring continued, and No additional interventions needed at this time..    RECOMMENDATIONS    We recommend: Critical care upgrade    PROVIDER ESCALATION    Orders received and case discussed with Dr. De Dios .    Primary team arrival time: 1250    Disposition: Tx in ICU bed 89270.    FOLLOW UP    Bedside nurseCasie  updated on plan of care. Instructed to call the Rapid Response Nurse, Harshal Fletcher RN at [phone] for additional questions or  concerns.

## 2025-04-02 NOTE — ASSESSMENT & PLAN NOTE
-CT Abd/pelvi 3/30/25: 1. The gallbladder is distended noting cholelithiasis, and suspected wall thickening/induration about the gallbladder walls.  There is distal choledocholithiasis.  Evaluation with ultrasound is recommended as developing acute cholecystitis is a consideration. 2. Liquid content within a few distal small bowel loops and colon.  Correlation with any history of diarrheal illness. 3. Stable pulmonary nodules, please see CT 02/27/2023.4. Hepatomegaly, correlation with LFTs recommended.  - concerns for cholelithiasis and possible choledocholithiasis.   -[facility] consulted for Choledocholithiasis management: Due to severe pulmonary HTN, risks of EUS/ERCP would outweigh any benefits of removing nonobstructing stone at this time. Discussed with primary team. Please reach out if LFTs become elevated or if there is concern for acute cholangitis (fever, jaundice, worsening RUQ pain, ductal dilatation on imaging).

## 2025-04-02 NOTE — SUBJECTIVE & OBJECTIVE
"Interval History: Patient is s/p IR kita tube placement 3/31.  General surgery: no further antibiotics needed, and will likely need kita tube in place indefinitely.  Per gen surgery recommendations: [facility] consulted given non obstructive choledocholithiasis, but per their recommendations no intervention needed.  Liver enzymes are normal. She is tolerating regular diet and had bowel movement yesterday.  Pain control with Tramadol.     Continuous Infusions:   veletri/remodulin cassette   Intravenous Continuous        veletri/remodulin tubing   Intravenous Continuous         Scheduled Meds:   ambrisentan  10 mg Oral Daily    ferrous sulfate  1 tablet Oral Daily    fluticasone furoate-vilanteroL  1 puff Inhalation QHS    fluticasone propionate  2 spray Each Nostril QHS    folic acid  1,000 mcg Oral Daily    hydrOXYzine HCL  25 mg Oral Once    levothyroxine  125 mcg Oral Before breakfast    pantoprazole  40 mg Intravenous Daily    tadalafil  40 mg Oral QHS    topiramate  100 mg Oral BID    treprostinil (REMODULIN) 12,000,000 ng in 0.9% NaCl 100 mL infusion  117 ng/kg/min (Order-Specific) Intravenous Q24H     PRN Meds:  Current Facility-Administered Medications:     acetaminophen, 650 mg, Oral, Q6H PRN    albuterol-ipratropium, 3 mL, Nebulization, Q6H PRN    diphenhydrAMINE, 50 mg, Oral, Q6H PRN    ondansetron, 8 mg, Oral, Q8H PRN    ondansetron, 4 mg, Intravenous, Q8H PRN    polyethylene glycol, 17 g, Oral, BID PRN    sodium chloride 0.9%, 10 mL, Intravenous, PRN    traMADoL, 100 mg, Oral, Q6H PRN    Review of patient's allergies indicates:   Allergen Reactions    Fentanyl Anaphylaxis     Respiratory distress    Vibra-tabs [doxycycline hyclate] Anaphylaxis     "throat felt like it was closing"    Adhesive Hives     Silk tape    Amoxicillin Rash    Nsaids (non-steroidal anti-inflammatory drug) Swelling    Chlorhexidine Other (See Comments)     Blue Chlorhexidine causes hives     Objective:     Vital " "Signs (Most Recent):  Temp: 99.3 °F (37.4 °C) (04/02/25 0731)  Pulse: 102 (04/02/25 1127)  Resp: 18 (04/02/25 0731)  BP: (!) 93/56 (04/02/25 0731)  SpO2: (!) 92 % (04/02/25 0731) Vital Signs (24h Range):  Temp:  [98 °F (36.7 °C)-99.3 °F (37.4 °C)] 99.3 °F (37.4 °C)  Pulse:  [] 102  Resp:  [18-22] 18  SpO2:  [86 %-95 %] 92 %  BP: ()/(51-62) 93/56     Patient Vitals for the past 72 hrs (Last 3 readings):   Weight   03/31/25 0642 62.9 kg (138 lb 12.5 oz)   03/30/25 1211 64.9 kg (143 lb)     Body mass index is 28.03 kg/m².      Intake/Output Summary (Last 24 hours) at 4/2/2025 1131  Last data filed at 4/2/2025 0457  Gross per 24 hour   Intake 280 ml   Output 360 ml   Net -80 ml        Physical Exam  Vitals and nursing note reviewed.   Constitutional:       Appearance: She is well-developed.   HENT:      Head: Normocephalic.   Eyes:      Pupils: Pupils are equal, round, and reactive to light.   Cardiovascular:      Rate and Rhythm: Normal rate and regular rhythm.   Pulmonary:      Effort: Pulmonary effort is normal.      Breath sounds: Normal breath sounds.   Abdominal:      General: Bowel sounds are normal.      Palpations: Abdomen is soft.      Tenderness: There is abdominal tenderness.      Comments: Sue tube in place    Musculoskeletal:         General: Normal range of motion.      Cervical back: Normal range of motion and neck supple.   Skin:     General: Skin is warm and dry.   Neurological:      Mental Status: She is alert and oriented to person, place, and time.   Psychiatric:         Behavior: Behavior normal.            Significant Labs:  CBC:  Recent Labs   Lab 03/31/25  0845 04/01/25  0614 04/02/25  0620   WBC 11.30 10.99 8.79   RBC 4.86 4.58 4.42   HGB 14.5 13.7 13.1   HCT 43.6 41.8 39.8    117* 91*   MCV 90 91 90   MCH 29.8 29.9 29.6   MCHC 33.3 32.8 32.9     BNP:  No results for input(s): "BNP" in the last 168 hours.    Invalid input(s): "BNPTRIAGELBLO"  CMP:  Recent Labs   Lab " "03/31/25  0845 04/01/25  0614 04/02/25  0619   CALCIUM 8.3* 8.3* 8.3*   ALBUMIN 3.2* 2.8* 2.6*    133* 135*   K 3.5 3.2* 3.4*   CO2 20* 19* 20*   * 108 106   BUN 8 10 9   CREATININE 0.6 0.7 0.6   ALKPHOS 73 63 62   ALT 12 9* 8*   AST 15 9* 9*   BILITOT 0.5 0.6 0.3      Coagulation:   Recent Labs   Lab 03/30/25  1334 03/31/25  0845   INR 1.1 1.1     LDH:  No results for input(s): "LDH" in the last 72 hours.  Microbiology:  Microbiology Results (last 7 days)       Procedure Component Value Units Date/Time    Blood culture #2 **CANNOT BE ORDERED STAT** [[id number]]  (Normal) Collected: 03/30/25 1336    Order Status: Completed Specimen: Blood from Peripheral, Antecubital, Left Updated: 04/01/25 1501     Blood Culture No Growth After 48 Hours    Blood culture #1 **CANNOT BE ORDERED STAT** [[id number]]  (Normal) Collected: 03/30/25 1336    Order Status: Completed Specimen: Blood from Peripheral, Antecubital, Right Updated: 04/01/25 1500     Blood Culture No Growth After 48 Hours    Urine culture [[id number]] Collected: 03/30/25 2314    Order Status: Completed Specimen: Urine, Clean Catch Updated: 04/01/25 0937     Urine Culture No Growth          I have reviewed all pertinent labs within the past 24 hours.    Estimated Creatinine Clearance: 92.2 mL/min (based on SCr of 0.6 mg/dL).    Diagnostic Results:  I have reviewed all pertinent imaging results/findings within the past 24 hours.  "

## 2025-04-02 NOTE — NURSING
"This RN was in pt's room when pt stated she was "lightheaded, shaky & dizzy all of a sudden". BG checked, 117. Charge RN ran to bedside as pt's HR alarmed in the 200's. Surinder & Dr. De Dios at bedside. Adenosine 12 mg administered @ 1250 by surinder RN, per Dr. De Dios. Patient converted to Sinus tachycardia. Pt transferred to SICU.  "

## 2025-04-02 NOTE — PLAN OF CARE
Patient AAO x4. Afebrile. SpO2 range from 87-95% titrated between 3-5L NC. R biliary tube output 60 ml and orange/red. Remodulin infusing at 117 ng/kg/min. DW 60.5 kg. 84.96 ml/24 hrs. Pt up to BSC with stand by assist. X2 liquid BM overnight. PRN morphine x1. BP stable overnight. NSR-ST on telemetry. Bed locked and in lowest setting. Call bell in reach.

## 2025-04-02 NOTE — ASSESSMENT & PLAN NOTE
-hx of kita tube that had been removed  -CT scan with distended and thickened gallbladder with multiple stone and choledocolithiasis.  -HIDA scan with Scintigraphic evidence of cystic duct obstruction and acute cholecystitis   -Given Pulm HTN: high risk for surgery  -Kita tube placed 4/1  -Per general surgery: no further need for abx  -Patient tolerating regular diet   -[facility] consulted for choledocholithiasis

## 2025-04-03 PROBLEM — I47.10 SVT (SUPRAVENTRICULAR TACHYCARDIA): Status: ACTIVE | Noted: 2025-04-03

## 2025-04-03 LAB
ABSOLUTE EOSINOPHIL (OHS): 0.25 K/UL
ABSOLUTE MONOCYTE (OHS): 0.66 K/UL (ref 0.3–1)
ABSOLUTE NEUTROPHIL COUNT (OHS): 6.42 K/UL (ref 1.8–7.7)
ALBUMIN SERPL BCP-MCNC: 2.7 G/DL (ref 3.5–5.2)
ALP SERPL-CCNC: 79 UNIT/L (ref 40–150)
ALT SERPL W/O P-5'-P-CCNC: 10 UNIT/L (ref 10–44)
ANION GAP (OHS): 6 MMOL/L (ref 8–16)
AST SERPL-CCNC: 15 UNIT/L (ref 11–45)
BASOPHILS # BLD AUTO: 0.03 K/UL
BASOPHILS NFR BLD AUTO: 0.4 %
BILIRUB SERPL-MCNC: 0.2 MG/DL (ref 0.1–1)
BUN SERPL-MCNC: 11 MG/DL (ref 6–20)
CALCIUM SERPL-MCNC: 8.3 MG/DL (ref 8.7–10.5)
CHLORIDE SERPL-SCNC: 109 MMOL/L (ref 95–110)
CO2 SERPL-SCNC: 18 MMOL/L (ref 23–29)
CREAT SERPL-MCNC: 0.6 MG/DL (ref 0.5–1.4)
ERYTHROCYTE [DISTWIDTH] IN BLOOD BY AUTOMATED COUNT: 14 % (ref 11.5–14.5)
GFR SERPLBLD CREATININE-BSD FMLA CKD-EPI: >60 ML/MIN/1.73/M2
GLUCOSE SERPL-MCNC: 109 MG/DL (ref 70–110)
HCT VFR BLD AUTO: 40.3 % (ref 37–48.5)
HGB BLD-MCNC: 13.1 GM/DL (ref 12–16)
IMM GRANULOCYTES # BLD AUTO: 0.03 K/UL (ref 0–0.04)
IMM GRANULOCYTES NFR BLD AUTO: 0.4 % (ref 0–0.5)
LYMPHOCYTES # BLD AUTO: 1.08 K/UL (ref 1–4.8)
MAGNESIUM SERPL-MCNC: 1.9 MG/DL (ref 1.6–2.6)
MCH RBC QN AUTO: 29.7 PG (ref 27–31)
MCHC RBC AUTO-ENTMCNC: 32.5 G/DL (ref 32–36)
MCV RBC AUTO: 91 FL (ref 82–98)
NUCLEATED RBC (/100WBC) (OHS): 0 /100 WBC
PHOSPHATE SERPL-MCNC: 3.3 MG/DL (ref 2.7–4.5)
PLATELET # BLD AUTO: 94 K/UL (ref 150–450)
PMV BLD AUTO: 12.3 FL (ref 9.2–12.9)
POCT GLUCOSE: 106 MG/DL (ref 70–110)
POTASSIUM SERPL-SCNC: 4.6 MMOL/L (ref 3.5–5.1)
PROT SERPL-MCNC: 6.4 GM/DL (ref 6–8.4)
RBC # BLD AUTO: 4.41 M/UL (ref 4–5.4)
RELATIVE EOSINOPHIL (OHS): 3 %
RELATIVE LYMPHOCYTE (OHS): 12.8 % (ref 18–48)
RELATIVE MONOCYTE (OHS): 7.8 % (ref 4–15)
RELATIVE NEUTROPHIL (OHS): 75.6 % (ref 38–73)
SODIUM SERPL-SCNC: 133 MMOL/L (ref 136–145)
WBC # BLD AUTO: 8.47 K/UL (ref 3.9–12.7)

## 2025-04-03 PROCEDURE — 27000221 HC OXYGEN, UP TO 24 HOURS

## 2025-04-03 PROCEDURE — 20600001 HC STEP DOWN PRIVATE ROOM

## 2025-04-03 PROCEDURE — 25000242 PHARM REV CODE 250 ALT 637 W/ HCPCS: Performed by: STUDENT IN AN ORGANIZED HEALTH CARE EDUCATION/TRAINING PROGRAM

## 2025-04-03 PROCEDURE — 82040 ASSAY OF SERUM ALBUMIN: CPT | Performed by: STUDENT IN AN ORGANIZED HEALTH CARE EDUCATION/TRAINING PROGRAM

## 2025-04-03 PROCEDURE — 25000003 PHARM REV CODE 250: Performed by: INTERNAL MEDICINE

## 2025-04-03 PROCEDURE — 99900035 HC TECH TIME PER 15 MIN (STAT)

## 2025-04-03 PROCEDURE — 99233 SBSQ HOSP IP/OBS HIGH 50: CPT | Mod: ,,, | Performed by: PHYSICIAN ASSISTANT

## 2025-04-03 PROCEDURE — 63600175 PHARM REV CODE 636 W HCPCS: Performed by: STUDENT IN AN ORGANIZED HEALTH CARE EDUCATION/TRAINING PROGRAM

## 2025-04-03 PROCEDURE — 25000003 PHARM REV CODE 250: Performed by: STUDENT IN AN ORGANIZED HEALTH CARE EDUCATION/TRAINING PROGRAM

## 2025-04-03 PROCEDURE — 25000003 PHARM REV CODE 250: Performed by: NURSE PRACTITIONER

## 2025-04-03 PROCEDURE — 94640 AIRWAY INHALATION TREATMENT: CPT

## 2025-04-03 PROCEDURE — 83735 ASSAY OF MAGNESIUM: CPT | Performed by: STUDENT IN AN ORGANIZED HEALTH CARE EDUCATION/TRAINING PROGRAM

## 2025-04-03 PROCEDURE — 85025 COMPLETE CBC W/AUTO DIFF WBC: CPT | Performed by: STUDENT IN AN ORGANIZED HEALTH CARE EDUCATION/TRAINING PROGRAM

## 2025-04-03 PROCEDURE — 84100 ASSAY OF PHOSPHORUS: CPT | Performed by: STUDENT IN AN ORGANIZED HEALTH CARE EDUCATION/TRAINING PROGRAM

## 2025-04-03 PROCEDURE — 94761 N-INVAS EAR/PLS OXIMETRY MLT: CPT

## 2025-04-03 PROCEDURE — 63600175 PHARM REV CODE 636 W HCPCS: Mod: TB | Performed by: INTERNAL MEDICINE

## 2025-04-03 RX ORDER — TRAMADOL HYDROCHLORIDE 50 MG/1
50 TABLET ORAL EVERY 6 HOURS PRN
Status: DISCONTINUED | OUTPATIENT
Start: 2025-04-03 | End: 2025-04-07 | Stop reason: HOSPADM

## 2025-04-03 RX ADMIN — TOPIRAMATE 100 MG: 25 TABLET, FILM COATED ORAL at 08:04

## 2025-04-03 RX ADMIN — FLUTICASONE PROPIONATE 100 MCG: 50 SPRAY, METERED NASAL at 09:04

## 2025-04-03 RX ADMIN — AMBRISENTAN 10 MG: 5 TABLET, FILM COATED ORAL at 08:04

## 2025-04-03 RX ADMIN — DIPHENHYDRAMINE HYDROCHLORIDE 50 MG: 25 CAPSULE ORAL at 09:04

## 2025-04-03 RX ADMIN — DIPHENHYDRAMINE HYDROCHLORIDE 50 MG: 25 CAPSULE ORAL at 11:04

## 2025-04-03 RX ADMIN — TOPIRAMATE 100 MG: 25 TABLET, FILM COATED ORAL at 09:04

## 2025-04-03 RX ADMIN — TREPROSTINIL 7078.5 NG/MIN: 200 INJECTION, SOLUTION INTRAVENOUS; SUBCUTANEOUS at 03:04

## 2025-04-03 RX ADMIN — FERROUS SULFATE TAB EC 325 MG (65 MG FE EQUIVALENT) 1 EACH: 325 (65 FE) TABLET DELAYED RESPONSE at 08:04

## 2025-04-03 RX ADMIN — IPRATROPIUM BROMIDE AND ALBUTEROL SULFATE 3 ML: 2.5; .5 SOLUTION RESPIRATORY (INHALATION) at 10:04

## 2025-04-03 RX ADMIN — FOLIC ACID 1000 MCG: 1 TABLET ORAL at 08:04

## 2025-04-03 RX ADMIN — LEVOTHYROXINE SODIUM 125 MCG: 0.12 TABLET ORAL at 05:04

## 2025-04-03 RX ADMIN — TADALAFIL 40 MG: 20 TABLET ORAL at 09:04

## 2025-04-03 RX ADMIN — FLUTICASONE FUROATE AND VILANTEROL TRIFENATATE 1 PUFF: 200; 25 POWDER RESPIRATORY (INHALATION) at 09:04

## 2025-04-03 RX ADMIN — TRAMADOL HYDROCHLORIDE 50 MG: 50 TABLET, COATED ORAL at 11:04

## 2025-04-03 RX ADMIN — PANTOPRAZOLE SODIUM 40 MG: 40 INJECTION, POWDER, FOR SOLUTION INTRAVENOUS at 08:04

## 2025-04-03 NOTE — PROGRESS NOTES
Update/Discharge planning    SW received communication from medical team that pt is anticipated to discharge tomorrow with O2    SW talked to pt via phone and confirmed that pt does not have home O2.  SW asked pt if she has a preference for [employer].  Pt reports that she does not.      DEBBIE informed Sussy Centeno RN and GUMARO Frazier via secure chat that pt does not have home O2.  SW was informed that pt will have O2 test tomorrow morning.    SW remains available

## 2025-04-03 NOTE — PROGRESS NOTES
[facility]  Heart Transplant  Progress Note    Patient Name: Yuni Perez  MRN: [id number]  Admission Date: 3/30/2025  Hospital Length of Stay: 4 days  Attending Physician: Doyle De Dios MD  Primary Care Provider: Lulu Sandhu MD  Principal Problem:Cholecystitis    Subjective:   Interval History: Patient is s/p IR kita tube placement 3/31.  Doing well from that aspect; she is tolerating regular diet and had bowel movement yesterday.  Pain control with Tramadol. She had as episode of SVT yesterday with HR in the 200's; she converted with 12 of Adenosine.  She has been in sinus since with no events on telemetry.  Spoke with EP: and will not add any agents currently but will plan to add Multaq if she has another episode.  Will consider event monitor as an outpatient     Continuous Infusions:   veletri/remodulin cassette   Intravenous Continuous        veletri/remodulin tubing   Intravenous Continuous         Scheduled Meds:   ambrisentan  10 mg Oral Daily    ferrous sulfate  1 tablet Oral Daily    fluticasone furoate-vilanteroL  1 puff Inhalation QHS    fluticasone propionate  2 spray Each Nostril QHS    folic acid  1,000 mcg Oral Daily    hydrOXYzine HCL  25 mg Oral Once    levothyroxine  125 mcg Oral Before breakfast    pantoprazole  40 mg Intravenous Daily    tadalafil  40 mg Oral QHS    topiramate  100 mg Oral BID    treprostinil (REMODULIN) 12,000,000 ng in 0.9% NaCl 100 mL infusion  117 ng/kg/min (Order-Specific) Intravenous Q24H     PRN Meds:  Current Facility-Administered Medications:     acetaminophen, 650 mg, Oral, Q6H PRN    albuterol-ipratropium, 3 mL, Nebulization, Q6H PRN    diphenhydrAMINE, 50 mg, Oral, Q6H PRN    ondansetron, 8 mg, Oral, Q8H PRN    ondansetron, 4 mg, Intravenous, Q8H PRN    polyethylene glycol, 17 g, Oral, BID PRN    sodium chloride 0.9%, 10 mL, Intravenous, PRN    traMADoL, 50 mg, Oral, Q6H PRN    Review of patient's allergies indicates:  "  Allergen Reactions    Fentanyl Anaphylaxis     Respiratory distress    Vibra-tabs [doxycycline hyclate] Anaphylaxis     "throat felt like it was closing"    Adhesive Hives     Silk tape    Amoxicillin Rash    Nsaids (non-steroidal anti-inflammatory drug) Swelling    Chlorhexidine Other (See Comments)     Blue Chlorhexidine causes hives     Objective:     Vital Signs (Most Recent):  Temp: 97.7 °F (36.5 °C) (04/03/25 1100)  Pulse: 90 (04/03/25 1200)  Resp: 15 (04/03/25 1200)  BP: 92/64 (04/03/25 1200)  SpO2: 95 % (04/03/25 1200) Vital Signs (24h Range):  Temp:  [97.5 °F (36.4 °C)-98.1 °F (36.7 °C)] 97.7 °F (36.5 °C)  Pulse:  [] 90  Resp:  [11-56] 15  SpO2:  [88 %-99 %] 95 %  BP: ()/(53-87) 92/64     Patient Vitals for the past 72 hrs (Last 3 readings):   Weight   04/03/25 0328 64 kg (141 lb 1.5 oz)     Body mass index is 28.5 kg/m².      Intake/Output Summary (Last 24 hours) at 4/3/2025 1225  Last data filed at 4/3/2025 1101  Gross per 24 hour   Intake 490 ml   Output 410 ml   Net 80 ml        Physical Exam  Vitals and nursing note reviewed.   Constitutional:       Appearance: She is well-developed.   HENT:      Head: Normocephalic.   Eyes:      Pupils: Pupils are equal, round, and reactive to light.   Cardiovascular:      Rate and Rhythm: Normal rate and regular rhythm.   Pulmonary:      Effort: Pulmonary effort is normal.      Breath sounds: Normal breath sounds.   Abdominal:      General: Bowel sounds are normal.      Palpations: Abdomen is soft.      Tenderness: There is abdominal tenderness.      Comments: Sue tube in place    Musculoskeletal:         General: Normal range of motion.      Cervical back: Normal range of motion and neck supple.   Skin:     General: Skin is warm and dry.   Neurological:      Mental Status: She is alert and oriented to person, place, and time.   Psychiatric:         Behavior: Behavior normal.            Significant Labs:  CBC:  Recent Labs   Lab 04/02/25  0620 " "04/02/25  1346 04/03/25  0321   WBC 8.79 9.49 8.47   RBC 4.42 4.36 4.41   HGB 13.1 13.3 13.1   HCT 39.8 39.6 40.3   PLT 91* 95* 94*   MCV 90 91 91   MCH 29.6 30.5 29.7   MCHC 32.9 33.6 32.5     BNP:  No results for input(s): "BNP" in the last 168 hours.    Invalid input(s): "BNPTRIAGELBLO"  CMP:  Recent Labs   Lab 04/02/25  0619 04/02/25  1346 04/03/25  0321   CALCIUM 8.3* 8.2* 8.3*   ALBUMIN 2.6* 2.6* 2.7*   * 134* 133*   K 3.4* 3.4* 4.6   CO2 20* 21* 18*    106 109   BUN 9 10 11   CREATININE 0.6 0.6 0.6   ALKPHOS 62 82 79   ALT 8* 11 10   AST 9* 20 15   BILITOT 0.3 0.3 0.2      Coagulation:   Recent Labs   Lab 03/30/25  1334 03/31/25  0845 04/02/25  1346   INR 1.1 1.1 1.2   APTT  --   --  30.3     LDH:  No results for input(s): "LDH" in the last 72 hours.  Microbiology:  Microbiology Results (last 7 days)       Procedure Component Value Units Date/Time    Blood culture #1 **CANNOT BE ORDERED STAT** [[id number]]  (Normal) Collected: 03/30/25 1336    Order Status: Completed Specimen: Blood from Peripheral, Antecubital, Right Updated: 04/02/25 1502     Blood Culture No Growth After 72 Hours    Blood culture #2 **CANNOT BE ORDERED STAT** [[id number]]  (Normal) Collected: 03/30/25 1336    Order Status: Completed Specimen: Blood from Peripheral, Antecubital, Left Updated: 04/02/25 1502     Blood Culture No Growth After 72 Hours    Urine culture [[id number]] Collected: 03/30/25 2314    Order Status: Completed Specimen: Urine, Clean Catch Updated: 04/01/25 0937     Urine Culture No Growth          I have reviewed all pertinent labs within the past 24 hours.    Estimated Creatinine Clearance: 92.9 mL/min (based on SCr of 0.6 mg/dL).    Diagnostic Results:  I have reviewed all pertinent imaging results/findings within the past 24 hours.  Assessment and Plan:     Mrs. Perez is a 55 y.o. W has PAH and is currently on triple therapy who presented to the ED with abdominal pain and n/v. Patient was diagnosed with " IPA in 2009 and started on Remodulin, Letairis, and Adcirca. She has a history of ABHIJEET (untreated - not able to tolerate CPAP 2/2 severe congestion), GERD, and Asthma. Current therapy is Remodulin infusing at 80ng/kg/min (pre-filled cassettes), [id number] qdaily, and tadalafil 40mg qdaily. Has T/F Adempas. Patient denies chest pain, chest pressure, syncope, pre-syncope, lightheadedness, dizziness, PND, orthopnea, or LE edema.        CT Abd/pelvi 3/30/25:     1. The gallbladder is distended noting cholelithiasis, and suspected wall thickening/induration about the gallbladder walls.  There is distal choledocholithiasis.  Evaluation with ultrasound is recommended as developing acute cholecystitis is a consideration.  2. Liquid content within a few distal small bowel loops and colon.  Correlation with any history of diarrheal illness.  3. Stable pulmonary nodules, please see CT 02/27/2023.  4. Hepatomegaly, correlation with LFTs recommended.      TTE 12/16/25      Left Ventricle: The left ventricle is normal in size. Normal wall thickness. Normal wall motion. Septal flattening in systole consistent with right ventricular pressure overload. There is normal systolic function with a visually estimated ejection fraction of 60 - 65%. There is normal diastolic function.    Right Ventricle: Moderate right ventricular enlargement with hypertrophy. Systolic function is mildly to moderately reduced.    The right atrium is mildly dilated.    Tricuspid Valve: There is mild regurgitation.    Pulmonary Artery: There is moderate to severe pulmonary hypertension. The estimated pulmonary artery systolic pressure is 67 mmHg.    IVC/SVC: Normal venous pressure at 3 mmHg.       * Cholecystitis  -hx of kita tube that had been removed  -CT scan with distended and thickened gallbladder with multiple stone and choledocolithiasis.  -HIDA scan with Scintigraphic evidence of cystic duct obstruction and acute cholecystitis   -Given Pulm HTN:  high risk for surgery  -Sue tube placed 4/1  -Per general surgery: no further need for abx  -Patient tolerating regular diet   -[facility] consulted for choledocholithiasis     Cholelithiasis  -CT Abd/pelvi 3/30/25: 1. The gallbladder is distended noting cholelithiasis, and suspected wall thickening/induration about the gallbladder walls.  There is distal choledocholithiasis.  Evaluation with ultrasound is recommended as developing acute cholecystitis is a consideration. 2. Liquid content within a few distal small bowel loops and colon.  Correlation with any history of diarrheal illness. 3. Stable pulmonary nodules, please see CT 02/27/2023.4. Hepatomegaly, correlation with LFTs recommended.  - concerns for cholelithiasis and possible choledocholithiasis.   -[facility] consulted for Choledocholithiasis management: Due to severe pulmonary HTN, risks of EUS/ERCP would outweigh any benefits of removing nonobstructing stone at this time. Discussed with primary team. Please reach out if LFTs become elevated or if there is concern for acute cholangitis (fever, jaundice, worsening RUQ pain, ductal dilatation on imaging).           SVT (supraventricular tachycardia)  -Patient has SVT with HR in the 200's   -Converted with 12mg of Adenosine  -No further events on telemetry  -If re occurs then will consider Multaq  -Will consider event monitor on discharge     WHO group 1 pulmonary arterial hypertension  -Patient was diagnosed with IPAH in 2009 and started on Remodulin, Letairis, and Adcirca. She has a history of ABHIJEET (untreated - not able to tolerate CPAP 2/2 severe congestion), GERD, and Asthma. Not interested in lung transplant.   - continue triple therapy w/ Remodulin infusing at 80ng/kg/min (pre-filled cassettes), [id number] qdaily, and tadalafil 40mg qhs. Admitting to PH floor.       Hypothyroidism (acquired)  -resuming home synthroid    Mild intermittent asthma  -continue home  inhalers    Chronic tension-type headache, not intractable  -continue home medications    Allergic asthma  -continue home meds        GUMARO Frazier  Heart Transplant  [facility]

## 2025-04-03 NOTE — ASSESSMENT & PLAN NOTE
-Patient has SVT with HR in the 200's   -Converted with 12mg of Adenosine  -No further events on telemetry  -If re occurs then will consider Multaq  -Will consider event monitor on discharge

## 2025-04-03 NOTE — PLAN OF CARE
SICU PLAN OF CARE    Dx: Cholecystitis  Goals of Care: MAP>65    Vital Signs (last 12 hours):   Temp:  [97.6 °F (36.4 °C)-98.1 °F (36.7 °C)]   Pulse:  []   Resp:  [11-56]   BP: ()/(60-87)   SpO2:  [88 %-97 %]      Neuro: AAOx4, Follows commands , and Moves all extremities spontaneously     Cardiac: normal sinus rhythm. -130's when getting OOB to BSC earlier in shift.    Respiratory: Weaned to 2L n/c. Briefly placed on 5L HFNC when getting to BSC earlier in shift.    Gtts: Remodulin    Urine Output: Voids Spontaneously  300 mL/shift    Drains:  Biliary tube, total output 40mL/shift    Diet: Regular and 2000mL Fluid Restriction     Labs/Accuchecks: Daily labs    Skin:  No skin issues. CHG bath given.  Patient shifts weight independently, bony prominences protected, and mattress inflated/working correctly.     Shift Events:  When getting up to BSC, pt's 's and SpO2 87% on 5L n/c. Placed on HFNC briefly and HR back to 90's once back in bed. OOBTC this AM, pt tolerated much better with no significant change in VS. Medicated with PRN tramadol x1. x1 BM overnight.  See flowsheet for further assessment/details.  Patient updated on plan of care, questions answered, and emotional support provided.  MD updated on patient condition, vitals, labs, and gtts.

## 2025-04-03 NOTE — PROGRESS NOTES
Pt arrived to [facility] [id number] from SICU via wheelchair. Pt assisted to bed. No complaints. Vitals obtained and reviewed. On tele. On 4L NC for activity. Remodulin infusing @ 85cc/24hrs. Bed locked/lowest position, nonskid socks on, call bell left within reach. Pt verbalized understanding to call for needs/assistance.

## 2025-04-03 NOTE — PHYSICIAN QUERY
Provider, Please clarify the diagnosis associated with the noted clinical findings.     Moderate protein calorie malnutrition

## 2025-04-03 NOTE — SUBJECTIVE & OBJECTIVE
"Interval History: Patient is s/p IR kita tube placement 3/31.  Doing well from that aspect; she is tolerating regular diet and had bowel movement yesterday.  Pain control with Tramadol. She had as episode of SVT yesterday with HR in the 200's; she converted with 12 of Adenosine.  She has been in sinus since with no events on telemetry.  Spoke with EP: and will not add any agents currently but will plan to add Multaq if she has another episode.  Will consider event monitor as an outpatient     Continuous Infusions:   veletri/remodulin cassette   Intravenous Continuous        veletri/remodulin tubing   Intravenous Continuous         Scheduled Meds:   ambrisentan  10 mg Oral Daily    ferrous sulfate  1 tablet Oral Daily    fluticasone furoate-vilanteroL  1 puff Inhalation QHS    fluticasone propionate  2 spray Each Nostril QHS    folic acid  1,000 mcg Oral Daily    hydrOXYzine HCL  25 mg Oral Once    levothyroxine  125 mcg Oral Before breakfast    pantoprazole  40 mg Intravenous Daily    tadalafil  40 mg Oral QHS    topiramate  100 mg Oral BID    treprostinil (REMODULIN) 12,000,000 ng in 0.9% NaCl 100 mL infusion  117 ng/kg/min (Order-Specific) Intravenous Q24H     PRN Meds:  Current Facility-Administered Medications:     acetaminophen, 650 mg, Oral, Q6H PRN    albuterol-ipratropium, 3 mL, Nebulization, Q6H PRN    diphenhydrAMINE, 50 mg, Oral, Q6H PRN    ondansetron, 8 mg, Oral, Q8H PRN    ondansetron, 4 mg, Intravenous, Q8H PRN    polyethylene glycol, 17 g, Oral, BID PRN    sodium chloride 0.9%, 10 mL, Intravenous, PRN    traMADoL, 50 mg, Oral, Q6H PRN    Review of patient's allergies indicates:   Allergen Reactions    Fentanyl Anaphylaxis     Respiratory distress    Vibra-tabs [doxycycline hyclate] Anaphylaxis     "throat felt like it was closing"    Adhesive Hives     Silk tape    Amoxicillin Rash    Nsaids (non-steroidal anti-inflammatory drug) Swelling    Chlorhexidine Other (See Comments)     Blue Chlorhexidine " "causes hives     Objective:     Vital Signs (Most Recent):  Temp: 97.7 °F (36.5 °C) (04/03/25 1100)  Pulse: 90 (04/03/25 1200)  Resp: 15 (04/03/25 1200)  BP: 92/64 (04/03/25 1200)  SpO2: 95 % (04/03/25 1200) Vital Signs (24h Range):  Temp:  [97.5 °F (36.4 °C)-98.1 °F (36.7 °C)] 97.7 °F (36.5 °C)  Pulse:  [] 90  Resp:  [11-56] 15  SpO2:  [88 %-99 %] 95 %  BP: ()/(53-87) 92/64     Patient Vitals for the past 72 hrs (Last 3 readings):   Weight   04/03/25 0328 64 kg (141 lb 1.5 oz)     Body mass index is 28.5 kg/m².      Intake/Output Summary (Last 24 hours) at 4/3/2025 1225  Last data filed at 4/3/2025 1101  Gross per 24 hour   Intake 490 ml   Output 410 ml   Net 80 ml        Physical Exam  Vitals and nursing note reviewed.   Constitutional:       Appearance: She is well-developed.   HENT:      Head: Normocephalic.   Eyes:      Pupils: Pupils are equal, round, and reactive to light.   Cardiovascular:      Rate and Rhythm: Normal rate and regular rhythm.   Pulmonary:      Effort: Pulmonary effort is normal.      Breath sounds: Normal breath sounds.   Abdominal:      General: Bowel sounds are normal.      Palpations: Abdomen is soft.      Tenderness: There is abdominal tenderness.      Comments: Sue tube in place    Musculoskeletal:         General: Normal range of motion.      Cervical back: Normal range of motion and neck supple.   Skin:     General: Skin is warm and dry.   Neurological:      Mental Status: She is alert and oriented to person, place, and time.   Psychiatric:         Behavior: Behavior normal.            Significant Labs:  CBC:  Recent Labs   Lab 04/02/25  0620 04/02/25  1346 04/03/25  0321   WBC 8.79 9.49 8.47   RBC 4.42 4.36 4.41   HGB 13.1 13.3 13.1   HCT 39.8 39.6 40.3   PLT 91* 95* 94*   MCV 90 91 91   MCH 29.6 30.5 29.7   MCHC 32.9 33.6 32.5     BNP:  No results for input(s): "BNP" in the last 168 hours.    Invalid input(s): "BNPTRIAGELBLO"  CMP:  Recent Labs   Lab 04/02/25  0619 " "04/02/25  1346 04/03/25  0321   CALCIUM 8.3* 8.2* 8.3*   ALBUMIN 2.6* 2.6* 2.7*   * 134* 133*   K 3.4* 3.4* 4.6   CO2 20* 21* 18*    106 109   BUN 9 10 11   CREATININE 0.6 0.6 0.6   ALKPHOS 62 82 79   ALT 8* 11 10   AST 9* 20 15   BILITOT 0.3 0.3 0.2      Coagulation:   Recent Labs   Lab 03/30/25  1334 03/31/25  0845 04/02/25  1346   INR 1.1 1.1 1.2   APTT  --   --  30.3     LDH:  No results for input(s): "LDH" in the last 72 hours.  Microbiology:  Microbiology Results (last 7 days)       Procedure Component Value Units Date/Time    Blood culture #1 **CANNOT BE ORDERED STAT** [[id number]]  (Normal) Collected: 03/30/25 1336    Order Status: Completed Specimen: Blood from Peripheral, Antecubital, Right Updated: 04/02/25 1502     Blood Culture No Growth After 72 Hours    Blood culture #2 **CANNOT BE ORDERED STAT** [[id number]]  (Normal) Collected: 03/30/25 1336    Order Status: Completed Specimen: Blood from Peripheral, Antecubital, Left Updated: 04/02/25 1502     Blood Culture No Growth After 72 Hours    Urine culture [[id number]] Collected: 03/30/25 2314    Order Status: Completed Specimen: Urine, Clean Catch Updated: 04/01/25 0937     Urine Culture No Growth          I have reviewed all pertinent labs within the past 24 hours.    Estimated Creatinine Clearance: 92.9 mL/min (based on SCr of 0.6 mg/dL).    Diagnostic Results:  I have reviewed all pertinent imaging results/findings within the past 24 hours.  "

## 2025-04-04 ENCOUNTER — TELEPHONE (OUTPATIENT)
Dept: HEPATOLOGY | Facility: CLINIC | Age: 55
End: 2025-04-04
Payer: COMMERCIAL

## 2025-04-04 LAB
ABSOLUTE EOSINOPHIL (OHS): 0.26 K/UL
ABSOLUTE MONOCYTE (OHS): 0.53 K/UL (ref 0.3–1)
ABSOLUTE NEUTROPHIL COUNT (OHS): 3.93 K/UL (ref 1.8–7.7)
ALBUMIN SERPL BCP-MCNC: 2.7 G/DL (ref 3.5–5.2)
ALP SERPL-CCNC: 71 UNIT/L (ref 40–150)
ALT SERPL W/O P-5'-P-CCNC: 7 UNIT/L (ref 10–44)
ANION GAP (OHS): 6 MMOL/L (ref 8–16)
AST SERPL-CCNC: 10 UNIT/L (ref 11–45)
BACTERIA BLD CULT: NORMAL
BACTERIA BLD CULT: NORMAL
BASOPHILS # BLD AUTO: 0.03 K/UL
BASOPHILS NFR BLD AUTO: 0.5 %
BILIRUB SERPL-MCNC: 0.2 MG/DL (ref 0.1–1)
BUN SERPL-MCNC: 7 MG/DL (ref 6–20)
CALCIUM SERPL-MCNC: 8.4 MG/DL (ref 8.7–10.5)
CHLORIDE SERPL-SCNC: 109 MMOL/L (ref 95–110)
CO2 SERPL-SCNC: 21 MMOL/L (ref 23–29)
CREAT SERPL-MCNC: 0.6 MG/DL (ref 0.5–1.4)
ERYTHROCYTE [DISTWIDTH] IN BLOOD BY AUTOMATED COUNT: 14 % (ref 11.5–14.5)
GFR SERPLBLD CREATININE-BSD FMLA CKD-EPI: >60 ML/MIN/1.73/M2
GLUCOSE SERPL-MCNC: 87 MG/DL (ref 70–110)
HCT VFR BLD AUTO: 38 % (ref 37–48.5)
HGB BLD-MCNC: 12.6 GM/DL (ref 12–16)
IMM GRANULOCYTES # BLD AUTO: 0.03 K/UL (ref 0–0.04)
IMM GRANULOCYTES NFR BLD AUTO: 0.5 % (ref 0–0.5)
LYMPHOCYTES # BLD AUTO: 1.08 K/UL (ref 1–4.8)
MAGNESIUM SERPL-MCNC: 2 MG/DL (ref 1.6–2.6)
MCH RBC QN AUTO: 30 PG (ref 27–31)
MCHC RBC AUTO-ENTMCNC: 33.2 G/DL (ref 32–36)
MCV RBC AUTO: 91 FL (ref 82–98)
NUCLEATED RBC (/100WBC) (OHS): 0 /100 WBC
PHOSPHATE SERPL-MCNC: 4 MG/DL (ref 2.7–4.5)
PLATELET # BLD AUTO: 107 K/UL (ref 150–450)
PMV BLD AUTO: 13 FL (ref 9.2–12.9)
POTASSIUM SERPL-SCNC: 3.6 MMOL/L (ref 3.5–5.1)
PROT SERPL-MCNC: 6.2 GM/DL (ref 6–8.4)
RBC # BLD AUTO: 4.2 M/UL (ref 4–5.4)
RELATIVE EOSINOPHIL (OHS): 4.4 %
RELATIVE LYMPHOCYTE (OHS): 18.4 % (ref 18–48)
RELATIVE MONOCYTE (OHS): 9 % (ref 4–15)
RELATIVE NEUTROPHIL (OHS): 67.2 % (ref 38–73)
SODIUM SERPL-SCNC: 136 MMOL/L (ref 136–145)
WBC # BLD AUTO: 5.86 K/UL (ref 3.9–12.7)

## 2025-04-04 PROCEDURE — 63600175 PHARM REV CODE 636 W HCPCS: Mod: TB | Performed by: INTERNAL MEDICINE

## 2025-04-04 PROCEDURE — 84100 ASSAY OF PHOSPHORUS: CPT | Performed by: STUDENT IN AN ORGANIZED HEALTH CARE EDUCATION/TRAINING PROGRAM

## 2025-04-04 PROCEDURE — 27000221 HC OXYGEN, UP TO 24 HOURS

## 2025-04-04 PROCEDURE — 36415 COLL VENOUS BLD VENIPUNCTURE: CPT | Performed by: STUDENT IN AN ORGANIZED HEALTH CARE EDUCATION/TRAINING PROGRAM

## 2025-04-04 PROCEDURE — 99900035 HC TECH TIME PER 15 MIN (STAT)

## 2025-04-04 PROCEDURE — 20600001 HC STEP DOWN PRIVATE ROOM

## 2025-04-04 PROCEDURE — 83735 ASSAY OF MAGNESIUM: CPT | Performed by: STUDENT IN AN ORGANIZED HEALTH CARE EDUCATION/TRAINING PROGRAM

## 2025-04-04 PROCEDURE — 94640 AIRWAY INHALATION TREATMENT: CPT

## 2025-04-04 PROCEDURE — 80053 COMPREHEN METABOLIC PANEL: CPT | Performed by: STUDENT IN AN ORGANIZED HEALTH CARE EDUCATION/TRAINING PROGRAM

## 2025-04-04 PROCEDURE — 25000003 PHARM REV CODE 250: Performed by: STUDENT IN AN ORGANIZED HEALTH CARE EDUCATION/TRAINING PROGRAM

## 2025-04-04 PROCEDURE — 85025 COMPLETE CBC W/AUTO DIFF WBC: CPT | Performed by: STUDENT IN AN ORGANIZED HEALTH CARE EDUCATION/TRAINING PROGRAM

## 2025-04-04 PROCEDURE — 25000003 PHARM REV CODE 250: Performed by: INTERNAL MEDICINE

## 2025-04-04 PROCEDURE — 99233 SBSQ HOSP IP/OBS HIGH 50: CPT | Mod: ,,, | Performed by: PHYSICIAN ASSISTANT

## 2025-04-04 PROCEDURE — 25000242 PHARM REV CODE 250 ALT 637 W/ HCPCS: Performed by: STUDENT IN AN ORGANIZED HEALTH CARE EDUCATION/TRAINING PROGRAM

## 2025-04-04 PROCEDURE — 63600175 PHARM REV CODE 636 W HCPCS: Performed by: STUDENT IN AN ORGANIZED HEALTH CARE EDUCATION/TRAINING PROGRAM

## 2025-04-04 PROCEDURE — 94761 N-INVAS EAR/PLS OXIMETRY MLT: CPT

## 2025-04-04 PROCEDURE — 25000003 PHARM REV CODE 250: Performed by: NURSE PRACTITIONER

## 2025-04-04 RX ADMIN — IPRATROPIUM BROMIDE AND ALBUTEROL SULFATE 3 ML: 2.5; .5 SOLUTION RESPIRATORY (INHALATION) at 09:04

## 2025-04-04 RX ADMIN — FERROUS SULFATE TAB EC 325 MG (65 MG FE EQUIVALENT) 1 EACH: 325 (65 FE) TABLET DELAYED RESPONSE at 09:04

## 2025-04-04 RX ADMIN — PANTOPRAZOLE SODIUM 40 MG: 40 INJECTION, POWDER, FOR SOLUTION INTRAVENOUS at 09:04

## 2025-04-04 RX ADMIN — FOLIC ACID 1000 MCG: 1 TABLET ORAL at 09:04

## 2025-04-04 RX ADMIN — TREPROSTINIL 7078.5 NG/MIN: 200 INJECTION, SOLUTION INTRAVENOUS; SUBCUTANEOUS at 02:04

## 2025-04-04 RX ADMIN — AMBRISENTAN 10 MG: 5 TABLET, FILM COATED ORAL at 09:04

## 2025-04-04 RX ADMIN — TADALAFIL 40 MG: 20 TABLET ORAL at 08:04

## 2025-04-04 RX ADMIN — FLUTICASONE PROPIONATE 100 MCG: 50 SPRAY, METERED NASAL at 08:04

## 2025-04-04 RX ADMIN — TRAMADOL HYDROCHLORIDE 50 MG: 50 TABLET, COATED ORAL at 08:04

## 2025-04-04 RX ADMIN — LEVOTHYROXINE SODIUM 125 MCG: 0.12 TABLET ORAL at 05:04

## 2025-04-04 RX ADMIN — TOPIRAMATE 100 MG: 25 TABLET, FILM COATED ORAL at 09:04

## 2025-04-04 RX ADMIN — FLUTICASONE FUROATE AND VILANTEROL TRIFENATATE 1 PUFF: 200; 25 POWDER RESPIRATORY (INHALATION) at 08:04

## 2025-04-04 RX ADMIN — TOPIRAMATE 100 MG: 25 TABLET, FILM COATED ORAL at 08:04

## 2025-04-04 NOTE — PROGRESS NOTES
Discharge    SW received communication from medical team that pt is expected to discharge today.  O2 test completed and pt does not qualify for home O2.  SW met with pt to assess discharge needs.  Pt presents alone in room, AAOx4, pleasant affect. Pt verbalizes agreement with discharge date and plan.  Pt is waiting on delivery on Remodulin from [facility], no ETA yet.  Pt reports her father will pick her up tomorrow morning due to lack of ETA.  Medical team and nursing updated.  Pt denies additional needs or concerns at this time. SW remains available.

## 2025-04-04 NOTE — HOSPITAL COURSE
PHTN on triple therapy admitted with cholecystitis confirmed on US, CT, and HIDA. GEN CARR said too high risk for OR so consulted IR for tube.  Kita tube placement 3/31. [facility] consulted for choledocholithiasis management, and risk of EUS/ERCP would outweigh any benefits of removing nonobstructing  stone.  recommended reaching out if LFT's become elevated or if concern for acute cholangitis.  Post kita tube placement, she was doing well from that aspect; she is tolerating regular diet and had bowel movement . Pain control with Tramadol. She had as episode of SVT with HR in the 200's; she converted with 12mg of Adenosine. She has been in sinus since with no events on telemetry. Spoke with EP: and will not add any agents currently but will plan to add Multaq if she has another episode. Will consider event monitor as an outpatient.  We tested her for home oxygen but she did not qualify. Patient discharged to home in stable condition

## 2025-04-04 NOTE — PROGRESS NOTES
Home Oxygen Evaluation    Date Performed: 2025    1) Patient's Home O2 Sat on room air, while at rest: 91%        If O2 sats on room air at rest are 88% or below, patient qualifies. No additional testing needed. Document N/A in steps 2 and 3. If 89% or above, complete steps 2.      2) Patient's O2 Sat on room air while exercisin        If O2 sats on room air while exercising remain 89% or above patient does not qualify, no further testing needed Document N/A in step 3. If O2 sats on room air while exercising are 88% or below, continue to step 3.      3) Patient's O2 Sat while exercising on O2: 96 at 4 LPM         (Must show improvement from #2 for patients to qualify)    If O2 sats improve on oxygen, patient qualifies for portable oxygen. If not, the patient does not qualify.

## 2025-04-04 NOTE — SUBJECTIVE & OBJECTIVE
"Interval History: Patient is s/p IR kita tube placement 3/31.  Doing well from that aspect; she is tolerating regular diet and had bowel movement yesterday.  Pain control with Tramadol. She had as episode of SVT that resolved with 12 of Adenosine.  She has been in sinus since with no events on telemetry.  Spoke with EP: and will not add any agents currently but will plan to add Multaq if she has another episode.  Will consider event monitor as an outpatient.  Will discharge home today; will see if she needs home O2    Continuous Infusions:   veletri/remodulin cassette   Intravenous Continuous        veletri/remodulin tubing   Intravenous Continuous         Scheduled Meds:   ambrisentan  10 mg Oral Daily    ferrous sulfate  1 tablet Oral Daily    fluticasone furoate-vilanteroL  1 puff Inhalation QHS    fluticasone propionate  2 spray Each Nostril QHS    folic acid  1,000 mcg Oral Daily    hydrOXYzine HCL  25 mg Oral Once    levothyroxine  125 mcg Oral Before breakfast    pantoprazole  40 mg Intravenous Daily    tadalafil  40 mg Oral QHS    topiramate  100 mg Oral BID    treprostinil (REMODULIN) 12,000,000 ng in 0.9% NaCl 100 mL infusion  117 ng/kg/min (Order-Specific) Intravenous Q24H     PRN Meds:  Current Facility-Administered Medications:     acetaminophen, 650 mg, Oral, Q6H PRN    albuterol-ipratropium, 3 mL, Nebulization, Q6H PRN    diphenhydrAMINE, 50 mg, Oral, Q6H PRN    ondansetron, 8 mg, Oral, Q8H PRN    ondansetron, 4 mg, Intravenous, Q8H PRN    polyethylene glycol, 17 g, Oral, BID PRN    sodium chloride 0.9%, 10 mL, Intravenous, PRN    traMADoL, 50 mg, Oral, Q6H PRN    Review of patient's allergies indicates:   Allergen Reactions    Fentanyl Anaphylaxis     Respiratory distress    Vibra-tabs [doxycycline hyclate] Anaphylaxis     "throat felt like it was closing"    Adhesive Hives     Silk tape    Amoxicillin Rash    Nsaids (non-steroidal anti-inflammatory drug) Swelling    Chlorhexidine Other (See " "Comments)     Blue Chlorhexidine causes hives     Objective:     Vital Signs (Most Recent):  Temp: 98.7 °F (37.1 °C) (04/04/25 0812)  Pulse: 83 (04/04/25 0812)  Resp: 18 (04/04/25 0537)  BP: (!) 111/58 (04/04/25 0812)  SpO2: 97 % (04/04/25 0812) Vital Signs (24h Range):  Temp:  [97.7 °F (36.5 °C)-98.7 °F (37.1 °C)] 98.7 °F (37.1 °C)  Pulse:  [] 83  Resp:  [13-41] 18  SpO2:  [90 %-100 %] 97 %  BP: ()/(51-77) 111/58     Patient Vitals for the past 72 hrs (Last 3 readings):   Weight   04/03/25 0328 64 kg (141 lb 1.5 oz)     Body mass index is 28.5 kg/m².      Intake/Output Summary (Last 24 hours) at 4/4/2025 0946  Last data filed at 4/4/2025 0539  Gross per 24 hour   Intake 180 ml   Output 880 ml   Net -700 ml        Physical Exam  Vitals and nursing note reviewed.   Constitutional:       Appearance: She is well-developed.   HENT:      Head: Normocephalic.   Eyes:      Pupils: Pupils are equal, round, and reactive to light.   Cardiovascular:      Rate and Rhythm: Normal rate and regular rhythm.   Pulmonary:      Effort: Pulmonary effort is normal.      Breath sounds: Normal breath sounds.   Abdominal:      General: Bowel sounds are normal.      Palpations: Abdomen is soft.      Tenderness: There is abdominal tenderness.      Comments: Sue tube in place    Musculoskeletal:         General: Normal range of motion.      Cervical back: Normal range of motion and neck supple.   Skin:     General: Skin is warm and dry.   Neurological:      Mental Status: She is alert and oriented to person, place, and time.   Psychiatric:         Behavior: Behavior normal.            Significant Labs:  CBC:  Recent Labs   Lab 04/02/25  1346 04/03/25  0321 04/04/25  0619   WBC 9.49 8.47 5.86   RBC 4.36 4.41 4.20   HGB 13.3 13.1 12.6   HCT 39.6 40.3 38.0   PLT 95* 94* 107*   MCV 91 91 91   MCH 30.5 29.7 30.0   MCHC 33.6 32.5 33.2     BNP:  No results for input(s): "BNP" in the last 168 hours.    Invalid input(s): " ""BNPTRIAGELBLO"  CMP:  Recent Labs   Lab 04/02/25  1346 04/03/25  0321 04/04/25  0619   CALCIUM 8.2* 8.3* 8.4*   ALBUMIN 2.6* 2.7* 2.7*   * 133* 136   K 3.4* 4.6 3.6   CO2 21* 18* 21*    109 109   BUN 10 11 7   CREATININE 0.6 0.6 0.6   ALKPHOS 82 79 71   ALT 11 10 7*   AST 20 15 10*   BILITOT 0.3 0.2 0.2      Coagulation:   Recent Labs   Lab 03/30/25  1334 03/31/25  0845 04/02/25  1346   INR 1.1 1.1 1.2   APTT  --   --  30.3     LDH:  No results for input(s): "LDH" in the last 72 hours.  Microbiology:  Microbiology Results (last 7 days)       Procedure Component Value Units Date/Time    Blood culture #2 **CANNOT BE ORDERED STAT** [[id number]]  (Normal) Collected: 03/30/25 1336    Order Status: Completed Specimen: Blood from Peripheral, Antecubital, Left Updated: 04/03/25 1501     Blood Culture No Growth After 96 hours    Blood culture #1 **CANNOT BE ORDERED STAT** [[id number]]  (Normal) Collected: 03/30/25 1336    Order Status: Completed Specimen: Blood from Peripheral, Antecubital, Right Updated: 04/03/25 1501     Blood Culture No Growth After 96 hours    Urine culture [[id number]] Collected: 03/30/25 2314    Order Status: Completed Specimen: Urine, Clean Catch Updated: 04/01/25 0937     Urine Culture No Growth          I have reviewed all pertinent labs within the past 24 hours.    Estimated Creatinine Clearance: 92.9 mL/min (based on SCr of 0.6 mg/dL).    Diagnostic Results:  I have reviewed all pertinent imaging results/findings within the past 24 hours.  "

## 2025-04-04 NOTE — PLAN OF CARE
Patient is medically ready  for discharge  Patient does not qualify for Oxygen at this time, sats did not drop below 89% on RA with activity     Patient needs Home Remdoulin emergency kit brought to hospital in order to mix HOME Remodulin cassette for discharge    Patient having difficulties locating it via phone with family    Patient is active with [facility] (Pre-filled cassettes)    PH Rn looking into of [facility] can deliver IV Remdoulin vial (2.5mg/ml) /kit to hospital for discharge mixing purposes

## 2025-04-04 NOTE — PLAN OF CARE
SICU PLAN OF CARE    Dx: Cholecystitis    Goals of Care: Monitor cardiac and respiratory status    Vital Signs (last 12 hours):   Temp:  [97.7 °F (36.5 °C)-98 °F (36.7 °C)]   Pulse:  []   Resp:  [13-36]   BP: ()/(51-78)   SpO2:  [90 %-99 %]      Neuro: AAOx4, Follows commands , and Moves all extremities spontaneously     Cardiac: sinus tachycardia    Respiratory:  3L Nasal Cannula     Gtts: Remodulin    Urine Output: multiple liquid bm and unmeasured UOP occurrences    Drains:  biliary tube, total output 30mL/shift    Diet: Regular, 2000cc FR    Skin:  No new breakdown noted.  Patient turned q2h, bony prominences protected, and mattress inflated/working correctly.     Shift Events:  Pt stepped down from SICU to TSU [id number]. Transferred via wheelchair by RN on 3L NC. Extra pump transferred with patient medications and all patient belongings. Chart at bedside. Connected to tele box and called to admit. RN at bedside on arrival, all questions answered.  See flowsheet for further assessment/details.  Family updated on current condition/plan of care, questions answered, and emotional support provided.  MD updated on current condition, vitals, labs, and gtts.

## 2025-04-04 NOTE — TELEPHONE ENCOUNTER
Patient called regarding the scheduled labs and u/s appointment on 4/7/2025.Pt stated that she is currently admitted to the hospital and feels weak.I reschedule the patient procedure to 1 week prior ,on 6/24/2025.The pt verbalized understanding.

## 2025-04-04 NOTE — PLAN OF CARE
Home Ox testing documentation   Ceferino Douglass, RN   Registered Nurse  Med/Surg     Progress Notes      Signed     Creation Time: 2025 11:53 AM        Home Oxygen Evaluation     Date Performed: 2025     1)Patient's Home O2 Sat on room air, while at rest: 91%                               If O2 sats on room air at rest are 88% or below, patient qualifies. No additional testing needed. Document N/A in steps 2 and 3. If 89% or above, complete steps 2.        2)Patient's O2 Sat on room air while exercisin                               If O2 sats on room air while exercising remain 89% or above patient does not qualify, no further testing needed Document N/A in step 3. If O2 sats on room air while exercising are 88% or below, continue to step 3.        3)Patient's O2 Sat while exercising on O2: 96 at 4 LPM                                           (Must show improvement from #2 for patients to qualify)     If O2 sats improve on oxygen, patient qualifies for portable oxygen. If not, the patient does not qualify.

## 2025-04-04 NOTE — PROGRESS NOTES
[facility] Transplant Stepdown  Heart Transplant  Progress Note    Patient Name: Yuni Perez  MRN: [id number]  Admission Date: 3/30/2025  Hospital Length of Stay: 5 days  Attending Physician: Doyle De Dios MD  Primary Care Provider: Lulu Sandhu MD  Principal Problem:Cholecystitis    Subjective:   Interval History: Patient is s/p IR kita tube placement 3/31.  Doing well from that aspect; she is tolerating regular diet and had bowel movement yesterday.  Pain control with Tramadol. She had as episode of SVT that resolved with 12 of Adenosine.  She has been in sinus since with no events on telemetry.  Spoke with EP: and will not add any agents currently but will plan to add Multaq if she has another episode.  Will consider event monitor as an outpatient.  Will discharge home today; will see if she needs home O2    Continuous Infusions:   veletri/remodulin cassette   Intravenous Continuous        veletri/remodulin tubing   Intravenous Continuous         Scheduled Meds:   ambrisentan  10 mg Oral Daily    ferrous sulfate  1 tablet Oral Daily    fluticasone furoate-vilanteroL  1 puff Inhalation QHS    fluticasone propionate  2 spray Each Nostril QHS    folic acid  1,000 mcg Oral Daily    hydrOXYzine HCL  25 mg Oral Once    levothyroxine  125 mcg Oral Before breakfast    pantoprazole  40 mg Intravenous Daily    tadalafil  40 mg Oral QHS    topiramate  100 mg Oral BID    treprostinil (REMODULIN) 12,000,000 ng in 0.9% NaCl 100 mL infusion  117 ng/kg/min (Order-Specific) Intravenous Q24H     PRN Meds:  Current Facility-Administered Medications:     acetaminophen, 650 mg, Oral, Q6H PRN    albuterol-ipratropium, 3 mL, Nebulization, Q6H PRN    diphenhydrAMINE, 50 mg, Oral, Q6H PRN    ondansetron, 8 mg, Oral, Q8H PRN    ondansetron, 4 mg, Intravenous, Q8H PRN    polyethylene glycol, 17 g, Oral, BID PRN    sodium chloride 0.9%, 10 mL, Intravenous, PRN    traMADoL, 50 mg, Oral, Q6H PRN    Review of patient's  "allergies indicates:   Allergen Reactions    Fentanyl Anaphylaxis     Respiratory distress    Vibra-tabs [doxycycline hyclate] Anaphylaxis     "throat felt like it was closing"    Adhesive Hives     Silk tape    Amoxicillin Rash    Nsaids (non-steroidal anti-inflammatory drug) Swelling    Chlorhexidine Other (See Comments)     Blue Chlorhexidine causes hives     Objective:     Vital Signs (Most Recent):  Temp: 98.7 °F (37.1 °C) (04/04/25 0812)  Pulse: 83 (04/04/25 0812)  Resp: 18 (04/04/25 0537)  BP: (!) 111/58 (04/04/25 0812)  SpO2: 97 % (04/04/25 0812) Vital Signs (24h Range):  Temp:  [97.7 °F (36.5 °C)-98.7 °F (37.1 °C)] 98.7 °F (37.1 °C)  Pulse:  [] 83  Resp:  [13-41] 18  SpO2:  [90 %-100 %] 97 %  BP: ()/(51-77) 111/58     Patient Vitals for the past 72 hrs (Last 3 readings):   Weight   04/03/25 0328 64 kg (141 lb 1.5 oz)     Body mass index is 28.5 kg/m².      Intake/Output Summary (Last 24 hours) at 4/4/2025 0946  Last data filed at 4/4/2025 0539  Gross per 24 hour   Intake 180 ml   Output 880 ml   Net -700 ml        Physical Exam  Vitals and nursing note reviewed.   Constitutional:       Appearance: She is well-developed.   HENT:      Head: Normocephalic.   Eyes:      Pupils: Pupils are equal, round, and reactive to light.   Cardiovascular:      Rate and Rhythm: Normal rate and regular rhythm.   Pulmonary:      Effort: Pulmonary effort is normal.      Breath sounds: Normal breath sounds.   Abdominal:      General: Bowel sounds are normal.      Palpations: Abdomen is soft.      Tenderness: There is abdominal tenderness.      Comments: Sue tube in place    Musculoskeletal:         General: Normal range of motion.      Cervical back: Normal range of motion and neck supple.   Skin:     General: Skin is warm and dry.   Neurological:      Mental Status: She is alert and oriented to person, place, and time.   Psychiatric:         Behavior: Behavior normal.            Significant Labs:  CBC:  Recent " "Labs   Lab 04/02/25  1346 04/03/25  0321 04/04/25  0619   WBC 9.49 8.47 5.86   RBC 4.36 4.41 4.20   HGB 13.3 13.1 12.6   HCT 39.6 40.3 38.0   PLT 95* 94* 107*   MCV 91 91 91   MCH 30.5 29.7 30.0   MCHC 33.6 32.5 33.2     BNP:  No results for input(s): "BNP" in the last 168 hours.    Invalid input(s): "BNPTRIAGELBLO"  CMP:  Recent Labs   Lab 04/02/25  1346 04/03/25  0321 04/04/25  0619   CALCIUM 8.2* 8.3* 8.4*   ALBUMIN 2.6* 2.7* 2.7*   * 133* 136   K 3.4* 4.6 3.6   CO2 21* 18* 21*    109 109   BUN 10 11 7   CREATININE 0.6 0.6 0.6   ALKPHOS 82 79 71   ALT 11 10 7*   AST 20 15 10*   BILITOT 0.3 0.2 0.2      Coagulation:   Recent Labs   Lab 03/30/25  1334 03/31/25  0845 04/02/25  1346   INR 1.1 1.1 1.2   APTT  --   --  30.3     LDH:  No results for input(s): "LDH" in the last 72 hours.  Microbiology:  Microbiology Results (last 7 days)       Procedure Component Value Units Date/Time    Blood culture #2 **CANNOT BE ORDERED STAT** [[id number]]  (Normal) Collected: 03/30/25 1336    Order Status: Completed Specimen: Blood from Peripheral, Antecubital, Left Updated: 04/03/25 1501     Blood Culture No Growth After 96 hours    Blood culture #1 **CANNOT BE ORDERED STAT** [[id number]]  (Normal) Collected: 03/30/25 1336    Order Status: Completed Specimen: Blood from Peripheral, Antecubital, Right Updated: 04/03/25 1501     Blood Culture No Growth After 96 hours    Urine culture [[id number]] Collected: 03/30/25 2314    Order Status: Completed Specimen: Urine, Clean Catch Updated: 04/01/25 0937     Urine Culture No Growth          I have reviewed all pertinent labs within the past 24 hours.    Estimated Creatinine Clearance: 92.9 mL/min (based on SCr of 0.6 mg/dL).    Diagnostic Results:  I have reviewed all pertinent imaging results/findings within the past 24 hours.  Assessment and Plan:     Mrs. Perez is a 55 y.o. W has PAH and is currently on triple therapy who presented to the ED with abdominal pain and n/v. " Patient was diagnosed with IPAH in 2009 and started on Remodulin, Letairis, and Adcirca. She has a history of ABHIJEET (untreated - not able to tolerate CPAP 2/2 severe congestion), GERD, and Asthma. Current therapy is Remodulin infusing at 80ng/kg/min (pre-filled cassettes), [id number] qdaily, and tadalafil 40mg qdaily. Has T/F Adempas. Patient denies chest pain, chest pressure, syncope, pre-syncope, lightheadedness, dizziness, PND, orthopnea, or LE edema.        CT Abd/pelvi 3/30/25:     1. The gallbladder is distended noting cholelithiasis, and suspected wall thickening/induration about the gallbladder walls.  There is distal choledocholithiasis.  Evaluation with ultrasound is recommended as developing acute cholecystitis is a consideration.  2. Liquid content within a few distal small bowel loops and colon.  Correlation with any history of diarrheal illness.  3. Stable pulmonary nodules, please see CT 02/27/2023.  4. Hepatomegaly, correlation with LFTs recommended.      TTE 12/16/25      Left Ventricle: The left ventricle is normal in size. Normal wall thickness. Normal wall motion. Septal flattening in systole consistent with right ventricular pressure overload. There is normal systolic function with a visually estimated ejection fraction of 60 - 65%. There is normal diastolic function.    Right Ventricle: Moderate right ventricular enlargement with hypertrophy. Systolic function is mildly to moderately reduced.    The right atrium is mildly dilated.    Tricuspid Valve: There is mild regurgitation.    Pulmonary Artery: There is moderate to severe pulmonary hypertension. The estimated pulmonary artery systolic pressure is 67 mmHg.    IVC/SVC: Normal venous pressure at 3 mmHg.       * Cholecystitis  -hx of kita tube that had been removed  -CT scan with distended and thickened gallbladder with multiple stone and choledocolithiasis.  -HIDA scan with Scintigraphic evidence of cystic duct obstruction and acute  cholecystitis   -Given Pulm HTN: high risk for surgery  -Sue tube placed 4/1  -Per general surgery: no further need for abx  -Patient tolerating regular diet   -[facility] consulted for choledocholithiasis     Cholelithiasis  -CT Abd/pelvi 3/30/25: 1. The gallbladder is distended noting cholelithiasis, and suspected wall thickening/induration about the gallbladder walls.  There is distal choledocholithiasis.  Evaluation with ultrasound is recommended as developing acute cholecystitis is a consideration. 2. Liquid content within a few distal small bowel loops and colon.  Correlation with any history of diarrheal illness. 3. Stable pulmonary nodules, please see CT 02/27/2023.4. Hepatomegaly, correlation with LFTs recommended.  - concerns for cholelithiasis and possible choledocholithiasis.   -[facility] consulted for Choledocholithiasis management: Due to severe pulmonary HTN, risks of EUS/ERCP would outweigh any benefits of removing nonobstructing stone at this time. Discussed with primary team. Please reach out if LFTs become elevated or if there is concern for acute cholangitis (fever, jaundice, worsening RUQ pain, ductal dilatation on imaging).           SVT (supraventricular tachycardia)  -Patient has SVT with HR in the 200's   -Converted with 12mg of Adenosine  -No further events on telemetry  -If re occurs then will consider Multaq  -Will consider event monitor on discharge     WHO group 1 pulmonary arterial hypertension  -Patient was diagnosed with IPAH in 2009 and started on Remodulin, Letairis, and Adcirca. She has a history of ABHIJEET (untreated - not able to tolerate CPAP 2/2 severe congestion), GERD, and Asthma. Not interested in lung transplant.   - continue triple therapy w/ Remodulin infusing at 80ng/kg/min (pre-filled cassettes), [id number] qdaily, and tadalafil 40mg qhs. Admitting to PH floor.   -Evaluating if she needs home O2      Hypothyroidism  (acquired)  -resuming home synthroid    Mild intermittent asthma  -continue home inhalers    Chronic tension-type headache, not intractable  -continue home medications    Allergic asthma  -continue home meds        GUMARO Frazier  Heart Transplant  [facility] - Transplant Stepdown

## 2025-04-04 NOTE — PLAN OF CARE
-AAOx4, VSS, afebrile, on 4L NC   -Oxygen test done, O2 sat stayed at 89 or above  -Home Remodulin delivered to room  -Plan to discharge in the AM  -No complaints at this time  -No falls reported this shift  -Bed low and locked, call light in reach      Problem: Adult Inpatient Plan of Care  Goal: Plan of Care Review  Outcome: Progressing  Goal: Optimal Comfort and Wellbeing  Outcome: Progressing  Goal: Readiness for Transition of Care  Outcome: Progressing     Problem: Skin Injury Risk Increased  Goal: Skin Health and Integrity  Outcome: Progressing     Problem: Pain Acute  Goal: Optimal Pain Control and Function  Outcome: Progressing

## 2025-04-04 NOTE — ASSESSMENT & PLAN NOTE
-Patient was diagnosed with IPAH in 2009 and started on Remodulin, Letairis, and Adcirca. She has a history of ABHIJEET (untreated - not able to tolerate CPAP 2/2 severe congestion), GERD, and Asthma. Not interested in lung transplant.   - continue triple therapy w/ Remodulin infusing at 80ng/kg/min (pre-filled cassettes), [id number] qdaily, and tadalafil 40mg qhs. Admitting to PH floor.   -Evaluating if she needs home O2

## 2025-04-04 NOTE — PLAN OF CARE
Pt Aaox4, VSS on 4L NC, pt cannot tolerate titrating oxygen due to becoming SOB when ambulatory. NS-tachy on tele. Pt with RLQ kita tube---tube draining ss thick output, see flowsheet. Pt with audible wheezing, PRN respiatory treatment administered with relief. Tramadol administered x1 for abdominal pain. Pt with Remodulin infusing @ 85mL/24hrs via R chest port. Pt up to BSC with 1x assist, voiding yellow urine and had 2 BM overnight. Bed in lowest locked position, gus light within reach, pt remains free from falls/injury. POC ongoing.

## 2025-04-04 NOTE — TELEPHONE ENCOUNTER
----- Message from Candace sent at 4/4/2025  9:44 AM CDT -----  Regarding: Consult/Advisory  Contact: Yuni  Consult/Advisory Name Of Caller:Yuni Perez  Contact Preference: [phone] ( hospital phone), requesting a call back.[phone] (home)  Nature of call:pt is calling in regards to an US she has scheduled for 04/07/2025,  because  she is currently in the  hospital, would like to reschedule if possible, states she is too weak.

## 2025-04-05 PROCEDURE — 99900035 HC TECH TIME PER 15 MIN (STAT)

## 2025-04-05 PROCEDURE — 27000221 HC OXYGEN, UP TO 24 HOURS

## 2025-04-05 PROCEDURE — 25000003 PHARM REV CODE 250: Performed by: NURSE PRACTITIONER

## 2025-04-05 PROCEDURE — 25000003 PHARM REV CODE 250: Performed by: STUDENT IN AN ORGANIZED HEALTH CARE EDUCATION/TRAINING PROGRAM

## 2025-04-05 PROCEDURE — 94761 N-INVAS EAR/PLS OXIMETRY MLT: CPT

## 2025-04-05 PROCEDURE — 63600175 PHARM REV CODE 636 W HCPCS: Performed by: NURSE PRACTITIONER

## 2025-04-05 PROCEDURE — 25000242 PHARM REV CODE 250 ALT 637 W/ HCPCS: Performed by: STUDENT IN AN ORGANIZED HEALTH CARE EDUCATION/TRAINING PROGRAM

## 2025-04-05 PROCEDURE — 20600001 HC STEP DOWN PRIVATE ROOM

## 2025-04-05 PROCEDURE — 63600175 PHARM REV CODE 636 W HCPCS: Mod: TB | Performed by: INTERNAL MEDICINE

## 2025-04-05 PROCEDURE — 94640 AIRWAY INHALATION TREATMENT: CPT

## 2025-04-05 PROCEDURE — 25000003 PHARM REV CODE 250: Performed by: INTERNAL MEDICINE

## 2025-04-05 PROCEDURE — 63600175 PHARM REV CODE 636 W HCPCS: Performed by: STUDENT IN AN ORGANIZED HEALTH CARE EDUCATION/TRAINING PROGRAM

## 2025-04-05 RX ORDER — POTASSIUM CHLORIDE 20 MEQ/1
40 TABLET, EXTENDED RELEASE ORAL ONCE
Status: COMPLETED | OUTPATIENT
Start: 2025-04-05 | End: 2025-04-05

## 2025-04-05 RX ORDER — FUROSEMIDE 10 MG/ML
60 INJECTION INTRAMUSCULAR; INTRAVENOUS ONCE
Status: COMPLETED | OUTPATIENT
Start: 2025-04-05 | End: 2025-04-05

## 2025-04-05 RX ADMIN — FLUTICASONE FUROATE AND VILANTEROL TRIFENATATE 1 PUFF: 200; 25 POWDER RESPIRATORY (INHALATION) at 08:04

## 2025-04-05 RX ADMIN — FUROSEMIDE 60 MG: 10 INJECTION, SOLUTION INTRAMUSCULAR; INTRAVENOUS at 09:04

## 2025-04-05 RX ADMIN — PANTOPRAZOLE SODIUM 40 MG: 40 INJECTION, POWDER, FOR SOLUTION INTRAVENOUS at 09:04

## 2025-04-05 RX ADMIN — LEVOTHYROXINE SODIUM 125 MCG: 0.12 TABLET ORAL at 05:04

## 2025-04-05 RX ADMIN — AMBRISENTAN 10 MG: 5 TABLET, FILM COATED ORAL at 09:04

## 2025-04-05 RX ADMIN — FOLIC ACID 1000 MCG: 1 TABLET ORAL at 09:04

## 2025-04-05 RX ADMIN — TOPIRAMATE 100 MG: 25 TABLET, FILM COATED ORAL at 08:04

## 2025-04-05 RX ADMIN — TOPIRAMATE 100 MG: 25 TABLET, FILM COATED ORAL at 09:04

## 2025-04-05 RX ADMIN — IPRATROPIUM BROMIDE AND ALBUTEROL SULFATE 3 ML: 2.5; .5 SOLUTION RESPIRATORY (INHALATION) at 10:04

## 2025-04-05 RX ADMIN — TRAMADOL HYDROCHLORIDE 50 MG: 50 TABLET, COATED ORAL at 08:04

## 2025-04-05 RX ADMIN — POTASSIUM CHLORIDE 40 MEQ: 1500 TABLET, EXTENDED RELEASE ORAL at 09:04

## 2025-04-05 RX ADMIN — FLUTICASONE PROPIONATE 100 MCG: 50 SPRAY, METERED NASAL at 08:04

## 2025-04-05 RX ADMIN — TADALAFIL 40 MG: 20 TABLET ORAL at 08:04

## 2025-04-05 RX ADMIN — TREPROSTINIL 7078.5 NG/MIN: 200 INJECTION, SOLUTION INTRAVENOUS; SUBCUTANEOUS at 03:04

## 2025-04-05 RX ADMIN — FERROUS SULFATE TAB EC 325 MG (65 MG FE EQUIVALENT) 1 EACH: 325 (65 FE) TABLET DELAYED RESPONSE at 09:04

## 2025-04-05 NOTE — PROGRESS NOTES
"[facility] Step[facility]  Heart Transplant  Progress Note    Patient Name: Yuni Perez  MRN: [id number]  Admission Date: 3/30/2025  Hospital Length of Stay: 6 days  Attending Physician: Doyle De Dios MD  Primary Care Provider: Lulu Sandhu MD  Principal Problem:Cholecystitis    Subjective:   Interval History: Patient is s/p IR kita tube placement 3/31.  NAOEN noted. Does feel bloated this am.     Continuous Infusions:   veletri/remodulin cassette   Intravenous Continuous        veletri/remodulin tubing   Intravenous Continuous         Scheduled Meds:   ambrisentan  10 mg Oral Daily    ferrous sulfate  1 tablet Oral Daily    fluticasone furoate-vilanteroL  1 puff Inhalation QHS    fluticasone propionate  2 spray Each Nostril QHS    folic acid  1,000 mcg Oral Daily    hydrOXYzine HCL  25 mg Oral Once    levothyroxine  125 mcg Oral Before breakfast    pantoprazole  40 mg Intravenous Daily    tadalafil  40 mg Oral QHS    topiramate  100 mg Oral BID    treprostinil (REMODULIN) 12,000,000 ng in 0.9% NaCl 100 mL infusion  117 ng/kg/min (Order-Specific) Intravenous Q24H     PRN Meds:  Current Facility-Administered Medications:     acetaminophen, 650 mg, Oral, Q6H PRN    albuterol-ipratropium, 3 mL, Nebulization, Q6H PRN    diphenhydrAMINE, 50 mg, Oral, Q6H PRN    ondansetron, 8 mg, Oral, Q8H PRN    ondansetron, 4 mg, Intravenous, Q8H PRN    polyethylene glycol, 17 g, Oral, BID PRN    sodium chloride 0.9%, 10 mL, Intravenous, PRN    traMADoL, 50 mg, Oral, Q6H PRN    Review of patient's allergies indicates:   Allergen Reactions    Fentanyl Anaphylaxis     Respiratory distress    Vibra-tabs [doxycycline hyclate] Anaphylaxis     "throat felt like it was closing"    Adhesive Hives     Silk tape    Amoxicillin Rash    Nsaids (non-steroidal anti-inflammatory drug) Swelling    Chlorhexidine Other (See Comments)     Blue Chlorhexidine causes hives     Objective:     Vital Signs (Most Recent):  Temp: 97.7 °F " "(36.5 °C) (04/05/25 0721)  Pulse: 90 (04/05/25 0721)  Resp: 19 (04/05/25 0721)  BP: 106/72 (04/05/25 0721)  SpO2: (!) 93 % (04/05/25 0721) Vital Signs (24h Range):  Temp:  [97.4 °F (36.3 °C)-98.1 °F (36.7 °C)] 97.7 °F (36.5 °C)  Pulse:  [] 90  Resp:  [16-19] 19  SpO2:  [89 %-99 %] 93 %  BP: ()/(59-76) 106/72     Patient Vitals for the past 72 hrs (Last 3 readings):   Weight   04/03/25 0328 64 kg (141 lb 1.5 oz)     Body mass index is 28.5 kg/m².      Intake/Output Summary (Last 24 hours) at 4/5/2025 1031  Last data filed at 4/5/2025 0543  Gross per 24 hour   Intake 460 ml   Output 60 ml   Net 400 ml        Physical Exam  Vitals and nursing note reviewed.   Constitutional:       Appearance: She is well-developed.   HENT:      Head: Normocephalic.   Eyes:      Pupils: Pupils are equal, round, and reactive to light.   Cardiovascular:      Rate and Rhythm: Normal rate and regular rhythm.   Pulmonary:      Effort: Pulmonary effort is normal.      Breath sounds: Normal breath sounds.   Abdominal:      General: Bowel sounds are normal.      Palpations: Abdomen is soft.      Tenderness: There is abdominal tenderness.      Comments: Sue tube in place    Musculoskeletal:         General: Normal range of motion.      Cervical back: Normal range of motion and neck supple.   Skin:     General: Skin is warm and dry.   Neurological:      Mental Status: She is alert and oriented to person, place, and time.   Psychiatric:         Behavior: Behavior normal.            Significant Labs:  CBC:  Recent Labs   Lab 04/02/25  1346 04/03/25  0321 04/04/25  0619   WBC 9.49 8.47 5.86   RBC 4.36 4.41 4.20   HGB 13.3 13.1 12.6   HCT 39.6 40.3 38.0   PLT 95* 94* 107*   MCV 91 91 91   MCH 30.5 29.7 30.0   MCHC 33.6 32.5 33.2     BNP:  No results for input(s): "BNP" in the last 168 hours.    Invalid input(s): "BNPTRIAGELBLO"  CMP:  Recent Labs   Lab 04/02/25  1346 04/03/25  0321 04/04/25  0619   CALCIUM 8.2* 8.3* 8.4*   ALBUMIN 2.6* " "2.7* 2.7*   * 133* 136   K 3.4* 4.6 3.6   CO2 21* 18* 21*    109 109   BUN 10 11 7   CREATININE 0.6 0.6 0.6   ALKPHOS 82 79 71   ALT 11 10 7*   AST 20 15 10*   BILITOT 0.3 0.2 0.2      Coagulation:   Recent Labs   Lab 03/30/25  1334 03/31/25  0845 04/02/25  1346   INR 1.1 1.1 1.2   APTT  --   --  30.3     LDH:  No results for input(s): "LDH" in the last 72 hours.  Microbiology:  Microbiology Results (last 7 days)       Procedure Component Value Units Date/Time    Blood culture #1 **CANNOT BE ORDERED STAT** [[id number]]  (Normal) Collected: 03/30/25 1336    Order Status: Completed Specimen: Blood from Peripheral, Antecubital, Right Updated: 04/04/25 1502     Blood Culture No Growth After 5 Days    Blood culture #2 **CANNOT BE ORDERED STAT** [[id number]]  (Normal) Collected: 03/30/25 1336    Order Status: Completed Specimen: Blood from Peripheral, Antecubital, Left Updated: 04/04/25 1502     Blood Culture No Growth After 5 Days    Urine culture [[id number]] Collected: 03/30/25 2314    Order Status: Completed Specimen: Urine, Clean Catch Updated: 04/01/25 0937     Urine Culture No Growth          I have reviewed all pertinent labs within the past 24 hours.    Estimated Creatinine Clearance: 92.9 mL/min (based on SCr of 0.6 mg/dL).    Diagnostic Results:  I have reviewed all pertinent imaging results/findings within the past 24 hours.  Assessment and Plan:     Mrs. Perez is a 55 y.o. W has PAH and is currently on triple therapy who presented to the ED with abdominal pain and n/v. Patient was diagnosed with IPAH in 2009 and started on Remodulin, Letairis, and Adcirca. She has a history of ABHIJEET (untreated - not able to tolerate CPAP 2/2 severe congestion), GERD, and Asthma. Current therapy is Remodulin infusing at 80ng/kg/min (pre-filled cassettes), [id number] qdaily, and tadalafil 40mg qdaily. Has T/F Adempas. Patient denies chest pain, chest pressure, syncope, pre-syncope, lightheadedness, dizziness, " PND, orthopnea, or LE edema.        CT Abd/pelvi 3/30/25:     1. The gallbladder is distended noting cholelithiasis, and suspected wall thickening/induration about the gallbladder walls.  There is distal choledocholithiasis.  Evaluation with ultrasound is recommended as developing acute cholecystitis is a consideration.  2. Liquid content within a few distal small bowel loops and colon.  Correlation with any history of diarrheal illness.  3. Stable pulmonary nodules, please see CT 02/27/2023.  4. Hepatomegaly, correlation with LFTs recommended.      TTE 12/16/25      Left Ventricle: The left ventricle is normal in size. Normal wall thickness. Normal wall motion. Septal flattening in systole consistent with right ventricular pressure overload. There is normal systolic function with a visually estimated ejection fraction of 60 - 65%. There is normal diastolic function.    Right Ventricle: Moderate right ventricular enlargement with hypertrophy. Systolic function is mildly to moderately reduced.    The right atrium is mildly dilated.    Tricuspid Valve: There is mild regurgitation.    Pulmonary Artery: There is moderate to severe pulmonary hypertension. The estimated pulmonary artery systolic pressure is 67 mmHg.    IVC/SVC: Normal venous pressure at 3 mmHg.       * Cholecystitis  -hx of kita tube that had been removed  -CT scan with distended and thickened gallbladder with multiple stone and choledocolithiasis.  -HIDA scan with Scintigraphic evidence of cystic duct obstruction and acute cholecystitis   -Given Pulm HTN: high risk for surgery  -Kita tube placed 4/1  -Per general surgery: no further need for abx  -Patient tolerating regular diet   -[facility] consulted for choledocholithiasis     Cholelithiasis  -CT Abd/pelvi 3/30/25: 1. The gallbladder is distended noting cholelithiasis, and suspected wall thickening/induration about the gallbladder walls.  There is distal choledocholithiasis.   Evaluation with ultrasound is recommended as developing acute cholecystitis is a consideration. 2. Liquid content within a few distal small bowel loops and colon.  Correlation with any history of diarrheal illness. 3. Stable pulmonary nodules, please see CT 02/27/2023.4. Hepatomegaly, correlation with LFTs recommended.  - concerns for cholelithiasis and possible choledocholithiasis.   -[facility] consulted for Choledocholithiasis management: Due to severe pulmonary HTN, risks of EUS/ERCP would outweigh any benefits of removing nonobstructing stone at this time. Discussed with primary team. Please reach out if LFTs become elevated or if there is concern for acute cholangitis (fever, jaundice, worsening RUQ pain, ductal dilatation on imaging).           WHO group 1 pulmonary arterial hypertension  -Patient was diagnosed with IPAH in 2009 and started on Remodulin, Letairis, and Adcirca. She has a history of ABHIJEET (untreated - not able to tolerate CPAP 2/2 severe congestion), GERD, and Asthma. Not interested in lung transplant.   - continue triple therapy w/ Remodulin infusing at 80ng/kg/min (pre-filled cassettes), [id number] qdaily, and tadalafil 40mg qhs. Admitting to PH floor.   -Evaluating if she needs home O2      SVT (supraventricular tachycardia)  -Patient has SVT with HR in the 200's   -Converted with 12mg of Adenosine  -No further events on telemetry  -If re occurs then will consider Multaq  -Will consider event monitor on discharge     Mild intermittent asthma  -continue home inhalers    Chronic tension-type headache, not intractable  -continue home medications    Hypothyroidism (acquired)  -resuming home synthroid    Allergic asthma  -continue home meds        Naveen Hurtado NP  Heart Transplant  [facility]

## 2025-04-05 NOTE — PROGRESS NOTES
Update/DC Planning Note:    DEBBIE contacted by pt's nurse this afternoon to advise pt will need home oxygen.   DEBBIE contacted Margo with [facility] to coordinate home o2.      Plan at this time is for pt to dc home tomorrow.  Margo will be contacted once dc is determined, and she will deliver pt's portable tank for her dc home.  Pt to have home concentrator set up at home once she arrives home. Pt to contact [facility] to deliver concentrator.    Nursing advised pt will need ride home.  SW will arrange for ride home through [facility] once dc order is placed and home o2 is delivered.    SW noted that pt will also need home health arranged.  Orders noted.  Pt chose [facility].  [facility] contacted with referral ([place]) and referral sent through [facility] per Lour[facility]se's request.     [facility] -[phone]  [facility] -[phone]    DEBBIE remains available.

## 2025-04-05 NOTE — SUBJECTIVE & OBJECTIVE
"Interval History: Patient is s/p IR kita tube placement 3/31.  CONCHISOEKAILA noted. Does feel bloated this am.     Continuous Infusions:   veletri/remodulin cassette   Intravenous Continuous        veletri/remodulin tubing   Intravenous Continuous         Scheduled Meds:   ambrisentan  10 mg Oral Daily    ferrous sulfate  1 tablet Oral Daily    fluticasone furoate-vilanteroL  1 puff Inhalation QHS    fluticasone propionate  2 spray Each Nostril QHS    folic acid  1,000 mcg Oral Daily    hydrOXYzine HCL  25 mg Oral Once    levothyroxine  125 mcg Oral Before breakfast    pantoprazole  40 mg Intravenous Daily    tadalafil  40 mg Oral QHS    topiramate  100 mg Oral BID    treprostinil (REMODULIN) 12,000,000 ng in 0.9% NaCl 100 mL infusion  117 ng/kg/min (Order-Specific) Intravenous Q24H     PRN Meds:  Current Facility-Administered Medications:     acetaminophen, 650 mg, Oral, Q6H PRN    albuterol-ipratropium, 3 mL, Nebulization, Q6H PRN    diphenhydrAMINE, 50 mg, Oral, Q6H PRN    ondansetron, 8 mg, Oral, Q8H PRN    ondansetron, 4 mg, Intravenous, Q8H PRN    polyethylene glycol, 17 g, Oral, BID PRN    sodium chloride 0.9%, 10 mL, Intravenous, PRN    traMADoL, 50 mg, Oral, Q6H PRN    Review of patient's allergies indicates:   Allergen Reactions    Fentanyl Anaphylaxis     Respiratory distress    Vibra-tabs [doxycycline hyclate] Anaphylaxis     "throat felt like it was closing"    Adhesive Hives     Silk tape    Amoxicillin Rash    Nsaids (non-steroidal anti-inflammatory drug) Swelling    Chlorhexidine Other (See Comments)     Blue Chlorhexidine causes hives     Objective:     Vital Signs (Most Recent):  Temp: 97.7 °F (36.5 °C) (04/05/25 0721)  Pulse: 90 (04/05/25 0721)  Resp: 19 (04/05/25 0721)  BP: 106/72 (04/05/25 0721)  SpO2: (!) 93 % (04/05/25 0721) Vital Signs (24h Range):  Temp:  [97.4 °F (36.3 °C)-98.1 °F (36.7 °C)] 97.7 °F (36.5 °C)  Pulse:  [] 90  Resp:  [16-19] 19  SpO2:  [89 %-99 %] 93 %  BP: ()/(59-76) " "106/72     Patient Vitals for the past 72 hrs (Last 3 readings):   Weight   04/03/25 0328 64 kg (141 lb 1.5 oz)     Body mass index is 28.5 kg/m².      Intake/Output Summary (Last 24 hours) at 4/5/2025 1031  Last data filed at 4/5/2025 0543  Gross per 24 hour   Intake 460 ml   Output 60 ml   Net 400 ml        Physical Exam  Vitals and nursing note reviewed.   Constitutional:       Appearance: She is well-developed.   HENT:      Head: Normocephalic.   Eyes:      Pupils: Pupils are equal, round, and reactive to light.   Cardiovascular:      Rate and Rhythm: Normal rate and regular rhythm.   Pulmonary:      Effort: Pulmonary effort is normal.      Breath sounds: Normal breath sounds.   Abdominal:      General: Bowel sounds are normal.      Palpations: Abdomen is soft.      Tenderness: There is abdominal tenderness.      Comments: Sue tube in place    Musculoskeletal:         General: Normal range of motion.      Cervical back: Normal range of motion and neck supple.   Skin:     General: Skin is warm and dry.   Neurological:      Mental Status: She is alert and oriented to person, place, and time.   Psychiatric:         Behavior: Behavior normal.            Significant Labs:  CBC:  Recent Labs   Lab 04/02/25  1346 04/03/25  0321 04/04/25  0619   WBC 9.49 8.47 5.86   RBC 4.36 4.41 4.20   HGB 13.3 13.1 12.6   HCT 39.6 40.3 38.0   PLT 95* 94* 107*   MCV 91 91 91   MCH 30.5 29.7 30.0   MCHC 33.6 32.5 33.2     BNP:  No results for input(s): "BNP" in the last 168 hours.    Invalid input(s): "BNPTRIAGELBLO"  CMP:  Recent Labs   Lab 04/02/25  1346 04/03/25  0321 04/04/25  0619   CALCIUM 8.2* 8.3* 8.4*   ALBUMIN 2.6* 2.7* 2.7*   * 133* 136   K 3.4* 4.6 3.6   CO2 21* 18* 21*    109 109   BUN 10 11 7   CREATININE 0.6 0.6 0.6   ALKPHOS 82 79 71   ALT 11 10 7*   AST 20 15 10*   BILITOT 0.3 0.2 0.2      Coagulation:   Recent Labs   Lab 03/30/25  1334 03/31/25  0845 04/02/25  1346   INR 1.1 1.1 1.2   APTT  --   --  30.3 " "    LDH:  No results for input(s): "LDH" in the last 72 hours.  Microbiology:  Microbiology Results (last 7 days)       Procedure Component Value Units Date/Time    Blood culture #1 **CANNOT BE ORDERED STAT** [[id number]]  (Normal) Collected: 03/30/25 1336    Order Status: Completed Specimen: Blood from Peripheral, Antecubital, Right Updated: 04/04/25 1502     Blood Culture No Growth After 5 Days    Blood culture #2 **CANNOT BE ORDERED STAT** [[id number]]  (Normal) Collected: 03/30/25 1336    Order Status: Completed Specimen: Blood from Peripheral, Antecubital, Left Updated: 04/04/25 1502     Blood Culture No Growth After 5 Days    Urine culture [[id number]] Collected: 03/30/25 2314    Order Status: Completed Specimen: Urine, Clean Catch Updated: 04/01/25 0937     Urine Culture No Growth          I have reviewed all pertinent labs within the past 24 hours.    Estimated Creatinine Clearance: 92.9 mL/min (based on SCr of 0.6 mg/dL).    Diagnostic Results:  I have reviewed all pertinent imaging results/findings within the past 24 hours.  "

## 2025-04-05 NOTE — PROGRESS NOTES
Home Oxygen Evaluation    Date Performed: 2025    1) Patient's Home O2 Sat on room air, while at rest: 94        If O2 sats on room air at rest are 88% or below, patient qualifies. No additional testing needed. Document N/A in steps 2 and 3. If 89% or above, complete steps 2.      2) Patient's O2 Sat on room air while exercisin        If O2 sats on room air while exercising remain 89% or above patient does not qualify, no further testing needed Document N/A in step 3. If O2 sats on room air while exercising are 88% or below, continue to step 3.      3) Patient's O2 Sat while exercising on O2: 96 at 2 LPM         (Must show improvement from #2 for patients to qualify)    If O2 sats improve on oxygen, patient qualifies for portable oxygen. If not, the patient does not qualify.

## 2025-04-05 NOTE — PLAN OF CARE
[facility]   Heart Transplant/PHTN Clinic   [address] [place] [address]   [phone] after hours [phone] fax   HOME HEALTH ORDERS     Admit to Home Health   Diagnosis:Problem List[1]    Send follow up questions to [phone] or [phone].     Patient is homebound due to: PHTN S/P Sue tube    Nursing:   SN to complete comprehensive assessment including routine vital signs. Instruct on disease process and s/s of complications to report to MD. Review/verify medication list sent home with the patient at time of discharge and instruct patient/caregiver as needed. Frequency may be adjusted depending on start of care date.     Notify MD if SBP > 160 or < 90; DBP > 90 or < 50; HR > 120 or < 50; Temp > 101; Weight gain >3lbs in 1 day or 5lbs in 1 week.  Other:       LABS: SN to perform labs: Weekly bmp, mag, cbc.     HOME INFUSION THERAPY:     ** Do not flush/draw back or manipulate line at any time. Epoprostenol/Treprostinil infusion should not be interrupted for any reason**    Sue tube: Please fush w 5-10cc NS Q12H or prn(pt can perform).   Dressing boyd q3days or prn.     SN to perform Infusion Therapy/Central Line Care.   Review Central Line Care    **For questions or concerns, please call [phone] and ask for Pulmonary Hypertension clinic, M-F 8-5. After hours, weekends, call [phone] and ask for the Heart Transplant Cardiologist on call.**     -Sue Tube: To be flushed with 5-10cc NS Q12H or prn(pt van perform).    Dressing to be changed Q3 days and prn.   Send follow up questions to [phone] or [phone].           [1]   Patient Active Problem List  Diagnosis    Chronic pulmonary heart disease    Liver mass    Non-allergic vasomotor rhinitis    Allergic asthma    WHO group 1 pulmonary arterial hypertension    Tricuspid regurgitation    Hypothyroidism (acquired)    Migraine without aura and without status migrainosus, not  intractable    Lumbar disc disease    Moderate asthma    Chronic low back pain    Overweight (BMI 25.0-29.9)    Menorrhagia    ABHIJEET (obstructive sleep apnea)    Medication overuse headache    Cholelithiasis    Borderline osteopenia    Hepatic adenoma    Left shoulder pain    Chronic tension-type headache, not intractable    Mild intermittent asthma    Pasteurella cellulitis due to cat bite    Cervicogenic headache    Episodic migraine    Chronic neck pain    Decreased range of motion of neck    Posture abnormality    Cholecystitis    Moderate protein-calorie malnutrition    SVT (supraventricular tachycardia)

## 2025-04-05 NOTE — NURSING
AAOX4. Titrated O2 to 2L at beginning of shift, pt satting in mid 90s. Pt titrated to RA at around 05:30 am, O2 greater than 92%. Sue drain site CDI, draining serous fluids with red streaks; drained a total of 60 ml during shift. Bed locked and in lowest position, call light within reach.

## 2025-04-05 NOTE — PLAN OF CARE
-AAOx4, VSS, afebrile, on 2L  -1 BM  -Plan to discharge in the AM   -No complaints at this time  -No falls reported this shift  -Bed low and locked, call light in reach      Problem: Adult Inpatient Plan of Care  Goal: Plan of Care Review  Outcome: Progressing  Goal: Optimal Comfort and Wellbeing  Outcome: Progressing  Goal: Readiness for Transition of Care  Outcome: Progressing     Problem: Infection  Goal: Absence of Infection Signs and Symptoms  Outcome: Progressing     Problem: Skin Injury Risk Increased  Goal: Skin Health and Integrity  Outcome: Progressing     Problem: Pain Acute  Goal: Optimal Pain Control and Function  Outcome: Progressing

## 2025-04-06 LAB
ABSOLUTE EOSINOPHIL (OHS): 0.2 K/UL
ABSOLUTE MONOCYTE (OHS): 0.54 K/UL (ref 0.3–1)
ABSOLUTE NEUTROPHIL COUNT (OHS): 4.8 K/UL (ref 1.8–7.7)
ALBUMIN SERPL BCP-MCNC: 3.1 G/DL (ref 3.5–5.2)
ALP SERPL-CCNC: 69 UNIT/L (ref 40–150)
ALT SERPL W/O P-5'-P-CCNC: 10 UNIT/L (ref 10–44)
ANION GAP (OHS): 7 MMOL/L (ref 8–16)
AST SERPL-CCNC: 13 UNIT/L (ref 11–45)
BASOPHILS # BLD AUTO: 0.04 K/UL
BASOPHILS NFR BLD AUTO: 0.6 %
BILIRUB SERPL-MCNC: 0.3 MG/DL (ref 0.1–1)
BUN SERPL-MCNC: 9 MG/DL (ref 6–20)
CALCIUM SERPL-MCNC: 8.6 MG/DL (ref 8.7–10.5)
CHLORIDE SERPL-SCNC: 111 MMOL/L (ref 95–110)
CO2 SERPL-SCNC: 20 MMOL/L (ref 23–29)
CREAT SERPL-MCNC: 0.6 MG/DL (ref 0.5–1.4)
ERYTHROCYTE [DISTWIDTH] IN BLOOD BY AUTOMATED COUNT: 14.4 % (ref 11.5–14.5)
GFR SERPLBLD CREATININE-BSD FMLA CKD-EPI: >60 ML/MIN/1.73/M2
GLUCOSE SERPL-MCNC: 96 MG/DL (ref 70–110)
HCT VFR BLD AUTO: 41.8 % (ref 37–48.5)
HGB BLD-MCNC: 13.7 GM/DL (ref 12–16)
IMM GRANULOCYTES # BLD AUTO: 0.03 K/UL (ref 0–0.04)
IMM GRANULOCYTES NFR BLD AUTO: 0.4 % (ref 0–0.5)
LYMPHOCYTES # BLD AUTO: 1.22 K/UL (ref 1–4.8)
MCH RBC QN AUTO: 29.5 PG (ref 27–31)
MCHC RBC AUTO-ENTMCNC: 32.8 G/DL (ref 32–36)
MCV RBC AUTO: 90 FL (ref 82–98)
NUCLEATED RBC (/100WBC) (OHS): 0 /100 WBC
PLATELET # BLD AUTO: 151 K/UL (ref 150–450)
PLATELET BLD QL SMEAR: NORMAL
PMV BLD AUTO: 12.5 FL (ref 9.2–12.9)
POTASSIUM SERPL-SCNC: 3.6 MMOL/L (ref 3.5–5.1)
PROT SERPL-MCNC: 6.8 GM/DL (ref 6–8.4)
RBC # BLD AUTO: 4.65 M/UL (ref 4–5.4)
RELATIVE EOSINOPHIL (OHS): 2.9 %
RELATIVE LYMPHOCYTE (OHS): 17.9 % (ref 18–48)
RELATIVE MONOCYTE (OHS): 7.9 % (ref 4–15)
RELATIVE NEUTROPHIL (OHS): 70.3 % (ref 38–73)
SODIUM SERPL-SCNC: 138 MMOL/L (ref 136–145)
WBC # BLD AUTO: 6.83 K/UL (ref 3.9–12.7)

## 2025-04-06 PROCEDURE — 25000003 PHARM REV CODE 250: Performed by: INTERNAL MEDICINE

## 2025-04-06 PROCEDURE — 25000003 PHARM REV CODE 250: Performed by: NURSE PRACTITIONER

## 2025-04-06 PROCEDURE — 85025 COMPLETE CBC W/AUTO DIFF WBC: CPT | Performed by: NURSE PRACTITIONER

## 2025-04-06 PROCEDURE — 80053 COMPREHEN METABOLIC PANEL: CPT | Performed by: NURSE PRACTITIONER

## 2025-04-06 PROCEDURE — 99900035 HC TECH TIME PER 15 MIN (STAT)

## 2025-04-06 PROCEDURE — 94640 AIRWAY INHALATION TREATMENT: CPT

## 2025-04-06 PROCEDURE — 94761 N-INVAS EAR/PLS OXIMETRY MLT: CPT

## 2025-04-06 PROCEDURE — 63600175 PHARM REV CODE 636 W HCPCS: Mod: TB | Performed by: INTERNAL MEDICINE

## 2025-04-06 PROCEDURE — 63600175 PHARM REV CODE 636 W HCPCS: Performed by: NURSE PRACTITIONER

## 2025-04-06 PROCEDURE — 25000003 PHARM REV CODE 250: Performed by: STUDENT IN AN ORGANIZED HEALTH CARE EDUCATION/TRAINING PROGRAM

## 2025-04-06 PROCEDURE — 20600001 HC STEP DOWN PRIVATE ROOM

## 2025-04-06 PROCEDURE — 63600175 PHARM REV CODE 636 W HCPCS: Performed by: STUDENT IN AN ORGANIZED HEALTH CARE EDUCATION/TRAINING PROGRAM

## 2025-04-06 PROCEDURE — 36415 COLL VENOUS BLD VENIPUNCTURE: CPT | Performed by: NURSE PRACTITIONER

## 2025-04-06 PROCEDURE — 25000242 PHARM REV CODE 250 ALT 637 W/ HCPCS: Performed by: STUDENT IN AN ORGANIZED HEALTH CARE EDUCATION/TRAINING PROGRAM

## 2025-04-06 PROCEDURE — 27000221 HC OXYGEN, UP TO 24 HOURS

## 2025-04-06 RX ORDER — FUROSEMIDE 10 MG/ML
60 INJECTION INTRAMUSCULAR; INTRAVENOUS ONCE
Status: COMPLETED | OUTPATIENT
Start: 2025-04-06 | End: 2025-04-06

## 2025-04-06 RX ORDER — MUPIROCIN 20 MG/G
OINTMENT TOPICAL 2 TIMES DAILY
Status: DISCONTINUED | OUTPATIENT
Start: 2025-04-06 | End: 2025-04-07 | Stop reason: HOSPADM

## 2025-04-06 RX ADMIN — TADALAFIL 40 MG: 20 TABLET ORAL at 08:04

## 2025-04-06 RX ADMIN — FLUTICASONE PROPIONATE 100 MCG: 50 SPRAY, METERED NASAL at 08:04

## 2025-04-06 RX ADMIN — AMBRISENTAN 10 MG: 5 TABLET, FILM COATED ORAL at 08:04

## 2025-04-06 RX ADMIN — DIPHENHYDRAMINE HYDROCHLORIDE 50 MG: 25 CAPSULE ORAL at 10:04

## 2025-04-06 RX ADMIN — FUROSEMIDE 60 MG: 10 INJECTION, SOLUTION INTRAMUSCULAR; INTRAVENOUS at 12:04

## 2025-04-06 RX ADMIN — LEVOTHYROXINE SODIUM 125 MCG: 0.12 TABLET ORAL at 05:04

## 2025-04-06 RX ADMIN — FERROUS SULFATE TAB EC 325 MG (65 MG FE EQUIVALENT) 1 EACH: 325 (65 FE) TABLET DELAYED RESPONSE at 08:04

## 2025-04-06 RX ADMIN — TOPIRAMATE 100 MG: 25 TABLET, FILM COATED ORAL at 08:04

## 2025-04-06 RX ADMIN — FLUTICASONE FUROATE AND VILANTEROL TRIFENATATE 1 PUFF: 200; 25 POWDER RESPIRATORY (INHALATION) at 08:04

## 2025-04-06 RX ADMIN — FOLIC ACID 1000 MCG: 1 TABLET ORAL at 08:04

## 2025-04-06 RX ADMIN — MUPIROCIN: 20 OINTMENT TOPICAL at 08:04

## 2025-04-06 RX ADMIN — TREPROSTINIL 7078.5 NG/MIN: 200 INJECTION, SOLUTION INTRAVENOUS; SUBCUTANEOUS at 03:04

## 2025-04-06 RX ADMIN — TRAMADOL HYDROCHLORIDE 50 MG: 50 TABLET, COATED ORAL at 08:04

## 2025-04-06 RX ADMIN — PANTOPRAZOLE SODIUM 40 MG: 40 INJECTION, POWDER, FOR SOLUTION INTRAVENOUS at 08:04

## 2025-04-06 RX ADMIN — IPRATROPIUM BROMIDE AND ALBUTEROL SULFATE 3 ML: 2.5; .5 SOLUTION RESPIRATORY (INHALATION) at 07:04

## 2025-04-06 NOTE — PROGRESS NOTES
"[facility]  Heart Transplant  Progress Note    Patient Name: Yuni Perez  MRN: [id number]  Admission Date: 3/30/2025  Hospital Length of Stay: 7 days  Attending Physician: Doyle De Dios MD  Primary Care Provider: Lulu Sandhu MD  Principal Problem:Cholecystitis    Subjective:   Interval History: Patient is s/p IR kita tube placement 3/31.  Doing well from that aspect. S/P dose of IVP Lasix yesterday which she responded well to.Qualified for Home O2 and currently on NC.     Continuous Infusions:   veletri/remodulin cassette   Intravenous Continuous        veletri/remodulin tubing   Intravenous Continuous         Scheduled Meds:   ambrisentan  10 mg Oral Daily    ferrous sulfate  1 tablet Oral Daily    fluticasone furoate-vilanteroL  1 puff Inhalation QHS    fluticasone propionate  2 spray Each Nostril QHS    folic acid  1,000 mcg Oral Daily    hydrOXYzine HCL  25 mg Oral Once    levothyroxine  125 mcg Oral Before breakfast    pantoprazole  40 mg Intravenous Daily    tadalafil  40 mg Oral QHS    topiramate  100 mg Oral BID    treprostinil (REMODULIN) 12,000,000 ng in 0.9% NaCl 100 mL infusion  117 ng/kg/min (Order-Specific) Intravenous Q24H     PRN Meds:  Current Facility-Administered Medications:     acetaminophen, 650 mg, Oral, Q6H PRN    albuterol-ipratropium, 3 mL, Nebulization, Q6H PRN    diphenhydrAMINE, 50 mg, Oral, Q6H PRN    ondansetron, 8 mg, Oral, Q8H PRN    ondansetron, 4 mg, Intravenous, Q8H PRN    polyethylene glycol, 17 g, Oral, BID PRN    sodium chloride 0.9%, 10 mL, Intravenous, PRN    traMADoL, 50 mg, Oral, Q6H PRN    Review of patient's allergies indicates:   Allergen Reactions    Fentanyl Anaphylaxis     Respiratory distress    Vibra-tabs [doxycycline hyclate] Anaphylaxis     "throat felt like it was closing"    Adhesive Hives     Silk tape    Amoxicillin Rash    Nsaids (non-steroidal anti-inflammatory drug) Swelling    Chlorhexidine Other (See Comments)     " "Blue Chlorhexidine causes hives     Objective:     Vital Signs (Most Recent):  Temp: 98.1 °F (36.7 °C) (04/06/25 0736)  Pulse: 83 (04/06/25 0736)  Resp: 20 (04/06/25 0736)  BP: 113/62 (04/06/25 0736)  SpO2: 96 % (04/06/25 0736) Vital Signs (24h Range):  Temp:  [97.6 °F (36.4 °C)-98.1 °F (36.7 °C)] 98.1 °F (36.7 °C)  Pulse:  [82-99] 83  Resp:  [16-20] 20  SpO2:  [94 %-97 %] 96 %  BP: (104-113)/(59-76) 113/62     No data found.    Body mass index is 28.5 kg/m².      Intake/Output Summary (Last 24 hours) at 4/6/2025 1020  Last data filed at 4/6/2025 0544  Gross per 24 hour   Intake 240 ml   Output 70 ml   Net 170 ml        Physical Exam  Vitals and nursing note reviewed.   Constitutional:       Appearance: She is well-developed.   HENT:      Head: Normocephalic.   Eyes:      Pupils: Pupils are equal, round, and reactive to light.   Cardiovascular:      Rate and Rhythm: Normal rate and regular rhythm.   Pulmonary:      Effort: Pulmonary effort is normal.      Breath sounds: Normal breath sounds.   Abdominal:      General: Bowel sounds are normal.      Palpations: Abdomen is soft.      Tenderness: There is abdominal tenderness.      Comments: Sue tube in place    Musculoskeletal:         General: Normal range of motion.      Cervical back: Normal range of motion and neck supple.   Skin:     General: Skin is warm and dry.   Neurological:      Mental Status: She is alert and oriented to person, place, and time.   Psychiatric:         Behavior: Behavior normal.            Significant Labs:  CBC:  Recent Labs   Lab 04/03/25  0321 04/04/25  0619 04/06/25  0909   WBC 8.47 5.86 6.83   RBC 4.41 4.20 4.65   HGB 13.1 12.6 13.7   HCT 40.3 38.0 41.8   PLT 94* 107* 151   MCV 91 91 90   MCH 29.7 30.0 29.5   MCHC 32.5 33.2 32.8     BNP:  No results for input(s): "BNP" in the last 168 hours.    Invalid input(s): "BNPTRIAGELBLO"  CMP:  Recent Labs   Lab 04/03/25  0321 04/04/25  0619 04/06/25  0909   CALCIUM 8.3* 8.4* 8.6*   ALBUMIN " "2.7* 2.7* 3.1*   * 136 138   K 4.6 3.6 3.6   CO2 18* 21* 20*    109 111*   BUN 11 7 9   CREATININE 0.6 0.6 0.6   ALKPHOS 79 71 69   ALT 10 7* 10   AST 15 10* 13   BILITOT 0.2 0.2 0.3      Coagulation:   Recent Labs   Lab 03/30/25  1334 03/31/25  0845 04/02/25  1346   INR 1.1 1.1 1.2   APTT  --   --  30.3     LDH:  No results for input(s): "LDH" in the last 72 hours.  Microbiology:  Microbiology Results (last 7 days)       Procedure Component Value Units Date/Time    Blood culture #1 **CANNOT BE ORDERED STAT** [[id number]]  (Normal) Collected: 03/30/25 1336    Order Status: Completed Specimen: Blood from Peripheral, Antecubital, Right Updated: 04/04/25 1502     Blood Culture No Growth After 5 Days    Blood culture #2 **CANNOT BE ORDERED STAT** [[id number]]  (Normal) Collected: 03/30/25 1336    Order Status: Completed Specimen: Blood from Peripheral, Antecubital, Left Updated: 04/04/25 1502     Blood Culture No Growth After 5 Days    Urine culture [[id number]] Collected: 03/30/25 2314    Order Status: Completed Specimen: Urine, Clean Catch Updated: 04/01/25 0937     Urine Culture No Growth          I have reviewed all pertinent labs within the past 24 hours.    Estimated Creatinine Clearance: 92.9 mL/min (based on SCr of 0.6 mg/dL).    Diagnostic Results:  I have reviewed all pertinent imaging results/findings within the past 24 hours.  Assessment and Plan:     Mrs. Perez is a 55 y.o. W has PAH and is currently on triple therapy who presented to the ED with abdominal pain and n/v. Patient was diagnosed with IPAH in 2009 and started on Remodulin, Letairis, and Adcirca. She has a history of ABHIJEET (untreated - not able to tolerate CPAP 2/2 severe congestion), GERD, and Asthma. Current therapy is Remodulin infusing at 80ng/kg/min (pre-filled cassettes), [id number] qdaily, and tadalafil 40mg qdaily. Has T/F Adempas. Patient denies chest pain, chest pressure, syncope, pre-syncope, lightheadedness, " dizziness, PND, orthopnea, or LE edema.        CT Abd/pelvi 3/30/25:     1. The gallbladder is distended noting cholelithiasis, and suspected wall thickening/induration about the gallbladder walls.  There is distal choledocholithiasis.  Evaluation with ultrasound is recommended as developing acute cholecystitis is a consideration.  2. Liquid content within a few distal small bowel loops and colon.  Correlation with any history of diarrheal illness.  3. Stable pulmonary nodules, please see CT 02/27/2023.  4. Hepatomegaly, correlation with LFTs recommended.      TTE 12/16/25      Left Ventricle: The left ventricle is normal in size. Normal wall thickness. Normal wall motion. Septal flattening in systole consistent with right ventricular pressure overload. There is normal systolic function with a visually estimated ejection fraction of 60 - 65%. There is normal diastolic function.    Right Ventricle: Moderate right ventricular enlargement with hypertrophy. Systolic function is mildly to moderately reduced.    The right atrium is mildly dilated.    Tricuspid Valve: There is mild regurgitation.    Pulmonary Artery: There is moderate to severe pulmonary hypertension. The estimated pulmonary artery systolic pressure is 67 mmHg.    IVC/SVC: Normal venous pressure at 3 mmHg.       * Cholecystitis  -hx of kita tube that had been removed  -CT scan with distended and thickened gallbladder with multiple stone and choledocolithiasis.  -HIDA scan with Scintigraphic evidence of cystic duct obstruction and acute cholecystitis   -Given Pulm HTN: high risk for surgery  -Kita tube placed 4/1  -Per general surgery: no further need for abx  -Patient tolerating regular diet   -[facility] consulted for choledocholithiasis     Cholelithiasis  -CT Abd/pelvi 3/30/25: 1. The gallbladder is distended noting cholelithiasis, and suspected wall thickening/induration about the gallbladder walls.  There is distal  choledocholithiasis.  Evaluation with ultrasound is recommended as developing acute cholecystitis is a consideration. 2. Liquid content within a few distal small bowel loops and colon.  Correlation with any history of diarrheal illness. 3. Stable pulmonary nodules, please see CT 02/27/2023.4. Hepatomegaly, correlation with LFTs recommended.  - concerns for cholelithiasis and possible choledocholithiasis.   -[facility] consulted for Choledocholithiasis management: Due to severe pulmonary HTN, risks of EUS/ERCP would outweigh any benefits of removing nonobstructing stone at this time. Discussed with primary team. Please reach out if LFTs become elevated or if there is concern for acute cholangitis (fever, jaundice, worsening RUQ pain, ductal dilatation on imaging).           WHO group 1 pulmonary arterial hypertension  -Patient was diagnosed with IPAH in 2009 and started on Remodulin, Letairis, and Adcirca. She has a history of ABHIJEET (untreated - not able to tolerate CPAP 2/2 severe congestion), GERD, and Asthma. Not interested in lung transplant.   - continue triple therapy w/ Remodulin infusing at 80ng/kg/min (pre-filled cassettes), [id number] qdaily, and tadalafil 40mg qhs.   -Qualified for home O2      SVT (supraventricular tachycardia)  -Patient has SVT with HR in the 200's   -Converted with 12mg of Adenosine  -No further events on telemetry  -If re occurs then will consider Multaq  -Will consider event monitor on discharge     Mild intermittent asthma  -continue home inhalers    Chronic tension-type headache, not intractable  -continue home medications    Hypothyroidism (acquired)  -resuming home synthroid    Allergic asthma  -continue home meds        Naveen Hurtado NP  Heart Transplant  [facility]

## 2025-04-06 NOTE — ASSESSMENT & PLAN NOTE
-Patient was diagnosed with IPAH in 2009 and started on Remodulin, Letairis, and Adcirca. She has a history of ABHIJEET (untreated - not able to tolerate CPAP 2/2 severe congestion), GERD, and Asthma. Not interested in lung transplant.   - continue triple therapy w/ Remodulin infusing at 80ng/kg/min (pre-filled cassettes), [id number] qdaily, and tadalafil 40mg qhs.   -Qualified for home O2

## 2025-04-06 NOTE — PROGRESS NOTES
Update/DC Planning:    SW notified by team that pt will not be discharging today.      [facility] ([facility] notified.  [facility] ([facility]) sent message that they cannot accept pt due to not being able to see pt in a timely manner.  SW sent message to update that pt will not dc today.  Spoke with [facility] who advised to f/up tomorrow with admit team on referral.     SW team will f/up tomorrow with home health arrangements, home o2, and transportation.     SW remains available.

## 2025-04-06 NOTE — PLAN OF CARE
-AAOx4, VSS, afebrile, on 2L  -1 BM  -Plan to discharge tomorrow  -No complaints at this time  -No falls reported this shift  -Pt up in chair with wheels locked, call light in reach      Problem: Adult Inpatient Plan of Care  Goal: Plan of Care Review  Outcome: Progressing  Goal: Optimal Comfort and Wellbeing  Outcome: Progressing  Goal: Readiness for Transition of Care  Outcome: Progressing     Problem: Infection  Goal: Absence of Infection Signs and Symptoms  Outcome: Progressing     Problem: Skin Injury Risk Increased  Goal: Skin Health and Integrity  Outcome: Progressing     Problem: Pain Acute  Goal: Optimal Pain Control and Function  Outcome: Progressing

## 2025-04-06 NOTE — SUBJECTIVE & OBJECTIVE
"Interval History: Patient is s/p IR kita tube placement 3/31.  Doing well from that aspect. S/P dose of IVP Lasix yesterday which she responded well to.Qualified for Home O2 and currently on NC.     Continuous Infusions:   veletri/remodulin cassette   Intravenous Continuous        veletri/remodulin tubing   Intravenous Continuous         Scheduled Meds:   ambrisentan  10 mg Oral Daily    ferrous sulfate  1 tablet Oral Daily    fluticasone furoate-vilanteroL  1 puff Inhalation QHS    fluticasone propionate  2 spray Each Nostril QHS    folic acid  1,000 mcg Oral Daily    hydrOXYzine HCL  25 mg Oral Once    levothyroxine  125 mcg Oral Before breakfast    pantoprazole  40 mg Intravenous Daily    tadalafil  40 mg Oral QHS    topiramate  100 mg Oral BID    treprostinil (REMODULIN) 12,000,000 ng in 0.9% NaCl 100 mL infusion  117 ng/kg/min (Order-Specific) Intravenous Q24H     PRN Meds:  Current Facility-Administered Medications:     acetaminophen, 650 mg, Oral, Q6H PRN    albuterol-ipratropium, 3 mL, Nebulization, Q6H PRN    diphenhydrAMINE, 50 mg, Oral, Q6H PRN    ondansetron, 8 mg, Oral, Q8H PRN    ondansetron, 4 mg, Intravenous, Q8H PRN    polyethylene glycol, 17 g, Oral, BID PRN    sodium chloride 0.9%, 10 mL, Intravenous, PRN    traMADoL, 50 mg, Oral, Q6H PRN    Review of patient's allergies indicates:   Allergen Reactions    Fentanyl Anaphylaxis     Respiratory distress    Vibra-tabs [doxycycline hyclate] Anaphylaxis     "throat felt like it was closing"    Adhesive Hives     Silk tape    Amoxicillin Rash    Nsaids (non-steroidal anti-inflammatory drug) Swelling    Chlorhexidine Other (See Comments)     Blue Chlorhexidine causes hives     Objective:     Vital Signs (Most Recent):  Temp: 98.1 °F (36.7 °C) (04/06/25 0736)  Pulse: 83 (04/06/25 0736)  Resp: 20 (04/06/25 0736)  BP: 113/62 (04/06/25 0736)  SpO2: 96 % (04/06/25 0736) Vital Signs (24h Range):  Temp:  [97.6 °F (36.4 °C)-98.1 °F (36.7 °C)] 98.1 °F (36.7 " "°C)  Pulse:  [82-99] 83  Resp:  [16-20] 20  SpO2:  [94 %-97 %] 96 %  BP: (104-113)/(59-76) 113/62     No data found.    Body mass index is 28.5 kg/m².      Intake/Output Summary (Last 24 hours) at 4/6/2025 1020  Last data filed at 4/6/2025 0544  Gross per 24 hour   Intake 240 ml   Output 70 ml   Net 170 ml        Physical Exam  Vitals and nursing note reviewed.   Constitutional:       Appearance: She is well-developed.   HENT:      Head: Normocephalic.   Eyes:      Pupils: Pupils are equal, round, and reactive to light.   Cardiovascular:      Rate and Rhythm: Normal rate and regular rhythm.   Pulmonary:      Effort: Pulmonary effort is normal.      Breath sounds: Normal breath sounds.   Abdominal:      General: Bowel sounds are normal.      Palpations: Abdomen is soft.      Tenderness: There is abdominal tenderness.      Comments: Sue tube in place    Musculoskeletal:         General: Normal range of motion.      Cervical back: Normal range of motion and neck supple.   Skin:     General: Skin is warm and dry.   Neurological:      Mental Status: She is alert and oriented to person, place, and time.   Psychiatric:         Behavior: Behavior normal.            Significant Labs:  CBC:  Recent Labs   Lab 04/03/25  0321 04/04/25  0619 04/06/25  0909   WBC 8.47 5.86 6.83   RBC 4.41 4.20 4.65   HGB 13.1 12.6 13.7   HCT 40.3 38.0 41.8   PLT 94* 107* 151   MCV 91 91 90   MCH 29.7 30.0 29.5   MCHC 32.5 33.2 32.8     BNP:  No results for input(s): "BNP" in the last 168 hours.    Invalid input(s): "BNPTRIAGELBLO"  CMP:  Recent Labs   Lab 04/03/25  0321 04/04/25  0619 04/06/25  0909   CALCIUM 8.3* 8.4* 8.6*   ALBUMIN 2.7* 2.7* 3.1*   * 136 138   K 4.6 3.6 3.6   CO2 18* 21* 20*    109 111*   BUN 11 7 9   CREATININE 0.6 0.6 0.6   ALKPHOS 79 71 69   ALT 10 7* 10   AST 15 10* 13   BILITOT 0.2 0.2 0.3      Coagulation:   Recent Labs   Lab 03/30/25  1334 03/31/25  0845 04/02/25  1346   INR 1.1 1.1 1.2   APTT  --   --  " "30.3     LDH:  No results for input(s): "LDH" in the last 72 hours.  Microbiology:  Microbiology Results (last 7 days)       Procedure Component Value Units Date/Time    Blood culture #1 **CANNOT BE ORDERED STAT** [[id number]]  (Normal) Collected: 03/30/25 1336    Order Status: Completed Specimen: Blood from Peripheral, Antecubital, Right Updated: 04/04/25 1502     Blood Culture No Growth After 5 Days    Blood culture #2 **CANNOT BE ORDERED STAT** [[id number]]  (Normal) Collected: 03/30/25 1336    Order Status: Completed Specimen: Blood from Peripheral, Antecubital, Left Updated: 04/04/25 1502     Blood Culture No Growth After 5 Days    Urine culture [[id number]] Collected: 03/30/25 2314    Order Status: Completed Specimen: Urine, Clean Catch Updated: 04/01/25 0937     Urine Culture No Growth          I have reviewed all pertinent labs within the past 24 hours.    Estimated Creatinine Clearance: 92.9 mL/min (based on SCr of 0.6 mg/dL).    Diagnostic Results:  I have reviewed all pertinent imaging results/findings within the past 24 hours.  "

## 2025-04-06 NOTE — NURSING
AAOX4. On 2L sating I'm mid 90s. Kita drain draining serous fluid; total out from kita drain 70 ccs. Tramadol administered 1X and  Bms recorded during shift.

## 2025-04-07 VITALS
HEART RATE: 89 BPM | SYSTOLIC BLOOD PRESSURE: 84 MMHG | OXYGEN SATURATION: 92 % | WEIGHT: 141.13 LBS | RESPIRATION RATE: 18 BRPM | DIASTOLIC BLOOD PRESSURE: 56 MMHG | TEMPERATURE: 98 F | HEIGHT: 59 IN | BODY MASS INDEX: 28.45 KG/M2

## 2025-04-07 DIAGNOSIS — I27.9 CHRONIC PULMONARY HEART DISEASE: Primary | ICD-10-CM

## 2025-04-07 PROCEDURE — 63600175 PHARM REV CODE 636 W HCPCS: Performed by: STUDENT IN AN ORGANIZED HEALTH CARE EDUCATION/TRAINING PROGRAM

## 2025-04-07 PROCEDURE — 25000003 PHARM REV CODE 250: Performed by: STUDENT IN AN ORGANIZED HEALTH CARE EDUCATION/TRAINING PROGRAM

## 2025-04-07 PROCEDURE — 99900035 HC TECH TIME PER 15 MIN (STAT)

## 2025-04-07 PROCEDURE — 25000003 PHARM REV CODE 250: Performed by: INTERNAL MEDICINE

## 2025-04-07 PROCEDURE — 27000221 HC OXYGEN, UP TO 24 HOURS

## 2025-04-07 RX ADMIN — PANTOPRAZOLE SODIUM 40 MG: 40 INJECTION, POWDER, FOR SOLUTION INTRAVENOUS at 08:04

## 2025-04-07 RX ADMIN — TOPIRAMATE 100 MG: 25 TABLET, FILM COATED ORAL at 08:04

## 2025-04-07 RX ADMIN — FERROUS SULFATE TAB EC 325 MG (65 MG FE EQUIVALENT) 1 EACH: 325 (65 FE) TABLET DELAYED RESPONSE at 08:04

## 2025-04-07 RX ADMIN — MUPIROCIN: 20 OINTMENT TOPICAL at 08:04

## 2025-04-07 RX ADMIN — FOLIC ACID 1000 MCG: 1 TABLET ORAL at 08:04

## 2025-04-07 RX ADMIN — LEVOTHYROXINE SODIUM 125 MCG: 0.12 TABLET ORAL at 05:04

## 2025-04-07 RX ADMIN — AMBRISENTAN 10 MG: 5 TABLET, FILM COATED ORAL at 08:04

## 2025-04-07 NOTE — SUBJECTIVE & OBJECTIVE
"Interval History: Patient is s/p IR kita tube placement 3/31.  Doing well from that aspect.     Continuous Infusions:   veletri/remodulin cassette   Intravenous Continuous        veletri/remodulin tubing   Intravenous Continuous         Scheduled Meds:   ambrisentan  10 mg Oral Daily    ferrous sulfate  1 tablet Oral Daily    fluticasone furoate-vilanteroL  1 puff Inhalation QHS    fluticasone propionate  2 spray Each Nostril QHS    folic acid  1,000 mcg Oral Daily    hydrOXYzine HCL  25 mg Oral Once    levothyroxine  125 mcg Oral Before breakfast    mupirocin   Nasal BID    pantoprazole  40 mg Intravenous Daily    tadalafil  40 mg Oral QHS    topiramate  100 mg Oral BID    treprostinil (REMODULIN) 12,000,000 ng in 0.9% NaCl 100 mL infusion  117 ng/kg/min (Order-Specific) Intravenous Q24H     PRN Meds:  Current Facility-Administered Medications:     acetaminophen, 650 mg, Oral, Q6H PRN    albuterol-ipratropium, 3 mL, Nebulization, Q6H PRN    diphenhydrAMINE, 50 mg, Oral, Q6H PRN    ondansetron, 8 mg, Oral, Q8H PRN    ondansetron, 4 mg, Intravenous, Q8H PRN    polyethylene glycol, 17 g, Oral, BID PRN    sodium chloride 0.9%, 10 mL, Intravenous, PRN    traMADoL, 50 mg, Oral, Q6H PRN    Review of patient's allergies indicates:   Allergen Reactions    Fentanyl Anaphylaxis     Respiratory distress    Vibra-tabs [doxycycline hyclate] Anaphylaxis     "throat felt like it was closing"    Adhesive Hives     Silk tape    Amoxicillin Rash    Nsaids (non-steroidal anti-inflammatory drug) Swelling    Chlorhexidine Other (See Comments)     Blue Chlorhexidine causes hives     Objective:     Vital Signs (Most Recent):  Temp: 97.7 °F (36.5 °C) (04/07/25 0827)  Pulse: 82 (04/07/25 0900)  Resp: 18 (04/07/25 0827)  BP: 96/65 (04/07/25 0827)  SpO2: 95 % (04/07/25 0900) Vital Signs (24h Range):  Temp:  [97.4 °F (36.3 °C)-98.1 °F (36.7 °C)] 97.7 °F (36.5 °C)  Pulse:  [76-98] 82  Resp:  [18-20] 18  SpO2:  [91 %-98 %] 95 %  BP: " "()/(5973) 96/65     No data found.    Body mass index is 28.5 kg/m².      Intake/Output Summary (Last 24 hours) at 4/7/2025 1054  Last data filed at 4/7/2025 0600  Gross per 24 hour   Intake --   Output 70 ml   Net -70 ml        Physical Exam  Vitals and nursing note reviewed.   Constitutional:       Appearance: She is well-developed.   HENT:      Head: Normocephalic.   Eyes:      Pupils: Pupils are equal, round, and reactive to light.   Cardiovascular:      Rate and Rhythm: Normal rate and regular rhythm.   Pulmonary:      Effort: Pulmonary effort is normal.      Breath sounds: Normal breath sounds.   Abdominal:      General: Bowel sounds are normal.      Palpations: Abdomen is soft.      Tenderness: There is abdominal tenderness.      Comments: Sue tube in place    Musculoskeletal:         General: Normal range of motion.      Cervical back: Normal range of motion and neck supple.   Skin:     General: Skin is warm and dry.   Neurological:      Mental Status: She is alert and oriented to person, place, and time.   Psychiatric:         Behavior: Behavior normal.            Significant Labs:  CBC:  Recent Labs   Lab 04/03/25  0321 04/04/25  0619 04/06/25  0909   WBC 8.47 5.86 6.83   RBC 4.41 4.20 4.65   HGB 13.1 12.6 13.7   HCT 40.3 38.0 41.8   PLT 94* 107* 151   MCV 91 91 90   MCH 29.7 30.0 29.5   MCHC 32.5 33.2 32.8     BNP:  No results for input(s): "BNP" in the last 168 hours.    Invalid input(s): "BNPTRIAGELBLO"  CMP:  Recent Labs   Lab 04/03/25  0321 04/04/25  0619 04/06/25  0909   CALCIUM 8.3* 8.4* 8.6*   ALBUMIN 2.7* 2.7* 3.1*   * 136 138   K 4.6 3.6 3.6   CO2 18* 21* 20*    109 111*   BUN 11 7 9   CREATININE 0.6 0.6 0.6   ALKPHOS 79 71 69   ALT 10 7* 10   AST 15 10* 13   BILITOT 0.2 0.2 0.3      Coagulation:   Recent Labs   Lab 04/02/25  1346   INR 1.2   APTT 30.3     LDH:  No results for input(s): "LDH" in the last 72 hours.  Microbiology:  Microbiology Results (last 7 days)       " Procedure Component Value Units Date/Time    Blood culture #1 **CANNOT BE ORDERED STAT** [[id number]]  (Normal) Collected: 03/30/25 1336    Order Status: Completed Specimen: Blood from Peripheral, Antecubital, Right Updated: 04/04/25 1502     Blood Culture No Growth After 5 Days    Blood culture #2 **CANNOT BE ORDERED STAT** [[id number]]  (Normal) Collected: 03/30/25 1336    Order Status: Completed Specimen: Blood from Peripheral, Antecubital, Left Updated: 04/04/25 1502     Blood Culture No Growth After 5 Days    Urine culture [[id number]] Collected: 03/30/25 2314    Order Status: Completed Specimen: Urine, Clean Catch Updated: 04/01/25 0937     Urine Culture No Growth          I have reviewed all pertinent labs within the past 24 hours.    Estimated Creatinine Clearance: 92.9 mL/min (based on SCr of 0.6 mg/dL).    Diagnostic Results:  I have reviewed all pertinent imaging results/findings within the past 24 hours.

## 2025-04-07 NOTE — PROGRESS NOTES
"[facility]  Heart Transplant  Progress Note    Patient Name: Yuni Perez  MRN: [id number]  Admission Date: 3/30/2025  Hospital Length of Stay: 8 days  Attending Physician: Marcel Cantu MD  Primary Care Provider: Lulu Sandhu MD  Principal Problem:Cholecystitis    Subjective:   Interval History: Patient is s/p IR kita tube placement 3/31.  Doing well from that aspect.     Continuous Infusions:   veletri/remodulin cassette   Intravenous Continuous        veletri/remodulin tubing   Intravenous Continuous         Scheduled Meds:   ambrisentan  10 mg Oral Daily    ferrous sulfate  1 tablet Oral Daily    fluticasone furoate-vilanteroL  1 puff Inhalation QHS    fluticasone propionate  2 spray Each Nostril QHS    folic acid  1,000 mcg Oral Daily    hydrOXYzine HCL  25 mg Oral Once    levothyroxine  125 mcg Oral Before breakfast    mupirocin   Nasal BID    pantoprazole  40 mg Intravenous Daily    tadalafil  40 mg Oral QHS    topiramate  100 mg Oral BID    treprostinil (REMODULIN) 12,000,000 ng in 0.9% NaCl 100 mL infusion  117 ng/kg/min (Order-Specific) Intravenous Q24H     PRN Meds:  Current Facility-Administered Medications:     acetaminophen, 650 mg, Oral, Q6H PRN    albuterol-ipratropium, 3 mL, Nebulization, Q6H PRN    diphenhydrAMINE, 50 mg, Oral, Q6H PRN    ondansetron, 8 mg, Oral, Q8H PRN    ondansetron, 4 mg, Intravenous, Q8H PRN    polyethylene glycol, 17 g, Oral, BID PRN    sodium chloride 0.9%, 10 mL, Intravenous, PRN    traMADoL, 50 mg, Oral, Q6H PRN    Review of patient's allergies indicates:   Allergen Reactions    Fentanyl Anaphylaxis     Respiratory distress    Vibra-tabs [doxycycline hyclate] Anaphylaxis     "throat felt like it was closing"    Adhesive Hives     Silk tape    Amoxicillin Rash    Nsaids (non-steroidal anti-inflammatory drug) Swelling    Chlorhexidine Other (See Comments)     Blue Chlorhexidine causes hives     Objective:     Vital Signs (Most " "Recent):  Temp: 97.7 °F (36.5 °C) (04/07/25 0827)  Pulse: 82 (04/07/25 0900)  Resp: 18 (04/07/25 0827)  BP: 96/65 (04/07/25 0827)  SpO2: 95 % (04/07/25 0900) Vital Signs (24h Range):  Temp:  [97.4 °F (36.3 °C)-98.1 °F (36.7 °C)] 97.7 °F (36.5 °C)  Pulse:  [76-98] 82  Resp:  [18-20] 18  SpO2:  [91 %-98 %] 95 %  BP: ()/(59-73) 96/65     No data found.    Body mass index is 28.5 kg/m².      Intake/Output Summary (Last 24 hours) at 4/7/2025 1054  Last data filed at 4/7/2025 0600  Gross per 24 hour   Intake --   Output 70 ml   Net -70 ml        Physical Exam  Vitals and nursing note reviewed.   Constitutional:       Appearance: She is well-developed.   HENT:      Head: Normocephalic.   Eyes:      Pupils: Pupils are equal, round, and reactive to light.   Cardiovascular:      Rate and Rhythm: Normal rate and regular rhythm.   Pulmonary:      Effort: Pulmonary effort is normal.      Breath sounds: Normal breath sounds.   Abdominal:      General: Bowel sounds are normal.      Palpations: Abdomen is soft.      Tenderness: There is abdominal tenderness.      Comments: Sue tube in place    Musculoskeletal:         General: Normal range of motion.      Cervical back: Normal range of motion and neck supple.   Skin:     General: Skin is warm and dry.   Neurological:      Mental Status: She is alert and oriented to person, place, and time.   Psychiatric:         Behavior: Behavior normal.            Significant Labs:  CBC:  Recent Labs   Lab 04/03/25  0321 04/04/25  0619 04/06/25  0909   WBC 8.47 5.86 6.83   RBC 4.41 4.20 4.65   HGB 13.1 12.6 13.7   HCT 40.3 38.0 41.8   PLT 94* 107* 151   MCV 91 91 90   MCH 29.7 30.0 29.5   MCHC 32.5 33.2 32.8     BNP:  No results for input(s): "BNP" in the last 168 hours.    Invalid input(s): "JER"  CMP:  Recent Labs   Lab 04/03/25  0321 04/04/25  0619 04/06/25  0909   CALCIUM 8.3* 8.4* 8.6*   ALBUMIN 2.7* 2.7* 3.1*   * 136 138   K 4.6 3.6 3.6   CO2 18* 21* 20*    109 " "111*   BUN 11 7 9   CREATININE 0.6 0.6 0.6   ALKPHOS 79 71 69   ALT 10 7* 10   AST 15 10* 13   BILITOT 0.2 0.2 0.3      Coagulation:   Recent Labs   Lab 04/02/25  1346   INR 1.2   APTT 30.3     LDH:  No results for input(s): "LDH" in the last 72 hours.  Microbiology:  Microbiology Results (last 7 days)       Procedure Component Value Units Date/Time    Blood culture #1 **CANNOT BE ORDERED STAT** [[id number]]  (Normal) Collected: 03/30/25 1336    Order Status: Completed Specimen: Blood from Peripheral, Antecubital, Right Updated: 04/04/25 1502     Blood Culture No Growth After 5 Days    Blood culture #2 **CANNOT BE ORDERED STAT** [[id number]]  (Normal) Collected: 03/30/25 1336    Order Status: Completed Specimen: Blood from Peripheral, Antecubital, Left Updated: 04/04/25 1502     Blood Culture No Growth After 5 Days    Urine culture [[id number]] Collected: 03/30/25 2314    Order Status: Completed Specimen: Urine, Clean Catch Updated: 04/01/25 0937     Urine Culture No Growth          I have reviewed all pertinent labs within the past 24 hours.    Estimated Creatinine Clearance: 92.9 mL/min (based on SCr of 0.6 mg/dL).    Diagnostic Results:  I have reviewed all pertinent imaging results/findings within the past 24 hours.  Assessment and Plan:     Mrs. Perez is a 55 y.o. W has PAH and is currently on triple therapy who presented to the ED with abdominal pain and n/v. Patient was diagnosed with IPAH in 2009 and started on Remodulin, Letairis, and Adcirca. She has a history of ABHIJEET (untreated - not able to tolerate CPAP 2/2 severe congestion), GERD, and Asthma. Current therapy is Remodulin infusing at 80ng/kg/min (pre-filled cassettes), [id number] qdaily, and tadalafil 40mg qdaily. Has T/F Adempas. Patient denies chest pain, chest pressure, syncope, pre-syncope, lightheadedness, dizziness, PND, orthopnea, or LE edema.        CT Abd/pelvi 3/30/25:     1. The gallbladder is distended noting cholelithiasis, and " suspected wall thickening/induration about the gallbladder walls.  There is distal choledocholithiasis.  Evaluation with ultrasound is recommended as developing acute cholecystitis is a consideration.  2. Liquid content within a few distal small bowel loops and colon.  Correlation with any history of diarrheal illness.  3. Stable pulmonary nodules, please see CT 02/27/2023.  4. Hepatomegaly, correlation with LFTs recommended.      TTE 12/16/25      Left Ventricle: The left ventricle is normal in size. Normal wall thickness. Normal wall motion. Septal flattening in systole consistent with right ventricular pressure overload. There is normal systolic function with a visually estimated ejection fraction of 60 - 65%. There is normal diastolic function.    Right Ventricle: Moderate right ventricular enlargement with hypertrophy. Systolic function is mildly to moderately reduced.    The right atrium is mildly dilated.    Tricuspid Valve: There is mild regurgitation.    Pulmonary Artery: There is moderate to severe pulmonary hypertension. The estimated pulmonary artery systolic pressure is 67 mmHg.    IVC/SVC: Normal venous pressure at 3 mmHg.       * Cholecystitis  -hx of kita tube that had been removed  -CT scan with distended and thickened gallbladder with multiple stone and choledocolithiasis.  -HIDA scan with Scintigraphic evidence of cystic duct obstruction and acute cholecystitis   -Given Pulm HTN: high risk for surgery  -Kita tube placed 4/1  -Per general surgery: no further need for abx  -Patient tolerating regular diet   -[facility] consulted for choledocholithiasis     Cholelithiasis  -CT Abd/pelvi 3/30/25: 1. The gallbladder is distended noting cholelithiasis, and suspected wall thickening/induration about the gallbladder walls.  There is distal choledocholithiasis.  Evaluation with ultrasound is recommended as developing acute cholecystitis is a consideration. 2. Liquid content within a few  distal small bowel loops and colon.  Correlation with any history of diarrheal illness. 3. Stable pulmonary nodules, please see CT 02/27/2023.4. Hepatomegaly, correlation with LFTs recommended.  - concerns for cholelithiasis and possible choledocholithiasis.   -[facility] consulted for Choledocholithiasis management: Due to severe pulmonary HTN, risks of EUS/ERCP would outweigh any benefits of removing nonobstructing stone at this time. Discussed with primary team. Please reach out if LFTs become elevated or if there is concern for acute cholangitis (fever, jaundice, worsening RUQ pain, ductal dilatation on imaging).           WHO group 1 pulmonary arterial hypertension  -Patient was diagnosed with IPAH in 2009 and started on Remodulin, Letairis, and Adcirca. She has a history of ABHIJEET (untreated - not able to tolerate CPAP 2/2 severe congestion), GERD, and Asthma. Not interested in lung transplant.   - continue triple therapy w/ Remodulin infusing at 80ng/kg/min (pre-filled cassettes), [id number] qdaily, and tadalafil 40mg qhs.   -Qualified for home O2      SVT (supraventricular tachycardia)  -Patient has SVT with HR in the 200's   -Converted with 12mg of Adenosine  -No further events on telemetry  -If re occurs then will consider Multaq  -Will consider event monitor on discharge     Mild intermittent asthma  -continue home inhalers    Chronic tension-type headache, not intractable  -continue home medications    Hypothyroidism (acquired)  -resuming home synthroid    Allergic asthma  -continue home meds        Naveen Hurtado NP  Heart Transplant  [facility]

## 2025-04-07 NOTE — PROGRESS NOTES
Discharge    SW received communication from medical team that pt's is expected to discharge today on home O2 and home health for kita tube dressing changes.  SW  met with pt to assess discharge needs.  Pt presents alone in room AAOx4, neutral affect.  Pt reports coping adequately. Pt verbalizes agreement with discharge date and plan.  Pt reports that she will need transportation to set up and doesn't have anyone that can pick her up.  SW confirmed home address with pt.    DEBBIE notified Shanice Caicedo and Gaby Negrete with [facility] via secure chat and informed them of pt's discharge.  SW received message back that portable O2 tank was delivered to pt's room and that pt is to contact [facility] and inform them when she is on her way home to her portable concentrator can be delivered     SW contacted [facility] and spoke with Marylin.  SW was informed that pt has not met a deductible and would have to pay out of pocket for home health  services but could do the dressing changes on her own if she's given supplies.    DEBBIE spoke with pt again and informed her of needing to meet her deductible and asked if pt was comfortable doing her own kita dressing changes.  Pt reports that she is comfortable doing her own dressing changes.    SW updated medical team via secure chat regarding home health change.  Medical team in agreement that pt can do her own kita dressing changes will be given 6-8 weeks of supplies.  Pt will notify pulmonary hypertension team when she is running low on supplies.    DEBBIE booked [employer] transportation for pt for 3:30 pm    Pt in agreement with discharge plan.  Bedside nurse notified.     SW remains available.    [facility]:[phone]  [facility]: [phone]

## 2025-04-07 NOTE — PROGRESS NOTES
Pt. D/C TO HOME WITH MEDS; EDUCATED PT. ON D/C INSTRUCTIONS AND PT. VERBALIZED UNDERSTANDING OF INSTRUCTIONS; PT. TRANSPORTED VIA W/C BY [facility]ORT[facility]

## 2025-04-07 NOTE — NURSING
AAOX4. On 2L sating in 96%. Sue drain site CDI. No Bms recorded during shift. Plan for d/c today. Bed locked and in lowest position; call light within reach. Pt instructed to call for assistance.

## 2025-04-07 NOTE — PROGRESS NOTES
Patient mixed home cassette and connected to home pump. 41cc/24hr noted to pump. Patient educated on how to flush drain, how to change dressing, and ss of infection. Patient gave full verbal understanding on the above education.

## 2025-04-08 ENCOUNTER — PATIENT OUTREACH (OUTPATIENT)
Dept: ADMINISTRATIVE | Facility: CLINIC | Age: 55
End: 2025-04-08
Payer: COMMERCIAL

## 2025-04-08 NOTE — PROGRESS NOTES
C3 nurse spoke with Yuni Perez for a TCC post hospital discharge follow up call. The patient has a scheduled HOSFU appointment with Alesha Tomlin NP  on 04/16/25 @ 0800. Pt informed that this is a placeholder time and that she would be contacted a day or so prior with an visit time.

## 2025-04-11 ENCOUNTER — TELEPHONE (OUTPATIENT)
Dept: TRANSPLANT | Facility: CLINIC | Age: 55
End: 2025-04-11
Payer: COMMERCIAL

## 2025-04-14 ENCOUNTER — OFFICE VISIT (OUTPATIENT)
Dept: HOME HEALTH SERVICES | Facility: CLINIC | Age: 55
End: 2025-04-14
Payer: COMMERCIAL

## 2025-04-14 VITALS
HEART RATE: 86 BPM | TEMPERATURE: 97 F | RESPIRATION RATE: 19 BRPM | SYSTOLIC BLOOD PRESSURE: 91 MMHG | DIASTOLIC BLOOD PRESSURE: 69 MMHG | OXYGEN SATURATION: 95 %

## 2025-04-14 DIAGNOSIS — K80.00 CALCULUS OF GALLBLADDER WITH ACUTE CHOLECYSTITIS WITHOUT OBSTRUCTION: ICD-10-CM

## 2025-04-14 DIAGNOSIS — M54.2 CHRONIC NECK PAIN: ICD-10-CM

## 2025-04-14 DIAGNOSIS — K81.9 CHOLECYSTITIS: ICD-10-CM

## 2025-04-14 DIAGNOSIS — G89.29 CHRONIC NECK PAIN: ICD-10-CM

## 2025-04-14 DIAGNOSIS — E44.0 MODERATE PROTEIN-CALORIE MALNUTRITION: ICD-10-CM

## 2025-04-14 DIAGNOSIS — B36.9 FUNGAL RASH OF TRUNK: ICD-10-CM

## 2025-04-14 DIAGNOSIS — E03.9 HYPOTHYROIDISM (ACQUIRED): ICD-10-CM

## 2025-04-14 DIAGNOSIS — G43.009 MIGRAINE WITHOUT AURA AND WITHOUT STATUS MIGRAINOSUS, NOT INTRACTABLE: Primary | ICD-10-CM

## 2025-04-14 DIAGNOSIS — J45.909 MODERATE ASTHMA WITHOUT COMPLICATION, UNSPECIFIED WHETHER PERSISTENT: ICD-10-CM

## 2025-04-14 DIAGNOSIS — I27.21 WHO GROUP 1 PULMONARY ARTERIAL HYPERTENSION: Chronic | ICD-10-CM

## 2025-04-14 RX ORDER — ONDANSETRON 4 MG/1
4 TABLET, FILM COATED ORAL EVERY 6 HOURS PRN
Qty: 30 TABLET | Refills: 2 | Status: SHIPPED | OUTPATIENT
Start: 2025-04-14 | End: 2025-05-14

## 2025-04-14 RX ORDER — NYSTATIN 100000 [USP'U]/G
POWDER TOPICAL 2 TIMES DAILY
Qty: 30 G | Refills: 1 | Status: SHIPPED | OUTPATIENT
Start: 2025-04-14 | End: 2025-04-28

## 2025-04-15 NOTE — ASSESSMENT & PLAN NOTE
Patient was diagnosed with IPAH in 2009 and started on Remodulin, Letairis, and Adcirca. She has a history of ABHIJEET (untreated - not able to tolerate CPAP 2/2 severe congestion), GERD, and Asthma. Not interested in lung transplant.   - continue triple therapy w/ Remodulin infusing to right chest wall Eason at 80ng/kg/min (pre-filled cassettes), [id number] qdaily, and tadalafil 40mg qhs.   - continue supplemental O2

## 2025-04-15 NOTE — ASSESSMENT & PLAN NOTE
Patient reports poor oral intake.   - continue Boost Breeze TID   - Nutritional consult   - F/U w/ PCP

## 2025-04-15 NOTE — ASSESSMENT & PLAN NOTE
Denies intolerance to heat/cold. Denies unintentional weight loss/gain. Last recorded TSH > 1 year ago.   - monitor for intolerance to heat/cold and unintentional weight loss/gain.   - continue Levothyroxine  - HH SN to obtain TSH  - F/U w/ PCP

## 2025-04-15 NOTE — ASSESSMENT & PLAN NOTE
Reports SOB with exertion. O2 sats 95% on O2 2L NC. Stable.   - monitor O2 sats   - continue supplemental O2  - continue albuterol and breo   - F/U w/ PCP.

## 2025-04-15 NOTE — ASSESSMENT & PLAN NOTE
Sue tube noted with scant yellow drainage.   - monitor drainage  - drain, measure and record  -  SN to monitor   - F/U w/ Gen Surg

## 2025-04-15 NOTE — ASSESSMENT & PLAN NOTE
[facility] consulted for Choledocholithiasis management: Due to severe pulmonary HTN, risks of EUS/ERCP would outweigh any benefits of removing nonobstructing stone at this time.

## 2025-04-15 NOTE — ASSESSMENT & PLAN NOTE
Patient reports rash under bilateral breast folds and groin (see media).   - clean affected area with basic soap and pat dry BID. Keep affected area clean and dry.   - apply nystatin powder to affected are as directed  - F/U w/ PCP

## 2025-04-15 NOTE — ASSESSMENT & PLAN NOTE
Denies neck pain at present.   - monitor for pain.   - continue multimodal pain management  -  PT  - F/U w/ PCP

## 2025-04-15 NOTE — PROGRESS NOTES
[facility]  Transitional Care Management (TCM) Home Visit    Encounter Provider: Alesha Tomlin   PCP: Lulu Sandhu MD  Consult Requested By: No ref. provider found  Admit Date: 3/30/25   IP Discharge Date: 4/7/25  Hospital Length of Stay: 8  Days since discharge (from IP or SNF):    [facility] Contact Note: 4/8/25  Hospital Diagnosis: No admission diagnoses are documented for this encounter.     HISTORY OF PRESENT ILLNESS      Patient ID: Yuni Perez is a 55 y.o. female was recently admitted to the hospital, this is their TCM encounter.    Hospital Course Synopsis:    Was found to have cholecystitis confirmed on US, CT, and HIDA. GEN BRUNO said too high risk for OR so consulted IR for tube. Sue tube placement 3/31. [facility] consulted for choledocholithiasis management, and risk of EUS/ERCP would outweigh any benefits of removing nonobstructing  stone.  Post sue tube placement, she did well, she tolerated regular diet and had bowel movements . Pain controlled with Tramadol. She had as episode of SVT with HR in the 200's; she converted with 12mg of Adenosine. She has been in sinus since with no events on telemetry.  EP will not add any agents currently but will plan to add Multaq if she has future issues. She was qualified for home O2 and discharged home with O2 and Sue tube. She will f/u as scheduled or prn with any issues.     DECISION MAKING TODAY       Assessment & Plan:  1. Migraine without aura and without status migrainosus, not intractable  Assessment & Plan:  Reports episodes of headaches with nausea. Denies headache at present.   - monitor headaches' frequency and intensity   - continue Topomax and Nurtec as directed  - F/U w/ PCP       2. Moderate asthma without complication, unspecified whether persistent  Overview:  Moderate asthma from 2020 visit with Dr. Hollins    Assessment & Plan:  Reports SOB with exertion. O2 sats 95% on O2 2L NC. Stable.   -  monitor O2 sats   - continue supplemental O2  - continue albuterol and breo   - F/U w/ PCP.       3. Hypothyroidism (acquired)  Assessment & Plan:  Denies intolerance to heat/cold. Denies unintentional weight loss/gain. Last recorded TSH > 1 year ago.   - monitor for intolerance to heat/cold and unintentional weight loss/gain.   - continue Levothyroxine  -  SN to obtain TSH  - F/U w/ PCP       4. Moderate protein-calorie malnutrition  Assessment & Plan:  Patient reports poor oral intake.   - continue Boost Breeze TID   - Nutritional consult   - F/U w/ PCP       5. Cholecystitis  Overview:  -CT scan with distended and thickened gallbladder with multiple stone and choledocolithiasis.  -HIDA scan with Scintigraphic evidence of cystic duct obstruction and acute cholecystitis   -Given Pulm HTN: high risk for surgery  -Sue tube placed 4/1  -[facility] consulted for choledocholithiasis     Assessment & Plan:  Sue tube noted with scant yellow drainage.   - monitor drainage  - drain, measure and record  -  SN to monitor   - F/U w/ Gen Surg      6. Calculus of gallbladder with acute cholecystitis without obstruction  Overview:  - has had a drain in the past; not a surgical candidate    Assessment & Plan:  [facility] consulted for Choledocholithiasis management: Due to severe pulmonary HTN, risks of EUS/ERCP would outweigh any benefits of removing nonobstructing stone at this time.       7. Chronic neck pain  Assessment & Plan:  Denies neck pain at present.   - monitor for pain.   - continue multimodal pain management  -  PT  - F/U w/ PCP       Other orders  -     ondansetron (ZOFRAN) 4 MG tablet; Take 1 tablet (4 mg total) by mouth every 6 (six) hours as needed for Nausea. 1 Tablet Oral Every 6 hours  Dispense: 30 tablet; Refill: 2  -     nystatin (MYCOSTATIN) powder; Apply topically 2 (two) times daily. for 14 days  Dispense: 30 g; Refill: 1           Medication List on Discharge:      Medication List            Accurate as of April 14, 2025  9:24 PM. If you have any questions, ask your nurse or doctor.                START taking these medications      nystatin powder  Commonly known as: MYCOSTATIN  Apply topically 2 (two) times daily. for 14 days  Started by: NAHID Fragoso            CHANGE how you take these medications      ferrous sulfate 325 (65 FE) MG EC tablet  Take 325 mg by mouth daily as needed.  What changed: additional instructions     furosemide 20 MG tablet  Commonly known as: LASIX  Take 1 tablet (20 mg total) by mouth once daily.  What changed: additional instructions     GLUCOSAMINE-CHOND-MSM COMPLEX ORAL  Take by mouth. Three times a week  What changed: additional instructions     LIDOcaine 5 %  Commonly known as: LIDODERM  Place 1 patch onto the skin once daily.  What changed: additional instructions     ondansetron 4 MG tablet  Commonly known as: ZOFRAN  Take 1 tablet (4 mg total) by mouth every 6 (six) hours as needed for Nausea. 1 Tablet Oral Every 6 hours  What changed: reasons to take this  Changed by: NAHID Fragoso            CONTINUE taking these medications      acetaminophen 500 MG tablet  Commonly known as: TYLENOL  Take 1,000 mg by mouth every 6 (six) hours as needed for Pain.     ADCIRCA 20 mg Tab  Generic drug: tadalafil  Take 2 tablets (40 mg total) by mouth once daily.     albuterol-ipratropium 2.5 mg-0.5 mg/3 mL nebulizer solution  Commonly known as: DUO-NEB  Take 3 mLs by nebulization every 6 (six) hours as needed for Wheezing. Rescue     ambrisentan 10 MG Tab  Commonly known as: LETAIRIS  Take 1 tablet (10 mg total) by mouth once daily.     BREO ELLIPTA 200-25 mcg/dose Dsdv diskus inhaler  Generic drug: fluticasone furoate-vilanteroL  INHALE 1 PUFF INTO THE LUNGS EVERY EVENING. CONTROLLER     cyanocobalamin (vitamin B-12) 2,500 mcg Subl  Commonly known as: VITAMIN B-12  Place 2,500 mcg under the tongue every morning.     diphenhydrAMINE 25 mg  capsule  Commonly known as: BENADRYL  Take 50 mg by mouth every 6 (six) hours as needed for Itching. Patient takes prn evenings     diphenoxylate-atropine 2.5-0.025 mg 2.5-0.025 mg per tablet  Commonly known as: LOMOTIL  Take 1 tablet by mouth 4 (four) times daily as needed for Diarrhea.     fluticasone propionate 50 mcg/actuation nasal spray  Commonly known as: FLONASE  2 sprays (100 mcg total) by Each Nostril route every evening.     folic acid 1 MG tablet  Commonly known as: FOLVITE  Take 1 tablet (1,000 mcg total) by mouth once daily.     hyoscyamine 0.125 mg Subl  Place 1 tablet (0.125 mg total) under the tongue every 6 (six) hours as needed (Aa needed).     levothyroxine 125 MCG tablet  Commonly known as: SYNTHROID  Take 1 tablet (125 mcg total) by mouth before breakfast.     loratadine 10 mg tablet  Commonly known as: CLARITIN  Take 10 mg by mouth once daily. Takes in morning     NURTEC 75 mg odt  Generic drug: rimegepant  Take 1 tablet (75 mg total) by mouth as needed for Migraine. Place ODT tablet on the tongue; alternatively the ODT tablet may be placed under the tongue. Can take 2nd dose in 24 hrs if mild relief, no more than 2 in 24 hrs     pantoprazole 40 MG tablet  Commonly known as: PROTONIX  Take 1 tablet (40 mg total) by mouth once daily.     PHEXXI 1.8-1-0.4 % Gel  Generic drug: lactic acid-citric-potassium  Place 1 Applicatorful vaginally as needed (CONTRACEPTIVE).     tiZANidine 4 MG tablet  Commonly known as: ZANAFLEX  Take 4 mg by mouth every 6 (six) hours as needed.     topiramate 100 MG tablet  Commonly known as: TOPAMAX  Take 1 tablet (100 mg total) by mouth 2 (two) times daily.     treprostinil 2.5 mg/mL Soln  Commonly known as: REMODULIN  Mix cassette as directed and infuse continuously per physician titration orders on dosing sheet. Current dose 80ng/kg/min     triamcinolone 0.5 % ointment  Commonly known as: KENALOG  Apply 1 application  topically 2 (two) times daily.     VENTOLIN HFA 90  mcg/actuation inhaler  Generic drug: albuterol  Inhale 2 puffs into the lungs every 6 (six) hours as needed for Wheezing or Shortness of Breath. Rescue     XANAX ORAL  Take by mouth as needed.              Medication Reconciliation:  Were medications changed on discharge? Yes  Were medications in the home? Yes  Is the patient taking the medications as directed? Yes  Does the patient understand the medications and changes? Yes  Does updated med list accurately reflects meds patient is currently taking? Yes    ENVIRONMENT OF CARE      Family and/or Caregiver present at visit?  No  Name of Caregiver:   History provided by: patient    Advance Care Planning   Advanced Care Planning Status:  Patient has had an ACP conversation  Living Will: Yes  Power of : Yes  [place]: No    Does Caregiver have HCPoA: No  Changes today:   Is patient hospice appropriate: No  (If needed, use PPS <30 or FAST score >7)  Was referral to hospice placed: No       Impression upon entering the home:  Physical Dwelling: single family home   Appearance of home environment: cleaniness: clean  Functional Status: independent  Mobility: ambulatory  Nutritional access: available food but inadequate intake  Home Health: No, and will be referred today   DME/Supplies: none     Diagnostic tests reviewed/disposition: No diagnosic tests pending after this hospitalization.  Disease/illness education:  SOB  Establishment or re-establishment of referral orders for community resources: No other necessary community resources.   Discussion with other health care providers: No discussion with other health care providers necessary.   Does patient have a PCP at [facility]? Yes   Repatriation plan with PCP? follow-up with PCP within 90d   Does patient have an ostomy (ileostomy, colostomy, suprapubic catheter, nephrostomy tube, tracheostomy, PEG tube, pleurex catheter, cholecystostomy, etc)? Yes. Is it on problem list?yes  Were BPAs reviewed? Yes    Social History      Socioeconomic History    Marital status: Single    Number of children: 0   Occupational History    Occupation: disabled     Employer: [employer]   Tobacco Use    Smoking status: Never    Smokeless tobacco: Never   Substance and Sexual Activity    Alcohol use: No     Alcohol/week: 0.8 standard drinks of alcohol     Types: 1 Standard drinks or equivalent per week     Comment: socially    Drug use: No    Sexual activity: Not Currently     Birth control/protection: OCP, Pill     Comment: pt is a virgin     Social Drivers of Health     Financial Resource Strain: Low Risk  (4/10/2025)    Received from [facility]    Overall Financial Resource Strain (CARDIA)     Difficulty of Paying Living Expenses: Not hard at all   Food Insecurity: No Food Insecurity (4/10/2025)    Received from [facility]    Hunger Vital Sign     Worried About Running Out of Food in the Last Year: Never true     Ran Out of Food in the Last Year: Never true   Transportation Needs: No Transportation Needs (4/10/2025)    Received from [facility]    PRAPARE - Transportation     Lack of Transportation (Medical): No     Lack of Transportation (Non-Medical): No   Physical Activity: Inactive (4/10/2025)    Received from [facility]    Exercise Vital Sign     Days of Exercise per Week: 0 days     Minutes of Exercise per Session: 0 min   Stress: Stress Concern Present (4/10/2025)    Received from [facility]    [employer] - Occupational Stress Questionnaire     Feeling of Stress : To some extent   Housing Stability: Low Risk  (4/10/2025)    Received from [facility]    Housing Stability Vital Sign     Unable to Pay for Housing in the Last Year: No     Number of Times Moved in the Last Year: 0     Homeless in the Last Year: No       OBJECTIVE:     Vital Signs:  Vitals:     04/14/25 1154   BP: 91/69   Pulse: 86   Resp: 19   Temp: 97.3 °F (36.3 °C)       Review of Systems   Constitutional:  Positive for activity change and fatigue. Negative for chills and fever.   HENT:  Negative for congestion, rhinorrhea and trouble swallowing.    Eyes:  Negative for visual disturbance.   Respiratory:  Positive for shortness of breath. Negative for cough.    Cardiovascular:  Negative for chest pain.   Gastrointestinal:  Positive for abdominal pain and diarrhea. Negative for constipation, nausea and vomiting.        ABD pain 3/10 at kita tube insertion site  Hx of IBS      Endocrine: Negative for cold intolerance and heat intolerance.   Genitourinary:  Negative for difficulty urinating and dysuria.   Musculoskeletal:  Negative for back pain, neck pain and neck stiffness.   Skin:  Positive for rash.        Bilateral breast folds and groin   Allergic/Immunologic: Negative for environmental allergies.   Neurological:  Positive for weakness. Negative for dizziness and headaches.   Psychiatric/Behavioral:  Negative for behavioral problems, hallucinations and suicidal ideas.        Physical Exam:  Physical Exam  Constitutional:       Appearance: Normal appearance. She is normal weight. She is ill-appearing.   HENT:      Head: Normocephalic and atraumatic.      Nose: No congestion or rhinorrhea.   Eyes:      General: No scleral icterus.  Cardiovascular:      Rate and Rhythm: Normal rate.   Pulmonary:      Breath sounds: Examination of the right-upper field reveals decreased breath sounds. Examination of the left-upper field reveals decreased breath sounds. Examination of the right-middle field reveals decreased breath sounds. Examination of the left-middle field reveals decreased breath sounds. Examination of the right-lower field reveals decreased breath sounds. Examination of the left-lower field reveals decreased breath sounds. Decreased breath sounds present.   Abdominal:      General: Bowel sounds are  normal. There is no distension.      Palpations: Abdomen is soft.      Tenderness: There is no abdominal tenderness.   Musculoskeletal:      Cervical back: No rigidity.      Right lower leg: No edema.      Left lower leg: No edema.   Skin:     Findings: Rash present.      Comments: Bilateral breast folds and groin   Neurological:      Mental Status: She is alert and oriented to person, place, and time.      Motor: Weakness present.   Psychiatric:         Mood and Affect: Mood normal.         Behavior: Behavior normal.         INSTRUCTIONS FOR PATIENT:     Scheduled Follow-up, Appts Reviewed with Modifications if Needed: Yes  Future Appointments   Date Time Provider Department Center   5/1/2025  1:45 PM Jakob Shah MD [facility] GENSUR [facility]   5/29/2025 10:30 AM LAB, APPOINTMENT [facility]   5/29/2025 11:00 AM SIX, MINUTE WALK [facility] PUL WLK [facility]   5/29/2025 11:30 AM Keshia Poe MD [facility] HEARTTX [facility]   6/4/2025 11:30 AM Africa Garcia MD [facility] NEURO [facility]   6/24/2025  8:45 AM [facility] OIC-US1 MASTER [facility] ULTR [facility]   6/24/2025 12:00 PM LAB, APPOINTMENT [facility] INTMED [facility] LAB IM [facility] PCW   7/1/2025  2:30 PM Benjamin Yuan MD [facility] HEPAT [facility]   8/6/2025 10:00 AM Lulu Sandhu MD [facility]     Take all medications as prescribed  Keep all follow-up appointments  Return to the hospital or call your primary care provider if any worsening symptoms such as fever, chest pain, shortness of breath, return of symptoms, or any other concerns.      Signature: NAHID Black    Transition of Care Visit:  I have reviewed and updated the history and problem list.  I have reconciled the medication list.  I have discussed the hospitalization and current medical issues, prognosis and plans with the patient/family.

## 2025-04-15 NOTE — ASSESSMENT & PLAN NOTE
Reports episodes of headaches with nausea. Denies headache at present.   - monitor headaches' frequency and intensity   - continue Topomax and Nurtec as directed  - F/U w/ PCP

## 2025-04-15 NOTE — ASSESSMENT & PLAN NOTE
Denies headache at present.   - monitor headaches' frequency and intensity   - continue Nurtec as directed  - F/U w/ PCP

## 2025-04-21 ENCOUNTER — TELEPHONE (OUTPATIENT)
Dept: HOME HEALTH SERVICES | Facility: CLINIC | Age: 55
End: 2025-04-21
Payer: COMMERCIAL

## 2025-04-21 NOTE — TELEPHONE ENCOUNTER
Faxed orders, notes, and demographics to [facility] for home health services per NP request.  Fax confirmation received.  Spoke with Lily Whittington with [facility] and she is assisting to get patient seen as soon as possible.

## 2025-04-22 ENCOUNTER — TELEPHONE (OUTPATIENT)
Dept: HOME HEALTH SERVICES | Facility: CLINIC | Age: 55
End: 2025-04-22
Payer: COMMERCIAL

## 2025-04-22 NOTE — TELEPHONE ENCOUNTER
Late Note from 4/21/2025: Received a call from patient stating that she was seen by Nurse Practitioner Alesha Tomlin and she was suppose to be getting a call from home health but has not heard anything.  Also states she is running low on her Bilary Bag flushes and needs more flushes sent to her as well.  Notified her that I would follow up with home health and NP and assist with getting services in place.  Notified her that I would call back when I have more information.  Refaxed orders, notes, and demographics to [facility].  Received an in basket message from Deborah Thomas with [facility] stating that [facility] waiting list is backed up until May 12th and that we should seek out other companies.  Reached out to Lily Whittington to see if patient could be moved up.  States they are unable to accommodate.  Faxed orders to [facility] for assistance with getting patient seen sooner.Waiting to hear back. Also reached out to [facility] to see if they could assist with getting flushes ordered.  States that they are unable to provide the flushes for patient's Bilary Bag.  Will attempt another company.       Note from 4/22/2025: Received call from [facility] stating that they are having issues with staffing in patient's area and are unable to accept patient at this time.  Sent referrals to [facility], [facility], and [facility]  to assist with getting patient seen.  Received calls from [facility] and [facility] that they are unable to staff patient's area at this time.  Waiting to hear back from [facility].  Also reached out to [facility] and [facility] to see if they can assist with getting patient's flushes.  Both unable to provide flushes.  Email sent to [facility] to see how long they are out for patient to be admitted and what is the possibly of getting patient moved up if there is a cancellation.  Waiting to hear back.  Reached out to [employer] and they stated it is a case by case basis so we would need to send orders, notes, and demographics  to see if patient qualifies. Outgoing call to patient to notify her of current status with home health and getting flushes ordered.  Suggested to patient to call [employer] number on the back of her card to see who they are in contract with to get the flushes ordered.  She clarified that she is using Sodium Chloride 0.9% flushes which is in 10cc Syringes twice daily.  She is currently down to 4 more flushes and she is trying to ration them.  Also states that she thinks the doctor's name is Alyssa Robert.  Notified her that I would try to reach out to the doctor's pool staff for assistance in getting the flushes ordered.  Faxed orders for home health to [facility] and [facility] also to assist with getting placement for the patient ASAP.  Patient verbalized understanding and all questions answered during phone call.  .

## 2025-04-24 ENCOUNTER — TELEPHONE (OUTPATIENT)
Dept: TRANSPLANT | Facility: CLINIC | Age: 55
End: 2025-04-24
Payer: COMMERCIAL

## 2025-04-24 NOTE — TELEPHONE ENCOUNTER
NN received call from patient about letter for jury duty. Patient was informed that it was faxed and a copy mailed to her but patient has no received it so another copy was mailed again.     Patient also inquired about oxygen/inogen machines advised the patient to contact [employer] company because they would know more about her insurance and what she is eligible for. Patient was provided with information such as Inogen is pulsed dose, it may or may not be covered by insurance, and oxygen options are provided based on 6MW. Patient verbalized understanding.

## 2025-04-29 PROCEDURE — G0180 MD CERTIFICATION HHA PATIENT: HCPCS | Mod: ,,, | Performed by: NURSE PRACTITIONER

## 2025-05-01 ENCOUNTER — OFFICE VISIT (OUTPATIENT)
Dept: SURGERY | Facility: CLINIC | Age: 55
End: 2025-05-01
Payer: COMMERCIAL

## 2025-05-01 VITALS
HEIGHT: 59 IN | BODY MASS INDEX: 28.93 KG/M2 | HEART RATE: 100 BPM | OXYGEN SATURATION: 96 % | DIASTOLIC BLOOD PRESSURE: 78 MMHG | WEIGHT: 143.5 LBS | SYSTOLIC BLOOD PRESSURE: 118 MMHG

## 2025-05-01 DIAGNOSIS — T85.518A CHOLECYSTOSTOMY TUBE DYSFUNCTION, INITIAL ENCOUNTER: Primary | ICD-10-CM

## 2025-05-01 PROCEDURE — 3008F BODY MASS INDEX DOCD: CPT | Mod: CPTII,S$GLB,, | Performed by: SURGERY

## 2025-05-01 PROCEDURE — 1111F DSCHRG MED/CURRENT MED MERGE: CPT | Mod: CPTII,S$GLB,, | Performed by: SURGERY

## 2025-05-01 PROCEDURE — 1159F MED LIST DOCD IN RCRD: CPT | Mod: CPTII,S$GLB,, | Performed by: SURGERY

## 2025-05-01 PROCEDURE — 3074F SYST BP LT 130 MM HG: CPT | Mod: CPTII,S$GLB,, | Performed by: SURGERY

## 2025-05-01 PROCEDURE — 3078F DIAST BP <80 MM HG: CPT | Mod: CPTII,S$GLB,, | Performed by: SURGERY

## 2025-05-01 PROCEDURE — 99999 PR PBB SHADOW E&M-EST. PATIENT-LVL III: CPT | Mod: PBBFAC,,, | Performed by: SURGERY

## 2025-05-01 PROCEDURE — 99213 OFFICE O/P EST LOW 20 MIN: CPT | Mod: S$GLB,,, | Performed by: SURGERY

## 2025-05-01 NOTE — PROGRESS NOTES
General Surgery Office Visit   History and Physical    Patient Name: Yuni Perez  YOB: 1970 (55 y.o.)  MRN: [id number]  Today's Date: 05/01/2025    Referring Md:   No referring provider defined for this encounter.    SUBJECTIVE:     Chief Complaint: Ktia tube check     History of Present Illness:  Yuni Perez is a 55 y.o. female with PMHx of seizure disorder, ABHIJEET, Asthma, chronic pulmonary heart disease, SVT, hepatic adenoma, hypothyroidism, and severe pulmonary HTN (Remodulin)  who presents to the clinic today for eval of kita tube. Admitted for acute cholecystitis in 2019 and underwent IR PCT which was subsequently removed. She re-presented with acute cholecystitis in March 2025 and underwent IR PCT on 3/31/25. She reports feeling okay since her discharge. Having some abdominal pain at the drain site which is new in the last few days. Has been flushing the drain BID without difficulty. Putting out 50-100cc per day of thin fluid. Some nausea this morning. Tolerating diet and having her baseline bowel function (IBS - alternates between constipation and diarrhea, no acute changes). She denies fever, chills, dizziness lightheadedness, emesis, hematochezia, dysuria, hematuria, CP, SOB, and all other symptoms. Patient reports being compliant with home medication regimen.     Per chart review, she was taken to the OR back in 2014 for planned lap cholecystectomy but the procedure was aborted secondary to severe pulmonary HTN and hemodynamic instability after induction of anesthesia. Per operative report, she had significantly elevated PA pressures along with systemic hypotension requiring multiple pressors. The procedure was aborted and she was admitted to the ICU after.    Last echo 12/2024 with PA pressure 67mmHg      Not currently on any anticoagulants      Review of patient's allergies indicates:   Allergen Reactions    Fentanyl Anaphylaxis     Respiratory distress     "Vibra-tabs [doxycycline hyclate] Anaphylaxis     "throat felt like it was closing"    Adhesive Hives     Silk tape    Amoxicillin Rash    Nsaids (non-steroidal anti-inflammatory drug) Swelling    Chlorhexidine Other (See Comments)     Blue Chlorhexidine causes hives       Past Medical History:   Diagnosis Date    AR (allergic rhinitis)     Cholelithiasis, common bile duct     Chronic low back pain     Eye pressure 2017    General anesthetics causing adverse effect in therapeutic use     GERD (gastroesophageal reflux disease)     History of migraine headaches     Hypothyroidism     Innocent heart murmur     Lumbar disc disease     Menorrhagia     Mild asthma     Obesity     Plantar fasciitis of left foot     Primary pulmonary hypertension     followed by heart transplant/pulmonary     Seizure disorder     x 1 in 2008    Seizures     Sleep apnea     SVT (supraventricular tachycardia) 4/3/2025    TMJ (dislocation of temporomandibular joint)     Tricuspid regurgitation      Past Surgical History:   Procedure Laterality Date    CARDIAC CATHETERIZATION      CATHETERIZATION OF BOTH LEFT AND RIGHT HEART Bilateral 9/29/2020    Procedure: CATHETERIZATION, HEART, BOTH LEFT AND RIGHT;  Surgeon: Gcuci Pickett MD;  Location: [facility] CATH LAB;  Service: Cardiology;  Laterality: Bilateral;    CENTRAL VENOUS CATHETER INSERTION      CORONARY ANGIOGRAPHY N/A 9/29/2020    Procedure: ANGIOGRAM, CORONARY ARTERY;  Surgeon: Gucci Pickett MD;  Location: [facility] CATH LAB;  Service: Cardiology;  Laterality: N/A;    gallbladder drain  06/2019    INSERTION OF HAYNES CATHETER Right 6/17/2020    Procedure: INSERTION, CATHETER, CENTRAL VENOUS, HAYNES Replace Single Lumen for Remodulin Infusion;  Surgeon: Bertin Dewitt MD;  Location: [facility] OR [address] [place] [address];  Service: General;  Laterality: Right;    PORTACATH PLACEMENT      REMOVAL OF TUNNELED CENTRAL VENOUS CATHETER (CVC) N/A 6/17/2020    Procedure: REMOVAL, CATHETER, CENTRAL VENOUS, TUNNELED;  " Surgeon: Bertin Dewitt MD;  [facility];  Service: General;  Laterality: N/A;    RIGHT HEART CATHETERIZATION Right 8/20/2018    Procedure: HEART CATH-RIGHT;  Surgeon: Ivonne Rai MD;  Location: [facility] CATH LAB;  Service: Cardiology;  Laterality: Right;    RIGHT HEART CATHETERIZATION Right 9/4/2019    Procedure: INSERTION, CATHETER, RIGHT HEART;  Surgeon: Ivonne Rai MD;  Location: [facility] CATH LAB;  Service: Cardiology;  Laterality: Right;    RIGHT HEART CATHETERIZATION Right 6/10/2022    Procedure: INSERTION, CATHETER, RIGHT HEART;  Surgeon: Keshia Poe MD;  Location: [facility] CATH LAB;  Service: Cardiology;  Laterality: Right;    UPPER GASTROINTESTINAL ENDOSCOPY       Family History   Problem Relation Name Age of Onset    Diabetes Mother      Heart disease Father      Glaucoma Father      No Known Problems Sister      Cancer Maternal Aunt          breast    Breast cancer Maternal Aunt      No Known Problems Maternal Uncle      No Known Problems Paternal Aunt      No Known Problems Paternal Uncle      Cancer Maternal Grandmother          uterine    Diabetes Maternal Grandfather      Heart attack Maternal Grandfather      No Known Problems Paternal Grandmother      Heart disease Paternal Grandfather      Heart attack Paternal Grandfather      Diabetes Paternal Grandfather      Leukemia Brother      Breast cancer Cousin      Breast cancer Cousin      Hypertension Other      Colon cancer Neg Hx      Ovarian cancer Neg Hx      Amblyopia Neg Hx      Blindness Neg Hx      Cataracts Neg Hx      Macular degeneration Neg Hx      Retinal detachment Neg Hx      Strabismus Neg Hx      Stroke Neg Hx      Thyroid disease Neg Hx      Esophageal cancer Neg Hx       Social History[1]     Review of Systems:  Review of Systems   Constitutional:  Negative for chills and fever.   Respiratory:  Negative for cough and shortness of breath.    Cardiovascular:  Negative for chest pain.   Gastrointestinal:  Positive  "for abdominal pain and nausea. Negative for vomiting.   Genitourinary:  Negative for dysuria and hematuria.   Musculoskeletal:  Negative for falls.   Skin:  Negative for rash.   Neurological:  Negative for dizziness and headaches.   Psychiatric/Behavioral:  Negative for substance abuse. The patient is not nervous/anxious.    All other systems reviewed and are negative.      OBJECTIVE:     Vital Signs (Most Recent)  /78 (BP Location: Right arm, Patient Position: Sitting)   Pulse 100   Ht 4' 11" (1.499 m)   Wt 65.1 kg (143 lb 8.3 oz)   SpO2 96%   BMI 28.99 kg/m²     Physical Exam  Vitals and nursing note reviewed.   Constitutional:       General: She is not in acute distress.     Appearance: She is not diaphoretic.      Comments: Home O2 via NC   HENT:      Head: Normocephalic and atraumatic.      Mouth/Throat:      Mouth: Mucous membranes are moist.      Pharynx: Oropharynx is clear.   Eyes:      Extraocular Movements: Extraocular movements intact.      Conjunctiva/sclera: Conjunctivae normal.   Cardiovascular:      Rate and Rhythm: Normal rate.   Pulmonary:      Effort: Pulmonary effort is normal. No respiratory distress.   Abdominal:      General: There is no distension.      Palpations: Abdomen is soft.      Tenderness: There is no guarding or rebound.      Comments: IR drain to R abdomen with serous output in bag. Mild skin irritation at adhesive dressing. Mild TTP at drain insertion site, no TTP deep abdomen.   No peritonitic signs    Musculoskeletal:         General: No deformity.   Skin:     General: Skin is warm and dry.   Neurological:      Mental Status: She is alert and oriented to person, place, and time.       Labs:   Lab Results   Component Value Date    WBC 6.83 04/06/2025    HGB 13.7 04/06/2025    HCT 41.8 04/06/2025    MCV 90 04/06/2025     04/06/2025       CMP  Sodium   Date Value Ref Range Status   04/06/2025 138 136 - 145 mmol/L Final   12/13/2024 140 136 - 145 mmol/L Final "     Potassium   Date Value Ref Range Status   04/06/2025 3.6 3.5 - 5.1 mmol/L Final   12/13/2024 3.9 3.5 - 5.1 mmol/L Final     Chloride   Date Value Ref Range Status   04/06/2025 111 (H) 95 - 110 mmol/L Final   12/13/2024 112 (H) 95 - 110 mmol/L Final     CO2   Date Value Ref Range Status   04/06/2025 20 (L) 23 - 29 mmol/L Final   12/13/2024 20 (L) 23 - 29 mmol/L Final     Glucose   Date Value Ref Range Status   04/06/2025 96 70 - 110 mg/dL Final   12/13/2024 100 70 - 110 mg/dL Final     BUN   Date Value Ref Range Status   04/06/2025 9 6 - 20 mg/dL Final     Creatinine   Date Value Ref Range Status   04/06/2025 0.6 0.5 - 1.4 mg/dL Final     Calcium   Date Value Ref Range Status   04/06/2025 8.6 (L) 8.7 - 10.5 mg/dL Final   12/13/2024 8.8 8.7 - 10.5 mg/dL Final     Protein Total   Date Value Ref Range Status   04/06/2025 6.8 6.0 - 8.4 gm/dL Final     Total Protein   Date Value Ref Range Status   12/13/2024 7.2 6.0 - 8.4 g/dL Final     Albumin   Date Value Ref Range Status   04/06/2025 3.1 (L) 3.5 - 5.2 g/dL Final   12/13/2024 3.9 3.5 - 5.2 g/dL Final     Total Bilirubin   Date Value Ref Range Status   12/13/2024 0.4 0.1 - 1.0 mg/dL Final     Comment:     For infants and newborns, interpretation of results should be based  on gestational age, weight and in agreement with clinical  observations.    Premature Infant recommended reference ranges:  Up to 24 hours.............<8.0 mg/dL  Up to 48 hours............<12.0 mg/dL  3-5 days..................<15.0 mg/dL  6-29 days.................<15.0 mg/dL       Bilirubin Total   Date Value Ref Range Status   04/06/2025 0.3 0.1 - 1.0 mg/dL Final     Comment:     For infants and newborns, interpretation of results should be based   on gestational age, weight and in agreement with clinical   observations.    Premature Infant recommended reference ranges:   0-24 hours:  <8.0 mg/dL   24-48 hours: <12.0 mg/dL   3-5 days:    <15.0 mg/dL   6-29 days:   <15.0 mg/dL     Alkaline  Phosphatase   Date Value Ref Range Status   12/13/2024 89 40 - 150 U/L Final     ALP   Date Value Ref Range Status   04/06/2025 69 40 - 150 unit/L Final     AST   Date Value Ref Range Status   04/06/2025 13 11 - 45 unit/L Final   12/13/2024 15 10 - 40 U/L Final     ALT   Date Value Ref Range Status   04/06/2025 10 10 - 44 unit/L Final   12/13/2024 11 10 - 44 U/L Final     Anion Gap   Date Value Ref Range Status   04/06/2025 7 (L) 8 - 16 mmol/L Final     eGFR   Date Value Ref Range Status   04/06/2025 >60 >60 mL/min/1.73/m2 Final     Comment:     Estimated GFR calculated using the CKD-EPI creatinine (2021) equation.   12/13/2024 >60.0 >60 mL/min/1.73 m^2 Final       Imaging:   IR Cholecystostomy  Order: [id number]   Status: Final result       Next appt: 05/29/2025 at 10:30 AM in Lab (LAB, [facility])    Test Result Released: No (inaccessible in [facility])    0 Result Notes  Details    Reading Physician Reading Date Result Priority   Darius Chopra MD  [phone]  3/31/2025 Routine     Narrative & Impression  EXAMINATION:  Cholecystostomy tube placement     Procedural Personnel     Attending physician(s): Nav     Fellow physician(s): None     Resident physician(s): None     Advanced practice provider(s): None     Pre-procedure diagnosis: Acute cholecystitis     Post-procedure diagnosis: Same     Indication: Acute cholecystitis     Complications: No immediate complications.     TECHNIQUE:  - Cholecystostomy tube placement under ultrasound and fluoroscopic guidance     FINDINGS:  Pre-procedure     Consent: Informed consent for the procedure was obtained and time-out was performed prior to the procedure.     Preparation: The site was prepared and draped using maximal sterile barrier technique including cutaneous antisepsis.     Antibiotic administered: Prophylactic dose within 1 hour of procedure start time or 2 hours for vancomycin or fluoroquinolones        Anesthesia/sedation     The IR  procedural team has confirmed the patient ID and re-evaluated the patient and sedation plan confirming it is suitable for the patient's condition and procedure.     Level of anesthesia/sedation: Moderate sedation (conscious sedation)     Anesthesia/sedation administered by: Independent trained observer under attending supervision with continuous monitoring of the patient's level of consciousness and physiologic status     Total intra-service sedation time (minutes): 50     Cholecystostomy tube placement     Initial imaging was performed. Local anesthesia was administered. The gallbladder was accessed via a transperitoneal approach using an access needle followed by wire insertion and serial dilation and a cholecystostomy tube was placed. Position within the gallbladder was confirmed.     Initial imaging findings: Distended, thick-walled gallbladder with multiple stones     Cholecystostomy tube placed: 10 [place] APD     External catheter securement: Non-absorbable suture and adhesive anchoring device     Contrast     Contrast agent: Omnipaque 350     Contrast volume (mL): 4     Additional Details     Additional description of procedure: None     Equipment details: None     Specimens removed: 10 mL of fluid was aspirated. A sample was not sent for analysis.     Estimated blood loss (mL): Less than 10     Standardized report: [email or web address]     Attestation     Signer name: Nav     I attest that I was present for the entire procedure. I reviewed the stored images and agree with the report as written.     Impression:     Insertion of cholecystostomy tube with drainage of cloudy bile.     Plan:     1. Drainage: Gravity drainage     2. Flushing instructions: Flush drain with 5-10 cc of sterile saline every 12 hours     3. Follow-up: Routine exchange in 6-8 weeks unless cholecystectomy is performed.      IR Cholecystostomy  Order: [id number]   Status: Final result       Next appt: 05/29/2025 at 10:30 AM in Lab  (LAB, [facility] [place])    Test Result Released: No (inaccessible in MyChart)    0 Result Notes  Details    Reading Physician Reading Date Result Priority   Darius Chopra MD  [phone]  3/31/2025 Routine     Narrative & Impression  EXAMINATION:  Cholecystostomy tube placement     Procedural Personnel     Attending physician(s): Nav     Fellow physician(s): None     Resident physician(s): None     Advanced practice provider(s): None     Pre-procedure diagnosis: Acute cholecystitis     Post-procedure diagnosis: Same     Indication: Acute cholecystitis     Complications: No immediate complications.     TECHNIQUE:  - Cholecystostomy tube placement under ultrasound and fluoroscopic guidance     FINDINGS:  Pre-procedure     Consent: Informed consent for the procedure was obtained and time-out was performed prior to the procedure.     Preparation: The site was prepared and draped using maximal sterile barrier technique including cutaneous antisepsis.     Antibiotic administered: Prophylactic dose within 1 hour of procedure start time or 2 hours for vancomycin or fluoroquinolones        Anesthesia/sedation     The IR procedural team has confirmed the patient ID and re-evaluated the patient and sedation plan confirming it is suitable for the patient's condition and procedure.     Level of anesthesia/sedation: Moderate sedation (conscious sedation)     Anesthesia/sedation administered by: Independent trained observer under attending supervision with continuous monitoring of the patient's level of consciousness and physiologic status     Total intra-service sedation time (minutes): 50     Cholecystostomy tube placement     Initial imaging was performed. Local anesthesia was administered. The gallbladder was accessed via a transperitoneal approach using an access needle followed by wire insertion and serial dilation and a cholecystostomy tube was placed. Position within the gallbladder was confirmed.      Initial imaging findings: Distended, thick-walled gallbladder with multiple stones     Cholecystostomy tube placed: 10 Irish APD     External catheter securement: Non-absorbable suture and adhesive anchoring device     Contrast     Contrast agent: Omnipaque 350     Contrast volume (mL): 4     Additional Details     Additional description of procedure: None     Equipment details: None     Specimens removed: 10 mL of fluid was aspirated. A sample was not sent for analysis.     Estimated blood loss (mL): Less than 10     Standardized report: [email or web address]     Attestation     Signer name: Nav     I attest that I was present for the entire procedure. I reviewed the stored images and agree with the report as written.     Impression:     Insertion of cholecystostomy tube with drainage of cloudy bile.     Plan:     1. Drainage: Gravity drainage     2. Flushing instructions: Flush drain with 5-10 cc of sterile saline every 12 hours     3. Follow-up: Routine exchange in 6-8 weeks unless cholecystectomy is performed.          ASSESSMENT/PLAN:     Yuni Perez is a 55 y.o. female with PMHx of seizure disorder, ABHIJEET, Asthma, chronic pulmonary heart disease, SVT, hepatic adenoma, hypothyroidism, and severe pulmonary HTN (Remodulin)  who presents to the clinic today for eval of kita tube. Clinically stable     Yuni was seen today for follow-up.    Diagnoses and all orders for this visit:    Cholecystostomy tube dysfunction, initial encounter      - Needs IR drain study/interrogation, will reach out to their clinic   - Continue drain care   - Unfortunately, she is not a surgical candidate given her sever pulmonary HTN. Also has stone burden on imaging that increases her risk for recurrent cholecystitis if tube were to be pulled.  - ED precautions given  - RTC PRN  - Continue any current medications  - Call with any questions or concerns  - Discussed POC with patient. All questions were answered.  Patient is agreeable to plan and verbalized understanding.     Patient and POC discussed with general surgery staff, Dr. Shah. They are in agreement with current POC.       ANA Gaston, PA-C  General Surgery  - [facility]           [1]   Social History  Tobacco Use    Smoking status: Never    Smokeless tobacco: Never   Substance Use Topics    Alcohol use: No     Alcohol/week: 0.8 standard drinks of alcohol     Types: 1 Standard drinks or equivalent per week     Comment: socially    Drug use: No

## 2025-05-07 ENCOUNTER — LAB REQUISITION (OUTPATIENT)
Dept: LAB | Facility: HOSPITAL | Age: 55
End: 2025-05-07
Payer: COMMERCIAL

## 2025-05-07 DIAGNOSIS — K81.9 CHOLECYSTITIS, UNSPECIFIED: ICD-10-CM

## 2025-05-07 DIAGNOSIS — T85.518A CHOLECYSTOSTOMY TUBE DYSFUNCTION, INITIAL ENCOUNTER: Primary | ICD-10-CM

## 2025-05-07 LAB
T4 FREE SERPL-MCNC: 1.38 NG/DL (ref 0.71–1.51)
TSH SERPL-ACNC: 0.04 UIU/ML (ref 0.4–4)

## 2025-05-07 PROCEDURE — 84443 ASSAY THYROID STIM HORMONE: CPT | Performed by: NURSE PRACTITIONER

## 2025-05-09 DIAGNOSIS — G43.009 MIGRAINE WITHOUT AURA AND WITHOUT STATUS MIGRAINOSUS, NOT INTRACTABLE: ICD-10-CM

## 2025-05-12 RX ORDER — TOPIRAMATE 100 MG/1
100 TABLET, FILM COATED ORAL 2 TIMES DAILY
Qty: 60 TABLET | Refills: 0 | Status: SHIPPED | OUTPATIENT
Start: 2025-05-12 | End: 2025-06-11

## 2025-05-17 ENCOUNTER — LAB VISIT (OUTPATIENT)
Dept: LAB | Facility: HOSPITAL | Age: 55
End: 2025-05-17
Attending: FAMILY MEDICINE
Payer: COMMERCIAL

## 2025-05-17 DIAGNOSIS — T85.518A CHOLECYSTOSTOMY TUBE DYSFUNCTION, INITIAL ENCOUNTER: ICD-10-CM

## 2025-05-17 LAB
ALBUMIN SERPL BCP-MCNC: 3.8 G/DL (ref 3.5–5.2)
ALP SERPL-CCNC: 78 UNIT/L (ref 40–150)
ALT SERPL W/O P-5'-P-CCNC: 12 UNIT/L (ref 10–44)
ANION GAP (OHS): 7 MMOL/L (ref 8–16)
AST SERPL-CCNC: 16 UNIT/L (ref 11–45)
BILIRUB SERPL-MCNC: 0.4 MG/DL (ref 0.1–1)
BUN SERPL-MCNC: 11 MG/DL (ref 6–20)
CALCIUM SERPL-MCNC: 8.7 MG/DL (ref 8.7–10.5)
CHLORIDE SERPL-SCNC: 113 MMOL/L (ref 95–110)
CO2 SERPL-SCNC: 19 MMOL/L (ref 23–29)
CREAT SERPL-MCNC: 0.7 MG/DL (ref 0.5–1.4)
GFR SERPLBLD CREATININE-BSD FMLA CKD-EPI: >60 ML/MIN/1.73/M2
GLUCOSE SERPL-MCNC: 96 MG/DL (ref 70–110)
POTASSIUM SERPL-SCNC: 3.8 MMOL/L (ref 3.5–5.1)
PROT SERPL-MCNC: 7.5 GM/DL (ref 6–8.4)
SODIUM SERPL-SCNC: 139 MMOL/L (ref 136–145)

## 2025-05-17 PROCEDURE — 36415 COLL VENOUS BLD VENIPUNCTURE: CPT

## 2025-05-17 PROCEDURE — 80053 COMPREHEN METABOLIC PANEL: CPT

## 2025-05-19 ENCOUNTER — ANESTHESIA (OUTPATIENT)
Dept: INTERVENTIONAL RADIOLOGY/VASCULAR | Facility: HOSPITAL | Age: 55
End: 2025-05-19
Payer: COMMERCIAL

## 2025-05-19 ENCOUNTER — HOSPITAL ENCOUNTER (OUTPATIENT)
Dept: INTERVENTIONAL RADIOLOGY/VASCULAR | Facility: HOSPITAL | Age: 55
Discharge: HOME | End: 2025-05-19
Payer: COMMERCIAL

## 2025-05-19 VITALS
OXYGEN SATURATION: 95 % | HEART RATE: 83 BPM | SYSTOLIC BLOOD PRESSURE: 101 MMHG | TEMPERATURE: 98 F | WEIGHT: 140 LBS | RESPIRATION RATE: 11 BRPM | BODY MASS INDEX: 28.22 KG/M2 | DIASTOLIC BLOOD PRESSURE: 58 MMHG | HEIGHT: 59 IN

## 2025-05-19 DIAGNOSIS — T85.518A CHOLECYSTOSTOMY TUBE DYSFUNCTION, INITIAL ENCOUNTER: ICD-10-CM

## 2025-05-19 PROCEDURE — 37000008 HC ANESTHESIA 1ST 15 MINUTES

## 2025-05-19 PROCEDURE — 63600175 PHARM REV CODE 636 W HCPCS: Performed by: NURSE ANESTHETIST, CERTIFIED REGISTERED

## 2025-05-19 PROCEDURE — C1729 CATH, DRAINAGE: HCPCS

## 2025-05-19 PROCEDURE — 47536 EXCHANGE BILIARY DRG CATH: CPT | Performed by: RADIOLOGY

## 2025-05-19 PROCEDURE — 25500020 PHARM REV CODE 255: Performed by: NURSE ANESTHETIST, CERTIFIED REGISTERED

## 2025-05-19 PROCEDURE — C1769 GUIDE WIRE: HCPCS

## 2025-05-19 PROCEDURE — 63600175 PHARM REV CODE 636 W HCPCS: Performed by: RADIOLOGY

## 2025-05-19 PROCEDURE — 37000009 HC ANESTHESIA EA ADD 15 MINS

## 2025-05-19 RX ORDER — MIDAZOLAM HYDROCHLORIDE 1 MG/ML
INJECTION INTRAMUSCULAR; INTRAVENOUS
Status: DISCONTINUED | OUTPATIENT
Start: 2025-05-19 | End: 2025-05-19

## 2025-05-19 RX ORDER — LIDOCAINE HYDROCHLORIDE 20 MG/ML
INJECTION INTRAVENOUS
Status: DISCONTINUED | OUTPATIENT
Start: 2025-05-19 | End: 2025-05-19

## 2025-05-19 RX ORDER — SODIUM CHLORIDE 9 MG/ML
INJECTION, SOLUTION INTRAVENOUS CONTINUOUS PRN
Status: DISCONTINUED | OUTPATIENT
Start: 2025-05-19 | End: 2025-05-19

## 2025-05-19 RX ORDER — CIPROFLOXACIN 2 MG/ML
INJECTION, SOLUTION INTRAVENOUS
Status: DISCONTINUED | OUTPATIENT
Start: 2025-05-19 | End: 2025-05-19

## 2025-05-19 RX ORDER — ONDANSETRON HYDROCHLORIDE 2 MG/ML
4 INJECTION, SOLUTION INTRAVENOUS EVERY 6 HOURS PRN
Status: DISCONTINUED | OUTPATIENT
Start: 2025-05-19 | End: 2025-05-20 | Stop reason: HOSPADM

## 2025-05-19 RX ORDER — PROPOFOL 10 MG/ML
VIAL (ML) INTRAVENOUS
Status: DISCONTINUED | OUTPATIENT
Start: 2025-05-19 | End: 2025-05-19

## 2025-05-19 RX ORDER — LIDOCAINE HYDROCHLORIDE 10 MG/ML
1 INJECTION, SOLUTION EPIDURAL; INFILTRATION; INTRACAUDAL; PERINEURAL ONCE
Status: DISCONTINUED | OUTPATIENT
Start: 2025-05-19 | End: 2025-05-20 | Stop reason: HOSPADM

## 2025-05-19 RX ORDER — PROPOFOL 10 MG/ML
VIAL (ML) INTRAVENOUS CONTINUOUS PRN
Status: DISCONTINUED | OUTPATIENT
Start: 2025-05-19 | End: 2025-05-19

## 2025-05-19 RX ORDER — SODIUM CHLORIDE 9 MG/ML
INJECTION, SOLUTION INTRAVENOUS CONTINUOUS
Status: DISCONTINUED | OUTPATIENT
Start: 2025-05-19 | End: 2025-05-20 | Stop reason: HOSPADM

## 2025-05-19 RX ORDER — LIDOCAINE HYDROCHLORIDE 10 MG/ML
INJECTION, SOLUTION EPIDURAL; INFILTRATION; INTRACAUDAL; PERINEURAL
Status: COMPLETED | OUTPATIENT
Start: 2025-05-19 | End: 2025-05-19

## 2025-05-19 RX ADMIN — CIPROFLOXACIN 400 MG: 2 INJECTION, SOLUTION INTRAVENOUS at 08:05

## 2025-05-19 RX ADMIN — LIDOCAINE HYDROCHLORIDE 3 ML: 10 INJECTION, SOLUTION EPIDURAL; INFILTRATION; INTRACAUDAL; PERINEURAL at 08:05

## 2025-05-19 RX ADMIN — LIDOCAINE HYDROCHLORIDE 20 MG: 20 INJECTION INTRAVENOUS at 07:05

## 2025-05-19 RX ADMIN — PROPOFOL 40 MG: 10 INJECTION, EMULSION INTRAVENOUS at 07:05

## 2025-05-19 RX ADMIN — MIDAZOLAM HYDROCHLORIDE 2 MG: 1 INJECTION, SOLUTION INTRAMUSCULAR; INTRAVENOUS at 07:05

## 2025-05-19 RX ADMIN — IOHEXOL 10 ML: 300 INJECTION, SOLUTION INTRAVENOUS at 08:05

## 2025-05-19 RX ADMIN — PROPOFOL 150 MCG/KG/MIN: 10 INJECTION, EMULSION INTRAVENOUS at 07:05

## 2025-05-19 RX ADMIN — SODIUM CHLORIDE: 9 INJECTION, SOLUTION INTRAVENOUS at 07:05

## 2025-05-19 NOTE — NURSING
Pt arrived to [facility] for kita tube exchange with anesthesia, escorted to room by RN and CRNA who will assume care. Pt oriented to unit and staff. Plan of care reviewed with patient, patient verbalizes understanding. Comfort measures utilized. Pt safely transferred from stretcher to procedural table. Fall risk reviewed with patient, fall risk interventions maintained. Safety strap applied, positioner pillows utilized to minimize pressure points. Blankets applied. Pt prepped and draped utilizing standard sterile technique. Patient placed on continuous monitoring, as required by sedation policy. Timeouts completed utilizing standard universal time-out, per department and facility policy. RN to remain at bedside, continuous monitoring maintained. Pt resting comfortably. Denies pain/discomfort. Will continue to monitor. See flow sheets for monitoring, medication administration, and updates.

## 2025-05-19 NOTE — TRANSFER OF CARE
"Anesthesia Transfer of Care Note    Patient: Yuni Perez    Procedure(s) Performed: * No procedures listed *    Patient location: [facility]    Anesthesia Type: general    Transport from OR: Transported from OR on 6-10 L/min O2 by face mask with adequate spontaneous ventilation    Post pain: adequate analgesia    Post assessment: no apparent anesthetic complications and tolerated procedure well    Post vital signs: stable    Level of consciousness: sedated    Nausea/Vomiting: no nausea/vomiting    Complications: none    Transfer of care protocol was followed      Last vitals: Visit Vitals  /66   Pulse 89   Temp 36.8 °C (98.2 °F) (Temporal)   Resp 19   Ht 4' 11" (1.499 m)   Wt 63.5 kg (140 lb)   SpO2 95%   Breastfeeding No   BMI 28.28 kg/m²     "

## 2025-05-19 NOTE — DISCHARGE INSTRUCTIONS
Cholecystostomy Drain Discharge Instructions    What is a Cholecystostomy drain (Kita Tube)?   Bile is produced by your liver to help digest food. It is stored in the gallbladder. A Kita Tube is placed when the gallbladder is blocked and bile can not exit the gallbladder. The kita tube drains the blocked gallbladder and removes infection.      Generally the drain must stay in for at least 4 weeks, unless surgery removes your gallbladder. While the drain is there, you will have follow-up care with your provider regularly. The drain will be checked in 4 weeks in the Interventional Radiology (IR) department. Drains are generally replaced every 8-10 weeks. If you do not have an appointment and feel it is time for the tube to be exchanged, please call [phone].       Diet and Activity:   Continue with your regular diet and previously prescribed medications.    Avoid any activity that may result in pulling on the drain.    Do not submerge the drain. This means do not swim, sit in a hot tub, soak in a tub bath, or do anything that involves putting the drain under water.     Emptying the Collection Bag:  Empty the collection bag into the toilet when it is half full, or at least once each day. Your provider will let you know if you should keep track of the amount.     1. Turn the knob at the bottom of the bag and drain into toilet. If your provider has told you to track the contents, empty the bag into a measuring container and record the amount.   2. Wash your hands.   3. Change outside tube and bag weekly. These can be bought from any medical supplies store.     Showering:  While it is okay to shower with your drain, do not soak in any water with your drain. Soaking may lead to infection.      First 2 weeks: Before taking a shower, cover the dressing with a double layer of plastic wrap (like Saran wrap) where it enters the skin. Tape down the edges. After the shower, remove the plastic and apply a clean  bandage.    After 2 weeks: You may shower without the plastic wrap. Rinse well. Pat the area dry and apply a clean bandage.     Drain Care:  Change your dressing at least every 2 days, or if it becomes dirty or wet.   1. Wash your hands.   2. Remove the old bandage carefully. Try not to pull on the drain or stitches.   3. If the skin needs to be cleaned, use a gauze or cotton swab to gently clean it with soap and water.   4. Wait for your skin to dry.   5. Apply a sterile 4x4 gauze over the tube where it enters your body. Be careful not to kink the drain.   6. Secure with paper tape.   7. Wash your hands.     You may or may not need to flush the drain. Your provider will discuss this with you.     Call your provider immediately or seek emergency medical attention if you experience any of the following symptoms. This information does not replace medical advice from your healthcare provider. Your experience may differ from that of the typical patient. Talk to your provider if you have any questions about this document, your condition, or your treatment plan.    New or worsening yellowing of your skin/eyes    Fever    New or worsening belly pain    Nausea or vomiting    New or worsening redness or swelling where the drain meets the skin    Pus leaking around the drain    The drain begins to leak, breaks, or falls out Troubleshooting    If you have any of the above symptoms and the drain is capped, uncap the drain and attach the drainage bag.    If the drain is already attached to a drainage bag, remove the bandage and check to see if the drain is twisted. If these steps do not improve your symptoms, call your provider immediately, or go to the nearest emergency room, or call 911.         Flushing instructions:  If you are told to flush your drain You will need to flush your biliary drainwith 10 cc of normal saline each day.   1. Gather supplies: 10 mL of sterile normal saline solution, syringe, sterile gauze, paper  tape, and rubbing alcohol (liquid or single-use alcohol wipes).   2. Wash your hands.   3. Unscrew the drainage bag from drain (place a towel beneath to catch any drips).   4. Wipe drain with alcohol.   5. Fill the syringe with 10 mL of normal saline solution.   6. Attach syringe to drain.   7. Gently inject normal saline into drain. Do NOT pull back on the syringe.   8. Unscrew syringe.   9. Reattach drainage bag.   10. Wash your hands and dispose of supplies.         Capping instructions:  If you are told to cap your drain, you should cap the drain 24 hours after the procedure.     Remember, if you experience any of the following symptoms, try uncapping the drain and attaching the drainage bag:    New or worsening yellowing of your skin/eyes    Fever    New or worsening belly pain    Nausea or vomiting    Increased drainage around the tube    If this does not improve your symptoms, call your provider immediately or seek emergency medical attention.     Interventional Radiology Clinic     For complications   [phone]. Monday - Friday, 8:00 am - 4:00 pm    [phone] After hours and on holidays. Ask to speak with the interventional radiologist on call.     For Scheduling   [phone] Monday - Friday, 8:00 am - 4:00 pm   Instruct patient NOT to use Biopatch/chlorhexadine patch to tube site

## 2025-05-19 NOTE — PROGRESS NOTES
Pt has questions for the doctor who performed the procedure. I[facility]R. called & notified. The Resident will come and speak to patient & family.

## 2025-05-19 NOTE — SEDATION DOCUMENTATION
"Pt c/o pain with kita tube and noted had Biopatch at site even tho states gets "hives" with chlorhexadine. MD made aware  "

## 2025-05-19 NOTE — NURSING
Sue tube exchange complete. Pt tolerated well. VSS. No signs or symptoms of distress noted per CRNA. Pt will be transferred to PACU bed escorted by RN and CRNA who will give report.   Biliary tube open and draining to bag

## 2025-05-19 NOTE — PROCEDURES
Radiology Post-Procedure Note    Pre Op Diagnosis: Cholelithiasis  Post Op Diagnosis: Same    Procedure: Cholecystostomy tube exchange    Procedure performed by: Karan Daniels MD    Written Informed Consent Obtained: Yes  Specimen Removed: NO  Estimated Blood Loss: Minimal    Findings:   Successful 10F cholecystostomy tube exchange.    Patient tolerated procedure well.    Karan Daniels MD  Interventional Radiologist  Department of Radiology

## 2025-05-19 NOTE — ANESTHESIA PREPROCEDURE EVALUATION
"[facility]  Anesthesia Pre-Operative Evaluation         Patient Name/: Yuni Perez, 1970  MRN: [id number]    SUBJECTIVE:     Pre-operative evaluation for * No procedures listed *     2025    Yuni Perez is a 55 y.o. female     Patient now presents for the above procedure(s).    ________________________________________  Results for orders placed during the hospital encounter of 24    Echo    Interpretation Summary    Left Ventricle: The left ventricle is normal in size. Normal wall thickness. Normal wall motion. Septal flattening in systole consistent with right ventricular pressure overload. There is normal systolic function with a visually estimated ejection fraction of 60 - 65%. There is normal diastolic function.    Right Ventricle: Moderate right ventricular enlargement with hypertrophy. Systolic function is mildly to moderately reduced.    The right atrium is mildly dilated.    Tricuspid Valve: There is mild regurgitation.    Pulmonary Artery: There is moderate to severe pulmonary hypertension. The estimated pulmonary artery systolic pressure is 67 mmHg.    IVC/SVC: Normal venous pressure at 3 mmHg.    ________________________________________    LDA:   Tunneled Central Line Insertion/Assessment - Single Lumen  20 1018 (Active)   Number of days: 1796       Tunneled Central Line - Single Lumen  Subclavian Right (Active)   Number of days:             Biliary Tube 25 1730 10 Fr. RUQ (Active)   Number of days: 48       Drips:       Problem List[1]    Review of patient's allergies indicates:   Allergen Reactions    Fentanyl Anaphylaxis     Respiratory distress    Vibra-tabs [doxycycline hyclate] Anaphylaxis     "throat felt like it was closing"    Adhesive Hives     Silk tape    Amoxicillin Rash    Nsaids (non-steroidal anti-inflammatory drug) Swelling    Chlorhexidine Other (See Comments)     Blue Chlorhexidine causes hives       Current " Inpatient Medications:       Medications Ordered Prior to Encounter[2]    Past Surgical History:   Procedure Laterality Date    CARDIAC CATHETERIZATION      CATHETERIZATION OF BOTH LEFT AND RIGHT HEART Bilateral 9/29/2020    Procedure: CATHETERIZATION, HEART, BOTH LEFT AND RIGHT;  Surgeon: Gucci Pickett MD;  Location: [facility] CATH LAB;  Service: Cardiology;  Laterality: Bilateral;    CENTRAL VENOUS CATHETER INSERTION      CORONARY ANGIOGRAPHY N/A 9/29/2020    Procedure: ANGIOGRAM, CORONARY ARTERY;  Surgeon: Gucci Pickett MD;  Location: [facility] CATH LAB;  Service: Cardiology;  Laterality: N/A;    gallbladder drain  06/2019    INSERTION OF HAYNES CATHETER Right 6/17/2020    Procedure: INSERTION, CATHETER, CENTRAL VENOUS, HAYNES Replace Single Lumen for Remodulin Infusion;  Surgeon: Bertin Dewitt MD;  Location: [facility] OR [address] [place] [address];  Service: General;  Laterality: Right;    PORTACATH PLACEMENT      REMOVAL OF TUNNELED CENTRAL VENOUS CATHETER (CVC) N/A 6/17/2020    Procedure: REMOVAL, CATHETER, CENTRAL VENOUS, TUNNELED;  Surgeon: Bertin Dewitt MD;  [facility];  Service: General;  Laterality: N/A;    RIGHT HEART CATHETERIZATION Right 8/20/2018    Procedure: HEART CATH-RIGHT;  Surgeon: Ivonne Rai MD;  Location: [facility] CATH LAB;  Service: Cardiology;  Laterality: Right;    RIGHT HEART CATHETERIZATION Right 9/4/2019    Procedure: INSERTION, CATHETER, RIGHT HEART;  Surgeon: Ivonne Rai MD;  Location: [facility] CATH LAB;  Service: Cardiology;  Laterality: Right;    RIGHT HEART CATHETERIZATION Right 6/10/2022    Procedure: INSERTION, CATHETER, RIGHT HEART;  Surgeon: Keshia Poe MD;  Location: [facility] CATH LAB;  Service: Cardiology;  Laterality: Right;    UPPER GASTROINTESTINAL ENDOSCOPY         Social History:  Tobacco Use: Low Risk  (5/1/2025)    Patient History     Smoking Tobacco Use: Never     Smokeless Tobacco Use: Never     Passive Exposure: Not on file       Alcohol Use: Not At Risk  (4/10/2025)    Received from [facility]    AUDIT-C     Frequency of Alcohol Consumption: Never     Average Number of Drinks: Not on file     Frequency of Binge Drinking: Never       OBJECTIVE:     Vital Signs Range:  BMI Readings from Last 1 Encounters:   05/01/25 28.99 kg/m²               Significant Labs:        Component Value Date/Time    WBC 6.83 04/06/2025 0909    HGB 13.7 04/06/2025 0909    HGB 14.8 12/13/2024 1107    HCT 41.8 04/06/2025 0909    HCT 44.9 12/13/2024 1107    HCT 38 09/09/2014 1521     04/06/2025 0909     12/13/2024 1107     05/17/2025 1043     12/13/2024 1107    K 3.8 05/17/2025 1043    K 3.9 12/13/2024 1107     (H) 05/17/2025 1043     (H) 12/13/2024 1107    CO2 19 (L) 05/17/2025 1043    CO2 20 (L) 12/13/2024 1107    GLU 96 05/17/2025 1043     12/13/2024 1107    BUN 11 05/17/2025 1043    CREATININE 0.7 05/17/2025 1043    MG 2.0 04/04/2025 0619    PHOS 4.0 04/04/2025 0619    PHOS 2.8 09/10/2014 0616    CALCIUM 8.7 05/17/2025 1043    CALCIUM 8.8 12/13/2024 1107    ALBUMIN 3.8 05/17/2025 1043    ALBUMIN 3.9 12/13/2024 1107    PROT 7.5 05/17/2025 1043    PROT 7.2 12/13/2024 1107    ALKPHOS 78 05/17/2025 1043    ALKPHOS 89 12/13/2024 1107    BILITOT 0.4 05/17/2025 1043    BILITOT 0.4 12/13/2024 1107    AST 16 05/17/2025 1043    AST 15 12/13/2024 1107    ALT 12 05/17/2025 1043    ALT 11 12/13/2024 1107    INR 1.2 04/02/2025 1346    INR 0.9 03/11/2024 0834    INR 2.3 (H) 03/09/2010 1207    HGBA1C 4.9 09/21/2020 1009        Please see Results Review for additional labs.     Diagnostic Studies: No relevant studies.    EKG:   Results for orders placed or performed during the hospital encounter of 03/30/25   EKG 12-lead    Collection Time: 04/02/25 11:50 AM   Result Value Ref Range    QRS Duration 70 ms    [facility] QTC Calculation 430 ms    Narrative    Test Reason : R00.0,    Vent. Rate : 127 BPM     Atrial Rate : 127 BPM     P-R Int : 146  ms          QRS Dur :  70 ms      QT Int : 296 ms       P-R-T Axes :  78 133  21 degrees    QTcB Int : 430 ms    Sinus tachycardia  Rightward axis  Right atrial enlargement  RVH with repolarization abnormality  Abnormal ECG  When compared with ECG of 30-Mar-2025 13:20,  No significant change was found  Confirmed by Atul Montanez ([id number]) on 4/2/2025 1:42:52 PM    Referred By: AAAREFERRAL SELF           Confirmed By: Atul Montanez       ECHO:  See subjective, if available.      ASSESSMENT/PLAN:                                                                                                                  05/19/2025  Yuni Perez is a 55 y.o., female.      Pre-op Assessment          Review of Systems  Cardiovascular:                    Primary pulmonary hypertension                           Pulmonary:    Asthma    Sleep Apnea                Hepatic/GI:     GERD Liver Disease,               Musculoskeletal:         Spine Disorders: cervical            Neurological:      Headaches Seizures          Chronic Pain Syndrome                         Endocrine:   Hypothyroidism                 Anesthesia Plan  Type of Anesthesia, risks & benefits discussed:    Anesthesia Type: Gen Natural Airway  Intra-op Monitoring Plan: Standard ASA Monitors  Induction:  IV  Informed Consent: Informed consent signed with the Patient and all parties understand the risks and agree with anesthesia plan.  All questions answered.   ASA Score: 4  Day of Surgery Review of History & Physical: H&P Update referred to the surgeon/provider.    Ready For Surgery From Anesthesia Perspective.     .           [1]   Patient Active Problem List  Diagnosis    Chronic pulmonary heart disease    Liver mass    Non-allergic vasomotor rhinitis    Allergic asthma    WHO group 1 pulmonary arterial hypertension    Tricuspid regurgitation    Hypothyroidism (acquired)    Migraine without aura and without status migrainosus, not intractable    Lumbar disc  disease    Moderate asthma    Chronic low back pain    Overweight (BMI 25.0-29.9)    Menorrhagia    ABHIJEET (obstructive sleep apnea)    Medication overuse headache    Cholelithiasis    Borderline osteopenia    Hepatic adenoma    Left shoulder pain    Chronic tension-type headache, not intractable    Mild intermittent asthma    Pasteurella cellulitis due to cat bite    Cervicogenic headache    Episodic migraine    Chronic neck pain    Decreased range of motion of neck    Posture abnormality    Cholecystitis    Moderate protein-calorie malnutrition    SVT (supraventricular tachycardia)    Fungal rash of trunk   [2]   Current Outpatient Medications on File Prior to Visit   Medication Sig Dispense Refill    acetaminophen (TYLENOL) 500 MG tablet Take 1,000 mg by mouth every 6 (six) hours as needed for Pain.      ADCIRCA 20 mg Tab Take 2 tablets (40 mg total) by mouth once daily. (Patient taking differently: Take 40 mg by mouth nightly.) 60 tablet 11    albuterol (VENTOLIN HFA) 90 mcg/actuation inhaler Inhale 2 puffs into the lungs every 6 (six) hours as needed for Wheezing or Shortness of Breath. Rescue 18 g 6    albuterol-ipratropium (DUO-NEB) 2.5 mg-0.5 mg/3 mL nebulizer solution Take 3 mLs by nebulization every 6 (six) hours as needed for Wheezing. Rescue (Patient not taking: Reported on 4/8/2025) 6 each 6    alprazolam (XANAX ORAL) Take by mouth as needed. (Patient not taking: Reported on 5/16/2025)      ambrisentan (LETAIRIS) 10 MG Tab Take 1 tablet (10 mg total) by mouth once daily. 30 tablet 11    BREO ELLIPTA 200-25 mcg/dose DsDv diskus inhaler INHALE 1 PUFF INTO THE LUNGS EVERY EVENING. CONTROLLER 180 each 1    butalbital-acetaminophen-caffeine -40 mg (FIORICET, ESGIC) -40 mg per tablet Take 1 tablet by mouth every 4 (four) hours as needed.      cyanocobalamin, vitamin B-12, 2,500 mcg Subl Place 2,500 mcg under the tongue every morning.       diphenhydrAMINE (BENADRYL) 25 mg capsule Take 50 mg by mouth  every 6 (six) hours as needed for Itching. Patient takes prn evenings      diphenoxylate-atropine 2.5-0.025 mg (LOMOTIL) 2.5-0.025 mg per tablet Take 1 tablet by mouth 4 (four) times daily as needed for Diarrhea. 120 tablet 0    ferrous sulfate 325 (65 FE) MG EC tablet Take 325 mg by mouth daily as needed.       fluticasone propionate (FLONASE) 50 mcg/actuation nasal spray 2 sprays (100 mcg total) by Each Nostril route every evening. (Patient taking differently: 2 sprays by Each Nostril route Daily.) 16 g 11    folic acid (FOLVITE) 1 MG tablet Take 1 tablet (1,000 mcg total) by mouth once daily. 90 tablet 3    furosemide (LASIX) 20 MG tablet Take 1 tablet (20 mg total) by mouth once daily. (Patient taking differently: Take 20 mg by mouth once daily. Usually takes PRN weekly for swelling.) 30 tablet 11    glucosam/chond-msm1/C/michele/bor (GLUCOSAMINE-CHOND-MSM COMPLEX ORAL) Take by mouth. Three times a week (Patient taking differently: Take by mouth. Two times a week)      hyoscyamine (LEVSIN) 0.125 mg Subl Place 1 tablet (0.125 mg total) under the tongue every 6 (six) hours as needed (Aa needed). 60 tablet 3    lactic acid-citric-potassium (PHEXXI) 1.8-1-0.4 % Gel Place 1 Applicatorful vaginally as needed (CONTRACEPTIVE). (Patient not taking: Reported on 5/1/2025) 5 g 2    levothyroxine (SYNTHROID) 125 MCG tablet Take 1 tablet (125 mcg total) by mouth before breakfast. 90 tablet 2    LIDOcaine (LIDODERM) 5 % Place 1 patch onto the skin once daily. (Patient taking differently: Place 1 patch onto the skin once daily. Takes PRN) 15 patch 0    loratadine (CLARITIN) 10 mg tablet Take 10 mg by mouth once daily. Takes in morning      nystatin (MYCOSTATIN) powder Apply topically 2 (two) times daily. for 14 days 30 g 1    pantoprazole (PROTONIX) 40 MG tablet Take 1 tablet (40 mg total) by mouth once daily. 90 tablet 3    rimegepant (NURTEC) 75 mg odt Take 1 tablet (75 mg total) by mouth as needed for Migraine. Place ODT tablet  on the tongue; alternatively the ODT tablet may be placed under the tongue. Can take 2nd dose in 24 hrs if mild relief, no more than 2 in 24 hrs 16 tablet 5    tiZANidine (ZANAFLEX) 4 MG tablet Take 4 mg by mouth every 6 (six) hours as needed.      topiramate (TOPAMAX) 100 MG tablet Take 1 tablet (100 mg total) by mouth 2 (two) times daily. Further fills on followup. 60 tablet 0    treprostinil (REMODULIN) 2.5 mg/mL Soln Mix cassette as directed and infuse continuously per physician titration orders on dosing sheet. Current dose 80ng/kg/min 60 mL 11    triamcinolone (KENALOG) 0.5 % ointment Apply 1 application  topically 2 (two) times daily.       No current facility-administered medications on file prior to visit.

## 2025-05-19 NOTE — PLAN OF CARE
Patient Awake and alert. Denies pain, nausea/vomiting. Tolerating liquids. VSS. No distress noted. D/C instructions given to patient & family. Verbals understanding.

## 2025-05-19 NOTE — PLAN OF CARE
Patient arrived to room. PIV placed.. Admit assessment completed. Plan of care discussed with patient. Will monitor. Call light within reach, instructed in use.

## 2025-05-19 NOTE — H&P
Radiology History & Physical      SUBJECTIVE:     History of Present Illness:  Yuni Perez is a 55 y.o. female who presents for kita tube check +/- exchange. Pt reports tube has not been capped and only drains when bending over.   Past Medical History:   Diagnosis Date    AR (allergic rhinitis)     Cholelithiasis, common bile duct     Chronic low back pain     Eye pressure 2017    General anesthetics causing adverse effect in therapeutic use     GERD (gastroesophageal reflux disease)     History of migraine headaches     Hypothyroidism     Innocent heart murmur     Lumbar disc disease     Menorrhagia     Mild asthma     Obesity     Plantar fasciitis of left foot     Primary pulmonary hypertension     followed by heart transplant/pulmonary     Seizure disorder     x 1 in 2008    Seizures     Sleep apnea     SVT (supraventricular tachycardia) 4/3/2025    TMJ (dislocation of temporomandibular joint)     Tricuspid regurgitation      Past Surgical History:   Procedure Laterality Date    CARDIAC CATHETERIZATION      CATHETERIZATION OF BOTH LEFT AND RIGHT HEART Bilateral 9/29/2020    Procedure: CATHETERIZATION, HEART, BOTH LEFT AND RIGHT;  Surgeon: Gucci Pickett MD;  Location: [facility] CATH LAB;  Service: Cardiology;  Laterality: Bilateral;    CENTRAL VENOUS CATHETER INSERTION      CORONARY ANGIOGRAPHY N/A 9/29/2020    Procedure: ANGIOGRAM, CORONARY ARTERY;  Surgeon: Gucci Pickett MD;  Location: [facility] CATH LAB;  Service: Cardiology;  Laterality: N/A;    gallbladder drain  06/2019    INSERTION OF HAYNES CATHETER Right 6/17/2020    Procedure: INSERTION, CATHETER, CENTRAL VENOUS, HAYNES Replace Single Lumen for Remodulin Infusion;  Surgeon: Bertin Dewitt MD;  Location: [facility] OR [address] [place] [address];  Service: General;  Laterality: Right;    PORTACATH PLACEMENT      REMOVAL OF TUNNELED CENTRAL VENOUS CATHETER (CVC) N/A 6/17/2020    Procedure: REMOVAL, CATHETER, CENTRAL VENOUS, TUNNELED;  Surgeon: Bertin Dewitt,  MD;  Location: [facility] OR [facility] FLR;  Service: General;  Laterality: N/A;    RIGHT HEART CATHETERIZATION Right 8/20/2018    Procedure: HEART CATH-RIGHT;  Surgeon: Ivonne Rai MD;  Location: [facility] CATH LAB;  Service: Cardiology;  Laterality: Right;    RIGHT HEART CATHETERIZATION Right 9/4/2019    Procedure: INSERTION, CATHETER, RIGHT HEART;  Surgeon: Ivonne Rai MD;  Location: [facility] CATH LAB;  Service: Cardiology;  Laterality: Right;    RIGHT HEART CATHETERIZATION Right 6/10/2022    Procedure: INSERTION, CATHETER, RIGHT HEART;  Surgeon: Keshia Poe MD;  Location: [facility] CATH LAB;  Service: Cardiology;  Laterality: Right;    UPPER GASTROINTESTINAL ENDOSCOPY         Home Meds:   Prior to Admission medications    Medication Sig Start Date End Date Taking? Authorizing Provider   acetaminophen (TYLENOL) 500 MG tablet Take 1,000 mg by mouth every 6 (six) hours as needed for Pain.   Yes Provider, Historical   ADCIRCA 20 mg Tab Take 2 tablets (40 mg total) by mouth once daily.  Patient taking differently: Take 40 mg by mouth nightly. 3/11/25   Marcel Cantu MD   albuterol (VENTOLIN HFA) 90 mcg/actuation inhaler Inhale 2 puffs into the lungs every 6 (six) hours as needed for Wheezing or Shortness of Breath. Rescue 3/12/25 3/12/26  Deborah Lebron FNP   albuterol-ipratropium (DUO-NEB) 2.5 mg-0.5 mg/3 mL nebulizer solution Take 3 mLs by nebulization every 6 (six) hours as needed for Wheezing. Rescue  Patient not taking: Reported on 4/8/2025 10/22/21 4/8/25  Keshia Poe MD   alprazolam (XANAX ORAL) Take by mouth as needed.  Patient not taking: Reported on 5/16/2025    Provider, Historical   ambrisentan (LETAIRIS) 10 MG Tab Take 1 tablet (10 mg total) by mouth once daily. 3/11/25   Marcel Cantu MD   BREO ELLIPTA 200-25 mcg/dose DsDv diskus inhaler INHALE 1 PUFF INTO THE LUNGS EVERY EVENING. CONTROLLER 3/24/25   Lulu Sandhu MD   butalbital-acetaminophen-caffeine -40 mg (FIORICET, ESGIC) -40 mg  per tablet Take 1 tablet by mouth every 4 (four) hours as needed.    Provider, Historical   cyanocobalamin, vitamin B-12, 2,500 mcg Subl Place 2,500 mcg under the tongue every morning.     Provider, Historical   diphenhydrAMINE (BENADRYL) 25 mg capsule Take 50 mg by mouth every 6 (six) hours as needed for Itching. Patient takes prn evenings    Provider, Historical   diphenoxylate-atropine 2.5-0.025 mg (LOMOTIL) 2.5-0.025 mg per tablet Take 1 tablet by mouth 4 (four) times daily as needed for Diarrhea. 9/25/24   Bárbara Langford NP   ferrous sulfate 325 (65 FE) MG EC tablet Take 325 mg by mouth daily as needed.     Provider, Historical   fluticasone propionate (FLONASE) 50 mcg/actuation nasal spray 2 sprays (100 mcg total) by Each Nostril route every evening.  Patient taking differently: 2 sprays by Each Nostril route Daily. 12/15/20   Keshia Poe MD   folic acid (FOLVITE) 1 MG tablet Take 1 tablet (1,000 mcg total) by mouth once daily. 11/22/24   Renee Schumacher NP   furosemide (LASIX) 20 MG tablet Take 1 tablet (20 mg total) by mouth once daily.  Patient taking differently: Take 20 mg by mouth once daily. Usually takes PRN weekly for swelling. 9/25/24 9/25/25  Bárbara Langford NP   glucosam/chond-msm1/C/michele/bor (GLUCOSAMINE-CHOND-MSM COMPLEX ORAL) Take by mouth. Three times a week  Patient taking differently: Take by mouth. Two times a week    Provider, Historical   hyoscyamine (LEVSIN) 0.125 mg Subl Place 1 tablet (0.125 mg total) under the tongue every 6 (six) hours as needed (Aa needed). 4/18/24   Leandra Acevedo NP   lactic acid-citric-potassium (PHEXXI) 1.8-1-0.4 % Gel Place 1 Applicatorful vaginally as needed (CONTRACEPTIVE).  Patient not taking: Reported on 5/1/2025 10/4/21   Darcie Mccain MD   levothyroxine (SYNTHROID) 125 MCG tablet Take 1 tablet (125 mcg total) by mouth before breakfast. 11/22/24   Renee Schumacher NP   LIDOcaine (LIDODERM) 5 % Place 1 patch onto the skin once  "daily.  Patient taking differently: Place 1 patch onto the skin once daily. Takes PRN 5/13/22   Kenn Ray NP   loratadine (CLARITIN) 10 mg tablet Take 10 mg by mouth once daily. Takes in morning    Provider, Historical   nystatin (MYCOSTATIN) powder Apply topically 2 (two) times daily. for 14 days 4/14/25 4/28/25  Alesha Tomlin FNP   pantoprazole (PROTONIX) 40 MG tablet Take 1 tablet (40 mg total) by mouth once daily. 11/22/24   Renee Schumacher NP   rimegepant (NURTEC) 75 mg odt Take 1 tablet (75 mg total) by mouth as needed for Migraine. Place ODT tablet on the tongue; alternatively the ODT tablet may be placed under the tongue. Can take 2nd dose in 24 hrs if mild relief, no more than 2 in 24 hrs 11/19/24   Africa Garcia MD   tiZANidine (ZANAFLEX) 4 MG tablet Take 4 mg by mouth every 6 (six) hours as needed. 7/2/22   Provider, Historical   topiramate (TOPAMAX) 100 MG tablet Take 1 tablet (100 mg total) by mouth 2 (two) times daily. Further fills on followup. 5/12/25 6/11/25  Africa Garcia MD   treprostinil (REMODULIN) 2.5 mg/mL Soln Mix cassette as directed and infuse continuously per physician titration orders on dosing sheet. Current dose 80ng/kg/min 12/5/17   Ivonne Rai MD   triamcinolone (KENALOG) 0.5 % ointment Apply 1 application  topically 2 (two) times daily. 11/7/24   Provider, Historical     Anticoagulants/Antiplatelets: reviewed    Allergies:   Review of patient's allergies indicates:   Allergen Reactions    Fentanyl Anaphylaxis     Respiratory distress    Vibra-tabs [doxycycline hyclate] Anaphylaxis     "throat felt like it was closing"    Adhesive Hives     Silk tape    Amoxicillin Rash    Nsaids (non-steroidal anti-inflammatory drug) Swelling    Chlorhexidine Other (See Comments)     Blue Chlorhexidine causes hives     Sedation History:  no adverse reactions    Review of Systems:   Hematological: no known coagulopathies  Respiratory: no shortness of breath  Cardiovascular: " no chest pain  Gastrointestinal: no abdominal pain  Genito-Urinary: no dysuria  Musculoskeletal: negative  Neurological: no TIA or stroke symptoms         OBJECTIVE:     Vital Signs (Most Recent)       Physical Exam:  ASA: per anes   Mallampati: per anes     General: no acute distress  Mental Status: alert and oriented to person, place and time  HEENT: normocephalic, atraumatic  Chest: unlabored breathing  Heart: regular heart rate  Abdomen: nondistended  Extremity: moves all extremities    Laboratory  Lab Results   Component Value Date    INR 1.2 04/02/2025       Lab Results   Component Value Date    WBC 6.83 04/06/2025    HGB 13.7 04/06/2025    HCT 41.8 04/06/2025    MCV 90 04/06/2025     04/06/2025      Lab Results   Component Value Date    GLU 96 05/17/2025     05/17/2025    K 3.8 05/17/2025     (H) 05/17/2025    CO2 19 (L) 05/17/2025    BUN 11 05/17/2025    CREATININE 0.7 05/17/2025    CALCIUM 8.7 05/17/2025    MG 2.0 04/04/2025    ALT 12 05/17/2025    AST 16 05/17/2025    ALBUMIN 3.8 05/17/2025    BILITOT 0.4 05/17/2025    BILIDIR 0.1 03/08/2013       ASSESSMENT/PLAN:     Sedation Plan: per anes  Patient will undergo kita tube check +/- exchange.    Elizabeth Odell MD PGY-2  Department of Radiology  [facility]

## 2025-05-20 DIAGNOSIS — T85.518A CHOLECYSTOSTOMY TUBE DYSFUNCTION, INITIAL ENCOUNTER: Primary | ICD-10-CM

## 2025-05-20 NOTE — ANESTHESIA POSTPROCEDURE EVALUATION
Anesthesia Post Evaluation    Patient: Yuni Perez    Procedure(s) Performed: * No procedures listed *    Final Anesthesia Type: general      Patient location during evaluation: PACU  Patient participation: Yes- Able to Participate  Level of consciousness: awake and alert  Post-procedure vital signs: reviewed and stable  Pain management: adequate  Airway patency: patent    PONV status at discharge: No PONV  Anesthetic complications: no      Cardiovascular status: blood pressure returned to baseline  Respiratory status: unassisted  Hydration status: euvolemic  Follow-up not needed.              Vitals Value Taken Time   /58 05/19/25 09:32   Temp 36.6 °C (97.9 °F) 05/19/25 09:45   Pulse 83 05/19/25 09:40   Resp 26 05/19/25 09:40   SpO2 95 % 05/19/25 09:40   Vitals shown include unfiled device data.      No case tracking events are documented in the log.      Pain/Ronald Score: Ronald Score: 10 (5/19/2025  9:30 AM)

## 2025-05-29 ENCOUNTER — LAB VISIT (OUTPATIENT)
Dept: LAB | Facility: HOSPITAL | Age: 55
End: 2025-05-29
Payer: COMMERCIAL

## 2025-05-29 ENCOUNTER — OFFICE VISIT (OUTPATIENT)
Dept: TRANSPLANT | Facility: CLINIC | Age: 55
End: 2025-05-29
Payer: COMMERCIAL

## 2025-05-29 ENCOUNTER — TELEPHONE (OUTPATIENT)
Dept: TRANSPLANT | Facility: CLINIC | Age: 55
End: 2025-05-29

## 2025-05-29 ENCOUNTER — HOSPITAL ENCOUNTER (OUTPATIENT)
Dept: PULMONOLOGY | Facility: CLINIC | Age: 55
Discharge: HOME | End: 2025-05-29
Payer: COMMERCIAL

## 2025-05-29 VITALS — HEIGHT: 59 IN | BODY MASS INDEX: 28.02 KG/M2 | WEIGHT: 139 LBS

## 2025-05-29 VITALS
OXYGEN SATURATION: 95 % | DIASTOLIC BLOOD PRESSURE: 66 MMHG | HEIGHT: 59 IN | BODY MASS INDEX: 28.13 KG/M2 | WEIGHT: 139.56 LBS | SYSTOLIC BLOOD PRESSURE: 113 MMHG | HEART RATE: 101 BPM

## 2025-05-29 DIAGNOSIS — I27.21 WHO GROUP 1 PULMONARY ARTERIAL HYPERTENSION: Primary | Chronic | ICD-10-CM

## 2025-05-29 DIAGNOSIS — I27.9 CHRONIC PULMONARY HEART DISEASE: ICD-10-CM

## 2025-05-29 DIAGNOSIS — I27.21 WHO GROUP 1 PULMONARY ARTERIAL HYPERTENSION: ICD-10-CM

## 2025-05-29 DIAGNOSIS — E03.9 HYPOTHYROIDISM (ACQUIRED): ICD-10-CM

## 2025-05-29 DIAGNOSIS — I47.10 SVT (SUPRAVENTRICULAR TACHYCARDIA): ICD-10-CM

## 2025-05-29 DIAGNOSIS — J45.909 MODERATE ASTHMA WITHOUT COMPLICATION, UNSPECIFIED WHETHER PERSISTENT: ICD-10-CM

## 2025-05-29 LAB
ABSOLUTE EOSINOPHIL (OHS): 0.15 K/UL
ABSOLUTE MONOCYTE (OHS): 0.45 K/UL (ref 0.3–1)
ABSOLUTE NEUTROPHIL COUNT (OHS): 3.41 K/UL (ref 1.8–7.7)
ALBUMIN SERPL BCP-MCNC: 4.2 G/DL (ref 3.5–5.2)
ALP SERPL-CCNC: 95 UNIT/L (ref 40–150)
ALT SERPL W/O P-5'-P-CCNC: 17 UNIT/L (ref 10–44)
ANION GAP (OHS): 9 MMOL/L (ref 8–16)
AST SERPL-CCNC: 18 UNIT/L (ref 11–45)
BASOPHILS # BLD AUTO: 0.04 K/UL
BASOPHILS NFR BLD AUTO: 0.8 %
BILIRUB SERPL-MCNC: 0.4 MG/DL (ref 0.1–1)
BNP SERPL-MCNC: 71 PG/ML (ref 0–99)
BUN SERPL-MCNC: 7 MG/DL (ref 6–20)
CALCIUM SERPL-MCNC: 9.3 MG/DL (ref 8.7–10.5)
CHLORIDE SERPL-SCNC: 112 MMOL/L (ref 95–110)
CO2 SERPL-SCNC: 22 MMOL/L (ref 23–29)
CREAT SERPL-MCNC: 0.7 MG/DL (ref 0.5–1.4)
ERYTHROCYTE [DISTWIDTH] IN BLOOD BY AUTOMATED COUNT: 14.1 % (ref 11.5–14.5)
GFR SERPLBLD CREATININE-BSD FMLA CKD-EPI: >60 ML/MIN/1.73/M2
GLUCOSE SERPL-MCNC: 90 MG/DL (ref 70–110)
HCT VFR BLD AUTO: 43.9 % (ref 37–48.5)
HGB BLD-MCNC: 14.7 GM/DL (ref 12–16)
IMM GRANULOCYTES # BLD AUTO: 0.02 K/UL (ref 0–0.04)
IMM GRANULOCYTES NFR BLD AUTO: 0.4 % (ref 0–0.5)
LYMPHOCYTES # BLD AUTO: 1 K/UL (ref 1–4.8)
MAGNESIUM SERPL-MCNC: 2.2 MG/DL (ref 1.6–2.6)
MCH RBC QN AUTO: 29.8 PG (ref 27–31)
MCHC RBC AUTO-ENTMCNC: 33.5 G/DL (ref 32–36)
MCV RBC AUTO: 89 FL (ref 82–98)
NUCLEATED RBC (/100WBC) (OHS): 0 /100 WBC
PLATELET # BLD AUTO: 230 K/UL (ref 150–450)
PMV BLD AUTO: 12 FL (ref 9.2–12.9)
POTASSIUM SERPL-SCNC: 3.8 MMOL/L (ref 3.5–5.1)
PROT SERPL-MCNC: 7.7 GM/DL (ref 6–8.4)
RBC # BLD AUTO: 4.93 M/UL (ref 4–5.4)
RELATIVE EOSINOPHIL (OHS): 3 %
RELATIVE LYMPHOCYTE (OHS): 19.7 % (ref 18–48)
RELATIVE MONOCYTE (OHS): 8.9 % (ref 4–15)
RELATIVE NEUTROPHIL (OHS): 67.2 % (ref 38–73)
SODIUM SERPL-SCNC: 143 MMOL/L (ref 136–145)
WBC # BLD AUTO: 5.07 K/UL (ref 3.9–12.7)

## 2025-05-29 PROCEDURE — 1159F MED LIST DOCD IN RCRD: CPT | Mod: CPTII,S$GLB,, | Performed by: INTERNAL MEDICINE

## 2025-05-29 PROCEDURE — 84075 ASSAY ALKALINE PHOSPHATASE: CPT

## 2025-05-29 PROCEDURE — 3008F BODY MASS INDEX DOCD: CPT | Mod: CPTII,S$GLB,, | Performed by: INTERNAL MEDICINE

## 2025-05-29 PROCEDURE — 99215 OFFICE O/P EST HI 40 MIN: CPT | Mod: S$GLB,,, | Performed by: INTERNAL MEDICINE

## 2025-05-29 PROCEDURE — 3074F SYST BP LT 130 MM HG: CPT | Mod: CPTII,S$GLB,, | Performed by: INTERNAL MEDICINE

## 2025-05-29 PROCEDURE — 85025 COMPLETE CBC W/AUTO DIFF WBC: CPT

## 2025-05-29 PROCEDURE — 3078F DIAST BP <80 MM HG: CPT | Mod: CPTII,S$GLB,, | Performed by: INTERNAL MEDICINE

## 2025-05-29 PROCEDURE — 36415 COLL VENOUS BLD VENIPUNCTURE: CPT

## 2025-05-29 PROCEDURE — 83880 ASSAY OF NATRIURETIC PEPTIDE: CPT

## 2025-05-29 PROCEDURE — 99999 PR PBB SHADOW E&M-EST. PATIENT-LVL III: CPT | Mod: PBBFAC,,, | Performed by: INTERNAL MEDICINE

## 2025-05-29 PROCEDURE — 83735 ASSAY OF MAGNESIUM: CPT

## 2025-05-29 NOTE — PROCEDURES
Yuni Perez is a 55 y.o.   female patient, who presents for a 6 minute walk test ordered by MD Lui.  The diagnosis is Qualify for Oxygen; Pulmonary Hypertension.  The patient's BMI is 28.1 kg/m2.  Predicted distance (lower limit of normal) is 387.84 meters.      Test Results:    The test was completed without stopping.  The total time walked was 360 seconds.  During walking, the patient reported:  Dyspnea.  The patient used no assistive devices during testing.     05/29/2025---------Distance: 365.76 meters (1200 feet)     O2 Sat % Supplemental Oxygen Heart Rate Blood Pressure Ab Scale   Pre-exercise  (Resting) 96 % Room Air 97 bpm 108/76 mmHg 2   During Exercise 92 % Room Air 152 bpm 127/67 mmHg 9   Post-exercise  (Recovery) 96 % Room Air  115 bpm       Recovery Time: 120 seconds    Performing nurse/tech: [employer] RRT      PREVIOUS STUDY:   12/13/2024---------Distance: 396.24 meters (1300 feet)       O2 Sat % Supplemental Oxygen Heart Rate Blood Pressure Ab Scale   Pre-exercise  (Resting) 97 % Room Air 85 bpm 109/74 mmHg 2   During Exercise 93 % Room Air 143 bpm 137/97 mmHg 9   Post-exercise  (Recovery) 98 % Room Air  112 bpm 136/82 mmHg         CLINICAL INTERPRETATION:  Six minute walk distance is 365.76 meters (1200 feet) with very, very heavy dyspnea.  During exercise, there was desaturation while breathing room air.  Blood pressure remained stable and Heart rate increased significantly with walking.  This may represent a tachycardic response to exercise.  The patient did not report non-pulmonary symptoms during exercise.  Since the previous study in December 2024, exercise capacity is unchanged.  Based upon age and body mass index, exercise capacity is less than predicted.

## 2025-05-29 NOTE — PROGRESS NOTES
Subjective:    Patient ID:  Yuni Perez is a 55 y.o. female who presents for follow-up of Pulmonary Hypertension.    HPI   Mrs. Perez is a  55 y.o. female who presents for PH follow-up. Patient was diagnosed with IPAH in 2009 and started on Remodulin, Letairis, and Adcirca. She has a history of ABHIJEET (untreated - not able to tolerate CPAP 2/2 severe congestion), GERD, and Asthma. Current therapy is Remodulin infusing at 117ng/kg/min (pre-filled cassettes), Letairis 10mg qdaily, and Adcirca 40mg qdaily. Has T/F Adempas. Patient evaluated by lung transplant in 2020. Had everything set up for LUT, but says her caregivers were deemed not acceptable. She has come to terms with not being a LUT candidate here at [facility]. She is not interested in lung transplant here, additionally I asked her about her original plan re: [place] [facility], she states on further review she does not think this would be possible for her either.     Mrs. Perez recently was admitted in the hospital 03/30-04/7/25 with abdominal pain, N/v. She was found to have cholecystitis, confirmed on US, Ct, and HIDA. Gen surgery consulted, felt to be too high risk for cholecystectomy, therefore kita tube placed 03/31/25. Post kita tube she did well other than an episode of SVT with HR in the 200s, which she converted out of wih 12 mg adenosine and was in sinus since then. EP considred multaq however no additional agents added at the time. She was discharged with home O2 and her kita tube. Since discharge, seen by GS 05/01/25 and felt to remain high risk for surgery. They were consulting IR for drain study/interrogation. 05/19 had kita tube check and exchange.     Overall patient feels she has recovered well from her cholecystitis. Had it exchanged as stated above and has some erythema around it, not tender, but is getting it evaluated again. Breathing is essentially the same but she admits overall has been more short of  breath than she used to be. We discussed how she has not agreed to RHC in 3 years, however she today states she is open to repeating RHC if we think it would help. Prefers neck, although we tried RIJ and could not get through, not sure we did [facility].     6MWT:    Today:   05/29/2025---------Distance: 365.76 meters (1200 feet)       O2 Sat % Supplemental Oxygen Heart Rate Blood Pressure Ab Scale   Pre-exercise  (Resting) 96 % Room Air 97 bpm 108/76 mmHg 2   During Exercise 92 % Room Air 152 bpm 127/67 mmHg 9   Post-exercise  (Recovery) 96 % Room Air  115 bpm          Recovery Time: 120 seconds     9/25/2024 12/13/2024   6MW     6MWT Status completed without stopping  completed without stopping    Patient Reported Dyspnea;Calf pain     Was O2 used? No  No    6MW Distance walked (feet) 1200 feet  1300 feet    Distance walked (meters) 365.76 meters  396.24 meters    Did patient stop?  No    Oxygen Saturation 98 %  97 %    Supplemental Oxygen Room Air  Room Air    Heart Rate 71 bpm  85 bpm    Blood Pressure 112/76  109/74    Ab Dyspnea Rating  moderate  light    Oxygen Saturation 95 %  93 %    Supplemental Oxygen Room Air  Room Air    Heart Rate 144 bpm  143 bpm    Blood Pressure 122/87  137/97 (H)    Ab Dyspnea Rating  very,very heavy  very,very heavy    Recovery Time (seconds) 182 seconds  114 seconds    Oxygen Saturation 99 %  98 %    Supplemental Oxygen Room Air  Room Air    Heart Rate 90 bpm  112 bpm      ECHO: 12/16/2024    Left Ventricle: The left ventricle is normal in size. Normal wall thickness. Normal wall motion. Septal flattening in systole consistent with right ventricular pressure overload. There is normal systolic function with a visually estimated ejection fraction of 60 - 65%. There is normal diastolic function.    Right Ventricle: Moderate right ventricular enlargement with hypertrophy. Systolic function is mildly to moderately reduced.    The right atrium is mildly dilated.    Tricuspid Valve:  There is mild regurgitation.    Pulmonary Artery: There is moderate to severe pulmonary hypertension. The estimated pulmonary artery systolic pressure is 67 mmHg.    IVC/SVC: Normal venous pressure at 3 mmHg.    ECHO: 2/2/2024    Left Ventricle: The left ventricle is normal in size. Ventricular mass is normal. Normal wall thickness. Normal wall motion. Septal flattening in systole consistent with right ventricular pressure overload. There is normal systolic function with a visually estimated ejection fraction of 60 - 65%. There is normal diastolic function.    Right Ventricle: Moderate right ventricular enlargement. Wall thickness is normal. Right ventricle wall motion  is normal. Systolic function is mild - moderately reduced visually although the TAPSE.    Right Atrium: Right atrium is mildly dilated.    Aortic Valve: The aortic valve is a trileaflet valve. There is low normal AFSANEH to trivial stenosis. Aortic valve area by VTI is 2.00 cm². Aortic valve peak velocity is 1.63 m/s. Mean gradient is 5 mmHg. The dimensionless index is 0.79.    Tricuspid Valve: Mildly thickened leaflets. There is mild to moderate regurgitation.    Pulmonic Valve: There is mild regurgitation.    Pulmonary Artery: There is very severe pulmonary hypertension. The estimated pulmonary artery systolic pressure is 99 mmHg.    IVC/SVC: Normal venous pressure at 3 mmHg.    ECHO: 6/23/2023  There is abnormal septal wall motion. There is systolic flattening of the interventricular septum consistent with right ventricle pressure overload.  The left ventricle is normal in size with  Moderate right ventricular enlargement with mildly to moderately reduced right ventricular systolic function. Free Wall Strain 11%, global RV strain 11%.  Mild pulmonic regurgitation.  Moderate tricuspid regurgitation.  Normal central venous pressure (3 mmHg).  The estimated PA systolic pressure is 106 mmHg.  There is pulmonary hypertension.  There is evidence of at least  moderate right to left venous-arterial shunting with agitated saline contrast. Unfortunately continuous capture not obtained thus cannot specify whether intra or extracardiac.    ECHO: 12/16/2022  The left ventricle is normal in size with normal systolic function.  The estimated ejection fraction is 68%.  Indeterminate left ventricular diastolic function.  Mild right ventricular enlargement with mildly reduced right ventricular systolic function.  Mild right atrial enlargement.  Mild to moderate tricuspid regurgitation.  Mild pulmonic regurgitation.  Normal central venous pressure (3 mmHg).  The estimated PA systolic pressure is 107 mmHg.  There is Quite Severe pulmonary hypertension.  Trivial posterior pericardial effusion.    RHC: 6/10/2022  Estimated blood loss: none  Severe PAH on IV remodulin, adcirca and letairis.  Normal right and left-sided filling pressures.  Borderline low-normal cardiac output by Ebony method which is normal when indexed for BSA.  No signficant change from RHC 8/18.  RA: 6/ 7/ 5 RV: 105/ 3/ 10 PA: 103/ 45/ 64 PWP: 15/ 1512/ 13 . Cardiac output was 4.11 by Ebony. Cardiac index is 2.67 L/min/m2. O2 Sat: PA 70%. AO sat 96% PVR 12.4    PFTS: 9/27/2023  Spirometry shows moderate-severe obstruction. Lung volume determination shows TLC is normal with evidence air trapping is present. Airway mechanics are abnormal showing increased airway resistance and decreased conductance. DLCO is moderately decreased (42%).     PFTs: 3/17/2023  Spirometry is consistent with restriction. The significantly reduced FEF 25-75 suggests superimposed obstruction may be present. Lung volume determination is normal. The mildly elevated RV/TLC ratio suggests possible early air trapping. Diffusion capacity is moderately reduced. This interpretation of DLCO assumes normal hemoglobin level (41%)    PFTs: 9/8/2020  Spirometry shows moderate obstruction. Lung volume determination is normal. DLCO is mildly decreased -  68%    CT Chest: 2/27/2023  FINDINGS:  There are innumerable upper lobe predominant vague, centrilobular micro nodules.  Due to their very small size in their shear numbers, they are not amenable to exact comparison is.  However, the gross appearance is similar to the previous exam.  These nodules are seen in all lobes.     There are stable 5 mm right lower lobe posterior segment (4-309) and 4 mm left lower lobe posterior segment (4-313) pulmonary nodules.     The tip of a right catheter is seen overlying the cavoatrial junction.     No new lymphadenopathy.     The heart size is normal.  The main pulmonary segment is again noted to be severely dilated, up to 3.9 cm in diameter.     No new abnormalities are seen in the upper abdominal structures.     The bone density appears diminished.     Impression:     1. Redemonstrated innumerable bilateral centrilobular pulmonary micro nodules.  The differential diagnosis could include hypersensitivity pneumonitis, respiratory bronchiolitis (if the patient is a smoker), infection, vasculitis.  This is not an exhaustive list.  2. Severely dilated main pulmonary artery up to 3.9 cm.  3. Stable bilateral lower lobe subcentimeter pulmonary nodules.    IV/SC:  Pulmonary Hypertension Review Flowsheet 12/16/2024   Pump Type CADD   Medication type Remodulin   Vial size (No Data)   Dose (ng/kg/min) 117/ng/kg/min   DW (kg) 60.5kg   Amt of Med used    Amt of Diluent Used NS    Final Concentration (ng/ml) 230675 ng/ml   Pump Rate ml/24 hr 41 ml/ 24 hr     Review of Systems   Constitutional: Negative for decreased appetite, diaphoresis, fever and night sweats.   HENT:  Positive for congestion.    Cardiovascular:  Positive for dyspnea on exertion. Negative for chest pain, irregular heartbeat, leg swelling and syncope.   Respiratory:  Positive for shortness of breath. Negative for cough and wheezing.         Occasional SOB at night and morning   Endocrine: Negative.    Skin: Negative.   "  Musculoskeletal:  Positive for myalgias. Negative for back pain, falls and joint swelling.   Gastrointestinal: Negative.    Genitourinary: Negative.    Neurological: Negative.    Psychiatric/Behavioral:  The patient is nervous/anxious.         Controlled with medication        Objective: /66   Pulse 101   Ht 4' 11" (1.499 m)   Wt 63.3 kg (139 lb 8.8 oz)   SpO2 95% Comment: 2 liters of o2  BMI 28.19 kg/m²     Physical Exam  Constitutional:       Appearance: Normal appearance.   HENT:      Head: Normocephalic and atraumatic.   Eyes:      Pupils: Pupils are equal, round, and reactive to light.   Neck:      Vascular: No JVD.   Cardiovascular:      Rate and Rhythm: Normal rate and regular rhythm.      Pulses: Normal pulses.      Heart sounds: Murmur heard.      Comments: Loud S2  Pulmonary:      Effort: Pulmonary effort is normal.      Breath sounds: Normal breath sounds.   Chest:      Comments: RCW - Indwelling catheter - Eason for Remodulin infusion. Dressing is C/D/I    Abdominal:      General: Abdomen is flat.      Palpations: Abdomen is soft.      Comments: Sue tube with some erythema under tegaderm, not warm or really tender on palpation.   Musculoskeletal:         General: Normal range of motion.      Cervical back: Normal range of motion and neck supple.   Skin:     General: Skin is warm and dry.      Capillary Refill: Capillary refill takes less than 2 seconds.   Neurological:      Mental Status: She is alert and oriented to person, place, and time.   Psychiatric:         Mood and Affect: Mood normal.         Behavior: Behavior normal.           Lab Results   Component Value Date    BNP 64 12/13/2024     05/17/2025     12/13/2024    K 3.8 05/17/2025    K 3.9 12/13/2024    MG 2.0 04/04/2025     (H) 05/17/2025     (H) 12/13/2024    CO2 19 (L) 05/17/2025    CO2 20 (L) 12/13/2024    BUN 11 05/17/2025    CREATININE 0.7 05/17/2025    GLU 96 05/17/2025     12/13/2024    " HGBA1C 4.9 09/21/2020    AST 16 05/17/2025    AST 15 12/13/2024    ALT 12 05/17/2025    ALT 11 12/13/2024    ALBUMIN 3.8 05/17/2025    ALBUMIN 3.9 12/13/2024    PROT 7.5 05/17/2025    PROT 7.2 12/13/2024    BILITOT 0.4 05/17/2025    BILITOT 0.4 12/13/2024    CHOL 125 09/21/2020    HDL 36 (L) 09/21/2020    LDLCALC 72.8 09/21/2020    TRIG 81 09/21/2020       Magnesium    Date Value Ref Range Status   04/04/2025 2.0 1.6 - 2.6 mg/dL Final       Lab Results   Component Value Date    WBC 6.83 04/06/2025    HGB 13.7 04/06/2025    HCT 41.8 04/06/2025    MCV 90 04/06/2025     04/06/2025       BNP   Date Value Ref Range Status   12/13/2024 64 0 - 99 pg/mL Final     Comment:     Values of less than 100 pg/ml are consistent with non-CHF populations.   09/18/2024 52 0 - 99 pg/mL Final     Comment:     Values of less than 100 pg/ml are consistent with non-CHF populations.   08/06/2024 79 0 - 99 pg/mL Final     Comment:     Values of less than 100 pg/ml are consistent with non-CHF populations.     ..  WHO Group: 1 Functional Class: II  REVEAL Score: 7 (intermediate Risk)  Assessment/Plan:   Yuni Perez was seen today for pulmonary hypertension.    Diagnoses and all orders for this visit:    WHO group 1 pulmonary arterial hypertension  Miss Perez is subjectively and clinically stable. ECHO shows moderate RV enlargement and elevated PASP on triple therapy. She is euvolemic on exam. REVEAL score is intermediate risk now that she had an admission for her gallbladder issue. She has refused RHC here at [facility], and was on her way to [place] to consider LUT as well, however this is no longer an option from what she has said. We have agreed to try to evaluate again for pulmonary hypertension, with RHC. However, would like to evaluate for IJ access if possible, she is very scared about groin access. Will get U/S of bilateral upper extremtieis and go from there. Plan for RHC in the next 2 months or so based on what  ultrasound shows.  Discussed with Dr. Cantu, PH medical director and Dorene RODRIGUEZ, regarding possible addition of statercept since patient is really not likely to have many other options in setting of not being candidate for LUT here or elsewhere for now. Open to the idea, may depend on kita tube, however not sure this will ever be resolved, as concern she is not a candidate for choleystectomy due to her severe PH and what happened back in 2016, although now she is on significant treatment for her PH. RHC will help with this as well.     Hypothyroidism (acquired)  On therapy    Moderate asthma without complication, unspecified whether persistent  PFT from 12/2024 with severe obstruction, however not sure I see more recent pulm note, will recommend returning to see them. FVC below 60, DLCO 54- we are referring her again for this as well.     ABHIJEET (obstructive sleep apnea)  Not able to tolerate CPAP.    SVT- no further issues, no prn meds for now, refer to EP as indicated    Return to clinic in 3 months with labs, walk. Please contact our department if you need to be seen sooner or have any concerns. Likely do RHC before this visit.     Recommend 2 gram sodium restriction and 1500cc fluid restriction.  Encourage physical activity with graded exercise program.  Requested patient to weigh themselves daily, and to notify us if their weight increases by more than 3 lbs in 1 day or 5 lbs in 1 week.

## 2025-05-29 NOTE — TELEPHONE ENCOUNTER
Per Dr. Poe, patient is open to RHC. Patient will first have US to check for vein access for RHC. If suitable and patient is still agreeable to RHC, then can proceed with scheduling.

## 2025-05-30 ENCOUNTER — TELEPHONE (OUTPATIENT)
Dept: TRANSPLANT | Facility: CLINIC | Age: 55
End: 2025-05-30
Payer: COMMERCIAL

## 2025-05-30 NOTE — TELEPHONE ENCOUNTER
NN called patient after receiving message that patient had questions about extremity ultrasound since she was under the impression would be for the neck. NN confirmed the orders with Dr. Poe, and what the plan was. Patient was reassured that Dr. Poe wanted the upper extremity ultrasound and based on results will proceed with RHC.

## 2025-05-31 ENCOUNTER — HOSPITAL ENCOUNTER (EMERGENCY)
Facility: HOSPITAL | Age: 55
Discharge: HOME | End: 2025-05-31
Attending: STUDENT IN AN ORGANIZED HEALTH CARE EDUCATION/TRAINING PROGRAM
Payer: COMMERCIAL

## 2025-05-31 VITALS
WEIGHT: 139 LBS | HEART RATE: 86 BPM | SYSTOLIC BLOOD PRESSURE: 112 MMHG | BODY MASS INDEX: 28.02 KG/M2 | RESPIRATION RATE: 18 BRPM | DIASTOLIC BLOOD PRESSURE: 71 MMHG | OXYGEN SATURATION: 97 % | TEMPERATURE: 98 F | HEIGHT: 59 IN

## 2025-05-31 DIAGNOSIS — R00.0 TACHYCARDIA: ICD-10-CM

## 2025-05-31 DIAGNOSIS — I47.10 SVT (SUPRAVENTRICULAR TACHYCARDIA): Primary | ICD-10-CM

## 2025-05-31 LAB
ABSOLUTE EOSINOPHIL (OHS): 0.15 K/UL
ABSOLUTE MONOCYTE (OHS): 0.57 K/UL (ref 0.3–1)
ABSOLUTE NEUTROPHIL COUNT (OHS): 4.96 K/UL (ref 1.8–7.7)
ALBUMIN SERPL BCP-MCNC: 3.1 G/DL (ref 3.5–5.2)
ALP SERPL-CCNC: 82 UNIT/L (ref 40–150)
ALT SERPL W/O P-5'-P-CCNC: 35 UNIT/L (ref 10–44)
ANION GAP (OHS): 8 MMOL/L (ref 8–16)
AST SERPL-CCNC: 52 UNIT/L (ref 11–45)
BASOPHILS # BLD AUTO: 0.03 K/UL
BASOPHILS NFR BLD AUTO: 0.4 %
BILIRUB SERPL-MCNC: 0.1 MG/DL (ref 0.1–1)
BNP SERPL-MCNC: 70 PG/ML (ref 0–99)
BUN SERPL-MCNC: 17 MG/DL (ref 6–20)
CALCIUM SERPL-MCNC: 7.4 MG/DL (ref 8.7–10.5)
CHLORIDE SERPL-SCNC: 120 MMOL/L (ref 95–110)
CO2 SERPL-SCNC: 14 MMOL/L (ref 23–29)
CREAT SERPL-MCNC: 0.6 MG/DL (ref 0.5–1.4)
ERYTHROCYTE [DISTWIDTH] IN BLOOD BY AUTOMATED COUNT: 14.1 % (ref 11.5–14.5)
GFR SERPLBLD CREATININE-BSD FMLA CKD-EPI: >60 ML/MIN/1.73/M2
GLUCOSE SERPL-MCNC: 91 MG/DL (ref 70–110)
HCT VFR BLD AUTO: 37 % (ref 37–48.5)
HGB BLD-MCNC: 11.8 GM/DL (ref 12–16)
IMM GRANULOCYTES # BLD AUTO: 0.02 K/UL (ref 0–0.04)
IMM GRANULOCYTES NFR BLD AUTO: 0.3 % (ref 0–0.5)
LYMPHOCYTES # BLD AUTO: 1.08 K/UL (ref 1–4.8)
MAGNESIUM SERPL-MCNC: 1.7 MG/DL (ref 1.6–2.6)
MCH RBC QN AUTO: 29.8 PG (ref 27–31)
MCHC RBC AUTO-ENTMCNC: 31.9 G/DL (ref 32–36)
MCV RBC AUTO: 93 FL (ref 82–98)
NUCLEATED RBC (/100WBC) (OHS): 0 /100 WBC
OHS QRS DURATION: 74 MS
OHS QTC CALCULATION: 433 MS
PLATELET # BLD AUTO: 203 K/UL (ref 150–450)
PMV BLD AUTO: 12.3 FL (ref 9.2–12.9)
POTASSIUM SERPL-SCNC: 3.1 MMOL/L (ref 3.5–5.1)
PROT SERPL-MCNC: 5.6 GM/DL (ref 6–8.4)
RBC # BLD AUTO: 3.96 M/UL (ref 4–5.4)
RELATIVE EOSINOPHIL (OHS): 2.2 %
RELATIVE LYMPHOCYTE (OHS): 15.9 % (ref 18–48)
RELATIVE MONOCYTE (OHS): 8.4 % (ref 4–15)
RELATIVE NEUTROPHIL (OHS): 72.8 % (ref 38–73)
SODIUM SERPL-SCNC: 142 MMOL/L (ref 136–145)
T4 FREE SERPL-MCNC: 1.27 NG/DL (ref 0.71–1.51)
TROPONIN I SERPL HS-MCNC: 13 NG/L
TSH SERPL-ACNC: 0.04 UIU/ML (ref 0.4–4)
WBC # BLD AUTO: 6.81 K/UL (ref 3.9–12.7)

## 2025-05-31 PROCEDURE — 93005 ELECTROCARDIOGRAM TRACING: CPT

## 2025-05-31 PROCEDURE — 83880 ASSAY OF NATRIURETIC PEPTIDE: CPT

## 2025-05-31 PROCEDURE — 84443 ASSAY THYROID STIM HORMONE: CPT

## 2025-05-31 PROCEDURE — 84439 ASSAY OF FREE THYROXINE: CPT

## 2025-05-31 PROCEDURE — 25000003 PHARM REV CODE 250

## 2025-05-31 PROCEDURE — 63600175 PHARM REV CODE 636 W HCPCS

## 2025-05-31 PROCEDURE — 82040 ASSAY OF SERUM ALBUMIN: CPT

## 2025-05-31 PROCEDURE — 84484 ASSAY OF TROPONIN QUANT: CPT

## 2025-05-31 PROCEDURE — 83735 ASSAY OF MAGNESIUM: CPT

## 2025-05-31 PROCEDURE — 96366 THER/PROPH/DIAG IV INF ADDON: CPT

## 2025-05-31 PROCEDURE — 85025 COMPLETE CBC W/AUTO DIFF WBC: CPT

## 2025-05-31 PROCEDURE — 93010 ELECTROCARDIOGRAM REPORT: CPT | Mod: ,,, | Performed by: INTERNAL MEDICINE

## 2025-05-31 PROCEDURE — 99285 EMERGENCY DEPT VISIT HI MDM: CPT | Mod: 25

## 2025-05-31 PROCEDURE — 96365 THER/PROPH/DIAG IV INF INIT: CPT

## 2025-05-31 RX ORDER — MAGNESIUM SULFATE 1 G/100ML
1 INJECTION INTRAVENOUS ONCE
Status: COMPLETED | OUTPATIENT
Start: 2025-05-31 | End: 2025-05-31

## 2025-05-31 RX ADMIN — MAGNESIUM SULFATE HEPTAHYDRATE 1 G: 500 INJECTION, SOLUTION INTRAMUSCULAR; INTRAVENOUS at 06:05

## 2025-05-31 RX ADMIN — POTASSIUM BICARBONATE 40 MEQ: 391 TABLET, EFFERVESCENT ORAL at 06:05

## 2025-05-31 NOTE — CONSULTS
[facility] Emergency Dept  Heart Transplant  Consult Note    Patient Name: Yuni Perez  MRN: [id number]  Admission Date: 5/31/2025  Hospital Length of Stay: 0 days  Attending Physician: Chris Chen MD  Primary Care Provider: Lulu Sandhu MD   Principal Problem:<principal problem not specified>    Inpatient consult to Cardiology  Consult performed by: Shravan Grant MD  Consult ordered by: Marlyn Cerda MD        Subjective:     History of Present Illness:  55 y.o. female w history of IPAH in 2009 (current therapy is Remodulin infusing at 117ng/kg/min, Letairis 10mg qdaily, Adcirca 40mg qdaily, declined transplant w/u in past), ABHIJEET (untreated - not able to tolerate CPAP 2/2 severe congestion), GERD, and Asthma. Recent admission for cholecystitis s/p kita tube due to being high risk for surgery. During this admit she had episode of SVT requiring adenosine. HTS spoke with EP, no formal consult, who suggest multaq if reoccurred. Since this time she has been doing better and her tube has been exchanged. Presented today for an episode of tachycardia will sitting down to use the restroom last night. Reports checked her HR and was 190. Associated lightheadedness with no syncope. Self terminated in 15 minutes. On arrival HD stable. NSR on EKG. BNP WNL, K3.1, mag 1.7. CXR improved from prior. Her K and mag were replaced. Consulted for concerns of recurrent SVT    Past Medical History:   Diagnosis Date    AR (allergic rhinitis)     Cholelithiasis, common bile duct     Chronic low back pain     Eye pressure 2017    General anesthetics causing adverse effect in therapeutic use     GERD (gastroesophageal reflux disease)     History of migraine headaches     Hypothyroidism     Innocent heart murmur     Lumbar disc disease     Menorrhagia     Mild asthma     Obesity     Plantar fasciitis of left foot     Primary pulmonary hypertension     followed by heart transplant/pulmonary     Seizure disorder     x  1 in 2008    Seizures     Sleep apnea     SVT (supraventricular tachycardia) 4/3/2025    TMJ (dislocation of temporomandibular joint)     Tricuspid regurgitation        Past Surgical History:   Procedure Laterality Date    CARDIAC CATHETERIZATION      CATHETERIZATION OF BOTH LEFT AND RIGHT HEART Bilateral 9/29/2020    Procedure: CATHETERIZATION, HEART, BOTH LEFT AND RIGHT;  Surgeon: Gucci Pickett MD;  Location: [facility] CATH LAB;  Service: Cardiology;  Laterality: Bilateral;    CENTRAL VENOUS CATHETER INSERTION      CORONARY ANGIOGRAPHY N/A 9/29/2020    Procedure: ANGIOGRAM, CORONARY ARTERY;  Surgeon: Gucci Pickett MD;  Location: [facility] CATH LAB;  Service: Cardiology;  Laterality: N/A;    gallbladder drain  06/2019    INSERTION OF HAYNES CATHETER Right 6/17/2020    Procedure: INSERTION, CATHETER, CENTRAL VENOUS, HAYNES Replace Single Lumen for Remodulin Infusion;  Surgeon: Bertin Dewitt MD;  Location: [facility] OR [address] [place] [address];  Service: General;  Laterality: Right;    PORTACATH PLACEMENT      REMOVAL OF TUNNELED CENTRAL VENOUS CATHETER (CVC) N/A 6/17/2020    Procedure: REMOVAL, CATHETER, CENTRAL VENOUS, TUNNELED;  Surgeon: Bertin Dewitt MD;  [facility];  Service: General;  Laterality: N/A;    RIGHT HEART CATHETERIZATION Right 8/20/2018    Procedure: HEART CATH-RIGHT;  Surgeon: Ivonne Rai MD;  Location: [facility] CATH LAB;  Service: Cardiology;  Laterality: Right;    RIGHT HEART CATHETERIZATION Right 9/4/2019    Procedure: INSERTION, CATHETER, RIGHT HEART;  Surgeon: Ivonne Rai MD;  Location: [facility] CATH LAB;  Service: Cardiology;  Laterality: Right;    RIGHT HEART CATHETERIZATION Right 6/10/2022    Procedure: INSERTION, CATHETER, RIGHT HEART;  Surgeon: Keshia Poe MD;  Location: [facility] CATH LAB;  Service: Cardiology;  Laterality: Right;    UPPER GASTROINTESTINAL ENDOSCOPY         Review of patient's allergies indicates:   Allergen Reactions    Fentanyl Anaphylaxis     Respiratory distress  "   Vibra-tabs [doxycycline hyclate] Anaphylaxis     "throat felt like it was closing"    Adhesive Hives     Silk tape    Amoxicillin Rash    Nsaids (non-steroidal anti-inflammatory drug) Swelling    Chlorhexidine Other (See Comments)     Blue Chlorhexidine causes hives       Current Facility-Administered Medications   Medication    magnesium sulfate in dextrose IVPB (premix) 1 g    potassium bicarbonate disintegrating tablet 40 mEq     Current Outpatient Medications   Medication Sig    acetaminophen (TYLENOL) 500 MG tablet Take 1,000 mg by mouth every 6 (six) hours as needed for Pain.    ADCIRCA 20 mg Tab Take 2 tablets (40 mg total) by mouth once daily. (Patient taking differently: Take 40 mg by mouth nightly.)    albuterol (VENTOLIN HFA) 90 mcg/actuation inhaler Inhale 2 puffs into the lungs every 6 (six) hours as needed for Wheezing or Shortness of Breath. Rescue    albuterol-ipratropium (DUO-NEB) 2.5 mg-0.5 mg/3 mL nebulizer solution Take 3 mLs by nebulization every 6 (six) hours as needed for Wheezing. Rescue    alprazolam (XANAX ORAL) Take by mouth as needed.    ambrisentan (LETAIRIS) 10 MG Tab Take 1 tablet (10 mg total) by mouth once daily.    BREO ELLIPTA 200-25 mcg/dose DsDv diskus inhaler INHALE 1 PUFF INTO THE LUNGS EVERY EVENING. CONTROLLER    butalbital-acetaminophen-caffeine -40 mg (FIORICET, ESGIC) -40 mg per tablet Take 1 tablet by mouth every 4 (four) hours as needed.    cyanocobalamin, vitamin B-12, 2,500 mcg Subl Place 2,500 mcg under the tongue every morning.     diphenhydrAMINE (BENADRYL) 25 mg capsule Take 50 mg by mouth every 6 (six) hours as needed for Itching. Patient takes prn evenings    diphenoxylate-atropine 2.5-0.025 mg (LOMOTIL) 2.5-0.025 mg per tablet Take 1 tablet by mouth 4 (four) times daily as needed for Diarrhea.    ferrous sulfate 325 (65 FE) MG EC tablet Take 325 mg by mouth daily as needed.     fluticasone propionate (FLONASE) 50 mcg/actuation nasal spray 2 sprays " (100 mcg total) by Each Nostril route every evening. (Patient taking differently: 2 sprays by Each Nostril route Daily.)    folic acid (FOLVITE) 1 MG tablet Take 1 tablet (1,000 mcg total) by mouth once daily.    furosemide (LASIX) 20 MG tablet Take 1 tablet (20 mg total) by mouth once daily. (Patient taking differently: Take 20 mg by mouth once daily. Usually takes PRN weekly for swelling.)    glucosam/chond-msm1/C/michele/bor (GLUCOSAMINE-CHOND-MSM COMPLEX ORAL) Take by mouth. Three times a week (Patient taking differently: Take by mouth. Two times a week)    hyoscyamine (LEVSIN) 0.125 mg Subl Place 1 tablet (0.125 mg total) under the tongue every 6 (six) hours as needed (Aa needed).    lactic acid-citric-potassium (PHEXXI) 1.8-1-0.4 % Gel Place 1 Applicatorful vaginally as needed (CONTRACEPTIVE).    levothyroxine (SYNTHROID) 125 MCG tablet Take 1 tablet (125 mcg total) by mouth before breakfast.    LIDOcaine (LIDODERM) 5 % Place 1 patch onto the skin once daily. (Patient taking differently: Place 1 patch onto the skin once daily. Takes PRN)    loratadine (CLARITIN) 10 mg tablet Take 10 mg by mouth once daily. Takes in morning    nystatin (MYCOSTATIN) powder Apply topically 2 (two) times daily. for 14 days    pantoprazole (PROTONIX) 40 MG tablet Take 1 tablet (40 mg total) by mouth once daily.    rimegepant (NURTEC) 75 mg odt Take 1 tablet (75 mg total) by mouth as needed for Migraine. Place ODT tablet on the tongue; alternatively the ODT tablet may be placed under the tongue. Can take 2nd dose in 24 hrs if mild relief, no more than 2 in 24 hrs    tiZANidine (ZANAFLEX) 4 MG tablet Take 4 mg by mouth every 6 (six) hours as needed.    topiramate (TOPAMAX) 100 MG tablet Take 1 tablet (100 mg total) by mouth 2 (two) times daily. Further fills on followup.    treprostinil (REMODULIN) 2.5 mg/mL Soln Mix cassette as directed and infuse continuously per physician titration orders on dosing sheet. Current dose 80ng/kg/min     triamcinolone (KENALOG) 0.5 % ointment Apply 1 application  topically 2 (two) times daily.     Family History       Problem Relation (Age of Onset)    Breast cancer Maternal Aunt, Cousin, Cousin    Cancer Maternal Aunt, Maternal Grandmother    Diabetes Mother, Maternal Grandfather, Paternal Grandfather    Glaucoma Father    Heart attack Maternal Grandfather, Paternal Grandfather    Heart disease Father, Paternal Grandfather    Hypertension Other    Leukemia Brother    No Known Problems Sister, Maternal Uncle, Paternal Aunt, Paternal Uncle, Paternal Grandmother          Tobacco Use    Smoking status: Never    Smokeless tobacco: Never   Substance and Sexual Activity    Alcohol use: No     Alcohol/week: 0.8 standard drinks of alcohol     Types: 1 Standard drinks or equivalent per week     Comment: socially    Drug use: No    Sexual activity: Not Currently     Birth control/protection: OCP, Pill     Comment: pt is a virgin     Review of Systems   Respiratory:  Negative for shortness of breath.      Objective:     Vital Signs (Most Recent):  Temp: 97.7 °F (36.5 °C) (05/31/25 0430)  Pulse: 94 (05/31/25 0600)  Resp: 17 (05/31/25 0430)  BP: 91/69 (05/31/25 0500)  SpO2: 96 % (05/31/25 0600) Vital Signs (24h Range):  Temp:  [97.7 °F (36.5 °C)] 97.7 °F (36.5 °C)  Pulse:  [] 94  Resp:  [17-18] 17  SpO2:  [96 %-97 %] 96 %  BP: (91-93)/(53-69) 91/69     Patient Vitals for the past 72 hrs (Last 3 readings):   Weight   05/31/25 0207 63 kg (139 lb)     Body mass index is 28.07 kg/m².    No intake or output data in the 24 hours ending 05/31/25 0632       Physical Exam  Vitals reviewed.   Constitutional:       Appearance: Normal appearance.   HENT:      Head: Normocephalic and atraumatic.      Nose: Nose normal.      Mouth/Throat:      Mouth: Mucous membranes are moist.   Eyes:      Conjunctiva/sclera: Conjunctivae normal.   Cardiovascular:      Rate and Rhythm: Normal rate and regular rhythm.      Heart sounds: Murmur heard.  "  Pulmonary:      Effort: Pulmonary effort is normal. No respiratory distress.   Abdominal:      General: Abdomen is flat.      Comments: Sue drain in place with no tenderness, mild erythema and minimal drainage - unchanged from prior per patient    Musculoskeletal:      Right lower leg: No edema.      Left lower leg: No edema.   Neurological:      Mental Status: She is alert.            Significant Labs:  CBC:  Recent Labs   Lab 05/29/25  1020 05/31/25  0302   WBC 5.07 6.81   RBC 4.93 3.96*   HGB 14.7 11.8*   HCT 43.9 37.0    203   MCV 89 93   MCH 29.8 29.8   MCHC 33.5 31.9*     BNP:  Recent Labs   Lab 05/29/25  1020 05/31/25  0302   BNP 71 70     CMP:  Recent Labs   Lab 05/29/25  1020 05/31/25  0302   GLU 90 91   CALCIUM 9.3 7.4*   ALBUMIN 4.2 3.1*   PROT 7.7 5.6*    142   K 3.8 3.1*   CO2 22* 14*   * 120*   BUN 7 17   CREATININE 0.7 0.6   ALKPHOS 95 82   ALT 17 35   AST 18 52*   BILITOT 0.4 0.1      Coagulation:   No results for input(s): "PT", "INR", "APTT" in the last 168 hours.  LDH:  No results for input(s): "LDH" in the last 72 hours.  Microbiology:  Microbiology Results (last 7 days)       ** No results found for the last 168 hours. **            I have reviewed all pertinent labs within the past 24 hours.    Diagnostic Results:  I have reviewed and interpreted all pertinent imaging results/findings within the past 24 hours.    Assessment/Plan:     SVT (supraventricular tachycardia)  History of SVT with likely SVT episode at home tonight which self resolved. Lightheaded but no syncope.     Recommendations  Stable SVT, no emergent need for antiarrythmic's  EP follow, PH coordinators messaged  Electrolytes replaced  Encouraged hydration and close follow up  Can dc home         Thank you for your consult. I will sign off. Please contact us if you have any additional questions.    Shravan Grant MD  Heart Transplant  [facility]  "

## 2025-05-31 NOTE — ED PROVIDER NOTES
"Encounter Date: 5/31/2025       History     Chief Complaint   Patient presents with    Tachycardia     Took vitals before bedtime, HR was 190. Upon ems arrival, pt's HR was 108.      55 y.o. female with a PMH of pulmonary hypertension on continuous remodulin infusion, SVT, recent admission for kita tube, seizure disorder, hypothyroidism, migraine headaches, asthma [place] [facility] for evaluation of an episode of tachycardia, palpitations, chest pressure, dyspnea, lightheadedness, and slight diaphoresis that lasted 15 minutes. She reports it felt like prior episodes of SVT and occurred when she had just sat down on the toilet to pee. Symptoms have now resolved. She denies any other recent symptoms including fever, chills, productive cough, n/v/d, abdominal pain, dysuria, or lower extremity edema.         The history is provided by the patient and medical records.     Review of patient's allergies indicates:   Allergen Reactions    Fentanyl Anaphylaxis     Respiratory distress    Vibra-tabs [doxycycline hyclate] Anaphylaxis     "throat felt like it was closing"    Adhesive Hives     Silk tape    Amoxicillin Rash    Nsaids (non-steroidal anti-inflammatory drug) Swelling    Chlorhexidine Other (See Comments)     Blue Chlorhexidine causes hives     Past Medical History:   Diagnosis Date    AR (allergic rhinitis)     Cholelithiasis, common bile duct     Chronic low back pain     Eye pressure 2017    General anesthetics causing adverse effect in therapeutic use     GERD (gastroesophageal reflux disease)     History of migraine headaches     Hypothyroidism     Innocent heart murmur     Lumbar disc disease     Menorrhagia     Mild asthma     Obesity     Plantar fasciitis of left foot     Primary pulmonary hypertension     followed by heart transplant/pulmonary     Seizure disorder     x 1 in 2008    Seizures     Sleep apnea     SVT (supraventricular tachycardia) 4/3/2025    TMJ (dislocation of " temporomandibular joint)     Tricuspid regurgitation      Past Surgical History:   Procedure Laterality Date    CARDIAC CATHETERIZATION      CATHETERIZATION OF BOTH LEFT AND RIGHT HEART Bilateral 9/29/2020    Procedure: CATHETERIZATION, HEART, BOTH LEFT AND RIGHT;  Surgeon: Gucci Pickett MD;  Location: [facility] CATH LAB;  Service: Cardiology;  Laterality: Bilateral;    CENTRAL VENOUS CATHETER INSERTION      CORONARY ANGIOGRAPHY N/A 9/29/2020    Procedure: ANGIOGRAM, CORONARY ARTERY;  Surgeon: Gucci Pickett MD;  Location: [facility] CATH LAB;  Service: Cardiology;  Laterality: N/A;    gallbladder drain  06/2019    INSERTION OF HAYNES CATHETER Right 6/17/2020    Procedure: INSERTION, CATHETER, CENTRAL VENOUS, HAYNES Replace Single Lumen for Remodulin Infusion;  Surgeon: Bertin Dewitt MD;  Location: [facility] OR [address] [place] [address];  Service: General;  Laterality: Right;    PORTACATH PLACEMENT      REMOVAL OF TUNNELED CENTRAL VENOUS CATHETER (CVC) N/A 6/17/2020    Procedure: REMOVAL, CATHETER, CENTRAL VENOUS, TUNNELED;  Surgeon: Bertin Dewitt MD;  Location: [facility] OR [address] [place] [address];  Service: General;  Laterality: N/A;    RIGHT HEART CATHETERIZATION Right 8/20/2018    Procedure: HEART CATH-RIGHT;  Surgeon: Ivonne Rai MD;  Location: [facility] CATH LAB;  Service: Cardiology;  Laterality: Right;    RIGHT HEART CATHETERIZATION Right 9/4/2019    Procedure: INSERTION, CATHETER, RIGHT HEART;  Surgeon: Ivonne Rai MD;  Location: [facility] CATH LAB;  Service: Cardiology;  Laterality: Right;    RIGHT HEART CATHETERIZATION Right 6/10/2022    Procedure: INSERTION, CATHETER, RIGHT HEART;  Surgeon: Keshia Poe MD;  Location: [facility] CATH LAB;  Service: Cardiology;  Laterality: Right;    UPPER GASTROINTESTINAL ENDOSCOPY       Family History   Problem Relation Name Age of Onset    Diabetes Mother      Heart disease Father      Glaucoma Father      No Known Problems Sister      Cancer Maternal Aunt          breast    Breast cancer Maternal Aunt       No Known Problems Maternal Uncle      No Known Problems Paternal Aunt      No Known Problems Paternal Uncle      Cancer Maternal Grandmother          uterine    Diabetes Maternal Grandfather      Heart attack Maternal Grandfather      No Known Problems Paternal Grandmother      Heart disease Paternal Grandfather      Heart attack Paternal Grandfather      Diabetes Paternal Grandfather      Leukemia Brother      Breast cancer Cousin      Breast cancer Cousin      Hypertension Other      Colon cancer Neg Hx      Ovarian cancer Neg Hx      Amblyopia Neg Hx      Blindness Neg Hx      Cataracts Neg Hx      Macular degeneration Neg Hx      Retinal detachment Neg Hx      Strabismus Neg Hx      Stroke Neg Hx      Thyroid disease Neg Hx      Esophageal cancer Neg Hx       Social History[1]  Review of Systems    Physical Exam     Initial Vitals [05/31/25 0207]   BP Pulse Resp Temp SpO2   92/60 110 18 97.7 °F (36.5 °C) 96 %      MAP       --         Physical Exam    Nursing note and vitals reviewed.  Constitutional: She appears well-developed and well-nourished. No distress.   HENT:   Head: Normocephalic. Mouth/Throat: Oropharynx is clear and moist.   Eyes: Conjunctivae are normal. Pupils are equal, round, and reactive to light. No scleral icterus.   Neck: Neck supple.   Cardiovascular:  Normal rate, regular rhythm and intact distal pulses.           No murmur heard.  Pulmonary/Chest: Breath sounds normal. No stridor. No respiratory distress.   Port in right upper chest wall with remodulin infusing. Clean dressing. No surrounding erythema, warmth, or induration.    Abdominal: Abdomen is soft. She exhibits no distension. There is no abdominal tenderness.   Sue tube in place in RUQ. No surrounding erythema, warmth, or induration.    Musculoskeletal:         General: No edema.      Cervical back: Neck supple.     Neurological: She is alert. GCS score is 15. GCS eye subscore is 4. GCS verbal subscore is 5. GCS motor subscore  is 6.   Skin: Skin is warm and dry. No rash noted.         ED Course   Procedures  Labs Reviewed   COMPREHENSIVE METABOLIC PANEL - Abnormal       Result Value    Sodium 142      Potassium 3.1 (*)     Chloride 120 (*)     CO2 14 (*)     Glucose 91      BUN 17      Creatinine 0.6      Calcium 7.4 (*)     Protein Total 5.6 (*)     Albumin 3.1 (*)     Bilirubin Total 0.1      ALP 82      AST 52 (*)     ALT 35      Anion Gap 8      eGFR >60     TSH - Abnormal    TSH 0.038 (*)    CBC WITH DIFFERENTIAL - Abnormal    WBC 6.81      RBC 3.96 (*)     HGB 11.8 (*)     HCT 37.0      MCV 93      MCH 29.8      MCHC 31.9 (*)     RDW 14.1      Platelet Count 203      MPV 12.3      Nucleated RBC 0      Neut % 72.8      Lymph % 15.9 (*)     Mono % 8.4      Eos % 2.2      Basophil % 0.4      Imm Grans % 0.3      Neut # 4.96      Lymph # 1.08      Mono # 0.57      Eos # 0.15      Baso # 0.03      Imm Grans # 0.02     B-TYPE NATRIURETIC PEPTIDE - Normal    BNP 70     TROPONIN I HIGH SENSITIVITY - Normal    Troponin High Sensitive 13     MAGNESIUM - Normal    Magnesium  1.7     T4, FREE - Normal    Free T4 1.27     CBC W/ AUTO DIFFERENTIAL    Narrative:     The following orders were created for panel order CBC auto differential.  Procedure                               Abnormality         Status                     ---------                               -----------         ------                     CBC with Differential[[id number]]       Abnormal            Final result                 Please view results for these tests on the individual orders.     EKG Readings: (Independently Interpreted)   Initial Reading: No STEMI. Previous EKG: Compared with most recent EKG Rhythm: Normal Sinus Rhythm. Heart Rate: 100.   TWI similar to prior      ECG Results              EKG 12-lead (Final result)        Collection Time Result Time QRS Duration OHS QTC Calculation    05/31/25 02:56:30 05/31/25 09:11:27 57 433                     Final result by  Interface, Lab In [facility] (05/31/25 09:11:36)                   Narrative:    Test Reason : R00.0,    Vent. Rate : 100 BPM     Atrial Rate : 100 BPM     P-R Int : 178 ms          QRS Dur :  74 ms      QT Int : 336 ms       P-R-T Axes :  78 122 -16 degrees    QTcB Int : 433 ms    Normal sinus rhythm  Right atrial enlargement  Right ventricular hypertrophy  ST and T wave abnormality, consider inferior ischemia  ST and T wave abnormality, consider anterolateral ischemia  Abnormal ECG  When compared with ECG of 02-Apr-2025 11:50,  Non-specific change in ST segment in Anterior leads  T wave inversion more evident in Inferior leads  T wave inversion now evident in Anterior-lateral leads  Confirmed by Piotr Jarquin ([id number]) on 5/31/2025 9:11:23 AM    Referred By: AAAREFERRAL SELF           Confirmed By: Piotr Jarquin                                  Imaging Results              X-Ray Chest AP Portable (Final result)  Result time 05/31/25 07:46:18      Final result by Neli Alexander MD (05/31/25 07:46:18)                   Impression:      Slightly improved pulmonary venous hypertension and interstitial pulmonary edema.    Persistent prominence of the cardiopericardial silhouette.    Persistent enlargement of the main pulmonary artery segment tonio, likely related to underlying pulmonary arterial hypertension.    Possible gastritis.    Recommendations include clinical correlation and continued radiographic follow-up.      Electronically signed by: Neil Alexander  Date:    05/31/2025  Time:    07:46               Narrative:    EXAMINATION:  XR CHEST AP PORTABLE    CLINICAL HISTORY:  tachycardia;    TECHNIQUE:  Single frontal view of the chest was performed.    COMPARISON:  Portable chest x-ray 03/30/2025    FINDINGS:  Midline thoracic trachea.    There remain marked enlargement of the main pulmonary artery segment and prominence of the cardiopericardial silhouette.    Bilateral hilar prominence continues, likely on the basis of  pulmonary arterial hypertension.    Slightly decreased pulmonary venous engorgement and interstitial pulmonary edema.    No consolidation, discrete pneumothorax, or blunting of either lateral costophrenic angle.    No acute radiographic abnormality apparent in the visualized skeleton or visualized upper abdomen, noting that osseous detail is suboptimally visualized, especially in the spine.    Despite moderate gaseous distension of the gastric fundus, gastric rugal folds appear prominent, a finding that is suspicious for gastritis or other gastric mucosal or submucosal abnormality.    Tip of right jugular approach central venous catheter again projects over the anticipated region of the superior cavoatrial junction.    External leads project over the torso.  Oxygen tubing projects over the upper chest and base of neck.  Questionable catheter partially visualized over the right upper quadrant.                                    X-Rays:   Independently Interpreted Readings:   Chest X-Ray: No acute abnormalities.     Medications   potassium bicarbonate disintegrating tablet 40 mEq (40 mEq Oral Given 5/31/25 0645)   magnesium sulfate in dextrose IVPB (premix) 1 g (0 g Intravenous Stopped 5/31/25 0823)     Medical Decision Making  56 y/o F presents for episode of achycardia, palpitations, chest pressure, dyspnea, lightheadedness, and slight diaphoresis that lasted 15 minutes consistent with prior episodes of SVT.     Patient is afebrile, hemodynamically stable, and in no acute distress on arrival.   Differential diagnoses considered include, but not limited to: SVT, infection, PE, pneumonia, CHF, pHTN exacerbation, electrolyte abnormality    EKG similar to prior, no new ischemic changes.   CXR similar to prior.   Labs with slight hypokalemia and hypomagnesemia, replaced in ED.   Consulted and discussed with cardiology as patient reports they previously considered starting medication for SVT but decided to wait to see if  she had recurrent episodes. Cardiology fellow has evaluated the pt at bedside and recommends close f/u in transplant clinic, no meds at DC today. Discussed all findings, plan for discharge home, follow up in transplant clinic, and strict return precautions and patient expressed understanding and agreement.     Amount and/or Complexity of Data Reviewed  Labs: ordered.  Radiology: ordered.    Risk  Prescription drug management.                                      Clinical Impression:  Final diagnoses:  [R00.0] Tachycardia  [I47.10] SVT (supraventricular tachycardia) (Primary)          ED Disposition Condition    Discharge Stable          ED Prescriptions    None       Follow-up Information       Follow up With Specialties Details Why Contact Info    Keshia Poe MD Cardiology, Transplant   [address] [facility] [address]  [phone]      [facility] - Emergency Dept Emergency Medicine  If symptoms worsen [address] [facility] [id number] [phone]                   [1]   Social History  Tobacco Use    Smoking status: Never    Smokeless tobacco: Never   Substance Use Topics    Alcohol use: No     Alcohol/week: 0.8 standard drinks of alcohol     Types: 1 Standard drinks or equivalent per week     Comment: socially    Drug use: No        Marlyn Cerda MD  Resident  05/31/25 0931

## 2025-05-31 NOTE — SUBJECTIVE & OBJECTIVE
Past Medical History:   Diagnosis Date    AR (allergic rhinitis)     Cholelithiasis, common bile duct     Chronic low back pain     Eye pressure 2017    General anesthetics causing adverse effect in therapeutic use     GERD (gastroesophageal reflux disease)     History of migraine headaches     Hypothyroidism     Innocent heart murmur     Lumbar disc disease     Menorrhagia     Mild asthma     Obesity     Plantar fasciitis of left foot     Primary pulmonary hypertension     followed by heart transplant/pulmonary     Seizure disorder     x 1 in 2008    Seizures     Sleep apnea     SVT (supraventricular tachycardia) 4/3/2025    TMJ (dislocation of temporomandibular joint)     Tricuspid regurgitation        Past Surgical History:   Procedure Laterality Date    CARDIAC CATHETERIZATION      CATHETERIZATION OF BOTH LEFT AND RIGHT HEART Bilateral 9/29/2020    Procedure: CATHETERIZATION, HEART, BOTH LEFT AND RIGHT;  Surgeon: Gucci Pickett MD;  Location: [facility] CATH LAB;  Service: Cardiology;  Laterality: Bilateral;    CENTRAL VENOUS CATHETER INSERTION      CORONARY ANGIOGRAPHY N/A 9/29/2020    Procedure: ANGIOGRAM, CORONARY ARTERY;  Surgeon: Gucci Pickett MD;  Location: [facility] CATH LAB;  Service: Cardiology;  Laterality: N/A;    gallbladder drain  06/2019    INSERTION OF HAYNES CATHETER Right 6/17/2020    Procedure: INSERTION, CATHETER, CENTRAL VENOUS, HAYNES Replace Single Lumen for Remodulin Infusion;  Surgeon: Bertin Dewitt MD;  [facility];  Service: General;  Laterality: Right;    PORTACATH PLACEMENT      REMOVAL OF TUNNELED CENTRAL VENOUS CATHETER (CVC) N/A 6/17/2020    Procedure: REMOVAL, CATHETER, CENTRAL VENOUS, TUNNELED;  Surgeon: Bertin Dewitt MD;  Location: [facility] OR 2ND FLR;  Service: General;  Laterality: N/A;    RIGHT HEART CATHETERIZATION Right 8/20/2018    Procedure: HEART CATH-RIGHT;  Surgeon: Ivonne Rai MD;  Location: [facility] CATH LAB;  Service: Cardiology;  Laterality:  "Right;    RIGHT HEART CATHETERIZATION Right 9/4/2019    Procedure: INSERTION, CATHETER, RIGHT HEART;  Surgeon: Ivonne Rai MD;  Location: [facility] CATH LAB;  Service: Cardiology;  Laterality: Right;    RIGHT HEART CATHETERIZATION Right 6/10/2022    Procedure: INSERTION, CATHETER, RIGHT HEART;  Surgeon: Keshia Poe MD;  Location: [facility] CATH LAB;  Service: Cardiology;  Laterality: Right;    UPPER GASTROINTESTINAL ENDOSCOPY         Review of patient's allergies indicates:   Allergen Reactions    Fentanyl Anaphylaxis     Respiratory distress    Vibra-tabs [doxycycline hyclate] Anaphylaxis     "throat felt like it was closing"    Adhesive Hives     Silk tape    Amoxicillin Rash    Nsaids (non-steroidal anti-inflammatory drug) Swelling    Chlorhexidine Other (See Comments)     Blue Chlorhexidine causes hives       Current Facility-Administered Medications   Medication    magnesium sulfate in dextrose IVPB (premix) 1 g    potassium bicarbonate disintegrating tablet 40 mEq     Current Outpatient Medications   Medication Sig    acetaminophen (TYLENOL) 500 MG tablet Take 1,000 mg by mouth every 6 (six) hours as needed for Pain.    ADCIRCA 20 mg Tab Take 2 tablets (40 mg total) by mouth once daily. (Patient taking differently: Take 40 mg by mouth nightly.)    albuterol (VENTOLIN HFA) 90 mcg/actuation inhaler Inhale 2 puffs into the lungs every 6 (six) hours as needed for Wheezing or Shortness of Breath. Rescue    albuterol-ipratropium (DUO-NEB) 2.5 mg-0.5 mg/3 mL nebulizer solution Take 3 mLs by nebulization every 6 (six) hours as needed for Wheezing. Rescue    alprazolam (XANAX ORAL) Take by mouth as needed.    ambrisentan (LETAIRIS) 10 MG Tab Take 1 tablet (10 mg total) by mouth once daily.    BREO ELLIPTA 200-25 mcg/dose DsDv diskus inhaler INHALE 1 PUFF INTO THE LUNGS EVERY EVENING. CONTROLLER    butalbital-acetaminophen-caffeine -40 mg (FIORICET, ESGIC) -40 mg per tablet Take 1 tablet by mouth every 4 " (four) hours as needed.    cyanocobalamin, vitamin B-12, 2,500 mcg Subl Place 2,500 mcg under the tongue every morning.     diphenhydrAMINE (BENADRYL) 25 mg capsule Take 50 mg by mouth every 6 (six) hours as needed for Itching. Patient takes prn evenings    diphenoxylate-atropine 2.5-0.025 mg (LOMOTIL) 2.5-0.025 mg per tablet Take 1 tablet by mouth 4 (four) times daily as needed for Diarrhea.    ferrous sulfate 325 (65 FE) MG EC tablet Take 325 mg by mouth daily as needed.     fluticasone propionate (FLONASE) 50 mcg/actuation nasal spray 2 sprays (100 mcg total) by Each Nostril route every evening. (Patient taking differently: 2 sprays by Each Nostril route Daily.)    folic acid (FOLVITE) 1 MG tablet Take 1 tablet (1,000 mcg total) by mouth once daily.    furosemide (LASIX) 20 MG tablet Take 1 tablet (20 mg total) by mouth once daily. (Patient taking differently: Take 20 mg by mouth once daily. Usually takes PRN weekly for swelling.)    glucosam/chond-msm1/C/michele/bor (GLUCOSAMINE-CHOND-MSM COMPLEX ORAL) Take by mouth. Three times a week (Patient taking differently: Take by mouth. Two times a week)    hyoscyamine (LEVSIN) 0.125 mg Subl Place 1 tablet (0.125 mg total) under the tongue every 6 (six) hours as needed (Aa needed).    lactic acid-citric-potassium (PHEXXI) 1.8-1-0.4 % Gel Place 1 Applicatorful vaginally as needed (CONTRACEPTIVE).    levothyroxine (SYNTHROID) 125 MCG tablet Take 1 tablet (125 mcg total) by mouth before breakfast.    LIDOcaine (LIDODERM) 5 % Place 1 patch onto the skin once daily. (Patient taking differently: Place 1 patch onto the skin once daily. Takes PRN)    loratadine (CLARITIN) 10 mg tablet Take 10 mg by mouth once daily. Takes in morning    nystatin (MYCOSTATIN) powder Apply topically 2 (two) times daily. for 14 days    pantoprazole (PROTONIX) 40 MG tablet Take 1 tablet (40 mg total) by mouth once daily.    rimegepant (NURTEC) 75 mg odt Take 1 tablet (75 mg total) by mouth as needed  for Migraine. Place ODT tablet on the tongue; alternatively the ODT tablet may be placed under the tongue. Can take 2nd dose in 24 hrs if mild relief, no more than 2 in 24 hrs    tiZANidine (ZANAFLEX) 4 MG tablet Take 4 mg by mouth every 6 (six) hours as needed.    topiramate (TOPAMAX) 100 MG tablet Take 1 tablet (100 mg total) by mouth 2 (two) times daily. Further fills on followup.    treprostinil (REMODULIN) 2.5 mg/mL Soln Mix cassette as directed and infuse continuously per physician titration orders on dosing sheet. Current dose 80ng/kg/min    triamcinolone (KENALOG) 0.5 % ointment Apply 1 application  topically 2 (two) times daily.     Family History       Problem Relation (Age of Onset)    Breast cancer Maternal Aunt, Cousin, Cousin    Cancer Maternal Aunt, Maternal Grandmother    Diabetes Mother, Maternal Grandfather, Paternal Grandfather    Glaucoma Father    Heart attack Maternal Grandfather, Paternal Grandfather    Heart disease Father, Paternal Grandfather    Hypertension Other    Leukemia Brother    No Known Problems Sister, Maternal Uncle, Paternal Aunt, Paternal Uncle, Paternal Grandmother          Tobacco Use    Smoking status: Never    Smokeless tobacco: Never   Substance and Sexual Activity    Alcohol use: No     Alcohol/week: 0.8 standard drinks of alcohol     Types: 1 Standard drinks or equivalent per week     Comment: socially    Drug use: No    Sexual activity: Not Currently     Birth control/protection: OCP, Pill     Comment: pt is a virgin     Review of Systems   Respiratory:  Negative for shortness of breath.      Objective:     Vital Signs (Most Recent):  Temp: 97.7 °F (36.5 °C) (05/31/25 0430)  Pulse: 94 (05/31/25 0600)  Resp: 17 (05/31/25 0430)  BP: 91/69 (05/31/25 0500)  SpO2: 96 % (05/31/25 0600) Vital Signs (24h Range):  Temp:  [97.7 °F (36.5 °C)] 97.7 °F (36.5 °C)  Pulse:  [] 94  Resp:  [17-18] 17  SpO2:  [96 %-97 %] 96 %  BP: (91-93)/(53-69) 91/69     Patient Vitals for the  "past 72 hrs (Last 3 readings):   Weight   05/31/25 0207 63 kg (139 lb)     Body mass index is 28.07 kg/m².    No intake or output data in the 24 hours ending 05/31/25 0632       Physical Exam  Vitals reviewed.   Constitutional:       Appearance: Normal appearance.   HENT:      Head: Normocephalic and atraumatic.      Nose: Nose normal.      Mouth/Throat:      Mouth: Mucous membranes are moist.   Eyes:      Conjunctiva/sclera: Conjunctivae normal.   Cardiovascular:      Rate and Rhythm: Normal rate and regular rhythm.      Heart sounds: Murmur heard.   Pulmonary:      Effort: Pulmonary effort is normal. No respiratory distress.   Abdominal:      General: Abdomen is flat.      Comments: Sue drain in place with no tenderness, mild erythema and minimal drainage - unchanged from prior per patient    Musculoskeletal:      Right lower leg: No edema.      Left lower leg: No edema.   Neurological:      Mental Status: She is alert.            Significant Labs:  CBC:  Recent Labs   Lab 05/29/25  1020 05/31/25  0302   WBC 5.07 6.81   RBC 4.93 3.96*   HGB 14.7 11.8*   HCT 43.9 37.0    203   MCV 89 93   MCH 29.8 29.8   MCHC 33.5 31.9*     BNP:  Recent Labs   Lab 05/29/25  1020 05/31/25  0302   BNP 71 70     CMP:  Recent Labs   Lab 05/29/25  1020 05/31/25  0302   GLU 90 91   CALCIUM 9.3 7.4*   ALBUMIN 4.2 3.1*   PROT 7.7 5.6*    142   K 3.8 3.1*   CO2 22* 14*   * 120*   BUN 7 17   CREATININE 0.7 0.6   ALKPHOS 95 82   ALT 17 35   AST 18 52*   BILITOT 0.4 0.1      Coagulation:   No results for input(s): "PT", "INR", "APTT" in the last 168 hours.  LDH:  No results for input(s): "LDH" in the last 72 hours.  Microbiology:  Microbiology Results (last 7 days)       ** No results found for the last 168 hours. **            I have reviewed all pertinent labs within the past 24 hours.    Diagnostic Results:  I have reviewed and interpreted all pertinent imaging results/findings within the past 24 hours.    "

## 2025-05-31 NOTE — ASSESSMENT & PLAN NOTE
History of SVT with likely SVT episode at home tonight which self resolved. Lightheaded but no syncope.     Recommendations  Stable SVT, no emergent need for antiarrythmic's  EP follow, PH coordinators messaged  Electrolytes replaced  Encouraged hydration and close follow up  Can dc home

## 2025-05-31 NOTE — PLAN OF CARE
Discharge Planning Assessment:    Patient admitted on: 5-31-25  Chart reviewed today  Care plan discussed with ER treatment team   attending Dr Chen    Current Dispo: home today  Transportation: has reliable  Case management to follow     [phone] land line. Ewa BONILLA

## 2025-05-31 NOTE — PLAN OF CARE
[facility] - Emergency Dept  Initial Discharge Assessment       Primary Care Provider: Lulu Sandhu MD    Admission Diagnosis: No admission diagnoses are documented for this encounter.    Admission Date: 5/31/2025  Expected Discharge Date:     Transition of Care Barriers: (P) None    Payor: BLUE CROSS BLUE SHIELD / Plan: [place] PPO / Product Type: PPO /     Extended Emergency Contact Information  Primary Emergency Contact: Deuce Perez   [place]  Mobile Phone: [phone]  Relation: Father  Preferred language: English   needed? No    Discharge Plan A: (P) Home with family  Discharge Plan B: (P) Home with family      [facility] [address] [place] [address]  [address] [place] [id number]  Phone: [phone] Fax: [phone]    [id number] - [place] - [address]  [address] [address]  Phone: [phone] Fax: [phone]      Initial Assessment (most recent)       Adult Discharge Assessment - 05/31/25 1546          Discharge Assessment    Assessment Type Discharge Planning Assessment (P)      Confirmed/corrected address, phone number and insurance Yes (P)      Confirmed Demographics Correct on Facesheet (P)      Source of Information patient;health record (P)      People in Home parent(s) (P)      Do you expect to return to your current living situation? Yes (P)      Do you have help at home or someone to help you manage your care at home? Yes (P)      Prior to hospitilization cognitive status: Alert/Oriented (P)      Current cognitive status: Alert/Oriented (P)      Equipment Currently Used at Home oxygen (P)      Readmission within 30 days? No (P)      Patient currently being followed by outpatient case management? No (P)      Do you currently have service(s) that help you manage your care at home? No (P)      Do you take prescription medications? Yes (P)      Do you have prescription coverage? Yes (P)      Do you  have any problems affording any of your prescribed medications? No (P)      Is the patient taking medications as prescribed? yes (P)      How do you get to doctors appointments? family or friend will provide (P)      Are you on dialysis? No (P)      Discharge Plan A Home with family (P)      Discharge Plan B Home with family (P)      DME Needed Upon Discharge  none (P)      Discharge Plan discussed with: Patient;Parent(s) (P)      Transition of Care Barriers None (P)         Physical Activity    On average, how many days per week do you engage in moderate to strenuous exercise (like a brisk walk)? 0 days (P)         Financial Resource Strain    How hard is it for you to pay for the very basics like food, housing, medical care, and heating? Not very hard (P)         Housing Stability    In the last 12 months, was there a time when you were not able to pay the mortgage or rent on time? No (P)      At any time in the past 12 months, were you homeless or living in a shelter (including now)? No (P)         Transportation Needs    In the past 12 months, has lack of transportation kept you from medical appointments or from getting medications? No (P)      In the past 12 months, has lack of transportation kept you from meetings, work, or from getting things needed for daily living? No (P)         Food Insecurity    Within the past 12 months, you worried that your food would run out before you got the money to buy more. Never true (P)         Stress    Do you feel stress - tense, restless, nervous, or anxious, or unable to sleep at night because your mind is troubled all the time - these days? To some extent (P)         Social Isolation    How often do you feel lonely or isolated from those around you?  Sometimes (P)         Alcohol Use    Q1: How often do you have a drink containing alcohol? Patient declined (P)         Utilities    In the past 12 months has the electric, gas, oil, or water company threatened to shut off  services in your home? No (P)         Health Literacy    How often do you need to have someone help you when you read instructions, pamphlets, or other written material from your doctor or pharmacy? Rarely (P)

## 2025-05-31 NOTE — HPI
55 y.o. female w history of IPAH in 2009 (current therapy is Remodulin infusing at 117ng/kg/min, Letairis 10mg qdaily, Adcirca 40mg qdaily, declined transplant w/u in past), ABHIJEET (untreated - not able to tolerate CPAP 2/2 severe congestion), GERD, and Asthma. Recent admission for cholecystitis s/p kita tube due to being high risk for surgery. During this admit she had episode of SVT requiring adenosine. HTS spoke with EP, no formal consult, who suggest multaq if reoccurred. Since this time she has been doing better and her tube has been exchanged. Presented today for an episode of tachycardia will sitting down to use the restroom last night. Reports checked her HR and was 190. Associated lightheadedness with no syncope. Self terminated in 15 minutes. On arrival HD stable. NSR on EKG. BNP WNL, K3.1, mag 1.7. CXR improved from prior. Her K and mag were replaced. Consulted for concerns of recurrent SVT

## 2025-05-31 NOTE — ED NOTES
I assumed care of this patient at this time. Report received from DENNY Aguilar. Pt is resting comfortably in ED stretcher + no acute distress observed. Pt is AAOx4. RR is even, unlabored, and spontaneous + pt's oxygen saturation is 96% on 2 LPM via NC at this time; baseline. Skin is warm, dry and intact. Pt denies pain or needs at this time. Pt remains on continuous cardiac monitor, pulse ox, and BP cuff to cycle. Bed low and locked; side rails up x2; call light within reach.

## 2025-05-31 NOTE — PLAN OF CARE
Patient Chart Advisories  Yuni Perez - [id number]  Take notice of the following advisories for this patient before you continue.     Patient has an FYI of type Patient High Alert         PULMONARY ARTERIAL HYPERTENSION PATIENT ON HIGH RISK MEDICATION               IF ON IV VELETRI (EPOPROSTENOL) OR IV REMODULIN (TREPROSTINIL), DO NOT FLUSH LINE OR INTERRUPT INFUSION FOR ANY REASON.  CALL [facility] ([phone]) FOR ANY LINE/PUMP INFUSION ISSUES               AFTER HOURS, CALL HTS FELLOW ON CALL               IF ADMIT NEEDED, ADMIT TO HTS. PATIENT CAN ONLY BE ADMITTED TO [facility]

## 2025-05-31 NOTE — ED TRIAGE NOTES
"Yuni Perez, a 55 y.o. female presents to the ED via EMS for tachycardia. Pt explains she was using the bathroom and noticed her heart rate go up to the 200's. In EMS, pts heart rate was down to 110. In the ED, pt heart rate is 100-110. Pt endorsing shortness of breath, slightly increased from baseline. Pt has a hx of pulmonary hypertension and had a hospital admission where she had SVT. Pt on 2L at baseline, and on 2L in the ER satting high 90s.    Triage note:  Chief Complaint   Patient presents with    Tachycardia     Took vitals before bedtime, HR was 190. Upon ems arrival, pt's HR was 108.      Review of patient's allergies indicates:   Allergen Reactions    Fentanyl Anaphylaxis     Respiratory distress    Vibra-tabs [doxycycline hyclate] Anaphylaxis     "throat felt like it was closing"    Adhesive Hives     Silk tape    Amoxicillin Rash    Nsaids (non-steroidal anti-inflammatory drug) Swelling    Chlorhexidine Other (See Comments)     Blue Chlorhexidine causes hives     Past Medical History:   Diagnosis Date    AR (allergic rhinitis)     Cholelithiasis, common bile duct     Chronic low back pain     Eye pressure 2017    General anesthetics causing adverse effect in therapeutic use     GERD (gastroesophageal reflux disease)     History of migraine headaches     Hypothyroidism     Innocent heart murmur     Lumbar disc disease     Menorrhagia     Mild asthma     Obesity     Plantar fasciitis of left foot     Primary pulmonary hypertension     followed by heart transplant/pulmonary     Seizure disorder     x 1 in 2008    Seizures     Sleep apnea     SVT (supraventricular tachycardia) 4/3/2025    TMJ (dislocation of temporomandibular joint)     Tricuspid regurgitation       "

## 2025-05-31 NOTE — HPI
55 y.o. female w history of diagnosed with IPAH in 2009 and started on Remodulin, Letairis, and Adcirca. She has a history of ABHIJEET (untreated - not able to tolerate CPAP 2/2 severe congestion), GERD, and Asthma. Current therapy is Remodulin infusing at 117ng/kg/min (pre-filled cassettes), Letairis 10mg qdaily, and Adcirca 40mg qdaily. Recent admission for cholecystitis s/p kita tube due to being high risk for surgery. During this admit she had episode of SVT requiring adenosine. HTS spoke with EP, no formal consult, who suggest multaq if reoccurred. Since this time she has been doing better and her tube has been exchanged. Presented today for an episode of tachycardia will sitting down to use the restroom last night. Reports checked her HR and was 190. Associated lightheadedness with no syncope. Self terminated in 15 minutes. On arrival HD stable. NSR on EKG. BNP WNL, K3.1, mag 1.7. CXR improved from prior. Her K and mag were replaced. Consulted for concerns of recurrent SVT

## 2025-05-31 NOTE — ED NOTES
Bed: [facility]  Expected date: 5/31/25  Expected time: 7:57 AM  Means of arrival:   Comments:

## 2025-05-31 NOTE — DISCHARGE INSTRUCTIONS
Thank you for coming to [facility].  It was a pleasure taking care of you.  Please follow-up in cardiac transplant clinic as soon as possible for a visit.  Return to the emergency department if you develop any new or worsening symptoms.   apixaban 2.5 mg oral tablet: 1 tab(s) orally 2 times a day monitor for signs of bleeding , CBC every 2 days x 1 week  aspirin 81 mg oral delayed release tablet: 1 tab(s) orally once a day  atorvastatin 40 mg oral tablet: 1 tab(s) orally once a day (at bedtime)  benztropine 2 mg oral tablet: 1 tab(s) orally 2 times a day  divalproex sodium 500 mg oral delayed release tablet: 3 tab(s) orally once a day RESUME PREADMISSION SCHEDULE ,NO CHANGES  docusate sodium 100 mg oral tablet: 2 tab(s) orally 2 times a day  famotidine 20 mg oral tablet: 1 tab(s) orally once a day (in the morning)  folic acid 1 mg oral tablet: 1 tab(s) orally once a day (in the morning)  levothyroxine 88 mcg (0.088 mg) oral tablet: 1 tab(s) orally once a day  losartan 50 mg oral tablet: 1 tab(s) orally once a day  metFORMIN 1000 mg oral tablet: 1 tab(s) orally 2 times a day  metoprolol tartrate 25 mg oral tablet: 1 tab(s) orally 2 times a day  pantoprazole 40 mg oral delayed release tablet: 1 tab(s) orally 2 times a day  Plavix 75 mg oral tablet: 1 tab(s) orally  QUEtiapine 400 mg oral tablet: 1 tab(s) orally once a day (at bedtime)  RisperDAL 4 mg oral tablet: 1 tab(s) orally 2 times a day  traZODone 100 mg oral tablet: 1 tab(s) orally once a day (at bedtime)  Vascepa 1 g oral capsule: 1 cap(s) orally 2 times a day  Vitamin D2 50,000 intl units (1.25 mg) oral capsule: 1 cap(s) orally every 2 weeks  Next dose due 8/15/2020   atorvastatin 40 mg oral tablet: 1 tab(s) orally once a day (at bedtime)  benztropine 2 mg oral tablet: 1 tab(s) orally 2 times a day  divalproex sodium 500 mg oral tablet, extended release: 3 tab(s) orally once a day  docusate sodium 100 mg oral tablet: 2 tab(s) orally 2 times a day  folic acid 1 mg oral tablet: 1 tab(s) orally once a day (in the morning)  levothyroxine 88 mcg (0.088 mg) oral tablet: 1 tab(s) orally once a day  losartan 50 mg oral tablet: 1 tab(s) orally once a day  metFORMIN 1000 mg oral tablet: 1 tab(s) orally 2 times a day  metoprolol tartrate 25 mg oral tablet: 1 tab(s) orally 2 times a day  pantoprazole 40 mg oral delayed release tablet: 1 tab(s) orally 2 times a day  QUEtiapine 400 mg oral tablet: 1 tab(s) orally once a day (at bedtime)  risperiDONE 4 mg oral tablet: 1 tab(s) orally 2 times a day  traZODone 100 mg oral tablet: 1 tab(s) orally once a day (at bedtime)  Vitamin D2 50,000 intl units (1.25 mg) oral capsule: 1 cap(s) orally every 2 weeks  Next dose due 8/15/2020   atorvastatin 40 mg oral tablet: 1 tab(s) orally once a day (at bedtime)  benztropine 2 mg oral tablet: 1 tab(s) orally 2 times a day  divalproex sodium 500 mg oral tablet, extended release: 3 tab(s) orally once a day  docusate sodium 100 mg oral tablet: 2 tab(s) orally 2 times a day  folic acid 1 mg oral tablet: 1 tab(s) orally once a day (in the morning)  levothyroxine 88 mcg (0.088 mg) oral tablet: 1 tab(s) orally once a day  losartan 50 mg oral tablet: 1 tab(s) orally once a day  metFORMIN 1000 mg oral tablet: 1 tab(s) orally 2 times a day  metoprolol tartrate 25 mg oral tablet: 1 tab(s) orally once  pantoprazole 40 mg oral delayed release tablet: 1 tab(s) orally 2 times a day  QUEtiapine 400 mg oral tablet: 1 tab(s) orally once a day (at bedtime)  risperiDONE 4 mg oral tablet: 1 tab(s) orally 2 times a day  traZODone 100 mg oral tablet: 1 tab(s) orally once a day (at bedtime)  Vitamin D2 50,000 intl units (1.25 mg) oral capsule: 1 cap(s) orally every 2 weeks  Next dose due 8/15/2020   atorvastatin 40 mg oral tablet: 1 tab(s) orally once a day (at bedtime)  benztropine 2 mg oral tablet: 1 tab(s) orally 2 times a day  divalproex sodium 500 mg oral tablet, extended release: 3 tab(s) orally once a day  docusate sodium 100 mg oral tablet: 2 tab(s) orally 2 times a day  folic acid 1 mg oral tablet: 1 tab(s) orally once a day (in the morning)  levothyroxine 88 mcg (0.088 mg) oral tablet: 1 tab(s) orally once a day  losartan 50 mg oral tablet: 1 tab(s) orally once a day  metFORMIN 1000 mg oral tablet: 1 tab(s) orally 2 times a day  metoprolol tartrate 25 mg oral tablet: 1 tab(s) orally 2 times a day  pantoprazole 40 mg oral delayed release tablet: 1 tab(s) orally 2 times a day  QUEtiapine 400 mg oral tablet: 1 tab(s) orally once a day (at bedtime)  risperiDONE 4 mg oral tablet: 1 tab(s) orally 2 times a day  traZODone 100 mg oral tablet: 1 tab(s) orally once a day (at bedtime)  Vitamin D2 50,000 intl units (1.25 mg) oral capsule: 1 cap(s) orally every 2 weeks

## 2025-06-02 ENCOUNTER — TELEPHONE (OUTPATIENT)
Dept: PULMONOLOGY | Facility: CLINIC | Age: 55
End: 2025-06-02
Payer: COMMERCIAL

## 2025-06-02 ENCOUNTER — TELEPHONE (OUTPATIENT)
Dept: ELECTROPHYSIOLOGY | Facility: CLINIC | Age: 55
End: 2025-06-02
Payer: COMMERCIAL

## 2025-06-02 ENCOUNTER — TELEPHONE (OUTPATIENT)
Dept: INTERVENTIONAL RADIOLOGY/VASCULAR | Facility: CLINIC | Age: 55
End: 2025-06-02
Payer: COMMERCIAL

## 2025-06-02 DIAGNOSIS — I47.10 SVT (SUPRAVENTRICULAR TACHYCARDIA): Primary | ICD-10-CM

## 2025-06-03 ENCOUNTER — TELEPHONE (OUTPATIENT)
Dept: ELECTROPHYSIOLOGY | Facility: CLINIC | Age: 55
End: 2025-06-03
Payer: COMMERCIAL

## 2025-06-03 DIAGNOSIS — I47.10 SVT (SUPRAVENTRICULAR TACHYCARDIA): Primary | ICD-10-CM

## 2025-06-04 ENCOUNTER — OFFICE VISIT (OUTPATIENT)
Dept: NEUROLOGY | Facility: CLINIC | Age: 55
End: 2025-06-04
Payer: COMMERCIAL

## 2025-06-04 VITALS
HEART RATE: 91 BPM | DIASTOLIC BLOOD PRESSURE: 75 MMHG | OXYGEN SATURATION: 98 % | SYSTOLIC BLOOD PRESSURE: 111 MMHG | BODY MASS INDEX: 27.38 KG/M2 | HEIGHT: 59 IN | WEIGHT: 135.81 LBS

## 2025-06-04 DIAGNOSIS — G43.009 MIGRAINE WITHOUT AURA AND WITHOUT STATUS MIGRAINOSUS, NOT INTRACTABLE: ICD-10-CM

## 2025-06-04 DIAGNOSIS — G43.909 EPISODIC MIGRAINE: ICD-10-CM

## 2025-06-04 DIAGNOSIS — G47.33 OSA (OBSTRUCTIVE SLEEP APNEA): ICD-10-CM

## 2025-06-04 DIAGNOSIS — G89.29 CHRONIC NECK PAIN: Primary | ICD-10-CM

## 2025-06-04 DIAGNOSIS — M54.2 CHRONIC NECK PAIN: Primary | ICD-10-CM

## 2025-06-04 PROCEDURE — 3008F BODY MASS INDEX DOCD: CPT | Mod: CPTII,S$GLB,, | Performed by: STUDENT IN AN ORGANIZED HEALTH CARE EDUCATION/TRAINING PROGRAM

## 2025-06-04 PROCEDURE — 3078F DIAST BP <80 MM HG: CPT | Mod: CPTII,S$GLB,, | Performed by: STUDENT IN AN ORGANIZED HEALTH CARE EDUCATION/TRAINING PROGRAM

## 2025-06-04 PROCEDURE — 1159F MED LIST DOCD IN RCRD: CPT | Mod: CPTII,S$GLB,, | Performed by: STUDENT IN AN ORGANIZED HEALTH CARE EDUCATION/TRAINING PROGRAM

## 2025-06-04 PROCEDURE — 99999 PR PBB SHADOW E&M-EST. PATIENT-LVL V: CPT | Mod: PBBFAC,,, | Performed by: STUDENT IN AN ORGANIZED HEALTH CARE EDUCATION/TRAINING PROGRAM

## 2025-06-04 PROCEDURE — 3074F SYST BP LT 130 MM HG: CPT | Mod: CPTII,S$GLB,, | Performed by: STUDENT IN AN ORGANIZED HEALTH CARE EDUCATION/TRAINING PROGRAM

## 2025-06-04 PROCEDURE — 99214 OFFICE O/P EST MOD 30 MIN: CPT | Mod: S$GLB,,, | Performed by: STUDENT IN AN ORGANIZED HEALTH CARE EDUCATION/TRAINING PROGRAM

## 2025-06-04 RX ORDER — TOPIRAMATE 100 MG/1
100 TABLET, FILM COATED ORAL 2 TIMES DAILY
Qty: 60 TABLET | Refills: 5 | Status: SHIPPED | OUTPATIENT
Start: 2025-06-04 | End: 2025-12-01

## 2025-06-04 RX ORDER — RIMEGEPANT SULFATE 75 MG/75MG
75 TABLET, ORALLY DISINTEGRATING ORAL
Qty: 16 TABLET | Refills: 5 | Status: SHIPPED | OUTPATIENT
Start: 2025-06-04 | End: 2025-12-01

## 2025-06-17 ENCOUNTER — HOSPITAL ENCOUNTER (INPATIENT)
Facility: HOSPITAL | Age: 55
LOS: 4 days | Discharge: HOME HEALTH | DRG: 919 | End: 2025-06-21
Attending: EMERGENCY MEDICINE | Admitting: INTERNAL MEDICINE
Payer: COMMERCIAL

## 2025-06-17 DIAGNOSIS — Z13.6 SCREENING FOR CARDIOVASCULAR CONDITION: ICD-10-CM

## 2025-06-17 DIAGNOSIS — I50.9 HEART FAILURE: ICD-10-CM

## 2025-06-17 DIAGNOSIS — I27.21 WHO GROUP 1 PULMONARY ARTERIAL HYPERTENSION: ICD-10-CM

## 2025-06-17 DIAGNOSIS — M79.622 LEFT UPPER ARM PAIN: ICD-10-CM

## 2025-06-17 DIAGNOSIS — K80.50 CHOLEDOCHOLITHIASIS: ICD-10-CM

## 2025-06-17 DIAGNOSIS — K80.00 CALCULUS OF GALLBLADDER WITH ACUTE CHOLECYSTITIS WITHOUT OBSTRUCTION: ICD-10-CM

## 2025-06-17 DIAGNOSIS — R10.11 RIGHT UPPER QUADRANT ABDOMINAL PAIN: Primary | ICD-10-CM

## 2025-06-17 PROBLEM — J18.9 PNEUMONIA: Status: ACTIVE | Noted: 2025-06-17

## 2025-06-17 LAB
ABSOLUTE EOSINOPHIL (OHS): 0.14 K/UL
ABSOLUTE MONOCYTE (OHS): 0.5 K/UL (ref 0.3–1)
ABSOLUTE NEUTROPHIL COUNT (OHS): 4.99 K/UL (ref 1.8–7.7)
ALBUMIN SERPL BCP-MCNC: 4.1 G/DL (ref 3.5–5.2)
ALLENS TEST: ABNORMAL
ALP SERPL-CCNC: 87 UNIT/L (ref 40–150)
ALT SERPL W/O P-5'-P-CCNC: 12 UNIT/L (ref 10–44)
ANION GAP (OHS): 10 MMOL/L (ref 8–16)
AST SERPL-CCNC: 25 UNIT/L (ref 11–45)
BASOPHILS # BLD AUTO: 0.03 K/UL
BASOPHILS NFR BLD AUTO: 0.5 %
BILIRUB SERPL-MCNC: 0.3 MG/DL (ref 0.1–1)
BIPAP: 0
BUN SERPL-MCNC: 8 MG/DL (ref 6–20)
CALCIUM SERPL-MCNC: 9 MG/DL (ref 8.7–10.5)
CHLORIDE SERPL-SCNC: 110 MMOL/L (ref 95–110)
CO2 SERPL-SCNC: 20 MMOL/L (ref 23–29)
CREAT SERPL-MCNC: 0.6 MG/DL (ref 0.5–1.4)
ERYTHROCYTE [DISTWIDTH] IN BLOOD BY AUTOMATED COUNT: 13.8 % (ref 11.5–14.5)
FIO2: 21 %
GFR SERPLBLD CREATININE-BSD FMLA CKD-EPI: >60 ML/MIN/1.73/M2
GLUCOSE SERPL-MCNC: 89 MG/DL (ref 70–110)
HCT VFR BLD AUTO: 43.8 % (ref 37–48.5)
HGB BLD-MCNC: 14.6 GM/DL (ref 12–16)
IMM GRANULOCYTES # BLD AUTO: 0.02 K/UL (ref 0–0.04)
IMM GRANULOCYTES NFR BLD AUTO: 0.3 % (ref 0–0.5)
LACTATE SERPL-SCNC: 0.5 MMOL/L (ref 0.5–2.2)
LDH SERPL L TO P-CCNC: 0.45 MMOL/L (ref 0.5–2.2)
LDH SERPL L TO P-CCNC: 1 MMOL/L (ref 0.5–2.2)
LYMPHOCYTES # BLD AUTO: 0.64 K/UL (ref 1–4.8)
MAGNESIUM SERPL-MCNC: 2 MG/DL (ref 1.6–2.6)
MCH RBC QN AUTO: 29 PG (ref 27–31)
MCHC RBC AUTO-ENTMCNC: 33.3 G/DL (ref 32–36)
MCV RBC AUTO: 87 FL (ref 82–98)
NUCLEATED RBC (/100WBC) (OHS): 0 /100 WBC
OHS QRS DURATION: 76 MS
OHS QTC CALCULATION: 500 MS
PHOSPHATE SERPL-MCNC: 3.6 MG/DL (ref 2.7–4.5)
PLATELET # BLD AUTO: 205 K/UL (ref 150–450)
PMV BLD AUTO: 12.3 FL (ref 9.2–12.9)
POC PERFORMED BY: NORMAL
POC TEMPERATURE: 37 C
POTASSIUM SERPL-SCNC: 4.2 MMOL/L (ref 3.5–5.1)
PROCALCITONIN SERPL-MCNC: 0.05 NG/ML
PROT SERPL-MCNC: 7.6 GM/DL (ref 6–8.4)
RBC # BLD AUTO: 5.03 M/UL (ref 4–5.4)
RELATIVE EOSINOPHIL (OHS): 2.2 %
RELATIVE LYMPHOCYTE (OHS): 10.1 % (ref 18–48)
RELATIVE MONOCYTE (OHS): 7.9 % (ref 4–15)
RELATIVE NEUTROPHIL (OHS): 79 % (ref 38–73)
SAMPLE: ABNORMAL
SITE: ABNORMAL
SODIUM SERPL-SCNC: 140 MMOL/L (ref 136–145)
SPECIMEN SOURCE: NORMAL
WBC # BLD AUTO: 6.32 K/UL (ref 3.9–12.7)

## 2025-06-17 PROCEDURE — 83605 ASSAY OF LACTIC ACID: CPT

## 2025-06-17 PROCEDURE — 87040 BLOOD CULTURE FOR BACTERIA: CPT

## 2025-06-17 PROCEDURE — 93010 ELECTROCARDIOGRAM REPORT: CPT | Mod: ,,, | Performed by: INTERNAL MEDICINE

## 2025-06-17 PROCEDURE — 25000003 PHARM REV CODE 250: Performed by: STUDENT IN AN ORGANIZED HEALTH CARE EDUCATION/TRAINING PROGRAM

## 2025-06-17 PROCEDURE — 25000003 PHARM REV CODE 250

## 2025-06-17 PROCEDURE — 96375 TX/PRO/DX INJ NEW DRUG ADDON: CPT

## 2025-06-17 PROCEDURE — 63600175 PHARM REV CODE 636 W HCPCS

## 2025-06-17 PROCEDURE — 96365 THER/PROPH/DIAG IV INF INIT: CPT

## 2025-06-17 PROCEDURE — 84100 ASSAY OF PHOSPHORUS: CPT

## 2025-06-17 PROCEDURE — 99285 EMERGENCY DEPT VISIT HI MDM: CPT | Mod: 25

## 2025-06-17 PROCEDURE — 20600001 HC STEP DOWN PRIVATE ROOM

## 2025-06-17 PROCEDURE — 87186 SC STD MICRODIL/AGAR DIL: CPT

## 2025-06-17 PROCEDURE — 99223 1ST HOSP IP/OBS HIGH 75: CPT | Mod: ,,, | Performed by: INTERNAL MEDICINE

## 2025-06-17 PROCEDURE — 63600175 PHARM REV CODE 636 W HCPCS: Mod: TB

## 2025-06-17 PROCEDURE — 80053 COMPREHEN METABOLIC PANEL: CPT

## 2025-06-17 PROCEDURE — 83735 ASSAY OF MAGNESIUM: CPT

## 2025-06-17 PROCEDURE — 93005 ELECTROCARDIOGRAM TRACING: CPT

## 2025-06-17 PROCEDURE — 99900035 HC TECH TIME PER 15 MIN (STAT)

## 2025-06-17 PROCEDURE — 25500020 PHARM REV CODE 255: Performed by: EMERGENCY MEDICINE

## 2025-06-17 PROCEDURE — 85025 COMPLETE CBC W/AUTO DIFF WBC: CPT

## 2025-06-17 PROCEDURE — 84145 PROCALCITONIN (PCT): CPT

## 2025-06-17 RX ORDER — FUROSEMIDE 20 MG/1
20 TABLET ORAL DAILY
Status: DISCONTINUED | OUTPATIENT
Start: 2025-06-18 | End: 2025-06-17

## 2025-06-17 RX ORDER — SUCRALFATE 1 G/10ML
1 SUSPENSION ORAL EVERY 6 HOURS
Status: DISCONTINUED | OUTPATIENT
Start: 2025-06-17 | End: 2025-06-20

## 2025-06-17 RX ORDER — TADALAFIL 20 MG/1
40 TABLET ORAL NIGHTLY
Status: DISCONTINUED | OUTPATIENT
Start: 2025-06-17 | End: 2025-06-17

## 2025-06-17 RX ORDER — METRONIDAZOLE 500 MG/100ML
500 INJECTION, SOLUTION INTRAVENOUS
Status: DISCONTINUED | OUTPATIENT
Start: 2025-06-17 | End: 2025-06-17

## 2025-06-17 RX ORDER — TALC
6 POWDER (GRAM) TOPICAL NIGHTLY PRN
Status: DISCONTINUED | OUTPATIENT
Start: 2025-06-17 | End: 2025-06-21 | Stop reason: HOSPADM

## 2025-06-17 RX ORDER — SODIUM CHLORIDE 0.9 % (FLUSH) 0.9 %
10 SYRINGE (ML) INJECTION
Status: DISCONTINUED | OUTPATIENT
Start: 2025-06-17 | End: 2025-06-21 | Stop reason: HOSPADM

## 2025-06-17 RX ORDER — TREPROSTINIL 2.5 MG/ML
80 INJECTION, SOLUTION INTRAVENOUS; SUBCUTANEOUS ONCE
Status: DISCONTINUED | OUTPATIENT
Start: 2025-06-17 | End: 2025-06-17

## 2025-06-17 RX ORDER — AMBRISENTAN 10 MG/1
10 TABLET, FILM COATED ORAL DAILY
Status: DISCONTINUED | OUTPATIENT
Start: 2025-06-18 | End: 2025-06-17

## 2025-06-17 RX ORDER — AMOXICILLIN 250 MG
1 CAPSULE ORAL 2 TIMES DAILY
Status: DISCONTINUED | OUTPATIENT
Start: 2025-06-17 | End: 2025-06-20

## 2025-06-17 RX ORDER — IPRATROPIUM BROMIDE AND ALBUTEROL SULFATE 2.5; .5 MG/3ML; MG/3ML
3 SOLUTION RESPIRATORY (INHALATION) EVERY 6 HOURS PRN
Status: DISCONTINUED | OUTPATIENT
Start: 2025-06-17 | End: 2025-06-21 | Stop reason: HOSPADM

## 2025-06-17 RX ORDER — ONDANSETRON HYDROCHLORIDE 2 MG/ML
4 INJECTION, SOLUTION INTRAVENOUS EVERY 8 HOURS PRN
Status: DISCONTINUED | OUTPATIENT
Start: 2025-06-17 | End: 2025-06-21 | Stop reason: HOSPADM

## 2025-06-17 RX ORDER — FLUTICASONE FUROATE AND VILANTEROL 200; 25 UG/1; UG/1
1 POWDER RESPIRATORY (INHALATION) DAILY
Status: DISCONTINUED | OUTPATIENT
Start: 2025-06-18 | End: 2025-06-21 | Stop reason: HOSPADM

## 2025-06-17 RX ORDER — ONDANSETRON HYDROCHLORIDE 2 MG/ML
4 INJECTION, SOLUTION INTRAVENOUS
Status: COMPLETED | OUTPATIENT
Start: 2025-06-17 | End: 2025-06-17

## 2025-06-17 RX ORDER — POLYETHYLENE GLYCOL 3350 17 G/17G
17 POWDER, FOR SOLUTION ORAL DAILY
Status: DISCONTINUED | OUTPATIENT
Start: 2025-06-18 | End: 2025-06-20

## 2025-06-17 RX ORDER — TRAMADOL HYDROCHLORIDE 50 MG/1
50 TABLET, FILM COATED ORAL EVERY 6 HOURS PRN
Status: DISCONTINUED | OUTPATIENT
Start: 2025-06-17 | End: 2025-06-21 | Stop reason: HOSPADM

## 2025-06-17 RX ORDER — MORPHINE SULFATE 2 MG/ML
2 INJECTION, SOLUTION INTRAMUSCULAR; INTRAVENOUS ONCE
Status: COMPLETED | OUTPATIENT
Start: 2025-06-17 | End: 2025-06-17

## 2025-06-17 RX ORDER — ALBUTEROL SULFATE 90 UG/1
2 INHALANT RESPIRATORY (INHALATION) EVERY 6 HOURS PRN
Status: DISCONTINUED | OUTPATIENT
Start: 2025-06-17 | End: 2025-06-21 | Stop reason: HOSPADM

## 2025-06-17 RX ORDER — ALUMINUM HYDROXIDE, MAGNESIUM HYDROXIDE, AND SIMETHICONE 1200; 120; 1200 MG/30ML; MG/30ML; MG/30ML
30 SUSPENSION ORAL
Status: DISCONTINUED | OUTPATIENT
Start: 2025-06-17 | End: 2025-06-20

## 2025-06-17 RX ORDER — BUTALBITAL, ACETAMINOPHEN AND CAFFEINE 50; 325; 40 MG/1; MG/1; MG/1
1 TABLET ORAL EVERY 4 HOURS PRN
Status: DISCONTINUED | OUTPATIENT
Start: 2025-06-17 | End: 2025-06-21 | Stop reason: HOSPADM

## 2025-06-17 RX ORDER — LEVOTHYROXINE SODIUM 125 UG/1
125 TABLET ORAL
Status: DISCONTINUED | OUTPATIENT
Start: 2025-06-18 | End: 2025-06-21 | Stop reason: HOSPADM

## 2025-06-17 RX ORDER — AMBRISENTAN 10 MG/1
10 TABLET, FILM COATED ORAL DAILY
Status: DISCONTINUED | OUTPATIENT
Start: 2025-06-18 | End: 2025-06-21 | Stop reason: HOSPADM

## 2025-06-17 RX ORDER — ACETAMINOPHEN 325 MG/1
650 TABLET ORAL EVERY 4 HOURS PRN
Status: DISCONTINUED | OUTPATIENT
Start: 2025-06-17 | End: 2025-06-21 | Stop reason: HOSPADM

## 2025-06-17 RX ORDER — TOPIRAMATE 25 MG/1
100 TABLET, FILM COATED ORAL 2 TIMES DAILY
Status: DISCONTINUED | OUTPATIENT
Start: 2025-06-17 | End: 2025-06-21 | Stop reason: HOSPADM

## 2025-06-17 RX ORDER — TADALAFIL 20 MG/1
40 TABLET ORAL DAILY
Status: DISCONTINUED | OUTPATIENT
Start: 2025-06-18 | End: 2025-06-21 | Stop reason: HOSPADM

## 2025-06-17 RX ORDER — ALPRAZOLAM 0.25 MG/1
0.25 TABLET ORAL 2 TIMES DAILY PRN
Status: DISCONTINUED | OUTPATIENT
Start: 2025-06-17 | End: 2025-06-21 | Stop reason: HOSPADM

## 2025-06-17 RX ORDER — CEFTRIAXONE 2 G/1
2 INJECTION, POWDER, FOR SOLUTION INTRAMUSCULAR; INTRAVENOUS
Status: DISCONTINUED | OUTPATIENT
Start: 2025-06-17 | End: 2025-06-17

## 2025-06-17 RX ORDER — PANTOPRAZOLE SODIUM 40 MG/1
40 TABLET, DELAYED RELEASE ORAL DAILY
Status: DISCONTINUED | OUTPATIENT
Start: 2025-06-18 | End: 2025-06-21 | Stop reason: HOSPADM

## 2025-06-17 RX ADMIN — SODIUM CHLORIDE, POTASSIUM CHLORIDE, SODIUM LACTATE AND CALCIUM CHLORIDE 250 ML: 600; 310; 30; 20 INJECTION, SOLUTION INTRAVENOUS at 03:06

## 2025-06-17 RX ADMIN — PIPERACILLIN SODIUM AND TAZOBACTAM SODIUM 4.5 G: 4; .5 INJECTION, POWDER, FOR SOLUTION INTRAVENOUS at 10:06

## 2025-06-17 RX ADMIN — MORPHINE SULFATE 2 MG: 2 INJECTION, SOLUTION INTRAMUSCULAR; INTRAVENOUS at 02:06

## 2025-06-17 RX ADMIN — IOHEXOL 75 ML: 350 INJECTION, SOLUTION INTRAVENOUS at 03:06

## 2025-06-17 RX ADMIN — VANCOMYCIN HYDROCHLORIDE 1500 MG: 1.5 INJECTION, POWDER, LYOPHILIZED, FOR SOLUTION INTRAVENOUS at 04:06

## 2025-06-17 RX ADMIN — ONDANSETRON 4 MG: 2 INJECTION INTRAMUSCULAR; INTRAVENOUS at 02:06

## 2025-06-17 RX ADMIN — TOPIRAMATE 100 MG: 25 TABLET, FILM COATED ORAL at 10:06

## 2025-06-17 RX ADMIN — PIPERACILLIN SODIUM AND TAZOBACTAM SODIUM 4.5 G: 4; .5 INJECTION, POWDER, FOR SOLUTION INTRAVENOUS at 03:06

## 2025-06-17 RX ADMIN — TRAMADOL HYDROCHLORIDE 50 MG: 50 TABLET, COATED ORAL at 10:06

## 2025-06-17 RX ADMIN — TREPROSTINIL 7078.5 NG/MIN: 200 INJECTION, SOLUTION INTRAVENOUS; SUBCUTANEOUS at 10:06

## 2025-06-17 NOTE — ASSESSMENT & PLAN NOTE
Patient with suspected pneumonia with infiltrate on chest x-ray.  Patient already on broad-spectrum antibiotics for choledocholithiasis.  Concern possibility of aspiration given patient's vomiting at home and difficulty keeping down food even abdominal pain.  Otherwise no significant increase in oxygen requirements.   -sputum culture ordered  -infectious disease consult, appreciate recommendations  -continue broad-spectrum antibiotics per of choledocholithiasis problem.

## 2025-06-17 NOTE — ASSESSMENT & PLAN NOTE
Patient presents with significant and progressively worsening abdominal pain and left shoulder pain.  Patient has a kita tube in place and is status post kita tube exchange 5/19.  CT scans appear to be similar to previous CT scan on diagnosis of choledocholithiasis, however patient with purulent drainage from exit site of kita tube and some white purulence in her kita bag.  Patient also with nausea and vomiting, poor p.o. intake, worsening pain that has prompted her to come in.  -aerobic and anaerobic culture collected from percutaneous kita tube exit site  -blood cultures collected x2  - patient started on broad-spectrum antibiotics with vancomycin and Zosyn  - consult to Infectious Disease, appreciate recommendations  -consulting general surgery, appreciate recommendation  -consult to interventional Radiology, appreciate recommendations

## 2025-06-17 NOTE — ASSESSMENT & PLAN NOTE
Hx of SVT post kita tube placement that converted with adenosine. EP did not add any agents currently but will plan to add Multaq if she has future issues

## 2025-06-17 NOTE — SUBJECTIVE & OBJECTIVE
Past Medical History:   Diagnosis Date    AR (allergic rhinitis)     Cholelithiasis, common bile duct     Chronic low back pain     Eye pressure 2017    General anesthetics causing adverse effect in therapeutic use     GERD (gastroesophageal reflux disease)     History of migraine headaches     Hypothyroidism     Innocent heart murmur     Lumbar disc disease     Menorrhagia     Mild asthma     Obesity     Plantar fasciitis of left foot     Primary pulmonary hypertension     followed by heart transplant/pulmonary     Seizure disorder     x 1 in 2008    Seizures     Sleep apnea     SVT (supraventricular tachycardia) 4/3/2025    TMJ (dislocation of temporomandibular joint)     Tricuspid regurgitation        Past Surgical History:   Procedure Laterality Date    CARDIAC CATHETERIZATION      CATHETERIZATION OF BOTH LEFT AND RIGHT HEART Bilateral 9/29/2020    Procedure: CATHETERIZATION, HEART, BOTH LEFT AND RIGHT;  Surgeon: Gucci Pickett MD;  Location: [facility] CATH LAB;  Service: Cardiology;  Laterality: Bilateral;    CENTRAL VENOUS CATHETER INSERTION      CORONARY ANGIOGRAPHY N/A 9/29/2020    Procedure: ANGIOGRAM, CORONARY ARTERY;  Surgeon: Gucci Pickett MD;  Location: [facility] CATH LAB;  Service: Cardiology;  Laterality: N/A;    gallbladder drain  06/2019    INSERTION OF HAYNES CATHETER Right 6/17/2020    Procedure: INSERTION, CATHETER, CENTRAL VENOUS, HAYNES Replace Single Lumen for Remodulin Infusion;  Surgeon: Bertin Dewitt MD;  [facility];  Service: General;  Laterality: Right;    PORTACATH PLACEMENT      REMOVAL OF TUNNELED CENTRAL VENOUS CATHETER (CVC) N/A 6/17/2020    Procedure: REMOVAL, CATHETER, CENTRAL VENOUS, TUNNELED;  Surgeon: Bertin Dewitt MD;  Location: [facility] OR 2ND FLR;  Service: General;  Laterality: N/A;    RIGHT HEART CATHETERIZATION Right 8/20/2018    Procedure: HEART CATH-RIGHT;  Surgeon: Ivonne Rai MD;  Location: [facility] CATH LAB;  Service: Cardiology;  Laterality:  "Right;    RIGHT HEART CATHETERIZATION Right 9/4/2019    Procedure: INSERTION, CATHETER, RIGHT HEART;  Surgeon: Ivonne Rai MD;  Location: [facility] CATH LAB;  Service: Cardiology;  Laterality: Right;    RIGHT HEART CATHETERIZATION Right 6/10/2022    Procedure: INSERTION, CATHETER, RIGHT HEART;  Surgeon: Keshia Poe MD;  Location: [facility] CATH LAB;  Service: Cardiology;  Laterality: Right;    UPPER GASTROINTESTINAL ENDOSCOPY         Review of patient's allergies indicates:   Allergen Reactions    Fentanyl Anaphylaxis     Respiratory distress    Vibra-tabs [doxycycline hyclate] Anaphylaxis     "throat felt like it was closing"    Adhesive Hives     Silk tape    Amoxicillin Rash    Nsaids (non-steroidal anti-inflammatory drug) Swelling    Chlorhexidine Other (See Comments)     Blue Chlorhexidine causes hives       Current Facility-Administered Medications   Medication    acetaminophen tablet 650 mg    albuterol inhaler 2 puff    albuterol-ipratropium 2.5 mg-0.5 mg/3 mL nebulizer solution 3 mL    ALPRAZolam tablet 0.25 mg    aluminum-magnesium hydroxide-simethicone 200-200-20 mg/5 mL suspension 30 mL    [START ON 6/18/2025] ambrisentan tablet 10 mg    butalbital-acetaminophen-caffeine -40 mg per tablet 1 tablet    [START ON 6/18/2025] fluticasone furoate-vilanteroL 200-25 mcg/dose diskus inhaler 1 puff    [START ON 6/18/2025] furosemide tablet 20 mg    [START ON 6/18/2025] levothyroxine tablet 125 mcg    melatonin tablet 6 mg    ondansetron injection 4 mg    [START ON 6/18/2025] pantoprazole EC tablet 40 mg    piperacillin-tazobactam (ZOSYN) 4.5 g in D5W 100 mL IVPB (MB+)    [START ON 6/18/2025] polyethylene glycol packet 17 g    senna-docusate 8.6-50 mg per tablet 1 tablet    sodium chloride 0.9% flush 10 mL    sucralfate 100 mg/mL suspension 1 g    tadalafil tablet 40 mg    treprostinil infusion 2.5 mg/mL    vancomycin 1,500 mg in 0.9% NaCl 250 mL IVPB (admixture device)     Current Outpatient Medications "   Medication Sig    acetaminophen (TYLENOL) 500 MG tablet Take 1,000 mg by mouth every 6 (six) hours as needed for Pain.    ADCIRCA 20 mg Tab Take 2 tablets (40 mg total) by mouth once daily. (Patient taking differently: Take 40 mg by mouth nightly.)    albuterol (VENTOLIN HFA) 90 mcg/actuation inhaler Inhale 2 puffs into the lungs every 6 (six) hours as needed for Wheezing or Shortness of Breath. Rescue    albuterol-ipratropium (DUO-NEB) 2.5 mg-0.5 mg/3 mL nebulizer solution Take 3 mLs by nebulization every 6 (six) hours as needed for Wheezing. Rescue    alprazolam (XANAX ORAL) Take by mouth as needed.    ambrisentan (LETAIRIS) 10 MG Tab Take 1 tablet (10 mg total) by mouth once daily.    BREO ELLIPTA 200-25 mcg/dose DsDv diskus inhaler INHALE 1 PUFF INTO THE LUNGS EVERY EVENING. CONTROLLER    butalbital-acetaminophen-caffeine -40 mg (FIORICET, ESGIC) -40 mg per tablet Take 1 tablet by mouth every 4 (four) hours as needed.    cyanocobalamin, vitamin B-12, 2,500 mcg Subl Place 2,500 mcg under the tongue every morning.     diphenhydrAMINE (BENADRYL) 25 mg capsule Take 50 mg by mouth every 6 (six) hours as needed for Itching. Patient takes prn evenings    diphenoxylate-atropine 2.5-0.025 mg (LOMOTIL) 2.5-0.025 mg per tablet Take 1 tablet by mouth 4 (four) times daily as needed for Diarrhea.    ferrous sulfate 325 (65 FE) MG EC tablet Take 325 mg by mouth daily as needed.     fluticasone propionate (FLONASE) 50 mcg/actuation nasal spray 2 sprays (100 mcg total) by Each Nostril route every evening. (Patient taking differently: 2 sprays by Each Nostril route Daily.)    folic acid (FOLVITE) 1 MG tablet Take 1 tablet (1,000 mcg total) by mouth once daily.    furosemide (LASIX) 20 MG tablet Take 1 tablet (20 mg total) by mouth once daily. (Patient taking differently: Take 20 mg by mouth once daily. Usually takes PRN weekly for swelling.)    glucosam/chond-msm1/C/michele/bor (GLUCOSAMINE-CHOND-MSM COMPLEX ORAL) Take  by mouth. Three times a week (Patient taking differently: Take by mouth. Two times a week)    hyoscyamine (LEVSIN) 0.125 mg Subl Place 1 tablet (0.125 mg total) under the tongue every 6 (six) hours as needed (Aa needed).    lactic acid-citric-potassium (PHEXXI) 1.8-1-0.4 % Gel Place 1 Applicatorful vaginally as needed (CONTRACEPTIVE).    levothyroxine (SYNTHROID) 125 MCG tablet Take 1 tablet (125 mcg total) by mouth before breakfast.    LIDOcaine (LIDODERM) 5 % Place 1 patch onto the skin once daily. (Patient taking differently: Place 1 patch onto the skin once daily. Takes PRN)    loratadine (CLARITIN) 10 mg tablet Take 10 mg by mouth once daily. Takes in morning    nystatin (MYCOSTATIN) powder Apply topically 2 (two) times daily. for 14 days    pantoprazole (PROTONIX) 40 MG tablet Take 1 tablet (40 mg total) by mouth once daily.    rimegepant (NURTEC) 75 mg odt Take 1 tablet (75 mg total) by mouth as needed for Migraine. Place ODT tablet on the tongue; alternatively the ODT tablet may be placed under the tongue. Can take 2nd dose in 24 hrs if mild relief, no more than 2 in 24 hrs    tiZANidine (ZANAFLEX) 4 MG tablet Take 4 mg by mouth every 6 (six) hours as needed.    topiramate (TOPAMAX) 100 MG tablet Take 1 tablet (100 mg total) by mouth 2 (two) times daily.    treprostinil (REMODULIN) 2.5 mg/mL Soln Mix cassette as directed and infuse continuously per physician titration orders on dosing sheet. Current dose 80ng/kg/min    triamcinolone (KENALOG) 0.5 % ointment Apply 1 application  topically 2 (two) times daily.     Family History       Problem Relation (Age of Onset)    Breast cancer Maternal Aunt, Cousin, Cousin    Cancer Maternal Aunt, Maternal Grandmother    Diabetes Mother, Maternal Grandfather, Paternal Grandfather    Glaucoma Father    Heart attack Maternal Grandfather, Paternal Grandfather    Heart disease Father, Paternal Grandfather    Hypertension Other    Leukemia Brother    No Known Problems Sister,  Maternal Uncle, Paternal Aunt, Paternal Uncle, Paternal Grandmother          Tobacco Use    Smoking status: Never    Smokeless tobacco: Never   Substance and Sexual Activity    Alcohol use: No     Alcohol/week: 0.8 standard drinks of alcohol     Types: 1 Standard drinks or equivalent per week     Comment: socially    Drug use: No    Sexual activity: Not Currently     Birth control/protection: OCP, Pill     Comment: pt is a virgin     Review of Systems   Constitutional:  Positive for activity change, appetite change, chills, diaphoresis and fatigue.   Respiratory:  Negative for shortness of breath.    Cardiovascular:  Negative for chest pain and leg swelling.   Gastrointestinal:  Positive for abdominal pain and nausea. Negative for abdominal distention, blood in stool, constipation and diarrhea.   Genitourinary:  Negative for difficulty urinating.   Neurological:  Negative for dizziness and light-headedness.   All other systems reviewed and are negative.    Objective:     Vital Signs (Most Recent):  Temp: 98 °F (36.7 °C) (06/17/25 1602)  Pulse: 103 (06/17/25 1700)  Resp: 20 (06/17/25 1602)  BP: 126/82 (06/17/25 1602)  SpO2: (!) 92 % (06/17/25 1700) Vital Signs (24h Range):  Temp:  [98 °F (36.7 °C)] 98 °F (36.7 °C)  Pulse:  [] 103  Resp:  [16-20] 20  SpO2:  [91 %-95 %] 92 %  BP: (123-143)/(79-82) 126/82     Patient Vitals for the past 72 hrs (Last 3 readings):   Weight   06/17/25 1323 63 kg (139 lb)     Body mass index is 28.07 kg/m².    No intake or output data in the 24 hours ending 06/17/25 1713       Physical Exam  Vitals reviewed.   Constitutional:       Appearance: She is normal weight.   HENT:      Head: Normocephalic and atraumatic.      Right Ear: External ear normal.      Left Ear: External ear normal.      Nose: Nose normal.      Mouth/Throat:      Mouth: Mucous membranes are moist.      Pharynx: Oropharynx is clear.   Eyes:      Conjunctiva/sclera: Conjunctivae normal.   Cardiovascular:      Rate and  "Rhythm: Normal rate and regular rhythm.      Pulses: Normal pulses.      Heart sounds: Normal heart sounds.   Pulmonary:      Effort: Pulmonary effort is normal.      Breath sounds: Normal breath sounds.   Abdominal:      General: Bowel sounds are normal.      Tenderness: There is abdominal tenderness. There is guarding.      Comments: Kita tube with greenish white drainage around exit site.  White drainage mixed with bilious drainage in kita tube collection bag.  Significant abdominal pain and right upper quadrant, right upper back   Skin:     General: Skin is warm.   Neurological:      Mental Status: She is alert. Mental status is at baseline.   Psychiatric:         Mood and Affect: Mood normal.         Behavior: Behavior normal.            Significant Labs:  CBC:  Recent Labs   Lab 06/17/25  1421   WBC 6.32   RBC 5.03   HGB 14.6   HCT 43.8      MCV 87   MCH 29.0   MCHC 33.3     BNP:  No results for input(s): "BNP" in the last 168 hours.    Invalid input(s): "BNPTRIAGELBLO"  CMP:  Recent Labs   Lab 06/17/25  1421   GLU 89   CALCIUM 9.0   ALBUMIN 4.1   PROT 7.6      K 4.2   CO2 20*      BUN 8   CREATININE 0.6   ALKPHOS 87   ALT 12   AST 25   BILITOT 0.3      Coagulation:   No results for input(s): "PT", "INR", "APTT" in the last 168 hours.  LDH:  No results for input(s): "LDH" in the last 72 hours.  Microbiology:  Microbiology Results (last 7 days)       Procedure Component Value Units Date/Time    Aerobic culture (Specify Source) **CANNOT BE ORDERED AS STAT* [[id number]] Collected: 06/17/25 1606    Order Status: Sent Specimen: Wound from Abdomen Updated: 06/17/25 1653    Blood culture x two cultures. Draw prior to antibiotics. [[id number]] Collected: 06/17/25 1421    Order Status: Resulted Specimen: Blood from Peripheral, Forearm, Left Updated: 06/17/25 1552    Blood culture x two cultures. Draw prior to antibiotics. [[id number]] Collected: 06/17/25 1422    Order Status: Resulted Specimen: " Blood from Peripheral, Forearm, Right Updated: 06/17/25 1551            I have reviewed all pertinent labs within the past 24 hours.    Diagnostic Results:  I have reviewed and interpreted all pertinent imaging results/findings within the past 24 hours.

## 2025-06-17 NOTE — HPI
Yuni Perez is a 55 y.o. female with PMHx of seizure disorder, ABHIJEET, Asthma, chronic pulmonary heart disease, SVT, hepatic adenoma, hypothyroidism, and severe pulmonary HTN (Remodulin) who presents to the emergency room with complaint of nausea, abdominal pain. She was recently admitted in March 2025, where a cholecystostomy tube was placed. She underwent a tube study on 5/19/25 with cholecystostomy tube exchange. She states that she had been doing well, largely, following the exchange. She reports that the stitch became dislodged and the tube has partially retracted out of her skin. Since that occurred, she reports pain and drainage around the tube. She also endorses some nausea and worsening abdominal pain. She continues to be able to flush the tube, she has some purulent discharge noted, which is new. Tolerating diet and having her baseline bowel function (IBS - alternates between constipation and diarrhea, no acute changes). She denies fever, chills, dizziness lightheadedness, emesis, hematochezia, dysuria, hematuria, CP, SOB, and all other symptoms. Patient reports being compliant with home medication regimen.      Per chart review, she was taken to the OR back in 2014 for planned lap cholecystectomy but the procedure was aborted secondary to severe pulmonary HTN and hemodynamic instability after induction of anesthesia. Per operative report, she had significantly elevated PA pressures along with systemic hypotension requiring multiple pressors. The procedure was aborted and she was admitted to the ICU after.      Last ECHO 12/2024 with PA pressure 67mmHg

## 2025-06-17 NOTE — H&P
[facility] Emergency Dept  Heart Transplant  H&P    Patient Name: Yuni Perez  MRN: [id number]  Admission Date: 6/17/2025  Attending Physician: Jerry Zuñiga, *  Primary Care Provider: Lulu Sandhu MD  Principal Problem:<principal problem not specified>    Subjective:     History of Present Illness:    55 y.o. W female diagnosed with IPAH in 2009 and started on Remodulin, Letairis, and Adcirca. She has a history of ABHIJEET (untreated - not able to tolerate CPAP 2/2 severe congestion), GERD, and Asthma.      Pt presents today due to nausea, vomiting and abdominal pain.  Her symptoms have been ongoing since Thursday or Friday 6/12 to 6/13.  She also reports increased drainage from her abdominal wall site that is greenish white, a mix of blood and white drainage in her kita tube bag, diaphoresis intermittently concerning for possible fever (the patient did not take her temperature) intermittently in the last few days, intermittent stabbing pain that progresses with movement but is present continuously in her right upper quadrant and right upper back, shoulder pain in her left shoulder concerning for referred pain from her choledocholithiasis.  She has was recently discharged at the beginning of April for similar symptoms after which she a kita tube was placed for drainage of her gallbladder for cholecysitits. She was deemed not a surgical candidate due to high risk. She also underwent kita tube exchange on 5/19 with IR. On admission, Pt was hemodynamically stable and labs were largely unremarkable with no leukocytosis and no electrolyte abnormalities.  Patient has on her home oxygen requirements.  CT imaging as below.     CT 6/27/25:   1. Since the previous CT, cholecystostomy tube has been placed. There is mild wall thickening of the gallbladder with mild pericholecystic fluid. There is a focus of induration within the adjacent rectus musculature, through which the tube courses suggesting  inflammation/hematoma secondary to tube placement. There is some indistinctness about the region, however no focal organized collection to suggest abscess. Continued follow-up is advised.  2. Choledocholithiasis, similar to the previous exam.  3. Findings suggest hepatic steatosis, correlation with LFTs recommended noting hepatomegaly.  4. Heterogeneous enhancement involving the right hepatic lobe inferiorly. This may reflect sequela of contrast phase. Underlying hemangioma is a consideration. Consider nonemergent outpatient MRI for characterization as warranted.  5. Please see above for several additional findings.      She was recently admitted for abdominal pain/nausea/vomiting and found to have acute cholecystitis on US, CT and HIDA. She was seen by general surgery and  felt to be too high risk for cholecystectomy, therefore kita tube placed 03/31/25. Post kita tube she did well other than an episode of SVT with HR in the 200s, which she converted out of wih 12 mg adenosine and was in sinus since then. EP considred multaq however no additional agents added at the time. She was discharged with home O2 and her kita tube. Since discharge, seen by GS 05/01/25 and felt to remain high risk for surgery. They were consulting IR for drain study/interrogation. 05/19 had kita tube check and exchange.    For her pulmonary htn, current therapy is Remodulin infusing at 117ng/kg/min (pre-filled cassettes), Letairis 10mg qdaily, and Adcirca 40mg qdaily., Has T/F Adempas. Patient evaluated by lung transplant in 2020. Had everything set up for LUT, but says her caregivers were deemed not acceptable and is not a transplant candidate at [facility].     Last TTE:   TTE 12/16/25     ·  Left Ventricle: The left ventricle is normal in size. Normal wall thickness. Normal wall motion. Septal flattening in systole consistent with right ventricular pressure overload. There is normal systolic function with a visually estimated ejection  fraction of 60 - 65%. There is normal diastolic function.  ·  Right Ventricle: Moderate right ventricular enlargement with hypertrophy. Systolic function is mildly to moderately reduced.  ·  The right atrium is mildly dilated.  ·  Tricuspid Valve: There is mild regurgitation.  ·  Pulmonary Artery: There is moderate to severe pulmonary hypertension. The estimated pulmonary artery systolic pressure is 67 mmHg.  ·  IVC/SVC: Normal venous pressure at 3 mmHg.      Past Medical History:   Diagnosis Date    AR (allergic rhinitis)     Cholelithiasis, common bile duct     Chronic low back pain     Eye pressure 2017    General anesthetics causing adverse effect in therapeutic use     GERD (gastroesophageal reflux disease)     History of migraine headaches     Hypothyroidism     Innocent heart murmur     Lumbar disc disease     Menorrhagia     Mild asthma     Obesity     Plantar fasciitis of left foot     Primary pulmonary hypertension     followed by heart transplant/pulmonary     Seizure disorder     x 1 in 2008    Seizures     Sleep apnea     SVT (supraventricular tachycardia) 4/3/2025    TMJ (dislocation of temporomandibular joint)     Tricuspid regurgitation        Past Surgical History:   Procedure Laterality Date    CARDIAC CATHETERIZATION      CATHETERIZATION OF BOTH LEFT AND RIGHT HEART Bilateral 9/29/2020    Procedure: CATHETERIZATION, HEART, BOTH LEFT AND RIGHT;  Surgeon: Gucci Pickett MD;  Location: [facility] CATH LAB;  Service: Cardiology;  Laterality: Bilateral;    CENTRAL VENOUS CATHETER INSERTION      CORONARY ANGIOGRAPHY N/A 9/29/2020    Procedure: ANGIOGRAM, CORONARY ARTERY;  Surgeon: Gucci Pickett MD;  Location: [facility] CATH LAB;  Service: Cardiology;  Laterality: N/A;    gallbladder drain  06/2019    INSERTION OF HAYNES CATHETER Right 6/17/2020    Procedure: INSERTION, CATHETER, CENTRAL VENOUS, HAYNES Replace Single Lumen for Remodulin Infusion;  Surgeon: Bertin Dewitt MD;  Location: [facility] OR [address] [place] [address];  " Service: General;  Laterality: Right;    PORTACATH PLACEMENT      REMOVAL OF TUNNELED CENTRAL VENOUS CATHETER (CVC) N/A 6/17/2020    Procedure: REMOVAL, CATHETER, CENTRAL VENOUS, TUNNELED;  Surgeon: Bertin Dewitt MD;  Location: [facility] OR [address] [place] [address];  Service: General;  Laterality: N/A;    RIGHT HEART CATHETERIZATION Right 8/20/2018    Procedure: HEART CATH-RIGHT;  Surgeon: Ivonne Rai MD;  Location: [facility] CATH LAB;  Service: Cardiology;  Laterality: Right;    RIGHT HEART CATHETERIZATION Right 9/4/2019    Procedure: INSERTION, CATHETER, RIGHT HEART;  Surgeon: Ivonne Rai MD;  Location: [facility] CATH LAB;  Service: Cardiology;  Laterality: Right;    RIGHT HEART CATHETERIZATION Right 6/10/2022    Procedure: INSERTION, CATHETER, RIGHT HEART;  Surgeon: Keshia Poe MD;  Location: [facility] CATH LAB;  Service: Cardiology;  Laterality: Right;    UPPER GASTROINTESTINAL ENDOSCOPY         Review of patient's allergies indicates:   Allergen Reactions    Fentanyl Anaphylaxis     Respiratory distress    Vibra-tabs [doxycycline hyclate] Anaphylaxis     "throat felt like it was closing"    Adhesive Hives     Silk tape    Amoxicillin Rash    Nsaids (non-steroidal anti-inflammatory drug) Swelling    Chlorhexidine Other (See Comments)     Blue Chlorhexidine causes hives       Current Facility-Administered Medications   Medication    acetaminophen tablet 650 mg    albuterol inhaler 2 puff    albuterol-ipratropium 2.5 mg-0.5 mg/3 mL nebulizer solution 3 mL    ALPRAZolam tablet 0.25 mg    aluminum-magnesium hydroxide-simethicone 200-200-20 mg/5 mL suspension 30 mL    [START ON 6/18/2025] ambrisentan tablet 10 mg    butalbital-acetaminophen-caffeine -40 mg per tablet 1 tablet    [START ON 6/18/2025] fluticasone furoate-vilanteroL 200-25 mcg/dose diskus inhaler 1 puff    [START ON 6/18/2025] furosemide tablet 20 mg    [START ON 6/18/2025] levothyroxine tablet 125 mcg    melatonin tablet 6 mg    ondansetron injection 4 mg    " [START ON 6/18/2025] pantoprazole EC tablet 40 mg    piperacillin-tazobactam (ZOSYN) 4.5 g in D5W 100 mL IVPB (MB+)    [START ON 6/18/2025] polyethylene glycol packet 17 g    senna-docusate 8.6-50 mg per tablet 1 tablet    sodium chloride 0.9% flush 10 mL    sucralfate 100 mg/mL suspension 1 g    tadalafil tablet 40 mg    treprostinil infusion 2.5 mg/mL    vancomycin 1,500 mg in 0.9% NaCl 250 mL IVPB (admixture device)     Current Outpatient Medications   Medication Sig    acetaminophen (TYLENOL) 500 MG tablet Take 1,000 mg by mouth every 6 (six) hours as needed for Pain.    ADCIRCA 20 mg Tab Take 2 tablets (40 mg total) by mouth once daily. (Patient taking differently: Take 40 mg by mouth nightly.)    albuterol (VENTOLIN HFA) 90 mcg/actuation inhaler Inhale 2 puffs into the lungs every 6 (six) hours as needed for Wheezing or Shortness of Breath. Rescue    albuterol-ipratropium (DUO-NEB) 2.5 mg-0.5 mg/3 mL nebulizer solution Take 3 mLs by nebulization every 6 (six) hours as needed for Wheezing. Rescue    alprazolam (XANAX ORAL) Take by mouth as needed.    ambrisentan (LETAIRIS) 10 MG Tab Take 1 tablet (10 mg total) by mouth once daily.    BREO ELLIPTA 200-25 mcg/dose DsDv diskus inhaler INHALE 1 PUFF INTO THE LUNGS EVERY EVENING. CONTROLLER    butalbital-acetaminophen-caffeine -40 mg (FIORICET, ESGIC) -40 mg per tablet Take 1 tablet by mouth every 4 (four) hours as needed.    cyanocobalamin, vitamin B-12, 2,500 mcg Subl Place 2,500 mcg under the tongue every morning.     diphenhydrAMINE (BENADRYL) 25 mg capsule Take 50 mg by mouth every 6 (six) hours as needed for Itching. Patient takes prn evenings    diphenoxylate-atropine 2.5-0.025 mg (LOMOTIL) 2.5-0.025 mg per tablet Take 1 tablet by mouth 4 (four) times daily as needed for Diarrhea.    ferrous sulfate 325 (65 FE) MG EC tablet Take 325 mg by mouth daily as needed.     fluticasone propionate (FLONASE) 50 mcg/actuation nasal spray 2 sprays (100 mcg  total) by Each Nostril route every evening. (Patient taking differently: 2 sprays by Each Nostril route Daily.)    folic acid (FOLVITE) 1 MG tablet Take 1 tablet (1,000 mcg total) by mouth once daily.    furosemide (LASIX) 20 MG tablet Take 1 tablet (20 mg total) by mouth once daily. (Patient taking differently: Take 20 mg by mouth once daily. Usually takes PRN weekly for swelling.)    glucosam/chond-msm1/C/michele/bor (GLUCOSAMINE-CHOND-MSM COMPLEX ORAL) Take by mouth. Three times a week (Patient taking differently: Take by mouth. Two times a week)    hyoscyamine (LEVSIN) 0.125 mg Subl Place 1 tablet (0.125 mg total) under the tongue every 6 (six) hours as needed (Aa needed).    lactic acid-citric-potassium (PHEXXI) 1.8-1-0.4 % Gel Place 1 Applicatorful vaginally as needed (CONTRACEPTIVE).    levothyroxine (SYNTHROID) 125 MCG tablet Take 1 tablet (125 mcg total) by mouth before breakfast.    LIDOcaine (LIDODERM) 5 % Place 1 patch onto the skin once daily. (Patient taking differently: Place 1 patch onto the skin once daily. Takes PRN)    loratadine (CLARITIN) 10 mg tablet Take 10 mg by mouth once daily. Takes in morning    nystatin (MYCOSTATIN) powder Apply topically 2 (two) times daily. for 14 days    pantoprazole (PROTONIX) 40 MG tablet Take 1 tablet (40 mg total) by mouth once daily.    rimegepant (NURTEC) 75 mg odt Take 1 tablet (75 mg total) by mouth as needed for Migraine. Place ODT tablet on the tongue; alternatively the ODT tablet may be placed under the tongue. Can take 2nd dose in 24 hrs if mild relief, no more than 2 in 24 hrs    tiZANidine (ZANAFLEX) 4 MG tablet Take 4 mg by mouth every 6 (six) hours as needed.    topiramate (TOPAMAX) 100 MG tablet Take 1 tablet (100 mg total) by mouth 2 (two) times daily.    treprostinil (REMODULIN) 2.5 mg/mL Soln Mix cassette as directed and infuse continuously per physician titration orders on dosing sheet. Current dose 80ng/kg/min    triamcinolone (KENALOG) 0.5 %  ointment Apply 1 application  topically 2 (two) times daily.     Family History       Problem Relation (Age of Onset)    Breast cancer Maternal Aunt, Cousin, Cousin    Cancer Maternal Aunt, Maternal Grandmother    Diabetes Mother, Maternal Grandfather, Paternal Grandfather    Glaucoma Father    Heart attack Maternal Grandfather, Paternal Grandfather    Heart disease Father, Paternal Grandfather    Hypertension Other    Leukemia Brother    No Known Problems Sister, Maternal Uncle, Paternal Aunt, Paternal Uncle, Paternal Grandmother          Tobacco Use    Smoking status: Never    Smokeless tobacco: Never   Substance and Sexual Activity    Alcohol use: No     Alcohol/week: 0.8 standard drinks of alcohol     Types: 1 Standard drinks or equivalent per week     Comment: socially    Drug use: No    Sexual activity: Not Currently     Birth control/protection: OCP, Pill     Comment: pt is a virgin     Review of Systems   Constitutional:  Positive for activity change, appetite change, chills, diaphoresis and fatigue.   Respiratory:  Negative for shortness of breath.    Cardiovascular:  Negative for chest pain and leg swelling.   Gastrointestinal:  Positive for abdominal pain and nausea. Negative for abdominal distention, blood in stool, constipation and diarrhea.   Genitourinary:  Negative for difficulty urinating.   Neurological:  Negative for dizziness and light-headedness.   All other systems reviewed and are negative.    Objective:     Vital Signs (Most Recent):  Temp: 98 °F (36.7 °C) (06/17/25 1602)  Pulse: 103 (06/17/25 1700)  Resp: 20 (06/17/25 1602)  BP: 126/82 (06/17/25 1602)  SpO2: (!) 92 % (06/17/25 1700) Vital Signs (24h Range):  Temp:  [98 °F (36.7 °C)] 98 °F (36.7 °C)  Pulse:  [] 103  Resp:  [16-20] 20  SpO2:  [91 %-95 %] 92 %  BP: (123-143)/(79-82) 126/82     Patient Vitals for the past 72 hrs (Last 3 readings):   Weight   06/17/25 1323 63 kg (139 lb)     Body mass index is 28.07 kg/m².    No intake or  "output data in the 24 hours ending 06/17/25 1713       Physical Exam  Vitals reviewed.   Constitutional:       Appearance: She is normal weight.   HENT:      Head: Normocephalic and atraumatic.      Right Ear: External ear normal.      Left Ear: External ear normal.      Nose: Nose normal.      Mouth/Throat:      Mouth: Mucous membranes are moist.      Pharynx: Oropharynx is clear.   Eyes:      Conjunctiva/sclera: Conjunctivae normal.   Cardiovascular:      Rate and Rhythm: Normal rate and regular rhythm.      Pulses: Normal pulses.      Heart sounds: Normal heart sounds.   Pulmonary:      Effort: Pulmonary effort is normal.      Breath sounds: Normal breath sounds.   Abdominal:      General: Bowel sounds are normal.      Tenderness: There is abdominal tenderness. There is guarding.      Comments: Kita tube with greenish white drainage around exit site.  White drainage mixed with bilious drainage in kita tube collection bag.  Significant abdominal pain and right upper quadrant, right upper back   Skin:     General: Skin is warm.   Neurological:      Mental Status: She is alert. Mental status is at baseline.   Psychiatric:         Mood and Affect: Mood normal.         Behavior: Behavior normal.            Significant Labs:  CBC:  Recent Labs   Lab 06/17/25  1421   WBC 6.32   RBC 5.03   HGB 14.6   HCT 43.8      MCV 87   MCH 29.0   MCHC 33.3     BNP:  No results for input(s): "BNP" in the last 168 hours.    Invalid input(s): "BNPTRIAGELBLO"  CMP:  Recent Labs   Lab 06/17/25  1421   GLU 89   CALCIUM 9.0   ALBUMIN 4.1   PROT 7.6      K 4.2   CO2 20*      BUN 8   CREATININE 0.6   ALKPHOS 87   ALT 12   AST 25   BILITOT 0.3      Coagulation:   No results for input(s): "PT", "INR", "APTT" in the last 168 hours.  LDH:  No results for input(s): "LDH" in the last 72 hours.  Microbiology:  Microbiology Results (last 7 days)       Procedure Component Value Units Date/Time    Aerobic culture (Specify Source) " **CANNOT BE ORDERED AS STAT* [[id number]] Collected: 06/17/25 1606    Order Status: Sent Specimen: Wound from Abdomen Updated: 06/17/25 1653    Blood culture x two cultures. Draw prior to antibiotics. [[id number]] Collected: 06/17/25 1421    Order Status: Resulted Specimen: Blood from Peripheral, Forearm, Left Updated: 06/17/25 1552    Blood culture x two cultures. Draw prior to antibiotics. [[id number]] Collected: 06/17/25 1422    Order Status: Resulted Specimen: Blood from Peripheral, Forearm, Right Updated: 06/17/25 1551            I have reviewed all pertinent labs within the past 24 hours.    Diagnostic Results:  I have reviewed and interpreted all pertinent imaging results/findings within the past 24 hours.    Assessment/Plan:     Choledocholithiasis  Patient presents with significant and progressively worsening abdominal pain and left shoulder pain.  Patient has a kita tube in place and is status post kita tube exchange 5/19.  CT scans appear to be similar to previous CT scan on diagnosis of choledocholithiasis, however patient with purulent drainage from exit site of kita tube and some white purulence in her kita bag.  Patient also with nausea and vomiting, poor p.o. intake, worsening pain that has prompted her to come in.  -aerobic and anaerobic culture collected from percutaneous kita tube exit site  -blood cultures collected x2  - patient started on broad-spectrum antibiotics with vancomycin and Zosyn  - consult to Infectious Disease, appreciate recommendations  -consulting general surgery, appreciate recommendation  -consult to interventional Radiology, appreciate recommendations    Pneumonia  Patient with suspected pneumonia with infiltrate on chest x-ray.  Patient already on broad-spectrum antibiotics for choledocholithiasis.  Concern possibility of aspiration given patient's vomiting at home and difficulty keeping down food even abdominal pain.  Otherwise no significant increase in oxygen  requirements.   -sputum culture ordered  -infectious disease consult, appreciate recommendations  -continue broad-spectrum antibiotics per of choledocholithiasis problem.    WHO group 1 pulmonary arterial hypertension  Continue home Remodulin, [id number] qdaily, and tadalafil 40mg qdaily.  Patient requiring home oxygen requirements.  -held patient's Lasix dosing (takes 20 mg of Lasix p.o. daily) given patient has had extremely poor p.o. intake in the last few days since Friday and has not been able to keep down any significant amounts of solids or liquids, appears dry on exam    SVT (supraventricular tachycardia)  Hx of SVT post kita tube placement that converted with adenosine. EP did not add any agents currently but will plan to add Multaq if she has future issues         Sylvie Barone MD  Heart Transplant  [facility] Dept

## 2025-06-17 NOTE — ASSESSMENT & PLAN NOTE
Continue home Remodulin, [id number] qdaily, and tadalafil 40mg qdaily.  Patient requiring home oxygen requirements.  -held patient's Lasix dosing (takes 20 mg of Lasix p.o. daily) given patient has had extremely poor p.o. intake in the last few days since Friday and has not been able to keep down any significant amounts of solids or liquids, appears dry on exam

## 2025-06-17 NOTE — ED TRIAGE NOTES
"Yuni Perez, a 55 y.o. female presents to the ED w/ complaint of abdominal pain and redness around her biliary tube site. Pt states that she has had sharp, stabbing pain in her RUQ which radiates to the back that "feels like someone has a knife in me." The pain started Sunday and has gotten worse since then. Pt states that her home health nurse said her biliary site looks infected and referred her to the ED. Pt endorses sweating and chills for the past 2 days as well. Denies CP, SOB, vomiting.    Triage note:  Chief Complaint   Patient presents with    Multiploe complaints     On remodulin for pul htsn, has biliary tube and infected     Review of patient's allergies indicates:   Allergen Reactions    Fentanyl Anaphylaxis     Respiratory distress    Vibra-tabs [doxycycline hyclate] Anaphylaxis     "throat felt like it was closing"    Adhesive Hives     Silk tape    Amoxicillin Rash    Nsaids (non-steroidal anti-inflammatory drug) Swelling    Chlorhexidine Other (See Comments)     Blue Chlorhexidine causes hives     Past Medical History:   Diagnosis Date    AR (allergic rhinitis)     Cholelithiasis, common bile duct     Chronic low back pain     Eye pressure 2017    General anesthetics causing adverse effect in therapeutic use     GERD (gastroesophageal reflux disease)     History of migraine headaches     Hypothyroidism     Innocent heart murmur     Lumbar disc disease     Menorrhagia     Mild asthma     Obesity     Plantar fasciitis of left foot     Primary pulmonary hypertension     followed by heart transplant/pulmonary     Seizure disorder     x 1 in 2008    Seizures     Sleep apnea     SVT (supraventricular tachycardia) 4/3/2025    TMJ (dislocation of temporomandibular joint)     Tricuspid regurgitation        "

## 2025-06-17 NOTE — SUBJECTIVE & OBJECTIVE
No current facility-administered medications on file prior to encounter.     Current Outpatient Medications on File Prior to Encounter   Medication Sig    acetaminophen (TYLENOL) 500 MG tablet Take 1,000 mg by mouth every 6 (six) hours as needed for Pain.    ADCIRCA 20 mg Tab Take 2 tablets (40 mg total) by mouth once daily. (Patient taking differently: Take 40 mg by mouth nightly.)    albuterol (VENTOLIN HFA) 90 mcg/actuation inhaler Inhale 2 puffs into the lungs every 6 (six) hours as needed for Wheezing or Shortness of Breath. Rescue    albuterol-ipratropium (DUO-NEB) 2.5 mg-0.5 mg/3 mL nebulizer solution Take 3 mLs by nebulization every 6 (six) hours as needed for Wheezing. Rescue    alprazolam (XANAX ORAL) Take by mouth as needed.    ambrisentan (LETAIRIS) 10 MG Tab Take 1 tablet (10 mg total) by mouth once daily.    BREO ELLIPTA 200-25 mcg/dose DsDv diskus inhaler INHALE 1 PUFF INTO THE LUNGS EVERY EVENING. CONTROLLER    butalbital-acetaminophen-caffeine -40 mg (FIORICET, ESGIC) -40 mg per tablet Take 1 tablet by mouth every 4 (four) hours as needed.    cyanocobalamin, vitamin B-12, 2,500 mcg Subl Place 2,500 mcg under the tongue every morning.     diphenhydrAMINE (BENADRYL) 25 mg capsule Take 50 mg by mouth every 6 (six) hours as needed for Itching. Patient takes prn evenings    diphenoxylate-atropine 2.5-0.025 mg (LOMOTIL) 2.5-0.025 mg per tablet Take 1 tablet by mouth 4 (four) times daily as needed for Diarrhea.    ferrous sulfate 325 (65 FE) MG EC tablet Take 325 mg by mouth daily as needed.     fluticasone propionate (FLONASE) 50 mcg/actuation nasal spray 2 sprays (100 mcg total) by Each Nostril route every evening. (Patient taking differently: 2 sprays by Each Nostril route Daily.)    folic acid (FOLVITE) 1 MG tablet Take 1 tablet (1,000 mcg total) by mouth once daily.    furosemide (LASIX) 20 MG tablet Take 1 tablet (20 mg total) by mouth once daily. (Patient taking differently: Take 20 mg  "by mouth once daily. Usually takes PRN weekly for swelling.)    glucosam/chond-msm1/C/michele/bor (GLUCOSAMINE-CHOND-MSM COMPLEX ORAL) Take by mouth. Three times a week (Patient taking differently: Take by mouth. Two times a week)    hyoscyamine (LEVSIN) 0.125 mg Subl Place 1 tablet (0.125 mg total) under the tongue every 6 (six) hours as needed (Aa needed).    lactic acid-citric-potassium (PHEXXI) 1.8-1-0.4 % Gel Place 1 Applicatorful vaginally as needed (CONTRACEPTIVE).    levothyroxine (SYNTHROID) 125 MCG tablet Take 1 tablet (125 mcg total) by mouth before breakfast.    LIDOcaine (LIDODERM) 5 % Place 1 patch onto the skin once daily. (Patient taking differently: Place 1 patch onto the skin once daily. Takes PRN)    loratadine (CLARITIN) 10 mg tablet Take 10 mg by mouth once daily. Takes in morning    nystatin (MYCOSTATIN) powder Apply topically 2 (two) times daily. for 14 days    pantoprazole (PROTONIX) 40 MG tablet Take 1 tablet (40 mg total) by mouth once daily.    rimegepant (NURTEC) 75 mg odt Take 1 tablet (75 mg total) by mouth as needed for Migraine. Place ODT tablet on the tongue; alternatively the ODT tablet may be placed under the tongue. Can take 2nd dose in 24 hrs if mild relief, no more than 2 in 24 hrs    tiZANidine (ZANAFLEX) 4 MG tablet Take 4 mg by mouth every 6 (six) hours as needed.    topiramate (TOPAMAX) 100 MG tablet Take 1 tablet (100 mg total) by mouth 2 (two) times daily.    treprostinil (REMODULIN) 2.5 mg/mL Soln Mix cassette as directed and infuse continuously per physician titration orders on dosing sheet. Current dose 80ng/kg/min    triamcinolone (KENALOG) 0.5 % ointment Apply 1 application  topically 2 (two) times daily.       Review of patient's allergies indicates:   Allergen Reactions    Fentanyl Anaphylaxis     Respiratory distress    Vibra-tabs [doxycycline hyclate] Anaphylaxis     "throat felt like it was closing"    Adhesive Hives     Silk tape    Amoxicillin Rash    Nsaids " (non-steroidal anti-inflammatory drug) Swelling    Chlorhexidine Other (See Comments)     Blue Chlorhexidine causes hives       Past Medical History:   Diagnosis Date    AR (allergic rhinitis)     Cholelithiasis, common bile duct     Chronic low back pain     Eye pressure 2017    General anesthetics causing adverse effect in therapeutic use     GERD (gastroesophageal reflux disease)     History of migraine headaches     Hypothyroidism     Innocent heart murmur     Lumbar disc disease     Menorrhagia     Mild asthma     Obesity     Plantar fasciitis of left foot     Primary pulmonary hypertension     followed by heart transplant/pulmonary     Seizure disorder     x 1 in 2008    Seizures     Sleep apnea     SVT (supraventricular tachycardia) 4/3/2025    TMJ (dislocation of temporomandibular joint)     Tricuspid regurgitation      Past Surgical History:   Procedure Laterality Date    CARDIAC CATHETERIZATION      CATHETERIZATION OF BOTH LEFT AND RIGHT HEART Bilateral 9/29/2020    Procedure: CATHETERIZATION, HEART, BOTH LEFT AND RIGHT;  Surgeon: Gucci Pickett MD;  Location: [facility] CATH LAB;  Service: Cardiology;  Laterality: Bilateral;    CENTRAL VENOUS CATHETER INSERTION      CORONARY ANGIOGRAPHY N/A 9/29/2020    Procedure: ANGIOGRAM, CORONARY ARTERY;  Surgeon: Gucci Pickett MD;  Location: [facility] CATH LAB;  Service: Cardiology;  Laterality: N/A;    gallbladder drain  06/2019    INSERTION OF HAYNES CATHETER Right 6/17/2020    Procedure: INSERTION, CATHETER, CENTRAL VENOUS, HAYNES Replace Single Lumen for Remodulin Infusion;  Surgeon: Bertin Dweitt MD;  Location: [facility] OR [address] [place] [address];  Service: General;  Laterality: Right;    PORTACATH PLACEMENT      REMOVAL OF TUNNELED CENTRAL VENOUS CATHETER (CVC) N/A 6/17/2020    Procedure: REMOVAL, CATHETER, CENTRAL VENOUS, TUNNELED;  Surgeon: Bertin Dewitt MD;  Location: [facility] OR [address] [place] [address];  Service: General;  Laterality: N/A;    RIGHT HEART CATHETERIZATION Right 8/20/2018     Procedure: HEART CATH-RIGHT;  Surgeon: Ivonne Rai MD;  Location: [facility] CATH LAB;  Service: Cardiology;  Laterality: Right;    RIGHT HEART CATHETERIZATION Right 9/4/2019    Procedure: INSERTION, CATHETER, RIGHT HEART;  Surgeon: Ivonne Rai MD;  Location: [facility] CATH LAB;  Service: Cardiology;  Laterality: Right;    RIGHT HEART CATHETERIZATION Right 6/10/2022    Procedure: INSERTION, CATHETER, RIGHT HEART;  Surgeon: Keshia Poe MD;  Location: [facility] CATH LAB;  Service: Cardiology;  Laterality: Right;    UPPER GASTROINTESTINAL ENDOSCOPY       Family History       Problem Relation (Age of Onset)    Breast cancer Maternal Aunt, Cousin, Cousin    Cancer Maternal Aunt, Maternal Grandmother    Diabetes Mother, Maternal Grandfather, Paternal Grandfather    Glaucoma Father    Heart attack Maternal Grandfather, Paternal Grandfather    Heart disease Father, Paternal Grandfather    Hypertension Other    Leukemia Brother    No Known Problems Sister, Maternal Uncle, Paternal Aunt, Paternal Uncle, Paternal Grandmother          Tobacco Use    Smoking status: Never    Smokeless tobacco: Never   Substance and Sexual Activity    Alcohol use: No     Alcohol/week: 0.8 standard drinks of alcohol     Types: 1 Standard drinks or equivalent per week     Comment: socially    Drug use: No    Sexual activity: Not Currently     Birth control/protection: OCP, Pill     Comment: pt is a virgin     Review of Systems   Constitutional:  Negative for activity change, appetite change, fatigue and fever.   HENT: Negative.     Respiratory:  Negative for cough and shortness of breath.    Cardiovascular:  Negative for chest pain.   Gastrointestinal:  Positive for abdominal pain and nausea. Negative for abdominal distention, constipation, diarrhea and vomiting.   Musculoskeletal: Negative.    Skin: Negative.      Objective:     Vital Signs (Most Recent):  Temp: 98 °F (36.7 °C) (06/17/25 1602)  Pulse: 104 (06/17/25 1800)  Resp: 20 (06/17/25  1602)  BP: 126/82 (06/17/25 1602)  SpO2: (!) 91 % (06/17/25 1800) Vital Signs (24h Range):  Temp:  [98 °F (36.7 °C)] 98 °F (36.7 °C)  Pulse:  [] 104  Resp:  [16-20] 20  SpO2:  [91 %-95 %] 91 %  BP: (123-143)/(79-82) 126/82     Weight: 63 kg (139 lb)  Body mass index is 28.07 kg/m².     Physical Exam  Vitals and nursing note reviewed.   Constitutional:       General: She is not in acute distress.     Appearance: Normal appearance.   HENT:      Nose: Nose normal.      Mouth/Throat:      Mouth: Mucous membranes are moist.   Cardiovascular:      Rate and Rhythm: Normal rate and regular rhythm.   Pulmonary:      Effort: Pulmonary effort is normal.   Abdominal:      Comments: Abdomen soft, non-distended  She has tenderness about the cholecystostomy tube in the right upper quadrant. There is a small amount of granulation tissue at the tube site. There is some drainage noted from around the catheter. There is seropurulent drainage within the drainage tube. The skin surrounding the catheter is excoriated.    Musculoskeletal:         General: Normal range of motion.   Skin:     General: Skin is dry.   Neurological:      General: No focal deficit present.      Mental Status: She is alert.            I have reviewed all pertinent lab results within the past 24 hours.  CBC:   Recent Labs   Lab 06/17/25  1421   WBC 6.32   RBC 5.03   HGB 14.6   HCT 43.8      MCV 87   MCH 29.0   MCHC 33.3     CMP:   Recent Labs   Lab 06/17/25  1421   GLU 89   CALCIUM 9.0   ALBUMIN 4.1   PROT 7.6      K 4.2   CO2 20*      BUN 8   CREATININE 0.6   ALKPHOS 87   ALT 12   AST 25   BILITOT 0.3       Significant Diagnostics:  I have reviewed all pertinent imaging results/findings within the past 24 hours.

## 2025-06-17 NOTE — ED PROVIDER NOTES
"Encounter Date: 6/17/2025       History     Chief Complaint   Patient presents with    Multiploe complaints     On remodulin for pul htsn, has biliary tube and infected     Ms. Perez is a 55 y.o. female with pulmonary hypertension on continuous remodulin infusion, SVT, recent admission for kita tube 04/2025, seizure disorder, hypothyroidism, migraine headaches, asthma presents to [facility] Emergency Department for  worsening right upper quadrant abdominal pain  that radiates to the back.  Patient  states her symptoms began this past Friday with  Right upper quadrant abdominal pain, nausea, and 1 episode of  vomiting.  She describes the pain as similar to passing gallstones in the past so she ignored this.  Over the weekend the pain and nausea would wax and wane.   On Sunday her nausea  and abdominal pain began to worsen, now extending to the right flank and right back , she has also been diaphoretic since Sunday.   She denies fevers. She states the pain is an 8/10.  The abdominal pain is tight and throbbing in nature.  The back pain is sharp and stabbing.  She has tried over-the-counter Tylenol with minimal relief.  She initially was not going to come to the emergency department, but was urged to at the request of her home health nurse.  She also reports that her kita tube has put out about half the normal output over the last 24 hours.        Review of patient's allergies indicates:   Allergen Reactions    Fentanyl Anaphylaxis     Respiratory distress    Vibra-tabs [doxycycline hyclate] Anaphylaxis     "throat felt like it was closing"    Adhesive Hives     Silk tape    Amoxicillin Rash    Nsaids (non-steroidal anti-inflammatory drug) Swelling    Chlorhexidine Other (See Comments)     Blue Chlorhexidine causes hives     Past Medical History:   Diagnosis Date    AR (allergic rhinitis)     Cholelithiasis, common bile duct     Chronic low back pain     Eye pressure 2017    General anesthetics causing adverse effect in " therapeutic use     GERD (gastroesophageal reflux disease)     History of migraine headaches     Hypothyroidism     Innocent heart murmur     Lumbar disc disease     Menorrhagia     Mild asthma     Obesity     Plantar fasciitis of left foot     Primary pulmonary hypertension     followed by heart transplant/pulmonary     Seizure disorder     x 1 in 2008    Seizures     Sleep apnea     SVT (supraventricular tachycardia) 4/3/2025    TMJ (dislocation of temporomandibular joint)     Tricuspid regurgitation      Past Surgical History:   Procedure Laterality Date    CARDIAC CATHETERIZATION      CATHETERIZATION OF BOTH LEFT AND RIGHT HEART Bilateral 9/29/2020    Procedure: CATHETERIZATION, HEART, BOTH LEFT AND RIGHT;  Surgeon: Gucci Pickett MD;  Location: [facility] CATH LAB;  Service: Cardiology;  Laterality: Bilateral;    CENTRAL VENOUS CATHETER INSERTION      CORONARY ANGIOGRAPHY N/A 9/29/2020    Procedure: ANGIOGRAM, CORONARY ARTERY;  Surgeon: Gucci Pickett MD;  Location: [facility] CATH LAB;  Service: Cardiology;  Laterality: N/A;    gallbladder drain  06/2019    INSERTION OF HAYNES CATHETER Right 6/17/2020    Procedure: INSERTION, CATHETER, CENTRAL VENOUS, HAYNES Replace Single Lumen for Remodulin Infusion;  Surgeon: Bertin Dewitt MD;  [facility];  Service: General;  Laterality: Right;    PORTACATH PLACEMENT      REMOVAL OF TUNNELED CENTRAL VENOUS CATHETER (CVC) N/A 6/17/2020    Procedure: REMOVAL, CATHETER, CENTRAL VENOUS, TUNNELED;  Surgeon: Bertin Dewitt MD;  [facility];  Service: General;  Laterality: N/A;    RIGHT HEART CATHETERIZATION Right 8/20/2018    Procedure: HEART CATH-RIGHT;  Surgeon: Ivonne Rai MD;  Location: [facility] CATH LAB;  Service: Cardiology;  Laterality: Right;    RIGHT HEART CATHETERIZATION Right 9/4/2019    Procedure: INSERTION, CATHETER, RIGHT HEART;  Surgeon: Ivonne Rai MD;  Location: [facility] CATH LAB;  Service: Cardiology;  Laterality: Right;     RIGHT HEART CATHETERIZATION Right 6/10/2022    Procedure: INSERTION, CATHETER, RIGHT HEART;  Surgeon: Keshia Poe MD;  Location: [facility] CATH LAB;  Service: Cardiology;  Laterality: Right;    UPPER GASTROINTESTINAL ENDOSCOPY       Family History   Problem Relation Name Age of Onset    Diabetes Mother      Heart disease Father      Glaucoma Father      No Known Problems Sister      Cancer Maternal Aunt          breast    Breast cancer Maternal Aunt      No Known Problems Maternal Uncle      No Known Problems Paternal Aunt      No Known Problems Paternal Uncle      Cancer Maternal Grandmother          uterine    Diabetes Maternal Grandfather      Heart attack Maternal Grandfather      No Known Problems Paternal Grandmother      Heart disease Paternal Grandfather      Heart attack Paternal Grandfather      Diabetes Paternal Grandfather      Leukemia Brother      Breast cancer Cousin      Breast cancer Cousin      Hypertension Other      Colon cancer Neg Hx      Ovarian cancer Neg Hx      Amblyopia Neg Hx      Blindness Neg Hx      Cataracts Neg Hx      Macular degeneration Neg Hx      Retinal detachment Neg Hx      Strabismus Neg Hx      Stroke Neg Hx      Thyroid disease Neg Hx      Esophageal cancer Neg Hx       Social History[1]  Review of Systems    Physical Exam     Initial Vitals [06/17/25 1323]   BP Pulse Resp Temp SpO2   (!) 143/82 105 20 98 °F (36.7 °C) 95 %      MAP       --         Physical Exam    Constitutional: She is diaphoretic. She has a sickly appearance.   HENT: Mouth/Throat: Mucous membranes are dry.   Cardiovascular:  Regular rhythm.   Tachycardia present.         Pulmonary/Chest: Effort normal and breath sounds normal.   Abdominal: There is abdominal tenderness in the right upper quadrant and epigastric area.   Sue tube in the right upper quadrant with mixed chylous and purulent discharge.  Erythema and akila pus at the insertion site of the sue tube.     Neurological: She is alert.          ED Course   Procedures  Labs Reviewed   CULTURE, BLOOD   CULTURE, BLOOD   CBC W/ AUTO DIFFERENTIAL    Narrative:     The following orders were created for panel order CBC auto differential.  Procedure                               Abnormality         Status                     ---------                               -----------         ------                     CBC with Differential[[id number]]                           In process                   Please view results for these tests on the individual orders.   COMPREHENSIVE METABOLIC PANEL   URINALYSIS, REFLEX TO URINE CULTURE   PROCALCITONIN   MAGNESIUM   PHOSPHORUS   CBC WITH DIFFERENTIAL     EKG Readings: (Independently Interpreted)   Initial Reading: No STEMI. Previous EKG: Compared with most recent EKG Rhythm: Sinus Tachycardia.       Imaging Results    None          Medications   piperacillin-tazobactam (ZOSYN) 4.5 g in D5W 100 mL IVPB (MB+) (has no administration in time range)   vancomycin 1,500 mg in 0.9% NaCl 250 mL IVPB (admixture device) (has no administration in time range)   lactated ringers bolus 250 mL (has no administration in time range)   ondansetron injection 4 mg (4 mg Intravenous Given 6/17/25 1437)   morphine injection 2 mg (2 mg Intravenous Given 6/17/25 1437)     Medical Decision Making  Ms. Perez is a 55 y.o. female with pulmonary hypertension on continuous remodulin infusion, SVT, recent admission for kita tube 04/2025, seizure disorder, hypothyroidism, migraine headaches, asthma presents to [facility] Emergency Department for worsening  nausea, right upper quadrant abdominal pain that radiates to the back, and diaphoresis.  Patient meeting 2/4 SIRS criteria on nurse evaluation.  Starting sepsis bundle, limited fluids to 250 cc given pulmonary arterial hypertension. CBC with no leukocytosis.  CMP unremarkable, creatinine at baseline. CT abdomen and pelvis with contrast demonstrating cholelithiasis, no abscesses.  Patient to be  admitted to [facility] service for sepsis with  RUQ abdomen perc kita tube as likely source.    Amount and/or Complexity of Data Reviewed  Labs: ordered.  Radiology: ordered.  ECG/medicine tests: ordered and independent interpretation performed.    Risk  Prescription drug management.  Decision regarding hospitalization.              Attending Attestation:             Attending ED Notes:   Attending Note:  I have seen the patient, have repeated the key portions of the history and physical, reviewed and agree with the medical documentation, and supervised and managed the medical care of the patient. Additionally, I was present for the critical portion of any procedure(s) performed.    55 F hx of pHTN on remodulin, per kita tube here with severe right upper quadrant pain and purulent drainage from tube site.  Vanc and Zosyn initiated, pain medication administered.  Went over to oncoming team pending labs, CT abdomen pelvis and admission    ASHLEY Flores MD  Staff ED Physician  06/17/2025 3:00 PM                                   Clinical Impression:  Final diagnoses:  [Z13.6] Screening for cardiovascular condition                       [1]   Social History  Tobacco Use    Smoking status: Never    Smokeless tobacco: Never   Substance Use Topics    Alcohol use: No     Alcohol/week: 0.8 standard drinks of alcohol     Types: 1 Standard drinks or equivalent per week     Comment: socially    Drug use: No        Asa Vanessa MD  Resident  06/17/25 1640

## 2025-06-17 NOTE — HPI
55 y.o. W female diagnosed with IPAH in 2009 and started on Remodulin, Letairis, and Adcirca. She has a history of ABHIJEET (untreated - not able to tolerate CPAP 2/2 severe congestion), GERD, and Asthma.      Pt presents today due to nausea, vomiting and abdominal pain.  Her symptoms have been ongoing since Thursday or Friday 6/12 to 6/13.  She also reports increased drainage from her abdominal wall site that is greenish white, a mix of blood and white drainage in her kita tube bag, diaphoresis intermittently concerning for possible fever (the patient did not take her temperature) intermittently in the last few days, intermittent stabbing pain that progresses with movement but is present continuously in her right upper quadrant and right upper back, shoulder pain in her left shoulder concerning for referred pain from her choledocholithiasis.  She has was recently discharged at the beginning of April for similar symptoms after which she a kita tube was placed for drainage of her gallbladder for cholecysitits. She was deemed not a surgical candidate due to high risk. She also underwent kita tube exchange on 5/19 with IR. On admission, Pt was hemodynamically stable and labs were largely unremarkable with no leukocytosis and no electrolyte abnormalities.  Patient has on her home oxygen requirements.  CT imaging as below.     CT 6/27/25:   1. Since the previous CT, cholecystostomy tube has been placed. There is mild wall thickening of the gallbladder with mild pericholecystic fluid. There is a focus of induration within the adjacent rectus musculature, through which the tube courses suggesting inflammation/hematoma secondary to tube placement. There is some indistinctness about the region, however no focal organized collection to suggest abscess. Continued follow-up is advised.  2. Choledocholithiasis, similar to the previous exam.  3. Findings suggest hepatic steatosis, correlation with LFTs recommended noting  hepatomegaly.  4. Heterogeneous enhancement involving the right hepatic lobe inferiorly. This may reflect sequela of contrast phase. Underlying hemangioma is a consideration. Consider nonemergent outpatient MRI for characterization as warranted.  5. Please see above for several additional findings.      She was recently admitted for abdominal pain/nausea/vomiting and found to have acute cholecystitis on US, CT and HIDA. She was seen by general surgery and  felt to be too high risk for cholecystectomy, therefore kita tube placed 03/31/25. Post kita tube she did well other than an episode of SVT with HR in the 200s, which she converted out of wih 12 mg adenosine and was in sinus since then. EP considred multaq however no additional agents added at the time. She was discharged with home O2 and her kita tube. Since discharge, seen by GS 05/01/25 and felt to remain high risk for surgery. They were consulting IR for drain study/interrogation. 05/19 had kita tube check and exchange.    For her pulmonary htn, current therapy is Remodulin infusing at 117ng/kg/min (pre-filled cassettes), Letairis 10mg qdaily, and Adcirca 40mg qdaily., Has T/F Adempas. Patient evaluated by lung transplant in 2020. Had everything set up for LUT, but says her caregivers were deemed not acceptable and is not a transplant candidate at [facility].     Last TTE:   TTE 12/16/25     ·  Left Ventricle: The left ventricle is normal in size. Normal wall thickness. Normal wall motion. Septal flattening in systole consistent with right ventricular pressure overload. There is normal systolic function with a visually estimated ejection fraction of 60 - 65%. There is normal diastolic function.  ·  Right Ventricle: Moderate right ventricular enlargement with hypertrophy. Systolic function is mildly to moderately reduced.  ·  The right atrium is mildly dilated.  ·  Tricuspid Valve: There is mild regurgitation.  ·  Pulmonary Artery: There is moderate to severe  pulmonary hypertension. The estimated pulmonary artery systolic pressure is 67 mmHg.  ·  IVC/SVC: Normal venous pressure at 3 mmHg.

## 2025-06-17 NOTE — CARE UPDATE
55 y.o. W has PAH and is currently on triple therapy who presented to the ED with abdominal pain and n/v. Patient was diagnosed with IPAH in 2009 and started on Remodulin, Letairis, and Adcirca. She has a history of ABHIJEET (untreated - not able to tolerate CPAP 2/2 severe congestion), GERD, and Asthma. Current therapy is Remodulin infusing at 80ng/kg/min (pre-filled cassettes), [id number] qdaily, and tadalafil 40mg qdaily. Has T/F Adempas. Patient denies chest pain, chest pressure, syncope, pre-syncope, lightheadedness, dizziness, PND, orthopnea, or LE edema.     Exam concerning for small amount of drainage from around the kita-tube, some erythema and tenderness around the area as well.       -- Admit to [facility] PH floor   -- Septic workup with cultures   -- IV BCABX, consult ID  -- Full H&P by [facility] to follow           CT Abd/pelvi 6/17/25:    1. Since the previous CT, cholecystostomy tube has been placed.  There is mild wall thickening of the gallbladder with mild pericholecystic fluid.  There is a focus of induration within the adjacent rectus musculature, through which the tube courses suggesting inflammation/hematoma secondary to tube placement.  There is some indistinctness about the region, however no focal organized collection to suggest abscess.  Continued follow-up is advised.  2. Choledocholithiasis, similar to the previous exam.  3. Findings suggest hepatic steatosis, correlation with LFTs recommended noting hepatomegaly.  4. Heterogeneous enhancement involving the right hepatic lobe inferiorly.  This may reflect sequela of contrast phase.  Underlying hemangioma is a consideration.  Consider nonemergent outpatient MRI for characterization as warranted.  5. Please see above for several additional findings.       Results for orders placed during the hospital encounter of 12/16/24  Echo  Interpretation Summary    Left Ventricle: The left ventricle is normal in size. Normal wall thickness. Normal wall motion.  Septal flattening in systole consistent with right ventricular pressure overload. There is normal systolic function with a visually estimated ejection fraction of 60 - 65%. There is normal diastolic function.    Right Ventricle: Moderate right ventricular enlargement with hypertrophy. Systolic function is mildly to moderately reduced.    The right atrium is mildly dilated.    Tricuspid Valve: There is mild regurgitation.    Pulmonary Artery: There is moderate to severe pulmonary hypertension. The estimated pulmonary artery systolic pressure is 67 mmHg.    IVC/SVC: Normal venous pressure at 3 mmHg.

## 2025-06-18 ENCOUNTER — ANESTHESIA EVENT (OUTPATIENT)
Dept: INTERVENTIONAL RADIOLOGY/VASCULAR | Facility: HOSPITAL | Age: 55
End: 2025-06-18
Payer: COMMERCIAL

## 2025-06-18 LAB
ABSOLUTE EOSINOPHIL (OHS): 0.19 K/UL
ABSOLUTE MONOCYTE (OHS): 0.8 K/UL (ref 0.3–1)
ABSOLUTE NEUTROPHIL COUNT (OHS): 3.54 K/UL (ref 1.8–7.7)
ALBUMIN SERPL BCP-MCNC: 3.4 G/DL (ref 3.5–5.2)
ALP SERPL-CCNC: 75 UNIT/L (ref 40–150)
ALT SERPL W/O P-5'-P-CCNC: 7 UNIT/L (ref 10–44)
ANION GAP (OHS): 8 MMOL/L (ref 8–16)
AST SERPL-CCNC: 14 UNIT/L (ref 11–45)
B-HCG UR QL: NEGATIVE
BACTERIA #/AREA URNS AUTO: NORMAL /HPF
BASOPHILS # BLD AUTO: 0.04 K/UL
BASOPHILS NFR BLD AUTO: 0.7 %
BILIRUB SERPL-MCNC: 0.3 MG/DL (ref 0.1–1)
BILIRUB UR QL STRIP.AUTO: NEGATIVE
BUN SERPL-MCNC: 8 MG/DL (ref 6–20)
CALCIUM SERPL-MCNC: 8.5 MG/DL (ref 8.7–10.5)
CHLORIDE SERPL-SCNC: 111 MMOL/L (ref 95–110)
CLARITY UR: CLEAR
CO2 SERPL-SCNC: 21 MMOL/L (ref 23–29)
COLOR UR AUTO: YELLOW
CREAT SERPL-MCNC: 0.8 MG/DL (ref 0.5–1.4)
ERYTHROCYTE [DISTWIDTH] IN BLOOD BY AUTOMATED COUNT: 13.9 % (ref 11.5–14.5)
GFR SERPLBLD CREATININE-BSD FMLA CKD-EPI: >60 ML/MIN/1.73/M2
GLUCOSE SERPL-MCNC: 93 MG/DL (ref 70–110)
GLUCOSE UR QL STRIP: NEGATIVE
HCT VFR BLD AUTO: 38.5 % (ref 37–48.5)
HGB BLD-MCNC: 12.7 GM/DL (ref 12–16)
HGB UR QL STRIP: ABNORMAL
HOLD SPECIMEN: NORMAL
IMM GRANULOCYTES # BLD AUTO: 0.02 K/UL (ref 0–0.04)
IMM GRANULOCYTES NFR BLD AUTO: 0.3 % (ref 0–0.5)
KETONES UR QL STRIP: ABNORMAL
LEUKOCYTE ESTERASE UR QL STRIP: ABNORMAL
LYMPHOCYTES # BLD AUTO: 1.17 K/UL (ref 1–4.8)
MAGNESIUM SERPL-MCNC: 2 MG/DL (ref 1.6–2.6)
MCH RBC QN AUTO: 29.3 PG (ref 27–31)
MCHC RBC AUTO-ENTMCNC: 33 G/DL (ref 32–36)
MCV RBC AUTO: 89 FL (ref 82–98)
MICROSCOPIC COMMENT: NORMAL
NITRITE UR QL STRIP: NEGATIVE
NUCLEATED RBC (/100WBC) (OHS): 0 /100 WBC
OHS QRS DURATION: 78 MS
OHS QTC CALCULATION: 507 MS
PH UR STRIP: 6 [PH]
PHOSPHATE SERPL-MCNC: 5.1 MG/DL (ref 2.7–4.5)
PLATELET # BLD AUTO: 181 K/UL (ref 150–450)
PMV BLD AUTO: 12.1 FL (ref 9.2–12.9)
POTASSIUM SERPL-SCNC: 3.6 MMOL/L (ref 3.5–5.1)
PROT SERPL-MCNC: 6.3 GM/DL (ref 6–8.4)
PROT UR QL STRIP: ABNORMAL
RBC # BLD AUTO: 4.34 M/UL (ref 4–5.4)
RBC #/AREA URNS AUTO: 2 /HPF (ref 0–4)
RELATIVE EOSINOPHIL (OHS): 3.3 %
RELATIVE LYMPHOCYTE (OHS): 20.3 % (ref 18–48)
RELATIVE MONOCYTE (OHS): 13.9 % (ref 4–15)
RELATIVE NEUTROPHIL (OHS): 61.5 % (ref 38–73)
SODIUM SERPL-SCNC: 140 MMOL/L (ref 136–145)
SP GR UR STRIP: >=1.03
SQUAMOUS #/AREA URNS AUTO: 2 /HPF
UROBILINOGEN UR STRIP-ACNC: NEGATIVE EU/DL
WBC # BLD AUTO: 5.76 K/UL (ref 3.9–12.7)
WBC #/AREA URNS AUTO: 4 /HPF (ref 0–5)

## 2025-06-18 PROCEDURE — 63600175 PHARM REV CODE 636 W HCPCS: Mod: TB

## 2025-06-18 PROCEDURE — 99223 1ST HOSP IP/OBS HIGH 75: CPT | Mod: ,,, | Performed by: INTERNAL MEDICINE

## 2025-06-18 PROCEDURE — BF131ZZ FLUOROSCOPY OF GALLBLADDER AND BILE DUCTS USING LOW OSMOLAR CONTRAST: ICD-10-PCS | Performed by: RADIOLOGY

## 2025-06-18 PROCEDURE — 85025 COMPLETE CBC W/AUTO DIFF WBC: CPT

## 2025-06-18 PROCEDURE — 63600175 PHARM REV CODE 636 W HCPCS

## 2025-06-18 PROCEDURE — 25000003 PHARM REV CODE 250: Performed by: NURSE PRACTITIONER

## 2025-06-18 PROCEDURE — 80053 COMPREHEN METABOLIC PANEL: CPT

## 2025-06-18 PROCEDURE — 25000242 PHARM REV CODE 250 ALT 637 W/ HCPCS

## 2025-06-18 PROCEDURE — 25000003 PHARM REV CODE 250: Performed by: STUDENT IN AN ORGANIZED HEALTH CARE EDUCATION/TRAINING PROGRAM

## 2025-06-18 PROCEDURE — 83735 ASSAY OF MAGNESIUM: CPT

## 2025-06-18 PROCEDURE — 99222 1ST HOSP IP/OBS MODERATE 55: CPT | Mod: ,,, | Performed by: INTERNAL MEDICINE

## 2025-06-18 PROCEDURE — 25000003 PHARM REV CODE 250

## 2025-06-18 PROCEDURE — 87205 SMEAR GRAM STAIN: CPT

## 2025-06-18 PROCEDURE — 84100 ASSAY OF PHOSPHORUS: CPT

## 2025-06-18 PROCEDURE — 0F24X0Z CHANGE DRAINAGE DEVICE IN GALLBLADDER, EXTERNAL APPROACH: ICD-10-PCS | Performed by: RADIOLOGY

## 2025-06-18 PROCEDURE — 81001 URINALYSIS AUTO W/SCOPE: CPT

## 2025-06-18 PROCEDURE — 87075 CULTR BACTERIA EXCEPT BLOOD: CPT | Performed by: RADIOLOGY

## 2025-06-18 PROCEDURE — 81025 URINE PREGNANCY TEST: CPT | Performed by: INTERNAL MEDICINE

## 2025-06-18 PROCEDURE — 25500020 PHARM REV CODE 255: Performed by: INTERNAL MEDICINE

## 2025-06-18 PROCEDURE — 20600001 HC STEP DOWN PRIVATE ROOM

## 2025-06-18 PROCEDURE — 87186 SC STD MICRODIL/AGAR DIL: CPT | Performed by: RADIOLOGY

## 2025-06-18 PROCEDURE — 99233 SBSQ HOSP IP/OBS HIGH 50: CPT | Mod: ,,, | Performed by: NURSE PRACTITIONER

## 2025-06-18 PROCEDURE — 63600175 PHARM REV CODE 636 W HCPCS: Performed by: INTERNAL MEDICINE

## 2025-06-18 PROCEDURE — 36415 COLL VENOUS BLD VENIPUNCTURE: CPT

## 2025-06-18 PROCEDURE — 87102 FUNGUS ISOLATION CULTURE: CPT | Performed by: RADIOLOGY

## 2025-06-18 PROCEDURE — 99232 SBSQ HOSP IP/OBS MODERATE 35: CPT | Mod: ,,, | Performed by: FAMILY MEDICINE

## 2025-06-18 PROCEDURE — 94761 N-INVAS EAR/PLS OXIMETRY MLT: CPT

## 2025-06-18 PROCEDURE — 27000221 HC OXYGEN, UP TO 24 HOURS

## 2025-06-18 RX ORDER — FLUTICASONE PROPIONATE 50 MCG
2 SPRAY, SUSPENSION (ML) NASAL DAILY PRN
Status: DISCONTINUED | OUTPATIENT
Start: 2025-06-18 | End: 2025-06-21 | Stop reason: HOSPADM

## 2025-06-18 RX ORDER — GLUCAGON 1 MG
1 KIT INJECTION
Status: DISCONTINUED | OUTPATIENT
Start: 2025-06-18 | End: 2025-06-21 | Stop reason: HOSPADM

## 2025-06-18 RX ORDER — DIPHENHYDRAMINE HCL 25 MG
25 CAPSULE ORAL EVERY 6 HOURS PRN
Status: DISCONTINUED | OUTPATIENT
Start: 2025-06-18 | End: 2025-06-21 | Stop reason: HOSPADM

## 2025-06-18 RX ORDER — LIDOCAINE HYDROCHLORIDE 20 MG/ML
INJECTION INTRAVENOUS
Status: DISCONTINUED | OUTPATIENT
Start: 2025-06-18 | End: 2025-06-18

## 2025-06-18 RX ORDER — CEFTRIAXONE 2 G/1
2 INJECTION, POWDER, FOR SOLUTION INTRAMUSCULAR; INTRAVENOUS
Status: DISCONTINUED | OUTPATIENT
Start: 2025-06-18 | End: 2025-06-20

## 2025-06-18 RX ORDER — MIDAZOLAM HYDROCHLORIDE 1 MG/ML
INJECTION INTRAMUSCULAR; INTRAVENOUS
Status: DISCONTINUED | OUTPATIENT
Start: 2025-06-18 | End: 2025-06-18

## 2025-06-18 RX ORDER — MORPHINE SULFATE 2 MG/ML
2 INJECTION, SOLUTION INTRAMUSCULAR; INTRAVENOUS EVERY 5 MIN PRN
Refills: 0 | Status: DISCONTINUED | OUTPATIENT
Start: 2025-06-18 | End: 2025-06-21 | Stop reason: HOSPADM

## 2025-06-18 RX ORDER — PROPOFOL 10 MG/ML
VIAL (ML) INTRAVENOUS
Status: DISCONTINUED | OUTPATIENT
Start: 2025-06-18 | End: 2025-06-18

## 2025-06-18 RX ADMIN — LEVOTHYROXINE SODIUM 125 MCG: 0.12 TABLET ORAL at 05:06

## 2025-06-18 RX ADMIN — ACETAMINOPHEN 650 MG: 325 TABLET ORAL at 06:06

## 2025-06-18 RX ADMIN — PIPERACILLIN SODIUM AND TAZOBACTAM SODIUM 4.5 G: 4; .5 INJECTION, POWDER, FOR SOLUTION INTRAVENOUS at 05:06

## 2025-06-18 RX ADMIN — FLUTICASONE FUROATE AND VILANTEROL TRIFENATATE 1 PUFF: 200; 25 POWDER RESPIRATORY (INHALATION) at 08:06

## 2025-06-18 RX ADMIN — PROPOFOL 20 MG: 10 INJECTION, EMULSION INTRAVENOUS at 12:06

## 2025-06-18 RX ADMIN — LIDOCAINE HYDROCHLORIDE 60 MG: 20 INJECTION INTRAVENOUS at 12:06

## 2025-06-18 RX ADMIN — CEFTRIAXONE 2 G: 2 INJECTION, POWDER, FOR SOLUTION INTRAMUSCULAR; INTRAVENOUS at 03:06

## 2025-06-18 RX ADMIN — PANTOPRAZOLE SODIUM 40 MG: 40 TABLET, DELAYED RELEASE ORAL at 08:06

## 2025-06-18 RX ADMIN — TRAMADOL HYDROCHLORIDE 50 MG: 50 TABLET, COATED ORAL at 05:06

## 2025-06-18 RX ADMIN — DIPHENHYDRAMINE HYDROCHLORIDE 25 MG: 25 CAPSULE ORAL at 09:06

## 2025-06-18 RX ADMIN — POLYETHYLENE GLYCOL 3350 17 G: 17 POWDER, FOR SOLUTION ORAL at 08:06

## 2025-06-18 RX ADMIN — TRAMADOL HYDROCHLORIDE 50 MG: 50 TABLET, COATED ORAL at 09:06

## 2025-06-18 RX ADMIN — ACETAMINOPHEN 650 MG: 325 TABLET ORAL at 11:06

## 2025-06-18 RX ADMIN — SODIUM CHLORIDE: 0.9 INJECTION, SOLUTION INTRAVENOUS at 12:06

## 2025-06-18 RX ADMIN — IOHEXOL 20 ML: 300 INJECTION, SOLUTION INTRAVENOUS at 01:06

## 2025-06-18 RX ADMIN — TOPIRAMATE 100 MG: 25 TABLET, FILM COATED ORAL at 09:06

## 2025-06-18 RX ADMIN — MIDAZOLAM HYDROCHLORIDE 2 MG: 2 INJECTION, SOLUTION INTRAMUSCULAR; INTRAVENOUS at 12:06

## 2025-06-18 RX ADMIN — TRAMADOL HYDROCHLORIDE 50 MG: 50 TABLET, COATED ORAL at 03:06

## 2025-06-18 RX ADMIN — TADALAFIL 40 MG: 20 TABLET ORAL at 09:06

## 2025-06-18 RX ADMIN — TOPIRAMATE 100 MG: 25 TABLET, FILM COATED ORAL at 08:06

## 2025-06-18 RX ADMIN — SENNOSIDES AND DOCUSATE SODIUM 1 TABLET: 50; 8.6 TABLET ORAL at 08:06

## 2025-06-18 RX ADMIN — FLUTICASONE PROPIONATE 100 MCG: 50 SPRAY, METERED NASAL at 09:06

## 2025-06-18 RX ADMIN — TREPROSTINIL 7078.5 NG/MIN: 200 INJECTION, SOLUTION INTRAVENOUS; SUBCUTANEOUS at 03:06

## 2025-06-18 RX ADMIN — AMBRISENTAN 10 MG: 10 TABLET, FILM COATED ORAL at 08:06

## 2025-06-18 NOTE — CONSULTS
Interventional Radiology  Consult/History & Physical Note    Consult Requested By: Sylvie Barone MD/ Jerry Galeana MD  Reason for Consult: cholecystostomy tube exchange    SUBJECTIVE:     Chief Complaint:  cholecystostomy tube displaced    History of Present Illness:  Yuni Perez is a 55 y.o. female with a PMHx of pulmonary hypertension on continuous remodulin infusion, SVT, s/p kita tube 04/2025, seizure disorder, hypothyroidism, migraine headaches, asthma who was admitted on 6/17/2025 for RUQ pain, nausea, and vomiting x1. Interventional Radiology has been consulted for cholecystogram with kita tube exchange for management of kita tube dislodgement. Pt underwent cholecystostomy tube exchange with new 10 [place] tube in place on 5/19/2025 due to cholelithiasis. Pt reports She has been experiencing RUQ pain. Pt has had recent imaging including a CT a/p on 6/17/2025 which revealed dislodgment of kita tube. The pt's Tbili is currently 0.3 and is stable. The pt's WBC is 5.76 and is trending down. Pt is afebrile and hemodynamically stable. She has a history of ABHIJEET intolerant to CPAP. She does not take any anticoagulants. She has been NPO since midnight.    Review of Systems   Constitutional:  Negative for chills and fever.   Respiratory:  Negative for cough and shortness of breath.    Cardiovascular:  Negative for chest pain and leg swelling.   Gastrointestinal:  Positive for abdominal pain. Negative for vomiting (no vomiting since admission).       Scheduled Meds:   tadalafil  40 mg Oral Daily    aluminum-magnesium hydroxide-simethicone  30 mL Oral QID (AC & HS)    fluticasone furoate-vilanteroL  1 puff Inhalation Daily    ambrisentan  10 mg Oral Daily    levothyroxine  125 mcg Oral Before breakfast    pantoprazole  40 mg Oral Daily    polyethylene glycol  17 g Oral Daily    senna-docusate  1 tablet Oral BID    sucralfate  1 g Oral Q6H    topiramate  100 mg Oral BID    treprostinil sodium  "(REMODULIN) 12,000,000 ng in 0.9% NaCl 100 mL infusion  117 ng/kg/min (Order-Specific) Intravenous Q24H     Continuous Infusions:   veletri/remodulin cassette   Intravenous Continuous        veletri/remodulin tubing   Intravenous Continuous         PRN Meds:  Current Facility-Administered Medications:     acetaminophen, 650 mg, Oral, Q4H PRN    albuterol, 2 puff, Inhalation, Q6H PRN    albuterol-ipratropium, 3 mL, Nebulization, Q6H PRN    ALPRAZolam, 0.25 mg, Oral, BID PRN    butalbital-acetaminophen-caffeine -40 mg, 1 tablet, Oral, Q4H PRN    melatonin, 6 mg, Oral, Nightly PRN    ondansetron, 4 mg, Intravenous, Q8H PRN    sodium chloride 0.9%, 10 mL, Intravenous, PRN    traMADoL, 50 mg, Oral, Q6H PRN    Review of patient's allergies indicates:   Allergen Reactions    Fentanyl Anaphylaxis     Respiratory distress    Vibra-tabs [doxycycline hyclate] Anaphylaxis     "throat felt like it was closing"    Adhesive Hives     Silk tape    Amoxicillin Rash    Nsaids (non-steroidal anti-inflammatory drug) Swelling    Chlorhexidine Other (See Comments)     Blue Chlorhexidine causes hives       Past Medical History:   Diagnosis Date    AR (allergic rhinitis)     Cholelithiasis, common bile duct     Chronic low back pain     Eye pressure 2017    General anesthetics causing adverse effect in therapeutic use     GERD (gastroesophageal reflux disease)     History of migraine headaches     Hypothyroidism     Innocent heart murmur     Lumbar disc disease     Menorrhagia     Mild asthma     Obesity     Plantar fasciitis of left foot     Primary pulmonary hypertension     followed by heart transplant/pulmonary     Seizure disorder     x 1 in 2008    Seizures     Sleep apnea     SVT (supraventricular tachycardia) 4/3/2025    TMJ (dislocation of temporomandibular joint)     Tricuspid regurgitation      Past Surgical History:   Procedure Laterality Date    CARDIAC CATHETERIZATION      CATHETERIZATION OF BOTH LEFT AND RIGHT HEART " Bilateral 9/29/2020    Procedure: CATHETERIZATION, HEART, BOTH LEFT AND RIGHT;  Surgeon: Gucci Pickett MD;  Location: [facility] CATH LAB;  Service: Cardiology;  Laterality: Bilateral;    CENTRAL VENOUS CATHETER INSERTION      CORONARY ANGIOGRAPHY N/A 9/29/2020    Procedure: ANGIOGRAM, CORONARY ARTERY;  Surgeon: Gucci Pickett MD;  Location: [facility] CATH LAB;  Service: Cardiology;  Laterality: N/A;    gallbladder drain  06/2019    INSERTION OF HAYNES CATHETER Right 6/17/2020    Procedure: INSERTION, CATHETER, CENTRAL VENOUS, HAYNES Replace Single Lumen for Remodulin Infusion;  Surgeon: Bertin Dewitt MD;  Location: [facility] OR [address] [place] [address];  Service: General;  Laterality: Right;    PORTACATH PLACEMENT      REMOVAL OF TUNNELED CENTRAL VENOUS CATHETER (CVC) N/A 6/17/2020    Procedure: REMOVAL, CATHETER, CENTRAL VENOUS, TUNNELED;  Surgeon: Bertin Dewitt MD;  [facility];  Service: General;  Laterality: N/A;    RIGHT HEART CATHETERIZATION Right 8/20/2018    Procedure: HEART CATH-RIGHT;  Surgeon: Ivonne Rai MD;  Location: [facility] CATH LAB;  Service: Cardiology;  Laterality: Right;    RIGHT HEART CATHETERIZATION Right 9/4/2019    Procedure: INSERTION, CATHETER, RIGHT HEART;  Surgeon: Ivonne Rai MD;  Location: [facility] CATH LAB;  Service: Cardiology;  Laterality: Right;    RIGHT HEART CATHETERIZATION Right 6/10/2022    Procedure: INSERTION, CATHETER, RIGHT HEART;  Surgeon: Keshia Poe MD;  Location: [facility] CATH LAB;  Service: Cardiology;  Laterality: Right;    UPPER GASTROINTESTINAL ENDOSCOPY       Family History   Problem Relation Name Age of Onset    Diabetes Mother      Heart disease Father      Glaucoma Father      No Known Problems Sister      Cancer Maternal Aunt          breast    Breast cancer Maternal Aunt      No Known Problems Maternal Uncle      No Known Problems Paternal Aunt      No Known Problems Paternal Uncle      Cancer Maternal Grandmother          uterine    Diabetes Maternal  Grandfather      Heart attack Maternal Grandfather      No Known Problems Paternal Grandmother      Heart disease Paternal Grandfather      Heart attack Paternal Grandfather      Diabetes Paternal Grandfather      Leukemia Brother      Breast cancer Cousin      Breast cancer Cousin      Hypertension Other      Colon cancer Neg Hx      Ovarian cancer Neg Hx      Amblyopia Neg Hx      Blindness Neg Hx      Cataracts Neg Hx      Macular degeneration Neg Hx      Retinal detachment Neg Hx      Strabismus Neg Hx      Stroke Neg Hx      Thyroid disease Neg Hx      Esophageal cancer Neg Hx       Social History[1]    OBJECTIVE:     Vital Signs (Most Recent)  Temp: 97.8 °F (36.6 °C) (06/18/25 0757)  Pulse: 78 (06/18/25 0825)  Resp: 18 (06/18/25 0825)  BP: 90/63 (06/18/25 0757)  SpO2: 95 % (06/18/25 0825)    Physical Exam:  Physical Exam  Constitutional:       General: She is not in acute distress.  HENT:      Head: Normocephalic and atraumatic.   Eyes:      General: No scleral icterus.  Pulmonary:      Effort: Pulmonary effort is normal. No respiratory distress.   Skin:     General: Skin is warm and dry.   Neurological:      Mental Status: She is alert and oriented to person, place, and time.   Psychiatric:         Mood and Affect: Mood normal.       Laboratory  I have reviewed all pertinent lab results within the past 24 hours.    ASA/Mallampati  ASA: 3  Mallampati: 1    Imaging:  Recent imaging studies including CT a/p on 6/17/2025 which was independently reviewed by Dr. August.     ASSESSMENT/PLAN:     Assessment:  55 y.o. female with a PMHx of pulmonary hypertension on continuous remodulin infusion, SVT, s/p kita tube 04/2025, seizure disorder, hypothyroidism, migraine headaches, asthma who was admitted on 6/17/2025 for RUQ pain, nausea, and vomiting x1 who has been referred to IR for cholecystogram with kita tube exchange for management of kita tube dislodgement. The procedure was discussed in great detail with the  patient including thorough explanations of the potential risks and benefits of cholangiogram with biliary drain exchange. Risks include but are not limited to infection, bleeding. biliary injury/biloma and need for additional procedures. The patient is a candidate for cholangiogram with biliary drain exchange per anesthesia. Plan discussed with ordering physician and pt who verbalized understanding of the plan and would like to proceed.    Plan:  Will proceed with cholangiogram with biliary drain exchange per anesthesia on 6/18/2025.   Please keep pt NPO.   Anticoagulation history reviewed.   Coagulation labs not routinely recommended per SIR guidelines.  Thank you for the consult. Please contact with questions via [employer] secure chat.    Time spent during patient care today was 75 minutes. This includes time spent before the visit reviewing the chart, discussing case with staff physician and ordering provider, time spent during the face to face patient visit, and time spent after the visit on documentation. Time excludes procedure time.     ROMEO Hughes, FNP  Interventional Radiology           [1]   Social History  Tobacco Use    Smoking status: Never    Smokeless tobacco: Never   Substance Use Topics    Alcohol use: No     Alcohol/week: 0.8 standard drinks of alcohol     Types: 1 Standard drinks or equivalent per week     Comment: socially    Drug use: No

## 2025-06-18 NOTE — CONSULTS
"[facility] Service Consult Note    Attending: Jerry Zuñiga, *   Admit Date: 6/17/2025  Today's Date: 06/17/2025    SUBJECTIVE:     HPI:  Yuni Perez is a 55 year old feamle for whom AES is consulted for choledocholithiasis. She has a history of severe pulmonary HTN (on Remodulin, Ambrisentan and Tadalafil), ABHIJEET (not able to tolerate CPAP 2/2 severe congestion), seizure disorder, hypothyroidism, admission from 3/30/25 to 4/7/25 for acute cholecystitis (not a surgical candidate), AES consulted for choledocholithiasis but procedure deferred as risks outweighed benefits of removing non-obstructing stone now s/p kita tube with IR on 3/31 and exchange on 5/19.    She presented to [facility] on 6/17 with reports of nausea and RUQ abdominal pain which started on Friday 6/13. She felt like her kita tube partially dislodged and since that occurred, she has been experiencing increasing pain and nausea. She has had mild abdominal daily since the kita tube was placed but this significantly worsened after the tube moved. On arrival to the ED, she was afebrile and HDS. Laboratory workup notable unremarkable including normal LFTs. CTAP with IV contrast notable for calculus within the distal aspect of the CBD measuring 3 cmm with prominence of the CBD 8 mm, kita tube with focus of induration to the adjacent musculature which could be related to inflammation/hematoma. She was given a dose of Zosyn on admission. She was evaluated by general surgery who placed a stitch to hold her tube in place but remains not a candidate for surgical intervention. On my assessment, she still reports upper right sided abdominal pain. Of note, she has had decreased drainage from the kita tube since she thinks it became dislodged.     Review of patient's allergies indicates:   Allergen Reactions    Fentanyl Anaphylaxis     Respiratory distress    Vibra-tabs [doxycycline hyclate] Anaphylaxis     "throat felt like it was " "closing"    Adhesive Hives     Silk tape    Amoxicillin Rash    Nsaids (non-steroidal anti-inflammatory drug) Swelling    Chlorhexidine Other (See Comments)     Blue Chlorhexidine causes hives       Past Medical History:   Diagnosis Date    AR (allergic rhinitis)     Cholelithiasis, common bile duct     Chronic low back pain     Eye pressure 2017    General anesthetics causing adverse effect in therapeutic use     GERD (gastroesophageal reflux disease)     History of migraine headaches     Hypothyroidism     Innocent heart murmur     Lumbar disc disease     Menorrhagia     Mild asthma     Obesity     Plantar fasciitis of left foot     Primary pulmonary hypertension     followed by heart transplant/pulmonary     Seizure disorder     x 1 in 2008    Seizures     Sleep apnea     SVT (supraventricular tachycardia) 4/3/2025    TMJ (dislocation of temporomandibular joint)     Tricuspid regurgitation      Past Surgical History:   Procedure Laterality Date    CARDIAC CATHETERIZATION      CATHETERIZATION OF BOTH LEFT AND RIGHT HEART Bilateral 9/29/2020    Procedure: CATHETERIZATION, HEART, BOTH LEFT AND RIGHT;  Surgeon: Gucci Pickett MD;  Location: [facility] CATH LAB;  Service: Cardiology;  Laterality: Bilateral;    CENTRAL VENOUS CATHETER INSERTION      CORONARY ANGIOGRAPHY N/A 9/29/2020    Procedure: ANGIOGRAM, CORONARY ARTERY;  Surgeon: Gucci Pickett MD;  Location: [facility] CATH LAB;  Service: Cardiology;  Laterality: N/A;    gallbladder drain  06/2019    INSERTION OF HAYNES CATHETER Right 6/17/2020    Procedure: INSERTION, CATHETER, CENTRAL VENOUS, HAYNES Replace Single Lumen for Remodulin Infusion;  Surgeon: Bertin Dewitt MD;  Location: [facility] OR [address] [place] [address];  Service: General;  Laterality: Right;    PORTACATH PLACEMENT      REMOVAL OF TUNNELED CENTRAL VENOUS CATHETER (CVC) N/A 6/17/2020    Procedure: REMOVAL, CATHETER, CENTRAL VENOUS, TUNNELED;  Surgeon: Bertin Dewitt MD;  [facility];  Service: " General;  Laterality: N/A;    RIGHT HEART CATHETERIZATION Right 8/20/2018    Procedure: HEART CATH-RIGHT;  Surgeon: Ivonne Rai MD;  Location: [facility] CATH LAB;  Service: Cardiology;  Laterality: Right;    RIGHT HEART CATHETERIZATION Right 9/4/2019    Procedure: INSERTION, CATHETER, RIGHT HEART;  Surgeon: Ivonne Rai MD;  Location: [facility] CATH LAB;  Service: Cardiology;  Laterality: Right;    RIGHT HEART CATHETERIZATION Right 6/10/2022    Procedure: INSERTION, CATHETER, RIGHT HEART;  Surgeon: Keshia Peo MD;  Location: [facility] CATH LAB;  Service: Cardiology;  Laterality: Right;    UPPER GASTROINTESTINAL ENDOSCOPY       Family History   Problem Relation Name Age of Onset    Diabetes Mother      Heart disease Father      Glaucoma Father      No Known Problems Sister      Cancer Maternal Aunt          breast    Breast cancer Maternal Aunt      No Known Problems Maternal Uncle      No Known Problems Paternal Aunt      No Known Problems Paternal Uncle      Cancer Maternal Grandmother          uterine    Diabetes Maternal Grandfather      Heart attack Maternal Grandfather      No Known Problems Paternal Grandmother      Heart disease Paternal Grandfather      Heart attack Paternal Grandfather      Diabetes Paternal Grandfather      Leukemia Brother      Breast cancer Cousin      Breast cancer Cousin      Hypertension Other      Colon cancer Neg Hx      Ovarian cancer Neg Hx      Amblyopia Neg Hx      Blindness Neg Hx      Cataracts Neg Hx      Macular degeneration Neg Hx      Retinal detachment Neg Hx      Strabismus Neg Hx      Stroke Neg Hx      Thyroid disease Neg Hx      Esophageal cancer Neg Hx       Social History[1]    All home medications reviewed.    OBJECTIVE:     Vital Signs Trends/History Reviewed  Vitals:    06/17/25 1700 06/17/25 1800 06/17/25 1900 06/17/25 2000   BP:       BP Location:       Patient Position:       Pulse: 103 104 98 95   Resp:       Temp:       TempSrc:       SpO2: (!) 92% (!) 91%  95% (!) 93%   Weight:       Height:           Physical Exam  Vitals reviewed.   Constitutional:       General: She is not in acute distress.     Appearance: She is not ill-appearing.   Eyes:      General: No scleral icterus.  Pulmonary:      Effort: Pulmonary effort is normal. No respiratory distress.      Comments: On nasal canula.  Abdominal:      General: There is no distension.      Palpations: Abdomen is soft.      Tenderness: There is abdominal tenderness (around kita tube). There is no rebound.      Comments: Kita tube to right upper abdomen with no drainage noted in bag.   Skin:     Coloration: Skin is not jaundiced.   Neurological:      Mental Status: She is alert and oriented to person, place, and time.         Laboratory: All labs results within last 24 hours have been reviewed.     Imaging: All imaging results within the last 24 hours have been reviewed.       Infusions:     veletri/remodulin cassette   Intravenous Continuous        veletri/remodulin tubing   Intravenous Continuous           Scheduled Medications:    aluminum-magnesium hydroxide-simethicone  30 mL Oral QID (AC & HS)    [START ON 6/18/2025] ambrisentan  10 mg Oral Daily    [START ON 6/18/2025] fluticasone furoate-vilanteroL  1 puff Inhalation Daily    [START ON 6/18/2025] levothyroxine  125 mcg Oral Before breakfast    [START ON 6/18/2025] pantoprazole  40 mg Oral Daily    piperacillin-tazobactam (Zosyn) IV (PEDS and ADULTS) (extended infusion is not appropriate)  4.5 g Intravenous Q8H    [START ON 6/18/2025] polyethylene glycol  17 g Oral Daily    senna-docusate  1 tablet Oral BID    sucralfate  1 g Oral Q6H    tadalafil  40 mg Oral Nightly    treprostinil (REMODULIN) 12,000,000 ng in 0.9% NaCl 100 mL infusion  117 ng/kg/min (Order-Specific) Intravenous Q24H       PRN Medications:     Current Facility-Administered Medications:     acetaminophen, 650 mg, Oral, Q4H PRN    albuterol, 2 puff, Inhalation, Q6H PRN    albuterol-ipratropium, 3  mL, Nebulization, Q6H PRN    ALPRAZolam, 0.25 mg, Oral, BID PRN    butalbital-acetaminophen-caffeine -40 mg, 1 tablet, Oral, Q4H PRN    melatonin, 6 mg, Oral, Nightly PRN    ondansetron, 4 mg, Intravenous, Q8H PRN    sodium chloride 0.9%, 10 mL, Intravenous, PRN    traMADoL, 50 mg, Oral, Q6H PRN    ASSESSMENT:    55F with severe pulmonary HTN (on Remodulin, Ambrisentan and Tadalafil), ABHIJETE (not able to tolerate CPAP 2/2 severe congestion), seizure disorder, hypothyroidism, admission from 3/30/25 to 4/7/25 for acute cholecystitis (not a surgical candidate), AES consulted for choledocholithiasis but procedure deferred as risks outweighed benefits of removing non-obstructing stone now s/p kita tube with IR on 3/31 and exchange on 5/19 who presented to [facility] on 6/17 with nausea and RUQ abdominal pain which started on Friday 6/13 after her tube became partially dislodged. AES consulted for choledocholithiasis seen on imaging. She was evaluated by general surgery who placed a stitch to hold her tube in place but remains not a candidate for surgical intervention. Her LFTs are normal, and she is without jaundice/scleral icterus.     Problem List:  Hx of cholecystitis s/p kita tube 3/31 with exchange 5/19  Choledocholithiasis, non-obstructing   Abdominal pain  Severe Pulmonary HTN    RECOMMENDATIONS:    -Agree with IR consult for consideration of kita tube exchange given minimal drainage and concern for dislodgement.   -Although a CBD stone appears to be present, it is small and non-obstructing, which is unlikely to be contributing to her current symptoms.   -Given her hx of severe pulmonary HTN, risks of EUS/ERCP would outweigh the benefit of removing a non-obstructing stone at this time.       Thank you for allowing us to participate in the care of this patient. Please call with questions.      Kamala Causey MD  Gastroenterology Fellow, PGY-V  [facility]         [1]   Social History  Tobacco Use     Smoking status: Never    Smokeless tobacco: Never   Substance Use Topics    Alcohol use: No     Alcohol/week: 0.8 standard drinks of alcohol     Types: 1 Standard drinks or equivalent per week     Comment: socially    Drug use: No

## 2025-06-18 NOTE — ANESTHESIA PREPROCEDURE EVALUATION
"[facility]  Anesthesia Pre-Operative Evaluation         Patient Name/: Yuni Perez, 1970  MRN: [id number]    SUBJECTIVE:        2025    Yuni Perez is a 55 y.o. female         ________________________________________  Results for orders placed during the hospital encounter of 24    Echo    Interpretation Summary    Left Ventricle: The left ventricle is normal in size. Normal wall thickness. Normal wall motion. Septal flattening in systole consistent with right ventricular pressure overload. There is normal systolic function with a visually estimated ejection fraction of 60 - 65%. There is normal diastolic function.    Right Ventricle: Moderate right ventricular enlargement with hypertrophy. Systolic function is mildly to moderately reduced.    The right atrium is mildly dilated.    Tricuspid Valve: There is mild regurgitation.    Pulmonary Artery: There is moderate to severe pulmonary hypertension. The estimated pulmonary artery systolic pressure is 67 mmHg.    IVC/SVC: Normal venous pressure at 3 mmHg.    ________________________________________    LDA:   Tunneled Central Line Insertion/Assessment - Single Lumen  20 1018 (Active)   Number of days: 1796       Tunneled Central Line - Single Lumen  Subclavian Right (Active)   Number of days:             Biliary Tube 25 1730 10 Fr. RUQ (Active)   Number of days: 48       Drips:    veletri/remodulin cassette   Intravenous Continuous        veletri/remodulin tubing   Intravenous Continuous           Problem List[1]    Review of patient's allergies indicates:   Allergen Reactions    Fentanyl Anaphylaxis     Respiratory distress    Vibra-tabs [doxycycline hyclate] Anaphylaxis     "throat felt like it was closing"    Adhesive Hives     Silk tape    Amoxicillin Rash    Nsaids (non-steroidal anti-inflammatory drug) Swelling    Chlorhexidine Other (See Comments)     Blue Chlorhexidine causes hives "       Current Inpatient Medications:    tadalafil  40 mg Oral Daily    aluminum-magnesium hydroxide-simethicone  30 mL Oral QID (AC & HS)    fluticasone furoate-vilanteroL  1 puff Inhalation Daily    ambrisentan  10 mg Oral Daily    levothyroxine  125 mcg Oral Before breakfast    pantoprazole  40 mg Oral Daily    polyethylene glycol  17 g Oral Daily    senna-docusate  1 tablet Oral BID    sucralfate  1 g Oral Q6H    topiramate  100 mg Oral BID    treprostinil sodium (REMODULIN) 12,000,000 ng in 0.9% NaCl 100 mL infusion  117 ng/kg/min (Order-Specific) Intravenous Q24H       Medications Ordered Prior to Encounter[2]    Past Surgical History:   Procedure Laterality Date    CARDIAC CATHETERIZATION      CATHETERIZATION OF BOTH LEFT AND RIGHT HEART Bilateral 9/29/2020    Procedure: CATHETERIZATION, HEART, BOTH LEFT AND RIGHT;  Surgeon: Gucci Pickett MD;  Location: [facility] CATH LAB;  Service: Cardiology;  Laterality: Bilateral;    CENTRAL VENOUS CATHETER INSERTION      CORONARY ANGIOGRAPHY N/A 9/29/2020    Procedure: ANGIOGRAM, CORONARY ARTERY;  Surgeon: Gucci Pickett MD;  Location: [facility] CATH LAB;  Service: Cardiology;  Laterality: N/A;    gallbladder drain  06/2019    INSERTION OF HAYNES CATHETER Right 6/17/2020    Procedure: INSERTION, CATHETER, CENTRAL VENOUS, HAYNES Replace Single Lumen for Remodulin Infusion;  Surgeon: Bertin Dewitt MD;  Location: [facility] OR [address] [place] [address];  Service: General;  Laterality: Right;    PORTACATH PLACEMENT      REMOVAL OF TUNNELED CENTRAL VENOUS CATHETER (CVC) N/A 6/17/2020    Procedure: REMOVAL, CATHETER, CENTRAL VENOUS, TUNNELED;  Surgeon: Bertin Dewitt MD;  [facility];  Service: General;  Laterality: N/A;    RIGHT HEART CATHETERIZATION Right 8/20/2018    Procedure: HEART CATH-RIGHT;  Surgeon: Ivonne Rai MD;  Location: [facility] CATH LAB;  Service: Cardiology;  Laterality: Right;    RIGHT HEART CATHETERIZATION Right 9/4/2019    Procedure: INSERTION, CATHETER,  RIGHT HEART;  Surgeon: Ivonne Rai MD;  Location: [facility] CATH LAB;  Service: Cardiology;  Laterality: Right;    RIGHT HEART CATHETERIZATION Right 6/10/2022    Procedure: INSERTION, CATHETER, RIGHT HEART;  Surgeon: Keshia Poe MD;  Location: [facility] CATH LAB;  Service: Cardiology;  Laterality: Right;    UPPER GASTROINTESTINAL ENDOSCOPY         Social History:  Tobacco Use: Low Risk  (6/17/2025)    Patient History     Smoking Tobacco Use: Never     Smokeless Tobacco Use: Never     Passive Exposure: Not on file       Alcohol Use: Unknown (5/31/2025)    AUDIT-C     Frequency of Alcohol Consumption: Patient declined     Average Number of Drinks: Patient does not drink     Frequency of Binge Drinking: Not on file       OBJECTIVE:     Vital Signs Range:  BMI Readings from Last 1 Encounters:   06/18/25 28.07 kg/m²       Temp:  [36.6 °C (97.8 °F)-36.9 °C (98.4 °F)]   Pulse:  [78-90]   Resp:  [16-18]   BP: (90-94)/(55-63)   SpO2:  [90 %-95 %]        Significant Labs:        Component Value Date/Time    WBC 5.76 06/18/2025 0508    HGB 12.7 06/18/2025 0508    HGB 14.8 12/13/2024 1107    HCT 38.5 06/18/2025 0508    HCT 44.9 12/13/2024 1107    HCT 38 09/09/2014 1521     06/18/2025 0508     12/13/2024 1107     06/18/2025 0509     12/13/2024 1107    K 3.6 06/18/2025 0509    K 3.9 12/13/2024 1107     (H) 06/18/2025 0509     (H) 12/13/2024 1107    CO2 21 (L) 06/18/2025 0509    CO2 20 (L) 12/13/2024 1107    GLU 93 06/18/2025 0509     12/13/2024 1107    BUN 8 06/18/2025 0509    CREATININE 0.8 06/18/2025 0509    MG 2.0 06/18/2025 0509    PHOS 5.1 (H) 06/18/2025 0509    PHOS 2.8 09/10/2014 0616    CALCIUM 8.5 (L) 06/18/2025 0509    CALCIUM 8.8 12/13/2024 1107    ALBUMIN 3.4 (L) 06/18/2025 0509    ALBUMIN 3.9 12/13/2024 1107    PROT 6.3 06/18/2025 0509    PROT 7.2 12/13/2024 1107    ALKPHOS 75 06/18/2025 0509    ALKPHOS 89 12/13/2024 1107    BILITOT 0.3 06/18/2025 0509    BILITOT 0.4  12/13/2024 1107    AST 14 06/18/2025 0509    AST 15 12/13/2024 1107    ALT 7 (L) 06/18/2025 0509    ALT 11 12/13/2024 1107    INR 1.2 04/02/2025 1346    INR 0.9 03/11/2024 0834    INR 2.3 (H) 03/09/2010 1207    HGBA1C 4.9 09/21/2020 1009        Please see Results Review for additional labs.     Diagnostic Studies: No relevant studies.    EKG:   Results for orders placed or performed in visit on 06/17/25   EKG 12-lead    Collection Time: 06/17/25  1:47 PM   Result Value Ref Range    QRS Duration 76 ms    [facility] QTC Calculation 500 ms    Narrative    Test Reason :    Vent. Rate : 102 BPM     Atrial Rate : 102 BPM     P-R Int : 158 ms          QRS Dur :  76 ms      QT Int : 384 ms       P-R-T Axes :  84 137  36 degrees    QTcB Int : 500 ms    Sinus tachycardia  Biatrial enlargement  Right axis deviation  ST and T wave abnormality, consider anterolateral ischemia  Abnormal ECG  When compared with ECG of 31-May-2025 02:56,  T wave inversion less evident in Inferior leads  QT has lengthened  Confirmed by Dougie Kolb ([id number]) on 6/17/2025 1:56:22 PM    Referred By:            Confirmed By: Dougie Kolb       ECHO:  See subjective, if available.      ASSESSMENT/PLAN:                                                                                                             Pre-op Assessment    I have reviewed the Patient Summary Reports.     I have reviewed the Nursing Notes. I have reviewed the NPO Status.      Review of Systems  Cardiovascular:                    Primary pulmonary hypertension                           Pulmonary:    Asthma    Sleep Apnea                Hepatic/GI:     GERD Liver Disease,               Musculoskeletal:         Spine Disorders: cervical            Neurological:      Headaches Seizures          Chronic Pain Syndrome                         Endocrine:   Hypothyroidism                 Anesthesia Plan  Type of Anesthesia, risks & benefits discussed:    Anesthesia Type: Gen Natural  Airway, Gen ETT, Gen Supraglottic Airway  Intra-op Monitoring Plan: Standard ASA Monitors  Induction:  IV  Airway Plan: Direct and Video  Informed Consent: Informed consent signed with the Patient and all parties understand the risks and agree with anesthesia plan.  All questions answered.   ASA Score: 4  Day of Surgery Review of History & Physical: H&P Update referred to the surgeon/provider.    Ready For Surgery From Anesthesia Perspective.     .             [1]   Patient Active Problem List  Diagnosis    Chronic pulmonary heart disease    Liver mass    Non-allergic vasomotor rhinitis    Allergic asthma    WHO group 1 pulmonary arterial hypertension    Tricuspid regurgitation    Hypothyroidism (acquired)    Migraine without aura and without status migrainosus, not intractable    Lumbar disc disease    Moderate asthma    Chronic low back pain    Overweight (BMI 25.0-29.9)    Menorrhagia    ABHIJEET (obstructive sleep apnea)    Medication overuse headache    Cholelithiasis    Borderline osteopenia    Hepatic adenoma    Left shoulder pain    Chronic tension-type headache, not intractable    Mild intermittent asthma    Pasteurella cellulitis due to cat bite    Cervicogenic headache    Episodic migraine    Chronic neck pain    Decreased range of motion of neck    Posture abnormality    Cholecystitis    Moderate protein-calorie malnutrition    SVT (supraventricular tachycardia)    Fungal rash of trunk    Choledocholithiasis    Pneumonia   [2]   No current facility-administered medications on file prior to encounter.     Current Outpatient Medications on File Prior to Encounter   Medication Sig Dispense Refill    acetaminophen (TYLENOL) 500 MG tablet Take 1,000 mg by mouth every 6 (six) hours as needed for Pain.      ADCIRCA 20 mg Tab Take 2 tablets (40 mg total) by mouth once daily. (Patient taking differently: Take 40 mg by mouth nightly.) 60 tablet 11    albuterol (VENTOLIN HFA) 90 mcg/actuation inhaler Inhale 2 puffs into  the lungs every 6 (six) hours as needed for Wheezing or Shortness of Breath. Rescue 18 g 6    albuterol-ipratropium (DUO-NEB) 2.5 mg-0.5 mg/3 mL nebulizer solution Take 3 mLs by nebulization every 6 (six) hours as needed for Wheezing. Rescue 6 each 6    alprazolam (XANAX ORAL) Take by mouth as needed.      ambrisentan (LETAIRIS) 10 MG Tab Take 1 tablet (10 mg total) by mouth once daily. 30 tablet 11    BREO ELLIPTA 200-25 mcg/dose DsDv diskus inhaler INHALE 1 PUFF INTO THE LUNGS EVERY EVENING. CONTROLLER 180 each 1    butalbital-acetaminophen-caffeine -40 mg (FIORICET, ESGIC) -40 mg per tablet Take 1 tablet by mouth every 4 (four) hours as needed.      cyanocobalamin, vitamin B-12, 2,500 mcg Subl Place 2,500 mcg under the tongue every morning.       diphenhydrAMINE (BENADRYL) 25 mg capsule Take 50 mg by mouth every 6 (six) hours as needed for Itching. Patient takes prn evenings      diphenoxylate-atropine 2.5-0.025 mg (LOMOTIL) 2.5-0.025 mg per tablet Take 1 tablet by mouth 4 (four) times daily as needed for Diarrhea. 120 tablet 0    ferrous sulfate 325 (65 FE) MG EC tablet Take 325 mg by mouth daily as needed.       fluticasone propionate (FLONASE) 50 mcg/actuation nasal spray 2 sprays (100 mcg total) by Each Nostril route every evening. (Patient taking differently: 2 sprays by Each Nostril route Daily.) 16 g 11    folic acid (FOLVITE) 1 MG tablet Take 1 tablet (1,000 mcg total) by mouth once daily. 90 tablet 3    furosemide (LASIX) 20 MG tablet Take 1 tablet (20 mg total) by mouth once daily. (Patient taking differently: Take 20 mg by mouth once daily. Usually takes PRN weekly for swelling.) 30 tablet 11    glucosam/chond-msm1/C/michele/bor (GLUCOSAMINE-CHOND-MSM COMPLEX ORAL) Take by mouth. Three times a week (Patient taking differently: Take by mouth. Two times a week)      hyoscyamine (LEVSIN) 0.125 mg Subl Place 1 tablet (0.125 mg total) under the tongue every 6 (six) hours as needed (Aa needed). 60  tablet 3    lactic acid-citric-potassium (PHEXXI) 1.8-1-0.4 % Gel Place 1 Applicatorful vaginally as needed (CONTRACEPTIVE). 5 g 2    levothyroxine (SYNTHROID) 125 MCG tablet Take 1 tablet (125 mcg total) by mouth before breakfast. 90 tablet 2    LIDOcaine (LIDODERM) 5 % Place 1 patch onto the skin once daily. (Patient taking differently: Place 1 patch onto the skin once daily. Takes PRN) 15 patch 0    loratadine (CLARITIN) 10 mg tablet Take 10 mg by mouth once daily. Takes in morning      nystatin (MYCOSTATIN) powder Apply topically 2 (two) times daily. for 14 days 30 g 1    pantoprazole (PROTONIX) 40 MG tablet Take 1 tablet (40 mg total) by mouth once daily. 90 tablet 3    rimegepant (NURTEC) 75 mg odt Take 1 tablet (75 mg total) by mouth as needed for Migraine. Place ODT tablet on the tongue; alternatively the ODT tablet may be placed under the tongue. Can take 2nd dose in 24 hrs if mild relief, no more than 2 in 24 hrs 16 tablet 5    tiZANidine (ZANAFLEX) 4 MG tablet Take 4 mg by mouth every 6 (six) hours as needed.      topiramate (TOPAMAX) 100 MG tablet Take 1 tablet (100 mg total) by mouth 2 (two) times daily. 60 tablet 5    treprostinil (REMODULIN) 2.5 mg/mL Soln Mix cassette as directed and infuse continuously per physician titration orders on dosing sheet. Current dose 80ng/kg/min 60 mL 11    triamcinolone (KENALOG) 0.5 % ointment Apply 1 application  topically 2 (two) times daily.

## 2025-06-18 NOTE — PLAN OF CARE
Pt tolerated procedure well under crna care, ruq dressing c/d/I, specimen collected and sent to lab, transfer to [facility], report given @ bedside.

## 2025-06-18 NOTE — ASSESSMENT & PLAN NOTE
Patient presents with significant and progressively worsening abdominal pain and left shoulder pain.  Patient has a kita tube in place and is status post kita tube exchange 5/19.  CT scans appear to be similar to previous CT scan on diagnosis of choledocholithiasis, however patient with purulent drainage from exit site of kita tube and some white purulence in her kita bag.  Patient also with nausea and vomiting, poor p.o. intake, worsening pain that has prompted her to come in.  -aerobic and anaerobic culture collected from percutaneous kita tube exit site  -blood cultures collected x2  - patient started on broad-spectrum antibiotics with vancomycin and Zosyn  - Appreciate GEN BRUNO consult.   - consult to Infectious Disease, appreciate recommendations  - consult to interventional Radiology and AES per surgery.

## 2025-06-18 NOTE — CONSULTS
[facility] Emergency Dept  General Surgery  Consult Note    Patient Name: Yuni Perez  MRN: [id number]  Code Status: Full Code  Admission Date: 6/17/2025  Hospital Length of Stay: 0 days  Attending Physician: Jerry Zuñiga, *  Primary Care Provider: Lulu Sandhu MD    Patient information was obtained from patient and past medical records.     Inpatient consult to General Surgery  Consult performed by: Terry Nair MD  Consult ordered by: Sylvie Barone MD        Subjective:     Principal Problem: <principal problem not specified>    History of Present Illness: Yuni Perez is a 55 y.o. female with PMHx of seizure disorder, ABHIJEET, Asthma, chronic pulmonary heart disease, SVT, hepatic adenoma, hypothyroidism, and severe pulmonary HTN (Remodulin) who presents to the emergency room with complaint of nausea, abdominal pain. She was recently admitted in March 2025, where a cholecystostomy tube was placed. She underwent a tube study on 5/19/25 with cholecystostomy tube exchange. She states that she had been doing well, largely, following the exchange. She reports that the stitch became dislodged and the tube has partially retracted out of her skin. Since that occurred, she reports pain and drainage around the tube. She also endorses some nausea and worsening abdominal pain. She continues to be able to flush the tube, she has some purulent discharge noted, which is new. Tolerating diet and having her baseline bowel function (IBS - alternates between constipation and diarrhea, no acute changes). She denies fever, chills, dizziness lightheadedness, emesis, hematochezia, dysuria, hematuria, CP, SOB, and all other symptoms. Patient reports being compliant with home medication regimen.      Per chart review, she was taken to the OR back in 2014 for planned lap cholecystectomy but the procedure was aborted secondary to severe pulmonary HTN and hemodynamic instability after  induction of anesthesia. Per operative report, she had significantly elevated PA pressures along with systemic hypotension requiring multiple pressors. The procedure was aborted and she was admitted to the ICU after.      Last ECHO 12/2024 with PA pressure 67mmHg    No current facility-administered medications on file prior to encounter.     Current Outpatient Medications on File Prior to Encounter   Medication Sig    acetaminophen (TYLENOL) 500 MG tablet Take 1,000 mg by mouth every 6 (six) hours as needed for Pain.    ADCIRCA 20 mg Tab Take 2 tablets (40 mg total) by mouth once daily. (Patient taking differently: Take 40 mg by mouth nightly.)    albuterol (VENTOLIN HFA) 90 mcg/actuation inhaler Inhale 2 puffs into the lungs every 6 (six) hours as needed for Wheezing or Shortness of Breath. Rescue    albuterol-ipratropium (DUO-NEB) 2.5 mg-0.5 mg/3 mL nebulizer solution Take 3 mLs by nebulization every 6 (six) hours as needed for Wheezing. Rescue    alprazolam (XANAX ORAL) Take by mouth as needed.    ambrisentan (LETAIRIS) 10 MG Tab Take 1 tablet (10 mg total) by mouth once daily.    BREO ELLIPTA 200-25 mcg/dose DsDv diskus inhaler INHALE 1 PUFF INTO THE LUNGS EVERY EVENING. CONTROLLER    butalbital-acetaminophen-caffeine -40 mg (FIORICET, ESGIC) -40 mg per tablet Take 1 tablet by mouth every 4 (four) hours as needed.    cyanocobalamin, vitamin B-12, 2,500 mcg Subl Place 2,500 mcg under the tongue every morning.     diphenhydrAMINE (BENADRYL) 25 mg capsule Take 50 mg by mouth every 6 (six) hours as needed for Itching. Patient takes prn evenings    diphenoxylate-atropine 2.5-0.025 mg (LOMOTIL) 2.5-0.025 mg per tablet Take 1 tablet by mouth 4 (four) times daily as needed for Diarrhea.    ferrous sulfate 325 (65 FE) MG EC tablet Take 325 mg by mouth daily as needed.     fluticasone propionate (FLONASE) 50 mcg/actuation nasal spray 2 sprays (100 mcg total) by Each Nostril route every evening. (Patient taking  differently: 2 sprays by Each Nostril route Daily.)    folic acid (FOLVITE) 1 MG tablet Take 1 tablet (1,000 mcg total) by mouth once daily.    furosemide (LASIX) 20 MG tablet Take 1 tablet (20 mg total) by mouth once daily. (Patient taking differently: Take 20 mg by mouth once daily. Usually takes PRN weekly for swelling.)    glucosam/chond-msm1/C/mcihele/bor (GLUCOSAMINE-CHOND-MSM COMPLEX ORAL) Take by mouth. Three times a week (Patient taking differently: Take by mouth. Two times a week)    hyoscyamine (LEVSIN) 0.125 mg Subl Place 1 tablet (0.125 mg total) under the tongue every 6 (six) hours as needed (Aa needed).    lactic acid-citric-potassium (PHEXXI) 1.8-1-0.4 % Gel Place 1 Applicatorful vaginally as needed (CONTRACEPTIVE).    levothyroxine (SYNTHROID) 125 MCG tablet Take 1 tablet (125 mcg total) by mouth before breakfast.    LIDOcaine (LIDODERM) 5 % Place 1 patch onto the skin once daily. (Patient taking differently: Place 1 patch onto the skin once daily. Takes PRN)    loratadine (CLARITIN) 10 mg tablet Take 10 mg by mouth once daily. Takes in morning    nystatin (MYCOSTATIN) powder Apply topically 2 (two) times daily. for 14 days    pantoprazole (PROTONIX) 40 MG tablet Take 1 tablet (40 mg total) by mouth once daily.    rimegepant (NURTEC) 75 mg odt Take 1 tablet (75 mg total) by mouth as needed for Migraine. Place ODT tablet on the tongue; alternatively the ODT tablet may be placed under the tongue. Can take 2nd dose in 24 hrs if mild relief, no more than 2 in 24 hrs    tiZANidine (ZANAFLEX) 4 MG tablet Take 4 mg by mouth every 6 (six) hours as needed.    topiramate (TOPAMAX) 100 MG tablet Take 1 tablet (100 mg total) by mouth 2 (two) times daily.    treprostinil (REMODULIN) 2.5 mg/mL Soln Mix cassette as directed and infuse continuously per physician titration orders on dosing sheet. Current dose 80ng/kg/min    triamcinolone (KENALOG) 0.5 % ointment Apply 1 application  topically 2 (two) times daily.  "      Review of patient's allergies indicates:   Allergen Reactions    Fentanyl Anaphylaxis     Respiratory distress    Vibra-tabs [doxycycline hyclate] Anaphylaxis     "throat felt like it was closing"    Adhesive Hives     Silk tape    Amoxicillin Rash    Nsaids (non-steroidal anti-inflammatory drug) Swelling    Chlorhexidine Other (See Comments)     Blue Chlorhexidine causes hives       Past Medical History:   Diagnosis Date    AR (allergic rhinitis)     Cholelithiasis, common bile duct     Chronic low back pain     Eye pressure 2017    General anesthetics causing adverse effect in therapeutic use     GERD (gastroesophageal reflux disease)     History of migraine headaches     Hypothyroidism     Innocent heart murmur     Lumbar disc disease     Menorrhagia     Mild asthma     Obesity     Plantar fasciitis of left foot     Primary pulmonary hypertension     followed by heart transplant/pulmonary     Seizure disorder     x 1 in 2008    Seizures     Sleep apnea     SVT (supraventricular tachycardia) 4/3/2025    TMJ (dislocation of temporomandibular joint)     Tricuspid regurgitation      Past Surgical History:   Procedure Laterality Date    CARDIAC CATHETERIZATION      CATHETERIZATION OF BOTH LEFT AND RIGHT HEART Bilateral 9/29/2020    Procedure: CATHETERIZATION, HEART, BOTH LEFT AND RIGHT;  Surgeon: Gucci Pickett MD;  Location: [facility] CATH LAB;  Service: Cardiology;  Laterality: Bilateral;    CENTRAL VENOUS CATHETER INSERTION      CORONARY ANGIOGRAPHY N/A 9/29/2020    Procedure: ANGIOGRAM, CORONARY ARTERY;  Surgeon: Gucci Pickett MD;  Location: [facility] CATH LAB;  Service: Cardiology;  Laterality: N/A;    gallbladder drain  06/2019    INSERTION OF HAYNES CATHETER Right 6/17/2020    Procedure: INSERTION, CATHETER, CENTRAL VENOUS, HAYNES Replace Single Lumen for Remodulin Infusion;  Surgeon: Bertin Dewitt MD;  Location: [facility] OR [address] [place] [address];  Service: General;  Laterality: Right;    PORTACATH PLACEMENT      " REMOVAL OF TUNNELED CENTRAL VENOUS CATHETER (CVC) N/A 6/17/2020    Procedure: REMOVAL, CATHETER, CENTRAL VENOUS, TUNNELED;  Surgeon: Bertin Dewitt MD;  Location: [facility] OR [address] [place] [address];  Service: General;  Laterality: N/A;    RIGHT HEART CATHETERIZATION Right 8/20/2018    Procedure: HEART CATH-RIGHT;  Surgeon: Ivonne Rai MD;  Location: [facility] CATH LAB;  Service: Cardiology;  Laterality: Right;    RIGHT HEART CATHETERIZATION Right 9/4/2019    Procedure: INSERTION, CATHETER, RIGHT HEART;  Surgeon: Ivonne Rai MD;  Location: [facility] CATH LAB;  Service: Cardiology;  Laterality: Right;    RIGHT HEART CATHETERIZATION Right 6/10/2022    Procedure: INSERTION, CATHETER, RIGHT HEART;  Surgeon: Keshia Poe MD;  Location: [facility] CATH LAB;  Service: Cardiology;  Laterality: Right;    UPPER GASTROINTESTINAL ENDOSCOPY       Family History       Problem Relation (Age of Onset)    Breast cancer Maternal Aunt, Cousin, Cousin    Cancer Maternal Aunt, Maternal Grandmother    Diabetes Mother, Maternal Grandfather, Paternal Grandfather    Glaucoma Father    Heart attack Maternal Grandfather, Paternal Grandfather    Heart disease Father, Paternal Grandfather    Hypertension Other    Leukemia Brother    No Known Problems Sister, Maternal Uncle, Paternal Aunt, Paternal Uncle, Paternal Grandmother          Tobacco Use    Smoking status: Never    Smokeless tobacco: Never   Substance and Sexual Activity    Alcohol use: No     Alcohol/week: 0.8 standard drinks of alcohol     Types: 1 Standard drinks or equivalent per week     Comment: socially    Drug use: No    Sexual activity: Not Currently     Birth control/protection: OCP, Pill     Comment: pt is a virgin     Review of Systems   Constitutional:  Negative for activity change, appetite change, fatigue and fever.   HENT: Negative.     Respiratory:  Negative for cough and shortness of breath.    Cardiovascular:  Negative for chest pain.   Gastrointestinal:  Positive for abdominal pain  and nausea. Negative for abdominal distention, constipation, diarrhea and vomiting.   Musculoskeletal: Negative.    Skin: Negative.      Objective:     Vital Signs (Most Recent):  Temp: 98 °F (36.7 °C) (06/17/25 1602)  Pulse: 104 (06/17/25 1800)  Resp: 20 (06/17/25 1602)  BP: 126/82 (06/17/25 1602)  SpO2: (!) 91 % (06/17/25 1800) Vital Signs (24h Range):  Temp:  [98 °F (36.7 °C)] 98 °F (36.7 °C)  Pulse:  [] 104  Resp:  [16-20] 20  SpO2:  [91 %-95 %] 91 %  BP: (123-143)/(79-82) 126/82     Weight: 63 kg (139 lb)  Body mass index is 28.07 kg/m².     Physical Exam  Vitals and nursing note reviewed.   Constitutional:       General: She is not in acute distress.     Appearance: Normal appearance.   HENT:      Nose: Nose normal.      Mouth/Throat:      Mouth: Mucous membranes are moist.   Cardiovascular:      Rate and Rhythm: Normal rate and regular rhythm.   Pulmonary:      Effort: Pulmonary effort is normal.   Abdominal:      Comments: Abdomen soft, non-distended  She has tenderness about the cholecystostomy tube in the right upper quadrant. There is a small amount of granulation tissue at the tube site. There is some drainage noted from around the catheter. There is seropurulent drainage within the drainage tube. The skin surrounding the catheter is excoriated.    Musculoskeletal:         General: Normal range of motion.   Skin:     General: Skin is dry.   Neurological:      General: No focal deficit present.      Mental Status: She is alert.            I have reviewed all pertinent lab results within the past 24 hours.  CBC:   Recent Labs   Lab 06/17/25  1421   WBC 6.32   RBC 5.03   HGB 14.6   HCT 43.8      MCV 87   MCH 29.0   MCHC 33.3     CMP:   Recent Labs   Lab 06/17/25  1421   GLU 89   CALCIUM 9.0   ALBUMIN 4.1   PROT 7.6      K 4.2   CO2 20*      BUN 8   CREATININE 0.6   ALKPHOS 87   ALT 12   AST 25   BILITOT 0.3       Significant Diagnostics:  I have reviewed all pertinent imaging  results/findings within the past 24 hours.    Assessment/Plan:     Choledocholithiasis  Coleen Perez is a 55yoF with known pulmonary hypertension who has a cholecystostomy tube in place. Over the past several days, she has had worsening drainage around the cholecystostomy tube with associated abdominal pain and nausea. General surgery consulted for evaluation.     - patient seen and examined   - labs and imaging reviewed   - there is choledocholithiasis that remains present on her CT scan, but there is no evidence of biliary obstruction from a biochemical standpoint  - I replaced the stitch to hold the tube in place  - unfortunately, she is not optimized and remains not a surgical candidate at this time   - would recommend IR consultation for tube study, given these symptoms occurred when the stitch pulled out of her skin   - she would also benefit from an AES consultation for clearance of her common bile duct, as this can precipitate symptoms   - a wound care consultation for better instructions to care for the excoriated skin around the cholecystostomy tube  - please call with questions regarding this patient's care      VTE Risk Mitigation (From admission, onward)           Ordered     IP VTE HIGH RISK PATIENT  Once         06/17/25 1710     Place sequential compression device  Until discontinued         06/17/25 1710                    Thank you for your consult. I will follow-up with patient. Please contact us if you have any additional questions.    Terry Nair MD  General Surgery  [facility]

## 2025-06-18 NOTE — PLAN OF CARE
Pt arrived to [id number] for cholecystomy, safety/comfort measure implemented, fall risk discuss, pt verbalized understanding, transfer to table, warm blankets provided prn, connected to monitor, anesthesia providing care during procedure.

## 2025-06-18 NOTE — PROGRESS NOTES
Pt arrived to [facility] rrom [id number]. Remodulin  changed to hospital mix and cassette. Remodulin @ 117 ng/kg/min DW 60.5 kg. RUQ Bili tube in place with yellow output. Pt NPO at mn for bili tube exchange, AES cx placed. Iv abx cont as ordered. PRN tramadol given for pain. AM labs ordered.

## 2025-06-18 NOTE — ANESTHESIA POSTPROCEDURE EVALUATION
Anesthesia Post Evaluation    Patient: Yuni Perez    Procedure(s) Performed: * No procedures listed *    Final Anesthesia Type: general      Patient location during evaluation: PACU  Patient participation: Yes- Able to Participate  Level of consciousness: awake  Post-procedure vital signs: reviewed and stable  Pain management: adequate  Airway patency: patent    PONV status at discharge: No PONV  Anesthetic complications: no      Cardiovascular status: blood pressure returned to baseline and stable  Respiratory status: spontaneous ventilation and nasal cannula  Hydration status: euvolemic  Follow-up not needed.              Vitals Value Taken Time   BP 90/57 06/18/25 13:31   Temp 36.4 °C (97.6 °F) 06/18/25 13:15   Pulse 84 06/18/25 13:37   Resp 15 06/18/25 13:37   SpO2 92 % 06/18/25 13:37   Vitals shown include unfiled device data.      Event Time   Out of Recovery 06/18/2025 13:48:00         Pain/Ronald Score: Pain Rating Prior to Med Admin: 7 (6/18/2025  5:28 AM)  Pain Rating Post Med Admin: 3 (6/18/2025  6:28 AM)  Ronald Score: 9 (6/18/2025  1:30 PM)

## 2025-06-18 NOTE — ASSESSMENT & PLAN NOTE
-Continue home Remodulin, [id number] qdaily, and tadalafil 40mg qdaily.  Patient requiring home oxygen requirements.  -holding patient's Lasix dosing (takes 20 mg of Lasix p.o. daily) given patient has had extremely poor p.o. intake in the last few days since Friday and has not been able to keep down any significant amounts of solids or liquids, appears dry on exam

## 2025-06-18 NOTE — NURSING TRANSFER
Nursing Transfer Note      6/18/2025   1:40 PM    Reason patient is being transferred: post procedure    Transfer To: [id number]    Transfer via stretcher    Transfer with 2L NC to O2, cardiac monitoring, infusion    Transported by pct    Any special needs or follow-up needed: routine    Patient belongings transferred with patient: No    Chart send with patient: Yes    Notified: no one on text    Patient reassessed at: 1313 6/18/2025

## 2025-06-18 NOTE — PLAN OF CARE
DISCHARGE PLANNING:  Sent the following message to bedside RN and TSU MONET  Hi can one of you ask her if she brought her home mixing kit with Remodulin supplies that she will need for discharge? She is pre-filled cassette if she has those and they  prior to dc she will have to mix prior to leaving---either way she needs a non- pre-filled cassette OR mixing kit with Remodulin vial /supplies----Trying to think ahead, not sure how long her stay will be   Will f/u for dc planning

## 2025-06-18 NOTE — SUBJECTIVE & OBJECTIVE
"Interval History: No big complaints this am. Pain improved a bit.     Continuous Infusions:   veletri/remodulin cassette   Intravenous Continuous        veletri/remodulin tubing   Intravenous Continuous         Scheduled Meds:   tadalafil  40 mg Oral Daily    aluminum-magnesium hydroxide-simethicone  30 mL Oral QID (AC & HS)    fluticasone furoate-vilanteroL  1 puff Inhalation Daily    ambrisentan  10 mg Oral Daily    levothyroxine  125 mcg Oral Before breakfast    pantoprazole  40 mg Oral Daily    polyethylene glycol  17 g Oral Daily    senna-docusate  1 tablet Oral BID    sucralfate  1 g Oral Q6H    topiramate  100 mg Oral BID    treprostinil sodium (REMODULIN) 12,000,000 ng in 0.9% NaCl 100 mL infusion  117 ng/kg/min (Order-Specific) Intravenous Q24H     PRN Meds:  Current Facility-Administered Medications:     acetaminophen, 650 mg, Oral, Q4H PRN    albuterol, 2 puff, Inhalation, Q6H PRN    albuterol-ipratropium, 3 mL, Nebulization, Q6H PRN    ALPRAZolam, 0.25 mg, Oral, BID PRN    butalbital-acetaminophen-caffeine -40 mg, 1 tablet, Oral, Q4H PRN    melatonin, 6 mg, Oral, Nightly PRN    ondansetron, 4 mg, Intravenous, Q8H PRN    sodium chloride 0.9%, 10 mL, Intravenous, PRN    traMADoL, 50 mg, Oral, Q6H PRN    Review of patient's allergies indicates:   Allergen Reactions    Fentanyl Anaphylaxis     Respiratory distress    Vibra-tabs [doxycycline hyclate] Anaphylaxis     "throat felt like it was closing"    Adhesive Hives     Silk tape    Amoxicillin Rash    Nsaids (non-steroidal anti-inflammatory drug) Swelling    Chlorhexidine Other (See Comments)     Blue Chlorhexidine causes hives     Objective:     Vital Signs (Most Recent):  Temp: 97.8 °F (36.6 °C) (06/18/25 0757)  Pulse: 78 (06/18/25 0825)  Resp: 18 (06/18/25 0825)  BP: 90/63 (06/18/25 0757)  SpO2: 95 % (06/18/25 0825) Vital Signs (24h Range):  Temp:  [97.8 °F (36.6 °C)-98.4 °F (36.9 °C)] 97.8 °F (36.6 °C)  Pulse:  [] 78  Resp:  [16-20] " "18  SpO2:  [90 %-95 %] 95 %  BP: ()/(55-82) 90/63     Patient Vitals for the past 72 hrs (Last 3 readings):   Weight   06/18/25 0431 63.7 kg (140 lb 6.9 oz)   06/17/25 2205 63.7 kg (140 lb 6.9 oz)   06/17/25 1323 63 kg (139 lb)       Body mass index is 28.36 kg/m².      Intake/Output Summary (Last 24 hours) at 6/18/2025 1049  Last data filed at 6/18/2025 0406  Gross per 24 hour   Intake 0 ml   Output 60 ml   Net -60 ml        Physical Exam  Vitals and nursing note reviewed.   Constitutional:       Appearance: She is well-developed.   HENT:      Head: Normocephalic.   Eyes:      Pupils: Pupils are equal, round, and reactive to light.   Cardiovascular:      Rate and Rhythm: Normal rate and regular rhythm.   Pulmonary:      Effort: Pulmonary effort is normal.      Breath sounds: Normal breath sounds.   Abdominal:      General: Bowel sounds are normal.      Palpations: Abdomen is soft.      Tenderness: There is abdominal tenderness.      Comments: Sue tube in place    Musculoskeletal:         General: Normal range of motion.      Cervical back: Normal range of motion and neck supple.   Skin:     General: Skin is warm and dry.   Neurological:      Mental Status: She is alert and oriented to person, place, and time.   Psychiatric:         Behavior: Behavior normal.            Significant Labs:  CBC:  Recent Labs   Lab 06/17/25  1421 06/18/25  0508   WBC 6.32 5.76   RBC 5.03 4.34   HGB 14.6 12.7   HCT 43.8 38.5    181   MCV 87 89   MCH 29.0 29.3   MCHC 33.3 33.0     BNP:  No results for input(s): "BNP" in the last 168 hours.    Invalid input(s): "BNPTRIAGELBLO"  CMP:  Recent Labs   Lab 06/17/25  1421 06/18/25  0509   GLU 89 93   CALCIUM 9.0 8.5*   ALBUMIN 4.1 3.4*   PROT 7.6 6.3    140   K 4.2 3.6   CO2 20* 21*    111*   BUN 8 8   CREATININE 0.6 0.8   ALKPHOS 87 75   ALT 12 7*   AST 25 14   BILITOT 0.3 0.3      Coagulation:   No results for input(s): "PT", "INR", "APTT" in the last 168 " "hours.    LDH:  No results for input(s): "LDH" in the last 72 hours.  Microbiology:  Microbiology Results (last 7 days)       Procedure Component Value Units Date/Time    Blood culture x two cultures. Draw prior to antibiotics. [[id number]]  (Normal) Collected: 06/17/25 1421    Order Status: Completed Specimen: Blood from Peripheral, Forearm, Left Updated: 06/17/25 2301     Blood Culture No Growth After 6 Hours    Blood culture x two cultures. Draw prior to antibiotics. [[id number]]  (Normal) Collected: 06/17/25 1422    Order Status: Completed Specimen: Blood from Peripheral, Forearm, Right Updated: 06/17/25 2301     Blood Culture No Growth After 6 Hours    Culture, Respiratory with Gram Stain [[id number]]     Order Status: Sent Specimen: Respiratory     Culture, Anaerobe [[id number]]     Order Status: Sent Specimen: Skin     Aerobic culture (Specify Source) **CANNOT BE ORDERED AS STAT* [[id number]] Collected: 06/17/25 1606    Order Status: Sent Specimen: Wound from Abdomen Updated: 06/17/25 0273          I have reviewed all pertinent labs within the past 24 hours.    Estimated Creatinine Clearance: 69.5 mL/min (based on SCr of 0.8 mg/dL).    Diagnostic Results:  I have reviewed all pertinent imaging results/findings within the past 24 hours.  "

## 2025-06-18 NOTE — ASSESSMENT & PLAN NOTE
Coleen Perez is a 55yoF with known pulmonary hypertension who has a cholecystostomy tube in place. Over the past several days, she has had worsening drainage around the cholecystostomy tube with associated abdominal pain and nausea. General surgery consulted for evaluation.     - patient seen and examined   - labs and imaging reviewed   - there is choledocholithiasis that remains present on her CT scan, but there is no evidence of biliary obstruction from a biochemical standpoint  - I replaced the stitch to hold the tube in place  - unfortunately, she is not optimized and remains not a surgical candidate at this time   - would recommend IR consultation for tube study, given these symptoms occurred when the stitch pulled out of her skin   - she would also benefit from an AES consultation for clearance of her common bile duct, as this can precipitate symptoms   - a wound care consultation for better instructions to care for the excoriated skin around the cholecystostomy tube  - please call with questions regarding this patient's care

## 2025-06-18 NOTE — PROCEDURES
Radiology Post-Procedure Note    Pre Op Diagnosis: Cholecystitis  Post Op Diagnosis: Same    Procedure: Cholecystostomy tube exchange    Procedure performed by: Karan Daniels MD    Written Informed Consent Obtained: Yes  Specimen Removed: YES 5mL purulent fluid  Estimated Blood Loss: Minimal    Findings:   Cholecystostomy tube exchange performed and re-positioned in the gallbladder lumen.  5mL purulent bilious fluid removed and sent for culture.  No complications.    Patient tolerated procedure well.    Karan Daniels MD  Interventional Radiologist  Department of Radiology

## 2025-06-18 NOTE — PROGRESS NOTES
Admit Note      Met with patient to assess needs. Patient is a 55 y.o. single female admitted for Screening for cardiovascular condition, HF, R upper quadrant adb pain, and WHO group 1 PAH.        Patient admitted on 6/17/2025. At this time, patient presents as alert and oriented x 4, communicative, and cooperative. At this time, patients caregiver/elderly father presents as alert and oriented x 4.    Household/Family Systems     Patient resides with her father Deuce. Support system includes pt's father. Patient does not have dependents that are need of being cared for.    Pt's home address: [address] [facility] [address] [facility]                                  [place] [address] (30 mins-1 hr from [facility])    Pt's phone number: [phone]     Patients primary caregiver is self with support of her father as indicated. Pt's father is retired but teaches music during his pastime. Confirmed patients emergency contact information is as follow:    Deuce Perez, father, [phone] (92 yo, drives, and ambulates with SPC)    Pt does not want to list any additional emergency contacts at this time. Pt reports she will ask her cousin if she can list her as an emergency contact.     During admission, patient's caregiver plans to stay at home. Confirmed patient and patients caregivers do have access to reliable transportation.    Cognitive Status/Learning     Patient reports reading ability as college and states she does not have difficulty with reading, writing, seeing, hearing, comprehension, learning, and memory. Pt wears eyeglasses. Patient reports she learns best by visual and hands-on.  Needed: No.   Highest education level: Associate/Bachelor Degree    Vocation/Disability     Working for Income: No  If no, reason not working: Demands of Treatment  Patient reports she was denied disability due to high household income. Pt also reports that she is ineligible for SSD. 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    Adherence     Patient reports a high level of adherence to her health care regimen. Pt reports she attends all MD appts, takes medications as prescribes, and adheres to a Mediterranean and low sodium diet. Adherence counseling and education provided. Patient verbalizes understanding.    Substance Use    Patient reports the following substance usage.    Tobacco: none, patient denies any use.  Alcohol: Pt reports she drinks a small glass of ruben during the holidays but otherwise does not drink.  Illicit Drugs/Non-prescribed Medications: none, patient denies any use.  Patient states clear understanding of the potential impact of substance use.  Substance abstinence/cessation counseling, education and resources provided and reviewed.     Services Utilizing/ADLS    Infusion Service: Prior to admission, patient utilizing? yes  IV Remodulin with [facility] ([phone])  Home Health: Prior to admission, patient utilizing? yes [facility] ([phone])  DME: Prior to admission, yes Pt has a SPC, TTB, and home O2 (portable and concentrator) at home. Pt reports she also has a RW, Rollator, and WC that belonged to her mother.   Pulmonary/Cardiac Rehab: Prior to admission, no  Went to [facility] in the past   Dialysis:  Prior to admission, no  Transplant Specialty Pharmacy:  Prior to admission, no.    Pt is amenable to using above providers post-dc.     Prior to admission, patient reports she was independent with ADLS and was driving. Pt ambulates without assist and reports no falls pta.  Patient reports she is able to care for self at this time. Patient indicates a willingness to care for self once medically cleared to do so.    Insurance/Medications    Insured by   Payer/Plan Subscr  Sex Relation Sub. Ins. ID Effective Group Num   1. BLUE CROSS BL* VERA TURNER* 1970 Female Self [id number] 10/15/10 [id number]                                   [address] [address]   2. EYEMED VISION* VERA TURNER*  1970 Female Self [id number]                                    [address], [place] [id number]      Primary Insurance (for UNOS reporting): Private Insurance  Secondary Insurance (for UNOS reporting): None    Patient reports she is able to obtain and afford medications at this time and at time of discharge.    Living Will/Healthcare Power of     Patient has a LW and HCPA. LW and HCPA on file in Epic. Pt's mother, Britta, is listed as primary HCPA. Pt's mother is . Discussed updating HCPA. Pt reports she has to have a discussion with her cousin to determine if she would be amenable to being her HCPA.  provided education regarding LW and HCPA and the completion of forms.    Coping/Mental Health    Patient is coping adequately with the aid of  family members and engaging in hobbies: needlepoint, reading and watching TV Patient denies mental health difficulties. Pt reports she experienced grief after the death of her mother. Pt reports it has been a year since her mother's passing and her grief has improved. Empathetic listening and emotional support provided. Discussed Grieving and Loss Counseling with pt. Pt denied above services stating she does not want to travel with portable O2. Pt reports that her CM at [facility] will assist with resources. Pt denies SI and HI.     Discharge Planning    At time of discharge, patient plans to return to her home under the care of self with support from her father. Patients father will transport patient. Per rounds today, expected discharge date has not been medically determined at this time. Patient and patients caregiver verbalize understanding and are involved in treatment planning and discharge process.    Additional Concerns    Patient's caretaker denies additional needs and/or concerns at this time. Patient is being followed for needs, education, resources, information, emotional support, supportive counseling, and for  supportive and skilled discharge plan of care.  providing ongoing psychosocial support, education, resources and d/c planning as needed.  SW remains available. Patient's caregiver verbalizes understanding and agreement with information reviewed,  availability and how to access available resources as needed. Patient denies additional needs and/or concerns at this time. Patient verbalizes understanding and agreement with information reviewed, social work availability, and how to access available resources as needed.

## 2025-06-18 NOTE — PROGRESS NOTES
"[facility]  Heart Transplant  Progress Note    Patient Name: Yuni Perez  MRN: [id number]  Admission Date: 6/17/2025  Hospital Length of Stay: 1 days  Attending Physician: Jerry Zuñiga, *  Primary Care Provider: Lulu Sandhu MD  Principal Problem:Choledocholithiasis    Subjective:   Interval History: No big complaints this am. Pain improved a bit.     Continuous Infusions:   veletri/remodulin cassette   Intravenous Continuous        veletri/remodulin tubing   Intravenous Continuous         Scheduled Meds:   tadalafil  40 mg Oral Daily    aluminum-magnesium hydroxide-simethicone  30 mL Oral QID (AC & HS)    fluticasone furoate-vilanteroL  1 puff Inhalation Daily    ambrisentan  10 mg Oral Daily    levothyroxine  125 mcg Oral Before breakfast    pantoprazole  40 mg Oral Daily    polyethylene glycol  17 g Oral Daily    senna-docusate  1 tablet Oral BID    sucralfate  1 g Oral Q6H    topiramate  100 mg Oral BID    treprostinil sodium (REMODULIN) 12,000,000 ng in 0.9% NaCl 100 mL infusion  117 ng/kg/min (Order-Specific) Intravenous Q24H     PRN Meds:  Current Facility-Administered Medications:     acetaminophen, 650 mg, Oral, Q4H PRN    albuterol, 2 puff, Inhalation, Q6H PRN    albuterol-ipratropium, 3 mL, Nebulization, Q6H PRN    ALPRAZolam, 0.25 mg, Oral, BID PRN    butalbital-acetaminophen-caffeine -40 mg, 1 tablet, Oral, Q4H PRN    melatonin, 6 mg, Oral, Nightly PRN    ondansetron, 4 mg, Intravenous, Q8H PRN    sodium chloride 0.9%, 10 mL, Intravenous, PRN    traMADoL, 50 mg, Oral, Q6H PRN    Review of patient's allergies indicates:   Allergen Reactions    Fentanyl Anaphylaxis     Respiratory distress    Vibra-tabs [doxycycline hyclate] Anaphylaxis     "throat felt like it was closing"    Adhesive Hives     Silk tape    Amoxicillin Rash    Nsaids (non-steroidal anti-inflammatory drug) Swelling    Chlorhexidine Other (See Comments)     Blue Chlorhexidine causes " "hives     Objective:     Vital Signs (Most Recent):  Temp: 97.8 °F (36.6 °C) (06/18/25 0757)  Pulse: 78 (06/18/25 0825)  Resp: 18 (06/18/25 0825)  BP: 90/63 (06/18/25 0757)  SpO2: 95 % (06/18/25 0825) Vital Signs (24h Range):  Temp:  [97.8 °F (36.6 °C)-98.4 °F (36.9 °C)] 97.8 °F (36.6 °C)  Pulse:  [] 78  Resp:  [16-20] 18  SpO2:  [90 %-95 %] 95 %  BP: ()/(55-82) 90/63     Patient Vitals for the past 72 hrs (Last 3 readings):   Weight   06/18/25 0431 63.7 kg (140 lb 6.9 oz)   06/17/25 2205 63.7 kg (140 lb 6.9 oz)   06/17/25 1323 63 kg (139 lb)       Body mass index is 28.36 kg/m².      Intake/Output Summary (Last 24 hours) at 6/18/2025 1049  Last data filed at 6/18/2025 0406  Gross per 24 hour   Intake 0 ml   Output 60 ml   Net -60 ml        Physical Exam  Vitals and nursing note reviewed.   Constitutional:       Appearance: She is well-developed.   HENT:      Head: Normocephalic.   Eyes:      Pupils: Pupils are equal, round, and reactive to light.   Cardiovascular:      Rate and Rhythm: Normal rate and regular rhythm.   Pulmonary:      Effort: Pulmonary effort is normal.      Breath sounds: Normal breath sounds.   Abdominal:      General: Bowel sounds are normal.      Palpations: Abdomen is soft.      Tenderness: There is abdominal tenderness.      Comments: Sue tube in place    Musculoskeletal:         General: Normal range of motion.      Cervical back: Normal range of motion and neck supple.   Skin:     General: Skin is warm and dry.   Neurological:      Mental Status: She is alert and oriented to person, place, and time.   Psychiatric:         Behavior: Behavior normal.            Significant Labs:  CBC:  Recent Labs   Lab 06/17/25  1421 06/18/25  0508   WBC 6.32 5.76   RBC 5.03 4.34   HGB 14.6 12.7   HCT 43.8 38.5    181   MCV 87 89   MCH 29.0 29.3   MCHC 33.3 33.0     BNP:  No results for input(s): "BNP" in the last 168 hours.    Invalid input(s): "BNPTRIAGELBLO"  CMP:  Recent Labs   Lab " "06/17/25  1421 06/18/25  0509   GLU 89 93   CALCIUM 9.0 8.5*   ALBUMIN 4.1 3.4*   PROT 7.6 6.3    140   K 4.2 3.6   CO2 20* 21*    111*   BUN 8 8   CREATININE 0.6 0.8   ALKPHOS 87 75   ALT 12 7*   AST 25 14   BILITOT 0.3 0.3      Coagulation:   No results for input(s): "PT", "INR", "APTT" in the last 168 hours.    LDH:  No results for input(s): "LDH" in the last 72 hours.  Microbiology:  Microbiology Results (last 7 days)       Procedure Component Value Units Date/Time    Blood culture x two cultures. Draw prior to antibiotics. [[id number]]  (Normal) Collected: 06/17/25 1421    Order Status: Completed Specimen: Blood from Peripheral, Forearm, Left Updated: 06/17/25 2301     Blood Culture No Growth After 6 Hours    Blood culture x two cultures. Draw prior to antibiotics. [[id number]]  (Normal) Collected: 06/17/25 1422    Order Status: Completed Specimen: Blood from Peripheral, Forearm, Right Updated: 06/17/25 2301     Blood Culture No Growth After 6 Hours    Culture, Respiratory with Gram Stain [[id number]]     Order Status: Sent Specimen: Respiratory     Culture, Anaerobe [[id number]]     Order Status: Sent Specimen: Skin     Aerobic culture (Specify Source) **CANNOT BE ORDERED AS STAT* [[id number]] Collected: 06/17/25 1606    Order Status: Sent Specimen: Wound from Abdomen Updated: 06/17/25 8051          I have reviewed all pertinent labs within the past 24 hours.    Estimated Creatinine Clearance: 69.5 mL/min (based on SCr of 0.8 mg/dL).    Diagnostic Results:  I have reviewed all pertinent imaging results/findings within the past 24 hours.  Assessment and Plan:       55 y.o. W female diagnosed with IPAH in 2009 and started on Remodulin, Letairis, and Adcirca. She has a history of ABHIJEET (untreated - not able to tolerate CPAP 2/2 severe congestion), GERD, and Asthma.      Pt presents today due to nausea, vomiting and abdominal pain.  Her symptoms have been ongoing since Thursday or Friday 6/12 to 6/13.  " She also reports increased drainage from her abdominal wall site that is greenish white, a mix of blood and white drainage in her kita tube bag, diaphoresis intermittently concerning for possible fever (the patient did not take her temperature) intermittently in the last few days, intermittent stabbing pain that progresses with movement but is present continuously in her right upper quadrant and right upper back, shoulder pain in her left shoulder concerning for referred pain from her choledocholithiasis.  She has was recently discharged at the beginning of April for similar symptoms after which she a kita tube was placed for drainage of her gallbladder for cholecysitits. She was deemed not a surgical candidate due to high risk. She also underwent kita tube exchange on 5/19 with IR. On admission, Pt was hemodynamically stable and labs were largely unremarkable with no leukocytosis and no electrolyte abnormalities.  Patient has on her home oxygen requirements.  CT imaging as below.     CT 6/27/25:   1. Since the previous CT, cholecystostomy tube has been placed. There is mild wall thickening of the gallbladder with mild pericholecystic fluid. There is a focus of induration within the adjacent rectus musculature, through which the tube courses suggesting inflammation/hematoma secondary to tube placement. There is some indistinctness about the region, however no focal organized collection to suggest abscess. Continued follow-up is advised.  2. Choledocholithiasis, similar to the previous exam.  3. Findings suggest hepatic steatosis, correlation with LFTs recommended noting hepatomegaly.  4. Heterogeneous enhancement involving the right hepatic lobe inferiorly. This may reflect sequela of contrast phase. Underlying hemangioma is a consideration. Consider nonemergent outpatient MRI for characterization as warranted.  5. Please see above for several additional findings.      She was recently admitted for abdominal  pain/nausea/vomiting and found to have acute cholecystitis on US, CT and HIDA. She was seen by general surgery and  felt to be too high risk for cholecystectomy, therefore kita tube placed 03/31/25. Post kita tube she did well other than an episode of SVT with HR in the 200s, which she converted out of wih 12 mg adenosine and was in sinus since then. EP considred multaq however no additional agents added at the time. She was discharged with home O2 and her kita tube. Since discharge, seen by GS 05/01/25 and felt to remain high risk for surgery. They were consulting IR for drain study/interrogation. 05/19 had kita tube check and exchange.    For her pulmonary htn, current therapy is Remodulin infusing at 117ng/kg/min (pre-filled cassettes), Letairis 10mg qdaily, and Adcirca 40mg qdaily., Has T/F Adempas. Patient evaluated by lung transplant in 2020. Had everything set up for LUT, but says her caregivers were deemed not acceptable and is not a transplant candidate at [facility].     Last TTE:   TTE 12/16/25     ·  Left Ventricle: The left ventricle is normal in size. Normal wall thickness. Normal wall motion. Septal flattening in systole consistent with right ventricular pressure overload. There is normal systolic function with a visually estimated ejection fraction of 60 - 65%. There is normal diastolic function.  ·  Right Ventricle: Moderate right ventricular enlargement with hypertrophy. Systolic function is mildly to moderately reduced.  ·  The right atrium is mildly dilated.  ·  Tricuspid Valve: There is mild regurgitation.  ·  Pulmonary Artery: There is moderate to severe pulmonary hypertension. The estimated pulmonary artery systolic pressure is 67 mmHg.  ·  IVC/SVC: Normal venous pressure at 3 mmHg.      * Choledocholithiasis  Patient presents with significant and progressively worsening abdominal pain and left shoulder pain.  Patient has a kita tube in place and is status post kita tube exchange 5/19.  CT  scans appear to be similar to previous CT scan on diagnosis of choledocholithiasis, however patient with purulent drainage from exit site of kita tube and some white purulence in her kita bag.  Patient also with nausea and vomiting, poor p.o. intake, worsening pain that has prompted her to come in.  -aerobic and anaerobic culture collected from percutaneous kita tube exit site  -blood cultures collected x2  - patient started on broad-spectrum antibiotics with vancomycin and Zosyn  - Appreciate GEN BRUNO consult.   - consult to Infectious Disease, appreciate recommendations  - consult to interventional Radiology and AES per surgery.     WHO group 1 pulmonary arterial hypertension  -Continue home Remodulin, [id number] qdaily, and tadalafil 40mg qdaily.  Patient requiring home oxygen requirements.  -holding patient's Lasix dosing (takes 20 mg of Lasix p.o. daily) given patient has had extremely poor p.o. intake in the last few days since Friday and has not been able to keep down any significant amounts of solids or liquids, appears dry on exam    Pneumonia  Patient with suspected pneumonia with infiltrate on chest x-ray.  Patient already on broad-spectrum antibiotics for choledocholithiasis.  Concern possibility of aspiration given patient's vomiting at home and difficulty keeping down food even abdominal pain.  Otherwise no significant increase in oxygen requirements.   -sputum culture ordered  -infectious disease consult, appreciate recommendations  -continue broad-spectrum antibiotics per of choledocholithiasis problem.    SVT (supraventricular tachycardia)  Hx of SVT post kita tube placement that converted with adenosine. EP did not add any agents currently but will plan to add Multaq if she has future issues       Naveen Hurtado, NP  Heart Transplant  [facility] Trans[facility]

## 2025-06-18 NOTE — PROGRESS NOTES
Pt. aao x 3 returned from IR via stretcher; pt. Had a biliary drain exchange; tolerated well; drainage bag noted to RLQ no drainage noted at this time; denies pain or discomfort; vss at this time; care continued.

## 2025-06-18 NOTE — PROGRESS NOTES
Pharmacy contacted [facility] and obtained the following information. Please refer to the following information when dosing patient Remodulin.

## 2025-06-18 NOTE — PLAN OF CARE
Pt. aao x 3  Pt. Skin cdi  Pt. Hasa right sub pac with remodulin@117ng/kg/min  Pt. Has a rlq biliary bag with occlusive drsg cdi  Pt. Had a biliary drain exchange today 6/18/25; tolerated well.  Pt. Requires minimal staff assistance with adl's  Pt. Safety maintained.  Pt. Has new order for rocephin ivp daily  Pt. Urine sent for u/a c&s  Pt. Sitting up in bed watching tv with call light in reach.

## 2025-06-18 NOTE — TREATMENT PLAN
Patient admitted with ABD pain---Gen surgery consulted for bili tube exchange.  Patient is on continuous IV Remodulin infusion   See Dosing Sheet below    Dose = 114ngs/kg/min  DW = 60.5kgs    Patient is pre-filled cassette and will need Home mixing supplies in order to mix home cassette prior to dc once medically ready for discharge

## 2025-06-18 NOTE — TRANSFER OF CARE
"Anesthesia Transfer of Care Note    Patient: Yuni Perez    Procedure(s) Performed: * No procedures listed *    Patient location: PACU    Anesthesia Type: general    Transport from OR: Transported from OR on 2-3 L/min O2 by NC with adequate spontaneous ventilation    Post pain: adequate analgesia    Post assessment: no apparent anesthetic complications and tolerated procedure well    Post vital signs: stable    Level of consciousness: awake, oriented and alert    Nausea/Vomiting: no nausea/vomiting    Complications: none    Transfer of care protocol was followed      Last vitals: Visit Vitals  BP (!) 92/54 (BP Location: Right arm, Patient Position: Lying)   Pulse 82   Temp 36.4 °C (97.6 °F) (Tympanic)   Resp 17   Ht 4' 11" (1.499 m)   Wt 63 kg (139 lb)   SpO2 (!) 94%   Breastfeeding No   BMI 28.07 kg/m²     "

## 2025-06-19 LAB
ABSOLUTE EOSINOPHIL (OHS): 0.36 K/UL
ABSOLUTE MONOCYTE (OHS): 0.59 K/UL (ref 0.3–1)
ABSOLUTE NEUTROPHIL COUNT (OHS): 2.76 K/UL (ref 1.8–7.7)
ALBUMIN SERPL BCP-MCNC: 3.4 G/DL (ref 3.5–5.2)
ALP SERPL-CCNC: 73 UNIT/L (ref 40–150)
ALT SERPL W/O P-5'-P-CCNC: 9 UNIT/L (ref 10–44)
ANION GAP (OHS): 8 MMOL/L (ref 8–16)
AST SERPL-CCNC: 13 UNIT/L (ref 11–45)
BASOPHILS # BLD AUTO: 0.05 K/UL
BASOPHILS NFR BLD AUTO: 1 %
BILIRUB SERPL-MCNC: 0.2 MG/DL (ref 0.1–1)
BUN SERPL-MCNC: 9 MG/DL (ref 6–20)
CALCIUM SERPL-MCNC: 8.5 MG/DL (ref 8.7–10.5)
CHLORIDE SERPL-SCNC: 109 MMOL/L (ref 95–110)
CO2 SERPL-SCNC: 20 MMOL/L (ref 23–29)
CREAT SERPL-MCNC: 0.6 MG/DL (ref 0.5–1.4)
ERYTHROCYTE [DISTWIDTH] IN BLOOD BY AUTOMATED COUNT: 13.9 % (ref 11.5–14.5)
GFR SERPLBLD CREATININE-BSD FMLA CKD-EPI: >60 ML/MIN/1.73/M2
GLUCOSE SERPL-MCNC: 91 MG/DL (ref 70–110)
GRAM STN SPEC: NORMAL
GRAM STN SPEC: NORMAL
HCT VFR BLD AUTO: 38.8 % (ref 37–48.5)
HGB BLD-MCNC: 12.8 GM/DL (ref 12–16)
IMM GRANULOCYTES # BLD AUTO: 0.02 K/UL (ref 0–0.04)
IMM GRANULOCYTES NFR BLD AUTO: 0.4 % (ref 0–0.5)
LYMPHOCYTES # BLD AUTO: 1.01 K/UL (ref 1–4.8)
MAGNESIUM SERPL-MCNC: 1.8 MG/DL (ref 1.6–2.6)
MCH RBC QN AUTO: 29.2 PG (ref 27–31)
MCHC RBC AUTO-ENTMCNC: 33 G/DL (ref 32–36)
MCV RBC AUTO: 89 FL (ref 82–98)
NUCLEATED RBC (/100WBC) (OHS): 0 /100 WBC
PHOSPHATE SERPL-MCNC: 4.5 MG/DL (ref 2.7–4.5)
PLATELET # BLD AUTO: 177 K/UL (ref 150–450)
PMV BLD AUTO: 12.1 FL (ref 9.2–12.9)
POTASSIUM SERPL-SCNC: 3.2 MMOL/L (ref 3.5–5.1)
PROT SERPL-MCNC: 6.4 GM/DL (ref 6–8.4)
RBC # BLD AUTO: 4.38 M/UL (ref 4–5.4)
RELATIVE EOSINOPHIL (OHS): 7.5 %
RELATIVE LYMPHOCYTE (OHS): 21.1 % (ref 18–48)
RELATIVE MONOCYTE (OHS): 12.3 % (ref 4–15)
RELATIVE NEUTROPHIL (OHS): 57.7 % (ref 38–73)
SODIUM SERPL-SCNC: 137 MMOL/L (ref 136–145)
WBC # BLD AUTO: 4.79 K/UL (ref 3.9–12.7)

## 2025-06-19 PROCEDURE — 99233 SBSQ HOSP IP/OBS HIGH 50: CPT | Mod: ,,, | Performed by: INTERNAL MEDICINE

## 2025-06-19 PROCEDURE — 83735 ASSAY OF MAGNESIUM: CPT

## 2025-06-19 PROCEDURE — 80053 COMPREHEN METABOLIC PANEL: CPT

## 2025-06-19 PROCEDURE — 94640 AIRWAY INHALATION TREATMENT: CPT

## 2025-06-19 PROCEDURE — 25000003 PHARM REV CODE 250

## 2025-06-19 PROCEDURE — 25000003 PHARM REV CODE 250: Performed by: STUDENT IN AN ORGANIZED HEALTH CARE EDUCATION/TRAINING PROGRAM

## 2025-06-19 PROCEDURE — 63600175 PHARM REV CODE 636 W HCPCS: Mod: TB

## 2025-06-19 PROCEDURE — 27000221 HC OXYGEN, UP TO 24 HOURS

## 2025-06-19 PROCEDURE — 36415 COLL VENOUS BLD VENIPUNCTURE: CPT

## 2025-06-19 PROCEDURE — 25000242 PHARM REV CODE 250 ALT 637 W/ HCPCS

## 2025-06-19 PROCEDURE — 85025 COMPLETE CBC W/AUTO DIFF WBC: CPT

## 2025-06-19 PROCEDURE — 99900035 HC TECH TIME PER 15 MIN (STAT)

## 2025-06-19 PROCEDURE — 25000003 PHARM REV CODE 250: Performed by: NURSE PRACTITIONER

## 2025-06-19 PROCEDURE — 20600001 HC STEP DOWN PRIVATE ROOM

## 2025-06-19 PROCEDURE — 94761 N-INVAS EAR/PLS OXIMETRY MLT: CPT

## 2025-06-19 PROCEDURE — 63600175 PHARM REV CODE 636 W HCPCS: Performed by: INTERNAL MEDICINE

## 2025-06-19 PROCEDURE — 84100 ASSAY OF PHOSPHORUS: CPT

## 2025-06-19 RX ORDER — ALBUTEROL SULFATE 90 UG/1
2 INHALANT RESPIRATORY (INHALATION) EVERY 6 HOURS PRN
Status: DISCONTINUED | OUTPATIENT
Start: 2025-06-19 | End: 2025-06-20

## 2025-06-19 RX ORDER — GABAPENTIN 100 MG/1
200 CAPSULE ORAL ONCE
Status: COMPLETED | OUTPATIENT
Start: 2025-06-19 | End: 2025-06-19

## 2025-06-19 RX ORDER — POTASSIUM CHLORIDE 20 MEQ/1
40 TABLET, EXTENDED RELEASE ORAL ONCE
Status: COMPLETED | OUTPATIENT
Start: 2025-06-19 | End: 2025-06-19

## 2025-06-19 RX ADMIN — CEFTRIAXONE 2 G: 2 INJECTION, POWDER, FOR SOLUTION INTRAMUSCULAR; INTRAVENOUS at 02:06

## 2025-06-19 RX ADMIN — IPRATROPIUM BROMIDE AND ALBUTEROL SULFATE 3 ML: 2.5; .5 SOLUTION RESPIRATORY (INHALATION) at 10:06

## 2025-06-19 RX ADMIN — DIPHENHYDRAMINE HYDROCHLORIDE 25 MG: 25 CAPSULE ORAL at 10:06

## 2025-06-19 RX ADMIN — PANTOPRAZOLE SODIUM 40 MG: 40 TABLET, DELAYED RELEASE ORAL at 08:06

## 2025-06-19 RX ADMIN — DIPHENHYDRAMINE HYDROCHLORIDE 25 MG: 25 CAPSULE ORAL at 09:06

## 2025-06-19 RX ADMIN — TADALAFIL 40 MG: 20 TABLET ORAL at 08:06

## 2025-06-19 RX ADMIN — POTASSIUM CHLORIDE 40 MEQ: 1500 TABLET, EXTENDED RELEASE ORAL at 08:06

## 2025-06-19 RX ADMIN — LEVOTHYROXINE SODIUM 125 MCG: 0.12 TABLET ORAL at 05:06

## 2025-06-19 RX ADMIN — TOPIRAMATE 100 MG: 25 TABLET, FILM COATED ORAL at 08:06

## 2025-06-19 RX ADMIN — AMBRISENTAN 10 MG: 10 TABLET, FILM COATED ORAL at 08:06

## 2025-06-19 RX ADMIN — FLUTICASONE FUROATE AND VILANTEROL TRIFENATATE 1 PUFF: 200; 25 POWDER RESPIRATORY (INHALATION) at 08:06

## 2025-06-19 RX ADMIN — GABAPENTIN 200 MG: 100 CAPSULE ORAL at 12:06

## 2025-06-19 RX ADMIN — TREPROSTINIL 7078.5 NG/MIN: 200 INJECTION, SOLUTION INTRAVENOUS; SUBCUTANEOUS at 02:06

## 2025-06-19 NOTE — SUBJECTIVE & OBJECTIVE
"Interval History: No big complaints this am. Pain improved a bit. Tube changed out. Draiing less cloudy per pt.     Continuous Infusions:   veletri/remodulin cassette   Intravenous Continuous        veletri/remodulin tubing   Intravenous Continuous         Scheduled Meds:   tadalafil  40 mg Oral Daily    aluminum-magnesium hydroxide-simethicone  30 mL Oral QID (AC & HS)    cefTRIAXone (Rocephin) IV (PEDS and ADULTS)  2 g Intravenous Q24H    fluticasone furoate-vilanteroL  1 puff Inhalation Daily    gabapentin  200 mg Oral Once    ambrisentan  10 mg Oral Daily    levothyroxine  125 mcg Oral Before breakfast    pantoprazole  40 mg Oral Daily    polyethylene glycol  17 g Oral Daily    senna-docusate  1 tablet Oral BID    sucralfate  1 g Oral Q6H    topiramate  100 mg Oral BID    treprostinil sodium (REMODULIN) 12,000,000 ng in 0.9% NaCl 100 mL infusion  117 ng/kg/min (Order-Specific) Intravenous Q24H     PRN Meds:  Current Facility-Administered Medications:     acetaminophen, 650 mg, Oral, Q4H PRN    albuterol, 2 puff, Inhalation, Q6H PRN    albuterol, 2 puff, Inhalation, Q6H PRN    albuterol-ipratropium, 3 mL, Nebulization, Q6H PRN    ALPRAZolam, 0.25 mg, Oral, BID PRN    butalbital-acetaminophen-caffeine -40 mg, 1 tablet, Oral, Q4H PRN    dextrose 50%, 12.5 g, Intravenous, PRN    diphenhydrAMINE, 25 mg, Oral, Q6H PRN    fluticasone propionate, 2 spray, Each Nostril, Daily PRN    glucagon (human recombinant), 1 mg, Intramuscular, PRN    melatonin, 6 mg, Oral, Nightly PRN    morphine, 2 mg, Intravenous, Q5 Min PRN    ondansetron, 4 mg, Intravenous, Q8H PRN    sodium chloride 0.9%, 10 mL, Intravenous, PRN    traMADoL, 50 mg, Oral, Q6H PRN    Review of patient's allergies indicates:   Allergen Reactions    Fentanyl Anaphylaxis     Respiratory distress    Vibra-tabs [doxycycline hyclate] Anaphylaxis     "throat felt like it was closing"    Adhesive Hives     Silk tape    Amoxicillin Rash    Nsaids (non-steroidal " anti-inflammatory drug) Swelling    Chlorhexidine Other (See Comments)     Blue Chlorhexidine causes hives     Objective:     Vital Signs (Most Recent):  Temp: 97.5 °F (36.4 °C) (06/19/25 0839)  Pulse: 86 (06/19/25 1057)  Resp: 18 (06/19/25 1057)  BP: 114/70 (06/19/25 0839)  SpO2: 95 % (06/19/25 1057) Vital Signs (24h Range):  Temp:  [97.5 °F (36.4 °C)-98.3 °F (36.8 °C)] 97.5 °F (36.4 °C)  Pulse:  [71-95] 86  Resp:  [15-19] 18  SpO2:  [92 %-96 %] 95 %  BP: ()/(53-70) 114/70     Patient Vitals for the past 72 hrs (Last 3 readings):   Weight   06/19/25 0500 63 kg (138 lb 14.2 oz)   06/18/25 1129 63 kg (139 lb)   06/18/25 0431 63.7 kg (140 lb 6.9 oz)       Body mass index is 28.05 kg/m².      Intake/Output Summary (Last 24 hours) at 6/19/2025 1223  Last data filed at 6/19/2025 1017  Gross per 24 hour   Intake 1160 ml   Output 330 ml   Net 830 ml        Physical Exam  Vitals and nursing note reviewed.   Constitutional:       Appearance: She is well-developed.   HENT:      Head: Normocephalic.   Eyes:      Pupils: Pupils are equal, round, and reactive to light.   Cardiovascular:      Rate and Rhythm: Normal rate and regular rhythm.   Pulmonary:      Effort: Pulmonary effort is normal.      Breath sounds: Normal breath sounds.   Abdominal:      General: Bowel sounds are normal.      Palpations: Abdomen is soft.      Tenderness: There is abdominal tenderness.      Comments: Sue tube in place    Musculoskeletal:         General: Normal range of motion.      Cervical back: Normal range of motion and neck supple.   Skin:     General: Skin is warm and dry.   Neurological:      Mental Status: She is alert and oriented to person, place, and time.   Psychiatric:         Behavior: Behavior normal.            Significant Labs:  CBC:  Recent Labs   Lab 06/17/25  1421 06/18/25  0508 06/19/25  0534   WBC 6.32 5.76 4.79   RBC 5.03 4.34 4.38   HGB 14.6 12.7 12.8   HCT 43.8 38.5 38.8    181 177   MCV 87 89 89   MCH 29.0  "29.3 29.2   MCHC 33.3 33.0 33.0     BNP:  No results for input(s): "BNP" in the last 168 hours.    Invalid input(s): "BNPTRIAGELBLO"  CMP:  Recent Labs   Lab 06/17/25  1421 06/18/25  0509 06/19/25  0534   GLU 89 93 91   CALCIUM 9.0 8.5* 8.5*   ALBUMIN 4.1 3.4* 3.4*   PROT 7.6 6.3 6.4    140 137   K 4.2 3.6 3.2*   CO2 20* 21* 20*    111* 109   BUN 8 8 9   CREATININE 0.6 0.8 0.6   ALKPHOS 87 75 73   ALT 12 7* 9*   AST 25 14 13   BILITOT 0.3 0.3 0.2      Coagulation:   No results for input(s): "PT", "INR", "APTT" in the last 168 hours.    LDH:  No results for input(s): "LDH" in the last 72 hours.  Microbiology:  Microbiology Results (last 7 days)       Procedure Component Value Units Date/Time    Aerobic culture (Specify Source) **CANNOT BE ORDERED AS STAT* [[id number]]  (Abnormal) Collected: 06/17/25 1606    Order Status: Completed Specimen: Wound from Abdomen Updated: 06/19/25 1103     CULTURE, AEROBIC Moderate Gram-negative Rods     Comment: Identification and susceptibility pending       Gram stain [[id number]] Collected: 06/18/25 1257    Order Status: Completed Specimen: Tissue from Abdomen Updated: 06/19/25 1053     GRAM STAIN Moderate WBC seen      Few Gram Negative Rods    Aerobic culture [[id number]]  (Abnormal) Collected: 06/18/25 1255    Order Status: Completed Specimen: Body Fluid from Abdomen Updated: 06/19/25 0927     CULTURE, AEROBIC Few Gram-negative Rods     Comment: Identification and susceptibility pending       Culture, Anaerobic [[id number]] Collected: 06/18/25 1255    Order Status: Completed Specimen: Body Fluid from Abdomen Updated: 06/19/25 0834     Anaerobe Culture Culture In Progress    Blood culture x two cultures. Draw prior to antibiotics. [[id number]]  (Normal) Collected: 06/17/25 1421    Order Status: Completed Specimen: Blood from Peripheral, Forearm, Left Updated: 06/19/25 0501     Blood Culture No Growth After 36 Hours    Blood culture x two cultures. Draw prior to " antibiotics. [[id number]]  (Normal) Collected: 06/17/25 1422    Order Status: Completed Specimen: Blood from Peripheral, Forearm, Right Updated: 06/19/25 0501     Blood Culture No Growth After 36 Hours    Fungus culture [[id number]] Collected: 06/18/25 1255    Order Status: Sent Specimen: Body Fluid from Abdomen Updated: 06/18/25 1824    Culture, Respiratory with Gram Stain [[id number]]     Order Status: Sent Specimen: Respiratory     Culture, Anaerobe [[id number]]     Order Status: Sent Specimen: Skin           I have reviewed all pertinent labs within the past 24 hours.    Estimated Creatinine Clearance: 92.2 mL/min (based on SCr of 0.6 mg/dL).    Diagnostic Results:  I have reviewed all pertinent imaging results/findings within the past 24 hours.

## 2025-06-19 NOTE — ASSESSMENT & PLAN NOTE
55 year old female with a history of pulmonary artery hypertension and cholecystitis managed with a cholecystomy tube.  She is now admitted with abdominal pain and nausea likely secondary cholecystomy tube malfunction.  Patient is now s/p tube exchange by IR.  Cultures sent and are pending.      Plan  Start ceftriaxone.  Follow up on cultures.

## 2025-06-19 NOTE — PLAN OF CARE
Patient is AAOx3. RLQ bili drain to gravity with clear brown/green drainage. Remodiun infusing at 117ng/kg/min, SW 60.5kg, 85cc/24hrs. Patient received PO K replacement and gabapentin x1 dose for pain. Patient is NSR on telemetry. Plan is for the patient to go home over the weekend. Patient continues to receive IV antibiotics. Reminded the patient to call for assistance.

## 2025-06-19 NOTE — SUBJECTIVE & OBJECTIVE
Past Medical History:   Diagnosis Date    AR (allergic rhinitis)     Cholelithiasis, common bile duct     Chronic low back pain     Eye pressure 2017    General anesthetics causing adverse effect in therapeutic use     GERD (gastroesophageal reflux disease)     History of migraine headaches     Hypothyroidism     Innocent heart murmur     Lumbar disc disease     Menorrhagia     Mild asthma     Obesity     Plantar fasciitis of left foot     Primary pulmonary hypertension     followed by heart transplant/pulmonary     Seizure disorder     x 1 in 2008    Seizures     Sleep apnea     SVT (supraventricular tachycardia) 4/3/2025    TMJ (dislocation of temporomandibular joint)     Tricuspid regurgitation        Past Surgical History:   Procedure Laterality Date    CARDIAC CATHETERIZATION      CATHETERIZATION OF BOTH LEFT AND RIGHT HEART Bilateral 9/29/2020    Procedure: CATHETERIZATION, HEART, BOTH LEFT AND RIGHT;  Surgeon: Gucci Pickett MD;  Location: [facility] CATH LAB;  Service: Cardiology;  Laterality: Bilateral;    CENTRAL VENOUS CATHETER INSERTION      CORONARY ANGIOGRAPHY N/A 9/29/2020    Procedure: ANGIOGRAM, CORONARY ARTERY;  Surgeon: Gucci Pickett MD;  Location: [facility] CATH LAB;  Service: Cardiology;  Laterality: N/A;    gallbladder drain  06/2019    INSERTION OF HAYNES CATHETER Right 6/17/2020    Procedure: INSERTION, CATHETER, CENTRAL VENOUS, HAYNES Replace Single Lumen for Remodulin Infusion;  Surgeon: Bertin Dewitt MD;  [facility];  Service: General;  Laterality: Right;    PORTACATH PLACEMENT      REMOVAL OF TUNNELED CENTRAL VENOUS CATHETER (CVC) N/A 6/17/2020    Procedure: REMOVAL, CATHETER, CENTRAL VENOUS, TUNNELED;  Surgeon: Bertin Dewitt MD;  Location: [facility] OR 2ND FLR;  Service: General;  Laterality: N/A;    RIGHT HEART CATHETERIZATION Right 8/20/2018    Procedure: HEART CATH-RIGHT;  Surgeon: Ivonne Rai MD;  Location: [facility] CATH LAB;  Service: Cardiology;  Laterality:  "Right;    RIGHT HEART CATHETERIZATION Right 9/4/2019    Procedure: INSERTION, CATHETER, RIGHT HEART;  Surgeon: Ivonne Rai MD;  Location: [facility] CATH LAB;  Service: Cardiology;  Laterality: Right;    RIGHT HEART CATHETERIZATION Right 6/10/2022    Procedure: INSERTION, CATHETER, RIGHT HEART;  Surgeon: Keshia Poe MD;  Location: [facility] CATH LAB;  Service: Cardiology;  Laterality: Right;    UPPER GASTROINTESTINAL ENDOSCOPY         Review of patient's allergies indicates:   Allergen Reactions    Fentanyl Anaphylaxis     Respiratory distress    Vibra-tabs [doxycycline hyclate] Anaphylaxis     "throat felt like it was closing"    Adhesive Hives     Silk tape    Amoxicillin Rash    Nsaids (non-steroidal anti-inflammatory drug) Swelling    Chlorhexidine Other (See Comments)     Blue Chlorhexidine causes hives       Medications:  Medications Prior to Admission   Medication Sig    acetaminophen (TYLENOL) 500 MG tablet Take 1,000 mg by mouth every 6 (six) hours as needed for Pain.    ADCIRCA 20 mg Tab Take 2 tablets (40 mg total) by mouth once daily. (Patient taking differently: Take 40 mg by mouth nightly.)    albuterol (VENTOLIN HFA) 90 mcg/actuation inhaler Inhale 2 puffs into the lungs every 6 (six) hours as needed for Wheezing or Shortness of Breath. Rescue    albuterol-ipratropium (DUO-NEB) 2.5 mg-0.5 mg/3 mL nebulizer solution Take 3 mLs by nebulization every 6 (six) hours as needed for Wheezing. Rescue    alprazolam (XANAX ORAL) Take by mouth as needed.    ambrisentan (LETAIRIS) 10 MG Tab Take 1 tablet (10 mg total) by mouth once daily.    BREO ELLIPTA 200-25 mcg/dose DsDv diskus inhaler INHALE 1 PUFF INTO THE LUNGS EVERY EVENING. CONTROLLER    butalbital-acetaminophen-caffeine -40 mg (FIORICET, ESGIC) -40 mg per tablet Take 1 tablet by mouth every 4 (four) hours as needed.    cyanocobalamin, vitamin B-12, 2,500 mcg Subl Place 2,500 mcg under the tongue every morning.     diphenhydrAMINE (BENADRYL) 25 " mg capsule Take 50 mg by mouth every 6 (six) hours as needed for Itching. Patient takes prn evenings    diphenoxylate-atropine 2.5-0.025 mg (LOMOTIL) 2.5-0.025 mg per tablet Take 1 tablet by mouth 4 (four) times daily as needed for Diarrhea.    ferrous sulfate 325 (65 FE) MG EC tablet Take 325 mg by mouth daily as needed.     fluticasone propionate (FLONASE) 50 mcg/actuation nasal spray 2 sprays (100 mcg total) by Each Nostril route every evening. (Patient taking differently: 2 sprays by Each Nostril route Daily.)    folic acid (FOLVITE) 1 MG tablet Take 1 tablet (1,000 mcg total) by mouth once daily.    furosemide (LASIX) 20 MG tablet Take 1 tablet (20 mg total) by mouth once daily. (Patient taking differently: Take 20 mg by mouth once daily. Usually takes PRN weekly for swelling.)    glucosam/chond-msm1/C/michele/bor (GLUCOSAMINE-CHOND-MSM COMPLEX ORAL) Take by mouth. Three times a week (Patient taking differently: Take by mouth. Two times a week)    hyoscyamine (LEVSIN) 0.125 mg Subl Place 1 tablet (0.125 mg total) under the tongue every 6 (six) hours as needed (Aa needed).    lactic acid-citric-potassium (PHEXXI) 1.8-1-0.4 % Gel Place 1 Applicatorful vaginally as needed (CONTRACEPTIVE).    levothyroxine (SYNTHROID) 125 MCG tablet Take 1 tablet (125 mcg total) by mouth before breakfast.    LIDOcaine (LIDODERM) 5 % Place 1 patch onto the skin once daily. (Patient taking differently: Place 1 patch onto the skin once daily. Takes PRN)    loratadine (CLARITIN) 10 mg tablet Take 10 mg by mouth once daily. Takes in morning    nystatin (MYCOSTATIN) powder Apply topically 2 (two) times daily. for 14 days    pantoprazole (PROTONIX) 40 MG tablet Take 1 tablet (40 mg total) by mouth once daily.    rimegepant (NURTEC) 75 mg odt Take 1 tablet (75 mg total) by mouth as needed for Migraine. Place ODT tablet on the tongue; alternatively the ODT tablet may be placed under the tongue. Can take 2nd dose in 24 hrs if mild relief, no  more than 2 in 24 hrs    tiZANidine (ZANAFLEX) 4 MG tablet Take 4 mg by mouth every 6 (six) hours as needed.    topiramate (TOPAMAX) 100 MG tablet Take 1 tablet (100 mg total) by mouth 2 (two) times daily.    treprostinil (REMODULIN) 2.5 mg/mL Soln Mix cassette as directed and infuse continuously per physician titration orders on dosing sheet. Current dose 80ng/kg/min    triamcinolone (KENALOG) 0.5 % ointment Apply 1 application  topically 2 (two) times daily.     Antibiotics (From admission, onward)      Start     Stop Route Frequency Ordered    06/18/25 1515  cefTRIAXone injection 2 g         -- IV Every 24 hours (non-standard times) 06/18/25 1405          Antifungals (From admission, onward)      None          Antivirals (From admission, onward)      None             Immunization History   Administered Date(s) Administered    COVID-19 MRNA, LN-S PF (MODERNA HALF 0.25 ML DOSE) 07/01/2022    COVID-19, MRNA, LN-S, PF (MODERNA FULL 0.5 ML DOSE) 03/18/2021, 04/17/2021    Hepatitis A / Hepatitis B 01/18/2017, 02/17/2017, 07/19/2017    Influenza 12/05/2006, 10/22/2008, 10/05/2010, 10/18/2011, 11/19/2012    Influenza (FLUAD) - Quadrivalent - Adjuvanted - PF *Preferred* (65+) 09/22/2020, 11/15/2023    Influenza - Quadrivalent 10/16/2014, 10/30/2015    Influenza - Quadrivalent - PF *Preferred* (6 months and older) 12/05/2006, 10/22/2008, 10/05/2010, 10/18/2011, 11/19/2012, 01/18/2017    Influenza - Trivalent - Fluzone High Dose - PF (65 years and older) 01/12/2018, 11/20/2018, 10/11/2019    Influenza Split 11/19/2012    PPD Test 08/28/2006    Pneumococcal Conjugate - 13 Valent 08/22/2016    Pneumococcal Polysaccharide - 23 Valent 12/17/2008, 01/18/2017    Td - PF (ADULT) 08/09/2023    Tdap 12/17/2008, 01/18/2017       Family History       Problem Relation (Age of Onset)    Breast cancer Maternal Aunt, Cousin, Cousin    Cancer Maternal Aunt, Maternal Grandmother    Diabetes Mother, Maternal Grandfather, Paternal Grandfather     Glaucoma Father    Heart attack Maternal Grandfather, Paternal Grandfather    Heart disease Father, Paternal Grandfather    Hypertension Other    Leukemia Brother    No Known Problems Sister, Maternal Uncle, Paternal Aunt, Paternal Uncle, Paternal Grandmother          Social History     Socioeconomic History    Marital status: Single    Number of children: 0   Occupational History    Occupation: disabled     Employer: [employer]   Tobacco Use    Smoking status: Never    Smokeless tobacco: Never   Substance and Sexual Activity    Alcohol use: No     Alcohol/week: 0.8 standard drinks of alcohol     Types: 1 Standard drinks or equivalent per week     Comment: socially    Drug use: No    Sexual activity: Not Currently     Birth control/protection: OCP, Pill     Comment: pt is a virgin     Social Drivers of Health     Financial Resource Strain: Medium Risk (6/18/2025)    Overall Financial Resource Strain (CARDIA)     Difficulty of Paying Living Expenses: Somewhat hard   Food Insecurity: Food Insecurity Present (6/18/2025)    Hunger Vital Sign     Worried About Running Out of Food in the Last Year: Sometimes true     Ran Out of Food in the Last Year: Sometimes true   Transportation Needs: No Transportation Needs (6/18/2025)    PRAPARE - Transportation     Lack of Transportation (Medical): No     Lack of Transportation (Non-Medical): No   Physical Activity: Unknown (5/31/2025)    Exercise Vital Sign     Days of Exercise per Week: 0 days   Recent Concern: Physical Activity - Inactive (4/10/2025)    [facility]    Exercise Vital Sign     Days of Exercise per Week: 0 days     Minutes of Exercise per Session: 0 min   Stress: No Stress Concern Present (6/18/2025)    [employer] - Occupational Stress Questionnaire     Feeling of Stress : Only a little   Recent Concern: Stress - Stress Concern Present (5/31/2025)    [employer] - Occupational Stress Questionnaire     Feeling of Stress : To some extent   Housing Stability: Low Risk  (6/18/2025)    Housing Stability Vital Sign     Unable to Pay for Housing in the Last Year: No     Homeless in the Last Year: No     Review of Systems   Gastrointestinal:  Positive for abdominal pain.   All other systems reviewed and are negative.    Objective:     Vital Signs (Most Recent):  Temp: 97.7 °F (36.5 °C) (06/18/25 2306)  Pulse: 84 (06/18/25 2316)  Resp: 18 (06/18/25 2306)  BP: 108/67 (06/18/25 2306)  SpO2: (!) 93 % (06/18/25 2306) Vital Signs (24h Range):  Temp:  [97.6 °F (36.4 °C)-98.4 °F (36.9 °C)] 97.7 °F (36.5 °C)  Pulse:  [78-91] 84  Resp:  [15-19] 18  SpO2:  [90 %-96 %] 93 %  BP: ()/(53-67) 108/67     Weight: 63 kg (139 lb)  Body mass index is 28.07 kg/m².    Estimated Creatinine Clearance: 69.2 mL/min (based on SCr of 0.8 mg/dL).     Physical Exam  Vitals and nursing note reviewed.   Constitutional:       General: She is not in acute distress.     Appearance: She is well-developed. She is not diaphoretic.   HENT:      Head: Normocephalic and atraumatic.      Right Ear: External ear normal.      Left Ear: External ear normal.      Nose: Nose normal.      Mouth/Throat:      Pharynx: No oropharyngeal exudate.   Eyes:      General: No scleral icterus.        Right eye: No discharge.         Left eye: No discharge.      Conjunctiva/sclera: Conjunctivae normal.      Pupils: Pupils are equal, round, and reactive to light.   Neck:      Thyroid: No thyromegaly.      Vascular: No JVD.      Trachea: No tracheal deviation.   Cardiovascular:      Rate and Rhythm: Normal rate and regular rhythm.      Heart sounds: No murmur heard.     No friction rub. No gallop.   Pulmonary:      Effort: Pulmonary effort is normal. No respiratory distress.      Breath sounds: Normal breath sounds. No stridor. No wheezing or rales.   Chest:      Chest wall: No tenderness.   Abdominal:      General: Bowel sounds  are normal. There is no distension.      Palpations: Abdomen is soft. There is no mass.      Tenderness: There is abdominal tenderness. There is no guarding or rebound.      Comments: Cholecystotomy tube   Musculoskeletal:         General: No tenderness. Normal range of motion.      Cervical back: Normal range of motion and neck supple.   Lymphadenopathy:      Cervical: No cervical adenopathy.   Skin:     General: Skin is warm.      Coloration: Skin is not pale.      Findings: No erythema or rash.   Neurological:      Mental Status: She is alert and oriented to person, place, and time.      Cranial Nerves: No cranial nerve deficit.      Motor: No abnormal muscle tone.      Coordination: Coordination normal.      Deep Tendon Reflexes: Reflexes normal.   Psychiatric:         Behavior: Behavior normal.         Thought Content: Thought content normal.         Judgment: Judgment normal.          Significant Labs:   Microbiology Results (last 7 days)       Procedure Component Value Units Date/Time    Gram stain [[id number]] Collected: 06/18/25 1257    Order Status: Sent Specimen: Tissue from Abdomen Updated: 06/18/25 1824    Fungus culture [[id number]] Collected: 06/18/25 1255    Order Status: Sent Specimen: Body Fluid from Abdomen Updated: 06/18/25 1824    Culture, Anaerobic [[id number]] Collected: 06/18/25 1255    Order Status: Sent Specimen: Body Fluid from Abdomen Updated: 06/18/25 1823    Aerobic culture [[id number]] Collected: 06/18/25 1255    Order Status: Sent Specimen: Body Fluid from Abdomen Updated: 06/18/25 1823    Blood culture x two cultures. Draw prior to antibiotics. [[id number]]  (Normal) Collected: 06/17/25 1421    Order Status: Completed Specimen: Blood from Peripheral, Forearm, Left Updated: 06/18/25 1701     Blood Culture No Growth After 24 Hours    Blood culture x two cultures. Draw prior to antibiotics. [[id number]]  (Normal) Collected: 06/17/25 1422    Order Status: Completed Specimen: Blood from  Peripheral, Forearm, Right Updated: 06/18/25 1701     Blood Culture No Growth After 24 Hours    Culture, Respiratory with Gram Stain [[id number]]     Order Status: Sent Specimen: Respiratory     Culture, Anaerobe [[id number]]     Order Status: Sent Specimen: Skin     Aerobic culture (Specify Source) **CANNOT BE ORDERED AS STAT* [[id number]] Collected: 06/17/25 1606    Order Status: Sent Specimen: Wound from Abdomen Updated: 06/17/25 1653            Significant Imaging: I have reviewed all pertinent imaging results/findings within the past 24 hours.

## 2025-06-19 NOTE — CONSULTS
[facility] - Transplant Stepdown  Infectious Disease  Consult Note    Patient Name: Yuni Perez  MRN: [id number]  Admission Date: 6/17/2025  Hospital Length of Stay: 1 days  Attending Physician: Jerry Zuñiga, *  Primary Care Provider: Lulu Sandhu MD     Isolation Status: No active isolations    Patient information was obtained from patient, past medical records, and ER records.      Inpatient consult to Infectious Diseases  Consult performed by: Azael Monterroso MD  Consult ordered by: Sylvie Barone MD        Assessment/Plan:     Cardiac/Vascular  WHO group 1 pulmonary arterial hypertension  Management per primary team.    GI  * Choledocholithiasis  55 year old female with a history of pulmonary artery hypertension and cholecystitis managed with a cholecystomy tube.  She is now admitted with abdominal pain and nausea likely secondary cholecystomy tube malfunction.  Patient is now s/p tube exchange by IR.  Cultures sent and are pending.      Plan  Start ceftriaxone.  Follow up on cultures.        Thank you for your consult. I will follow-up with patient. Please contact us if you have any additional questions.    Azael Monterroso MD  Infectious Disease  [facility] - Transplant Stepdown    Time: 75 minutes   50% of time spent on face-to-face counseling and coordination of care. Counseling included review of test results, diagnosis, and treatment plan with patient and/or family.  I have reviewed hospital notes from HM service and other specialty providers as well as outside medical records. I have also reviewed CBC, CMP/BMP,  cultures and imaging with my interpretation as documented. Patient is high risk of morbidity, on antibiotics requiring intensive monitoring for toxicity.       Subjective:     Principal Problem: Choledocholithiasis    HPI: 55 year old female with a history of pulmonary hypertension.  She was hospitalized in march of 2025 for cholecystitis. She was deemed to not be a  surgical candidate at that time.  She underwent cholecystostomy tube placement.  In the interim she had the tube up-sized.  She is admitted to [facility] with abdominal pain and nausea thought to be related to malfunctioning of her cholecystostomy tube.     Past Medical History:   Diagnosis Date    AR (allergic rhinitis)     Cholelithiasis, common bile duct     Chronic low back pain     Eye pressure 2017    General anesthetics causing adverse effect in therapeutic use     GERD (gastroesophageal reflux disease)     History of migraine headaches     Hypothyroidism     Innocent heart murmur     Lumbar disc disease     Menorrhagia     Mild asthma     Obesity     Plantar fasciitis of left foot     Primary pulmonary hypertension     followed by heart transplant/pulmonary     Seizure disorder     x 1 in 2008    Seizures     Sleep apnea     SVT (supraventricular tachycardia) 4/3/2025    TMJ (dislocation of temporomandibular joint)     Tricuspid regurgitation        Past Surgical History:   Procedure Laterality Date    CARDIAC CATHETERIZATION      CATHETERIZATION OF BOTH LEFT AND RIGHT HEART Bilateral 9/29/2020    Procedure: CATHETERIZATION, HEART, BOTH LEFT AND RIGHT;  Surgeon: Gucci Pickett MD;  Location: [facility] CATH LAB;  Service: Cardiology;  Laterality: Bilateral;    CENTRAL VENOUS CATHETER INSERTION      CORONARY ANGIOGRAPHY N/A 9/29/2020    Procedure: ANGIOGRAM, CORONARY ARTERY;  Surgeon: Gucci Pickett MD;  Location: [facility] CATH LAB;  Service: Cardiology;  Laterality: N/A;    gallbladder drain  06/2019    INSERTION OF HAYNES CATHETER Right 6/17/2020    Procedure: INSERTION, CATHETER, CENTRAL VENOUS, HYANES Replace Single Lumen for Remodulin Infusion;  Surgeon: Bertin Dewitt MD;  Location: [facility] OR [address] [place] [address];  Service: General;  Laterality: Right;    PORTACATH PLACEMENT      REMOVAL OF TUNNELED CENTRAL VENOUS CATHETER (CVC) N/A 6/17/2020    Procedure: REMOVAL, CATHETER, CENTRAL VENOUS, TUNNELED;  Surgeon: Bertin IQBAL  "MD Esdras;  Location: [facility] OR [facility] FLR;  Service: General;  Laterality: N/A;    RIGHT HEART CATHETERIZATION Right 8/20/2018    Procedure: HEART CATH-RIGHT;  Surgeon: Ivonne Rai MD;  Location: [facility] CATH LAB;  Service: Cardiology;  Laterality: Right;    RIGHT HEART CATHETERIZATION Right 9/4/2019    Procedure: INSERTION, CATHETER, RIGHT HEART;  Surgeon: Iovnne Rai MD;  Location: [facility] CATH LAB;  Service: Cardiology;  Laterality: Right;    RIGHT HEART CATHETERIZATION Right 6/10/2022    Procedure: INSERTION, CATHETER, RIGHT HEART;  Surgeon: Keshia Poe MD;  Location: [facility] CATH LAB;  Service: Cardiology;  Laterality: Right;    UPPER GASTROINTESTINAL ENDOSCOPY         Review of patient's allergies indicates:   Allergen Reactions    Fentanyl Anaphylaxis     Respiratory distress    Vibra-tabs [doxycycline hyclate] Anaphylaxis     "throat felt like it was closing"    Adhesive Hives     Silk tape    Amoxicillin Rash    Nsaids (non-steroidal anti-inflammatory drug) Swelling    Chlorhexidine Other (See Comments)     Blue Chlorhexidine causes hives       Medications:  Medications Prior to Admission   Medication Sig    acetaminophen (TYLENOL) 500 MG tablet Take 1,000 mg by mouth every 6 (six) hours as needed for Pain.    ADCIRCA 20 mg Tab Take 2 tablets (40 mg total) by mouth once daily. (Patient taking differently: Take 40 mg by mouth nightly.)    albuterol (VENTOLIN HFA) 90 mcg/actuation inhaler Inhale 2 puffs into the lungs every 6 (six) hours as needed for Wheezing or Shortness of Breath. Rescue    albuterol-ipratropium (DUO-NEB) 2.5 mg-0.5 mg/3 mL nebulizer solution Take 3 mLs by nebulization every 6 (six) hours as needed for Wheezing. Rescue    alprazolam (XANAX ORAL) Take by mouth as needed.    ambrisentan (LETAIRIS) 10 MG Tab Take 1 tablet (10 mg total) by mouth once daily.    BREO ELLIPTA 200-25 mcg/dose DsDv diskus inhaler INHALE 1 PUFF INTO THE LUNGS EVERY EVENING. CONTROLLER    " butalbital-acetaminophen-caffeine -40 mg (FIORICET, ESGIC) -40 mg per tablet Take 1 tablet by mouth every 4 (four) hours as needed.    cyanocobalamin, vitamin B-12, 2,500 mcg Subl Place 2,500 mcg under the tongue every morning.     diphenhydrAMINE (BENADRYL) 25 mg capsule Take 50 mg by mouth every 6 (six) hours as needed for Itching. Patient takes prn evenings    diphenoxylate-atropine 2.5-0.025 mg (LOMOTIL) 2.5-0.025 mg per tablet Take 1 tablet by mouth 4 (four) times daily as needed for Diarrhea.    ferrous sulfate 325 (65 FE) MG EC tablet Take 325 mg by mouth daily as needed.     fluticasone propionate (FLONASE) 50 mcg/actuation nasal spray 2 sprays (100 mcg total) by Each Nostril route every evening. (Patient taking differently: 2 sprays by Each Nostril route Daily.)    folic acid (FOLVITE) 1 MG tablet Take 1 tablet (1,000 mcg total) by mouth once daily.    furosemide (LASIX) 20 MG tablet Take 1 tablet (20 mg total) by mouth once daily. (Patient taking differently: Take 20 mg by mouth once daily. Usually takes PRN weekly for swelling.)    glucosam/chond-msm1/C/michele/bor (GLUCOSAMINE-CHOND-MSM COMPLEX ORAL) Take by mouth. Three times a week (Patient taking differently: Take by mouth. Two times a week)    hyoscyamine (LEVSIN) 0.125 mg Subl Place 1 tablet (0.125 mg total) under the tongue every 6 (six) hours as needed (Aa needed).    lactic acid-citric-potassium (PHEXXI) 1.8-1-0.4 % Gel Place 1 Applicatorful vaginally as needed (CONTRACEPTIVE).    levothyroxine (SYNTHROID) 125 MCG tablet Take 1 tablet (125 mcg total) by mouth before breakfast.    LIDOcaine (LIDODERM) 5 % Place 1 patch onto the skin once daily. (Patient taking differently: Place 1 patch onto the skin once daily. Takes PRN)    loratadine (CLARITIN) 10 mg tablet Take 10 mg by mouth once daily. Takes in morning    nystatin (MYCOSTATIN) powder Apply topically 2 (two) times daily. for 14 days    pantoprazole (PROTONIX) 40 MG tablet Take 1  tablet (40 mg total) by mouth once daily.    rimegepant (NURTEC) 75 mg odt Take 1 tablet (75 mg total) by mouth as needed for Migraine. Place ODT tablet on the tongue; alternatively the ODT tablet may be placed under the tongue. Can take 2nd dose in 24 hrs if mild relief, no more than 2 in 24 hrs    tiZANidine (ZANAFLEX) 4 MG tablet Take 4 mg by mouth every 6 (six) hours as needed.    topiramate (TOPAMAX) 100 MG tablet Take 1 tablet (100 mg total) by mouth 2 (two) times daily.    treprostinil (REMODULIN) 2.5 mg/mL Soln Mix cassette as directed and infuse continuously per physician titration orders on dosing sheet. Current dose 80ng/kg/min    triamcinolone (KENALOG) 0.5 % ointment Apply 1 application  topically 2 (two) times daily.     Antibiotics (From admission, onward)      Start     Stop Route Frequency Ordered    06/18/25 1515  cefTRIAXone injection 2 g         -- IV Every 24 hours (non-standard times) 06/18/25 1405          Antifungals (From admission, onward)      None          Antivirals (From admission, onward)      None             Immunization History   Administered Date(s) Administered    COVID-19 MRNA, LN-S PF (MODERNA HALF 0.25 ML DOSE) 07/01/2022    COVID-19, MRNA, LN-S, PF (MODERNA FULL 0.5 ML DOSE) 03/18/2021, 04/17/2021    Hepatitis A / Hepatitis B 01/18/2017, 02/17/2017, 07/19/2017    Influenza 12/05/2006, 10/22/2008, 10/05/2010, 10/18/2011, 11/19/2012    Influenza (FLUAD) - Quadrivalent - Adjuvanted - PF *Preferred* (65+) 09/22/2020, 11/15/2023    Influenza - Quadrivalent 10/16/2014, 10/30/2015    Influenza - Quadrivalent - PF *Preferred* (6 months and older) 12/05/2006, 10/22/2008, 10/05/2010, 10/18/2011, 11/19/2012, 01/18/2017    Influenza - Trivalent - Fluzone High Dose - PF (65 years and older) 01/12/2018, 11/20/2018, 10/11/2019    Influenza Split 11/19/2012    PPD Test 08/28/2006    Pneumococcal Conjugate - 13 Valent 08/22/2016    Pneumococcal Polysaccharide - 23 Valent 12/17/2008, 01/18/2017     Td - PF (ADULT) 08/09/2023    Tdap 12/17/2008, 01/18/2017       Family History       Problem Relation (Age of Onset)    Breast cancer Maternal Aunt, Cousin, Cousin    Cancer Maternal Aunt, Maternal Grandmother    Diabetes Mother, Maternal Grandfather, Paternal Grandfather    Glaucoma Father    Heart attack Maternal Grandfather, Paternal Grandfather    Heart disease Father, Paternal Grandfather    Hypertension Other    Leukemia Brother    No Known Problems Sister, Maternal Uncle, Paternal Aunt, Paternal Uncle, Paternal Grandmother          Social History     Socioeconomic History    Marital status: Single    Number of children: 0   Occupational History    Occupation: disabled     Employer: [employer]   Tobacco Use    Smoking status: Never    Smokeless tobacco: Never   Substance and Sexual Activity    Alcohol use: No     Alcohol/week: 0.8 standard drinks of alcohol     Types: 1 Standard drinks or equivalent per week     Comment: socially    Drug use: No    Sexual activity: Not Currently     Birth control/protection: OCP, Pill     Comment: pt is a virgin     Social Drivers of Health     Financial Resource Strain: Medium Risk (6/18/2025)    Overall Financial Resource Strain (CARDIA)     Difficulty of Paying Living Expenses: Somewhat hard   Food Insecurity: Food Insecurity Present (6/18/2025)    Hunger Vital Sign     Worried About Running Out of Food in the Last Year: Sometimes true     Ran Out of Food in the Last Year: Sometimes true   Transportation Needs: No Transportation Needs (6/18/2025)    PRAPARE - Transportation     Lack of Transportation (Medical): No     Lack of Transportation (Non-Medical): No   Physical Activity: Unknown (5/31/2025)    Exercise Vital Sign     Days of Exercise per Week: 0 days   Recent Concern: Physical Activity - Inactive (4/10/2025)    Received from [facility]    Exercise Vital Sign     Days of Exercise per Week: 0 days     Minutes of Exercise per  Session: 0 min   Stress: No Stress Concern Present (6/18/2025)    [facility]ational [facility] - Occupational Stress Questionnaire     Feeling of Stress : Only a little   Recent Concern: Stress - Stress Concern Present (5/31/2025)    [facility] of Occupational Health - Occupational Stress Questionnaire     Feeling of Stress : To some extent   Housing Stability: Low Risk  (6/18/2025)    Housing Stability Vital Sign     Unable to Pay for Housing in the Last Year: No     Homeless in the Last Year: No     Review of Systems   Gastrointestinal:  Positive for abdominal pain.   All other systems reviewed and are negative.    Objective:     Vital Signs (Most Recent):  Temp: 97.7 °F (36.5 °C) (06/18/25 2306)  Pulse: 84 (06/18/25 2316)  Resp: 18 (06/18/25 2306)  BP: 108/67 (06/18/25 2306)  SpO2: (!) 93 % (06/18/25 2306) Vital Signs (24h Range):  Temp:  [97.6 °F (36.4 °C)-98.4 °F (36.9 °C)] 97.7 °F (36.5 °C)  Pulse:  [78-91] 84  Resp:  [15-19] 18  SpO2:  [90 %-96 %] 93 %  BP: ()/(53-67) 108/67     Weight: 63 kg (139 lb)  Body mass index is 28.07 kg/m².    Estimated Creatinine Clearance: 69.2 mL/min (based on SCr of 0.8 mg/dL).     Physical Exam  Vitals and nursing note reviewed.   Constitutional:       General: She is not in acute distress.     Appearance: She is well-developed. She is not diaphoretic.   HENT:      Head: Normocephalic and atraumatic.      Right Ear: External ear normal.      Left Ear: External ear normal.      Nose: Nose normal.      Mouth/Throat:      Pharynx: No oropharyngeal exudate.   Eyes:      General: No scleral icterus.        Right eye: No discharge.         Left eye: No discharge.      Conjunctiva/sclera: Conjunctivae normal.      Pupils: Pupils are equal, round, and reactive to light.   Neck:      Thyroid: No thyromegaly.      Vascular: No JVD.      Trachea: No tracheal deviation.   Cardiovascular:      Rate and Rhythm: Normal rate and regular rhythm.      Heart sounds: No  murmur heard.     No friction rub. No gallop.   Pulmonary:      Effort: Pulmonary effort is normal. No respiratory distress.      Breath sounds: Normal breath sounds. No stridor. No wheezing or rales.   Chest:      Chest wall: No tenderness.   Abdominal:      General: Bowel sounds are normal. There is no distension.      Palpations: Abdomen is soft. There is no mass.      Tenderness: There is abdominal tenderness. There is no guarding or rebound.      Comments: Cholecystotomy tube   Musculoskeletal:         General: No tenderness. Normal range of motion.      Cervical back: Normal range of motion and neck supple.   Lymphadenopathy:      Cervical: No cervical adenopathy.   Skin:     General: Skin is warm.      Coloration: Skin is not pale.      Findings: No erythema or rash.   Neurological:      Mental Status: She is alert and oriented to person, place, and time.      Cranial Nerves: No cranial nerve deficit.      Motor: No abnormal muscle tone.      Coordination: Coordination normal.      Deep Tendon Reflexes: Reflexes normal.   Psychiatric:         Behavior: Behavior normal.         Thought Content: Thought content normal.         Judgment: Judgment normal.          Significant Labs:   Microbiology Results (last 7 days)       Procedure Component Value Units Date/Time    Gram stain [[id number]] Collected: 06/18/25 1257    Order Status: Sent Specimen: Tissue from Abdomen Updated: 06/18/25 1824    Fungus culture [[id number]] Collected: 06/18/25 1255    Order Status: Sent Specimen: Body Fluid from Abdomen Updated: 06/18/25 1824    Culture, Anaerobic [[id number]] Collected: 06/18/25 1255    Order Status: Sent Specimen: Body Fluid from Abdomen Updated: 06/18/25 1823    Aerobic culture [[id number]] Collected: 06/18/25 1255    Order Status: Sent Specimen: Body Fluid from Abdomen Updated: 06/18/25 1823    Blood culture x two cultures. Draw prior to antibiotics. [[id number]]  (Normal) Collected: 06/17/25 1421    Order  Status: Completed Specimen: Blood from Peripheral, Forearm, Left Updated: 06/18/25 1701     Blood Culture No Growth After 24 Hours    Blood culture x two cultures. Draw prior to antibiotics. [[id number]]  (Normal) Collected: 06/17/25 1422    Order Status: Completed Specimen: Blood from Peripheral, Forearm, Right Updated: 06/18/25 1701     Blood Culture No Growth After 24 Hours    Culture, Respiratory with Gram Stain [[id number]]     Order Status: Sent Specimen: Respiratory     Culture, Anaerobe [[id number]]     Order Status: Sent Specimen: Skin     Aerobic culture (Specify Source) **CANNOT BE ORDERED AS STAT* [[id number]] Collected: 06/17/25 1606    Order Status: Sent Specimen: Wound from Abdomen Updated: 06/17/25 1653            Significant Imaging: I have reviewed all pertinent imaging results/findings within the past 24 hours.               You can access the FollowMyHealth Patient Portal offered by [facility] by registering at the following website: [email or web address]. By joining [employer]’s FollowMyHealth portal, you will also be able to view your health information using other applications (apps) compatible with our system.

## 2025-06-19 NOTE — ASSESSMENT & PLAN NOTE
Patient presents with significant and progressively worsening abdominal pain and left shoulder pain.  Patient has a kita tube in place and is status post kita tube exchange 5/19.  CT scans appear to be similar to previous CT scan on diagnosis of choledocholithiasis, however patient with purulent drainage from exit site of kita tube and some white purulence in her kita bag.  Patient also with nausea and vomiting, poor p.o. intake, worsening pain that has prompted her to come in.  - aerobic and anaerobic culture collected from percutaneous kita tube exit site  - blood cultures collected x2  - patient started on broad-spectrum antibiotics with vancomycin and Zosyn  - Appreciate GEN BRUNO consult.   - consult to Infectious Disease, appreciate recommendations  - S/P tube exchange 6 18. Will await cultures.

## 2025-06-19 NOTE — PLAN OF CARE
Pt arrived to TSU rrom [id number]. Remodulin  changed to hospital mix and cassette. Remodulin @ 117 ng/kg/min DW 60.5 kg. RUQ Bili tube in place with brown output.  Iv abx cont as ordered. PRN tramadol given for pain. AM labs ordered.

## 2025-06-19 NOTE — HPI
55 year old female with a history of pulmonary hypertension.  She was hospitalized in march of 2025 for cholecystitis. She was deemed to not be a surgical candidate at that time.  She underwent cholecystostomy tube placement.  In the interim she had the tube up-sized.  She is admitted to [facility] with abdominal pain and nausea thought to be related to malfunctioning of her cholecystostomy tube.

## 2025-06-19 NOTE — PROGRESS NOTES
[facility] Transplant Stepdown  Heart Transplant  Progress Note    Patient Name: Yuni Perez  MRN: [id number]  Admission Date: 6/17/2025  Hospital Length of Stay: 2 days  Attending Physician: Jerry Zuñiga, *  Primary Care Provider: uLlu Sandhu MD  Principal Problem:Choledocholithiasis    Subjective:   Interval History: No big complaints this am. Pain improved a bit. Tube changed out. Draiing less cloudy per pt.     Continuous Infusions:   veletri/remodulin cassette   Intravenous Continuous        veletri/remodulin tubing   Intravenous Continuous         Scheduled Meds:   tadalafil  40 mg Oral Daily    aluminum-magnesium hydroxide-simethicone  30 mL Oral QID (AC & HS)    cefTRIAXone (Rocephin) IV (PEDS and ADULTS)  2 g Intravenous Q24H    fluticasone furoate-vilanteroL  1 puff Inhalation Daily    gabapentin  200 mg Oral Once    ambrisentan  10 mg Oral Daily    levothyroxine  125 mcg Oral Before breakfast    pantoprazole  40 mg Oral Daily    polyethylene glycol  17 g Oral Daily    senna-docusate  1 tablet Oral BID    sucralfate  1 g Oral Q6H    topiramate  100 mg Oral BID    treprostinil sodium (REMODULIN) 12,000,000 ng in 0.9% NaCl 100 mL infusion  117 ng/kg/min (Order-Specific) Intravenous Q24H     PRN Meds:  Current Facility-Administered Medications:     acetaminophen, 650 mg, Oral, Q4H PRN    albuterol, 2 puff, Inhalation, Q6H PRN    albuterol, 2 puff, Inhalation, Q6H PRN    albuterol-ipratropium, 3 mL, Nebulization, Q6H PRN    ALPRAZolam, 0.25 mg, Oral, BID PRN    butalbital-acetaminophen-caffeine -40 mg, 1 tablet, Oral, Q4H PRN    dextrose 50%, 12.5 g, Intravenous, PRN    diphenhydrAMINE, 25 mg, Oral, Q6H PRN    fluticasone propionate, 2 spray, Each Nostril, Daily PRN    glucagon (human recombinant), 1 mg, Intramuscular, PRN    melatonin, 6 mg, Oral, Nightly PRN    morphine, 2 mg, Intravenous, Q5 Min PRN    ondansetron, 4 mg, Intravenous, Q8H PRN    sodium chloride 0.9%, 10 mL,  "Intravenous, PRN    traMADoL, 50 mg, Oral, Q6H PRN    Review of patient's allergies indicates:   Allergen Reactions    Fentanyl Anaphylaxis     Respiratory distress    Vibra-tabs [doxycycline hyclate] Anaphylaxis     "throat felt like it was closing"    Adhesive Hives     Silk tape    Amoxicillin Rash    Nsaids (non-steroidal anti-inflammatory drug) Swelling    Chlorhexidine Other (See Comments)     Blue Chlorhexidine causes hives     Objective:     Vital Signs (Most Recent):  Temp: 97.5 °F (36.4 °C) (06/19/25 0839)  Pulse: 86 (06/19/25 1057)  Resp: 18 (06/19/25 1057)  BP: 114/70 (06/19/25 0839)  SpO2: 95 % (06/19/25 1057) Vital Signs (24h Range):  Temp:  [97.5 °F (36.4 °C)-98.3 °F (36.8 °C)] 97.5 °F (36.4 °C)  Pulse:  [71-95] 86  Resp:  [15-19] 18  SpO2:  [92 %-96 %] 95 %  BP: ()/(53-70) 114/70     Patient Vitals for the past 72 hrs (Last 3 readings):   Weight   06/19/25 0500 63 kg (138 lb 14.2 oz)   06/18/25 1129 63 kg (139 lb)   06/18/25 0431 63.7 kg (140 lb 6.9 oz)       Body mass index is 28.05 kg/m².      Intake/Output Summary (Last 24 hours) at 6/19/2025 1223  Last data filed at 6/19/2025 1017  Gross per 24 hour   Intake 1160 ml   Output 330 ml   Net 830 ml        Physical Exam  Vitals and nursing note reviewed.   Constitutional:       Appearance: She is well-developed.   HENT:      Head: Normocephalic.   Eyes:      Pupils: Pupils are equal, round, and reactive to light.   Cardiovascular:      Rate and Rhythm: Normal rate and regular rhythm.   Pulmonary:      Effort: Pulmonary effort is normal.      Breath sounds: Normal breath sounds.   Abdominal:      General: Bowel sounds are normal.      Palpations: Abdomen is soft.      Tenderness: There is abdominal tenderness.      Comments: Sue tube in place    Musculoskeletal:         General: Normal range of motion.      Cervical back: Normal range of motion and neck supple.   Skin:     General: Skin is warm and dry.   Neurological:      Mental Status: She " "is alert and oriented to person, place, and time.   Psychiatric:         Behavior: Behavior normal.            Significant Labs:  CBC:  Recent Labs   Lab 06/17/25  1421 06/18/25  0508 06/19/25  0534   WBC 6.32 5.76 4.79   RBC 5.03 4.34 4.38   HGB 14.6 12.7 12.8   HCT 43.8 38.5 38.8    181 177   MCV 87 89 89   MCH 29.0 29.3 29.2   MCHC 33.3 33.0 33.0     BNP:  No results for input(s): "BNP" in the last 168 hours.    Invalid input(s): "BNPTRIAGELBLO"  CMP:  Recent Labs   Lab 06/17/25  1421 06/18/25  0509 06/19/25  0534   GLU 89 93 91   CALCIUM 9.0 8.5* 8.5*   ALBUMIN 4.1 3.4* 3.4*   PROT 7.6 6.3 6.4    140 137   K 4.2 3.6 3.2*   CO2 20* 21* 20*    111* 109   BUN 8 8 9   CREATININE 0.6 0.8 0.6   ALKPHOS 87 75 73   ALT 12 7* 9*   AST 25 14 13   BILITOT 0.3 0.3 0.2      Coagulation:   No results for input(s): "PT", "INR", "APTT" in the last 168 hours.    LDH:  No results for input(s): "LDH" in the last 72 hours.  Microbiology:  Microbiology Results (last 7 days)       Procedure Component Value Units Date/Time    Aerobic culture (Specify Source) **CANNOT BE ORDERED AS STAT* [[id number]]  (Abnormal) Collected: 06/17/25 1606    Order Status: Completed Specimen: Wound from Abdomen Updated: 06/19/25 1103     CULTURE, AEROBIC Moderate Gram-negative Rods     Comment: Identification and susceptibility pending       Gram stain [[id number]] Collected: 06/18/25 1257    Order Status: Completed Specimen: Tissue from Abdomen Updated: 06/19/25 1053     GRAM STAIN Moderate WBC seen      Few Gram Negative Rods    Aerobic culture [[id number]]  (Abnormal) Collected: 06/18/25 1255    Order Status: Completed Specimen: Body Fluid from Abdomen Updated: 06/19/25 0927     CULTURE, AEROBIC Few Gram-negative Rods     Comment: Identification and susceptibility pending       Culture, Anaerobic [[id number]] Collected: 06/18/25 1255    Order Status: Completed Specimen: Body Fluid from Abdomen Updated: 06/19/25 0834     Anaerobe " Culture Culture In Progress    Blood culture x two cultures. Draw prior to antibiotics. [[id number]]  (Normal) Collected: 06/17/25 1421    Order Status: Completed Specimen: Blood from Peripheral, Forearm, Left Updated: 06/19/25 0501     Blood Culture No Growth After 36 Hours    Blood culture x two cultures. Draw prior to antibiotics. [[id number]]  (Normal) Collected: 06/17/25 1422    Order Status: Completed Specimen: Blood from Peripheral, Forearm, Right Updated: 06/19/25 0501     Blood Culture No Growth After 36 Hours    Fungus culture [[id number]] Collected: 06/18/25 1255    Order Status: Sent Specimen: Body Fluid from Abdomen Updated: 06/18/25 1824    Culture, Respiratory with Gram Stain [[id number]]     Order Status: Sent Specimen: Respiratory     Culture, Anaerobe [[id number]]     Order Status: Sent Specimen: Skin           I have reviewed all pertinent labs within the past 24 hours.    Estimated Creatinine Clearance: 92.2 mL/min (based on SCr of 0.6 mg/dL).    Diagnostic Results:  I have reviewed all pertinent imaging results/findings within the past 24 hours.  Assessment and Plan:       55 y.o. W female diagnosed with IPAH in 2009 and started on Remodulin, Letairis, and Adcirca. She has a history of ABHIJEET (untreated - not able to tolerate CPAP 2/2 severe congestion), GERD, and Asthma.      Pt presents today due to nausea, vomiting and abdominal pain.  Her symptoms have been ongoing since Thursday or Friday 6/12 to 6/13.  She also reports increased drainage from her abdominal wall site that is greenish white, a mix of blood and white drainage in her kita tube bag, diaphoresis intermittently concerning for possible fever (the patient did not take her temperature) intermittently in the last few days, intermittent stabbing pain that progresses with movement but is present continuously in her right upper quadrant and right upper back, shoulder pain in her left shoulder concerning for referred pain from her  choledocholithiasis.  She has was recently discharged at the beginning of April for similar symptoms after which she a kita tube was placed for drainage of her gallbladder for cholecysitits. She was deemed not a surgical candidate due to high risk. She also underwent kita tube exchange on 5/19 with IR. On admission, Pt was hemodynamically stable and labs were largely unremarkable with no leukocytosis and no electrolyte abnormalities.  Patient has on her home oxygen requirements.  CT imaging as below.     CT 6/27/25:   1. Since the previous CT, cholecystostomy tube has been placed. There is mild wall thickening of the gallbladder with mild pericholecystic fluid. There is a focus of induration within the adjacent rectus musculature, through which the tube courses suggesting inflammation/hematoma secondary to tube placement. There is some indistinctness about the region, however no focal organized collection to suggest abscess. Continued follow-up is advised.  2. Choledocholithiasis, similar to the previous exam.  3. Findings suggest hepatic steatosis, correlation with LFTs recommended noting hepatomegaly.  4. Heterogeneous enhancement involving the right hepatic lobe inferiorly. This may reflect sequela of contrast phase. Underlying hemangioma is a consideration. Consider nonemergent outpatient MRI for characterization as warranted.  5. Please see above for several additional findings.      She was recently admitted for abdominal pain/nausea/vomiting and found to have acute cholecystitis on US, CT and HIDA. She was seen by general surgery and  felt to be too high risk for cholecystectomy, therefore kita tube placed 03/31/25. Post kita tube she did well other than an episode of SVT with HR in the 200s, which she converted out of wih 12 mg adenosine and was in sinus since then. EP considred multaq however no additional agents added at the time. She was discharged with home O2 and her kita tube. Since discharge,  seen by GS 05/01/25 and felt to remain high risk for surgery. They were consulting IR for drain study/interrogation. 05/19 had kita tube check and exchange.    For her pulmonary htn, current therapy is Remodulin infusing at 117ng/kg/min (pre-filled cassettes), Letairis 10mg qdaily, and Adcirca 40mg qdaily., Has T/F Adempas. Patient evaluated by lung transplant in 2020. Had everything set up for LUT, but says her caregivers were deemed not acceptable and is not a transplant candidate at [facility].     Last TTE:   TTE 12/16/25     ·  Left Ventricle: The left ventricle is normal in size. Normal wall thickness. Normal wall motion. Septal flattening in systole consistent with right ventricular pressure overload. There is normal systolic function with a visually estimated ejection fraction of 60 - 65%. There is normal diastolic function.  ·  Right Ventricle: Moderate right ventricular enlargement with hypertrophy. Systolic function is mildly to moderately reduced.  ·  The right atrium is mildly dilated.  ·  Tricuspid Valve: There is mild regurgitation.  ·  Pulmonary Artery: There is moderate to severe pulmonary hypertension. The estimated pulmonary artery systolic pressure is 67 mmHg.  ·  IVC/SVC: Normal venous pressure at 3 mmHg.      * Choledocholithiasis  Patient presents with significant and progressively worsening abdominal pain and left shoulder pain.  Patient has a kita tube in place and is status post kita tube exchange 5/19.  CT scans appear to be similar to previous CT scan on diagnosis of choledocholithiasis, however patient with purulent drainage from exit site of kita tube and some white purulence in her kita bag.  Patient also with nausea and vomiting, poor p.o. intake, worsening pain that has prompted her to come in.  - aerobic and anaerobic culture collected from percutaneous kita tube exit site  - blood cultures collected x2  - patient started on broad-spectrum antibiotics with vancomycin and Zosyn  -  Appreciate GEN BRUNO consult.   - consult to Infectious Disease, appreciate recommendations  - S/P tube exchange 6 18. Will await cultures.     WHO group 1 pulmonary arterial hypertension  -Continue home Remodulin, [id number] qdaily, and tadalafil 40mg qdaily.  Patient requiring home oxygen requirements.  -holding patient's Lasix dosing (takes 20 mg of Lasix p.o. daily) given patient has had extremely poor p.o. intake in the last few days since Friday and has not been able to keep down any significant amounts of solids or liquids, appears dry on exam    Pneumonia  Patient with suspected pneumonia with infiltrate on chest x-ray.  Patient already on broad-spectrum antibiotics for choledocholithiasis.  Concern possibility of aspiration given patient's vomiting at home and difficulty keeping down food even abdominal pain.  Otherwise no significant increase in oxygen requirements.   -sputum culture ordered  -infectious disease consult, appreciate recommendations  -continue broad-spectrum antibiotics per of choledocholithiasis problem.    SVT (supraventricular tachycardia)  Hx of SVT post kita tube placement that converted with adenosine. EP did not add any agents currently but will plan to add Multaq if she has future issues         Naveen Hurtado, NP  Heart Transplant  [facility]

## 2025-06-20 LAB
ABSOLUTE EOSINOPHIL (OHS): 0.29 K/UL
ABSOLUTE MONOCYTE (OHS): 0.69 K/UL (ref 0.3–1)
ABSOLUTE NEUTROPHIL COUNT (OHS): 3.5 K/UL (ref 1.8–7.7)
ALBUMIN SERPL BCP-MCNC: 3.3 G/DL (ref 3.5–5.2)
ALP SERPL-CCNC: 67 UNIT/L (ref 40–150)
ALT SERPL W/O P-5'-P-CCNC: 7 UNIT/L (ref 10–44)
ANION GAP (OHS): 7 MMOL/L (ref 8–16)
AST SERPL-CCNC: 10 UNIT/L (ref 11–45)
BACTERIA SPEC AEROBE CULT: ABNORMAL
BACTERIA SPEC AEROBE CULT: ABNORMAL
BASOPHILS # BLD AUTO: 0.03 K/UL
BASOPHILS NFR BLD AUTO: 0.5 %
BILIRUB SERPL-MCNC: 0.2 MG/DL (ref 0.1–1)
BUN SERPL-MCNC: 9 MG/DL (ref 6–20)
CALCIUM SERPL-MCNC: 8.5 MG/DL (ref 8.7–10.5)
CHLORIDE SERPL-SCNC: 113 MMOL/L (ref 95–110)
CO2 SERPL-SCNC: 19 MMOL/L (ref 23–29)
CREAT SERPL-MCNC: 0.6 MG/DL (ref 0.5–1.4)
ERYTHROCYTE [DISTWIDTH] IN BLOOD BY AUTOMATED COUNT: 14 % (ref 11.5–14.5)
GFR SERPLBLD CREATININE-BSD FMLA CKD-EPI: >60 ML/MIN/1.73/M2
GLUCOSE SERPL-MCNC: 100 MG/DL (ref 70–110)
HCT VFR BLD AUTO: 37.5 % (ref 37–48.5)
HGB BLD-MCNC: 12.3 GM/DL (ref 12–16)
IMM GRANULOCYTES # BLD AUTO: 0.01 K/UL (ref 0–0.04)
IMM GRANULOCYTES NFR BLD AUTO: 0.2 % (ref 0–0.5)
LYMPHOCYTES # BLD AUTO: 0.96 K/UL (ref 1–4.8)
MAGNESIUM SERPL-MCNC: 2 MG/DL (ref 1.6–2.6)
MCH RBC QN AUTO: 29.3 PG (ref 27–31)
MCHC RBC AUTO-ENTMCNC: 32.8 G/DL (ref 32–36)
MCV RBC AUTO: 89 FL (ref 82–98)
NUCLEATED RBC (/100WBC) (OHS): 0 /100 WBC
PHOSPHATE SERPL-MCNC: 3.4 MG/DL (ref 2.7–4.5)
PLATELET # BLD AUTO: 178 K/UL (ref 150–450)
PMV BLD AUTO: 12.3 FL (ref 9.2–12.9)
POTASSIUM SERPL-SCNC: 3.6 MMOL/L (ref 3.5–5.1)
PROT SERPL-MCNC: 6.2 GM/DL (ref 6–8.4)
RBC # BLD AUTO: 4.2 M/UL (ref 4–5.4)
RELATIVE EOSINOPHIL (OHS): 5.3 %
RELATIVE LYMPHOCYTE (OHS): 17.5 % (ref 18–48)
RELATIVE MONOCYTE (OHS): 12.6 % (ref 4–15)
RELATIVE NEUTROPHIL (OHS): 63.9 % (ref 38–73)
SODIUM SERPL-SCNC: 139 MMOL/L (ref 136–145)
WBC # BLD AUTO: 5.48 K/UL (ref 3.9–12.7)

## 2025-06-20 PROCEDURE — 25000003 PHARM REV CODE 250: Performed by: INTERNAL MEDICINE

## 2025-06-20 PROCEDURE — 84100 ASSAY OF PHOSPHORUS: CPT

## 2025-06-20 PROCEDURE — 25000003 PHARM REV CODE 250: Performed by: PHYSICIAN ASSISTANT

## 2025-06-20 PROCEDURE — 63600175 PHARM REV CODE 636 W HCPCS: Performed by: INTERNAL MEDICINE

## 2025-06-20 PROCEDURE — C1751 CATH, INF, PER/CENT/MIDLINE: HCPCS

## 2025-06-20 PROCEDURE — 27000207 HC ISOLATION

## 2025-06-20 PROCEDURE — 80053 COMPREHEN METABOLIC PANEL: CPT

## 2025-06-20 PROCEDURE — 36410 VNPNXR 3YR/> PHY/QHP DX/THER: CPT

## 2025-06-20 PROCEDURE — 63600175 PHARM REV CODE 636 W HCPCS: Mod: TB

## 2025-06-20 PROCEDURE — 36415 COLL VENOUS BLD VENIPUNCTURE: CPT

## 2025-06-20 PROCEDURE — 76937 US GUIDE VASCULAR ACCESS: CPT

## 2025-06-20 PROCEDURE — 85025 COMPLETE CBC W/AUTO DIFF WBC: CPT

## 2025-06-20 PROCEDURE — 83735 ASSAY OF MAGNESIUM: CPT

## 2025-06-20 PROCEDURE — 25000003 PHARM REV CODE 250

## 2025-06-20 PROCEDURE — 25000003 PHARM REV CODE 250: Performed by: STUDENT IN AN ORGANIZED HEALTH CARE EDUCATION/TRAINING PROGRAM

## 2025-06-20 PROCEDURE — 99233 SBSQ HOSP IP/OBS HIGH 50: CPT | Mod: ,,, | Performed by: INTERNAL MEDICINE

## 2025-06-20 PROCEDURE — 94640 AIRWAY INHALATION TREATMENT: CPT

## 2025-06-20 PROCEDURE — 27000221 HC OXYGEN, UP TO 24 HOURS

## 2025-06-20 PROCEDURE — 25000003 PHARM REV CODE 250: Performed by: NURSE PRACTITIONER

## 2025-06-20 PROCEDURE — 99900035 HC TECH TIME PER 15 MIN (STAT)

## 2025-06-20 PROCEDURE — 94761 N-INVAS EAR/PLS OXIMETRY MLT: CPT

## 2025-06-20 PROCEDURE — 20600001 HC STEP DOWN PRIVATE ROOM

## 2025-06-20 RX ORDER — AMOXICILLIN 250 MG
1 CAPSULE ORAL 2 TIMES DAILY PRN
Status: DISCONTINUED | OUTPATIENT
Start: 2025-06-20 | End: 2025-06-21 | Stop reason: HOSPADM

## 2025-06-20 RX ORDER — SODIUM CHLORIDE 0.9 % (FLUSH) 0.9 %
10 SYRINGE (ML) INJECTION EVERY 12 HOURS PRN
Status: DISCONTINUED | OUTPATIENT
Start: 2025-06-20 | End: 2025-06-21 | Stop reason: HOSPADM

## 2025-06-20 RX ORDER — GABAPENTIN 100 MG/1
200 CAPSULE ORAL 3 TIMES DAILY
Status: DISCONTINUED | OUTPATIENT
Start: 2025-06-20 | End: 2025-06-21 | Stop reason: HOSPADM

## 2025-06-20 RX ORDER — CEFEPIME HYDROCHLORIDE 1 G/1
2 INJECTION, POWDER, FOR SOLUTION INTRAMUSCULAR; INTRAVENOUS
Status: DISCONTINUED | OUTPATIENT
Start: 2025-06-20 | End: 2025-06-20

## 2025-06-20 RX ORDER — POLYETHYLENE GLYCOL 3350 17 G/17G
17 POWDER, FOR SOLUTION ORAL 2 TIMES DAILY PRN
Status: DISCONTINUED | OUTPATIENT
Start: 2025-06-20 | End: 2025-06-21 | Stop reason: HOSPADM

## 2025-06-20 RX ADMIN — ACETAMINOPHEN 650 MG: 325 TABLET ORAL at 07:06

## 2025-06-20 RX ADMIN — DIPHENHYDRAMINE HYDROCHLORIDE 25 MG: 25 CAPSULE ORAL at 08:06

## 2025-06-20 RX ADMIN — TRAMADOL HYDROCHLORIDE 50 MG: 50 TABLET, COATED ORAL at 09:06

## 2025-06-20 RX ADMIN — GABAPENTIN 200 MG: 100 CAPSULE ORAL at 01:06

## 2025-06-20 RX ADMIN — TADALAFIL 40 MG: 20 TABLET ORAL at 08:06

## 2025-06-20 RX ADMIN — CEFEPIME 2 G: 1 INJECTION, POWDER, FOR SOLUTION INTRAMUSCULAR; INTRAVENOUS at 02:06

## 2025-06-20 RX ADMIN — AMBRISENTAN 10 MG: 10 TABLET, FILM COATED ORAL at 09:06

## 2025-06-20 RX ADMIN — FLUTICASONE FUROATE AND VILANTEROL TRIFENATATE 1 PUFF: 200; 25 POWDER RESPIRATORY (INHALATION) at 08:06

## 2025-06-20 RX ADMIN — GABAPENTIN 200 MG: 100 CAPSULE ORAL at 08:06

## 2025-06-20 RX ADMIN — PANTOPRAZOLE SODIUM 40 MG: 40 TABLET, DELAYED RELEASE ORAL at 08:06

## 2025-06-20 RX ADMIN — TOPIRAMATE 100 MG: 25 TABLET, FILM COATED ORAL at 08:06

## 2025-06-20 RX ADMIN — LEVOTHYROXINE SODIUM 125 MCG: 0.12 TABLET ORAL at 06:06

## 2025-06-20 RX ADMIN — MEROPENEM 2 G: 2 INJECTION, POWDER, FOR SOLUTION INTRAVENOUS at 04:06

## 2025-06-20 RX ADMIN — MEROPENEM 2 G: 2 INJECTION, POWDER, FOR SOLUTION INTRAVENOUS at 11:06

## 2025-06-20 RX ADMIN — TREPROSTINIL 7078.5 NG/MIN: 200 INJECTION, SOLUTION INTRAVENOUS; SUBCUTANEOUS at 02:06

## 2025-06-20 NOTE — ASSESSMENT & PLAN NOTE
55 year old female with a history of pulmonary artery hypertension and cholecystitis managed with a cholecystomy tube.  She is now admitted with abdominal pain and nausea likely secondary cholecystomy tube malfunction.  Patient is now s/p tube exchange by IR.  Cultures are positive for GNR.    Plan  Continue ceftriaxone.  Follow up on cultures.

## 2025-06-20 NOTE — TREATMENT PLAN
AES Treatment Plan    Yuni Perez is a 55 y.o. female admitted to hospital 6/17/2025 (Hospital Day: 4) due to Choledocholithiasis.     Interval History  No events overnight. Abdominal fluid cx speciated as pansensitive pseudomonas.     Objective  Temp:  [97.5 °F (36.4 °C)-99.1 °F (37.3 °C)] 98.1 °F (36.7 °C) (06/20 1110)  Pulse:  [71-96] 83 (06/20 1110)  BP: ()/(51-67) 96/60 (06/20 1110)  Resp:  [18-19] 19 (06/20 1110)  SpO2:  [95 %-98 %] 96 % (06/20 1110)    Laboratory    Recent Labs   Lab 06/18/25  0508 06/19/25  0534 06/20/25  0516   HGB 12.7 12.8 12.3       Lab Results   Component Value Date    WBC 5.48 06/20/2025    HGB 12.3 06/20/2025    HCT 37.5 06/20/2025    MCV 89 06/20/2025     06/20/2025       Lab Results   Component Value Date     06/20/2025    K 3.6 06/20/2025     (H) 06/20/2025    CO2 19 (L) 06/20/2025    BUN 9 06/20/2025    CREATININE 0.6 06/20/2025    CALCIUM 8.5 (L) 06/20/2025    ANIONGAP 7 (L) 06/20/2025    ESTGFRAFRICA >60.0 06/10/2022    EGFRNONAA >60.0 06/10/2022       Lab Results   Component Value Date    ALT 7 (L) 06/20/2025    AST 10 (L) 06/20/2025    ALKPHOS 67 06/20/2025    BILITOT 0.2 06/20/2025       Lab Results   Component Value Date    INR 1.2 04/02/2025    INR 1.1 03/31/2025    INR 1.1 03/30/2025   Assessment:  55F with severe pulmonary HTN (on Remodulin, Ambrisentan and Tadalafil), ABHIJEET (not able to tolerate CPAP 2/2 severe congestion), seizure disorder, hypothyroidism, admission from 3/30/25 to 4/7/25 for acute cholecystitis (not a surgical candidate), AES consulted for choledocholithiasis but procedure deferred as risks outweighed benefits of removing non-obstructing stone now s/p kita tube with IR on 3/31 and exchange on 5/19 who presented to [facility] on 6/17 with nausea and RUQ abdominal pain which started on Friday 6/13 after her tube became partially dislodged. AES consulted for choledocholithiasis seen on imaging. She was evaluated by general  surgery who placed a stitch to hold her tube in place but remains not a candidate for surgical intervention. Her LFTs are normal, and she is without jaundice/scleral icterus. ERCP deferred as her pain seemed to be more related to kita tube dislodgement and the risks of sedating her for a non-obstructing stone outweighed the benefits. IR consulted; she is now s/p kita tube exchange with 5 ML of purulent bilious fluid removed and sent for cx. Her pain did improve after the kita tube exchange. Cx grew pansensitive pseudomonas, on CTX with plans to transition to PO regimen. Her LFTs have remained normal throughout admission.    Problem List:  Hx of cholecystitis s/p kita tube 3/31 with exchange 5/19 and repeat exchange 6/18  Pansensitive pseudomonas on cx from tube exchange  Choledocholithiasis, non-obstructing   Abdominal pain  Severe Pulmonary HTN    Recommendations:  - If symptoms were to continue despite tube exchange and or evidence of cholangitis were present, could consider ERCP for small nonobstructive choledocholithiasis as long as risks/benefits were discussed with anesthesia beforehand and documented. As of now, there is no clinical indication or benefit to proceed with what would be a high risk procedure.  - Plan of care was discussed with primary team.  - We are signing-off. Please call with any questions.    Thank you for involving us in the care of Yuni Perez. Please call with any additional questions, concerns or changes in the patient's clinical status.    Kamala Causey MD  Gastroenterology, PGY-V

## 2025-06-20 NOTE — PROGRESS NOTES
Dc planning note     Tentative dc today. Requested HH orders to resume wound care and labs with [facility] ([phone]), per pt's request. Orders received and provided to [facility] via [employer] in Basket. Following and available.     11:15  Received message via basket from Lindsay at [facility] stating pt was accepted to HH services.     14:29  Informed by TIFFANY Woo, of updated HH orders for [facility] due to pt's need for home IV Abx. Informed Marylou at [facility] of updated orders and that above was dropped in basket. Also, completed referral with Toshia at [facility] ([phone]) and provided orders via basket. Awaiting decision from Toshia.     16:37  Apryl at [facility] stated that pt was accepted and informed SW that she completed IV Abx education with pt. Apryl stated that she will plan delivery to pt's home. Informed Apryl that HH services were arranged with [facility].

## 2025-06-20 NOTE — ASSESSMENT & PLAN NOTE
Patient presents with significant and progressively worsening abdominal pain and left shoulder pain.  Patient has a kita tube in place and is status post kita tube exchange 5/19.  CT scans appear to be similar to previous CT scan on diagnosis of choledocholithiasis, however patient with purulent drainage from exit site of kita tube and some white purulence in her kita bag.  Patient also with nausea and vomiting, poor p.o. intake, worsening pain that has prompted her to come in.  - aerobic and anaerobic culture collected from percutaneous kita tube exit site  - blood cultures collected x2  - patient started on broad-spectrum antibiotics with vancomycin and Zosyn  - Appreciate GEN BRUNO consult.   - consult to Infectious Disease, appreciate recommendations  - S/P tube exchange 6 18.   - Cultures resulted in pseudomonas, will await ID final recommendation

## 2025-06-20 NOTE — PROGRESS NOTES
[facility] Stepdown  Heart Transplant  Progress Note    Patient Name: Yuni Perez  MRN: [id number]  Admission Date: 6/17/2025  Hospital Length of Stay: 3 days  Attending Physician: Jerry Zuñiga, *  Primary Care Provider: Lulu Sandhu MD  Principal Problem:Choledocholithiasis    Subjective:   Interval History: No events overnight. Abdomen still somewhat bothering her. Pain improved a bit. Thought gabapentin yesterday was helpful for the pain. Cultures resulted in pseudomonas    Continuous Infusions:   veletri/remodulin cassette   Intravenous Continuous        veletri/remodulin tubing   Intravenous Continuous         Scheduled Meds:   tadalafil  40 mg Oral Daily    cefTRIAXone (Rocephin) IV (PEDS and ADULTS)  2 g Intravenous Q24H    fluticasone furoate-vilanteroL  1 puff Inhalation Daily    gabapentin  200 mg Oral TID    ambrisentan  10 mg Oral Daily    levothyroxine  125 mcg Oral Before breakfast    pantoprazole  40 mg Oral Daily    topiramate  100 mg Oral BID    treprostinil sodium (REMODULIN) 12,000,000 ng in 0.9% NaCl 100 mL infusion  117 ng/kg/min (Order-Specific) Intravenous Q24H     PRN Meds:  Current Facility-Administered Medications:     acetaminophen, 650 mg, Oral, Q4H PRN    albuterol, 2 puff, Inhalation, Q6H PRN    albuterol-ipratropium, 3 mL, Nebulization, Q6H PRN    ALPRAZolam, 0.25 mg, Oral, BID PRN    butalbital-acetaminophen-caffeine -40 mg, 1 tablet, Oral, Q4H PRN    dextrose 50%, 12.5 g, Intravenous, PRN    diphenhydrAMINE, 25 mg, Oral, Q6H PRN    fluticasone propionate, 2 spray, Each Nostril, Daily PRN    glucagon (human recombinant), 1 mg, Intramuscular, PRN    melatonin, 6 mg, Oral, Nightly PRN    morphine, 2 mg, Intravenous, Q5 Min PRN    ondansetron, 4 mg, Intravenous, Q8H PRN    polyethylene glycol, 17 g, Oral, BID PRN    senna-docusate, 1 tablet, Oral, BID PRN    sodium chloride 0.9%, 10 mL, Intravenous, PRN    traMADoL, 50 mg, Oral, Q6H  "PRN    Review of patient's allergies indicates:   Allergen Reactions    Fentanyl Anaphylaxis     Respiratory distress    Vibra-tabs [doxycycline hyclate] Anaphylaxis     "throat felt like it was closing"    Adhesive Hives     Silk tape    Amoxicillin Rash    Nsaids (non-steroidal anti-inflammatory drug) Swelling    Chlorhexidine Other (See Comments)     Blue Chlorhexidine causes hives     Objective:     Vital Signs (Most Recent):  Temp: 98.1 °F (36.7 °C) (06/20/25 1110)  Pulse: 83 (06/20/25 1110)  Resp: 19 (06/20/25 1110)  BP: 96/60 (06/20/25 1110)  SpO2: 96 % (06/20/25 1110) Vital Signs (24h Range):  Temp:  [97.5 °F (36.4 °C)-99.1 °F (37.3 °C)] 98.1 °F (36.7 °C)  Pulse:  [71-96] 83  Resp:  [18-19] 19  SpO2:  [95 %-98 %] 96 %  BP: ()/(51-67) 96/60     Patient Vitals for the past 72 hrs (Last 3 readings):   Weight   06/20/25 0400 63.4 kg (139 lb 12.4 oz)   06/19/25 0500 63 kg (138 lb 14.2 oz)   06/18/25 1129 63 kg (139 lb)       Body mass index is 28.23 kg/m².      Intake/Output Summary (Last 24 hours) at 6/20/2025 1237  Last data filed at 6/19/2025 2230  Gross per 24 hour   Intake 240 ml   Output 670 ml   Net -430 ml        Physical Exam  Vitals and nursing note reviewed.   Constitutional:       Appearance: She is well-developed.   HENT:      Head: Normocephalic.   Eyes:      Pupils: Pupils are equal, round, and reactive to light.   Cardiovascular:      Rate and Rhythm: Normal rate and regular rhythm.   Pulmonary:      Effort: Pulmonary effort is normal.      Breath sounds: Normal breath sounds.   Abdominal:      General: Bowel sounds are normal.      Palpations: Abdomen is soft.      Tenderness: There is abdominal tenderness.      Comments: Sue tube in place    Musculoskeletal:         General: Normal range of motion.      Cervical back: Normal range of motion and neck supple.   Skin:     General: Skin is warm and dry.   Neurological:      Mental Status: She is alert and oriented to person, place, and time. " "  Psychiatric:         Behavior: Behavior normal.            Significant Labs:  CBC:  Recent Labs   Lab 06/18/25  0508 06/19/25  0534 06/20/25  0516   WBC 5.76 4.79 5.48   RBC 4.34 4.38 4.20   HGB 12.7 12.8 12.3   HCT 38.5 38.8 37.5    177 178   MCV 89 89 89   MCH 29.3 29.2 29.3   MCHC 33.0 33.0 32.8     BNP:  No results for input(s): "BNP" in the last 168 hours.    Invalid input(s): "BNPTRIAGELBLO"  CMP:  Recent Labs   Lab 06/18/25  0509 06/19/25  0534 06/20/25  0516   GLU 93 91 100   CALCIUM 8.5* 8.5* 8.5*   ALBUMIN 3.4* 3.4* 3.3*   PROT 6.3 6.4 6.2    137 139   K 3.6 3.2* 3.6   CO2 21* 20* 19*   * 109 113*   BUN 8 9 9   CREATININE 0.8 0.6 0.6   ALKPHOS 75 73 67   ALT 7* 9* 7*   AST 14 13 10*   BILITOT 0.3 0.2 0.2      Coagulation:   No results for input(s): "PT", "INR", "APTT" in the last 168 hours.    LDH:  No results for input(s): "LDH" in the last 72 hours.  Microbiology:  Microbiology Results (last 7 days)       Procedure Component Value Units Date/Time    Aerobic culture (Specify Source) **CANNOT BE ORDERED AS STAT* [[id number]]  (Abnormal)  (Susceptibility) Collected: 06/17/25 1606    Order Status: Completed Specimen: Wound from Abdomen Updated: 06/20/25 1206     CULTURE, AEROBIC Moderate Pseudomonas aeruginosa    Aerobic culture [[id number]]  (Abnormal)  (Susceptibility) Collected: 06/18/25 1255    Order Status: Completed Specimen: Body Fluid from Abdomen Updated: 06/20/25 1128     CULTURE, AEROBIC Few Pseudomonas aeruginosa    Culture, Anaerobic [[id number]] Collected: 06/18/25 1255    Order Status: Completed Specimen: Body Fluid from Abdomen Updated: 06/20/25 1035     Anaerobe Culture Culture In Progress    Blood culture x two cultures. Draw prior to antibiotics. [[id number]]  (Normal) Collected: 06/17/25 1421    Order Status: Completed Specimen: Blood from Peripheral, Forearm, Left Updated: 06/19/25 1709     Blood Culture No Growth After 48 Hours    Blood culture x two cultures. Draw " prior to antibiotics. [[id number]]  (Normal) Collected: 06/17/25 1422    Order Status: Completed Specimen: Blood from Peripheral, Forearm, Right Updated: 06/19/25 1702     Blood Culture No Growth After 48 Hours    Gram stain [[id number]] Collected: 06/18/25 1257    Order Status: Completed Specimen: Tissue from Abdomen Updated: 06/19/25 1053     GRAM STAIN Moderate WBC seen      Few Gram Negative Rods    Fungus culture [[id number]] Collected: 06/18/25 1255    Order Status: Sent Specimen: Body Fluid from Abdomen Updated: 06/18/25 1824    Culture, Respiratory with Gram Stain [[id number]]     Order Status: Sent Specimen: Respiratory     Culture, Anaerobe [[id number]]     Order Status: Sent Specimen: Skin           I have reviewed all pertinent labs within the past 24 hours.    Estimated Creatinine Clearance: 92.4 mL/min (based on SCr of 0.6 mg/dL).    Diagnostic Results:  I have reviewed all pertinent imaging results/findings within the past 24 hours.  Assessment and Plan:       55 y.o. W female diagnosed with IPAH in 2009 and started on Remodulin, Letairis, and Adcirca. She has a history of ABHIJEET (untreated - not able to tolerate CPAP 2/2 severe congestion), GERD, and Asthma.      Pt presents today due to nausea, vomiting and abdominal pain.  Her symptoms have been ongoing since Thursday or Friday 6/12 to 6/13.  She also reports increased drainage from her abdominal wall site that is greenish white, a mix of blood and white drainage in her kita tube bag, diaphoresis intermittently concerning for possible fever (the patient did not take her temperature) intermittently in the last few days, intermittent stabbing pain that progresses with movement but is present continuously in her right upper quadrant and right upper back, shoulder pain in her left shoulder concerning for referred pain from her choledocholithiasis.  She has was recently discharged at the beginning of April for similar symptoms after which she a kita tube  was placed for drainage of her gallbladder for cholecysitits. She was deemed not a surgical candidate due to high risk. She also underwent kita tube exchange on 5/19 with IR. On admission, Pt was hemodynamically stable and labs were largely unremarkable with no leukocytosis and no electrolyte abnormalities.  Patient has on her home oxygen requirements.  CT imaging as below.     CT 6/27/25:   1. Since the previous CT, cholecystostomy tube has been placed. There is mild wall thickening of the gallbladder with mild pericholecystic fluid. There is a focus of induration within the adjacent rectus musculature, through which the tube courses suggesting inflammation/hematoma secondary to tube placement. There is some indistinctness about the region, however no focal organized collection to suggest abscess. Continued follow-up is advised.  2. Choledocholithiasis, similar to the previous exam.  3. Findings suggest hepatic steatosis, correlation with LFTs recommended noting hepatomegaly.  4. Heterogeneous enhancement involving the right hepatic lobe inferiorly. This may reflect sequela of contrast phase. Underlying hemangioma is a consideration. Consider nonemergent outpatient MRI for characterization as warranted.  5. Please see above for several additional findings.      She was recently admitted for abdominal pain/nausea/vomiting and found to have acute cholecystitis on US, CT and HIDA. She was seen by general surgery and  felt to be too high risk for cholecystectomy, therefore kita tube placed 03/31/25. Post kita tube she did well other than an episode of SVT with HR in the 200s, which she converted out of wih 12 mg adenosine and was in sinus since then. EP considred multaq however no additional agents added at the time. She was discharged with home O2 and her kita tube. Since discharge, seen by GS 05/01/25 and felt to remain high risk for surgery. They were consulting IR for drain study/interrogation. 05/19 had kita  tube check and exchange.    For her pulmonary htn, current therapy is Remodulin infusing at 117ng/kg/min (pre-filled cassettes), Letairis 10mg qdaily, and Adcirca 40mg qdaily., Has T/F Adempas. Patient evaluated by lung transplant in 2020. Had everything set up for LUT, but says her caregivers were deemed not acceptable and is not a transplant candidate at [facility].     Last TTE:   TTE 12/16/25     ·  Left Ventricle: The left ventricle is normal in size. Normal wall thickness. Normal wall motion. Septal flattening in systole consistent with right ventricular pressure overload. There is normal systolic function with a visually estimated ejection fraction of 60 - 65%. There is normal diastolic function.  ·  Right Ventricle: Moderate right ventricular enlargement with hypertrophy. Systolic function is mildly to moderately reduced.  ·  The right atrium is mildly dilated.  ·  Tricuspid Valve: There is mild regurgitation.  ·  Pulmonary Artery: There is moderate to severe pulmonary hypertension. The estimated pulmonary artery systolic pressure is 67 mmHg.  ·  IVC/SVC: Normal venous pressure at 3 mmHg.      * Choledocholithiasis  Patient presents with significant and progressively worsening abdominal pain and left shoulder pain.  Patient has a kita tube in place and is status post kita tube exchange 5/19.  CT scans appear to be similar to previous CT scan on diagnosis of choledocholithiasis, however patient with purulent drainage from exit site of kita tube and some white purulence in her kita bag.  Patient also with nausea and vomiting, poor p.o. intake, worsening pain that has prompted her to come in.  - aerobic and anaerobic culture collected from percutaneous kita tube exit site  - blood cultures collected x2  - patient started on broad-spectrum antibiotics with vancomycin and Zosyn  - Appreciate GEN BRUNO consult.   - consult to Infectious Disease, appreciate recommendations  - S/P tube exchange 6 18.   - Cultures  resulted in pseudomonas, will await ID final recommendation    Pneumonia  Patient with suspected pneumonia with infiltrate on chest x-ray.  Patient already on broad-spectrum antibiotics for choledocholithiasis.  Concern possibility of aspiration given patient's vomiting at home and difficulty keeping down food even abdominal pain.  Otherwise no significant increase in oxygen requirements.   -sputum culture ordered  -infectious disease consult, appreciate recommendations  -continue broad-spectrum antibiotics per of choledocholithiasis problem.    SVT (supraventricular tachycardia)  Hx of SVT post kita tube placement that converted with adenosine. EP did not add any agents currently but will plan to add Multaq if she has future issues     WHO group 1 pulmonary arterial hypertension  -Continue home Remodulin, [id number] qdaily, and tadalafil 40mg qdaily.  Patient requiring home oxygen requirements.  -holding patient's Lasix dosing (takes 20 mg of Lasix p.o. daily) given patient has had extremely poor p.o. intake in the last few days since Friday and has not been able to keep down any significant amounts of solids or liquids, appears dry on exam        Chilo Myers PA-C  Heart Transplant  [facility] Transplant [facility]down

## 2025-06-20 NOTE — PLAN OF CARE
[facility]   Heart Transplant/PHTN Clinic   [address] [place] [address]   [phone] after hours [phone] fax   HOME HEALTH ORDERS     Admit to Home Health   Diagnosis:Problem List[1]    Send follow up questions to [phone] or [phone].     Patient is homebound due to:Pulmonary Hypertension    Diet: Cardiac diet    Acitivities: as tolerated    Nursing:   SN to complete comprehensive assessment including routine vital signs. Instruct on disease process and s/s of complications to report to MD. Review/verify medication list sent home with the patient at time of discharge and instruct patient/caregiver as needed. Frequency may be adjusted depending on start of care date.     Notify MD if SBP > 160 or < 90; DBP > 90 or < 50; HR > 120 or < 50; Temp > 101; Weight gain >3lbs in 1 day or 5lbs in 1 week.  Other:       LABS: SN to perform labs: Weekly bmp, mag, cbc.     HOME INFUSION THERAPY:     Meropenem 2 grams, every 8 hours, for 7 days total, End date 6/26/25    ** Do not flush/draw back or manipulate line at any time. Epoprostenol/Treprostinil infusion should not be interrupted for any reason**     Sue tube: Please fush w 5-10cc NS Q12H or prn(pt can perform).   Dressing boyd q3days or prn.      SN to perform Infusion Therapy/Central Line Care.   Review Central Line Care     **For questions or concerns, please call [phone] and ask for Pulmonary Hypertension clinic, M-F 8-5. After hours, weekends, call [phone] and ask for the Heart Transplant Cardiologist on call.**      -Sue Tube: To be flushed with 5-10cc NS Q12H or prn(pt can perform).    Dressing to be changed Q3 days and prn.   Send follow up questions to [phone] or [phone].         Send follow up questions to [phone] or [phone].           [1]   Patient Active Problem List  Diagnosis    Chronic pulmonary heart disease    Liver mass    Non-allergic  vasomotor rhinitis    Allergic asthma    WHO group 1 pulmonary arterial hypertension    Tricuspid regurgitation    Hypothyroidism (acquired)    Migraine without aura and without status migrainosus, not intractable    Lumbar disc disease    Moderate asthma    Chronic low back pain    Overweight (BMI 25.0-29.9)    Menorrhagia    ABHIJEET (obstructive sleep apnea)    Medication overuse headache    Cholelithiasis    Borderline osteopenia    Hepatic adenoma    Left shoulder pain    Chronic tension-type headache, not intractable    Mild intermittent asthma    Pasteurella cellulitis due to cat bite    Cervicogenic headache    Episodic migraine    Chronic neck pain    Decreased range of motion of neck    Posture abnormality    Cholecystitis    Moderate protein-calorie malnutrition    SVT (supraventricular tachycardia)    Fungal rash of trunk    Choledocholithiasis    Pneumonia

## 2025-06-20 NOTE — PLAN OF CARE
Problem: Adult Inpatient Plan of Care  Goal: Absence of Hospital-Acquired Illness or Injury  Outcome: Progressing     Problem: Pneumonia  Goal: Fluid Balance  Outcome: Progressing   Pt progressing towards goals of care. AOx4. Up OOB in recliner. Independent with ADL's,  Appetite is fair. Pt tolerated meropenem without issue. Midline placed for home abx. Remodulin infusing at 85cc /24 hr. Cassette replaced at 1430. Right bili drain with scant cloudy drainage.

## 2025-06-20 NOTE — PROGRESS NOTES
Dc note     Met with pt in room to discuss tentative dc tomorrow. Pt was AAOx4 with open affect. Inquired if pt's father would be able to bring her Remodulin Cassette from home for dc tomorrow, which pt confirmed he will be able to bring. Informed pt that Marylou at [facility] ([phone]) was notified of her anticipated dc tomorrow. Informed pt that Intake will contact her with SOC. Also, discussed home IV Abx with pt and informed her that services were arranged with [facility] ([phone]). Pt confirmed she completed education with Apryl. Pt was amenable to dc tomorrow and stated her father will transport her home. Pt did not verbalize any further dc needs/concerns. Pt will have support of her father post-dc. Pt reports coping adequately and denies further needs, questions, concerns at this time and none indicated. Providing psychosocial and counseling support, education, resources, assistance and discharge planning as indicated. Post-dc information added to sticky note for pt's nurse tomorrow. SW remains available.

## 2025-06-20 NOTE — CONSULTS
[facility] VASCULAR ACCESS NOTE       Bed:60913/37409 A    Single lumen 20G X 8CM Midline placed in the Right Brachial using Ultrasound Guidance.    Vessel image recorded and saved to EMR.    Indication: IV ABX  Technique: Seldinger Technique (PowerGlide)    Attempts: 1  Max dwell date: 7/19/25  Lot number: [id number]    Per INS Standards of Practice:     Do NOT infuse irritants/vesicants, pressors, or parenteral nutrition via Midline because catheter tip is located in a deep vein and early signs/symptoms of extravasation may not be detected.     Vesicant:  pH <5 or >9  Osmolarity >600 mOsm/L    Common antimicrobial medications labeled as vesicants include Vancomycin, Acyclovir, and Remdesivir.    Clinton Eric RN

## 2025-06-20 NOTE — SUBJECTIVE & OBJECTIVE
Interval History: No adverse     Review of Systems   Gastrointestinal:  Positive for abdominal pain.   All other systems reviewed and are negative.    Objective:     Vital Signs (Most Recent):  Temp: 98.3 °F (36.8 °C) (06/19/25 2310)  Pulse: 90 (06/19/25 2310)  Resp: 18 (06/19/25 2310)  BP: (!) 87/61 (06/19/25 2310)  SpO2: 96 % (06/19/25 2310) Vital Signs (24h Range):  Temp:  [97.5 °F (36.4 °C)-98.3 °F (36.8 °C)] 98.3 °F (36.8 °C)  Pulse:  [71-96] 90  Resp:  [16-18] 18  SpO2:  [94 %-98 %] 96 %  BP: ()/(52-70) 87/61     Weight: 63 kg (138 lb 14.2 oz)  Body mass index is 28.05 kg/m².    Estimated Creatinine Clearance: 92.2 mL/min (based on SCr of 0.6 mg/dL).     Physical Exam  Vitals and nursing note reviewed.   Constitutional:       General: She is not in acute distress.     Appearance: She is well-developed. She is not diaphoretic.   HENT:      Head: Normocephalic and atraumatic.      Right Ear: External ear normal.      Left Ear: External ear normal.      Nose: Nose normal.      Mouth/Throat:      Pharynx: No oropharyngeal exudate.   Eyes:      General: No scleral icterus.        Right eye: No discharge.         Left eye: No discharge.      Conjunctiva/sclera: Conjunctivae normal.      Pupils: Pupils are equal, round, and reactive to light.   Neck:      Thyroid: No thyromegaly.      Vascular: No JVD.      Trachea: No tracheal deviation.   Cardiovascular:      Rate and Rhythm: Normal rate and regular rhythm.      Heart sounds: No murmur heard.     No friction rub. No gallop.   Pulmonary:      Effort: Pulmonary effort is normal. No respiratory distress.      Breath sounds: Normal breath sounds. No stridor. No wheezing or rales.   Chest:      Chest wall: No tenderness.   Abdominal:      General: Bowel sounds are normal. There is no distension.      Palpations: Abdomen is soft. There is no mass.      Tenderness: There is abdominal tenderness. There is no guarding or rebound.      Comments: Cholecystotomy tube    Musculoskeletal:         General: No tenderness. Normal range of motion.      Cervical back: Normal range of motion and neck supple.   Lymphadenopathy:      Cervical: No cervical adenopathy.   Skin:     General: Skin is warm.      Coloration: Skin is not pale.      Findings: No erythema or rash.   Neurological:      Mental Status: She is alert and oriented to person, place, and time.      Cranial Nerves: No cranial nerve deficit.      Motor: No abnormal muscle tone.      Coordination: Coordination normal.      Deep Tendon Reflexes: Reflexes normal.   Psychiatric:         Behavior: Behavior normal.         Thought Content: Thought content normal.         Judgment: Judgment normal.          Significant Labs:   Microbiology Results (last 7 days)       Procedure Component Value Units Date/Time    Blood culture x two cultures. Draw prior to antibiotics. [[id number]]  (Normal) Collected: 06/17/25 1421    Order Status: Completed Specimen: Blood from Peripheral, Forearm, Left Updated: 06/19/25 1702     Blood Culture No Growth After 48 Hours    Blood culture x two cultures. Draw prior to antibiotics. [[id number]]  (Normal) Collected: 06/17/25 1422    Order Status: Completed Specimen: Blood from Peripheral, Forearm, Right Updated: 06/19/25 1702     Blood Culture No Growth After 48 Hours    Aerobic culture (Specify Source) **CANNOT BE ORDERED AS STAT* [[id number]]  (Abnormal) Collected: 06/17/25 1606    Order Status: Completed Specimen: Wound from Abdomen Updated: 06/19/25 1103     CULTURE, AEROBIC Moderate Gram-negative Rods     Comment: Identification and susceptibility pending       Gram stain [[id number]] Collected: 06/18/25 1257    Order Status: Completed Specimen: Tissue from Abdomen Updated: 06/19/25 1053     GRAM STAIN Moderate WBC seen      Few Gram Negative Rods    Aerobic culture [[id number]]  (Abnormal) Collected: 06/18/25 1255    Order Status: Completed Specimen: Body Fluid from Abdomen Updated: 06/19/25 0978      CULTURE, AEROBIC Few Gram-negative Rods     Comment: Identification and susceptibility pending       Culture, Anaerobic [[id number]] Collected: 06/18/25 1255    Order Status: Completed Specimen: Body Fluid from Abdomen Updated: 06/19/25 0834     Anaerobe Culture Culture In Progress    Fungus culture [[id number]] Collected: 06/18/25 1255    Order Status: Sent Specimen: Body Fluid from Abdomen Updated: 06/18/25 1824    Culture, Respiratory with Gram Stain [[id number]]     Order Status: Sent Specimen: Respiratory     Culture, Anaerobe [[id number]]     Order Status: Sent Specimen: Skin             Significant Imaging: I have reviewed all pertinent imaging results/findings within the past 24 hours.

## 2025-06-20 NOTE — OP NOTE
Outpatient Antibiotic Therapy Plan:    Please send referral to [facility].    1) Infection: Cholecystitis s/p placement of kita tube. Cultures positive for Pseudomonas.    2) Discharge Antibiotics:    Intravenous antibiotics:  Meropenem 2 grams IV q 8 hours       3) Therapy Duration:  7 days    Estimated end date of IV antibiotics: 6/27/25    4) Outpatient Weekly Labs:    Order the following labs to be drawn on Mondays:   CBC  CMP     5) Fax Lab Results to Infectious Diseases Provider: Dr. Monterroso    [facility] Fax Number: [phone]    6) Outpatient Infectious Diseases Follow-up    Follow-up appointment will be arranged by the ID clinic and will be found in the patient's appointments tab.    Prior to discharge, please ensure the patient's follow-up has been scheduled.    If there is still no follow-up scheduled prior to discharge, please send an [employer] message to [facility] Clinical Pool or Call Infectious Diseases Dept.

## 2025-06-20 NOTE — PROGRESS NOTES
[facility] - Transplant Stepdown  Infectious Disease  Progress Note    Patient Name: Yuni Perez  MRN: [id number]  Admission Date: 6/17/2025  Length of Stay: 3 days  Attending Physician: Jerry Zuñiga, *  Primary Care Provider: Lulu Sandhu MD    Isolation Status: No active isolations  Assessment/Plan:      GI  * Choledocholithiasis  55 year old female with a history of pulmonary artery hypertension and cholecystitis managed with a cholecystomy tube.  She is now admitted with abdominal pain and nausea likely secondary cholecystomy tube malfunction.  Patient is now s/p tube exchange by IR.  Cultures are positive for Pseudomonas.  Patient has a prolonged QT interval which would put her at risk for dysrhythmia with quinolone use.  Will treat with cefepime for 7 days.    Plan  Meropenem 2 grams IV q 8 hours for 7 days.        Anticipated Disposition: TBD    Thank you for your consult. I will sign off. Please contact us if you have any additional questions.    Azael Monterroso MD  Infectious Disease  [facility] - Transplant Stepdown    Time: 50 minutes   50% of time spent on face-to-face counseling and coordination of care. Counseling included review of test results, diagnosis, and treatment plan with patient and/or family.  I have reviewed hospital notes from primary service and other specialty providers as well as outside medical records. I have also reviewed CBC, CMP/BMP,  cultures and imaging with my interpretation as documented. Patient is high risk of morbidity, on antibiotics requiring intensive monitoring for toxicity.       Subjective:     Principal Problem:Choledocholithiasis    HPI: 55 year old female with a history of pulmonary hypertension.  She was hospitalized in march of 2025 for cholecystitis. She was deemed to not be a surgical candidate at that time.  She underwent cholecystostomy tube placement.  In the interim she had the tube up-sized.  She is admitted to [facility] with abdominal pain  and nausea thought to be related to malfunctioning of her cholecystostomy tube.   Interval History: No adverse events.    Review of Systems   Gastrointestinal:  Positive for abdominal pain.   All other systems reviewed and are negative.    Objective:     Vital Signs (Most Recent):  Temp: 98.1 °F (36.7 °C) (06/20/25 1110)  Pulse: 83 (06/20/25 1110)  Resp: 19 (06/20/25 1110)  BP: 96/60 (06/20/25 1110)  SpO2: 96 % (06/20/25 1110) Vital Signs (24h Range):  Temp:  [97.5 °F (36.4 °C)-99.1 °F (37.3 °C)] 98.1 °F (36.7 °C)  Pulse:  [71-96] 83  Resp:  [18-19] 19  SpO2:  [95 %-98 %] 96 %  BP: ()/(51-67) 96/60     Weight: 63.4 kg (139 lb 12.4 oz)  Body mass index is 28.23 kg/m².    Estimated Creatinine Clearance: 92.4 mL/min (based on SCr of 0.6 mg/dL).     Physical Exam  Vitals and nursing note reviewed.   Constitutional:       General: She is not in acute distress.     Appearance: She is well-developed. She is not diaphoretic.   HENT:      Head: Normocephalic and atraumatic.      Right Ear: External ear normal.      Left Ear: External ear normal.      Nose: Nose normal.      Mouth/Throat:      Pharynx: No oropharyngeal exudate.   Eyes:      General: No scleral icterus.        Right eye: No discharge.         Left eye: No discharge.      Conjunctiva/sclera: Conjunctivae normal.      Pupils: Pupils are equal, round, and reactive to light.   Neck:      Thyroid: No thyromegaly.      Vascular: No JVD.      Trachea: No tracheal deviation.   Cardiovascular:      Rate and Rhythm: Normal rate and regular rhythm.      Heart sounds: No murmur heard.     No friction rub. No gallop.   Pulmonary:      Effort: Pulmonary effort is normal. No respiratory distress.      Breath sounds: Normal breath sounds. No stridor. No wheezing or rales.   Chest:      Chest wall: No tenderness.   Abdominal:      General: Bowel sounds are normal. There is no distension.      Palpations: Abdomen is soft. There is no mass.      Tenderness: There is  abdominal tenderness. There is no guarding or rebound.      Comments: Cholecystotomy tube   Musculoskeletal:         General: No tenderness. Normal range of motion.      Cervical back: Normal range of motion and neck supple.   Lymphadenopathy:      Cervical: No cervical adenopathy.   Skin:     General: Skin is warm.      Coloration: Skin is not pale.      Findings: No erythema or rash.   Neurological:      Mental Status: She is alert and oriented to person, place, and time.      Cranial Nerves: No cranial nerve deficit.      Motor: No abnormal muscle tone.      Coordination: Coordination normal.      Deep Tendon Reflexes: Reflexes normal.   Psychiatric:         Behavior: Behavior normal.         Thought Content: Thought content normal.         Judgment: Judgment normal.          Significant Labs:   Microbiology Results (last 7 days)       Procedure Component Value Units Date/Time    Aerobic culture (Specify Source) **CANNOT BE ORDERED AS STAT* [[id number]]  (Abnormal)  (Susceptibility) Collected: 06/17/25 1606    Order Status: Completed Specimen: Wound from Abdomen Updated: 06/20/25 1206     CULTURE, AEROBIC Moderate Pseudomonas aeruginosa    Aerobic culture [[id number]]  (Abnormal)  (Susceptibility) Collected: 06/18/25 1255    Order Status: Completed Specimen: Body Fluid from Abdomen Updated: 06/20/25 1128     CULTURE, AEROBIC Few Pseudomonas aeruginosa    Culture, Anaerobic [[id number]] Collected: 06/18/25 1255    Order Status: Completed Specimen: Body Fluid from Abdomen Updated: 06/20/25 1035     Anaerobe Culture Culture In Progress    Blood culture x two cultures. Draw prior to antibiotics. [[id number]]  (Normal) Collected: 06/17/25 1421    Order Status: Completed Specimen: Blood from Peripheral, Forearm, Left Updated: 06/19/25 1702     Blood Culture No Growth After 48 Hours    Blood culture x two cultures. Draw prior to antibiotics. [[id number]]  (Normal) Collected: 06/17/25 1422    Order Status: Completed  Specimen: Blood from Peripheral, Forearm, Right Updated: 06/19/25 1702     Blood Culture No Growth After 48 Hours    Gram stain [[id number]] Collected: 06/18/25 1257    Order Status: Completed Specimen: Tissue from Abdomen Updated: 06/19/25 1053     GRAM STAIN Moderate WBC seen      Few Gram Negative Rods    Fungus culture [[id number]] Collected: 06/18/25 1255    Order Status: Sent Specimen: Body Fluid from Abdomen Updated: 06/18/25 1824    Culture, Respiratory with Gram Stain [[id number]]     Order Status: Sent Specimen: Respiratory     Culture, Anaerobe [[id number]]     Order Status: Sent Specimen: Skin             Significant Imaging: I have reviewed all pertinent imaging results/findings within the past 24 hours.

## 2025-06-20 NOTE — PROGRESS NOTES
[facility] - Transplant Stepdown  Infectious Disease  Progress Note    Patient Name: Yuni Perez  MRN: [id number]  Admission Date: 6/17/2025  Length of Stay: 2 days  Attending Physician: Jerry Zuñiga, *  Primary Care Provider: Lulu Sandhu MD    Isolation Status: No active isolations  Assessment/Plan:      GI  * Choledocholithiasis  55 year old female with a history of pulmonary artery hypertension and cholecystitis managed with a cholecystomy tube.  She is now admitted with abdominal pain and nausea likely secondary cholecystomy tube malfunction.  Patient is now s/p tube exchange by IR.  Cultures are positive for GNR.    Plan  Continue ceftriaxone.  Follow up on cultures.        Anticipated Disposition: TBD    Thank you for your consult. I will follow-up with patient. Please contact us if you have any additional questions.    Azael Monterroso MD  Infectious Disease  [facility] - Transplant Stepdown    Time: 50 minutes   50% of time spent on face-to-face counseling and coordination of care. Counseling included review of test results, diagnosis, and treatment plan with patient and/or family.  I have reviewed hospital notes from primary service and other specialty providers as well as outside medical records. I have also reviewed CBC, CMP/BMP,  cultures and imaging with my interpretation as documented. Patient is high risk of morbidity, on antibiotics requiring intensive monitoring for toxicity.       Subjective:     Principal Problem:Choledocholithiasis    HPI: 55 year old female with a history of pulmonary hypertension.  She was hospitalized in march of 2025 for cholecystitis. She was deemed to not be a surgical candidate at that time.  She underwent cholecystostomy tube placement.  In the interim she had the tube up-sized.  She is admitted to [facility] with abdominal pain and nausea thought to be related to malfunctioning of her cholecystostomy tube.   Interval History: No adverse     Review of  Systems   Gastrointestinal:  Positive for abdominal pain.   All other systems reviewed and are negative.    Objective:     Vital Signs (Most Recent):  Temp: 98.3 °F (36.8 °C) (06/19/25 2310)  Pulse: 90 (06/19/25 2310)  Resp: 18 (06/19/25 2310)  BP: (!) 87/61 (06/19/25 2310)  SpO2: 96 % (06/19/25 2310) Vital Signs (24h Range):  Temp:  [97.5 °F (36.4 °C)-98.3 °F (36.8 °C)] 98.3 °F (36.8 °C)  Pulse:  [71-96] 90  Resp:  [16-18] 18  SpO2:  [94 %-98 %] 96 %  BP: ()/(52-70) 87/61     Weight: 63 kg (138 lb 14.2 oz)  Body mass index is 28.05 kg/m².    Estimated Creatinine Clearance: 92.2 mL/min (based on SCr of 0.6 mg/dL).     Physical Exam  Vitals and nursing note reviewed.   Constitutional:       General: She is not in acute distress.     Appearance: She is well-developed. She is not diaphoretic.   HENT:      Head: Normocephalic and atraumatic.      Right Ear: External ear normal.      Left Ear: External ear normal.      Nose: Nose normal.      Mouth/Throat:      Pharynx: No oropharyngeal exudate.   Eyes:      General: No scleral icterus.        Right eye: No discharge.         Left eye: No discharge.      Conjunctiva/sclera: Conjunctivae normal.      Pupils: Pupils are equal, round, and reactive to light.   Neck:      Thyroid: No thyromegaly.      Vascular: No JVD.      Trachea: No tracheal deviation.   Cardiovascular:      Rate and Rhythm: Normal rate and regular rhythm.      Heart sounds: No murmur heard.     No friction rub. No gallop.   Pulmonary:      Effort: Pulmonary effort is normal. No respiratory distress.      Breath sounds: Normal breath sounds. No stridor. No wheezing or rales.   Chest:      Chest wall: No tenderness.   Abdominal:      General: Bowel sounds are normal. There is no distension.      Palpations: Abdomen is soft. There is no mass.      Tenderness: There is abdominal tenderness. There is no guarding or rebound.      Comments: Cholecystotomy tube   Musculoskeletal:         General: No  tenderness. Normal range of motion.      Cervical back: Normal range of motion and neck supple.   Lymphadenopathy:      Cervical: No cervical adenopathy.   Skin:     General: Skin is warm.      Coloration: Skin is not pale.      Findings: No erythema or rash.   Neurological:      Mental Status: She is alert and oriented to person, place, and time.      Cranial Nerves: No cranial nerve deficit.      Motor: No abnormal muscle tone.      Coordination: Coordination normal.      Deep Tendon Reflexes: Reflexes normal.   Psychiatric:         Behavior: Behavior normal.         Thought Content: Thought content normal.         Judgment: Judgment normal.          Significant Labs:   Microbiology Results (last 7 days)       Procedure Component Value Units Date/Time    Blood culture x two cultures. Draw prior to antibiotics. [[id number]]  (Normal) Collected: 06/17/25 1421    Order Status: Completed Specimen: Blood from Peripheral, Forearm, Left Updated: 06/19/25 1702     Blood Culture No Growth After 48 Hours    Blood culture x two cultures. Draw prior to antibiotics. [[id number]]  (Normal) Collected: 06/17/25 1422    Order Status: Completed Specimen: Blood from Peripheral, Forearm, Right Updated: 06/19/25 1702     Blood Culture No Growth After 48 Hours    Aerobic culture (Specify Source) **CANNOT BE ORDERED AS STAT* [[id number]]  (Abnormal) Collected: 06/17/25 1606    Order Status: Completed Specimen: Wound from Abdomen Updated: 06/19/25 1103     CULTURE, AEROBIC Moderate Gram-negative Rods     Comment: Identification and susceptibility pending       Gram stain [[id number]] Collected: 06/18/25 1257    Order Status: Completed Specimen: Tissue from Abdomen Updated: 06/19/25 1053     GRAM STAIN Moderate WBC seen      Few Gram Negative Rods    Aerobic culture [[id number]]  (Abnormal) Collected: 06/18/25 1255    Order Status: Completed Specimen: Body Fluid from Abdomen Updated: 06/19/25 0927     CULTURE, AEROBIC Few Gram-negative  Rods     Comment: Identification and susceptibility pending       Culture, Anaerobic [[id number]] Collected: 06/18/25 1255    Order Status: Completed Specimen: Body Fluid from Abdomen Updated: 06/19/25 0834     Anaerobe Culture Culture In Progress    Fungus culture [[id number]] Collected: 06/18/25 1255    Order Status: Sent Specimen: Body Fluid from Abdomen Updated: 06/18/25 1824    Culture, Respiratory with Gram Stain [[id number]]     Order Status: Sent Specimen: Respiratory     Culture, Anaerobe [[id number]]     Order Status: Sent Specimen: Skin             Significant Imaging: I have reviewed all pertinent imaging results/findings within the past 24 hours.

## 2025-06-20 NOTE — ASSESSMENT & PLAN NOTE
55 year old female with a history of pulmonary artery hypertension and cholecystitis managed with a cholecystomy tube.  She is now admitted with abdominal pain and nausea likely secondary cholecystomy tube malfunction.  Patient is now s/p tube exchange by IR.  Cultures are positive for Pseudomonas.  Patient has a prolonged QT interval which would put her at risk for dysrhythmia with quinolone use.  Will treat with cefepime for 7 days.    Plan  Cefepime 2 grams IV q 8 hours for 7 days.

## 2025-06-20 NOTE — PLAN OF CARE
Pt is AAOx 4. Afebrile with mild c/o pain that didn't require pain med. Tele monitor in place (NSR). Remodulin infusing @ 117ng/kg/min @ a dosing rate of 60.5 kg. Alex drain on the RLQ with minimal clear brown drainage. Pt on 2L NC. VSS. Pt is independent OOB. Bed in the lowest position with wheels lock, side rails up x3, call light within reach, non-skid socks in place, pt verbalized understanding to call the RN when needed, hand hygiene encouraged. Will continue to monitor.

## 2025-06-20 NOTE — PROGRESS NOTES
[facility]    [facility] educator met with the patient and discussed the discharge plan for home IVABX. Yuni Perez will discharge home with family support. The patient will infuse her medication via Elastomeric Pump. The patient was educated on the S.A.S.H procedure and a S.A.S.H mat was provided. The patient education checklist was reviewed and acknowledged by the above person and she is agreeable to discharge with the home infusion plan of care. The IV administration process using aspetic technique was reviewed with successful return demonstration. The patient feels comfortable with doing her own home infusions. The patient will discharge home with Meropenem 2 gm IV every 8 hours for an estimated end of therapy date of 6/27/25. Dosing schedule times are 0700, 1500 and 2300. An extension was placed to her single lumen midline. [facility] will follow the patient for weekly dressing changes and lab draws. Time was allotted for questions. The patient's nurse and the case management team were notified that the teaching has been completed.     Medication delivery will be made to the home on the day of discharge.    Patient was accepted to care by [facility] and report called to Marylou   Phone number [phone]    [facility]  Apryl Josue RN, Clinical Educator  Cell [phone]  Office [phone]  Fax [phone]

## 2025-06-20 NOTE — SUBJECTIVE & OBJECTIVE
"Interval History: No events overnight. Abdomen still somewhat bothering her. Pain improved a bit. Thought gabapentin yesterday was helpful for the pain. Cultures resulted in pseudomonas    Continuous Infusions:   veletri/remodulin cassette   Intravenous Continuous        veletri/remodulin tubing   Intravenous Continuous         Scheduled Meds:   tadalafil  40 mg Oral Daily    cefTRIAXone (Rocephin) IV (PEDS and ADULTS)  2 g Intravenous Q24H    fluticasone furoate-vilanteroL  1 puff Inhalation Daily    gabapentin  200 mg Oral TID    ambrisentan  10 mg Oral Daily    levothyroxine  125 mcg Oral Before breakfast    pantoprazole  40 mg Oral Daily    topiramate  100 mg Oral BID    treprostinil sodium (REMODULIN) 12,000,000 ng in 0.9% NaCl 100 mL infusion  117 ng/kg/min (Order-Specific) Intravenous Q24H     PRN Meds:  Current Facility-Administered Medications:     acetaminophen, 650 mg, Oral, Q4H PRN    albuterol, 2 puff, Inhalation, Q6H PRN    albuterol-ipratropium, 3 mL, Nebulization, Q6H PRN    ALPRAZolam, 0.25 mg, Oral, BID PRN    butalbital-acetaminophen-caffeine -40 mg, 1 tablet, Oral, Q4H PRN    dextrose 50%, 12.5 g, Intravenous, PRN    diphenhydrAMINE, 25 mg, Oral, Q6H PRN    fluticasone propionate, 2 spray, Each Nostril, Daily PRN    glucagon (human recombinant), 1 mg, Intramuscular, PRN    melatonin, 6 mg, Oral, Nightly PRN    morphine, 2 mg, Intravenous, Q5 Min PRN    ondansetron, 4 mg, Intravenous, Q8H PRN    polyethylene glycol, 17 g, Oral, BID PRN    senna-docusate, 1 tablet, Oral, BID PRN    sodium chloride 0.9%, 10 mL, Intravenous, PRN    traMADoL, 50 mg, Oral, Q6H PRN    Review of patient's allergies indicates:   Allergen Reactions    Fentanyl Anaphylaxis     Respiratory distress    Vibra-tabs [doxycycline hyclate] Anaphylaxis     "throat felt like it was closing"    Adhesive Hives     Silk tape    Amoxicillin Rash    Nsaids (non-steroidal anti-inflammatory drug) Swelling    Chlorhexidine Other (See " Comments)     Blue Chlorhexidine causes hives     Objective:     Vital Signs (Most Recent):  Temp: 98.1 °F (36.7 °C) (06/20/25 1110)  Pulse: 83 (06/20/25 1110)  Resp: 19 (06/20/25 1110)  BP: 96/60 (06/20/25 1110)  SpO2: 96 % (06/20/25 1110) Vital Signs (24h Range):  Temp:  [97.5 °F (36.4 °C)-99.1 °F (37.3 °C)] 98.1 °F (36.7 °C)  Pulse:  [71-96] 83  Resp:  [18-19] 19  SpO2:  [95 %-98 %] 96 %  BP: ()/(51-67) 96/60     Patient Vitals for the past 72 hrs (Last 3 readings):   Weight   06/20/25 0400 63.4 kg (139 lb 12.4 oz)   06/19/25 0500 63 kg (138 lb 14.2 oz)   06/18/25 1129 63 kg (139 lb)       Body mass index is 28.23 kg/m².      Intake/Output Summary (Last 24 hours) at 6/20/2025 1237  Last data filed at 6/19/2025 2230  Gross per 24 hour   Intake 240 ml   Output 670 ml   Net -430 ml        Physical Exam  Vitals and nursing note reviewed.   Constitutional:       Appearance: She is well-developed.   HENT:      Head: Normocephalic.   Eyes:      Pupils: Pupils are equal, round, and reactive to light.   Cardiovascular:      Rate and Rhythm: Normal rate and regular rhythm.   Pulmonary:      Effort: Pulmonary effort is normal.      Breath sounds: Normal breath sounds.   Abdominal:      General: Bowel sounds are normal.      Palpations: Abdomen is soft.      Tenderness: There is abdominal tenderness.      Comments: Sue tube in place    Musculoskeletal:         General: Normal range of motion.      Cervical back: Normal range of motion and neck supple.   Skin:     General: Skin is warm and dry.   Neurological:      Mental Status: She is alert and oriented to person, place, and time.   Psychiatric:         Behavior: Behavior normal.            Significant Labs:  CBC:  Recent Labs   Lab 06/18/25  0508 06/19/25  0534 06/20/25  0516   WBC 5.76 4.79 5.48   RBC 4.34 4.38 4.20   HGB 12.7 12.8 12.3   HCT 38.5 38.8 37.5    177 178   MCV 89 89 89   MCH 29.3 29.2 29.3   MCHC 33.0 33.0 32.8     BNP:  No results for  "input(s): "BNP" in the last 168 hours.    Invalid input(s): "BNPTRIAGELBLO"  CMP:  Recent Labs   Lab 06/18/25  0509 06/19/25  0534 06/20/25  0516   GLU 93 91 100   CALCIUM 8.5* 8.5* 8.5*   ALBUMIN 3.4* 3.4* 3.3*   PROT 6.3 6.4 6.2    137 139   K 3.6 3.2* 3.6   CO2 21* 20* 19*   * 109 113*   BUN 8 9 9   CREATININE 0.8 0.6 0.6   ALKPHOS 75 73 67   ALT 7* 9* 7*   AST 14 13 10*   BILITOT 0.3 0.2 0.2      Coagulation:   No results for input(s): "PT", "INR", "APTT" in the last 168 hours.    LDH:  No results for input(s): "LDH" in the last 72 hours.  Microbiology:  Microbiology Results (last 7 days)       Procedure Component Value Units Date/Time    Aerobic culture (Specify Source) **CANNOT BE ORDERED AS STAT* [[id number]]  (Abnormal)  (Susceptibility) Collected: 06/17/25 1606    Order Status: Completed Specimen: Wound from Abdomen Updated: 06/20/25 1206     CULTURE, AEROBIC Moderate Pseudomonas aeruginosa    Aerobic culture [[id number]]  (Abnormal)  (Susceptibility) Collected: 06/18/25 1255    Order Status: Completed Specimen: Body Fluid from Abdomen Updated: 06/20/25 1128     CULTURE, AEROBIC Few Pseudomonas aeruginosa    Culture, Anaerobic [[id number]] Collected: 06/18/25 1255    Order Status: Completed Specimen: Body Fluid from Abdomen Updated: 06/20/25 1035     Anaerobe Culture Culture In Progress    Blood culture x two cultures. Draw prior to antibiotics. [[id number]]  (Normal) Collected: 06/17/25 1421    Order Status: Completed Specimen: Blood from Peripheral, Forearm, Left Updated: 06/19/25 1702     Blood Culture No Growth After 48 Hours    Blood culture x two cultures. Draw prior to antibiotics. [[id number]]  (Normal) Collected: 06/17/25 1422    Order Status: Completed Specimen: Blood from Peripheral, Forearm, Right Updated: 06/19/25 1702     Blood Culture No Growth After 48 Hours    Gram stain [[id number]] Collected: 06/18/25 1257    Order Status: Completed Specimen: Tissue from Abdomen Updated: " 06/19/25 1053     GRAM STAIN Moderate WBC seen      Few Gram Negative Rods    Fungus culture [[id number]] Collected: 06/18/25 1255    Order Status: Sent Specimen: Body Fluid from Abdomen Updated: 06/18/25 1824    Culture, Respiratory with Gram Stain [[id number]]     Order Status: Sent Specimen: Respiratory     Culture, Anaerobe [[id number]]     Order Status: Sent Specimen: Skin           I have reviewed all pertinent labs within the past 24 hours.    Estimated Creatinine Clearance: 92.4 mL/min (based on SCr of 0.6 mg/dL).    Diagnostic Results:  I have reviewed all pertinent imaging results/findings within the past 24 hours.

## 2025-06-20 NOTE — ASSESSMENT & PLAN NOTE
-Continue home Remodulin, [id number] qdaily, and tadalafil 40mg qdaily.  Patient requiring home oxygen requirements.  -holding patient's Lasix dosing (takes 20 mg of Lasix p.o. daily) given patient has had extremely poor p.o. intake in the last few days since Friday and has not been able to keep down any significant amounts of solids or liquids, appears dry on exam

## 2025-06-20 NOTE — SUBJECTIVE & OBJECTIVE
Interval History: No adverse events.    Review of Systems   Gastrointestinal:  Positive for abdominal pain.   All other systems reviewed and are negative.    Objective:     Vital Signs (Most Recent):  Temp: 98.1 °F (36.7 °C) (06/20/25 1110)  Pulse: 83 (06/20/25 1110)  Resp: 19 (06/20/25 1110)  BP: 96/60 (06/20/25 1110)  SpO2: 96 % (06/20/25 1110) Vital Signs (24h Range):  Temp:  [97.5 °F (36.4 °C)-99.1 °F (37.3 °C)] 98.1 °F (36.7 °C)  Pulse:  [71-96] 83  Resp:  [18-19] 19  SpO2:  [95 %-98 %] 96 %  BP: ()/(51-67) 96/60     Weight: 63.4 kg (139 lb 12.4 oz)  Body mass index is 28.23 kg/m².    Estimated Creatinine Clearance: 92.4 mL/min (based on SCr of 0.6 mg/dL).     Physical Exam  Vitals and nursing note reviewed.   Constitutional:       General: She is not in acute distress.     Appearance: She is well-developed. She is not diaphoretic.   HENT:      Head: Normocephalic and atraumatic.      Right Ear: External ear normal.      Left Ear: External ear normal.      Nose: Nose normal.      Mouth/Throat:      Pharynx: No oropharyngeal exudate.   Eyes:      General: No scleral icterus.        Right eye: No discharge.         Left eye: No discharge.      Conjunctiva/sclera: Conjunctivae normal.      Pupils: Pupils are equal, round, and reactive to light.   Neck:      Thyroid: No thyromegaly.      Vascular: No JVD.      Trachea: No tracheal deviation.   Cardiovascular:      Rate and Rhythm: Normal rate and regular rhythm.      Heart sounds: No murmur heard.     No friction rub. No gallop.   Pulmonary:      Effort: Pulmonary effort is normal. No respiratory distress.      Breath sounds: Normal breath sounds. No stridor. No wheezing or rales.   Chest:      Chest wall: No tenderness.   Abdominal:      General: Bowel sounds are normal. There is no distension.      Palpations: Abdomen is soft. There is no mass.      Tenderness: There is abdominal tenderness. There is no guarding or rebound.      Comments: Cholecystotomy  tube   Musculoskeletal:         General: No tenderness. Normal range of motion.      Cervical back: Normal range of motion and neck supple.   Lymphadenopathy:      Cervical: No cervical adenopathy.   Skin:     General: Skin is warm.      Coloration: Skin is not pale.      Findings: No erythema or rash.   Neurological:      Mental Status: She is alert and oriented to person, place, and time.      Cranial Nerves: No cranial nerve deficit.      Motor: No abnormal muscle tone.      Coordination: Coordination normal.      Deep Tendon Reflexes: Reflexes normal.   Psychiatric:         Behavior: Behavior normal.         Thought Content: Thought content normal.         Judgment: Judgment normal.          Significant Labs:   Microbiology Results (last 7 days)       Procedure Component Value Units Date/Time    Aerobic culture (Specify Source) **CANNOT BE ORDERED AS STAT* [[id number]]  (Abnormal)  (Susceptibility) Collected: 06/17/25 1606    Order Status: Completed Specimen: Wound from Abdomen Updated: 06/20/25 1206     CULTURE, AEROBIC Moderate Pseudomonas aeruginosa    Aerobic culture [[id number]]  (Abnormal)  (Susceptibility) Collected: 06/18/25 1255    Order Status: Completed Specimen: Body Fluid from Abdomen Updated: 06/20/25 1128     CULTURE, AEROBIC Few Pseudomonas aeruginosa    Culture, Anaerobic [[id number]] Collected: 06/18/25 1255    Order Status: Completed Specimen: Body Fluid from Abdomen Updated: 06/20/25 1035     Anaerobe Culture Culture In Progress    Blood culture x two cultures. Draw prior to antibiotics. [[id number]]  (Normal) Collected: 06/17/25 1421    Order Status: Completed Specimen: Blood from Peripheral, Forearm, Left Updated: 06/19/25 1702     Blood Culture No Growth After 48 Hours    Blood culture x two cultures. Draw prior to antibiotics. [[id number]]  (Normal) Collected: 06/17/25 1422    Order Status: Completed Specimen: Blood from Peripheral, Forearm, Right Updated: 06/19/25 1702     Blood Culture  No Growth After 48 Hours    Gram stain [[id number]] Collected: 06/18/25 1257    Order Status: Completed Specimen: Tissue from Abdomen Updated: 06/19/25 1053     GRAM STAIN Moderate WBC seen      Few Gram Negative Rods    Fungus culture [[id number]] Collected: 06/18/25 1255    Order Status: Sent Specimen: Body Fluid from Abdomen Updated: 06/18/25 1824    Culture, Respiratory with Gram Stain [[id number]]     Order Status: Sent Specimen: Respiratory     Culture, Anaerobe [[id number]]     Order Status: Sent Specimen: Skin             Significant Imaging: I have reviewed all pertinent imaging results/findings within the past 24 hours.

## 2025-06-21 VITALS
HEIGHT: 59 IN | BODY MASS INDEX: 28.07 KG/M2 | HEART RATE: 96 BPM | RESPIRATION RATE: 18 BRPM | SYSTOLIC BLOOD PRESSURE: 86 MMHG | TEMPERATURE: 98 F | OXYGEN SATURATION: 96 % | WEIGHT: 139.25 LBS | DIASTOLIC BLOOD PRESSURE: 48 MMHG

## 2025-06-21 LAB
ABSOLUTE EOSINOPHIL (OHS): 0.3 K/UL
ABSOLUTE MONOCYTE (OHS): 0.48 K/UL (ref 0.3–1)
ABSOLUTE NEUTROPHIL COUNT (OHS): 2.63 K/UL (ref 1.8–7.7)
ALBUMIN SERPL BCP-MCNC: 3.3 G/DL (ref 3.5–5.2)
ALP SERPL-CCNC: 68 UNIT/L (ref 40–150)
ALT SERPL W/O P-5'-P-CCNC: 5 UNIT/L (ref 10–44)
ANION GAP (OHS): 6 MMOL/L (ref 8–16)
AST SERPL-CCNC: 11 UNIT/L (ref 11–45)
BASOPHILS # BLD AUTO: 0.03 K/UL
BASOPHILS NFR BLD AUTO: 0.7 %
BILIRUB SERPL-MCNC: 0.2 MG/DL (ref 0.1–1)
BUN SERPL-MCNC: 9 MG/DL (ref 6–20)
CALCIUM SERPL-MCNC: 8.4 MG/DL (ref 8.7–10.5)
CHLORIDE SERPL-SCNC: 113 MMOL/L (ref 95–110)
CO2 SERPL-SCNC: 19 MMOL/L (ref 23–29)
CREAT SERPL-MCNC: 0.5 MG/DL (ref 0.5–1.4)
ERYTHROCYTE [DISTWIDTH] IN BLOOD BY AUTOMATED COUNT: 13.9 % (ref 11.5–14.5)
GFR SERPLBLD CREATININE-BSD FMLA CKD-EPI: >60 ML/MIN/1.73/M2
GLUCOSE SERPL-MCNC: 90 MG/DL (ref 70–110)
HCT VFR BLD AUTO: 36.8 % (ref 37–48.5)
HGB BLD-MCNC: 12.4 GM/DL (ref 12–16)
IMM GRANULOCYTES # BLD AUTO: 0.01 K/UL (ref 0–0.04)
IMM GRANULOCYTES NFR BLD AUTO: 0.2 % (ref 0–0.5)
LYMPHOCYTES # BLD AUTO: 0.79 K/UL (ref 1–4.8)
MAGNESIUM SERPL-MCNC: 2.1 MG/DL (ref 1.6–2.6)
MCH RBC QN AUTO: 29.8 PG (ref 27–31)
MCHC RBC AUTO-ENTMCNC: 33.7 G/DL (ref 32–36)
MCV RBC AUTO: 89 FL (ref 82–98)
NUCLEATED RBC (/100WBC) (OHS): 0 /100 WBC
PHOSPHATE SERPL-MCNC: 4.5 MG/DL (ref 2.7–4.5)
PLATELET # BLD AUTO: 164 K/UL (ref 150–450)
PMV BLD AUTO: 12.3 FL (ref 9.2–12.9)
POTASSIUM SERPL-SCNC: 3.6 MMOL/L (ref 3.5–5.1)
PROT SERPL-MCNC: 6.3 GM/DL (ref 6–8.4)
RBC # BLD AUTO: 4.16 M/UL (ref 4–5.4)
RELATIVE EOSINOPHIL (OHS): 7.1 %
RELATIVE LYMPHOCYTE (OHS): 18.6 % (ref 18–48)
RELATIVE MONOCYTE (OHS): 11.3 % (ref 4–15)
RELATIVE NEUTROPHIL (OHS): 62.1 % (ref 38–73)
SODIUM SERPL-SCNC: 138 MMOL/L (ref 136–145)
WBC # BLD AUTO: 4.24 K/UL (ref 3.9–12.7)

## 2025-06-21 PROCEDURE — 83735 ASSAY OF MAGNESIUM: CPT

## 2025-06-21 PROCEDURE — 25000003 PHARM REV CODE 250

## 2025-06-21 PROCEDURE — 85025 COMPLETE CBC W/AUTO DIFF WBC: CPT

## 2025-06-21 PROCEDURE — 82565 ASSAY OF CREATININE: CPT

## 2025-06-21 PROCEDURE — 25000003 PHARM REV CODE 250: Performed by: INTERNAL MEDICINE

## 2025-06-21 PROCEDURE — 25000003 PHARM REV CODE 250: Performed by: PHYSICIAN ASSISTANT

## 2025-06-21 PROCEDURE — 63600175 PHARM REV CODE 636 W HCPCS: Performed by: INTERNAL MEDICINE

## 2025-06-21 PROCEDURE — 25000003 PHARM REV CODE 250: Performed by: STUDENT IN AN ORGANIZED HEALTH CARE EDUCATION/TRAINING PROGRAM

## 2025-06-21 PROCEDURE — 84100 ASSAY OF PHOSPHORUS: CPT

## 2025-06-21 PROCEDURE — 36415 COLL VENOUS BLD VENIPUNCTURE: CPT

## 2025-06-21 RX ORDER — LIDOCAINE 50 MG/G
1 PATCH TOPICAL DAILY
Qty: 30 PATCH | Refills: 11 | Status: SHIPPED | OUTPATIENT
Start: 2025-06-21

## 2025-06-21 RX ORDER — GABAPENTIN 100 MG/1
200 CAPSULE ORAL 3 TIMES DAILY
Qty: 180 EACH | Refills: 11 | Status: SHIPPED | OUTPATIENT
Start: 2025-06-21 | End: 2026-06-21

## 2025-06-21 RX ADMIN — AMBRISENTAN 10 MG: 10 TABLET, FILM COATED ORAL at 07:06

## 2025-06-21 RX ADMIN — PANTOPRAZOLE SODIUM 40 MG: 40 TABLET, DELAYED RELEASE ORAL at 07:06

## 2025-06-21 RX ADMIN — MEROPENEM 2 G: 2 INJECTION, POWDER, FOR SOLUTION INTRAVENOUS at 07:06

## 2025-06-21 RX ADMIN — GABAPENTIN 200 MG: 100 CAPSULE ORAL at 02:06

## 2025-06-21 RX ADMIN — FLUTICASONE PROPIONATE 100 MCG: 50 SPRAY, METERED NASAL at 07:06

## 2025-06-21 RX ADMIN — MEROPENEM 2 G: 2 INJECTION, POWDER, FOR SOLUTION INTRAVENOUS at 02:06

## 2025-06-21 RX ADMIN — GABAPENTIN 200 MG: 100 CAPSULE ORAL at 07:06

## 2025-06-21 RX ADMIN — TOPIRAMATE 100 MG: 25 TABLET, FILM COATED ORAL at 07:06

## 2025-06-21 RX ADMIN — LEVOTHYROXINE SODIUM 125 MCG: 0.12 TABLET ORAL at 06:06

## 2025-06-21 NOTE — ASSESSMENT & PLAN NOTE
Patient presents with significant and progressively worsening abdominal pain and left shoulder pain.  Patient has a kita tube in place and is status post kita tube exchange 5/19.  CT scans appear to be similar to previous CT scan on diagnosis of choledocholithiasis, however patient with purulent drainage from exit site of kita tube and some white purulence in her kita bag.  Patient also with nausea and vomiting, poor p.o. intake, worsening pain that has prompted her to come in.  - aerobic and anaerobic culture collected from percutaneous kita tube exit site  - blood cultures collected x2  - patient started on broad-spectrum antibiotics with vancomycin and Zosyn  - Appreciate GEN BRUNO consult.   - consult to Infectious Disease, appreciate recommendations  - S/P tube exchange 6 18.   - Cultures resulted in pseudomonas, needs 7 day course of meropenem per ID

## 2025-06-21 NOTE — SUBJECTIVE & OBJECTIVE
"Interval History: No major complaints this am. Thinks the pain is better controlled on gabapentin. Sue tube draining normally. Will D/C today on 7 day course of meropenem for pseudomonas isolated from biliary fluid.     Continuous Infusions:   veletri/remodulin cassette   Intravenous Continuous        veletri/remodulin tubing   Intravenous Continuous         Scheduled Meds:   tadalafil  40 mg Oral Daily    fluticasone furoate-vilanteroL  1 puff Inhalation Daily    gabapentin  200 mg Oral TID    ambrisentan  10 mg Oral Daily    levothyroxine  125 mcg Oral Before breakfast    meropenem IV (PEDS and ADULTS)  2 g Intravenous Q8H    pantoprazole  40 mg Oral Daily    topiramate  100 mg Oral BID    treprostinil sodium (REMODULIN) 12,000,000 ng in 0.9% NaCl 100 mL infusion  117 ng/kg/min (Order-Specific) Intravenous Q24H     PRN Meds:  Current Facility-Administered Medications:     acetaminophen, 650 mg, Oral, Q4H PRN    albuterol, 2 puff, Inhalation, Q6H PRN    albuterol-ipratropium, 3 mL, Nebulization, Q6H PRN    ALPRAZolam, 0.25 mg, Oral, BID PRN    butalbital-acetaminophen-caffeine -40 mg, 1 tablet, Oral, Q4H PRN    dextrose 50%, 12.5 g, Intravenous, PRN    diphenhydrAMINE, 25 mg, Oral, Q6H PRN    fluticasone propionate, 2 spray, Each Nostril, Daily PRN    glucagon (human recombinant), 1 mg, Intramuscular, PRN    melatonin, 6 mg, Oral, Nightly PRN    morphine, 2 mg, Intravenous, Q5 Min PRN    ondansetron, 4 mg, Intravenous, Q8H PRN    polyethylene glycol, 17 g, Oral, BID PRN    senna-docusate, 1 tablet, Oral, BID PRN    sodium chloride 0.9%, 10 mL, Intravenous, PRN    Flushing PICC/Midline Protocol, , , Until Discontinued **AND** sodium chloride 0.9%, 10 mL, Intravenous, Q12H PRN    traMADoL, 50 mg, Oral, Q6H PRN    Review of patient's allergies indicates:   Allergen Reactions    Fentanyl Anaphylaxis     Respiratory distress    Vibra-tabs [doxycycline hyclate] Anaphylaxis     "throat felt like it was closing"    " Adhesive Hives     Silk tape    Amoxicillin Rash    Nsaids (non-steroidal anti-inflammatory drug) Swelling    Chlorhexidine Other (See Comments)     Blue Chlorhexidine causes hives     Objective:     Vital Signs (Most Recent):  Temp: 98.3 °F (36.8 °C) (06/21/25 0949)  Pulse: 96 (06/21/25 1033)  Resp: 18 (06/21/25 0949)  BP: 111/75 (06/21/25 0949)  SpO2: 96 % (06/21/25 0949) Vital Signs (24h Range):  Temp:  [97.3 °F (36.3 °C)-98.3 °F (36.8 °C)] 98.3 °F (36.8 °C)  Pulse:  [71-96] 96  Resp:  [18-19] 18  SpO2:  [94 %-96 %] 96 %  BP: ()/(52-75) 111/75     Patient Vitals for the past 72 hrs (Last 3 readings):   Weight   06/21/25 0400 63.2 kg (139 lb 3.5 oz)   06/20/25 0400 63.4 kg (139 lb 12.4 oz)   06/19/25 0500 63 kg (138 lb 14.2 oz)       Body mass index is 28.12 kg/m².      Intake/Output Summary (Last 24 hours) at 6/21/2025 1049  Last data filed at 6/21/2025 0819  Gross per 24 hour   Intake 639.22 ml   Output 1350 ml   Net -710.78 ml        Physical Exam  Vitals and nursing note reviewed.   Constitutional:       Appearance: She is well-developed.   HENT:      Head: Normocephalic.   Eyes:      Pupils: Pupils are equal, round, and reactive to light.   Cardiovascular:      Rate and Rhythm: Normal rate and regular rhythm.   Pulmonary:      Effort: Pulmonary effort is normal.      Breath sounds: Normal breath sounds.   Abdominal:      General: Bowel sounds are normal.      Palpations: Abdomen is soft.      Tenderness: There is abdominal tenderness.      Comments: Sue tube in place    Musculoskeletal:         General: Normal range of motion.      Cervical back: Normal range of motion and neck supple.   Skin:     General: Skin is warm and dry.   Neurological:      Mental Status: She is alert and oriented to person, place, and time.   Psychiatric:         Behavior: Behavior normal.            Significant Labs:  CBC:  Recent Labs   Lab 06/19/25  0534 06/20/25  0516 06/21/25  0512   WBC 4.79 5.48 4.24   RBC 4.38 4.20  "4.16   HGB 12.8 12.3 12.4   HCT 38.8 37.5 36.8*    178 164   MCV 89 89 89   MCH 29.2 29.3 29.8   MCHC 33.0 32.8 33.7     BNP:  No results for input(s): "BNP" in the last 168 hours.    Invalid input(s): "BNPTRIAGELBLO"  CMP:  Recent Labs   Lab 06/19/25  0534 06/20/25  0516 06/21/25  0512   GLU 91 100 90   CALCIUM 8.5* 8.5* 8.4*   ALBUMIN 3.4* 3.3* 3.3*   PROT 6.4 6.2 6.3    139 138   K 3.2* 3.6 3.6   CO2 20* 19* 19*    113* 113*   BUN 9 9 9   CREATININE 0.6 0.6 0.5   ALKPHOS 73 67 68   ALT 9* 7* 5*   AST 13 10* 11   BILITOT 0.2 0.2 0.2      Coagulation:   No results for input(s): "PT", "INR", "APTT" in the last 168 hours.    LDH:  No results for input(s): "LDH" in the last 72 hours.  Microbiology:  Microbiology Results (last 7 days)       Procedure Component Value Units Date/Time    Blood culture x two cultures. Draw prior to antibiotics. [[id number]]  (Normal) Collected: 06/17/25 1421    Order Status: Completed Specimen: Blood from Peripheral, Forearm, Left Updated: 06/20/25 1702     Blood Culture No Growth After 72 Hours    Blood culture x two cultures. Draw prior to antibiotics. [[id number]]  (Normal) Collected: 06/17/25 1422    Order Status: Completed Specimen: Blood from Peripheral, Forearm, Right Updated: 06/20/25 1702     Blood Culture No Growth After 72 Hours    Aerobic culture (Specify Source) **CANNOT BE ORDERED AS STAT* [[id number]]  (Abnormal)  (Susceptibility) Collected: 06/17/25 1606    Order Status: Completed Specimen: Wound from Abdomen Updated: 06/20/25 1206     CULTURE, AEROBIC Moderate Pseudomonas aeruginosa    Aerobic culture [[id number]]  (Abnormal)  (Susceptibility) Collected: 06/18/25 1255    Order Status: Completed Specimen: Body Fluid from Abdomen Updated: 06/20/25 1128     CULTURE, AEROBIC Few Pseudomonas aeruginosa    Culture, Anaerobic [[id number]] Collected: 06/18/25 1255    Order Status: Completed Specimen: Body Fluid from Abdomen Updated: 06/20/25 1035     Anaerobe " Culture Culture In Progress    Gram stain [[id number]] Collected: 06/18/25 1257    Order Status: Completed Specimen: Tissue from Abdomen Updated: 06/19/25 1053     GRAM STAIN Moderate WBC seen      Few Gram Negative Rods    Fungus culture [[id number]] Collected: 06/18/25 1255    Order Status: Sent Specimen: Body Fluid from Abdomen Updated: 06/18/25 1824    Culture, Respiratory with Gram Stain [[id number]]     Order Status: Sent Specimen: Respiratory     Culture, Anaerobe [[id number]]     Order Status: Sent Specimen: Skin           I have reviewed all pertinent labs within the past 24 hours.    Estimated Creatinine Clearance: 110.9 mL/min (based on SCr of 0.5 mg/dL).    Diagnostic Results:  I have reviewed all pertinent imaging results/findings within the past 24 hours.

## 2025-06-21 NOTE — ASSESSMENT & PLAN NOTE
-Continue home Remodulin, [id number] qdaily, and tadalafil 40mg qdaily.  Patient requiring home oxygen requirements.  -holding patient's Lasix dosing (takes 20 mg of Lasix p.o. daily) given patient has had extremely poor p.o. intake in the last few days since Friday and has not been able to keep down any significant amounts of solids or liquids, appears dry on exam

## 2025-06-21 NOTE — PLAN OF CARE
Pt. aao x 3  Pt. Denies pain or discomfort  Pt. Has remodulin @ 117ng/kg/min  to right pac for PH.  Pt. Has a new midline for home iv abt.  Pt. Tolerates care well.  Pt. Ambulates without assistance  Pt. Has O2 2L per nc cont  Pt. Continent to bowel and bladder  Pt. Will be d/c to home today 6/21/25  Pt. Safety maintained.

## 2025-06-21 NOTE — PROGRESS NOTES
Pt. aao x 4  d/c to home via w/c; educated pt. On medications and instructions; pt. Verbalized understanding per AVS.

## 2025-06-21 NOTE — PLAN OF CARE
Pt is AAOx 4. Afebrile with c/o pain. Tylenol given. Tele monitor in place (NSR). Remodulin infusing @ 117ng/kg/min @ a dosing rate of 60.5 kg. Alex drain on the RLQ with  clear brown drainage. Pt on contact isolation for MDRO. Pt on IV abx. Pt on 2L NC. VSS. Pt is independent OOB. Bed in the lowest position with wheels lock, side rails up x4, call light within reach, non-skid socks in place, pt verbalized understanding to call the RN when needed, hand hygiene encouraged. Will continue to monitor.

## 2025-06-21 NOTE — PROGRESS NOTES
[facility]  Heart Transplant  Progress Note    Patient Name: Yuni Perez  MRN: [id number]  Admission Date: 6/17/2025  Hospital Length of Stay: 4 days  Attending Physician: Jerry Zuñiga, *  Primary Care Provider: Lulu Sandhu MD  Principal Problem:Choledocholithiasis    Subjective:   Interval History: No major complaints this am. Thinks the pain is better controlled on gabapentin. Sue tube draining normally. Will D/C today on 7 day course of meropenem for pseudomonas isolated from biliary fluid.     Continuous Infusions:   veletri/remodulin cassette   Intravenous Continuous        veletri/remodulin tubing   Intravenous Continuous         Scheduled Meds:   tadalafil  40 mg Oral Daily    fluticasone furoate-vilanteroL  1 puff Inhalation Daily    gabapentin  200 mg Oral TID    ambrisentan  10 mg Oral Daily    levothyroxine  125 mcg Oral Before breakfast    meropenem IV (PEDS and ADULTS)  2 g Intravenous Q8H    pantoprazole  40 mg Oral Daily    topiramate  100 mg Oral BID    treprostinil sodium (REMODULIN) 12,000,000 ng in 0.9% NaCl 100 mL infusion  117 ng/kg/min (Order-Specific) Intravenous Q24H     PRN Meds:  Current Facility-Administered Medications:     acetaminophen, 650 mg, Oral, Q4H PRN    albuterol, 2 puff, Inhalation, Q6H PRN    albuterol-ipratropium, 3 mL, Nebulization, Q6H PRN    ALPRAZolam, 0.25 mg, Oral, BID PRN    butalbital-acetaminophen-caffeine -40 mg, 1 tablet, Oral, Q4H PRN    dextrose 50%, 12.5 g, Intravenous, PRN    diphenhydrAMINE, 25 mg, Oral, Q6H PRN    fluticasone propionate, 2 spray, Each Nostril, Daily PRN    glucagon (human recombinant), 1 mg, Intramuscular, PRN    melatonin, 6 mg, Oral, Nightly PRN    morphine, 2 mg, Intravenous, Q5 Min PRN    ondansetron, 4 mg, Intravenous, Q8H PRN    polyethylene glycol, 17 g, Oral, BID PRN    senna-docusate, 1 tablet, Oral, BID PRN    sodium chloride 0.9%, 10 mL, Intravenous, PRN    Flushing  "PICC/Midline Protocol, , , Until Discontinued **AND** sodium chloride 0.9%, 10 mL, Intravenous, Q12H PRN    traMADoL, 50 mg, Oral, Q6H PRN    Review of patient's allergies indicates:   Allergen Reactions    Fentanyl Anaphylaxis     Respiratory distress    Vibra-tabs [doxycycline hyclate] Anaphylaxis     "throat felt like it was closing"    Adhesive Hives     Silk tape    Amoxicillin Rash    Nsaids (non-steroidal anti-inflammatory drug) Swelling    Chlorhexidine Other (See Comments)     Blue Chlorhexidine causes hives     Objective:     Vital Signs (Most Recent):  Temp: 98.3 °F (36.8 °C) (06/21/25 0949)  Pulse: 96 (06/21/25 1033)  Resp: 18 (06/21/25 0949)  BP: 111/75 (06/21/25 0949)  SpO2: 96 % (06/21/25 0949) Vital Signs (24h Range):  Temp:  [97.3 °F (36.3 °C)-98.3 °F (36.8 °C)] 98.3 °F (36.8 °C)  Pulse:  [71-96] 96  Resp:  [18-19] 18  SpO2:  [94 %-96 %] 96 %  BP: ()/(52-75) 111/75     Patient Vitals for the past 72 hrs (Last 3 readings):   Weight   06/21/25 0400 63.2 kg (139 lb 3.5 oz)   06/20/25 0400 63.4 kg (139 lb 12.4 oz)   06/19/25 0500 63 kg (138 lb 14.2 oz)       Body mass index is 28.12 kg/m².      Intake/Output Summary (Last 24 hours) at 6/21/2025 1049  Last data filed at 6/21/2025 0819  Gross per 24 hour   Intake 639.22 ml   Output 1350 ml   Net -710.78 ml        Physical Exam  Vitals and nursing note reviewed.   Constitutional:       Appearance: She is well-developed.   HENT:      Head: Normocephalic.   Eyes:      Pupils: Pupils are equal, round, and reactive to light.   Cardiovascular:      Rate and Rhythm: Normal rate and regular rhythm.   Pulmonary:      Effort: Pulmonary effort is normal.      Breath sounds: Normal breath sounds.   Abdominal:      General: Bowel sounds are normal.      Palpations: Abdomen is soft.      Tenderness: There is abdominal tenderness.      Comments: Sue tube in place    Musculoskeletal:         General: Normal range of motion.      Cervical back: Normal range of " "motion and neck supple.   Skin:     General: Skin is warm and dry.   Neurological:      Mental Status: She is alert and oriented to person, place, and time.   Psychiatric:         Behavior: Behavior normal.            Significant Labs:  CBC:  Recent Labs   Lab 06/19/25  0534 06/20/25  0516 06/21/25  0512   WBC 4.79 5.48 4.24   RBC 4.38 4.20 4.16   HGB 12.8 12.3 12.4   HCT 38.8 37.5 36.8*    178 164   MCV 89 89 89   MCH 29.2 29.3 29.8   MCHC 33.0 32.8 33.7     BNP:  No results for input(s): "BNP" in the last 168 hours.    Invalid input(s): "BNPTRIAGELBLO"  CMP:  Recent Labs   Lab 06/19/25  0534 06/20/25  0516 06/21/25  0512   GLU 91 100 90   CALCIUM 8.5* 8.5* 8.4*   ALBUMIN 3.4* 3.3* 3.3*   PROT 6.4 6.2 6.3    139 138   K 3.2* 3.6 3.6   CO2 20* 19* 19*    113* 113*   BUN 9 9 9   CREATININE 0.6 0.6 0.5   ALKPHOS 73 67 68   ALT 9* 7* 5*   AST 13 10* 11   BILITOT 0.2 0.2 0.2      Coagulation:   No results for input(s): "PT", "INR", "APTT" in the last 168 hours.    LDH:  No results for input(s): "LDH" in the last 72 hours.  Microbiology:  Microbiology Results (last 7 days)       Procedure Component Value Units Date/Time    Blood culture x two cultures. Draw prior to antibiotics. [[id number]]  (Normal) Collected: 06/17/25 1421    Order Status: Completed Specimen: Blood from Peripheral, Forearm, Left Updated: 06/20/25 1702     Blood Culture No Growth After 72 Hours    Blood culture x two cultures. Draw prior to antibiotics. [[id number]]  (Normal) Collected: 06/17/25 1422    Order Status: Completed Specimen: Blood from Peripheral, Forearm, Right Updated: 06/20/25 1702     Blood Culture No Growth After 72 Hours    Aerobic culture (Specify Source) **CANNOT BE ORDERED AS STAT* [[id number]]  (Abnormal)  (Susceptibility) Collected: 06/17/25 1606    Order Status: Completed Specimen: Wound from Abdomen Updated: 06/20/25 1206     CULTURE, AEROBIC Moderate Pseudomonas aeruginosa    Aerobic culture [[id number]]  " (Abnormal)  (Susceptibility) Collected: 06/18/25 1255    Order Status: Completed Specimen: Body Fluid from Abdomen Updated: 06/20/25 1128     CULTURE, AEROBIC Few Pseudomonas aeruginosa    Culture, Anaerobic [[id number]] Collected: 06/18/25 1255    Order Status: Completed Specimen: Body Fluid from Abdomen Updated: 06/20/25 1035     Anaerobe Culture Culture In Progress    Gram stain [[id number]] Collected: 06/18/25 1257    Order Status: Completed Specimen: Tissue from Abdomen Updated: 06/19/25 1053     GRAM STAIN Moderate WBC seen      Few Gram Negative Rods    Fungus culture [[id number]] Collected: 06/18/25 1255    Order Status: Sent Specimen: Body Fluid from Abdomen Updated: 06/18/25 1824    Culture, Respiratory with Gram Stain [[id number]]     Order Status: Sent Specimen: Respiratory     Culture, Anaerobe [[id number]]     Order Status: Sent Specimen: Skin           I have reviewed all pertinent labs within the past 24 hours.    Estimated Creatinine Clearance: 110.9 mL/min (based on SCr of 0.5 mg/dL).    Diagnostic Results:  I have reviewed all pertinent imaging results/findings within the past 24 hours.  Assessment and Plan:       55 y.o. W female diagnosed with IPAH in 2009 and started on Remodulin, Letairis, and Adcirca. She has a history of ABHIJEET (untreated - not able to tolerate CPAP 2/2 severe congestion), GERD, and Asthma.      Pt presents today due to nausea, vomiting and abdominal pain.  Her symptoms have been ongoing since Thursday or Friday 6/12 to 6/13.  She also reports increased drainage from her abdominal wall site that is greenish white, a mix of blood and white drainage in her kita tube bag, diaphoresis intermittently concerning for possible fever (the patient did not take her temperature) intermittently in the last few days, intermittent stabbing pain that progresses with movement but is present continuously in her right upper quadrant and right upper back, shoulder pain in her left shoulder  concerning for referred pain from her choledocholithiasis.  She has was recently discharged at the beginning of April for similar symptoms after which she a kita tube was placed for drainage of her gallbladder for cholecysitits. She was deemed not a surgical candidate due to high risk. She also underwent kita tube exchange on 5/19 with IR. On admission, Pt was hemodynamically stable and labs were largely unremarkable with no leukocytosis and no electrolyte abnormalities.  Patient has on her home oxygen requirements.  CT imaging as below.     CT 6/27/25:   1. Since the previous CT, cholecystostomy tube has been placed. There is mild wall thickening of the gallbladder with mild pericholecystic fluid. There is a focus of induration within the adjacent rectus musculature, through which the tube courses suggesting inflammation/hematoma secondary to tube placement. There is some indistinctness about the region, however no focal organized collection to suggest abscess. Continued follow-up is advised.  2. Choledocholithiasis, similar to the previous exam.  3. Findings suggest hepatic steatosis, correlation with LFTs recommended noting hepatomegaly.  4. Heterogeneous enhancement involving the right hepatic lobe inferiorly. This may reflect sequela of contrast phase. Underlying hemangioma is a consideration. Consider nonemergent outpatient MRI for characterization as warranted.  5. Please see above for several additional findings.      She was recently admitted for abdominal pain/nausea/vomiting and found to have acute cholecystitis on US, CT and HIDA. She was seen by general surgery and  felt to be too high risk for cholecystectomy, therefore kita tube placed 03/31/25. Post kita tube she did well other than an episode of SVT with HR in the 200s, which she converted out of wih 12 mg adenosine and was in sinus since then. EP considred multaq however no additional agents added at the time. She was discharged with home O2  and her kita tube. Since discharge, seen by GS 05/01/25 and felt to remain high risk for surgery. They were consulting IR for drain study/interrogation. 05/19 had kita tube check and exchange.    For her pulmonary htn, current therapy is Remodulin infusing at 117ng/kg/min (pre-filled cassettes), Letairis 10mg qdaily, and Adcirca 40mg qdaily., Has T/F Adempas. Patient evaluated by lung transplant in 2020. Had everything set up for LUT, but says her caregivers were deemed not acceptable and is not a transplant candidate at [facility].     Last TTE:   TTE 12/16/25     ·  Left Ventricle: The left ventricle is normal in size. Normal wall thickness. Normal wall motion. Septal flattening in systole consistent with right ventricular pressure overload. There is normal systolic function with a visually estimated ejection fraction of 60 - 65%. There is normal diastolic function.  ·  Right Ventricle: Moderate right ventricular enlargement with hypertrophy. Systolic function is mildly to moderately reduced.  ·  The right atrium is mildly dilated.  ·  Tricuspid Valve: There is mild regurgitation.  ·  Pulmonary Artery: There is moderate to severe pulmonary hypertension. The estimated pulmonary artery systolic pressure is 67 mmHg.  ·  IVC/SVC: Normal venous pressure at 3 mmHg.      * Choledocholithiasis  Patient presents with significant and progressively worsening abdominal pain and left shoulder pain.  Patient has a kita tube in place and is status post kita tube exchange 5/19.  CT scans appear to be similar to previous CT scan on diagnosis of choledocholithiasis, however patient with purulent drainage from exit site of kita tube and some white purulence in her kita bag.  Patient also with nausea and vomiting, poor p.o. intake, worsening pain that has prompted her to come in.  - aerobic and anaerobic culture collected from percutaneous kita tube exit site  - blood cultures collected x2  - patient started on broad-spectrum  antibiotics with vancomycin and Zosyn  - Appreciate GEN BRUNO consult.   - consult to Infectious Disease, appreciate recommendations  - S/P tube exchange 6 18.   - Cultures resulted in pseudomonas, needs 7 day course of meropenem per ID    Pneumonia  Patient with suspected pneumonia with infiltrate on chest x-ray.  Patient already on broad-spectrum antibiotics for choledocholithiasis.  Concern possibility of aspiration given patient's vomiting at home and difficulty keeping down food even abdominal pain.  Otherwise no significant increase in oxygen requirements.   -sputum culture ordered  -infectious disease consult, appreciate recommendations  -continue broad-spectrum antibiotics per of choledocholithiasis problem.    SVT (supraventricular tachycardia)  Hx of SVT post kita tube placement that converted with adenosine. EP did not add any agents currently but will plan to add Multaq if she has future issues     WHO group 1 pulmonary arterial hypertension  -Continue home Remodulin, [id number] qdaily, and tadalafil 40mg qdaily.  Patient requiring home oxygen requirements.  -holding patient's Lasix dosing (takes 20 mg of Lasix p.o. daily) given patient has had extremely poor p.o. intake in the last few days since Friday and has not been able to keep down any significant amounts of solids or liquids, appears dry on exam        Chilo Myers PA-C  Heart Transplant  [facility]

## 2025-06-22 LAB
BACTERIA BLD CULT: NORMAL
BACTERIA BLD CULT: NORMAL

## 2025-06-22 NOTE — DISCHARGE SUMMARY
[facility] Transplant Step[facility]  Heart Transplant  Discharge Summary      Patient Name: Yuni Perez  MRN: [id number]  Admission Date: 6/17/2025  Hospital Length of Stay: 4 days  Discharge Date and Time: 06/22/2025 12:09 PM  Attending Physician: Martha att. providers found   Discharging Provider: Chilo Myers PA-C  Primary Care Provider: Lulu Sandhu MD     HPI:   55 y.o. W female diagnosed with IPAH in 2009 and started on Remodulin, Letairis, and Adcirca. She has a history of ABHIJEET (untreated - not able to tolerate CPAP 2/2 severe congestion), GERD, and Asthma.      Pt presents today due to nausea, vomiting and abdominal pain.  Her symptoms have been ongoing since Thursday or Friday 6/12 to 6/13.  She also reports increased drainage from her abdominal wall site that is greenish white, a mix of blood and white drainage in her kita tube bag, diaphoresis intermittently concerning for possible fever (the patient did not take her temperature) intermittently in the last few days, intermittent stabbing pain that progresses with movement but is present continuously in her right upper quadrant and right upper back, shoulder pain in her left shoulder concerning for referred pain from her choledocholithiasis.  She has was recently discharged at the beginning of April for similar symptoms after which she a kita tube was placed for drainage of her gallbladder for cholecysitits. She was deemed not a surgical candidate due to high risk. She also underwent kita tube exchange on 5/19 with IR. On admission, Pt was hemodynamically stable and labs were largely unremarkable with no leukocytosis and no electrolyte abnormalities.  Patient has on her home oxygen requirements.  CT imaging as below.     CT 6/27/25:   1. Since the previous CT, cholecystostomy tube has been placed. There is mild wall thickening of the gallbladder with mild pericholecystic fluid. There is a focus of induration within the adjacent rectus  musculature, through which the tube courses suggesting inflammation/hematoma secondary to tube placement. There is some indistinctness about the region, however no focal organized collection to suggest abscess. Continued follow-up is advised.  2. Choledocholithiasis, similar to the previous exam.  3. Findings suggest hepatic steatosis, correlation with LFTs recommended noting hepatomegaly.  4. Heterogeneous enhancement involving the right hepatic lobe inferiorly. This may reflect sequela of contrast phase. Underlying hemangioma is a consideration. Consider nonemergent outpatient MRI for characterization as warranted.  5. Please see above for several additional findings.      She was recently admitted for abdominal pain/nausea/vomiting and found to have acute cholecystitis on US, CT and HIDA. She was seen by general surgery and  felt to be too high risk for cholecystectomy, therefore kita tube placed 03/31/25. Post kita tube she did well other than an episode of SVT with HR in the 200s, which she converted out of wih 12 mg adenosine and was in sinus since then. EP considred multaq however no additional agents added at the time. She was discharged with home O2 and her kita tube. Since discharge, seen by GS 05/01/25 and felt to remain high risk for surgery. They were consulting IR for drain study/interrogation. 05/19 had kita tube check and exchange.    For her pulmonary htn, current therapy is Remodulin infusing at 117ng/kg/min (pre-filled cassettes), Letairis 10mg qdaily, and Adcirca 40mg qdaily., Has T/F Adempas. Patient evaluated by lung transplant in 2020. Had everything set up for LUT, but says her caregivers were deemed not acceptable and is not a transplant candidate at [facility].     Last TTE:   TTE 12/16/25     ·  Left Ventricle: The left ventricle is normal in size. Normal wall thickness. Normal wall motion. Septal flattening in systole consistent with right ventricular pressure overload. There is normal  systolic function with a visually estimated ejection fraction of 60 - 65%. There is normal diastolic function.  ·  Right Ventricle: Moderate right ventricular enlargement with hypertrophy. Systolic function is mildly to moderately reduced.  ·  The right atrium is mildly dilated.  ·  Tricuspid Valve: There is mild regurgitation.  ·  Pulmonary Artery: There is moderate to severe pulmonary hypertension. The estimated pulmonary artery systolic pressure is 67 mmHg.  ·  IVC/SVC: Normal venous pressure at 3 mmHg.      * No surgery found *     Hospital Course:   Ms Perez presented with significant and progressively worsening abdominal pain and left shoulder pain.  Patient has had a kita tube in place and is status post kita tube exchange 5/19.  CT scans appeared to be similar to previous CT scan on diagnosis of choledocholithiasis, however patient with purulent drainage from exit site of kita tube and some white purulence in her kita bag.  Patient also with nausea and vomiting, poor p.o. intake, worsening pain that has prompted her to come in to be evaluated. Kita tube was exchanged by IR on 6/18 with improvement in symptoms. aerobic and anaerobic culture collected from percutaneous kita tube exit site which grew Pseudomonas for which ID recommends 7 day treatment with meropenem. Blood cultures remained NGTD, GI felt  cholangitis from the small nonobstructing stone less likely and that her symptoms were primarily from the kita tube becoming dislodged. She was discharged home on 6.21 with midline to complete the 7 days of meropenem.    Goals of Care Treatment Preferences:  Code Status: Full Code    Living Will: Yes              Consults (From admission, onward)          Status Ordering Provider     Inpatient consult to Midline team  Once        Provider:  (Not yet assigned)    Completed MANUEL THORNTON     Inpatient consult to [facility] ([facility])  Once        Provider:  (Not yet assigned)    Completed  ALBINA AVENDAÑO     Inpatient consult to Interventional Radiology  Once        Provider:  (Not yet assigned)    Completed ALBINA AVENDAÑO     Inpatient consult to General Surgery  Once        Provider:  (Not yet assigned)    Completed ALBINA AVENDAÑO     Inpatient consult to Infectious Diseases  Once        Provider:  (Not yet assigned)    Completed ALBINA AVENDAÑO            Significant Diagnostic Studies: Labs: All labs within the past 24 hours have been reviewed    Pending Diagnostic Studies:       None          Final Active Diagnoses:    Diagnosis Date Noted POA    PRINCIPAL PROBLEM:  Choledocholithiasis [K80.50] 06/17/2025 Yes    Pneumonia [J18.9] 06/17/2025 Yes    SVT (supraventricular tachycardia) [I47.10] 04/03/2025 Yes    ABHIJEET (obstructive sleep apnea) [G47.33]  Yes    WHO group 1 pulmonary arterial hypertension [I27.21]  Yes     Chronic      Problems Resolved During this Admission:      Discharged Condition: stable    Disposition: Home or Self Care    Follow Up:   Contact information for after-discharge care       Dialysis/Infusion       [facility] [place] .    Service: Infusion and IV Therapy  Contact information:  [address] [facility] [address]  [phone]                     Home Medical Care       [facility] .    Service: Home Nursing  Contact information:  [address]  [facility] [id number] [phone]                                 Patient Instructions:   No discharge procedures on file.  Medications:  Reconciled Home Medications:      Medication List        START taking these medications      0.9% NaCl PgBk 100 mL with meropenem 2 gram SolR 2 g  Inject 2 g into the vein every 8 (eight) hours.     0.9% NaCl SolP 100 mL with treprostinil sodium 5 mg/mL Soln 12,000,000 ng  Inject 7,078.5 ng/min into the vein continuous.     gabapentin 100 MG capsule  Commonly known as: NEURONTIN  Take 2  capsules (200 mg total) by mouth 3 (three) times daily.            CHANGE how you take these medications      ADCIRCA 20 mg Tab  Generic drug: tadalafil  Take 2 tablets (40 mg total) by mouth once daily.  What changed: when to take this     fluticasone propionate 50 mcg/actuation nasal spray  Commonly known as: FLONASE  2 sprays (100 mcg total) by Each Nostril route every evening.  What changed: when to take this     furosemide 20 MG tablet  Commonly known as: LASIX  Take 1 tablet (20 mg total) by mouth once daily.  What changed: additional instructions     GLUCOSAMINE-CHOND-MSM COMPLEX ORAL  Take by mouth. Three times a week  What changed: additional instructions     LIDOcaine 5 %  Commonly known as: LIDODERM  Place 1 patch onto the skin once daily. Takes as needed  What changed: additional instructions            CONTINUE taking these medications      acetaminophen 500 MG tablet  Commonly known as: TYLENOL  Take 1,000 mg by mouth every 6 (six) hours as needed for Pain.     albuterol-ipratropium 2.5 mg-0.5 mg/3 mL nebulizer solution  Commonly known as: DUO-NEB  Take 3 mLs by nebulization every 6 (six) hours as needed for Wheezing. Rescue     ambrisentan 10 MG Tab  Commonly known as: LETAIRIS  Take 1 tablet (10 mg total) by mouth once daily.     BREO ELLIPTA 200-25 mcg/dose Dsdv diskus inhaler  Generic drug: fluticasone furoate-vilanteroL  INHALE 1 PUFF INTO THE LUNGS EVERY EVENING. CONTROLLER     butalbital-acetaminophen-caffeine -40 mg -40 mg per tablet  Commonly known as: FIORICET, ESGIC  Take 1 tablet by mouth every 4 (four) hours as needed.     cyanocobalamin (vitamin B-12) 2,500 mcg Subl  Commonly known as: VITAMIN B-12  Place 2,500 mcg under the tongue every morning.     diphenhydrAMINE 25 mg capsule  Commonly known as: BENADRYL  Take 50 mg by mouth every 6 (six) hours as needed for Itching. Patient takes prn evenings     diphenoxylate-atropine 2.5-0.025 mg 2.5-0.025 mg per tablet  Commonly known  as: LOMOTIL  Take 1 tablet by mouth 4 (four) times daily as needed for Diarrhea.     ferrous sulfate 325 (65 FE) MG EC tablet  Take 325 mg by mouth daily as needed.     folic acid 1 MG tablet  Commonly known as: FOLVITE  Take 1 tablet (1,000 mcg total) by mouth once daily.     hyoscyamine 0.125 mg Subl  Place 1 tablet (0.125 mg total) under the tongue every 6 (six) hours as needed (Aa needed).     levothyroxine 125 MCG tablet  Commonly known as: SYNTHROID  Take 1 tablet (125 mcg total) by mouth before breakfast.     loratadine 10 mg tablet  Commonly known as: CLARITIN  Take 10 mg by mouth once daily. Takes in morning     NURTEC 75 mg odt  Generic drug: rimegepant  Take 1 tablet (75 mg total) by mouth as needed for Migraine. Place ODT tablet on the tongue; alternatively the ODT tablet may be placed under the tongue. Can take 2nd dose in 24 hrs if mild relief, no more than 2 in 24 hrs     nystatin powder  Commonly known as: MYCOSTATIN  Apply topically 2 (two) times daily. for 14 days     pantoprazole 40 MG tablet  Commonly known as: PROTONIX  Take 1 tablet (40 mg total) by mouth once daily.     PHEXXI 1.8-1-0.4 % Gel  Generic drug: lactic acid-citric-potassium  Place 1 Applicatorful vaginally as needed (CONTRACEPTIVE).     tiZANidine 4 MG tablet  Commonly known as: ZANAFLEX  Take 4 mg by mouth every 6 (six) hours as needed.     topiramate 100 MG tablet  Commonly known as: TOPAMAX  Take 1 tablet (100 mg total) by mouth 2 (two) times daily.     treprostinil 2.5 mg/mL Soln  Commonly known as: REMODULIN  Mix cassette as directed and infuse continuously per physician titration orders on dosing sheet. Current dose 80ng/kg/min     triamcinolone 0.5 % ointment  Commonly known as: KENALOG  Apply 1 application  topically 2 (two) times daily.     VENTOLIN HFA 90 mcg/actuation inhaler  Generic drug: albuterol  Inhale 2 puffs into the lungs every 6 (six) hours as needed for Wheezing or Shortness of Breath. Rescue            STOP  taking these medications      XANAX ORAL              Chilo Myers PA-C  Heart Transplant  [facility] Trans[facility]

## 2025-06-22 NOTE — HOSPITAL COURSE
Ms Perez presented with significant and progressively worsening abdominal pain and left shoulder pain.  Patient has had a kita tube in place and is status post kita tube exchange 5/19.  CT scans appeared to be similar to previous CT scan on diagnosis of choledocholithiasis, however patient with purulent drainage from exit site of kita tube and some white purulence in her kita bag.  Patient also with nausea and vomiting, poor p.o. intake, worsening pain that has prompted her to come in to be evaluated. Kita tube was exchanged by IR on 6/18 with improvement in symptoms. aerobic and anaerobic culture collected from percutaneous kita tube exit site which grew Pseudomonas for which ID recommends 7 day treatment with meropenem. Blood cultures remained NGTD, GI felt  cholangitis from the small nonobstructing stone less likely and that her symptoms were primarily from the kita tube becoming dislodged. She was discharged home on 6.21 with midline to complete the 7 days of meropenem.

## 2025-06-23 LAB
BACTERIA SPEC ANAEROBE CULT: NORMAL
FUNGUS SPEC CULT: NORMAL

## 2025-06-23 NOTE — PROGRESS NOTES
Follow-up note       Dc Summary received and provided to [facility] ([phone]) via [employer]price in Basket.

## 2025-06-24 ENCOUNTER — TELEPHONE (OUTPATIENT)
Dept: INFECTIOUS DISEASES | Facility: CLINIC | Age: 55
End: 2025-06-24
Payer: COMMERCIAL

## 2025-06-24 ENCOUNTER — HOSPITAL ENCOUNTER (OUTPATIENT)
Dept: RADIOLOGY | Facility: HOSPITAL | Age: 55
Discharge: HOME | End: 2025-06-24
Attending: INTERNAL MEDICINE
Payer: COMMERCIAL

## 2025-06-24 DIAGNOSIS — R16.0 LIVER MASS: ICD-10-CM

## 2025-06-24 DIAGNOSIS — I27.21 WHO GROUP 1 PULMONARY ARTERIAL HYPERTENSION: Chronic | ICD-10-CM

## 2025-06-24 DIAGNOSIS — I27.9 CHRONIC PULMONARY HEART DISEASE: ICD-10-CM

## 2025-06-24 DIAGNOSIS — D13.4 HEPATIC ADENOMA: ICD-10-CM

## 2025-06-24 PROCEDURE — 76700 US EXAM ABDOM COMPLETE: CPT | Mod: TC

## 2025-06-24 PROCEDURE — 93970 EXTREMITY STUDY: CPT | Mod: TC

## 2025-06-24 PROCEDURE — 76700 US EXAM ABDOM COMPLETE: CPT | Mod: 26,,, | Performed by: INTERNAL MEDICINE

## 2025-06-24 PROCEDURE — 93970 EXTREMITY STUDY: CPT | Mod: 26,,, | Performed by: INTERNAL MEDICINE

## 2025-06-24 NOTE — TELEPHONE ENCOUNTER
Patient notified of appt with Dr Monterroso on 6/27/25.  She has not gotten a call from [facility] to set up lab work I will place a call to them left message for nursing supervisor.

## 2025-06-26 ENCOUNTER — LAB REQUISITION (OUTPATIENT)
Dept: LAB | Facility: HOSPITAL | Age: 55
End: 2025-06-26
Payer: COMMERCIAL

## 2025-06-26 DIAGNOSIS — K81.9 CHOLECYSTITIS, UNSPECIFIED: ICD-10-CM

## 2025-06-26 LAB
ABSOLUTE EOSINOPHIL (OHS): 0.5 K/UL
ABSOLUTE MONOCYTE (OHS): 0.55 K/UL (ref 0.3–1)
ABSOLUTE NEUTROPHIL COUNT (OHS): 3.71 K/UL (ref 1.8–7.7)
ANION GAP (OHS): 8 MMOL/L (ref 8–16)
BASOPHILS # BLD AUTO: 0.05 K/UL
BASOPHILS NFR BLD AUTO: 0.8 %
BUN SERPL-MCNC: 7 MG/DL (ref 6–20)
CALCIUM SERPL-MCNC: 8.5 MG/DL (ref 8.7–10.5)
CHLORIDE SERPL-SCNC: 112 MMOL/L (ref 95–110)
CO2 SERPL-SCNC: 20 MMOL/L (ref 23–29)
CREAT SERPL-MCNC: 0.6 MG/DL (ref 0.5–1.4)
ERYTHROCYTE [DISTWIDTH] IN BLOOD BY AUTOMATED COUNT: 14.2 % (ref 11.5–14.5)
GFR SERPLBLD CREATININE-BSD FMLA CKD-EPI: >60 ML/MIN/1.73/M2
GLUCOSE SERPL-MCNC: 75 MG/DL (ref 70–110)
HCT VFR BLD AUTO: 41.4 % (ref 37–48.5)
HGB BLD-MCNC: 13.8 GM/DL (ref 12–16)
IMM GRANULOCYTES # BLD AUTO: 0.03 K/UL (ref 0–0.04)
IMM GRANULOCYTES NFR BLD AUTO: 0.5 % (ref 0–0.5)
LYMPHOCYTES # BLD AUTO: 1.25 K/UL (ref 1–4.8)
MAGNESIUM SERPL-MCNC: 2.3 MG/DL (ref 1.6–2.6)
MCH RBC QN AUTO: 29.7 PG (ref 27–31)
MCHC RBC AUTO-ENTMCNC: 33.3 G/DL (ref 32–36)
MCV RBC AUTO: 89 FL (ref 82–98)
NUCLEATED RBC (/100WBC) (OHS): 0 /100 WBC
PLATELET # BLD AUTO: 272 K/UL (ref 150–450)
PLATELET BLD QL SMEAR: NORMAL
PMV BLD AUTO: 12.3 FL (ref 9.2–12.9)
POTASSIUM SERPL-SCNC: 3.9 MMOL/L (ref 3.5–5.1)
RBC # BLD AUTO: 4.65 M/UL (ref 4–5.4)
RELATIVE EOSINOPHIL (OHS): 8.2 %
RELATIVE LYMPHOCYTE (OHS): 20.5 % (ref 18–48)
RELATIVE MONOCYTE (OHS): 9 % (ref 4–15)
RELATIVE NEUTROPHIL (OHS): 61 % (ref 38–73)
SODIUM SERPL-SCNC: 140 MMOL/L (ref 136–145)
WBC # BLD AUTO: 6.09 K/UL (ref 3.9–12.7)

## 2025-06-26 PROCEDURE — 82310 ASSAY OF CALCIUM: CPT | Performed by: INTERNAL MEDICINE

## 2025-06-26 PROCEDURE — 85025 COMPLETE CBC W/AUTO DIFF WBC: CPT | Performed by: INTERNAL MEDICINE

## 2025-06-26 PROCEDURE — 83735 ASSAY OF MAGNESIUM: CPT | Performed by: INTERNAL MEDICINE

## 2025-06-27 ENCOUNTER — OFFICE VISIT (OUTPATIENT)
Dept: INFECTIOUS DISEASES | Facility: CLINIC | Age: 55
End: 2025-06-27
Payer: COMMERCIAL

## 2025-06-27 ENCOUNTER — TELEPHONE (OUTPATIENT)
Dept: INFECTIOUS DISEASES | Facility: CLINIC | Age: 55
End: 2025-06-27

## 2025-06-27 ENCOUNTER — TELEPHONE (OUTPATIENT)
Dept: INTERVENTIONAL RADIOLOGY/VASCULAR | Facility: HOSPITAL | Age: 55
End: 2025-06-27
Payer: COMMERCIAL

## 2025-06-27 VITALS
HEART RATE: 92 BPM | TEMPERATURE: 98 F | WEIGHT: 139.56 LBS | BODY MASS INDEX: 28.13 KG/M2 | DIASTOLIC BLOOD PRESSURE: 69 MMHG | HEIGHT: 59 IN | SYSTOLIC BLOOD PRESSURE: 101 MMHG

## 2025-06-27 DIAGNOSIS — K81.9 CHOLECYSTITIS: Primary | ICD-10-CM

## 2025-06-27 DIAGNOSIS — K80.12 CALCULUS OF GALLBLADDER WITH ACUTE ON CHRONIC CHOLECYSTITIS WITHOUT OBSTRUCTION: ICD-10-CM

## 2025-06-27 PROCEDURE — 99999 PR PBB SHADOW E&M-EST. PATIENT-LVL V: CPT | Mod: PBBFAC,,, | Performed by: INTERNAL MEDICINE

## 2025-06-27 NOTE — TELEPHONE ENCOUNTER
----- Message from Med Assistant Vaughn sent at 6/27/2025  3:51 PM CDT -----  Pt would like to have her line removed in the  AMB infusion suite on  7/1. Please put in order.

## 2025-06-27 NOTE — PROGRESS NOTES
Subjective     Patient ID: Yuni Perez is a 55 y.o. female.    Chief Complaint:No chief complaint on file.      History of Present Illness    History of Present Illness    CHIEF COMPLAINT:  Patient presents for follow-up regarding a gallbladder drainage tube and recent complications with leakage and pain.    HPI:  55 year old female with a history of pulmonary hypertension and cholecystitis in march of 2025.  Patient was deemed to not be a surgical candidate and so a cholecystostomy tube was placed on march 31, 2025.  The patient had the tube exchanged on may 19, 2025.  She was admitted to the hospital on June 17 with abdominal pain, nausea, vomiting and increased drainage around her tube.  The tube was exchanged and cultures sent.  These cultures were positive for Pseudomonas.  She was discharged on meropenem IV.  She is here today for folow up.      Yesterday, she noticed slow drainage during flushing and subsequent leakage. A nurse changed her bandage and confirmed leakage during flushing. The nurse checked for prolapse and found none, noting that sutures on one side were intact and the area was slightly red.  She has ongoing pain and drainage starting yesterday around 2 PM, with a little more today. She typically flushes the tube at least daily but did not do so today pending medical advice. She is currently on antibiotics, with 3 doses remaining. A home health nurse evaluated yesterday to draw blood and change bandages. She reports that the bandage is mildly itchy but tolerable.  She denies fever and chills at home.    MEDICATIONS:  Meropenem IV  Remodulin    MEDICAL HISTORY:  Patient has a history of gallbladder infection, which occurred on March 31st and required drainage tube placement. She also has gallstones, as mentioned in her recent ultrasound. Patient is currently perimenopausal.  Patient is on remodulin for pulmonary hypertension.    TEST RESULTS:  Patient had a blood draw  yesterday.    IMAGING:  An abdominal ultrasound was performed on 3 days ago that showed gallstones.        Review of Systems   All other systems reviewed and are negative.       Objective   Physical Exam  Vitals and nursing note reviewed.   Constitutional:       General: She is not in acute distress.     Appearance: She is well-developed. She is not diaphoretic.   HENT:      Head: Normocephalic and atraumatic.      Right Ear: External ear normal.      Left Ear: External ear normal.      Nose: Nose normal.      Mouth/Throat:      Pharynx: No oropharyngeal exudate.   Eyes:      General: No scleral icterus.        Right eye: No discharge.         Left eye: No discharge.      Conjunctiva/sclera: Conjunctivae normal.      Pupils: Pupils are equal, round, and reactive to light.   Neck:      Thyroid: No thyromegaly.      Vascular: No JVD.      Trachea: No tracheal deviation.   Cardiovascular:      Rate and Rhythm: Normal rate and regular rhythm.      Heart sounds: No murmur heard.     No friction rub. No gallop.   Pulmonary:      Effort: Pulmonary effort is normal. No respiratory distress.      Breath sounds: Normal breath sounds. No stridor. No wheezing or rales.   Chest:      Chest wall: No tenderness.   Abdominal:      General: Bowel sounds are normal. There is no distension.      Palpations: Abdomen is soft. There is no mass.      Tenderness: There is no abdominal tenderness. There is no guarding or rebound.      Comments: Cholecystostomy tube in place.   Musculoskeletal:         General: No tenderness. Normal range of motion.      Cervical back: Normal range of motion and neck supple.   Lymphadenopathy:      Cervical: No cervical adenopathy.   Skin:     General: Skin is warm.      Coloration: Skin is not pale.      Findings: No erythema or rash.   Neurological:      Mental Status: She is alert and oriented to person, place, and time.      Cranial Nerves: No cranial nerve deficit.      Motor: No abnormal muscle tone.       Coordination: Coordination normal.      Deep Tendon Reflexes: Reflexes normal.   Psychiatric:         Behavior: Behavior normal.         Thought Content: Thought content normal.         Judgment: Judgment normal.            Assessment and Plan     1. Cholecystitis    2. Calculus of gallbladder with acute on chronic cholecystitis without obstruction      55 year old female with a history of pulmonary hypertension and cholecystitis managed with a cholecystostomy tube.  She was recently admitted to the hospital with complications with the tube.  The tube was changed and cultures from her gallbladder were positive for Pseudomonas.  Patient will complete a course of IV meropenem tomorrow. Will arrange for her mid line to be removed next week.  She is having some drainage from around the tube with flushes.  Will refer her to IR to check her cholecystostomy tube.      There are no surgical plans currently to remove her gallbladder due to her history of pulmonary hypertension.  Patient is at risk for recurrent infection of her gallbladder.    Plan:  Cholecystitis  -     IR Abscess Tube Check (xpd); Future; Expected date: 06/27/2025  -     Ambulatory referral/consult to Interventional RAD; Future; Expected date: 07/04/2025    Calculus of gallbladder with acute on chronic cholecystitis without obstruction  -     IR Abscess Tube Check (xpd); Future; Expected date: 06/27/2025  -     Ambulatory referral/consult to Interventional RAD; Future; Expected date: 07/04/2025      This note was generated with the assistance of ambient listening technology. Verbal consent was obtained by the patient and accompanying visitor(s) for the recording of patient appointment to facilitate this note. I attest to having reviewed and edited the generated note for accuracy, though some syntax or spelling errors may persist. Please contact the author of this note for any clarification.

## 2025-06-30 ENCOUNTER — ANESTHESIA EVENT (OUTPATIENT)
Dept: INTERVENTIONAL RADIOLOGY/VASCULAR | Facility: HOSPITAL | Age: 55
End: 2025-06-30
Payer: COMMERCIAL

## 2025-06-30 ENCOUNTER — HOSPITAL ENCOUNTER (OUTPATIENT)
Dept: INTERVENTIONAL RADIOLOGY/VASCULAR | Facility: HOSPITAL | Age: 55
Discharge: HOME | End: 2025-06-30
Attending: FAMILY MEDICINE
Payer: COMMERCIAL

## 2025-06-30 VITALS
HEIGHT: 59 IN | RESPIRATION RATE: 26 BRPM | BODY MASS INDEX: 28.02 KG/M2 | TEMPERATURE: 98 F | DIASTOLIC BLOOD PRESSURE: 52 MMHG | SYSTOLIC BLOOD PRESSURE: 84 MMHG | OXYGEN SATURATION: 94 % | HEART RATE: 80 BPM | WEIGHT: 139 LBS

## 2025-06-30 DIAGNOSIS — T85.518A CHOLECYSTOSTOMY TUBE DYSFUNCTION, INITIAL ENCOUNTER: ICD-10-CM

## 2025-06-30 LAB
B-HCG UR QL: NEGATIVE
CTP QC/QA: YES

## 2025-06-30 PROCEDURE — 63600175 PHARM REV CODE 636 W HCPCS: Performed by: RADIOLOGY

## 2025-06-30 PROCEDURE — 37000009 HC ANESTHESIA EA ADD 15 MINS

## 2025-06-30 PROCEDURE — 99152 MOD SED SAME PHYS/QHP 5/>YRS: CPT | Mod: ,,, | Performed by: RADIOLOGY

## 2025-06-30 PROCEDURE — C1769 GUIDE WIRE: HCPCS

## 2025-06-30 PROCEDURE — 37000008 HC ANESTHESIA 1ST 15 MINUTES

## 2025-06-30 PROCEDURE — 47536 EXCHANGE BILIARY DRG CATH: CPT

## 2025-06-30 PROCEDURE — 25000003 PHARM REV CODE 250: Performed by: NURSE ANESTHETIST, CERTIFIED REGISTERED

## 2025-06-30 PROCEDURE — 63600175 PHARM REV CODE 636 W HCPCS: Performed by: NURSE ANESTHETIST, CERTIFIED REGISTERED

## 2025-06-30 PROCEDURE — 25500020 PHARM REV CODE 255: Performed by: UROLOGY

## 2025-06-30 PROCEDURE — 47536 EXCHANGE BILIARY DRG CATH: CPT | Mod: ,,, | Performed by: RADIOLOGY

## 2025-06-30 PROCEDURE — 81025 URINE PREGNANCY TEST: CPT | Performed by: FAMILY MEDICINE

## 2025-06-30 PROCEDURE — 99152 MOD SED SAME PHYS/QHP 5/>YRS: CPT

## 2025-06-30 PROCEDURE — 27201423 OPTIME MED/SURG SUP & DEVICES STERILE SUPPLY

## 2025-06-30 PROCEDURE — C1729 CATH, DRAINAGE: HCPCS

## 2025-06-30 RX ORDER — HYDROMORPHONE HYDROCHLORIDE 1 MG/ML
0.2 INJECTION, SOLUTION INTRAMUSCULAR; INTRAVENOUS; SUBCUTANEOUS EVERY 5 MIN PRN
Status: DISCONTINUED | OUTPATIENT
Start: 2025-06-30 | End: 2025-07-01 | Stop reason: HOSPADM

## 2025-06-30 RX ORDER — PROPOFOL 10 MG/ML
VIAL (ML) INTRAVENOUS
Status: DISCONTINUED | OUTPATIENT
Start: 2025-06-30 | End: 2025-06-30

## 2025-06-30 RX ORDER — LIDOCAINE HYDROCHLORIDE 20 MG/ML
INJECTION, SOLUTION EPIDURAL; INFILTRATION; INTRACAUDAL; PERINEURAL
Status: DISCONTINUED | OUTPATIENT
Start: 2025-06-30 | End: 2025-06-30

## 2025-06-30 RX ORDER — GLUCAGON 1 MG
1 KIT INJECTION
Status: DISCONTINUED | OUTPATIENT
Start: 2025-06-30 | End: 2025-07-01 | Stop reason: HOSPADM

## 2025-06-30 RX ORDER — CEFTRIAXONE 1 G/1
INJECTION, POWDER, FOR SOLUTION INTRAMUSCULAR; INTRAVENOUS
Status: DISCONTINUED | OUTPATIENT
Start: 2025-06-30 | End: 2025-06-30

## 2025-06-30 RX ORDER — MIDAZOLAM HYDROCHLORIDE 1 MG/ML
INJECTION INTRAMUSCULAR; INTRAVENOUS
Status: DISCONTINUED | OUTPATIENT
Start: 2025-06-30 | End: 2025-06-30

## 2025-06-30 RX ORDER — LIDOCAINE HYDROCHLORIDE 10 MG/ML
INJECTION, SOLUTION EPIDURAL; INFILTRATION; INTRACAUDAL; PERINEURAL
Status: COMPLETED | OUTPATIENT
Start: 2025-06-30 | End: 2025-06-30

## 2025-06-30 RX ORDER — MORPHINE SULFATE 2 MG/ML
INJECTION, SOLUTION INTRAMUSCULAR; INTRAVENOUS
Status: DISCONTINUED
Start: 2025-06-30 | End: 2025-06-30 | Stop reason: WASHOUT

## 2025-06-30 RX ORDER — OXYCODONE HYDROCHLORIDE 5 MG/1
5 TABLET ORAL
Status: DISCONTINUED | OUTPATIENT
Start: 2025-06-30 | End: 2025-07-01 | Stop reason: HOSPADM

## 2025-06-30 RX ORDER — PROCHLORPERAZINE EDISYLATE 5 MG/ML
5 INJECTION INTRAMUSCULAR; INTRAVENOUS EVERY 30 MIN PRN
Status: DISCONTINUED | OUTPATIENT
Start: 2025-06-30 | End: 2025-07-01 | Stop reason: HOSPADM

## 2025-06-30 RX ORDER — VASOPRESSIN 20 [USP'U]/ML
INJECTION, SOLUTION INTRAMUSCULAR; SUBCUTANEOUS
Status: DISCONTINUED | OUTPATIENT
Start: 2025-06-30 | End: 2025-06-30

## 2025-06-30 RX ORDER — SODIUM CHLORIDE 9 MG/ML
INJECTION, SOLUTION INTRAVENOUS CONTINUOUS
Status: DISCONTINUED | OUTPATIENT
Start: 2025-06-30 | End: 2025-07-01 | Stop reason: HOSPADM

## 2025-06-30 RX ADMIN — PROPOFOL 20 MG: 10 INJECTION, EMULSION INTRAVENOUS at 10:06

## 2025-06-30 RX ADMIN — SODIUM CHLORIDE: 0.9 INJECTION, SOLUTION INTRAVENOUS at 09:06

## 2025-06-30 RX ADMIN — VASOPRESSIN 1 UNITS: 20 INJECTION INTRAVENOUS at 10:06

## 2025-06-30 RX ADMIN — PROPOFOL 50 MCG/KG/MIN: 10 INJECTION, EMULSION INTRAVENOUS at 10:06

## 2025-06-30 RX ADMIN — MIDAZOLAM 2 MG: 1 INJECTION INTRAMUSCULAR; INTRAVENOUS at 09:06

## 2025-06-30 RX ADMIN — IOHEXOL 15 ML: 300 INJECTION, SOLUTION INTRAVENOUS at 10:06

## 2025-06-30 RX ADMIN — CEFTRIAXONE 1 G: 1 INJECTION, POWDER, FOR SOLUTION INTRAMUSCULAR; INTRAVENOUS at 10:06

## 2025-06-30 RX ADMIN — LIDOCAINE HYDROCHLORIDE 5 ML: 10 SOLUTION INTRAVENOUS at 10:06

## 2025-06-30 RX ADMIN — LIDOCAINE HYDROCHLORIDE 30 MG: 20 INJECTION, SOLUTION EPIDURAL; INFILTRATION; INTRACAUDAL at 10:06

## 2025-06-30 RX ADMIN — PROPOFOL 30 MG: 10 INJECTION, EMULSION INTRAVENOUS at 10:06

## 2025-06-30 NOTE — ANESTHESIA PREPROCEDURE EVALUATION
06/30/2025  Yuni Perez is a 55 y.o., female     PASP 68 mmHg on Remodulin (since 2008)  /78    Hx of anesthetics in past without complications  NPO>8 hours  No food or drug allergies  Amenable to proceed with scheduled procedure    Procedure: IR Cholecystostomy Tube Exchange    Hx of anesthetics in past without complications  NPO>8 hours  No food or drug allergies  Amenable to proceed with scheduled procedure         Problem List[1]    Past Medical History:   Diagnosis Date    AR (allergic rhinitis)     Cholelithiasis, common bile duct     Chronic low back pain     Eye pressure 2017    General anesthetics causing adverse effect in therapeutic use     GERD (gastroesophageal reflux disease)     History of migraine headaches     Hypothyroidism     Innocent heart murmur     Lumbar disc disease     Menorrhagia     Mild asthma     Obesity     Plantar fasciitis of left foot     Primary pulmonary hypertension     followed by heart transplant/pulmonary     Seizure disorder     x 1 in 2008    Seizures     Sleep apnea     SVT (supraventricular tachycardia) 4/3/2025    TMJ (dislocation of temporomandibular joint)     Tricuspid regurgitation        ECHO: Results for orders placed during the hospital encounter of 12/16/24    Echo    Interpretation Summary    Left Ventricle: The left ventricle is normal in size. Normal wall thickness. Normal wall motion. Septal flattening in systole consistent with right ventricular pressure overload. There is normal systolic function with a visually estimated ejection fraction of 60 - 65%. There is normal diastolic function.    Right Ventricle: Moderate right ventricular enlargement with hypertrophy. Systolic function is mildly to moderately reduced.    The right atrium is mildly dilated.    Tricuspid Valve: There is mild regurgitation.    Pulmonary Artery: There is  "moderate to severe pulmonary hypertension. The estimated pulmonary artery systolic pressure is 67 mmHg.    IVC/SVC: Normal venous pressure at 3 mmHg.      There is no height or weight on file to calculate BMI.    Tobacco Use: Low Risk  (6/30/2025)    Patient History     Smoking Tobacco Use: Never     Smokeless Tobacco Use: Never     Passive Exposure: Not on file       Social History     Substance and Sexual Activity   Drug Use No        Alcohol Use: Unknown (5/31/2025)    AUDIT-C     Frequency of Alcohol Consumption: Patient declined     Average Number of Drinks: Patient does not drink     Frequency of Binge Drinking: Not on file       Review of patient's allergies indicates:   Allergen Reactions    Fentanyl Anaphylaxis     Respiratory distress    Vibra-tabs [doxycycline hyclate] Anaphylaxis     "throat felt like it was closing"    Adhesive Hives     Silk tape    Amoxicillin Rash    Nsaids (non-steroidal anti-inflammatory drug) Swelling    Chlorhexidine Other (See Comments)     Blue Chlorhexidine causes hives         Airway:  No value filed.         Pre-op Assessment    I have reviewed the Patient Summary Reports.     I have reviewed the Nursing Notes. I have reviewed the NPO Status.   I have reviewed the Medications.     Review of Systems  Anesthesia Hx:  No problems with previous Anesthesia   History of prior surgery of interest to airway management or planning:          Denies Family Hx of Anesthesia complications.    Denies Personal Hx of Anesthesia complications.                    Cardiovascular:     Hypertension Valvular problems/Murmurs         PVD hyperlipidemia                               Pulmonary:      Shortness of breath  Sleep Apnea            Pulmonary Infection:   Pulmonary Hypertension, primary (WHO) Functional Severity Class II - Slight limitation of physical activity, Comfortable at rest. Echocardiographic Estimated Pulmonary Artery Systolic Pressure > 60 Right Heart Cath Mean Pulmonary Artery " Pressure  > 50   Hepatic/GI:     GERD Liver Disease,               Endocrine:   Hypothyroidism              Physical Exam  General: Well nourished, Cooperative, Alert, Oriented and Anxious    Airway:  Mallampati: I / I  Mouth Opening: Small, but > 3cm  TM Distance: Normal  Tongue: Normal  Neck ROM: Normal ROM    Dental:  Intact, Periodontal disease        Anesthesia Plan  Type of Anesthesia, risks & benefits discussed:    Anesthesia Type: Gen Supraglottic Airway, Gen ETT, Gen Natural Airway  Intra-op Monitoring Plan: Standard ASA Monitors  Post Op Pain Control Plan: multimodal analgesia and IV/PO Opioids PRN  Induction:  IV  Airway Plan: , Post-Induction  Informed Consent: Informed consent signed with the Patient and all parties understand the risks and agree with anesthesia plan.  All questions answered. Patient consented to blood products? No  ASA Score: 4    Ready For Surgery From Anesthesia Perspective.     .           [1]   Patient Active Problem List  Diagnosis    Chronic pulmonary heart disease    Liver mass    Non-allergic vasomotor rhinitis    Allergic asthma    WHO group 1 pulmonary arterial hypertension    Tricuspid regurgitation    Hypothyroidism (acquired)    Migraine without aura and without status migrainosus, not intractable    Lumbar disc disease    Moderate asthma    Chronic low back pain    Overweight (BMI 25.0-29.9)    Menorrhagia    ABHIJEET (obstructive sleep apnea)    Medication overuse headache    Cholelithiasis    Borderline osteopenia    Hepatic adenoma    Left shoulder pain    Chronic tension-type headache, not intractable    Mild intermittent asthma    Pasteurella cellulitis due to cat bite    Cervicogenic headache    Episodic migraine    Chronic neck pain    Decreased range of motion of neck    Posture abnormality    Cholecystitis    Moderate protein-calorie malnutrition    SVT (supraventricular tachycardia)    Fungal rash of trunk    Choledocholithiasis    Pneumonia

## 2025-06-30 NOTE — DISCHARGE INSTRUCTIONS
Cholecystostomy Drain Discharge Instructions    What is a Cholecystostomy drain (Kita Tube)?   Bile is produced by your liver to help digest food. It is stored in the gallbladder. A Kita Tube is placed when the gallbladder is blocked and bile can not exit the gallbladder. The kita tube drains the blocked gallbladder and removes infection.      Generally the drain must stay in for at least 4 weeks, unless surgery removes your gallbladder. While the drain is there, you will have follow-up care with your provider regularly. The drain will be checked in 4 weeks in the Interventional Radiology (IR) department. Drains are generally replaced every 8-10 weeks. If you do not have an appointment and feel it is time for the tube to be exchanged, please call [phone].       Diet and Activity:   Continue with your regular diet and previously prescribed medications.    Avoid any activity that may result in pulling on the drain.    Do not submerge the drain. This means do not swim, sit in a hot tub, soak in a tub bath, or do anything that involves putting the drain under water.     Emptying the Collection Bag:  Empty the collection bag into the toilet when it is half full, or at least once each day. Your provider will let you know if you should keep track of the amount.     1. Turn the knob at the bottom of the bag and drain into toilet. If your provider has told you to track the contents, empty the bag into a measuring container and record the amount.   2. Wash your hands.   3. Change outside tube and bag weekly. These can be bought from any medical supplies store.     Showering:  While it is okay to shower with your drain, do not soak in any water with your drain. Soaking may lead to infection.      First 2 weeks: Before taking a shower, cover the dressing with a double layer of plastic wrap (like Saran wrap) where it enters the skin. Tape down the edges. After the shower, remove the plastic and apply a clean  bandage.    After 2 weeks: You may shower without the plastic wrap. Rinse well. Pat the area dry and apply a clean bandage.     Drain Care:  Change your dressing at least every 2 days, or if it becomes dirty or wet.   1. Wash your hands.   2. Remove the old bandage carefully. Try not to pull on the drain or stitches.   3. If the skin needs to be cleaned, use a gauze or cotton swab to gently clean it with soap and water.   4. Wait for your skin to dry.   5. Apply a sterile 4x4 gauze over the tube where it enters your body. Be careful not to kink the drain.   6. Secure with paper tape.   7. Wash your hands.     You may or may not need to flush the drain. Your provider will discuss this with you.     Call your provider immediately or seek emergency medical attention if you experience any of the following symptoms. This information does not replace medical advice from your healthcare provider. Your experience may differ from that of the typical patient. Talk to your provider if you have any questions about this document, your condition, or your treatment plan.    New or worsening yellowing of your skin/eyes    Fever    New or worsening belly pain    Nausea or vomiting    New or worsening redness or swelling where the drain meets the skin    Pus leaking around the drain    The drain begins to leak, breaks, or falls out Troubleshooting    If you have any of the above symptoms and the drain is capped, uncap the drain and attach the drainage bag.    If the drain is already attached to a drainage bag, remove the bandage and check to see if the drain is twisted. If these steps do not improve your symptoms, call your provider immediately, or go to the nearest emergency room, or call 911.         Flushing instructions:  If you are told to flush your drain You will need to flush your biliary drainwith 10 cc of normal saline each day.   1. Gather supplies: 10 mL of sterile normal saline solution, syringe, sterile gauze, paper  tape, and rubbing alcohol (liquid or single-use alcohol wipes).   2. Wash your hands.   3. Unscrew the drainage bag from drain (place a towel beneath to catch any drips).   4. Wipe drain with alcohol.   5. Fill the syringe with 10 mL of normal saline solution.   6. Attach syringe to drain.   7. Gently inject normal saline into drain. Do NOT pull back on the syringe.   8. Unscrew syringe.   9. Reattach drainage bag.   10. Wash your hands and dispose of supplies.         Capping instructions:  If you are told to cap your drain, you should cap the drain 24 hours after the procedure.     Remember, if you experience any of the following symptoms, try uncapping the drain and attaching the drainage bag:    New or worsening yellowing of your skin/eyes    Fever    New or worsening belly pain    Nausea or vomiting    Increased drainage around the tube    If this does not improve your symptoms, call your provider immediately or seek emergency medical attention.     Interventional Radiology Clinic     For complications   [phone]. Monday - Friday, 8:00 am - 4:00 pm    [phone] After hours and on holidays. Ask to speak with the interventional radiologist on call.     For Scheduling   [phone] Monday - Friday, 8:00 am - 4:00 pm

## 2025-06-30 NOTE — PLAN OF CARE
Pt arrived to IR for kita tube exchange with anesthesia. Pt oriented to unit and staff. Plan of care reviewed with patient, patient verbalizes understanding. Comfort measures utilized. Pt safely transferred from stretcher to procedural table. Fall risk reviewed with patient, fall risk interventions maintained. Safety strap applied, positioner pillows utilized to minimize pressure points. Blankets applied. Pt prepped and draped utilizing standard sterile technique. Patient placed on continuous monitoring, as required by sedation policy. Timeouts completed utilizing standard universal time-out, per department and facility policy. RN to remain at bedside, continuous monitoring maintained. Pt resting comfortably. Denies pain/discomfort. Will continue to monitor. See anesthesia flow sheets for monitoring, medication administration, and updates.

## 2025-06-30 NOTE — DISCHARGE SUMMARY
Radiology Discharge Summary      Hospital Course: No complications    Admit Date: 6/30/2025  Discharge Date: 06/30/2025     Instructions Given to Patient: Yes  Diet: Resume prior diet  Activity: activity as tolerated and no driving for today    Description of Condition on Discharge: Stable  Vital Signs (Most Recent): Temp: 98.1 °F (36.7 °C) (06/30/25 0922)  Pulse:  (see anesthesia documentation) (06/30/25 1035)  Resp:  (see anesthesia documentation) (06/30/25 1035)  BP:  (see anesthesia documentation) (06/30/25 1035)  SpO2:  (see anesthesia documentation) (06/30/25 1035)    Discharge Disposition: Home    Discharge Diagnosis: Cholecystitis s/p kita tube upsize/exhange     Follow-up: follow-up with IR in 8-12 weeks for routine exchange    Дмитрий Guerra MD

## 2025-06-30 NOTE — PLAN OF CARE
Patient being discharged to home via wheelchair to the care of her father. Bed rest completed. Patient VSS on room air and tolerating PO intake. Discharge instructions (written and verbal) and follow up information given to patient, who verbalized understanding as well as a readiness for discharge. [facility] contact info provided for additional questions following discharge.

## 2025-06-30 NOTE — ANESTHESIA POSTPROCEDURE EVALUATION
Anesthesia Post Evaluation    Patient: Yuni Perez    Procedure(s) Performed: * No procedures listed *    Final Anesthesia Type: general      Patient location during evaluation: PACU  Patient participation: Yes- Able to Participate  Level of consciousness: awake and alert and oriented  Post-procedure vital signs: reviewed and stable  Pain management: adequate  Airway patency: patent    PONV status at discharge: No PONV  Anesthetic complications: no      Cardiovascular status: hemodynamically stable  Respiratory status: unassisted  Hydration status: euvolemic  Follow-up not needed.              Vitals Value Taken Time   BP 90/51 06/30/25 11:46   Temp 36.6 °C (97.9 °F) 06/30/25 10:56   Pulse 82 06/30/25 11:49   Resp 17 06/30/25 11:49   SpO2 96 % 06/30/25 11:49   Vitals shown include unfiled device data.      No case tracking events are documented in the log.      Pain/Ronald Score: Ronald Score: 9 (6/30/2025 11:45 AM)

## 2025-06-30 NOTE — PROCEDURES
Pre Op Diagnosis: Cholecystitis  Post Op Diagnosis: Same    Procedure: Cholecystostomy tube upsize and exchange.    Procedure performed by: Дмитрий Guerra MD, Darius Chopra MD    Written Informed Consent Obtained: Yes  Specimen Removed: NO  Estimated Blood Loss: Minimal    Findings:   Contrast injection through pre-existing kita tube showed multiple filling defects consistent with stones with nonvisuailization of the cystic duct.    Tube was upsized for a 12Fr kita tube and post exchange cholangiogram showed filling of the cystic duct.    Patient tolerated procedure well.    Дмитрий Guerra  Diagnostic Radiology PGY-2

## 2025-06-30 NOTE — TRANSFER OF CARE
"Anesthesia Transfer of Care Note    Patient: Yuni Perez    Procedure(s) Performed: * No procedures listed *    Patient location: [facility]    Anesthesia Type: general    Transport from OR: Transported from OR on room air with adequate spontaneous ventilation    Post pain: adequate analgesia    Post assessment: no apparent anesthetic complications and tolerated procedure well    Post vital signs: stable    Level of consciousness: awake    Nausea/Vomiting: no nausea/vomiting    Complications: none    Transfer of care protocol was followed      Last vitals: Visit Vitals  BP (!) 98/59 (BP Location: Right arm, Patient Position: Lying)   Pulse 84   Temp 36.7 °C (98.1 °F) (Temporal)   Resp 18   Ht 4' 11" (1.499 m)   Wt 63 kg (139 lb)   LMP  (LMP Unknown)   SpO2 (!) 93%   Breastfeeding No   BMI 28.07 kg/m²     "

## 2025-06-30 NOTE — PLAN OF CARE
Procedure completed. Patient tolerated well. Refer to anesthesia documentation for vital signs and medication administration. Site CDI. RUQ Sue tube secured and open to leg drainage bag. Patient to be transported to [facility] phase 2 for 1 hour recovery per MD; report to be given at the bedside.

## 2025-06-30 NOTE — H&P
Radiology History & Physical      SUBJECTIVE:     Chief Complaint: Leaking cholecystostomy tube    History of Present Illness:  Yuni Perez is a 55 y.o. female w/ a hx of cholecystitis s/p kita tube placement, with most recent exchange 5/07/2025. Patient reports she was having some drainage around the skin, and presents for exchange.     Past Medical History:   Diagnosis Date    AR (allergic rhinitis)     Cholelithiasis, common bile duct     Chronic low back pain     Eye pressure 2017    General anesthetics causing adverse effect in therapeutic use     GERD (gastroesophageal reflux disease)     History of migraine headaches     Hypothyroidism     Innocent heart murmur     Lumbar disc disease     Menorrhagia     Mild asthma     Obesity     Plantar fasciitis of left foot     Primary pulmonary hypertension     followed by heart transplant/pulmonary     Seizure disorder     x 1 in 2008    Seizures     Sleep apnea     SVT (supraventricular tachycardia) 4/3/2025    TMJ (dislocation of temporomandibular joint)     Tricuspid regurgitation      Past Surgical History:   Procedure Laterality Date    CARDIAC CATHETERIZATION      CATHETERIZATION OF BOTH LEFT AND RIGHT HEART Bilateral 9/29/2020    Procedure: CATHETERIZATION, HEART, BOTH LEFT AND RIGHT;  Surgeon: Gucci Pickett MD;  Location: [facility] CATH LAB;  Service: Cardiology;  Laterality: Bilateral;    CENTRAL VENOUS CATHETER INSERTION      CORONARY ANGIOGRAPHY N/A 9/29/2020    Procedure: ANGIOGRAM, CORONARY ARTERY;  Surgeon: Gucci Pickett MD;  Location: [facility] CATH LAB;  Service: Cardiology;  Laterality: N/A;    gallbladder drain  06/2019    INSERTION OF HAYNES CATHETER Right 6/17/2020    Procedure: INSERTION, CATHETER, CENTRAL VENOUS, HAYNES Replace Single Lumen for Remodulin Infusion;  Surgeon: Bertin Dewitt MD;  Location: [facility] OR [address] [place] [address];  Service: General;  Laterality: Right;    PORTACATH PLACEMENT      REMOVAL OF TUNNELED CENTRAL VENOUS CATHETER  (CVC) N/A 6/17/2020    Procedure: REMOVAL, CATHETER, CENTRAL VENOUS, TUNNELED;  Surgeon: Bertin Dewitt MD;  Location: [facility] OR [address] [place] [address];  Service: General;  Laterality: N/A;    RIGHT HEART CATHETERIZATION Right 8/20/2018    Procedure: HEART CATH-RIGHT;  Surgeon: Ivonne Rai MD;  Location: [facility] CATH LAB;  Service: Cardiology;  Laterality: Right;    RIGHT HEART CATHETERIZATION Right 9/4/2019    Procedure: INSERTION, CATHETER, RIGHT HEART;  Surgeon: Ivonne Rai MD;  Location: [facility] CATH LAB;  Service: Cardiology;  Laterality: Right;    RIGHT HEART CATHETERIZATION Right 6/10/2022    Procedure: INSERTION, CATHETER, RIGHT HEART;  Surgeon: Keshia Poe MD;  Location: [facility] CATH LAB;  Service: Cardiology;  Laterality: Right;    UPPER GASTROINTESTINAL ENDOSCOPY         Home Meds:   Prior to Admission medications    Medication Sig Start Date End Date Taking? Authorizing Provider   0.9% NaCl SolP 100 mL with treprostinil sodium 5 mg/mL Soln 12,000,000 ng Inject 7,078.5 ng/min into the vein continuous. 6/21/25  Yes Chilo Myers PA-C   acetaminophen (TYLENOL) 500 MG tablet Take 1,000 mg by mouth every 6 (six) hours as needed for Pain.   Yes Provider, Kaye   ADCIRCA 20 mg Tab Take 2 tablets (40 mg total) by mouth once daily.  Patient taking differently: Take 40 mg by mouth nightly. 3/11/25  Yes Marcel Cantu MD   albuterol (VENTOLIN HFA) 90 mcg/actuation inhaler Inhale 2 puffs into the lungs every 6 (six) hours as needed for Wheezing or Shortness of Breath. Rescue 3/12/25 3/12/26 Yes Deborah Lebron FNP   ambrisentan (LETAIRIS) 10 MG Tab Take 1 tablet (10 mg total) by mouth once daily. 3/11/25  Yes Marcel Cantu MD   BREO ELLIPTA 200-25 mcg/dose DsDv diskus inhaler INHALE 1 PUFF INTO THE LUNGS EVERY EVENING. CONTROLLER 3/24/25  Yes Lulu Sandhu MD   cyanocobalamin, vitamin B-12, 2,500 mcg Subl Place 2,500 mcg under the tongue every morning.    Yes Provider, Historical   diphenhydrAMINE (BENADRYL)  25 mg capsule Take 50 mg by mouth every 6 (six) hours as needed for Itching. Patient takes prn evenings   Yes Provider, Historical   folic acid (FOLVITE) 1 MG tablet Take 1 tablet (1,000 mcg total) by mouth once daily. 11/22/24  Yes Renee Schumacher NP   furosemide (LASIX) 20 MG tablet Take 1 tablet (20 mg total) by mouth once daily.  Patient taking differently: Take 20 mg by mouth daily as needed. 9/25/24 9/25/25 Yes Bárbara Langford NP   gabapentin (NEURONTIN) 100 MG capsule Take 2 capsules (200 mg total) by mouth 3 (three) times daily. 6/21/25 6/21/26 Yes Chilo Myers PA-C   hyoscyamine (LEVSIN) 0.125 mg Subl Place 1 tablet (0.125 mg total) under the tongue every 6 (six) hours as needed (Aa needed). 4/18/24  Yes Leandra Acevedo NP   levothyroxine (SYNTHROID) 125 MCG tablet Take 1 tablet (125 mcg total) by mouth before breakfast. 11/22/24  Yes Renee Schumacher NP   loratadine (CLARITIN) 10 mg tablet Take 10 mg by mouth once daily. Takes in morning   Yes Provider, Historical   pantoprazole (PROTONIX) 40 MG tablet Take 1 tablet (40 mg total) by mouth once daily. 11/22/24  Yes Renee Schumacher NP   rimegepant (NURTEC) 75 mg odt Take 1 tablet (75 mg total) by mouth as needed for Migraine. Place ODT tablet on the tongue; alternatively the ODT tablet may be placed under the tongue. Can take 2nd dose in 24 hrs if mild relief, no more than 2 in 24 hrs 6/4/25 12/1/25 Yes Africa Garcia MD   topiramate (TOPAMAX) 100 MG tablet Take 1 tablet (100 mg total) by mouth 2 (two) times daily. 6/4/25 12/1/25 Yes Africa Garcia MD   0.9% NaCl PgBk 100 mL with meropenem 2 gram SolR 2 g Inject 2 g into the vein every 8 (eight) hours. 6/21/25   Chilo Myers PA-C   albuterol-ipratropium (DUO-NEB) 2.5 mg-0.5 mg/3 mL nebulizer solution Take 3 mLs by nebulization every 6 (six) hours as needed for Wheezing. Rescue 10/22/21 6/29/25  Keshia Poe MD   butalbital-acetaminophen-caffeine -40 mg (FIORICET, ESGIC)  "-40 mg per tablet Take 1 tablet by mouth every 4 (four) hours as needed.    Provider, Historical   diphenoxylate-atropine 2.5-0.025 mg (LOMOTIL) 2.5-0.025 mg per tablet Take 1 tablet by mouth 4 (four) times daily as needed for Diarrhea. 9/25/24   Bárbara Langford NP   ferrous sulfate 325 (65 FE) MG EC tablet Take 325 mg by mouth daily as needed.   Patient not taking: Reported on 6/29/2025    Provider, Historical   fluticasone propionate (FLONASE) 50 mcg/actuation nasal spray 2 sprays (100 mcg total) by Each Nostril route every evening.  Patient taking differently: 2 sprays by Each Nostril route Daily. 12/15/20   Keshia Poe MD   glucosam/chond-msm1/C/michele/bor (GLUCOSAMINE-CHOND-MSM COMPLEX ORAL) Take by mouth. Three times a week  Patient taking differently: Take by mouth. Two times a week    Provider, Historical   lactic acid-citric-potassium (PHEXXI) 1.8-1-0.4 % Gel Place 1 Applicatorful vaginally as needed (CONTRACEPTIVE). 10/4/21   Darcie Mccain MD   LIDOcaine (LIDODERM) 5 % Place 1 patch onto the skin once daily. Takes as needed  Patient not taking: Reported on 6/29/2025 6/21/25   Chilo Myers PA-C   nystatin (MYCOSTATIN) powder Apply topically 2 (two) times daily. for 14 days 4/14/25 5/29/25  Alesha Tomlin, NAHID   tiZANidine (ZANAFLEX) 4 MG tablet Take 4 mg by mouth every 6 (six) hours as needed. 7/2/22   Provider, Historical   treprostinil (REMODULIN) 2.5 mg/mL Soln Mix cassette as directed and infuse continuously per physician titration orders on dosing sheet. Current dose 80ng/kg/min 12/5/17   Ivonne Rai MD   triamcinolone (KENALOG) 0.5 % ointment Apply 1 application  topically 2 (two) times daily. 11/7/24   Provider, Historical     Anticoagulants/Antiplatelets: no anticoagulation    Allergies:   Review of patient's allergies indicates:   Allergen Reactions    Fentanyl Anaphylaxis     Respiratory distress    Vibra-tabs [doxycycline hyclate] Anaphylaxis     "throat felt " "like it was closing"    Adhesive Hives     Silk tape    Amoxicillin Rash    Nsaids (non-steroidal anti-inflammatory drug) Swelling    Chlorhexidine Other (See Comments)     Blue Chlorhexidine causes hives     Sedation History:  no adverse reactions    Review of Systems:   Hematological: no known coagulopathies  Respiratory: no shortness of breath  Cardiovascular: no chest pain  Gastrointestinal: no abdominal pain  Genito-Urinary: no dysuria  Musculoskeletal: negative  Neurological: no TIA or stroke symptoms         OBJECTIVE:     Vital Signs (Most Recent)  Temp: 98.1 °F (36.7 °C) (06/30/25 0922)  Pulse: 91 (06/30/25 0922)  Resp: 18 (06/30/25 0922)  BP: 105/63 (06/30/25 0922)  SpO2: (!) 92 % (06/30/25 0922)    Physical Exam:  ASA: per anesthesia  Mallampati: per anesthesia   Access: per anesthesia    General: no acute distress  Mental Status: alert and oriented to person, place and time  HEENT: normocephalic, atraumatic  Chest: unlabored breathing  Heart: regular heart rate  Abdomen: nondistended  Extremity: moves all extremities    ASSESSMENT/PLAN:     Sedation Plan: per anesthesia  Patient will undergo: Cholecystostomy tube exchange    Дмитрий Guerra  Diagnostic Radiology PGY-2     "

## 2025-07-01 ENCOUNTER — INFUSION (OUTPATIENT)
Dept: INFECTIOUS DISEASES | Facility: HOSPITAL | Age: 55
End: 2025-07-01
Payer: COMMERCIAL

## 2025-07-01 ENCOUNTER — OFFICE VISIT (OUTPATIENT)
Dept: HEPATOLOGY | Facility: CLINIC | Age: 55
End: 2025-07-01
Attending: INTERNAL MEDICINE
Payer: COMMERCIAL

## 2025-07-01 VITALS
BODY MASS INDEX: 27.91 KG/M2 | SYSTOLIC BLOOD PRESSURE: 101 MMHG | BODY MASS INDEX: 27.84 KG/M2 | HEART RATE: 89 BPM | OXYGEN SATURATION: 95 % | HEIGHT: 59 IN | TEMPERATURE: 98 F | SYSTOLIC BLOOD PRESSURE: 113 MMHG | DIASTOLIC BLOOD PRESSURE: 60 MMHG | WEIGHT: 138.44 LBS | WEIGHT: 138.13 LBS | HEIGHT: 59 IN | HEART RATE: 99 BPM | DIASTOLIC BLOOD PRESSURE: 57 MMHG | OXYGEN SATURATION: 95 % | RESPIRATION RATE: 21 BRPM

## 2025-07-01 DIAGNOSIS — R16.0 LIVER MASS: Primary | ICD-10-CM

## 2025-07-01 DIAGNOSIS — K80.50 CHOLEDOCHOLITHIASIS: Primary | ICD-10-CM

## 2025-07-01 PROCEDURE — 1160F RVW MEDS BY RX/DR IN RCRD: CPT | Mod: CPTII,S$GLB,, | Performed by: INTERNAL MEDICINE

## 2025-07-01 PROCEDURE — 1159F MED LIST DOCD IN RCRD: CPT | Mod: CPTII,S$GLB,, | Performed by: INTERNAL MEDICINE

## 2025-07-01 PROCEDURE — 99211 OFF/OP EST MAY X REQ PHY/QHP: CPT

## 2025-07-01 PROCEDURE — 99999 PR PBB SHADOW E&M-EST. PATIENT-LVL V: CPT | Mod: PBBFAC,,, | Performed by: INTERNAL MEDICINE

## 2025-07-01 PROCEDURE — 99213 OFFICE O/P EST LOW 20 MIN: CPT | Mod: S$GLB,,, | Performed by: INTERNAL MEDICINE

## 2025-07-01 PROCEDURE — 3078F DIAST BP <80 MM HG: CPT | Mod: CPTII,S$GLB,, | Performed by: INTERNAL MEDICINE

## 2025-07-01 PROCEDURE — 1111F DSCHRG MED/CURRENT MED MERGE: CPT | Mod: CPTII,S$GLB,, | Performed by: INTERNAL MEDICINE

## 2025-07-01 PROCEDURE — 3008F BODY MASS INDEX DOCD: CPT | Mod: CPTII,S$GLB,, | Performed by: INTERNAL MEDICINE

## 2025-07-01 PROCEDURE — 3074F SYST BP LT 130 MM HG: CPT | Mod: CPTII,S$GLB,, | Performed by: INTERNAL MEDICINE

## 2025-07-01 NOTE — PROGRESS NOTES
Hepatology Follow Up Note    Referring provider: Aaareferral Self  PCP: Lulu Sandhu MD    Chief complaint: follow up liver lesion    HPI:  Yuni Perez is a 55 y.o. female with history of pulmonary HTN who presents for scheduled follow up.    This 55-year-old woman is being followed for a hepatic adenoma.  She is free of any symptoms.  She is now off hormonal replacement therapy. A recent CT scan which confirmed stability.  She also has primary pulmonary hypertension.  Her most recent pressing issue involved cholecystitis.  She was thought to be too high-risk for surgical therapy so she had a cholecystostomy tube inserted at the end of March.  She has had a number of tube checks his recently as yesterday.  Past Medical History:   Diagnosis Date    AR (allergic rhinitis)     Cholelithiasis, common bile duct     Chronic low back pain     Eye pressure 2017    General anesthetics causing adverse effect in therapeutic use     GERD (gastroesophageal reflux disease)     History of migraine headaches     Hypothyroidism     Innocent heart murmur     Lumbar disc disease     Menorrhagia     Mild asthma     Obesity     Plantar fasciitis of left foot     Primary pulmonary hypertension     followed by heart transplant/pulmonary     Seizure disorder     x 1 in 2008    Seizures     Sleep apnea     SVT (supraventricular tachycardia) 4/3/2025    TMJ (dislocation of temporomandibular joint)     Tricuspid regurgitation        Past Surgical History:   Procedure Laterality Date    CARDIAC CATHETERIZATION      CATHETERIZATION OF BOTH LEFT AND RIGHT HEART Bilateral 9/29/2020    Procedure: CATHETERIZATION, HEART, BOTH LEFT AND RIGHT;  Surgeon: Gucci Pickett MD;  Location: [facility] CATH LAB;  Service: Cardiology;  Laterality: Bilateral;    CENTRAL VENOUS CATHETER INSERTION      CORONARY ANGIOGRAPHY N/A 9/29/2020    Procedure: ANGIOGRAM, CORONARY ARTERY;  Surgeon: Gucci Pickett MD;  Location: [facility] CATH LAB;  Service:  Cardiology;  Laterality: N/A;    gallbladder drain  06/2019    INSERTION OF HAYNES CATHETER Right 6/17/2020    Procedure: INSERTION, CATHETER, CENTRAL VENOUS, HAYNES Replace Single Lumen for Remodulin Infusion;  Surgeon: Bertin Dewitt MD;  [facility];  Service: General;  Laterality: Right;    PORTACATH PLACEMENT      REMOVAL OF TUNNELED CENTRAL VENOUS CATHETER (CVC) N/A 6/17/2020    Procedure: REMOVAL, CATHETER, CENTRAL VENOUS, TUNNELED;  Surgeon: Bertin Dewitt MD;  [facility];  Service: General;  Laterality: N/A;    RIGHT HEART CATHETERIZATION Right 8/20/2018    Procedure: HEART CATH-RIGHT;  Surgeon: Ivonne aRi MD;  Location: [facility] CATH LAB;  Service: Cardiology;  Laterality: Right;    RIGHT HEART CATHETERIZATION Right 9/4/2019    Procedure: INSERTION, CATHETER, RIGHT HEART;  Surgeon: Ivonne Rai MD;  Location: [facility] CATH LAB;  Service: Cardiology;  Laterality: Right;    RIGHT HEART CATHETERIZATION Right 6/10/2022    Procedure: INSERTION, CATHETER, RIGHT HEART;  Surgeon: Keshia Poe MD;  Location: [facility] CATH LAB;  Service: Cardiology;  Laterality: Right;    UPPER GASTROINTESTINAL ENDOSCOPY         Family History   Problem Relation Name Age of Onset    Diabetes Mother      Heart disease Father      Glaucoma Father      No Known Problems Sister      Cancer Maternal Aunt          breast    Breast cancer Maternal Aunt      No Known Problems Maternal Uncle      No Known Problems Paternal Aunt      No Known Problems Paternal Uncle      Cancer Maternal Grandmother          uterine    Diabetes Maternal Grandfather      Heart attack Maternal Grandfather      No Known Problems Paternal Grandmother      Heart disease Paternal Grandfather      Heart attack Paternal Grandfather      Diabetes Paternal Grandfather      Leukemia Brother      Breast cancer Cousin      Breast cancer Cousin      Hypertension Other      Colon cancer Neg Hx      Ovarian cancer Neg Hx       Amblyopia Neg Hx      Blindness Neg Hx      Cataracts Neg Hx      Macular degeneration Neg Hx      Retinal detachment Neg Hx      Strabismus Neg Hx      Stroke Neg Hx      Thyroid disease Neg Hx      Esophageal cancer Neg Hx         Social History     Tobacco Use    Smoking status: Never    Smokeless tobacco: Never   Substance Use Topics    Alcohol use: Yes     Alcohol/week: 0.8 standard drinks of alcohol     Types: 1 Standard drinks or equivalent per week     Comment: a shot glass of ruben every now and then    Drug use: No       Current Outpatient Medications   Medication Sig Dispense Refill    0.9% NaCl PgBk 100 mL with meropenem 2 gram SolR 2 g Inject 2 g into the vein every 8 (eight) hours.      0.9% NaCl SolP 100 mL with treprostinil sodium 5 mg/mL Soln 12,000,000 ng Inject 7,078.5 ng/min into the vein continuous.      acetaminophen (TYLENOL) 500 MG tablet Take 1,000 mg by mouth every 6 (six) hours as needed for Pain.      ADCIRCA 20 mg Tab Take 2 tablets (40 mg total) by mouth once daily. (Patient taking differently: Take 40 mg by mouth nightly.) 60 tablet 11    albuterol (VENTOLIN HFA) 90 mcg/actuation inhaler Inhale 2 puffs into the lungs every 6 (six) hours as needed for Wheezing or Shortness of Breath. Rescue 18 g 6    albuterol-ipratropium (DUO-NEB) 2.5 mg-0.5 mg/3 mL nebulizer solution Take 3 mLs by nebulization every 6 (six) hours as needed for Wheezing. Rescue 6 each 6    ambrisentan (LETAIRIS) 10 MG Tab Take 1 tablet (10 mg total) by mouth once daily. 30 tablet 11    BREO ELLIPTA 200-25 mcg/dose DsDv diskus inhaler INHALE 1 PUFF INTO THE LUNGS EVERY EVENING. CONTROLLER 180 each 1    butalbital-acetaminophen-caffeine -40 mg (FIORICET, ESGIC) -40 mg per tablet Take 1 tablet by mouth every 4 (four) hours as needed.      cyanocobalamin, vitamin B-12, 2,500 mcg Subl Place 2,500 mcg under the tongue every morning.       diphenhydrAMINE (BENADRYL) 25 mg capsule Take 50 mg by mouth every 6 (six)  hours as needed for Itching. Patient takes prn evenings      diphenoxylate-atropine 2.5-0.025 mg (LOMOTIL) 2.5-0.025 mg per tablet Take 1 tablet by mouth 4 (four) times daily as needed for Diarrhea. 120 tablet 0    ferrous sulfate 325 (65 FE) MG EC tablet Take 325 mg by mouth daily as needed.  (Patient not taking: Reported on 6/29/2025)      fluticasone propionate (FLONASE) 50 mcg/actuation nasal spray 2 sprays (100 mcg total) by Each Nostril route every evening. (Patient taking differently: 2 sprays by Each Nostril route Daily.) 16 g 11    folic acid (FOLVITE) 1 MG tablet Take 1 tablet (1,000 mcg total) by mouth once daily. 90 tablet 3    furosemide (LASIX) 20 MG tablet Take 1 tablet (20 mg total) by mouth once daily. (Patient taking differently: Take 20 mg by mouth daily as needed.) 30 tablet 11    gabapentin (NEURONTIN) 100 MG capsule Take 2 capsules (200 mg total) by mouth 3 (three) times daily. 180 each 11    glucosam/chond-msm1/C/michele/bor (GLUCOSAMINE-CHOND-MSM COMPLEX ORAL) Take by mouth. Three times a week (Patient taking differently: Take by mouth. Two times a week)      hyoscyamine (LEVSIN) 0.125 mg Subl Place 1 tablet (0.125 mg total) under the tongue every 6 (six) hours as needed (Aa needed). 60 tablet 3    lactic acid-citric-potassium (PHEXXI) 1.8-1-0.4 % Gel Place 1 Applicatorful vaginally as needed (CONTRACEPTIVE). 5 g 2    levothyroxine (SYNTHROID) 125 MCG tablet Take 1 tablet (125 mcg total) by mouth before breakfast. 90 tablet 2    LIDOcaine (LIDODERM) 5 % Place 1 patch onto the skin once daily. Takes as needed (Patient not taking: Reported on 6/29/2025) 30 patch 11    loratadine (CLARITIN) 10 mg tablet Take 10 mg by mouth once daily. Takes in morning      nystatin (MYCOSTATIN) powder Apply topically 2 (two) times daily. for 14 days 30 g 1    pantoprazole (PROTONIX) 40 MG tablet Take 1 tablet (40 mg total) by mouth once daily. 90 tablet 3    rimegepant (NURTEC) 75 mg odt Take 1 tablet (75 mg total)  "by mouth as needed for Migraine. Place ODT tablet on the tongue; alternatively the ODT tablet may be placed under the tongue. Can take 2nd dose in 24 hrs if mild relief, no more than 2 in 24 hrs 16 tablet 5    tiZANidine (ZANAFLEX) 4 MG tablet Take 4 mg by mouth every 6 (six) hours as needed.      topiramate (TOPAMAX) 100 MG tablet Take 1 tablet (100 mg total) by mouth 2 (two) times daily. 60 tablet 5    treprostinil (REMODULIN) 2.5 mg/mL Soln Mix cassette as directed and infuse continuously per physician titration orders on dosing sheet. Current dose 80ng/kg/min 60 mL 11    triamcinolone (KENALOG) 0.5 % ointment Apply 1 application  topically 2 (two) times daily.       No current facility-administered medications for this visit.       Review of patient's allergies indicates:   Allergen Reactions    Fentanyl Anaphylaxis     Respiratory distress    Vibra-tabs [doxycycline hyclate] Anaphylaxis     "throat felt like it was closing"    Adhesive Hives     Silk tape    Amoxicillin Rash    Nsaids (non-steroidal anti-inflammatory drug) Swelling    Chlorhexidine Other (See Comments)     Blue Chlorhexidine causes hives         Vitals:    07/01/25 1410   BP: 101/60   Pulse: 89   SpO2: 95%   Weight: 62.8 kg (138 lb 7.2 oz)   Height: 4' 11" (1.499 m)       Physical Exam  Constitutional:       General: She is not in acute distress.  HENT:      Head: Normocephalic and atraumatic.   Eyes:      General: No scleral icterus.     Conjunctiva/sclera: Conjunctivae normal.      Pupils: Pupils are equal, round, and reactive to light.   Cardiovascular:      Rate and Rhythm: Normal rate and regular rhythm.      Pulses: Normal pulses.      Heart sounds: Normal heart sounds.   Pulmonary:      Effort: No respiratory distress.      Breath sounds: No wheezing or rales.   Abdominal:      General: Bowel sounds are normal. There is no distension.      Palpations: Abdomen is soft.      Tenderness: There is no abdominal tenderness.   Musculoskeletal:  "        General: No deformity. Normal range of motion.      Left lower leg: No edema.   Skin:     General: Skin is warm and dry.   Neurological:      General: No focal deficit present.      Mental Status: She is alert and oriented to person, place, and time.      Cranial Nerves: No cranial nerve deficit.   Psychiatric:         Mood and Affect: Mood normal.         Thought Content: Thought content normal.         Judgment: Judgment normal.         LABS: I personally reviewed pertinent laboratory findings.    Lab Results   Component Value Date    WBC 6.09 06/26/2025    HGB 13.8 06/26/2025    HCT 41.4 06/26/2025    MCV 89 06/26/2025     06/26/2025       Lab Results   Component Value Date     06/26/2025    K 3.9 06/26/2025     (H) 06/26/2025    CO2 20 (L) 06/26/2025    BUN 7 06/26/2025    CREATININE 0.6 06/26/2025    CALCIUM 8.5 (L) 06/26/2025    ANIONGAP 8 06/26/2025    ESTGFRAFRICA >60.0 06/10/2022    EGFRNONAA >60.0 06/10/2022       Lab Results   Component Value Date    ALT 5 (L) 06/21/2025    AST 11 06/21/2025    ALKPHOS 68 06/21/2025    BILITOT 0.2 06/21/2025       Lab Results   Component Value Date    HEPBCAB Negative 09/21/2020    HEPCAB Non-Reactive 03/30/2025       Lab Results   Component Value Date    RUBY Positive (A) 10/08/2013        MELD 3.0: 11 at 4/4/2025  6:19 AM  MELD-Na: 8 at 4/4/2025  6:19 AM  Calculated from:  Serum Creatinine: 0.6 mg/dL (Using min of 1 mg/dL) at 4/4/2025  6:19 AM  Serum Sodium: 136 mmol/L at 4/4/2025  6:19 AM  Total Bilirubin: 0.2 mg/dL (Using min of 1 mg/dL) at 4/4/2025  6:19 AM  Serum Albumin: 2.7 g/dL at 4/4/2025  6:19 AM  INR(ratio): 1.2 at 4/2/2025  1:46 PM  Age at listing (hypothetical): 55 years  Sex: Female at 4/4/2025  6:19 AM       IMAGING: I personally reviewed imaging studies.    Assessment:    Stable left lobe liver lesion, likely hepatic adenoma  1. Liver mass        Recommendations:   Given its stability I will continue ultrasound surveillance on an  annual basis.    She will return to clinic at that time.

## 2025-07-01 NOTE — PROGRESS NOTES
Pt to clinic for Midline removal:    [facility] Midline removed per protocol and MD order; pt tolerated well, catheter tip intact;  Pressure dressing of vasaline gauze, 2x2 gauze and coban placed to site;  Pt educated on site care;

## 2025-07-07 ENCOUNTER — TELEPHONE (OUTPATIENT)
Dept: PULMONOLOGY | Facility: CLINIC | Age: 55
End: 2025-07-07
Payer: COMMERCIAL

## 2025-07-07 ENCOUNTER — TELEPHONE (OUTPATIENT)
Dept: TRANSPLANT | Facility: CLINIC | Age: 55
End: 2025-07-07
Payer: COMMERCIAL

## 2025-07-07 NOTE — TELEPHONE ENCOUNTER
Copied from CRM #[id number]. Topic: General Inquiry - Patient Advice  >> Jul 3, 2025  2:05 PM Sana wrote:  Pt is calling to speak to nurse about her prescription for oxygen; pt states she has stitches and its hard to lift. Pt want a prescription for a portable tank please call

## 2025-07-07 NOTE — TELEPHONE ENCOUNTER
Nn received a call from patient asking about portable oxygen concentrators because it is difficult to carry her current tanks. NN called and spoke with [facility] who states that a new walk is needed within 30 days of new prescription. Patient was notified and offered a walk to be done with her appointment this week and if patient desaturates, a new Rx can be submitted for portable oxygen concentrators. Patient would like to hold of for now.

## 2025-07-07 NOTE — TELEPHONE ENCOUNTER
Call returned to patient, patient advised to reach her provider in the pulmonary hypertension clinic for a new Rx as she is now followed by that clinic. Pt verbalized understanding.

## 2025-07-09 ENCOUNTER — OFFICE VISIT (OUTPATIENT)
Dept: PULMONOLOGY | Facility: CLINIC | Age: 55
End: 2025-07-09
Payer: COMMERCIAL

## 2025-07-09 VITALS
WEIGHT: 136.88 LBS | HEART RATE: 83 BPM | DIASTOLIC BLOOD PRESSURE: 68 MMHG | BODY MASS INDEX: 27.6 KG/M2 | OXYGEN SATURATION: 97 % | SYSTOLIC BLOOD PRESSURE: 90 MMHG | HEIGHT: 59 IN

## 2025-07-09 DIAGNOSIS — K81.9 CHOLECYSTITIS: ICD-10-CM

## 2025-07-09 DIAGNOSIS — I27.21 WHO GROUP 1 PULMONARY ARTERIAL HYPERTENSION: Primary | Chronic | ICD-10-CM

## 2025-07-09 DIAGNOSIS — K80.00 CALCULUS OF GALLBLADDER WITH ACUTE CHOLECYSTITIS WITHOUT OBSTRUCTION: ICD-10-CM

## 2025-07-09 DIAGNOSIS — E66.3 OVERWEIGHT (BMI 25.0-29.9): ICD-10-CM

## 2025-07-09 PROCEDURE — 99999 PR PBB SHADOW E&M-EST. PATIENT-LVL III: CPT | Mod: PBBFAC,,, | Performed by: INTERNAL MEDICINE

## 2025-07-09 NOTE — PROGRESS NOTES
Subjective:       Patient ID: Yuni Perez is a 55 y.o. female.    Chief Complaint: Follow-up (Pt here for f/u, last seen 9/2023. She has been doing good with the exception of a URI in the beginning of the year. She would like a Rx for a portable concentrator  )    HPI  Yuni Perez 55 y.o. female    has a past medical history of AR (allergic rhinitis), Cholelithiasis, common bile duct, Chronic low back pain, Eye pressure (2017), General anesthetics causing adverse effect in therapeutic use, GERD (gastroesophageal reflux disease), History of migraine headaches, Hypothyroidism, Innocent heart murmur, Lumbar disc disease, Menorrhagia, Mild asthma, Obesity, Plantar fasciitis of left foot, Primary pulmonary hypertension, Seizure disorder, Seizures, Sleep apnea, SVT (supraventricular tachycardia) (4/3/2025), TMJ (dislocation of temporomandibular joint), and Tricuspid regurgitation.    has a past surgical history that includes Portacath placement; Cardiac catheterization; Upper gastrointestinal endoscopy; Central venous catheter insertion; Right heart catheterization (Right, 8/20/2018); Right heart catheterization (Right, 9/4/2019); gallbladder drain (06/2019); Insertion of Eason catheter (Right, 6/17/2020); Removal of tunneled central venous catheter (CVC) (N/A, 6/17/2020); Catheterization of both left and right heart (Bilateral, 9/29/2020); Coronary angiography (N/A, 9/29/2020); and Right heart catheterization (Right, 6/10/2022).   reports that she has never smoked. She has never used smokeless tobacco. She reports current alcohol use of about 0.8 standard drinks of alcohol per week. She reports that she does not use drugs.  Referred by: No ref. provider found  Who had concerns including Follow-up (Pt here for f/u, last seen 9/2023. She has been doing good with the exception of a URI in the beginning of the year. She would like a Rx for a portable concentrator  ).  The patient's last visit  with me was on 9/27/2023.  LAST VISIT    Interval History    Here for follow-up for pulmonary.  Sees Dr. Poe in pulmonary hypertension clinic.  Recently had kita tube placed in March 2025 however due to concerns about her PH inability to tolerate anesthesia surgery is reluctant to take her to the operating room.  The plan for now appears to be changing the tube out.  Pulmonary hypertension appears to be inadequately controlled however patient has been reluctant to get a right heart catheterization to reassess.  We had a extensive discussion about the importance of doing this right heart catheterization given that it has been 3 years in order to optimize her therapy.  She will reach out to Dr. Poe and get this scheduled.  She has been asking about visiting family in [place] and flying.  Given that her room air oxygen saturations are 96 % she has no indication for supplemental oxygen with flight.  Frankly, she does not need supplemental oxygen. .  Her last 6 minute walk she only desaturated to 92% after walking 400 m.  I confirmed that she never walks more than this distance in 1 sitting.  She is only taking the Lasix PRN however we discussed the importance of taking it more frequently.  Specifically weighing herself daily and if the weight increases by more than 2-4 lb she should take an extra 2 Lasix.  Denies any recent infections or other complications    Review of Systems    Objective:      Physical Exam   Constitutional: She is oriented to person, place, and time. She appears well-developed and well-nourished. She appears not cachectic. No distress. She is not obese.   HENT:   Head: Normocephalic.   Nose: Nose normal. No mucosal edema.   Mouth/Throat: Normal dentition. No oropharyngeal exudate.   Neck: No tracheal deviation present.   Cardiovascular: Normal rate, regular rhythm and intact distal pulses. Exam reveals no gallop and no friction rub.   Murmur heard.  Accentuated P2, pulmonary artery pressures  "likely in the 60s   Pulmonary/Chest: Normal expansion, symmetric chest wall expansion, effort normal and breath sounds normal. No stridor.   Abdominal: Soft. Bowel sounds are normal.   Musculoskeletal:         General: No tenderness, deformity or edema. Normal range of motion.      Cervical back: Normal range of motion and neck supple.   Lymphadenopathy: No supraclavicular adenopathy is present.     She has no cervical adenopathy.   Neurological: She is alert and oriented to person, place, and time. No cranial nerve deficit. Gait normal.   Skin: Skin is warm and dry. No rash noted. She is not diaphoretic. No cyanosis or erythema. No pallor. Nails show no clubbing.   Psychiatric: She has a normal mood and affect. Her behavior is normal. Judgment and thought content normal.       Personal Diagnostic Review        7/9/2025     2:26 PM 7/1/2025     2:58 PM 7/1/2025     2:10 PM 6/30/2025    12:23 PM 6/30/2025    12:22 PM 6/30/2025    12:15 PM 6/30/2025    12:00 PM   Pulmonary Function Tests   SpO2 97 % 95 % 95 % 94 % 94 % 95 % 91 %   Height 4' 11" (1.499 m) 4' 11" (1.499 m) 4' 11" (1.499 m)       Weight 62.1 kg (136 lb 14.5 oz) 62.6 kg (138 lb 1.9 oz) 62.8 kg (138 lb 7.2 oz)       BMI (Calculated) 27.6 27.9 27.9               Assessment:       1. WHO group 1 pulmonary arterial hypertension    2. Overweight (BMI 25.0-29.9)    3. Cholecystitis    4. Calculus of gallbladder with acute cholecystitis without obstruction            Plan:            No orders of the defined types were placed in this encounter.      Assessment:  Yuni was seen today for follow-up.    Diagnoses and all orders for this visit:    WHO group 1 pulmonary arterial hypertension    Overweight (BMI 25.0-29.9)    Cholecystitis    Calculus of gallbladder with acute cholecystitis without obstruction        Plan:  Problem List Items Addressed This Visit       WHO group 1 pulmonary arterial hypertension - Primary (Chronic)    Overview   followed by heart " transplant/pulmonary   Continue current therapy per heart transplant.  Discussed monitoring her weights not by checking her cap refill on her legs.  Also not by measuring the circumference of her legs.  Agree with recommendations by ats for repeat right heart catheterization.  This was explained to patient and she understands.    Also had a discussion with the patient about her oxygen.  She does not have oxygen needs and should not wear her oxygen.  She seems reluctant to take it off however again her oxygen saturations on room air at rest were 96%, and only desaturated to 92% with 400 m.  No indication for in-flight oxygen.         Cholecystitis    Overview   -CT scan with distended and thickened gallbladder with multiple stone and choledocolithiasis.  -HIDA scan with Scintigraphic evidence of cystic duct obstruction and acute cholecystitis   -Given Pulm HTN: high risk for surgery  -Sue tube placed 4/1  -[facility] consulted for choledocholithiasis          Cholelithiasis    Overview   - has had a drain in the past; not a surgical candidate         Overweight (BMI 25.0-29.9)       I spent a total of 30 minutes on the day of the visit.     I spent a total of 30 minutes on the day of the visit.This includes face to face time and non-face to face time preparing to see the patient (eg, review of tests), obtaining and/or reviewing separately obtained history, documenting clinical information in the electronic or other health record, independently interpreting results and communicating results to the patient/family/caregiver, or care coordinator.      Follow up in about 1 year (around 7/9/2026).    Patient Instructions   Weigh yourself daily: if your weight increases by 2-3 lbs or more, take 40mg (2-20mg pills)    Unless you are walking more than 4 football fields without stopping, you don't need to wear oxygen.

## 2025-07-09 NOTE — PATIENT INSTRUCTIONS
Weigh yourself daily: if your weight increases by 2-3 lbs or more, take 40mg (2-20mg pills)    Unless you are walking more than 4 football fields without stopping, you don't need to wear oxygen.

## 2025-07-10 NOTE — PROGRESS NOTES
"Subjective     HPI    I had the pleasure of seeing Yuni Perez in consultation at your request for the evaluation of SVT. She is a 55F with primary PH (on IV Remodulin), allergic rhinitis, cholecystitis status-post percutaneous drain placement (concerns about PH preventing her from being surgical candidate at this time) with drain still in place, hypothyroidism, migraines.    Pt was admitted to [facility] in 4/2025 with acute cholecystitis, and underwent percutaneous drain placement. During this hospitalization she went into SVT, which was terminated with adenosine 12 mg.     I reviewed a telemetry strips dated 4/2/2025 which shows  bpm. Episode associated with lightheadedness and an "odd feeling".     I personally reviewed all ECGs in the EMR dating back to 2007--all show sinus rhythm.    Echo in 12/2024 showed LVEF 60-65%, moderate RV enlargement with hypertrophy, with systolic function mildly to moderately reduced, PASP 67 mmHg.    Pt had another episode earlier this month, terminated with vagal maneuvers. Pt not currently on AVN blockers or antiarrhythmics.    My interpretation of today's ECG is sinus rhythm 83 bpm with ONESIMO and RAD.     Review of Systems   Constitutional: Negative for decreased appetite, malaise/fatigue, weight gain and weight loss.   HENT:  Negative for sore throat.    Eyes:  Negative for blurred vision.   Cardiovascular:  Negative for chest pain, dyspnea on exertion, irregular heartbeat, leg swelling, near-syncope, orthopnea, palpitations, paroxysmal nocturnal dyspnea and syncope.   Respiratory:  Negative for shortness of breath.    Skin:  Negative for rash.   Musculoskeletal:  Negative for arthritis.   Gastrointestinal:  Negative for abdominal pain.   Neurological:  Negative for focal weakness.   Psychiatric/Behavioral:  Negative for altered mental status.           Objective     Physical Exam  Vitals and nursing note reviewed.   Constitutional:       General: She is not in " acute distress.     Appearance: She is well-developed.   HENT:      Head: Normocephalic and atraumatic.   Eyes:      General: No scleral icterus.     Conjunctiva/sclera: Conjunctivae normal.      Pupils: Pupils are equal, round, and reactive to light.   Neck:      Thyroid: No thyromegaly.      Vascular: No JVD.   Cardiovascular:      Rate and Rhythm: Normal rate and regular rhythm.      Pulses: Intact distal pulses.      Heart sounds: Normal heart sounds. No murmur heard.     No friction rub. No gallop.   Pulmonary:      Effort: Pulmonary effort is normal. No respiratory distress.      Breath sounds: Normal breath sounds.   Abdominal:      General: Bowel sounds are normal. There is no distension.      Palpations: Abdomen is soft.   Musculoskeletal:      Cervical back: Normal range of motion and neck supple.   Skin:     General: Skin is warm and dry.   Neurological:      Mental Status: She is alert and oriented to person, place, and time.   Psychiatric:         Behavior: Behavior normal.            Assessment and Plan     1. SVT (supraventricular tachycardia)    2. WHO group 1 pulmonary arterial hypertension    3. Hypothyroidism (acquired)    4. ABHIJEET (obstructive sleep apnea)        Plan:     In summary, Yuni Perez is a 55F with a history of PH, and recent episode of SVT in the setting of cholecystitis/percutaneous drain placement.    Discussed options in detail including holding the course (most recent episode terminated with vagal maneuvers) vs starting low dose diltiazem vs EPS/RFA SVT. For now pt would like to hold the course.    If down the road we proceed with ablation, pt will need updated RHC for PH med optimization prior to deep sedation by anesthesia.    Otherwise plan is 1 year follow-up, with 2 week Zio prior.    Thank you for allowing me to participate in the care of this patient. Please do not hesitate to call me with any questions or concerns.

## 2025-07-14 ENCOUNTER — OFFICE VISIT (OUTPATIENT)
Dept: ELECTROPHYSIOLOGY | Facility: CLINIC | Age: 55
End: 2025-07-14
Payer: COMMERCIAL

## 2025-07-14 ENCOUNTER — HOSPITAL ENCOUNTER (OUTPATIENT)
Dept: CARDIOLOGY | Facility: CLINIC | Age: 55
Discharge: HOME | End: 2025-07-14
Payer: COMMERCIAL

## 2025-07-14 VITALS
SYSTOLIC BLOOD PRESSURE: 93 MMHG | WEIGHT: 136.88 LBS | BODY MASS INDEX: 27.6 KG/M2 | HEIGHT: 59 IN | HEART RATE: 83 BPM | DIASTOLIC BLOOD PRESSURE: 62 MMHG

## 2025-07-14 DIAGNOSIS — I47.10 SVT (SUPRAVENTRICULAR TACHYCARDIA): Primary | ICD-10-CM

## 2025-07-14 DIAGNOSIS — G47.33 OSA (OBSTRUCTIVE SLEEP APNEA): ICD-10-CM

## 2025-07-14 DIAGNOSIS — I27.21 WHO GROUP 1 PULMONARY ARTERIAL HYPERTENSION: Chronic | ICD-10-CM

## 2025-07-14 DIAGNOSIS — I47.10 SVT (SUPRAVENTRICULAR TACHYCARDIA): ICD-10-CM

## 2025-07-14 DIAGNOSIS — R00.0 TACHYCARDIA: ICD-10-CM

## 2025-07-14 DIAGNOSIS — E03.9 HYPOTHYROIDISM (ACQUIRED): ICD-10-CM

## 2025-07-14 LAB
OHS QRS DURATION: 82 MS
OHS QTC CALCULATION: 470 MS

## 2025-07-14 PROCEDURE — 3074F SYST BP LT 130 MM HG: CPT | Mod: CPTII,S$GLB,, | Performed by: INTERNAL MEDICINE

## 2025-07-14 PROCEDURE — 93000 ELECTROCARDIOGRAM COMPLETE: CPT | Mod: S$GLB,,, | Performed by: INTERNAL MEDICINE

## 2025-07-14 PROCEDURE — 99205 OFFICE O/P NEW HI 60 MIN: CPT | Mod: S$GLB,,, | Performed by: INTERNAL MEDICINE

## 2025-07-14 PROCEDURE — 3008F BODY MASS INDEX DOCD: CPT | Mod: CPTII,S$GLB,, | Performed by: INTERNAL MEDICINE

## 2025-07-14 PROCEDURE — 1159F MED LIST DOCD IN RCRD: CPT | Mod: CPTII,S$GLB,, | Performed by: INTERNAL MEDICINE

## 2025-07-14 PROCEDURE — 1160F RVW MEDS BY RX/DR IN RCRD: CPT | Mod: CPTII,S$GLB,, | Performed by: INTERNAL MEDICINE

## 2025-07-14 PROCEDURE — 3078F DIAST BP <80 MM HG: CPT | Mod: CPTII,S$GLB,, | Performed by: INTERNAL MEDICINE

## 2025-07-14 PROCEDURE — 1111F DSCHRG MED/CURRENT MED MERGE: CPT | Mod: CPTII,S$GLB,, | Performed by: INTERNAL MEDICINE

## 2025-07-14 PROCEDURE — 99999 PR PBB SHADOW E&M-EST. PATIENT-LVL V: CPT | Mod: PBBFAC,,, | Performed by: INTERNAL MEDICINE

## 2025-07-17 ENCOUNTER — EXTERNAL HOME HEALTH (OUTPATIENT)
Dept: HOME HEALTH SERVICES | Facility: HOSPITAL | Age: 55
End: 2025-07-17
Payer: COMMERCIAL

## 2025-07-17 DIAGNOSIS — E66.3 OVERWEIGHT (BMI 25.0-29.9): Primary | ICD-10-CM

## 2025-07-17 PROBLEM — A28.0 PASTEURELLA CELLULITIS DUE TO CAT BITE: Status: RESOLVED | Noted: 2023-09-27 | Resolved: 2025-07-17
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
## 2025-07-17 PROBLEM — J18.9 PNEUMONIA: Status: RESOLVED | Noted: 2025-06-17 | Resolved: 2025-07-17

## 2025-07-17 PROBLEM — L03.90 PASTEURELLA CELLULITIS DUE TO CAT BITE: Status: RESOLVED | Noted: 2023-09-27 | Resolved: 2025-07-17

## 2025-07-28 ENCOUNTER — TELEPHONE (OUTPATIENT)
Dept: TRANSPLANT | Facility: CLINIC | Age: 55
End: 2025-07-28
Payer: COMMERCIAL

## 2025-07-29 ENCOUNTER — DOCUMENT SCAN (OUTPATIENT)
Dept: HOME HEALTH SERVICES | Facility: HOSPITAL | Age: 55
End: 2025-07-29
Payer: COMMERCIAL

## 2025-08-01 ENCOUNTER — DOCUMENT SCAN (OUTPATIENT)
Dept: HOME HEALTH SERVICES | Facility: HOSPITAL | Age: 55
End: 2025-08-01
Payer: COMMERCIAL

## 2025-08-06 ENCOUNTER — OFFICE VISIT (OUTPATIENT)
Dept: FAMILY MEDICINE | Facility: CLINIC | Age: 55
End: 2025-08-06
Payer: COMMERCIAL

## 2025-08-06 VITALS
HEIGHT: 59 IN | SYSTOLIC BLOOD PRESSURE: 94 MMHG | TEMPERATURE: 98 F | BODY MASS INDEX: 27.53 KG/M2 | WEIGHT: 136.56 LBS | OXYGEN SATURATION: 93 % | HEART RATE: 77 BPM | DIASTOLIC BLOOD PRESSURE: 62 MMHG

## 2025-08-06 DIAGNOSIS — Z23 NEED FOR COVID-19 VACCINE: ICD-10-CM

## 2025-08-06 DIAGNOSIS — H93.19 TINNITUS, UNSPECIFIED LATERALITY: ICD-10-CM

## 2025-08-06 DIAGNOSIS — M85.80 BORDERLINE OSTEOPENIA: ICD-10-CM

## 2025-08-06 DIAGNOSIS — I47.10 SVT (SUPRAVENTRICULAR TACHYCARDIA): ICD-10-CM

## 2025-08-06 DIAGNOSIS — I27.21 WHO GROUP 1 PULMONARY ARTERIAL HYPERTENSION: Primary | Chronic | ICD-10-CM

## 2025-08-06 DIAGNOSIS — G47.33 OSA (OBSTRUCTIVE SLEEP APNEA): ICD-10-CM

## 2025-08-06 DIAGNOSIS — E66.3 OVERWEIGHT (BMI 25.0-29.9): ICD-10-CM

## 2025-08-06 DIAGNOSIS — E03.9 HYPOTHYROIDISM (ACQUIRED): ICD-10-CM

## 2025-08-06 DIAGNOSIS — Z12.11 ENCOUNTER FOR COLORECTAL CANCER SCREENING: ICD-10-CM

## 2025-08-06 DIAGNOSIS — R16.0 LIVER MASS: ICD-10-CM

## 2025-08-06 DIAGNOSIS — K81.9 CHOLECYSTITIS: ICD-10-CM

## 2025-08-06 DIAGNOSIS — R42 VERTIGO: ICD-10-CM

## 2025-08-06 DIAGNOSIS — Z23 NEED FOR SHINGLES VACCINE: ICD-10-CM

## 2025-08-06 DIAGNOSIS — Z23 NEED FOR STREPTOCOCCUS PNEUMONIAE VACCINATION: ICD-10-CM

## 2025-08-06 DIAGNOSIS — K80.00 CALCULUS OF GALLBLADDER WITH ACUTE CHOLECYSTITIS WITHOUT OBSTRUCTION: ICD-10-CM

## 2025-08-06 DIAGNOSIS — Z12.12 ENCOUNTER FOR COLORECTAL CANCER SCREENING: ICD-10-CM

## 2025-08-06 PROCEDURE — 90471 IMMUNIZATION ADMIN: CPT | Mod: S$GLB,,, | Performed by: INTERNAL MEDICINE

## 2025-08-06 PROCEDURE — G2211 COMPLEX E/M VISIT ADD ON: HCPCS | Mod: S$GLB,,, | Performed by: INTERNAL MEDICINE

## 2025-08-06 PROCEDURE — 99214 OFFICE O/P EST MOD 30 MIN: CPT | Mod: 25,S$GLB,, | Performed by: INTERNAL MEDICINE

## 2025-08-06 PROCEDURE — 1159F MED LIST DOCD IN RCRD: CPT | Mod: CPTII,S$GLB,, | Performed by: INTERNAL MEDICINE

## 2025-08-06 PROCEDURE — 3078F DIAST BP <80 MM HG: CPT | Mod: CPTII,S$GLB,, | Performed by: INTERNAL MEDICINE

## 2025-08-06 PROCEDURE — 99999 PR PBB SHADOW E&M-EST. PATIENT-LVL V: CPT | Mod: PBBFAC,,, | Performed by: INTERNAL MEDICINE

## 2025-08-06 PROCEDURE — 3074F SYST BP LT 130 MM HG: CPT | Mod: CPTII,S$GLB,, | Performed by: INTERNAL MEDICINE

## 2025-08-06 PROCEDURE — 3008F BODY MASS INDEX DOCD: CPT | Mod: CPTII,S$GLB,, | Performed by: INTERNAL MEDICINE

## 2025-08-06 PROCEDURE — 90677 PCV20 VACCINE IM: CPT | Mod: S$GLB,,, | Performed by: INTERNAL MEDICINE

## 2025-08-06 PROCEDURE — 1160F RVW MEDS BY RX/DR IN RCRD: CPT | Mod: CPTII,S$GLB,, | Performed by: INTERNAL MEDICINE

## 2025-08-06 NOTE — PROGRESS NOTES
CHIEF COMPLAINT:   Chief Complaint   Patient presents with    Annual Exam          HISTORY OF PRESENT ILLNESS:  Yuni Perez is a 55 y.o. female who presents to the clinic today for Annual Exam  .             Patient reports fluctuating health since her last visit. She currently has a tube in place related to her gallbladder issues. There has been conflicting information about potential surgery, with concerns raised about anesthesia due to her medical condition PH). She has been researching non-surgical options for gallbladder stones, including gallbladder lithotripsy.    Patient had 2 episodes of supraventricular tachycardia (SVT). The first occurred in March while hospitalized, where she felt lightheaded and dizzy, triggering alarms on the telemetry unit. The second episode happened at home in early June or July, requiring EMT assistance. She was advised on various vagal maneuvers to manage these episodes, including bearing down, holding her breath, and applying cold to her face.    Patient reports ongoing ear discomfort (mostly on the left), describing it as a cricket-like sound and a sensation of water in her left ear. This has been persistent and differs from tinnitus. She also reports mild vertigo. She has tried using swimmers' ear medicine without success. She has reduced her caffeine intake, switching to caffeine-free tea and coca cola in an attempt to alleviate the symptoms.    Patient is scheduled for a gallbladder tube replacement procedure on the 20th of the current month. She expresses frustration with the requirement for pregnancy tests before procedures, given her age and medical history.    Patient denies any further episodes of SVT beyond the 2 reported. She denies any signs of infection at the tube insertion site, such as redness, pus, or significant increase in pain.          Subjective    PAST MEDICAL HISTORY:  Past Medical History:   Diagnosis Date    AR (allergic rhinitis)      Cholelithiasis, common bile duct     Chronic low back pain     Eye pressure 2017    General anesthetics causing adverse effect in therapeutic use     GERD (gastroesophageal reflux disease)     History of migraine headaches     Hypothyroidism     Innocent heart murmur     Lumbar disc disease     Menorrhagia     Mild asthma     Obesity     Pasteurella cellulitis due to cat bite 09/27/2023    Plantar fasciitis of left foot     Primary pulmonary hypertension     followed by heart transplant/pulmonary     Seizure disorder     x 1 in 2008    Seizures     Sleep apnea     SVT (supraventricular tachycardia) 04/03/2025    TMJ (dislocation of temporomandibular joint)     Tricuspid regurgitation        PAST SURGICAL HISTORY:  Past Surgical History:   Procedure Laterality Date    CARDIAC CATHETERIZATION      CATHETERIZATION OF BOTH LEFT AND RIGHT HEART Bilateral 9/29/2020    Procedure: CATHETERIZATION, HEART, BOTH LEFT AND RIGHT;  Surgeon: Gucci Pickett MD;  Location: [facility] CATH LAB;  Service: Cardiology;  Laterality: Bilateral;    CENTRAL VENOUS CATHETER INSERTION      CORONARY ANGIOGRAPHY N/A 9/29/2020    Procedure: ANGIOGRAM, CORONARY ARTERY;  Surgeon: Gucci Pickett MD;  Location: [facility] CATH LAB;  Service: Cardiology;  Laterality: N/A;    gallbladder drain  06/2019    INSERTION OF HAYNES CATHETER Right 6/17/2020    Procedure: INSERTION, CATHETER, CENTRAL VENOUS, HAYNES Replace Single Lumen for Remodulin Infusion;  Surgeon: Bertin Dewitt MD;  Location: [facility] OR [address] [place] [address];  Service: General;  Laterality: Right;    PORTACATH PLACEMENT      REMOVAL OF TUNNELED CENTRAL VENOUS CATHETER (CVC) N/A 6/17/2020    Procedure: REMOVAL, CATHETER, CENTRAL VENOUS, TUNNELED;  Surgeon: Bertin Dewitt MD;  [facility];  Service: General;  Laterality: N/A;    RIGHT HEART CATHETERIZATION Right 8/20/2018    Procedure: HEART CATH-RIGHT;  Surgeon: Ivonne Rai MD;  Location: [facility] CATH LAB;  Service: Cardiology;   "Laterality: Right;    RIGHT HEART CATHETERIZATION Right 9/4/2019    Procedure: INSERTION, CATHETER, RIGHT HEART;  Surgeon: Ivonne Rai MD;  Location: [facility] CATH LAB;  Service: Cardiology;  Laterality: Right;    RIGHT HEART CATHETERIZATION Right 6/10/2022    Procedure: INSERTION, CATHETER, RIGHT HEART;  Surgeon: Keshia Poe MD;  Location: [facility] CATH LAB;  Service: Cardiology;  Laterality: Right;    UPPER GASTROINTESTINAL ENDOSCOPY         SOCIAL HISTORY:  Social History[1]    FAMILY HISTORY:  Family History   Problem Relation Name Age of Onset    Diabetes Mother      Heart disease Father      Glaucoma Father      No Known Problems Sister      Cancer Maternal Aunt          breast    Breast cancer Maternal Aunt      No Known Problems Maternal Uncle      No Known Problems Paternal Aunt      No Known Problems Paternal Uncle      Cancer Maternal Grandmother          uterine    Diabetes Maternal Grandfather      Heart attack Maternal Grandfather      No Known Problems Paternal Grandmother      Heart disease Paternal Grandfather      Heart attack Paternal Grandfather      Diabetes Paternal Grandfather      Leukemia Brother      Breast cancer Cousin      Breast cancer Cousin      Hypertension Other      Colon cancer Neg Hx      Ovarian cancer Neg Hx      Amblyopia Neg Hx      Blindness Neg Hx      Cataracts Neg Hx      Macular degeneration Neg Hx      Retinal detachment Neg Hx      Strabismus Neg Hx      Stroke Neg Hx      Thyroid disease Neg Hx      Esophageal cancer Neg Hx         ALLERGIES AND MEDICATIONS: updated and reviewed.  Review of patient's allergies indicates:   Allergen Reactions    Fentanyl Anaphylaxis     Respiratory distress    Vibra-tabs [doxycycline hyclate] Anaphylaxis     "throat felt like it was closing"    Adhesive Hives     Silk tape    Amoxicillin Rash    Nsaids (non-steroidal anti-inflammatory drug) Swelling    Chlorhexidine Other (See Comments)     Blue Chlorhexidine causes hives "     Medication List with Changes/Refills   Current Medications    0.9% NACL PGBK 100 ML WITH MEROPENEM 2 GRAM SOLR 2 G    Inject 2 g into the vein every 8 (eight) hours.    0.9% NACL SOLP 100 ML WITH TREPROSTINIL SODIUM 5 MG/ML SOLN 12,000,000 NG    Inject 7,078.5 ng/min into the vein continuous.    ACETAMINOPHEN (TYLENOL) 500 MG TABLET    Take 1,000 mg by mouth every 6 (six) hours as needed for Pain.    ADCIRCA 20 MG TAB    Take 2 tablets (40 mg total) by mouth once daily.    ALBUTEROL (VENTOLIN HFA) 90 MCG/ACTUATION INHALER    Inhale 2 puffs into the lungs every 6 (six) hours as needed for Wheezing or Shortness of Breath. Rescue    ALBUTEROL-IPRATROPIUM (DUO-NEB) 2.5 MG-0.5 MG/3 ML NEBULIZER SOLUTION    Take 3 mLs by nebulization every 6 (six) hours as needed for Wheezing. Rescue    AMBRISENTAN (LETAIRIS) 10 MG TAB    Take 1 tablet (10 mg total) by mouth once daily.    BREO ELLIPTA 200-25 MCG/DOSE DSDV DISKUS INHALER    INHALE 1 PUFF INTO THE LUNGS EVERY EVENING. CONTROLLER    BUTALBITAL-ACETAMINOPHEN-CAFFEINE -40 MG (FIORICET, ESGIC) -40 MG PER TABLET    Take 1 tablet by mouth every 4 (four) hours as needed.    CYANOCOBALAMIN, VITAMIN B-12, 2,500 MCG SUBL    Place 2,500 mcg under the tongue every morning.     DIPHENHYDRAMINE (BENADRYL) 25 MG CAPSULE    Take 50 mg by mouth every 6 (six) hours as needed for Itching. Patient takes prn evenings    DIPHENOXYLATE-ATROPINE 2.5-0.025 MG (LOMOTIL) 2.5-0.025 MG PER TABLET    Take 1 tablet by mouth 4 (four) times daily as needed for Diarrhea.    FERROUS SULFATE 325 (65 FE) MG EC TABLET    Take 325 mg by mouth daily as needed.    FLUTICASONE PROPIONATE (FLONASE) 50 MCG/ACTUATION NASAL SPRAY    2 sprays (100 mcg total) by Each Nostril route every evening.    FOLIC ACID (FOLVITE) 1 MG TABLET    Take 1 tablet (1,000 mcg total) by mouth once daily.    FUROSEMIDE (LASIX) 20 MG TABLET    Take 1 tablet (20 mg total) by mouth once daily.    GABAPENTIN (NEURONTIN) 100 MG  CAPSULE    Take 2 capsules (200 mg total) by mouth 3 (three) times daily.    GLUCOSAM/CHOND-MSM1/C/TRACEE/BOR (GLUCOSAMINE-CHOND-MSM COMPLEX ORAL)    Take by mouth. Three times a week    HYOSCYAMINE (LEVSIN) 0.125 MG SUBL    Place 1 tablet (0.125 mg total) under the tongue every 6 (six) hours as needed (Aa needed).    LACTIC ACID-CITRIC-POTASSIUM (PHEXXI) 1.8-1-0.4 % GEL    Place 1 Applicatorful vaginally as needed (CONTRACEPTIVE).    LEVOTHYROXINE (SYNTHROID) 125 MCG TABLET    Take 1 tablet (125 mcg total) by mouth before breakfast.    LIDOCAINE (LIDODERM) 5 %    Place 1 patch onto the skin once daily. Takes as needed    LORATADINE (CLARITIN) 10 MG TABLET    Take 10 mg by mouth once daily. Takes in morning    NYSTATIN (MYCOSTATIN) POWDER    Apply topically 2 (two) times daily. for 14 days    PANTOPRAZOLE (PROTONIX) 40 MG TABLET    Take 1 tablet (40 mg total) by mouth once daily.    RIMEGEPANT (NURTEC) 75 MG ODT    Take 1 tablet (75 mg total) by mouth as needed for Migraine. Place ODT tablet on the tongue; alternatively the ODT tablet may be placed under the tongue. Can take 2nd dose in 24 hrs if mild relief, no more than 2 in 24 hrs    TIZANIDINE (ZANAFLEX) 4 MG TABLET    Take 4 mg by mouth every 6 (six) hours as needed.    TOPIRAMATE (TOPAMAX) 100 MG TABLET    Take 1 tablet (100 mg total) by mouth 2 (two) times daily.    TREPROSTINIL (REMODULIN) 2.5 MG/ML SOLN    Mix cassette as directed and infuse continuously per physician titration orders on dosing sheet. Current dose 80ng/kg/min    TRIAMCINOLONE (KENALOG) 0.5 % OINTMENT    Apply 1 application  topically 2 (two) times daily.       CARE TEAM:  Patient Care Team:  Lulu Sandhu MD as PCP - General (Internal Medicine)  Ralph Whyte MD (Inactive) as Referring Physician (Intensive Care)  Ian Lackey MD (Inactive) as Consulting Physician (Pulmonary Disease)  Monika Dalton LPN as Care Coordinator  Dorene Ny RN as Pulmonary  "Hypertension Coordinator (Advanced Heart Failure & Transplant Cardiology)  Aye Stout, RN as Pulmonary Hypertension Coordinator (Advanced Heart Failure & Transplant Cardiology)  Bárbara Langford, NP (Inactive) (Cardiology)       REVIEW OF SYSTEMS:  Review of Systems   Constitutional:  Positive for fatigue. Negative for chills, fever and unexpected weight change.   HENT:  Positive for nasal congestion (chronic) and tinnitus. Negative for hearing loss.    Respiratory:  Positive for shortness of breath (stable; chronic).    Cardiovascular:  Negative for leg swelling.   Genitourinary:  Negative for dysuria.   Neurological:  Positive for vertigo.              Objective    PHYSICAL EXAMINATION/VITALS:  Vitals:    08/06/25 0949   BP: 94/62   Pulse: 77   Temp: 98 °F (36.7 °C)   TempSrc: Oral   SpO2: (!) 93%   Weight: 62 kg (136 lb 9.2 oz)   Height: 4' 11" (1.499 m)       Body mass index is 27.58 kg/m².      General appearance - alert, well appearing, and in no distress, overweight  Psychiatric - alert, oriented to person, place, and time, normal behavior, speech, dress, motor activity and thought process  Eyes - pupils equal and reactive, extraocular eye movements intact, sclera anicteric  Ears -  no erythema or bulging of the tympanic membranes.  Possible slight fluid behind left eardrum.  Scant wax noted in both ear canals  Neck - supple, no significant adenopathy, carotids upstroke normal bilaterally, no bruits  Lymphatics - no palpable cervical lymphadenopathy  Chest - clear to auscultation, no wheezes, rales or rhonchi, symmetric air entry  Heart - normal rate and regular rhythm  Abdomen - gallbladder tube in place - dressing appeared clean/dry  Neurological - alert, normal speech, no focal findings; cranial nerves II through XII intact  Musculoskeletal - not examined, gait normal  Extremities - no pedal edema noted  Skin - normal coloration, no suspicious skin lesions        LABS:  No labs needed " at this time            ASSESSMENT AND PLAN:  1. WHO group 1 pulmonary arterial hypertension  The current medical regimen is effective;  continue present plan and medications.   Followed by: pulmonology/transplant.   Overview:  followed by heart transplant/pulmonary   Continue current therapy per heart transplant.  Discussed monitoring her weights not by checking her cap refill on her legs.  Also not by measuring the circumference of her legs.  Agree with recommendations by ats for repeat right heart catheterization.  This was explained to patient and she understands.    Also had a discussion with the patient about her oxygen.  She does not have oxygen needs and should not wear her oxygen.  She seems reluctant to take it off however again her oxygen saturations on room air at rest were 96%, and only desaturated to 92% with 400 m.  No indication for in-flight oxygen.      2. Hypothyroidism (acquired)  Lab Results   Component Value Date    TSH 0.038 (L) 05/31/2025     Patient is clinically euthyroid. Continue current regimen.    3. Cholecystitis/11. Calculus of gallbladder with acute cholecystitis without obstruction    Patient is scheduled for drain replacement on 08/20.  We will set her up for a urine pregnancy test today before so this will not delay her procedure on 08/20.  She will discuss with them if gallbladder lithotripsy would be an option to perhaps not have to keep a tube in the future.  We discussed that there is no need for antibiotics at this time.  Overview:  -CT scan with distended and thickened gallbladder with multiple stone and choledocholithiasis.  -HIDA scan with Scintigraphic evidence of cystic duct obstruction and acute cholecystitis   -Given Pulm HTN: high risk for surgery  -Sue tube placed 4/1  -[facility] consulted for choledocholithiasis     Orders:  -     POCT Urine Pregnancy    4. Borderline osteopenia  We discussed adequate calcium intake (preferably from diet) and  vitamin D supplementation. We discussed fall precautions. She is up to date on her BMD. No need for prescription medication at this time.  Overview:  - last BMD 2/2019 - No need for Rx tx at this time.   BMD 7/2021 - No need for Rx tx at this time.        5. ABHIJEET (obstructive sleep apnea)    Unable to tolerate CPAP machine.  Overview:  - not able to tolerate CPAP due to severe nasal congestion      6. Overweight (BMI 25.0-29.9)  BMI Readings from Last 3 Encounters:   08/06/25 27.58 kg/m²   07/14/25 27.65 kg/m²   07/09/25 27.65 kg/m²     The patient is asked to continue to make an attempt to improve diet and exercise patterns to aid in medical management of this problem.     7. Need for Streptococcus pneumoniae vaccination    -     pneumoc 20-amandeep conj-dip cr(PF) (PREVNAR-20 (PF)) injection Syrg 0.5 mL    8. Need for shingles vaccine    Declined    9. Need for COVID-19 vaccine   declined    10. Encounter for colorectal cancer screening   Patient declines screening as she would not be able to undergo colonoscopy procedure if there is a positive test result.  She will let us know if she develops any rectal bleeding.        12. Liver mass    Consider benign.  Followed by GI.  Overview:  43 with primary pulmonary htn. incidental lesion found on CT scan April 2012; FNH vs adenoma.     Discussed at IR conference Sept 2012 with recommendation for MRI w/eovist.     MRI done 10/2012: findings were more consistent with adenoma, ~5cm. This was reviewed with recommendation for surgery consult for possible resection d/t concern of risk of bleeding.     She was seen in surgery at the end of March with recommendation to repeat MRI.     MRI 4/3/13: 4.9cm seg 6 lesion; moderate, mildly heterogeneous T2 hyperintensity; saturates mildly heterogeneous arterial hyper enhancement, with enhancement persisting into delayed phases; no washout or peripheral capsular enhancement; no change in size. Multilobulated mass is present within the right  gluteal musculature. The mass demonstrates T2 intensity, and multiple fluid levels. It shows progressive enhancement on dynamic postcontrast imaging. In total, the mass measures approximately 8.9 x 1.8 cm. These are most compatible with a vascular malformation.    MRI done 11/6/2015: Stable appearing right hepatic lobe mass with segment VI most compatible with a hepatic adenoma although not entirely specific. Such stability over time (at least 10/5/2012) suggest a benign etiology.        13. SVT (supraventricular tachycardia)    She was reminded of some vasovagal maneuvers that she could do if she develops any future episodes.  She will follow-up with Cardiology and discuss possibly starting diltiazem for medical treatment should it happen again.  She is trying to schedule herself for right heart catheterization in case she might need further intervention.  She will seek immediate medical care for any acute changes in her medical condition.       14.  Tinnitus   We discussed there is no cure for this.  Likely due to some Eustachian tube dysfunction due to chronic nasal congestion from her medication for pulmonary hypertension.  She will resume saline nasal rinse.  She will try to avoid quiet places.     15. Vertigo.    Medication as needed.  Likely related to inner ear dysfunction.  She denies hearing loss at this point I do not think she has Meniere's disease, although she reports that her mother did have this.        PATIENT EDUCATION:  Explained eustachian tube anatomy and its connection to chronic ear fullness sensation.  Discussed tinnitus causes, symptoms, and management strategies.  Educated on Pseudomonas bacteria characteristics and challenges in eradication.  Provided information on proper saline solution preparation for nasal irrigation using sterile water and salt, or purchase plain saline solution from pharmacy.    ACTION ITEMS/LIFESTYLE:  Avoid caffeine and alcohol to help manage tinnitus  symptoms.  Use background noise or sound therapy to mask tinnitus when bothersome.  Perform vagal maneuvers if experiencing SVT symptoms:   Apply cold pack to face   Practice slow, deep breaths or breath-holding   Bear down as if having a bowel movement          Orders Placed This Encounter   Procedures    POCT Urine Pregnancy      FOLLOW UP: Follow up in about 6 months (around 2/6/2026), or if symptoms worsen or fail to improve, for annual exam. or sooner as needed.    This note was generated with the assistance of ambient listening technology. Verbal consent was obtained by the patient and accompanying visitor(s) for the recording of patient appointment to facilitate this note. I attest to having reviewed and edited the generated note for accuracy, though some syntax or spelling errors may persist. Please contact the author of this note for any clarification.           [1]   Social History  Socioeconomic History    Marital status: Single    Number of children: 0   Occupational History    Occupation: [employer]     Employer: [employer]   Tobacco Use    Smoking status: Never    Smokeless tobacco: Never   Substance and Sexual Activity    Alcohol use: Yes     Alcohol/week: 0.8 standard drinks of alcohol     Types: 1 Standard drinks or equivalent per week     Comment: a shot glass of ruben every now and then    Drug use: No    Sexual activity: Not Currently     Birth control/protection: OCP, Pill     Comment: pt is a virgin     Social Drivers of Health     Financial Resource Strain: Medium Risk (6/18/2025)    Overall Financial Resource Strain (CARDIA)     Difficulty of Paying Living Expenses: Somewhat hard   Food Insecurity: Food Insecurity Present (6/18/2025)    Hunger Vital Sign     Worried About Running Out of Food in the Last Year: Sometimes true     Ran Out of Food in the Last Year: Sometimes true   Transportation Needs: No Transportation Needs (6/18/2025)    PRAPARE - Transportation     Lack of  Transportation (Medical): No     Lack of Transportation (Non-Medical): No   Physical Activity: Unknown (5/31/2025)    Exercise Vital Sign     Days of Exercise per Week: 0 days   Recent Concern: Physical Activity - Inactive (4/10/2025)    Received from [facility]    Exercise Vital Sign     Days of Exercise per Week: 0 days     Minutes of Exercise per Session: 0 min   Stress: No Stress Concern Present (6/18/2025)    [place] [place] [employer] - Occupational Stress Questionnaire     Feeling of Stress : Only a little   Recent Concern: Stress - Stress Concern Present (5/31/2025)    [place] [place] [employer] - Occupational Stress Questionnaire     Feeling of Stress : To some extent   Housing Stability: Low Risk  (6/18/2025)    Housing Stability Vital Sign     Unable to Pay for Housing in the Last Year: No     Homeless in the Last Year: No

## 2025-08-11 ENCOUNTER — TELEPHONE (OUTPATIENT)
Dept: TRANSPLANT | Facility: CLINIC | Age: 55
End: 2025-08-11
Payer: COMMERCIAL

## 2025-08-11 DIAGNOSIS — G43.909 EPISODIC MIGRAINE: ICD-10-CM

## 2025-08-12 RX ORDER — RIMEGEPANT SULFATE 75 MG/75MG
75 TABLET, ORALLY DISINTEGRATING ORAL
Qty: 16 TABLET | Refills: 2 | Status: SHIPPED | OUTPATIENT
Start: 2025-08-12 | End: 2025-11-10

## 2025-08-20 ENCOUNTER — ANESTHESIA (OUTPATIENT)
Dept: INTERVENTIONAL RADIOLOGY/VASCULAR | Facility: HOSPITAL | Age: 55
End: 2025-08-20
Payer: COMMERCIAL

## 2025-08-20 ENCOUNTER — HOSPITAL ENCOUNTER (OUTPATIENT)
Dept: INTERVENTIONAL RADIOLOGY/VASCULAR | Facility: HOSPITAL | Age: 55
Discharge: HOME | End: 2025-08-20
Attending: FAMILY MEDICINE
Payer: COMMERCIAL

## 2025-08-20 VITALS
OXYGEN SATURATION: 97 % | HEIGHT: 59 IN | HEART RATE: 80 BPM | RESPIRATION RATE: 20 BRPM | DIASTOLIC BLOOD PRESSURE: 63 MMHG | WEIGHT: 136 LBS | SYSTOLIC BLOOD PRESSURE: 121 MMHG | TEMPERATURE: 98 F | BODY MASS INDEX: 27.42 KG/M2

## 2025-08-20 DIAGNOSIS — K80.50 CHOLEDOCHOLITHIASIS: ICD-10-CM

## 2025-08-20 PROCEDURE — 37000009 HC ANESTHESIA EA ADD 15 MINS: Performed by: RADIOLOGY

## 2025-08-20 PROCEDURE — 25000003 PHARM REV CODE 250: Performed by: FAMILY MEDICINE

## 2025-08-20 PROCEDURE — C1769 GUIDE WIRE: HCPCS | Performed by: RADIOLOGY

## 2025-08-20 PROCEDURE — 63600175 PHARM REV CODE 636 W HCPCS: Performed by: NURSE ANESTHETIST, CERTIFIED REGISTERED

## 2025-08-20 PROCEDURE — 27201423 OPTIME MED/SURG SUP & DEVICES STERILE SUPPLY: Performed by: RADIOLOGY

## 2025-08-20 PROCEDURE — 37000008 HC ANESTHESIA 1ST 15 MINUTES: Performed by: RADIOLOGY

## 2025-08-20 PROCEDURE — C1729 CATH, DRAINAGE: HCPCS | Performed by: RADIOLOGY

## 2025-08-20 RX ORDER — SODIUM CHLORIDE 9 MG/ML
INJECTION, SOLUTION INTRAVENOUS CONTINUOUS
Status: DISCONTINUED | OUTPATIENT
Start: 2025-08-20 | End: 2025-08-21 | Stop reason: HOSPADM

## 2025-08-20 RX ORDER — PROPOFOL 10 MG/ML
VIAL (ML) INTRAVENOUS
Status: DISCONTINUED | OUTPATIENT
Start: 2025-08-20 | End: 2025-08-20

## 2025-08-20 RX ORDER — LIDOCAINE HYDROCHLORIDE 20 MG/ML
INJECTION INTRAVENOUS
Status: DISCONTINUED | OUTPATIENT
Start: 2025-08-20 | End: 2025-08-20

## 2025-08-20 RX ORDER — CEFTRIAXONE 1 G/1
INJECTION, POWDER, FOR SOLUTION INTRAMUSCULAR; INTRAVENOUS
Status: DISCONTINUED | OUTPATIENT
Start: 2025-08-20 | End: 2025-08-20

## 2025-08-20 RX ORDER — MIDAZOLAM HYDROCHLORIDE 1 MG/ML
INJECTION INTRAMUSCULAR; INTRAVENOUS
Status: DISCONTINUED | OUTPATIENT
Start: 2025-08-20 | End: 2025-08-20

## 2025-08-20 RX ORDER — SODIUM CHLORIDE 0.9 % (FLUSH) 0.9 %
3 SYRINGE (ML) INJECTION
Status: DISCONTINUED | OUTPATIENT
Start: 2025-08-20 | End: 2025-08-21 | Stop reason: HOSPADM

## 2025-08-20 RX ORDER — GLUCAGON 1 MG
1 KIT INJECTION
Status: DISCONTINUED | OUTPATIENT
Start: 2025-08-20 | End: 2025-08-21 | Stop reason: HOSPADM

## 2025-08-20 RX ORDER — LIDOCAINE HYDROCHLORIDE 10 MG/ML
1 INJECTION, SOLUTION EPIDURAL; INFILTRATION; INTRACAUDAL; PERINEURAL ONCE
Status: DISCONTINUED | OUTPATIENT
Start: 2025-08-20 | End: 2025-08-21 | Stop reason: HOSPADM

## 2025-08-20 RX ADMIN — MIDAZOLAM HYDROCHLORIDE 2 MG: 2 INJECTION, SOLUTION INTRAMUSCULAR; INTRAVENOUS at 11:08

## 2025-08-20 RX ADMIN — PROPOFOL 30 MG: 10 INJECTION, EMULSION INTRAVENOUS at 11:08

## 2025-08-20 RX ADMIN — SODIUM CHLORIDE: 0.9 INJECTION, SOLUTION INTRAVENOUS at 11:08

## 2025-08-20 RX ADMIN — PROPOFOL 10 MG: 10 INJECTION, EMULSION INTRAVENOUS at 11:08

## 2025-08-20 RX ADMIN — PROPOFOL 20 MG: 10 INJECTION, EMULSION INTRAVENOUS at 11:08

## 2025-08-20 RX ADMIN — CEFTRIAXONE SODIUM 1 G: 1 INJECTION, POWDER, FOR SOLUTION INTRAMUSCULAR; INTRAVENOUS at 11:08

## 2025-08-20 RX ADMIN — LIDOCAINE HYDROCHLORIDE 75 MG: 20 INJECTION INTRAVENOUS at 11:08

## 2025-08-25 ENCOUNTER — TELEPHONE (OUTPATIENT)
Dept: NEUROLOGY | Facility: CLINIC | Age: 55
End: 2025-08-25
Payer: COMMERCIAL

## 2025-08-25 DIAGNOSIS — K80.50 CHOLEDOCHOLITHIASIS: Primary | ICD-10-CM

## 2025-08-26 DIAGNOSIS — G43.909 EPISODIC MIGRAINE: ICD-10-CM

## 2025-08-26 DIAGNOSIS — G43.009 MIGRAINE WITHOUT AURA AND WITHOUT STATUS MIGRAINOSUS, NOT INTRACTABLE: Primary | ICD-10-CM

## 2025-08-26 RX ORDER — RIMEGEPANT SULFATE 75 MG/75MG
75 TABLET, ORALLY DISINTEGRATING ORAL
Qty: 16 TABLET | Refills: 2 | Status: SHIPPED | OUTPATIENT
Start: 2025-08-26 | End: 2025-11-24

## 2025-08-27 ENCOUNTER — TELEPHONE (OUTPATIENT)
Dept: TRANSPLANT | Facility: CLINIC | Age: 55
End: 2025-08-27
Payer: COMMERCIAL

## 2025-08-29 ENCOUNTER — TELEPHONE (OUTPATIENT)
Dept: TRANSPLANT | Facility: CLINIC | Age: 55
End: 2025-08-29

## 2025-08-29 ENCOUNTER — SOCIAL WORK (OUTPATIENT)
Dept: TRANSPLANT | Facility: CLINIC | Age: 55
End: 2025-08-29

## 2025-08-29 ENCOUNTER — RESULTS FOLLOW-UP (OUTPATIENT)
Dept: FAMILY MEDICINE | Facility: CLINIC | Age: 55
End: 2025-08-29
Payer: COMMERCIAL

## 2025-08-29 ENCOUNTER — HOSPITAL ENCOUNTER (OUTPATIENT)
Dept: PULMONOLOGY | Facility: CLINIC | Age: 55
Discharge: HOME | End: 2025-08-29
Payer: COMMERCIAL

## 2025-08-29 ENCOUNTER — OFFICE VISIT (OUTPATIENT)
Dept: INTERVENTIONAL RADIOLOGY/VASCULAR | Facility: CLINIC | Age: 55
End: 2025-08-29
Payer: COMMERCIAL

## 2025-08-29 VITALS
WEIGHT: 135.56 LBS | HEIGHT: 59 IN | SYSTOLIC BLOOD PRESSURE: 110 MMHG | DIASTOLIC BLOOD PRESSURE: 75 MMHG | BODY MASS INDEX: 27.33 KG/M2 | HEART RATE: 88 BPM

## 2025-08-29 DIAGNOSIS — I27.20 PULMONARY HYPERTENSION: Primary | ICD-10-CM

## 2025-08-29 DIAGNOSIS — K80.50 CHOLEDOCHOLITHIASIS: Primary | ICD-10-CM

## 2025-08-29 PROCEDURE — 99999 PR PBB SHADOW E&M-EST. PATIENT-LVL III: CPT | Mod: PBBFAC,,,

## 2025-09-04 ENCOUNTER — TELEPHONE (OUTPATIENT)
Dept: INTERVENTIONAL RADIOLOGY/VASCULAR | Facility: HOSPITAL | Age: 55
End: 2025-09-04
Payer: COMMERCIAL

## (undated) DEVICE — SEE MEDLINE ITEM 146417

## (undated) DEVICE — SYR ONLY LUER LOCK 20CC

## (undated) DEVICE — SUT ETHILON 2-0 PSLX 30IN

## (undated) DEVICE — DRAPE C ARM 42 X 120 10/BX

## (undated) DEVICE — KIT PROBE COVER WITH GEL

## (undated) DEVICE — OMNIPAQUE 350 200ML

## (undated) DEVICE — SHEATH INTRODUCER 6FR 11CM

## (undated) DEVICE — SUT VICRYL 3-0 27 SH

## (undated) DEVICE — PROTECTION STATION PLUS

## (undated) DEVICE — KIT MICROINTRODUCE MINI 5X10CM

## (undated) DEVICE — SUT MCRYL PLUS 4-0 PS2 27IN

## (undated) DEVICE — SET DECANTER MEDICHOICE

## (undated) DEVICE — CATH SWAN GANZ STND 7FR

## (undated) DEVICE — CATH INFINITI 4F JL4 .042X100

## (undated) DEVICE — KIT CUSTOM MANIFOLD

## (undated) DEVICE — DEVICE PERCLOSE SUT CLSR 6FR

## (undated) DEVICE — ELECTRODE REM PLYHSV RETURN 9

## (undated) DEVICE — GLOVE GAMMEX SURG LF PI SZ 7.5

## (undated) DEVICE — CATH INFINITI JUDKINS JR4

## (undated) DEVICE — TRAY MINOR GEN SURG

## (undated) DEVICE — SET MICROPUNCTURE 5FR 501NT

## (undated) DEVICE — GOWN SURGICAL X-LARGE

## (undated) DEVICE — VISE RADIFOCUS MULTI TORQUE

## (undated) DEVICE — APPLICATOR CHLORAPREP ORN 26ML

## (undated) DEVICE — DRAPE THYROID WITH ARMBOARD

## (undated) DEVICE — SEE MEDLINE ITEM 156894

## (undated) DEVICE — SUT PROLENE 2-0 30 SH

## (undated) DEVICE — GUIDEWIRE VERRATA + JTIP 185CM

## (undated) DEVICE — SHEATH INTRODUCER 7FR 11CM

## (undated) DEVICE — DRESSING TRANS 4X4 TEGADERM

## (undated) DEVICE — SEE MEDLINE ITEM 157117

## (undated) DEVICE — CATH ANG PIGTAIL 4FR INFINITY

## (undated) DEVICE — CATH JL5 4FR INFINITY

## (undated) DEVICE — SEE MEDLINE ITEM 157131

## (undated) DEVICE — KIT MICROINTRO 4F .018X40X7CM

## (undated) DEVICE — SUT VICRYL PLUS 3-0 SH 18IN

## (undated) DEVICE — SOL NACL 0.9% INJ PF/50151

## (undated) DEVICE — SYR SLIP TIP 5CC

## (undated) DEVICE — GUIDEWIRE STF .035X260CM ANG

## (undated) DEVICE — SPIKE CONTRAST CONTROLLER

## (undated) DEVICE — GUIDEWIRE EMERALD 150CM PTFE

## (undated) DEVICE — CATH WEDGE 6FR X 110CM

## (undated) DEVICE — CATH DXTERITY AL20 100CM 6FR

## (undated) DEVICE — BLADE SURG CARBON STEEL SZ11